# Patient Record
Sex: FEMALE | Race: WHITE | NOT HISPANIC OR LATINO | URBAN - METROPOLITAN AREA
[De-identification: names, ages, dates, MRNs, and addresses within clinical notes are randomized per-mention and may not be internally consistent; named-entity substitution may affect disease eponyms.]

---

## 2017-01-31 ENCOUNTER — OUTPATIENT (OUTPATIENT)
Dept: OUTPATIENT SERVICES | Facility: HOSPITAL | Age: 67
LOS: 1 days | Discharge: HOME | End: 2017-01-31

## 2017-01-31 DIAGNOSIS — D69.6 THROMBOCYTOPENIA, UNSPECIFIED: ICD-10-CM

## 2017-02-01 ENCOUNTER — RESULT REVIEW (OUTPATIENT)
Age: 67
End: 2017-02-01

## 2017-02-02 ENCOUNTER — APPOINTMENT (OUTPATIENT)
Dept: HEMATOLOGY ONCOLOGY | Facility: CLINIC | Age: 67
End: 2017-02-02

## 2017-02-02 VITALS
OXYGEN SATURATION: 100 % | BODY MASS INDEX: 40.15 KG/M2 | RESPIRATION RATE: 16 BRPM | HEART RATE: 74 BPM | DIASTOLIC BLOOD PRESSURE: 80 MMHG | SYSTOLIC BLOOD PRESSURE: 130 MMHG | WEIGHT: 246.92 LBS | TEMPERATURE: 98 F

## 2017-02-02 LAB
BASOPHILS # BLD AUTO: 0 K/UL — SIGNIFICANT CHANGE UP (ref 0–0.2)
BASOPHILS NFR BLD AUTO: 0.4 % — SIGNIFICANT CHANGE UP (ref 0–2)
EOSINOPHIL # BLD AUTO: 0.3 K/UL — SIGNIFICANT CHANGE UP (ref 0–0.5)
EOSINOPHIL NFR BLD AUTO: 4.1 % — SIGNIFICANT CHANGE UP (ref 0–6)
HCT VFR BLD CALC: 34.1 % — LOW (ref 34.5–45)
HGB BLD-MCNC: 10.9 G/DL — LOW (ref 11.5–15.5)
LYMPHOCYTES # BLD AUTO: 1.4 K/UL — SIGNIFICANT CHANGE UP (ref 1–3.3)
LYMPHOCYTES # BLD AUTO: 23 % — SIGNIFICANT CHANGE UP (ref 13–44)
MCHC RBC-ENTMCNC: 31.9 GM/DL — LOW (ref 32–36)
MCHC RBC-ENTMCNC: 33.1 PG — SIGNIFICANT CHANGE UP (ref 27–34)
MCV RBC AUTO: 104 FL — HIGH (ref 80–100)
MONOCYTES # BLD AUTO: 0.3 K/UL — SIGNIFICANT CHANGE UP (ref 0–0.9)
MONOCYTES NFR BLD AUTO: 5.5 % — SIGNIFICANT CHANGE UP (ref 2–14)
NEUTROPHILS # BLD AUTO: 4.2 K/UL — SIGNIFICANT CHANGE UP (ref 1.8–7.4)
NEUTROPHILS NFR BLD AUTO: 67 % — SIGNIFICANT CHANGE UP (ref 43–77)
PLATELET # BLD AUTO: 558 K/UL — HIGH (ref 150–400)
RBC # BLD: 3.29 M/UL — LOW (ref 3.8–5.2)
RBC # FLD: 19.2 % — HIGH (ref 10.3–14.5)
WBC # BLD: 6.3 K/UL — SIGNIFICANT CHANGE UP (ref 3.8–10.5)
WBC # FLD AUTO: 6.3 K/UL — SIGNIFICANT CHANGE UP (ref 3.8–10.5)

## 2017-02-28 ENCOUNTER — OUTPATIENT (OUTPATIENT)
Dept: OUTPATIENT SERVICES | Facility: HOSPITAL | Age: 67
LOS: 1 days | Discharge: HOME | End: 2017-02-28

## 2017-02-28 DIAGNOSIS — D69.6 THROMBOCYTOPENIA, UNSPECIFIED: ICD-10-CM

## 2017-03-01 ENCOUNTER — RESULT REVIEW (OUTPATIENT)
Age: 67
End: 2017-03-01

## 2017-03-02 ENCOUNTER — APPOINTMENT (OUTPATIENT)
Dept: HEMATOLOGY ONCOLOGY | Facility: CLINIC | Age: 67
End: 2017-03-02

## 2017-03-02 VITALS
WEIGHT: 242.06 LBS | DIASTOLIC BLOOD PRESSURE: 82 MMHG | BODY MASS INDEX: 39.36 KG/M2 | HEART RATE: 68 BPM | TEMPERATURE: 98.8 F | SYSTOLIC BLOOD PRESSURE: 131 MMHG | RESPIRATION RATE: 16 BRPM | OXYGEN SATURATION: 96 %

## 2017-03-02 LAB
BASOPHILS # BLD AUTO: 0 K/UL — SIGNIFICANT CHANGE UP (ref 0–0.2)
BASOPHILS NFR BLD AUTO: 0.2 % — SIGNIFICANT CHANGE UP (ref 0–2)
EOSINOPHIL # BLD AUTO: 0.2 K/UL — SIGNIFICANT CHANGE UP (ref 0–0.5)
EOSINOPHIL NFR BLD AUTO: 3.6 % — SIGNIFICANT CHANGE UP (ref 0–6)
HCT VFR BLD CALC: 30.9 % — LOW (ref 34.5–45)
HGB BLD-MCNC: 10.5 G/DL — LOW (ref 11.5–15.5)
LYMPHOCYTES # BLD AUTO: 1.4 K/UL — SIGNIFICANT CHANGE UP (ref 1–3.3)
LYMPHOCYTES # BLD AUTO: 28 % — SIGNIFICANT CHANGE UP (ref 13–44)
MCHC RBC-ENTMCNC: 33.9 G/DL — SIGNIFICANT CHANGE UP (ref 32–36)
MCHC RBC-ENTMCNC: 35 PG — HIGH (ref 27–34)
MCV RBC AUTO: 103 FL — HIGH (ref 80–100)
MONOCYTES # BLD AUTO: 0.3 K/UL — SIGNIFICANT CHANGE UP (ref 0–0.9)
MONOCYTES NFR BLD AUTO: 5.3 % — SIGNIFICANT CHANGE UP (ref 2–14)
NEUTROPHILS # BLD AUTO: 3.1 K/UL — SIGNIFICANT CHANGE UP (ref 1.8–7.4)
NEUTROPHILS NFR BLD AUTO: 62.9 % — SIGNIFICANT CHANGE UP (ref 43–77)
PLATELET # BLD AUTO: 413 K/UL — HIGH (ref 150–400)
RBC # BLD: 2.99 M/UL — LOW (ref 3.8–5.2)
RBC # FLD: 18.8 % — HIGH (ref 10.3–14.5)
WBC # BLD: 4.9 K/UL — SIGNIFICANT CHANGE UP (ref 3.8–10.5)
WBC # FLD AUTO: 4.9 K/UL — SIGNIFICANT CHANGE UP (ref 3.8–10.5)

## 2017-04-03 ENCOUNTER — FORM ENCOUNTER (OUTPATIENT)
Age: 67
End: 2017-04-03

## 2017-04-04 ENCOUNTER — OUTPATIENT (OUTPATIENT)
Dept: OUTPATIENT SERVICES | Facility: HOSPITAL | Age: 67
LOS: 1 days | End: 2017-04-04
Payer: COMMERCIAL

## 2017-04-04 ENCOUNTER — APPOINTMENT (OUTPATIENT)
Dept: ULTRASOUND IMAGING | Facility: IMAGING CENTER | Age: 67
End: 2017-04-04

## 2017-04-04 DIAGNOSIS — D75.81 MYELOFIBROSIS: ICD-10-CM

## 2017-04-04 PROCEDURE — 76700 US EXAM ABDOM COMPLETE: CPT

## 2017-04-07 ENCOUNTER — OUTPATIENT (OUTPATIENT)
Dept: OUTPATIENT SERVICES | Facility: HOSPITAL | Age: 67
LOS: 1 days | Discharge: HOME | End: 2017-04-07

## 2017-04-07 DIAGNOSIS — D69.6 THROMBOCYTOPENIA, UNSPECIFIED: ICD-10-CM

## 2017-04-10 ENCOUNTER — RESULT REVIEW (OUTPATIENT)
Age: 67
End: 2017-04-10

## 2017-04-11 ENCOUNTER — LABORATORY RESULT (OUTPATIENT)
Age: 67
End: 2017-04-11

## 2017-04-11 ENCOUNTER — APPOINTMENT (OUTPATIENT)
Dept: HEMATOLOGY ONCOLOGY | Facility: CLINIC | Age: 67
End: 2017-04-11

## 2017-04-11 VITALS
SYSTOLIC BLOOD PRESSURE: 127 MMHG | HEART RATE: 82 BPM | BODY MASS INDEX: 39.51 KG/M2 | TEMPERATURE: 98.2 F | WEIGHT: 242.95 LBS | OXYGEN SATURATION: 94 % | RESPIRATION RATE: 16 BRPM | DIASTOLIC BLOOD PRESSURE: 76 MMHG

## 2017-04-11 LAB
BASOPHILS # BLD AUTO: 0 K/UL — SIGNIFICANT CHANGE UP (ref 0–0.2)
BASOPHILS NFR BLD AUTO: 0.1 % — SIGNIFICANT CHANGE UP (ref 0–2)
EOSINOPHIL # BLD AUTO: 0.2 K/UL — SIGNIFICANT CHANGE UP (ref 0–0.5)
EOSINOPHIL NFR BLD AUTO: 2.6 % — SIGNIFICANT CHANGE UP (ref 0–6)
HCT VFR BLD CALC: 30.9 % — LOW (ref 34.5–45)
HGB BLD-MCNC: 10.6 G/DL — LOW (ref 11.5–15.5)
LYMPHOCYTES # BLD AUTO: 1.1 K/UL — SIGNIFICANT CHANGE UP (ref 1–3.3)
LYMPHOCYTES # BLD AUTO: 15 % — SIGNIFICANT CHANGE UP (ref 13–44)
MCHC RBC-ENTMCNC: 34.3 G/DL — SIGNIFICANT CHANGE UP (ref 32–36)
MCHC RBC-ENTMCNC: 37 PG — HIGH (ref 27–34)
MCV RBC AUTO: 108 FL — HIGH (ref 80–100)
MONOCYTES # BLD AUTO: 0.3 K/UL — SIGNIFICANT CHANGE UP (ref 0–0.9)
MONOCYTES NFR BLD AUTO: 3.6 % — SIGNIFICANT CHANGE UP (ref 2–14)
NEUTROPHILS # BLD AUTO: 5.6 K/UL — SIGNIFICANT CHANGE UP (ref 1.8–7.4)
NEUTROPHILS NFR BLD AUTO: 78.7 % — HIGH (ref 43–77)
PLATELET # BLD AUTO: 504 K/UL — HIGH (ref 150–400)
RBC # BLD: 2.87 M/UL — LOW (ref 3.8–5.2)
RBC # FLD: 17.8 % — HIGH (ref 10.3–14.5)
WBC # BLD: 7.2 K/UL — SIGNIFICANT CHANGE UP (ref 3.8–10.5)
WBC # FLD AUTO: 7.2 K/UL — SIGNIFICANT CHANGE UP (ref 3.8–10.5)

## 2017-05-24 ENCOUNTER — OUTPATIENT (OUTPATIENT)
Dept: OUTPATIENT SERVICES | Facility: HOSPITAL | Age: 67
LOS: 1 days | Discharge: HOME | End: 2017-05-24

## 2017-05-24 DIAGNOSIS — D69.6 THROMBOCYTOPENIA, UNSPECIFIED: ICD-10-CM

## 2017-05-25 ENCOUNTER — LABORATORY RESULT (OUTPATIENT)
Age: 67
End: 2017-05-25

## 2017-05-25 ENCOUNTER — APPOINTMENT (OUTPATIENT)
Dept: HEMATOLOGY ONCOLOGY | Facility: CLINIC | Age: 67
End: 2017-05-25

## 2017-05-25 ENCOUNTER — RESULT REVIEW (OUTPATIENT)
Age: 67
End: 2017-05-25

## 2017-05-25 VITALS
BODY MASS INDEX: 39.54 KG/M2 | HEART RATE: 76 BPM | SYSTOLIC BLOOD PRESSURE: 137 MMHG | RESPIRATION RATE: 16 BRPM | OXYGEN SATURATION: 99 % | DIASTOLIC BLOOD PRESSURE: 78 MMHG | TEMPERATURE: 99.1 F | WEIGHT: 243.17 LBS

## 2017-05-25 LAB
BASOPHILS # BLD AUTO: 0 K/UL — SIGNIFICANT CHANGE UP (ref 0–0.2)
BASOPHILS NFR BLD AUTO: 0.4 % — SIGNIFICANT CHANGE UP (ref 0–2)
EOSINOPHIL # BLD AUTO: 0.1 K/UL — SIGNIFICANT CHANGE UP (ref 0–0.5)
EOSINOPHIL NFR BLD AUTO: 2.6 % — SIGNIFICANT CHANGE UP (ref 0–6)
HCT VFR BLD CALC: 30.4 % — LOW (ref 34.5–45)
HGB BLD-MCNC: 10.5 G/DL — LOW (ref 11.5–15.5)
LYMPHOCYTES # BLD AUTO: 1.4 K/UL — SIGNIFICANT CHANGE UP (ref 1–3.3)
LYMPHOCYTES # BLD AUTO: 28 % — SIGNIFICANT CHANGE UP (ref 13–44)
MCHC RBC-ENTMCNC: 34.4 G/DL — SIGNIFICANT CHANGE UP (ref 32–36)
MCHC RBC-ENTMCNC: 38.2 PG — HIGH (ref 27–34)
MCV RBC AUTO: 111 FL — HIGH (ref 80–100)
MONOCYTES # BLD AUTO: 0.3 K/UL — SIGNIFICANT CHANGE UP (ref 0–0.9)
MONOCYTES NFR BLD AUTO: 5.4 % — SIGNIFICANT CHANGE UP (ref 2–14)
NEUTROPHILS # BLD AUTO: 3.2 K/UL — SIGNIFICANT CHANGE UP (ref 1.8–7.4)
NEUTROPHILS NFR BLD AUTO: 63.7 % — SIGNIFICANT CHANGE UP (ref 43–77)
PLATELET # BLD AUTO: 408 K/UL — HIGH (ref 150–400)
RBC # BLD: 2.74 M/UL — LOW (ref 3.8–5.2)
RBC # FLD: 15 % — HIGH (ref 10.3–14.5)
WBC # BLD: 5 K/UL — SIGNIFICANT CHANGE UP (ref 3.8–10.5)
WBC # FLD AUTO: 5 K/UL — SIGNIFICANT CHANGE UP (ref 3.8–10.5)

## 2017-05-26 LAB
ALBUMIN SERPL ELPH-MCNC: 3.6 G/DL
ALP BLD-CCNC: 74 U/L
ALT SERPL-CCNC: 12 U/L
ANION GAP SERPL CALC-SCNC: 15 MMOL/L
AST SERPL-CCNC: 12 U/L
BILIRUB SERPL-MCNC: 0.3 MG/DL
BUN SERPL-MCNC: 19 MG/DL
CALCIUM SERPL-MCNC: 9 MG/DL
CHLORIDE SERPL-SCNC: 103 MMOL/L
CO2 SERPL-SCNC: 24 MMOL/L
CREAT SERPL-MCNC: 0.85 MG/DL
FOLATE SERPL-MCNC: 17.8 NG/ML
GLUCOSE SERPL-MCNC: 98 MG/DL
LDH SERPL-CCNC: 232 U/L
POTASSIUM SERPL-SCNC: 4.4 MMOL/L
PROT SERPL-MCNC: 6.7 G/DL
SODIUM SERPL-SCNC: 142 MMOL/L
URATE SERPL-MCNC: 5.3 MG/DL
VIT B12 SERPL-MCNC: 821 PG/ML

## 2017-06-29 ENCOUNTER — OUTPATIENT (OUTPATIENT)
Dept: OUTPATIENT SERVICES | Facility: HOSPITAL | Age: 67
LOS: 1 days | Discharge: HOME | End: 2017-06-29

## 2017-06-29 DIAGNOSIS — D69.6 THROMBOCYTOPENIA, UNSPECIFIED: ICD-10-CM

## 2017-07-06 ENCOUNTER — RESULT REVIEW (OUTPATIENT)
Age: 67
End: 2017-07-06

## 2017-07-06 ENCOUNTER — APPOINTMENT (OUTPATIENT)
Dept: HEMATOLOGY ONCOLOGY | Facility: CLINIC | Age: 67
End: 2017-07-06

## 2017-07-06 VITALS
BODY MASS INDEX: 39.87 KG/M2 | DIASTOLIC BLOOD PRESSURE: 81 MMHG | HEART RATE: 75 BPM | WEIGHT: 245.15 LBS | TEMPERATURE: 98.6 F | RESPIRATION RATE: 16 BRPM | SYSTOLIC BLOOD PRESSURE: 152 MMHG | OXYGEN SATURATION: 96 %

## 2017-07-06 LAB
BASOPHILS # BLD AUTO: 0 K/UL — SIGNIFICANT CHANGE UP (ref 0–0.2)
BASOPHILS NFR BLD AUTO: 0.5 % — SIGNIFICANT CHANGE UP (ref 0–2)
EOSINOPHIL # BLD AUTO: 0.1 K/UL — SIGNIFICANT CHANGE UP (ref 0–0.5)
EOSINOPHIL NFR BLD AUTO: 2.4 % — SIGNIFICANT CHANGE UP (ref 0–6)
HCT VFR BLD CALC: 31.9 % — LOW (ref 34.5–45)
HGB BLD-MCNC: 10.8 G/DL — LOW (ref 11.5–15.5)
LYMPHOCYTES # BLD AUTO: 1.4 K/UL — SIGNIFICANT CHANGE UP (ref 1–3.3)
LYMPHOCYTES # BLD AUTO: 25.4 % — SIGNIFICANT CHANGE UP (ref 13–44)
MCHC RBC-ENTMCNC: 33.9 G/DL — SIGNIFICANT CHANGE UP (ref 32–36)
MCHC RBC-ENTMCNC: 37.7 PG — HIGH (ref 27–34)
MCV RBC AUTO: 111 FL — HIGH (ref 80–100)
MONOCYTES # BLD AUTO: 0.3 K/UL — SIGNIFICANT CHANGE UP (ref 0–0.9)
MONOCYTES NFR BLD AUTO: 5.4 % — SIGNIFICANT CHANGE UP (ref 2–14)
NEUTROPHILS # BLD AUTO: 3.7 K/UL — SIGNIFICANT CHANGE UP (ref 1.8–7.4)
NEUTROPHILS NFR BLD AUTO: 66.4 % — SIGNIFICANT CHANGE UP (ref 43–77)
PLATELET # BLD AUTO: 450 K/UL — HIGH (ref 150–400)
RBC # BLD: 2.87 M/UL — LOW (ref 3.8–5.2)
RBC # FLD: 14.6 % — HIGH (ref 10.3–14.5)
WBC # BLD: 5.6 K/UL — SIGNIFICANT CHANGE UP (ref 3.8–10.5)
WBC # FLD AUTO: 5.6 K/UL — SIGNIFICANT CHANGE UP (ref 3.8–10.5)

## 2017-08-06 LAB
ALBUMIN SERPL ELPH-MCNC: 3.9 G/DL
ALP BLD-CCNC: 77 U/L
ALT SERPL-CCNC: 17 U/L
ANION GAP SERPL CALC-SCNC: 14 MMOL/L
AST SERPL-CCNC: 17 U/L
BILIRUB SERPL-MCNC: 0.4 MG/DL
BUN SERPL-MCNC: 12 MG/DL
CALCIUM SERPL-MCNC: 9.4 MG/DL
CHLORIDE SERPL-SCNC: 104 MMOL/L
CO2 SERPL-SCNC: 25 MMOL/L
CREAT SERPL-MCNC: 0.8 MG/DL
GLUCOSE SERPL-MCNC: 90 MG/DL
LDH SERPL-CCNC: 247 U/L
POTASSIUM SERPL-SCNC: 4.1 MMOL/L
PROT SERPL-MCNC: 6.6 G/DL
SODIUM SERPL-SCNC: 143 MMOL/L

## 2017-08-14 ENCOUNTER — OUTPATIENT (OUTPATIENT)
Dept: OUTPATIENT SERVICES | Facility: HOSPITAL | Age: 67
LOS: 1 days | Discharge: HOME | End: 2017-08-14

## 2017-08-14 DIAGNOSIS — D69.6 THROMBOCYTOPENIA, UNSPECIFIED: ICD-10-CM

## 2017-09-12 ENCOUNTER — LABORATORY RESULT (OUTPATIENT)
Age: 67
End: 2017-09-12

## 2017-09-12 ENCOUNTER — RESULT REVIEW (OUTPATIENT)
Age: 67
End: 2017-09-12

## 2017-09-12 ENCOUNTER — APPOINTMENT (OUTPATIENT)
Dept: HEMATOLOGY ONCOLOGY | Facility: CLINIC | Age: 67
End: 2017-09-12
Payer: MEDICARE

## 2017-09-12 ENCOUNTER — OUTPATIENT (OUTPATIENT)
Dept: OUTPATIENT SERVICES | Facility: HOSPITAL | Age: 67
LOS: 1 days | Discharge: HOME | End: 2017-09-12

## 2017-09-12 VITALS
SYSTOLIC BLOOD PRESSURE: 130 MMHG | TEMPERATURE: 98.5 F | DIASTOLIC BLOOD PRESSURE: 78 MMHG | BODY MASS INDEX: 41.31 KG/M2 | OXYGEN SATURATION: 97 % | RESPIRATION RATE: 16 BRPM | WEIGHT: 244.93 LBS | HEART RATE: 75 BPM | HEIGHT: 64.65 IN

## 2017-09-12 DIAGNOSIS — D69.6 THROMBOCYTOPENIA, UNSPECIFIED: ICD-10-CM

## 2017-09-12 LAB
BASOPHILS # BLD AUTO: 0 K/UL — SIGNIFICANT CHANGE UP (ref 0–0.2)
BASOPHILS NFR BLD AUTO: 0.2 % — SIGNIFICANT CHANGE UP (ref 0–2)
EOSINOPHIL # BLD AUTO: 0.2 K/UL — SIGNIFICANT CHANGE UP (ref 0–0.5)
EOSINOPHIL NFR BLD AUTO: 2.8 % — SIGNIFICANT CHANGE UP (ref 0–6)
HCT VFR BLD CALC: 32.8 % — LOW (ref 34.5–45)
HGB BLD-MCNC: 10.6 G/DL — LOW (ref 11.5–15.5)
LYMPHOCYTES # BLD AUTO: 1.4 K/UL — SIGNIFICANT CHANGE UP (ref 1–3.3)
LYMPHOCYTES # BLD AUTO: 23.2 % — SIGNIFICANT CHANGE UP (ref 13–44)
MCHC RBC-ENTMCNC: 32.2 G/DL — SIGNIFICANT CHANGE UP (ref 32–36)
MCHC RBC-ENTMCNC: 35.1 PG — HIGH (ref 27–34)
MCV RBC AUTO: 109 FL — HIGH (ref 80–100)
MONOCYTES # BLD AUTO: 0.3 K/UL — SIGNIFICANT CHANGE UP (ref 0–0.9)
MONOCYTES NFR BLD AUTO: 5.3 % — SIGNIFICANT CHANGE UP (ref 2–14)
NEUTROPHILS # BLD AUTO: 4.2 K/UL — SIGNIFICANT CHANGE UP (ref 1.8–7.4)
NEUTROPHILS NFR BLD AUTO: 68.5 % — SIGNIFICANT CHANGE UP (ref 43–77)
PLATELET # BLD AUTO: 437 K/UL — HIGH (ref 150–400)
RBC # BLD: 3.02 M/UL — LOW (ref 3.8–5.2)
RBC # FLD: 17 % — HIGH (ref 10.3–14.5)
WBC # BLD: 6.2 K/UL — SIGNIFICANT CHANGE UP (ref 3.8–10.5)
WBC # FLD AUTO: 6.2 K/UL — SIGNIFICANT CHANGE UP (ref 3.8–10.5)

## 2017-09-12 PROCEDURE — 99214 OFFICE O/P EST MOD 30 MIN: CPT

## 2017-12-13 ENCOUNTER — OUTPATIENT (OUTPATIENT)
Dept: OUTPATIENT SERVICES | Facility: HOSPITAL | Age: 67
LOS: 1 days | Discharge: HOME | End: 2017-12-13

## 2017-12-13 DIAGNOSIS — D69.6 THROMBOCYTOPENIA, UNSPECIFIED: ICD-10-CM

## 2017-12-19 ENCOUNTER — APPOINTMENT (OUTPATIENT)
Dept: HEMATOLOGY ONCOLOGY | Facility: CLINIC | Age: 67
End: 2017-12-19
Payer: MEDICARE

## 2017-12-19 ENCOUNTER — RESULT REVIEW (OUTPATIENT)
Age: 67
End: 2017-12-19

## 2017-12-19 VITALS
BODY MASS INDEX: 41.5 KG/M2 | SYSTOLIC BLOOD PRESSURE: 147 MMHG | WEIGHT: 246.7 LBS | RESPIRATION RATE: 16 BRPM | HEART RATE: 76 BPM | TEMPERATURE: 98.2 F | OXYGEN SATURATION: 100 % | DIASTOLIC BLOOD PRESSURE: 74 MMHG

## 2017-12-19 LAB
BASOPHILS # BLD AUTO: 0.1 K/UL — SIGNIFICANT CHANGE UP (ref 0–0.2)
BASOPHILS NFR BLD AUTO: 0.9 % — SIGNIFICANT CHANGE UP (ref 0–2)
EOSINOPHIL # BLD AUTO: 0.2 K/UL — SIGNIFICANT CHANGE UP (ref 0–0.5)
EOSINOPHIL NFR BLD AUTO: 2.7 % — SIGNIFICANT CHANGE UP (ref 0–6)
HCT VFR BLD CALC: 32.3 % — LOW (ref 34.5–45)
HGB BLD-MCNC: 10.8 G/DL — LOW (ref 11.5–15.5)
LYMPHOCYTES # BLD AUTO: 1.3 K/UL — SIGNIFICANT CHANGE UP (ref 1–3.3)
LYMPHOCYTES # BLD AUTO: 20.5 % — SIGNIFICANT CHANGE UP (ref 13–44)
MCHC RBC-ENTMCNC: 33.3 G/DL — SIGNIFICANT CHANGE UP (ref 32–36)
MCHC RBC-ENTMCNC: 35.8 PG — HIGH (ref 27–34)
MCV RBC AUTO: 107 FL — HIGH (ref 80–100)
MONOCYTES # BLD AUTO: 0.3 K/UL — SIGNIFICANT CHANGE UP (ref 0–0.9)
MONOCYTES NFR BLD AUTO: 4 % — SIGNIFICANT CHANGE UP (ref 2–14)
NEUTROPHILS # BLD AUTO: 4.6 K/UL — SIGNIFICANT CHANGE UP (ref 1.8–7.4)
NEUTROPHILS NFR BLD AUTO: 71.9 % — SIGNIFICANT CHANGE UP (ref 43–77)
PLATELET # BLD AUTO: 455 K/UL — HIGH (ref 150–400)
RBC # BLD: 3.02 M/UL — LOW (ref 3.8–5.2)
RBC # FLD: 16.4 % — HIGH (ref 10.3–14.5)
WBC # BLD: 6.4 K/UL — SIGNIFICANT CHANGE UP (ref 3.8–10.5)
WBC # FLD AUTO: 6.4 K/UL — SIGNIFICANT CHANGE UP (ref 3.8–10.5)

## 2017-12-19 PROCEDURE — 99213 OFFICE O/P EST LOW 20 MIN: CPT

## 2018-04-03 ENCOUNTER — OUTPATIENT (OUTPATIENT)
Dept: OUTPATIENT SERVICES | Facility: HOSPITAL | Age: 68
LOS: 1 days | Discharge: HOME | End: 2018-04-03

## 2018-04-03 DIAGNOSIS — D69.6 THROMBOCYTOPENIA, UNSPECIFIED: ICD-10-CM

## 2018-04-05 ENCOUNTER — APPOINTMENT (OUTPATIENT)
Dept: HEMATOLOGY ONCOLOGY | Facility: CLINIC | Age: 68
End: 2018-04-05

## 2018-06-20 ENCOUNTER — OUTPATIENT (OUTPATIENT)
Dept: OUTPATIENT SERVICES | Facility: HOSPITAL | Age: 68
LOS: 1 days | Discharge: HOME | End: 2018-06-20

## 2018-06-20 DIAGNOSIS — D69.6 THROMBOCYTOPENIA, UNSPECIFIED: ICD-10-CM

## 2018-06-21 ENCOUNTER — RESULT REVIEW (OUTPATIENT)
Age: 68
End: 2018-06-21

## 2018-06-21 ENCOUNTER — APPOINTMENT (OUTPATIENT)
Dept: HEMATOLOGY ONCOLOGY | Facility: CLINIC | Age: 68
End: 2018-06-21
Payer: MEDICARE

## 2018-06-21 VITALS
SYSTOLIC BLOOD PRESSURE: 130 MMHG | RESPIRATION RATE: 16 BRPM | BODY MASS INDEX: 39.5 KG/M2 | TEMPERATURE: 98 F | HEART RATE: 76 BPM | WEIGHT: 234.79 LBS | DIASTOLIC BLOOD PRESSURE: 70 MMHG

## 2018-06-21 DIAGNOSIS — F32.9 MAJOR DEPRESSIVE DISORDER, SINGLE EPISODE, UNSPECIFIED: ICD-10-CM

## 2018-06-21 LAB
BASOPHILS # BLD AUTO: 0 K/UL — SIGNIFICANT CHANGE UP (ref 0–0.2)
BASOPHILS NFR BLD AUTO: 0.3 % — SIGNIFICANT CHANGE UP (ref 0–2)
EOSINOPHIL # BLD AUTO: 0.2 K/UL — SIGNIFICANT CHANGE UP (ref 0–0.5)
EOSINOPHIL NFR BLD AUTO: 2.5 % — SIGNIFICANT CHANGE UP (ref 0–6)
HCT VFR BLD CALC: 36.5 % — SIGNIFICANT CHANGE UP (ref 34.5–45)
HGB BLD-MCNC: 11.8 G/DL — SIGNIFICANT CHANGE UP (ref 11.5–15.5)
LYMPHOCYTES # BLD AUTO: 2 K/UL — SIGNIFICANT CHANGE UP (ref 1–3.3)
LYMPHOCYTES # BLD AUTO: 21.1 % — SIGNIFICANT CHANGE UP (ref 13–44)
MCHC RBC-ENTMCNC: 32.4 G/DL — SIGNIFICANT CHANGE UP (ref 32–36)
MCHC RBC-ENTMCNC: 34.8 PG — HIGH (ref 27–34)
MCV RBC AUTO: 107 FL — HIGH (ref 80–100)
MONOCYTES # BLD AUTO: 0.4 K/UL — SIGNIFICANT CHANGE UP (ref 0–0.9)
MONOCYTES NFR BLD AUTO: 4.3 % — SIGNIFICANT CHANGE UP (ref 2–14)
NEUTROPHILS # BLD AUTO: 6.7 K/UL — SIGNIFICANT CHANGE UP (ref 1.8–7.4)
NEUTROPHILS NFR BLD AUTO: 71.8 % — SIGNIFICANT CHANGE UP (ref 43–77)
PLATELET # BLD AUTO: 812 K/UL — HIGH (ref 150–400)
RBC # BLD: 3.4 M/UL — LOW (ref 3.8–5.2)
RBC # FLD: 18.1 % — HIGH (ref 10.3–14.5)
WBC # BLD: 9.3 K/UL — SIGNIFICANT CHANGE UP (ref 3.8–10.5)
WBC # FLD AUTO: 9.3 K/UL — SIGNIFICANT CHANGE UP (ref 3.8–10.5)

## 2018-06-21 PROCEDURE — 99213 OFFICE O/P EST LOW 20 MIN: CPT

## 2018-07-02 LAB
ALBUMIN SERPL ELPH-MCNC: 3.7 G/DL
ALP BLD-CCNC: 72 U/L
ALT SERPL-CCNC: 13 U/L
ANION GAP SERPL CALC-SCNC: 13 MMOL/L
AST SERPL-CCNC: 18 U/L
BILIRUB SERPL-MCNC: 0.4 MG/DL
BUN SERPL-MCNC: 13 MG/DL
CALCIUM SERPL-MCNC: 9.3 MG/DL
CHLORIDE SERPL-SCNC: 103 MMOL/L
CO2 SERPL-SCNC: 25 MMOL/L
CREAT SERPL-MCNC: 0.95 MG/DL
GLUCOSE SERPL-MCNC: 84 MG/DL
LDH SERPL-CCNC: 337 U/L
POTASSIUM SERPL-SCNC: 5.8 MMOL/L
PROT SERPL-MCNC: 7 G/DL
SODIUM SERPL-SCNC: 141 MMOL/L
URATE SERPL-MCNC: 4.8 MG/DL

## 2018-07-19 ENCOUNTER — OUTPATIENT (OUTPATIENT)
Dept: OUTPATIENT SERVICES | Facility: HOSPITAL | Age: 68
LOS: 1 days | Discharge: HOME | End: 2018-07-19

## 2018-07-19 DIAGNOSIS — D69.6 THROMBOCYTOPENIA, UNSPECIFIED: ICD-10-CM

## 2018-07-26 ENCOUNTER — RESULT REVIEW (OUTPATIENT)
Age: 68
End: 2018-07-26

## 2018-07-26 ENCOUNTER — LABORATORY RESULT (OUTPATIENT)
Age: 68
End: 2018-07-26

## 2018-07-26 ENCOUNTER — APPOINTMENT (OUTPATIENT)
Dept: HEMATOLOGY ONCOLOGY | Facility: CLINIC | Age: 68
End: 2018-07-26
Payer: MEDICARE

## 2018-07-26 VITALS
DIASTOLIC BLOOD PRESSURE: 70 MMHG | SYSTOLIC BLOOD PRESSURE: 130 MMHG | BODY MASS INDEX: 40.42 KG/M2 | OXYGEN SATURATION: 99 % | WEIGHT: 240.3 LBS | HEART RATE: 83 BPM | TEMPERATURE: 98.5 F | RESPIRATION RATE: 18 BRPM

## 2018-07-26 LAB
BASOPHILS # BLD AUTO: 0 K/UL — SIGNIFICANT CHANGE UP (ref 0–0.2)
BASOPHILS NFR BLD AUTO: 0.4 % — SIGNIFICANT CHANGE UP (ref 0–2)
EOSINOPHIL # BLD AUTO: 0.2 K/UL — SIGNIFICANT CHANGE UP (ref 0–0.5)
EOSINOPHIL NFR BLD AUTO: 3.4 % — SIGNIFICANT CHANGE UP (ref 0–6)
HCT VFR BLD CALC: 31.2 % — LOW (ref 34.5–45)
HGB BLD-MCNC: 10.2 G/DL — LOW (ref 11.5–15.5)
LYMPHOCYTES # BLD AUTO: 0.9 K/UL — LOW (ref 1–3.3)
LYMPHOCYTES # BLD AUTO: 16.3 % — SIGNIFICANT CHANGE UP (ref 13–44)
MCHC RBC-ENTMCNC: 32.6 G/DL — SIGNIFICANT CHANGE UP (ref 32–36)
MCHC RBC-ENTMCNC: 35.3 PG — HIGH (ref 27–34)
MCV RBC AUTO: 108 FL — HIGH (ref 80–100)
MONOCYTES # BLD AUTO: 0.2 K/UL — SIGNIFICANT CHANGE UP (ref 0–0.9)
MONOCYTES NFR BLD AUTO: 3.6 % — SIGNIFICANT CHANGE UP (ref 2–14)
NEUTROPHILS # BLD AUTO: 4.4 K/UL — SIGNIFICANT CHANGE UP (ref 1.8–7.4)
NEUTROPHILS NFR BLD AUTO: 76.3 % — SIGNIFICANT CHANGE UP (ref 43–77)
PLATELET # BLD AUTO: 512 K/UL — HIGH (ref 150–400)
RBC # BLD: 2.88 M/UL — LOW (ref 3.8–5.2)
RBC # FLD: 18.2 % — HIGH (ref 10.3–14.5)
WBC # BLD: 5.8 K/UL — SIGNIFICANT CHANGE UP (ref 3.8–10.5)
WBC # FLD AUTO: 5.8 K/UL — SIGNIFICANT CHANGE UP (ref 3.8–10.5)

## 2018-07-26 PROCEDURE — 99213 OFFICE O/P EST LOW 20 MIN: CPT

## 2018-08-15 ENCOUNTER — OUTPATIENT (OUTPATIENT)
Dept: OUTPATIENT SERVICES | Facility: HOSPITAL | Age: 68
LOS: 1 days | Discharge: HOME | End: 2018-08-15

## 2018-08-15 DIAGNOSIS — D69.6 THROMBOCYTOPENIA, UNSPECIFIED: ICD-10-CM

## 2018-08-22 ENCOUNTER — RESULT REVIEW (OUTPATIENT)
Age: 68
End: 2018-08-22

## 2018-08-22 ENCOUNTER — APPOINTMENT (OUTPATIENT)
Dept: HEMATOLOGY ONCOLOGY | Facility: CLINIC | Age: 68
End: 2018-08-22

## 2018-08-22 LAB
BASOPHILS # BLD AUTO: 0 K/UL — SIGNIFICANT CHANGE UP (ref 0–0.2)
BASOPHILS NFR BLD AUTO: 0.2 % — SIGNIFICANT CHANGE UP (ref 0–2)
EOSINOPHIL # BLD AUTO: 0.4 K/UL — SIGNIFICANT CHANGE UP (ref 0–0.5)
EOSINOPHIL NFR BLD AUTO: 5.3 % — SIGNIFICANT CHANGE UP (ref 0–6)
HCT VFR BLD CALC: 32.2 % — LOW (ref 34.5–45)
HGB BLD-MCNC: 10.7 G/DL — LOW (ref 11.5–15.5)
LYMPHOCYTES # BLD AUTO: 1.5 K/UL — SIGNIFICANT CHANGE UP (ref 1–3.3)
LYMPHOCYTES # BLD AUTO: 18.9 % — SIGNIFICANT CHANGE UP (ref 13–44)
MCHC RBC-ENTMCNC: 33.2 G/DL — SIGNIFICANT CHANGE UP (ref 32–36)
MCHC RBC-ENTMCNC: 34.9 PG — HIGH (ref 27–34)
MCV RBC AUTO: 105 FL — HIGH (ref 80–100)
MONOCYTES # BLD AUTO: 0.2 K/UL — SIGNIFICANT CHANGE UP (ref 0–0.9)
MONOCYTES NFR BLD AUTO: 2.5 % — SIGNIFICANT CHANGE UP (ref 2–14)
NEUTROPHILS # BLD AUTO: 5.8 K/UL — SIGNIFICANT CHANGE UP (ref 1.8–7.4)
NEUTROPHILS NFR BLD AUTO: 73 % — SIGNIFICANT CHANGE UP (ref 43–77)
PLATELET # BLD AUTO: 594 K/UL — HIGH (ref 150–400)
RBC # BLD: 3.06 M/UL — LOW (ref 3.8–5.2)
RBC # FLD: 17.8 % — HIGH (ref 10.3–14.5)
WBC # BLD: 8 K/UL — SIGNIFICANT CHANGE UP (ref 3.8–10.5)
WBC # FLD AUTO: 8 K/UL — SIGNIFICANT CHANGE UP (ref 3.8–10.5)

## 2018-09-11 LAB
ALBUMIN SERPL ELPH-MCNC: 3.9 G/DL
ALP BLD-CCNC: 76 U/L
ALT SERPL-CCNC: 10 U/L
ANION GAP SERPL CALC-SCNC: 15 MMOL/L
AST SERPL-CCNC: 16 U/L
BILIRUB SERPL-MCNC: 0.4 MG/DL
BUN SERPL-MCNC: 12 MG/DL
CALCIUM SERPL-MCNC: 8.7 MG/DL
CHLORIDE SERPL-SCNC: 101 MMOL/L
CO2 SERPL-SCNC: 26 MMOL/L
CREAT SERPL-MCNC: 0.85 MG/DL
GLUCOSE SERPL-MCNC: 109 MG/DL
LDH SERPL-CCNC: 305 U/L
POTASSIUM SERPL-SCNC: 4.2 MMOL/L
PROT SERPL-MCNC: 6.4 G/DL
SODIUM SERPL-SCNC: 142 MMOL/L
URATE SERPL-MCNC: 4.4 MG/DL

## 2018-09-26 ENCOUNTER — OUTPATIENT (OUTPATIENT)
Dept: OUTPATIENT SERVICES | Facility: HOSPITAL | Age: 68
LOS: 1 days | Discharge: HOME | End: 2018-09-26

## 2018-09-26 DIAGNOSIS — D69.6 THROMBOCYTOPENIA, UNSPECIFIED: ICD-10-CM

## 2018-10-04 ENCOUNTER — APPOINTMENT (OUTPATIENT)
Dept: HEMATOLOGY ONCOLOGY | Facility: CLINIC | Age: 68
End: 2018-10-04
Payer: MEDICARE

## 2018-10-04 ENCOUNTER — RESULT REVIEW (OUTPATIENT)
Age: 68
End: 2018-10-04

## 2018-10-04 VITALS
SYSTOLIC BLOOD PRESSURE: 139 MMHG | DIASTOLIC BLOOD PRESSURE: 86 MMHG | BODY MASS INDEX: 39.87 KG/M2 | HEART RATE: 94 BPM | WEIGHT: 237 LBS | OXYGEN SATURATION: 99 % | TEMPERATURE: 97.7 F | RESPIRATION RATE: 17 BRPM

## 2018-10-04 LAB
BASOPHILS # BLD AUTO: 0 K/UL — SIGNIFICANT CHANGE UP (ref 0–0.2)
BASOPHILS NFR BLD AUTO: 0.2 % — SIGNIFICANT CHANGE UP (ref 0–2)
EOSINOPHIL # BLD AUTO: 0.2 K/UL — SIGNIFICANT CHANGE UP (ref 0–0.5)
EOSINOPHIL NFR BLD AUTO: 2.3 % — SIGNIFICANT CHANGE UP (ref 0–6)
HCT VFR BLD CALC: 32.8 % — LOW (ref 34.5–45)
HGB BLD-MCNC: 10.8 G/DL — LOW (ref 11.5–15.5)
LYMPHOCYTES # BLD AUTO: 1.1 K/UL — SIGNIFICANT CHANGE UP (ref 1–3.3)
LYMPHOCYTES # BLD AUTO: 14.9 % — SIGNIFICANT CHANGE UP (ref 13–44)
MCHC RBC-ENTMCNC: 32.8 G/DL — SIGNIFICANT CHANGE UP (ref 32–36)
MCHC RBC-ENTMCNC: 34.4 PG — HIGH (ref 27–34)
MCV RBC AUTO: 105 FL — HIGH (ref 80–100)
MONOCYTES # BLD AUTO: 0.3 K/UL — SIGNIFICANT CHANGE UP (ref 0–0.9)
MONOCYTES NFR BLD AUTO: 4 % — SIGNIFICANT CHANGE UP (ref 2–14)
NEUTROPHILS # BLD AUTO: 5.7 K/UL — SIGNIFICANT CHANGE UP (ref 1.8–7.4)
NEUTROPHILS NFR BLD AUTO: 78.6 % — HIGH (ref 43–77)
PLATELET # BLD AUTO: 519 K/UL — HIGH (ref 150–400)
RBC # BLD: 3.13 M/UL — LOW (ref 3.8–5.2)
RBC # FLD: 18.7 % — HIGH (ref 10.3–14.5)
WBC # BLD: 7.3 K/UL — SIGNIFICANT CHANGE UP (ref 3.8–10.5)
WBC # FLD AUTO: 7.3 K/UL — SIGNIFICANT CHANGE UP (ref 3.8–10.5)

## 2018-10-04 PROCEDURE — 99213 OFFICE O/P EST LOW 20 MIN: CPT

## 2018-10-16 LAB
ALBUMIN SERPL ELPH-MCNC: 4.2 G/DL
ALP BLD-CCNC: 80 U/L
ALT SERPL-CCNC: 16 U/L
ANION GAP SERPL CALC-SCNC: 14 MMOL/L
AST SERPL-CCNC: 16 U/L
BILIRUB SERPL-MCNC: 0.5 MG/DL
BUN SERPL-MCNC: 12 MG/DL
CALCIUM SERPL-MCNC: 8.8 MG/DL
CHLORIDE SERPL-SCNC: 104 MMOL/L
CO2 SERPL-SCNC: 24 MMOL/L
CREAT SERPL-MCNC: 0.84 MG/DL
GLUCOSE SERPL-MCNC: 96 MG/DL
LDH SERPL-CCNC: 240 U/L
POTASSIUM SERPL-SCNC: 4.6 MMOL/L
PROT SERPL-MCNC: 6.9 G/DL
SODIUM SERPL-SCNC: 142 MMOL/L
URATE SERPL-MCNC: 4.7 MG/DL

## 2018-12-06 ENCOUNTER — NON-APPOINTMENT (OUTPATIENT)
Age: 68
End: 2018-12-06

## 2018-12-06 ENCOUNTER — RX RENEWAL (OUTPATIENT)
Age: 68
End: 2018-12-06

## 2018-12-06 ENCOUNTER — APPOINTMENT (OUTPATIENT)
Dept: CARDIOLOGY | Facility: CLINIC | Age: 68
End: 2018-12-06
Payer: MEDICARE

## 2018-12-06 VITALS
SYSTOLIC BLOOD PRESSURE: 135 MMHG | HEIGHT: 66 IN | WEIGHT: 242 LBS | BODY MASS INDEX: 38.89 KG/M2 | HEART RATE: 94 BPM | DIASTOLIC BLOOD PRESSURE: 87 MMHG | OXYGEN SATURATION: 96 %

## 2018-12-06 DIAGNOSIS — M79.89 OTHER SPECIFIED SOFT TISSUE DISORDERS: ICD-10-CM

## 2018-12-06 DIAGNOSIS — R06.09 OTHER FORMS OF DYSPNEA: ICD-10-CM

## 2018-12-06 PROCEDURE — 99215 OFFICE O/P EST HI 40 MIN: CPT

## 2018-12-06 PROCEDURE — 93000 ELECTROCARDIOGRAM COMPLETE: CPT

## 2018-12-06 RX ORDER — FUROSEMIDE 20 MG/1
20 TABLET ORAL
Qty: 30 | Refills: 1 | Status: DISCONTINUED | COMMUNITY
Start: 2017-07-06 | End: 2018-12-06

## 2018-12-07 NOTE — PHYSICAL EXAM
[General Appearance - Well Developed] : well developed [Normal Appearance] : normal appearance [Well Groomed] : well groomed [General Appearance - Well Nourished] : well nourished [No Deformities] : no deformities [General Appearance - In No Acute Distress] : no acute distress [Normal Conjunctiva] : the conjunctiva exhibited no abnormalities [Eyelids - No Xanthelasma] : the eyelids demonstrated no xanthelasmas [Normal Oral Mucosa] : normal oral mucosa [No Oral Pallor] : no oral pallor [No Oral Cyanosis] : no oral cyanosis [Normal Jugular Venous A Waves Present] : normal jugular venous A waves present [Normal Jugular Venous V Waves Present] : normal jugular venous V waves present [No Jugular Venous Montes A Waves] : no jugular venous montes A waves [Respiration, Rhythm And Depth] : normal respiratory rhythm and effort [Exaggerated Use Of Accessory Muscles For Inspiration] : no accessory muscle use [Auscultation Breath Sounds / Voice Sounds] : lungs were clear to auscultation bilaterally [Abdomen Soft] : soft [Abdomen Tenderness] : non-tender [Abdomen Mass (___ Cm)] : no abdominal mass palpated [Abnormal Walk] : normal gait [Gait - Sufficient For Exercise Testing] : the gait was sufficient for exercise testing [Nail Clubbing] : no clubbing of the fingernails [Cyanosis, Localized] : no localized cyanosis [Petechial Hemorrhages (___cm)] : no petechial hemorrhages [Skin Color & Pigmentation] : normal skin color and pigmentation [] : no rash [No Venous Stasis] : no venous stasis [Skin Lesions] : no skin lesions [No Skin Ulcers] : no skin ulcer [No Xanthoma] : no  xanthoma was observed [Oriented To Time, Place, And Person] : oriented to person, place, and time [Affect] : the affect was normal [Mood] : the mood was normal [No Anxiety] : not feeling anxious [Normal Rate] : normal [Rhythm Regular] : regular [Normal S1] : normal S1 [Normal S2] : normal S2 [No Gallop] : no gallop heard [2+] : left 2+ [___ +] : [unfilled]U+ pitting edema to the right ankle [FreeTextEntry1] : obese [Right Carotid Bruit] : no bruit heard over the right carotid [Left Carotid Bruit] : no bruit heard over the left carotid

## 2018-12-07 NOTE — HISTORY OF PRESENT ILLNESS
[FreeTextEntry1] : 68-year-old woman with a history of asthma, obesity, essential thrombocytosis who presents today for a followup visit cardiac evaluation. \par She was last seen in the office with 8/2016.\par \par In may of 2018 she was in [facility] for HMMV. Since then she has still remained significantly dyspnea. She states that she has not been able to walk more than 25 feet or climbing 3 steps without stopping. She has progressively feeing worse for the last seven month despite being on inhalers and different. She complains of increased PND and orthopnea. She has been sleeping in a recliner more. She has noted more lower extremity edema. She has been coughing more especially laying down. She has been complaining of more intermittent palpitations. \par  She has been complaining of a left sided sharp chest pain, intermittently, last for a minute and resolves, nonexertional. \par \par She   denies any   near syncope, syncope, strokelike symptoms. \par She does not maintain a low salt diet.

## 2018-12-07 NOTE — REVIEW OF SYSTEMS
[Shortness Of Breath] : shortness of breath [Dyspnea on exertion] : dyspnea during exertion [Lower Ext Edema] : lower extremity edema [see HPI] : see HPI [Joint Pain] : joint pain [Joint Stiffness] : joint stiffness [Negative] : Heme/Lymph [Chest  Pressure] : no chest pressure [Chest Pain] : no chest pain [Leg Claudication] : no intermittent leg claudication [Palpitations] : no palpitations

## 2018-12-07 NOTE — DISCUSSION/SUMMARY
[FreeTextEntry1] : 68-year-old woman with a history as listed presents for a followup visit.\par Mallory is significantly volume overloaded which is likely the cause of her symptoms. i will start her on Lasix 40mg Q12 PO. She will monitor her sodium intake. Check daily weight. \par She will get a 2d echo to assess for any  new structural heart disease, changes in valvular and ventricular function. Eventually when better optimized she will need an angiogram to rule out obstructive CAD. \par Her EKG appears to be in AF. She is currently rate controlled. I will reassess this post diuresis. She will likely require AC if it continues. Continue ASA for now. \par She will get her baseline labs. \par If her symptoms worsen she is instructed to go to the ED. \par She will followup with me in 1 week.

## 2018-12-11 ENCOUNTER — APPOINTMENT (OUTPATIENT)
Dept: CARDIOLOGY | Facility: CLINIC | Age: 68
End: 2018-12-11
Payer: MEDICARE

## 2018-12-11 VITALS — DIASTOLIC BLOOD PRESSURE: 78 MMHG | SYSTOLIC BLOOD PRESSURE: 120 MMHG | HEART RATE: 100 BPM | OXYGEN SATURATION: 97 %

## 2018-12-11 PROCEDURE — 99211 OFF/OP EST MAY X REQ PHY/QHP: CPT

## 2018-12-12 LAB
ALBUMIN SERPL ELPH-MCNC: 4.6 G/DL
ALP BLD-CCNC: 91 U/L
ALT SERPL-CCNC: 13 U/L
ANION GAP SERPL CALC-SCNC: 18 MMOL/L
ANION GAP SERPL CALC-SCNC: 19 MMOL/L
AST SERPL-CCNC: 15 U/L
BILIRUB SERPL-MCNC: 0.5 MG/DL
BUN SERPL-MCNC: 25 MG/DL
BUN SERPL-MCNC: 26 MG/DL
CALCIUM SERPL-MCNC: 9.3 MG/DL
CALCIUM SERPL-MCNC: 9.8 MG/DL
CHLORIDE SERPL-SCNC: 97 MMOL/L
CHLORIDE SERPL-SCNC: 97 MMOL/L
CO2 SERPL-SCNC: 24 MMOL/L
CO2 SERPL-SCNC: 25 MMOL/L
CREAT SERPL-MCNC: 1.3 MG/DL
CREAT SERPL-MCNC: 1.32 MG/DL
GLUCOSE SERPL-MCNC: 123 MG/DL
GLUCOSE SERPL-MCNC: 96 MG/DL
NT-PROBNP SERPL-MCNC: 620 PG/ML
POTASSIUM SERPL-SCNC: 5 MMOL/L
POTASSIUM SERPL-SCNC: 5.2 MMOL/L
PROT SERPL-MCNC: 8.3 G/DL
SODIUM SERPL-SCNC: 139 MMOL/L
SODIUM SERPL-SCNC: 141 MMOL/L

## 2018-12-13 ENCOUNTER — APPOINTMENT (OUTPATIENT)
Dept: CARDIOLOGY | Facility: CLINIC | Age: 68
End: 2018-12-13
Payer: MEDICARE

## 2018-12-13 ENCOUNTER — RX RENEWAL (OUTPATIENT)
Age: 68
End: 2018-12-13

## 2018-12-13 ENCOUNTER — NON-APPOINTMENT (OUTPATIENT)
Age: 68
End: 2018-12-13

## 2018-12-13 VITALS
WEIGHT: 230 LBS | SYSTOLIC BLOOD PRESSURE: 132 MMHG | DIASTOLIC BLOOD PRESSURE: 82 MMHG | HEIGHT: 66 IN | HEART RATE: 82 BPM | OXYGEN SATURATION: 97 % | BODY MASS INDEX: 36.96 KG/M2

## 2018-12-13 PROCEDURE — 99215 OFFICE O/P EST HI 40 MIN: CPT

## 2018-12-13 PROCEDURE — 93000 ELECTROCARDIOGRAM COMPLETE: CPT

## 2018-12-13 RX ORDER — APIXABAN 5 MG/1
5 TABLET, FILM COATED ORAL
Qty: 60 | Refills: 3 | Status: DISCONTINUED | COMMUNITY
Start: 2018-12-13 | End: 2018-12-13

## 2018-12-13 NOTE — DISCUSSION/SUMMARY
[FreeTextEntry1] : 68-year-old woman with a history as listed presents for a followup visit.\par Mallory has improved but she is still volume overloaded. She will return to lasix 40mg Qday. \par She will get a 2d echo to assess for any  new structural heart disease, changes in valvular and ventricular function.  I think she needs to go for a coronary angiogram to rule out CAD. \par She is still in  AF. She is currently rate controlled. I will start her on Eliquis 5mg q12. She will start on Toprol 50mg Qday. \par If her symptoms worsen she is instructed to go to the ED. \par She will followup with me in 2-3 week.

## 2018-12-13 NOTE — HISTORY OF PRESENT ILLNESS
[FreeTextEntry1] : 68-year-old woman with a history of asthma, obesity, essential thrombocytosis \par \par Since her last visit, she appeared to improve from the dyspnea standpoint but became very lightheaded and weak. It was thought to be from overdiuresis. Her lasix was held. \par She is now feeling better. Her orthopnea has improved but still present. She is sleeping on 2 pillows. She is no longer using the recliner. Her cough has become infrequent but still occurs with laying down. Her dyspnea has improved though she is still complaining of MATTHEW after 500 feet. She has been having intermittent left sided sharp chest pain that occurs at random and nonexertional. \par She   denies any  palpitations, lower extremity edema, near syncope, syncope, strokelike symptoms.

## 2018-12-17 ENCOUNTER — CHART COPY (OUTPATIENT)
Age: 68
End: 2018-12-17

## 2018-12-17 ENCOUNTER — APPOINTMENT (OUTPATIENT)
Dept: CARDIOLOGY | Facility: CLINIC | Age: 68
End: 2018-12-17
Payer: MEDICARE

## 2018-12-17 PROCEDURE — 93306 TTE W/DOPPLER COMPLETE: CPT

## 2018-12-20 ENCOUNTER — OUTPATIENT (OUTPATIENT)
Dept: OUTPATIENT SERVICES | Facility: HOSPITAL | Age: 68
LOS: 1 days | End: 2018-12-20
Payer: MEDICARE

## 2018-12-20 VITALS
RESPIRATION RATE: 16 BRPM | SYSTOLIC BLOOD PRESSURE: 136 MMHG | TEMPERATURE: 99 F | DIASTOLIC BLOOD PRESSURE: 81 MMHG | WEIGHT: 229.94 LBS | HEART RATE: 112 BPM | HEIGHT: 67 IN

## 2018-12-20 DIAGNOSIS — I48.91 UNSPECIFIED ATRIAL FIBRILLATION: ICD-10-CM

## 2018-12-20 DIAGNOSIS — S99.919A UNSPECIFIED INJURY OF UNSPECIFIED ANKLE, INITIAL ENCOUNTER: Chronic | ICD-10-CM

## 2018-12-20 DIAGNOSIS — K81.9 CHOLECYSTITIS, UNSPECIFIED: Chronic | ICD-10-CM

## 2018-12-20 LAB
ANION GAP SERPL CALC-SCNC: 11 MMOL/L — SIGNIFICANT CHANGE UP (ref 5–17)
BUN SERPL-MCNC: 19 MG/DL — SIGNIFICANT CHANGE UP (ref 7–23)
CALCIUM SERPL-MCNC: 9.3 MG/DL — SIGNIFICANT CHANGE UP (ref 8.4–10.5)
CHLORIDE SERPL-SCNC: 101 MMOL/L — SIGNIFICANT CHANGE UP (ref 96–108)
CO2 SERPL-SCNC: 26 MMOL/L — SIGNIFICANT CHANGE UP (ref 22–31)
CREAT SERPL-MCNC: 0.98 MG/DL — SIGNIFICANT CHANGE UP (ref 0.5–1.3)
GLUCOSE BLDC GLUCOMTR-MCNC: 117 MG/DL — HIGH (ref 70–99)
GLUCOSE SERPL-MCNC: 118 MG/DL — HIGH (ref 70–99)
HCT VFR BLD CALC: 36.1 % — SIGNIFICANT CHANGE UP (ref 34.5–45)
HGB BLD-MCNC: 12.2 G/DL — SIGNIFICANT CHANGE UP (ref 11.5–15.5)
MAGNESIUM SERPL-MCNC: 2 MG/DL — SIGNIFICANT CHANGE UP (ref 1.6–2.6)
MCHC RBC-ENTMCNC: 33.7 GM/DL — SIGNIFICANT CHANGE UP (ref 32–36)
MCHC RBC-ENTMCNC: 33.7 PG — SIGNIFICANT CHANGE UP (ref 27–34)
MCV RBC AUTO: 100 FL — SIGNIFICANT CHANGE UP (ref 80–100)
PLATELET # BLD AUTO: 365 K/UL — SIGNIFICANT CHANGE UP (ref 150–400)
POTASSIUM SERPL-MCNC: 4.5 MMOL/L — SIGNIFICANT CHANGE UP (ref 3.5–5.3)
POTASSIUM SERPL-SCNC: 4.5 MMOL/L — SIGNIFICANT CHANGE UP (ref 3.5–5.3)
RBC # BLD: 3.61 M/UL — LOW (ref 3.8–5.2)
RBC # FLD: 19.4 % — HIGH (ref 10.3–14.5)
SODIUM SERPL-SCNC: 138 MMOL/L — SIGNIFICANT CHANGE UP (ref 135–145)
WBC # BLD: 7.8 K/UL — SIGNIFICANT CHANGE UP (ref 3.8–10.5)
WBC # FLD AUTO: 7.8 K/UL — SIGNIFICANT CHANGE UP (ref 3.8–10.5)

## 2018-12-20 PROCEDURE — 85027 COMPLETE CBC AUTOMATED: CPT

## 2018-12-20 PROCEDURE — 99152 MOD SED SAME PHYS/QHP 5/>YRS: CPT | Mod: GC

## 2018-12-20 PROCEDURE — 93005 ELECTROCARDIOGRAM TRACING: CPT

## 2018-12-20 PROCEDURE — 83735 ASSAY OF MAGNESIUM: CPT

## 2018-12-20 PROCEDURE — 93010 ELECTROCARDIOGRAM REPORT: CPT

## 2018-12-20 PROCEDURE — 93458 L HRT ARTERY/VENTRICLE ANGIO: CPT

## 2018-12-20 PROCEDURE — 99152 MOD SED SAME PHYS/QHP 5/>YRS: CPT

## 2018-12-20 PROCEDURE — 93458 L HRT ARTERY/VENTRICLE ANGIO: CPT | Mod: 26,GC

## 2018-12-20 PROCEDURE — C1769: CPT

## 2018-12-20 PROCEDURE — C1887: CPT

## 2018-12-20 PROCEDURE — 80048 BASIC METABOLIC PNL TOTAL CA: CPT

## 2018-12-20 PROCEDURE — 82962 GLUCOSE BLOOD TEST: CPT

## 2018-12-20 PROCEDURE — C1894: CPT

## 2018-12-20 RX ORDER — RIVAROXABAN 15 MG-20MG
1 KIT ORAL
Qty: 0 | Refills: 0 | COMMUNITY

## 2018-12-20 RX ORDER — METFORMIN HYDROCHLORIDE 850 MG/1
1 TABLET ORAL
Qty: 0 | Refills: 0 | COMMUNITY

## 2018-12-20 NOTE — H&P CARDIOLOGY - FAMILY HISTORY
Father  Still living? Yes, Estimated age: Age Unknown  Family history of early CAD, Age at diagnosis: Age Unknown     Mother  Still living? Unknown  Family history of early CAD, Age at diagnosis: Age Unknown

## 2018-12-20 NOTE — H&P CARDIOLOGY - HISTORY OF PRESENT ILLNESS
68-year-old woman with a history of asthma, obesity, essential thrombocytosis (sees hematologist Dr Doshi, Carmen), recent hospital for upper respiratory infection in May, treated with IV abx,  who complains of several months of progressive dyspnea and fatigue accompanied with diaphoresis and midsternal chest discomfort, non radiating, orthopnea (sleeps in recliner) and underwent cardiac evaluation with Dr Bryson Fernandez and had TTE that showed boardeline pHTN, mild mitral regurgitation, mild tricuspid regurgitation, EF 65%.  Pt treated with Lasix for LE edema with improvement but caused acute kidney injury with overdiuresis and dose reduced recently. Pt  denies any palpitations, lower extremity edema, near syncope, syncope, stroke like symptoms but continues to complain of dyspnea and fatigue.  Pt referred for [facility] for further cardiac evaluation for which she presents today.  Pt denies any discomfort at this time.

## 2019-01-04 ENCOUNTER — APPOINTMENT (OUTPATIENT)
Dept: CARDIOLOGY | Facility: CLINIC | Age: 69
End: 2019-01-04
Payer: MEDICARE

## 2019-01-04 ENCOUNTER — RX RENEWAL (OUTPATIENT)
Age: 69
End: 2019-01-04

## 2019-01-04 ENCOUNTER — NON-APPOINTMENT (OUTPATIENT)
Age: 69
End: 2019-01-04

## 2019-01-04 VITALS
OXYGEN SATURATION: 97 % | BODY MASS INDEX: 37.93 KG/M2 | WEIGHT: 236 LBS | HEIGHT: 66 IN | SYSTOLIC BLOOD PRESSURE: 126 MMHG | HEART RATE: 102 BPM | DIASTOLIC BLOOD PRESSURE: 78 MMHG

## 2019-01-04 PROBLEM — E03.9 HYPOTHYROIDISM, UNSPECIFIED: Chronic | Status: ACTIVE | Noted: 2018-12-20

## 2019-01-04 PROBLEM — E78.5 HYPERLIPIDEMIA, UNSPECIFIED: Chronic | Status: ACTIVE | Noted: 2018-12-20

## 2019-01-04 PROBLEM — I48.1 PERSISTENT ATRIAL FIBRILLATION: Chronic | Status: ACTIVE | Noted: 2018-12-20

## 2019-01-04 PROBLEM — D47.3 ESSENTIAL (HEMORRHAGIC) THROMBOCYTHEMIA: Chronic | Status: ACTIVE | Noted: 2018-12-20

## 2019-01-04 PROCEDURE — 93000 ELECTROCARDIOGRAM COMPLETE: CPT

## 2019-01-04 PROCEDURE — 99215 OFFICE O/P EST HI 40 MIN: CPT

## 2019-01-04 NOTE — PHYSICAL EXAM
[General Appearance - Well Developed] : well developed [Normal Appearance] : normal appearance [Well Groomed] : well groomed [General Appearance - Well Nourished] : well nourished [No Deformities] : no deformities [General Appearance - In No Acute Distress] : no acute distress [Normal Conjunctiva] : the conjunctiva exhibited no abnormalities [Eyelids - No Xanthelasma] : the eyelids demonstrated no xanthelasmas [Normal Oral Mucosa] : normal oral mucosa [No Oral Pallor] : no oral pallor [No Oral Cyanosis] : no oral cyanosis [Normal Jugular Venous A Waves Present] : normal jugular venous A waves present [Normal Jugular Venous V Waves Present] : normal jugular venous V waves present [No Jugular Venous Montes A Waves] : no jugular venous montes A waves [Respiration, Rhythm And Depth] : normal respiratory rhythm and effort [Exaggerated Use Of Accessory Muscles For Inspiration] : no accessory muscle use [Auscultation Breath Sounds / Voice Sounds] : lungs were clear to auscultation bilaterally [Abdomen Soft] : soft [Abdomen Tenderness] : non-tender [Abdomen Mass (___ Cm)] : no abdominal mass palpated [Abnormal Walk] : normal gait [Gait - Sufficient For Exercise Testing] : the gait was sufficient for exercise testing [Nail Clubbing] : no clubbing of the fingernails [Cyanosis, Localized] : no localized cyanosis [Petechial Hemorrhages (___cm)] : no petechial hemorrhages [Skin Color & Pigmentation] : normal skin color and pigmentation [] : no rash [No Venous Stasis] : no venous stasis [Skin Lesions] : no skin lesions [No Skin Ulcers] : no skin ulcer [No Xanthoma] : no  xanthoma was observed [Oriented To Time, Place, And Person] : oriented to person, place, and time [Affect] : the affect was normal [Mood] : the mood was normal [No Anxiety] : not feeling anxious [Normal Rate] : normal [Irregularly Irregular] : irregularly irregular [Normal S1] : normal S1 [Normal S2] : normal S2 [No Gallop] : no gallop heard [No Murmur] : no murmurs heard [2+] : left 2+ [No Pitting Edema] : no pitting edema present [FreeTextEntry1] : obese [Right Carotid Bruit] : no bruit heard over the right carotid [Left Carotid Bruit] : no bruit heard over the left carotid [Bruit] : no bruit heard

## 2019-01-11 ENCOUNTER — APPOINTMENT (OUTPATIENT)
Dept: ELECTROPHYSIOLOGY | Facility: CLINIC | Age: 69
End: 2019-01-11
Payer: MEDICARE

## 2019-01-11 ENCOUNTER — NON-APPOINTMENT (OUTPATIENT)
Age: 69
End: 2019-01-11

## 2019-01-11 VITALS
OXYGEN SATURATION: 95 % | WEIGHT: 232 LBS | HEIGHT: 66 IN | DIASTOLIC BLOOD PRESSURE: 57 MMHG | BODY MASS INDEX: 37.28 KG/M2 | HEART RATE: 96 BPM | SYSTOLIC BLOOD PRESSURE: 125 MMHG

## 2019-01-11 PROCEDURE — 99214 OFFICE O/P EST MOD 30 MIN: CPT

## 2019-01-11 PROCEDURE — 93000 ELECTROCARDIOGRAM COMPLETE: CPT

## 2019-01-11 RX ORDER — AZITHROMYCIN 250 MG/1
250 TABLET, FILM COATED ORAL
Qty: 6 | Refills: 0 | Status: COMPLETED | COMMUNITY
Start: 2017-04-09 | End: 2019-01-11

## 2019-01-11 RX ORDER — ALBUTEROL SULFATE 90 UG/1
108 (90 BASE) AEROSOL, METERED RESPIRATORY (INHALATION)
Qty: 9 | Refills: 0 | Status: DISCONTINUED | COMMUNITY
Start: 2017-02-08 | End: 2019-01-11

## 2019-01-11 RX ORDER — FLUTICASONE PROPIONATE 50 UG/1
50 SPRAY, METERED NASAL
Qty: 16 | Refills: 0 | Status: DISCONTINUED | COMMUNITY
Start: 2017-03-21 | End: 2019-01-11

## 2019-01-11 RX ORDER — BENZONATATE 100 MG/1
100 CAPSULE ORAL
Qty: 90 | Refills: 0 | Status: DISCONTINUED | COMMUNITY
Start: 2017-03-21 | End: 2019-01-11

## 2019-01-11 RX ORDER — PROMETHAZINE HYDROCHLORIDE AND DEXTROMETHORPHAN HYDROBROMIDE ORAL SOLUTION 15; 6.25 MG/5ML; MG/5ML
6.25-15 SOLUTION ORAL
Qty: 140 | Refills: 0 | Status: DISCONTINUED | COMMUNITY
Start: 2017-04-07 | End: 2019-01-11

## 2019-01-11 RX ORDER — METHYLPREDNISOLONE 4 MG/1
4 TABLET ORAL
Qty: 21 | Refills: 0 | Status: DISCONTINUED | COMMUNITY
Start: 2017-04-09 | End: 2019-01-11

## 2019-01-14 ENCOUNTER — APPOINTMENT (OUTPATIENT)
Dept: CARDIOLOGY | Facility: CLINIC | Age: 69
End: 2019-01-14

## 2019-01-14 NOTE — DISCUSSION/SUMMARY
[FreeTextEntry1] : In summary, this is a 68 year old woman with symptomatic CHADSVASC 4 persistent atrial fibrillation. Options regarding management, including a rate control strategy with AV praveen blocking agents, or rhythm control strategies employing anti-arrhythmic drugs and/or catheter ablation were reviewed. She remains symptomatic despite a good dose of beta blockers. I thus recommended that she pursue a rhythm control strategy; she is ruluctant to begin additional medications and would like to pursue ablation. She will first return for ADE/DCCV with initiation of amiodarone to maintain sinus in the weeks prior to her procedure. This will be stopped at her one-month visit assuming she is in sinus.\par \par The rationale for the procedure as well as its risks--including but not limited to bleeding, vascular injury, pericardial effusion/tamponade, heart block requiring pacemaker, stroke, and death--were reviewed in detail. After consideration of this information, the decision was made to proceed with the procedure.\par \par Ms. Ornelas appeared to understand the whole discussion and verbalized that all of her questions were answered to her satisfaction.\par \par Thank you for allowing me to be involved in the care of this pleasant woman. Please feel free to contact me with any questions.\par \par

## 2019-01-14 NOTE — PHYSICAL EXAM
[General Appearance - Well Developed] : well developed [Normal Appearance] : normal appearance [Well Groomed] : well groomed [General Appearance - Well Nourished] : well nourished [No Deformities] : no deformities [General Appearance - In No Acute Distress] : no acute distress [Normal Jugular Venous A Waves Present] : normal jugular venous A waves present [Normal Jugular Venous V Waves Present] : normal jugular venous V waves present [No Jugular Venous Montes A Waves] : no jugular venous montes A waves [Heart Sounds] : normal S1 and S2 [Murmurs] : no murmurs present [Respiration, Rhythm And Depth] : normal respiratory rhythm and effort [Exaggerated Use Of Accessory Muscles For Inspiration] : no accessory muscle use [Auscultation Breath Sounds / Voice Sounds] : lungs were clear to auscultation bilaterally [Abdomen Soft] : soft [Abdomen Tenderness] : non-tender [Abdomen Mass (___ Cm)] : no abdominal mass palpated [Nail Clubbing] : no clubbing of the fingernails [Cyanosis, Localized] : no localized cyanosis [Petechial Hemorrhages (___cm)] : no petechial hemorrhages [] : no ischemic changes [FreeTextEntry1] : Irregular rhythm

## 2019-01-14 NOTE — HISTORY OF PRESENT ILLNESS
[FreeTextEntry1] : Referring Physician: Bryson Fernandez MD\par \par Dear Bryson:\par \par Ms. Mallory Ornelas was seen in the [facility] today. For our records, please allow me to summarize the history and my findings.\par \par This pleasant 68 year old woman has a cardiovascular history significant for diabetes, HTN, obesity, and CHADSVASC 4 persistent atrial fibrillation. She first noted onset of shortness of breath and fatigue in late September/early October of this year. She feels symptoms have been getting slowly worse, such that she can now only climb 3-5 stairs before having to stop for breathlessness. She endorses occasional occasional palpitations, but has had no dizziness, syncope, or chest pain.\par \par Ms. Ornelas denies any recent history of chest pain, shortness of breath, palpitations, dizziness, or syncope.\par \par DIAGNOSTIC TESTS\par

## 2019-01-22 ENCOUNTER — OUTPATIENT (OUTPATIENT)
Dept: OUTPATIENT SERVICES | Facility: HOSPITAL | Age: 69
LOS: 1 days | End: 2019-01-22
Payer: MEDICARE

## 2019-01-22 ENCOUNTER — APPOINTMENT (OUTPATIENT)
Dept: CV DIAGNOSITCS | Facility: HOSPITAL | Age: 69
End: 2019-01-22

## 2019-01-22 VITALS
WEIGHT: 231.93 LBS | SYSTOLIC BLOOD PRESSURE: 150 MMHG | TEMPERATURE: 98 F | DIASTOLIC BLOOD PRESSURE: 71 MMHG | HEART RATE: 87 BPM | OXYGEN SATURATION: 96 % | HEIGHT: 66 IN

## 2019-01-22 DIAGNOSIS — S99.919A UNSPECIFIED INJURY OF UNSPECIFIED ANKLE, INITIAL ENCOUNTER: Chronic | ICD-10-CM

## 2019-01-22 DIAGNOSIS — I48.91 UNSPECIFIED ATRIAL FIBRILLATION: ICD-10-CM

## 2019-01-22 DIAGNOSIS — K81.9 CHOLECYSTITIS, UNSPECIFIED: Chronic | ICD-10-CM

## 2019-01-22 LAB
ANION GAP SERPL CALC-SCNC: 13 MMOL/L — SIGNIFICANT CHANGE UP (ref 5–17)
APTT BLD: 32.7 SEC — SIGNIFICANT CHANGE UP (ref 27.5–36.3)
BUN SERPL-MCNC: 18 MG/DL — SIGNIFICANT CHANGE UP (ref 7–23)
CALCIUM SERPL-MCNC: 9.3 MG/DL — SIGNIFICANT CHANGE UP (ref 8.4–10.5)
CHLORIDE SERPL-SCNC: 100 MMOL/L — SIGNIFICANT CHANGE UP (ref 96–108)
CO2 SERPL-SCNC: 27 MMOL/L — SIGNIFICANT CHANGE UP (ref 22–31)
CREAT SERPL-MCNC: 1.09 MG/DL — SIGNIFICANT CHANGE UP (ref 0.5–1.3)
GLUCOSE SERPL-MCNC: 116 MG/DL — HIGH (ref 70–99)
INR BLD: 1.1 RATIO — SIGNIFICANT CHANGE UP (ref 0.88–1.16)
POTASSIUM SERPL-MCNC: 4.1 MMOL/L — SIGNIFICANT CHANGE UP (ref 3.5–5.3)
POTASSIUM SERPL-SCNC: 4.1 MMOL/L — SIGNIFICANT CHANGE UP (ref 3.5–5.3)
PROTHROM AB SERPL-ACNC: 12.7 SEC — SIGNIFICANT CHANGE UP (ref 10–12.9)
SODIUM SERPL-SCNC: 140 MMOL/L — SIGNIFICANT CHANGE UP (ref 135–145)

## 2019-01-22 PROCEDURE — 93312 ECHO TRANSESOPHAGEAL: CPT

## 2019-01-22 PROCEDURE — 92960 CARDIOVERSION ELECTRIC EXT: CPT

## 2019-01-22 PROCEDURE — 93010 ELECTROCARDIOGRAM REPORT: CPT

## 2019-01-22 PROCEDURE — 85610 PROTHROMBIN TIME: CPT

## 2019-01-22 PROCEDURE — 82962 GLUCOSE BLOOD TEST: CPT

## 2019-01-22 PROCEDURE — 85730 THROMBOPLASTIN TIME PARTIAL: CPT

## 2019-01-22 PROCEDURE — 93312 ECHO TRANSESOPHAGEAL: CPT | Mod: 26

## 2019-01-22 PROCEDURE — 85027 COMPLETE CBC AUTOMATED: CPT

## 2019-01-22 PROCEDURE — 93306 TTE W/DOPPLER COMPLETE: CPT | Mod: 26

## 2019-01-22 PROCEDURE — 93005 ELECTROCARDIOGRAM TRACING: CPT

## 2019-01-22 PROCEDURE — 93306 TTE W/DOPPLER COMPLETE: CPT

## 2019-01-22 PROCEDURE — 80048 BASIC METABOLIC PNL TOTAL CA: CPT

## 2019-01-22 RX ORDER — RIVAROXABAN 15 MG-20MG
1 KIT ORAL
Qty: 0 | Refills: 0 | COMMUNITY

## 2019-01-22 RX ORDER — FUROSEMIDE 40 MG
1 TABLET ORAL
Qty: 0 | Refills: 0 | COMMUNITY

## 2019-01-22 RX ORDER — METFORMIN HYDROCHLORIDE 850 MG/1
1 TABLET ORAL
Qty: 0 | Refills: 0 | COMMUNITY

## 2019-01-22 RX ORDER — HYDROXYUREA 500 MG/1
1500 CAPSULE ORAL
Qty: 0 | Refills: 0 | COMMUNITY

## 2019-01-22 RX ORDER — LEVOTHYROXINE SODIUM 125 MCG
1 TABLET ORAL
Qty: 0 | Refills: 0 | COMMUNITY

## 2019-01-22 RX ORDER — ASPIRIN/CALCIUM CARB/MAGNESIUM 324 MG
1 TABLET ORAL
Qty: 0 | Refills: 0 | COMMUNITY

## 2019-01-22 RX ORDER — HYDROXYUREA 500 MG/1
1 CAPSULE ORAL
Qty: 0 | Refills: 0 | COMMUNITY

## 2019-01-22 NOTE — H&P CARDIOLOGY - HISTORY OF PRESENT ILLNESS
68-year-old woman with a history of asthma, obesity, essential thrombocytosis (sees hematologist Carmen Gibson), recent hospital for upper respiratory infection 12/2018 chest pain and had cardiac cath which showed normal coronaries 12/21/18. Presents today for outpatient ADE/ Cardioversion with Dr. Arceo.   LM:   --  LM: Normal.  LAD:   --  LAD:Normal.  --  D1: Normal.  CX:   --  Circumflex: Normal.  --  OM1: Normal.  RCA:   --  RCA: Normal.  COMPLICATIONS: There were no complications.  DIAGNOSTIC IMPRESSIONS: Normal coronary arteries. Slightly elevated LVEDP  DIAGNOSTIC RECOMMENDATIONS: F/u with Bryson palacios. Consider ADE CV  Prepared and signed by  Joellen Joyner M.D.  Signed 12/21/2018 08:47:30  HEMODYNAMIC TABLES 68-year-old woman with a history of asthma, obesity, essential thrombocytosis (sees hematologist Carmen Gibson), recent hospital for upper respiratory infection 12/2018 chest pain and had cardiac cath which showed normal coronaries 12/21/18. Presents today for outpatient ADE/ Cardioversion with Dr. Arceo. AFib was diagnosed fall of 2018. Pt took Xarelto this morning and is NPO for procedure today.     Dr. Bryson Palacios - cardiologist [place]     LM:   --  LM: Normal.  LAD:   --  LAD:Normal.  --  D1: Normal.  CX:   --  Circumflex: Normal.  --  OM1: Normal.  RCA:   --  RCA: Normal.  COMPLICATIONS: There were no complications.  DIAGNOSTIC IMPRESSIONS: Normal coronary arteries. Slightly elevated LVEDP  DIAGNOSTIC RECOMMENDATIONS: F/u with Bryson palacios. Consider ADE CV  Prepared and signed by  Joellen Joyner M.D.  Signed 12/21/2018 08:47:30  HEMODYNAMIC TABLES

## 2019-01-22 NOTE — H&P CARDIOLOGY - PMH
HLD (hyperlipidemia)    Hypothyroidism    Obesity (BMI 35.0-39.9 without comorbidity)    Persistent atrial fibrillation    Thrombocytosis

## 2019-01-23 ENCOUNTER — RX RENEWAL (OUTPATIENT)
Age: 69
End: 2019-01-23

## 2019-01-25 PROBLEM — E66.9 OBESITY, UNSPECIFIED: Chronic | Status: ACTIVE | Noted: 2019-01-22

## 2019-02-07 ENCOUNTER — INPATIENT (INPATIENT)
Facility: HOSPITAL | Age: 69
LOS: 0 days | Discharge: HOME | DRG: 274 | End: 2019-02-08
Attending: INTERNAL MEDICINE | Admitting: INTERNAL MEDICINE
Payer: MEDICARE

## 2019-02-07 VITALS
SYSTOLIC BLOOD PRESSURE: 110 MMHG | HEART RATE: 85 BPM | OXYGEN SATURATION: 100 % | DIASTOLIC BLOOD PRESSURE: 78 MMHG | TEMPERATURE: 97 F | RESPIRATION RATE: 18 BRPM

## 2019-02-07 DIAGNOSIS — K81.9 CHOLECYSTITIS, UNSPECIFIED: Chronic | ICD-10-CM

## 2019-02-07 DIAGNOSIS — S99.919A UNSPECIFIED INJURY OF UNSPECIFIED ANKLE, INITIAL ENCOUNTER: Chronic | ICD-10-CM

## 2019-02-07 DIAGNOSIS — I48.91 UNSPECIFIED ATRIAL FIBRILLATION: ICD-10-CM

## 2019-02-07 LAB
ALBUMIN SERPL ELPH-MCNC: 3.2 G/DL — LOW (ref 3.3–5)
ALP SERPL-CCNC: 62 U/L — SIGNIFICANT CHANGE UP (ref 40–120)
ALT FLD-CCNC: 11 U/L — SIGNIFICANT CHANGE UP (ref 10–45)
ANION GAP SERPL CALC-SCNC: 13 MMOL/L — SIGNIFICANT CHANGE UP (ref 5–17)
ANISOCYTOSIS BLD QL: SLIGHT — SIGNIFICANT CHANGE UP
AST SERPL-CCNC: 24 U/L — SIGNIFICANT CHANGE UP (ref 10–40)
BASOPHILS # BLD AUTO: 0 K/UL — SIGNIFICANT CHANGE UP (ref 0–0.2)
BASOPHILS NFR BLD AUTO: 0.3 % — SIGNIFICANT CHANGE UP (ref 0–2)
BILIRUB SERPL-MCNC: 0.2 MG/DL — SIGNIFICANT CHANGE UP (ref 0.2–1.2)
BLD GP AB SCN SERPL QL: NEGATIVE — SIGNIFICANT CHANGE UP
BUN SERPL-MCNC: 16 MG/DL — SIGNIFICANT CHANGE UP (ref 7–23)
CALCIUM SERPL-MCNC: 8.1 MG/DL — LOW (ref 8.4–10.5)
CHLORIDE SERPL-SCNC: 109 MMOL/L — HIGH (ref 96–108)
CO2 SERPL-SCNC: 22 MMOL/L — SIGNIFICANT CHANGE UP (ref 22–31)
CREAT SERPL-MCNC: 1.13 MG/DL — SIGNIFICANT CHANGE UP (ref 0.5–1.3)
DACRYOCYTES BLD QL SMEAR: SLIGHT — SIGNIFICANT CHANGE UP
ELLIPTOCYTES BLD QL SMEAR: SLIGHT — SIGNIFICANT CHANGE UP
EOSINOPHIL # BLD AUTO: 0.2 K/UL — SIGNIFICANT CHANGE UP (ref 0–0.5)
EOSINOPHIL NFR BLD AUTO: 2 % — SIGNIFICANT CHANGE UP (ref 0–6)
GLUCOSE SERPL-MCNC: 114 MG/DL — HIGH (ref 70–99)
HCT VFR BLD CALC: 31.3 % — LOW (ref 34.5–45)
HGB BLD-MCNC: 10.4 G/DL — LOW (ref 11.5–15.5)
HYPOCHROMIA BLD QL: SLIGHT — SIGNIFICANT CHANGE UP
INR BLD: 1.27 RATIO — HIGH (ref 0.88–1.16)
LYMPHOCYTES # BLD AUTO: 1.6 K/UL — SIGNIFICANT CHANGE UP (ref 1–3.3)
LYMPHOCYTES # BLD AUTO: 15.5 % — SIGNIFICANT CHANGE UP (ref 13–44)
MACROCYTES BLD QL: SIGNIFICANT CHANGE UP
MAGNESIUM SERPL-MCNC: 1.7 MG/DL — SIGNIFICANT CHANGE UP (ref 1.6–2.6)
MCHC RBC-ENTMCNC: 33.2 GM/DL — SIGNIFICANT CHANGE UP (ref 32–36)
MCHC RBC-ENTMCNC: 35 PG — HIGH (ref 27–34)
MCV RBC AUTO: 105 FL — HIGH (ref 80–100)
MICROCYTES BLD QL: SLIGHT — SIGNIFICANT CHANGE UP
MONOCYTES # BLD AUTO: 0.3 K/UL — SIGNIFICANT CHANGE UP (ref 0–0.9)
MONOCYTES NFR BLD AUTO: 3.3 % — SIGNIFICANT CHANGE UP (ref 2–14)
NEUTROPHILS # BLD AUTO: 8.3 K/UL — HIGH (ref 1.8–7.4)
NEUTROPHILS NFR BLD AUTO: 78.9 % — HIGH (ref 43–77)
PHOSPHATE SERPL-MCNC: 6.1 MG/DL — HIGH (ref 2.5–4.5)
PLAT MORPH BLD: NORMAL — SIGNIFICANT CHANGE UP
PLATELET # BLD AUTO: 591 K/UL — HIGH (ref 150–400)
POIKILOCYTOSIS BLD QL AUTO: SLIGHT — SIGNIFICANT CHANGE UP
POLYCHROMASIA BLD QL SMEAR: SLIGHT — SIGNIFICANT CHANGE UP
POTASSIUM SERPL-MCNC: 3.5 MMOL/L — SIGNIFICANT CHANGE UP (ref 3.5–5.3)
POTASSIUM SERPL-SCNC: 3.5 MMOL/L — SIGNIFICANT CHANGE UP (ref 3.5–5.3)
PROT SERPL-MCNC: 6.1 G/DL — SIGNIFICANT CHANGE UP (ref 6–8.3)
PROTHROM AB SERPL-ACNC: 14.7 SEC — HIGH (ref 10–12.9)
RBC # BLD: 2.97 M/UL — LOW (ref 3.8–5.2)
RBC # FLD: 20.8 % — HIGH (ref 10.3–14.5)
RBC BLD AUTO: ABNORMAL
RH IG SCN BLD-IMP: NEGATIVE — SIGNIFICANT CHANGE UP
SODIUM SERPL-SCNC: 144 MMOL/L — SIGNIFICANT CHANGE UP (ref 135–145)
SPHEROCYTES BLD QL SMEAR: SLIGHT — SIGNIFICANT CHANGE UP
TARGETS BLD QL SMEAR: SLIGHT — SIGNIFICANT CHANGE UP
WBC # BLD: 10.5 K/UL — SIGNIFICANT CHANGE UP (ref 3.8–10.5)
WBC # FLD AUTO: 10.5 K/UL — SIGNIFICANT CHANGE UP (ref 3.8–10.5)

## 2019-02-07 PROCEDURE — 93010 ELECTROCARDIOGRAM REPORT: CPT

## 2019-02-07 PROCEDURE — 93657 TX L/R ATRIAL FIB ADDL: CPT

## 2019-02-07 PROCEDURE — 93656 COMPRE EP EVAL ABLTJ ATR FIB: CPT

## 2019-02-07 PROCEDURE — 93623 PRGRMD STIMJ&PACG IV RX NFS: CPT | Mod: 26

## 2019-02-07 PROCEDURE — 93662 INTRACARDIAC ECG (ICE): CPT | Mod: 26

## 2019-02-07 PROCEDURE — 99223 1ST HOSP IP/OBS HIGH 75: CPT | Mod: GC

## 2019-02-07 PROCEDURE — 93613 INTRACARDIAC EPHYS 3D MAPG: CPT

## 2019-02-07 RX ORDER — MONTELUKAST 4 MG/1
10 TABLET, CHEWABLE ORAL DAILY
Qty: 0 | Refills: 0 | Status: DISCONTINUED | OUTPATIENT
Start: 2019-02-07 | End: 2019-02-08

## 2019-02-07 RX ORDER — FUROSEMIDE 40 MG
40 TABLET ORAL DAILY
Qty: 0 | Refills: 0 | Status: DISCONTINUED | OUTPATIENT
Start: 2019-02-07 | End: 2019-02-08

## 2019-02-07 RX ORDER — RIVAROXABAN 15 MG-20MG
20 KIT ORAL EVERY 24 HOURS
Qty: 0 | Refills: 0 | Status: DISCONTINUED | OUTPATIENT
Start: 2019-02-07 | End: 2019-02-08

## 2019-02-07 RX ORDER — SERTRALINE 25 MG/1
100 TABLET, FILM COATED ORAL DAILY
Qty: 0 | Refills: 0 | Status: DISCONTINUED | OUTPATIENT
Start: 2019-02-07 | End: 2019-02-08

## 2019-02-07 RX ORDER — INSULIN LISPRO 100/ML
VIAL (ML) SUBCUTANEOUS AT BEDTIME
Qty: 0 | Refills: 0 | Status: DISCONTINUED | OUTPATIENT
Start: 2019-02-07 | End: 2019-02-08

## 2019-02-07 RX ORDER — ATORVASTATIN CALCIUM 80 MG/1
20 TABLET, FILM COATED ORAL AT BEDTIME
Qty: 0 | Refills: 0 | Status: DISCONTINUED | OUTPATIENT
Start: 2019-02-07 | End: 2019-02-08

## 2019-02-07 RX ORDER — TIOTROPIUM BROMIDE 18 UG/1
1 CAPSULE ORAL; RESPIRATORY (INHALATION) DAILY
Qty: 0 | Refills: 0 | Status: DISCONTINUED | OUTPATIENT
Start: 2019-02-07 | End: 2019-02-08

## 2019-02-07 RX ORDER — METOPROLOL TARTRATE 50 MG
50 TABLET ORAL DAILY
Qty: 0 | Refills: 0 | Status: DISCONTINUED | OUTPATIENT
Start: 2019-02-07 | End: 2019-02-08

## 2019-02-07 RX ORDER — SODIUM CHLORIDE 9 MG/ML
3 INJECTION INTRAMUSCULAR; INTRAVENOUS; SUBCUTANEOUS EVERY 8 HOURS
Qty: 0 | Refills: 0 | Status: DISCONTINUED | OUTPATIENT
Start: 2019-02-07 | End: 2019-02-08

## 2019-02-07 RX ORDER — ONDANSETRON 8 MG/1
4 TABLET, FILM COATED ORAL ONCE
Qty: 0 | Refills: 0 | Status: COMPLETED | OUTPATIENT
Start: 2019-02-07 | End: 2019-02-07

## 2019-02-07 RX ORDER — HYDROXYUREA 500 MG/1
500 CAPSULE ORAL
Qty: 0 | Refills: 0 | Status: DISCONTINUED | OUTPATIENT
Start: 2019-02-08 | End: 2019-02-08

## 2019-02-07 RX ORDER — INSULIN LISPRO 100/ML
VIAL (ML) SUBCUTANEOUS
Qty: 0 | Refills: 0 | Status: DISCONTINUED | OUTPATIENT
Start: 2019-02-07 | End: 2019-02-08

## 2019-02-07 RX ORDER — PANTOPRAZOLE SODIUM 20 MG/1
40 TABLET, DELAYED RELEASE ORAL
Qty: 0 | Refills: 0 | Status: DISCONTINUED | OUTPATIENT
Start: 2019-02-07 | End: 2019-02-08

## 2019-02-07 RX ORDER — LEVOTHYROXINE SODIUM 125 MCG
175 TABLET ORAL DAILY
Qty: 0 | Refills: 0 | Status: DISCONTINUED | OUTPATIENT
Start: 2019-02-07 | End: 2019-02-08

## 2019-02-07 RX ADMIN — TIOTROPIUM BROMIDE 1 CAPSULE(S): 18 CAPSULE ORAL; RESPIRATORY (INHALATION) at 18:25

## 2019-02-07 RX ADMIN — SODIUM CHLORIDE 3 MILLILITER(S): 9 INJECTION INTRAMUSCULAR; INTRAVENOUS; SUBCUTANEOUS at 21:17

## 2019-02-07 RX ADMIN — Medication 40 MILLIGRAM(S): at 17:16

## 2019-02-07 RX ADMIN — RIVAROXABAN 20 MILLIGRAM(S): KIT at 18:02

## 2019-02-07 RX ADMIN — ATORVASTATIN CALCIUM 20 MILLIGRAM(S): 80 TABLET, FILM COATED ORAL at 21:17

## 2019-02-07 RX ADMIN — SODIUM CHLORIDE 3 MILLILITER(S): 9 INJECTION INTRAMUSCULAR; INTRAVENOUS; SUBCUTANEOUS at 12:56

## 2019-02-07 RX ADMIN — ONDANSETRON 4 MILLIGRAM(S): 8 TABLET, FILM COATED ORAL at 13:18

## 2019-02-07 RX ADMIN — MONTELUKAST 10 MILLIGRAM(S): 4 TABLET, CHEWABLE ORAL at 17:16

## 2019-02-07 RX ADMIN — Medication 50 MILLIGRAM(S): at 17:16

## 2019-02-07 NOTE — CHART NOTE - NSCHARTNOTEFT_GEN_A_CORE
====================  CCU MIDNIGHT ROUNDS  ====================    HERMILA QUIÑONES  [id number]    ====================  SUMMARY: HPI: 68F PMH asthma, obesity, essential thrombocytosis (sees hematologist Carmen Gibson), admitted for Afib Ablation now s/p afib ablation.    ====================        ====================  NEW EVENTS:  ====================  No events, no complaints.      ====================  VITALS (Last 12 hrs):  ====================    T(C): 37.1 (02-07-19 @ 19:00), Max: 37.1 (02-07-19 @ 19:00)  HR: 94 (02-07-19 @ 22:00) (65 - 94)  BP: 117/100 (02-07-19 @ 22:00) (98/55 - 124/59)  BP(mean): 107 (02-07-19 @ 22:00) (67 - 107)  RR: 22 (02-07-19 @ 21:00) (16 - 23)  SpO2: 96% (02-07-19 @ 21:00) (95% - 100%)      TELEMETRY: BRANTD      I&O's Summary    07 Feb 2019 07:01  -  07 Feb 2019 23:31  --------------------------------------------------------  IN: 350 mL / OUT: 1200 mL / NET: -850 mL        ====================  PLAN:  ====================    Atrial Fibrillation s/p Afib Ablation  -Continue all home medications: Toprol XL 50mg daily,  Atorvastatin 20mg daily, Lasix 40mg daily, Synthroid 175mcg daily, Zoloft 100mg daily, Spiriva, Singulair 10mg  -Xarelto 20mg  -Protonix 40mg  -Soft Diet  -HISS    Larry Jameson, CCU PA-C  #[id number]

## 2019-02-07 NOTE — CHART NOTE - NSCHARTNOTEFT_GEN_A_CORE
68F PMH asthma, obesity, essential thrombocytosis (sees hematologist Carmen Gibson), admitted for Afib Ablation now s/p afib ablation.    Atrial Fibrillation s/p Afib Ablation  Continue all home medications: Toprol XL 50mg daily,  Atorvastatin 20mg daily, Lasix 40mg daily, Synthroid 175mcg daily, Zoloft 100mg daily, Spiriva, Singulair 10mg  Metformin on hold start HISS  Suture removal at 19:00  Xarelto 20mg at 18:00  Start Protonix 40mg  Soft Diet  Plan discussed with Dr. Adis Byrd, ANP    MEDICATIONS  (STANDING):  atorvastatin 20 milliGRAM(s) Oral at bedtime  furosemide    Tablet 40 milliGRAM(s) Oral daily  insulin lispro (HumaLOG) corrective regimen sliding scale   SubCutaneous three times a day before meals  insulin lispro (HumaLOG) corrective regimen sliding scale   SubCutaneous at bedtime  levothyroxine 175 MICROGram(s) Oral daily  metoprolol succinate ER 50 milliGRAM(s) Oral daily  montelukast 10 milliGRAM(s) Oral daily  pantoprazole    Tablet 40 milliGRAM(s) Oral before breakfast  rivaroxaban 20 milliGRAM(s) Oral every 24 hours  sertraline 100 milliGRAM(s) Oral daily  sodium chloride 0.9% lock flush 3 milliLiter(s) IV Push every 8 hours  tiotropium 18 MICROgram(s) Capsule 1 Capsule(s) Inhalation daily    MEDICATIONS  (PRN):      Objective:  Vital Signs Last 24 Hrs  T(C): 36.3 (07 Feb 2019 12:40), Max: 36.3 (07 Feb 2019 12:40)  T(F): 97.3 (07 Feb 2019 12:40), Max: 97.3 (07 Feb 2019 12:40)  HR: 76 (07 Feb 2019 15:00) (74 - 85)  BP: 108/57 (07 Feb 2019 15:00) (99/60 - 124/59)  BP(mean): 73 (07 Feb 2019 15:00) (72 - 89)  RR: 21 (07 Feb 2019 15:00) (17 - 22)  SpO2: 100% (07 Feb 2019 15:00) (95% - 100%)  ICU Vital Signs Last 24 Hrs  T(C): 36.3 (07 Feb 2019 12:40), Max: 36.3 (07 Feb 2019 12:40)  T(F): 97.3 (07 Feb 2019 12:40), Max: 97.3 (07 Feb 2019 12:40)  HR: 76 (07 Feb 2019 15:00) (74 - 85)  BP: 108/57 (07 Feb 2019 15:00) (99/60 - 124/59)  BP(mean): 73 (07 Feb 2019 15:00) (72 - 89)  ABP: --  ABP(mean): --  RR: 21 (07 Feb 2019 15:00) (17 - 22)  SpO2: 100% (07 Feb 2019 15:00) (95% - 100%)      Adult Advanced Hemodynamics Last 24 Hrs  CVP(mm Hg): --  CVP(cm H2O): --  CO: --  CI: --  PA: --  PA(mean): --  PCWP: --  SVR: --  SVRI: --  PVR: --  PVRI: --      Daily Height in cm: 167.64 (07 Feb 2019 12:45)    Daily                                10.4   10.5  )-----------( 591      ( 07 Feb 2019 13:55 )             31.3     02-07    144  |  109<H>  |  16  ----------------------------<  114<H>  3.5   |  22  |  1.13    Ca    8.1<L>      07 Feb 2019 13:55  Phos  6.1     02-07  Mg     1.7     02-07    TPro  6.1  /  Alb  3.2<L>  /  TBili  0.2  /  DBili  x   /  AST  24  /  ALT  11  /  AlkPhos  62  02-07    LIVER FUNCTIONS - ( 07 Feb 2019 13:55 )  Alb: 3.2 g/dL / Pro: 6.1 g/dL / ALK PHOS: 62 U/L / ALT: 11 U/L / AST: 24 U/L / GGT: x           PT/INR - ( 07 Feb 2019 06:53 )   PT: 14.7 sec;   INR: 1.27 ratio               *BLOOD GAS/ARTERIAL/MIXED/VENOUS  *LACTATE      HEALTH ISSUES - PROBLEM Dx:        HEALTH ISSUES - R/O PROBLEM Dx:      Juan Byrd CCU NP   #

## 2019-02-08 ENCOUNTER — TRANSCRIPTION ENCOUNTER (OUTPATIENT)
Age: 69
End: 2019-02-08

## 2019-02-08 VITALS
OXYGEN SATURATION: 96 % | HEART RATE: 80 BPM | DIASTOLIC BLOOD PRESSURE: 59 MMHG | RESPIRATION RATE: 18 BRPM | SYSTOLIC BLOOD PRESSURE: 107 MMHG

## 2019-02-08 DIAGNOSIS — I48.1 PERSISTENT ATRIAL FIBRILLATION: ICD-10-CM

## 2019-02-08 DIAGNOSIS — E78.5 HYPERLIPIDEMIA, UNSPECIFIED: ICD-10-CM

## 2019-02-08 DIAGNOSIS — D47.3 ESSENTIAL (HEMORRHAGIC) THROMBOCYTHEMIA: ICD-10-CM

## 2019-02-08 DIAGNOSIS — J45.909 UNSPECIFIED ASTHMA, UNCOMPLICATED: ICD-10-CM

## 2019-02-08 DIAGNOSIS — E03.9 HYPOTHYROIDISM, UNSPECIFIED: ICD-10-CM

## 2019-02-08 LAB
ALBUMIN SERPL ELPH-MCNC: 3.6 G/DL — SIGNIFICANT CHANGE UP (ref 3.3–5)
ALP SERPL-CCNC: 64 U/L — SIGNIFICANT CHANGE UP (ref 40–120)
ALT FLD-CCNC: 9 U/L — LOW (ref 10–45)
ANION GAP SERPL CALC-SCNC: 14 MMOL/L — SIGNIFICANT CHANGE UP (ref 5–17)
AST SERPL-CCNC: 22 U/L — SIGNIFICANT CHANGE UP (ref 10–40)
BILIRUB SERPL-MCNC: 0.5 MG/DL — SIGNIFICANT CHANGE UP (ref 0.2–1.2)
BUN SERPL-MCNC: 12 MG/DL — SIGNIFICANT CHANGE UP (ref 7–23)
CALCIUM SERPL-MCNC: 8 MG/DL — LOW (ref 8.4–10.5)
CHLORIDE SERPL-SCNC: 103 MMOL/L — SIGNIFICANT CHANGE UP (ref 96–108)
CO2 SERPL-SCNC: 24 MMOL/L — SIGNIFICANT CHANGE UP (ref 22–31)
CREAT SERPL-MCNC: 1.15 MG/DL — SIGNIFICANT CHANGE UP (ref 0.5–1.3)
GLUCOSE SERPL-MCNC: 132 MG/DL — HIGH (ref 70–99)
HCT VFR BLD CALC: 29.4 % — LOW (ref 34.5–45)
HGB BLD-MCNC: 10 G/DL — LOW (ref 11.5–15.5)
MAGNESIUM SERPL-MCNC: 1.6 MG/DL — SIGNIFICANT CHANGE UP (ref 1.6–2.6)
MCHC RBC-ENTMCNC: 34 GM/DL — SIGNIFICANT CHANGE UP (ref 32–36)
MCHC RBC-ENTMCNC: 35.2 PG — HIGH (ref 27–34)
MCV RBC AUTO: 104 FL — HIGH (ref 80–100)
PHOSPHATE SERPL-MCNC: 4.3 MG/DL — SIGNIFICANT CHANGE UP (ref 2.5–4.5)
PLATELET # BLD AUTO: 585 K/UL — HIGH (ref 150–400)
POTASSIUM SERPL-MCNC: 3.2 MMOL/L — LOW (ref 3.5–5.3)
POTASSIUM SERPL-SCNC: 3.2 MMOL/L — LOW (ref 3.5–5.3)
PROT SERPL-MCNC: 6.4 G/DL — SIGNIFICANT CHANGE UP (ref 6–8.3)
RBC # BLD: 2.84 M/UL — LOW (ref 3.8–5.2)
RBC # FLD: 20.5 % — HIGH (ref 10.3–14.5)
SODIUM SERPL-SCNC: 141 MMOL/L — SIGNIFICANT CHANGE UP (ref 135–145)
WBC # BLD: 9.8 K/UL — SIGNIFICANT CHANGE UP (ref 3.8–10.5)
WBC # FLD AUTO: 9.8 K/UL — SIGNIFICANT CHANGE UP (ref 3.8–10.5)

## 2019-02-08 PROCEDURE — 93623 PRGRMD STIMJ&PACG IV RX NFS: CPT

## 2019-02-08 PROCEDURE — 93613 INTRACARDIAC EPHYS 3D MAPG: CPT

## 2019-02-08 PROCEDURE — 93005 ELECTROCARDIOGRAM TRACING: CPT

## 2019-02-08 PROCEDURE — C1894: CPT

## 2019-02-08 PROCEDURE — 80053 COMPREHEN METABOLIC PANEL: CPT

## 2019-02-08 PROCEDURE — 93662 INTRACARDIAC ECG (ICE): CPT

## 2019-02-08 PROCEDURE — 85027 COMPLETE CBC AUTOMATED: CPT

## 2019-02-08 PROCEDURE — 84100 ASSAY OF PHOSPHORUS: CPT

## 2019-02-08 PROCEDURE — C1730: CPT

## 2019-02-08 PROCEDURE — 86900 BLOOD TYPING SEROLOGIC ABO: CPT

## 2019-02-08 PROCEDURE — 85610 PROTHROMBIN TIME: CPT

## 2019-02-08 PROCEDURE — 86850 RBC ANTIBODY SCREEN: CPT

## 2019-02-08 PROCEDURE — 99238 HOSP IP/OBS DSCHRG MGMT 30/<: CPT

## 2019-02-08 PROCEDURE — 82962 GLUCOSE BLOOD TEST: CPT

## 2019-02-08 PROCEDURE — 93656 COMPRE EP EVAL ABLTJ ATR FIB: CPT

## 2019-02-08 PROCEDURE — C1766: CPT

## 2019-02-08 PROCEDURE — 86901 BLOOD TYPING SEROLOGIC RH(D): CPT

## 2019-02-08 PROCEDURE — C1732: CPT

## 2019-02-08 PROCEDURE — 83735 ASSAY OF MAGNESIUM: CPT

## 2019-02-08 PROCEDURE — 93010 ELECTROCARDIOGRAM REPORT: CPT

## 2019-02-08 PROCEDURE — 93657 TX L/R ATRIAL FIB ADDL: CPT

## 2019-02-08 PROCEDURE — 94640 AIRWAY INHALATION TREATMENT: CPT

## 2019-02-08 RX ORDER — POTASSIUM CHLORIDE 20 MEQ
1 PACKET (EA) ORAL
Qty: 0 | Refills: 0 | COMMUNITY

## 2019-02-08 RX ORDER — MAGNESIUM SULFATE 500 MG/ML
2 VIAL (ML) INJECTION ONCE
Qty: 0 | Refills: 0 | Status: COMPLETED | OUTPATIENT
Start: 2019-02-08 | End: 2019-02-08

## 2019-02-08 RX ORDER — POTASSIUM CHLORIDE 20 MEQ
40 PACKET (EA) ORAL EVERY 4 HOURS
Qty: 0 | Refills: 0 | Status: COMPLETED | OUTPATIENT
Start: 2019-02-08 | End: 2019-02-08

## 2019-02-08 RX ORDER — ALBUTEROL 90 UG/1
2.5 AEROSOL, METERED ORAL ONCE
Qty: 0 | Refills: 0 | Status: COMPLETED | OUTPATIENT
Start: 2019-02-08 | End: 2019-02-08

## 2019-02-08 RX ADMIN — ALBUTEROL 2.5 MILLIGRAM(S): 90 AEROSOL, METERED ORAL at 04:07

## 2019-02-08 RX ADMIN — Medication 50 MILLIGRAM(S): at 06:36

## 2019-02-08 RX ADMIN — SODIUM CHLORIDE 3 MILLILITER(S): 9 INJECTION INTRAMUSCULAR; INTRAVENOUS; SUBCUTANEOUS at 04:53

## 2019-02-08 RX ADMIN — Medication 40 MILLIEQUIVALENT(S): at 09:04

## 2019-02-08 RX ADMIN — Medication 100 MILLIGRAM(S): at 06:42

## 2019-02-08 RX ADMIN — Medication 100 MILLIGRAM(S): at 01:08

## 2019-02-08 RX ADMIN — Medication 40 MILLIEQUIVALENT(S): at 06:41

## 2019-02-08 RX ADMIN — HYDROXYUREA 500 MILLIGRAM(S): 500 CAPSULE ORAL at 06:41

## 2019-02-08 RX ADMIN — Medication 50 GRAM(S): at 06:42

## 2019-02-08 RX ADMIN — Medication 175 MICROGRAM(S): at 06:36

## 2019-02-08 RX ADMIN — Medication 2: at 09:04

## 2019-02-08 RX ADMIN — PANTOPRAZOLE SODIUM 40 MILLIGRAM(S): 20 TABLET, DELAYED RELEASE ORAL at 06:36

## 2019-02-08 NOTE — PROGRESS NOTE ADULT - SUBJECTIVE AND OBJECTIVE BOX
Admission date: 2/7  CC: Andree     HPI:68F PMH asthma, obesity, essential thrombocytosis (sees hematologist Carmen Gibson), admitted for Afib Ablation now s/p afib ablation.    Overnight events:  Pt had asthma attack which was relieved with Albuterol.  Pt states this usually happens at least once a month and is relieved with Albuterol.  Pt had bleeding from R groin walking to bathroom overnight, groin soft, no signs of hematoma or further bleeding.     OBJECTIVE:  Review Of Systems: Pt denies HA, CP, SOB, wheezing, abd pain, N/V, weakness/dizziness.   Constitutional: [ ] Fever [ ] Chills [ ] Fatigue [ ] Weight change   HEENT: [ ] Blurred vision [ ] Eye Pain [ ] Headache [ ] Runny nose [ ] Sore Throat   Respiratory: [ ] Cough [ ] Wheezing [ ] Shortness of breath  Cardiovascular: [ ] Chest Pain [ ] Palpitations [ ] MATTHEW [ ] PND [ ] Orthopnea  Gastrointestinal: [ ] Abdominal Pain [ ] Diarrhea [ ] Constipation [ ] Hemorrhoids [ ] Nausea [ ] Vomiting  Genitourinary: [ ] Nocturia [ ] Dysuria [ ] Incontinence  Extremities: [ ] Swelling [ ] Joint Pain  Neurologic: [ ] Focal deficit [ ] Paresthesias [ ] Syncope  Lymphatic: [ ] Swelling [ ] Lymphadenopathy   Skin: [ ] Rash [ ] Ecchymoses [ ] Wounds [ ] Lesions  Psychiatry: [ ] Depression [ ] Suicidal/Homicidal Ideation [ ] Anxiety [ ] Sleep Disturbances  [ ] 10 point review of systems is otherwise negative except as mentioned above            [ ]Unable to obtain    Allergy:  No Known Allergies      Medications:  MEDICATIONS  (STANDING):  atorvastatin 20 milliGRAM(s) Oral at bedtime  benzonatate 100 milliGRAM(s) Oral three times a day  furosemide    Tablet 40 milliGRAM(s) Oral daily  hydroxyurea 500 milliGRAM(s) Oral two times a day  insulin lispro (HumaLOG) corrective regimen sliding scale   SubCutaneous three times a day before meals  insulin lispro (HumaLOG) corrective regimen sliding scale   SubCutaneous at bedtime  levothyroxine 175 MICROGram(s) Oral daily  metoprolol succinate ER 50 milliGRAM(s) Oral daily  montelukast 10 milliGRAM(s) Oral daily  pantoprazole    Tablet 40 milliGRAM(s) Oral before breakfast  potassium chloride    Tablet ER 40 milliEquivalent(s) Oral every 4 hours  rivaroxaban 20 milliGRAM(s) Oral every 24 hours  sertraline 100 milliGRAM(s) Oral daily  sodium chloride 0.9% lock flush 3 milliLiter(s) IV Push every 8 hours  tiotropium 18 MICROgram(s) Capsule 1 Capsule(s) Inhalation daily      PMH/PSH/FH/SH: [x ] Unchanged    Vitals:  T(C): 37.6 (02-08-19 @ 07:20), Max: 38.3 (02-08-19 @ 04:00)  HR: 73 (02-08-19 @ 08:00) (65 - 94)  BP: 100/71 (02-08-19 @ 08:00) (98/54 - 124/59)  BP(mean): 81 (02-08-19 @ 08:00) (66 - 107)  RR: 17 (02-08-19 @ 08:00) (16 - 23)  SpO2: 96% (02-08-19 @ 08:00) (90% - 100%)  Wt(kg): --  Daily Height in cm: 167.64 (07 Feb 2019 12:45)    Daily   I&O's Summary    07 Feb 2019 07:01  -  08 Feb 2019 07:00  --------------------------------------------------------  IN: 350 mL / OUT: 1200 mL / NET: -850 mL      Labs:                        10.0   9.8   )-----------( 585      ( 08 Feb 2019 04:12 )             29.4     02-08    141  |  103  |  12  ----------------------------<  132<H>  3.2<L>   |  24  |  1.15    Ca    8.0<L>      08 Feb 2019 04:12  Phos  4.3     02-08  Mg     1.6     02-08    TPro  6.4  /  Alb  3.6  /  TBili  0.5  /  DBili  x   /  AST  22  /  ALT  9<L>  /  AlkPhos  64  02-08    PT/INR - ( 07 Feb 2019 06:53 )   PT: 14.7 sec;   INR: 1.27 ratio        Magnesium, Serum: 1.6 mg/dL (02-08 @ 04:12)  Phosphorus Level, Serum: 4.3 mg/dL (02-08 @ 04:12)      ECG: NSR @ 75bpm. No acute ST-T wave changes. QTc 475.     Echo:  < from: Transesophageal Echocardiogram (01.22.19 @ 15:24) >  Conclusions:  1. Severely dilated left atrium.  LA volume index = 67  cc/m2.   No left atrial or left atrial appendage thrombus.   Normal left atrial appendage function (velocity=  0.45  m/s).  2. Normal left ventricular internal dimensions and wall  thicknesses.  3. Normal left ventricular systolic function. No segmental  wall motion abnormalities.  4. Normal right ventricular size and function.  5. Color Doppler demonstrates evidence of a small patent  foramen ovale.    < end of copied text >      Stress Testing:     Cath:    Imaging:    Interpretation of Telemetry: Sinus       Physical Exam:  Appearance: [x ] Normal  [ ] abnormal [ ] NAD   Eyes: [x ] PERRL [x ] EOMI  HENT: [ x] Normal [ ] Abnormal oral muscosa [ ]NC/AT  Cardiovascular: [x ] S1 [x ] S2 [x ] RRR [ ] m/r/g [ ]edema [ ] JVP  Procedural Access Site: R groin clean, dry, intact.  Soft, non-tender, no signs of hematoma.   Respiratory: [ x] Clear to auscultation bilaterally, no wheezes, rales, rhonchi.   Gastrointestinal: [ x] Soft [ ] tenderness[ ] distension [ ] BS  Musculoskeletal: [ ] clubbing [ ] joint deformity   Neurologic: [x ] Non-focal  Lymphatic: [ ] lymphadenopathy  Psychiatry: [ x] AAOx3  [ ] confused [ ] disoriented [x ] Mood & affect appropriate  Skin: [ ]  rashes [ ] ecchymoses [ ] cyanosis

## 2019-02-08 NOTE — DISCHARGE NOTE ADULT - CARE PLAN
Principal Discharge DX:	Persistent atrial fibrillation  Goal:	sinus rhythm  Assessment and plan of treatment:	S/p ablation.  Continue Metoprolol and Xarelto as prescribed.  Follow up with cardiologist in 1-2 weeks.

## 2019-02-08 NOTE — DISCHARGE NOTE ADULT - NS AS ACTIVITY OBS
Return to Work/School allowed/Bathing allowed/Showering allowed/Driving allowed/No Heavy lifting/straining

## 2019-02-08 NOTE — DISCHARGE NOTE ADULT - CARE PROVIDER_API CALL
Terence Arceo (MD)  Cardiac Electrophysiology; Cardiology; Internal Medicine  [address] [place] [address]  Phone: [phone]  Follow Up Time:

## 2019-02-08 NOTE — DISCHARGE NOTE ADULT - MEDICATION SUMMARY - MEDICATIONS TO TAKE
I will START or STAY ON the medications listed below when I get home from the hospital:    Xarelto 20 mg oral tablet  -- 1 tab(s) by mouth once a day   -- Indication: For Atrial fibrillation    Zoloft 100 mg oral tablet  -- 1 tab(s) by mouth once a day  -- Indication: For Depression     metFORMIN 500 mg oral tablet  -- 1 tab(s) by mouth 2 times a day  -- Indication: For Diabetes    Lipitor 20 mg oral tablet  -- 1 tab(s) by mouth once a day  -- Indication: For HLD (hyperlipidemia)    hydroxyurea  -- 500 milligram(s) by mouth 2 times a day  -- Indication: For Thrombocytosis    benzonatate 100 mg oral capsule  -- 1 cap(s) by mouth 3 times a day  -- Indication: For Asthma    metoprolol succinate 50 mg oral tablet, extended release  -- 1 tab(s) by mouth once a day  -- Indication: For Atrial fibrillation    Spiriva  -- 2 puff(s) inhaled 2 times a day  -- Indication: For Asthma    Dulera 200 mcg-5 mcg/inh inhalation aerosol  -- 2 puff(s) inhaled 2 times a day  -- Indication: For Asthma    furosemide 40 mg oral tablet  -- 1 tab(s) by mouth once a day  -- Indication: For Atrial fibrillation    Singulair 10 mg oral tablet  -- 1 tab(s) by mouth once a day  -- Indication: For Asthma    pantoprazole 40 mg oral delayed release tablet  -- 1 tab(s) by mouth once a day  -- Indication: For GERD    Synthroid 175 mcg (0.175 mg) oral tablet  -- 1 tab(s) by mouth once a day  -- Indication: For Hypothyroidism

## 2019-02-08 NOTE — DISCHARGE NOTE ADULT - PATIENT PORTAL LINK FT
You can access the [facility], offered by [facility], by registering with the following website: [email or web address]

## 2019-02-08 NOTE — PROGRESS NOTE ADULT - ASSESSMENT
68F PMH asthma, obesity, essential thrombocytosis (sees hematologist Dr Doshi, Carmen), admitted for Afib Ablation now s/p afib ablation. Hemodynamically stable in sinus rhythm. Plan to D/C home today.

## 2019-02-08 NOTE — DISCHARGE NOTE ADULT - HOSPITAL COURSE
67 y/o F with h/o asthma, obesity, essential thrombocytosis, HLD, A.fib now s/p ablation on 2/7.  Pt hemodynamically stable, in sinus rhythm.  Cleared for discharge home.

## 2019-02-08 NOTE — DISCHARGE NOTE ADULT - PLAN OF CARE
sinus rhythm S/p ablation.  Continue Metoprolol and Xarelto as prescribed.  Follow up with cardiologist in 1-2 weeks.

## 2019-02-08 NOTE — DISCHARGE NOTE ADULT - CARE PROVIDERS DIRECT ADDRESSES
[email or web address]

## 2019-02-22 ENCOUNTER — OUTPATIENT (OUTPATIENT)
Dept: OUTPATIENT SERVICES | Facility: HOSPITAL | Age: 69
LOS: 1 days | Discharge: HOME | End: 2019-02-22

## 2019-02-22 DIAGNOSIS — S99.919A UNSPECIFIED INJURY OF UNSPECIFIED ANKLE, INITIAL ENCOUNTER: Chronic | ICD-10-CM

## 2019-02-22 DIAGNOSIS — K81.9 CHOLECYSTITIS, UNSPECIFIED: Chronic | ICD-10-CM

## 2019-02-22 DIAGNOSIS — D69.6 THROMBOCYTOPENIA, UNSPECIFIED: ICD-10-CM

## 2019-03-05 ENCOUNTER — APPOINTMENT (OUTPATIENT)
Dept: HEMATOLOGY ONCOLOGY | Facility: CLINIC | Age: 69
End: 2019-03-05
Payer: MEDICARE

## 2019-03-13 ENCOUNTER — APPOINTMENT (OUTPATIENT)
Dept: CARDIOLOGY | Facility: CLINIC | Age: 69
End: 2019-03-13
Payer: MEDICARE

## 2019-03-13 ENCOUNTER — NON-APPOINTMENT (OUTPATIENT)
Age: 69
End: 2019-03-13

## 2019-03-13 VITALS
BODY MASS INDEX: 36.8 KG/M2 | HEART RATE: 60 BPM | DIASTOLIC BLOOD PRESSURE: 77 MMHG | HEIGHT: 66 IN | SYSTOLIC BLOOD PRESSURE: 126 MMHG | OXYGEN SATURATION: 98 % | WEIGHT: 229 LBS

## 2019-03-13 PROCEDURE — 93000 ELECTROCARDIOGRAM COMPLETE: CPT

## 2019-03-13 PROCEDURE — 99214 OFFICE O/P EST MOD 30 MIN: CPT

## 2019-03-13 RX ORDER — AMIODARONE HYDROCHLORIDE 200 MG/1
200 TABLET ORAL
Qty: 104 | Refills: 3 | Status: DISCONTINUED | COMMUNITY
Start: 2019-01-23 | End: 2019-03-13

## 2019-03-13 NOTE — DISCUSSION/SUMMARY
[FreeTextEntry1] : 68-year-old woman with a history as listed presents for a followup visit.\par \par Mallory is doing well s/p her AF ablation. \par She denies any anginal symptoms. Clinically she is euvolemic on exam. She is s/p cath on 12/2018 with normal cors and LVEDP. She was noted to have normal LV function. She had an echo in 12/2018 that showed normal systolic LV function without any significant other findings, including no significant valvular disease. \par  \par Her EKG did not reveal any significant ischemic changes. She si maintaining SR. She will continue Toprol 50mg Qday. She will continue Xarelto for CVA risk reduction. She will likely get a screening holter in 6 months. \par She will  continue lasix 40mg Qday  \par \par Exercise and diet counseling was performed in order to reduce her future cardiovascular risk. \par She will followup with me in 3 months

## 2019-03-13 NOTE — HISTORY OF PRESENT ILLNESS
[FreeTextEntry1] : 68-year-old woman with a history of asthma, obesity, essential thrombocytosis, AF s/p ablation, normal cors in 12/2018, PAF s/p ablation in 2/7/19.\par \par she is s/p cath on 12/2018 with normal cors and LVEDP. She was noted to have normal LV function. \par She then subsequently underwent an AF ablation on 2/7/19. She was taken off of Amio. \par \par Since her last visit, her fatigue has improved.  Her dyspnea on exertion has dramatically improved. \par She   denies any  palpitations, lower extremity edema, near syncope, syncope, strokelike symptoms. \par No reported melena, hematochezia or hematemesis. \par She is compliant with her medications.

## 2019-03-13 NOTE — REVIEW OF SYSTEMS
[Shortness Of Breath] : no shortness of breath [Dyspnea on exertion] : not dyspnea during exertion [Chest  Pressure] : no chest pressure [Chest Pain] : no chest pain [Lower Ext Edema] : no extremity edema [Leg Claudication] : no intermittent leg claudication [Palpitations] : no palpitations [see HPI] : see HPI [Joint Pain] : joint pain [Joint Stiffness] : joint stiffness [Negative] : Heme/Lymph

## 2019-03-13 NOTE — PHYSICAL EXAM
[General Appearance - Well Developed] : well developed [Normal Appearance] : normal appearance [Well Groomed] : well groomed [General Appearance - Well Nourished] : well nourished [No Deformities] : no deformities [General Appearance - In No Acute Distress] : no acute distress [Normal Conjunctiva] : the conjunctiva exhibited no abnormalities [Eyelids - No Xanthelasma] : the eyelids demonstrated no xanthelasmas [Normal Oral Mucosa] : normal oral mucosa [No Oral Pallor] : no oral pallor [No Oral Cyanosis] : no oral cyanosis [Normal Jugular Venous A Waves Present] : normal jugular venous A waves present [Normal Jugular Venous V Waves Present] : normal jugular venous V waves present [No Jugular Venous Montes A Waves] : no jugular venous montes A waves [Respiration, Rhythm And Depth] : normal respiratory rhythm and effort [Exaggerated Use Of Accessory Muscles For Inspiration] : no accessory muscle use [Auscultation Breath Sounds / Voice Sounds] : lungs were clear to auscultation bilaterally [Abdomen Soft] : soft [Abdomen Tenderness] : non-tender [Abdomen Mass (___ Cm)] : no abdominal mass palpated [FreeTextEntry1] : obese [Abnormal Walk] : normal gait [Gait - Sufficient For Exercise Testing] : the gait was sufficient for exercise testing [Nail Clubbing] : no clubbing of the fingernails [Cyanosis, Localized] : no localized cyanosis [Petechial Hemorrhages (___cm)] : no petechial hemorrhages [Skin Color & Pigmentation] : normal skin color and pigmentation [] : no rash [No Venous Stasis] : no venous stasis [Skin Lesions] : no skin lesions [No Skin Ulcers] : no skin ulcer [No Xanthoma] : no  xanthoma was observed [Oriented To Time, Place, And Person] : oriented to person, place, and time [Affect] : the affect was normal [Mood] : the mood was normal [No Anxiety] : not feeling anxious [Normal Rate] : normal [Rhythm Regular] : regular [Normal S1] : normal S1 [Normal S2] : normal S2 [No Gallop] : no gallop heard [No Murmur] : no murmurs heard [2+] : left 2+ [Right Carotid Bruit] : no bruit heard over the right carotid [Left Carotid Bruit] : no bruit heard over the left carotid [Bruit] : no bruit heard [No Pitting Edema] : no pitting edema present

## 2019-03-28 ENCOUNTER — OUTPATIENT (OUTPATIENT)
Dept: OUTPATIENT SERVICES | Facility: HOSPITAL | Age: 69
LOS: 1 days | Discharge: HOME | End: 2019-03-28

## 2019-03-28 DIAGNOSIS — S99.919A UNSPECIFIED INJURY OF UNSPECIFIED ANKLE, INITIAL ENCOUNTER: Chronic | ICD-10-CM

## 2019-03-28 DIAGNOSIS — K81.9 CHOLECYSTITIS, UNSPECIFIED: Chronic | ICD-10-CM

## 2019-03-28 DIAGNOSIS — D69.6 THROMBOCYTOPENIA, UNSPECIFIED: ICD-10-CM

## 2019-04-01 ENCOUNTER — NON-APPOINTMENT (OUTPATIENT)
Age: 69
End: 2019-04-01

## 2019-04-01 ENCOUNTER — APPOINTMENT (OUTPATIENT)
Dept: ELECTROPHYSIOLOGY | Facility: HOSPITAL | Age: 69
End: 2019-04-01
Payer: MEDICARE

## 2019-04-01 VITALS
SYSTOLIC BLOOD PRESSURE: 133 MMHG | BODY MASS INDEX: 36.32 KG/M2 | HEART RATE: 71 BPM | HEIGHT: 66 IN | WEIGHT: 226 LBS | DIASTOLIC BLOOD PRESSURE: 76 MMHG | OXYGEN SATURATION: 99 %

## 2019-04-01 PROCEDURE — 99214 OFFICE O/P EST MOD 30 MIN: CPT

## 2019-04-01 PROCEDURE — 93000 ELECTROCARDIOGRAM COMPLETE: CPT

## 2019-04-01 RX ORDER — MONTELUKAST 10 MG/1
10 TABLET, FILM COATED ORAL
Qty: 30 | Refills: 0 | Status: DISCONTINUED | COMMUNITY
Start: 2017-05-01 | End: 2019-04-01

## 2019-04-01 NOTE — HISTORY OF PRESENT ILLNESS
[FreeTextEntry1] : Referring Physician: Bryson Fernandez MD\par \par Dear Bryson:\par \par Ms. Mallory Ornelas was seen in the [facility] today. For our records, please allow me to summarize the history and my findings.\par \par This pleasant 68 year old woman has a cardiovascular history significant for diabetes, HTN, obesity, and CHADSVASC 4 persistent atrial fibrillation. Arrhythmia was characterized by severe fatigue and exertional intolerance. On 2/7/19 she underwent pulmonary vein isolation. She returns today for followup noting significant improvement in symptoms. She has resumed usual activities and is without complaint. \par \par Ms. Ornelas denies any recent history of chest pain, shortness of breath, palpitations, dizziness, or syncope.\par

## 2019-04-01 NOTE — DISCUSSION/SUMMARY
[FreeTextEntry1] : In summary, this is a 68 year old woman with symptomatic CHADSVASC 4 persistent atrial fibrillation s/p ablation. I am pleased to see her doing so well today and maintaining sinus rhythm. She will discontinue amiodarone in 2 weeks and will be sent an event monitor in one month. She will return for follow up in 3 months. We discussed risks/benefits of longterm anti-coagulation in this individual with persistent CHADSVASC 4 AF; at this time I recommended drug continuation unless a strong contraindication develops.\par \par Ms. Ornelas appeared to understand the whole discussion and verbalized that all of her questions were answered to her satisfaction.\par \par Thank you for allowing me to be involved in the care of this pleasant woman. Please feel free to contact me with any questions.

## 2019-04-01 NOTE — PHYSICAL EXAM
[General Appearance - Well Developed] : well developed [Normal Appearance] : normal appearance [Well Groomed] : well groomed [General Appearance - Well Nourished] : well nourished [No Deformities] : no deformities [General Appearance - In No Acute Distress] : no acute distress [Normal Jugular Venous A Waves Present] : normal jugular venous A waves present [Normal Jugular Venous V Waves Present] : normal jugular venous V waves present [No Jugular Venous Montes A Waves] : no jugular venous montes A waves [Respiration, Rhythm And Depth] : normal respiratory rhythm and effort [Exaggerated Use Of Accessory Muscles For Inspiration] : no accessory muscle use [Auscultation Breath Sounds / Voice Sounds] : lungs were clear to auscultation bilaterally [Heart Sounds] : normal S1 and S2 [Murmurs] : no murmurs present [Abdomen Soft] : soft [Abdomen Tenderness] : non-tender [Abdomen Mass (___ Cm)] : no abdominal mass palpated [Nail Clubbing] : no clubbing of the fingernails [Cyanosis, Localized] : no localized cyanosis [Petechial Hemorrhages (___cm)] : no petechial hemorrhages [Normal Conjunctiva] : the conjunctiva exhibited no abnormalities [Eyelids - No Xanthelasma] : the eyelids demonstrated no xanthelasmas [Normal Oral Mucosa] : normal oral mucosa [No Oral Pallor] : no oral pallor [No Oral Cyanosis] : no oral cyanosis [FreeTextEntry1] : Irregular rhythm [Skin Color & Pigmentation] : normal skin color and pigmentation [] : no rash [No Venous Stasis] : no venous stasis [Skin Lesions] : no skin lesions [No Skin Ulcers] : no skin ulcer [No Xanthoma] : no  xanthoma was observed [Oriented To Time, Place, And Person] : oriented to person, place, and time [Affect] : the affect was normal [Mood] : the mood was normal [No Anxiety] : not feeling anxious

## 2019-04-03 ENCOUNTER — RX RENEWAL (OUTPATIENT)
Age: 69
End: 2019-04-03

## 2019-04-09 ENCOUNTER — RESULT REVIEW (OUTPATIENT)
Age: 69
End: 2019-04-09

## 2019-04-09 ENCOUNTER — APPOINTMENT (OUTPATIENT)
Dept: HEMATOLOGY ONCOLOGY | Facility: CLINIC | Age: 69
End: 2019-04-09
Payer: MEDICARE

## 2019-04-09 VITALS
SYSTOLIC BLOOD PRESSURE: 109 MMHG | BODY MASS INDEX: 36.65 KG/M2 | WEIGHT: 227.05 LBS | DIASTOLIC BLOOD PRESSURE: 70 MMHG | RESPIRATION RATE: 16 BRPM | HEART RATE: 61 BPM | OXYGEN SATURATION: 98 % | TEMPERATURE: 98.5 F

## 2019-04-09 LAB
BASOPHILS # BLD AUTO: 0 K/UL — SIGNIFICANT CHANGE UP (ref 0–0.2)
BASOPHILS NFR BLD AUTO: 0.4 % — SIGNIFICANT CHANGE UP (ref 0–2)
EOSINOPHIL # BLD AUTO: 0.3 K/UL — SIGNIFICANT CHANGE UP (ref 0–0.5)
EOSINOPHIL NFR BLD AUTO: 4 % — SIGNIFICANT CHANGE UP (ref 0–6)
HCT VFR BLD CALC: 32.5 % — LOW (ref 34.5–45)
HGB BLD-MCNC: 10.9 G/DL — LOW (ref 11.5–15.5)
LYMPHOCYTES # BLD AUTO: 1.2 K/UL — SIGNIFICANT CHANGE UP (ref 1–3.3)
LYMPHOCYTES # BLD AUTO: 15 % — SIGNIFICANT CHANGE UP (ref 13–44)
MCHC RBC-ENTMCNC: 33.6 G/DL — SIGNIFICANT CHANGE UP (ref 32–36)
MCHC RBC-ENTMCNC: 35.2 PG — HIGH (ref 27–34)
MCV RBC AUTO: 105 FL — HIGH (ref 80–100)
MONOCYTES # BLD AUTO: 0.2 K/UL — SIGNIFICANT CHANGE UP (ref 0–0.9)
MONOCYTES NFR BLD AUTO: 2.7 % — SIGNIFICANT CHANGE UP (ref 2–14)
NEUTROPHILS # BLD AUTO: 6.2 K/UL — SIGNIFICANT CHANGE UP (ref 1.8–7.4)
NEUTROPHILS NFR BLD AUTO: 77.8 % — HIGH (ref 43–77)
PLATELET # BLD AUTO: 515 K/UL — HIGH (ref 150–400)
RBC # BLD: 3.1 M/UL — LOW (ref 3.8–5.2)
RBC # FLD: 18.2 % — HIGH (ref 10.3–14.5)
WBC # BLD: 7.9 K/UL — SIGNIFICANT CHANGE UP (ref 3.8–10.5)
WBC # FLD AUTO: 7.9 K/UL — SIGNIFICANT CHANGE UP (ref 3.8–10.5)

## 2019-04-09 PROCEDURE — 99213 OFFICE O/P EST LOW 20 MIN: CPT

## 2019-04-10 NOTE — HISTORY OF PRESENT ILLNESS
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 [de-identified] : -february had an ablation- has a fib\par -currently on xarelto\par -asthma exacerbation x 2\par -father passed away yesterday, was on hospice\par -her son's getting a divorce\par -reports she had a "nervous breakdown" started rexalti on top of zoloft\par

## 2019-04-10 NOTE — ASSESSMENT
[FreeTextEntry1] : 69 yo F with a history of PMF jak2+ here with worse counts, now improved somewhat back on HU,\par \par -continue hu for now, no history of clotting, \par -if platelets worsen, can consider change of medications, continues to be stable\par -hb stabilized but increasing hu will drop hb as well so will hold at this dose\par -continue folic acid and aspirin\par

## 2019-05-01 ENCOUNTER — RX RENEWAL (OUTPATIENT)
Age: 69
End: 2019-05-01

## 2019-05-01 LAB
ALBUMIN SERPL ELPH-MCNC: 3.7 G/DL
ALP BLD-CCNC: 78 U/L
ALT SERPL-CCNC: 10 U/L
ANION GAP SERPL CALC-SCNC: 15 MMOL/L
AST SERPL-CCNC: 14 U/L
BILIRUB SERPL-MCNC: 0.4 MG/DL
BUN SERPL-MCNC: 11 MG/DL
CALCIUM SERPL-MCNC: 8.6 MG/DL
CHLORIDE SERPL-SCNC: 101 MMOL/L
CO2 SERPL-SCNC: 28 MMOL/L
CREAT SERPL-MCNC: 1 MG/DL
GLUCOSE SERPL-MCNC: 110 MG/DL
LDH SERPL-CCNC: 266 U/L
POTASSIUM SERPL-SCNC: 3.4 MMOL/L
PROT SERPL-MCNC: 6.5 G/DL
SODIUM SERPL-SCNC: 144 MMOL/L
URATE SERPL-MCNC: 6.3 MG/DL

## 2019-06-11 ENCOUNTER — NON-APPOINTMENT (OUTPATIENT)
Age: 69
End: 2019-06-11

## 2019-06-11 ENCOUNTER — APPOINTMENT (OUTPATIENT)
Dept: CARDIOLOGY | Facility: CLINIC | Age: 69
End: 2019-06-11
Payer: MEDICARE

## 2019-06-11 VITALS
SYSTOLIC BLOOD PRESSURE: 121 MMHG | BODY MASS INDEX: 36.96 KG/M2 | DIASTOLIC BLOOD PRESSURE: 78 MMHG | OXYGEN SATURATION: 93 % | HEIGHT: 66 IN | HEART RATE: 65 BPM | WEIGHT: 230 LBS

## 2019-06-11 PROCEDURE — 93000 ELECTROCARDIOGRAM COMPLETE: CPT

## 2019-06-11 PROCEDURE — 99214 OFFICE O/P EST MOD 30 MIN: CPT

## 2019-06-11 NOTE — HISTORY OF PRESENT ILLNESS
[FreeTextEntry1] : 68-year-old woman with a history of asthma, obesity, essential thrombocytosis, AF s/p ablation, normal cors in 12/2018, PAF s/p ablation in 2/7/19.\par \par she is s/p cath on 12/2018 with normal cors and LVEDP. She was noted to have normal LV function. \par She then subsequently underwent an AF ablation on 2/7/19. She was taken off of Amio. \par \par Since her last visit, her father has recently passed away. \par Her allergies have been more active. \par She tried to wear a Ziopatch but it did not work. She never received a replacement from the company. \par  Her dyspnea on exertion has resolved. SHe has been swimming more. She has been able to do the treadmill for about 15-20 minutes. \par She   denies any  chest pain, palpitations, lower extremity edema, near syncope, syncope, strokelike symptoms. \par No reported melena, hematochezia or hematemesis. \par She is compliant with her medications.

## 2019-06-11 NOTE — DISCUSSION/SUMMARY
[FreeTextEntry1] : 68-year-old woman with a history as listed presents for a followup visit.\par \par Mallory is doing well overall. She denies any anginal symptoms. Clinically she is euvolemic on exam. She is s/p cath on 12/2018 with normal cors and LVEDP. She was noted to have normal LV function. She had an echo in 12/2018 that showed normal systolic LV function without any significant other findings, including no significant valvular disease. Her physical exam was essentially unrevealing for any significant cardiovascular findings. \par  \par Her EKG did not reveal any significant ischemic changes. She is maintaining SR. She will continue Toprol 50mg Qday. She will continue Xarelto for CVA risk reduction. She will likely get a screening holter in August/Sept. \par \par She will  continue lasix 40mg Qday for her history of lower extremity edema and because she maintains a  higher salt diet. \par \par Exercise and diet counseling was performed in order to reduce her future cardiovascular risk. \par She will followup with me in 3 months

## 2019-06-11 NOTE — REVIEW OF SYSTEMS
[see HPI] : see HPI [Joint Pain] : joint pain [Joint Stiffness] : joint stiffness [Negative] : Endocrine [Shortness Of Breath] : no shortness of breath [Dyspnea on exertion] : not dyspnea during exertion [Chest  Pressure] : no chest pressure [Chest Pain] : no chest pain [Lower Ext Edema] : no extremity edema [Leg Claudication] : no intermittent leg claudication [Palpitations] : no palpitations

## 2019-06-11 NOTE — PHYSICAL EXAM
[General Appearance - Well Developed] : well developed [Well Groomed] : well groomed [Normal Appearance] : normal appearance [General Appearance - In No Acute Distress] : no acute distress [No Deformities] : no deformities [General Appearance - Well Nourished] : well nourished [Normal Conjunctiva] : the conjunctiva exhibited no abnormalities [Normal Oral Mucosa] : normal oral mucosa [Eyelids - No Xanthelasma] : the eyelids demonstrated no xanthelasmas [No Oral Cyanosis] : no oral cyanosis [No Oral Pallor] : no oral pallor [Normal Jugular Venous A Waves Present] : normal jugular venous A waves present [Normal Jugular Venous V Waves Present] : normal jugular venous V waves present [No Jugular Venous Montes A Waves] : no jugular venous montes A waves [Auscultation Breath Sounds / Voice Sounds] : lungs were clear to auscultation bilaterally [Respiration, Rhythm And Depth] : normal respiratory rhythm and effort [Exaggerated Use Of Accessory Muscles For Inspiration] : no accessory muscle use [Abdomen Soft] : soft [Abdomen Tenderness] : non-tender [Abdomen Mass (___ Cm)] : no abdominal mass palpated [Abnormal Walk] : normal gait [Nail Clubbing] : no clubbing of the fingernails [Gait - Sufficient For Exercise Testing] : the gait was sufficient for exercise testing [Petechial Hemorrhages (___cm)] : no petechial hemorrhages [Cyanosis, Localized] : no localized cyanosis [Skin Color & Pigmentation] : normal skin color and pigmentation [Skin Lesions] : no skin lesions [No Venous Stasis] : no venous stasis [] : no rash [No Xanthoma] : no  xanthoma was observed [No Skin Ulcers] : no skin ulcer [Affect] : the affect was normal [Oriented To Time, Place, And Person] : oriented to person, place, and time [No Anxiety] : not feeling anxious [Normal Rate] : normal [Mood] : the mood was normal [Rhythm Regular] : regular [Normal S1] : normal S1 [Normal S2] : normal S2 [No Gallop] : no gallop heard [No Murmur] : no murmurs heard [2+] : left 2+ [No Pitting Edema] : no pitting edema present [Right Carotid Bruit] : no bruit heard over the right carotid [FreeTextEntry1] : obese [Left Carotid Bruit] : no bruit heard over the left carotid [Bruit] : no bruit heard

## 2019-08-16 ENCOUNTER — OUTPATIENT (OUTPATIENT)
Dept: OUTPATIENT SERVICES | Facility: HOSPITAL | Age: 69
LOS: 1 days | Discharge: HOME | End: 2019-08-16

## 2019-08-16 DIAGNOSIS — D69.6 THROMBOCYTOPENIA, UNSPECIFIED: ICD-10-CM

## 2019-08-16 DIAGNOSIS — S99.919A UNSPECIFIED INJURY OF UNSPECIFIED ANKLE, INITIAL ENCOUNTER: Chronic | ICD-10-CM

## 2019-08-16 DIAGNOSIS — K81.9 CHOLECYSTITIS, UNSPECIFIED: Chronic | ICD-10-CM

## 2019-08-20 ENCOUNTER — APPOINTMENT (OUTPATIENT)
Dept: HEMATOLOGY ONCOLOGY | Facility: CLINIC | Age: 69
End: 2019-08-20

## 2019-08-29 ENCOUNTER — APPOINTMENT (OUTPATIENT)
Dept: HEMATOLOGY ONCOLOGY | Facility: CLINIC | Age: 69
End: 2019-08-29

## 2019-10-15 ENCOUNTER — APPOINTMENT (OUTPATIENT)
Dept: CARDIOLOGY | Facility: CLINIC | Age: 69
End: 2019-10-15
Payer: MEDICARE

## 2019-10-15 ENCOUNTER — NON-APPOINTMENT (OUTPATIENT)
Age: 69
End: 2019-10-15

## 2019-10-15 VITALS
DIASTOLIC BLOOD PRESSURE: 81 MMHG | WEIGHT: 235 LBS | HEIGHT: 66 IN | OXYGEN SATURATION: 100 % | BODY MASS INDEX: 37.77 KG/M2 | SYSTOLIC BLOOD PRESSURE: 138 MMHG | HEART RATE: 70 BPM

## 2019-10-15 PROCEDURE — 99214 OFFICE O/P EST MOD 30 MIN: CPT

## 2019-10-15 PROCEDURE — 93000 ELECTROCARDIOGRAM COMPLETE: CPT

## 2019-10-15 RX ORDER — AMIODARONE HYDROCHLORIDE 200 MG/1
200 TABLET ORAL DAILY
Qty: 30 | Refills: 5 | Status: DISCONTINUED | COMMUNITY
Start: 2019-04-01 | End: 2019-10-15

## 2019-10-15 NOTE — REVIEW OF SYSTEMS
[Shortness Of Breath] : no shortness of breath [Dyspnea on exertion] : not dyspnea during exertion [Chest  Pressure] : no chest pressure [Lower Ext Edema] : no extremity edema [Chest Pain] : no chest pain [Leg Claudication] : no intermittent leg claudication [Palpitations] : no palpitations [see HPI] : see HPI [Joint Pain] : joint pain [Joint Stiffness] : joint stiffness [Negative] : Heme/Lymph

## 2019-10-15 NOTE — HISTORY OF PRESENT ILLNESS
[FreeTextEntry1] : 69-year-old woman with a history of asthma, obesity, essential thrombocytosis, AF s/p ablation, normal cors in 12/2018, PAF s/p ablation in 2/7/19.\par \par she is s/p cath on 12/2018 with normal cors and LVEDP. She was noted to have normal LV function. \par She then subsequently underwent an AF ablation on 2/7/19. She was taken off of Amio. \par \par Since her last visit, she has been feeling well. \par Her dyspnea on exertion especially when she climbs 3 flights of stairs.  She has became more sedentary over the summer. \par She   denies any  chest pain, palpitations, lower extremity edema, near syncope, syncope, strokelike symptoms. No reported melena, hematochezia or hematemesis. \par She is compliant with her medications.

## 2019-10-15 NOTE — PHYSICAL EXAM
[General Appearance - Well Developed] : well developed [Normal Appearance] : normal appearance [Well Groomed] : well groomed [General Appearance - Well Nourished] : well nourished [No Deformities] : no deformities [General Appearance - In No Acute Distress] : no acute distress [Normal Conjunctiva] : the conjunctiva exhibited no abnormalities [Eyelids - No Xanthelasma] : the eyelids demonstrated no xanthelasmas [No Oral Pallor] : no oral pallor [Normal Oral Mucosa] : normal oral mucosa [No Oral Cyanosis] : no oral cyanosis [Normal Jugular Venous A Waves Present] : normal jugular venous A waves present [Normal Jugular Venous V Waves Present] : normal jugular venous V waves present [No Jugular Venous Montes A Waves] : no jugular venous montes A waves [Respiration, Rhythm And Depth] : normal respiratory rhythm and effort [Exaggerated Use Of Accessory Muscles For Inspiration] : no accessory muscle use [Auscultation Breath Sounds / Voice Sounds] : lungs were clear to auscultation bilaterally [Abdomen Tenderness] : non-tender [Abdomen Soft] : soft [Abdomen Mass (___ Cm)] : no abdominal mass palpated [FreeTextEntry1] : obese [Abnormal Walk] : normal gait [Gait - Sufficient For Exercise Testing] : the gait was sufficient for exercise testing [Nail Clubbing] : no clubbing of the fingernails [Cyanosis, Localized] : no localized cyanosis [Petechial Hemorrhages (___cm)] : no petechial hemorrhages [Skin Color & Pigmentation] : normal skin color and pigmentation [] : no rash [No Venous Stasis] : no venous stasis [Skin Lesions] : no skin lesions [No Skin Ulcers] : no skin ulcer [No Xanthoma] : no  xanthoma was observed [Oriented To Time, Place, And Person] : oriented to person, place, and time [Affect] : the affect was normal [Mood] : the mood was normal [No Anxiety] : not feeling anxious [Normal Rate] : normal [Rhythm Regular] : regular [Normal S2] : normal S2 [Normal S1] : normal S1 [No Gallop] : no gallop heard [No Murmur] : no murmurs heard [2+] : left 2+ [Left Carotid Bruit] : no bruit heard over the left carotid [Right Carotid Bruit] : no bruit heard over the right carotid [No Pitting Edema] : no pitting edema present [Bruit] : no bruit heard

## 2019-10-15 NOTE — DISCUSSION/SUMMARY
[FreeTextEntry1] : 69-year-old woman with a history as listed presents for a followup visit.\par \par Mallory is doing well overall. She is having dyspnea on exertion which is likely related to deconditioning. \par She denies any anginal symptoms. Clinically she is euvolemic on exam. She is s/p cath on 12/2018 with normal cors and LVEDP. She was noted to have normal LV function. She had an echo in 12/2018 that showed normal systolic LV function without any significant other findings, including no significant valvular disease. Her physical exam was essentially unrevealing for any significant cardiovascular findings. \par  \par Her EKG did not reveal any significant ischemic changes. She is maintaining SR. She will continue Toprol 50mg Qday. \par She does not feel her AF. Therefore at this point she will continue Xarelto for CVA risk reduction. She will likely get a screening holter  prior to her next visit. \par \par She will  continue lasix 40mg Qday for her history of lower extremity edema and because she maintains a  higher salt diet. \par \par Exercise and diet counseling was performed in order to reduce her future cardiovascular risk. \par She will followup with me in 3 months

## 2019-10-17 ENCOUNTER — OUTPATIENT (OUTPATIENT)
Dept: OUTPATIENT SERVICES | Facility: HOSPITAL | Age: 69
LOS: 1 days | Discharge: HOME | End: 2019-10-17

## 2019-10-17 ENCOUNTER — OTHER (OUTPATIENT)
Age: 69
End: 2019-10-17

## 2019-10-17 DIAGNOSIS — D69.6 THROMBOCYTOPENIA, UNSPECIFIED: ICD-10-CM

## 2019-10-17 DIAGNOSIS — K81.9 CHOLECYSTITIS, UNSPECIFIED: Chronic | ICD-10-CM

## 2019-10-17 DIAGNOSIS — S99.919A UNSPECIFIED INJURY OF UNSPECIFIED ANKLE, INITIAL ENCOUNTER: Chronic | ICD-10-CM

## 2019-10-24 ENCOUNTER — RESULT REVIEW (OUTPATIENT)
Age: 69
End: 2019-10-24

## 2019-10-24 ENCOUNTER — APPOINTMENT (OUTPATIENT)
Dept: HEMATOLOGY ONCOLOGY | Facility: CLINIC | Age: 69
End: 2019-10-24
Payer: MEDICARE

## 2019-10-24 VITALS
OXYGEN SATURATION: 100 % | DIASTOLIC BLOOD PRESSURE: 64 MMHG | HEART RATE: 79 BPM | BODY MASS INDEX: 38.68 KG/M2 | TEMPERATURE: 98.7 F | RESPIRATION RATE: 18 BRPM | WEIGHT: 239.64 LBS | SYSTOLIC BLOOD PRESSURE: 95 MMHG

## 2019-10-24 LAB
BASOPHILS # BLD AUTO: 0 K/UL — SIGNIFICANT CHANGE UP (ref 0–0.2)
BASOPHILS NFR BLD AUTO: 0.2 % — SIGNIFICANT CHANGE UP (ref 0–2)
EOSINOPHIL # BLD AUTO: 0.5 K/UL — SIGNIFICANT CHANGE UP (ref 0–0.5)
EOSINOPHIL NFR BLD AUTO: 2.9 % — SIGNIFICANT CHANGE UP (ref 0–6)
HCT VFR BLD CALC: 33.4 % — LOW (ref 34.5–45)
HGB BLD-MCNC: 10.8 G/DL — LOW (ref 11.5–15.5)
LYMPHOCYTES # BLD AUTO: 1.5 K/UL — SIGNIFICANT CHANGE UP (ref 1–3.3)
LYMPHOCYTES # BLD AUTO: 8.8 % — LOW (ref 13–44)
MCHC RBC-ENTMCNC: 32.3 G/DL — SIGNIFICANT CHANGE UP (ref 32–36)
MCHC RBC-ENTMCNC: 32.9 PG — SIGNIFICANT CHANGE UP (ref 27–34)
MCV RBC AUTO: 102 FL — HIGH (ref 80–100)
MONOCYTES # BLD AUTO: 0.4 K/UL — SIGNIFICANT CHANGE UP (ref 0–0.9)
MONOCYTES NFR BLD AUTO: 2.2 % — SIGNIFICANT CHANGE UP (ref 2–14)
NEUTROPHILS # BLD AUTO: 14.8 K/UL — HIGH (ref 1.8–7.4)
NEUTROPHILS NFR BLD AUTO: 85.9 % — HIGH (ref 43–77)
PLATELET # BLD AUTO: 622 K/UL — HIGH (ref 150–400)
RBC # BLD: 3.28 M/UL — LOW (ref 3.8–5.2)
RBC # FLD: 22.8 % — HIGH (ref 10.3–14.5)
WBC # BLD: 17.2 K/UL — HIGH (ref 3.8–10.5)
WBC # FLD AUTO: 17.2 K/UL — HIGH (ref 3.8–10.5)

## 2019-10-24 PROCEDURE — 99213 OFFICE O/P EST LOW 20 MIN: CPT

## 2019-10-24 NOTE — HISTORY OF PRESENT ILLNESS
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 [de-identified] : -here feeling down last few days\par -thinking of not going on her trip in november- 3 week cruise with a bunch of friends\par -has not been feeling like she wants to go out, this has been x 1-2 months\par -otherwise she is doing okay, she thinks she will go on her trip\par -she decided to stay on her xarelto for now with her history of afib (she is s/p ablation),  to do a monitor when she comes back\par -no back pain, no itching, no dizziness, no blurry vision\par -no sx of uri, no urinary tract sx/ burning or dysuria, no cough, no diarrhea to explain her wbc\par

## 2019-10-24 NOTE — ASSESSMENT
[FreeTextEntry1] : 69 yo F with a history of PMF jak2+ here with persistent thrombocytosis despite HU TID/BID alternating,\par \par -continue hu for now, no history of clotting, hb hovering at 10, don’t want to increase dose to worsen anemia\par -if platelets worsen, can consider change of medications, approx stable\par -continue folic acid and xarelto\par -patient to repeat cbc next monday/tuesday given elevated wbc\par -will review pbs as well\par

## 2019-10-28 ENCOUNTER — APPOINTMENT (OUTPATIENT)
Dept: HEMATOLOGY ONCOLOGY | Facility: CLINIC | Age: 69
End: 2019-10-28

## 2019-10-28 ENCOUNTER — RESULT REVIEW (OUTPATIENT)
Age: 69
End: 2019-10-28

## 2019-10-28 LAB
BASOPHILS # BLD AUTO: 0 K/UL — SIGNIFICANT CHANGE UP (ref 0–0.2)
BASOPHILS NFR BLD AUTO: 0.2 % — SIGNIFICANT CHANGE UP (ref 0–2)
EOSINOPHIL # BLD AUTO: 0.8 K/UL — HIGH (ref 0–0.5)
EOSINOPHIL NFR BLD AUTO: 4.3 % — SIGNIFICANT CHANGE UP (ref 0–6)
HCT VFR BLD CALC: 32.5 % — LOW (ref 34.5–45)
HGB BLD-MCNC: 10.6 G/DL — LOW (ref 11.5–15.5)
LYMPHOCYTES # BLD AUTO: 1.5 K/UL — SIGNIFICANT CHANGE UP (ref 1–3.3)
LYMPHOCYTES # BLD AUTO: 8 % — LOW (ref 13–44)
MCHC RBC-ENTMCNC: 32.4 PG — SIGNIFICANT CHANGE UP (ref 27–34)
MCHC RBC-ENTMCNC: 32.6 G/DL — SIGNIFICANT CHANGE UP (ref 32–36)
MCV RBC AUTO: 99.5 FL — SIGNIFICANT CHANGE UP (ref 80–100)
MONOCYTES # BLD AUTO: 0.4 K/UL — SIGNIFICANT CHANGE UP (ref 0–0.9)
MONOCYTES NFR BLD AUTO: 2.3 % — SIGNIFICANT CHANGE UP (ref 2–14)
NEUTROPHILS # BLD AUTO: 16.4 K/UL — HIGH (ref 1.8–7.4)
NEUTROPHILS NFR BLD AUTO: 85.2 % — HIGH (ref 43–77)
PLATELET # BLD AUTO: 1020 K/UL — CRITICAL HIGH (ref 150–400)
RBC # BLD: 3.27 M/UL — LOW (ref 3.8–5.2)
RBC # FLD: 24.6 % — HIGH (ref 10.3–14.5)
WBC # BLD: 19.3 K/UL — HIGH (ref 3.8–10.5)
WBC # FLD AUTO: 19.3 K/UL — HIGH (ref 3.8–10.5)

## 2019-10-30 ENCOUNTER — APPOINTMENT (OUTPATIENT)
Dept: HEMATOLOGY ONCOLOGY | Facility: CLINIC | Age: 69
End: 2019-10-30

## 2019-10-30 ENCOUNTER — RESULT REVIEW (OUTPATIENT)
Age: 69
End: 2019-10-30

## 2019-10-30 LAB
BASOPHILS # BLD AUTO: 0.1 K/UL — SIGNIFICANT CHANGE UP (ref 0–0.2)
BASOPHILS NFR BLD AUTO: 0.4 % — SIGNIFICANT CHANGE UP (ref 0–2)
EOSINOPHIL # BLD AUTO: 0.6 K/UL — HIGH (ref 0–0.5)
EOSINOPHIL NFR BLD AUTO: 3.8 % — SIGNIFICANT CHANGE UP (ref 0–6)
HCT VFR BLD CALC: 32.9 % — LOW (ref 34.5–45)
HGB BLD-MCNC: 11 G/DL — LOW (ref 11.5–15.5)
LYMPHOCYTES # BLD AUTO: 1.6 K/UL — SIGNIFICANT CHANGE UP (ref 1–3.3)
LYMPHOCYTES # BLD AUTO: 10.7 % — LOW (ref 13–44)
MCHC RBC-ENTMCNC: 33.4 PG — SIGNIFICANT CHANGE UP (ref 27–34)
MCHC RBC-ENTMCNC: 33.5 G/DL — SIGNIFICANT CHANGE UP (ref 32–36)
MCV RBC AUTO: 99.7 FL — SIGNIFICANT CHANGE UP (ref 80–100)
MONOCYTES # BLD AUTO: 0.3 K/UL — SIGNIFICANT CHANGE UP (ref 0–0.9)
MONOCYTES NFR BLD AUTO: 2 % — SIGNIFICANT CHANGE UP (ref 2–14)
NEUTROPHILS # BLD AUTO: 12.5 K/UL — HIGH (ref 1.8–7.4)
NEUTROPHILS NFR BLD AUTO: 83.1 % — HIGH (ref 43–77)
PLATELET # BLD AUTO: 1024 K/UL — CRITICAL HIGH (ref 150–400)
RBC # BLD: 3.3 M/UL — LOW (ref 3.8–5.2)
RBC # FLD: 23.8 % — HIGH (ref 10.3–14.5)
WBC # BLD: 15.1 K/UL — HIGH (ref 3.8–10.5)
WBC # FLD AUTO: 15.1 K/UL — HIGH (ref 3.8–10.5)

## 2019-11-16 ENCOUNTER — OUTPATIENT (OUTPATIENT)
Dept: OUTPATIENT SERVICES | Facility: HOSPITAL | Age: 69
LOS: 1 days | Discharge: HOME | End: 2019-11-16

## 2019-11-16 DIAGNOSIS — S99.919A UNSPECIFIED INJURY OF UNSPECIFIED ANKLE, INITIAL ENCOUNTER: Chronic | ICD-10-CM

## 2019-11-16 DIAGNOSIS — D69.6 THROMBOCYTOPENIA, UNSPECIFIED: ICD-10-CM

## 2019-11-16 DIAGNOSIS — K81.9 CHOLECYSTITIS, UNSPECIFIED: Chronic | ICD-10-CM

## 2019-11-21 ENCOUNTER — RESULT REVIEW (OUTPATIENT)
Age: 69
End: 2019-11-21

## 2019-11-21 ENCOUNTER — APPOINTMENT (OUTPATIENT)
Dept: HEMATOLOGY ONCOLOGY | Facility: CLINIC | Age: 69
End: 2019-11-21

## 2019-11-21 LAB
BASOPHILS # BLD AUTO: 0 K/UL — SIGNIFICANT CHANGE UP (ref 0–0.2)
BASOPHILS NFR BLD AUTO: 0.2 % — SIGNIFICANT CHANGE UP (ref 0–2)
EOSINOPHIL # BLD AUTO: 0.5 K/UL — SIGNIFICANT CHANGE UP (ref 0–0.5)
EOSINOPHIL NFR BLD AUTO: 4.1 % — SIGNIFICANT CHANGE UP (ref 0–6)
HCT VFR BLD CALC: 33.3 % — LOW (ref 34.5–45)
HGB BLD-MCNC: 10.5 G/DL — LOW (ref 11.5–15.5)
LYMPHOCYTES # BLD AUTO: 1.2 K/UL — SIGNIFICANT CHANGE UP (ref 1–3.3)
LYMPHOCYTES # BLD AUTO: 9.6 % — LOW (ref 13–44)
MCHC RBC-ENTMCNC: 31.6 G/DL — LOW (ref 32–36)
MCHC RBC-ENTMCNC: 31.6 PG — SIGNIFICANT CHANGE UP (ref 27–34)
MCV RBC AUTO: 100 FL — SIGNIFICANT CHANGE UP (ref 80–100)
MONOCYTES # BLD AUTO: 0.2 K/UL — SIGNIFICANT CHANGE UP (ref 0–0.9)
MONOCYTES NFR BLD AUTO: 2 % — SIGNIFICANT CHANGE UP (ref 2–14)
NEUTROPHILS # BLD AUTO: 10.3 K/UL — HIGH (ref 1.8–7.4)
NEUTROPHILS NFR BLD AUTO: 84.1 % — HIGH (ref 43–77)
PLATELET # BLD AUTO: 509 K/UL — HIGH (ref 150–400)
RBC # BLD: 3.33 M/UL — LOW (ref 3.8–5.2)
RBC # FLD: 23.4 % — HIGH (ref 10.3–14.5)
WBC # BLD: 12.3 K/UL — HIGH (ref 3.8–10.5)
WBC # FLD AUTO: 12.3 K/UL — HIGH (ref 3.8–10.5)

## 2019-11-22 ENCOUNTER — APPOINTMENT (OUTPATIENT)
Dept: HEMATOLOGY ONCOLOGY | Facility: CLINIC | Age: 69
End: 2019-11-22

## 2019-11-29 ENCOUNTER — APPOINTMENT (OUTPATIENT)
Dept: HEMATOLOGY ONCOLOGY | Facility: CLINIC | Age: 69
End: 2019-11-29

## 2019-11-29 ENCOUNTER — RESULT REVIEW (OUTPATIENT)
Age: 69
End: 2019-11-29

## 2019-11-29 LAB
BASOPHILS # BLD AUTO: 0 K/UL — SIGNIFICANT CHANGE UP (ref 0–0.2)
BASOPHILS NFR BLD AUTO: 0.4 % — SIGNIFICANT CHANGE UP (ref 0–2)
EOSINOPHIL # BLD AUTO: 0.5 K/UL — SIGNIFICANT CHANGE UP (ref 0–0.5)
EOSINOPHIL NFR BLD AUTO: 4.8 % — SIGNIFICANT CHANGE UP (ref 0–6)
HCT VFR BLD CALC: 32.9 % — LOW (ref 34.5–45)
HGB BLD-MCNC: 10.7 G/DL — LOW (ref 11.5–15.5)
LYMPHOCYTES # BLD AUTO: 1.3 K/UL — SIGNIFICANT CHANGE UP (ref 1–3.3)
LYMPHOCYTES # BLD AUTO: 13 % — SIGNIFICANT CHANGE UP (ref 13–44)
MCHC RBC-ENTMCNC: 32.4 G/DL — SIGNIFICANT CHANGE UP (ref 32–36)
MCHC RBC-ENTMCNC: 32.8 PG — SIGNIFICANT CHANGE UP (ref 27–34)
MCV RBC AUTO: 101 FL — HIGH (ref 80–100)
MONOCYTES # BLD AUTO: 0.2 K/UL — SIGNIFICANT CHANGE UP (ref 0–0.9)
MONOCYTES NFR BLD AUTO: 2 % — SIGNIFICANT CHANGE UP (ref 2–14)
NEUTROPHILS # BLD AUTO: 7.8 K/UL — HIGH (ref 1.8–7.4)
NEUTROPHILS NFR BLD AUTO: 79.8 % — HIGH (ref 43–77)
PLATELET # BLD AUTO: 248 K/UL — SIGNIFICANT CHANGE UP (ref 150–400)
RBC # BLD: 3.25 M/UL — LOW (ref 3.8–5.2)
RBC # FLD: 20.3 % — HIGH (ref 10.3–14.5)
WBC # BLD: 9.8 K/UL — SIGNIFICANT CHANGE UP (ref 3.8–10.5)
WBC # FLD AUTO: 9.8 K/UL — SIGNIFICANT CHANGE UP (ref 3.8–10.5)

## 2019-12-03 ENCOUNTER — APPOINTMENT (OUTPATIENT)
Dept: HEMATOLOGY ONCOLOGY | Facility: CLINIC | Age: 69
End: 2019-12-03

## 2019-12-03 ENCOUNTER — RESULT REVIEW (OUTPATIENT)
Age: 69
End: 2019-12-03

## 2019-12-03 LAB
BASOPHILS # BLD AUTO: 0 K/UL — SIGNIFICANT CHANGE UP (ref 0–0.2)
BASOPHILS NFR BLD AUTO: 0.3 % — SIGNIFICANT CHANGE UP (ref 0–2)
EOSINOPHIL # BLD AUTO: 0.6 K/UL — HIGH (ref 0–0.5)
EOSINOPHIL NFR BLD AUTO: 4.6 % — SIGNIFICANT CHANGE UP (ref 0–6)
HCT VFR BLD CALC: 35.4 % — SIGNIFICANT CHANGE UP (ref 34.5–45)
HGB BLD-MCNC: 11.2 G/DL — LOW (ref 11.5–15.5)
LYMPHOCYTES # BLD AUTO: 1.4 K/UL — SIGNIFICANT CHANGE UP (ref 1–3.3)
LYMPHOCYTES # BLD AUTO: 10.9 % — LOW (ref 13–44)
MCHC RBC-ENTMCNC: 31.6 G/DL — LOW (ref 32–36)
MCHC RBC-ENTMCNC: 32.2 PG — SIGNIFICANT CHANGE UP (ref 27–34)
MCV RBC AUTO: 102 FL — HIGH (ref 80–100)
MONOCYTES # BLD AUTO: 0.3 K/UL — SIGNIFICANT CHANGE UP (ref 0–0.9)
MONOCYTES NFR BLD AUTO: 2 % — SIGNIFICANT CHANGE UP (ref 2–14)
NEUTROPHILS # BLD AUTO: 10.3 K/UL — HIGH (ref 1.8–7.4)
NEUTROPHILS NFR BLD AUTO: 82.3 % — HIGH (ref 43–77)
PLATELET # BLD AUTO: 370 K/UL — SIGNIFICANT CHANGE UP (ref 150–400)
RBC # BLD: 3.47 M/UL — LOW (ref 3.8–5.2)
RBC # FLD: 22.9 % — HIGH (ref 10.3–14.5)
WBC # BLD: 12.5 K/UL — HIGH (ref 3.8–10.5)
WBC # FLD AUTO: 12.5 K/UL — HIGH (ref 3.8–10.5)

## 2019-12-11 ENCOUNTER — OUTPATIENT (OUTPATIENT)
Dept: OUTPATIENT SERVICES | Facility: HOSPITAL | Age: 69
LOS: 1 days | Discharge: HOME | End: 2019-12-11

## 2019-12-11 DIAGNOSIS — K81.9 CHOLECYSTITIS, UNSPECIFIED: Chronic | ICD-10-CM

## 2019-12-11 DIAGNOSIS — S99.919A UNSPECIFIED INJURY OF UNSPECIFIED ANKLE, INITIAL ENCOUNTER: Chronic | ICD-10-CM

## 2019-12-11 DIAGNOSIS — D69.6 THROMBOCYTOPENIA, UNSPECIFIED: ICD-10-CM

## 2019-12-16 ENCOUNTER — APPOINTMENT (OUTPATIENT)
Dept: CARDIOLOGY | Facility: CLINIC | Age: 69
End: 2019-12-16
Payer: MEDICARE

## 2019-12-16 PROCEDURE — 93268 ECG RECORD/REVIEW: CPT

## 2019-12-18 ENCOUNTER — APPOINTMENT (OUTPATIENT)
Dept: HEMATOLOGY ONCOLOGY | Facility: CLINIC | Age: 69
End: 2019-12-18

## 2019-12-18 ENCOUNTER — RESULT REVIEW (OUTPATIENT)
Age: 69
End: 2019-12-18

## 2019-12-18 LAB
BASOPHILS # BLD AUTO: 0 K/UL — SIGNIFICANT CHANGE UP (ref 0–0.2)
BASOPHILS NFR BLD AUTO: 0.2 % — SIGNIFICANT CHANGE UP (ref 0–2)
EOSINOPHIL # BLD AUTO: 0.7 K/UL — HIGH (ref 0–0.5)
EOSINOPHIL NFR BLD AUTO: 5 % — SIGNIFICANT CHANGE UP (ref 0–6)
HCT VFR BLD CALC: 30.2 % — LOW (ref 34.5–45)
HGB BLD-MCNC: 9.8 G/DL — LOW (ref 11.5–15.5)
LYMPHOCYTES # BLD AUTO: 1 K/UL — SIGNIFICANT CHANGE UP (ref 1–3.3)
LYMPHOCYTES # BLD AUTO: 7.7 % — LOW (ref 13–44)
MCHC RBC-ENTMCNC: 32.4 G/DL — SIGNIFICANT CHANGE UP (ref 32–36)
MCHC RBC-ENTMCNC: 32.7 PG — SIGNIFICANT CHANGE UP (ref 27–34)
MCV RBC AUTO: 101 FL — HIGH (ref 80–100)
MONOCYTES # BLD AUTO: 0.2 K/UL — SIGNIFICANT CHANGE UP (ref 0–0.9)
MONOCYTES NFR BLD AUTO: 1.8 % — LOW (ref 2–14)
NEUTROPHILS # BLD AUTO: 11.3 K/UL — HIGH (ref 1.8–7.4)
NEUTROPHILS NFR BLD AUTO: 85.4 % — HIGH (ref 43–77)
PLATELET # BLD AUTO: 499 K/UL — HIGH (ref 150–400)
RBC # BLD: 2.99 M/UL — LOW (ref 3.8–5.2)
RBC # FLD: 23 % — HIGH (ref 10.3–14.5)
WBC # BLD: 13.2 K/UL — HIGH (ref 3.8–10.5)
WBC # FLD AUTO: 13.2 K/UL — HIGH (ref 3.8–10.5)

## 2019-12-26 ENCOUNTER — APPOINTMENT (OUTPATIENT)
Dept: HEMATOLOGY ONCOLOGY | Facility: CLINIC | Age: 69
End: 2019-12-26

## 2020-01-01 NOTE — DISCHARGE NOTE ADULT - FUNCTIONAL SCREEN CURRENT LEVEL: DRESSING, MLM
2 = assistive person 0 = independent You can access the FollowMyHealth Patient Portal offered by [facility] by registering at the following website: [email or web address]. By joining [facility]’s FollowMyHealth portal, you will also be able to view your health information using other applications (apps) compatible with our system.

## 2020-01-03 ENCOUNTER — RESULT REVIEW (OUTPATIENT)
Age: 70
End: 2020-01-03

## 2020-01-03 ENCOUNTER — APPOINTMENT (OUTPATIENT)
Dept: HEMATOLOGY ONCOLOGY | Facility: CLINIC | Age: 70
End: 2020-01-03

## 2020-01-03 LAB
BASOPHILS # BLD AUTO: 0 K/UL — SIGNIFICANT CHANGE UP (ref 0–0.2)
BASOPHILS NFR BLD AUTO: 0.3 % — SIGNIFICANT CHANGE UP (ref 0–2)
EOSINOPHIL # BLD AUTO: 0.5 K/UL — SIGNIFICANT CHANGE UP (ref 0–0.5)
EOSINOPHIL NFR BLD AUTO: 4 % — SIGNIFICANT CHANGE UP (ref 0–6)
HCT VFR BLD CALC: 33.6 % — LOW (ref 34.5–45)
HGB BLD-MCNC: 10.8 G/DL — LOW (ref 11.5–15.5)
LYMPHOCYTES # BLD AUTO: 1.3 K/UL — SIGNIFICANT CHANGE UP (ref 1–3.3)
LYMPHOCYTES # BLD AUTO: 11.2 % — LOW (ref 13–44)
MCHC RBC-ENTMCNC: 32 PG — SIGNIFICANT CHANGE UP (ref 27–34)
MCHC RBC-ENTMCNC: 32.1 G/DL — SIGNIFICANT CHANGE UP (ref 32–36)
MCV RBC AUTO: 99.5 FL — SIGNIFICANT CHANGE UP (ref 80–100)
MONOCYTES # BLD AUTO: 0.3 K/UL — SIGNIFICANT CHANGE UP (ref 0–0.9)
MONOCYTES NFR BLD AUTO: 2.8 % — SIGNIFICANT CHANGE UP (ref 2–14)
NEUTROPHILS # BLD AUTO: 9.6 K/UL — HIGH (ref 1.8–7.4)
NEUTROPHILS NFR BLD AUTO: 81.6 % — HIGH (ref 43–77)
PLATELET # BLD AUTO: 676 K/UL — HIGH (ref 150–400)
RBC # BLD: 3.38 M/UL — LOW (ref 3.8–5.2)
RBC # FLD: 22.7 % — HIGH (ref 10.3–14.5)
WBC # BLD: 11.7 K/UL — HIGH (ref 3.8–10.5)
WBC # FLD AUTO: 11.7 K/UL — HIGH (ref 3.8–10.5)

## 2020-01-09 ENCOUNTER — RESULT REVIEW (OUTPATIENT)
Age: 70
End: 2020-01-09

## 2020-01-09 ENCOUNTER — APPOINTMENT (OUTPATIENT)
Dept: HEMATOLOGY ONCOLOGY | Facility: CLINIC | Age: 70
End: 2020-01-09

## 2020-01-09 LAB
ALBUMIN SERPL ELPH-MCNC: 3.8 G/DL
ALP BLD-CCNC: 85 U/L
ALT SERPL-CCNC: 9 U/L
ANION GAP SERPL CALC-SCNC: 13 MMOL/L
AST SERPL-CCNC: 16 U/L
BASOPHILS # BLD AUTO: 0 K/UL — SIGNIFICANT CHANGE UP (ref 0–0.2)
BASOPHILS NFR BLD AUTO: 0.2 % — SIGNIFICANT CHANGE UP (ref 0–2)
BILIRUB SERPL-MCNC: 0.4 MG/DL
BUN SERPL-MCNC: 10 MG/DL
CALCIUM SERPL-MCNC: 8.8 MG/DL
CHLORIDE SERPL-SCNC: 101 MMOL/L
CO2 SERPL-SCNC: 27 MMOL/L
CREAT SERPL-MCNC: 0.9 MG/DL
EOSINOPHIL # BLD AUTO: 0.6 K/UL — HIGH (ref 0–0.5)
EOSINOPHIL NFR BLD AUTO: 4.4 % — SIGNIFICANT CHANGE UP (ref 0–6)
GLUCOSE SERPL-MCNC: 114 MG/DL
HCT VFR BLD CALC: 33.3 % — LOW (ref 34.5–45)
HGB BLD-MCNC: 10.4 G/DL — LOW (ref 11.5–15.5)
LDH SERPL-CCNC: 302 U/L
LYMPHOCYTES # BLD AUTO: 1.4 K/UL — SIGNIFICANT CHANGE UP (ref 1–3.3)
LYMPHOCYTES # BLD AUTO: 10.7 % — LOW (ref 13–44)
MCHC RBC-ENTMCNC: 31.2 G/DL — LOW (ref 32–36)
MCHC RBC-ENTMCNC: 31.2 PG — SIGNIFICANT CHANGE UP (ref 27–34)
MCV RBC AUTO: 100 FL — SIGNIFICANT CHANGE UP (ref 80–100)
MONOCYTES # BLD AUTO: 0.2 K/UL — SIGNIFICANT CHANGE UP (ref 0–0.9)
MONOCYTES NFR BLD AUTO: 1.8 % — LOW (ref 2–14)
NEUTROPHILS # BLD AUTO: 11 K/UL — HIGH (ref 1.8–7.4)
NEUTROPHILS NFR BLD AUTO: 82.9 % — HIGH (ref 43–77)
PLATELET # BLD AUTO: 428 K/UL — HIGH (ref 150–400)
POTASSIUM SERPL-SCNC: 3.7 MMOL/L
PROT SERPL-MCNC: 6.7 G/DL
RBC # BLD: 3.32 M/UL — LOW (ref 3.8–5.2)
RBC # FLD: 23 % — HIGH (ref 10.3–14.5)
SODIUM SERPL-SCNC: 141 MMOL/L
URATE SERPL-MCNC: 5 MG/DL
WBC # BLD: 13.3 K/UL — HIGH (ref 3.8–10.5)
WBC # FLD AUTO: 13.3 K/UL — HIGH (ref 3.8–10.5)

## 2020-01-17 ENCOUNTER — APPOINTMENT (OUTPATIENT)
Dept: CARDIOLOGY | Facility: CLINIC | Age: 70
End: 2020-01-17

## 2020-01-17 ENCOUNTER — OUTPATIENT (OUTPATIENT)
Dept: OUTPATIENT SERVICES | Facility: HOSPITAL | Age: 70
LOS: 1 days | Discharge: HOME | End: 2020-01-17

## 2020-01-17 DIAGNOSIS — S99.919A UNSPECIFIED INJURY OF UNSPECIFIED ANKLE, INITIAL ENCOUNTER: Chronic | ICD-10-CM

## 2020-01-17 DIAGNOSIS — K81.9 CHOLECYSTITIS, UNSPECIFIED: Chronic | ICD-10-CM

## 2020-01-17 DIAGNOSIS — D69.6 THROMBOCYTOPENIA, UNSPECIFIED: ICD-10-CM

## 2020-01-21 DIAGNOSIS — R79.89 OTHER SPECIFIED ABNORMAL FINDINGS OF BLOOD CHEMISTRY: ICD-10-CM

## 2020-01-23 ENCOUNTER — APPOINTMENT (OUTPATIENT)
Dept: HEMATOLOGY ONCOLOGY | Facility: CLINIC | Age: 70
End: 2020-01-23
Payer: MEDICARE

## 2020-01-23 ENCOUNTER — RESULT REVIEW (OUTPATIENT)
Age: 70
End: 2020-01-23

## 2020-01-23 VITALS
WEIGHT: 231.49 LBS | TEMPERATURE: 98.5 F | DIASTOLIC BLOOD PRESSURE: 82 MMHG | SYSTOLIC BLOOD PRESSURE: 141 MMHG | BODY MASS INDEX: 37.36 KG/M2 | RESPIRATION RATE: 16 BRPM | OXYGEN SATURATION: 95 % | HEART RATE: 75 BPM

## 2020-01-23 LAB
BASOPHILS # BLD AUTO: 0.1 K/UL — SIGNIFICANT CHANGE UP (ref 0–0.2)
BASOPHILS NFR BLD AUTO: 0.6 % — SIGNIFICANT CHANGE UP (ref 0–2)
EOSINOPHIL # BLD AUTO: 0.6 K/UL — HIGH (ref 0–0.5)
EOSINOPHIL NFR BLD AUTO: 5.5 % — SIGNIFICANT CHANGE UP (ref 0–6)
HCT VFR BLD CALC: 38.2 % — SIGNIFICANT CHANGE UP (ref 34.5–45)
HGB BLD-MCNC: 11.7 G/DL — SIGNIFICANT CHANGE UP (ref 11.5–15.5)
LYMPHOCYTES # BLD AUTO: 1.7 K/UL — SIGNIFICANT CHANGE UP (ref 1–3.3)
LYMPHOCYTES # BLD AUTO: 14.5 % — SIGNIFICANT CHANGE UP (ref 13–44)
MCHC RBC-ENTMCNC: 30.5 G/DL — LOW (ref 32–36)
MCHC RBC-ENTMCNC: 30.7 PG — SIGNIFICANT CHANGE UP (ref 27–34)
MCV RBC AUTO: 101 FL — HIGH (ref 80–100)
MONOCYTES # BLD AUTO: 0.3 K/UL — SIGNIFICANT CHANGE UP (ref 0–0.9)
MONOCYTES NFR BLD AUTO: 2.4 % — SIGNIFICANT CHANGE UP (ref 2–14)
NEUTROPHILS # BLD AUTO: 9 K/UL — HIGH (ref 1.8–7.4)
NEUTROPHILS NFR BLD AUTO: 77 % — SIGNIFICANT CHANGE UP (ref 43–77)
PLATELET # BLD AUTO: 827 K/UL — HIGH (ref 150–400)
RBC # BLD: 3.8 M/UL — SIGNIFICANT CHANGE UP (ref 3.8–5.2)
RBC # FLD: 23.3 % — HIGH (ref 10.3–14.5)
WBC # BLD: 11.8 K/UL — HIGH (ref 3.8–10.5)
WBC # FLD AUTO: 11.8 K/UL — HIGH (ref 3.8–10.5)

## 2020-01-23 PROCEDURE — 99213 OFFICE O/P EST LOW 20 MIN: CPT

## 2020-01-27 ENCOUNTER — RX RENEWAL (OUTPATIENT)
Age: 70
End: 2020-01-27

## 2020-02-06 ENCOUNTER — APPOINTMENT (OUTPATIENT)
Dept: HEMATOLOGY ONCOLOGY | Facility: CLINIC | Age: 70
End: 2020-02-06

## 2020-02-06 ENCOUNTER — RESULT REVIEW (OUTPATIENT)
Age: 70
End: 2020-02-06

## 2020-02-06 LAB
BASOPHILS # BLD AUTO: 0 K/UL — SIGNIFICANT CHANGE UP (ref 0–0.2)
BASOPHILS NFR BLD AUTO: 0.3 % — SIGNIFICANT CHANGE UP (ref 0–2)
EOSINOPHIL # BLD AUTO: 0.6 K/UL — HIGH (ref 0–0.5)
EOSINOPHIL NFR BLD AUTO: 4.8 % — SIGNIFICANT CHANGE UP (ref 0–6)
HCT VFR BLD CALC: 35 % — SIGNIFICANT CHANGE UP (ref 34.5–45)
HGB BLD-MCNC: 11.5 G/DL — SIGNIFICANT CHANGE UP (ref 11.5–15.5)
LYMPHOCYTES # BLD AUTO: 1.4 K/UL — SIGNIFICANT CHANGE UP (ref 1–3.3)
LYMPHOCYTES # BLD AUTO: 10.7 % — LOW (ref 13–44)
MCHC RBC-ENTMCNC: 32.8 G/DL — SIGNIFICANT CHANGE UP (ref 32–36)
MCHC RBC-ENTMCNC: 32.9 PG — SIGNIFICANT CHANGE UP (ref 27–34)
MCV RBC AUTO: 100 FL — SIGNIFICANT CHANGE UP (ref 80–100)
MONOCYTES # BLD AUTO: 0.2 K/UL — SIGNIFICANT CHANGE UP (ref 0–0.9)
MONOCYTES NFR BLD AUTO: 1.4 % — LOW (ref 2–14)
NEUTROPHILS # BLD AUTO: 11.2 K/UL — HIGH (ref 1.8–7.4)
NEUTROPHILS NFR BLD AUTO: 82.8 % — HIGH (ref 43–77)
PLATELET # BLD AUTO: 383 K/UL — SIGNIFICANT CHANGE UP (ref 150–400)
RBC # BLD: 3.48 M/UL — LOW (ref 3.8–5.2)
RBC # FLD: 22.3 % — HIGH (ref 10.3–14.5)
WBC # BLD: 13.6 K/UL — HIGH (ref 3.8–10.5)
WBC # FLD AUTO: 13.6 K/UL — HIGH (ref 3.8–10.5)

## 2020-02-19 ENCOUNTER — OUTPATIENT (OUTPATIENT)
Dept: OUTPATIENT SERVICES | Facility: HOSPITAL | Age: 70
LOS: 1 days | Discharge: HOME | End: 2020-02-19

## 2020-02-19 DIAGNOSIS — D69.6 THROMBOCYTOPENIA, UNSPECIFIED: ICD-10-CM

## 2020-02-19 DIAGNOSIS — K81.9 CHOLECYSTITIS, UNSPECIFIED: Chronic | ICD-10-CM

## 2020-02-19 DIAGNOSIS — S99.919A UNSPECIFIED INJURY OF UNSPECIFIED ANKLE, INITIAL ENCOUNTER: Chronic | ICD-10-CM

## 2020-02-20 ENCOUNTER — RESULT REVIEW (OUTPATIENT)
Age: 70
End: 2020-02-20

## 2020-02-20 ENCOUNTER — APPOINTMENT (OUTPATIENT)
Dept: HEMATOLOGY ONCOLOGY | Facility: CLINIC | Age: 70
End: 2020-02-20

## 2020-02-20 LAB
BASOPHILS # BLD AUTO: 0 K/UL — SIGNIFICANT CHANGE UP (ref 0–0.2)
EOSINOPHIL # BLD AUTO: 0.7 K/UL — HIGH (ref 0–0.5)
EOSINOPHIL NFR BLD AUTO: 2 % — SIGNIFICANT CHANGE UP (ref 0–6)
HCT VFR BLD CALC: 34.2 % — LOW (ref 34.5–45)
HGB BLD-MCNC: 11 G/DL — LOW (ref 11.5–15.5)
LYMPHOCYTES # BLD AUTO: 10 % — LOW (ref 13–44)
LYMPHOCYTES # BLD AUTO: 2.2 K/UL — SIGNIFICANT CHANGE UP (ref 1–3.3)
MCHC RBC-ENTMCNC: 32 PG — SIGNIFICANT CHANGE UP (ref 27–34)
MCHC RBC-ENTMCNC: 32.3 G/DL — SIGNIFICANT CHANGE UP (ref 32–36)
MCV RBC AUTO: 99.1 FL — SIGNIFICANT CHANGE UP (ref 80–100)
MONOCYTES # BLD AUTO: 0.4 K/UL — SIGNIFICANT CHANGE UP (ref 0–0.9)
MONOCYTES NFR BLD AUTO: 5 % — SIGNIFICANT CHANGE UP (ref 2–14)
NEUTROPHILS # BLD AUTO: 13.6 K/UL — HIGH (ref 1.8–7.4)
NEUTROPHILS NFR BLD AUTO: 82 % — HIGH (ref 43–77)
NEUTS BAND # BLD: 1 % — SIGNIFICANT CHANGE UP (ref 0–8)
PLAT MORPH BLD: NORMAL — SIGNIFICANT CHANGE UP
PLATELET # BLD AUTO: 917 K/UL — HIGH (ref 150–400)
RBC # BLD: 3.45 M/UL — LOW (ref 3.8–5.2)
RBC # FLD: 24.1 % — HIGH (ref 10.3–14.5)
RBC BLD AUTO: SIGNIFICANT CHANGE UP
WBC # BLD: 17 K/UL — HIGH (ref 3.8–10.5)
WBC # FLD AUTO: 17 K/UL — HIGH (ref 3.8–10.5)

## 2020-02-28 ENCOUNTER — APPOINTMENT (OUTPATIENT)
Dept: HEMATOLOGY ONCOLOGY | Facility: CLINIC | Age: 70
End: 2020-02-28

## 2020-02-28 ENCOUNTER — RESULT REVIEW (OUTPATIENT)
Age: 70
End: 2020-02-28

## 2020-02-28 LAB
BASOPHILS # BLD AUTO: 0.1 K/UL — SIGNIFICANT CHANGE UP (ref 0–0.2)
BASOPHILS NFR BLD AUTO: 0.9 % — SIGNIFICANT CHANGE UP (ref 0–2)
EOSINOPHIL # BLD AUTO: 0.5 K/UL — SIGNIFICANT CHANGE UP (ref 0–0.5)
EOSINOPHIL NFR BLD AUTO: 4.4 % — SIGNIFICANT CHANGE UP (ref 0–6)
HCT VFR BLD CALC: 33.7 % — LOW (ref 34.5–45)
HGB BLD-MCNC: 11 G/DL — LOW (ref 11.5–15.5)
LYMPHOCYTES # BLD AUTO: 1.2 K/UL — SIGNIFICANT CHANGE UP (ref 1–3.3)
LYMPHOCYTES # BLD AUTO: 10.6 % — LOW (ref 13–44)
MCHC RBC-ENTMCNC: 32.3 PG — SIGNIFICANT CHANGE UP (ref 27–34)
MCHC RBC-ENTMCNC: 32.5 G/DL — SIGNIFICANT CHANGE UP (ref 32–36)
MCV RBC AUTO: 99.3 FL — SIGNIFICANT CHANGE UP (ref 80–100)
MONOCYTES # BLD AUTO: 0.2 K/UL — SIGNIFICANT CHANGE UP (ref 0–0.9)
MONOCYTES NFR BLD AUTO: 1.8 % — LOW (ref 2–14)
NEUTROPHILS # BLD AUTO: 9.3 K/UL — HIGH (ref 1.8–7.4)
NEUTROPHILS NFR BLD AUTO: 82.4 % — HIGH (ref 43–77)
PLATELET # BLD AUTO: 995 K/UL — HIGH (ref 150–400)
RBC # BLD: 3.39 M/UL — LOW (ref 3.8–5.2)
RBC # FLD: 23.8 % — HIGH (ref 10.3–14.5)
WBC # BLD: 11.3 K/UL — HIGH (ref 3.8–10.5)
WBC # FLD AUTO: 11.3 K/UL — HIGH (ref 3.8–10.5)

## 2020-03-05 ENCOUNTER — RESULT REVIEW (OUTPATIENT)
Age: 70
End: 2020-03-05

## 2020-03-05 ENCOUNTER — APPOINTMENT (OUTPATIENT)
Dept: HEMATOLOGY ONCOLOGY | Facility: CLINIC | Age: 70
End: 2020-03-05
Payer: MEDICARE

## 2020-03-05 VITALS
DIASTOLIC BLOOD PRESSURE: 77 MMHG | SYSTOLIC BLOOD PRESSURE: 116 MMHG | WEIGHT: 236.09 LBS | TEMPERATURE: 98.3 F | HEART RATE: 64 BPM | RESPIRATION RATE: 16 BRPM | OXYGEN SATURATION: 95 % | BODY MASS INDEX: 38.11 KG/M2

## 2020-03-05 LAB
ANISOCYTOSIS BLD QL: SLIGHT — SIGNIFICANT CHANGE UP
BASOPHILS # BLD AUTO: 0 K/UL — SIGNIFICANT CHANGE UP (ref 0–0.2)
BASOPHILS NFR BLD AUTO: 0 % — SIGNIFICANT CHANGE UP (ref 0–2)
DACRYOCYTES BLD QL SMEAR: SLIGHT — SIGNIFICANT CHANGE UP
ELLIPTOCYTES BLD QL SMEAR: SLIGHT — SIGNIFICANT CHANGE UP
EOSINOPHIL # BLD AUTO: 0.43 K/UL — SIGNIFICANT CHANGE UP (ref 0–0.5)
EOSINOPHIL NFR BLD AUTO: 4 % — SIGNIFICANT CHANGE UP (ref 0–6)
HCT VFR BLD CALC: 34.7 % — SIGNIFICANT CHANGE UP (ref 34.5–45)
HGB BLD-MCNC: 10.4 G/DL — LOW (ref 11.5–15.5)
LYMPHOCYTES # BLD AUTO: 0.86 K/UL — LOW (ref 1–3.3)
LYMPHOCYTES # BLD AUTO: 8 % — LOW (ref 13–44)
MACROCYTES BLD QL: SLIGHT — SIGNIFICANT CHANGE UP
MCHC RBC-ENTMCNC: 30 GM/DL — LOW (ref 32–36)
MCHC RBC-ENTMCNC: 32.1 PG — SIGNIFICANT CHANGE UP (ref 27–34)
MCV RBC AUTO: 107.1 FL — HIGH (ref 80–100)
MONOCYTES # BLD AUTO: 0.53 K/UL — SIGNIFICANT CHANGE UP (ref 0–0.9)
MONOCYTES NFR BLD AUTO: 5 % — SIGNIFICANT CHANGE UP (ref 2–14)
NEUTROPHILS # BLD AUTO: 8.87 K/UL — HIGH (ref 1.8–7.4)
NEUTROPHILS NFR BLD AUTO: 83 % — HIGH (ref 43–77)
NRBC # BLD: 0 — SIGNIFICANT CHANGE UP
NRBC # BLD: SIGNIFICANT CHANGE UP /100 WBCS (ref 0–0)
PLAT MORPH BLD: NORMAL — SIGNIFICANT CHANGE UP
PLATELET # BLD AUTO: 477 K/UL — HIGH (ref 150–400)
POIKILOCYTOSIS BLD QL AUTO: SLIGHT — SIGNIFICANT CHANGE UP
RBC # BLD: 3.24 M/UL — LOW (ref 3.8–5.2)
RBC # FLD: 20.7 % — HIGH (ref 10.3–14.5)
RBC BLD AUTO: ABNORMAL
WBC # BLD: 10.69 K/UL — HIGH (ref 3.8–10.5)
WBC # FLD AUTO: 10.69 K/UL — HIGH (ref 3.8–10.5)

## 2020-03-05 PROCEDURE — 99214 OFFICE O/P EST MOD 30 MIN: CPT

## 2020-03-05 NOTE — HISTORY OF PRESENT ILLNESS
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

## 2020-03-05 NOTE — PHYSICAL EXAM
[Fully active, able to carry on all pre-disease performance without restriction] : Status 0 - Fully active, able to carry on all pre-disease performance without restriction [Normal] : clear to auscultation bilaterally, no dullness, no wheezing

## 2020-03-05 NOTE — ASSESSMENT
[FreeTextEntry1] : 89 yo F with a history of PMF jak2+ here with persistent thrombocytosis despite HU 2g and 1.5g alternating,\par \par -continue hu for now, hb improved, platelets seem to have better control when patient is taking 4/3 tablets alternating\par -discussed risks and benefits of anagrelide, if still can tolerate HU would prefer this\par -however possibly could use jakafi? - restage- send LDH today, reimage spleen (was 15cm in 2017) and also plan for repeat bone marrow biopsy, based on this will risk stratify and determine if HU versus Jakafi (not sure if this will control the platelet count?)\par -continue folic acid and xarelto\par -repeat cbc every week for now given significant drop in platelets from last week to see if patient can stabilize on current HU dose\par -patient expressed understanding, will stay on HU for now.\par

## 2020-03-08 ENCOUNTER — EMERGENCY (EMERGENCY)
Facility: HOSPITAL | Age: 70
LOS: 1 days | Discharge: HOME | End: 2020-03-08
Attending: STUDENT IN AN ORGANIZED HEALTH CARE EDUCATION/TRAINING PROGRAM | Admitting: STUDENT IN AN ORGANIZED HEALTH CARE EDUCATION/TRAINING PROGRAM
Payer: MEDICARE

## 2020-03-08 VITALS
OXYGEN SATURATION: 98 % | DIASTOLIC BLOOD PRESSURE: 61 MMHG | SYSTOLIC BLOOD PRESSURE: 141 MMHG | HEART RATE: 80 BPM | TEMPERATURE: 98 F | RESPIRATION RATE: 18 BRPM

## 2020-03-08 VITALS
TEMPERATURE: 99 F | SYSTOLIC BLOOD PRESSURE: 139 MMHG | RESPIRATION RATE: 16 BRPM | HEART RATE: 97 BPM | OXYGEN SATURATION: 96 % | DIASTOLIC BLOOD PRESSURE: 78 MMHG

## 2020-03-08 DIAGNOSIS — S99.919A UNSPECIFIED INJURY OF UNSPECIFIED ANKLE, INITIAL ENCOUNTER: Chronic | ICD-10-CM

## 2020-03-08 DIAGNOSIS — K81.9 CHOLECYSTITIS, UNSPECIFIED: Chronic | ICD-10-CM

## 2020-03-08 LAB
ALBUMIN SERPL ELPH-MCNC: 3.9 G/DL — SIGNIFICANT CHANGE UP (ref 3.3–5)
ALP SERPL-CCNC: 87 U/L — SIGNIFICANT CHANGE UP (ref 40–120)
ALT FLD-CCNC: 7 U/L — SIGNIFICANT CHANGE UP (ref 4–33)
ANION GAP SERPL CALC-SCNC: 14 MMO/L — SIGNIFICANT CHANGE UP (ref 7–14)
ANISOCYTOSIS BLD QL: SIGNIFICANT CHANGE UP
AST SERPL-CCNC: 12 U/L — SIGNIFICANT CHANGE UP (ref 4–32)
BASOPHILS # BLD AUTO: 0.07 K/UL — SIGNIFICANT CHANGE UP (ref 0–0.2)
BASOPHILS NFR BLD AUTO: 0.4 % — SIGNIFICANT CHANGE UP (ref 0–2)
BASOPHILS NFR SPEC: 0 % — SIGNIFICANT CHANGE UP (ref 0–2)
BILIRUB SERPL-MCNC: 0.6 MG/DL — SIGNIFICANT CHANGE UP (ref 0.2–1.2)
BLASTS # FLD: 0 % — SIGNIFICANT CHANGE UP (ref 0–0)
BUN SERPL-MCNC: 18 MG/DL — SIGNIFICANT CHANGE UP (ref 7–23)
CALCIUM SERPL-MCNC: 8.9 MG/DL — SIGNIFICANT CHANGE UP (ref 8.4–10.5)
CHLORIDE SERPL-SCNC: 98 MMOL/L — SIGNIFICANT CHANGE UP (ref 98–107)
CO2 SERPL-SCNC: 25 MMOL/L — SIGNIFICANT CHANGE UP (ref 22–31)
CREAT SERPL-MCNC: 0.96 MG/DL — SIGNIFICANT CHANGE UP (ref 0.5–1.3)
EOSINOPHIL # BLD AUTO: 0.51 K/UL — HIGH (ref 0–0.5)
EOSINOPHIL NFR BLD AUTO: 3.1 % — SIGNIFICANT CHANGE UP (ref 0–6)
EOSINOPHIL NFR FLD: 4.4 % — SIGNIFICANT CHANGE UP (ref 0–6)
GIANT PLATELETS BLD QL SMEAR: PRESENT — SIGNIFICANT CHANGE UP
GLUCOSE SERPL-MCNC: 123 MG/DL — HIGH (ref 70–99)
HCT VFR BLD CALC: 33.7 % — LOW (ref 34.5–45)
HGB BLD-MCNC: 10.2 G/DL — LOW (ref 11.5–15.5)
HYPOCHROMIA BLD QL: SIGNIFICANT CHANGE UP
IMM GRANULOCYTES NFR BLD AUTO: 1.9 % — HIGH (ref 0–1.5)
LYMPHOCYTES # BLD AUTO: 1.21 K/UL — SIGNIFICANT CHANGE UP (ref 1–3.3)
LYMPHOCYTES # BLD AUTO: 7.3 % — LOW (ref 13–44)
LYMPHOCYTES NFR SPEC AUTO: 4.3 % — LOW (ref 13–44)
MACROCYTES BLD QL: SIGNIFICANT CHANGE UP
MCHC RBC-ENTMCNC: 30.3 % — LOW (ref 32–36)
MCHC RBC-ENTMCNC: 32.2 PG — SIGNIFICANT CHANGE UP (ref 27–34)
MCV RBC AUTO: 106.3 FL — HIGH (ref 80–100)
METAMYELOCYTES # FLD: 0 % — SIGNIFICANT CHANGE UP (ref 0–1)
MONOCYTES # BLD AUTO: 0.5 K/UL — SIGNIFICANT CHANGE UP (ref 0–0.9)
MONOCYTES NFR BLD AUTO: 3 % — SIGNIFICANT CHANGE UP (ref 2–14)
MONOCYTES NFR BLD: 0.9 % — LOW (ref 2–9)
MYELOCYTES NFR BLD: 0 % — SIGNIFICANT CHANGE UP (ref 0–0)
NEUTROPHIL AB SER-ACNC: 87.8 % — HIGH (ref 43–77)
NEUTROPHILS # BLD AUTO: 13.87 K/UL — HIGH (ref 1.8–7.4)
NEUTROPHILS NFR BLD AUTO: 84.3 % — HIGH (ref 43–77)
NEUTS BAND # BLD: 0 % — SIGNIFICANT CHANGE UP (ref 0–6)
NRBC # FLD: 0 K/UL — SIGNIFICANT CHANGE UP (ref 0–0)
OTHER - HEMATOLOGY %: 0 — SIGNIFICANT CHANGE UP
OVALOCYTES BLD QL SMEAR: SLIGHT — SIGNIFICANT CHANGE UP
PLATELET # BLD AUTO: 431 K/UL — HIGH (ref 150–400)
PLATELET COUNT - ESTIMATE: NORMAL — SIGNIFICANT CHANGE UP
PMV BLD: 12.1 FL — SIGNIFICANT CHANGE UP (ref 7–13)
POIKILOCYTOSIS BLD QL AUTO: SIGNIFICANT CHANGE UP
POLYCHROMASIA BLD QL SMEAR: SIGNIFICANT CHANGE UP
POTASSIUM SERPL-MCNC: 4.3 MMOL/L — SIGNIFICANT CHANGE UP (ref 3.5–5.3)
POTASSIUM SERPL-SCNC: 4.3 MMOL/L — SIGNIFICANT CHANGE UP (ref 3.5–5.3)
PROMYELOCYTES # FLD: 0 % — SIGNIFICANT CHANGE UP (ref 0–0)
PROT SERPL-MCNC: 7.5 G/DL — SIGNIFICANT CHANGE UP (ref 6–8.3)
RBC # BLD: 3.17 M/UL — LOW (ref 3.8–5.2)
RBC # FLD: 21.2 % — HIGH (ref 10.3–14.5)
SODIUM SERPL-SCNC: 137 MMOL/L — SIGNIFICANT CHANGE UP (ref 135–145)
STOMATOCYTES BLD QL SMEAR: SIGNIFICANT CHANGE UP
VARIANT LYMPHS # BLD: 2.6 % — SIGNIFICANT CHANGE UP
WBC # BLD: 16.47 K/UL — HIGH (ref 3.8–10.5)
WBC # FLD AUTO: 16.47 K/UL — HIGH (ref 3.8–10.5)

## 2020-03-08 PROCEDURE — 93970 EXTREMITY STUDY: CPT | Mod: 26

## 2020-03-08 PROCEDURE — 99284 EMERGENCY DEPT VISIT MOD MDM: CPT | Mod: GC

## 2020-03-08 RX ORDER — CEPHALEXIN 500 MG
500 CAPSULE ORAL ONCE
Refills: 0 | Status: COMPLETED | OUTPATIENT
Start: 2020-03-08 | End: 2020-03-08

## 2020-03-08 RX ORDER — CEPHALEXIN 500 MG
1 CAPSULE ORAL
Qty: 28 | Refills: 0
Start: 2020-03-08 | End: 2020-03-14

## 2020-03-08 RX ORDER — ACETAMINOPHEN 500 MG
975 TABLET ORAL ONCE
Refills: 0 | Status: COMPLETED | OUTPATIENT
Start: 2020-03-08 | End: 2020-03-08

## 2020-03-08 RX ADMIN — Medication 975 MILLIGRAM(S): at 17:46

## 2020-03-08 RX ADMIN — Medication 500 MILLIGRAM(S): at 19:21

## 2020-03-08 NOTE — ED ADULT NURSE NOTE - NSIMPLEMENTINTERV_GEN_ALL_ED
Implemented All Universal Safety Interventions:  [place] to call system. Call bell, personal items and telephone within reach. Instruct patient to call for assistance. Room bathroom lighting operational. Non-slip footwear when patient is off stretcher. Physically safe environment: no spills, clutter or unnecessary equipment. Stretcher in lowest position, wheels locked, appropriate side rails in place.

## 2020-03-08 NOTE — ED PROVIDER NOTE - PHYSICAL EXAMINATION
Gen: Well appearing, NAD  Head: NCAT  HEENT: PERRL, MMM, normal conjunctiva, anicteric, neck supple  Lung: CTAB, no adventitious sounds  CV: RRR, no murmurs  Abd: soft, NTND, no rebound or guarding, no CVAT  MSK: 3+ edema to RLE to midcalf, 2+ edema to LLE to midcalf, no visible deformities  Neuro: Moving all extremity grossly  Skin: redness to anterior shin on L, redness surround RLE from ankle to midshin circumferential  Psych: normal mood and affect

## 2020-03-08 NOTE — ED PROVIDER NOTE - PROGRESS NOTE DETAILS
William Peter DO PGY2 - elevated wbc, US findings noted. Advised to return for worsening symtpoms. Will f/u with pmd for repeat US. Safe for dc

## 2020-03-08 NOTE — ED PROVIDER NOTE - NSFOLLOWUPINSTRUCTIONS_ED_ALL_ED_FT
- Please follow up with your doctor this week to consider repeat ultrasound in 5-7 days    - Return to the ED for new or worsening symptoms    - Continue all prescribed medications and please  your prescription for your antibiotics

## 2020-03-08 NOTE — ED PROVIDER NOTE - NS ED ROS FT
CONSTITUTIONAL: No fevers, no chills, no lightheadedness, no dizziness  EYES: no visual changes, no eye pain  EARS: no ear drainage, no ear pain, no change in hearing  NOSE: no nasal congestion  MOUTH/THROAT: no sore throat  CV: No chest pain, no palpitations  RESP: No SOB, no cough  GI: No n/v/d, no abd pain  : no dysuria, no hematuria, no flank pain  MSK: no back pain  SKIN: see hpi  NEURO: no headache, no focal weakness, no decreased sensation/parasthesias   PSYCHIATRIC: no known mental health issues.

## 2020-03-08 NOTE — ED PROVIDER NOTE - CLINICAL SUMMARY MEDICAL DECISION MAKING FREE TEXT BOX
LE swelling and redness hx thrombocytosis, afib on xarelto. Concern for dvt vs cellulitis. Check plt ct and dvt study and reassess Graham Vera DO: LE swelling and redness, minimal pain hx thrombocytosis, afib on xarelto.  neurovasc intact. Concern for dvt vs cellulitis. plan: labs, us, reassess

## 2020-03-08 NOTE — ED ADULT TRIAGE NOTE - CHIEF COMPLAINT QUOTE
Patient c/o right lower leg area swelling since Friday night, sent in by her pmd to r/o a dvt. Patient c/o right lower leg area swelling since Friday night, sent in by her pmd to r/o a dvt. Patient has h/o elevated platelet count,  Thromboesietoesis. and a fib , takes Xarelto.

## 2020-03-08 NOTE — ED ADULT NURSE NOTE - CHIEF COMPLAINT QUOTE
Patient c/o right lower leg area swelling since Friday night, sent in by her pmd to r/o a dvt. Patient has h/o elevated platelet count,  Thromboesietoesis. and a fib , takes Xarelto.

## 2020-03-08 NOTE — ED ADULT NURSE NOTE - OBJECTIVE STATEMENT
pt received spot 20. pt with hx of thrombocytosis and afib on xarelto, A+Ox3 c/o b/l lower extremity edema and erythema x 3 days. sent by PMD for r/o dvt. denies any recent travel. respirations even and unlabored. denies cp/sob. lower extremities feel warm to touch. IVSL in place. labs drawn and sent. pt at US dept at this time. will monitor.

## 2020-03-08 NOTE — ED PROVIDER NOTE - PATIENT PORTAL LINK FT
You can access the FollowMyHealth Patient Portal offered by [facility] by registering at the following website: [email or web address]. By joining [employer]’s FollowMyHealth portal, you will also be able to view your health information using other applications (apps) compatible with our system.

## 2020-03-08 NOTE — ED PROVIDER NOTE - OBJECTIVE STATEMENT
70yo F hx thrombocytosis, afib on xarelto, htn p/w b/l LE pain, redness and swelling R>L x 2 days. Was sent in by pmd for concern of cellulitis vs dvt. No hx of stroke or dvt in past. Last plt count in 400s on last labs.

## 2020-03-16 ENCOUNTER — RX RENEWAL (OUTPATIENT)
Age: 70
End: 2020-03-16

## 2020-03-18 ENCOUNTER — RESULT REVIEW (OUTPATIENT)
Age: 70
End: 2020-03-18

## 2020-03-18 ENCOUNTER — APPOINTMENT (OUTPATIENT)
Dept: ULTRASOUND IMAGING | Facility: IMAGING CENTER | Age: 70
End: 2020-03-18
Payer: MEDICARE

## 2020-03-18 ENCOUNTER — APPOINTMENT (OUTPATIENT)
Dept: HEMATOLOGY ONCOLOGY | Facility: CLINIC | Age: 70
End: 2020-03-18

## 2020-03-18 ENCOUNTER — OUTPATIENT (OUTPATIENT)
Dept: OUTPATIENT SERVICES | Facility: HOSPITAL | Age: 70
LOS: 1 days | End: 2020-03-18
Payer: MEDICARE

## 2020-03-18 DIAGNOSIS — S99.919A UNSPECIFIED INJURY OF UNSPECIFIED ANKLE, INITIAL ENCOUNTER: Chronic | ICD-10-CM

## 2020-03-18 DIAGNOSIS — K81.9 CHOLECYSTITIS, UNSPECIFIED: Chronic | ICD-10-CM

## 2020-03-18 DIAGNOSIS — D75.81 MYELOFIBROSIS: ICD-10-CM

## 2020-03-18 LAB
ANISOCYTOSIS BLD QL: SLIGHT — SIGNIFICANT CHANGE UP
BASOPHILS # BLD AUTO: 0 K/UL — SIGNIFICANT CHANGE UP (ref 0–0.2)
BASOPHILS NFR BLD AUTO: 0 % — SIGNIFICANT CHANGE UP (ref 0–2)
ELLIPTOCYTES BLD QL SMEAR: SLIGHT — SIGNIFICANT CHANGE UP
EOSINOPHIL # BLD AUTO: 0.68 K/UL — HIGH (ref 0–0.5)
EOSINOPHIL NFR BLD AUTO: 6 % — SIGNIFICANT CHANGE UP (ref 0–6)
HCT VFR BLD CALC: 35 % — SIGNIFICANT CHANGE UP (ref 34.5–45)
HGB BLD-MCNC: 10.6 G/DL — LOW (ref 11.5–15.5)
LYMPHOCYTES # BLD AUTO: 1.13 K/UL — SIGNIFICANT CHANGE UP (ref 1–3.3)
LYMPHOCYTES # BLD AUTO: 10 % — LOW (ref 13–44)
MCHC RBC-ENTMCNC: 30.3 GM/DL — LOW (ref 32–36)
MCHC RBC-ENTMCNC: 32.4 PG — SIGNIFICANT CHANGE UP (ref 27–34)
MCV RBC AUTO: 107 FL — HIGH (ref 80–100)
MONOCYTES # BLD AUTO: 0.68 K/UL — SIGNIFICANT CHANGE UP (ref 0–0.9)
MONOCYTES NFR BLD AUTO: 6 % — SIGNIFICANT CHANGE UP (ref 2–14)
NEUTROPHILS # BLD AUTO: 8.79 K/UL — HIGH (ref 1.8–7.4)
NEUTROPHILS NFR BLD AUTO: 78 % — HIGH (ref 43–77)
NRBC # BLD: 0 — SIGNIFICANT CHANGE UP
NRBC # BLD: SIGNIFICANT CHANGE UP /100 WBCS (ref 0–0)
PLAT MORPH BLD: NORMAL — SIGNIFICANT CHANGE UP
PLATELET # BLD AUTO: 1041 K/UL — CRITICAL HIGH (ref 150–400)
POIKILOCYTOSIS BLD QL AUTO: SLIGHT — SIGNIFICANT CHANGE UP
RBC # BLD: 3.27 M/UL — LOW (ref 3.8–5.2)
RBC # FLD: 21.1 % — HIGH (ref 10.3–14.5)
RBC BLD AUTO: ABNORMAL
WBC # BLD: 11.27 K/UL — HIGH (ref 3.8–10.5)
WBC # FLD AUTO: 11.27 K/UL — HIGH (ref 3.8–10.5)

## 2020-03-18 PROCEDURE — 76700 US EXAM ABDOM COMPLETE: CPT | Mod: 26

## 2020-03-18 PROCEDURE — 76700 US EXAM ABDOM COMPLETE: CPT

## 2020-03-26 ENCOUNTER — APPOINTMENT (OUTPATIENT)
Dept: HEMATOLOGY ONCOLOGY | Facility: CLINIC | Age: 70
End: 2020-03-26

## 2020-04-09 ENCOUNTER — OUTPATIENT (OUTPATIENT)
Dept: OUTPATIENT SERVICES | Facility: HOSPITAL | Age: 70
LOS: 1 days | Discharge: HOME | End: 2020-04-09

## 2020-04-09 DIAGNOSIS — S99.919A UNSPECIFIED INJURY OF UNSPECIFIED ANKLE, INITIAL ENCOUNTER: Chronic | ICD-10-CM

## 2020-04-09 DIAGNOSIS — D69.6 THROMBOCYTOPENIA, UNSPECIFIED: ICD-10-CM

## 2020-04-09 DIAGNOSIS — K81.9 CHOLECYSTITIS, UNSPECIFIED: Chronic | ICD-10-CM

## 2020-04-10 LAB
ALBUMIN SERPL ELPH-MCNC: 3.8 G/DL
ALBUMIN SERPL ELPH-MCNC: 4.2 G/DL
ALP BLD-CCNC: 85 U/L
ALP BLD-CCNC: 86 U/L
ALT SERPL-CCNC: 11 U/L
ALT SERPL-CCNC: 15 U/L
ANION GAP SERPL CALC-SCNC: 13 MMOL/L
ANION GAP SERPL CALC-SCNC: 15 MMOL/L
AST SERPL-CCNC: 13 U/L
AST SERPL-CCNC: 17 U/L
BILIRUB SERPL-MCNC: 0.3 MG/DL
BILIRUB SERPL-MCNC: 0.5 MG/DL
BUN SERPL-MCNC: 14 MG/DL
BUN SERPL-MCNC: 23 MG/DL
CALCIUM SERPL-MCNC: 8.8 MG/DL
CALCIUM SERPL-MCNC: 9.5 MG/DL
CHLORIDE SERPL-SCNC: 101 MMOL/L
CHLORIDE SERPL-SCNC: 98 MMOL/L
CO2 SERPL-SCNC: 26 MMOL/L
CO2 SERPL-SCNC: 26 MMOL/L
CREAT SERPL-MCNC: 0.91 MG/DL
CREAT SERPL-MCNC: 1.03 MG/DL
GLUCOSE SERPL-MCNC: 101 MG/DL
GLUCOSE SERPL-MCNC: 82 MG/DL
POTASSIUM SERPL-SCNC: 4.2 MMOL/L
POTASSIUM SERPL-SCNC: 5 MMOL/L
PROT SERPL-MCNC: 6.8 G/DL
PROT SERPL-MCNC: 7 G/DL
SODIUM SERPL-SCNC: 139 MMOL/L
SODIUM SERPL-SCNC: 140 MMOL/L
VWF AG PPP IA-ACNC: 169 %
VWF MULTIMERS PPP IA-ACNC: NORMAL

## 2020-04-16 ENCOUNTER — APPOINTMENT (OUTPATIENT)
Dept: HEMATOLOGY ONCOLOGY | Facility: CLINIC | Age: 70
End: 2020-04-16

## 2020-04-29 ENCOUNTER — RESULT REVIEW (OUTPATIENT)
Age: 70
End: 2020-04-29

## 2020-04-29 ENCOUNTER — APPOINTMENT (OUTPATIENT)
Dept: HEMATOLOGY ONCOLOGY | Facility: CLINIC | Age: 70
End: 2020-04-29

## 2020-04-29 LAB
ANISOCYTOSIS BLD QL: SLIGHT — SIGNIFICANT CHANGE UP
BASOPHILS # BLD AUTO: 0 K/UL — SIGNIFICANT CHANGE UP (ref 0–0.2)
BASOPHILS NFR BLD AUTO: 0 % — SIGNIFICANT CHANGE UP (ref 0–2)
BLASTS # FLD: 2 % — HIGH (ref 0–0)
EOSINOPHIL # BLD AUTO: 0.29 K/UL — SIGNIFICANT CHANGE UP (ref 0–0.5)
EOSINOPHIL NFR BLD AUTO: 2 % — SIGNIFICANT CHANGE UP (ref 0–6)
HCT VFR BLD CALC: 33.8 % — LOW (ref 34.5–45)
HGB BLD-MCNC: 10.2 G/DL — LOW (ref 11.5–15.5)
HYPOCHROMIA BLD QL: SLIGHT — SIGNIFICANT CHANGE UP
LYMPHOCYTES # BLD AUTO: 0.87 K/UL — LOW (ref 1–3.3)
LYMPHOCYTES # BLD AUTO: 6 % — LOW (ref 13–44)
MACROCYTES BLD QL: SLIGHT — SIGNIFICANT CHANGE UP
MANUAL DIF COMMENT BLD-IMP: SIGNIFICANT CHANGE UP
MCHC RBC-ENTMCNC: 30.2 GM/DL — LOW (ref 32–36)
MCHC RBC-ENTMCNC: 32.2 PG — SIGNIFICANT CHANGE UP (ref 27–34)
MCV RBC AUTO: 106.6 FL — HIGH (ref 80–100)
MICROCYTES BLD QL: SLIGHT — SIGNIFICANT CHANGE UP
MONOCYTES # BLD AUTO: 0.29 K/UL — SIGNIFICANT CHANGE UP (ref 0–0.9)
MONOCYTES NFR BLD AUTO: 2 % — SIGNIFICANT CHANGE UP (ref 2–14)
MYELOCYTES NFR BLD: 1 % — HIGH (ref 0–0)
NEUTROPHILS # BLD AUTO: 12.65 K/UL — HIGH (ref 1.8–7.4)
NEUTROPHILS NFR BLD AUTO: 87 % — HIGH (ref 43–77)
NRBC # BLD: 0 /100 — SIGNIFICANT CHANGE UP (ref 0–0)
NRBC # BLD: SIGNIFICANT CHANGE UP /100 WBCS (ref 0–0)
OVALOCYTES BLD QL SMEAR: SLIGHT — SIGNIFICANT CHANGE UP
PLAT MORPH BLD: NORMAL — SIGNIFICANT CHANGE UP
PLATELET # BLD AUTO: 1226 K/UL — CRITICAL HIGH (ref 150–400)
POIKILOCYTOSIS BLD QL AUTO: SLIGHT — SIGNIFICANT CHANGE UP
POLYCHROMASIA BLD QL SMEAR: SLIGHT — SIGNIFICANT CHANGE UP
RBC # BLD: 3.17 M/UL — LOW (ref 3.8–5.2)
RBC # FLD: 21 % — HIGH (ref 10.3–14.5)
RBC BLD AUTO: ABNORMAL
STOMATOCYTES BLD QL SMEAR: SLIGHT — SIGNIFICANT CHANGE UP
WBC # BLD: 14.54 K/UL — HIGH (ref 3.8–10.5)
WBC # FLD AUTO: 14.54 K/UL — HIGH (ref 3.8–10.5)

## 2020-04-30 LAB
GIANT PLATELETS BLD QL SMEAR: PRESENT — SIGNIFICANT CHANGE UP
LG PLATELETS BLD QL AUTO: SLIGHT — SIGNIFICANT CHANGE UP

## 2020-05-04 ENCOUNTER — OUTPATIENT (OUTPATIENT)
Dept: OUTPATIENT SERVICES | Facility: HOSPITAL | Age: 70
LOS: 1 days | End: 2020-05-04
Payer: MEDICARE

## 2020-05-04 DIAGNOSIS — K81.9 CHOLECYSTITIS, UNSPECIFIED: Chronic | ICD-10-CM

## 2020-05-04 DIAGNOSIS — S99.919A UNSPECIFIED INJURY OF UNSPECIFIED ANKLE, INITIAL ENCOUNTER: Chronic | ICD-10-CM

## 2020-05-04 DIAGNOSIS — D69.6 THROMBOCYTOPENIA, UNSPECIFIED: ICD-10-CM

## 2020-05-06 ENCOUNTER — RESULT REVIEW (OUTPATIENT)
Age: 70
End: 2020-05-06

## 2020-05-06 ENCOUNTER — APPOINTMENT (OUTPATIENT)
Dept: HEMATOLOGY ONCOLOGY | Facility: CLINIC | Age: 70
End: 2020-05-06
Payer: MEDICARE

## 2020-05-06 ENCOUNTER — LABORATORY RESULT (OUTPATIENT)
Age: 70
End: 2020-05-06

## 2020-05-06 VITALS
TEMPERATURE: 98.5 F | RESPIRATION RATE: 18 BRPM | BODY MASS INDEX: 38.54 KG/M2 | DIASTOLIC BLOOD PRESSURE: 79 MMHG | OXYGEN SATURATION: 95 % | SYSTOLIC BLOOD PRESSURE: 132 MMHG | HEART RATE: 90 BPM | WEIGHT: 238.76 LBS

## 2020-05-06 LAB
ANISOCYTOSIS BLD QL: SLIGHT — SIGNIFICANT CHANGE UP
BASOPHILS # BLD AUTO: 0.1 K/UL — SIGNIFICANT CHANGE UP (ref 0–0.2)
BASOPHILS NFR BLD AUTO: 1 % — SIGNIFICANT CHANGE UP (ref 0–2)
DACRYOCYTES BLD QL SMEAR: SLIGHT — SIGNIFICANT CHANGE UP
EOSINOPHIL # BLD AUTO: 0.41 K/UL — SIGNIFICANT CHANGE UP (ref 0–0.5)
EOSINOPHIL NFR BLD AUTO: 4 % — SIGNIFICANT CHANGE UP (ref 0–6)
HCT VFR BLD CALC: 32.9 % — LOW (ref 34.5–45)
HGB BLD-MCNC: 9.8 G/DL — LOW (ref 11.5–15.5)
HYPOCHROMIA BLD QL: SLIGHT — SIGNIFICANT CHANGE UP
LYMPHOCYTES # BLD AUTO: 0.93 K/UL — LOW (ref 1–3.3)
LYMPHOCYTES # BLD AUTO: 9 % — LOW (ref 13–44)
MACROCYTES BLD QL: SLIGHT — SIGNIFICANT CHANGE UP
MCHC RBC-ENTMCNC: 29.8 GM/DL — LOW (ref 32–36)
MCHC RBC-ENTMCNC: 31.3 PG — SIGNIFICANT CHANGE UP (ref 27–34)
MCV RBC AUTO: 105.1 FL — HIGH (ref 80–100)
MICROCYTES BLD QL: SLIGHT — SIGNIFICANT CHANGE UP
MONOCYTES # BLD AUTO: 0.1 K/UL — SIGNIFICANT CHANGE UP (ref 0–0.9)
MONOCYTES NFR BLD AUTO: 1 % — LOW (ref 2–14)
NEUTROPHILS # BLD AUTO: 8.76 K/UL — HIGH (ref 1.8–7.4)
NEUTROPHILS NFR BLD AUTO: 84 % — HIGH (ref 43–77)
NEUTS BAND # BLD: 1 % — SIGNIFICANT CHANGE UP (ref 0–8)
NRBC # BLD: 0 /100 — SIGNIFICANT CHANGE UP (ref 0–0)
NRBC # BLD: SIGNIFICANT CHANGE UP /100 WBCS (ref 0–0)
PLAT MORPH BLD: NORMAL — SIGNIFICANT CHANGE UP
PLATELET # BLD AUTO: 611 K/UL — HIGH (ref 150–400)
POIKILOCYTOSIS BLD QL AUTO: SLIGHT — SIGNIFICANT CHANGE UP
POLYCHROMASIA BLD QL SMEAR: SLIGHT — SIGNIFICANT CHANGE UP
RBC # BLD: 3.13 M/UL — LOW (ref 3.8–5.2)
RBC # FLD: 20.6 % — HIGH (ref 10.3–14.5)
RBC BLD AUTO: ABNORMAL
WBC # BLD: 10.3 K/UL — SIGNIFICANT CHANGE UP (ref 3.8–10.5)
WBC # FLD AUTO: 10.3 K/UL — SIGNIFICANT CHANGE UP (ref 3.8–10.5)

## 2020-05-06 PROCEDURE — 88341 IMHCHEM/IMCYTCHM EA ADD ANTB: CPT

## 2020-05-06 PROCEDURE — 88264 CHROMOSOME ANALYSIS 20-25: CPT

## 2020-05-06 PROCEDURE — 38222 DX BONE MARROW BX & ASPIR: CPT | Mod: RT

## 2020-05-06 PROCEDURE — 88313 SPECIAL STAINS GROUP 2: CPT

## 2020-05-06 PROCEDURE — 87205 SMEAR GRAM STAIN: CPT

## 2020-05-06 PROCEDURE — 88360 TUMOR IMMUNOHISTOCHEM/MANUAL: CPT | Mod: 26

## 2020-05-06 PROCEDURE — 88280 CHROMOSOME KARYOTYPE STUDY: CPT

## 2020-05-06 PROCEDURE — 88342 IMHCHEM/IMCYTCHM 1ST ANTB: CPT

## 2020-05-06 PROCEDURE — 88360 TUMOR IMMUNOHISTOCHEM/MANUAL: CPT

## 2020-05-06 PROCEDURE — 85097 BONE MARROW INTERPRETATION: CPT

## 2020-05-06 PROCEDURE — 88342 IMHCHEM/IMCYTCHM 1ST ANTB: CPT | Mod: 26,59

## 2020-05-06 PROCEDURE — 88185 FLOWCYTOMETRY/TC ADD-ON: CPT

## 2020-05-06 PROCEDURE — 88237 TISSUE CULTURE BONE MARROW: CPT

## 2020-05-06 PROCEDURE — 88341 IMHCHEM/IMCYTCHM EA ADD ANTB: CPT | Mod: 26,59

## 2020-05-06 PROCEDURE — 88184 FLOWCYTOMETRY/ TC 1 MARKER: CPT

## 2020-05-06 PROCEDURE — 88305 TISSUE EXAM BY PATHOLOGIST: CPT | Mod: 26

## 2020-05-06 PROCEDURE — 88313 SPECIAL STAINS GROUP 2: CPT | Mod: 26

## 2020-05-06 PROCEDURE — 88305 TISSUE EXAM BY PATHOLOGIST: CPT

## 2020-05-06 PROCEDURE — 88189 FLOWCYTOMETRY/READ 16 & >: CPT

## 2020-05-06 NOTE — PROCEDURE
[Bone Marrow Biopsy] : bone marrow biopsy [Bone Marrow Aspiration] : bone marrow aspiration  [Patient] : the patient [Patient identification verified] : patient identification verified [Procedure verified and consent obtained] : procedure verified and consent obtained [Correct positioning] : correct positioning [Prone] : prone [The right posterior iliac crest was prepped with betadine and draped, using sterile technique.] : The right posterior iliac crest was prepped with betadine and draped, using sterile technique. [Lidocaine was injected and into the periosteum overlying the site.] : Lidocaine was injected and into the periosteum overlying the site. [Aspirate] : aspirate [Cytogenetics] : cytogenetics [FISH] : FISH [Other ___] : [unfilled] [Biopsy] : biopsy [Flow Cytometry] : flow cytometry [] : The patient was instructed to remove the bandage the following AM. The patient may bathe. Acetaminophen may be taken for discomfort, as per package directions.If there are any other problems, the patient was instructed to call the office. The patient verbalized understanding, and is aware of the office contact numbers. [FreeTextEntry1] : 68 yo female w/ PMF Jak2+, persistent thrombocytosis despite HU 2g and 1.5g alternating. BMBX to r/o MPN/MDS/AML [FreeTextEntry2] : CBC prior to procedure\par WBC 10.3\par Hgb 9.8   Hct 32.9\par Plt 611\par BM Bx and aspiration was performed by PARUL Calixto. [employer] ordered, [employer] tracting # [id number]. Pt felt fatigue, Hgb trending down 9.8 today, will inform Dr. Doshi team to close monitor her CBC. Explained to pt that with Hgb 9.8, she doesn't need blood transfusion but if her anemia symptoms get worsen such as dizziness, fatigue or SOB, she needs call Dr. Doshi's team ASAP for urgent blood transfusion. Pt verbalized understanding and agreed the plan. Will f/u w Dr. Doshi 5/21.

## 2020-05-07 ENCOUNTER — RESULT REVIEW (OUTPATIENT)
Age: 70
End: 2020-05-07

## 2020-05-08 LAB — TM INTERPRETATION: SIGNIFICANT CHANGE UP

## 2020-05-11 ENCOUNTER — OUTPATIENT (OUTPATIENT)
Dept: OUTPATIENT SERVICES | Facility: HOSPITAL | Age: 70
LOS: 1 days | Discharge: HOME | End: 2020-05-11

## 2020-05-11 DIAGNOSIS — K81.9 CHOLECYSTITIS, UNSPECIFIED: Chronic | ICD-10-CM

## 2020-05-11 DIAGNOSIS — S99.919A UNSPECIFIED INJURY OF UNSPECIFIED ANKLE, INITIAL ENCOUNTER: Chronic | ICD-10-CM

## 2020-05-11 DIAGNOSIS — D69.6 THROMBOCYTOPENIA, UNSPECIFIED: ICD-10-CM

## 2020-05-13 ENCOUNTER — APPOINTMENT (OUTPATIENT)
Dept: HEMATOLOGY ONCOLOGY | Facility: CLINIC | Age: 70
End: 2020-05-13

## 2020-05-13 LAB — HEMATOPATHOLOGY REPORT: SIGNIFICANT CHANGE UP

## 2020-05-19 LAB — CHROM ANALY OVERALL INTERP SPEC-IMP: SIGNIFICANT CHANGE UP

## 2020-05-21 ENCOUNTER — APPOINTMENT (OUTPATIENT)
Dept: HEMATOLOGY ONCOLOGY | Facility: CLINIC | Age: 70
End: 2020-05-21

## 2020-05-27 ENCOUNTER — APPOINTMENT (OUTPATIENT)
Dept: HEMATOLOGY ONCOLOGY | Facility: CLINIC | Age: 70
End: 2020-05-27
Payer: MEDICARE

## 2020-05-27 PROCEDURE — 99442: CPT | Mod: 95

## 2020-06-03 ENCOUNTER — RESULT REVIEW (OUTPATIENT)
Age: 70
End: 2020-06-03

## 2020-06-03 ENCOUNTER — APPOINTMENT (OUTPATIENT)
Dept: HEMATOLOGY ONCOLOGY | Facility: CLINIC | Age: 70
End: 2020-06-03

## 2020-06-03 LAB
ANISOCYTOSIS BLD QL: SLIGHT — SIGNIFICANT CHANGE UP
BASOPHILS # BLD AUTO: 0 K/UL — SIGNIFICANT CHANGE UP (ref 0–0.2)
BASOPHILS NFR BLD AUTO: 0 % — SIGNIFICANT CHANGE UP (ref 0–2)
DACRYOCYTES BLD QL SMEAR: SLIGHT — SIGNIFICANT CHANGE UP
EOSINOPHIL # BLD AUTO: 0.99 K/UL — HIGH (ref 0–0.5)
EOSINOPHIL NFR BLD AUTO: 6 % — SIGNIFICANT CHANGE UP (ref 0–6)
HCT VFR BLD CALC: 33.6 % — LOW (ref 34.5–45)
HGB BLD-MCNC: 9.8 G/DL — LOW (ref 11.5–15.5)
HYPOCHROMIA BLD QL: SLIGHT — SIGNIFICANT CHANGE UP
LYMPHOCYTES # BLD AUTO: 0.99 K/UL — LOW (ref 1–3.3)
LYMPHOCYTES # BLD AUTO: 6 % — LOW (ref 13–44)
MACROCYTES BLD QL: SLIGHT — SIGNIFICANT CHANGE UP
MCHC RBC-ENTMCNC: 29.2 GM/DL — LOW (ref 32–36)
MCHC RBC-ENTMCNC: 30.7 PG — SIGNIFICANT CHANGE UP (ref 27–34)
MCV RBC AUTO: 105.3 FL — HIGH (ref 80–100)
MICROCYTES BLD QL: SLIGHT — SIGNIFICANT CHANGE UP
MONOCYTES # BLD AUTO: 0.66 K/UL — SIGNIFICANT CHANGE UP (ref 0–0.9)
MONOCYTES NFR BLD AUTO: 4 % — SIGNIFICANT CHANGE UP (ref 2–14)
NEUTROPHILS # BLD AUTO: 13.88 K/UL — HIGH (ref 1.8–7.4)
NEUTROPHILS NFR BLD AUTO: 84 % — HIGH (ref 43–77)
NRBC # BLD: 0 /100 — SIGNIFICANT CHANGE UP (ref 0–0)
NRBC # BLD: SIGNIFICANT CHANGE UP /100 WBCS (ref 0–0)
PLAT MORPH BLD: NORMAL — SIGNIFICANT CHANGE UP
PLATELET # BLD AUTO: 447 K/UL — HIGH (ref 150–400)
POIKILOCYTOSIS BLD QL AUTO: SLIGHT — SIGNIFICANT CHANGE UP
POLYCHROMASIA BLD QL SMEAR: SLIGHT — SIGNIFICANT CHANGE UP
RBC # BLD: 3.19 M/UL — LOW (ref 3.8–5.2)
RBC # FLD: 21.1 % — HIGH (ref 10.3–14.5)
RBC BLD AUTO: ABNORMAL
WBC # BLD: 16.52 K/UL — HIGH (ref 3.8–10.5)
WBC # FLD AUTO: 16.52 K/UL — HIGH (ref 3.8–10.5)

## 2020-06-04 LAB
ALBUMIN SERPL ELPH-MCNC: 3.8 G/DL
ALP BLD-CCNC: 84 U/L
ALT SERPL-CCNC: 8 U/L
ANION GAP SERPL CALC-SCNC: 13 MMOL/L
AST SERPL-CCNC: 13 U/L
BILIRUB SERPL-MCNC: 0.5 MG/DL
BUN SERPL-MCNC: 9 MG/DL
CALCIUM SERPL-MCNC: 9 MG/DL
CHLORIDE SERPL-SCNC: 100 MMOL/L
CO2 SERPL-SCNC: 26 MMOL/L
CREAT SERPL-MCNC: 0.83 MG/DL
GLUCOSE SERPL-MCNC: 107 MG/DL
LDH SERPL-CCNC: 296 U/L
POTASSIUM SERPL-SCNC: 4 MMOL/L
PROT SERPL-MCNC: 6.8 G/DL
SODIUM SERPL-SCNC: 139 MMOL/L
URATE SERPL-MCNC: 4.6 MG/DL

## 2020-06-11 ENCOUNTER — NON-APPOINTMENT (OUTPATIENT)
Age: 70
End: 2020-06-11

## 2020-06-11 ENCOUNTER — APPOINTMENT (OUTPATIENT)
Dept: CARDIOLOGY | Facility: CLINIC | Age: 70
End: 2020-06-11
Payer: MEDICARE

## 2020-06-11 VITALS
DIASTOLIC BLOOD PRESSURE: 83 MMHG | HEIGHT: 66 IN | SYSTOLIC BLOOD PRESSURE: 168 MMHG | HEART RATE: 83 BPM | BODY MASS INDEX: 38.25 KG/M2 | OXYGEN SATURATION: 91 % | WEIGHT: 238 LBS

## 2020-06-11 PROCEDURE — 93000 ELECTROCARDIOGRAM COMPLETE: CPT

## 2020-06-11 PROCEDURE — 99214 OFFICE O/P EST MOD 30 MIN: CPT

## 2020-06-11 NOTE — PHYSICAL EXAM
[General Appearance - Well Developed] : well developed [Normal Appearance] : normal appearance [Well Groomed] : well groomed [General Appearance - Well Nourished] : well nourished [No Deformities] : no deformities [General Appearance - In No Acute Distress] : no acute distress [Normal Conjunctiva] : the conjunctiva exhibited no abnormalities [Normal Oral Mucosa] : normal oral mucosa [Eyelids - No Xanthelasma] : the eyelids demonstrated no xanthelasmas [No Oral Pallor] : no oral pallor [No Oral Cyanosis] : no oral cyanosis [Normal Jugular Venous A Waves Present] : normal jugular venous A waves present [Normal Jugular Venous V Waves Present] : normal jugular venous V waves present [No Jugular Venous Montes A Waves] : no jugular venous montes A waves [Respiration, Rhythm And Depth] : normal respiratory rhythm and effort [Exaggerated Use Of Accessory Muscles For Inspiration] : no accessory muscle use [Auscultation Breath Sounds / Voice Sounds] : lungs were clear to auscultation bilaterally [Abdomen Soft] : soft [Abdomen Tenderness] : non-tender [Abnormal Walk] : normal gait [FreeTextEntry1] : obese [Abdomen Mass (___ Cm)] : no abdominal mass palpated [Gait - Sufficient For Exercise Testing] : the gait was sufficient for exercise testing [Nail Clubbing] : no clubbing of the fingernails [Petechial Hemorrhages (___cm)] : no petechial hemorrhages [Cyanosis, Localized] : no localized cyanosis [] : no rash [Skin Color & Pigmentation] : normal skin color and pigmentation [No Venous Stasis] : no venous stasis [Skin Lesions] : no skin lesions [No Skin Ulcers] : no skin ulcer [No Xanthoma] : no  xanthoma was observed [Oriented To Time, Place, And Person] : oriented to person, place, and time [Affect] : the affect was normal [Mood] : the mood was normal [No Anxiety] : not feeling anxious [Normal Rate] : normal [Normal S1] : normal S1 [Rhythm Regular] : regular [Normal S2] : normal S2 [No Murmur] : no murmurs heard [No Gallop] : no gallop heard [2+] : left 2+ [Right Carotid Bruit] : no bruit heard over the right carotid [Left Carotid Bruit] : no bruit heard over the left carotid [Bruit] : no bruit heard [No Pitting Edema] : no pitting edema present

## 2020-06-11 NOTE — REVIEW OF SYSTEMS
[Shortness Of Breath] : no shortness of breath [Chest  Pressure] : no chest pressure [Chest Pain] : no chest pain [Dyspnea on exertion] : dyspnea during exertion [Lower Ext Edema] : no extremity edema [Leg Claudication] : no intermittent leg claudication [Palpitations] : no palpitations [see HPI] : see HPI [Joint Pain] : joint pain [Joint Stiffness] : joint stiffness [Anxiety] : anxiety [Under Stress] : under stress [Negative] : Heme/Lymph

## 2020-06-11 NOTE — DISCUSSION/SUMMARY
[FreeTextEntry1] : 69-year-old woman with a history as listed presents for a followup visit.\par \par Mallory is doing well overall. She is having dyspnea on exertion which is likely related to deconditioning. \par She denies any anginal symptoms. Clinically she is euvolemic on exam. She is s/p cath on 12/2018 with normal cors and LVEDP. She was noted to have normal LV function. She had an echo in 12/2018 that showed normal systolic LV function without any significant other findings, including no significant valvular disease. Her physical exam was essentially unrevealing for any significant cardiovascular findings. \par  \par Her EKG did not reveal any significant ischemic changes. She is maintaining SR. She will continue Toprol 50mg Qday. \par \par She does not feel her AF. Therefore at this point she will continue Xarelto for CVA risk reduction. She will likely get a screening holter (Ziopatch) prior to her next visit. \par \par She will  resume lasix 40mg QOD for her history of lower extremity edema and because she maintains a  higher salt diet. \par \par Exercise and diet counseling was performed in order to reduce her future cardiovascular risk. \par She will followup with me in 3 months

## 2020-06-11 NOTE — REVIEW OF SYSTEMS
[Shortness Of Breath] : no shortness of breath [Dyspnea on exertion] : dyspnea during exertion [Chest  Pressure] : no chest pressure [Chest Pain] : no chest pain [Leg Claudication] : no intermittent leg claudication [Palpitations] : no palpitations [Lower Ext Edema] : no extremity edema [see HPI] : see HPI [Joint Pain] : joint pain [Joint Stiffness] : joint stiffness [Anxiety] : anxiety [Under Stress] : under stress [Negative] : Endocrine

## 2020-06-11 NOTE — HISTORY OF PRESENT ILLNESS
[FreeTextEntry1] : 69-year-old woman with a history of asthma, obesity, essential thrombocytosis, HFPEF, normal cors in 12/2018, PAF s/p ablation in 2/7/19.\par \par she is s/p cath on 12/2018 with normal cors and LVEDP. She was noted to have normal LV function. \par She then subsequently underwent an AF ablation on 2/7/19. She was taken off of Amio. \par \par Since her last visit, she has been feeling well. \par She has been more stressed recently now that her son is having another baby. She has been quarantined. She has been anxious because of the pandemic. Recently she needed to increase the Hydroxyurea for worsening thrombocytosis. \par \par Her dyspnea on exertion especially when she climbs 2 flights of stairs.  \par She   denies any  chest pain, palpitations, lower extremity edema, near syncope, syncope, strokelike symptoms. No reported melena, hematochezia or hematemesis. \par She is taking the lasix 40mg every few days because she is feeling more worn our and lightheaded with it. She is compliant with her medications.

## 2020-06-11 NOTE — PHYSICAL EXAM
[General Appearance - Well Developed] : well developed [Normal Appearance] : normal appearance [Well Groomed] : well groomed [General Appearance - Well Nourished] : well nourished [No Deformities] : no deformities [General Appearance - In No Acute Distress] : no acute distress [Normal Conjunctiva] : the conjunctiva exhibited no abnormalities [Normal Oral Mucosa] : normal oral mucosa [Eyelids - No Xanthelasma] : the eyelids demonstrated no xanthelasmas [No Oral Pallor] : no oral pallor [No Oral Cyanosis] : no oral cyanosis [Normal Jugular Venous V Waves Present] : normal jugular venous V waves present [Normal Jugular Venous A Waves Present] : normal jugular venous A waves present [No Jugular Venous Montes A Waves] : no jugular venous montes A waves [Respiration, Rhythm And Depth] : normal respiratory rhythm and effort [Exaggerated Use Of Accessory Muscles For Inspiration] : no accessory muscle use [Auscultation Breath Sounds / Voice Sounds] : lungs were clear to auscultation bilaterally [Abdomen Soft] : soft [Abdomen Tenderness] : non-tender [Abnormal Walk] : normal gait [Abdomen Mass (___ Cm)] : no abdominal mass palpated [FreeTextEntry1] : obese [Gait - Sufficient For Exercise Testing] : the gait was sufficient for exercise testing [Nail Clubbing] : no clubbing of the fingernails [Cyanosis, Localized] : no localized cyanosis [Petechial Hemorrhages (___cm)] : no petechial hemorrhages [] : no rash [Skin Color & Pigmentation] : normal skin color and pigmentation [Skin Lesions] : no skin lesions [No Venous Stasis] : no venous stasis [No Skin Ulcers] : no skin ulcer [Oriented To Time, Place, And Person] : oriented to person, place, and time [No Xanthoma] : no  xanthoma was observed [Affect] : the affect was normal [Mood] : the mood was normal [No Anxiety] : not feeling anxious [Normal Rate] : normal [Normal S1] : normal S1 [Rhythm Regular] : regular [No Gallop] : no gallop heard [No Murmur] : no murmurs heard [Normal S2] : normal S2 [2+] : left 2+ [Right Carotid Bruit] : no bruit heard over the right carotid [Left Carotid Bruit] : no bruit heard over the left carotid [Bruit] : no bruit heard [No Pitting Edema] : no pitting edema present

## 2020-06-22 ENCOUNTER — OUTPATIENT (OUTPATIENT)
Dept: OUTPATIENT SERVICES | Facility: HOSPITAL | Age: 70
LOS: 1 days | Discharge: HOME | End: 2020-06-22

## 2020-06-22 DIAGNOSIS — K81.9 CHOLECYSTITIS, UNSPECIFIED: Chronic | ICD-10-CM

## 2020-06-22 DIAGNOSIS — D69.6 THROMBOCYTOPENIA, UNSPECIFIED: ICD-10-CM

## 2020-06-22 DIAGNOSIS — S99.919A UNSPECIFIED INJURY OF UNSPECIFIED ANKLE, INITIAL ENCOUNTER: Chronic | ICD-10-CM

## 2020-06-22 NOTE — ASSESSMENT
[FreeTextEntry1] : 67 yo F with a history of PMF jak2+ here with persistent thrombocytosis despite HU TID/BID alternating,\par \par -continue hu for now, hb improved, will go to HU TID\par -if unable to control with HU could consider anagrelide, but would prefer to use HU\par -continue folic acid and xarelto\par -repeat cbc every 2 weeks\par -return visit 6 weeks\par

## 2020-06-22 NOTE — HISTORY OF PRESENT ILLNESS
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 [de-identified] : -feeling worried and anxious about her recent platelet fluctuations\par -she cancelled her trip to [place]\par -she had a cruise she took in november/december which turned out very nice\par -she was on hu 3/4 tabs alternating daily and had great control of her platelets at the time

## 2020-06-24 ENCOUNTER — RESULT REVIEW (OUTPATIENT)
Age: 70
End: 2020-06-24

## 2020-06-24 ENCOUNTER — APPOINTMENT (OUTPATIENT)
Dept: HEMATOLOGY ONCOLOGY | Facility: CLINIC | Age: 70
End: 2020-06-24

## 2020-06-24 LAB
ANISOCYTOSIS BLD QL: SLIGHT — SIGNIFICANT CHANGE UP
BASOPHILS # BLD AUTO: 0.14 K/UL — SIGNIFICANT CHANGE UP (ref 0–0.2)
BASOPHILS NFR BLD AUTO: 1 % — SIGNIFICANT CHANGE UP (ref 0–2)
DACRYOCYTES BLD QL SMEAR: SLIGHT — SIGNIFICANT CHANGE UP
ELLIPTOCYTES BLD QL SMEAR: SLIGHT — SIGNIFICANT CHANGE UP
EOSINOPHIL # BLD AUTO: 0.98 K/UL — HIGH (ref 0–0.5)
EOSINOPHIL NFR BLD AUTO: 7 % — HIGH (ref 0–6)
HCT VFR BLD CALC: 32.9 % — LOW (ref 34.5–45)
HGB BLD-MCNC: 9.8 G/DL — LOW (ref 11.5–15.5)
HYPOCHROMIA BLD QL: SLIGHT — SIGNIFICANT CHANGE UP
LYMPHOCYTES # BLD AUTO: 1.13 K/UL — SIGNIFICANT CHANGE UP (ref 1–3.3)
LYMPHOCYTES # BLD AUTO: 8 % — LOW (ref 13–44)
MACROCYTES BLD QL: SLIGHT — SIGNIFICANT CHANGE UP
MCHC RBC-ENTMCNC: 29.8 GM/DL — LOW (ref 32–36)
MCHC RBC-ENTMCNC: 30.7 PG — SIGNIFICANT CHANGE UP (ref 27–34)
MCV RBC AUTO: 103.1 FL — HIGH (ref 80–100)
MICROCYTES BLD QL: SLIGHT — SIGNIFICANT CHANGE UP
MONOCYTES # BLD AUTO: 0.28 K/UL — SIGNIFICANT CHANGE UP (ref 0–0.9)
MONOCYTES NFR BLD AUTO: 2 % — SIGNIFICANT CHANGE UP (ref 2–14)
NEUTROPHILS # BLD AUTO: 11.54 K/UL — HIGH (ref 1.8–7.4)
NEUTROPHILS NFR BLD AUTO: 81 % — HIGH (ref 43–77)
NEUTS BAND # BLD: 1 % — SIGNIFICANT CHANGE UP (ref 0–8)
NRBC # BLD: 0 /100 — SIGNIFICANT CHANGE UP (ref 0–0)
NRBC # BLD: SIGNIFICANT CHANGE UP /100 WBCS (ref 0–0)
PLAT MORPH BLD: NORMAL — SIGNIFICANT CHANGE UP
PLATELET # BLD AUTO: 268 K/UL — SIGNIFICANT CHANGE UP (ref 150–400)
POIKILOCYTOSIS BLD QL AUTO: SLIGHT — SIGNIFICANT CHANGE UP
POLYCHROMASIA BLD QL SMEAR: SLIGHT — SIGNIFICANT CHANGE UP
RBC # BLD: 3.19 M/UL — LOW (ref 3.8–5.2)
RBC # FLD: 22.3 % — HIGH (ref 10.3–14.5)
RBC BLD AUTO: ABNORMAL
WBC # BLD: 14.07 K/UL — HIGH (ref 3.8–10.5)
WBC # FLD AUTO: 14.07 K/UL — HIGH (ref 3.8–10.5)

## 2020-06-26 LAB
ALBUMIN SERPL ELPH-MCNC: 4.1 G/DL
ALP BLD-CCNC: 80 U/L
ALT SERPL-CCNC: 8 U/L
ANION GAP SERPL CALC-SCNC: 11 MMOL/L
AST SERPL-CCNC: 13 U/L
BILIRUB SERPL-MCNC: 0.4 MG/DL
BUN SERPL-MCNC: 13 MG/DL
CALCIUM SERPL-MCNC: 8.9 MG/DL
CHLORIDE SERPL-SCNC: 103 MMOL/L
CO2 SERPL-SCNC: 27 MMOL/L
CREAT SERPL-MCNC: 0.89 MG/DL
GLUCOSE SERPL-MCNC: 98 MG/DL
LDH SERPL-CCNC: 270 U/L
POTASSIUM SERPL-SCNC: 3.7 MMOL/L
PROT SERPL-MCNC: 6.8 G/DL
SODIUM SERPL-SCNC: 141 MMOL/L
URATE SERPL-MCNC: 5.6 MG/DL

## 2020-07-06 ENCOUNTER — APPOINTMENT (OUTPATIENT)
Dept: HEMATOLOGY ONCOLOGY | Facility: CLINIC | Age: 70
End: 2020-07-06

## 2020-07-06 ENCOUNTER — RESULT REVIEW (OUTPATIENT)
Age: 70
End: 2020-07-06

## 2020-07-06 LAB
BASOPHILS # BLD AUTO: 0.08 K/UL — SIGNIFICANT CHANGE UP (ref 0–0.2)
BASOPHILS NFR BLD AUTO: 0.5 % — SIGNIFICANT CHANGE UP (ref 0–2)
EOSINOPHIL # BLD AUTO: 0.44 K/UL — SIGNIFICANT CHANGE UP (ref 0–0.5)
EOSINOPHIL NFR BLD AUTO: 2.9 % — SIGNIFICANT CHANGE UP (ref 0–6)
HCT VFR BLD CALC: 30.9 % — LOW (ref 34.5–45)
HGB BLD-MCNC: 9.6 G/DL — LOW (ref 11.5–15.5)
IMM GRANULOCYTES NFR BLD AUTO: 2.2 % — HIGH (ref 0–1.5)
LYMPHOCYTES # BLD AUTO: 0.74 K/UL — LOW (ref 1–3.3)
LYMPHOCYTES # BLD AUTO: 4.8 % — LOW (ref 13–44)
MCHC RBC-ENTMCNC: 30.4 PG — SIGNIFICANT CHANGE UP (ref 27–34)
MCHC RBC-ENTMCNC: 31.1 GM/DL — LOW (ref 32–36)
MCV RBC AUTO: 97.8 FL — SIGNIFICANT CHANGE UP (ref 80–100)
MONOCYTES # BLD AUTO: 0.49 K/UL — SIGNIFICANT CHANGE UP (ref 0–0.9)
MONOCYTES NFR BLD AUTO: 3.2 % — SIGNIFICANT CHANGE UP (ref 2–14)
NEUTROPHILS # BLD AUTO: 13.26 K/UL — HIGH (ref 1.8–7.4)
NEUTROPHILS NFR BLD AUTO: 86.4 % — HIGH (ref 43–77)
NRBC # BLD: 0 /100 WBCS — SIGNIFICANT CHANGE UP (ref 0–0)
PLATELET # BLD AUTO: 854 K/UL — HIGH (ref 150–400)
RBC # BLD: 3.16 M/UL — LOW (ref 3.8–5.2)
RBC # FLD: 21.6 % — HIGH (ref 10.3–14.5)
WBC # BLD: 15.35 K/UL — HIGH (ref 3.8–10.5)
WBC # FLD AUTO: 15.35 K/UL — HIGH (ref 3.8–10.5)

## 2020-07-16 ENCOUNTER — APPOINTMENT (OUTPATIENT)
Dept: HEMATOLOGY ONCOLOGY | Facility: CLINIC | Age: 70
End: 2020-07-16

## 2020-07-16 ENCOUNTER — RESULT REVIEW (OUTPATIENT)
Age: 70
End: 2020-07-16

## 2020-07-16 LAB
BASOPHILS # BLD AUTO: 0 K/UL — SIGNIFICANT CHANGE UP (ref 0–0.2)
BASOPHILS NFR BLD AUTO: 0 % — SIGNIFICANT CHANGE UP (ref 0–2)
EOSINOPHIL # BLD AUTO: 0.87 K/UL — HIGH (ref 0–0.5)
EOSINOPHIL NFR BLD AUTO: 5 % — SIGNIFICANT CHANGE UP (ref 0–6)
HCT VFR BLD CALC: 34.3 % — LOW (ref 34.5–45)
HGB BLD-MCNC: 10.1 G/DL — LOW (ref 11.5–15.5)
LYMPHOCYTES # BLD AUTO: 1.21 K/UL — SIGNIFICANT CHANGE UP (ref 1–3.3)
LYMPHOCYTES # BLD AUTO: 7 % — LOW (ref 13–44)
MCHC RBC-ENTMCNC: 29.4 GM/DL — LOW (ref 32–36)
MCHC RBC-ENTMCNC: 29.5 PG — SIGNIFICANT CHANGE UP (ref 27–34)
MCV RBC AUTO: 100.3 FL — HIGH (ref 80–100)
MONOCYTES # BLD AUTO: 0.35 K/UL — SIGNIFICANT CHANGE UP (ref 0–0.9)
MONOCYTES NFR BLD AUTO: 2 % — SIGNIFICANT CHANGE UP (ref 2–14)
NEUTROPHILS # BLD AUTO: 14.9 K/UL — HIGH (ref 1.8–7.4)
NEUTROPHILS NFR BLD AUTO: 86 % — HIGH (ref 43–77)
NRBC # BLD: 0 /100 — SIGNIFICANT CHANGE UP (ref 0–0)
NRBC # BLD: SIGNIFICANT CHANGE UP /100 WBCS (ref 0–0)
PLAT MORPH BLD: NORMAL — SIGNIFICANT CHANGE UP
PLATELET # BLD AUTO: 1176 K/UL — CRITICAL HIGH (ref 150–400)
RBC # BLD: 3.42 M/UL — LOW (ref 3.8–5.2)
RBC # FLD: 21.2 % — HIGH (ref 10.3–14.5)
RBC BLD AUTO: SIGNIFICANT CHANGE UP
WBC # BLD: 17.33 K/UL — HIGH (ref 3.8–10.5)
WBC # FLD AUTO: 17.33 K/UL — HIGH (ref 3.8–10.5)

## 2020-07-20 ENCOUNTER — APPOINTMENT (OUTPATIENT)
Dept: HEMATOLOGY ONCOLOGY | Facility: CLINIC | Age: 70
End: 2020-07-20

## 2020-07-20 ENCOUNTER — RESULT REVIEW (OUTPATIENT)
Age: 70
End: 2020-07-20

## 2020-07-20 LAB
BASOPHILS # BLD AUTO: 0 K/UL — SIGNIFICANT CHANGE UP (ref 0–0.2)
BASOPHILS NFR BLD AUTO: 0 % — SIGNIFICANT CHANGE UP (ref 0–2)
EOSINOPHIL # BLD AUTO: 0.97 K/UL — HIGH (ref 0–0.5)
EOSINOPHIL NFR BLD AUTO: 7 % — HIGH (ref 0–6)
HCT VFR BLD CALC: 32.5 % — LOW (ref 34.5–45)
HGB BLD-MCNC: 9.5 G/DL — LOW (ref 11.5–15.5)
LYMPHOCYTES # BLD AUTO: 1.11 K/UL — SIGNIFICANT CHANGE UP (ref 1–3.3)
LYMPHOCYTES # BLD AUTO: 8 % — LOW (ref 13–44)
MCHC RBC-ENTMCNC: 29.2 GM/DL — LOW (ref 32–36)
MCHC RBC-ENTMCNC: 29.3 PG — SIGNIFICANT CHANGE UP (ref 27–34)
MCV RBC AUTO: 100.3 FL — HIGH (ref 80–100)
MONOCYTES # BLD AUTO: 0.28 K/UL — SIGNIFICANT CHANGE UP (ref 0–0.9)
MONOCYTES NFR BLD AUTO: 2 % — SIGNIFICANT CHANGE UP (ref 2–14)
NEUTROPHILS # BLD AUTO: 11.51 K/UL — HIGH (ref 1.8–7.4)
NEUTROPHILS NFR BLD AUTO: 83 % — HIGH (ref 43–77)
NRBC # BLD: 0 /100 — SIGNIFICANT CHANGE UP (ref 0–0)
NRBC # BLD: SIGNIFICANT CHANGE UP /100 WBCS (ref 0–0)
PLAT MORPH BLD: NORMAL — SIGNIFICANT CHANGE UP
PLATELET # BLD AUTO: 883 K/UL — HIGH (ref 150–400)
RBC # BLD: 3.24 M/UL — LOW (ref 3.8–5.2)
RBC # FLD: 21.2 % — HIGH (ref 10.3–14.5)
RBC BLD AUTO: SIGNIFICANT CHANGE UP
WBC # BLD: 13.87 K/UL — HIGH (ref 3.8–10.5)
WBC # FLD AUTO: 13.87 K/UL — HIGH (ref 3.8–10.5)

## 2020-07-25 ENCOUNTER — OUTPATIENT (OUTPATIENT)
Dept: OUTPATIENT SERVICES | Facility: HOSPITAL | Age: 70
LOS: 1 days | Discharge: HOME | End: 2020-07-25

## 2020-07-25 DIAGNOSIS — K81.9 CHOLECYSTITIS, UNSPECIFIED: Chronic | ICD-10-CM

## 2020-07-25 DIAGNOSIS — D69.6 THROMBOCYTOPENIA, UNSPECIFIED: ICD-10-CM

## 2020-07-25 DIAGNOSIS — S99.919A UNSPECIFIED INJURY OF UNSPECIFIED ANKLE, INITIAL ENCOUNTER: Chronic | ICD-10-CM

## 2020-07-28 ENCOUNTER — NON-APPOINTMENT (OUTPATIENT)
Age: 70
End: 2020-07-28

## 2020-07-28 ENCOUNTER — APPOINTMENT (OUTPATIENT)
Dept: CARDIOLOGY | Facility: CLINIC | Age: 70
End: 2020-07-28
Payer: MEDICARE

## 2020-07-28 VITALS
BODY MASS INDEX: 37.77 KG/M2 | SYSTOLIC BLOOD PRESSURE: 157 MMHG | WEIGHT: 235 LBS | HEIGHT: 66 IN | DIASTOLIC BLOOD PRESSURE: 74 MMHG | HEART RATE: 88 BPM | OXYGEN SATURATION: 92 %

## 2020-07-28 VITALS — SYSTOLIC BLOOD PRESSURE: 120 MMHG | DIASTOLIC BLOOD PRESSURE: 72 MMHG

## 2020-07-28 PROCEDURE — 93000 ELECTROCARDIOGRAM COMPLETE: CPT

## 2020-07-28 PROCEDURE — 99215 OFFICE O/P EST HI 40 MIN: CPT

## 2020-07-30 ENCOUNTER — RESULT REVIEW (OUTPATIENT)
Age: 70
End: 2020-07-30

## 2020-07-30 ENCOUNTER — APPOINTMENT (OUTPATIENT)
Dept: HEMATOLOGY ONCOLOGY | Facility: CLINIC | Age: 70
End: 2020-07-30
Payer: MEDICARE

## 2020-07-30 LAB
ANISOCYTOSIS BLD QL: SLIGHT — SIGNIFICANT CHANGE UP
BASOPHILS # BLD AUTO: 0 K/UL — SIGNIFICANT CHANGE UP (ref 0–0.2)
BASOPHILS NFR BLD AUTO: 0 % — SIGNIFICANT CHANGE UP (ref 0–2)
EOSINOPHIL # BLD AUTO: 1.18 K/UL — HIGH (ref 0–0.5)
EOSINOPHIL NFR BLD AUTO: 7 % — HIGH (ref 0–6)
HCT VFR BLD CALC: 32.7 % — LOW (ref 34.5–45)
HGB BLD-MCNC: 10 G/DL — LOW (ref 11.5–15.5)
LG PLATELETS BLD QL AUTO: SLIGHT — SIGNIFICANT CHANGE UP
LYMPHOCYTES # BLD AUTO: 1.35 K/UL — SIGNIFICANT CHANGE UP (ref 1–3.3)
LYMPHOCYTES # BLD AUTO: 8 % — LOW (ref 13–44)
MACROCYTES BLD QL: SLIGHT — SIGNIFICANT CHANGE UP
MCHC RBC-ENTMCNC: 30 PG — SIGNIFICANT CHANGE UP (ref 27–34)
MCHC RBC-ENTMCNC: 30.6 GM/DL — LOW (ref 32–36)
MCV RBC AUTO: 98.2 FL — SIGNIFICANT CHANGE UP (ref 80–100)
MICROCYTES BLD QL: SLIGHT — SIGNIFICANT CHANGE UP
MONOCYTES # BLD AUTO: 0.17 K/UL — SIGNIFICANT CHANGE UP (ref 0–0.9)
MONOCYTES NFR BLD AUTO: 1 % — LOW (ref 2–14)
NEUTROPHILS # BLD AUTO: 14.2 K/UL — HIGH (ref 1.8–7.4)
NEUTROPHILS NFR BLD AUTO: 84 % — HIGH (ref 43–77)
NRBC # BLD: 0 /100 — SIGNIFICANT CHANGE UP (ref 0–0)
NRBC # BLD: SIGNIFICANT CHANGE UP /100 WBCS (ref 0–0)
OVALOCYTES BLD QL SMEAR: SLIGHT — SIGNIFICANT CHANGE UP
PLAT MORPH BLD: ABNORMAL
PLATELET # BLD AUTO: 365 K/UL — SIGNIFICANT CHANGE UP (ref 150–400)
RBC # BLD: 3.33 M/UL — LOW (ref 3.8–5.2)
RBC # FLD: 23 % — HIGH (ref 10.3–14.5)
RBC BLD AUTO: ABNORMAL
SCHISTOCYTES BLD QL AUTO: SLIGHT — SIGNIFICANT CHANGE UP
WBC # BLD: 16.91 K/UL — HIGH (ref 3.8–10.5)
WBC # FLD AUTO: 16.91 K/UL — HIGH (ref 3.8–10.5)

## 2020-07-30 PROCEDURE — 99441: CPT | Mod: 95

## 2020-08-01 NOTE — REVIEW OF SYSTEMS
[Shortness Of Breath] : no shortness of breath [Chest  Pressure] : no chest pressure [Palpitations] : no palpitations [Lower Ext Edema] : no extremity edema [Chest Pain] : no chest pain [Leg Claudication] : no intermittent leg claudication

## 2020-08-01 NOTE — DISCUSSION/SUMMARY
[FreeTextEntry1] : 69-year-old woman with a history as listed presents for a followup visit.\par \par Mallory is now complaining of more dyspnea and orthopnea. She is likely volume overloaded. She will increase her lasix to 40mg q12 x 2 weeks. She will get a 2d echo to assess for any  new structural heart disease, changes in valvular and ventricular function. Her EKG did not reveal any significant ischemic changes. \par \par She denies any anginal symptoms.  She is s/p cath on 12/2018 with normal cors and LVEDP. She was noted to have normal LV function.   Her physical exam was essentially unrevealing for any significant cardiovascular findings. \par  \par Her EKG did not reveal any significant ischemic changes. She is maintaining SR. She will continue Toprol 50mg Qday. \par \par She does not feel her AF. Therefore at this point she will continue Xarelto for CVA risk reduction. She will need to get a screening holter (Ziopatch). \par \par Exercise and diet counseling was performed in order to reduce her future cardiovascular risk. \par She will followup with me in 3 months

## 2020-08-01 NOTE — HISTORY OF PRESENT ILLNESS
[FreeTextEntry1] : 69-year-old woman with a history of asthma, obesity, essential thrombocytosis, HFPEF, normal cors in 12/2018, PAF s/p ablation in 2/7/19.\par \par she is s/p cath on 12/2018 with normal cors and LVEDP. She was noted to have normal LV function. \par She then subsequently underwent an AF ablation on 2/7/19. She was taken off of Amio. \par \par Since her last visit, she has been feeling well. \par She now a granddaughter who had some medical issues at birth but is improving. \par She has been having more thrombocytosis. She has been increasing her hydroxyurea. \par \par She is still not feeling well. She is complaining of more dyspnea on exertion. She is complaining of more PND/orthopnea over the last 2-3 weeks. She has been maintaining a high salt diet. \par  She has been taking lasix 40mg Qday. \par She   denies any  chest pain, palpitations, lower extremity edema, near syncope, syncope, strokelike symptoms. No reported melena, hematochezia or hematemesis.  She is compliant with her medications.

## 2020-08-01 NOTE — PHYSICAL EXAM
[FreeTextEntry1] : obese [Right Carotid Bruit] : no bruit heard over the right carotid [Bruit] : no bruit heard [Left Carotid Bruit] : no bruit heard over the left carotid

## 2020-08-20 ENCOUNTER — RESULT REVIEW (OUTPATIENT)
Age: 70
End: 2020-08-20

## 2020-08-20 ENCOUNTER — APPOINTMENT (OUTPATIENT)
Dept: HEMATOLOGY ONCOLOGY | Facility: CLINIC | Age: 70
End: 2020-08-20

## 2020-08-20 ENCOUNTER — APPOINTMENT (OUTPATIENT)
Dept: CARDIOLOGY | Facility: CLINIC | Age: 70
End: 2020-08-20

## 2020-08-20 LAB
BASOPHILS # BLD AUTO: 0 K/UL — SIGNIFICANT CHANGE UP (ref 0–0.2)
BASOPHILS NFR BLD AUTO: 0 % — SIGNIFICANT CHANGE UP (ref 0–2)
EOSINOPHIL # BLD AUTO: 0 K/UL — SIGNIFICANT CHANGE UP (ref 0–0.5)
EOSINOPHIL NFR BLD AUTO: 0 % — SIGNIFICANT CHANGE UP (ref 0–6)
HCT VFR BLD CALC: 34 % — LOW (ref 34.5–45)
HGB BLD-MCNC: 10.1 G/DL — LOW (ref 11.5–15.5)
LG PLATELETS BLD QL AUTO: SLIGHT — SIGNIFICANT CHANGE UP
LYMPHOCYTES # BLD AUTO: 0.83 K/UL — LOW (ref 1–3.3)
LYMPHOCYTES # BLD AUTO: 6 % — LOW (ref 13–44)
MCHC RBC-ENTMCNC: 29.7 GM/DL — LOW (ref 32–36)
MCHC RBC-ENTMCNC: 31 PG — SIGNIFICANT CHANGE UP (ref 27–34)
MCV RBC AUTO: 104.3 FL — HIGH (ref 80–100)
MONOCYTES # BLD AUTO: 0.97 K/UL — HIGH (ref 0–0.9)
MONOCYTES NFR BLD AUTO: 7 % — SIGNIFICANT CHANGE UP (ref 2–14)
NEUTROPHILS # BLD AUTO: 12.03 K/UL — HIGH (ref 1.8–7.4)
NEUTROPHILS NFR BLD AUTO: 87 % — HIGH (ref 43–77)
NRBC # BLD: 0 /100 — SIGNIFICANT CHANGE UP (ref 0–0)
NRBC # BLD: SIGNIFICANT CHANGE UP /100 WBCS (ref 0–0)
PLAT MORPH BLD: NORMAL — SIGNIFICANT CHANGE UP
PLATELET # BLD AUTO: 637 K/UL — HIGH (ref 150–400)
RBC # BLD: 3.26 M/UL — LOW (ref 3.8–5.2)
RBC # FLD: 24.1 % — HIGH (ref 10.3–14.5)
RBC BLD AUTO: SIGNIFICANT CHANGE UP
WBC # BLD: 13.83 K/UL — HIGH (ref 3.8–10.5)
WBC # FLD AUTO: 13.83 K/UL — HIGH (ref 3.8–10.5)

## 2020-09-03 LAB
ALBUMIN SERPL ELPH-MCNC: 4.1 G/DL
ALP BLD-CCNC: 84 U/L
ALT SERPL-CCNC: 8 U/L
ANION GAP SERPL CALC-SCNC: 14 MMOL/L
AST SERPL-CCNC: 14 U/L
BILIRUB SERPL-MCNC: 0.3 MG/DL
BUN SERPL-MCNC: 17 MG/DL
CALCIUM SERPL-MCNC: 8.9 MG/DL
CHLORIDE SERPL-SCNC: 100 MMOL/L
CO2 SERPL-SCNC: 28 MMOL/L
CREAT SERPL-MCNC: 1.02 MG/DL
GLUCOSE SERPL-MCNC: 115 MG/DL
LDH SERPL-CCNC: 299 U/L
POTASSIUM SERPL-SCNC: 4.1 MMOL/L
PROT SERPL-MCNC: 6.7 G/DL
SODIUM SERPL-SCNC: 142 MMOL/L
URATE SERPL-MCNC: 7.8 MG/DL

## 2020-11-18 ENCOUNTER — OUTPATIENT (OUTPATIENT)
Dept: OUTPATIENT SERVICES | Facility: HOSPITAL | Age: 70
LOS: 1 days | Discharge: HOME | End: 2020-11-18

## 2020-11-18 DIAGNOSIS — S99.919A UNSPECIFIED INJURY OF UNSPECIFIED ANKLE, INITIAL ENCOUNTER: Chronic | ICD-10-CM

## 2020-11-18 DIAGNOSIS — D69.6 THROMBOCYTOPENIA, UNSPECIFIED: ICD-10-CM

## 2020-11-18 DIAGNOSIS — K81.9 CHOLECYSTITIS, UNSPECIFIED: Chronic | ICD-10-CM

## 2020-11-19 ENCOUNTER — RESULT REVIEW (OUTPATIENT)
Age: 70
End: 2020-11-19

## 2020-11-19 ENCOUNTER — APPOINTMENT (OUTPATIENT)
Dept: HEMATOLOGY ONCOLOGY | Facility: CLINIC | Age: 70
End: 2020-11-19
Payer: MEDICARE

## 2020-11-19 VITALS
SYSTOLIC BLOOD PRESSURE: 117 MMHG | BODY MASS INDEX: 36.79 KG/M2 | RESPIRATION RATE: 18 BRPM | OXYGEN SATURATION: 95 % | HEART RATE: 84 BPM | TEMPERATURE: 97 F | WEIGHT: 227.94 LBS | DIASTOLIC BLOOD PRESSURE: 78 MMHG

## 2020-11-19 LAB
ANISOCYTOSIS BLD QL: SLIGHT — SIGNIFICANT CHANGE UP
BASOPHILS # BLD AUTO: 0 K/UL — SIGNIFICANT CHANGE UP (ref 0–0.2)
BASOPHILS NFR BLD AUTO: 0 % — SIGNIFICANT CHANGE UP (ref 0–2)
DACRYOCYTES BLD QL SMEAR: SLIGHT — SIGNIFICANT CHANGE UP
EOSINOPHIL # BLD AUTO: 0.41 K/UL — SIGNIFICANT CHANGE UP (ref 0–0.5)
EOSINOPHIL NFR BLD AUTO: 3 % — SIGNIFICANT CHANGE UP (ref 0–6)
HCT VFR BLD CALC: 31.1 % — LOW (ref 34.5–45)
HGB BLD-MCNC: 9.3 G/DL — LOW (ref 11.5–15.5)
HYPOCHROMIA BLD QL: SLIGHT — SIGNIFICANT CHANGE UP
LG PLATELETS BLD QL AUTO: SLIGHT — SIGNIFICANT CHANGE UP
LYMPHOCYTES # BLD AUTO: 0.96 K/UL — LOW (ref 1–3.3)
LYMPHOCYTES # BLD AUTO: 7 % — LOW (ref 13–44)
MCHC RBC-ENTMCNC: 29.9 G/DL — LOW (ref 32–36)
MCHC RBC-ENTMCNC: 31.1 PG — SIGNIFICANT CHANGE UP (ref 27–34)
MCV RBC AUTO: 104 FL — HIGH (ref 80–100)
MONOCYTES # BLD AUTO: 0.14 K/UL — SIGNIFICANT CHANGE UP (ref 0–0.9)
MONOCYTES NFR BLD AUTO: 1 % — LOW (ref 2–14)
NEUTROPHILS # BLD AUTO: 12.26 K/UL — HIGH (ref 1.8–7.4)
NEUTROPHILS NFR BLD AUTO: 89 % — HIGH (ref 43–77)
NRBC # BLD: 0 /100 — SIGNIFICANT CHANGE UP (ref 0–0)
NRBC # BLD: SIGNIFICANT CHANGE UP /100 WBCS (ref 0–0)
OVALOCYTES BLD QL SMEAR: SLIGHT — SIGNIFICANT CHANGE UP
PLAT MORPH BLD: NORMAL — SIGNIFICANT CHANGE UP
PLATELET # BLD AUTO: 335 K/UL — SIGNIFICANT CHANGE UP (ref 150–400)
POIKILOCYTOSIS BLD QL AUTO: SLIGHT — SIGNIFICANT CHANGE UP
RBC # BLD: 2.99 M/UL — LOW (ref 3.8–5.2)
RBC # FLD: 21.2 % — HIGH (ref 10.3–14.5)
RBC BLD AUTO: ABNORMAL
STOMATOCYTES BLD QL SMEAR: SLIGHT — SIGNIFICANT CHANGE UP
WBC # BLD: 13.77 K/UL — HIGH (ref 3.8–10.5)
WBC # FLD AUTO: 13.77 K/UL — HIGH (ref 3.8–10.5)

## 2020-11-19 PROCEDURE — 99214 OFFICE O/P EST MOD 30 MIN: CPT

## 2020-11-19 RX ORDER — SERTRALINE HYDROCHLORIDE 100 MG/1
100 TABLET, FILM COATED ORAL DAILY
Refills: 0 | Status: COMPLETED | COMMUNITY
Start: 2018-06-21 | End: 2020-11-19

## 2020-11-19 NOTE — HISTORY OF PRESENT ILLNESS
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

## 2020-11-19 NOTE — PHYSICAL EXAM
[Fully active, able to carry on all pre-disease performance without restriction] : Status 0 - Fully active, able to carry on all pre-disease performance without restriction [Normal] : affect appropriate [de-identified] : + splenomegaly, 3cm below costal margin

## 2020-11-19 NOTE — ASSESSMENT
[FreeTextEntry1] : 71 yo F with a history of PMF jak2+ here with improved platelet counts currenty on 1500 mg hu daily\par \par \par -continue hu for now, hb dropping, platelets are stable,\par -patient does not want to change her dose now, will check CBC in 1 month to see if Hb stable\par -if hb is dropping further will decrease hydroxyurea, kati if pt still fatigued and platelets are stable\par -continue folic acid and xarelto\par -poor appetite likely related to anxiety and depresoin however she has lost weight, reassess in 3 months, if weight loss continues despite improved mood, would repeat US and consider initiation of jakafi

## 2020-12-14 NOTE — OBJECTIVE
Prilosec      Last Written Prescription Date:  01/07/20  Last Fill Quantity: 90,   # refills: 3  Last Office Visit: 10/09/20  Future Office visit:          [History reviewed] : History reviewed. [Medications and Allergies reviewed] : Medications and allergies reviewed.

## 2020-12-18 ENCOUNTER — APPOINTMENT (OUTPATIENT)
Dept: HEMATOLOGY ONCOLOGY | Facility: CLINIC | Age: 70
End: 2020-12-18

## 2021-01-08 ENCOUNTER — APPOINTMENT (OUTPATIENT)
Dept: CARDIOLOGY | Facility: CLINIC | Age: 71
End: 2021-01-08
Payer: MEDICARE

## 2021-01-08 ENCOUNTER — NON-APPOINTMENT (OUTPATIENT)
Age: 71
End: 2021-01-08

## 2021-01-08 VITALS
HEIGHT: 66 IN | OXYGEN SATURATION: 98 % | HEART RATE: 76 BPM | SYSTOLIC BLOOD PRESSURE: 137 MMHG | DIASTOLIC BLOOD PRESSURE: 73 MMHG | WEIGHT: 220 LBS | BODY MASS INDEX: 35.36 KG/M2

## 2021-01-08 PROCEDURE — 99214 OFFICE O/P EST MOD 30 MIN: CPT

## 2021-01-08 PROCEDURE — 93000 ELECTROCARDIOGRAM COMPLETE: CPT

## 2021-01-08 NOTE — DISCUSSION/SUMMARY
[FreeTextEntry1] : 70-year-old woman with a history as listed presents for a followup visit.\par \par Mallory is doing relatively well.  She denies any anginal symptoms.  She is s/p cath on 12/2018 with normal cors and LVEDP. She was noted to have normal LV function.   Her physical exam was essentially unrevealing for any significant cardiovascular findings.  She is euvolemic on exam. She will continue Lasix 40mg Qday. \par  \par Her EKG did not reveal any significant ischemic changes. She is maintaining SR. She will continue Toprol 50mg Qday. \par \par She does not feel her AF. Therefore at this point she will continue Xarelto for CVA risk reduction.  \par \par Exercise and diet counseling was performed in order to reduce her future cardiovascular risk. \par She will followup with me in 6 months

## 2021-01-08 NOTE — REVIEW OF SYSTEMS
[Dyspnea on exertion] : dyspnea during exertion [see HPI] : see HPI [Joint Pain] : joint pain [Joint Stiffness] : joint stiffness [Anxiety] : anxiety [Under Stress] : under stress [Negative] : Heme/Lymph [Shortness Of Breath] : no shortness of breath [Chest  Pressure] : no chest pressure [Chest Pain] : no chest pain [Lower Ext Edema] : no extremity edema [Leg Claudication] : no intermittent leg claudication [Palpitations] : no palpitations

## 2021-01-08 NOTE — PHYSICAL EXAM
[General Appearance - Well Developed] : well developed [Normal Appearance] : normal appearance [General Appearance - Well Nourished] : well nourished [No Deformities] : no deformities [Normal Conjunctiva] : the conjunctiva exhibited no abnormalities [Eyelids - No Xanthelasma] : the eyelids demonstrated no xanthelasmas [Normal Oral Mucosa] : normal oral mucosa [No Oral Pallor] : no oral pallor [No Oral Cyanosis] : no oral cyanosis [Normal Jugular Venous A Waves Present] : normal jugular venous A waves present [Normal Jugular Venous V Waves Present] : normal jugular venous V waves present [No Jugular Venous Montes A Waves] : no jugular venous montes A waves [Respiration, Rhythm And Depth] : normal respiratory rhythm and effort [Exaggerated Use Of Accessory Muscles For Inspiration] : no accessory muscle use [Auscultation Breath Sounds / Voice Sounds] : lungs were clear to auscultation bilaterally [Abdomen Soft] : soft [Abdomen Tenderness] : non-tender [Abdomen Mass (___ Cm)] : no abdominal mass palpated [Abnormal Walk] : normal gait [Gait - Sufficient For Exercise Testing] : the gait was sufficient for exercise testing [Nail Clubbing] : no clubbing of the fingernails [Cyanosis, Localized] : no localized cyanosis [Petechial Hemorrhages (___cm)] : no petechial hemorrhages [Skin Color & Pigmentation] : normal skin color and pigmentation [] : no rash [No Venous Stasis] : no venous stasis [Skin Lesions] : no skin lesions [No Skin Ulcers] : no skin ulcer [No Xanthoma] : no  xanthoma was observed [Oriented To Time, Place, And Person] : oriented to person, place, and time [Affect] : the affect was normal [Mood] : the mood was normal [No Anxiety] : not feeling anxious [Normal Rate] : normal [Rhythm Regular] : regular [Normal S1] : normal S1 [Normal S2] : normal S2 [No Gallop] : no gallop heard [No Murmur] : no murmurs heard [2+] : left 2+ [___ +] : bilateral [unfilled]U+ pretibial pitting edema [FreeTextEntry1] : obese [Right Carotid Bruit] : no bruit heard over the right carotid [Left Carotid Bruit] : no bruit heard over the left carotid [Bruit] : no bruit heard

## 2021-01-08 NOTE — HISTORY OF PRESENT ILLNESS
[FreeTextEntry1] : 69-year-old woman with a history of asthma, obesity, essential thrombocytosis, HFPEF, normal cors in 12/2018, PAF s/p ablation in 2/7/19.\par \par she is s/p cath on 12/2018 with normal cors and LVEDP. She was noted to have normal LV function. \par She then subsequently underwent an AF ablation on 2/7/19. She was taken off of Amio. \par \par Since her last visit, she has been feeling well. Her granddaughter in now doing well. \par SHe is relatively feeling well. She   denies any chest pain, PND, orthopnea, lower extremity edema, near syncope, syncope, strokelike symptoms. Her dyspnea on exertion has remained stable. \par She no longer has any palpitations. \par  She has been taking lasix 40mg Qday. \par  No reported melena, hematochezia or hematemesis.  She is compliant with her medications.

## 2021-01-13 ENCOUNTER — OUTPATIENT (OUTPATIENT)
Dept: OUTPATIENT SERVICES | Facility: HOSPITAL | Age: 71
LOS: 1 days | Discharge: HOME | End: 2021-01-13

## 2021-01-13 DIAGNOSIS — D69.6 THROMBOCYTOPENIA, UNSPECIFIED: ICD-10-CM

## 2021-01-13 DIAGNOSIS — S99.919A UNSPECIFIED INJURY OF UNSPECIFIED ANKLE, INITIAL ENCOUNTER: Chronic | ICD-10-CM

## 2021-01-13 DIAGNOSIS — K81.9 CHOLECYSTITIS, UNSPECIFIED: Chronic | ICD-10-CM

## 2021-01-19 ENCOUNTER — RESULT REVIEW (OUTPATIENT)
Age: 71
End: 2021-01-19

## 2021-01-19 ENCOUNTER — APPOINTMENT (OUTPATIENT)
Dept: HEMATOLOGY ONCOLOGY | Facility: CLINIC | Age: 71
End: 2021-01-19

## 2021-01-19 LAB
BASOPHILS # BLD AUTO: 0.08 K/UL — SIGNIFICANT CHANGE UP (ref 0–0.2)
BASOPHILS NFR BLD AUTO: 1 % — SIGNIFICANT CHANGE UP (ref 0–2)
EOSINOPHIL # BLD AUTO: 0.47 K/UL — SIGNIFICANT CHANGE UP (ref 0–0.5)
EOSINOPHIL NFR BLD AUTO: 6 % — SIGNIFICANT CHANGE UP (ref 0–6)
HCT VFR BLD CALC: 29.8 % — LOW (ref 34.5–45)
HGB BLD-MCNC: 8.8 G/DL — LOW (ref 11.5–15.5)
LYMPHOCYTES # BLD AUTO: 0.71 K/UL — LOW (ref 1–3.3)
LYMPHOCYTES # BLD AUTO: 9 % — LOW (ref 13–44)
MCHC RBC-ENTMCNC: 29.5 G/DL — LOW (ref 32–36)
MCHC RBC-ENTMCNC: 31.9 PG — SIGNIFICANT CHANGE UP (ref 27–34)
MCV RBC AUTO: 108 FL — HIGH (ref 80–100)
MONOCYTES # BLD AUTO: 0.16 K/UL — SIGNIFICANT CHANGE UP (ref 0–0.9)
MONOCYTES NFR BLD AUTO: 2 % — SIGNIFICANT CHANGE UP (ref 2–14)
NEUTROPHILS # BLD AUTO: 6.44 K/UL — SIGNIFICANT CHANGE UP (ref 1.8–7.4)
NEUTROPHILS NFR BLD AUTO: 82 % — HIGH (ref 43–77)
NRBC # BLD: 0 /100 — SIGNIFICANT CHANGE UP (ref 0–0)
NRBC # BLD: SIGNIFICANT CHANGE UP /100 WBCS (ref 0–0)
PLAT MORPH BLD: NORMAL — SIGNIFICANT CHANGE UP
PLATELET # BLD AUTO: 532 K/UL — HIGH (ref 150–400)
RBC # BLD: 2.76 M/UL — LOW (ref 3.8–5.2)
RBC # FLD: 23.2 % — HIGH (ref 10.3–14.5)
RBC BLD AUTO: SIGNIFICANT CHANGE UP
WBC # BLD: 7.85 K/UL — SIGNIFICANT CHANGE UP (ref 3.8–10.5)
WBC # FLD AUTO: 7.85 K/UL — SIGNIFICANT CHANGE UP (ref 3.8–10.5)

## 2021-01-27 NOTE — HISTORY OF PRESENT ILLNESS
[FreeTextEntry1] : 68-year-old woman with a history of asthma, obesity, essential thrombocytosis \par \par Since her last visit, she is s/p cath with normal cors and LVEDP. She is no long feeling lightheaded. Though she is still coughing. It appears that the cough has worsened especially for the last few days. She noted that she had been eating more salt and had to sleep in recliner last night.  \par   She is sleeping on 2 pillows. Her dyspnea on exertion appears to be stable. \par She   denies any  palpitations, lower extremity edema, near syncope, syncope, strokelike symptoms. \par No reported melena, hematochezia or hematemesis. \par She is compliant with her medications. 
N/A

## 2021-02-11 ENCOUNTER — RESULT REVIEW (OUTPATIENT)
Age: 71
End: 2021-02-11

## 2021-02-11 ENCOUNTER — APPOINTMENT (OUTPATIENT)
Dept: HEMATOLOGY ONCOLOGY | Facility: CLINIC | Age: 71
End: 2021-02-11

## 2021-02-11 LAB
BASOPHILS # BLD AUTO: 0 K/UL — SIGNIFICANT CHANGE UP (ref 0–0.2)
BASOPHILS NFR BLD AUTO: 0 % — SIGNIFICANT CHANGE UP (ref 0–2)
BLASTS # FLD: 1 % — HIGH (ref 0–0)
EOSINOPHIL # BLD AUTO: 0.26 K/UL — SIGNIFICANT CHANGE UP (ref 0–0.5)
EOSINOPHIL NFR BLD AUTO: 3 % — SIGNIFICANT CHANGE UP (ref 0–6)
HCT VFR BLD CALC: 32.8 % — LOW (ref 34.5–45)
HGB BLD-MCNC: 9.9 G/DL — LOW (ref 11.5–15.5)
LYMPHOCYTES # BLD AUTO: 0.94 K/UL — LOW (ref 1–3.3)
LYMPHOCYTES # BLD AUTO: 11 % — LOW (ref 13–44)
MCHC RBC-ENTMCNC: 30.2 G/DL — LOW (ref 32–36)
MCHC RBC-ENTMCNC: 32.7 PG — SIGNIFICANT CHANGE UP (ref 27–34)
MCV RBC AUTO: 108.3 FL — HIGH (ref 80–100)
MONOCYTES # BLD AUTO: 0.17 K/UL — SIGNIFICANT CHANGE UP (ref 0–0.9)
MONOCYTES NFR BLD AUTO: 2 % — SIGNIFICANT CHANGE UP (ref 2–14)
NEUTROPHILS # BLD AUTO: 7.1 K/UL — SIGNIFICANT CHANGE UP (ref 1.8–7.4)
NEUTROPHILS NFR BLD AUTO: 83 % — HIGH (ref 43–77)
NRBC # BLD: 0 /100 — SIGNIFICANT CHANGE UP (ref 0–0)
NRBC # BLD: SIGNIFICANT CHANGE UP /100 WBCS (ref 0–0)
PLAT MORPH BLD: NORMAL — SIGNIFICANT CHANGE UP
PLATELET # BLD AUTO: 582 K/UL — HIGH (ref 150–400)
RBC # BLD: 3.03 M/UL — LOW (ref 3.8–5.2)
RBC # FLD: 23.4 % — HIGH (ref 10.3–14.5)
RBC BLD AUTO: SIGNIFICANT CHANGE UP
WBC # BLD: 8.56 K/UL — SIGNIFICANT CHANGE UP (ref 3.8–10.5)
WBC # FLD AUTO: 8.56 K/UL — SIGNIFICANT CHANGE UP (ref 3.8–10.5)

## 2021-02-19 LAB — LDH SERPL-CCNC: 298 U/L

## 2021-02-28 ENCOUNTER — OUTPATIENT (OUTPATIENT)
Dept: OUTPATIENT SERVICES | Facility: HOSPITAL | Age: 71
LOS: 1 days | Discharge: HOME | End: 2021-02-28

## 2021-02-28 DIAGNOSIS — D69.6 THROMBOCYTOPENIA, UNSPECIFIED: ICD-10-CM

## 2021-02-28 DIAGNOSIS — K81.9 CHOLECYSTITIS, UNSPECIFIED: Chronic | ICD-10-CM

## 2021-02-28 DIAGNOSIS — S99.919A UNSPECIFIED INJURY OF UNSPECIFIED ANKLE, INITIAL ENCOUNTER: Chronic | ICD-10-CM

## 2021-03-04 ENCOUNTER — RESULT REVIEW (OUTPATIENT)
Age: 71
End: 2021-03-04

## 2021-03-04 ENCOUNTER — APPOINTMENT (OUTPATIENT)
Dept: HEMATOLOGY ONCOLOGY | Facility: CLINIC | Age: 71
End: 2021-03-04
Payer: MEDICARE

## 2021-03-04 VITALS
WEIGHT: 224.87 LBS | RESPIRATION RATE: 20 BRPM | BODY MASS INDEX: 36.14 KG/M2 | HEIGHT: 65.98 IN | OXYGEN SATURATION: 94 % | DIASTOLIC BLOOD PRESSURE: 71 MMHG | TEMPERATURE: 99 F | HEART RATE: 84 BPM | SYSTOLIC BLOOD PRESSURE: 120 MMHG

## 2021-03-04 LAB
BASOPHILS # BLD AUTO: 0 K/UL — SIGNIFICANT CHANGE UP (ref 0–0.2)
BASOPHILS NFR BLD AUTO: 0 % — SIGNIFICANT CHANGE UP (ref 0–2)
EOSINOPHIL # BLD AUTO: 0.16 K/UL — SIGNIFICANT CHANGE UP (ref 0–0.5)
EOSINOPHIL NFR BLD AUTO: 2 % — SIGNIFICANT CHANGE UP (ref 0–6)
HCT VFR BLD CALC: 26.4 % — LOW (ref 34.5–45)
HGB BLD-MCNC: 7.8 G/DL — LOW (ref 11.5–15.5)
LYMPHOCYTES # BLD AUTO: 0.8 K/UL — LOW (ref 1–3.3)
LYMPHOCYTES # BLD AUTO: 10 % — LOW (ref 13–44)
MCHC RBC-ENTMCNC: 29.5 G/DL — LOW (ref 32–36)
MCHC RBC-ENTMCNC: 32.9 PG — SIGNIFICANT CHANGE UP (ref 27–34)
MCV RBC AUTO: 111.4 FL — HIGH (ref 80–100)
MONOCYTES # BLD AUTO: 0.48 K/UL — SIGNIFICANT CHANGE UP (ref 0–0.9)
MONOCYTES NFR BLD AUTO: 6 % — SIGNIFICANT CHANGE UP (ref 2–14)
NEUTROPHILS # BLD AUTO: 6.55 K/UL — SIGNIFICANT CHANGE UP (ref 1.8–7.4)
NEUTROPHILS NFR BLD AUTO: 82 % — HIGH (ref 43–77)
NRBC # BLD: 0 /100 — SIGNIFICANT CHANGE UP (ref 0–0)
NRBC # BLD: SIGNIFICANT CHANGE UP /100 WBCS (ref 0–0)
PLAT MORPH BLD: NORMAL — SIGNIFICANT CHANGE UP
PLATELET # BLD AUTO: 1088 K/UL — CRITICAL HIGH (ref 150–400)
RBC # BLD: 2.37 M/UL — LOW (ref 3.8–5.2)
RBC # FLD: 22.4 % — HIGH (ref 10.3–14.5)
RBC BLD AUTO: SIGNIFICANT CHANGE UP
WBC # BLD: 7.99 K/UL — SIGNIFICANT CHANGE UP (ref 3.8–10.5)
WBC # FLD AUTO: 7.99 K/UL — SIGNIFICANT CHANGE UP (ref 3.8–10.5)

## 2021-03-04 PROCEDURE — 99214 OFFICE O/P EST MOD 30 MIN: CPT

## 2021-03-08 NOTE — ASSESSMENT
[FreeTextEntry1] : 71 yo F with a history of PMF jak2+ here with improved platelet counts currenty on 1500 mg hu daily\par \par -hb has dropped sharply, but counts are fluctuating significantly\par -platelets have often fluctuated, have increased today despite low hb\par -transfuse 2 units prbc, she cannot come in until next week, \par -check type na dcross when she comes in next week\par -decresae hydrea, take two and three tablets alternating\par -repeat counts next week\par -will plan to start anagrelide if counts are similar, start 1mg daily and then increase to 1mg bid slowly while stopping HU\par -continue folic acid and xarelto- watch for bleeding, increased risk with adding anagrelide\par - would repeat US spleen\par -given anemia would not start jakafi at this time\par

## 2021-03-08 NOTE — PHYSICAL EXAM
[Fully active, able to carry on all pre-disease performance without restriction] : Status 0 - Fully active, able to carry on all pre-disease performance without restriction [Normal] : RRR, normal S1S2, no murmurs, rubs, gallops [de-identified] : appears very fatigued

## 2021-03-08 NOTE — HISTORY OF PRESENT ILLNESS
[de-identified] : 70yoF with a h/o ET/PMF (likely PMF given SM, Jak2+) on HU  2000  and 1500 alternating ; recently with fluctuating platelet counts [de-identified] : -she has been feeling exhausted lately\par -she has had a chronic cough, was told to start steriods but has not wanted to\par -does not like the way she feels on the medications\par -her hb has also dropped significantly\par -she is feeling fatigued to even get up and walk across the room\par -taking 3 tablets of HU daily

## 2021-03-10 ENCOUNTER — APPOINTMENT (OUTPATIENT)
Dept: HEMATOLOGY ONCOLOGY | Facility: CLINIC | Age: 71
End: 2021-03-10

## 2021-03-10 ENCOUNTER — RESULT REVIEW (OUTPATIENT)
Age: 71
End: 2021-03-10

## 2021-03-10 ENCOUNTER — OUTPATIENT (OUTPATIENT)
Dept: OUTPATIENT SERVICES | Facility: HOSPITAL | Age: 71
LOS: 1 days | End: 2021-03-10

## 2021-03-10 DIAGNOSIS — S99.919A UNSPECIFIED INJURY OF UNSPECIFIED ANKLE, INITIAL ENCOUNTER: Chronic | ICD-10-CM

## 2021-03-10 DIAGNOSIS — D75.81 MYELOFIBROSIS: ICD-10-CM

## 2021-03-10 DIAGNOSIS — K81.9 CHOLECYSTITIS, UNSPECIFIED: Chronic | ICD-10-CM

## 2021-03-10 LAB
BASOPHILS # BLD AUTO: 0.12 K/UL — SIGNIFICANT CHANGE UP (ref 0–0.2)
BASOPHILS NFR BLD AUTO: 1 % — SIGNIFICANT CHANGE UP (ref 0–2)
EOSINOPHIL # BLD AUTO: 0.24 K/UL — SIGNIFICANT CHANGE UP (ref 0–0.5)
EOSINOPHIL NFR BLD AUTO: 2 % — SIGNIFICANT CHANGE UP (ref 0–6)
HCT VFR BLD CALC: 29.2 % — LOW (ref 34.5–45)
HGB BLD-MCNC: 8.7 G/DL — LOW (ref 11.5–15.5)
LYMPHOCYTES # BLD AUTO: 1.22 K/UL — SIGNIFICANT CHANGE UP (ref 1–3.3)
LYMPHOCYTES # BLD AUTO: 10 % — LOW (ref 13–44)
MCHC RBC-ENTMCNC: 29.8 G/DL — LOW (ref 32–36)
MCHC RBC-ENTMCNC: 32.7 PG — SIGNIFICANT CHANGE UP (ref 27–34)
MCV RBC AUTO: 109.8 FL — HIGH (ref 80–100)
MONOCYTES # BLD AUTO: 0.37 K/UL — SIGNIFICANT CHANGE UP (ref 0–0.9)
MONOCYTES NFR BLD AUTO: 3 % — SIGNIFICANT CHANGE UP (ref 2–14)
NEUTROPHILS # BLD AUTO: 10.27 K/UL — HIGH (ref 1.8–7.4)
NEUTROPHILS NFR BLD AUTO: 84 % — HIGH (ref 43–77)
NRBC # BLD: 0 /100 — SIGNIFICANT CHANGE UP (ref 0–0)
NRBC # BLD: SIGNIFICANT CHANGE UP /100 WBCS (ref 0–0)
PLAT MORPH BLD: NORMAL — SIGNIFICANT CHANGE UP
PLATELET # BLD AUTO: 898 K/UL — HIGH (ref 150–400)
RBC # BLD: 2.66 M/UL — LOW (ref 3.8–5.2)
RBC # FLD: 21.1 % — HIGH (ref 10.3–14.5)
RBC BLD AUTO: SIGNIFICANT CHANGE UP
WBC # BLD: 12.23 K/UL — HIGH (ref 3.8–10.5)
WBC # FLD AUTO: 12.23 K/UL — HIGH (ref 3.8–10.5)

## 2021-03-12 ENCOUNTER — RESULT REVIEW (OUTPATIENT)
Age: 71
End: 2021-03-12

## 2021-03-12 ENCOUNTER — APPOINTMENT (OUTPATIENT)
Dept: HEMATOLOGY ONCOLOGY | Facility: CLINIC | Age: 71
End: 2021-03-12

## 2021-03-12 ENCOUNTER — APPOINTMENT (OUTPATIENT)
Dept: INFUSION THERAPY | Facility: HOSPITAL | Age: 71
End: 2021-03-12

## 2021-03-12 LAB
BASOPHILS # BLD AUTO: 0.12 K/UL — SIGNIFICANT CHANGE UP (ref 0–0.2)
BASOPHILS NFR BLD AUTO: 1 % — SIGNIFICANT CHANGE UP (ref 0–2)
EOSINOPHIL # BLD AUTO: 0.35 K/UL — SIGNIFICANT CHANGE UP (ref 0–0.5)
EOSINOPHIL NFR BLD AUTO: 3 % — SIGNIFICANT CHANGE UP (ref 0–6)
HCT VFR BLD CALC: 27.5 % — LOW (ref 34.5–45)
HGB BLD-MCNC: 8.2 G/DL — LOW (ref 11.5–15.5)
LYMPHOCYTES # BLD AUTO: 0.92 K/UL — LOW (ref 1–3.3)
LYMPHOCYTES # BLD AUTO: 8 % — LOW (ref 13–44)
MCHC RBC-ENTMCNC: 29.8 G/DL — LOW (ref 32–36)
MCHC RBC-ENTMCNC: 32.2 PG — SIGNIFICANT CHANGE UP (ref 27–34)
MCV RBC AUTO: 107.8 FL — HIGH (ref 80–100)
MONOCYTES # BLD AUTO: 0.81 K/UL — SIGNIFICANT CHANGE UP (ref 0–0.9)
MONOCYTES NFR BLD AUTO: 7 % — SIGNIFICANT CHANGE UP (ref 2–14)
NEUTROPHILS # BLD AUTO: 9.36 K/UL — HIGH (ref 1.8–7.4)
NEUTROPHILS NFR BLD AUTO: 81 % — HIGH (ref 43–77)
NRBC # BLD: 0 /100 — SIGNIFICANT CHANGE UP (ref 0–0)
NRBC # BLD: SIGNIFICANT CHANGE UP /100 WBCS (ref 0–0)
PLAT MORPH BLD: NORMAL — SIGNIFICANT CHANGE UP
PLATELET # BLD AUTO: 790 K/UL — HIGH (ref 150–400)
RBC # BLD: 2.55 M/UL — LOW (ref 3.8–5.2)
RBC # FLD: 21.5 % — HIGH (ref 10.3–14.5)
RBC BLD AUTO: SIGNIFICANT CHANGE UP
WBC # BLD: 11.56 K/UL — HIGH (ref 3.8–10.5)
WBC # FLD AUTO: 11.56 K/UL — HIGH (ref 3.8–10.5)

## 2021-03-17 ENCOUNTER — RESULT REVIEW (OUTPATIENT)
Age: 71
End: 2021-03-17

## 2021-03-17 ENCOUNTER — APPOINTMENT (OUTPATIENT)
Dept: HEMATOLOGY ONCOLOGY | Facility: CLINIC | Age: 71
End: 2021-03-17
Payer: MEDICARE

## 2021-03-17 ENCOUNTER — LABORATORY RESULT (OUTPATIENT)
Age: 71
End: 2021-03-17

## 2021-03-17 VITALS
RESPIRATION RATE: 16 BRPM | SYSTOLIC BLOOD PRESSURE: 101 MMHG | OXYGEN SATURATION: 98 % | DIASTOLIC BLOOD PRESSURE: 67 MMHG | WEIGHT: 227.08 LBS | BODY MASS INDEX: 36.67 KG/M2 | TEMPERATURE: 98 F | HEART RATE: 71 BPM

## 2021-03-17 PROBLEM — J45.909 UNSPECIFIED ASTHMA, UNCOMPLICATED: Chronic | Status: ACTIVE | Noted: 2021-03-12

## 2021-03-17 PROBLEM — E11.9 TYPE 2 DIABETES MELLITUS WITHOUT COMPLICATIONS: Chronic | Status: ACTIVE | Noted: 2021-03-12

## 2021-03-17 LAB
BASOPHILS # BLD AUTO: 0 K/UL — SIGNIFICANT CHANGE UP (ref 0–0.2)
BASOPHILS NFR BLD AUTO: 0 % — SIGNIFICANT CHANGE UP (ref 0–2)
EOSINOPHIL # BLD AUTO: 0.7 K/UL — HIGH (ref 0–0.5)
EOSINOPHIL NFR BLD AUTO: 6 % — SIGNIFICANT CHANGE UP (ref 0–6)
HCT VFR BLD CALC: 35.6 % — SIGNIFICANT CHANGE UP (ref 34.5–45)
HGB BLD-MCNC: 10.5 G/DL — LOW (ref 11.5–15.5)
LYMPHOCYTES # BLD AUTO: 1.29 K/UL — SIGNIFICANT CHANGE UP (ref 1–3.3)
LYMPHOCYTES # BLD AUTO: 11 % — LOW (ref 13–44)
MCHC RBC-ENTMCNC: 29.5 G/DL — LOW (ref 32–36)
MCHC RBC-ENTMCNC: 31.3 PG — SIGNIFICANT CHANGE UP (ref 27–34)
MCV RBC AUTO: 106 FL — HIGH (ref 80–100)
MONOCYTES # BLD AUTO: 0.47 K/UL — SIGNIFICANT CHANGE UP (ref 0–0.9)
MONOCYTES NFR BLD AUTO: 4 % — SIGNIFICANT CHANGE UP (ref 2–14)
NEUTROPHILS # BLD AUTO: 9.25 K/UL — HIGH (ref 1.8–7.4)
NEUTROPHILS NFR BLD AUTO: 79 % — HIGH (ref 43–77)
NRBC # BLD: 0 /100 — SIGNIFICANT CHANGE UP (ref 0–0)
NRBC # BLD: SIGNIFICANT CHANGE UP /100 WBCS (ref 0–0)
PLAT MORPH BLD: NORMAL — SIGNIFICANT CHANGE UP
PLATELET # BLD AUTO: 1067 K/UL — CRITICAL HIGH (ref 150–400)
RBC # BLD: 3.36 M/UL — LOW (ref 3.8–5.2)
RBC # FLD: 22.2 % — HIGH (ref 10.3–14.5)
RBC BLD AUTO: SIGNIFICANT CHANGE UP
WBC # BLD: 11.71 K/UL — HIGH (ref 3.8–10.5)
WBC # FLD AUTO: 11.71 K/UL — HIGH (ref 3.8–10.5)

## 2021-03-17 PROCEDURE — 99214 OFFICE O/P EST MOD 30 MIN: CPT

## 2021-03-23 ENCOUNTER — NON-APPOINTMENT (OUTPATIENT)
Age: 71
End: 2021-03-23

## 2021-03-24 ENCOUNTER — RESULT REVIEW (OUTPATIENT)
Age: 71
End: 2021-03-24

## 2021-03-24 ENCOUNTER — APPOINTMENT (OUTPATIENT)
Dept: HEMATOLOGY ONCOLOGY | Facility: CLINIC | Age: 71
End: 2021-03-24

## 2021-03-24 LAB
BASOPHILS # BLD AUTO: 0 K/UL — SIGNIFICANT CHANGE UP (ref 0–0.2)
BASOPHILS NFR BLD AUTO: 0 % — SIGNIFICANT CHANGE UP (ref 0–2)
BUN SERPL-MCNC: 7 MG/DL — SIGNIFICANT CHANGE UP (ref 7–23)
CA-I BLDA-SCNC: 1.1 MMOL/L — LOW (ref 1.12–1.3)
CHLORIDE SERPL-SCNC: 101 MMOL/L — SIGNIFICANT CHANGE UP (ref 96–108)
CO2 SERPL-SCNC: 27 MMOL/L — SIGNIFICANT CHANGE UP (ref 22–31)
CREAT SERPL-MCNC: 1 MG/DL — SIGNIFICANT CHANGE UP (ref 0.5–1.3)
EOSINOPHIL # BLD AUTO: 0.44 K/UL — SIGNIFICANT CHANGE UP (ref 0–0.5)
EOSINOPHIL NFR BLD AUTO: 3 % — SIGNIFICANT CHANGE UP (ref 0–6)
GLUCOSE SERPL-MCNC: 108 MG/DL — HIGH (ref 70–99)
HCT VFR BLD CALC: 31.1 % — LOW (ref 34.5–45)
HGB BLD-MCNC: 9.4 G/DL — LOW (ref 11.5–15.5)
LYMPHOCYTES # BLD AUTO: 0.3 K/UL — LOW (ref 1–3.3)
LYMPHOCYTES # BLD AUTO: 2 % — LOW (ref 13–44)
MCHC RBC-ENTMCNC: 30.2 G/DL — LOW (ref 32–36)
MCHC RBC-ENTMCNC: 31.6 PG — SIGNIFICANT CHANGE UP (ref 27–34)
MCV RBC AUTO: 104.7 FL — HIGH (ref 80–100)
MONOCYTES # BLD AUTO: 0.15 K/UL — SIGNIFICANT CHANGE UP (ref 0–0.9)
MONOCYTES NFR BLD AUTO: 1 % — LOW (ref 2–14)
NEUTROPHILS # BLD AUTO: 13.87 K/UL — HIGH (ref 1.8–7.4)
NEUTROPHILS NFR BLD AUTO: 94 % — HIGH (ref 43–77)
NRBC # BLD: 0 /100 — SIGNIFICANT CHANGE UP (ref 0–0)
NRBC # BLD: SIGNIFICANT CHANGE UP /100 WBCS (ref 0–0)
PLAT MORPH BLD: NORMAL — SIGNIFICANT CHANGE UP
PLATELET # BLD AUTO: 800 K/UL — HIGH (ref 150–400)
POTASSIUM SERPL-MCNC: 3.6 MMOL/L — SIGNIFICANT CHANGE UP (ref 3.5–5.3)
POTASSIUM SERPL-SCNC: 3.6 MMOL/L — SIGNIFICANT CHANGE UP (ref 3.5–5.3)
RBC # BLD: 2.97 M/UL — LOW (ref 3.8–5.2)
RBC # FLD: 21.3 % — HIGH (ref 10.3–14.5)
RBC BLD AUTO: SIGNIFICANT CHANGE UP
SODIUM SERPL-SCNC: 138 MMOL/L — SIGNIFICANT CHANGE UP (ref 135–145)
WBC # BLD: 14.75 K/UL — HIGH (ref 3.8–10.5)
WBC # FLD AUTO: 14.75 K/UL — HIGH (ref 3.8–10.5)

## 2021-03-26 ENCOUNTER — OUTPATIENT (OUTPATIENT)
Dept: OUTPATIENT SERVICES | Facility: HOSPITAL | Age: 71
LOS: 1 days | End: 2021-03-26
Payer: MEDICARE

## 2021-03-26 ENCOUNTER — APPOINTMENT (OUTPATIENT)
Dept: ULTRASOUND IMAGING | Facility: IMAGING CENTER | Age: 71
End: 2021-03-26
Payer: MEDICARE

## 2021-03-26 ENCOUNTER — OUTPATIENT (OUTPATIENT)
Dept: OUTPATIENT SERVICES | Facility: HOSPITAL | Age: 71
LOS: 1 days | Discharge: HOME | End: 2021-03-26

## 2021-03-26 DIAGNOSIS — D69.9 HEMORRHAGIC CONDITION, UNSPECIFIED: ICD-10-CM

## 2021-03-26 DIAGNOSIS — D75.81 MYELOFIBROSIS: ICD-10-CM

## 2021-03-26 DIAGNOSIS — K81.9 CHOLECYSTITIS, UNSPECIFIED: Chronic | ICD-10-CM

## 2021-03-26 DIAGNOSIS — S99.919A UNSPECIFIED INJURY OF UNSPECIFIED ANKLE, INITIAL ENCOUNTER: Chronic | ICD-10-CM

## 2021-03-26 PROCEDURE — 76705 ECHO EXAM OF ABDOMEN: CPT

## 2021-03-26 PROCEDURE — 76705 ECHO EXAM OF ABDOMEN: CPT | Mod: 26

## 2021-03-31 LAB
ALBUMIN SERPL ELPH-MCNC: 3.3 G/DL
ALBUMIN SERPL ELPH-MCNC: 3.4 G/DL
ALBUMIN SERPL ELPH-MCNC: 3.6 G/DL
ALP BLD-CCNC: 78 U/L
ALP BLD-CCNC: 80 U/L
ALP BLD-CCNC: 82 U/L
ALT SERPL-CCNC: 8 U/L
ALT SERPL-CCNC: 8 U/L
ALT SERPL-CCNC: 9 U/L
ANION GAP SERPL CALC-SCNC: 10 MMOL/L
ANION GAP SERPL CALC-SCNC: 13 MMOL/L
ANION GAP SERPL CALC-SCNC: 13 MMOL/L
AST SERPL-CCNC: 12 U/L
AST SERPL-CCNC: 16 U/L
AST SERPL-CCNC: 19 U/L
BILIRUB SERPL-MCNC: 0.3 MG/DL
BILIRUB SERPL-MCNC: 0.4 MG/DL
BILIRUB SERPL-MCNC: 0.4 MG/DL
BUN SERPL-MCNC: 12 MG/DL
BUN SERPL-MCNC: 9 MG/DL
BUN SERPL-MCNC: 9 MG/DL
CALCIUM SERPL-MCNC: 8.2 MG/DL
CALCIUM SERPL-MCNC: 8.4 MG/DL
CALCIUM SERPL-MCNC: 8.7 MG/DL
CHLORIDE SERPL-SCNC: 100 MMOL/L
CHLORIDE SERPL-SCNC: 101 MMOL/L
CHLORIDE SERPL-SCNC: 102 MMOL/L
CO2 SERPL-SCNC: 26 MMOL/L
CO2 SERPL-SCNC: 26 MMOL/L
CO2 SERPL-SCNC: 27 MMOL/L
CREAT SERPL-MCNC: 0.93 MG/DL
CREAT SERPL-MCNC: 1.03 MG/DL
CREAT SERPL-MCNC: 1.03 MG/DL
FOLATE SERPL-MCNC: 2.2 NG/ML
FOLATE SERPL-MCNC: 2.2 NG/ML
GLUCOSE SERPL-MCNC: 101 MG/DL
GLUCOSE SERPL-MCNC: 105 MG/DL
GLUCOSE SERPL-MCNC: 168 MG/DL
METHYLMALONATE SERPL-SCNC: 146 NMOL/L
METHYLMALONATE SERPL-SCNC: 187 NMOL/L
POTASSIUM SERPL-SCNC: 3.5 MMOL/L
POTASSIUM SERPL-SCNC: 4.1 MMOL/L
POTASSIUM SERPL-SCNC: 4.5 MMOL/L
PROT SERPL-MCNC: 6.3 G/DL
PROT SERPL-MCNC: 6.4 G/DL
PROT SERPL-MCNC: 6.7 G/DL
SARS-COV-2 N GENE NPH QL NAA+PROBE: NOT DETECTED
SODIUM SERPL-SCNC: 136 MMOL/L
SODIUM SERPL-SCNC: 140 MMOL/L
SODIUM SERPL-SCNC: 141 MMOL/L
VIT B12 SERPL-MCNC: 674 PG/ML
VIT B12 SERPL-MCNC: 850 PG/ML

## 2021-04-01 ENCOUNTER — RESULT REVIEW (OUTPATIENT)
Age: 71
End: 2021-04-01

## 2021-04-01 ENCOUNTER — APPOINTMENT (OUTPATIENT)
Dept: HEMATOLOGY ONCOLOGY | Facility: CLINIC | Age: 71
End: 2021-04-01
Payer: MEDICARE

## 2021-04-01 VITALS
HEIGHT: 65.98 IN | HEART RATE: 92 BPM | RESPIRATION RATE: 17 BRPM | DIASTOLIC BLOOD PRESSURE: 76 MMHG | WEIGHT: 209.66 LBS | SYSTOLIC BLOOD PRESSURE: 117 MMHG | TEMPERATURE: 96.8 F | BODY MASS INDEX: 33.69 KG/M2 | OXYGEN SATURATION: 98 %

## 2021-04-01 LAB
ANISOCYTOSIS BLD QL: SLIGHT — SIGNIFICANT CHANGE UP
BASOPHILS # BLD AUTO: 0.18 K/UL — SIGNIFICANT CHANGE UP (ref 0–0.2)
BASOPHILS NFR BLD AUTO: 1 % — SIGNIFICANT CHANGE UP (ref 0–2)
BUN SERPL-MCNC: 6 MG/DL — LOW (ref 7–23)
CA-I BLDA-SCNC: 1.08 MMOL/L — LOW (ref 1.12–1.3)
CHLORIDE SERPL-SCNC: 98 MMOL/L — SIGNIFICANT CHANGE UP (ref 96–108)
CO2 SERPL-SCNC: 27 MMOL/L — SIGNIFICANT CHANGE UP (ref 22–31)
CREAT SERPL-MCNC: 1.8 MG/DL — HIGH (ref 0.5–1.3)
EOSINOPHIL # BLD AUTO: 0.54 K/UL — HIGH (ref 0–0.5)
EOSINOPHIL NFR BLD AUTO: 3 % — SIGNIFICANT CHANGE UP (ref 0–6)
GLUCOSE SERPL-MCNC: 121 MG/DL — HIGH (ref 70–99)
HCT VFR BLD CALC: 32 % — LOW (ref 34.5–45)
HGB BLD-MCNC: 9.4 G/DL — LOW (ref 11.5–15.5)
LYMPHOCYTES # BLD AUTO: 0.72 K/UL — LOW (ref 1–3.3)
LYMPHOCYTES # BLD AUTO: 4 % — LOW (ref 13–44)
MACROCYTES BLD QL: SLIGHT — SIGNIFICANT CHANGE UP
MCHC RBC-ENTMCNC: 29.4 G/DL — LOW (ref 32–36)
MCHC RBC-ENTMCNC: 30.9 PG — SIGNIFICANT CHANGE UP (ref 27–34)
MCV RBC AUTO: 105.3 FL — HIGH (ref 80–100)
MICROCYTES BLD QL: SLIGHT — SIGNIFICANT CHANGE UP
MONOCYTES # BLD AUTO: 0.36 K/UL — SIGNIFICANT CHANGE UP (ref 0–0.9)
MONOCYTES NFR BLD AUTO: 2 % — SIGNIFICANT CHANGE UP (ref 2–14)
NEUTROPHILS # BLD AUTO: 16.17 K/UL — HIGH (ref 1.8–7.4)
NEUTROPHILS NFR BLD AUTO: 90 % — HIGH (ref 43–77)
NRBC # BLD: 0 /100 — SIGNIFICANT CHANGE UP (ref 0–0)
NRBC # BLD: SIGNIFICANT CHANGE UP /100 WBCS (ref 0–0)
PLAT MORPH BLD: NORMAL — SIGNIFICANT CHANGE UP
PLATELET # BLD AUTO: 738 K/UL — HIGH (ref 150–400)
POTASSIUM SERPL-MCNC: 3.3 MMOL/L — LOW (ref 3.5–5.3)
POTASSIUM SERPL-SCNC: 3.3 MMOL/L — LOW (ref 3.5–5.3)
RBC # BLD: 3.04 M/UL — LOW (ref 3.8–5.2)
RBC # FLD: 21.2 % — HIGH (ref 10.3–14.5)
RBC BLD AUTO: ABNORMAL
SODIUM SERPL-SCNC: 137 MMOL/L — SIGNIFICANT CHANGE UP (ref 135–145)
WBC # BLD: 17.97 K/UL — HIGH (ref 3.8–10.5)
WBC # FLD AUTO: 17.97 K/UL — HIGH (ref 3.8–10.5)

## 2021-04-01 PROCEDURE — 99214 OFFICE O/P EST MOD 30 MIN: CPT

## 2021-04-01 RX ORDER — ONDANSETRON 4 MG/1
4 TABLET, ORALLY DISINTEGRATING ORAL EVERY 6 HOURS
Qty: 30 | Refills: 1 | Status: DISCONTINUED | COMMUNITY
Start: 2021-03-24 | End: 2021-04-01

## 2021-04-01 RX ORDER — RUXOLITINIB 10 MG/1
10 TABLET ORAL TWICE DAILY
Qty: 60 | Refills: 0 | Status: DISCONTINUED | COMMUNITY
Start: 2021-03-17 | End: 2021-04-01

## 2021-04-01 NOTE — PHYSICAL EXAM
[Fully active, able to carry on all pre-disease performance without restriction] : Status 0 - Fully active, able to carry on all pre-disease performance without restriction [Normal] : grossly intact [de-identified] : appears very fatigued [de-identified] : Benign exam

## 2021-04-01 NOTE — HISTORY OF PRESENT ILLNESS
[de-identified] : 70yoF with a h/o ET/PMF (likely PMF given SM, Jak2+) on HU  2000  and 1500 alternating ; recently with fluctuating platelet counts [de-identified] : Patient is seen for follow up today as she has not been able to tolerate Jakafi. On 3/20/21 was started on Jakafi 20mg twice a day and started to experience nausea, vomiting, and diarrhea; Jakafi reduced to 10mg BID on 3/23/21, along with Hydrea decreased to 500mg once a day. Today, she feels exhausted and continues to have diarrhea (yesterday had 4 episodes, loose, watery, non-bloody), and nausea, zofran is not helping. She is able to tolerate oral intake and has been drinking plenty of fluids, does not want IVF. Patient denies palpitations, any bleeding, easy bruising, melena, BRBPR, fever, headache, abdominal pain, vomiting, or chest pain. Poor appetite, weight loss (-18 Ibs)\par +chronic cough due to her asthma, was told to start steroids but has not wanted to\par

## 2021-04-01 NOTE — ASSESSMENT
[FreeTextEntry1] : 69 yo F with a history of PMF jak2+ here with improved platelet counts currently on 500 mg hydroxyurea daily and Jakafi 10mg BID.\par US spleen (3/26/21): splenomegaly 17.7 x 6.1 x 8.7 cm. Splenic volume of 493 cc.\par \par -Discuss with Dr. Doshi to start anagrelide if counts are similar, start 1mg daily and then increase to 1mg bid slowly while stopping HU\par -continue folic acid and xarelto- watch for bleeding, increased risk with adding anagrelide\par \par Her Cr went from 1.0 to 1.8 over the past week. Weight loss of 18 pounds over past 2 weeks since starting Jakafi.\par Cannot abruptly discontinue Jakafi, must be tapered off. Currently at 10mg BID, reduce dose to 5mg BID. Patient is aware of this.\par Increase Hydrea to 1000mg daily\par BRAT diet reviewed. Oral hydration encouraged\par Increase oral hydration, patient does not want IVF. Agrees to IVF next Wednesday\par OTC Imodium for diarrhea\par Repeat labs next Wednesday with IVF\par Replete potassium\par Reglan prescribed, Zofran discontinued as it was not helping\par \par Follow up as scheduled\par Case and management discussed with Dr. Lantigua\par

## 2021-04-07 ENCOUNTER — RESULT REVIEW (OUTPATIENT)
Age: 71
End: 2021-04-07

## 2021-04-07 ENCOUNTER — LABORATORY RESULT (OUTPATIENT)
Age: 71
End: 2021-04-07

## 2021-04-07 ENCOUNTER — APPOINTMENT (OUTPATIENT)
Dept: INFUSION THERAPY | Facility: HOSPITAL | Age: 71
End: 2021-04-07

## 2021-04-07 ENCOUNTER — NON-APPOINTMENT (OUTPATIENT)
Age: 71
End: 2021-04-07

## 2021-04-07 ENCOUNTER — APPOINTMENT (OUTPATIENT)
Dept: HEMATOLOGY ONCOLOGY | Facility: CLINIC | Age: 71
End: 2021-04-07

## 2021-04-07 LAB
ANISOCYTOSIS BLD QL: SLIGHT — SIGNIFICANT CHANGE UP
BASOPHILS # BLD AUTO: 0.22 K/UL — HIGH (ref 0–0.2)
BASOPHILS NFR BLD AUTO: 1 % — SIGNIFICANT CHANGE UP (ref 0–2)
BUN SERPL-MCNC: 9 MG/DL — SIGNIFICANT CHANGE UP (ref 7–23)
CA-I BLDA-SCNC: 1.15 MMOL/L — SIGNIFICANT CHANGE UP (ref 1.12–1.3)
CHLORIDE SERPL-SCNC: 100 MMOL/L — SIGNIFICANT CHANGE UP (ref 96–108)
CO2 SERPL-SCNC: 31 MMOL/L — SIGNIFICANT CHANGE UP (ref 22–31)
CREAT SERPL-MCNC: 1.9 MG/DL — HIGH (ref 0.5–1.3)
DACRYOCYTES BLD QL SMEAR: SLIGHT — SIGNIFICANT CHANGE UP
EOSINOPHIL # BLD AUTO: 0.89 K/UL — HIGH (ref 0–0.5)
EOSINOPHIL NFR BLD AUTO: 4 % — SIGNIFICANT CHANGE UP (ref 0–6)
GLUCOSE SERPL-MCNC: 113 MG/DL — HIGH (ref 70–99)
HCT VFR BLD CALC: 33 % — LOW (ref 34.5–45)
HGB BLD-MCNC: 9.6 G/DL — LOW (ref 11.5–15.5)
HYPOCHROMIA BLD QL: SLIGHT — SIGNIFICANT CHANGE UP
LG PLATELETS BLD QL AUTO: SLIGHT — SIGNIFICANT CHANGE UP
LYMPHOCYTES # BLD AUTO: 1.56 K/UL — SIGNIFICANT CHANGE UP (ref 1–3.3)
LYMPHOCYTES # BLD AUTO: 7 % — LOW (ref 13–44)
MACROCYTES BLD QL: SLIGHT — SIGNIFICANT CHANGE UP
MCHC RBC-ENTMCNC: 29.1 G/DL — LOW (ref 32–36)
MCHC RBC-ENTMCNC: 29.9 PG — SIGNIFICANT CHANGE UP (ref 27–34)
MCV RBC AUTO: 102.8 FL — HIGH (ref 80–100)
MICROCYTES BLD QL: SLIGHT — SIGNIFICANT CHANGE UP
MONOCYTES # BLD AUTO: 1.11 K/UL — HIGH (ref 0–0.9)
MONOCYTES NFR BLD AUTO: 5 % — SIGNIFICANT CHANGE UP (ref 2–14)
NEUTROPHILS # BLD AUTO: 18.48 K/UL — HIGH (ref 1.8–7.4)
NEUTROPHILS NFR BLD AUTO: 83 % — HIGH (ref 43–77)
NRBC # BLD: 0 /100 — SIGNIFICANT CHANGE UP (ref 0–0)
NRBC # BLD: SIGNIFICANT CHANGE UP /100 WBCS (ref 0–0)
PLAT MORPH BLD: ABNORMAL
PLATELET # BLD AUTO: 1226 K/UL — CRITICAL HIGH (ref 150–400)
POTASSIUM SERPL-MCNC: 3.6 MMOL/L — SIGNIFICANT CHANGE UP (ref 3.5–5.3)
POTASSIUM SERPL-SCNC: 3.6 MMOL/L — SIGNIFICANT CHANGE UP (ref 3.5–5.3)
RBC # BLD: 3.21 M/UL — LOW (ref 3.8–5.2)
RBC # FLD: 21.2 % — HIGH (ref 10.3–14.5)
RBC BLD AUTO: ABNORMAL
SODIUM SERPL-SCNC: 140 MMOL/L — SIGNIFICANT CHANGE UP (ref 135–145)
WBC # BLD: 22.27 K/UL — HIGH (ref 3.8–10.5)
WBC # FLD AUTO: 22.27 K/UL — HIGH (ref 3.8–10.5)

## 2021-04-08 DIAGNOSIS — E86.0 DEHYDRATION: ICD-10-CM

## 2021-04-09 ENCOUNTER — RESULT REVIEW (OUTPATIENT)
Age: 71
End: 2021-04-09

## 2021-04-09 ENCOUNTER — LABORATORY RESULT (OUTPATIENT)
Age: 71
End: 2021-04-09

## 2021-04-09 ENCOUNTER — APPOINTMENT (OUTPATIENT)
Dept: INFUSION THERAPY | Facility: HOSPITAL | Age: 71
End: 2021-04-09

## 2021-04-09 LAB
ANISOCYTOSIS BLD QL: SLIGHT — SIGNIFICANT CHANGE UP
BASOPHILS # BLD AUTO: 0 K/UL — SIGNIFICANT CHANGE UP (ref 0–0.2)
BASOPHILS NFR BLD AUTO: 0 % — SIGNIFICANT CHANGE UP (ref 0–2)
BLASTS # FLD: 3 % — HIGH (ref 0–0)
BUN SERPL-MCNC: 10 MG/DL — SIGNIFICANT CHANGE UP (ref 7–23)
CA-I BLDA-SCNC: 1.08 MMOL/L — LOW (ref 1.12–1.3)
CHLORIDE SERPL-SCNC: 102 MMOL/L — SIGNIFICANT CHANGE UP (ref 96–108)
CO2 SERPL-SCNC: 29 MMOL/L — SIGNIFICANT CHANGE UP (ref 22–31)
CREAT SERPL-MCNC: 1.8 MG/DL — HIGH (ref 0.5–1.3)
DACRYOCYTES BLD QL SMEAR: SLIGHT — SIGNIFICANT CHANGE UP
ELLIPTOCYTES BLD QL SMEAR: SLIGHT — SIGNIFICANT CHANGE UP
EOSINOPHIL # BLD AUTO: 0.52 K/UL — HIGH (ref 0–0.5)
EOSINOPHIL NFR BLD AUTO: 2 % — SIGNIFICANT CHANGE UP (ref 0–6)
GLUCOSE SERPL-MCNC: 124 MG/DL — HIGH (ref 70–99)
HCT VFR BLD CALC: 31.7 % — LOW (ref 34.5–45)
HGB BLD-MCNC: 9.4 G/DL — LOW (ref 11.5–15.5)
HYPOCHROMIA BLD QL: SLIGHT — SIGNIFICANT CHANGE UP
LG PLATELETS BLD QL AUTO: SLIGHT — SIGNIFICANT CHANGE UP
LYMPHOCYTES # BLD AUTO: 1.55 K/UL — SIGNIFICANT CHANGE UP (ref 1–3.3)
LYMPHOCYTES # BLD AUTO: 6 % — LOW (ref 13–44)
MACROCYTES BLD QL: SLIGHT — SIGNIFICANT CHANGE UP
MCHC RBC-ENTMCNC: 29.7 G/DL — LOW (ref 32–36)
MCHC RBC-ENTMCNC: 30.2 PG — SIGNIFICANT CHANGE UP (ref 27–34)
MCV RBC AUTO: 101.9 FL — HIGH (ref 80–100)
MICROCYTES BLD QL: SLIGHT — SIGNIFICANT CHANGE UP
MONOCYTES # BLD AUTO: 0.78 K/UL — SIGNIFICANT CHANGE UP (ref 0–0.9)
MONOCYTES NFR BLD AUTO: 3 % — SIGNIFICANT CHANGE UP (ref 2–14)
NEUTROPHILS # BLD AUTO: 22.25 K/UL — HIGH (ref 1.8–7.4)
NEUTROPHILS NFR BLD AUTO: 86 % — HIGH (ref 43–77)
NRBC # BLD: 0 /100 — SIGNIFICANT CHANGE UP (ref 0–0)
NRBC # BLD: SIGNIFICANT CHANGE UP /100 WBCS (ref 0–0)
OVALOCYTES BLD QL SMEAR: SLIGHT — SIGNIFICANT CHANGE UP
PLAT MORPH BLD: NORMAL — SIGNIFICANT CHANGE UP
PLATELET # BLD AUTO: 1128 K/UL — CRITICAL HIGH (ref 150–400)
POIKILOCYTOSIS BLD QL AUTO: SLIGHT — SIGNIFICANT CHANGE UP
POTASSIUM SERPL-MCNC: 3.7 MMOL/L — SIGNIFICANT CHANGE UP (ref 3.5–5.3)
POTASSIUM SERPL-SCNC: 3.7 MMOL/L — SIGNIFICANT CHANGE UP (ref 3.5–5.3)
RBC # BLD: 3.11 M/UL — LOW (ref 3.8–5.2)
RBC # FLD: 21.4 % — HIGH (ref 10.3–14.5)
RBC BLD AUTO: ABNORMAL
SODIUM SERPL-SCNC: 139 MMOL/L — SIGNIFICANT CHANGE UP (ref 135–145)
STOMATOCYTES BLD QL SMEAR: SLIGHT — SIGNIFICANT CHANGE UP
WBC # BLD: 25.87 K/UL — HIGH (ref 3.8–10.5)
WBC # FLD AUTO: 25.87 K/UL — HIGH (ref 3.8–10.5)

## 2021-04-12 ENCOUNTER — NON-APPOINTMENT (OUTPATIENT)
Age: 71
End: 2021-04-12

## 2021-04-14 ENCOUNTER — RESULT REVIEW (OUTPATIENT)
Age: 71
End: 2021-04-14

## 2021-04-14 ENCOUNTER — APPOINTMENT (OUTPATIENT)
Dept: HEMATOLOGY ONCOLOGY | Facility: CLINIC | Age: 71
End: 2021-04-14
Payer: MEDICARE

## 2021-04-14 VITALS
HEIGHT: 65.98 IN | TEMPERATURE: 98.2 F | RESPIRATION RATE: 20 BRPM | WEIGHT: 213.39 LBS | DIASTOLIC BLOOD PRESSURE: 81 MMHG | SYSTOLIC BLOOD PRESSURE: 142 MMHG | OXYGEN SATURATION: 91 % | HEART RATE: 98 BPM | BODY MASS INDEX: 34.29 KG/M2

## 2021-04-14 LAB
ANISOCYTOSIS BLD QL: SLIGHT — SIGNIFICANT CHANGE UP
BASOPHILS # BLD AUTO: 0.23 K/UL — HIGH (ref 0–0.2)
BASOPHILS NFR BLD AUTO: 1 % — SIGNIFICANT CHANGE UP (ref 0–2)
BUN SERPL-MCNC: 13 MG/DL — SIGNIFICANT CHANGE UP (ref 7–23)
CA-I BLDA-SCNC: 1.07 MMOL/L — LOW (ref 1.12–1.3)
CHLORIDE SERPL-SCNC: 101 MMOL/L — SIGNIFICANT CHANGE UP (ref 96–108)
CO2 SERPL-SCNC: 29 MMOL/L — SIGNIFICANT CHANGE UP (ref 22–31)
CREAT SERPL-MCNC: 1 MG/DL — SIGNIFICANT CHANGE UP (ref 0.5–1.3)
DACRYOCYTES BLD QL SMEAR: SLIGHT — SIGNIFICANT CHANGE UP
EOSINOPHIL # BLD AUTO: 0.23 K/UL — SIGNIFICANT CHANGE UP (ref 0–0.5)
EOSINOPHIL NFR BLD AUTO: 1 % — SIGNIFICANT CHANGE UP (ref 0–6)
GLUCOSE SERPL-MCNC: 110 MG/DL — HIGH (ref 70–99)
HCT VFR BLD CALC: 31.4 % — LOW (ref 34.5–45)
HGB BLD-MCNC: 9.2 G/DL — LOW (ref 11.5–15.5)
HYPOCHROMIA BLD QL: SLIGHT — SIGNIFICANT CHANGE UP
LG PLATELETS BLD QL AUTO: SLIGHT — SIGNIFICANT CHANGE UP
LYMPHOCYTES # BLD AUTO: 1.16 K/UL — SIGNIFICANT CHANGE UP (ref 1–3.3)
LYMPHOCYTES # BLD AUTO: 5 % — LOW (ref 13–44)
MACROCYTES BLD QL: SLIGHT — SIGNIFICANT CHANGE UP
MCHC RBC-ENTMCNC: 29.3 G/DL — LOW (ref 32–36)
MCHC RBC-ENTMCNC: 30.5 PG — SIGNIFICANT CHANGE UP (ref 27–34)
MCV RBC AUTO: 104 FL — HIGH (ref 80–100)
MICROCYTES BLD QL: SLIGHT — SIGNIFICANT CHANGE UP
MONOCYTES # BLD AUTO: 0.46 K/UL — SIGNIFICANT CHANGE UP (ref 0–0.9)
MONOCYTES NFR BLD AUTO: 2 % — SIGNIFICANT CHANGE UP (ref 2–14)
MYELOCYTES NFR BLD: 1 % — HIGH (ref 0–0)
NEUTROPHILS # BLD AUTO: 20.87 K/UL — HIGH (ref 1.8–7.4)
NEUTROPHILS NFR BLD AUTO: 90 % — HIGH (ref 43–77)
NRBC # BLD: 0 /100 — SIGNIFICANT CHANGE UP (ref 0–0)
NRBC # BLD: SIGNIFICANT CHANGE UP /100 WBCS (ref 0–0)
PLAT MORPH BLD: NORMAL — SIGNIFICANT CHANGE UP
PLATELET # BLD AUTO: 977 K/UL — HIGH (ref 150–400)
POIKILOCYTOSIS BLD QL AUTO: SLIGHT — SIGNIFICANT CHANGE UP
POLYCHROMASIA BLD QL SMEAR: SLIGHT — SIGNIFICANT CHANGE UP
POTASSIUM SERPL-MCNC: 3.2 MMOL/L — LOW (ref 3.5–5.3)
POTASSIUM SERPL-SCNC: 3.2 MMOL/L — LOW (ref 3.5–5.3)
RBC # BLD: 3.02 M/UL — LOW (ref 3.8–5.2)
RBC # FLD: 21.9 % — HIGH (ref 10.3–14.5)
RBC BLD AUTO: ABNORMAL
SODIUM SERPL-SCNC: 140 MMOL/L — SIGNIFICANT CHANGE UP (ref 135–145)
WBC # BLD: 23.19 K/UL — HIGH (ref 3.8–10.5)
WBC # FLD AUTO: 23.19 K/UL — HIGH (ref 3.8–10.5)

## 2021-04-14 PROCEDURE — 99214 OFFICE O/P EST MOD 30 MIN: CPT

## 2021-04-14 RX ORDER — RUXOLITINIB 5 MG/1
5 TABLET ORAL TWICE DAILY
Refills: 0 | Status: COMPLETED | COMMUNITY
Start: 2021-04-01 | End: 2021-04-14

## 2021-04-14 NOTE — HISTORY OF PRESENT ILLNESS
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

## 2021-04-14 NOTE — ASSESSMENT
[FreeTextEntry1] : 71 yo F with a history of PMF jak2+ here with improved platelet counts on anagrelide 1mg in am and pm\par -also on HU twice daily. Intolerant of jakafi\par \par -platelets are improving in anegrelide 1mg bid\par -check next week,\par -decrease HU to 500/1000mg alternating\par -if plt stabilizing next week will increase anegrelide\par \par \par hycodan given for cough\par protonix given for stomach discomfort\par slowing improving\par -return visit 1 month\par -to call for results next week\par \par

## 2021-04-21 ENCOUNTER — APPOINTMENT (OUTPATIENT)
Dept: HEMATOLOGY ONCOLOGY | Facility: CLINIC | Age: 71
End: 2021-04-21

## 2021-04-23 NOTE — ASSESSMENT
[FreeTextEntry1] : 71 yo F with a history of PMF jak2+, Cytogenetics show +1 and +9, + SM on high doses of HU causing worsening anemia with persistently high platelet counts.  \par -on HU currently\par -instead of anagrelide, will attempt to start jakafi given it may help SM, symptoms of significant fatigue, and may allow for stopping HU and allow for hb recovery\par -trial of jakafi 20mg BID\par -discussed risks and benefits of medication\par -patient will call to let us know when she obtains jakafi to start.\par

## 2021-04-23 NOTE — HISTORY OF PRESENT ILLNESS
[de-identified] : 70yoF with a h/o ET/PMF (likely PMF given SM, Jak2+) on HU  2000  and 1500 alternating ; recently with fluctuating platelet counts [de-identified] : -platelet count is still very high\par -unable to control both platelets while keeping Hb high\par -she reports she is feeling very tired, more than ever\par -she has a cough and asthma symptoms but she does not want to take steroids, has been refusing\par

## 2021-04-23 NOTE — PHYSICAL EXAM
[Fully active, able to carry on all pre-disease performance without restriction] : Status 0 - Fully active, able to carry on all pre-disease performance without restriction [Normal] : no peripheral adenopathy appreciated [de-identified] : fatigued

## 2021-04-26 ENCOUNTER — OUTPATIENT (OUTPATIENT)
Dept: OUTPATIENT SERVICES | Facility: HOSPITAL | Age: 71
LOS: 1 days | Discharge: HOME | End: 2021-04-26

## 2021-04-26 DIAGNOSIS — S99.919A UNSPECIFIED INJURY OF UNSPECIFIED ANKLE, INITIAL ENCOUNTER: Chronic | ICD-10-CM

## 2021-04-26 DIAGNOSIS — D69.6 THROMBOCYTOPENIA, UNSPECIFIED: ICD-10-CM

## 2021-04-26 DIAGNOSIS — K81.9 CHOLECYSTITIS, UNSPECIFIED: Chronic | ICD-10-CM

## 2021-04-28 ENCOUNTER — RESULT REVIEW (OUTPATIENT)
Age: 71
End: 2021-04-28

## 2021-04-28 ENCOUNTER — APPOINTMENT (OUTPATIENT)
Dept: HEMATOLOGY ONCOLOGY | Facility: CLINIC | Age: 71
End: 2021-04-28

## 2021-04-28 LAB
ANISOCYTOSIS BLD QL: SLIGHT — SIGNIFICANT CHANGE UP
BASOPHILS # BLD AUTO: 0.25 K/UL — HIGH (ref 0–0.2)
BASOPHILS NFR BLD AUTO: 1 % — SIGNIFICANT CHANGE UP (ref 0–2)
BLASTS # FLD: 2 % — HIGH (ref 0–0)
EOSINOPHIL # BLD AUTO: 0.25 K/UL — SIGNIFICANT CHANGE UP (ref 0–0.5)
EOSINOPHIL NFR BLD AUTO: 1 % — SIGNIFICANT CHANGE UP (ref 0–6)
HCT VFR BLD CALC: 30.8 % — LOW (ref 34.5–45)
HGB BLD-MCNC: 9.3 G/DL — LOW (ref 11.5–15.5)
LYMPHOCYTES # BLD AUTO: 1.24 K/UL — SIGNIFICANT CHANGE UP (ref 1–3.3)
LYMPHOCYTES # BLD AUTO: 5 % — LOW (ref 13–44)
MACROCYTES BLD QL: SLIGHT — SIGNIFICANT CHANGE UP
MCHC RBC-ENTMCNC: 30.2 G/DL — LOW (ref 32–36)
MCHC RBC-ENTMCNC: 30.6 PG — SIGNIFICANT CHANGE UP (ref 27–34)
MCV RBC AUTO: 101.3 FL — HIGH (ref 80–100)
MICROCYTES BLD QL: SLIGHT — SIGNIFICANT CHANGE UP
MONOCYTES # BLD AUTO: 0.25 K/UL — SIGNIFICANT CHANGE UP (ref 0–0.9)
MONOCYTES NFR BLD AUTO: 1 % — LOW (ref 2–14)
NEUTROPHILS # BLD AUTO: 22.37 K/UL — HIGH (ref 1.8–7.4)
NEUTROPHILS NFR BLD AUTO: 90 % — HIGH (ref 43–77)
NRBC # BLD: 0 /100 — SIGNIFICANT CHANGE UP (ref 0–0)
NRBC # BLD: SIGNIFICANT CHANGE UP /100 WBCS (ref 0–0)
OVALOCYTES BLD QL SMEAR: SLIGHT — SIGNIFICANT CHANGE UP
PLAT MORPH BLD: NORMAL — SIGNIFICANT CHANGE UP
PLATELET # BLD AUTO: 602 K/UL — HIGH (ref 150–400)
POIKILOCYTOSIS BLD QL AUTO: SLIGHT — SIGNIFICANT CHANGE UP
POLYCHROMASIA BLD QL SMEAR: SLIGHT — SIGNIFICANT CHANGE UP
RBC # BLD: 3.04 M/UL — LOW (ref 3.8–5.2)
RBC # FLD: 22.5 % — HIGH (ref 10.3–14.5)
RBC BLD AUTO: ABNORMAL
WBC # BLD: 24.86 K/UL — HIGH (ref 3.8–10.5)
WBC # FLD AUTO: 24.86 K/UL — HIGH (ref 3.8–10.5)

## 2021-05-13 ENCOUNTER — RESULT REVIEW (OUTPATIENT)
Age: 71
End: 2021-05-13

## 2021-05-13 ENCOUNTER — APPOINTMENT (OUTPATIENT)
Dept: HEMATOLOGY ONCOLOGY | Facility: CLINIC | Age: 71
End: 2021-05-13
Payer: MEDICARE

## 2021-05-13 VITALS
TEMPERATURE: 98 F | SYSTOLIC BLOOD PRESSURE: 138 MMHG | OXYGEN SATURATION: 96 % | WEIGHT: 211.64 LBS | BODY MASS INDEX: 34.18 KG/M2 | RESPIRATION RATE: 18 BRPM | DIASTOLIC BLOOD PRESSURE: 80 MMHG | HEART RATE: 92 BPM

## 2021-05-13 LAB
ANISOCYTOSIS BLD QL: SLIGHT — SIGNIFICANT CHANGE UP
BASOPHILS # BLD AUTO: 0 K/UL — SIGNIFICANT CHANGE UP (ref 0–0.2)
BASOPHILS NFR BLD AUTO: 0 % — SIGNIFICANT CHANGE UP (ref 0–2)
BLASTS # FLD: 1 % — HIGH (ref 0–0)
EOSINOPHIL # BLD AUTO: 0.79 K/UL — HIGH (ref 0–0.5)
EOSINOPHIL NFR BLD AUTO: 4 % — SIGNIFICANT CHANGE UP (ref 0–6)
HCT VFR BLD CALC: 29.7 % — LOW (ref 34.5–45)
HGB BLD-MCNC: 8.4 G/DL — LOW (ref 11.5–15.5)
LYMPHOCYTES # BLD AUTO: 0.79 K/UL — LOW (ref 1–3.3)
LYMPHOCYTES # BLD AUTO: 4 % — LOW (ref 13–44)
MACROCYTES BLD QL: SLIGHT — SIGNIFICANT CHANGE UP
MCHC RBC-ENTMCNC: 28.3 G/DL — LOW (ref 32–36)
MCHC RBC-ENTMCNC: 29.9 PG — SIGNIFICANT CHANGE UP (ref 27–34)
MCV RBC AUTO: 105.7 FL — HIGH (ref 80–100)
MICROCYTES BLD QL: SLIGHT — SIGNIFICANT CHANGE UP
MONOCYTES # BLD AUTO: 0.2 K/UL — SIGNIFICANT CHANGE UP (ref 0–0.9)
MONOCYTES NFR BLD AUTO: 1 % — LOW (ref 2–14)
NEUTROPHILS # BLD AUTO: 17.79 K/UL — HIGH (ref 1.8–7.4)
NEUTROPHILS NFR BLD AUTO: 90 % — HIGH (ref 43–77)
NRBC # BLD: 0 /100 — SIGNIFICANT CHANGE UP (ref 0–0)
NRBC # BLD: SIGNIFICANT CHANGE UP /100 WBCS (ref 0–0)
PLAT MORPH BLD: NORMAL — SIGNIFICANT CHANGE UP
PLATELET # BLD AUTO: 984 K/UL — HIGH (ref 150–400)
POIKILOCYTOSIS BLD QL AUTO: SLIGHT — SIGNIFICANT CHANGE UP
POLYCHROMASIA BLD QL SMEAR: SLIGHT — SIGNIFICANT CHANGE UP
RBC # BLD: 2.81 M/UL — LOW (ref 3.8–5.2)
RBC # FLD: 24.2 % — HIGH (ref 10.3–14.5)
RBC BLD AUTO: ABNORMAL
STOMATOCYTES BLD QL SMEAR: SIGNIFICANT CHANGE UP
WBC # BLD: 19.77 K/UL — HIGH (ref 3.8–10.5)
WBC # FLD AUTO: 19.77 K/UL — HIGH (ref 3.8–10.5)

## 2021-05-13 PROCEDURE — 99213 OFFICE O/P EST LOW 20 MIN: CPT

## 2021-05-19 LAB
ALBUMIN SERPL ELPH-MCNC: 3.3 G/DL
ALBUMIN SERPL ELPH-MCNC: 3.4 G/DL
ALP BLD-CCNC: 76 U/L
ALP BLD-CCNC: 90 U/L
ALT SERPL-CCNC: 13 U/L
ALT SERPL-CCNC: 7 U/L
ANION GAP SERPL CALC-SCNC: 12 MMOL/L
ANION GAP SERPL CALC-SCNC: 13 MMOL/L
AST SERPL-CCNC: 15 U/L
AST SERPL-CCNC: 20 U/L
BILIRUB SERPL-MCNC: 0.5 MG/DL
BILIRUB SERPL-MCNC: 0.6 MG/DL
BUN SERPL-MCNC: 14 MG/DL
BUN SERPL-MCNC: 9 MG/DL
CALCIUM SERPL-MCNC: 8.2 MG/DL
CALCIUM SERPL-MCNC: 8.5 MG/DL
CHLORIDE SERPL-SCNC: 100 MMOL/L
CHLORIDE SERPL-SCNC: 103 MMOL/L
CO2 SERPL-SCNC: 24 MMOL/L
CO2 SERPL-SCNC: 24 MMOL/L
CREAT SERPL-MCNC: 0.94 MG/DL
CREAT SERPL-MCNC: 0.96 MG/DL
GLUCOSE SERPL-MCNC: 112 MG/DL
GLUCOSE SERPL-MCNC: 96 MG/DL
POTASSIUM SERPL-SCNC: 3.8 MMOL/L
POTASSIUM SERPL-SCNC: 4 MMOL/L
PROT SERPL-MCNC: 6.4 G/DL
PROT SERPL-MCNC: 6.4 G/DL
SODIUM SERPL-SCNC: 136 MMOL/L
SODIUM SERPL-SCNC: 140 MMOL/L

## 2021-05-19 NOTE — ASSESSMENT
[FreeTextEntry1] : 69 yo F with a history of PMF jak2+, Cytogenetics show +1 and +9, + SM on high doses of HU causing worsening anemia with persistently high platelet counts.  \par -now on anagrelide with some improved platelet countrol\par -still with anemia\par -patient to have the pet scan results sent to me \par -continue current dose of anagrelide, will increase if next level is similar given patients plt counts are erratic\par \par

## 2021-05-19 NOTE — HISTORY OF PRESENT ILLNESS
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

## 2021-05-25 ENCOUNTER — NON-APPOINTMENT (OUTPATIENT)
Age: 71
End: 2021-05-25

## 2021-05-25 ENCOUNTER — APPOINTMENT (OUTPATIENT)
Dept: CARDIOLOGY | Facility: CLINIC | Age: 71
End: 2021-05-25
Payer: MEDICARE

## 2021-05-25 VITALS
HEIGHT: 65 IN | WEIGHT: 209 LBS | HEART RATE: 86 BPM | BODY MASS INDEX: 34.82 KG/M2 | DIASTOLIC BLOOD PRESSURE: 77 MMHG | SYSTOLIC BLOOD PRESSURE: 120 MMHG | OXYGEN SATURATION: 97 %

## 2021-05-25 PROCEDURE — 93000 ELECTROCARDIOGRAM COMPLETE: CPT

## 2021-05-25 PROCEDURE — 93306 TTE W/DOPPLER COMPLETE: CPT

## 2021-05-25 PROCEDURE — 99215 OFFICE O/P EST HI 40 MIN: CPT

## 2021-05-25 NOTE — DISCUSSION/SUMMARY
[FreeTextEntry1] : 70-year-old woman with a history as listed presents for a followup visit.\par \par Mallory has had a complicated course since her last visit. It appears that she will need a VATS. She has worsening MATTHEW which I believe is likely related to her worsening anemia. She will need hematological evaluation prior to her procedure. Possibly a PRBC transfusion. She will get a 2d echo to assess for any  new structural heart disease, changes in valvular and ventricular function.  She is s/p cath on 12/2018 with normal coronaries. I don’t this that a repeat ischemic evaluation in setting of worsening anemia is prudent. She will continue Lasix 40mg Qday. \par  \par Her EKG did not reveal any significant ischemic changes. though she is more tachycardic with worsening PACs. She will continue Toprol 50mg Qday. \par \par Historically she has not felt her Af so we continued her on the Xarelto. though with her worsening anemia this may not be prudent. Given that she has maintained SR since her ablation she can stop her Xarelto for now at least until her counts improve. \par \par Exercise and diet counseling was performed in order to reduce her future cardiovascular risk. \par She will followup with me in 1 months

## 2021-05-25 NOTE — CARDIOLOGY SUMMARY
[de-identified] : SR with PACs [de-identified] : PAF s/p ablation in 2/7/19. [de-identified] : 12/2018 with normal cors and LVEDP.

## 2021-05-25 NOTE — HISTORY OF PRESENT ILLNESS
[FreeTextEntry1] : 70-year-old woman with a history of asthma, obesity, essential thrombocytosis, HFPEF, normal cors in 12/2018, PAF s/p ablation in 2/7/19.\par \par Since her last visit, she was hospitalized at [facility] for PNA. Since then she has been coughing more. She is seeing Dr. Buckley who found her to have "spots"  on her lungd. She is now pending VATS on 6/4/21 with Dr. Farah at [place]. Also her anemia and thrombocytosis has been worsening and she has been on anagrelide therapy. \par \par She   denies any chest pain, PND, orthopnea, lower extremity edema, near syncope, syncope, strokelike symptoms. Her dyspnea on exertion has worsened recently. he no longer has any palpitations. \par  No reported melena, hematochezia or hematemesis.  She is compliant with her medications.

## 2021-05-26 NOTE — DISCHARGE NOTE ADULT - PHYSICIAN SECTION COMPLETE
Final Anesthesia Post-op Assessment    Patient: Vipin Sandra  Procedure(s) Performed: LEFT EYE - 25 GAUGE VITRECTOMY, REMOVAL OF BLOOD - LEFT  Anesthesia type: General    Vitals Value Taken Time   Temp 36.4 °C (97.5 °F) 05/26/21 1701   Pulse 60 05/26/21 1701   Resp 18 05/26/21 1701   SpO2 100 % 05/26/21 1701   BP 91/53 05/26/21 1701         Patient Location: PACU Phase 1  Post-op Vital Signs:stable  Level of Consciousness: awake and alert  Respiratory Status: spontaneous ventilation  Cardiovascular stable  Hydration: euvolemic  Pain Management: adequately controlled  Handoff: Handoff to receiving nurse was performed and questions were answered  Vomiting: none  Nausea: None  Airway Patency:patent  Post-op Assessment: no complications and patient tolerated procedure well with no complications      No complications documented.    Yes

## 2021-06-21 ENCOUNTER — APPOINTMENT (OUTPATIENT)
Dept: CARDIOLOGY | Facility: CLINIC | Age: 71
End: 2021-06-21

## 2021-06-23 ENCOUNTER — OUTPATIENT (OUTPATIENT)
Dept: OUTPATIENT SERVICES | Facility: HOSPITAL | Age: 71
LOS: 1 days | Discharge: HOME | End: 2021-06-23

## 2021-06-23 DIAGNOSIS — K81.9 CHOLECYSTITIS, UNSPECIFIED: Chronic | ICD-10-CM

## 2021-06-23 DIAGNOSIS — S99.919A UNSPECIFIED INJURY OF UNSPECIFIED ANKLE, INITIAL ENCOUNTER: Chronic | ICD-10-CM

## 2021-06-23 DIAGNOSIS — D69.6 THROMBOCYTOPENIA, UNSPECIFIED: ICD-10-CM

## 2021-06-24 ENCOUNTER — RESULT REVIEW (OUTPATIENT)
Age: 71
End: 2021-06-24

## 2021-06-24 ENCOUNTER — APPOINTMENT (OUTPATIENT)
Dept: HEMATOLOGY ONCOLOGY | Facility: CLINIC | Age: 71
End: 2021-06-24

## 2021-06-24 LAB
ANISOCYTOSIS BLD QL: SLIGHT — SIGNIFICANT CHANGE UP
BASOPHILS # BLD AUTO: 0.38 K/UL — HIGH (ref 0–0.2)
BASOPHILS NFR BLD AUTO: 2 % — SIGNIFICANT CHANGE UP (ref 0–2)
BLASTS # FLD: 3 % — HIGH (ref 0–0)
EOSINOPHIL # BLD AUTO: 0.19 K/UL — SIGNIFICANT CHANGE UP (ref 0–0.5)
EOSINOPHIL NFR BLD AUTO: 1 % — SIGNIFICANT CHANGE UP (ref 0–6)
HCT VFR BLD CALC: 34.9 % — SIGNIFICANT CHANGE UP (ref 34.5–45)
HGB BLD-MCNC: 10.1 G/DL — LOW (ref 11.5–15.5)
LYMPHOCYTES # BLD AUTO: 1.32 K/UL — SIGNIFICANT CHANGE UP (ref 1–3.3)
LYMPHOCYTES # BLD AUTO: 7 % — LOW (ref 13–44)
MACROCYTES BLD QL: SLIGHT — SIGNIFICANT CHANGE UP
MCHC RBC-ENTMCNC: 28.9 G/DL — LOW (ref 32–36)
MCHC RBC-ENTMCNC: 29.6 PG — SIGNIFICANT CHANGE UP (ref 27–34)
MCV RBC AUTO: 102.3 FL — HIGH (ref 80–100)
MICROCYTES BLD QL: SLIGHT — SIGNIFICANT CHANGE UP
MONOCYTES # BLD AUTO: 0.76 K/UL — SIGNIFICANT CHANGE UP (ref 0–0.9)
MONOCYTES NFR BLD AUTO: 4 % — SIGNIFICANT CHANGE UP (ref 2–14)
NEUTROPHILS # BLD AUTO: 15.68 K/UL — HIGH (ref 1.8–7.4)
NEUTROPHILS NFR BLD AUTO: 83 % — HIGH (ref 43–77)
NRBC # BLD: 0 /100 — SIGNIFICANT CHANGE UP (ref 0–0)
NRBC # BLD: SIGNIFICANT CHANGE UP /100 WBCS (ref 0–0)
PLAT MORPH BLD: NORMAL — SIGNIFICANT CHANGE UP
PLATELET # BLD AUTO: 566 K/UL — HIGH (ref 150–400)
POIKILOCYTOSIS BLD QL AUTO: SLIGHT — SIGNIFICANT CHANGE UP
POLYCHROMASIA BLD QL SMEAR: SLIGHT — SIGNIFICANT CHANGE UP
RBC # BLD: 3.41 M/UL — LOW (ref 3.8–5.2)
RBC # FLD: 22.3 % — HIGH (ref 10.3–14.5)
RBC BLD AUTO: ABNORMAL
STOMATOCYTES BLD QL SMEAR: SIGNIFICANT CHANGE UP
WBC # BLD: 18.89 K/UL — HIGH (ref 3.8–10.5)
WBC # FLD AUTO: 18.89 K/UL — HIGH (ref 3.8–10.5)

## 2021-06-28 LAB
ALBUMIN SERPL ELPH-MCNC: 3.3 G/DL
ALP BLD-CCNC: 83 U/L
ALT SERPL-CCNC: 34 U/L
ANION GAP SERPL CALC-SCNC: 13 MMOL/L
AST SERPL-CCNC: 24 U/L
BILIRUB SERPL-MCNC: 0.4 MG/DL
BUN SERPL-MCNC: 15 MG/DL
CALCIUM SERPL-MCNC: 8.7 MG/DL
CHLORIDE SERPL-SCNC: 103 MMOL/L
CO2 SERPL-SCNC: 23 MMOL/L
CREAT SERPL-MCNC: 1.02 MG/DL
GLUCOSE SERPL-MCNC: 149 MG/DL
POTASSIUM SERPL-SCNC: 4.2 MMOL/L
PROT SERPL-MCNC: 6.3 G/DL
SODIUM SERPL-SCNC: 139 MMOL/L

## 2021-07-01 ENCOUNTER — APPOINTMENT (OUTPATIENT)
Dept: HEMATOLOGY ONCOLOGY | Facility: CLINIC | Age: 71
End: 2021-07-01

## 2021-07-01 ENCOUNTER — RESULT REVIEW (OUTPATIENT)
Age: 71
End: 2021-07-01

## 2021-07-01 LAB
ANISOCYTOSIS BLD QL: SLIGHT — SIGNIFICANT CHANGE UP
BASOPHILS # BLD AUTO: 0 K/UL — SIGNIFICANT CHANGE UP (ref 0–0.2)
BASOPHILS NFR BLD AUTO: 0 % — SIGNIFICANT CHANGE UP (ref 0–2)
BLASTS # FLD: 1 % — HIGH (ref 0–0)
EOSINOPHIL # BLD AUTO: 0.63 K/UL — HIGH (ref 0–0.5)
EOSINOPHIL NFR BLD AUTO: 4 % — SIGNIFICANT CHANGE UP (ref 0–6)
HCT VFR BLD CALC: 28.2 % — LOW (ref 34.5–45)
HGB BLD-MCNC: 8.4 G/DL — LOW (ref 11.5–15.5)
LG PLATELETS BLD QL AUTO: SLIGHT — SIGNIFICANT CHANGE UP
LYMPHOCYTES # BLD AUTO: 1.42 K/UL — SIGNIFICANT CHANGE UP (ref 1–3.3)
LYMPHOCYTES # BLD AUTO: 9 % — LOW (ref 13–44)
MACROCYTES BLD QL: SLIGHT — SIGNIFICANT CHANGE UP
MCHC RBC-ENTMCNC: 29.8 G/DL — LOW (ref 32–36)
MCHC RBC-ENTMCNC: 29.9 PG — SIGNIFICANT CHANGE UP (ref 27–34)
MCV RBC AUTO: 100.4 FL — HIGH (ref 80–100)
MICROCYTES BLD QL: SLIGHT — SIGNIFICANT CHANGE UP
MONOCYTES # BLD AUTO: 0.47 K/UL — SIGNIFICANT CHANGE UP (ref 0–0.9)
MONOCYTES NFR BLD AUTO: 3 % — SIGNIFICANT CHANGE UP (ref 2–14)
NEUTROPHILS # BLD AUTO: 13.13 K/UL — HIGH (ref 1.8–7.4)
NEUTROPHILS NFR BLD AUTO: 83 % — HIGH (ref 43–77)
NRBC # BLD: 0 /100 — SIGNIFICANT CHANGE UP (ref 0–0)
NRBC # BLD: SIGNIFICANT CHANGE UP /100 WBCS (ref 0–0)
PLAT MORPH BLD: NORMAL — SIGNIFICANT CHANGE UP
PLATELET # BLD AUTO: 513 K/UL — HIGH (ref 150–400)
POIKILOCYTOSIS BLD QL AUTO: SLIGHT — SIGNIFICANT CHANGE UP
POLYCHROMASIA BLD QL SMEAR: SLIGHT — SIGNIFICANT CHANGE UP
RBC # BLD: 2.81 M/UL — LOW (ref 3.8–5.2)
RBC # FLD: 22.1 % — HIGH (ref 10.3–14.5)
RBC BLD AUTO: ABNORMAL
STOMATOCYTES BLD QL SMEAR: SIGNIFICANT CHANGE UP
WBC # BLD: 15.82 K/UL — HIGH (ref 3.8–10.5)
WBC # FLD AUTO: 15.82 K/UL — HIGH (ref 3.8–10.5)

## 2021-07-02 ENCOUNTER — RX RENEWAL (OUTPATIENT)
Age: 71
End: 2021-07-02

## 2021-07-08 ENCOUNTER — RESULT REVIEW (OUTPATIENT)
Age: 71
End: 2021-07-08

## 2021-07-08 ENCOUNTER — APPOINTMENT (OUTPATIENT)
Dept: HEMATOLOGY ONCOLOGY | Facility: CLINIC | Age: 71
End: 2021-07-08

## 2021-07-08 LAB
ANISOCYTOSIS BLD QL: SLIGHT — SIGNIFICANT CHANGE UP
BASOPHILS # BLD AUTO: 0.62 K/UL — HIGH (ref 0–0.2)
BASOPHILS NFR BLD AUTO: 2 % — SIGNIFICANT CHANGE UP (ref 0–2)
BLASTS # FLD: 3 % — HIGH (ref 0–0)
EOSINOPHIL # BLD AUTO: 0.62 K/UL — HIGH (ref 0–0.5)
EOSINOPHIL NFR BLD AUTO: 2 % — SIGNIFICANT CHANGE UP (ref 0–6)
HCT VFR BLD CALC: 31.1 % — LOW (ref 34.5–45)
HGB BLD-MCNC: 8.9 G/DL — LOW (ref 11.5–15.5)
LYMPHOCYTES # BLD AUTO: 2.47 K/UL — SIGNIFICANT CHANGE UP (ref 1–3.3)
LYMPHOCYTES # BLD AUTO: 8 % — LOW (ref 13–44)
MACROCYTES BLD QL: SLIGHT — SIGNIFICANT CHANGE UP
MCHC RBC-ENTMCNC: 28.6 G/DL — LOW (ref 32–36)
MCHC RBC-ENTMCNC: 28.6 PG — SIGNIFICANT CHANGE UP (ref 27–34)
MCV RBC AUTO: 100 FL — SIGNIFICANT CHANGE UP (ref 80–100)
MICROCYTES BLD QL: SLIGHT — SIGNIFICANT CHANGE UP
MONOCYTES # BLD AUTO: 0.93 K/UL — HIGH (ref 0–0.9)
MONOCYTES NFR BLD AUTO: 3 % — SIGNIFICANT CHANGE UP (ref 2–14)
MYELOCYTES NFR BLD: 1 % — HIGH (ref 0–0)
NEUTROPHILS # BLD AUTO: 25.04 K/UL — HIGH (ref 1.8–7.4)
NEUTROPHILS NFR BLD AUTO: 81 % — HIGH (ref 43–77)
NRBC # BLD: 0 /100 — SIGNIFICANT CHANGE UP (ref 0–0)
NRBC # BLD: SIGNIFICANT CHANGE UP /100 WBCS (ref 0–0)
PLAT MORPH BLD: NORMAL — SIGNIFICANT CHANGE UP
PLATELET # BLD AUTO: 870 K/UL — HIGH (ref 150–400)
POIKILOCYTOSIS BLD QL AUTO: SLIGHT — SIGNIFICANT CHANGE UP
POLYCHROMASIA BLD QL SMEAR: SLIGHT — SIGNIFICANT CHANGE UP
RBC # BLD: 3.11 M/UL — LOW (ref 3.8–5.2)
RBC # FLD: 21.7 % — HIGH (ref 10.3–14.5)
RBC BLD AUTO: ABNORMAL
SCHISTOCYTES BLD QL AUTO: SLIGHT — SIGNIFICANT CHANGE UP
WBC # BLD: 30.91 K/UL — HIGH (ref 3.8–10.5)
WBC # FLD AUTO: 30.91 K/UL — HIGH (ref 3.8–10.5)

## 2021-07-09 ENCOUNTER — NON-APPOINTMENT (OUTPATIENT)
Age: 71
End: 2021-07-09

## 2021-07-15 ENCOUNTER — APPOINTMENT (OUTPATIENT)
Dept: HEMATOLOGY ONCOLOGY | Facility: CLINIC | Age: 71
End: 2021-07-15

## 2021-08-03 ENCOUNTER — OUTPATIENT (OUTPATIENT)
Dept: OUTPATIENT SERVICES | Facility: HOSPITAL | Age: 71
LOS: 1 days | Discharge: HOME | End: 2021-08-03

## 2021-08-03 DIAGNOSIS — S99.919A UNSPECIFIED INJURY OF UNSPECIFIED ANKLE, INITIAL ENCOUNTER: Chronic | ICD-10-CM

## 2021-08-03 DIAGNOSIS — D69.6 THROMBOCYTOPENIA, UNSPECIFIED: ICD-10-CM

## 2021-08-03 DIAGNOSIS — K81.9 CHOLECYSTITIS, UNSPECIFIED: Chronic | ICD-10-CM

## 2021-08-04 ENCOUNTER — RESULT REVIEW (OUTPATIENT)
Age: 71
End: 2021-08-04

## 2021-08-04 ENCOUNTER — OUTPATIENT (OUTPATIENT)
Dept: OUTPATIENT SERVICES | Facility: HOSPITAL | Age: 71
LOS: 1 days | End: 2021-08-04
Payer: MEDICARE

## 2021-08-04 ENCOUNTER — APPOINTMENT (OUTPATIENT)
Dept: HEMATOLOGY ONCOLOGY | Facility: CLINIC | Age: 71
End: 2021-08-04
Payer: MEDICARE

## 2021-08-04 VITALS
HEART RATE: 72 BPM | DIASTOLIC BLOOD PRESSURE: 72 MMHG | TEMPERATURE: 97 F | BODY MASS INDEX: 31.99 KG/M2 | HEIGHT: 64 IN | WEIGHT: 187.39 LBS | RESPIRATION RATE: 17 BRPM | SYSTOLIC BLOOD PRESSURE: 124 MMHG

## 2021-08-04 DIAGNOSIS — S99.919A UNSPECIFIED INJURY OF UNSPECIFIED ANKLE, INITIAL ENCOUNTER: Chronic | ICD-10-CM

## 2021-08-04 DIAGNOSIS — K81.9 CHOLECYSTITIS, UNSPECIFIED: Chronic | ICD-10-CM

## 2021-08-04 DIAGNOSIS — D75.81 MYELOFIBROSIS: ICD-10-CM

## 2021-08-04 LAB
ANISOCYTOSIS BLD QL: SLIGHT — SIGNIFICANT CHANGE UP
BASOPHILS # BLD AUTO: 0.69 K/UL — HIGH (ref 0–0.2)
BASOPHILS NFR BLD AUTO: 3 % — HIGH (ref 0–2)
BLASTS # FLD: 1 % — HIGH (ref 0–0)
EOSINOPHIL # BLD AUTO: 0.69 K/UL — HIGH (ref 0–0.5)
EOSINOPHIL NFR BLD AUTO: 3 % — SIGNIFICANT CHANGE UP (ref 0–6)
HCT VFR BLD CALC: 30.7 % — LOW (ref 34.5–45)
HGB BLD-MCNC: 8.7 G/DL — LOW (ref 11.5–15.5)
LYMPHOCYTES # BLD AUTO: 1.14 K/UL — SIGNIFICANT CHANGE UP (ref 1–3.3)
LYMPHOCYTES # BLD AUTO: 5 % — LOW (ref 13–44)
MACROCYTES BLD QL: SLIGHT — SIGNIFICANT CHANGE UP
MCHC RBC-ENTMCNC: 27.6 PG — SIGNIFICANT CHANGE UP (ref 27–34)
MCHC RBC-ENTMCNC: 28.3 G/DL — LOW (ref 32–36)
MCV RBC AUTO: 97.5 FL — SIGNIFICANT CHANGE UP (ref 80–100)
MICROCYTES BLD QL: SLIGHT — SIGNIFICANT CHANGE UP
MONOCYTES # BLD AUTO: 0.69 K/UL — SIGNIFICANT CHANGE UP (ref 0–0.9)
MONOCYTES NFR BLD AUTO: 3 % — SIGNIFICANT CHANGE UP (ref 2–14)
NEUTROPHILS # BLD AUTO: 19.42 K/UL — HIGH (ref 1.8–7.4)
NEUTROPHILS NFR BLD AUTO: 85 % — HIGH (ref 43–77)
NRBC # BLD: 0 /100 — SIGNIFICANT CHANGE UP (ref 0–0)
NRBC # BLD: SIGNIFICANT CHANGE UP /100 WBCS (ref 0–0)
PLAT MORPH BLD: NORMAL — SIGNIFICANT CHANGE UP
PLATELET # BLD AUTO: 598 K/UL — HIGH (ref 150–400)
POIKILOCYTOSIS BLD QL AUTO: SLIGHT — SIGNIFICANT CHANGE UP
POLYCHROMASIA BLD QL SMEAR: SLIGHT — SIGNIFICANT CHANGE UP
RBC # BLD: 3.15 M/UL — LOW (ref 3.8–5.2)
RBC # FLD: 22.3 % — HIGH (ref 10.3–14.5)
RBC BLD AUTO: ABNORMAL
SCHISTOCYTES BLD QL AUTO: SLIGHT — SIGNIFICANT CHANGE UP
WBC # BLD: 22.85 K/UL — HIGH (ref 3.8–10.5)
WBC # FLD AUTO: 22.85 K/UL — HIGH (ref 3.8–10.5)

## 2021-08-04 PROCEDURE — 99214 OFFICE O/P EST MOD 30 MIN: CPT

## 2021-08-04 RX ORDER — POTASSIUM CHLORIDE 1500 MG/1
20 TABLET, FILM COATED, EXTENDED RELEASE ORAL
Qty: 180 | Refills: 3 | Status: DISCONTINUED | COMMUNITY
Start: 2018-12-06 | End: 2021-08-04

## 2021-08-04 RX ORDER — POTASSIUM CHLORIDE 1500 MG/1
20 TABLET, FILM COATED, EXTENDED RELEASE ORAL
Qty: 4 | Refills: 0 | Status: DISCONTINUED | COMMUNITY
Start: 2021-04-01 | End: 2021-08-04

## 2021-08-04 NOTE — PHYSICAL EXAM
[Fully active, able to carry on all pre-disease performance without restriction] : Status 0 - Fully active, able to carry on all pre-disease performance without restriction [Normal] : affect appropriate [de-identified] : supple [de-identified] : (+)S1S2 irregularly regular [de-identified] : (+) +1-2 B/L LE edema R>L [de-identified] : no tenderness [de-identified] : warm/dry

## 2021-08-04 NOTE — ASSESSMENT
[FreeTextEntry1] : 71 yo F with a history of PMF jak2+, Cytogenetics show +1 and +9, + SM on high doses of HU causing worsening anemia with persistently high platelet counts.  \par \par -now on anagrelide and hydrea\par -CBC with WBC 22.85 Hgb 8.7 PLT.  Will decrease HU to 2 tabs M-W-F and 1 tab daily T-Th-Sat-Sun to see if there is any improvements in Hgb. If she is still symptomatic will transfuse.  Anagrelide will be increased to 2mg BID\par -continue follow up with pulm and cards\par -spoke weight loss and eating habits, she will try to eat small frequent meals throughout the day\par \par -Labs in 2 weeks and follow up in 1 month\par

## 2021-08-04 NOTE — REVIEW OF SYSTEMS
[Fatigue] : fatigue [Cough] : cough [Negative] : Allergic/Immunologic [Recent Change In Weight] : ~T recent weight change [Lower Ext Edema] : lower extremity edema [SOB on Exertion] : shortness of breath during exertion [Anxiety] : anxiety [Fever] : no fever [Chills] : no chills [Night Sweats] : no night sweats [Vision Problems] : no vision problems [Dysphagia] : no dysphagia [Nosebleeds] : no nosebleeds [Odynophagia] : no odynophagia [Wheezing] : no wheezing [Dysuria] : no dysuria [Incontinence] : no incontinence [Hot Flashes] : no hot flashes [Muscle Weakness] : no muscle weakness [FreeTextEntry5] : chronic LE edema R>L 2/2 surgery

## 2021-08-04 NOTE — HISTORY OF PRESENT ILLNESS
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 uses inhalers often\par -has lost approx 20 lbs since May, has poor appetite\par -she continues to follow up with pulm (Jl Miller) sees him in 2 weeks and will be getting repeat CT Chest\par

## 2021-08-18 ENCOUNTER — RESULT REVIEW (OUTPATIENT)
Age: 71
End: 2021-08-18

## 2021-08-18 ENCOUNTER — APPOINTMENT (OUTPATIENT)
Dept: HEMATOLOGY ONCOLOGY | Facility: CLINIC | Age: 71
End: 2021-08-18

## 2021-08-18 LAB
ANISOCYTOSIS BLD QL: SLIGHT — SIGNIFICANT CHANGE UP
BASOPHILS # BLD AUTO: 0.22 K/UL — HIGH (ref 0–0.2)
BASOPHILS NFR BLD AUTO: 1 % — SIGNIFICANT CHANGE UP (ref 0–2)
BLASTS # FLD: 1 % — HIGH (ref 0–0)
ELLIPTOCYTES BLD QL SMEAR: SLIGHT — SIGNIFICANT CHANGE UP
EOSINOPHIL # BLD AUTO: 0.87 K/UL — HIGH (ref 0–0.5)
EOSINOPHIL NFR BLD AUTO: 4 % — SIGNIFICANT CHANGE UP (ref 0–6)
HCT VFR BLD CALC: 30.8 % — LOW (ref 34.5–45)
HGB BLD-MCNC: 8.8 G/DL — LOW (ref 11.5–15.5)
HYPOCHROMIA BLD QL: SLIGHT — SIGNIFICANT CHANGE UP
LG PLATELETS BLD QL AUTO: SLIGHT — SIGNIFICANT CHANGE UP
LYMPHOCYTES # BLD AUTO: 0.87 K/UL — LOW (ref 1–3.3)
LYMPHOCYTES # BLD AUTO: 4 % — LOW (ref 13–44)
MACROCYTES BLD QL: SLIGHT — SIGNIFICANT CHANGE UP
MCHC RBC-ENTMCNC: 28 PG — SIGNIFICANT CHANGE UP (ref 27–34)
MCHC RBC-ENTMCNC: 28.6 G/DL — LOW (ref 32–36)
MCV RBC AUTO: 98.1 FL — SIGNIFICANT CHANGE UP (ref 80–100)
MICROCYTES BLD QL: SLIGHT — SIGNIFICANT CHANGE UP
MONOCYTES # BLD AUTO: 0.43 K/UL — SIGNIFICANT CHANGE UP (ref 0–0.9)
MONOCYTES NFR BLD AUTO: 2 % — SIGNIFICANT CHANGE UP (ref 2–14)
NEUTROPHILS # BLD AUTO: 19.09 K/UL — HIGH (ref 1.8–7.4)
NEUTROPHILS NFR BLD AUTO: 88 % — HIGH (ref 43–77)
NRBC # BLD: 0 /100 — SIGNIFICANT CHANGE UP (ref 0–0)
NRBC # BLD: SIGNIFICANT CHANGE UP /100 WBCS (ref 0–0)
OVALOCYTES BLD QL SMEAR: SLIGHT — SIGNIFICANT CHANGE UP
PLAT MORPH BLD: NORMAL — SIGNIFICANT CHANGE UP
PLATELET # BLD AUTO: 657 K/UL — HIGH (ref 150–400)
POIKILOCYTOSIS BLD QL AUTO: SLIGHT — SIGNIFICANT CHANGE UP
POLYCHROMASIA BLD QL SMEAR: SLIGHT — SIGNIFICANT CHANGE UP
RBC # BLD: 3.14 M/UL — LOW (ref 3.8–5.2)
RBC # FLD: 22.8 % — HIGH (ref 10.3–14.5)
RBC BLD AUTO: ABNORMAL
SCHISTOCYTES BLD QL AUTO: SLIGHT — SIGNIFICANT CHANGE UP
SPHEROCYTES BLD QL SMEAR: SLIGHT — SIGNIFICANT CHANGE UP
WBC # BLD: 21.69 K/UL — HIGH (ref 3.8–10.5)
WBC # FLD AUTO: 21.69 K/UL — HIGH (ref 3.8–10.5)

## 2021-08-18 PROCEDURE — 86901 BLOOD TYPING SEROLOGIC RH(D): CPT

## 2021-08-18 PROCEDURE — 86850 RBC ANTIBODY SCREEN: CPT

## 2021-08-18 PROCEDURE — 86900 BLOOD TYPING SEROLOGIC ABO: CPT

## 2021-09-01 ENCOUNTER — RESULT REVIEW (OUTPATIENT)
Age: 71
End: 2021-09-01

## 2021-09-01 ENCOUNTER — APPOINTMENT (OUTPATIENT)
Dept: HEMATOLOGY ONCOLOGY | Facility: CLINIC | Age: 71
End: 2021-09-01
Payer: MEDICARE

## 2021-09-01 VITALS
HEART RATE: 84 BPM | SYSTOLIC BLOOD PRESSURE: 113 MMHG | WEIGHT: 186.73 LBS | TEMPERATURE: 97.2 F | OXYGEN SATURATION: 99 % | DIASTOLIC BLOOD PRESSURE: 56 MMHG | BODY MASS INDEX: 31.11 KG/M2 | RESPIRATION RATE: 16 BRPM | HEIGHT: 65 IN

## 2021-09-01 LAB
ANISOCYTOSIS BLD QL: SLIGHT — SIGNIFICANT CHANGE UP
BASOPHILS # BLD AUTO: 0.24 K/UL — HIGH (ref 0–0.2)
BASOPHILS NFR BLD AUTO: 1 % — SIGNIFICANT CHANGE UP (ref 0–2)
BLASTS # FLD: 1 % — HIGH (ref 0–0)
ELLIPTOCYTES BLD QL SMEAR: SLIGHT — SIGNIFICANT CHANGE UP
EOSINOPHIL # BLD AUTO: 0.95 K/UL — HIGH (ref 0–0.5)
EOSINOPHIL NFR BLD AUTO: 4 % — SIGNIFICANT CHANGE UP (ref 0–6)
HCT VFR BLD CALC: 29.8 % — LOW (ref 34.5–45)
HGB BLD-MCNC: 8.6 G/DL — LOW (ref 11.5–15.5)
HYPOCHROMIA BLD QL: SLIGHT — SIGNIFICANT CHANGE UP
LG PLATELETS BLD QL AUTO: SLIGHT — SIGNIFICANT CHANGE UP
LYMPHOCYTES # BLD AUTO: 1.65 K/UL — SIGNIFICANT CHANGE UP (ref 1–3.3)
LYMPHOCYTES # BLD AUTO: 7 % — LOW (ref 13–44)
MACROCYTES BLD QL: SLIGHT — SIGNIFICANT CHANGE UP
MCHC RBC-ENTMCNC: 27.2 PG — SIGNIFICANT CHANGE UP (ref 27–34)
MCHC RBC-ENTMCNC: 28.9 G/DL — LOW (ref 32–36)
MCV RBC AUTO: 94.3 FL — SIGNIFICANT CHANGE UP (ref 80–100)
METAMYELOCYTES # FLD: 2 % — HIGH (ref 0–0)
MICROCYTES BLD QL: SLIGHT — SIGNIFICANT CHANGE UP
MONOCYTES # BLD AUTO: 0.71 K/UL — SIGNIFICANT CHANGE UP (ref 0–0.9)
MONOCYTES NFR BLD AUTO: 3 % — SIGNIFICANT CHANGE UP (ref 2–14)
MYELOCYTES NFR BLD: 1 % — HIGH (ref 0–0)
NEUTROPHILS # BLD AUTO: 19.14 K/UL — HIGH (ref 1.8–7.4)
NEUTROPHILS NFR BLD AUTO: 81 % — HIGH (ref 43–77)
NRBC # BLD: 0 /100 — SIGNIFICANT CHANGE UP (ref 0–0)
NRBC # BLD: SIGNIFICANT CHANGE UP /100 WBCS (ref 0–0)
OVALOCYTES BLD QL SMEAR: SLIGHT — SIGNIFICANT CHANGE UP
PLAT MORPH BLD: ABNORMAL
PLATELET # BLD AUTO: 520 K/UL — HIGH (ref 150–400)
POIKILOCYTOSIS BLD QL AUTO: SLIGHT — SIGNIFICANT CHANGE UP
POLYCHROMASIA BLD QL SMEAR: SLIGHT — SIGNIFICANT CHANGE UP
RBC # BLD: 3.16 M/UL — LOW (ref 3.8–5.2)
RBC # FLD: 23.6 % — HIGH (ref 10.3–14.5)
RBC BLD AUTO: ABNORMAL
SPHEROCYTES BLD QL SMEAR: SLIGHT — SIGNIFICANT CHANGE UP
WBC # BLD: 23.63 K/UL — HIGH (ref 3.8–10.5)
WBC # FLD AUTO: 23.63 K/UL — HIGH (ref 3.8–10.5)

## 2021-09-01 PROCEDURE — 99214 OFFICE O/P EST MOD 30 MIN: CPT

## 2021-09-01 NOTE — HISTORY OF PRESENT ILLNESS
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

## 2021-09-29 ENCOUNTER — OUTPATIENT (OUTPATIENT)
Dept: OUTPATIENT SERVICES | Facility: HOSPITAL | Age: 71
LOS: 1 days | Discharge: HOME | End: 2021-09-29

## 2021-09-29 DIAGNOSIS — S99.919A UNSPECIFIED INJURY OF UNSPECIFIED ANKLE, INITIAL ENCOUNTER: Chronic | ICD-10-CM

## 2021-09-29 DIAGNOSIS — D69.6 THROMBOCYTOPENIA, UNSPECIFIED: ICD-10-CM

## 2021-09-29 DIAGNOSIS — K81.9 CHOLECYSTITIS, UNSPECIFIED: Chronic | ICD-10-CM

## 2021-10-07 ENCOUNTER — APPOINTMENT (OUTPATIENT)
Dept: HEMATOLOGY ONCOLOGY | Facility: CLINIC | Age: 71
End: 2021-10-07
Payer: MEDICARE

## 2021-10-07 ENCOUNTER — RESULT REVIEW (OUTPATIENT)
Age: 71
End: 2021-10-07

## 2021-10-07 VITALS
DIASTOLIC BLOOD PRESSURE: 85 MMHG | WEIGHT: 187.39 LBS | RESPIRATION RATE: 16 BRPM | HEART RATE: 84 BPM | OXYGEN SATURATION: 98 % | BODY MASS INDEX: 31.18 KG/M2 | TEMPERATURE: 98.1 F | SYSTOLIC BLOOD PRESSURE: 142 MMHG

## 2021-10-07 LAB
ANISOCYTOSIS BLD QL: SLIGHT — SIGNIFICANT CHANGE UP
BASOPHILS # BLD AUTO: 0.27 K/UL — HIGH (ref 0–0.2)
BASOPHILS NFR BLD AUTO: 1 % — SIGNIFICANT CHANGE UP (ref 0–2)
BLASTS # FLD: 1 % — HIGH (ref 0–0)
DACRYOCYTES BLD QL SMEAR: SLIGHT — SIGNIFICANT CHANGE UP
ELLIPTOCYTES BLD QL SMEAR: SLIGHT — SIGNIFICANT CHANGE UP
EOSINOPHIL # BLD AUTO: 0.54 K/UL — HIGH (ref 0–0.5)
EOSINOPHIL NFR BLD AUTO: 2 % — SIGNIFICANT CHANGE UP (ref 0–6)
HCT VFR BLD CALC: 28.5 % — LOW (ref 34.5–45)
HGB BLD-MCNC: 8.5 G/DL — LOW (ref 11.5–15.5)
HYPOCHROMIA BLD QL: SLIGHT — SIGNIFICANT CHANGE UP
LG PLATELETS BLD QL AUTO: SLIGHT — SIGNIFICANT CHANGE UP
LYMPHOCYTES # BLD AUTO: 0.54 K/UL — LOW (ref 1–3.3)
LYMPHOCYTES # BLD AUTO: 2 % — LOW (ref 13–44)
MCHC RBC-ENTMCNC: 27.8 PG — SIGNIFICANT CHANGE UP (ref 27–34)
MCHC RBC-ENTMCNC: 29.8 G/DL — LOW (ref 32–36)
MCV RBC AUTO: 93.1 FL — SIGNIFICANT CHANGE UP (ref 80–100)
METAMYELOCYTES # FLD: 1 % — HIGH (ref 0–0)
MONOCYTES # BLD AUTO: 0.27 K/UL — SIGNIFICANT CHANGE UP (ref 0–0.9)
MONOCYTES NFR BLD AUTO: 1 % — LOW (ref 2–14)
MYELOCYTES NFR BLD: 1 % — HIGH (ref 0–0)
NEUTROPHILS # BLD AUTO: 24.45 K/UL — HIGH (ref 1.8–7.4)
NEUTROPHILS NFR BLD AUTO: 91 % — HIGH (ref 43–77)
NRBC # BLD: 0 /100 — SIGNIFICANT CHANGE UP (ref 0–0)
NRBC # BLD: SIGNIFICANT CHANGE UP /100 WBCS (ref 0–0)
PLAT MORPH BLD: NORMAL — SIGNIFICANT CHANGE UP
PLATELET # BLD AUTO: 759 K/UL — HIGH (ref 150–400)
POIKILOCYTOSIS BLD QL AUTO: SLIGHT — SIGNIFICANT CHANGE UP
POLYCHROMASIA BLD QL SMEAR: SLIGHT — SIGNIFICANT CHANGE UP
RBC # BLD: 3.06 M/UL — LOW (ref 3.8–5.2)
RBC # FLD: 22.4 % — HIGH (ref 10.3–14.5)
RBC BLD AUTO: ABNORMAL
WBC # BLD: 26.87 K/UL — HIGH (ref 3.8–10.5)
WBC # FLD AUTO: 26.87 K/UL — HIGH (ref 3.8–10.5)

## 2021-10-07 PROCEDURE — 99214 OFFICE O/P EST MOD 30 MIN: CPT

## 2021-10-07 NOTE — PHYSICAL EXAM
[Fully active, able to carry on all pre-disease performance without restriction] : Status 0 - Fully active, able to carry on all pre-disease performance without restriction [Normal] : affect appropriate [de-identified] : +splenomegaly, 1 FB below costophrenic margin. Abdomen soft, non-tender

## 2021-10-07 NOTE — HISTORY OF PRESENT ILLNESS
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 Today she has chronic complaints of fatigue, and cough; slightly improved. 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 Seen by pulmonologist (Dr. Farah) who attributes cough to COPD and is managing it via combination of inhaler medications. Patient denies bone pain, fever, chills, night sweats, abdominal pain, or chest pain. Poor appetite, unintentional weight loss but stable as of recent, early satiety.\par

## 2021-10-07 NOTE — ASSESSMENT
[FreeTextEntry1] : 70 yo F with a history of PMF jak2+, Cytogenetics show +1 and +9, + SM on high doses of HU causing worsening anemia with persistently high platelet counts.  \par \par -Currently on Anagrelide 1.5mg BID, but platelet count 759 today. Increase Anagrelide to 2mg BID\par -Hydrea was titrated down to 500mg daily, due to patient reporting low appetite, losing weight, early satiety, palpable spleen. HU may have been helping control some symptoms and now that we titrated down perhaps sx from SM may be worsening, would not expect such marked worsening from hu titration however still with anemia, not much improved from decreasing Hydrea.\par -f/u CT chest (09/2021) done at [facility] which showed diffuse inflammation, no signs of cancer. Is on Abx per pulmonologist for the inflammation findings. She will bring in the report. \par \par Repeat CBC in 2 weeks\par Follow up in 1 month\par Case and management discussed with Dr. Doshi.

## 2021-10-19 LAB
ALBUMIN SERPL ELPH-MCNC: 3.2 G/DL
ALBUMIN SERPL ELPH-MCNC: 3.3 G/DL
ALP BLD-CCNC: 87 U/L
ALP BLD-CCNC: 92 U/L
ALT SERPL-CCNC: 6 U/L
ALT SERPL-CCNC: <5 U/L
ANION GAP SERPL CALC-SCNC: 14 MMOL/L
ANION GAP SERPL CALC-SCNC: 14 MMOL/L
AST SERPL-CCNC: 9 U/L
AST SERPL-CCNC: 9 U/L
BILIRUB SERPL-MCNC: 0.3 MG/DL
BILIRUB SERPL-MCNC: 0.6 MG/DL
BUN SERPL-MCNC: 9 MG/DL
BUN SERPL-MCNC: 9 MG/DL
CALCIUM SERPL-MCNC: 8.4 MG/DL
CALCIUM SERPL-MCNC: 8.4 MG/DL
CHLORIDE SERPL-SCNC: 101 MMOL/L
CHLORIDE SERPL-SCNC: 97 MMOL/L
CO2 SERPL-SCNC: 26 MMOL/L
CO2 SERPL-SCNC: 27 MMOL/L
CREAT SERPL-MCNC: 0.88 MG/DL
CREAT SERPL-MCNC: 1.01 MG/DL
GLUCOSE SERPL-MCNC: 126 MG/DL
GLUCOSE SERPL-MCNC: 81 MG/DL
POTASSIUM SERPL-SCNC: 3.8 MMOL/L
POTASSIUM SERPL-SCNC: 3.9 MMOL/L
PROT SERPL-MCNC: 6 G/DL
PROT SERPL-MCNC: 6.4 G/DL
SODIUM SERPL-SCNC: 138 MMOL/L
SODIUM SERPL-SCNC: 140 MMOL/L

## 2021-10-27 ENCOUNTER — RESULT REVIEW (OUTPATIENT)
Age: 71
End: 2021-10-27

## 2021-10-27 ENCOUNTER — NON-APPOINTMENT (OUTPATIENT)
Age: 71
End: 2021-10-27

## 2021-10-27 ENCOUNTER — APPOINTMENT (OUTPATIENT)
Dept: HEMATOLOGY ONCOLOGY | Facility: CLINIC | Age: 71
End: 2021-10-27

## 2021-10-27 LAB
ANISOCYTOSIS BLD QL: SLIGHT — SIGNIFICANT CHANGE UP
BASOPHILS # BLD AUTO: 0.29 K/UL — HIGH (ref 0–0.2)
BASOPHILS NFR BLD AUTO: 1 % — SIGNIFICANT CHANGE UP (ref 0–2)
BLASTS # FLD: 2 % — HIGH (ref 0–0)
DACRYOCYTES BLD QL SMEAR: SLIGHT — SIGNIFICANT CHANGE UP
ELLIPTOCYTES BLD QL SMEAR: SLIGHT — SIGNIFICANT CHANGE UP
EOSINOPHIL # BLD AUTO: 1.44 K/UL — HIGH (ref 0–0.5)
EOSINOPHIL NFR BLD AUTO: 5 % — SIGNIFICANT CHANGE UP (ref 0–6)
HCT VFR BLD CALC: 30.4 % — LOW (ref 34.5–45)
HGB BLD-MCNC: 9.1 G/DL — LOW (ref 11.5–15.5)
HYPOCHROMIA BLD QL: SLIGHT — SIGNIFICANT CHANGE UP
LYMPHOCYTES # BLD AUTO: 1.15 K/UL — SIGNIFICANT CHANGE UP (ref 1–3.3)
LYMPHOCYTES # BLD AUTO: 4 % — LOW (ref 13–44)
MCHC RBC-ENTMCNC: 26.8 PG — LOW (ref 27–34)
MCHC RBC-ENTMCNC: 29.9 G/DL — LOW (ref 32–36)
MCV RBC AUTO: 89.7 FL — SIGNIFICANT CHANGE UP (ref 80–100)
MICROCYTES BLD QL: SLIGHT — SIGNIFICANT CHANGE UP
MONOCYTES # BLD AUTO: 0.86 K/UL — SIGNIFICANT CHANGE UP (ref 0–0.9)
MONOCYTES NFR BLD AUTO: 3 % — SIGNIFICANT CHANGE UP (ref 2–14)
MYELOCYTES NFR BLD: 1 % — HIGH (ref 0–0)
NEUTROPHILS # BLD AUTO: 24.13 K/UL — HIGH (ref 1.8–7.4)
NEUTROPHILS NFR BLD AUTO: 84 % — HIGH (ref 43–77)
NRBC # BLD: 0 /100 — SIGNIFICANT CHANGE UP (ref 0–0)
NRBC # BLD: SIGNIFICANT CHANGE UP /100 WBCS (ref 0–0)
PLAT MORPH BLD: NORMAL — SIGNIFICANT CHANGE UP
PLATELET # BLD AUTO: 540 K/UL — HIGH (ref 150–400)
POIKILOCYTOSIS BLD QL AUTO: SLIGHT — SIGNIFICANT CHANGE UP
POLYCHROMASIA BLD QL SMEAR: SLIGHT — SIGNIFICANT CHANGE UP
RBC # BLD: 3.39 M/UL — LOW (ref 3.8–5.2)
RBC # FLD: 21.3 % — HIGH (ref 10.3–14.5)
RBC BLD AUTO: ABNORMAL
WBC # BLD: 28.73 K/UL — HIGH (ref 3.8–10.5)
WBC # FLD AUTO: 28.73 K/UL — HIGH (ref 3.8–10.5)

## 2021-11-01 ENCOUNTER — OUTPATIENT (OUTPATIENT)
Dept: OUTPATIENT SERVICES | Facility: HOSPITAL | Age: 71
LOS: 1 days | Discharge: HOME | End: 2021-11-01

## 2021-11-01 DIAGNOSIS — S99.919A UNSPECIFIED INJURY OF UNSPECIFIED ANKLE, INITIAL ENCOUNTER: Chronic | ICD-10-CM

## 2021-11-01 DIAGNOSIS — D69.6 THROMBOCYTOPENIA, UNSPECIFIED: ICD-10-CM

## 2021-11-01 DIAGNOSIS — K81.9 CHOLECYSTITIS, UNSPECIFIED: Chronic | ICD-10-CM

## 2021-11-03 ENCOUNTER — RESULT REVIEW (OUTPATIENT)
Age: 71
End: 2021-11-03

## 2021-11-03 ENCOUNTER — APPOINTMENT (OUTPATIENT)
Dept: HEMATOLOGY ONCOLOGY | Facility: CLINIC | Age: 71
End: 2021-11-03
Payer: MEDICARE

## 2021-11-03 VITALS
BODY MASS INDEX: 28.58 KG/M2 | RESPIRATION RATE: 16 BRPM | DIASTOLIC BLOOD PRESSURE: 77 MMHG | WEIGHT: 171.52 LBS | HEIGHT: 65.04 IN | SYSTOLIC BLOOD PRESSURE: 145 MMHG | TEMPERATURE: 97.5 F | HEART RATE: 82 BPM | OXYGEN SATURATION: 99 %

## 2021-11-03 DIAGNOSIS — R05.3 CHRONIC COUGH: ICD-10-CM

## 2021-11-03 LAB
ANISOCYTOSIS BLD QL: SLIGHT — SIGNIFICANT CHANGE UP
BASOPHILS # BLD AUTO: 0.22 K/UL — HIGH (ref 0–0.2)
BASOPHILS NFR BLD AUTO: 1 % — SIGNIFICANT CHANGE UP (ref 0–2)
DACRYOCYTES BLD QL SMEAR: SLIGHT — SIGNIFICANT CHANGE UP
ELLIPTOCYTES BLD QL SMEAR: SLIGHT — SIGNIFICANT CHANGE UP
EOSINOPHIL # BLD AUTO: 0.44 K/UL — SIGNIFICANT CHANGE UP (ref 0–0.5)
EOSINOPHIL NFR BLD AUTO: 2 % — SIGNIFICANT CHANGE UP (ref 0–6)
HCT VFR BLD CALC: 27.5 % — LOW (ref 34.5–45)
HGB BLD-MCNC: 7.8 G/DL — LOW (ref 11.5–15.5)
HYPOCHROMIA BLD QL: SLIGHT — SIGNIFICANT CHANGE UP
LG PLATELETS BLD QL AUTO: SLIGHT — SIGNIFICANT CHANGE UP
LYMPHOCYTES # BLD AUTO: 0.89 K/UL — LOW (ref 1–3.3)
LYMPHOCYTES # BLD AUTO: 4 % — LOW (ref 13–44)
MCHC RBC-ENTMCNC: 26.4 PG — LOW (ref 27–34)
MCHC RBC-ENTMCNC: 28.4 G/DL — LOW (ref 32–36)
MCV RBC AUTO: 93.2 FL — SIGNIFICANT CHANGE UP (ref 80–100)
METAMYELOCYTES # FLD: 1 % — HIGH (ref 0–0)
MICROCYTES BLD QL: SLIGHT — SIGNIFICANT CHANGE UP
MONOCYTES # BLD AUTO: 0.44 K/UL — SIGNIFICANT CHANGE UP (ref 0–0.9)
MONOCYTES NFR BLD AUTO: 2 % — SIGNIFICANT CHANGE UP (ref 2–14)
NEUTROPHILS # BLD AUTO: 19.92 K/UL — HIGH (ref 1.8–7.4)
NEUTROPHILS NFR BLD AUTO: 90 % — HIGH (ref 43–77)
NRBC # BLD: 0 /100 — SIGNIFICANT CHANGE UP (ref 0–0)
NRBC # BLD: SIGNIFICANT CHANGE UP /100 WBCS (ref 0–0)
PLAT MORPH BLD: ABNORMAL
PLATELET # BLD AUTO: 445 K/UL — HIGH (ref 150–400)
POIKILOCYTOSIS BLD QL AUTO: SLIGHT — SIGNIFICANT CHANGE UP
POLYCHROMASIA BLD QL SMEAR: SLIGHT — SIGNIFICANT CHANGE UP
RBC # BLD: 2.95 M/UL — LOW (ref 3.8–5.2)
RBC # FLD: 22.6 % — HIGH (ref 10.3–14.5)
RBC BLD AUTO: ABNORMAL
WBC # BLD: 22.13 K/UL — HIGH (ref 3.8–10.5)
WBC # FLD AUTO: 22.13 K/UL — HIGH (ref 3.8–10.5)

## 2021-11-03 PROCEDURE — 99214 OFFICE O/P EST MOD 30 MIN: CPT

## 2021-11-03 NOTE — HISTORY OF PRESENT ILLNESS
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

## 2021-11-03 NOTE — ASSESSMENT
[FreeTextEntry1] : 72 yo F with a history of PMF jak2+, Cytogenetics show +1 and +9, + SM on high doses of HU causing worsening anemia with persistently high platelet counts.  \par \par \par Anemia:\par -on previous bone marrow biopsy, she had low iron stores\par -check iron studies today, if low \par -plan for 2 doses of iv venofer 200mg \par -if this does not work could initiate epo versus danazol\par -would hold off oral given her poor po intake at this time\par \par Splenomegaly and thrombocytosis\par -anagrelide has improved thrombocytosis  but patietns appetite and SM seems to be worsened\par -incresed HU last week to 500/1000 alternating\par -still on anagrelide 1.5bID\par -would plan to increase HU especially if Hb improves with iron supplementation\par -had a very bad response to jakafi, consider trial of fedratinib, will add on epo level and thiamine level \par -if patient against initiation of fedratinib and no response to iron and HU titration, look for clinical trial (concern if she will be a candidate given carcinoid)\par \par Carcinoid:\par being followed by Dr Farah at [place]\par -cough persists\par -reached out to his office today to get a betters sense of her diseaes status, possibility that there may be progressive disease\par -after discussion see if she needs to be seen by oncology here\par \par Return visit 1 week\par follow closely\par \par \par

## 2021-11-03 NOTE — PHYSICAL EXAM
[Fully active, able to carry on all pre-disease performance without restriction] : Status 0 - Fully active, able to carry on all pre-disease performance without restriction [Thin] : thin [de-identified] : spleen + 5 finger breaths below costal margin

## 2021-11-04 LAB
FERRITIN SERPL-MCNC: 475 NG/ML
FOLATE SERPL-MCNC: 9.7 NG/ML
IRON SATN MFR SERPL: 24 %
IRON SERPL-MCNC: 35 UG/DL
TIBC SERPL-MCNC: 147 UG/DL
UIBC SERPL-MCNC: 112 UG/DL
VIT B12 SERPL-MCNC: 1369 PG/ML

## 2021-11-08 ENCOUNTER — APPOINTMENT (OUTPATIENT)
Dept: INFUSION THERAPY | Facility: HOSPITAL | Age: 71
End: 2021-11-08

## 2021-11-09 DIAGNOSIS — R11.2 NAUSEA WITH VOMITING, UNSPECIFIED: ICD-10-CM

## 2021-11-11 ENCOUNTER — RESULT REVIEW (OUTPATIENT)
Age: 71
End: 2021-11-11

## 2021-11-11 ENCOUNTER — LABORATORY RESULT (OUTPATIENT)
Age: 71
End: 2021-11-11

## 2021-11-11 ENCOUNTER — APPOINTMENT (OUTPATIENT)
Dept: HEMATOLOGY ONCOLOGY | Facility: CLINIC | Age: 71
End: 2021-11-11
Payer: MEDICARE

## 2021-11-11 VITALS
DIASTOLIC BLOOD PRESSURE: 80 MMHG | RESPIRATION RATE: 16 BRPM | SYSTOLIC BLOOD PRESSURE: 134 MMHG | TEMPERATURE: 98 F | OXYGEN SATURATION: 98 % | WEIGHT: 171.96 LBS | BODY MASS INDEX: 28.58 KG/M2 | HEART RATE: 102 BPM

## 2021-11-11 LAB
ANISOCYTOSIS BLD QL: SLIGHT — SIGNIFICANT CHANGE UP
BASOPHILS # BLD AUTO: 0.59 K/UL — HIGH (ref 0–0.2)
BASOPHILS NFR BLD AUTO: 2 % — SIGNIFICANT CHANGE UP (ref 0–2)
BLASTS # FLD: 1 % — HIGH (ref 0–0)
DACRYOCYTES BLD QL SMEAR: SLIGHT — SIGNIFICANT CHANGE UP
ELLIPTOCYTES BLD QL SMEAR: SLIGHT — SIGNIFICANT CHANGE UP
EOSINOPHIL # BLD AUTO: 0.89 K/UL — HIGH (ref 0–0.5)
EOSINOPHIL NFR BLD AUTO: 3 % — SIGNIFICANT CHANGE UP (ref 0–6)
HCT VFR BLD CALC: 27.8 % — LOW (ref 34.5–45)
HGB BLD-MCNC: 8.2 G/DL — LOW (ref 11.5–15.5)
HYPOCHROMIA BLD QL: SLIGHT — SIGNIFICANT CHANGE UP
LG PLATELETS BLD QL AUTO: SLIGHT — SIGNIFICANT CHANGE UP
LYMPHOCYTES # BLD AUTO: 1.19 K/UL — SIGNIFICANT CHANGE UP (ref 1–3.3)
LYMPHOCYTES # BLD AUTO: 4 % — LOW (ref 13–44)
MACROCYTES BLD QL: SLIGHT — SIGNIFICANT CHANGE UP
MCHC RBC-ENTMCNC: 26.7 PG — LOW (ref 27–34)
MCHC RBC-ENTMCNC: 29.5 G/DL — LOW (ref 32–36)
MCV RBC AUTO: 90.6 FL — SIGNIFICANT CHANGE UP (ref 80–100)
MICROCYTES BLD QL: SLIGHT — SIGNIFICANT CHANGE UP
MONOCYTES # BLD AUTO: 0.89 K/UL — SIGNIFICANT CHANGE UP (ref 0–0.9)
MONOCYTES NFR BLD AUTO: 3 % — SIGNIFICANT CHANGE UP (ref 2–14)
MYELOCYTES NFR BLD: 1 % — HIGH (ref 0–0)
NEUTROPHILS # BLD AUTO: 25.55 K/UL — HIGH (ref 1.8–7.4)
NEUTROPHILS NFR BLD AUTO: 86 % — HIGH (ref 43–77)
NRBC # BLD: 0 /100 — SIGNIFICANT CHANGE UP (ref 0–0)
NRBC # BLD: SIGNIFICANT CHANGE UP /100 WBCS (ref 0–0)
OVALOCYTES BLD QL SMEAR: SLIGHT — SIGNIFICANT CHANGE UP
PLAT MORPH BLD: NORMAL — SIGNIFICANT CHANGE UP
PLATELET # BLD AUTO: 693 K/UL — HIGH (ref 150–400)
POIKILOCYTOSIS BLD QL AUTO: SLIGHT — SIGNIFICANT CHANGE UP
POLYCHROMASIA BLD QL SMEAR: SLIGHT — SIGNIFICANT CHANGE UP
RBC # BLD: 3.07 M/UL — LOW (ref 3.8–5.2)
RBC # FLD: 22.5 % — HIGH (ref 10.3–14.5)
RBC BLD AUTO: ABNORMAL
STOMATOCYTES BLD QL SMEAR: SLIGHT — SIGNIFICANT CHANGE UP
WBC # BLD: 29.71 K/UL — HIGH (ref 3.8–10.5)
WBC # FLD AUTO: 29.71 K/UL — HIGH (ref 3.8–10.5)

## 2021-11-11 PROCEDURE — 99214 OFFICE O/P EST MOD 30 MIN: CPT

## 2021-11-11 NOTE — ASSESSMENT
[FreeTextEntry1] : 72 yo F with a history of PMF jak2+, Cytogenetics show +1 and +9, + SM on high doses of HU causing worsening anemia with persistently high platelet counts.  \par US (2017): Spleen 15.3cm\par US (3/2020): Spleen 15.9cm\par US (3/2021): Spleen 17.7cm\par \par Splenomegaly and thrombocytosis\par -Splenic US scheduled for 11/22/21\par -Anagrelide 1.5mg BID has improved thrombocytosis initially but plts 693 today. Patients appetite and SM seems to be worsened\par -Continue HU 500mg BID, would consider increasing HU if Hb improves\par -Had a very bad response to Jakafi. Fedratinib 100mg daily to be started, and titrate to goal of 400mg daily as tolerated, for now will aim to achieve 200mg daily. Check epo, thiamine, lipase, and amylase prior to initiation (done today). \par -So far no response to iron and HU titration, if Fedratinib fails look for clinical trial (concern if she will be a candidate given carcinoid)\par \par Anemia:\par -on previous bone marrow biopsy (5/6/2020), she had low iron stores\par -11/3/21 labs found to have ferritin of 475, iron 35, sat 24%. Recieved one dose of Venofer 200mg on 11/8/21 without clinical improvement\par -iron repletion did not work could initiate epo versus danazol\par \par Carcinoid:\par -Spoke with Dr. Farah at [place], who strongly believes there is no concern for persistent disease\par -Cough persists, she will follow up with her pulmonologist \par \par Return for count check 1 week after starting Fedratinib, ~11/22/21; side profile/risk/benefits reviewed with patient in office and provided information pamphlet. \par Follow up in 1 month\par Case and management discussed with Dr. Doshi

## 2021-11-11 NOTE — HISTORY OF PRESENT ILLNESS
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 Farah who strongly believes there is no concern for persistent disease\par -she is continuously coughing, she will follow up with her pulmonologist\par -her spleen is also palpable on exam \par -she is exhausted and did not feel any improvement after the Venofer infusion\par

## 2021-11-11 NOTE — PHYSICAL EXAM
[Fully active, able to carry on all pre-disease performance without restriction] : Status 0 - Fully active, able to carry on all pre-disease performance without restriction [Thin] : thin [Normal] : RRR, normal S1S2, no murmurs, rubs, gallops [de-identified] : spleen + 5 finger breaths below costal margin

## 2021-11-11 NOTE — REVIEW OF SYSTEMS
[Negative] : Allergic/Immunologic [Fatigue] : fatigue [Recent Change In Weight] : ~T recent weight change [Cough] : cough [Depression] : depression [Fever] : no fever [Chills] : no chills [Night Sweats] : no night sweats [Wheezing] : no wheezing [Abdominal Pain] : no abdominal pain [Vomiting] : no vomiting [Constipation] : no constipation [Diarrhea] : no diarrhea [Suicidal] : not suicidal [FreeTextEntry7] : +Early satiety

## 2021-11-16 ENCOUNTER — APPOINTMENT (OUTPATIENT)
Dept: CARDIOLOGY | Facility: CLINIC | Age: 71
End: 2021-11-16
Payer: MEDICARE

## 2021-11-16 ENCOUNTER — NON-APPOINTMENT (OUTPATIENT)
Age: 71
End: 2021-11-16

## 2021-11-16 VITALS
HEIGHT: 66 IN | SYSTOLIC BLOOD PRESSURE: 109 MMHG | BODY MASS INDEX: 26.68 KG/M2 | HEART RATE: 75 BPM | WEIGHT: 166 LBS | DIASTOLIC BLOOD PRESSURE: 68 MMHG

## 2021-11-16 VITALS — DIASTOLIC BLOOD PRESSURE: 58 MMHG | SYSTOLIC BLOOD PRESSURE: 98 MMHG

## 2021-11-16 PROCEDURE — 93000 ELECTROCARDIOGRAM COMPLETE: CPT

## 2021-11-16 PROCEDURE — 99214 OFFICE O/P EST MOD 30 MIN: CPT

## 2021-11-16 NOTE — REVIEW OF SYSTEMS
[Feeling Fatigued] : feeling fatigued [Weight Loss (___ Lbs)] : [unfilled] ~Ulb weight loss [Negative] : Heme/Lymph

## 2021-11-16 NOTE — HISTORY OF PRESENT ILLNESS
[FreeTextEntry1] : 70-year-old woman with a history of asthma, obesity, essential thrombocytosis, HFPEF, normal cors in 12/2018, PAF s/p ablation in 2/7/19.\par \par Since her last visit, she had VATS with removal of carcinoid in 5/2021. She has had about a 70lbs weight loss since. She reports a lack of appetite, increased fatigue. She is trying to stay hydrated. She denies any near syncope or syncope. \par \par She   denies any chest pain, PND, orthopnea, lower extremity edema,  strokelike symptoms. Her dyspnea on exertion has worsened recently. he no longer has any palpitations. \par  No reported melena, hematochezia or hematemesis.  She is compliant with her medications.

## 2021-11-16 NOTE — DISCUSSION/SUMMARY
[FreeTextEntry1] : 71-year-old woman with a history as listed presents for a followup visit.\par \par Mallory has had profound weight loss.  This may be related to her underlying carcinoid.  She will follow up with Dr. Gray at [place] for this.  She may need to see an oncologist in the near future.  For now she will follow-up with hematology in regards to her anemia.  She recently has been stable.\par \par Of note despite her carcinoid diagnosis mitral valve and tricuspid valve did not have any signs of distortion from carcinoid.\par \par She denies any anginal symptoms. She is s/p cath on 12/2018 with normal coronaries. I don’t this that a repeat ischemic evaluation in setting of worsening anemia is prudent. \par \par  She is euvolemic on exam.  Her blood pressure is running low.  I will stop her Lasix given her poor p.o. intake.  She will monitor for worsening volume.\par  \par She is status post an A. fib ablation.  At this time she has an irregular rhythm.  Her EKG today is more consistent with a wandering atrial pacemaker or sinus rhythm with frequent PACs.  She will continue Toprol 50mg Qday.  She has maintained her Xarelto therapy.  She has no signs of clinical bleeding.  However given her anemia is likely not prudent to continue Xarelto and aspirin therapy.  If okay with hematology she should stop her aspirin.\par \par Exercise and diet counseling was performed in order to reduce her future cardiovascular risk. \par She will followup with me in 2 months

## 2021-11-22 ENCOUNTER — APPOINTMENT (OUTPATIENT)
Dept: HEMATOLOGY ONCOLOGY | Facility: CLINIC | Age: 71
End: 2021-11-22

## 2021-11-22 ENCOUNTER — OUTPATIENT (OUTPATIENT)
Dept: OUTPATIENT SERVICES | Facility: HOSPITAL | Age: 71
LOS: 1 days | End: 2021-11-22
Payer: MEDICARE

## 2021-11-22 ENCOUNTER — APPOINTMENT (OUTPATIENT)
Dept: INFUSION THERAPY | Facility: HOSPITAL | Age: 71
End: 2021-11-22

## 2021-11-22 ENCOUNTER — APPOINTMENT (OUTPATIENT)
Dept: ULTRASOUND IMAGING | Facility: IMAGING CENTER | Age: 71
End: 2021-11-22
Payer: MEDICARE

## 2021-11-22 DIAGNOSIS — D75.81 MYELOFIBROSIS: ICD-10-CM

## 2021-11-22 DIAGNOSIS — K81.9 CHOLECYSTITIS, UNSPECIFIED: Chronic | ICD-10-CM

## 2021-11-22 DIAGNOSIS — S99.919A UNSPECIFIED INJURY OF UNSPECIFIED ANKLE, INITIAL ENCOUNTER: Chronic | ICD-10-CM

## 2021-11-22 PROCEDURE — 76705 ECHO EXAM OF ABDOMEN: CPT

## 2021-11-22 PROCEDURE — 76705 ECHO EXAM OF ABDOMEN: CPT | Mod: 26

## 2021-11-30 ENCOUNTER — RESULT REVIEW (OUTPATIENT)
Age: 71
End: 2021-11-30

## 2021-11-30 ENCOUNTER — APPOINTMENT (OUTPATIENT)
Dept: HEMATOLOGY ONCOLOGY | Facility: CLINIC | Age: 71
End: 2021-11-30
Payer: MEDICARE

## 2021-11-30 ENCOUNTER — OUTPATIENT (OUTPATIENT)
Dept: OUTPATIENT SERVICES | Facility: HOSPITAL | Age: 71
LOS: 1 days | End: 2021-11-30
Payer: MEDICARE

## 2021-11-30 VITALS
TEMPERATURE: 97.7 F | DIASTOLIC BLOOD PRESSURE: 70 MMHG | OXYGEN SATURATION: 95 % | BODY MASS INDEX: 27.28 KG/M2 | WEIGHT: 169.73 LBS | HEART RATE: 73 BPM | RESPIRATION RATE: 20 BRPM | HEIGHT: 65.98 IN | SYSTOLIC BLOOD PRESSURE: 130 MMHG

## 2021-11-30 DIAGNOSIS — D75.81 MYELOFIBROSIS: ICD-10-CM

## 2021-11-30 DIAGNOSIS — K81.9 CHOLECYSTITIS, UNSPECIFIED: Chronic | ICD-10-CM

## 2021-11-30 DIAGNOSIS — S99.919A UNSPECIFIED INJURY OF UNSPECIFIED ANKLE, INITIAL ENCOUNTER: Chronic | ICD-10-CM

## 2021-11-30 LAB
ALBUMIN SERPL ELPH-MCNC: 3.1 G/DL
ALP BLD-CCNC: 103 U/L
ALT SERPL-CCNC: 5 U/L
AMYLASE/CREAT SERPL: 26 U/L
ANION GAP SERPL CALC-SCNC: 10 MMOL/L
ANISOCYTOSIS BLD QL: SLIGHT — SIGNIFICANT CHANGE UP
AST SERPL-CCNC: 8 U/L
BASOPHILS # BLD AUTO: 0.24 K/UL — HIGH (ref 0–0.2)
BASOPHILS NFR BLD AUTO: 1 % — SIGNIFICANT CHANGE UP (ref 0–2)
BILIRUB SERPL-MCNC: 0.5 MG/DL
BLASTS # FLD: 1 % — HIGH (ref 0–0)
BUN SERPL-MCNC: 8 MG/DL
CALCIUM SERPL-MCNC: 8.2 MG/DL
CHLORIDE SERPL-SCNC: 99 MMOL/L
CO2 SERPL-SCNC: 27 MMOL/L
CREAT SERPL-MCNC: 0.88 MG/DL
DACRYOCYTES BLD QL SMEAR: SLIGHT — SIGNIFICANT CHANGE UP
ELLIPTOCYTES BLD QL SMEAR: SLIGHT — SIGNIFICANT CHANGE UP
EOSINOPHIL # BLD AUTO: 1.41 K/UL — HIGH (ref 0–0.5)
EOSINOPHIL NFR BLD AUTO: 6 % — SIGNIFICANT CHANGE UP (ref 0–6)
GLUCOSE SERPL-MCNC: 98 MG/DL
HCT VFR BLD CALC: 26 % — LOW (ref 34.5–45)
HGB BLD-MCNC: 7.7 G/DL — LOW (ref 11.5–15.5)
HYPOCHROMIA BLD QL: SLIGHT — SIGNIFICANT CHANGE UP
LPL SERPL-CCNC: 17 U/L
LYMPHOCYTES # BLD AUTO: 1.65 K/UL — SIGNIFICANT CHANGE UP (ref 1–3.3)
LYMPHOCYTES # BLD AUTO: 7 % — LOW (ref 13–44)
MCHC RBC-ENTMCNC: 27.1 PG — SIGNIFICANT CHANGE UP (ref 27–34)
MCHC RBC-ENTMCNC: 29.6 G/DL — LOW (ref 32–36)
MCV RBC AUTO: 91.5 FL — SIGNIFICANT CHANGE UP (ref 80–100)
MICROCYTES BLD QL: SLIGHT — SIGNIFICANT CHANGE UP
MONOCYTES # BLD AUTO: 1.18 K/UL — HIGH (ref 0–0.9)
MONOCYTES NFR BLD AUTO: 5 % — SIGNIFICANT CHANGE UP (ref 2–14)
MYELOCYTES NFR BLD: 1 % — HIGH (ref 0–0)
NEUTROPHILS # BLD AUTO: 18.57 K/UL — HIGH (ref 1.8–7.4)
NEUTROPHILS NFR BLD AUTO: 79 % — HIGH (ref 43–77)
NRBC # BLD: 0 /100 — SIGNIFICANT CHANGE UP (ref 0–0)
NRBC # BLD: SIGNIFICANT CHANGE UP /100 WBCS (ref 0–0)
PLAT MORPH BLD: NORMAL — SIGNIFICANT CHANGE UP
PLATELET # BLD AUTO: 512 K/UL — HIGH (ref 150–400)
POIKILOCYTOSIS BLD QL AUTO: SLIGHT — SIGNIFICANT CHANGE UP
POTASSIUM SERPL-SCNC: 4 MMOL/L
PROT SERPL-MCNC: 6.2 G/DL
RBC # BLD: 2.84 M/UL — LOW (ref 3.8–5.2)
RBC # FLD: 22.8 % — HIGH (ref 10.3–14.5)
RBC BLD AUTO: ABNORMAL
SODIUM SERPL-SCNC: 136 MMOL/L
STOMATOCYTES BLD QL SMEAR: SLIGHT — SIGNIFICANT CHANGE UP
VIT B1 SERPL-MCNC: 157.3 NMOL/L
WBC # BLD: 23.5 K/UL — HIGH (ref 3.8–10.5)
WBC # FLD AUTO: 23.5 K/UL — HIGH (ref 3.8–10.5)

## 2021-11-30 PROCEDURE — 99214 OFFICE O/P EST MOD 30 MIN: CPT

## 2021-11-30 NOTE — REVIEW OF SYSTEMS
[Fatigue] : fatigue [Recent Change In Weight] : ~T recent weight change [Cough] : cough [Depression] : depression [Negative] : Allergic/Immunologic [Fever] : no fever [Chills] : no chills [Night Sweats] : no night sweats [Wheezing] : no wheezing [Abdominal Pain] : no abdominal pain [Vomiting] : no vomiting [Constipation] : no constipation [Diarrhea] : no diarrhea [Suicidal] : not suicidal [FreeTextEntry7] : +Early satiety

## 2021-11-30 NOTE — PHYSICAL EXAM
[Fully active, able to carry on all pre-disease performance without restriction] : Status 0 - Fully active, able to carry on all pre-disease performance without restriction [Thin] : thin [Normal] : RRR, normal S1S2, no murmurs, rubs, gallops [de-identified] : spleen + 5 finger breaths below costal margin, slightyl decreased in size

## 2021-11-30 NOTE — ASSESSMENT
[FreeTextEntry1] : 72 yo F with a history of PMF jak2+, Cytogenetics show +1 and +9, + SM on high doses of HU causing worsening anemia with persistently high platelet counts.  \par US (2017): Spleen 15.3cm\par US (3/2020): Spleen 15.9cm\par US (3/2021): Spleen 17.7cm\par US (11/2021): Spleen 19.2cm\par Had a very bad response to Jakafi. No response to iron and HU titration.\par \par Splenomegaly and thrombocytosis\par -Anagrelide 1.5mg BID has improved thrombocytosis initially but plts 693 today. Patients appetite and SM seems to be worsened\par -Decrease HU 500mg daily, given decreasing Hgb. \par -Hgb 7.0 today, will arrange for 1u PRBC\par -CBC weekly\par -Increase Fedratinib to 200mg daily, and will titrate to goal of 400mg daily as tolerated.\par -If Fedratinib fails look for clinical trial (concern if she will be a candidate given carcinoid)\par \par Anemia:\par -on previous bone marrow biopsy (5/6/2020), she had low iron stores\par -11/3/21 labs found to have ferritin of 475, iron 35, sat 24%. Recieved one dose of Venofer 200mg on 11/8/21 without clinical improvement\par -iron repletion did not work could initiate epo versus danazol\par \par Carcinoid:\par -Spoke with Dr. Farah at [place], who strongly believes there is no concern for persistent disease\par -Cough persists, she will follow up with her pulmonologist \par \par Follow up in 2 weeks

## 2021-11-30 NOTE — HISTORY OF PRESENT ILLNESS
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 She has gained 3 pounds since last visit but does still note early satiety.\par -60lb weight loss since March 2021 but has now stabilized\par -had surgery for carcinoid at [facility] [place] in may 2021, spoke with Dr. Farah who strongly believes there is no concern for persistent disease\par -she is continuously coughing, she will follow up with her pulmonologist\par

## 2021-12-01 LAB
AMYLASE/CREAT SERPL: 29 U/L
LPL SERPL-CCNC: 32 U/L

## 2021-12-08 ENCOUNTER — OUTPATIENT (OUTPATIENT)
Dept: OUTPATIENT SERVICES | Facility: HOSPITAL | Age: 71
LOS: 1 days | Discharge: HOME | End: 2021-12-08

## 2021-12-08 DIAGNOSIS — D69.6 THROMBOCYTOPENIA, UNSPECIFIED: ICD-10-CM

## 2021-12-08 DIAGNOSIS — S99.919A UNSPECIFIED INJURY OF UNSPECIFIED ANKLE, INITIAL ENCOUNTER: Chronic | ICD-10-CM

## 2021-12-08 DIAGNOSIS — K81.9 CHOLECYSTITIS, UNSPECIFIED: Chronic | ICD-10-CM

## 2021-12-09 ENCOUNTER — RESULT REVIEW (OUTPATIENT)
Age: 71
End: 2021-12-09

## 2021-12-09 ENCOUNTER — APPOINTMENT (OUTPATIENT)
Dept: HEMATOLOGY ONCOLOGY | Facility: CLINIC | Age: 71
End: 2021-12-09
Payer: MEDICARE

## 2021-12-09 ENCOUNTER — APPOINTMENT (OUTPATIENT)
Dept: HEMATOLOGY ONCOLOGY | Facility: CLINIC | Age: 71
End: 2021-12-09

## 2021-12-09 VITALS
HEIGHT: 66 IN | OXYGEN SATURATION: 99 % | WEIGHT: 174.14 LBS | RESPIRATION RATE: 18 BRPM | HEART RATE: 77 BPM | SYSTOLIC BLOOD PRESSURE: 122 MMHG | TEMPERATURE: 97.4 F | BODY MASS INDEX: 27.99 KG/M2 | DIASTOLIC BLOOD PRESSURE: 73 MMHG

## 2021-12-09 LAB
ANISOCYTOSIS BLD QL: SLIGHT — SIGNIFICANT CHANGE UP
BASOPHILS # BLD AUTO: 0.38 K/UL — HIGH (ref 0–0.2)
BASOPHILS NFR BLD AUTO: 2 % — SIGNIFICANT CHANGE UP (ref 0–2)
BLASTS # FLD: 1 % — HIGH (ref 0–0)
DACRYOCYTES BLD QL SMEAR: SLIGHT — SIGNIFICANT CHANGE UP
ELLIPTOCYTES BLD QL SMEAR: SLIGHT — SIGNIFICANT CHANGE UP
EOSINOPHIL # BLD AUTO: 0.96 K/UL — HIGH (ref 0–0.5)
EOSINOPHIL NFR BLD AUTO: 5 % — SIGNIFICANT CHANGE UP (ref 0–6)
GIANT PLATELETS BLD QL SMEAR: PRESENT — SIGNIFICANT CHANGE UP
HCT VFR BLD CALC: 25.6 % — LOW (ref 34.5–45)
HGB BLD-MCNC: 7.3 G/DL — LOW (ref 11.5–15.5)
HYPOCHROMIA BLD QL: SLIGHT — SIGNIFICANT CHANGE UP
LYMPHOCYTES # BLD AUTO: 0.76 K/UL — LOW (ref 1–3.3)
LYMPHOCYTES # BLD AUTO: 4 % — LOW (ref 13–44)
MCHC RBC-ENTMCNC: 26.7 PG — LOW (ref 27–34)
MCHC RBC-ENTMCNC: 28.5 G/DL — LOW (ref 32–36)
MCV RBC AUTO: 93.8 FL — SIGNIFICANT CHANGE UP (ref 80–100)
MICROCYTES BLD QL: SLIGHT — SIGNIFICANT CHANGE UP
MONOCYTES # BLD AUTO: 0.38 K/UL — SIGNIFICANT CHANGE UP (ref 0–0.9)
MONOCYTES NFR BLD AUTO: 2 % — SIGNIFICANT CHANGE UP (ref 2–14)
NEUTROPHILS # BLD AUTO: 16.44 K/UL — HIGH (ref 1.8–7.4)
NEUTROPHILS NFR BLD AUTO: 86 % — HIGH (ref 43–77)
NRBC # BLD: 0 /100 — SIGNIFICANT CHANGE UP (ref 0–0)
NRBC # BLD: SIGNIFICANT CHANGE UP /100 WBCS (ref 0–0)
PLAT MORPH BLD: NORMAL — SIGNIFICANT CHANGE UP
PLATELET # BLD AUTO: 737 K/UL — HIGH (ref 150–400)
POIKILOCYTOSIS BLD QL AUTO: SLIGHT — SIGNIFICANT CHANGE UP
RBC # BLD: 2.73 M/UL — LOW (ref 3.8–5.2)
RBC # FLD: 22.8 % — HIGH (ref 10.3–14.5)
RBC BLD AUTO: ABNORMAL
STOMATOCYTES BLD QL SMEAR: SLIGHT — SIGNIFICANT CHANGE UP
WBC # BLD: 19.12 K/UL — HIGH (ref 3.8–10.5)
WBC # FLD AUTO: 19.12 K/UL — HIGH (ref 3.8–10.5)

## 2021-12-09 PROCEDURE — 86923 COMPATIBILITY TEST ELECTRIC: CPT

## 2021-12-09 PROCEDURE — 86901 BLOOD TYPING SEROLOGIC RH(D): CPT

## 2021-12-09 PROCEDURE — 86850 RBC ANTIBODY SCREEN: CPT

## 2021-12-09 PROCEDURE — 99214 OFFICE O/P EST MOD 30 MIN: CPT

## 2021-12-09 PROCEDURE — 86900 BLOOD TYPING SEROLOGIC ABO: CPT

## 2021-12-09 NOTE — ASSESSMENT
[FreeTextEntry1] : 72 yo F with a history of PMF jak2+, Cytogenetics show +1 and +9, + SM on high doses of HU causing worsening anemia with persistently high platelet counts.  \par US (2017): Spleen 15.3cm\par US (3/2020): Spleen 15.9cm\par US (3/2021): Spleen 17.7cm\par US (11/2021): Spleen 19.2cm\par Had a very bad response to Jakafi. No response to iron and HU titration.\par \par Splenomegaly and thrombocytosis\par -Anagrelide 1.5mg BID has improved thrombocytosis initially but remain elevated today. Patients appetite and SM seems to be improved.\par -Discontinue HU 500mg, given decreasing Hgb. \par -Hgb 7.3 today, has 2u PRBC set for tomorrow\par -CBC weekly\par -Continue Fedratinib to 200mg daily, and will titrate to goal of 400mg daily as tolerated She is going away next week, will contiue current dose for now. Next visit will attempt to titrate down the anagrelide and increase Fedratinib dose. \par -If Fedratinib fails look for clinical trial (concern if she will be a candidate given h/o carcinoid)\par \par Anemia:\par -on previous bone marrow biopsy (5/6/2020), she had low iron stores\par -11/3/21 labs found to have ferritin of 475, iron 35, sat 24%. Recieved one dose of Venofer 200mg on 11/8/21 without clinical improvement\par -iron repletion did not work could initiate epo versus danazol, but would hold off for now \par \par Carcinoid:\par -Spoke with Dr. Farah at [place], who strongly believes there is no concern for persistent disease\par -Cough persists, she will follow up with her pulmonologist \par \par Follow up in 1 month

## 2021-12-09 NOTE — HISTORY OF PRESENT ILLNESS
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 She is tolerating the higher dose well. Notes fatigue but her hgb is low today, 7.3. Overall she notes improvement of the fatigue, and appetite. She has gained another 4 pounds since last visit but does still note early satiety.\par -Currently on Hydrea 500mg every other day. \par -60lb weight loss since March 2021 to Nov 2021 but has now stabilized and gaining weight since starting Fedratinib.\par -she is continuously coughing, she will follow up with her pulmonologist, planned to go on oxygen therapy when she is walking \par -Plans to go to [place] soon to visit her son and see his new apartment\par

## 2021-12-09 NOTE — PHYSICAL EXAM
[Fully active, able to carry on all pre-disease performance without restriction] : Status 0 - Fully active, able to carry on all pre-disease performance without restriction [Thin] : thin [Normal] : RRR, normal S1S2, no murmurs, rubs, gallops [de-identified] : B/L LE edema 2+, pitting [de-identified] : spleen + 4 finger breaths below costal margin, slightyl decreased in size

## 2021-12-10 ENCOUNTER — APPOINTMENT (OUTPATIENT)
Dept: INFUSION THERAPY | Facility: HOSPITAL | Age: 71
End: 2021-12-10

## 2021-12-13 DIAGNOSIS — Z51.89 ENCOUNTER FOR OTHER SPECIFIED AFTERCARE: ICD-10-CM

## 2021-12-14 LAB
ALBUMIN SERPL ELPH-MCNC: 3 G/DL
ALP BLD-CCNC: 91 U/L
ALT SERPL-CCNC: 6 U/L
AMYLASE/CREAT SERPL: 23 U/L
ANION GAP SERPL CALC-SCNC: 8 MMOL/L
AST SERPL-CCNC: 6 U/L
BILIRUB SERPL-MCNC: 0.4 MG/DL
BUN SERPL-MCNC: 9 MG/DL
CALCIUM SERPL-MCNC: 7.7 MG/DL
CHLORIDE SERPL-SCNC: 100 MMOL/L
CO2 SERPL-SCNC: 29 MMOL/L
CREAT SERPL-MCNC: 0.98 MG/DL
GLUCOSE SERPL-MCNC: 94 MG/DL
LPL SERPL-CCNC: 23 U/L
POTASSIUM SERPL-SCNC: 3.5 MMOL/L
PROT SERPL-MCNC: 6.1 G/DL
SODIUM SERPL-SCNC: 137 MMOL/L

## 2022-01-01 ENCOUNTER — APPOINTMENT (OUTPATIENT)
Dept: HEMATOLOGY ONCOLOGY | Facility: CLINIC | Age: 72
End: 2022-01-01

## 2022-01-01 ENCOUNTER — RESULT REVIEW (OUTPATIENT)
Age: 72
End: 2022-01-01

## 2022-01-01 ENCOUNTER — OUTPATIENT (OUTPATIENT)
Dept: OUTPATIENT SERVICES | Facility: HOSPITAL | Age: 72
LOS: 1 days | Discharge: HOME | End: 2022-01-01

## 2022-01-01 ENCOUNTER — APPOINTMENT (OUTPATIENT)
Dept: CARDIOLOGY | Facility: CLINIC | Age: 72
End: 2022-01-01

## 2022-01-01 ENCOUNTER — NON-APPOINTMENT (OUTPATIENT)
Age: 72
End: 2022-01-01

## 2022-01-01 ENCOUNTER — OUTPATIENT (OUTPATIENT)
Dept: OUTPATIENT SERVICES | Facility: HOSPITAL | Age: 72
LOS: 1 days | End: 2022-01-01
Payer: MEDICARE

## 2022-01-01 ENCOUNTER — INPATIENT (INPATIENT)
Facility: HOSPITAL | Age: 72
LOS: 1 days | Discharge: HOME | DRG: 291 | End: 2022-08-17
Attending: HOSPITALIST | Admitting: STUDENT IN AN ORGANIZED HEALTH CARE EDUCATION/TRAINING PROGRAM
Payer: COMMERCIAL

## 2022-01-01 ENCOUNTER — APPOINTMENT (OUTPATIENT)
Dept: CARDIOLOGY | Facility: CLINIC | Age: 72
End: 2022-01-01
Payer: MEDICARE

## 2022-01-01 ENCOUNTER — TRANSCRIPTION ENCOUNTER (OUTPATIENT)
Age: 72
End: 2022-01-01

## 2022-01-01 VITALS
DIASTOLIC BLOOD PRESSURE: 64 MMHG | RESPIRATION RATE: 16 BRPM | WEIGHT: 168.65 LBS | TEMPERATURE: 96.8 F | OXYGEN SATURATION: 97 % | SYSTOLIC BLOOD PRESSURE: 131 MMHG | BODY MASS INDEX: 24.98 KG/M2 | HEIGHT: 69 IN | HEART RATE: 71 BPM

## 2022-01-01 VITALS
DIASTOLIC BLOOD PRESSURE: 79 MMHG | WEIGHT: 165 LBS | BODY MASS INDEX: 24.44 KG/M2 | SYSTOLIC BLOOD PRESSURE: 128 MMHG | OXYGEN SATURATION: 97 % | HEART RATE: 75 BPM | HEIGHT: 69 IN

## 2022-01-01 VITALS
DIASTOLIC BLOOD PRESSURE: 78 MMHG | WEIGHT: 173.5 LBS | BODY MASS INDEX: 25.62 KG/M2 | HEART RATE: 89 BPM | OXYGEN SATURATION: 92 % | TEMPERATURE: 97.3 F | RESPIRATION RATE: 18 BRPM | SYSTOLIC BLOOD PRESSURE: 124 MMHG

## 2022-01-01 VITALS
BODY MASS INDEX: 25.59 KG/M2 | OXYGEN SATURATION: 98 % | SYSTOLIC BLOOD PRESSURE: 125 MMHG | WEIGHT: 173.28 LBS | RESPIRATION RATE: 18 BRPM | DIASTOLIC BLOOD PRESSURE: 80 MMHG | TEMPERATURE: 96.8 F | HEART RATE: 81 BPM

## 2022-01-01 VITALS
WEIGHT: 68 LBS | DIASTOLIC BLOOD PRESSURE: 90 MMHG | HEART RATE: 96 BPM | SYSTOLIC BLOOD PRESSURE: 131 MMHG | HEIGHT: 69 IN | BODY MASS INDEX: 10.07 KG/M2

## 2022-01-01 VITALS
HEART RATE: 65 BPM | DIASTOLIC BLOOD PRESSURE: 65 MMHG | TEMPERATURE: 98 F | SYSTOLIC BLOOD PRESSURE: 120 MMHG | OXYGEN SATURATION: 97 % | RESPIRATION RATE: 17 BRPM

## 2022-01-01 VITALS
OXYGEN SATURATION: 90 % | RESPIRATION RATE: 18 BRPM | BODY MASS INDEX: 24.58 KG/M2 | WEIGHT: 166.45 LBS | DIASTOLIC BLOOD PRESSURE: 80 MMHG | SYSTOLIC BLOOD PRESSURE: 145 MMHG | HEART RATE: 82 BPM | TEMPERATURE: 97.3 F

## 2022-01-01 VITALS
SYSTOLIC BLOOD PRESSURE: 119 MMHG | DIASTOLIC BLOOD PRESSURE: 67 MMHG | WEIGHT: 166.01 LBS | RESPIRATION RATE: 23 BRPM | OXYGEN SATURATION: 98 % | TEMPERATURE: 98 F | HEART RATE: 77 BPM | HEIGHT: 66 IN

## 2022-01-01 VITALS
OXYGEN SATURATION: 90 % | WEIGHT: 182.98 LBS | DIASTOLIC BLOOD PRESSURE: 89 MMHG | HEIGHT: 69.02 IN | BODY MASS INDEX: 27.1 KG/M2 | TEMPERATURE: 97.3 F | HEART RATE: 96 BPM | SYSTOLIC BLOOD PRESSURE: 160 MMHG | RESPIRATION RATE: 16 BRPM

## 2022-01-01 VITALS — OXYGEN SATURATION: 93 % | RESPIRATION RATE: 18 BRPM

## 2022-01-01 DIAGNOSIS — J45.909 UNSPECIFIED ASTHMA, UNCOMPLICATED: ICD-10-CM

## 2022-01-01 DIAGNOSIS — E03.9 HYPOTHYROIDISM, UNSPECIFIED: ICD-10-CM

## 2022-01-01 DIAGNOSIS — F32.9 MAJOR DEPRESSIVE DISORDER, SINGLE EPISODE, UNSPECIFIED: ICD-10-CM

## 2022-01-01 DIAGNOSIS — I48.1 PERSISTENT ATRIAL FIBRILLATION: ICD-10-CM

## 2022-01-01 DIAGNOSIS — I10 ESSENTIAL (PRIMARY) HYPERTENSION: ICD-10-CM

## 2022-01-01 DIAGNOSIS — I50.9 HEART FAILURE, UNSPECIFIED: ICD-10-CM

## 2022-01-01 DIAGNOSIS — D69.6 THROMBOCYTOPENIA, UNSPECIFIED: ICD-10-CM

## 2022-01-01 DIAGNOSIS — S99.919A UNSPECIFIED INJURY OF UNSPECIFIED ANKLE, INITIAL ENCOUNTER: Chronic | ICD-10-CM

## 2022-01-01 DIAGNOSIS — K81.9 CHOLECYSTITIS, UNSPECIFIED: Chronic | ICD-10-CM

## 2022-01-01 DIAGNOSIS — D47.3 ESSENTIAL (HEMORRHAGIC) THROMBOCYTHEMIA: ICD-10-CM

## 2022-01-01 DIAGNOSIS — D64.9 ANEMIA, UNSPECIFIED: ICD-10-CM

## 2022-01-01 DIAGNOSIS — B02.9 ZOSTER WITHOUT COMPLICATIONS: ICD-10-CM

## 2022-01-01 DIAGNOSIS — N17.9 ACUTE KIDNEY FAILURE, UNSPECIFIED: ICD-10-CM

## 2022-01-01 DIAGNOSIS — R06.09 OTHER FORMS OF DYSPNEA: ICD-10-CM

## 2022-01-01 DIAGNOSIS — D75.81 MYELOFIBROSIS: ICD-10-CM

## 2022-01-01 DIAGNOSIS — I50.33 ACUTE ON CHRONIC DIASTOLIC (CONGESTIVE) HEART FAILURE: ICD-10-CM

## 2022-01-01 DIAGNOSIS — R74.8 ABNORMAL LEVELS OF OTHER SERUM ENZYMES: ICD-10-CM

## 2022-01-01 DIAGNOSIS — R53.0 NEOPLASTIC (MALIGNANT) RELATED FATIGUE: ICD-10-CM

## 2022-01-01 LAB
% ALBUMIN: 53.5 % — SIGNIFICANT CHANGE UP
% ALPHA 1: 6 % — SIGNIFICANT CHANGE UP
% ALPHA 2: 9.4 % — SIGNIFICANT CHANGE UP
% BETA: 14.1 % — SIGNIFICANT CHANGE UP
% GAMMA: 17 % — SIGNIFICANT CHANGE UP
% M SPIKE: SIGNIFICANT CHANGE UP
ABO RH CONFIRMATION: SIGNIFICANT CHANGE UP
ALBUMIN SERPL ELPH-MCNC: 3.1 G/DL — LOW (ref 3.3–5)
ALBUMIN SERPL ELPH-MCNC: 3.4 G/DL — LOW (ref 3.6–5.5)
ALBUMIN SERPL ELPH-MCNC: 3.5 G/DL
ALBUMIN SERPL ELPH-MCNC: 3.6 G/DL
ALBUMIN SERPL ELPH-MCNC: 3.7 G/DL
ALBUMIN SERPL ELPH-MCNC: 4 G/DL
ALBUMIN SERPL ELPH-MCNC: 4.1 G/DL
ALBUMIN/GLOB SERPL ELPH: 1.2 RATIO — SIGNIFICANT CHANGE UP
ALP BLD-CCNC: 75 U/L
ALP BLD-CCNC: 78 U/L
ALP BLD-CCNC: 81 U/L
ALP BLD-CCNC: 81 U/L
ALP BLD-CCNC: 86 U/L
ALP BLD-CCNC: 89 U/L
ALP BLD-CCNC: 95 U/L
ALP SERPL-CCNC: 83 U/L — SIGNIFICANT CHANGE UP (ref 40–120)
ALPHA1 GLOB SERPL ELPH-MCNC: 0.4 G/DL — SIGNIFICANT CHANGE UP (ref 0.1–0.4)
ALPHA2 GLOB SERPL ELPH-MCNC: 0.6 G/DL — SIGNIFICANT CHANGE UP (ref 0.5–1)
ALT FLD-CCNC: 13 U/L — SIGNIFICANT CHANGE UP (ref 12–78)
ALT SERPL-CCNC: 15 U/L
ALT SERPL-CCNC: 6 U/L
ALT SERPL-CCNC: 6 U/L
ALT SERPL-CCNC: 7 U/L
ALT SERPL-CCNC: 7 U/L
ALT SERPL-CCNC: 9 U/L
ALT SERPL-CCNC: <5 U/L
AMYLASE/CREAT SERPL: 35 U/L
AMYLASE/CREAT SERPL: 36 U/L
AMYLASE/CREAT SERPL: 40 U/L
AMYLASE/CREAT SERPL: 42 U/L
AMYLASE/CREAT SERPL: 43 U/L
AMYLASE/CREAT SERPL: 70 U/L
AMYLASE/CREAT SERPL: 75 U/L
ANION GAP SERPL CALC-SCNC: 10 MMOL/L
ANION GAP SERPL CALC-SCNC: 10 MMOL/L
ANION GAP SERPL CALC-SCNC: 11 MMOL/L
ANION GAP SERPL CALC-SCNC: 12 MMOL/L
ANION GAP SERPL CALC-SCNC: 13 MMOL/L
ANION GAP SERPL CALC-SCNC: 13 MMOL/L
ANION GAP SERPL CALC-SCNC: 15 MMOL/L
ANION GAP SERPL CALC-SCNC: 6 MMOL/L — SIGNIFICANT CHANGE UP (ref 5–17)
ANION GAP SERPL CALC-SCNC: 9 MMOL/L — SIGNIFICANT CHANGE UP (ref 5–17)
ANISOCYTOSIS BLD QL: SLIGHT — SIGNIFICANT CHANGE UP
AST SERPL-CCNC: 11 U/L
AST SERPL-CCNC: 12 U/L
AST SERPL-CCNC: 12 U/L
AST SERPL-CCNC: 15 U/L
AST SERPL-CCNC: 15 U/L — SIGNIFICANT CHANGE UP (ref 15–37)
AST SERPL-CCNC: 16 U/L
B-GLOBULIN SERPL ELPH-MCNC: 0.9 G/DL — SIGNIFICANT CHANGE UP (ref 0.5–1)
B2 MICROGLOB SERPL-MCNC: 5 MG/L
BASOPHILS # BLD AUTO: 0 K/UL — SIGNIFICANT CHANGE UP (ref 0–0.2)
BASOPHILS # BLD AUTO: 0.06 K/UL — SIGNIFICANT CHANGE UP (ref 0–0.2)
BASOPHILS # BLD AUTO: 0.09 K/UL — SIGNIFICANT CHANGE UP (ref 0–0.2)
BASOPHILS # BLD AUTO: 0.1 K/UL — SIGNIFICANT CHANGE UP (ref 0–0.2)
BASOPHILS # BLD AUTO: 0.18 K/UL — SIGNIFICANT CHANGE UP (ref 0–0.2)
BASOPHILS # BLD AUTO: 0.24 K/UL — HIGH (ref 0–0.2)
BASOPHILS NFR BLD AUTO: 0 % — SIGNIFICANT CHANGE UP (ref 0–2)
BASOPHILS NFR BLD AUTO: 0.5 % — SIGNIFICANT CHANGE UP (ref 0–2)
BASOPHILS NFR BLD AUTO: 0.7 % — SIGNIFICANT CHANGE UP (ref 0–2)
BASOPHILS NFR BLD AUTO: 1 % — SIGNIFICANT CHANGE UP (ref 0–2)
BASOPHILS NFR BLD AUTO: 1 % — SIGNIFICANT CHANGE UP (ref 0–2)
BASOPHILS NFR BLD AUTO: 2 % — SIGNIFICANT CHANGE UP (ref 0–2)
BILIRUB SERPL-MCNC: 0.2 MG/DL
BILIRUB SERPL-MCNC: 0.3 MG/DL
BILIRUB SERPL-MCNC: 0.3 MG/DL
BILIRUB SERPL-MCNC: 0.4 MG/DL
BILIRUB SERPL-MCNC: 0.4 MG/DL — SIGNIFICANT CHANGE UP (ref 0.2–1.2)
BILIRUB SERPL-MCNC: 0.5 MG/DL
BLASTS # FLD: 3 % — HIGH (ref 0–0)
BLD GP AB SCN SERPL QL: SIGNIFICANT CHANGE UP
BLD GP AB SCN SERPL QL: SIGNIFICANT CHANGE UP
BUN SERPL-MCNC: 12 MG/DL
BUN SERPL-MCNC: 14 MG/DL
BUN SERPL-MCNC: 18 MG/DL
BUN SERPL-MCNC: 18 MG/DL — SIGNIFICANT CHANGE UP (ref 7–23)
BUN SERPL-MCNC: 19 MG/DL
BUN SERPL-MCNC: 19 MG/DL
BUN SERPL-MCNC: 20 MG/DL
BUN SERPL-MCNC: 20 MG/DL
BUN SERPL-MCNC: 21 MG/DL — SIGNIFICANT CHANGE UP (ref 7–23)
CALCIUM SERPL-MCNC: 7.8 MG/DL
CALCIUM SERPL-MCNC: 8 MG/DL — LOW (ref 8.5–10.1)
CALCIUM SERPL-MCNC: 8 MG/DL — LOW (ref 8.5–10.1)
CALCIUM SERPL-MCNC: 8.3 MG/DL
CALCIUM SERPL-MCNC: 8.4 MG/DL
CALCIUM SERPL-MCNC: 8.4 MG/DL
CALCIUM SERPL-MCNC: 8.5 MG/DL
CALCIUM SERPL-MCNC: 8.6 MG/DL
CALCIUM SERPL-MCNC: 8.9 MG/DL
CHLORIDE SERPL-SCNC: 102 MMOL/L
CHLORIDE SERPL-SCNC: 103 MMOL/L
CHLORIDE SERPL-SCNC: 104 MMOL/L
CHLORIDE SERPL-SCNC: 104 MMOL/L
CHLORIDE SERPL-SCNC: 105 MMOL/L
CHLORIDE SERPL-SCNC: 105 MMOL/L
CHLORIDE SERPL-SCNC: 105 MMOL/L — SIGNIFICANT CHANGE UP (ref 96–108)
CHLORIDE SERPL-SCNC: 106 MMOL/L
CHLORIDE SERPL-SCNC: 106 MMOL/L — SIGNIFICANT CHANGE UP (ref 96–108)
CO2 SERPL-SCNC: 22 MMOL/L
CO2 SERPL-SCNC: 26 MMOL/L
CO2 SERPL-SCNC: 27 MMOL/L
CO2 SERPL-SCNC: 28 MMOL/L
CO2 SERPL-SCNC: 28 MMOL/L
CO2 SERPL-SCNC: 28 MMOL/L — SIGNIFICANT CHANGE UP (ref 22–31)
CO2 SERPL-SCNC: 29 MMOL/L — SIGNIFICANT CHANGE UP (ref 22–31)
CREAT SERPL-MCNC: 0.96 MG/DL
CREAT SERPL-MCNC: 0.96 MG/DL
CREAT SERPL-MCNC: 1.03 MG/DL
CREAT SERPL-MCNC: 1.13 MG/DL
CREAT SERPL-MCNC: 1.2 MG/DL
CREAT SERPL-MCNC: 1.25 MG/DL
CREAT SERPL-MCNC: 1.3 MG/DL — SIGNIFICANT CHANGE UP (ref 0.5–1.3)
CREAT SERPL-MCNC: 1.37 MG/DL
CREAT SERPL-MCNC: 1.4 MG/DL — HIGH (ref 0.5–1.3)
DACRYOCYTES BLD QL SMEAR: SLIGHT — SIGNIFICANT CHANGE UP
EGFR: 41 ML/MIN/1.73M2 — LOW
EGFR: 42 ML/MIN/1.73M2
EGFR: 45 ML/MIN/1.73M2 — LOW
EGFR: 46 ML/MIN/1.73M2
EGFR: 49 ML/MIN/1.73M2
EGFR: 52 ML/MIN/1.73M2
EGFR: 59 ML/MIN/1.73M2
EGFR: 64 ML/MIN/1.73M2
EGFR: 64 ML/MIN/1.73M2
ELLIPTOCYTES BLD QL SMEAR: SLIGHT — SIGNIFICANT CHANGE UP
EOSINOPHIL # BLD AUTO: 0.35 K/UL — SIGNIFICANT CHANGE UP (ref 0–0.5)
EOSINOPHIL # BLD AUTO: 0.36 K/UL — SIGNIFICANT CHANGE UP (ref 0–0.5)
EOSINOPHIL # BLD AUTO: 0.38 K/UL — SIGNIFICANT CHANGE UP (ref 0–0.5)
EOSINOPHIL # BLD AUTO: 0.4 K/UL — SIGNIFICANT CHANGE UP (ref 0–0.5)
EOSINOPHIL # BLD AUTO: 0.43 K/UL — SIGNIFICANT CHANGE UP (ref 0–0.5)
EOSINOPHIL # BLD AUTO: 0.53 K/UL — HIGH (ref 0–0.5)
EOSINOPHIL # BLD AUTO: 0.55 K/UL — HIGH (ref 0–0.5)
EOSINOPHIL # BLD AUTO: 0.87 K/UL — HIGH (ref 0–0.5)
EOSINOPHIL # BLD AUTO: 1.43 K/UL — HIGH (ref 0–0.5)
EOSINOPHIL NFR BLD AUTO: 12 % — HIGH (ref 0–6)
EOSINOPHIL NFR BLD AUTO: 2.4 % — SIGNIFICANT CHANGE UP (ref 0–6)
EOSINOPHIL NFR BLD AUTO: 2.9 % — SIGNIFICANT CHANGE UP (ref 0–6)
EOSINOPHIL NFR BLD AUTO: 3 % — SIGNIFICANT CHANGE UP (ref 0–6)
EOSINOPHIL NFR BLD AUTO: 5 % — SIGNIFICANT CHANGE UP (ref 0–6)
EOSINOPHIL NFR BLD AUTO: 6 % — SIGNIFICANT CHANGE UP (ref 0–6)
FERRITIN SERPL-MCNC: 319 NG/ML — HIGH (ref 15–150)
FOLATE SERPL-MCNC: >20 NG/ML — SIGNIFICANT CHANGE UP
GAMMA GLOBULIN: 1.1 G/DL — SIGNIFICANT CHANGE UP (ref 0.6–1.6)
GIANT PLATELETS BLD QL SMEAR: PRESENT — SIGNIFICANT CHANGE UP
GLUCOSE SERPL-MCNC: 101 MG/DL
GLUCOSE SERPL-MCNC: 101 MG/DL
GLUCOSE SERPL-MCNC: 101 MG/DL — HIGH (ref 70–99)
GLUCOSE SERPL-MCNC: 102 MG/DL — HIGH (ref 70–99)
GLUCOSE SERPL-MCNC: 130 MG/DL
GLUCOSE SERPL-MCNC: 86 MG/DL
GLUCOSE SERPL-MCNC: 88 MG/DL
GLUCOSE SERPL-MCNC: 91 MG/DL
GLUCOSE SERPL-MCNC: 95 MG/DL
HCT VFR BLD CALC: 23.4 % — LOW (ref 34.5–45)
HCT VFR BLD CALC: 26.4 % — LOW (ref 34.5–45)
HCT VFR BLD CALC: 26.5 % — LOW (ref 34.5–45)
HCT VFR BLD CALC: 27.9 % — LOW (ref 34.5–45)
HCT VFR BLD CALC: 28.3 % — LOW (ref 34.5–45)
HCT VFR BLD CALC: 29 % — LOW (ref 34.5–45)
HCT VFR BLD CALC: 29.6 % — LOW (ref 34.5–45)
HCT VFR BLD CALC: 29.9 % — LOW (ref 34.5–45)
HCT VFR BLD CALC: 30 % — LOW (ref 34.5–45)
HCT VFR BLD CALC: 30.8 % — LOW (ref 34.5–45)
HCT VFR BLD CALC: 33.4 % — LOW (ref 34.5–45)
HCV AB S/CO SERPL IA: 0.1 S/CO — SIGNIFICANT CHANGE UP (ref 0–0.99)
HCV AB SERPL-IMP: SIGNIFICANT CHANGE UP
HGB BLD-MCNC: 7.2 G/DL — LOW (ref 11.5–15.5)
HGB BLD-MCNC: 7.9 G/DL — LOW (ref 11.5–15.5)
HGB BLD-MCNC: 8.4 G/DL — LOW (ref 11.5–15.5)
HGB BLD-MCNC: 8.4 G/DL — LOW (ref 11.5–15.5)
HGB BLD-MCNC: 8.7 G/DL — LOW (ref 11.5–15.5)
HGB BLD-MCNC: 8.8 G/DL — LOW (ref 11.5–15.5)
HGB BLD-MCNC: 8.9 G/DL — LOW (ref 11.5–15.5)
HGB BLD-MCNC: 8.9 G/DL — LOW (ref 11.5–15.5)
HGB BLD-MCNC: 9 G/DL — LOW (ref 11.5–15.5)
HGB BLD-MCNC: 9.2 G/DL — LOW (ref 11.5–15.5)
HGB BLD-MCNC: 9.9 G/DL — LOW (ref 11.5–15.5)
HYPOCHROMIA BLD QL: SLIGHT — SIGNIFICANT CHANGE UP
IMM GRANULOCYTES NFR BLD AUTO: 1.3 % — HIGH (ref 0–0.9)
IMM GRANULOCYTES NFR BLD AUTO: 1.6 % — HIGH (ref 0–1.5)
IRON SATN MFR SERPL: 19 % — SIGNIFICANT CHANGE UP (ref 14–50)
IRON SATN MFR SERPL: 42 UG/DL — SIGNIFICANT CHANGE UP (ref 30–160)
LDH SERPL-CCNC: 344 U/L
LDH SERPL-CCNC: 393 U/L
LDH SERPL-CCNC: 400 U/L
LDH SERPL-CCNC: 400 U/L
LG PLATELETS BLD QL AUTO: SLIGHT — SIGNIFICANT CHANGE UP
LPL SERPL-CCNC: 124 U/L
LPL SERPL-CCNC: 179 U/L
LPL SERPL-CCNC: 49 U/L
LPL SERPL-CCNC: 55 U/L
LPL SERPL-CCNC: 55 U/L
LPL SERPL-CCNC: 57 U/L
LPL SERPL-CCNC: 64 U/L
LYMPHOCYTES # BLD AUTO: 0.37 K/UL — LOW (ref 1–3.3)
LYMPHOCYTES # BLD AUTO: 0.88 K/UL — LOW (ref 1–3.3)
LYMPHOCYTES # BLD AUTO: 1.05 K/UL — SIGNIFICANT CHANGE UP (ref 1–3.3)
LYMPHOCYTES # BLD AUTO: 1.06 K/UL — SIGNIFICANT CHANGE UP (ref 1–3.3)
LYMPHOCYTES # BLD AUTO: 1.17 K/UL — SIGNIFICANT CHANGE UP (ref 1–3.3)
LYMPHOCYTES # BLD AUTO: 1.38 K/UL — SIGNIFICANT CHANGE UP (ref 1–3.3)
LYMPHOCYTES # BLD AUTO: 1.39 K/UL — SIGNIFICANT CHANGE UP (ref 1–3.3)
LYMPHOCYTES # BLD AUTO: 1.58 K/UL — SIGNIFICANT CHANGE UP (ref 1–3.3)
LYMPHOCYTES # BLD AUTO: 10 % — LOW (ref 13–44)
LYMPHOCYTES # BLD AUTO: 10 % — LOW (ref 13–44)
LYMPHOCYTES # BLD AUTO: 11 % — LOW (ref 13–44)
LYMPHOCYTES # BLD AUTO: 18 % — SIGNIFICANT CHANGE UP (ref 13–44)
LYMPHOCYTES # BLD AUTO: 2 % — LOW (ref 13–44)
LYMPHOCYTES # BLD AUTO: 2.15 K/UL — SIGNIFICANT CHANGE UP (ref 1–3.3)
LYMPHOCYTES # BLD AUTO: 8 % — LOW (ref 13–44)
LYMPHOCYTES # BLD AUTO: 8 % — LOW (ref 13–44)
LYMPHOCYTES # BLD AUTO: 8.1 % — LOW (ref 13–44)
LYMPHOCYTES # BLD AUTO: 9.3 % — LOW (ref 13–44)
M-SPIKE: SIGNIFICANT CHANGE UP (ref 0–0)
MCHC RBC-ENTMCNC: 25.8 PG — LOW (ref 27–34)
MCHC RBC-ENTMCNC: 26.4 PG — LOW (ref 27–34)
MCHC RBC-ENTMCNC: 27.2 PG — SIGNIFICANT CHANGE UP (ref 27–34)
MCHC RBC-ENTMCNC: 27.5 PG — SIGNIFICANT CHANGE UP (ref 27–34)
MCHC RBC-ENTMCNC: 27.8 PG — SIGNIFICANT CHANGE UP (ref 27–34)
MCHC RBC-ENTMCNC: 27.9 PG — SIGNIFICANT CHANGE UP (ref 27–34)
MCHC RBC-ENTMCNC: 28.6 PG — SIGNIFICANT CHANGE UP (ref 27–34)
MCHC RBC-ENTMCNC: 28.6 PG — SIGNIFICANT CHANGE UP (ref 27–34)
MCHC RBC-ENTMCNC: 28.7 PG — SIGNIFICANT CHANGE UP (ref 27–34)
MCHC RBC-ENTMCNC: 29 G/DL — LOW (ref 32–36)
MCHC RBC-ENTMCNC: 29.1 PG — SIGNIFICANT CHANGE UP (ref 27–34)
MCHC RBC-ENTMCNC: 29.4 PG — SIGNIFICANT CHANGE UP (ref 27–34)
MCHC RBC-ENTMCNC: 29.6 G/DL — LOW (ref 32–36)
MCHC RBC-ENTMCNC: 29.7 G/DL — LOW (ref 32–36)
MCHC RBC-ENTMCNC: 29.8 G/DL — LOW (ref 32–36)
MCHC RBC-ENTMCNC: 29.9 G/DL — LOW (ref 32–36)
MCHC RBC-ENTMCNC: 29.9 G/DL — LOW (ref 32–36)
MCHC RBC-ENTMCNC: 30.1 G/DL — LOW (ref 32–36)
MCHC RBC-ENTMCNC: 30.3 G/DL — LOW (ref 32–36)
MCHC RBC-ENTMCNC: 30.8 GM/DL — LOW (ref 32–36)
MCHC RBC-ENTMCNC: 31.7 GM/DL — LOW (ref 32–36)
MCHC RBC-ENTMCNC: 32.3 G/DL — SIGNIFICANT CHANGE UP (ref 32–36)
MCV RBC AUTO: 87 FL — SIGNIFICANT CHANGE UP (ref 80–100)
MCV RBC AUTO: 88.5 FL — SIGNIFICANT CHANGE UP (ref 80–100)
MCV RBC AUTO: 91 FL — SIGNIFICANT CHANGE UP (ref 80–100)
MCV RBC AUTO: 91.2 FL — SIGNIFICANT CHANGE UP (ref 80–100)
MCV RBC AUTO: 91.7 FL — SIGNIFICANT CHANGE UP (ref 80–100)
MCV RBC AUTO: 92.5 FL — SIGNIFICANT CHANGE UP (ref 80–100)
MCV RBC AUTO: 93.2 FL — SIGNIFICANT CHANGE UP (ref 80–100)
MCV RBC AUTO: 94 FL — SIGNIFICANT CHANGE UP (ref 80–100)
MCV RBC AUTO: 94.2 FL — SIGNIFICANT CHANGE UP (ref 80–100)
MCV RBC AUTO: 94.9 FL — SIGNIFICANT CHANGE UP (ref 80–100)
MCV RBC AUTO: 96.1 FL — SIGNIFICANT CHANGE UP (ref 80–100)
METAMYELOCYTES # FLD: 1 % — HIGH (ref 0–0)
MICROCYTES BLD QL: SLIGHT — SIGNIFICANT CHANGE UP
MONOCYTES # BLD AUTO: 0.18 K/UL — SIGNIFICANT CHANGE UP (ref 0–0.9)
MONOCYTES # BLD AUTO: 0.24 K/UL — SIGNIFICANT CHANGE UP (ref 0–0.9)
MONOCYTES # BLD AUTO: 0.27 K/UL — SIGNIFICANT CHANGE UP (ref 0–0.9)
MONOCYTES # BLD AUTO: 0.29 K/UL — SIGNIFICANT CHANGE UP (ref 0–0.9)
MONOCYTES # BLD AUTO: 0.35 K/UL — SIGNIFICANT CHANGE UP (ref 0–0.9)
MONOCYTES # BLD AUTO: 0.57 K/UL — SIGNIFICANT CHANGE UP (ref 0–0.9)
MONOCYTES # BLD AUTO: 0.7 K/UL — SIGNIFICANT CHANGE UP (ref 0–0.9)
MONOCYTES # BLD AUTO: 0.7 K/UL — SIGNIFICANT CHANGE UP (ref 0–0.9)
MONOCYTES # BLD AUTO: 0.73 K/UL — SIGNIFICANT CHANGE UP (ref 0–0.9)
MONOCYTES NFR BLD AUTO: 2 % — SIGNIFICANT CHANGE UP (ref 2–14)
MONOCYTES NFR BLD AUTO: 3 % — SIGNIFICANT CHANGE UP (ref 2–14)
MONOCYTES NFR BLD AUTO: 4 % — SIGNIFICANT CHANGE UP (ref 2–14)
MONOCYTES NFR BLD AUTO: 4 % — SIGNIFICANT CHANGE UP (ref 2–14)
MONOCYTES NFR BLD AUTO: 4.4 % — SIGNIFICANT CHANGE UP (ref 2–14)
MONOCYTES NFR BLD AUTO: 4.7 % — SIGNIFICANT CHANGE UP (ref 2–14)
NEUTROPHILS # BLD AUTO: 10.67 K/UL — HIGH (ref 1.8–7.4)
NEUTROPHILS # BLD AUTO: 11.56 K/UL — HIGH (ref 1.8–7.4)
NEUTROPHILS # BLD AUTO: 12.04 K/UL — HIGH (ref 1.8–7.4)
NEUTROPHILS # BLD AUTO: 12.05 K/UL — HIGH (ref 1.8–7.4)
NEUTROPHILS # BLD AUTO: 14.43 K/UL — HIGH (ref 1.8–7.4)
NEUTROPHILS # BLD AUTO: 16.5 K/UL — HIGH (ref 1.8–7.4)
NEUTROPHILS # BLD AUTO: 6.75 K/UL — SIGNIFICANT CHANGE UP (ref 1.8–7.4)
NEUTROPHILS # BLD AUTO: 7.88 K/UL — HIGH (ref 1.8–7.4)
NEUTROPHILS # BLD AUTO: 9.81 K/UL — HIGH (ref 1.8–7.4)
NEUTROPHILS NFR BLD AUTO: 66 % — SIGNIFICANT CHANGE UP (ref 43–77)
NEUTROPHILS NFR BLD AUTO: 77 % — SIGNIFICANT CHANGE UP (ref 43–77)
NEUTROPHILS NFR BLD AUTO: 81.3 % — HIGH (ref 43–77)
NEUTROPHILS NFR BLD AUTO: 82.8 % — HIGH (ref 43–77)
NEUTROPHILS NFR BLD AUTO: 83 % — HIGH (ref 43–77)
NEUTROPHILS NFR BLD AUTO: 84 % — HIGH (ref 43–77)
NEUTROPHILS NFR BLD AUTO: 84 % — HIGH (ref 43–77)
NEUTROPHILS NFR BLD AUTO: 87 % — HIGH (ref 43–77)
NEUTROPHILS NFR BLD AUTO: 90 % — HIGH (ref 43–77)
NRBC # BLD: 0 /100 WBCS — SIGNIFICANT CHANGE UP (ref 0–0)
NRBC # BLD: 0 /100 — SIGNIFICANT CHANGE UP (ref 0–0)
NRBC # BLD: SIGNIFICANT CHANGE UP /100 WBCS (ref 0–0)
NT-PROBNP SERPL-SCNC: 3172 PG/ML — HIGH (ref 0–125)
OB PNL STL: NEGATIVE — SIGNIFICANT CHANGE UP
PLAT MORPH BLD: ABNORMAL
PLAT MORPH BLD: NORMAL — SIGNIFICANT CHANGE UP
PLATELET # BLD AUTO: 329 K/UL — SIGNIFICANT CHANGE UP (ref 150–400)
PLATELET # BLD AUTO: 337 K/UL — SIGNIFICANT CHANGE UP (ref 150–400)
PLATELET # BLD AUTO: 368 K/UL — SIGNIFICANT CHANGE UP (ref 150–400)
PLATELET # BLD AUTO: 403 K/UL — HIGH (ref 150–400)
PLATELET # BLD AUTO: 442 K/UL — HIGH (ref 150–400)
PLATELET # BLD AUTO: 457 K/UL — HIGH (ref 150–400)
PLATELET # BLD AUTO: 459 K/UL — HIGH (ref 150–400)
PLATELET # BLD AUTO: 469 K/UL — HIGH (ref 150–400)
PLATELET # BLD AUTO: 520 K/UL — HIGH (ref 150–400)
PLATELET # BLD AUTO: 573 K/UL — HIGH (ref 150–400)
PLATELET # BLD AUTO: 657 K/UL — HIGH (ref 150–400)
POIKILOCYTOSIS BLD QL AUTO: SLIGHT — SIGNIFICANT CHANGE UP
POLYCHROMASIA BLD QL SMEAR: SLIGHT — SIGNIFICANT CHANGE UP
POTASSIUM SERPL-MCNC: 3.4 MMOL/L — LOW (ref 3.5–5.3)
POTASSIUM SERPL-MCNC: 4.2 MMOL/L — SIGNIFICANT CHANGE UP (ref 3.5–5.3)
POTASSIUM SERPL-SCNC: 3.2 MMOL/L
POTASSIUM SERPL-SCNC: 3.4 MMOL/L — LOW (ref 3.5–5.3)
POTASSIUM SERPL-SCNC: 3.9 MMOL/L
POTASSIUM SERPL-SCNC: 4.2 MMOL/L
POTASSIUM SERPL-SCNC: 4.2 MMOL/L
POTASSIUM SERPL-SCNC: 4.2 MMOL/L — SIGNIFICANT CHANGE UP (ref 3.5–5.3)
POTASSIUM SERPL-SCNC: 4.3 MMOL/L
POTASSIUM SERPL-SCNC: 4.5 MMOL/L
POTASSIUM SERPL-SCNC: 4.8 MMOL/L
PROT PATTERN SERPL ELPH-IMP: SIGNIFICANT CHANGE UP
PROT SERPL-MCNC: 5.8 G/DL
PROT SERPL-MCNC: 6.2 G/DL
PROT SERPL-MCNC: 6.3 G/DL
PROT SERPL-MCNC: 6.3 G/DL — SIGNIFICANT CHANGE UP (ref 6–8.3)
PROT SERPL-MCNC: 6.3 G/DL — SIGNIFICANT CHANGE UP (ref 6–8.3)
PROT SERPL-MCNC: 6.4 G/DL
PROT SERPL-MCNC: 6.5 G/DL
PROT SERPL-MCNC: 6.6 G/DL
PROT SERPL-MCNC: 6.8 G/DL
PROT SERPL-MCNC: 6.9 G/DL — SIGNIFICANT CHANGE UP (ref 6–8.3)
RBC # BLD: 2.51 M/UL — LOW (ref 3.8–5.2)
RBC # BLD: 2.89 M/UL — LOW (ref 3.8–5.2)
RBC # BLD: 2.9 M/UL — LOW (ref 3.8–5.2)
RBC # BLD: 3.01 M/UL — LOW (ref 3.8–5.2)
RBC # BLD: 3.06 M/UL — LOW (ref 3.8–5.2)
RBC # BLD: 3.08 M/UL — LOW (ref 3.8–5.2)
RBC # BLD: 3.11 M/UL — LOW (ref 3.8–5.2)
RBC # BLD: 3.16 M/UL — LOW (ref 3.8–5.2)
RBC # BLD: 3.2 M/UL — LOW (ref 3.8–5.2)
RBC # BLD: 3.48 M/UL — LOW (ref 3.8–5.2)
RBC # BLD: 3.84 M/UL — SIGNIFICANT CHANGE UP (ref 3.8–5.2)
RBC # FLD: 20.3 % — HIGH (ref 10.3–14.5)
RBC # FLD: 20.3 % — HIGH (ref 10.3–14.5)
RBC # FLD: 20.7 % — HIGH (ref 10.3–14.5)
RBC # FLD: 21.1 % — HIGH (ref 10.3–14.5)
RBC # FLD: 21.2 % — HIGH (ref 10.3–14.5)
RBC # FLD: 21.2 % — HIGH (ref 10.3–14.5)
RBC # FLD: 21.3 % — HIGH (ref 10.3–14.5)
RBC # FLD: 21.3 % — HIGH (ref 10.3–14.5)
RBC # FLD: 22.2 % — HIGH (ref 10.3–14.5)
RBC # FLD: 22.4 % — HIGH (ref 10.3–14.5)
RBC # FLD: 22.6 % — HIGH (ref 10.3–14.5)
RBC BLD AUTO: ABNORMAL
RETICS #: 47.1 K/UL — SIGNIFICANT CHANGE UP (ref 25–125)
RETICS #: 49.4 K/UL — SIGNIFICANT CHANGE UP (ref 25–125)
RETICS #: 61.8 K/UL — SIGNIFICANT CHANGE UP (ref 25–125)
RETICS/RBC NFR: 1.6 % — SIGNIFICANT CHANGE UP (ref 0.5–2.5)
RETICS/RBC NFR: 1.6 % — SIGNIFICANT CHANGE UP (ref 0.5–2.5)
RETICS/RBC NFR: 2 % — SIGNIFICANT CHANGE UP (ref 0.5–2.5)
SARS-COV-2 RNA SPEC QL NAA+PROBE: SIGNIFICANT CHANGE UP
SCHISTOCYTES BLD QL AUTO: SLIGHT — SIGNIFICANT CHANGE UP
SODIUM SERPL-SCNC: 140 MMOL/L — SIGNIFICANT CHANGE UP (ref 135–145)
SODIUM SERPL-SCNC: 141 MMOL/L
SODIUM SERPL-SCNC: 141 MMOL/L
SODIUM SERPL-SCNC: 142 MMOL/L
SODIUM SERPL-SCNC: 143 MMOL/L
SODIUM SERPL-SCNC: 143 MMOL/L — SIGNIFICANT CHANGE UP (ref 135–145)
STOMATOCYTES BLD QL SMEAR: SLIGHT — SIGNIFICANT CHANGE UP
TIBC SERPL-MCNC: 221 UG/DL — SIGNIFICANT CHANGE UP (ref 220–430)
TRANSFERRIN SERPL-MCNC: 177 MG/DL — LOW (ref 200–360)
TROPONIN I, HIGH SENSITIVITY RESULT: 32.9 NG/L — SIGNIFICANT CHANGE UP
UIBC SERPL-MCNC: 179 UG/DL — SIGNIFICANT CHANGE UP (ref 110–370)
VIT B12 SERPL-MCNC: 972 PG/ML — SIGNIFICANT CHANGE UP (ref 232–1245)
WBC # BLD: 10.2 K/UL — SIGNIFICANT CHANGE UP (ref 3.8–10.5)
WBC # BLD: 11.13 K/UL — HIGH (ref 3.8–10.5)
WBC # BLD: 11.68 K/UL — HIGH (ref 3.8–10.5)
WBC # BLD: 11.94 K/UL — HIGH (ref 3.8–10.5)
WBC # BLD: 12.9 K/UL — HIGH (ref 3.8–10.5)
WBC # BLD: 13.29 K/UL — HIGH (ref 3.8–10.5)
WBC # BLD: 14.34 K/UL — HIGH (ref 3.8–10.5)
WBC # BLD: 14.81 K/UL — HIGH (ref 3.8–10.5)
WBC # BLD: 17.39 K/UL — HIGH (ref 3.8–10.5)
WBC # BLD: 18.33 K/UL — HIGH (ref 3.8–10.5)
WBC # BLD: 8.77 K/UL — SIGNIFICANT CHANGE UP (ref 3.8–10.5)
WBC # FLD AUTO: 10.2 K/UL — SIGNIFICANT CHANGE UP (ref 3.8–10.5)
WBC # FLD AUTO: 11.13 K/UL — HIGH (ref 3.8–10.5)
WBC # FLD AUTO: 11.68 K/UL — HIGH (ref 3.8–10.5)
WBC # FLD AUTO: 11.94 K/UL — HIGH (ref 3.8–10.5)
WBC # FLD AUTO: 12.9 K/UL — HIGH (ref 3.8–10.5)
WBC # FLD AUTO: 13.29 K/UL — HIGH (ref 3.8–10.5)
WBC # FLD AUTO: 14.34 K/UL — HIGH (ref 3.8–10.5)
WBC # FLD AUTO: 14.81 K/UL — HIGH (ref 3.8–10.5)
WBC # FLD AUTO: 17.39 K/UL — HIGH (ref 3.8–10.5)
WBC # FLD AUTO: 18.33 K/UL — HIGH (ref 3.8–10.5)
WBC # FLD AUTO: 8.77 K/UL — SIGNIFICANT CHANGE UP (ref 3.8–10.5)

## 2022-01-01 PROCEDURE — 99214 OFFICE O/P EST MOD 30 MIN: CPT

## 2022-01-01 PROCEDURE — 84484 ASSAY OF TROPONIN QUANT: CPT

## 2022-01-01 PROCEDURE — 71045 X-RAY EXAM CHEST 1 VIEW: CPT

## 2022-01-01 PROCEDURE — 84165 PROTEIN E-PHORESIS SERUM: CPT

## 2022-01-01 PROCEDURE — 71250 CT THORAX DX C-: CPT | Mod: 26

## 2022-01-01 PROCEDURE — 86850 RBC ANTIBODY SCREEN: CPT

## 2022-01-01 PROCEDURE — 93010 ELECTROCARDIOGRAM REPORT: CPT

## 2022-01-01 PROCEDURE — 86900 BLOOD TYPING SEROLOGIC ABO: CPT

## 2022-01-01 PROCEDURE — 82272 OCCULT BLD FECES 1-3 TESTS: CPT

## 2022-01-01 PROCEDURE — 84466 ASSAY OF TRANSFERRIN: CPT

## 2022-01-01 PROCEDURE — 82728 ASSAY OF FERRITIN: CPT

## 2022-01-01 PROCEDURE — 83880 ASSAY OF NATRIURETIC PEPTIDE: CPT

## 2022-01-01 PROCEDURE — 84155 ASSAY OF PROTEIN SERUM: CPT

## 2022-01-01 PROCEDURE — 71250 CT THORAX DX C-: CPT

## 2022-01-01 PROCEDURE — 86901 BLOOD TYPING SEROLOGIC RH(D): CPT

## 2022-01-01 PROCEDURE — 93306 TTE W/DOPPLER COMPLETE: CPT | Mod: 26

## 2022-01-01 PROCEDURE — 99223 1ST HOSP IP/OBS HIGH 75: CPT | Mod: GC

## 2022-01-01 PROCEDURE — 93000 ELECTROCARDIOGRAM COMPLETE: CPT

## 2022-01-01 PROCEDURE — 86923 COMPATIBILITY TEST ELECTRIC: CPT

## 2022-01-01 PROCEDURE — 93970 EXTREMITY STUDY: CPT | Mod: 26

## 2022-01-01 PROCEDURE — 82746 ASSAY OF FOLIC ACID SERUM: CPT

## 2022-01-01 PROCEDURE — 83550 IRON BINDING TEST: CPT

## 2022-01-01 PROCEDURE — 85027 COMPLETE CBC AUTOMATED: CPT

## 2022-01-01 PROCEDURE — 99215 OFFICE O/P EST HI 40 MIN: CPT

## 2022-01-01 PROCEDURE — 82607 VITAMIN B-12: CPT

## 2022-01-01 PROCEDURE — 93306 TTE W/DOPPLER COMPLETE: CPT

## 2022-01-01 PROCEDURE — 80048 BASIC METABOLIC PNL TOTAL CA: CPT

## 2022-01-01 PROCEDURE — 93970 EXTREMITY STUDY: CPT

## 2022-01-01 PROCEDURE — 86803 HEPATITIS C AB TEST: CPT

## 2022-01-01 PROCEDURE — 80053 COMPREHEN METABOLIC PANEL: CPT

## 2022-01-01 PROCEDURE — 36430 TRANSFUSION BLD/BLD COMPNT: CPT

## 2022-01-01 PROCEDURE — 93308 TTE F-UP OR LMTD: CPT

## 2022-01-01 PROCEDURE — 87635 SARS-COV-2 COVID-19 AMP PRB: CPT

## 2022-01-01 PROCEDURE — 94640 AIRWAY INHALATION TREATMENT: CPT

## 2022-01-01 PROCEDURE — 99285 EMERGENCY DEPT VISIT HI MDM: CPT | Mod: 25

## 2022-01-01 PROCEDURE — 86334 IMMUNOFIX E-PHORESIS SERUM: CPT

## 2022-01-01 PROCEDURE — 71045 X-RAY EXAM CHEST 1 VIEW: CPT | Mod: 26

## 2022-01-01 PROCEDURE — 83540 ASSAY OF IRON: CPT

## 2022-01-01 PROCEDURE — 99285 EMERGENCY DEPT VISIT HI MDM: CPT

## 2022-01-01 PROCEDURE — 36415 COLL VENOUS BLD VENIPUNCTURE: CPT

## 2022-01-01 PROCEDURE — 99232 SBSQ HOSP IP/OBS MODERATE 35: CPT

## 2022-01-01 PROCEDURE — P9016: CPT

## 2022-01-01 PROCEDURE — 99222 1ST HOSP IP/OBS MODERATE 55: CPT

## 2022-01-01 PROCEDURE — 99239 HOSP IP/OBS DSCHRG MGMT >30: CPT

## 2022-01-01 PROCEDURE — 93005 ELECTROCARDIOGRAM TRACING: CPT

## 2022-01-01 RX ORDER — LEVOTHYROXINE SODIUM 125 MCG
175 TABLET ORAL DAILY
Refills: 0 | Status: DISCONTINUED | OUTPATIENT
Start: 2022-01-01 | End: 2022-01-01

## 2022-01-01 RX ORDER — VALACYCLOVIR 500 MG/1
1000 TABLET, FILM COATED ORAL
Refills: 0 | Status: DISCONTINUED | OUTPATIENT
Start: 2022-01-01 | End: 2022-01-01

## 2022-01-01 RX ORDER — GABAPENTIN 400 MG/1
1 CAPSULE ORAL
Qty: 60 | Refills: 0
Start: 2022-01-01 | End: 2022-01-01

## 2022-01-01 RX ORDER — ESCITALOPRAM OXALATE 10 MG/1
10 TABLET ORAL
Qty: 90 | Refills: 0 | Status: DISCONTINUED | COMMUNITY
Start: 2020-11-19 | End: 2022-01-01

## 2022-01-01 RX ORDER — METOPROLOL TARTRATE 50 MG
50 TABLET ORAL DAILY
Refills: 0 | Status: DISCONTINUED | OUTPATIENT
Start: 2022-01-01 | End: 2022-01-01

## 2022-01-01 RX ORDER — RIVAROXABAN 15 MG-20MG
20 KIT ORAL DAILY
Refills: 0 | Status: DISCONTINUED | OUTPATIENT
Start: 2022-01-01 | End: 2022-01-01

## 2022-01-01 RX ORDER — GABAPENTIN 400 MG/1
100 CAPSULE ORAL ONCE
Refills: 0 | Status: COMPLETED | OUTPATIENT
Start: 2022-01-01 | End: 2022-01-01

## 2022-01-01 RX ORDER — MONTELUKAST 4 MG/1
10 TABLET, CHEWABLE ORAL DAILY
Refills: 0 | Status: DISCONTINUED | OUTPATIENT
Start: 2022-01-01 | End: 2022-01-01

## 2022-01-01 RX ORDER — VALACYCLOVIR 500 MG/1
1 TABLET, FILM COATED ORAL
Qty: 0 | Refills: 0 | DISCHARGE

## 2022-01-01 RX ORDER — ACETAMINOPHEN 500 MG
1000 TABLET ORAL ONCE
Refills: 0 | Status: COMPLETED | OUTPATIENT
Start: 2022-01-01 | End: 2022-01-01

## 2022-01-01 RX ORDER — LEVOTHYROXINE SODIUM 125 MCG
1 TABLET ORAL
Qty: 0 | Refills: 0 | DISCHARGE

## 2022-01-01 RX ORDER — RIVAROXABAN 15 MG-20MG
1 KIT ORAL
Qty: 0 | Refills: 0 | DISCHARGE

## 2022-01-01 RX ORDER — FUROSEMIDE 40 MG
40 TABLET ORAL DAILY
Refills: 0 | Status: DISCONTINUED | OUTPATIENT
Start: 2022-01-01 | End: 2022-01-01

## 2022-01-01 RX ORDER — METFORMIN HYDROCHLORIDE 850 MG/1
1 TABLET ORAL
Qty: 0 | Refills: 0 | DISCHARGE

## 2022-01-01 RX ORDER — RIVAROXABAN 15 MG-20MG
15 KIT ORAL
Refills: 0 | Status: DISCONTINUED | OUTPATIENT
Start: 2022-01-01 | End: 2022-01-01

## 2022-01-01 RX ORDER — LEVOCETIRIZINE DIHYDROCHLORIDE 5 MG/1
5 TABLET ORAL
Qty: 90 | Refills: 0 | Status: ACTIVE | COMMUNITY
Start: 2022-01-01

## 2022-01-01 RX ORDER — GABAPENTIN 400 MG/1
100 CAPSULE ORAL
Refills: 0 | Status: DISCONTINUED | OUTPATIENT
Start: 2022-01-01 | End: 2022-01-01

## 2022-01-01 RX ORDER — HYDROXYUREA 500 MG/1
500 CAPSULE ORAL
Qty: 0 | Refills: 0 | DISCHARGE

## 2022-01-01 RX ORDER — FLUTICASONE FUROATE, UMECLIDINIUM BROMIDE AND VILANTEROL TRIFENATATE 100; 62.5; 25 UG/1; UG/1; UG/1
100-62.5-25 POWDER RESPIRATORY (INHALATION)
Qty: 60 | Refills: 0 | Status: ACTIVE | COMMUNITY
Start: 2022-01-01

## 2022-01-01 RX ORDER — GABAPENTIN 100 MG
100 TABLET ORAL
Refills: 0 | Status: DISCONTINUED | COMMUNITY
End: 2022-01-01

## 2022-01-01 RX ORDER — ESCITALOPRAM OXALATE 20 MG/1
20 TABLET ORAL
Qty: 90 | Refills: 0 | Status: ACTIVE | COMMUNITY
Start: 2022-01-01

## 2022-01-01 RX ORDER — SERTRALINE 25 MG/1
1 TABLET, FILM COATED ORAL
Qty: 0 | Refills: 0 | DISCHARGE

## 2022-01-01 RX ORDER — IPRATROPIUM BROMIDE 42 UG/1
0.06 SPRAY NASAL
Qty: 15 | Refills: 0 | Status: DISCONTINUED | COMMUNITY
Start: 2017-04-07 | End: 2022-01-01

## 2022-01-01 RX ORDER — POTASSIUM CHLORIDE 20 MEQ
40 PACKET (EA) ORAL EVERY 4 HOURS
Refills: 0 | Status: COMPLETED | OUTPATIENT
Start: 2022-01-01 | End: 2022-01-01

## 2022-01-01 RX ORDER — ESCITALOPRAM OXALATE 10 MG/1
10 TABLET, FILM COATED ORAL DAILY
Refills: 0 | Status: DISCONTINUED | OUTPATIENT
Start: 2022-01-01 | End: 2022-01-01

## 2022-01-01 RX ORDER — ONDANSETRON 8 MG/1
4 TABLET, FILM COATED ORAL ONCE
Refills: 0 | Status: COMPLETED | OUTPATIENT
Start: 2022-01-01 | End: 2022-01-01

## 2022-01-01 RX ORDER — ANAGRELIDE HCL 0.5 MG
1.5 CAPSULE ORAL
Refills: 0 | Status: DISCONTINUED | OUTPATIENT
Start: 2022-01-01 | End: 2022-01-01

## 2022-01-01 RX ORDER — FAMOTIDINE 10 MG/ML
1 INJECTION INTRAVENOUS
Qty: 0 | Refills: 0 | DISCHARGE

## 2022-01-01 RX ORDER — TIOTROPIUM BROMIDE 18 UG/1
1 CAPSULE ORAL; RESPIRATORY (INHALATION) DAILY
Refills: 0 | Status: DISCONTINUED | OUTPATIENT
Start: 2022-01-01 | End: 2022-01-01

## 2022-01-01 RX ORDER — FEDRATINIB HYDROCHLORIDE 100 MG/1
100 CAPSULE ORAL
Qty: 30 | Refills: 0 | Status: DISCONTINUED | COMMUNITY
Start: 2021-11-11 | End: 2022-01-01

## 2022-01-01 RX ORDER — MOMETASONE FUROATE AND FORMOTEROL FUMARATE DIHYDRATE 200; 5 UG/1; UG/1
2 AEROSOL RESPIRATORY (INHALATION)
Qty: 0 | Refills: 0 | DISCHARGE

## 2022-01-01 RX ORDER — ATORVASTATIN CALCIUM 80 MG/1
1 TABLET, FILM COATED ORAL
Qty: 0 | Refills: 0 | DISCHARGE

## 2022-01-01 RX ORDER — PANTOPRAZOLE SODIUM 20 MG/1
40 TABLET, DELAYED RELEASE ORAL
Refills: 0 | Status: DISCONTINUED | OUTPATIENT
Start: 2022-01-01 | End: 2022-01-01

## 2022-01-01 RX ORDER — METOCLOPRAMIDE 10 MG/1
10 TABLET ORAL EVERY 6 HOURS
Qty: 28 | Refills: 0 | Status: DISCONTINUED | COMMUNITY
Start: 2021-04-01 | End: 2022-01-01

## 2022-01-01 RX ADMIN — ESCITALOPRAM OXALATE 10 MILLIGRAM(S): 10 TABLET, FILM COATED ORAL at 10:07

## 2022-01-01 RX ADMIN — MONTELUKAST 10 MILLIGRAM(S): 4 TABLET, CHEWABLE ORAL at 10:08

## 2022-01-01 RX ADMIN — Medication 175 MICROGRAM(S): at 06:26

## 2022-01-01 RX ADMIN — RIVAROXABAN 15 MILLIGRAM(S): KIT at 16:35

## 2022-01-01 RX ADMIN — ESCITALOPRAM OXALATE 10 MILLIGRAM(S): 10 TABLET, FILM COATED ORAL at 11:09

## 2022-01-01 RX ADMIN — GABAPENTIN 100 MILLIGRAM(S): 400 CAPSULE ORAL at 06:05

## 2022-01-01 RX ADMIN — Medication 400 MILLIGRAM(S): at 15:02

## 2022-01-01 RX ADMIN — MONTELUKAST 10 MILLIGRAM(S): 4 TABLET, CHEWABLE ORAL at 11:09

## 2022-01-01 RX ADMIN — GABAPENTIN 100 MILLIGRAM(S): 400 CAPSULE ORAL at 16:35

## 2022-01-01 RX ADMIN — Medication 175 MICROGRAM(S): at 06:06

## 2022-01-01 RX ADMIN — Medication 40 MILLIEQUIVALENT(S): at 13:08

## 2022-01-01 RX ADMIN — ONDANSETRON 4 MILLIGRAM(S): 8 TABLET, FILM COATED ORAL at 22:21

## 2022-01-01 RX ADMIN — Medication 1.5 MILLIGRAM(S): at 18:50

## 2022-01-01 RX ADMIN — GABAPENTIN 100 MILLIGRAM(S): 400 CAPSULE ORAL at 08:01

## 2022-01-01 RX ADMIN — PANTOPRAZOLE SODIUM 40 MILLIGRAM(S): 20 TABLET, DELAYED RELEASE ORAL at 06:26

## 2022-01-01 RX ADMIN — Medication 400 MILLIGRAM(S): at 22:21

## 2022-01-01 RX ADMIN — Medication 50 MILLIGRAM(S): at 06:05

## 2022-01-01 RX ADMIN — Medication 40 MILLIGRAM(S): at 06:05

## 2022-01-01 RX ADMIN — Medication 1000 MILLIGRAM(S): at 23:00

## 2022-01-01 RX ADMIN — PANTOPRAZOLE SODIUM 40 MILLIGRAM(S): 20 TABLET, DELAYED RELEASE ORAL at 06:05

## 2022-01-01 RX ADMIN — TIOTROPIUM BROMIDE 1 CAPSULE(S): 18 CAPSULE ORAL; RESPIRATORY (INHALATION) at 07:31

## 2022-01-01 RX ADMIN — Medication 40 MILLIEQUIVALENT(S): at 09:36

## 2022-01-01 RX ADMIN — Medication 1.5 MILLIGRAM(S): at 06:06

## 2022-01-01 RX ADMIN — Medication 1.5 MILLIGRAM(S): at 08:44

## 2022-01-01 RX ADMIN — VALACYCLOVIR 1000 MILLIGRAM(S): 500 TABLET, FILM COATED ORAL at 06:36

## 2022-01-01 RX ADMIN — Medication 40 MILLIGRAM(S): at 10:08

## 2022-01-03 ENCOUNTER — APPOINTMENT (OUTPATIENT)
Dept: HEMATOLOGY ONCOLOGY | Facility: CLINIC | Age: 72
End: 2022-01-03

## 2022-01-03 ENCOUNTER — NON-APPOINTMENT (OUTPATIENT)
Age: 72
End: 2022-01-03

## 2022-01-04 ENCOUNTER — APPOINTMENT (OUTPATIENT)
Dept: INFUSION THERAPY | Facility: HOSPITAL | Age: 72
End: 2022-01-04

## 2022-01-11 ENCOUNTER — OUTPATIENT (OUTPATIENT)
Dept: OUTPATIENT SERVICES | Facility: HOSPITAL | Age: 72
LOS: 1 days | Discharge: HOME | End: 2022-01-11

## 2022-01-11 DIAGNOSIS — D69.6 THROMBOCYTOPENIA, UNSPECIFIED: ICD-10-CM

## 2022-01-11 DIAGNOSIS — K81.9 CHOLECYSTITIS, UNSPECIFIED: Chronic | ICD-10-CM

## 2022-01-11 DIAGNOSIS — S99.919A UNSPECIFIED INJURY OF UNSPECIFIED ANKLE, INITIAL ENCOUNTER: Chronic | ICD-10-CM

## 2022-01-12 ENCOUNTER — RESULT REVIEW (OUTPATIENT)
Age: 72
End: 2022-01-12

## 2022-01-12 ENCOUNTER — APPOINTMENT (OUTPATIENT)
Dept: HEMATOLOGY ONCOLOGY | Facility: CLINIC | Age: 72
End: 2022-01-12
Payer: MEDICARE

## 2022-01-12 VITALS
SYSTOLIC BLOOD PRESSURE: 127 MMHG | TEMPERATURE: 97.3 F | WEIGHT: 155.42 LBS | DIASTOLIC BLOOD PRESSURE: 78 MMHG | BODY MASS INDEX: 24.98 KG/M2 | HEIGHT: 66 IN | HEART RATE: 61 BPM | RESPIRATION RATE: 16 BRPM | OXYGEN SATURATION: 99 %

## 2022-01-12 LAB
ANISOCYTOSIS BLD QL: SLIGHT — SIGNIFICANT CHANGE UP
BASOPHILS # BLD AUTO: 0.29 K/UL — HIGH (ref 0–0.2)
BASOPHILS NFR BLD AUTO: 2 % — SIGNIFICANT CHANGE UP (ref 0–2)
DACRYOCYTES BLD QL SMEAR: SLIGHT — SIGNIFICANT CHANGE UP
ELLIPTOCYTES BLD QL SMEAR: SLIGHT — SIGNIFICANT CHANGE UP
EOSINOPHIL # BLD AUTO: 0.72 K/UL — HIGH (ref 0–0.5)
EOSINOPHIL NFR BLD AUTO: 5 % — SIGNIFICANT CHANGE UP (ref 0–6)
HCT VFR BLD CALC: 28.2 % — LOW (ref 34.5–45)
HGB BLD-MCNC: 8.2 G/DL — LOW (ref 11.5–15.5)
HYPOCHROMIA BLD QL: SLIGHT — SIGNIFICANT CHANGE UP
LG PLATELETS BLD QL AUTO: SLIGHT — SIGNIFICANT CHANGE UP
LYMPHOCYTES # BLD AUTO: 0.29 K/UL — LOW (ref 1–3.3)
LYMPHOCYTES # BLD AUTO: 2 % — LOW (ref 13–44)
MCHC RBC-ENTMCNC: 27.1 PG — SIGNIFICANT CHANGE UP (ref 27–34)
MCHC RBC-ENTMCNC: 29.1 G/DL — LOW (ref 32–36)
MCV RBC AUTO: 93.1 FL — SIGNIFICANT CHANGE UP (ref 80–100)
MICROCYTES BLD QL: SLIGHT — SIGNIFICANT CHANGE UP
MONOCYTES # BLD AUTO: 0.14 K/UL — SIGNIFICANT CHANGE UP (ref 0–0.9)
MONOCYTES NFR BLD AUTO: 1 % — LOW (ref 2–14)
MYELOCYTES NFR BLD: 1 % — HIGH (ref 0–0)
NEUTROPHILS # BLD AUTO: 12.85 K/UL — HIGH (ref 1.8–7.4)
NEUTROPHILS NFR BLD AUTO: 89 % — HIGH (ref 43–77)
NRBC # BLD: 0 /100 — SIGNIFICANT CHANGE UP (ref 0–0)
NRBC # BLD: SIGNIFICANT CHANGE UP /100 WBCS (ref 0–0)
PLAT MORPH BLD: NORMAL — SIGNIFICANT CHANGE UP
PLATELET # BLD AUTO: 764 K/UL — HIGH (ref 150–400)
POIKILOCYTOSIS BLD QL AUTO: SLIGHT — SIGNIFICANT CHANGE UP
RBC # BLD: 3.03 M/UL — LOW (ref 3.8–5.2)
RBC # FLD: 20.6 % — HIGH (ref 10.3–14.5)
RBC BLD AUTO: ABNORMAL
STOMATOCYTES BLD QL SMEAR: SLIGHT — SIGNIFICANT CHANGE UP
WBC # BLD: 14.44 K/UL — HIGH (ref 3.8–10.5)
WBC # FLD AUTO: 14.44 K/UL — HIGH (ref 3.8–10.5)

## 2022-01-12 PROCEDURE — 99214 OFFICE O/P EST MOD 30 MIN: CPT

## 2022-01-12 NOTE — HISTORY OF PRESENT ILLNESS
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 She is tolerating the higher dose well, though her hgb has remained ~8.0. Notes satiety is improving as well as fatigue and this is the best she has felt in a while.\par -Currently on Hydrea 500mg every other day as she had trouble with obtaining anagrelide. Last took anagrelide 01/01/2022.\par -Continued 16lb weight loss since 12/2021.\par -she is continuously coughing, she will follow up with her pulmonologist, planned to go on oxygen therapy when she is walking \par

## 2022-01-12 NOTE — REVIEW OF SYSTEMS
[Recent Change In Weight] : ~T recent weight change [Cough] : cough [Depression] : depression [Negative] : Allergic/Immunologic [Fever] : no fever [Chills] : no chills [Night Sweats] : no night sweats [Fatigue] : no fatigue [Wheezing] : no wheezing [Abdominal Pain] : no abdominal pain [Vomiting] : no vomiting [Constipation] : no constipation [Diarrhea] : no diarrhea [Suicidal] : not suicidal [FreeTextEntry7] : +Early satiety (improving)

## 2022-01-12 NOTE — PHYSICAL EXAM
[Fully active, able to carry on all pre-disease performance without restriction] : Status 0 - Fully active, able to carry on all pre-disease performance without restriction [Thin] : thin [Normal] : RRR, normal S1S2, no murmurs, rubs, gallops [de-identified] : B/L LE edema 1+, pitting [de-identified] : spleen + 4 finger breaths below costal margin, slightly decreased in size

## 2022-01-12 NOTE — ASSESSMENT
[FreeTextEntry1] : 70 yo F with a history of PMF jak2+, Cytogenetics show +1 and +9, + SM on high doses of HU causing worsening anemia with persistently high platelet counts.  \par US (2017): Spleen 15.3cm\par US (3/2020): Spleen 15.9cm\par US (3/2021): Spleen 17.7cm\par US (11/2021): Spleen 19.2cm\par Had a very bad response to Jakafi. No response to iron and HU titration.\par \par Splenomegaly and thrombocytosis\par -Patients appetite and SM seems to be improved.\par -Anagrelide 1.5mg BID\par -Discontinue HU 500mg, given decreasing Hgb. \par -Hgb 8.2 today. Last transfused 2u PRBC on 12/10/2021\par -CBC weekly\par -Continue Fedratinib to 200mg daily, and will titrate to goal of 400mg daily as tolerated, but her hemoglobin has remained around 8.0. Next visit will attempt to titrate down the anagrelide and increase Fedratinib dose. \par -If Fedratinib fails look for clinical trial (concern if she will be a candidate given h/o carcinoid)\par \par Anemia:\par -on previous bone marrow biopsy (5/6/2020), she had low iron stores\par -11/3/21 labs found to have ferritin of 475, iron 35, sat 24%. Recieved one dose of Venofer 200mg on 11/8/21 without clinical improvement\par -iron repletion did not work could initiate epo versus danazol, but would hold off for now \par \par Carcinoid:\par -Spoke with Dr. Farah at [place], who strongly believes there is no concern for persistent disease\par -Cough persists, she will follow up with her pulmonologist \par \par Follow up in 1 month\par Case and management discussed with Dr. Dsohi

## 2022-01-15 NOTE — CARDIOLOGY SUMMARY
EMERGENCY DEPARTMENT ENCOUNTER      NAME: Imani Del Real  AGE: 10 year old female  YOB: 2011  MRN: [id number]  EVALUATION DATE & TIME: No admission date for patient encounter.    PCP: No primary care provider on file.    ED PROVIDER: Deepak Nath PA-C      Chief Complaint   Patient presents with     Shortness of Breath         FINAL IMPRESSION:  1. Viral URI with cough    2. Croup          MEDICAL DECISION MAKING:    Pertinent Labs & Imaging studies reviewed. (See chart for details)  10 year old female presents to the Emergency Department for evaluation of cough, shortness of breath, reports of stridor coming from urgent care.    After obtaining history present illness, reviewing vitals and examining the patient currently she is not in any acute respiratory distress, no active coughing or stridor.  Plan to monitor the patient for the next 60 to 90 minutes here as well as assess with soft tissue x-ray of the neck, chest x-ray, provide Motrin.    Patient was reassessed after the racemic epinephrine neb and I did have discussion of x-ray results with the patient's father.  At this point time she appears in no acute distress.  We will continue to observe her for the next 45 minutes for total of 2 hours after the epi neb was given.  If there is no change in her symptoms no indication for repeat need of racemic epinephrine we will plan to discharge to home.  I did recommend the use of NSAIDs to help with swelling as well as cold drinks and cold fluids for eating.    Final assessment of the child shows that she is active, no acute distress, nontoxic, respiratory breath sounds are normal with no wheezing, stridor or tachypnea.,  Or tachypnea, rales, or tachypnea.    ED COURSE    I met with the patient, obtained history, performed an initial exam, and discussed options and plan for diagnostics and treatment here in the ED.    At the conclusion of the encounter I discussed the results of all of the tests and  [de-identified] : WAP\par  [de-identified] : 5/2021 nroaml LV function. RV was normal in structure. TV appears normal. MV leaflets are calcified the disposition. The questions were answered. The patient or family acknowledged understanding and was agreeable with the care plan.     MEDICATIONS GIVEN IN THE EMERGENCY:  Medications   ibuprofen (ADVIL/MOTRIN) suspension 400 mg (400 mg Oral Given 1/15/22 1150)   racEPINEPHrine neb solution 0.5 mL (0.5 mLs Nebulization Given 1/15/22 1220)       NEW PRESCRIPTIONS STARTED AT TODAY'S ER VISIT  New Prescriptions    No medications on file            =================================================================    HPI    Patient information was obtained from: Patient and father  Imani Del Real is a 10 year old female with a pertinent history of intermittent asthma who presents to this ED for evaluation of 2 to 3-day history of upper respiratory type symptoms.  Current complaints are shortness of breath, cough, fever.  Patient is fully vaccinated and also has received COVID-19 vaccines.  No other ill contacts at home.  Father reports that her asthma is very sporadic seems to occur most often when she has a cold or during allergy seasons.  No daily use of inhalers for long-term management of asthma.  Currently the patient denies any pain, no reports of sore throat, chest pain, or abdominal pain.  She denies headache.  She does admit to some mild breathing difficulty and a cough.  No complaints of GI symptoms such as nausea, vomiting, diarrhea.    Patient was assessed at urgent care they had concerns about stridor on their examination therefore they gave her Decadron and Tylenol, swabbed for COVID-19 and sent her here for further assessment.      REVIEW OF SYSTEMS   Review of Systems   Constitutional: Positive for chills, fatigue and fever.   HENT: Negative for congestion and sore throat.    Eyes: Negative.    Respiratory: Positive for cough and shortness of breath. Negative for wheezing and stridor.    Cardiovascular: Negative for chest pain.   Gastrointestinal: Negative.    Genitourinary: Negative.     Musculoskeletal: Negative.    Neurological: Negative.           PAST MEDICAL HISTORY:  No past medical history on file.    PAST SURGICAL HISTORY:  No past surgical history on file.      CURRENT MEDICATIONS:    No current facility-administered medications for this encounter.  No current outpatient medications on file.      ALLERGIES:  No Known Allergies    FAMILY HISTORY:  Family History   Problem Relation Age of Onset     Hypertension Maternal Grandmother        SOCIAL HISTORY:   Social History     Socioeconomic History     Marital status: Single     Spouse name: Not on file     Number of children: 0     Years of education: Not on file     Highest education level: Not on file   Occupational History     Not on file   Tobacco Use     Smoking status: Never Smoker     Smokeless tobacco: Never Used   Substance and Sexual Activity     Alcohol use: No     Drug use: No     Sexual activity: Never   Other Topics Concern     Not on file   Social History Narrative     Not on file     Social Determinants of Health     Financial Resource Strain: Not on file   Food Insecurity: Not on file   Transportation Needs: Not on file   Physical Activity: Not on file   Housing Stability: Not on file       VITALS:  Patient Vitals for the past 24 hrs:   Temp Temp src Pulse Resp SpO2 Weight   01/15/22 1122 99.4  F (37.4  C) Oral (!) 136 24 98 % 38.6 kg (85 lb)       PHYSICAL EXAM    Physical Exam  Vitals and nursing note reviewed.   Constitutional:       Appearance: Normal appearance. She is normal weight.   HENT:      Head: Normocephalic.      Right Ear: External ear normal.      Left Ear: External ear normal.      Nose: Nose normal. No congestion.      Mouth/Throat:      Mouth: Mucous membranes are moist.      Pharynx: Oropharynx is clear. No oropharyngeal exudate or posterior oropharyngeal erythema.   Eyes:      Conjunctiva/sclera: Conjunctivae normal.   Cardiovascular:      Rate and Rhythm: Tachycardia present.   Pulmonary:      Effort:  [de-identified] : PAF s/p ablation in 2/7/19. Pulmonary effort is normal. No respiratory distress, nasal flaring or retractions.      Breath sounds: Normal breath sounds. No stridor. No wheezing or rhonchi.   Abdominal:      General: Abdomen is flat.      Tenderness: There is no abdominal tenderness.   Musculoskeletal:         General: No deformity or signs of injury. Normal range of motion.   Skin:     General: Skin is warm and dry.      Findings: No rash.   Neurological:      General: No focal deficit present.      Mental Status: She is alert.      Sensory: No sensory deficit.      Motor: No weakness.   Psychiatric:         Mood and Affect: Mood normal.          LAB:  All pertinent labs reviewed and interpreted.  Results for orders placed or performed during the hospital encounter of 01/15/22   XR Chest 1 View    Impression    IMPRESSION: Negative chest.   XR Neck Soft Tissue    Impression    IMPRESSION: No prevertebral edema. Very mild narrowing of the subglottic airway on the single lateral view. This may reflect mild mucosal edema/laryngitis. Otherwise normal appearance of the epiglottis and larynx. No airway compromise.. No radiopaque   foreign bodies.       RADIOLOGY:  Reviewed all pertinent imaging. Please see official radiology report.  XR Neck Soft Tissue   Final Result   IMPRESSION: No prevertebral edema. Very mild narrowing of the subglottic airway on the single lateral view. This may reflect mild mucosal edema/laryngitis. Otherwise normal appearance of the epiglottis and larynx. No airway compromise.. No radiopaque    foreign bodies.      XR Chest 1 View   Final Result   IMPRESSION: Negative chest.            Deepak Nath PA-C  Emergency Medicine  [facility]     Deepak Nath PA-C  01/15/22 5460     [de-identified] : 12/2018 with normal cors and LVEDP.

## 2022-01-26 ENCOUNTER — RESULT REVIEW (OUTPATIENT)
Age: 72
End: 2022-01-26

## 2022-01-26 ENCOUNTER — APPOINTMENT (OUTPATIENT)
Dept: CARDIOLOGY | Facility: CLINIC | Age: 72
End: 2022-01-26
Payer: MEDICARE

## 2022-01-26 ENCOUNTER — APPOINTMENT (OUTPATIENT)
Dept: HEMATOLOGY ONCOLOGY | Facility: CLINIC | Age: 72
End: 2022-01-26

## 2022-01-26 ENCOUNTER — NON-APPOINTMENT (OUTPATIENT)
Age: 72
End: 2022-01-26

## 2022-01-26 VITALS
HEIGHT: 66 IN | OXYGEN SATURATION: 97 % | WEIGHT: 163 LBS | SYSTOLIC BLOOD PRESSURE: 135 MMHG | HEART RATE: 86 BPM | DIASTOLIC BLOOD PRESSURE: 70 MMHG | BODY MASS INDEX: 26.2 KG/M2

## 2022-01-26 LAB
ALBUMIN SERPL ELPH-MCNC: 3.4 G/DL
ALP BLD-CCNC: 82 U/L
ALT SERPL-CCNC: 5 U/L
AMYLASE/CREAT SERPL: 34 U/L
ANION GAP SERPL CALC-SCNC: 14 MMOL/L
ANISOCYTOSIS BLD QL: SLIGHT — SIGNIFICANT CHANGE UP
AST SERPL-CCNC: 11 U/L
BASOPHILS # BLD AUTO: 0.09 K/UL — SIGNIFICANT CHANGE UP (ref 0–0.2)
BASOPHILS NFR BLD AUTO: 0.5 % — SIGNIFICANT CHANGE UP (ref 0–2)
BILIRUB SERPL-MCNC: 0.4 MG/DL
BUN SERPL-MCNC: 9 MG/DL
CALCIUM SERPL-MCNC: 8.3 MG/DL
CHLORIDE SERPL-SCNC: 103 MMOL/L
CO2 SERPL-SCNC: 25 MMOL/L
CREAT SERPL-MCNC: 0.92 MG/DL
DACRYOCYTES BLD QL SMEAR: SLIGHT — SIGNIFICANT CHANGE UP
ELLIPTOCYTES BLD QL SMEAR: SLIGHT — SIGNIFICANT CHANGE UP
EOSINOPHIL # BLD AUTO: 0.63 K/UL — HIGH (ref 0–0.5)
EOSINOPHIL NFR BLD AUTO: 3.8 % — SIGNIFICANT CHANGE UP (ref 0–6)
GLUCOSE SERPL-MCNC: 87 MG/DL
HCT VFR BLD CALC: 26.4 % — LOW (ref 34.5–45)
HGB BLD-MCNC: 7.8 G/DL — LOW (ref 11.5–15.5)
HYPOCHROMIA BLD QL: SLIGHT — SIGNIFICANT CHANGE UP
IMM GRANULOCYTES NFR BLD AUTO: 3 % — HIGH (ref 0–1.5)
LG PLATELETS BLD QL AUTO: SLIGHT — SIGNIFICANT CHANGE UP
LPL SERPL-CCNC: 28 U/L
LYMPHOCYTES # BLD AUTO: 1.25 K/UL — SIGNIFICANT CHANGE UP (ref 1–3.3)
LYMPHOCYTES # BLD AUTO: 7.5 % — LOW (ref 13–44)
MCHC RBC-ENTMCNC: 27.2 PG — SIGNIFICANT CHANGE UP (ref 27–34)
MCHC RBC-ENTMCNC: 29.5 G/DL — LOW (ref 32–36)
MCV RBC AUTO: 92 FL — SIGNIFICANT CHANGE UP (ref 80–100)
MICROCYTES BLD QL: SLIGHT — SIGNIFICANT CHANGE UP
MONOCYTES # BLD AUTO: 0.46 K/UL — SIGNIFICANT CHANGE UP (ref 0–0.9)
MONOCYTES NFR BLD AUTO: 2.8 % — SIGNIFICANT CHANGE UP (ref 2–14)
NEUTROPHILS # BLD AUTO: 13.7 K/UL — HIGH (ref 1.8–7.4)
NEUTROPHILS NFR BLD AUTO: 82.4 % — HIGH (ref 43–77)
NRBC # BLD: 0 /100 WBCS — SIGNIFICANT CHANGE UP (ref 0–0)
OVALOCYTES BLD QL SMEAR: SLIGHT — SIGNIFICANT CHANGE UP
PLAT MORPH BLD: NORMAL — SIGNIFICANT CHANGE UP
PLATELET # BLD AUTO: 685 K/UL — HIGH (ref 150–400)
POIKILOCYTOSIS BLD QL AUTO: SLIGHT — SIGNIFICANT CHANGE UP
POLYCHROMASIA BLD QL SMEAR: SLIGHT — SIGNIFICANT CHANGE UP
POTASSIUM SERPL-SCNC: 4.6 MMOL/L
PROT SERPL-MCNC: 6.4 G/DL
RBC # BLD: 2.87 M/UL — LOW (ref 3.8–5.2)
RBC # FLD: 21.8 % — HIGH (ref 10.3–14.5)
RBC BLD AUTO: ABNORMAL
SCHISTOCYTES BLD QL AUTO: SLIGHT — SIGNIFICANT CHANGE UP
SODIUM SERPL-SCNC: 142 MMOL/L
STOMATOCYTES BLD QL SMEAR: SLIGHT — SIGNIFICANT CHANGE UP
WBC # BLD: 16.63 K/UL — HIGH (ref 3.8–10.5)
WBC # FLD AUTO: 16.63 K/UL — HIGH (ref 3.8–10.5)

## 2022-01-26 PROCEDURE — 99214 OFFICE O/P EST MOD 30 MIN: CPT

## 2022-01-26 PROCEDURE — 93000 ELECTROCARDIOGRAM COMPLETE: CPT

## 2022-01-26 NOTE — HISTORY OF PRESENT ILLNESS
[FreeTextEntry1] : 70-year-old woman with a history of asthma, obesity, essential thrombocytosis, HFPEF, normal cors in 12/2018, PAF s/p ablation in 2/7/19, VATS with removal of carcinoid in 5/2021\par \par Since her last visit, she has been feeling well. She started Fedratinib and anagrelide for splenomegaly and thrombocytosis. Her appetite has improved. She has been gaining weight. \par Her MATTHEW has improved though he still feels it when climbing the stairs. he   denies any chest pain, PND, orthopnea, lower extremity edema,  strokelike symptoms.  he no longer has any palpitations.  No reported melena, hematochezia or hematemesis.  She is compliant with her medications.

## 2022-01-26 NOTE — CARDIOLOGY SUMMARY
[de-identified] : Sinus  Rhythm  - frequent PAC s  [de-identified] : 5/2021 nroaml LV function. RV was normal in structure. TV appears normal. MV leaflets are calcified [de-identified] : PAF s/p ablation in 2/7/19. [de-identified] : 12/2018 with normal cors and LVEDP.

## 2022-01-26 NOTE — DISCUSSION/SUMMARY
[FreeTextEntry1] : 71-year-old woman with a history as listed presents for a followup visit.\par \par Mallory has been doing well.  She has SR with frequent APCs. She denies any anginal symptoms. She is s/p cath on 12/2018 with normal coronaries. I don’t this that a repeat ischemic evaluation in setting of worsening anemia is prudent. \par She is compensated from a HF POV but has mildly increased lower extremity edema.  She will take the Lasix PRN for lower extremity edema.   She will monitor for worsening volume.\par \par Of note despite her carcinoid diagnosis, her mitral valve and tricuspid valve did not have any signs of distortion from carcinoid.\par  \par She is status post an A. fib ablation.  At this time she has an irregular rhythm.  Her EKG today is more consistent with a  sinus rhythm with frequent PACs.  She will continue Toprol 50mg Qday.  She has maintained her Xarelto therapy.  She has no signs of clinical bleeding.  She will stay on the ASA therapy \par \par She will followup with heme. \par Exercise and diet counseling was performed in order to reduce her future cardiovascular risk. \par She will followup with me in 2-3 months

## 2022-02-10 ENCOUNTER — OUTPATIENT (OUTPATIENT)
Dept: OUTPATIENT SERVICES | Facility: HOSPITAL | Age: 72
LOS: 1 days | Discharge: HOME | End: 2022-02-10

## 2022-02-10 ENCOUNTER — RESULT REVIEW (OUTPATIENT)
Age: 72
End: 2022-02-10

## 2022-02-10 ENCOUNTER — APPOINTMENT (OUTPATIENT)
Dept: HEMATOLOGY ONCOLOGY | Facility: CLINIC | Age: 72
End: 2022-02-10
Payer: MEDICARE

## 2022-02-10 ENCOUNTER — OUTPATIENT (OUTPATIENT)
Dept: OUTPATIENT SERVICES | Facility: HOSPITAL | Age: 72
LOS: 1 days | End: 2022-02-10
Payer: MEDICARE

## 2022-02-10 VITALS
WEIGHT: 154.32 LBS | TEMPERATURE: 97.7 F | RESPIRATION RATE: 16 BRPM | HEIGHT: 65.98 IN | DIASTOLIC BLOOD PRESSURE: 74 MMHG | SYSTOLIC BLOOD PRESSURE: 143 MMHG | HEART RATE: 76 BPM | BODY MASS INDEX: 24.8 KG/M2 | OXYGEN SATURATION: 96 %

## 2022-02-10 DIAGNOSIS — D69.6 THROMBOCYTOPENIA, UNSPECIFIED: ICD-10-CM

## 2022-02-10 DIAGNOSIS — S99.919A UNSPECIFIED INJURY OF UNSPECIFIED ANKLE, INITIAL ENCOUNTER: Chronic | ICD-10-CM

## 2022-02-10 DIAGNOSIS — D75.81 MYELOFIBROSIS: ICD-10-CM

## 2022-02-10 DIAGNOSIS — K81.9 CHOLECYSTITIS, UNSPECIFIED: Chronic | ICD-10-CM

## 2022-02-10 LAB
ANISOCYTOSIS BLD QL: SLIGHT — SIGNIFICANT CHANGE UP
BASOPHILS # BLD AUTO: 0.07 K/UL — SIGNIFICANT CHANGE UP (ref 0–0.2)
BASOPHILS NFR BLD AUTO: 0.5 % — SIGNIFICANT CHANGE UP (ref 0–2)
DACRYOCYTES BLD QL SMEAR: SLIGHT — SIGNIFICANT CHANGE UP
ELLIPTOCYTES BLD QL SMEAR: SLIGHT — SIGNIFICANT CHANGE UP
EOSINOPHIL # BLD AUTO: 0.43 K/UL — SIGNIFICANT CHANGE UP (ref 0–0.5)
EOSINOPHIL NFR BLD AUTO: 3 % — SIGNIFICANT CHANGE UP (ref 0–6)
HCT VFR BLD CALC: 28.6 % — LOW (ref 34.5–45)
HGB BLD-MCNC: 8.1 G/DL — LOW (ref 11.5–15.5)
HYPOCHROMIA BLD QL: SLIGHT — SIGNIFICANT CHANGE UP
IMM GRANULOCYTES NFR BLD AUTO: 2.6 % — HIGH (ref 0–1.5)
LYMPHOCYTES # BLD AUTO: 1.16 K/UL — SIGNIFICANT CHANGE UP (ref 1–3.3)
LYMPHOCYTES # BLD AUTO: 8 % — LOW (ref 13–44)
MCHC RBC-ENTMCNC: 26.3 PG — LOW (ref 27–34)
MCHC RBC-ENTMCNC: 28.3 G/DL — LOW (ref 32–36)
MCV RBC AUTO: 92.9 FL — SIGNIFICANT CHANGE UP (ref 80–100)
MICROCYTES BLD QL: SLIGHT — SIGNIFICANT CHANGE UP
MONOCYTES # BLD AUTO: 0.58 K/UL — SIGNIFICANT CHANGE UP (ref 0–0.9)
MONOCYTES NFR BLD AUTO: 4 % — SIGNIFICANT CHANGE UP (ref 2–14)
NEUTROPHILS # BLD AUTO: 11.84 K/UL — HIGH (ref 1.8–7.4)
NEUTROPHILS NFR BLD AUTO: 81.9 % — HIGH (ref 43–77)
NRBC # BLD: 0 /100 WBCS — SIGNIFICANT CHANGE UP (ref 0–0)
PLAT MORPH BLD: NORMAL — SIGNIFICANT CHANGE UP
PLATELET # BLD AUTO: 702 K/UL — HIGH (ref 150–400)
POIKILOCYTOSIS BLD QL AUTO: SLIGHT — SIGNIFICANT CHANGE UP
RBC # BLD: 3.08 M/UL — LOW (ref 3.8–5.2)
RBC # FLD: 22.2 % — HIGH (ref 10.3–14.5)
RBC BLD AUTO: ABNORMAL
STOMATOCYTES BLD QL SMEAR: SLIGHT — SIGNIFICANT CHANGE UP
WBC # BLD: 14.45 K/UL — HIGH (ref 3.8–10.5)
WBC # FLD AUTO: 14.45 K/UL — HIGH (ref 3.8–10.5)

## 2022-02-10 PROCEDURE — 86850 RBC ANTIBODY SCREEN: CPT

## 2022-02-10 PROCEDURE — 99214 OFFICE O/P EST MOD 30 MIN: CPT

## 2022-02-10 PROCEDURE — 86900 BLOOD TYPING SEROLOGIC ABO: CPT

## 2022-02-10 PROCEDURE — 86901 BLOOD TYPING SEROLOGIC RH(D): CPT

## 2022-02-10 PROCEDURE — 86923 COMPATIBILITY TEST ELECTRIC: CPT

## 2022-02-10 NOTE — PHYSICAL EXAM
[Fully active, able to carry on all pre-disease performance without restriction] : Status 0 - Fully active, able to carry on all pre-disease performance without restriction [Thin] : thin [Normal] : RRR, normal S1S2, no murmurs, rubs, gallops [de-identified] : B/L LE edema 1+, pitting [de-identified] : spleen + 3 finger breaths below costal margin, slightly decreased in size

## 2022-02-10 NOTE — REVIEW OF SYSTEMS
[Recent Change In Weight] : ~T recent weight change [Cough] : cough [Depression] : depression [Fever] : no fever [Chills] : no chills [Night Sweats] : no night sweats [Fatigue] : no fatigue [Wheezing] : no wheezing [Suicidal] : not suicidal [Negative] : Gastrointestinal

## 2022-02-10 NOTE — HISTORY OF PRESENT ILLNESS
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 Though her hgb has remained ~8.0. \par -Notes her abdomen has felt warm, especially around the umbilical area x 2 weeks. The feeling of warmth is not associated with eating, or accompanied by other GI symptoms (i.e. nausea, vomiting, diarrhea, constipation). She is able to tolerate PO intake without issues. Had a CT of the abdomen done with the surgery department at [facility] which did not reveal any abnormalities.  \par -Weight loss has stabilized, she has had a much better appetite, aim to gain weight.\par -Still is continuously coughing, she will follow up with her pulmonologist, planned to go on oxygen therapy when she is walking \par

## 2022-02-10 NOTE — ASSESSMENT
[FreeTextEntry1] : 70 yo F with a history of PMF jak2+, Cytogenetics show +1 and +9, + SM on high doses of HU causing worsening anemia with persistently high platelet counts.  \par US (2017): Spleen 15.3cm\par US (3/2020): Spleen 15.9cm\par US (3/2021): Spleen 17.7cm\par US (11/2021): Spleen 19.2cm\par Had a very bad response to Jakafi. No response to iron and HU titration.\par Fedratinib 100mg daily started on 11/17/21, titrated up to 200mg daily on 11/30/21, titrated up to 300mg daily on 2/10/22\par \par Splenomegaly and thrombocytosis\par -Patients appetite and SM seems to be improved.\par -Anagrelide 1.5mg BID\par -Off HU 500mg, given decreasing Hgb. \par -Hgb 8.2 today. Last transfused 2u PRBC on 12/10/2021. Will transfuse 2u PRBC tomorrow in anticipation of drop as we will be titrating up on Fedratinib.\par -Increase Fedratinib to 300mg daily, and will titrate to goal of 400mg daily as tolerated. Next visit will attempt to titrate down the anagrelide and increase Fedratinib dose. \par -Repeat CBC [id number] to assess counts\par -If Fedratinib fails look for clinical trial (concern if she will be a candidate given h/o carcinoid)\par \par Anemia:\par -on previous bone marrow biopsy (5/6/2020), she had low iron stores\par -11/3/21 labs found to have ferritin of 475, iron 35, sat 24%. Recieved one dose of Venofer 200mg on 11/8/21 without clinical improvement\par -iron repletion did not work could initiate epo versus danazol, but would hold off for now \par \par Carcinoid:\par -Dr. Starr Lara (CT surgery) at [place] strongly believes there is no concern for persistent disease\par -Cough persists, she will follow up with her pulmonologist \par \par Follow up in 1 month\par Patient seen with Dr. Doshi

## 2022-02-11 ENCOUNTER — APPOINTMENT (OUTPATIENT)
Dept: INFUSION THERAPY | Facility: HOSPITAL | Age: 72
End: 2022-02-11

## 2022-02-14 DIAGNOSIS — R11.2 NAUSEA WITH VOMITING, UNSPECIFIED: ICD-10-CM

## 2022-02-14 DIAGNOSIS — Z51.89 ENCOUNTER FOR OTHER SPECIFIED AFTERCARE: ICD-10-CM

## 2022-02-24 ENCOUNTER — APPOINTMENT (OUTPATIENT)
Dept: HEMATOLOGY ONCOLOGY | Facility: CLINIC | Age: 72
End: 2022-02-24

## 2022-03-10 ENCOUNTER — APPOINTMENT (OUTPATIENT)
Dept: HEMATOLOGY ONCOLOGY | Facility: CLINIC | Age: 72
End: 2022-03-10
Payer: MEDICARE

## 2022-03-10 ENCOUNTER — RESULT REVIEW (OUTPATIENT)
Age: 72
End: 2022-03-10

## 2022-03-10 VITALS
RESPIRATION RATE: 16 BRPM | HEIGHT: 65.98 IN | WEIGHT: 164 LBS | TEMPERATURE: 97 F | SYSTOLIC BLOOD PRESSURE: 126 MMHG | DIASTOLIC BLOOD PRESSURE: 70 MMHG | BODY MASS INDEX: 26.36 KG/M2 | HEART RATE: 70 BPM

## 2022-03-10 LAB
ANISOCYTOSIS BLD QL: SLIGHT — SIGNIFICANT CHANGE UP
BASOPHILS # BLD AUTO: 0.16 K/UL — SIGNIFICANT CHANGE UP (ref 0–0.2)
BASOPHILS NFR BLD AUTO: 1 % — SIGNIFICANT CHANGE UP (ref 0–2)
BLASTS # FLD: 2 % — HIGH (ref 0–0)
DACRYOCYTES BLD QL SMEAR: SLIGHT — SIGNIFICANT CHANGE UP
ELLIPTOCYTES BLD QL SMEAR: SLIGHT — SIGNIFICANT CHANGE UP
EOSINOPHIL # BLD AUTO: 0.48 K/UL — SIGNIFICANT CHANGE UP (ref 0–0.5)
EOSINOPHIL NFR BLD AUTO: 3 % — SIGNIFICANT CHANGE UP (ref 0–6)
HCT VFR BLD CALC: 27.8 % — LOW (ref 34.5–45)
HGB BLD-MCNC: 8.1 G/DL — LOW (ref 11.5–15.5)
HYPOCHROMIA BLD QL: SLIGHT — SIGNIFICANT CHANGE UP
LG PLATELETS BLD QL AUTO: SLIGHT — SIGNIFICANT CHANGE UP
LYMPHOCYTES # BLD AUTO: 0.95 K/UL — LOW (ref 1–3.3)
LYMPHOCYTES # BLD AUTO: 6 % — LOW (ref 13–44)
MCHC RBC-ENTMCNC: 27.2 PG — SIGNIFICANT CHANGE UP (ref 27–34)
MCHC RBC-ENTMCNC: 29.1 G/DL — LOW (ref 32–36)
MCV RBC AUTO: 93.3 FL — SIGNIFICANT CHANGE UP (ref 80–100)
MICROCYTES BLD QL: SLIGHT — SIGNIFICANT CHANGE UP
MONOCYTES # BLD AUTO: 0.32 K/UL — SIGNIFICANT CHANGE UP (ref 0–0.9)
MONOCYTES NFR BLD AUTO: 2 % — SIGNIFICANT CHANGE UP (ref 2–14)
NEUTROPHILS # BLD AUTO: 13.65 K/UL — HIGH (ref 1.8–7.4)
NEUTROPHILS NFR BLD AUTO: 86 % — HIGH (ref 43–77)
NRBC # BLD: 0 /100 — SIGNIFICANT CHANGE UP (ref 0–0)
NRBC # BLD: SIGNIFICANT CHANGE UP /100 WBCS (ref 0–0)
PLAT MORPH BLD: NORMAL — SIGNIFICANT CHANGE UP
PLATELET # BLD AUTO: 558 K/UL — HIGH (ref 150–400)
POIKILOCYTOSIS BLD QL AUTO: SLIGHT — SIGNIFICANT CHANGE UP
RBC # BLD: 2.98 M/UL — LOW (ref 3.8–5.2)
RBC # FLD: 21.2 % — HIGH (ref 10.3–14.5)
RBC BLD AUTO: ABNORMAL
SCHISTOCYTES BLD QL AUTO: SLIGHT — SIGNIFICANT CHANGE UP
STOMATOCYTES BLD QL SMEAR: SLIGHT — SIGNIFICANT CHANGE UP
WBC # BLD: 15.87 K/UL — HIGH (ref 3.8–10.5)
WBC # FLD AUTO: 15.87 K/UL — HIGH (ref 3.8–10.5)

## 2022-03-10 PROCEDURE — 99213 OFFICE O/P EST LOW 20 MIN: CPT

## 2022-03-10 NOTE — REVIEW OF SYSTEMS
[Recent Change In Weight] : ~T recent weight change [Cough] : cough [Depression] : depression [Negative] : Allergic/Immunologic [Fever] : no fever [Chills] : no chills [Night Sweats] : no night sweats [Fatigue] : no fatigue [Wheezing] : no wheezing [Suicidal] : not suicidal

## 2022-03-10 NOTE — HISTORY OF PRESENT ILLNESS
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 Though her hgb has remained ~8.0. \par -Weight loss has stabilized, she has had a much better appetite, aim to gain weight; +9 pounds since last visit.\par -Still is continuously coughing, she will follow up with her pulmonologist. Currently on abx therapy for a URI via PCP. \par

## 2022-03-10 NOTE — ASSESSMENT
[FreeTextEntry1] : 72 yo F with a history of PMF jak2+, Cytogenetics show +1 and +9, + SM on high doses of HU causing worsening anemia with persistently high platelet counts.  \par US (2017): Spleen 15.3cm\par US (3/2020): Spleen 15.9cm\par US (3/2021): Spleen 17.7cm\par US (11/2021): Spleen 19.2cm\par Had a very bad response to Jakafi. No response to iron and HU titration.\par Fedratinib 100mg daily started on 11/17/21, titrated up to 200mg daily on 11/30/21, titrated up to 300mg daily on 2/10/22\par \par Splenomegaly and thrombocytosis\par -Patients appetite and SM seems to be improved.\par -Anagrelide 1.5mg BID, if platelets stable can consider titrating down at next visit\par -Off HU 500mg, given decreasing Hgb. \par -Hgb 8.1 today. Last transfused 2u PRBC on 2/11/2022. \par -continue Fedratinib to 300mg daily for now and monitor Hb and need for further transfusion.  Would plan to  titrate to goal of 400mg daily as tolerated. Next visit will attempt to titrate down the anagrelide and increase Fedratinib dose. \par -Repeat CBC [id number]\par -If Fedratinib fails look for clinical trial (concern if she will be a candidate given h/o carcinoid)\par \par Anemia:\par -on previous bone marrow biopsy (5/6/2020), she had low iron stores\par -11/3/21 labs found to have ferritin of 475, iron 35, sat 24%. Recieved one dose of Venofer 200mg on 11/8/21 without clinical improvement\par -iron repletion did not work could initiate epo versus danazol, but would hold off for now \par \par Carcinoid:\par -Dr. Starr Lara (CT surgery) at [place] strongly believes there is no concern for persistent disease\par -Cough persists, she will follow up with her pulmonologist \par \par Follow up in 1 month\par Informed that I will be leaving and she will be [place] to one [place] [facility] [place]

## 2022-03-10 NOTE — PHYSICAL EXAM
[Fully active, able to carry on all pre-disease performance without restriction] : Status 0 - Fully active, able to carry on all pre-disease performance without restriction [Thin] : thin [Normal] : RRR, normal S1S2, no murmurs, rubs, gallops [de-identified] : +Rhonchi [de-identified] : (chronic since her surgery) LLE edema 1+, pitting [de-identified] : spleen + 3 finger breaths below costal margin, slightly decreased in size

## 2022-03-14 LAB
ALBUMIN SERPL ELPH-MCNC: 3.5 G/DL
ALBUMIN SERPL ELPH-MCNC: 3.8 G/DL
ALP BLD-CCNC: 80 U/L
ALP BLD-CCNC: 85 U/L
ALT SERPL-CCNC: 5 U/L
ALT SERPL-CCNC: 7 U/L
AMYLASE/CREAT SERPL: 37 U/L
AMYLASE/CREAT SERPL: 44 U/L
ANION GAP SERPL CALC-SCNC: 10 MMOL/L
ANION GAP SERPL CALC-SCNC: 14 MMOL/L
AST SERPL-CCNC: 7 U/L
AST SERPL-CCNC: 8 U/L
BILIRUB SERPL-MCNC: 0.3 MG/DL
BILIRUB SERPL-MCNC: 0.3 MG/DL
BUN SERPL-MCNC: 12 MG/DL
BUN SERPL-MCNC: 16 MG/DL
CALCIUM SERPL-MCNC: 8.4 MG/DL
CALCIUM SERPL-MCNC: 8.5 MG/DL
CHLORIDE SERPL-SCNC: 105 MMOL/L
CHLORIDE SERPL-SCNC: 106 MMOL/L
CO2 SERPL-SCNC: 23 MMOL/L
CO2 SERPL-SCNC: 26 MMOL/L
CREAT SERPL-MCNC: 0.95 MG/DL
CREAT SERPL-MCNC: 0.95 MG/DL
EGFR: 65 ML/MIN/1.73M2
GLUCOSE SERPL-MCNC: 90 MG/DL
GLUCOSE SERPL-MCNC: 91 MG/DL
LPL SERPL-CCNC: 49 U/L
LPL SERPL-CCNC: 52 U/L
POTASSIUM SERPL-SCNC: 4.1 MMOL/L
POTASSIUM SERPL-SCNC: 4.2 MMOL/L
PROT SERPL-MCNC: 6.4 G/DL
PROT SERPL-MCNC: 6.8 G/DL
SODIUM SERPL-SCNC: 142 MMOL/L
SODIUM SERPL-SCNC: 142 MMOL/L

## 2022-03-22 ENCOUNTER — OUTPATIENT (OUTPATIENT)
Dept: OUTPATIENT SERVICES | Facility: HOSPITAL | Age: 72
LOS: 1 days | Discharge: HOME | End: 2022-03-22

## 2022-03-22 DIAGNOSIS — K81.9 CHOLECYSTITIS, UNSPECIFIED: Chronic | ICD-10-CM

## 2022-03-22 DIAGNOSIS — D69.6 THROMBOCYTOPENIA, UNSPECIFIED: ICD-10-CM

## 2022-03-22 DIAGNOSIS — S99.919A UNSPECIFIED INJURY OF UNSPECIFIED ANKLE, INITIAL ENCOUNTER: Chronic | ICD-10-CM

## 2022-03-24 ENCOUNTER — RESULT REVIEW (OUTPATIENT)
Age: 72
End: 2022-03-24

## 2022-03-24 ENCOUNTER — APPOINTMENT (OUTPATIENT)
Dept: HEMATOLOGY ONCOLOGY | Facility: CLINIC | Age: 72
End: 2022-03-24

## 2022-03-24 ENCOUNTER — OUTPATIENT (OUTPATIENT)
Dept: OUTPATIENT SERVICES | Facility: HOSPITAL | Age: 72
LOS: 1 days | End: 2022-03-24
Payer: MEDICARE

## 2022-03-24 DIAGNOSIS — D75.81 MYELOFIBROSIS: ICD-10-CM

## 2022-03-24 DIAGNOSIS — S99.919A UNSPECIFIED INJURY OF UNSPECIFIED ANKLE, INITIAL ENCOUNTER: Chronic | ICD-10-CM

## 2022-03-24 DIAGNOSIS — K81.9 CHOLECYSTITIS, UNSPECIFIED: Chronic | ICD-10-CM

## 2022-03-24 LAB
ANISOCYTOSIS BLD QL: SLIGHT — SIGNIFICANT CHANGE UP
BASOPHILS # BLD AUTO: 0.34 K/UL — HIGH (ref 0–0.2)
BASOPHILS NFR BLD AUTO: 3 % — HIGH (ref 0–2)
BLASTS # FLD: 1 % — HIGH (ref 0–0)
DACRYOCYTES BLD QL SMEAR: SLIGHT — SIGNIFICANT CHANGE UP
EOSINOPHIL # BLD AUTO: 0.34 K/UL — SIGNIFICANT CHANGE UP (ref 0–0.5)
EOSINOPHIL NFR BLD AUTO: 3 % — SIGNIFICANT CHANGE UP (ref 0–6)
HCT VFR BLD CALC: 27.2 % — LOW (ref 34.5–45)
HGB BLD-MCNC: 8 G/DL — LOW (ref 11.5–15.5)
HYPOCHROMIA BLD QL: SLIGHT — SIGNIFICANT CHANGE UP
LYMPHOCYTES # BLD AUTO: 1.03 K/UL — SIGNIFICANT CHANGE UP (ref 1–3.3)
LYMPHOCYTES # BLD AUTO: 9 % — LOW (ref 13–44)
MCHC RBC-ENTMCNC: 27.4 PG — SIGNIFICANT CHANGE UP (ref 27–34)
MCHC RBC-ENTMCNC: 29.4 G/DL — LOW (ref 32–36)
MCV RBC AUTO: 93.2 FL — SIGNIFICANT CHANGE UP (ref 80–100)
METAMYELOCYTES # FLD: 2 % — HIGH (ref 0–0)
MONOCYTES # BLD AUTO: 0.57 K/UL — SIGNIFICANT CHANGE UP (ref 0–0.9)
MONOCYTES NFR BLD AUTO: 5 % — SIGNIFICANT CHANGE UP (ref 2–14)
MYELOCYTES NFR BLD: 1 % — HIGH (ref 0–0)
NEUTROPHILS # BLD AUTO: 8.71 K/UL — HIGH (ref 1.8–7.4)
NEUTROPHILS NFR BLD AUTO: 76 % — SIGNIFICANT CHANGE UP (ref 43–77)
NRBC # BLD: 0 /100 — SIGNIFICANT CHANGE UP (ref 0–0)
NRBC # BLD: SIGNIFICANT CHANGE UP /100 WBCS (ref 0–0)
PLAT MORPH BLD: NORMAL — SIGNIFICANT CHANGE UP
PLATELET # BLD AUTO: 555 K/UL — HIGH (ref 150–400)
POIKILOCYTOSIS BLD QL AUTO: SLIGHT — SIGNIFICANT CHANGE UP
POLYCHROMASIA BLD QL SMEAR: SLIGHT — SIGNIFICANT CHANGE UP
RBC # BLD: 2.92 M/UL — LOW (ref 3.8–5.2)
RBC # FLD: 22.4 % — HIGH (ref 10.3–14.5)
RBC BLD AUTO: ABNORMAL
SCHISTOCYTES BLD QL AUTO: SLIGHT — SIGNIFICANT CHANGE UP
WBC # BLD: 11.46 K/UL — HIGH (ref 3.8–10.5)
WBC # FLD AUTO: 11.46 K/UL — HIGH (ref 3.8–10.5)

## 2022-03-24 PROCEDURE — 86901 BLOOD TYPING SEROLOGIC RH(D): CPT

## 2022-03-24 PROCEDURE — 86900 BLOOD TYPING SEROLOGIC ABO: CPT

## 2022-03-24 PROCEDURE — 86850 RBC ANTIBODY SCREEN: CPT

## 2022-04-04 ENCOUNTER — RESULT REVIEW (OUTPATIENT)
Age: 72
End: 2022-04-04

## 2022-04-04 ENCOUNTER — APPOINTMENT (OUTPATIENT)
Dept: HEMATOLOGY ONCOLOGY | Facility: CLINIC | Age: 72
End: 2022-04-04
Payer: MEDICARE

## 2022-04-04 VITALS
BODY MASS INDEX: 27.09 KG/M2 | OXYGEN SATURATION: 92 % | WEIGHT: 167.75 LBS | HEART RATE: 70 BPM | RESPIRATION RATE: 16 BRPM | TEMPERATURE: 96.9 F | SYSTOLIC BLOOD PRESSURE: 142 MMHG | DIASTOLIC BLOOD PRESSURE: 83 MMHG

## 2022-04-04 LAB
ANISOCYTOSIS BLD QL: SLIGHT — SIGNIFICANT CHANGE UP
BASOPHILS # BLD AUTO: 0 K/UL — SIGNIFICANT CHANGE UP (ref 0–0.2)
BASOPHILS NFR BLD AUTO: 0 % — SIGNIFICANT CHANGE UP (ref 0–2)
BLASTS # FLD: 1 % — HIGH (ref 0–0)
DACRYOCYTES BLD QL SMEAR: SLIGHT — SIGNIFICANT CHANGE UP
ELLIPTOCYTES BLD QL SMEAR: SLIGHT — SIGNIFICANT CHANGE UP
EOSINOPHIL # BLD AUTO: 0.53 K/UL — HIGH (ref 0–0.5)
EOSINOPHIL NFR BLD AUTO: 4 % — SIGNIFICANT CHANGE UP (ref 0–6)
GIANT PLATELETS BLD QL SMEAR: PRESENT — SIGNIFICANT CHANGE UP
HCT VFR BLD CALC: 25.5 % — LOW (ref 34.5–45)
HGB BLD-MCNC: 7.8 G/DL — LOW (ref 11.5–15.5)
HYPOCHROMIA BLD QL: SLIGHT — SIGNIFICANT CHANGE UP
LG PLATELETS BLD QL AUTO: SLIGHT — SIGNIFICANT CHANGE UP
LYMPHOCYTES # BLD AUTO: 1.86 K/UL — SIGNIFICANT CHANGE UP (ref 1–3.3)
LYMPHOCYTES # BLD AUTO: 14 % — SIGNIFICANT CHANGE UP (ref 13–44)
MCHC RBC-ENTMCNC: 28.3 PG — SIGNIFICANT CHANGE UP (ref 27–34)
MCHC RBC-ENTMCNC: 30.6 G/DL — LOW (ref 32–36)
MCV RBC AUTO: 92.4 FL — SIGNIFICANT CHANGE UP (ref 80–100)
METAMYELOCYTES # FLD: 1 % — HIGH (ref 0–0)
MONOCYTES # BLD AUTO: 0.53 K/UL — SIGNIFICANT CHANGE UP (ref 0–0.9)
MONOCYTES NFR BLD AUTO: 4 % — SIGNIFICANT CHANGE UP (ref 2–14)
NEUTROPHILS # BLD AUTO: 10.11 K/UL — HIGH (ref 1.8–7.4)
NEUTROPHILS NFR BLD AUTO: 76 % — SIGNIFICANT CHANGE UP (ref 43–77)
NRBC # BLD: 0 /100 — SIGNIFICANT CHANGE UP (ref 0–0)
NRBC # BLD: SIGNIFICANT CHANGE UP /100 WBCS (ref 0–0)
PLAT MORPH BLD: NORMAL — SIGNIFICANT CHANGE UP
PLATELET # BLD AUTO: 715 K/UL — HIGH (ref 150–400)
POIKILOCYTOSIS BLD QL AUTO: SLIGHT — SIGNIFICANT CHANGE UP
POLYCHROMASIA BLD QL SMEAR: SLIGHT — SIGNIFICANT CHANGE UP
RBC # BLD: 2.76 M/UL — LOW (ref 3.8–5.2)
RBC # FLD: 22.2 % — HIGH (ref 10.3–14.5)
RBC BLD AUTO: ABNORMAL
SCHISTOCYTES BLD QL AUTO: SLIGHT — SIGNIFICANT CHANGE UP
TARGETS BLD QL SMEAR: SLIGHT — SIGNIFICANT CHANGE UP
WBC # BLD: 13.3 K/UL — HIGH (ref 3.8–10.5)
WBC # FLD AUTO: 13.3 K/UL — HIGH (ref 3.8–10.5)

## 2022-04-04 PROCEDURE — 99215 OFFICE O/P EST HI 40 MIN: CPT

## 2022-04-05 NOTE — HISTORY OF PRESENT ILLNESS
[Treatment Protocol] : Treatment Protocol [Disease:__________________________] : Disease: [unfilled] [de-identified] : 70 yo F with PMHx lung nodules (malignant, s/p left lower lung resection), splenomegaly, ET/PMF (Jak2+) here for further management.  Previously was on Hydroxyurea 2000 mg  and 1500 mg alternating, had increase in platelet counts, was started on ruxolitinib which significant side effects within 2 days including body aches and weakness. She has been on Fedratinib since Nov 2021 which has led to stabilization of Hb and platelets. She has also experiencing some improvement in her neutrophilia.\par \par Social Hx:\par Retired from [employer], worked in the office.  ( passed away from Leukemia ~2004), Lives alone, adult children live nearby\par Never smoker, significant second hand exposure\par No significant alcohol use\par No illicit drug use\par \par Family Hx:\par No relevant family history\par \par =============================================================\par Splenomegaly:\par US (2017): Spleen 15.3cm\par US (3/2020): Spleen 15.9cm\par US (3/2021): Spleen 17.7cm\par US (11/2021): Spleen 19.2cm\par \par Treatment Hx:\par - Hydrea since at least 2014 up to 2021\par - Trial of Ruxolitinib Oct 2021\par - Fedratinib Nov 2021 to present\par \par ============================================================= [FreeTextEntry1] : On Fedratinib (JAK2 inhibitor) since Nov 2021 [de-identified] : 4/4/22: Transfer of care from Dr Carmen Doshi. Follow-up. Ms Ornelas is feeling well overall. She was recently told that she has cirrhosis of the liver seen on recent outpatient imaging ([facility]). She has consistent coughing every night and uses Hydrocodone / homatropine to help with cough / allow her to sleep.  Appetite improving, now eating full meals and gaining weight back. No current abdominal pain. Since that time she has had improvement in her wbc. Her hb has stabilized around 8, PLT have also improved, now down to mid 500's. She denies fevers, chills, night sweats. \par \par ____\par A comprehensive review of systems was performed including constitutional, eyes, ENT, cardiovascular, respiratory, gastrointestinal, genitourinary, musculoskeletal, integumentary, neurological, psychiatric and hematologic / lymphatic. All pertinent positives are included in the H&P under interval history above and the remaining review of systems listed are negative. \par

## 2022-04-05 NOTE — ADDENDUM
[FreeTextEntry1] : All medical record entries made by the Scribe were at my, Dr. Rice, direction and personally dictated by me on 04/04/2022. I have reviewed the chart and agree that the record accurately reflects my personal performance of the history, physical exam, assessment and plan.  I have also personally directed, reviewed, and agreed with the chart.\par

## 2022-04-05 NOTE — ASSESSMENT
[FreeTextEntry1] : 72 yo F with a history of PMF jak2+, Cytogenetics show +1 and +9, + SM on high doses of HU causing worsening anemia with persistently high platelet counts.  \par \par She had a very bad response to Jakafi. No response to iron and HU titration. Fedratinib 100 mg daily started on 11/17/21, titrated up to 200 mg daily on 11/30/21, titrated up to 300 mg daily on 2/10/22 which is her current dosing.\par \par Regarding her splenomegaly, her spleen size remains unchanged from her prior visit, however initial improvement has allowed her to have improvement in her appetite. She remains on both Fedratinib and Anagrelide, tolerating well. Hydrea had been stopped previously due to low Hb (she has required a few units of pRBC support previously), was also treated with venofer for CLAY in late 2021..\par \par Plan:\par - Continue Anagrelide 1.5 mg BID, will attempt to down titrate\par - Continue Fedratinib 300 mg daily, will titrate to goal of 400 mg daily as tolerated next visit.\par - If Fedratinib fails look for clinical trial (concern if she will be a candidate given h/o carcinoid)\par - Anemia: Did not favorably respond to iron infusion, could initiate epo versus danazol, but would hold off for now \par - Carcinoid: Dr. Starr Lara (CT surgery) at [place] strongly believes there is no concern for persistent disease, Continues to have persistent cough persists, plan to recheck CT imaging this month with Dr Mclean\par - HCM: She has received the COVID19 vaccines and booster.\par - Repeat CBC 2-4 weeks to assess counts\par - Follow up in 2 months\par \par _____\par I personally have spent a total of 45 minutes of time on the date of this encounter reviewing test results, documenting findings, providing education, coordinating care and directly consulting with the patient and/or designated family member.

## 2022-04-05 NOTE — PHYSICAL EXAM
[Fully active, able to carry on all pre-disease performance without restriction] : Status 0 - Fully active, able to carry on all pre-disease performance without restriction [Normal] : affect appropriate [de-identified] : B/L LE edema 1+, pitting, Right leg > left leg (reports it is chronic) [de-identified] : spleen + 3 fingers (~4 cm) below costal margin [de-identified] : 1.5 cm lymph node in the left inferior/ lower inguinal region

## 2022-04-06 ENCOUNTER — APPOINTMENT (OUTPATIENT)
Dept: HEMATOLOGY ONCOLOGY | Facility: CLINIC | Age: 72
End: 2022-04-06

## 2022-04-15 RX ORDER — HYDROCODONE BITARTRATE AND HOMATROPINE METHYLBROMIDE 5; 1.5 MG/5ML; MG/5ML
5-1.5 SOLUTION ORAL
Qty: 1 | Refills: 0 | Status: COMPLETED | COMMUNITY
Start: 2021-04-14 | End: 2022-04-15

## 2022-04-25 ENCOUNTER — APPOINTMENT (OUTPATIENT)
Dept: CARDIOLOGY | Facility: CLINIC | Age: 72
End: 2022-04-25

## 2022-04-29 ENCOUNTER — OUTPATIENT (OUTPATIENT)
Dept: OUTPATIENT SERVICES | Facility: HOSPITAL | Age: 72
LOS: 1 days | Discharge: HOME | End: 2022-04-29

## 2022-04-29 DIAGNOSIS — D69.6 THROMBOCYTOPENIA, UNSPECIFIED: ICD-10-CM

## 2022-04-29 DIAGNOSIS — K81.9 CHOLECYSTITIS, UNSPECIFIED: Chronic | ICD-10-CM

## 2022-04-29 DIAGNOSIS — S99.919A UNSPECIFIED INJURY OF UNSPECIFIED ANKLE, INITIAL ENCOUNTER: Chronic | ICD-10-CM

## 2022-05-04 ENCOUNTER — RESULT REVIEW (OUTPATIENT)
Age: 72
End: 2022-05-04

## 2022-05-04 ENCOUNTER — APPOINTMENT (OUTPATIENT)
Dept: HEMATOLOGY ONCOLOGY | Facility: CLINIC | Age: 72
End: 2022-05-04

## 2022-05-04 LAB
ANISOCYTOSIS BLD QL: SLIGHT — SIGNIFICANT CHANGE UP
BASOPHILS # BLD AUTO: 0 K/UL — SIGNIFICANT CHANGE UP (ref 0–0.2)
BASOPHILS NFR BLD AUTO: 0 % — SIGNIFICANT CHANGE UP (ref 0–2)
DACRYOCYTES BLD QL SMEAR: SLIGHT — SIGNIFICANT CHANGE UP
ELLIPTOCYTES BLD QL SMEAR: SLIGHT — SIGNIFICANT CHANGE UP
EOSINOPHIL # BLD AUTO: 0.65 K/UL — HIGH (ref 0–0.5)
EOSINOPHIL NFR BLD AUTO: 4 % — SIGNIFICANT CHANGE UP (ref 0–6)
HCT VFR BLD CALC: 25.7 % — LOW (ref 34.5–45)
HGB BLD-MCNC: 7.5 G/DL — LOW (ref 11.5–15.5)
HYPOCHROMIA BLD QL: SLIGHT — SIGNIFICANT CHANGE UP
LG PLATELETS BLD QL AUTO: SLIGHT — SIGNIFICANT CHANGE UP
LYMPHOCYTES # BLD AUTO: 1.3 K/UL — SIGNIFICANT CHANGE UP (ref 1–3.3)
LYMPHOCYTES # BLD AUTO: 8 % — LOW (ref 13–44)
MCHC RBC-ENTMCNC: 27.3 PG — SIGNIFICANT CHANGE UP (ref 27–34)
MCHC RBC-ENTMCNC: 29.2 G/DL — LOW (ref 32–36)
MCV RBC AUTO: 93.5 FL — SIGNIFICANT CHANGE UP (ref 80–100)
METAMYELOCYTES # FLD: 1 % — HIGH (ref 0–0)
MICROCYTES BLD QL: SLIGHT — SIGNIFICANT CHANGE UP
MONOCYTES # BLD AUTO: 0.49 K/UL — SIGNIFICANT CHANGE UP (ref 0–0.9)
MONOCYTES NFR BLD AUTO: 3 % — SIGNIFICANT CHANGE UP (ref 2–14)
MYELOCYTES NFR BLD: 1 % — HIGH (ref 0–0)
NEUTROPHILS # BLD AUTO: 13.54 K/UL — HIGH (ref 1.8–7.4)
NEUTROPHILS NFR BLD AUTO: 83 % — HIGH (ref 43–77)
NRBC # BLD: 0 /100 — SIGNIFICANT CHANGE UP (ref 0–0)
NRBC # BLD: SIGNIFICANT CHANGE UP /100 WBCS (ref 0–0)
PLAT MORPH BLD: NORMAL — SIGNIFICANT CHANGE UP
PLATELET # BLD AUTO: 670 K/UL — HIGH (ref 150–400)
POIKILOCYTOSIS BLD QL AUTO: SLIGHT — SIGNIFICANT CHANGE UP
POLYCHROMASIA BLD QL SMEAR: SLIGHT — SIGNIFICANT CHANGE UP
RBC # BLD: 2.75 M/UL — LOW (ref 3.8–5.2)
RBC # FLD: 22.9 % — HIGH (ref 10.3–14.5)
RBC BLD AUTO: ABNORMAL
RETICS #: 99.5 K/UL — SIGNIFICANT CHANGE UP (ref 25–125)
RETICS/RBC NFR: 3.6 % — HIGH (ref 0.5–2.5)
TARGETS BLD QL SMEAR: SLIGHT — SIGNIFICANT CHANGE UP
WBC # BLD: 16.31 K/UL — HIGH (ref 3.8–10.5)
WBC # FLD AUTO: 16.31 K/UL — HIGH (ref 3.8–10.5)

## 2022-05-20 NOTE — HISTORY OF PRESENT ILLNESS
[FreeTextEntry1] : 70-year-old woman with a history of asthma, obesity, essential thrombocytosis, HFPEF, normal cors in 12/2018, PAF s/p ablation in 2/7/19, VATS with removal of carcinoid in 5/2021\par \par Since her last visit, On 4/21/22 had a severe posterior nose bleed that continued over 12 hours. Required rhino rockets and interventional embolization via groin catheterization at [facility]. She needed PRBC transfusion. She is pending a recurrent transfusion. \par She has mildly worsening dyspnea on exertion since she had the epistaxis. She has mildly worsening lower extremity edema right >left. \par he   denies any chest pain, PND, orthopnea, lower extremity edema,  strokelike symptoms.  he no longer has any palpitations.  No reported melena, hematochezia or hematemesis.  She is compliant with her medications.

## 2022-05-20 NOTE — DISCUSSION/SUMMARY
[FreeTextEntry1] : 71-year-old woman with a history as listed presents for a followup visit.\par \par Mallory recently had about of epistaxis. She has  mildly increased lower extremity edema and worsening dyspena likely form anemia.   Given her MATTHEW and edema I would have her take lasix 40mg Qday x 3 days and then after her transfusion. \par \par She has SR with frequent APCs. She denies any anginal symptoms. She is s/p cath on 12/2018 with normal coronaries. I don’t this that a repeat ischemic evaluation in setting of worsening anemia is prudent. \par \par Of note despite her carcinoid diagnosis, her mitral valve and tricuspid valve did not have any signs of distortion from carcinoid.\par  \par She is status post an A. fib ablation.  At this time she has an irregular rhythm.  Her EKG today is more consistent with a  sinus rhythm with frequent PACs.  She will continue Toprol 50mg Qday.  She has maintained her Xarelto therapy.  She has no signs of clinical bleeding.  She will stay off the ASA therapy \par \par She will followup with heme. \par Exercise and diet counseling was performed in order to reduce her future cardiovascular risk. \par She will followup with me in 2-3 months

## 2022-05-20 NOTE — CARDIOLOGY SUMMARY
[de-identified] : Sinus  Rhythm  - frequent PAC s \par nonspecific T waves  [de-identified] : 5/2021 normal LV function. RV was normal in structure. TV appears normal. MV leaflets are calcified [de-identified] : PAF s/p ablation in 2/7/19. [de-identified] : 12/2018 with normal cors and LVEDP.

## 2022-05-20 NOTE — PHYSICAL EXAM
[FreeTextEntry1] : obese [Right Carotid Bruit] : no bruit heard over the right carotid [Left Carotid Bruit] : no bruit heard over the left carotid [Bruit] : no bruit heard

## 2022-07-11 NOTE — HISTORY OF PRESENT ILLNESS
[Disease:__________________________] : Disease: [unfilled] [Treatment Protocol] : Treatment Protocol [de-identified] : 72 yo F with PMHx lung nodules (malignant, s/p left lower lung resection), splenomegaly, ET/PMF (Jak2+) here for further management.  Previously was on Hydroxyurea 2000 mg  and 1500 mg alternating, had increase in platelet counts, was started on ruxolitinib which significant side effects within 2 days including body aches and weakness. She has been on Fedratinib since Nov 2021 which has led to stabilization of Hb and platelets. She has also experiencing some improvement in her neutrophilia.\par \par Social Hx:\par Retired from [employer], worked in the office.  ( passed away from Leukemia ~2004), Lives alone, adult children live nearby\par Never smoker, significant second hand exposure\par No significant alcohol use\par No illicit drug use\par \par Family Hx:\par No relevant family history\par \par =============================================================\par Splenomegaly:\par US (2017): Spleen 15.3cm\par US (3/2020): Spleen 15.9cm\par US (3/2021): Spleen 17.7cm\par US (11/2021): Spleen 19.2cm\par \par Treatment Hx:\par - Hydrea since at least 2014 up to 2021\par - Trial of Ruxolitinib Oct 2021\par - Fedratinib Nov 2021 to present\par \par ============================================================= [FreeTextEntry1] : On Fedratinib (JAK2 inhibitor) since Nov 2021 [de-identified] : 4/4/22: Transfer of care from Dr Carmen Doshi. Follow-up. Ms Ornelas is feeling well overall. She was recently told that she has cirrhosis of the liver seen on recent outpatient imaging ([facility]). She has consistent coughing every night and uses Hydrocodone / homatropine to help with cough / allow her to sleep.  Appetite improving, now eating full meals and gaining weight back. No current abdominal pain. Since that time she has had improvement in her wbc. Her hb has stabilized around 8, PLT have also improved, now down to mid 500's. She denies fevers, chills, night sweats. \par \par 7/11/22: Followup. In April she had severe epistaxis, which has since fully resolved. She is back on aspirin, and rivaroxaban. She feels well today overall, only with the complaint of fatigue. She has not received the second booster. She has never had COVID-19. She denies any fevers, chills, night sweats. No new lumps or bumps. Her appetite is improved.\par \par \par ____\par A comprehensive review of systems was performed including constitutional, eyes, ENT, cardiovascular, respiratory, gastrointestinal, genitourinary, musculoskeletal, integumentary, neurological, psychiatric and hematologic / lymphatic. All pertinent positives are included in the H&P under interval history above and the remaining review of systems listed are negative. \par

## 2022-07-11 NOTE — PHYSICAL EXAM
[Fully active, able to carry on all pre-disease performance without restriction] : Status 0 - Fully active, able to carry on all pre-disease performance without restriction [Normal] : no peripheral adenopathy appreciated [de-identified] : mild crackles in bilateral lung bases [de-identified] : B/L LE edema 1+, pitting, Right leg > left leg (reports it is chronic) [de-identified] : spleen + 1-2 fingers (~2 cm) below costal margin (improving)

## 2022-07-11 NOTE — ASSESSMENT
[FreeTextEntry1] : 72 yo F with a history of PMF jak2+, Cytogenetics show +1 and +9, + SM on high doses of HU causing worsening anemia with persistently high platelet counts.  \par \par She had a poor response to Jakafi. No response to iron and HU titration. Fedratinib 100 mg daily started on 11/17/21, titrated up to 200 mg daily on 11/30/21, titrated up to 300 mg daily on 2/10/22 which is her current dosing.\par \par Regarding her splenomegaly, her spleen size is improving which has allowed her to have improvement in her appetite. She remains on both Fedratinib and Anagrelide, tolerating well. Hydrea had been stopped previously due to low Hb (she has required a few units of pRBC support previously), was also treated with venofer for CLAY in late 2021.\par \par CBC today: WBC 8.7, Hb 7.9, Plt 337\par \par Plan:\par - Continue Anagrelide 1.5 mg BID, will attempt to down titrate if plts remain stable in normal range\par - Continue Fedratinib 300 mg daily, can titrate to 400 mg daily if needed, however counts are improved, will hold for now\par - If Fedratinib fails look for clinical trial (concern if she will be a candidate given h/o carcinoid)\par - Anemia: Did not favorably respond to iron infusion, could initiate epo versus danazol, but would hold off for now \par - Carcinoid: Dr. Starr Lara (CT surgery) at [facility] strongly believes there is no concern for persistent disease, Continues to have persistent cough persists, plan to recheck CT imaging this month with Dr Mclean\par - HCM: She has received the COVID19 vaccines and booster x 1.\par - Repeat CBC 4 weeks to assess counts\par - Follow up in 2 months\par \par _____\par I personally have spent a total of 35 minutes of time on the date of this encounter reviewing test results, documenting findings, providing education, coordinating care and directly consulting with the patient and/or designated family member.

## 2022-08-15 NOTE — ED ADULT NURSE NOTE - NSICDXPASTMEDICALHX_GEN_ALL_CORE_FT
PAST MEDICAL HISTORY:  Asthma     Diabetes mellitus     HLD (hyperlipidemia)     Hypothyroidism     Obesity (BMI 35.0-39.9 without comorbidity)     Persistent atrial fibrillation     Thrombocytosis

## 2022-08-16 NOTE — CONSULT NOTE ADULT - ATTENDING COMMENTS
70 y/o female H/O  Asthma, COPD, CHF, s/p cardiac cath which showed normal coronaries 12/21/18, AFib s/p cardiac ablation, Hypothyroidism thrombocytosis, anemia, lung nodules (s/p left lower lung resection), depression, presents with dyspnea on exertion for 4days likely 2/2 CHF exacerbation and anemia.     - EKG: Sinus rhythm with PACs   - No clear evidence of acute ischemia, and trops negative   - Appears volume overloaded on exam, will receive 40mg IV Lasix today   - Continue with Lasix 40mg IV daily   - monitor renal function closely   - strict I&Os, daily weights  - BP's acceptable   - TTE in 2021 with EF of 60% though will obtain TTE on admission     AF:  - continue on Metoprolol Succinate ER 50mg PO daily   - Hold Xarelto for now given low hgb    - admit to tele    Thank you for this comsult!

## 2022-08-16 NOTE — H&P ADULT - ATTENDING COMMENTS
70 y/o female H/O Asthma, COPD, HFpEF, s/p cardiac cath which showed normal coronaries 12/21/18, AFib s/p cardiac ablation (on xarelto), Hypothyroidism thrombocytosis, anemia, lung nodules (malignant, s/p left lower lung resection), depression, admitted for dyspnea on exertion and with change of positioning for 4days likely 2/2 CHF exacerbation and anemia. Admit to telemetry. Continue IV diuresis. Ordered for 1U PRBC by the ER. Check TTE. Follow CT chest results. Cardiology/Heme consults. Trial gabapentin for post herpetic neuralgia. D/w patient at bedside.    Agree with H&P as outlined above, edited where appropriate.

## 2022-08-16 NOTE — H&P ADULT - ASSESSMENT
70 y/o female H/O  Asthma, COPD, CHF s/p cardiac cath which showed normal coronaries 12/21/18, AFib s/p cardiac ablation, Hypothyroidism thrombocytosis, anemia, lung nodules (malignant, s/p left lower lung resection), depression, admitted for dyspnea on exertion and with change of positioning for 4days and anemia.  70 y/o female H/O  Asthma, COPD, CHF, s/p cardiac cath which showed normal coronaries 12/21/18, AFib s/p cardiac ablation, Hypothyroidism thrombocytosis, anemia, lung nodules (malignant, s/p left lower lung resection), depression, admitted for dyspnea on exertion and with change of positioning for 4days likely 2/2 CHF exacerbation and anemia.  72 y/o female H/O  Asthma, COPD, CHF, s/p cardiac cath which showed normal coronaries 12/21/18, AFib s/p cardiac ablation, Hypothyroidism thrombocytosis, anemia, lung nodules (malignant, s/p left lower lung resection), depression, admitted for dyspnea on exertion and with change of positioning for 4days likely 2/2 CHF exacerbation and anemia.

## 2022-08-16 NOTE — ED PROVIDER NOTE - SIGNIFICANT NEGATIVE FINDINGS
no headache, no chest pain, no Syncope , no palpitations, no abdominal pain, no n/v/d, no urinary symptoms, no GI  bleeding. no neuro changes

## 2022-08-16 NOTE — ED PROVIDER NOTE - CLINICAL SUMMARY MEDICAL DECISION MAKING FREE TEXT BOX
CHF exacerbation, severe anemia , already took extra Lasix 80 dose and is diuresing in the ED , will transfuse PRBCs slowly, admit to medicine

## 2022-08-16 NOTE — CONSULT NOTE ADULT - ASSESSMENT
72 y/o female H/O  Asthma, COPD, CHF, s/p cardiac cath which showed normal coronaries 12/21/18, AFib s/p cardiac ablation, Hypothyroidism thrombocytosis, anemia, lung nodules (malignant, s/p left lower lung resection), depression, presents with dyspnea on exertion and with change of positioning for 4days likely 2/2 CHF exacerbation and anemia.     - EKG: Sinus rhythm with PACs   - No clear evidence of acute ischemia, and trops negative   - Appears volume overloaded on exam, will administer 40mg IV Lasix x 1 STAT   - Continue with Lasix 40mg IV daily though monitor kidney fx   - BP's acceptable   - Hx of A. Fib, continue on Metoprolol Succinate ER 50mg PO daily and Xarelto for AC   - Hx of Heart Failure with Preserved EF, past TTE in 2021 with EF of 60% though will obtain TTE on admission   - Monitor and replete lytes, keep K>4, Mg>2.  - CHARTING IN PROGRESS    70 y/o female H/O  Asthma, COPD, CHF, s/p cardiac cath which showed normal coronaries 12/21/18, AFib s/p cardiac ablation, Hypothyroidism thrombocytosis, anemia, lung nodules (malignant, s/p left lower lung resection), depression, presents with dyspnea on exertion and with change of positioning for 4days likely 2/2 CHF exacerbation and anemia.     - EKG: Sinus rhythm with PACs   - No clear evidence of acute ischemia, and trops negative   - Appears volume overloaded on exam, will administer 40mg IV Lasix x 1 STAT   - Continue with Lasix 40mg IV daily though monitor kidney fx   - BP's acceptable   - Hx of A. Fib, continue on Metoprolol Succinate ER 50mg PO daily and Hold Xarelto for now   - Hx of Heart Failure with Preserved EF, past TTE in 2021 with EF of 60% though will obtain TTE on admission   - Monitor and replete lytes, keep K>4, Mg>2.     70 y/o female H/O  Asthma, COPD, CHF, s/p cardiac cath which showed normal coronaries 12/21/18, AFib s/p cardiac ablation, Hypothyroidism thrombocytosis, anemia, lung nodules (malignant, s/p left lower lung resection), depression, presents with dyspnea on exertion and with change of positioning for 4days likely 2/2 CHF exacerbation and anemia.     - EKG: Sinus rhythm with PACs   - No clear evidence of acute ischemia, and trops negative   - Appears volume overloaded on exam, will administer 40mg IV Lasix x 1 STAT   - Continue with Lasix 40mg IV daily though monitor kidney fx   - BP's acceptable   - Hx of A. Fib, continue on Metoprolol Succinate ER 50mg PO daily and Hold Xarelto for now given low hgb    - Hx of Heart Failure with Preserved EF, past TTE in 2021 with EF of 60% though will obtain TTE on admission   - Monitor and replete lytes, keep K>4, Mg>2.     70 y/o female H/O  Asthma, COPD, CHF, s/p cardiac cath which showed normal coronaries 12/21/18, AFib s/p cardiac ablation, Hypothyroidism thrombocytosis, anemia, lung nodules (malignant, s/p left lower lung resection), depression, presents with dyspnea on exertion and with change of positioning for 4days likely 2/2 CHF exacerbation and anemia.     - EKG: Sinus rhythm with PACs   - No clear evidence of acute ischemia, and trops negative   - Appears volume overloaded on exam, will receive 40mg IV Lasix today   - Continue with Lasix 40mg IV daily though monitor kidney fx   - BP's acceptable   - Hx of A. Fib, continue on Metoprolol Succinate ER 50mg PO daily and Hold Xarelto for now given low hgb    - Hx of Heart Failure with Preserved EF, past TTE in 2021 with EF of 60% though will obtain TTE on admission   - Monitor and replete lytes, keep K>4, Mg>2.

## 2022-08-16 NOTE — CONSULT NOTE ADULT - SUBJECTIVE AND OBJECTIVE BOX
[facility]         Nils Johnson, Go, Adrián, Elsa, Jim, Phan        [phone] (office)    CHIEF COMPLAINT: Patient is a 71y old  Female who presents with a chief complaint of SOB, anemia (16 Aug 2022 04:14)      HPI:  72 y/o female H/O Asthma, COPD, HFpEF, s/p cardiac cath which showed normal coronaries 12/21/18, AFib s/p cardiac ablation, Hypothyroidism thrombocytosis, anemia, lung nodules (malignant, s/p left lower lung resection), depression, c/o of dyspnea on exertion and with change of positioning for 4days which pt is attributing to increased stress in her life. SOB is relieved with resting and decreased activity. Also reports that these symptoms are similar to how she felt when she was diagnosed with Afib. Pt recently diagnosed by urgent care with shingles for the first time at her L abdomen wrapping around to her back for which she is taking valacyclovir and topical Terrasil. Recent 2U blood transfusion s/p severe epistaxis in April 2022. Agrees to receiving a blood transfusion during this admission, if necessary. Pt denies CP, weakness, dizziness, syncope, palpitations, abdominal pain, vomiting. Pt fears going out due to her "low immune system" and "always getting sick" such that she gets all of her groceries delivered. This lack of social interaction causes pt to be depressed. Denies recent sick contacts. COVID-19 PCR neg today. She admits to orthopnea, dyspnea on exertion and lower extremity edema.    In the ED,  VS: T  98.5F HR 77  /67   RR  23   SpO2    98 % (RA)  Labs: WBC 11.13, Hgb 7.2 (pt's baseline appears to be at least 8.3 per chart review), platelets 403, creatinine 1.4, gluc 101, ca 8.0, albumin 3.1, eGFR 41, ProBNP 3172  Urine: none  CXR: on wet read: cardiomegaly, L pleural effusion  Imaging: US Duplex venous lower extremity: No evidence of DVT in either lower extremity.  Orders: s/p 1U pRBCs   (16 Aug 2022 04:14)    Interval History: CHARTING IN PROGRESS     PAST MEDICAL & SURGICAL HISTORY:  Hypothyroidism      HLD (hyperlipidemia)      Thrombocytosis      Persistent atrial fibrillation      Obesity (BMI 35.0-39.9 without comorbidity)      Asthma      Diabetes mellitus      Ankle injury  on 2004, 5 surgeries.      Cholecystitis          SOCIAL HISTORY: No active tobacco, alcohol or illicit drug use    FAMILY HISTORY:  Family history of early CAD (Father)    Family history of early CAD (Mother)     No pertinent family history of CAD    Outpatient medications:    MEDICATIONS  (STANDING):  anagrelide 1.5 milliGRAM(s) Oral <User Schedule>  escitalopram 10 milliGRAM(s) Oral daily  furosemide   Injectable 40 milliGRAM(s) IV Push daily  gabapentin 100 milliGRAM(s) Oral two times a day  gabapentin 100 milliGRAM(s) Oral once  levothyroxine 175 MICROGram(s) Oral daily  metoprolol succinate ER 50 milliGRAM(s) Oral daily  montelukast 10 milliGRAM(s) Oral daily  pantoprazole    Tablet 40 milliGRAM(s) Oral before breakfast  rivaroxaban 15 milliGRAM(s) Oral with dinner  tiotropium 18 MICROgram(s) Capsule 1 Capsule(s) Inhalation daily    MEDICATIONS  (PRN):      Allergies    No Known Allergies    Intolerances        REVIEW OF SYSTEMS: Is negative for eye, ENT, GI, , allergic, dermatologic, musculoskeletal and neurologic, except as described above.    VITAL SIGNS:   Vital Signs Last 24 Hrs  T(C): 36.4 (16 Aug 2022 07:41), Max: 36.9 (15 Aug 2022 23:33)  T(F): 97.6 (16 Aug 2022 07:41), Max: 98.5 (15 Aug 2022 23:33)  HR: 77 (16 Aug 2022 07:41) (77 - 80)  BP: 137/81 (16 Aug 2022 07:41) (119/67 - 137/81)  BP(mean): --  RR: 20 (16 Aug 2022 07:41) (18 - 23)  SpO2: 100% (16 Aug 2022 07:41) (97% - 100%)    Parameters below as of 16 Aug 2022 07:41  Patient On (Oxygen Delivery Method): room air        I&O's Summary      PHYSICAL EXAM:    Constitutional: NAD, awake and alert, well-developed  Eyes:  EOMI, no oral cyanosis, conjunctivae clear, anicteric.  Pulmonary: Non-labored, breath sounds are clear bilaterally, no wheezing, rales or rhonchi  Cardiovascular:  regular S1 and S2. No murmur.  No rubs, gallops or clicks  Gastrointestinal: Bowel Sounds present, soft, nontender.   Lymph: No peripheral edema.   Neurological: Alert, strength and sensitivity are grossly intact  Skin: No obvious lesions/rashes.   Psych:  Mood & affect appropriate .    LABS: All Labs Reviewed:                        7.2    11.13 )-----------( 403      ( 16 Aug 2022 00:40 )             23.4     16 Aug 2022 00:40    140    |  105    |  21     ----------------------------<  101    4.2     |  29     |  1.40     Ca    8.0        16 Aug 2022 00:40    TPro  6.9    /  Alb  3.1    /  TBili  0.4    /  DBili  x      /  AST  15     /  ALT  13     /  AlkPhos  83     16 Aug 2022 00:40          Blood Culture:   08-16 @ 00:40  Pro Bnp 3172        RADIOLOGY:    EKG:    Impression/Plan: [facility]         Nils Johnson, Go, Adrián, Elsa, Jim, Phan        [phone] (office)    CHIEF COMPLAINT: Patient is a 71y old  Female who presents with a chief complaint of SOB, anemia (16 Aug 2022 04:14)      HPI:  72 y/o female H/O Asthma, COPD, HFpEF, s/p cardiac cath which showed normal coronaries 12/21/18, AFib s/p cardiac ablation, Hypothyroidism thrombocytosis, anemia, lung nodules (malignant, s/p left lower lung resection), depression, c/o of dyspnea on exertion and with change of positioning for 4days which pt is attributing to increased stress in her life. SOB is relieved with resting and decreased activity. Also reports that these symptoms are similar to how she felt when she was diagnosed with Afib. Pt recently diagnosed by urgent care with shingles for the first time at her L abdomen wrapping around to her back for which she is taking valacyclovir and topical Terrasil. Recent 2U blood transfusion s/p severe epistaxis in April 2022. Agrees to receiving a blood transfusion during this admission, if necessary. Pt denies CP, weakness, dizziness, syncope, palpitations, abdominal pain, vomiting. Pt fears going out due to her "low immune system" and "always getting sick" such that she gets all of her groceries delivered. This lack of social interaction causes pt to be depressed. Denies recent sick contacts. COVID-19 PCR neg today. She admits to orthopnea, dyspnea on exertion and lower extremity edema.    In the ED,  VS: T  98.5F HR 77  /67   RR  23   SpO2    98 % (RA)  Labs: WBC 11.13, Hgb 7.2 (pt's baseline appears to be at least 8.3 per chart review), platelets 403, creatinine 1.4, gluc 101, ca 8.0, albumin 3.1, eGFR 41, ProBNP 3172  Urine: none  CXR: on wet read: cardiomegaly, L pleural effusion  Imaging: US Duplex venous lower extremity: No evidence of DVT in either lower extremity.  Orders: s/p 1U pRBCs   (16 Aug 2022 04:14)    Interval History: Patient seen and examined at bedside. Patient laying comfortable and in no NAD. She states that she feels slight better after taking 80mg PO of Lasix per Dr. Fernandez's recommendation though still remains SOB which is work with exertion. Patient with history of severe anemia with transfusions in the past. Currently receiving 1 unit of blood. She denies any palpitations or chest pain.     PAST MEDICAL & SURGICAL HISTORY:  Hypothyroidism      HLD (hyperlipidemia)      Thrombocytosis      Persistent atrial fibrillation      Obesity (BMI 35.0-39.9 without comorbidity)      Asthma      Diabetes mellitus      Ankle injury  on 2004, 5 surgeries.      Cholecystitis          SOCIAL HISTORY: No active tobacco, alcohol or illicit drug use    FAMILY HISTORY:  Family history of early CAD (Father)    Family history of early CAD (Mother)     No pertinent family history of CAD    Outpatient medications:    MEDICATIONS  (STANDING):  anagrelide 1.5 milliGRAM(s) Oral <User Schedule>  escitalopram 10 milliGRAM(s) Oral daily  furosemide   Injectable 40 milliGRAM(s) IV Push daily  gabapentin 100 milliGRAM(s) Oral two times a day  gabapentin 100 milliGRAM(s) Oral once  levothyroxine 175 MICROGram(s) Oral daily  metoprolol succinate ER 50 milliGRAM(s) Oral daily  montelukast 10 milliGRAM(s) Oral daily  pantoprazole    Tablet 40 milliGRAM(s) Oral before breakfast  rivaroxaban 15 milliGRAM(s) Oral with dinner  tiotropium 18 MICROgram(s) Capsule 1 Capsule(s) Inhalation daily    MEDICATIONS  (PRN):      Allergies    No Known Allergies    Intolerances        REVIEW OF SYSTEMS: Is negative for eye, ENT, GI, , allergic, dermatologic, musculoskeletal and neurologic, except as described above.    VITAL SIGNS:   Vital Signs Last 24 Hrs  T(C): 36.4 (16 Aug 2022 07:41), Max: 36.9 (15 Aug 2022 23:33)  T(F): 97.6 (16 Aug 2022 07:41), Max: 98.5 (15 Aug 2022 23:33)  HR: 77 (16 Aug 2022 07:41) (77 - 80)  BP: 137/81 (16 Aug 2022 07:41) (119/67 - 137/81)  BP(mean): --  RR: 20 (16 Aug 2022 07:41) (18 - 23)  SpO2: 100% (16 Aug 2022 07:41) (97% - 100%)    Parameters below as of 16 Aug 2022 07:41  Patient On (Oxygen Delivery Method): room air        I&O's Summary      PHYSICAL EXAM:    Constitutional: NAD, awake and alert, well-developed  Pulmonary: Non-labored, breath sounds are clear bilaterally though decreased B/L in lower lobes, no wheezing, rales or rhonchi  Cardiovascular:  regular S1 and S2. No murmur.  No rubs, gallops or clicks  Gastrointestinal: Bowel Sounds present, soft, nontender.   Neurological: Alert, strength and sensitivity are grossly intact  Skin: Trace edema b/l   Psych:  Mood & affect appropriate .    LABS: All Labs Reviewed:                        7.2    11.13 )-----------( 403      ( 16 Aug 2022 00:40 )             23.4     16 Aug 2022 00:40    140    |  105    |  21     ----------------------------<  101    4.2     |  29     |  1.40     Ca    8.0        16 Aug 2022 00:40    TPro  6.9    /  Alb  3.1    /  TBili  0.4    /  DBili  x      /  AST  15     /  ALT  13     /  AlkPhos  83     16 Aug 2022 00:40          Blood Culture:   08-16 @ 00:40  Pro Bnp 3172        RADIOLOGY: < from: CT Chest No Cont (08.16.22 @ 05:25) >    ******PRELIMINARY REPORT******      ******PRELIMINARY REPORT******       ACC: [id number] EXAM:  CT CHEST                          PROCEDURE DATE:  08/16/2022    ******PRELIMINARY REPORT******      ******PRELIMINARY REPORT******           INTERPRETATION:  No emergent finding. Mosaic attenuation of lungs and   bilateral pleural effusions, correlate for CHF. Bilateral pulm nodules.   Please f/u final report.        ******PRELIMINARY REPORT******      ******PRELIMINARY REPORT******         SHAWN KING MD; Attending Radiologist  This document is a PRELIMINARY interpretation and is pending final   attending approval. Aug 16 2022  7:51AM    < end of copied text >  < from: US Duplex Venous Lower Ext Complete, Bilateral (08.16.22 @ 02:40) >  ACC: [id number] EXAM:  US DPLX LWR EXT VEINS COMPL BI                          PROCEDURE DATE:  08/16/2022          INTERPRETATION:  BILATERAL LOWER EXTREMITY VENOUS ULTRASOUND    INDICATION:  Bilateral lower extremity swelling. Evaluate for deep venous   thrombosis (DVT).    TECHNIQUE: Grayscale, color and spectral Doppler evaluation of the   bilateral lower extremities with graded compression maneuvers was   performed.    COMPARISON: None.    FINDINGS:    Normal waveforms and compressibility are demonstrated in the common   femoral, superficial femoral, and popliteal veins of both lower   extremities.  No thrombus is demonstrated. Doppler examination shows   normal spontaneous and phasic flow.    Visualized calf veins are patent.    IMPRESSION:    No evidence of DVT in either lower extremity.    --- End of Report ---            BRITTANY POLLOCK MD; Attending Radiologist  This document has been electronically signed. Aug 16 2022  3:56AM    < end of copied text >      EKG: Sinus rhythm with PACs, prolonged QT

## 2022-08-16 NOTE — H&P ADULT - PROBLEM SELECTOR PLAN 2
- Continue home spiriva - Hgb 7.2 (pt's baseline appears to be at least 8.3 per chart review), transfuse 1U pRBCs. Monitor for transfusion rxns - most at risk for TACO, but shall minimize with IV lasix dosing  - No active signs of bleeding. monitor for s/s of anemia. Transfuse PRN hgb <7.   - F/U iron studies (iron w/TIBC, vitB12, ferritin, transferrin, folate, protein electrophoresis)   - F/U FOBT

## 2022-08-16 NOTE — H&P ADULT - PROBLEM SELECTOR PLAN 3
- Hgb 7.2 (pt's baseline appears to be at least 8.3 per chart review), transfuse 1U pRBCs. Monitor for transfusion rxns  - No active signs of bleeding. monitor for s/s of anemia. Transfuse PRN hgb <7.   - F/U iron studies (iron w/TIBC, vitB12, ferritin, transferrin, folate, protein electrophoresis)   - F/U FOBT - Continue home spiriva

## 2022-08-16 NOTE — H&P ADULT - NSHPREVIEWOFSYSTEMS_GEN_ALL_CORE
REVIEW OF SYSTEMS:  Constitutional: + chronic fatigue denies fever, chills, diaphoresis  HEENT: denies sore throat, runny nose, blurry vision, double vision    Respiratory: + MATTHEW, denies cough, wheezing, hemoptysis  Cardiovascular: denies CP, palpitations  Gastrointestinal:  denies nausea, vomiting, diarrhea, constipation, abdominal pain, melena, hematochezia   Genitourinary: denies dysuria, hematuria  Skin/Breast: denies rash, hives, itching   Musculoskeletal: denies myalgias, arthritis, joint swelling, muscle weakness  Neurologic: denies syncope, LOC, headache, weakness, dizziness Constitutional: + chronic fatigue denies fever, chills, diaphoresis  HEENT: denies sore throat, runny nose, blurry vision, double vision    Respiratory: + MATTHEW, denies cough, wheezing, hemoptysis  Cardiovascular: denies CP, palpitations  Gastrointestinal:  denies nausea, vomiting, diarrhea, constipation, abdominal pain, melena, hematochezia   Genitourinary: denies dysuria, hematuria  Skin/Breast: denies rash, hives, itching   Musculoskeletal: denies myalgias, arthritis, joint swelling, muscle weakness; admits shignles pain  Neurologic: denies syncope, LOC, headache, weakness, dizziness  Psych: admits depression

## 2022-08-16 NOTE — H&P ADULT - NSHPOUTPATIENTPROVIDERS_GEN_ALL_CORE
PCP: Kely  Cardio: Jim  Pulm: Jl Miller MD and Tiffanie Kelly Onc: Benji Rice PCP: Kely/ PARUL Alexander  Cardio: Jim  Pulm: Jl Miller MD and Tiffanie Kelly Onc: Benji Rice

## 2022-08-16 NOTE — H&P ADULT - NSHPSOCIALHISTORY_GEN_ALL_CORE
Lives: alone;  ( passed away from Leukemia ~2004); her son lives nearby  ADLs: independent; ambulations without assistance, can drive a vehicle  Vaccination: Pfizer X3 COVID  Occupation: Retired from [employer], worked in the office.   Alcohol Use: rare 1-2X/year  Tobacco Use: Never smoker, significant second hand exposure  Recreational Drug Use: denies

## 2022-08-16 NOTE — GOALS OF CARE CONVERSATION - ADVANCED CARE PLANNING - CONVERSATION DETAILS
STARS Writer met with pt at bedside. Reviewed patient's medical and social history as well as events leading to patient's hospitalization. Writer discussed patient's current diagnosis (SOB, asthma, COPD, HF, A/F, hypothyroid, lung nodules), medical condition and management, prognosis, and life expectancy. Inquired about patient's wishes regarding extent of medical care to be provided including escalation of medical care into the ICU and use of vasopressor support. In addition, the writer inquired about thoughts regarding cardiopulmonary resuscitation, artificial nutrition and hydration including use of feeding tubes and IVF, antibiotics, and further investigative studies such as blood draws and radiology. Pt. showed good insight into medical condition. All questions answered. Writer recommended she discuss in detail her wishes regarding resusciataion with her son William ARELLANO. Pt to decide what she wants and discuss with son. Psychosocial support provided.

## 2022-08-16 NOTE — H&P ADULT - PROBLEM SELECTOR PLAN 6
- BP in /67  - Continue home metoprolol succinate ER 50mg qd w/hold parameters  - DASH/TLC diet  - Monitor vs - Continue home anagrelide 0.5mg. Pt will have to bring in home inrebic 100mg given that we don't stock that here - Continue home anagrelide 0.5mg. Pt will have to bring in home inrebic 100mg given that we don't stock that here  - Heme/onc consulted  - recs appreciated

## 2022-08-16 NOTE — H&P ADULT - PROBLEM SELECTOR PLAN 7
- Continue home synthroid .175mg - BP in /67  - Continue home metoprolol succinate ER 50mg qd w/hold parameters  - DASH/TLC diet  - Monitor vs

## 2022-08-16 NOTE — H&P ADULT - PROBLEM SELECTOR PLAN 4
- Continue home xarelto 20mg - Continue home metoprolol succinate ER 50mg qd for rate control w/hold parameters  - Continue home xarelto 20mg - Cr on admission 1.4 (baseline ~0.9 based on chart review) likely to resolve as pt doesn't have any hx of kidney issues  - F/U CMP  - renal dose meds. avoid nephrotoxins - Cr on admission 1.4 (baseline ~0.9 based on chart review) likely to resolve as this is new onset and pt doesn't have any hx of kidney issues  - F/U CMP  - renal dose meds. avoid nephrotoxins

## 2022-08-16 NOTE — H&P ADULT - PROBLEM SELECTOR PLAN 5
- Continue home anagrelide 0.5mg. Pt will have to bring in home inrebic 100mg given that we don't stock that here - Continue home metoprolol succinate ER 50mg qd for rate control w/hold parameters  - Continue home xarelto 20mg - Continue home metoprolol succinate ER 50mg qd for rate control w/hold parameters  - Home xarelto dose is 20mg, however, renal dose to 15mg given new onset aman

## 2022-08-16 NOTE — H&P ADULT - PROBLEM SELECTOR PLAN 1
- s/p extra 80mg lasix this evening after speaking with Dr Fernandez  - CXR: L pleural effusion and cardiomegaly. ProBNP 3172. F/U CT Chest to r/o CHF - s/p extra 80mg lasix this evening after speaking with Dr Fernandez. Continue lasix 40mg   - CXR: on wet read, L pleural effusion and cardiomegaly. ProBNP 3172. F/U CT Chest to r/o CHF - s/p extra 80mg lasix this evening after speaking with Dr Fernandez.   - CXR: on wet read, L pleural effusion and cardiomegaly. ProBNP 3172.  - F/U official CT Chest to r/o CHF. On wet read, pt appears to have pulmonary edema. Continue lasix 40mg IV once daily for diuresis given LAYA (baseline ~0.9 based on chart review) - s/p extra 80mg lasix this evening after speaking with Dr Fernandez.   - CXR: on wet read, L pleural effusion and cardiomegaly. ProBNP 3172.  - F/U official CT Chest to r/o CHF. On wet read, pt appears to have pulmonary edema. Continue lasix 40mg IV once daily for diuresis given LAYA (baseline ~0.9 based on chart review)  - TTE to be performed. F/U official results.    - Cardio Dr. Fernandez consulted  - recs appreciated Admit to telemetry  Patient with dyspnea on exertion, LE edema and orthopnea  - s/p extra 80mg lasix this evening after speaking with Dr Fernandez.   - CXR: on wet read, L pleural effusion and cardiomegaly. ProBNP 3172.  - F/U official CT Chest to r/o CHF. On wet read, pt appears to have pulmonary edema. Continue lasix 40mg IV once daily for diuresis given LAYA (baseline ~0.9 based on chart review)  - TTE to be performed. F/U official results.    - Cardio Dr. Fernandez consulted  - recs appreciated

## 2022-08-16 NOTE — H&P ADULT - PROBLEM SELECTOR PLAN 9
- Continue home escitalopram 10mg - Continue home valacyclovir   - Pain control Shingles rash L abdomen to lumbar w/post herpetic neuralgia  - Trialed advil for pain w/o relief  - Start pain control w/ gabapentin 100mg BID Shingles rash L abdomen to lumbar w/ post herpetic neuralgia  - Trialed advil and OTC cream for pain w/o relief  - Start pain control w/ gabapentin 100mg BID

## 2022-08-16 NOTE — H&P ADULT - NSHPLABSRESULTS_GEN_ALL_CORE
.  CT chest: c/w fibrosis with superimposed pulm edema on review (pending rad read)    Labs, Imaging and EKG all personally reviewed by the attending physician.

## 2022-08-16 NOTE — H&P ADULT - HISTORY OF PRESENT ILLNESS
72 y/o female H/O CHF s/p cardiac cath which showed normal coronaries 12/21/18, Asthma, COPD Hypothyroidism thrombocytosis, anemia, lung nodules (malignant, s/p left lower lung resection), depression, c/o of dyspnea on exertion for 4days which pt is attributing to increased stress in her life. Also reports that these symptoms are similar to how she felt when she was diagnosed with Afib. Pt recently diagnosed by urgent care with shingles for the first time at her L abdomen wrapping around to her back for which she is taking valacyclovir and topical Terrasil. Recent 2U blood transfusion s/p severe epistaxis in APril 2022. Pt denies weakness, dizziness, syncope, palpitations. Pt        Takes  Lasix 40 mg in the morning and an  extra 80mg  this evening after speaking with eboni Ward CP      Of note, pt         Patient last admitted,      In the ED,  VS: T   , HR   , BP    , RR    , SpO2     % (RA/NC)  Labs:  Urine:  EKG:  CXR:  Imaging: Other  Orders:    72 y/o female H/O  Asthma, COPD, CHF s/p cardiac cath which showed normal coronaries 12/21/18, AFib s/p cardiac ablation, Hypothyroidism thrombocytosis, anemia, lung nodules (malignant, s/p left lower lung resection), depression, c/o of dyspnea on exertion and with change of positioning for 4days which pt is attributing to increased stress in her life. SOB is relieved with resting and decreased activity. Also reports that these symptoms are similar to how she felt when she was diagnosed with Afib. Pt recently diagnosed by urgent care with shingles for the first time at her L abdomen wrapping around to her back for which she is taking valacyclovir and topical Terrasil. Recent 2U blood transfusion s/p severe epistaxis in April 2022. Agrees to receiving a blood transfusion during this admission, if necessary. Pt denies CP, weakness, dizziness, syncope, palpitations, abdominal pain, vomiting. Pt fears going out due to her "low immune system" and "always getting sick" such that she gets all of her groceries delivered. Denies recent sick contacts. COVID-19 PCR neg today.       In the ED,  VS: T  98.5F HR 77  /67   RR  23   SpO2    98 % (RA)  Labs: WBC 11.13, Hgb 7.2 (pt's baseline appears to be at least 8.3 per chart review), platelets 403, creatinine 1.4, gluc 101, ca 8.0, albumin 3.1, eGFR 41, ProBNP 3172  Urine: none  CXR: on wet read: cardiomegaly, L pleural effusion  Imaging: US Duplex venous lower extremity: No evidence of DVT in either lower extremity.  Orders: s/p 1U pRBCs   70 y/o female H/O  Asthma, COPD, CHF s/p cardiac cath which showed normal coronaries 12/21/18, AFib s/p cardiac ablation, Hypothyroidism thrombocytosis, anemia, lung nodules (malignant, s/p left lower lung resection), depression, c/o of dyspnea on exertion and with change of positioning for 4days which pt is attributing to increased stress in her life. SOB is relieved with resting and decreased activity. Also reports that these symptoms are similar to how she felt when she was diagnosed with Afib. Pt recently diagnosed by urgent care with shingles for the first time at her L abdomen wrapping around to her back for which she is taking valacyclovir and topical Terrasil. Recent 2U blood transfusion s/p severe epistaxis in April 2022. Agrees to receiving a blood transfusion during this admission, if necessary. Pt denies CP, weakness, dizziness, syncope, palpitations, abdominal pain, vomiting. Pt fears going out due to her "low immune system" and "always getting sick" such that she gets all of her groceries delivered. This lack of social interaction causes pt to be depressed. Denies recent sick contacts. COVID-19 PCR neg today.       In the ED,  VS: T  98.5F HR 77  /67   RR  23   SpO2    98 % (RA)  Labs: WBC 11.13, Hgb 7.2 (pt's baseline appears to be at least 8.3 per chart review), platelets 403, creatinine 1.4, gluc 101, ca 8.0, albumin 3.1, eGFR 41, ProBNP 3172  Urine: none  CXR: on wet read: cardiomegaly, L pleural effusion  Imaging: US Duplex venous lower extremity: No evidence of DVT in either lower extremity.  Orders: s/p 1U pRBCs   72 y/o female H/O Asthma, COPD, HFpEF, s/p cardiac cath which showed normal coronaries 12/21/18, AFib s/p cardiac ablation, Hypothyroidism thrombocytosis, anemia, lung nodules (malignant, s/p left lower lung resection), depression, c/o of dyspnea on exertion and with change of positioning for 4days which pt is attributing to increased stress in her life. SOB is relieved with resting and decreased activity. Also reports that these symptoms are similar to how she felt when she was diagnosed with Afib. Pt recently diagnosed by urgent care with shingles for the first time at her L abdomen wrapping around to her back for which she is taking valacyclovir and topical Terrasil. Recent 2U blood transfusion s/p severe epistaxis in April 2022. Agrees to receiving a blood transfusion during this admission, if necessary. Pt denies CP, weakness, dizziness, syncope, palpitations, abdominal pain, vomiting. Pt fears going out due to her "low immune system" and "always getting sick" such that she gets all of her groceries delivered. This lack of social interaction causes pt to be depressed. Denies recent sick contacts. COVID-19 PCR neg today. She admits to orthopnea, dyspnea on exertion and lower extremity edema.    In the ED,  VS: T  98.5F HR 77  /67   RR  23   SpO2    98 % (RA)  Labs: WBC 11.13, Hgb 7.2 (pt's baseline appears to be at least 8.3 per chart review), platelets 403, creatinine 1.4, gluc 101, ca 8.0, albumin 3.1, eGFR 41, ProBNP 3172  Urine: none  CXR: on wet read: cardiomegaly, L pleural effusion  Imaging: US Duplex venous lower extremity: No evidence of DVT in either lower extremity.  Orders: s/p 1U pRBCs

## 2022-08-16 NOTE — H&P ADULT - NSHPPHYSICALEXAM_GEN_ALL_CORE
T(C): 36.9 (08-15-22 @ 23:33), Max: 36.9 (08-15-22 @ 23:33)  HR: 77 (08-15-22 @ 23:33) (77 - 77)  BP: 119/67 (08-15-22 @ 23:33) (119/67 - 119/67)  RR: 23 (08-15-22 @ 23:33) (23 - 23)  SpO2: 98% (08-15-22 @ 23:33) (98% - 98%)    Physical Exam:   GENERAL: well-groomed, well-developed, NAD, wearing glasses, has pink nail polish, appropriately interactive during exam  HEENT: head NC/AT; EOM intact, PERRLA, conjunctiva & sclera clear; hearing grossly intact, dry oral mucosa  NECK: supple, no JVD  RESPIRATORY: CTA B/L, no wheezing, rales, rhonchi or rubs  CARDIOVASCULAR: irregularly irregular, no murmurs or gallops  ABDOMEN: soft, non-tender, non-distended, no guarding, rebound or rigidity  MUSCULOSKELETAL:  no clubbing or cyanosis of all 4 extremities; R > L distal leg and ankle edema   LYMPH: no cervical lymphadenopathy  VASCULAR: Radial pulses 2+ bilaterally  SKIN: warm and dry, color normal  NEUROLOGIC: AA&O X3, following commands, no sensory loss, motor Strength 5/5 in UE & LE B/L, moving all 4 extremities spontaneously  Psych: Normal mood and affect, normal behavior T(C): 36.9 (08-15-22 @ 23:33), Max: 36.9 (08-15-22 @ 23:33)  HR: 77 (08-15-22 @ 23:33) (77 - 77)  BP: 119/67 (08-15-22 @ 23:33) (119/67 - 119/67)  RR: 23 (08-15-22 @ 23:33) (23 - 23)  SpO2: 98% (08-15-22 @ 23:33) (98% - 98%)    GENERAL: well-groomed, well-developed, NAD, wearing glasses, has pink nail polish, appropriately interactive during exam  HEENT: head NC/AT; EOM intact, PERRLA, conjunctiva & sclera clear; hearing grossly intact, dry oral mucosa  NECK: supple, no JVD  RESPIRATORY: crackles throughout  CARDIOVASCULAR: irregularly irregular, no murmurs or gallops  ABDOMEN: soft, non-tender, non-distended, no guarding, rebound or rigidity  MUSCULOSKELETAL:  no clubbing or cyanosis of all 4 extremities; R > L distal leg and ankle edema   LYMPH: no cervical lymphadenopathy  VASCULAR: Radial pulses 2+ bilaterally  SKIN: warm and dry, color normal  NEUROLOGIC: AA&O X3, following commands, no sensory loss, motor Strength 5/5 in UE & LE B/L, moving all 4 extremities spontaneously  Psych: Normal mood and affect, normal behavior

## 2022-08-16 NOTE — ED PROVIDER NOTE - OBJECTIVE STATEMENT
shortness of breath since yesterday. rash to buttock currently being treated for shingles  shortness of breath shortness of breath since yesterday. rash to buttock currently being treated for shingles  shortness of breath x 4 days edema af asthma copd  Lasix 40 mg extra 80 this evening  no CP 70 y/o female H/O  CHF Asthma COPD, Diabetes mellitus HLD Hypothyroidism thrombocytosis, anemia,  lung nodules  C/C shortness of breath since yesterday. rash to buttock currently being treated for shingles  increasing shortness of breath x 4 days, Takes  Lasix 40 mg in the morning and an  extra 80mg  this evening after speaking with eboni Ward CP 70 y/o female H/O  CHF Asthma COPD, Diabetes mellitus HLD Hypothyroidism thrombocytosis, anemia,  lung nodules  C/C increasing shortness of breath x 4 days, MATTHEW, orthopnea and peripheral edema  no headache, no chest pain, no Syncope , no palpitations, no abdominal pain, no n/v/d, no urinary symptoms, no GI  bleeding. no neuro changes. Takes  Lasix 40 mg in the morning and an  extra 80mg  this evening after speaking with Dr Fernandez,

## 2022-08-17 NOTE — DISCHARGE NOTE PROVIDER - CARE PROVIDER_API CALL
Holly Edge (DO)  Family Medicine  [address] [place] [id number]  Phone: [phone]  Fax: [phone]  Follow Up Time: 1 week    Bryson Fernandez)  Internal Medicine  [address] [place] [address]  Phone: [phone]  Fax: [phone]  Follow Up Time: 2 weeks

## 2022-08-17 NOTE — DISCHARGE NOTE PROVIDER - NSDCFUSCHEDAPPT_GEN_ALL_CORE_FT
Matt Physician [facility]  [facility]  Scheduled Appointment: 09/12/2022    Benji Rice Physician [facility]  [facility]  Scheduled Appointment: 09/12/2022

## 2022-08-17 NOTE — DISCHARGE NOTE PROVIDER - PROVIDER TOKENS
PROVIDER:[TOKEN:[[id number]],FOLLOWUP:[1 week]],PROVIDER:[TOKEN:[[id number]],FOLLOWUP:[2 weeks]]

## 2022-08-17 NOTE — PROGRESS NOTE ADULT - ASSESSMENT
70 y/o female H/O  Asthma, COPD, CHF, s/p cardiac cath which showed normal coronaries 12/21/18, AFib s/p cardiac ablation, Hypothyroidism thrombocytosis, anemia, lung nodules (malignant, s/p left lower lung resection), depression, presents with dyspnea on exertion and with change of positioning for 4days likely 2/2 CHF exacerbation and anemia.     Hx Afib s/p ablation  - EKG: Sinus rhythm with PACs.;  NSR on tele.  Can D/C   - No clear evidence of acute ischemia, and trops negative   - No anginal symptoms  - Continue Toprol XL    Pleural Effusions  - CT chest noted.  With increasing RML nodule and new LLL nodule as well as new mediastinal adenopathy  - Although, she is not requiring O2 supplement, she is still mildly overloaded  - Continue with Lasix 40mg IV daily.  Would give additional 40 mg this afternoon and reassess in am  - BP remains stable and controlled  - Previous TTE with normal EF.  Follow up repeat    - Monitor and replete lytes, keep K>4, Mg>2.  - Will continue to follow    Ivana Whitney DNP, NP-C  Cardiology   Spectra #[id number]

## 2022-08-17 NOTE — DISCHARGE NOTE NURSING/CASE MANAGEMENT/SOCIAL WORK - NSDCPEFALRISK_GEN_ALL_CORE
Results   Component Value Date    BUN 22 2017     Lab Results   Component Value Date    CREATININE 0.81 2017       Additional Lab/Culture results: none    PAST MEDICAL, FAMILY AND SOCIAL HISTORY UPDATE:  Past Medical History:   Diagnosis Date    Allergic rhinitis 2017    At high risk for falls 2017    Colon polyp     Had colonoscopy done 20 yrs ago    Diabetes mellitus [facility]     Endometrial cancer ([facility]     History of TIA (transient ischemic attack) '80's    on Baby ASA    Hyperglycemia 3/21/2017    Hyperlipidemia     Hypertension since     on Rx.     Hypothyroidism     sub total thyroidectomy goiter    Legally blind since born    both eyes, cord prolapse    Lower back pain     Mixed incontinence 2016    Morbid obesity with BMI of 40.0-44.9, adult ([facility] 2017    Obesity     Osteoarthritis (arthritis due to wear and tear of joints)     ronan knee    Osteoarthritis involving multiple joints on both sides of body 2012    Osteoporosis     Tennis elbow     left     Past Surgical History:   Procedure Laterality Date    CATARACT REMOVAL WITH IMPLANT Bilateral     COLONOSCOPY      had polyp, she doesn't know where and when, \"20 yrs ago\"    COLONOSCOPY  2017    DILATION AND CURETTAGE OF UTERUS N/A 2017    HYSTEROSCOPY  WITH Lydia Quiver performed by Acacia Mujica DO at [address] N/A 10/24/2017    TOTAL LAPAROSCOPIC HYSTERECTOMY, BSO, F.S.  STAGING, GYRUS G400 performed by Errol Buck MD at [address] FLX W/REMOVAL LESION BY  [address] N/A 2017    COLONOSCOPY POLYPECTOMY / HOT SNARE performed by Delila Apgar, DO at [address]  10/24/2017    with pelvic lymph node dissection    THYROID SURGERY  1980    subtotal 20 years ago    TONSILLECTOMY      VITRECTOMY      FLOATERS     Family History   Problem Relation Age of Onset    Coronary Art Dis Father     Hypertension Father week (Patient taking differently: Take 50,000 Units by mouth once a week ) 4 capsule 2    loratadine (CLARITIN) 10 MG tablet TAKE ONE TABLET BY MOUTH EVERY DAY (Patient taking differently: Take 10 mg by mouth daily as needed TAKE ONE TABLET BY MOUTH EVERY DAY) 90 tablet 3    simvastatin (ZOCOR) 40 MG tablet TAKE ONE TABLET BY MOUTH ONE TIME A DAY (Patient taking differently: Take 40 mg by mouth nightly TAKE ONE TABLET BY MOUTH ONE TIME A DAY) 90 tablet 3      (All medications reviewed and updated by provider since last office visit or hospitalization)   Sulfa antibiotics and Penicillins  History   Smoking Status    Never Smoker   Smokeless Tobacco    Never Used      (If patient a smoker, smoking cessation counseling offered)     History   Alcohol Use No       REVIEW OF SYSTEMS:  Review of Systems   Constitutional: Negative for activity change, chills and fever. Eyes: Negative for pain, redness and visual disturbance. Respiratory: Negative for cough, shortness of breath and wheezing. Cardiovascular: Positive for leg swelling. Negative for chest pain. Gastrointestinal: Negative for abdominal pain, nausea and vomiting. Genitourinary: Negative for difficulty urinating, dysuria, frequency, hematuria, pelvic pain, vaginal bleeding and vaginal discharge. Musculoskeletal: Positive for back pain (chronic). Negative for joint swelling and myalgias. Skin: Negative for color change and rash. Neurological: Negative for dizziness, tremors and numbness. Hematological: Negative for adenopathy. Does not bruise/bleed easily. Physical Exam:      Vitals:    12/04/17 1129   BP: (!) 103/58   Pulse: 57   Temp:      Body mass index is 43.29 kg/m². Patient is a 74 y.o. female in no acute distress and alert and oriented to person, place and time. Physical Exam  Constitutional: Patient in no acute distress. Neuro: Alert and oriented to person, place and time.   Psych: Mood normal, affect normal  Skin: No rash noted  HEENT: Head: Normocephalic and atraumatic  Conjunctivae and EOM are normal. Pupils are equal, round  Nose: Normal  Right External Ear: Normal; Left External Ear: Normal  Mouth: Mucosa Moist  Neck: Supple  Lungs: Respiratory effort is normal  Cardiovascular: Warm & Pink  Abdomen: Soft, non-tender, non-distended with no CVA,  No flank tenderness,  Or hepatosplenomegaly   Lymphatics: No palpable lymphadenopathy. Bladder non-tender and not distended. Musculoskeletal: Normal gait and station      Assessment and Plan      1. Mixed incontinence    2. Urgency of urination    3. Frequency of urination    4. Nocturia           Plan:   Cont myrbetriq  F/u 6 mo     Return in about 6 months (around 6/4/2018). Prescriptions Ordered:  No orders of the defined types were placed in this encounter. Orders Placed:  No orders of the defined types were placed in this encounter.            Jared Caicedo MD For information on Fall & Injury Prevention, visit: [email or web address] OR  [email or web address] OR  https://www.cdc.gov/steadi/patient.html

## 2022-08-17 NOTE — DISCHARGE NOTE NURSING/CASE MANAGEMENT/SOCIAL WORK - PATIENT PORTAL LINK FT
You can access the FollowMyHealth Patient Portal offered by [facility] by registering at the following website: [email or web address]. By joining [employer]’s FollowMyHealth portal, you will also be able to view your health information using other applications (apps) compatible with our system.

## 2022-08-17 NOTE — DISCHARGE NOTE PROVIDER - HOSPITAL COURSE
FROM ADMISSION H+P:   HPI:  70 y/o female H/O Asthma, COPD, HFpEF, s/p cardiac cath which showed normal coronaries 12/21/18, AFib s/p cardiac ablation, Hypothyroidism thrombocytosis, anemia, lung nodules (malignant, s/p left lower lung resection), depression, c/o of dyspnea on exertion and with change of positioning for 4days which pt is attributing to increased stress in her life. SOB is relieved with resting and decreased activity. Also reports that these symptoms are similar to how she felt when she was diagnosed with Afib. Pt recently diagnosed by urgent care with shingles for the first time at her L abdomen wrapping around to her back for which she is taking valacyclovir and topical Terrasil. Recent 2U blood transfusion s/p severe epistaxis in April 2022. Agrees to receiving a blood transfusion during this admission, if necessary. Pt denies CP, weakness, dizziness, syncope, palpitations, abdominal pain, vomiting. Pt fears going out due to her "low immune system" and "always getting sick" such that she gets all of her groceries delivered. This lack of social interaction causes pt to be depressed. Denies recent sick contacts. COVID-19 PCR neg today. She admits to orthopnea, dyspnea on exertion and lower extremity edema.    In the ED,  VS: T  98.5F HR 77  /67   RR  23   SpO2    98 % (RA)  Labs: WBC 11.13, Hgb 7.2 (pt's baseline appears to be at least 8.3 per chart review), platelets 403, creatinine 1.4, gluc 101, ca 8.0, albumin 3.1, eGFR 41, ProBNP 3172  Urine: none  CXR: on wet read: cardiomegaly, L pleural effusion  Imaging: US Duplex venous lower extremity: No evidence of DVT in either lower extremity.  Orders: s/p 1U pRBCs   (16 Aug 2022 04:14)      ---  HOSPITAL COURSE:   Patient was admitted for dyspnea on exertion and anemia. Patient received IV Lasix 80mg x1,Cardio was consulted and was started on IV Lasix 40mg daily. Toprol 50mg was continued inpatient. Patient was transfused 1 unit PRBC for hgb 7.2 and post transfusion hgb was 8.4. Patient was found to have shingles rash and was started on Gabapentin 100mg BID.   Patient was medically stable and clinically improved prior to day of discharge.     T(C): 36.7 (08-17-22 @ 04:15), Max: 37 (08-16-22 @ 21:34)  HR: 75 (08-17-22 @ 06:05) (64 - 84)  BP: 129/62 (08-17-22 @ 06:05) (120/70 - 144/79)  RR: 18 (08-17-22 @ 04:15) (17 - 18)  SpO2: 91% (08-17-22 @ 04:15) (91% - 97%)    Physical Exam:  Gen: well appearing, NAD  HEENT: NCAT, PEERLA b/l, EOMI b/l, no conjunctival erythema  Cardio: regular rate and rhythm, +s1s2, no murmurs, rubs, or gallops  Pulm: CTA b/l, no wheezes, rales or rhonchi  Abdomen: soft, nontender, nondistended, +BS x4 quadrants, no guarding  Extremities: no clubbing, cyanosis or edema, +2 pedal pulses  Neuro: AAOx3, CNII-XII intact grossly, 5/5 strength all extremities, sensation intact  Skin: warm and dry, erythematous rash on L abdomen/back      ---  CONSULTANTS:   Cardio: [facility]     ---  TIME SPENT:  I, the attending physician, was physically present for the key portions of the evaluation and management (E/M) service provided. The total amount of time spent reviewing the hospital notes, laboratory values, imaging findings, assessing/counseling the patient, discussing with consultant physicians, social work, nursing staff was -- minutes     FROM ADMISSION H+P:   HPI:  70 y/o female H/O Asthma, COPD, HFpEF, s/p cardiac cath which showed normal coronaries 12/21/18, AFib s/p cardiac ablation, Hypothyroidism thrombocytosis, anemia, lung nodules (malignant, s/p left lower lung resection), depression, c/o of dyspnea on exertion and with change of positioning for 4days which pt is attributing to increased stress in her life. SOB is relieved with resting and decreased activity. Also reports that these symptoms are similar to how she felt when she was diagnosed with Afib. Pt recently diagnosed by urgent care with shingles for the first time at her L abdomen wrapping around to her back for which she is taking valacyclovir and topical Terrasil. Recent 2U blood transfusion s/p severe epistaxis in April 2022. Agrees to receiving a blood transfusion during this admission, if necessary. Pt denies CP, weakness, dizziness, syncope, palpitations, abdominal pain, vomiting. Pt fears going out due to her "low immune system" and "always getting sick" such that she gets all of her groceries delivered. This lack of social interaction causes pt to be depressed. Denies recent sick contacts. COVID-19 PCR neg today. She admits to orthopnea, dyspnea on exertion and lower extremity edema.    In the ED,  VS: T  98.5F HR 77  /67   RR  23   SpO2    98 % (RA)  Labs: WBC 11.13, Hgb 7.2 (pt's baseline appears to be at least 8.3 per chart review), platelets 403, creatinine 1.4, gluc 101, ca 8.0, albumin 3.1, eGFR 41, ProBNP 3172  Urine: none  CXR: on wet read: cardiomegaly, L pleural effusion  Imaging: US Duplex venous lower extremity: No evidence of DVT in either lower extremity.  Orders: s/p 1U pRBCs   (16 Aug 2022 04:14)      ---  HOSPITAL COURSE:   Patient was admitted for dyspnea on exertion and anemia. Patient received IV Lasix 80mg x1,Cardio was consulted and was started on IV Lasix 40mg daily. Toprol 50mg was continued inpatient. Patient was transfused 1 unit PRBC for hgb 7.2 and post transfusion hgb was 8.4. Patient was found to have shingles rash and was started on Gabapentin 100mg BID. TTE performed, showed normal LV and RV function, LVEF 65%, moderate MR.  Patient was medically stable and clinically improved prior to day of discharge.     T(C): 36.7 (08-17-22 @ 04:15), Max: 37 (08-16-22 @ 21:34)  HR: 75 (08-17-22 @ 06:05) (64 - 84)  BP: 129/62 (08-17-22 @ 06:05) (120/70 - 144/79)  RR: 18 (08-17-22 @ 04:15) (17 - 18)  SpO2: 91% (08-17-22 @ 04:15) (91% - 97%)    Physical Exam:  Gen: well appearing, NAD  HEENT: NCAT, PEERLA b/l, EOMI b/l, no conjunctival erythema  Cardio: regular rate and rhythm, +s1s2, no murmurs, rubs, or gallops  Pulm: CTA b/l, no wheezes, rales or rhonchi  Abdomen: soft, nontender, nondistended, +BS x4 quadrants, no guarding  Extremities: no clubbing, cyanosis or edema, +2 pedal pulses  Neuro: AAOx3, CNII-XII intact grossly, 5/5 strength all extremities, sensation intact  Skin: warm and dry, erythematous rash on L abdomen/back      ---  CONSULTANTS:   Cardio: [facility]     ---  TIME SPENT:  I, the attending physician, was physically present for the key portions of the evaluation and management (E/M) service provided. The total amount of time spent reviewing the hospital notes, laboratory values, imaging findings, assessing/counseling the patient, discussing with consultant physicians, social work, nursing staff was -- minutes     FROM ADMISSION H+P:   HPI:  70 y/o female H/O Asthma, COPD, HFpEF, s/p cardiac cath which showed normal coronaries 12/21/18, AFib s/p cardiac ablation, Hypothyroidism thrombocytosis, anemia, lung nodules (malignant, s/p left lower lung resection), depression, c/o of dyspnea on exertion and with change of positioning for 4days which pt is attributing to increased stress in her life. SOB is relieved with resting and decreased activity. Also reports that these symptoms are similar to how she felt when she was diagnosed with Afib. Pt recently diagnosed by urgent care with shingles for the first time at her L abdomen wrapping around to her back for which she is taking valacyclovir and topical Terrasil. Recent 2U blood transfusion s/p severe epistaxis in April 2022. Agrees to receiving a blood transfusion during this admission, if necessary. Pt denies CP, weakness, dizziness, syncope, palpitations, abdominal pain, vomiting. Pt fears going out due to her "low immune system" and "always getting sick" such that she gets all of her groceries delivered. This lack of social interaction causes pt to be depressed. Denies recent sick contacts. COVID-19 PCR neg today. She admits to orthopnea, dyspnea on exertion and lower extremity edema.    In the ED,  VS: T  98.5F HR 77  /67   RR  23   SpO2    98 % (RA)  Labs: WBC 11.13, Hgb 7.2 (pt's baseline appears to be at least 8.3 per chart review), platelets 403, creatinine 1.4, gluc 101, ca 8.0, albumin 3.1, eGFR 41, ProBNP 3172  Urine: none  CXR: on wet read: cardiomegaly, L pleural effusion  Imaging: US Duplex venous lower extremity: No evidence of DVT in either lower extremity.  Orders: s/p 1U pRBCs   (16 Aug 2022 04:14)      ---  HOSPITAL COURSE:   Patient was admitted for dyspnea on exertion and anemia. Patient received IV Lasix 80mg x1,Cardio was consulted and was started on IV Lasix 40mg daily. Toprol 50mg was continued inpatient. Patient was transfused 1 unit PRBC for hgb 7.2 and post transfusion hgb was 8.4. Patient was found to have shingles rash and was started on Gabapentin 100mg BID. TTE performed, showed normal LV and RV function, LVEF 65%, moderate MR.  Patient was medically stable and clinically improved prior to day of discharge.     NOTE: CT CHEST OBTAINED: Nonspecific mosaic parenchymal attenuation pattern. Correlate clinically.  Bilateral pleural effusions.  Increase in size of a 1.1 cm right middle lobe nodule and a new 7 mm left lower lobe nodule. Neoplasm not excluded. Consider PET/CT.  New mediastinal adenopathy as described  Additional findings as discussed      T(C): 36.7 (08-17-22 @ 04:15), Max: 37 (08-16-22 @ 21:34)  HR: 75 (08-17-22 @ 06:05) (64 - 84)  BP: 129/62 (08-17-22 @ 06:05) (120/70 - 144/79)  RR: 18 (08-17-22 @ 04:15) (17 - 18)  SpO2: 91% (08-17-22 @ 04:15) (91% - 97%)    Physical Exam:  Gen: well appearing, NAD  HEENT: NCAT, PEERLA b/l, EOMI b/l, no conjunctival erythema  Cardio: regular rate and rhythm, +s1s2, no murmurs, rubs, or gallops  Pulm: CTA b/l, no wheezes, rales or rhonchi  Abdomen: soft, nontender, nondistended, +BS x4 quadrants, no guarding  Extremities: no clubbing, cyanosis or edema, +2 pedal pulses  Neuro: AAOx3, CNII-XII intact grossly, 5/5 strength all extremities, sensation intact  Skin: warm and dry, erythematous rash on L abdomen/back      ---  CONSULTANTS:   Cardio: [facility]     D/W PATIENT CT FINDINGS. PATIENT WILL FOLLOWUP OUTPATIENT FOR CONTINUED WORKUP/PET SCAN with her PCP   FROM ADMISSION H+P:   HPI:  70 y/o female H/O Asthma, COPD, HFpEF, s/p cardiac cath which showed normal coronaries 12/21/18, AFib s/p cardiac ablation, Hypothyroidism thrombocytosis, anemia, lung nodules (malignant, s/p left lower lung resection), depression, c/o of dyspnea on exertion and with change of positioning for 4days which pt is attributing to increased stress in her life. SOB is relieved with resting and decreased activity. Also reports that these symptoms are similar to how she felt when she was diagnosed with Afib. Pt recently diagnosed by urgent care with shingles for the first time at her L abdomen wrapping around to her back for which she is taking valacyclovir and topical Terrasil. Recent 2U blood transfusion s/p severe epistaxis in April 2022. Agrees to receiving a blood transfusion during this admission, if necessary. Pt denies CP, weakness, dizziness, syncope, palpitations, abdominal pain, vomiting. Pt fears going out due to her "low immune system" and "always getting sick" such that she gets all of her groceries delivered. This lack of social interaction causes pt to be depressed. Denies recent sick contacts. COVID-19 PCR neg today. She admits to orthopnea, dyspnea on exertion and lower extremity edema.    In the ED,  VS: T  98.5F HR 77  /67   RR  23   SpO2    98 % (RA)  Labs: WBC 11.13, Hgb 7.2 (pt's baseline appears to be at least 8.3 per chart review), platelets 403, creatinine 1.4, gluc 101, ca 8.0, albumin 3.1, eGFR 41, ProBNP 3172  Urine: none  CXR: on wet read: cardiomegaly, L pleural effusion  Imaging: US Duplex venous lower extremity: No evidence of DVT in either lower extremity.  Orders: s/p 1U pRBCs   (16 Aug 2022 04:14)      ---  HOSPITAL COURSE:   Patient was admitted for dyspnea on exertion and anemia. Patient received IV Lasix 80mg x1,Cardio was consulted and was started on IV Lasix 40mg daily. Toprol 50mg was continued inpatient. Patient was transfused 1 unit PRBC for hgb 7.2 and post transfusion hgb was 8.4. Patient was found to have shingles rash and was started on Gabapentin 100mg BID. TTE performed, showed normal LV and RV function, LVEF 65%, moderate MR.  Patient was medically stable and clinically improved prior to day of discharge.     NOTE: CT CHEST OBTAINED: Nonspecific mosaic parenchymal attenuation pattern. Correlate clinically.  Bilateral pleural effusions.  Increase in size of a 1.1 cm right middle lobe nodule and a new 7 mm left lower lobe nodule. Neoplasm not excluded. Consider PET/CT.  New mediastinal adenopathy as described  Additional findings as discussed      T(C): 36.7 (08-17-22 @ 04:15), Max: 37 (08-16-22 @ 21:34)  HR: 75 (08-17-22 @ 06:05) (64 - 84)  BP: 129/62 (08-17-22 @ 06:05) (120/70 - 144/79)  RR: 18 (08-17-22 @ 04:15) (17 - 18)  SpO2: 91% (08-17-22 @ 04:15) (91% - 97%)    Physical Exam:  Gen: well appearing, NAD  HEENT: NCAT, PEERLA b/l, EOMI b/l, no conjunctival erythema  Cardio: regular rate and rhythm, +s1s2, no murmurs, rubs, or gallops  Pulm: CTA b/l, no wheezes, rales or rhonchi  Abdomen: soft, nontender, nondistended, +BS x4 quadrants, no guarding  Extremities: no clubbing, cyanosis or edema, +2 pedal pulses  Neuro: AAOx3, CNII-XII intact grossly, 5/5 strength all extremities, sensation intact  Skin: warm and dry, erythematous rash on L abdomen/back      ---  CONSULTANTS:   Cardio: [facility]     D/W PATIENT CT FINDINGS. PATIENT WILL FOLLOWUP OUTPATIENT FOR CONTINUED WORKUP/PET SCAN with her PCP. Patient reports lung nodules were cancerous s/p lung resection.. requested team to followup with Dr. Miller. Called Office; left message for Paul to call writer  back

## 2022-08-17 NOTE — DISCHARGE NOTE PROVIDER - ATTENDING DISCHARGE PHYSICAL EXAMINATION:
Objective:    Vitals:  T(C): 37.1 (08-17-22 @ 11:42), Max: 37.1 (08-17-22 @ 11:42)  HR: 75 (08-17-22 @ 11:42) (64 - 84)  BP: 134/95 (08-17-22 @ 11:42) (120/70 - 144/79)  RR: 18 (08-17-22 @ 11:42) (17 - 18)  SpO2: 98% (08-17-22 @ 11:42) (91% - 98%)    Physical Exam:  General: comfortable, no acute distress, well nourished  HEENT: Atraumatic, no LAD, trachea midline, PERRLA  Cardiovascular: normal s1s2, no murmurs, gallops or fricition rubs  Pulmonary: clear to ausculation Bilaterally, no wheezing , rhonchi  Gastrointestinal: soft non tender non distended, no masses felt, no organomegally  Muscloskeletal: no lower extremity edema, intact bilateral lower extremity pulses  Neurological: CN II-12 intact. No focal weakness  Psychiatrical: normal mood, cooperative  SKIN: no rash, lesions or ulcers

## 2022-08-17 NOTE — PROGRESS NOTE ADULT - SUBJECTIVE AND OBJECTIVE BOX
[facility] -- Elsa Sampson Patel, Savella, Goodger  Office # [phone]    Follow Up:  Volume overload    Subjective/Observations: Seen on side-lying position, non-orthopneic on RA.  States, she if feeling better with no O2 requirement.  Denies chest pain or palpitations.  No tele events    REVIEW OF SYSTEMS: All other review of systems is negative unless indicated above  PAST MEDICAL & SURGICAL HISTORY:  Hypothyroidism  HLD (hyperlipidemia)  Thrombocytosis  Persistent atrial fibrillation  Obesity (BMI 35.0-39.9 without comorbidity)  Asthma  Diabetes mellitus  Ankle injury  on 2004, 5 surgeries.  Cholecystitis    MEDICATIONS  (STANDING):  anagrelide 1.5 milliGRAM(s) Oral <User Schedule>  escitalopram 10 milliGRAM(s) Oral daily  furosemide   Injectable 40 milliGRAM(s) IV Push daily  gabapentin 100 milliGRAM(s) Oral two times a day  levothyroxine 175 MICROGram(s) Oral daily  metoprolol succinate ER 50 milliGRAM(s) Oral daily  montelukast 10 milliGRAM(s) Oral daily  pantoprazole    Tablet 40 milliGRAM(s) Oral before breakfast  potassium chloride   Powder 40 milliEquivalent(s) Oral every 4 hours  rivaroxaban 15 milliGRAM(s) Oral with dinner  tiotropium 18 MICROgram(s) Capsule 1 Capsule(s) Inhalation daily    MEDICATIONS  (PRN):    Allergies    No Known Allergies    Intolerances    Vital Signs Last 24 Hrs  T(C): 36.7 (17 Aug 2022 04:15), Max: 37 (16 Aug 2022 21:34)  T(F): 98 (17 Aug 2022 04:15), Max: 98.6 (16 Aug 2022 21:34)  HR: 75 (17 Aug 2022 06:05) (64 - 84)  BP: 129/62 (17 Aug 2022 06:05) (120/70 - 144/79)  BP(mean): 92 (16 Aug 2022 10:10) (92 - 92)  RR: 18 (17 Aug 2022 04:15) (17 - 18)  SpO2: 91% (17 Aug 2022 04:15) (91% - 97%)    Parameters below as of 17 Aug 2022 04:15  Patient On (Oxygen Delivery Method): room air    I&O's Summary    16 Aug 2022 07:01  -  17 Aug 2022 07:00  --------------------------------------------------------  IN: 360 mL / OUT: 300 mL / NET: 60 mL      PHYSICAL EXAM:  TELE: NSR  Constitutional: NAD, awake and alert, well-developed  HEENT: Moist Mucous Membranes, Anicteric  Pulmonary: Non-labored, breath sounds are diminished at bases bilaterally, No wheezing, +fine rales left base no rhonchi  Cardiovascular: Regular, S1 and S2, +murmurs, no , gallops or clicks  Gastrointestinal: Bowel Sounds present, soft, nontender.   Lymph: No peripheral edema. No lymphadenopathy.  Skin: + rashes left flank, no ulcers.  Psych:  Mood & affect appropriate  LABS: All Labs Reviewed:                        8.4    10.20 )-----------( 368      ( 17 Aug 2022 07:25 )             26.5                         7.2    11.13 )-----------( 403      ( 16 Aug 2022 00:40 )             23.4     17 Aug 2022 07:25    143    |  106    |  18     ----------------------------<  102    3.4     |  28     |  1.30   16 Aug 2022 00:40    140    |  105    |  21     ----------------------------<  101    4.2     |  29     |  1.40     Ca    8.0        17 Aug 2022 07:25  Ca    8.0        16 Aug 2022 00:40    TPro  6.3    /  Alb  x      /  TBili  x      /  DBili  x      /  AST  x      /  ALT  x      /  AlkPhos  x      16 Aug 2022 04:55  TPro  6.9    /  Alb  3.1    /  TBili  0.4    /  DBili  x      /  AST  15     /  ALT  13     /  AlkPhos  83     16 Aug 2022 00:40    TTE pending    ACC: [id number] EXAM:  CT CHEST                          PROCEDURE DATE:  08/16/2022      INTERPRETATION:  CLINICAL INFORMATION: Dyspnea    COMPARISON: None.    CONTRAST/COMPLICATIONS:  IV Contrast: NONE  Oral Contrast: NONE  Complications: None reported at time of study completion    PROCEDURE:  CT of the Chest was performed.  Sagittal and coronal reformats were performed.    FINDINGS:    LUNGS AND AIRWAYS: Patent central airways.  Generalized mosaic   parenchymal attenuation pattern is nonspecific and may be a manifestation   of small airways disease can also be seen in the setting of parenchymal   disease processes. Multiple bilateral scattered tiny parenchymal nodules   similar to prior likely granulomas.. Increase in size of a right middle   lobe nodule (2, 79) which measures 1.1 cm previously measured 5 mm. Also,   there is a new 7 mm left lower lobe parenchymal nodule (2, 81) neoplasm   not excluded. Consider PET/CT. No focal consolidation.  PLEURA: Small bilateral pleural effusions.  MEDIASTINUM AND KELSY: New pretracheal and AP window lymphadenopathy the   largest nodes in the pretracheal station measuring up to 1.7 cm. Hilar   evaluation limited without IV contrast. Small hiatal hernia..  VESSELS: Within normal limits.  HEART: Heart size is normal. No pericardial effusion.  CHEST WALL AND LOWER NECK: Within normal limits.  VISUALIZED UPPER ABDOMEN: Incompletely imaged hepatosplenomegaly..  BONES: No acute or aggressive pathology.    IMPRESSION:  Nonspecific mosaic parenchymal attenuation pattern. Correlate clinically.  Bilateral pleural effusions.  Increase in size of a 1.1 cm right middle lobe nodule and a new 7 mm left   lower lobe nodule. Neoplasm not excluded. Consider PET/CT.  New mediastinal adenopathy as described  Additional findings as discussed    --- End of Report ---    ANN MAY MD; Attending Radiologist  This document has been electronically signed. Aug 16 2022 11:33AM    Ventricular Rate 77 BPM    Atrial Rate 77 BPM    P-R Interval 184 ms    QRS Duration 84 ms    Q-T Interval 436 ms    QTC Calculation(Bazett) 493 ms    P Axis 97 degrees    R Axis 62 degrees    T Axis 16 degrees    Diagnosis Line Sinus rhythm with premature atrial complexes  Prolonged QT  Abnormal ECG  No previous ECGs available  Confirmed by Diandra Pool ([id number]) on 8/16/2022 11:30:47 AM

## 2022-08-17 NOTE — DISCHARGE NOTE PROVIDER - NSDCCPCAREPLAN_GEN_ALL_CORE_FT
PRINCIPAL DISCHARGE DIAGNOSIS  Diagnosis: CHF exacerbation  Assessment and Plan of Treatment: You were admitted for shortness of breath and were given IV diuretics. Your shortness of breath improved.   You were seen by cardiology and it was recommended for you to start taking a diuretic daily.  Please continue to take ______ after discharge.  Please follow up with your PCP and cardiologist after discharge.      SECONDARY DISCHARGE DIAGNOSES  Diagnosis: Anemia  Assessment and Plan of Treatment: You were found to have a hemoglobin of 7.2 on admission. You were given 1 unit of blood and your hemoglobin improved.   Please follow with your PCP within 1 week of discharge.   Please return back to the ER if you experience and dizziness, blurry vision, lightheadedness.     PRINCIPAL DISCHARGE DIAGNOSIS  Diagnosis: CHF exacerbation  Assessment and Plan of Treatment: You were admitted for shortness of breath and were given IV diuretics. Your shortness of breath improved.   You were seen by cardiology and it was recommended for you to start taking a diuretic daily.  Please continue to take Lasix 40mg daily after discharge.  Please follow up with your PCP and cardiologist after discharge.      SECONDARY DISCHARGE DIAGNOSES  Diagnosis: Anemia  Assessment and Plan of Treatment: You were found to have a hemoglobin of 7.2 on admission. You were given 1 unit of blood and your hemoglobin improved.   Please follow with your PCP within 1 week of discharge.   Please return back to the ER if you experience and dizziness, blurry vision, lightheadedness.

## 2022-08-17 NOTE — DISCHARGE NOTE PROVIDER - NSDCMRMEDTOKEN_GEN_ALL_CORE_FT
anagrelide 0.5 mg oral capsule: 3 cap(s) orally 2 times a day  escitalopram 10 mg oral tablet: 1 tab(s) orally once a day  furosemide 40 mg oral tablet: 1 tab(s) orally once a day  Inrebic 100 mg oral capsule: 1 cap(s) orally 3 times a day  metoprolol succinate 50 mg oral tablet, extended release: 1 tab(s) orally once a day  pantoprazole 40 mg oral delayed release tablet: 1 tab(s) orally once a day  Singulair 10 mg oral tablet: 1 tab(s) orally once a day  Spiriva: 2 puff(s) inhaled 2 times a day  Synthroid 175 mcg (0.175 mg) oral tablet: 1 tab(s) orally once a day  Xarelto 20 mg oral tablet: 1 tab(s) orally once a day (in the evening)   anagrelide 0.5 mg oral capsule: 3 cap(s) orally 2 times a day  escitalopram 10 mg oral tablet: 1 tab(s) orally once a day  furosemide 40 mg oral tablet: 1 tab(s) orally once a day  gabapentin 100 mg oral capsule: 1 cap(s) orally 2 times a day  Inrebic 100 mg oral capsule: 1 cap(s) orally 3 times a day  metoprolol succinate 50 mg oral tablet, extended release: 1 tab(s) orally once a day  pantoprazole 40 mg oral delayed release tablet: 1 tab(s) orally once a day  Singulair 10 mg oral tablet: 1 tab(s) orally once a day  Spiriva: 2 puff(s) inhaled 2 times a day  Synthroid 175 mcg (0.175 mg) oral tablet: 1 tab(s) orally once a day  Xarelto 20 mg oral tablet: 1 tab(s) orally once a day (in the evening)

## 2022-08-17 NOTE — PROGRESS NOTE ADULT - NS ATTEND AMEND GEN_ALL_CORE FT
mild adhf with radu effusions on ct  cont iv lasix today with plan to change to po in am  repeat echocardiogram with pending read

## 2022-08-25 NOTE — HISTORY OF PRESENT ILLNESS
[Disease:__________________________] : Disease: [unfilled] [Treatment Protocol] : Treatment Protocol [de-identified] : 70 yo F with PMHx lung nodules (malignant, s/p left lower lung resection), splenomegaly, ET/PMF (Jak2+) here for further management.  Previously was on Hydroxyurea 2000 mg  and 1500 mg alternating, had increase in platelet counts, was started on ruxolitinib which significant side effects within 2 days including body aches and weakness. She has been on Fedratinib since Nov 2021 which has led to stabilization of Hb and platelets. She has also experiencing some improvement in her neutrophilia.\par \par Social Hx:\par Retired from [employer], worked in the office.  ( passed away from Leukemia ~2004), Lives alone, adult children live nearby\par Never smoker, significant second hand exposure\par No significant alcohol use\par No illicit drug use\par \par Family Hx:\par No relevant family history\par \par =============================================================\par Splenomegaly:\par US (2017): Spleen 15.3cm\par US (3/2020): Spleen 15.9cm\par US (3/2021): Spleen 17.7cm\par US (11/2021): Spleen 19.2cm\par \par Treatment Hx:\par - Hydrea since at least 2014 up to 2021\par - Trial of Ruxolitinib Oct 2021\par - Fedratinib Nov 2021 to present (held from 8/26/22 to ____ due to lipase elevation)\par \par =============================================================\par Care providers:\par Pulmonologist- Dr. Jl Milelr [phone] [FreeTextEntry1] : On Fedratinib (JAK2 inhibitor) since Nov 2021 [de-identified] : 4/4/22: Transfer of care from Dr Carmen Doshi. Follow-up. Ms Ornelas is feeling well overall. She was recently told that she has cirrhosis of the liver seen on recent outpatient imaging ([facility]). She has consistent coughing every night and uses Hydrocodone / homatropine to help with cough / allow her to sleep.  Appetite improving, now eating full meals and gaining weight back. No current abdominal pain. Since that time she has had improvement in her wbc. Her hb has stabilized around 8, PLT have also improved, now down to mid 500's. She denies fevers, chills, night sweats. \par \par 7/11/22: Followup. In April she had severe epistaxis, which has since fully resolved. She is back on aspirin, and rivaroxaban. She feels well today overall, only with the complaint of fatigue. She has not received the second booster. She has never had COVID-19. She denies any fevers, chills, night sweats. No new lumps or bumps. Her appetite is improved.\par \par 8/24/22: Follow-up. She recently was admitted to [facility] [place] on 8/16/22 for presumed heart failure exacerbation due to shortness of breath and anemia. She initially went to hospital due to right leg swelling and shortness of breath. She was previously on Fedratinib, however its been held due to concerns of affecting her volume status. An echocardiogram on 8/16/22 showed normal systolic lvf of 65% without any noted diastolic function, however she did have moderate mitral valve regurgitation. GP told her yesterday she had two "spots" on her lungs. She is taking Lasix everyday. Her breathing has improved but still has shortness of breath. She had left hip shingles outbreak while at the hospital. She was on gabapentin for the shingles. She is still taking it but has not seen a significant improvement in her pain, lesions are healing. She is seeing the pulmonologist next week.\par \par \par ____\par A comprehensive review of systems was performed including constitutional, eyes, ENT, cardiovascular, respiratory, gastrointestinal, genitourinary, musculoskeletal, integumentary, neurological, psychiatric and hematologic / lymphatic. All pertinent positives are included in the H&P under interval history above and the remaining review of systems listed are negative. \par

## 2022-08-25 NOTE — PHYSICAL EXAM
[Fully active, able to carry on all pre-disease performance without restriction] : Status 0 - Fully active, able to carry on all pre-disease performance without restriction [Normal] : affect appropriate [de-identified] : course breath sounds throughout [de-identified] : irregular rhythm, regular rate [de-identified] : Trace edema of the right leg.  [de-identified] : spleen +2-3 fingers (~2-3 cm) below costal margin

## 2022-08-25 NOTE — ASSESSMENT
[FreeTextEntry1] : 70 yo F with a history of PMF jak2+, Cytogenetics show +1 and +9, + SM on high doses of HU causing worsening anemia with persistently high platelet counts.  \par \par She had a poor response to Jakafi. No response to iron and HU titration. Fedratinib 100 mg daily started on 11/17/21, titrated up to 200 mg daily on 11/30/21, titrated up to 300 mg daily on 2/10/22 which is her current dosing.\par \par Regarding her splenomegaly, her spleen size is improving which has allowed her to have improvement in her appetite. She remains on both Fedratinib and Anagrelide, tolerating well. Hydrea had been stopped previously due to low Hb (she has required a few units of pRBC support previously), was also treated with venofer for CLAY in late 2021.\par \par She was recently admitted to [facility] for dyspnea, leg edema and suspected heart failure exacerbation. I do not suspect that this is related to Fedratinib at this time. She is being managed with diuretics. Her most recent echo was unremarkable with good systolic function (LVEF 65%) with moderate MR.\par \par CBC today: WBC 11.68 , Hb 8.9 , Plt 459k \par \par Plan:\par - Continue Anagrelide 1.5 mg BID, will attempt to down titrate if plts remain stable in normal range\par - Grade 3 lipase elevation (~126), will hold Fedratinib for now until normalized to < 60. When restarting, will reduce dose to 200 mg daily. \par - If Fedratinib fails in the future will look for clinical trial (concern if she will be a candidate given h/o carcinoid)\par - Anemia: Did not favorably respond to iron infusion, could initiate epo versus danazol, but would hold off for now \par - Carcinoid: Since her last visit with Dr. Starr Lara (CT surgery) at [place] she had recent CT imaging of C/A/P which showed increased pulmonary nodule to 1.1 cm and another new subcentimeter nodule. Follow-up this week with Dr. Jl Miller (pulm)\par - HCM: She has received the COVID19 vaccines. Continue with cardiology followup.\par - Repeat CBC and lipase/ amylase on 9/1/22 to guide restarting fedratinib\par - Follow up in 1 month\par \par _____\par I personally have spent a total of 40 minutes of time on the date of this encounter reviewing test results, documenting findings, providing education, coordinating care and directly consulting with the patient and/or designated family member.

## 2022-08-30 NOTE — REVIEW OF SYSTEMS
[Feeling Fatigued] : feeling fatigued [Negative] : Heme/Lymph [Dyspnea on exertion] : dyspnea during exertion [Lower Ext Edema] : lower extremity edema

## 2022-08-30 NOTE — CARDIOLOGY SUMMARY
[de-identified] : Sinus  Rhythm  - frequent PAC s \par nonspecific T waves  [de-identified] : 5/2021 normal LV function. RV was normal in structure. TV appears normal. MV leaflets are calcified\par  8/16/22 showed normal systolic with EF 65% without any noted diastolic function, however she did have moderate mitral valve regurgitation. [de-identified] : PAF s/p ablation in 2/7/19. [de-identified] : 12/2018 with normal cors and LVEDP.

## 2022-08-30 NOTE — DISCUSSION/SUMMARY
[FreeTextEntry1] : 71-year-old woman with a history as listed presents for a followup visit.\par \par Mallory recently was admitted for worsening volume overload. She is still orthopneic on exam. She will take Lasix 40mg Q12 x 3 days and then take 40mg Qday alternating with 40mg q12. I think her volume overload was a from her high output HF from anemia and possibly as a side effect of her chemo agents. She has moderate MR on her last echo which is likely just reflection of her volume status. Will repeat once euvolemic. \par \par She has SR with frequent APCs. She denies any anginal symptoms. She is s/p cath on 12/2018 with normal coronaries. I don’t this that a repeat ischemic evaluation in setting of worsening anemia is prudent. \par  \par She is status post an A. fib ablation.  At this time she has an irregular rhythm.  Her EKG today is more consistent with a  sinus rhythm with frequent PACs.  She will continue Toprol 50mg Qday.  She has maintained her Xarelto therapy.  She will stay off the ASA therapy \par \par She will followup with heme. She will need try to maintain her Hb >8. \par Exercise and diet counseling was performed in order to reduce her future cardiovascular risk. \par She will followup with me in 2  months  [EKG obtained to assist in diagnosis and management of assessed problem(s)] : EKG obtained to assist in diagnosis and management of assessed problem(s)

## 2022-08-30 NOTE — HISTORY OF PRESENT ILLNESS
[FreeTextEntry1] : 70-year-old woman with a history of asthma, obesity, essential thrombocytosis, HFPEF, normal cors in 12/2018, PAF s/p ablation in 2/7/19, VATS with removal of carcinoid in 5/2021\par \par Since her last visit, she  was admitted to [facility] on 8/16/22 for presumed heart failure exacerbation due to shortness of breath and anemia. She initially went to hospital due to right leg swelling and shortness of breath. CT chest noted. With increasing RML nodule and new LLL nodule as well as new\par mediastinal adenopathy She was previously on Fedratinib, however its been held due to concerns of affecting her volume status. She was discharged on lasix. \par She is still complaining of MATTHEW when walking making her bed. Her lower extrmeity edema has returned to its baseline (right leg >left leg).  \par he   denies any chest pain, PND, orthopnea, lower extremity edema,  strokelike symptoms.     No reported melena, hematochezia or hematemesis.  She is compliant with her medications.

## 2022-09-22 PROBLEM — R74.8 ELEVATED LIPASE: Status: ACTIVE | Noted: 2022-01-01

## 2022-09-22 NOTE — HISTORY OF PRESENT ILLNESS
[Disease:__________________________] : Disease: [unfilled] [Treatment Protocol] : Treatment Protocol [de-identified] : 70 yo F with PMHx lung nodules (malignant, s/p left lower lung resection), splenomegaly, ET/PMF (Jak2+) here for further management.  Previously was on Hydroxyurea 2000 mg  and 1500 mg alternating, had increase in platelet counts, was started on ruxolitinib which significant side effects within 2 days including body aches and weakness. She has been on Fedratinib since Nov 2021 which has led to stabilization of Hb and platelets. She has also experiencing some improvement in her neutrophilia.\par \par Social Hx:\par Retired from [employer], worked in the office.  ( passed away from Leukemia ~2004), Lives alone, adult children live nearby\par Never smoker, significant second hand exposure\par No significant alcohol use\par No illicit drug use\par \par Family Hx:\par No relevant family history\par \par =============================================================\par Splenomegaly:\par US (2017): Spleen 15.3cm\par US (3/2020): Spleen 15.9cm\par US (3/2021): Spleen 17.7cm\par US (11/2021): Spleen 19.2cm\par \par Treatment Hx:\par - Hydrea since at least 2014 up to 2021\par - Trial of Ruxolitinib Oct 2021\par - Fedratinib Nov 2021 to present (held from 8/26/22 to ____ due to lipase elevation)\par \par =============================================================\par Care providers:\par Pulmonologist- Dr. Jl Miller [phone] [de-identified] : 4/4/22: Transfer of care from Dr Carmen Doshi. Follow-up. Ms Ornelas is feeling well overall. She was recently told that she has cirrhosis of the liver seen on recent outpatient imaging ([facility]). She has consistent coughing every night and uses Hydrocodone / homatropine to help with cough / allow her to sleep.  Appetite improving, now eating full meals and gaining weight back. No current abdominal pain. Since that time she has had improvement in her wbc. Her hb has stabilized around 8, PLT have also improved, now down to mid 500's. She denies fevers, chills, night sweats. \par \par 7/11/22: Followup. In April she had severe epistaxis, which has since fully resolved. She is back on aspirin, and rivaroxaban. She feels well today overall, only with the complaint of fatigue. She has not received the second booster. She has never had COVID-19. She denies any fevers, chills, night sweats. No new lumps or bumps. Her appetite is improved.\par \par 8/24/22: Follow-up. She recently was admitted to [facility] on 8/16/22 for presumed heart failure exacerbation due to shortness of breath and anemia. She initially went to hospital due to right leg swelling and shortness of breath. She was previously on Fedratinib, however its been held due to concerns of affecting her volume status. An echocardiogram on 8/16/22 showed normal systolic lvf of 65% without any noted diastolic function, however she did have moderate mitral valve regurgitation. GP told her yesterday she had two "spots" on her lungs. She is taking Lasix everyday. Her breathing has improved but still has shortness of breath. She had left hip shingles outbreak while at the hospital. She was on gabapentin for the shingles. She is still taking it but has not seen a significant improvement in her pain, lesions are healing. She is seeing the pulmonologist next week.\par \par 9/12/22: Follow-up. She has been feeling extremely fatigued but denies any other complaints. No fevers, chills, night sweats, unexpected weight loss. She had her chest CT reviewed by her pulmonologist, which showed stable nodules, no immediate action needed. She has been off Fedratinib for the past 2 weeks, we are awaiting repeat lipase.\par \par ____\par A comprehensive review of systems was performed including constitutional, eyes, ENT, cardiovascular, respiratory, gastrointestinal, genitourinary, musculoskeletal, integumentary, neurological, psychiatric and hematologic / lymphatic. All pertinent positives are included in the H&P under interval history above and the remaining review of systems listed are negative. \par  [FreeTextEntry1] : On Fedratinib (JAK2 inhibitor) since Nov 2021, Held for August and first 2 weeks of Sept 2022 due to elevated lipase (asymptomatic), Now on Fedratinib 100 mg daily

## 2022-09-22 NOTE — ASSESSMENT
[FreeTextEntry1] : 72 yo F with a history of PMF jak2+, Cytogenetics show +1 and +9, + SM on high doses of HU causing worsening anemia with persistently high platelet counts.  \par \par She had a poor response to Jakafi. No response to iron and HU titration. Fedratinib 100 mg daily started on 11/17/21, titrated up to 200 mg daily on 11/30/21, titrated up to 300 mg daily on 2/10/22 which is her current dosing.\par \par Regarding her splenomegaly, her spleen size is improving which has allowed her to have improvement in her appetite. She remains on anagrelide, tolerating well. She has been off Fedratinib since the end of Aug 2022 (2 weeks now) due to lipase elevations. Will reassess today and restart if normalizing. Hydrea had been stopped previously due to low Hb (she has required a few units of pRBC support previously), was also treated with venofer for CLAY in late 2021.\par \par She was recently admitted to [facility] [place] for dyspnea, leg edema and suspected heart failure exacerbation. I do not suspect that this is related to Fedratinib at this time. She is being managed with diuretics. Her most recent echo was unremarkable with good systolic function (LVEF 65%) with moderate MR. Since holding her Fedratinib, she has experienced increasing fatigue.\par \par CBC today:WBC 18.33, Hb 8.8, Plt 573\par \par Plan:\par - Continue Anagrelide 1.5 mg BID, will attempt to down titrate if plts remain stable in normal range\par - Grade 3 lipase elevation (~126), HELD Fedratinib for the last 2 weeks, if lipase ok today < 70, will restart Fedratinib at 100 mg daily on 9/13/22\par - If Fedratinib fails in the future will look for clinical trial (concern if she will be a candidate given h/o carcinoid)\par - Anemia: Did not favorably respond to iron infusion, could initiate epo versus danazol, but would hold off for now \par - Carcinoid: Since her last visit with Dr. Starr Lara (CT surgery) at [place] she had recent CT imaging of C/A/P which showed increased pulmonary nodule to 1.1 cm and another new subcentimeter nodule. Follow-up this week with Dr. Jl Miller (pulm)\par - HCM: She has received the COVID19 vaccines. Continue with cardiology followup.\par - Repeat CBC and lipase/ amylase on 9/1/22 to guide restarting fedratinib\par - Follow up in 1 month\par \par _____\par I personally have spent a total of 30 minutes of time on the date of this encounter reviewing test results, documenting findings, providing education, coordinating care and directly consulting with the patient and/or designated family member.

## 2022-09-22 NOTE — PHYSICAL EXAM
[Fully active, able to carry on all pre-disease performance without restriction] : Status 0 - Fully active, able to carry on all pre-disease performance without restriction [Normal] : affect appropriate [de-identified] : course breath sounds throughout [de-identified] : irregular rhythm, regular rate [de-identified] : Trace edema of the right leg.  [de-identified] : spleen +2-3 fingers (~2-3 cm) below costal margin

## 2022-09-23 PROBLEM — R53.0 NEOPLASTIC MALIGNANT RELATED FATIGUE: Status: ACTIVE | Noted: 2022-01-01

## 2022-10-06 NOTE — HISTORY OF PRESENT ILLNESS
[Disease:__________________________] : Disease: [unfilled] [Treatment Protocol] : Treatment Protocol [de-identified] : 70 yo F with PMHx lung nodules (malignant, s/p left lower lung resection), splenomegaly, ET/PMF (Jak2+) here for further management.  Previously was on Hydroxyurea 2000 mg  and 1500 mg alternating, had increase in platelet counts, was started on ruxolitinib which significant side effects within 2 days including body aches and weakness. She has been on Fedratinib since Nov 2021 which has led to stabilization of Hb and platelets. She has also experiencing some improvement in her neutrophilia.\par \par Social Hx:\par Retired from [employer], worked in the office.  ( passed away from Leukemia ~2004), Lives alone, adult children live nearby\par Never smoker, significant second hand exposure\par No significant alcohol use\par No illicit drug use\par \par Family Hx:\par No relevant family history\par \par =============================================================\par Splenomegaly:\par US (2017): Spleen 15.3cm\par US (3/2020): Spleen 15.9cm\par US (3/2021): Spleen 17.7cm\par US (11/2021): Spleen 19.2cm\par \par Treatment Hx:\par - Hydrea since at least 2014 up to 2021\par - Trial of Ruxolitinib Oct 2021\par - Fedratinib Nov 2021 to present (held from 8/26/22 to ____ due to lipase elevation)\par \par =============================================================\par Care providers:\par Pulmonologist- Dr. Jl Miller [phone] [de-identified] : 4/4/22: Transfer of care from Dr Carmen Doshi. Follow-up. Ms Ornelas is feeling well overall. She was recently told that she has cirrhosis of the liver seen on recent outpatient imaging ([facility]). She has consistent coughing every night and uses Hydrocodone / homatropine to help with cough / allow her to sleep.  Appetite improving, now eating full meals and gaining weight back. No current abdominal pain. Since that time she has had improvement in her wbc. Her hb has stabilized around 8, PLT have also improved, now down to mid 500's. She denies fevers, chills, night sweats. \par \par 7/11/22: Followup. In April she had severe epistaxis, which has since fully resolved. She is back on aspirin, and rivaroxaban. She feels well today overall, only with the complaint of fatigue. She has not received the second booster. She has never had COVID-19. She denies any fevers, chills, night sweats. No new lumps or bumps. Her appetite is improved.\par \par 8/24/22: Follow-up. She recently was admitted to [facility] on 8/16/22 for presumed heart failure exacerbation due to shortness of breath and anemia. She initially went to hospital due to right leg swelling and shortness of breath. She was previously on Fedratinib, however its been held due to concerns of affecting her volume status. An echocardiogram on 8/16/22 showed normal systolic lvf of 65% without any noted diastolic function, however she did have moderate mitral valve regurgitation. GP told her yesterday she had two "spots" on her lungs. She is taking Lasix everyday. Her breathing has improved but still has shortness of breath. She had left hip shingles outbreak while at the hospital. She was on gabapentin for the shingles. She is still taking it but has not seen a significant improvement in her pain, lesions are healing. She is seeing the pulmonologist next week.\par \par 9/12/22: Follow-up. She has been feeling extremely fatigued but denies any other complaints. No fevers, chills, night sweats, unexpected weight loss. She had her chest CT reviewed by her pulmonologist, which showed stable nodules, no immediate action needed. She has been off Fedratinib for the past 2 weeks, we are awaiting repeat lipase.\par \par 10/5/22: Follow-up. In the past few days she has been having issues with her appetite. She feels that she is always very hungry, however when she makes her food she feels she can only eat a few bites then she gets full and starts to feel nauseated. No vomiting. Food does not get stuck. Was constipated last week, feeling better this week with senna. No abdominal pain. She feels very fatigued still, which started immediately after holding Fedratinib for 3 weeks. This has not improved.  She has seen her pulmonologist and thoracic surgeon who are not concerned with lung nodules due to stability. She continues to be able to tolerate the Fedratinib and anagrelide. No fevers, chills, drenching night sweats, weight is stable.\par \par \par ____\par A comprehensive review of systems was performed including constitutional, eyes, ENT, cardiovascular, respiratory, gastrointestinal, genitourinary, musculoskeletal, integumentary, neurological, psychiatric and hematologic / lymphatic. All pertinent positives are included in the H&P under interval history above and the remaining review of systems listed are negative. \par  [FreeTextEntry1] : On Fedratinib (JAK2 inhibitor) since Nov 2021, Held for August and first 2 weeks of Sept 2022 due to elevated lipase (asymptomatic), Now on Fedratinib 100 mg daily

## 2022-10-06 NOTE — ASSESSMENT
[FreeTextEntry1] : 71 yo F with a history of PMF jak2+, Cytogenetics show +1 and +9, + SM on high doses of HU causing worsening anemia with persistently high platelet counts.  \par \par She had a poor response to Jakafi. No response to iron and HU titration. Fedratinib 100 mg daily started on 11/17/21, titrated up to 200 mg daily on 11/30/21, titrated up to 300 mg daily on 2/10/22. Since holding her Fedratinib on 8/25/22 due to lipase elevations, she has experienced increasing fatigue. fadratinib was restarted on 9/22/22 with no improvement in her fatigue since restarting at a lower dose (100 mg daily). Fedratinib held again since 10/6/2022 due to lipase now at 179.\par \par Regarding her splenomegaly, her spleen size is stable with -4 cm below the costal margin. She remains on anagrelide, tolerating well. She had been off Fedratinib since the end of Aug 2022 (2 weeks now) due to lipase elevations. Will reassess today and restart if normalizing. Hydrea had been stopped previously due to low Hb (she has required a few units of pRBC support previously), was also treated with venofer for CLAY in late 2021.\par \par \par CBC today: WBC 12.9, Hb 8.9, Plt 469\par \par Plan:\par - Continue Anagrelide 1.5 mg BID, will attempt to down titrate if plts remain stable in normal range\par - Grade 3 lipase elevation again after normalization and restarting at lowest dose of 100 mg daily, now HOLDING Fedratinib due to recurrent grade 3 lipase elevation (without pancreatitis symptoms). Advised increased oral hydration. Next lipase check 10/19/22.\par - If Fedratinib fails in the future will look for clinical trial (concern if she will be a candidate given h/o carcinoid)\par - Anemia: Did not favorably respond to iron infusion, could initiate epo versus danazol, but would hold off for now \par - Carcinoid: Since her last visit with Dr. Starr Lara (CT surgery) at [facility] she had recent CT imaging of C/A/P which showed increased pulmonary nodule to 1.1 cm and another new subcentimeter nodule. Reviewed with pulm and thoracic surgery, suspect stability, no need for further management. \par - HCM: She has received the COVID19 vaccines. Continue with cardiology followup Aug 2022 TTE with good systolic function (LVEF 65%) with moderate MR.\par - Follow up in 1 month\par \par _____\par I personally have spent a total of 40 minutes of time on the date of this encounter reviewing test results, documenting findings, providing education, coordinating care and directly consulting with the patient and/or designated family member.

## 2022-10-06 NOTE — PHYSICAL EXAM
[Fully active, able to carry on all pre-disease performance without restriction] : Status 0 - Fully active, able to carry on all pre-disease performance without restriction [Normal] : affect appropriate [de-identified] : course breath sounds throughout [de-identified] : irregular rhythm, regular rate [de-identified] : Trace edema of the right leg.  [de-identified] : spleen +2-3 fingers (~2-3 cm) below costal margin

## 2022-11-10 NOTE — DISCUSSION/SUMMARY
Patient has appointment for 11/15 and wants to do labs prior.  Please include all labs and urine orders.  Call him when orders placed so he can make appointment .      [FreeTextEntry1] : 68-year-old woman with a history as listed presents for a followup visit.\par \par Mallory is still complaining of dyspnea and cough. I think that recently she has salt loaded and her cough has returned. Overall has improved but she is still volume overloaded. She has gained about 6 pounds from dietary indiscretion.  She will  continue lasix 40mg Qday but take an extra dose for 3 days.  She will monitor her daily weights and reduce her salt intake. \par Some of her cough may be GERD related. I will start on a PPI. \par She had an echo in 12/2018 that showed normal systolic LV function without any significant other findings, including no significant valvular disease. Her coronary angiogram showed normal coronary. \par \par She is still in  AF. She is currently rate controlled. She will continue Xarelto 20mg qday. She will start on Toprol 50mg Qday.  I think some of her symptoms may be related to her AF. I will send her for a ADE/DCCV and discussion regarding ablation with EP. \par \par If her symptoms worsen she is instructed to go to the ED. \par She will followup with me in 4 week.

## 2022-11-16 NOTE — PHYSICAL EXAM
[Fully active, able to carry on all pre-disease performance without restriction] : Status 0 - Fully active, able to carry on all pre-disease performance without restriction [Normal] : affect appropriate [de-identified] : course breath sounds throughout [de-identified] : irregular rhythm, regular rate [de-identified] : Trace edema of the right leg.  [de-identified] : spleen +2-3 fingers (~2-3 cm) below costal margin

## 2022-11-16 NOTE — HISTORY OF PRESENT ILLNESS
[Disease:__________________________] : Disease: [unfilled] [Treatment Protocol] : Treatment Protocol [de-identified] : 70 yo F with PMHx lung nodules (malignant, s/p left lower lung resection), splenomegaly, ET/PMF (Jak2+) here for further management.  Previously was on Hydroxyurea 2000 mg  and 1500 mg alternating, had increase in platelet counts, was started on ruxolitinib which significant side effects within 2 days including body aches and weakness. She has been on Fedratinib since Nov 2021 which has led to stabilization of Hb and platelets. She has also experiencing some improvement in her neutrophilia.\par \par Social Hx:\par Retired from [employer], worked in the office.  ( passed away from Leukemia ~2004), Lives alone, adult children live nearby\par Never smoker, significant second hand exposure\par No significant alcohol use\par No illicit drug use\par \par Family Hx:\par No relevant family history\par \par =============================================================\par Splenomegaly:\par US (2017): Spleen 15.3cm\par US (3/2020): Spleen 15.9cm\par US (3/2021): Spleen 17.7cm\par US (11/2021): Spleen 19.2cm\par \par Treatment Hx:\par - Hydrea since at least 2014 up to 2021\par - Trial of Ruxolitinib Oct 2021\par - Fedratinib Nov 2021 to present (held from 8/26/22 to ____ due to lipase elevation)\par \par =============================================================\par Care providers:\par Pulmonologist- Dr. Jl Miller [phone] [FreeTextEntry1] : On Fedratinib (JAK2 inhibitor) since Nov 2021, Held for August and first 2 weeks of Sept 2022 due to elevated lipase (asymptomatic), Now on Fedratinib 100 mg daily [de-identified] : 4/4/22: Transfer of care from Dr Carmen Doshi. Follow-up. Ms Ornelas is feeling well overall. She was recently told that she has cirrhosis of the liver seen on recent outpatient imaging ([facility]). She has consistent coughing every night and uses Hydrocodone / homatropine to help with cough / allow her to sleep.  Appetite improving, now eating full meals and gaining weight back. No current abdominal pain. Since that time she has had improvement in her wbc. Her hb has stabilized around 8, PLT have also improved, now down to mid 500's. She denies fevers, chills, night sweats. \par \par 7/11/22: Followup. In April she had severe epistaxis, which has since fully resolved. She is back on aspirin, and rivaroxaban. She feels well today overall, only with the complaint of fatigue. She has not received the second booster. She has never had COVID-19. She denies any fevers, chills, night sweats. No new lumps or bumps. Her appetite is improved.\par \par 8/24/22: Follow-up. She recently was admitted to [facility] on 8/16/22 for presumed heart failure exacerbation due to shortness of breath and anemia. She initially went to hospital due to right leg swelling and shortness of breath. She was previously on Fedratinib, however its been held due to concerns of affecting her volume status. An echocardiogram on 8/16/22 showed normal systolic lvf of 65% without any noted diastolic function, however she did have moderate mitral valve regurgitation. GP told her yesterday she had two "spots" on her lungs. She is taking Lasix everyday. Her breathing has improved but still has shortness of breath. She had left hip shingles outbreak while at the hospital. She was on gabapentin for the shingles. She is still taking it but has not seen a significant improvement in her pain, lesions are healing. She is seeing the pulmonologist next week.\par \par 9/12/22: Follow-up. She has been feeling extremely fatigued but denies any other complaints. No fevers, chills, night sweats, unexpected weight loss. She had her chest CT reviewed by her pulmonologist, which showed stable nodules, no immediate action needed. She has been off Fedratinib for the past 2 weeks, we are awaiting repeat lipase.\par \par 10/5/22: Follow-up. In the past few days she has been having issues with her appetite. She feels that she is always very hungry, however when she makes her food she feels she can only eat a few bites then she gets full and starts to feel nauseated. No vomiting. Food does not get stuck. Was constipated last week, feeling better this week with senna. No abdominal pain. She feels very fatigued still, which started immediately after holding Fedratinib for 3 weeks. This has not improved.  She has seen her pulmonologist and thoracic surgeon who are not concerned with lung nodules due to stability. She continues to be able to tolerate the Fedratinib and anagrelide. No fevers, chills, drenching night sweats, weight is stable.\par \par 11/16/22: Follow up. Patient reports diarrhea with rectal pressure and intermittent abd pain for 4 weeks. States she has been eating junk food for the past few months. Followed up with GI  MD Mckenna ([facility]) who conducted a rectal digital exam and CT Abd. Patient states she was advised to follow up with a rectal surgeon MD Cheema on 11/18/22. Patient states diarrhea has improved, last episode yesterday. Denies any abd pain, n/v, bleeding/hematochezia, headache, dizziness, visual disturbances, chest pain. \par \par \par ____\par A comprehensive review of systems was performed including constitutional, eyes, ENT, cardiovascular, respiratory, gastrointestinal, genitourinary, musculoskeletal, integumentary, neurological, psychiatric and hematologic / lymphatic. All pertinent positives are included in the H&P under interval history above and the remaining review of systems listed are negative. \par

## 2022-11-16 NOTE — ASSESSMENT
[FreeTextEntry1] : 73 yo F with a history of PMF jak2+, Cytogenetics show +1 and +9, + SM on high doses of HU causing worsening anemia with persistently high platelet counts.  \par \par She had a poor response to Jakafi. No response to iron and HU titration. Fedratinib 100 mg daily started on 11/17/21, titrated up to 200 mg daily on 11/30/21, titrated up to 300 mg daily on 2/10/22. Since holding her Fedratinib on 8/25/22 due to lipase elevations, she has experienced increasing fatigue. fadratinib was restarted on 9/22/22 with no improvement in her fatigue since restarting at a lower dose (100 mg daily). Fedratinib held again since 10/6/2022 due to lipase now at 179.\par \par Regarding her splenomegaly, her spleen size is stable with -4 cm below the costal margin. She remains on anagrelide, tolerating well. She had been off Fedratinib since the end of Aug 2022 (2 weeks now) due to lipase elevations. Will reassess today and restart if normalizing. Hydrea had been stopped previously due to low Hb (she has required a few units of pRBC support previously), was also treated with venofer for CLAY in late 2021.\par \par CBC today: WBC 14.34 K/uL,  HGB 9.2 g/dL,  HCT 30.8 %,  PLTS 457 K/uL. Results reviewed with patient\par \par Plan:\par - CMP, Lipase, Amylase repeated today\par -Advised patient to fax us the recent CT ABD results from [facility]. We believe patient has a component of pancreatic insufficiency at this time. Will wait for colorectal surgeon's recommendations. \par - Continue Anagrelide 1.5 mg BID, will attempt to down titrate if plts remain stable in normal range\par - Fedratinib held due to recurrent grade 3 lipase elevation (without pancreatitis symptoms)\par - If Fedratinib fails in the future will look for clinical trial (concern if she will be a candidate given h/o carcinoid)\par - Anemia: Did not favorably respond to iron infusion, could initiate epo versus danazol, but would hold off for now \par - Carcinoid: Since her last visit with Dr. Starr Lara (CT surgery) at [place] she had recent CT imaging of C/A/P which showed increased pulmonary nodule to 1.1 cm and another new subcentimeter nodule. Reviewed with pulm and thoracic surgery, suspect stability, no need for further management. \par - HCM: She has received the COVID19 vaccines. Continue with cardiology follow up Aug 2022 TTE with good systolic function (LVEF 65%) with moderate MR.\par - Follow up in 1 month\par \par Case and management discussed with MD Rice \par _____\par I personally have spent a total of 30 minutes of time on the date of this encounter reviewing test results, documenting findings, providing education, coordinating care and directly consulting with the patient and/or designated family member.

## 2022-11-16 NOTE — REVIEW OF SYSTEMS
[Shortness Of Breath] : shortness of breath [Cough] : cough [Vomiting] : vomiting [Diarrhea] : diarrhea [Negative] : Heme/Lymph [Abdominal Pain] : no abdominal pain [Constipation] : no constipation [FreeTextEntry6] : hx of asthma, uses supplemental 02 2L NC intermittently, chronic cough for several years  [FreeTextEntry7] : intermittent diarrhea for past 4 weeks, last episode yesterday. denies any bleeding

## 2023-01-01 ENCOUNTER — RESULT REVIEW (OUTPATIENT)
Age: 73
End: 2023-01-01

## 2023-01-01 ENCOUNTER — APPOINTMENT (OUTPATIENT)
Dept: CARDIOLOGY | Facility: CLINIC | Age: 73
End: 2023-01-01

## 2023-01-01 ENCOUNTER — INPATIENT (INPATIENT)
Facility: HOSPITAL | Age: 73
LOS: 6 days | Discharge: HOME | DRG: 177 | End: 2023-01-19
Attending: STUDENT IN AN ORGANIZED HEALTH CARE EDUCATION/TRAINING PROGRAM | Admitting: INTERNAL MEDICINE
Payer: MEDICARE

## 2023-01-01 ENCOUNTER — APPOINTMENT (OUTPATIENT)
Dept: HEMATOLOGY ONCOLOGY | Facility: CLINIC | Age: 73
End: 2023-01-01

## 2023-01-01 ENCOUNTER — APPOINTMENT (OUTPATIENT)
Dept: GASTROENTEROLOGY | Facility: CLINIC | Age: 73
End: 2023-01-01

## 2023-01-01 ENCOUNTER — TRANSCRIPTION ENCOUNTER (OUTPATIENT)
Age: 73
End: 2023-01-01

## 2023-01-01 ENCOUNTER — OUTPATIENT (OUTPATIENT)
Dept: OUTPATIENT SERVICES | Facility: HOSPITAL | Age: 73
LOS: 1 days | Discharge: HOME | End: 2023-01-01

## 2023-01-01 ENCOUNTER — INPATIENT (INPATIENT)
Facility: HOSPITAL | Age: 73
LOS: 4 days | Discharge: HOME | DRG: 371 | End: 2023-01-31
Attending: FAMILY MEDICINE | Admitting: INTERNAL MEDICINE
Payer: MEDICARE

## 2023-01-01 ENCOUNTER — NON-APPOINTMENT (OUTPATIENT)
Age: 73
End: 2023-01-01

## 2023-01-01 ENCOUNTER — APPOINTMENT (OUTPATIENT)
Dept: NUCLEAR MEDICINE | Facility: IMAGING CENTER | Age: 73
End: 2023-01-01
Payer: MEDICARE

## 2023-01-01 ENCOUNTER — APPOINTMENT (OUTPATIENT)
Dept: CARDIOLOGY | Facility: CLINIC | Age: 73
End: 2023-01-01
Payer: MEDICARE

## 2023-01-01 ENCOUNTER — APPOINTMENT (OUTPATIENT)
Dept: HEMATOLOGY ONCOLOGY | Facility: CLINIC | Age: 73
End: 2023-01-01
Payer: MEDICARE

## 2023-01-01 ENCOUNTER — OUTPATIENT (OUTPATIENT)
Dept: OUTPATIENT SERVICES | Facility: HOSPITAL | Age: 73
LOS: 1 days | End: 2023-01-01
Payer: MEDICARE

## 2023-01-01 ENCOUNTER — INPATIENT (INPATIENT)
Facility: HOSPITAL | Age: 73
LOS: 21 days | DRG: 853 | End: 2023-05-05
Attending: FAMILY MEDICINE | Admitting: INTERNAL MEDICINE
Payer: MEDICARE

## 2023-01-01 VITALS
OXYGEN SATURATION: 94 % | RESPIRATION RATE: 18 BRPM | SYSTOLIC BLOOD PRESSURE: 123 MMHG | HEART RATE: 86 BPM | TEMPERATURE: 98 F | DIASTOLIC BLOOD PRESSURE: 76 MMHG

## 2023-01-01 VITALS
RESPIRATION RATE: 20 BRPM | HEART RATE: 127 BPM | WEIGHT: 167.99 LBS | SYSTOLIC BLOOD PRESSURE: 93 MMHG | TEMPERATURE: 98 F | HEIGHT: 66 IN | DIASTOLIC BLOOD PRESSURE: 57 MMHG | OXYGEN SATURATION: 94 %

## 2023-01-01 VITALS
HEART RATE: 69 BPM | OXYGEN SATURATION: 94 % | WEIGHT: 177 LBS | HEIGHT: 69 IN | BODY MASS INDEX: 26.22 KG/M2 | SYSTOLIC BLOOD PRESSURE: 132 MMHG | DIASTOLIC BLOOD PRESSURE: 70 MMHG

## 2023-01-01 VITALS
WEIGHT: 146.59 LBS | TEMPERATURE: 98.6 F | DIASTOLIC BLOOD PRESSURE: 71 MMHG | HEART RATE: 79 BPM | SYSTOLIC BLOOD PRESSURE: 121 MMHG | BODY MASS INDEX: 21.65 KG/M2 | RESPIRATION RATE: 18 BRPM | OXYGEN SATURATION: 99 %

## 2023-01-01 VITALS
SYSTOLIC BLOOD PRESSURE: 92 MMHG | WEIGHT: 149.91 LBS | TEMPERATURE: 96 F | RESPIRATION RATE: 18 BRPM | DIASTOLIC BLOOD PRESSURE: 61 MMHG | HEIGHT: 66 IN | HEART RATE: 108 BPM | OXYGEN SATURATION: 98 %

## 2023-01-01 VITALS
HEIGHT: 68 IN | BODY MASS INDEX: 37.59 KG/M2 | WEIGHT: 248 LBS | OXYGEN SATURATION: 92 % | DIASTOLIC BLOOD PRESSURE: 55 MMHG | HEART RATE: 86 BPM | SYSTOLIC BLOOD PRESSURE: 114 MMHG

## 2023-01-01 VITALS
HEART RATE: 71 BPM | OXYGEN SATURATION: 93 % | TEMPERATURE: 98 F | SYSTOLIC BLOOD PRESSURE: 112 MMHG | DIASTOLIC BLOOD PRESSURE: 68 MMHG | RESPIRATION RATE: 18 BRPM

## 2023-01-01 VITALS
HEIGHT: 66 IN | TEMPERATURE: 98 F | OXYGEN SATURATION: 97 % | WEIGHT: 171.96 LBS | DIASTOLIC BLOOD PRESSURE: 83 MMHG | RESPIRATION RATE: 18 BRPM | SYSTOLIC BLOOD PRESSURE: 133 MMHG | HEART RATE: 63 BPM

## 2023-01-01 VITALS — RESPIRATION RATE: 35 BRPM

## 2023-01-01 DIAGNOSIS — K81.9 CHOLECYSTITIS, UNSPECIFIED: Chronic | ICD-10-CM

## 2023-01-01 DIAGNOSIS — E03.9 HYPOTHYROIDISM, UNSPECIFIED: ICD-10-CM

## 2023-01-01 DIAGNOSIS — I48.91 UNSPECIFIED ATRIAL FIBRILLATION: ICD-10-CM

## 2023-01-01 DIAGNOSIS — I50.9 HEART FAILURE, UNSPECIFIED: ICD-10-CM

## 2023-01-01 DIAGNOSIS — J45.909 UNSPECIFIED ASTHMA, UNCOMPLICATED: ICD-10-CM

## 2023-01-01 DIAGNOSIS — E87.6 HYPOKALEMIA: ICD-10-CM

## 2023-01-01 DIAGNOSIS — D75.81 MYELOFIBROSIS: ICD-10-CM

## 2023-01-01 DIAGNOSIS — I10 ESSENTIAL (PRIMARY) HYPERTENSION: ICD-10-CM

## 2023-01-01 DIAGNOSIS — U07.1 COVID-19: ICD-10-CM

## 2023-01-01 DIAGNOSIS — D3A.090 BENIGN CARCINOID TUMOR OF THE BRONCHUS AND LUNG: ICD-10-CM

## 2023-01-01 DIAGNOSIS — I48.20 CHRONIC ATRIAL FIBRILLATION, UNSPECIFIED: ICD-10-CM

## 2023-01-01 DIAGNOSIS — S99.919A UNSPECIFIED INJURY OF UNSPECIFIED ANKLE, INITIAL ENCOUNTER: Chronic | ICD-10-CM

## 2023-01-01 DIAGNOSIS — E11.9 TYPE 2 DIABETES MELLITUS W/OUT COMPLICATIONS: ICD-10-CM

## 2023-01-01 DIAGNOSIS — Z29.9 ENCOUNTER FOR PROPHYLACTIC MEASURES, UNSPECIFIED: ICD-10-CM

## 2023-01-01 DIAGNOSIS — N17.9 ACUTE KIDNEY FAILURE, UNSPECIFIED: ICD-10-CM

## 2023-01-01 DIAGNOSIS — E78.5 HYPERLIPIDEMIA, UNSPECIFIED: ICD-10-CM

## 2023-01-01 DIAGNOSIS — N17.0 ACUTE KIDNEY FAILURE WITH TUBULAR NECROSIS: ICD-10-CM

## 2023-01-01 DIAGNOSIS — D64.9 ANEMIA, UNSPECIFIED: ICD-10-CM

## 2023-01-01 DIAGNOSIS — E11.9 TYPE 2 DIABETES MELLITUS WITHOUT COMPLICATIONS: ICD-10-CM

## 2023-01-01 DIAGNOSIS — R19.7 DIARRHEA, UNSPECIFIED: ICD-10-CM

## 2023-01-01 DIAGNOSIS — D47.1 CHRONIC MYELOPROLIFERATIVE DISEASE: ICD-10-CM

## 2023-01-01 DIAGNOSIS — A04.72 ENTEROCOLITIS DUE TO CLOSTRIDIUM DIFFICILE, NOT SPECIFIED AS RECURRENT: ICD-10-CM

## 2023-01-01 DIAGNOSIS — K63.1 PERFORATION OF INTESTINE (NONTRAUMATIC): ICD-10-CM

## 2023-01-01 DIAGNOSIS — A41.9 SEPSIS, UNSPECIFIED ORGANISM: ICD-10-CM

## 2023-01-01 DIAGNOSIS — Z86.2 PERSONAL HISTORY OF DISEASES OF THE BLOOD AND BLOOD-FORMING ORGANS AND CERTAIN DISORDERS INVOLVING THE IMMUNE MECHANISM: ICD-10-CM

## 2023-01-01 DIAGNOSIS — J96.01 ACUTE RESPIRATORY FAILURE WITH HYPOXIA: ICD-10-CM

## 2023-01-01 DIAGNOSIS — D69.6 THROMBOCYTOPENIA, UNSPECIFIED: ICD-10-CM

## 2023-01-01 DIAGNOSIS — I50.33 ACUTE ON CHRONIC DIASTOLIC (CONGESTIVE) HEART FAILURE: ICD-10-CM

## 2023-01-01 DIAGNOSIS — I50.23 ACUTE ON CHRONIC SYSTOLIC (CONGESTIVE) HEART FAILURE: ICD-10-CM

## 2023-01-01 LAB
ALBUMIN SERPL ELPH-MCNC: 1.6 G/DL — LOW (ref 3.3–5)
ALBUMIN SERPL ELPH-MCNC: 1.6 G/DL — LOW (ref 3.3–5)
ALBUMIN SERPL ELPH-MCNC: 1.7 G/DL — LOW (ref 3.3–5)
ALBUMIN SERPL ELPH-MCNC: 1.8 G/DL — LOW (ref 3.3–5)
ALBUMIN SERPL ELPH-MCNC: 1.9 G/DL — LOW (ref 3.3–5)
ALBUMIN SERPL ELPH-MCNC: 1.9 G/DL — LOW (ref 3.3–5)
ALBUMIN SERPL ELPH-MCNC: 2 G/DL — LOW (ref 3.3–5)
ALBUMIN SERPL ELPH-MCNC: 2.1 G/DL — LOW (ref 3.3–5)
ALBUMIN SERPL ELPH-MCNC: 2.2 G/DL — LOW (ref 3.3–5)
ALBUMIN SERPL ELPH-MCNC: 2.4 G/DL — LOW (ref 3.3–5)
ALBUMIN SERPL ELPH-MCNC: 2.5 G/DL — LOW (ref 3.3–5)
ALBUMIN SERPL ELPH-MCNC: 2.6 G/DL — LOW (ref 3.3–5)
ALBUMIN SERPL ELPH-MCNC: 2.7 G/DL — LOW (ref 3.3–5)
ALBUMIN SERPL ELPH-MCNC: 2.8 G/DL — LOW (ref 3.3–5)
ALBUMIN SERPL ELPH-MCNC: 2.8 G/DL — LOW (ref 3.3–5)
ALP SERPL-CCNC: 105 U/L — SIGNIFICANT CHANGE UP (ref 40–120)
ALP SERPL-CCNC: 112 U/L — SIGNIFICANT CHANGE UP (ref 40–120)
ALP SERPL-CCNC: 129 U/L — HIGH (ref 40–120)
ALP SERPL-CCNC: 152 U/L — HIGH (ref 40–120)
ALP SERPL-CCNC: 164 U/L — HIGH (ref 40–120)
ALP SERPL-CCNC: 51 U/L — SIGNIFICANT CHANGE UP (ref 40–120)
ALP SERPL-CCNC: 54 U/L — SIGNIFICANT CHANGE UP (ref 40–120)
ALP SERPL-CCNC: 54 U/L — SIGNIFICANT CHANGE UP (ref 40–120)
ALP SERPL-CCNC: 56 U/L — SIGNIFICANT CHANGE UP (ref 40–120)
ALP SERPL-CCNC: 57 U/L — SIGNIFICANT CHANGE UP (ref 40–120)
ALP SERPL-CCNC: 59 U/L — SIGNIFICANT CHANGE UP (ref 40–120)
ALP SERPL-CCNC: 61 U/L — SIGNIFICANT CHANGE UP (ref 40–120)
ALP SERPL-CCNC: 65 U/L — SIGNIFICANT CHANGE UP (ref 40–120)
ALP SERPL-CCNC: 66 U/L — SIGNIFICANT CHANGE UP (ref 40–120)
ALP SERPL-CCNC: 67 U/L — SIGNIFICANT CHANGE UP (ref 40–120)
ALP SERPL-CCNC: 69 U/L — SIGNIFICANT CHANGE UP (ref 40–120)
ALP SERPL-CCNC: 70 U/L — SIGNIFICANT CHANGE UP (ref 40–120)
ALP SERPL-CCNC: 71 U/L — SIGNIFICANT CHANGE UP (ref 40–120)
ALP SERPL-CCNC: 72 U/L — SIGNIFICANT CHANGE UP (ref 40–120)
ALP SERPL-CCNC: 74 U/L — SIGNIFICANT CHANGE UP (ref 40–120)
ALP SERPL-CCNC: 76 U/L — SIGNIFICANT CHANGE UP (ref 40–120)
ALP SERPL-CCNC: 77 U/L — SIGNIFICANT CHANGE UP (ref 40–120)
ALP SERPL-CCNC: 79 U/L — SIGNIFICANT CHANGE UP (ref 40–120)
ALP SERPL-CCNC: 79 U/L — SIGNIFICANT CHANGE UP (ref 40–120)
ALP SERPL-CCNC: 81 U/L — SIGNIFICANT CHANGE UP (ref 40–120)
ALP SERPL-CCNC: 82 U/L — SIGNIFICANT CHANGE UP (ref 40–120)
ALP SERPL-CCNC: 84 U/L — SIGNIFICANT CHANGE UP (ref 40–120)
ALP SERPL-CCNC: 85 U/L — SIGNIFICANT CHANGE UP (ref 40–120)
ALP SERPL-CCNC: 92 U/L — SIGNIFICANT CHANGE UP (ref 40–120)
ALP SERPL-CCNC: 94 U/L — SIGNIFICANT CHANGE UP (ref 40–120)
ALT FLD-CCNC: 10 U/L — LOW (ref 12–78)
ALT FLD-CCNC: 10 U/L — LOW (ref 12–78)
ALT FLD-CCNC: 11 U/L — LOW (ref 12–78)
ALT FLD-CCNC: 11 U/L — LOW (ref 12–78)
ALT FLD-CCNC: 12 U/L — SIGNIFICANT CHANGE UP (ref 12–78)
ALT FLD-CCNC: 140 U/L — HIGH (ref 12–78)
ALT FLD-CCNC: 15 U/L — SIGNIFICANT CHANGE UP (ref 12–78)
ALT FLD-CCNC: 17 U/L — SIGNIFICANT CHANGE UP (ref 12–78)
ALT FLD-CCNC: 195 U/L — HIGH (ref 12–78)
ALT FLD-CCNC: 218 U/L — HIGH (ref 12–78)
ALT FLD-CCNC: 24 U/L — SIGNIFICANT CHANGE UP (ref 12–78)
ALT FLD-CCNC: 268 U/L — HIGH (ref 12–78)
ALT FLD-CCNC: 277 U/L — HIGH (ref 12–78)
ALT FLD-CCNC: 29 U/L — SIGNIFICANT CHANGE UP (ref 12–78)
ALT FLD-CCNC: 40 U/L — SIGNIFICANT CHANGE UP (ref 12–78)
ALT FLD-CCNC: 6 U/L — LOW (ref 12–78)
ALT FLD-CCNC: 63 U/L — SIGNIFICANT CHANGE UP (ref 12–78)
ALT FLD-CCNC: 7 U/L — LOW (ref 12–78)
ALT FLD-CCNC: 8 U/L — LOW (ref 12–78)
ALT FLD-CCNC: 9 U/L — LOW (ref 12–78)
ALT FLD-CCNC: 97 U/L — HIGH (ref 12–78)
ALT FLD-CCNC: <6 U/L — LOW (ref 12–78)
ANION GAP SERPL CALC-SCNC: 10 MMOL/L — SIGNIFICANT CHANGE UP (ref 5–17)
ANION GAP SERPL CALC-SCNC: 11 MMOL/L — SIGNIFICANT CHANGE UP (ref 5–17)
ANION GAP SERPL CALC-SCNC: 12 MMOL/L — SIGNIFICANT CHANGE UP (ref 5–17)
ANION GAP SERPL CALC-SCNC: 13 MMOL/L — SIGNIFICANT CHANGE UP (ref 5–17)
ANION GAP SERPL CALC-SCNC: 13 MMOL/L — SIGNIFICANT CHANGE UP (ref 5–17)
ANION GAP SERPL CALC-SCNC: 15 MMOL/L — SIGNIFICANT CHANGE UP (ref 5–17)
ANION GAP SERPL CALC-SCNC: 16 MMOL/L — SIGNIFICANT CHANGE UP (ref 5–17)
ANION GAP SERPL CALC-SCNC: 3 MMOL/L — LOW (ref 5–17)
ANION GAP SERPL CALC-SCNC: 3 MMOL/L — LOW (ref 5–17)
ANION GAP SERPL CALC-SCNC: 4 MMOL/L — LOW (ref 5–17)
ANION GAP SERPL CALC-SCNC: 5 MMOL/L — SIGNIFICANT CHANGE UP (ref 5–17)
ANION GAP SERPL CALC-SCNC: 6 MMOL/L — SIGNIFICANT CHANGE UP (ref 5–17)
ANION GAP SERPL CALC-SCNC: 7 MMOL/L — SIGNIFICANT CHANGE UP (ref 5–17)
ANION GAP SERPL CALC-SCNC: 8 MMOL/L — SIGNIFICANT CHANGE UP (ref 5–17)
ANION GAP SERPL CALC-SCNC: 8 MMOL/L — SIGNIFICANT CHANGE UP (ref 5–17)
ANION GAP SERPL CALC-SCNC: 9 MMOL/L — SIGNIFICANT CHANGE UP (ref 5–17)
ANION GAP SERPL CALC-SCNC: 9 MMOL/L — SIGNIFICANT CHANGE UP (ref 5–17)
ANISOCYTOSIS BLD QL: SLIGHT — SIGNIFICANT CHANGE UP
ANISOCYTOSIS BLD QL: SLIGHT — SIGNIFICANT CHANGE UP
APPEARANCE UR: ABNORMAL
APPEARANCE UR: CLEAR — SIGNIFICANT CHANGE UP
APTT BLD: 33.2 SEC — SIGNIFICANT CHANGE UP (ref 27.5–35.5)
APTT BLD: 36.2 SEC — HIGH (ref 27.5–35.5)
APTT BLD: 36.9 SEC — HIGH (ref 27.5–35.5)
APTT BLD: 40.4 SEC — HIGH (ref 27.5–35.5)
APTT BLD: 42.7 SEC — HIGH (ref 27.5–35.5)
APTT BLD: 43.4 SEC — HIGH (ref 27.5–35.5)
APTT BLD: 46 SEC — HIGH (ref 27.5–35.5)
APTT BLD: 53.9 SEC — HIGH (ref 27.5–35.5)
AST SERPL-CCNC: 10 U/L — LOW (ref 15–37)
AST SERPL-CCNC: 100 U/L — HIGH (ref 15–37)
AST SERPL-CCNC: 1043 U/L — HIGH (ref 15–37)
AST SERPL-CCNC: 11 U/L — LOW (ref 15–37)
AST SERPL-CCNC: 11 U/L — LOW (ref 15–37)
AST SERPL-CCNC: 12 U/L — LOW (ref 15–37)
AST SERPL-CCNC: 12 U/L — LOW (ref 15–37)
AST SERPL-CCNC: 13 U/L — LOW (ref 15–37)
AST SERPL-CCNC: 14 U/L — LOW (ref 15–37)
AST SERPL-CCNC: 15 U/L — SIGNIFICANT CHANGE UP (ref 15–37)
AST SERPL-CCNC: 15 U/L — SIGNIFICANT CHANGE UP (ref 15–37)
AST SERPL-CCNC: 16 U/L — SIGNIFICANT CHANGE UP (ref 15–37)
AST SERPL-CCNC: 178 U/L — HIGH (ref 15–37)
AST SERPL-CCNC: 19 U/L — SIGNIFICANT CHANGE UP (ref 15–37)
AST SERPL-CCNC: 20 U/L — SIGNIFICANT CHANGE UP (ref 15–37)
AST SERPL-CCNC: 22 U/L — SIGNIFICANT CHANGE UP (ref 15–37)
AST SERPL-CCNC: 380 U/L — HIGH (ref 15–37)
AST SERPL-CCNC: 45 U/L — HIGH (ref 15–37)
AST SERPL-CCNC: 521 U/L — HIGH (ref 15–37)
AST SERPL-CCNC: 8 U/L — LOW (ref 15–37)
AST SERPL-CCNC: 85 U/L — HIGH (ref 15–37)
AST SERPL-CCNC: 866 U/L — HIGH (ref 15–37)
AST SERPL-CCNC: 9 U/L — LOW (ref 15–37)
AST SERPL-CCNC: 9 U/L — LOW (ref 15–37)
BASE EXCESS BLDA CALC-SCNC: -1.1 MMOL/L — SIGNIFICANT CHANGE UP (ref -2–3)
BASE EXCESS BLDA CALC-SCNC: -11.1 MMOL/L — LOW (ref -2–3)
BASE EXCESS BLDA CALC-SCNC: -12.3 MMOL/L — LOW (ref -2–3)
BASE EXCESS BLDA CALC-SCNC: -12.7 MMOL/L — LOW (ref -2–3)
BASE EXCESS BLDA CALC-SCNC: -16.7 MMOL/L — LOW (ref -2–3)
BASE EXCESS BLDA CALC-SCNC: -17.8 MMOL/L — LOW (ref -2–3)
BASE EXCESS BLDA CALC-SCNC: -2.7 MMOL/L — LOW (ref -2–3)
BASE EXCESS BLDA CALC-SCNC: -3.7 MMOL/L — LOW (ref -2–3)
BASE EXCESS BLDA CALC-SCNC: -5.2 MMOL/L — LOW (ref -2–3)
BASE EXCESS BLDA CALC-SCNC: -7 MMOL/L — LOW (ref -2–3)
BASE EXCESS BLDA CALC-SCNC: -9.5 MMOL/L — LOW (ref -2–3)
BASE EXCESS BLDA CALC-SCNC: 2.2 MMOL/L — SIGNIFICANT CHANGE UP (ref -2–3)
BASE EXCESS BLDA CALC-SCNC: 2.8 MMOL/L — SIGNIFICANT CHANGE UP (ref -2–3)
BASOPHILS # BLD AUTO: 0 K/UL — SIGNIFICANT CHANGE UP (ref 0–0.2)
BASOPHILS # BLD AUTO: 0.02 K/UL — SIGNIFICANT CHANGE UP (ref 0–0.2)
BASOPHILS # BLD AUTO: 0.03 K/UL — SIGNIFICANT CHANGE UP (ref 0–0.2)
BASOPHILS # BLD AUTO: 0.05 K/UL — SIGNIFICANT CHANGE UP (ref 0–0.2)
BASOPHILS # BLD AUTO: 0.06 K/UL — SIGNIFICANT CHANGE UP (ref 0–0.2)
BASOPHILS # BLD AUTO: 0.06 K/UL — SIGNIFICANT CHANGE UP (ref 0–0.2)
BASOPHILS # BLD AUTO: 0.07 K/UL — SIGNIFICANT CHANGE UP (ref 0–0.2)
BASOPHILS # BLD AUTO: 0.09 K/UL — SIGNIFICANT CHANGE UP (ref 0–0.2)
BASOPHILS # BLD AUTO: 0.09 K/UL — SIGNIFICANT CHANGE UP (ref 0–0.2)
BASOPHILS # BLD AUTO: 0.13 K/UL — SIGNIFICANT CHANGE UP (ref 0–0.2)
BASOPHILS # BLD AUTO: 0.16 K/UL — SIGNIFICANT CHANGE UP (ref 0–0.2)
BASOPHILS # BLD AUTO: 0.17 K/UL — SIGNIFICANT CHANGE UP (ref 0–0.2)
BASOPHILS # BLD AUTO: 0.18 K/UL — SIGNIFICANT CHANGE UP (ref 0–0.2)
BASOPHILS # BLD AUTO: 0.21 K/UL — HIGH (ref 0–0.2)
BASOPHILS # BLD AUTO: 0.23 K/UL — HIGH (ref 0–0.2)
BASOPHILS # BLD AUTO: 0.23 K/UL — HIGH (ref 0–0.2)
BASOPHILS # BLD AUTO: 0.25 K/UL — HIGH (ref 0–0.2)
BASOPHILS # BLD AUTO: 0.26 K/UL — HIGH (ref 0–0.2)
BASOPHILS # BLD AUTO: 0.34 K/UL — HIGH (ref 0–0.2)
BASOPHILS # BLD AUTO: 0.39 K/UL — HIGH (ref 0–0.2)
BASOPHILS # BLD AUTO: 0.42 K/UL — HIGH (ref 0–0.2)
BASOPHILS NFR BLD AUTO: 0 % — SIGNIFICANT CHANGE UP (ref 0–2)
BASOPHILS NFR BLD AUTO: 0.2 % — SIGNIFICANT CHANGE UP (ref 0–2)
BASOPHILS NFR BLD AUTO: 0.3 % — SIGNIFICANT CHANGE UP (ref 0–2)
BASOPHILS NFR BLD AUTO: 0.3 % — SIGNIFICANT CHANGE UP (ref 0–2)
BASOPHILS NFR BLD AUTO: 0.4 % — SIGNIFICANT CHANGE UP (ref 0–2)
BASOPHILS NFR BLD AUTO: 0.5 % — SIGNIFICANT CHANGE UP (ref 0–2)
BASOPHILS NFR BLD AUTO: 0.6 % — SIGNIFICANT CHANGE UP (ref 0–2)
BASOPHILS NFR BLD AUTO: 0.7 % — SIGNIFICANT CHANGE UP (ref 0–2)
BASOPHILS NFR BLD AUTO: 0.7 % — SIGNIFICANT CHANGE UP (ref 0–2)
BASOPHILS NFR BLD AUTO: 0.8 % — SIGNIFICANT CHANGE UP (ref 0–2)
BASOPHILS NFR BLD AUTO: 1 % — SIGNIFICANT CHANGE UP (ref 0–2)
BILIRUB DIRECT SERPL-MCNC: 0.1 MG/DL — SIGNIFICANT CHANGE UP (ref 0–0.3)
BILIRUB DIRECT SERPL-MCNC: 0.2 MG/DL — SIGNIFICANT CHANGE UP (ref 0–0.3)
BILIRUB INDIRECT FLD-MCNC: 0.2 MG/DL — SIGNIFICANT CHANGE UP (ref 0.2–1)
BILIRUB INDIRECT FLD-MCNC: 0.3 MG/DL — SIGNIFICANT CHANGE UP (ref 0.2–1)
BILIRUB INDIRECT FLD-MCNC: 0.4 MG/DL — SIGNIFICANT CHANGE UP (ref 0.2–1)
BILIRUB INDIRECT FLD-MCNC: 0.4 MG/DL — SIGNIFICANT CHANGE UP (ref 0.2–1)
BILIRUB INDIRECT FLD-MCNC: 0.6 MG/DL — SIGNIFICANT CHANGE UP (ref 0.2–1)
BILIRUB SERPL-MCNC: 0.3 MG/DL — SIGNIFICANT CHANGE UP (ref 0.2–1.2)
BILIRUB SERPL-MCNC: 0.4 MG/DL — SIGNIFICANT CHANGE UP (ref 0.2–1.2)
BILIRUB SERPL-MCNC: 0.5 MG/DL — SIGNIFICANT CHANGE UP (ref 0.2–1.2)
BILIRUB SERPL-MCNC: 0.6 MG/DL — SIGNIFICANT CHANGE UP (ref 0.2–1.2)
BILIRUB SERPL-MCNC: 0.7 MG/DL — SIGNIFICANT CHANGE UP (ref 0.2–1.2)
BILIRUB SERPL-MCNC: 0.8 MG/DL — SIGNIFICANT CHANGE UP (ref 0.2–1.2)
BILIRUB SERPL-MCNC: 0.9 MG/DL — SIGNIFICANT CHANGE UP (ref 0.2–1.2)
BILIRUB SERPL-MCNC: 0.9 MG/DL — SIGNIFICANT CHANGE UP (ref 0.2–1.2)
BILIRUB SERPL-MCNC: 1 MG/DL — SIGNIFICANT CHANGE UP (ref 0.2–1.2)
BILIRUB UR-MCNC: NEGATIVE — SIGNIFICANT CHANGE UP
BILIRUB UR-MCNC: NEGATIVE — SIGNIFICANT CHANGE UP
BLASTS # FLD: 1 % — HIGH (ref 0–0)
BLD GP AB SCN SERPL QL: SIGNIFICANT CHANGE UP
BLOOD GAS COMMENTS ARTERIAL: SIGNIFICANT CHANGE UP
BUN SERPL-MCNC: 17 MG/DL — SIGNIFICANT CHANGE UP (ref 7–23)
BUN SERPL-MCNC: 17 MG/DL — SIGNIFICANT CHANGE UP (ref 7–23)
BUN SERPL-MCNC: 18 MG/DL — SIGNIFICANT CHANGE UP (ref 7–23)
BUN SERPL-MCNC: 18 MG/DL — SIGNIFICANT CHANGE UP (ref 7–23)
BUN SERPL-MCNC: 19 MG/DL — SIGNIFICANT CHANGE UP (ref 7–23)
BUN SERPL-MCNC: 20 MG/DL — SIGNIFICANT CHANGE UP (ref 7–23)
BUN SERPL-MCNC: 22 MG/DL — SIGNIFICANT CHANGE UP (ref 7–23)
BUN SERPL-MCNC: 23 MG/DL — SIGNIFICANT CHANGE UP (ref 7–23)
BUN SERPL-MCNC: 23 MG/DL — SIGNIFICANT CHANGE UP (ref 7–23)
BUN SERPL-MCNC: 25 MG/DL — HIGH (ref 7–23)
BUN SERPL-MCNC: 26 MG/DL — HIGH (ref 7–23)
BUN SERPL-MCNC: 27 MG/DL — HIGH (ref 7–23)
BUN SERPL-MCNC: 28 MG/DL — HIGH (ref 7–23)
BUN SERPL-MCNC: 31 MG/DL — HIGH (ref 7–23)
BUN SERPL-MCNC: 33 MG/DL — HIGH (ref 7–23)
BUN SERPL-MCNC: 50 MG/DL — HIGH (ref 7–23)
BUN SERPL-MCNC: 53 MG/DL — HIGH (ref 7–23)
BUN SERPL-MCNC: 54 MG/DL — HIGH (ref 7–23)
BUN SERPL-MCNC: 61 MG/DL — HIGH (ref 7–23)
BUN SERPL-MCNC: 69 MG/DL — HIGH (ref 7–23)
BUN SERPL-MCNC: 71 MG/DL — HIGH (ref 7–23)
BUN SERPL-MCNC: 71 MG/DL — HIGH (ref 7–23)
BUN SERPL-MCNC: 73 MG/DL — HIGH (ref 7–23)
BUN SERPL-MCNC: 74 MG/DL — HIGH (ref 7–23)
BUN SERPL-MCNC: 74 MG/DL — HIGH (ref 7–23)
BUN SERPL-MCNC: 75 MG/DL — HIGH (ref 7–23)
BUN SERPL-MCNC: 77 MG/DL — HIGH (ref 7–23)
BUN SERPL-MCNC: 77 MG/DL — HIGH (ref 7–23)
BUN SERPL-MCNC: 79 MG/DL — HIGH (ref 7–23)
BUN SERPL-MCNC: 79 MG/DL — HIGH (ref 7–23)
BUN SERPL-MCNC: 80 MG/DL — HIGH (ref 7–23)
BUN SERPL-MCNC: 82 MG/DL — HIGH (ref 7–23)
BUN SERPL-MCNC: 82 MG/DL — HIGH (ref 7–23)
BUN SERPL-MCNC: 86 MG/DL — HIGH (ref 7–23)
BUN SERPL-MCNC: 86 MG/DL — HIGH (ref 7–23)
BUN SERPL-MCNC: 87 MG/DL — HIGH (ref 7–23)
BUN SERPL-MCNC: 88 MG/DL — HIGH (ref 7–23)
BUN SERPL-MCNC: 88 MG/DL — HIGH (ref 7–23)
BUN SERPL-MCNC: 91 MG/DL — HIGH (ref 7–23)
C DIFF BY PCR RESULT: DETECTED
C DIFF BY PCR RESULT: SIGNIFICANT CHANGE UP
CALCIUM SERPL-MCNC: 6.3 MG/DL — CRITICAL LOW (ref 8.5–10.1)
CALCIUM SERPL-MCNC: 6.5 MG/DL — CRITICAL LOW (ref 8.5–10.1)
CALCIUM SERPL-MCNC: 6.7 MG/DL — LOW (ref 8.5–10.1)
CALCIUM SERPL-MCNC: 6.8 MG/DL — LOW (ref 8.5–10.1)
CALCIUM SERPL-MCNC: 6.8 MG/DL — LOW (ref 8.5–10.1)
CALCIUM SERPL-MCNC: 6.9 MG/DL — LOW (ref 8.5–10.1)
CALCIUM SERPL-MCNC: 7.3 MG/DL — LOW (ref 8.5–10.1)
CALCIUM SERPL-MCNC: 7.4 MG/DL — LOW (ref 8.5–10.1)
CALCIUM SERPL-MCNC: 7.5 MG/DL — LOW (ref 8.5–10.1)
CALCIUM SERPL-MCNC: 7.6 MG/DL — LOW (ref 8.5–10.1)
CALCIUM SERPL-MCNC: 7.7 MG/DL — LOW (ref 8.5–10.1)
CALCIUM SERPL-MCNC: 7.8 MG/DL — LOW (ref 8.5–10.1)
CALCIUM SERPL-MCNC: 7.9 MG/DL — LOW (ref 8.5–10.1)
CALCIUM SERPL-MCNC: 8 MG/DL — LOW (ref 8.5–10.1)
CALCIUM SERPL-MCNC: 8.1 MG/DL — LOW (ref 8.5–10.1)
CALCIUM SERPL-MCNC: 8.2 MG/DL — LOW (ref 8.5–10.1)
CALCIUM SERPL-MCNC: 8.3 MG/DL — LOW (ref 8.5–10.1)
CALCIUM SERPL-MCNC: 8.3 MG/DL — LOW (ref 8.5–10.1)
CALCIUM SERPL-MCNC: 8.4 MG/DL — LOW (ref 8.5–10.1)
CALPROTECTIN STL-MCNT: 183 UG/G — HIGH (ref 0–120)
CGA SERPL-MCNC: 555.3 NG/ML
CHLORIDE SERPL-SCNC: 101 MMOL/L — SIGNIFICANT CHANGE UP (ref 96–108)
CHLORIDE SERPL-SCNC: 101 MMOL/L — SIGNIFICANT CHANGE UP (ref 96–108)
CHLORIDE SERPL-SCNC: 102 MMOL/L — SIGNIFICANT CHANGE UP (ref 96–108)
CHLORIDE SERPL-SCNC: 102 MMOL/L — SIGNIFICANT CHANGE UP (ref 96–108)
CHLORIDE SERPL-SCNC: 103 MMOL/L — SIGNIFICANT CHANGE UP (ref 96–108)
CHLORIDE SERPL-SCNC: 103 MMOL/L — SIGNIFICANT CHANGE UP (ref 96–108)
CHLORIDE SERPL-SCNC: 104 MMOL/L — SIGNIFICANT CHANGE UP (ref 96–108)
CHLORIDE SERPL-SCNC: 105 MMOL/L — SIGNIFICANT CHANGE UP (ref 96–108)
CHLORIDE SERPL-SCNC: 106 MMOL/L — SIGNIFICANT CHANGE UP (ref 96–108)
CHLORIDE SERPL-SCNC: 107 MMOL/L — SIGNIFICANT CHANGE UP (ref 96–108)
CHLORIDE SERPL-SCNC: 108 MMOL/L — SIGNIFICANT CHANGE UP (ref 96–108)
CHLORIDE SERPL-SCNC: 108 MMOL/L — SIGNIFICANT CHANGE UP (ref 96–108)
CHLORIDE SERPL-SCNC: 110 MMOL/L — HIGH (ref 96–108)
CHLORIDE SERPL-SCNC: 111 MMOL/L — HIGH (ref 96–108)
CHLORIDE SERPL-SCNC: 112 MMOL/L — HIGH (ref 96–108)
CHLORIDE SERPL-SCNC: 112 MMOL/L — HIGH (ref 96–108)
CHLORIDE SERPL-SCNC: 113 MMOL/L — HIGH (ref 96–108)
CHLORIDE SERPL-SCNC: 114 MMOL/L — HIGH (ref 96–108)
CHLORIDE SERPL-SCNC: 115 MMOL/L — HIGH (ref 96–108)
CHLORIDE SERPL-SCNC: 116 MMOL/L — HIGH (ref 96–108)
CK MB BLD-MCNC: 3.9 % — HIGH (ref 0–3.5)
CK MB BLD-MCNC: <3.2 % — SIGNIFICANT CHANGE UP (ref 0–3.5)
CK MB BLD-MCNC: <3.3 % — SIGNIFICANT CHANGE UP (ref 0–3.5)
CK MB CFR SERPL CALC: 2.4 NG/ML — SIGNIFICANT CHANGE UP (ref 0–3.6)
CK MB CFR SERPL CALC: <1 NG/ML — SIGNIFICANT CHANGE UP (ref 0–3.6)
CK MB CFR SERPL CALC: <1 NG/ML — SIGNIFICANT CHANGE UP (ref 0–3.6)
CK SERPL-CCNC: 30 U/L — SIGNIFICANT CHANGE UP (ref 26–192)
CK SERPL-CCNC: 31 U/L — SIGNIFICANT CHANGE UP (ref 26–192)
CK SERPL-CCNC: 54 U/L — SIGNIFICANT CHANGE UP (ref 26–192)
CK SERPL-CCNC: 62 U/L — SIGNIFICANT CHANGE UP (ref 26–192)
CO2 SERPL-SCNC: 15 MMOL/L — LOW (ref 22–31)
CO2 SERPL-SCNC: 17 MMOL/L — LOW (ref 22–31)
CO2 SERPL-SCNC: 19 MMOL/L — LOW (ref 22–31)
CO2 SERPL-SCNC: 22 MMOL/L — SIGNIFICANT CHANGE UP (ref 22–31)
CO2 SERPL-SCNC: 24 MMOL/L — SIGNIFICANT CHANGE UP (ref 22–31)
CO2 SERPL-SCNC: 25 MMOL/L — SIGNIFICANT CHANGE UP (ref 22–31)
CO2 SERPL-SCNC: 26 MMOL/L — SIGNIFICANT CHANGE UP (ref 22–31)
CO2 SERPL-SCNC: 27 MMOL/L — SIGNIFICANT CHANGE UP (ref 22–31)
CO2 SERPL-SCNC: 28 MMOL/L — SIGNIFICANT CHANGE UP (ref 22–31)
CO2 SERPL-SCNC: 29 MMOL/L — SIGNIFICANT CHANGE UP (ref 22–31)
CO2 SERPL-SCNC: 29 MMOL/L — SIGNIFICANT CHANGE UP (ref 22–31)
CO2 SERPL-SCNC: 30 MMOL/L — SIGNIFICANT CHANGE UP (ref 22–31)
CO2 SERPL-SCNC: 30 MMOL/L — SIGNIFICANT CHANGE UP (ref 22–31)
CO2 SERPL-SCNC: 31 MMOL/L — SIGNIFICANT CHANGE UP (ref 22–31)
CO2 SERPL-SCNC: 32 MMOL/L — HIGH (ref 22–31)
CO2 SERPL-SCNC: 32 MMOL/L — HIGH (ref 22–31)
CO2 SERPL-SCNC: 33 MMOL/L — HIGH (ref 22–31)
CO2 SERPL-SCNC: 34 MMOL/L — HIGH (ref 22–31)
CO2 SERPL-SCNC: 35 MMOL/L — HIGH (ref 22–31)
CO2 SERPL-SCNC: 35 MMOL/L — HIGH (ref 22–31)
CO2 SERPL-SCNC: 36 MMOL/L — HIGH (ref 22–31)
COLOR SPEC: ABNORMAL
COLOR SPEC: SIGNIFICANT CHANGE UP
CREAT ?TM UR-MCNC: 143 MG/DL — SIGNIFICANT CHANGE UP
CREAT ?TM UR-MCNC: 22 MG/DL — SIGNIFICANT CHANGE UP
CREAT SERPL-MCNC: 1.3 MG/DL — SIGNIFICANT CHANGE UP (ref 0.5–1.3)
CREAT SERPL-MCNC: 1.3 MG/DL — SIGNIFICANT CHANGE UP (ref 0.5–1.3)
CREAT SERPL-MCNC: 1.4 MG/DL — HIGH (ref 0.5–1.3)
CREAT SERPL-MCNC: 1.5 MG/DL — HIGH (ref 0.5–1.3)
CREAT SERPL-MCNC: 1.5 MG/DL — HIGH (ref 0.5–1.3)
CREAT SERPL-MCNC: 1.6 MG/DL — HIGH (ref 0.5–1.3)
CREAT SERPL-MCNC: 1.6 MG/DL — HIGH (ref 0.5–1.3)
CREAT SERPL-MCNC: 1.7 MG/DL — HIGH (ref 0.5–1.3)
CREAT SERPL-MCNC: 1.8 MG/DL — HIGH (ref 0.5–1.3)
CREAT SERPL-MCNC: 1.9 MG/DL — HIGH (ref 0.5–1.3)
CREAT SERPL-MCNC: 1.9 MG/DL — HIGH (ref 0.5–1.3)
CREAT SERPL-MCNC: 2 MG/DL — HIGH (ref 0.5–1.3)
CREAT SERPL-MCNC: 2 MG/DL — HIGH (ref 0.5–1.3)
CREAT SERPL-MCNC: 2.2 MG/DL — HIGH (ref 0.5–1.3)
CREAT SERPL-MCNC: 2.3 MG/DL — HIGH (ref 0.5–1.3)
CREAT SERPL-MCNC: 2.5 MG/DL — HIGH (ref 0.5–1.3)
CREAT SERPL-MCNC: 2.7 MG/DL — HIGH (ref 0.5–1.3)
CREAT SERPL-MCNC: 2.8 MG/DL — HIGH (ref 0.5–1.3)
CREAT SERPL-MCNC: 3.1 MG/DL — HIGH (ref 0.5–1.3)
CREAT SERPL-MCNC: 3.5 MG/DL — HIGH (ref 0.5–1.3)
CREAT SERPL-MCNC: 3.9 MG/DL — HIGH (ref 0.5–1.3)
CREAT SERPL-MCNC: 4.2 MG/DL — HIGH (ref 0.5–1.3)
CREAT SERPL-MCNC: 4.3 MG/DL — HIGH (ref 0.5–1.3)
CREAT SERPL-MCNC: 4.4 MG/DL — HIGH (ref 0.5–1.3)
CREAT SERPL-MCNC: 4.6 MG/DL — HIGH (ref 0.5–1.3)
CREAT SERPL-MCNC: 4.7 MG/DL — HIGH (ref 0.5–1.3)
CREAT SERPL-MCNC: 4.7 MG/DL — HIGH (ref 0.5–1.3)
CREAT SERPL-MCNC: 4.8 MG/DL — HIGH (ref 0.5–1.3)
CREAT SERPL-MCNC: 4.9 MG/DL — HIGH (ref 0.5–1.3)
CREAT SERPL-MCNC: 5 MG/DL — HIGH (ref 0.5–1.3)
CREAT SERPL-MCNC: 5.2 MG/DL — HIGH (ref 0.5–1.3)
CREAT SERPL-MCNC: 5.2 MG/DL — HIGH (ref 0.5–1.3)
CREAT SERPL-MCNC: 5.3 MG/DL — HIGH (ref 0.5–1.3)
CRP SERPL-MCNC: 23 MG/L — HIGH
CULTURE RESULTS: SIGNIFICANT CHANGE UP
DACRYOCYTES BLD QL SMEAR: SLIGHT — SIGNIFICANT CHANGE UP
DACRYOCYTES BLD QL SMEAR: SLIGHT — SIGNIFICANT CHANGE UP
DIFF PNL FLD: ABNORMAL
DIFF PNL FLD: NEGATIVE — SIGNIFICANT CHANGE UP
EGFR: 10 ML/MIN/1.73M2 — LOW
EGFR: 11 ML/MIN/1.73M2 — LOW
EGFR: 12 ML/MIN/1.73M2 — LOW
EGFR: 13 ML/MIN/1.73M2 — LOW
EGFR: 16 ML/MIN/1.73M2 — LOW
EGFR: 18 ML/MIN/1.73M2 — LOW
EGFR: 18 ML/MIN/1.73M2 — LOW
EGFR: 20 ML/MIN/1.73M2 — LOW
EGFR: 22 ML/MIN/1.73M2 — LOW
EGFR: 24 ML/MIN/1.73M2 — LOW
EGFR: 26 ML/MIN/1.73M2 — LOW
EGFR: 26 ML/MIN/1.73M2 — LOW
EGFR: 28 ML/MIN/1.73M2 — LOW
EGFR: 28 ML/MIN/1.73M2 — LOW
EGFR: 30 ML/MIN/1.73M2 — LOW
EGFR: 32 ML/MIN/1.73M2 — LOW
EGFR: 34 ML/MIN/1.73M2 — LOW
EGFR: 34 ML/MIN/1.73M2 — LOW
EGFR: 37 ML/MIN/1.73M2 — LOW
EGFR: 37 ML/MIN/1.73M2 — LOW
EGFR: 40 ML/MIN/1.73M2 — LOW
EGFR: 44 ML/MIN/1.73M2 — LOW
EGFR: 44 ML/MIN/1.73M2 — LOW
EGFR: 8 ML/MIN/1.73M2 — LOW
EGFR: 9 ML/MIN/1.73M2 — LOW
ELLIPTOCYTES BLD QL SMEAR: SLIGHT — SIGNIFICANT CHANGE UP
ELLIPTOCYTES BLD QL SMEAR: SLIGHT — SIGNIFICANT CHANGE UP
EOSINOPHIL # BLD AUTO: 0 K/UL — SIGNIFICANT CHANGE UP (ref 0–0.5)
EOSINOPHIL # BLD AUTO: 0 K/UL — SIGNIFICANT CHANGE UP (ref 0–0.5)
EOSINOPHIL # BLD AUTO: 0.01 K/UL — SIGNIFICANT CHANGE UP (ref 0–0.5)
EOSINOPHIL # BLD AUTO: 0.08 K/UL — SIGNIFICANT CHANGE UP (ref 0–0.5)
EOSINOPHIL # BLD AUTO: 0.15 K/UL — SIGNIFICANT CHANGE UP (ref 0–0.5)
EOSINOPHIL # BLD AUTO: 0.18 K/UL — SIGNIFICANT CHANGE UP (ref 0–0.5)
EOSINOPHIL # BLD AUTO: 0.2 K/UL — SIGNIFICANT CHANGE UP (ref 0–0.5)
EOSINOPHIL # BLD AUTO: 0.23 K/UL — SIGNIFICANT CHANGE UP (ref 0–0.5)
EOSINOPHIL # BLD AUTO: 0.24 K/UL — SIGNIFICANT CHANGE UP (ref 0–0.5)
EOSINOPHIL # BLD AUTO: 0.24 K/UL — SIGNIFICANT CHANGE UP (ref 0–0.5)
EOSINOPHIL # BLD AUTO: 0.25 K/UL — SIGNIFICANT CHANGE UP (ref 0–0.5)
EOSINOPHIL # BLD AUTO: 0.28 K/UL — SIGNIFICANT CHANGE UP (ref 0–0.5)
EOSINOPHIL # BLD AUTO: 0.28 K/UL — SIGNIFICANT CHANGE UP (ref 0–0.5)
EOSINOPHIL # BLD AUTO: 0.3 K/UL — SIGNIFICANT CHANGE UP (ref 0–0.5)
EOSINOPHIL # BLD AUTO: 0.31 K/UL — SIGNIFICANT CHANGE UP (ref 0–0.5)
EOSINOPHIL # BLD AUTO: 0.32 K/UL — SIGNIFICANT CHANGE UP (ref 0–0.5)
EOSINOPHIL # BLD AUTO: 0.33 K/UL — SIGNIFICANT CHANGE UP (ref 0–0.5)
EOSINOPHIL # BLD AUTO: 0.34 K/UL — SIGNIFICANT CHANGE UP (ref 0–0.5)
EOSINOPHIL # BLD AUTO: 0.37 K/UL — SIGNIFICANT CHANGE UP (ref 0–0.5)
EOSINOPHIL # BLD AUTO: 0.39 K/UL — SIGNIFICANT CHANGE UP (ref 0–0.5)
EOSINOPHIL # BLD AUTO: 0.41 K/UL — SIGNIFICANT CHANGE UP (ref 0–0.5)
EOSINOPHIL # BLD AUTO: 0.42 K/UL — SIGNIFICANT CHANGE UP (ref 0–0.5)
EOSINOPHIL # BLD AUTO: 0.42 K/UL — SIGNIFICANT CHANGE UP (ref 0–0.5)
EOSINOPHIL # BLD AUTO: 0.46 K/UL — SIGNIFICANT CHANGE UP (ref 0–0.5)
EOSINOPHIL # BLD AUTO: 0.47 K/UL — SIGNIFICANT CHANGE UP (ref 0–0.5)
EOSINOPHIL # BLD AUTO: 0.47 K/UL — SIGNIFICANT CHANGE UP (ref 0–0.5)
EOSINOPHIL # BLD AUTO: 0.48 K/UL — SIGNIFICANT CHANGE UP (ref 0–0.5)
EOSINOPHIL # BLD AUTO: 0.55 K/UL — HIGH (ref 0–0.5)
EOSINOPHIL # BLD AUTO: 0.56 K/UL — HIGH (ref 0–0.5)
EOSINOPHIL # BLD AUTO: 0.59 K/UL — HIGH (ref 0–0.5)
EOSINOPHIL # BLD AUTO: 0.6 K/UL — HIGH (ref 0–0.5)
EOSINOPHIL # BLD AUTO: 0.79 K/UL — HIGH (ref 0–0.5)
EOSINOPHIL NFR BLD AUTO: 0 % — SIGNIFICANT CHANGE UP (ref 0–6)
EOSINOPHIL NFR BLD AUTO: 0.6 % — SIGNIFICANT CHANGE UP (ref 0–6)
EOSINOPHIL NFR BLD AUTO: 0.8 % — SIGNIFICANT CHANGE UP (ref 0–6)
EOSINOPHIL NFR BLD AUTO: 1 % — SIGNIFICANT CHANGE UP (ref 0–6)
EOSINOPHIL NFR BLD AUTO: 1.1 % — SIGNIFICANT CHANGE UP (ref 0–6)
EOSINOPHIL NFR BLD AUTO: 1.2 % — SIGNIFICANT CHANGE UP (ref 0–6)
EOSINOPHIL NFR BLD AUTO: 1.3 % — SIGNIFICANT CHANGE UP (ref 0–6)
EOSINOPHIL NFR BLD AUTO: 1.6 % — SIGNIFICANT CHANGE UP (ref 0–6)
EOSINOPHIL NFR BLD AUTO: 1.8 % — SIGNIFICANT CHANGE UP (ref 0–6)
EOSINOPHIL NFR BLD AUTO: 1.8 % — SIGNIFICANT CHANGE UP (ref 0–6)
EOSINOPHIL NFR BLD AUTO: 1.9 % — SIGNIFICANT CHANGE UP (ref 0–6)
EOSINOPHIL NFR BLD AUTO: 2 % — SIGNIFICANT CHANGE UP (ref 0–6)
EOSINOPHIL NFR BLD AUTO: 2.1 % — SIGNIFICANT CHANGE UP (ref 0–6)
EOSINOPHIL NFR BLD AUTO: 2.2 % — SIGNIFICANT CHANGE UP (ref 0–6)
EOSINOPHIL NFR BLD AUTO: 2.4 % — SIGNIFICANT CHANGE UP (ref 0–6)
EOSINOPHIL NFR BLD AUTO: 2.7 % — SIGNIFICANT CHANGE UP (ref 0–6)
EOSINOPHIL NFR BLD AUTO: 2.8 % — SIGNIFICANT CHANGE UP (ref 0–6)
EOSINOPHIL NFR BLD AUTO: 3 % — SIGNIFICANT CHANGE UP (ref 0–6)
EOSINOPHIL NFR BLD AUTO: 3 % — SIGNIFICANT CHANGE UP (ref 0–6)
FERRITIN SERPL-MCNC: 293 NG/ML — HIGH (ref 15–150)
FLUAV AG NPH QL: SIGNIFICANT CHANGE UP
FLUAV AG NPH QL: SIGNIFICANT CHANGE UP
FLUBV AG NPH QL: SIGNIFICANT CHANGE UP
FLUBV AG NPH QL: SIGNIFICANT CHANGE UP
GAS PNL BLDA: SIGNIFICANT CHANGE UP
GI PCR PANEL: SIGNIFICANT CHANGE UP
GLUCOSE SERPL-MCNC: 104 MG/DL — HIGH (ref 70–99)
GLUCOSE SERPL-MCNC: 106 MG/DL — HIGH (ref 70–99)
GLUCOSE SERPL-MCNC: 108 MG/DL — HIGH (ref 70–99)
GLUCOSE SERPL-MCNC: 115 MG/DL — HIGH (ref 70–99)
GLUCOSE SERPL-MCNC: 115 MG/DL — HIGH (ref 70–99)
GLUCOSE SERPL-MCNC: 116 MG/DL — HIGH (ref 70–99)
GLUCOSE SERPL-MCNC: 118 MG/DL — HIGH (ref 70–99)
GLUCOSE SERPL-MCNC: 121 MG/DL — HIGH (ref 70–99)
GLUCOSE SERPL-MCNC: 124 MG/DL — HIGH (ref 70–99)
GLUCOSE SERPL-MCNC: 125 MG/DL — HIGH (ref 70–99)
GLUCOSE SERPL-MCNC: 126 MG/DL — HIGH (ref 70–99)
GLUCOSE SERPL-MCNC: 128 MG/DL — HIGH (ref 70–99)
GLUCOSE SERPL-MCNC: 129 MG/DL — HIGH (ref 70–99)
GLUCOSE SERPL-MCNC: 129 MG/DL — HIGH (ref 70–99)
GLUCOSE SERPL-MCNC: 131 MG/DL — HIGH (ref 70–99)
GLUCOSE SERPL-MCNC: 132 MG/DL — HIGH (ref 70–99)
GLUCOSE SERPL-MCNC: 134 MG/DL — HIGH (ref 70–99)
GLUCOSE SERPL-MCNC: 136 MG/DL — HIGH (ref 70–99)
GLUCOSE SERPL-MCNC: 137 MG/DL — HIGH (ref 70–99)
GLUCOSE SERPL-MCNC: 159 MG/DL — HIGH (ref 70–99)
GLUCOSE SERPL-MCNC: 162 MG/DL — HIGH (ref 70–99)
GLUCOSE SERPL-MCNC: 163 MG/DL — HIGH (ref 70–99)
GLUCOSE SERPL-MCNC: 63 MG/DL — LOW (ref 70–99)
GLUCOSE SERPL-MCNC: 66 MG/DL — LOW (ref 70–99)
GLUCOSE SERPL-MCNC: 67 MG/DL — LOW (ref 70–99)
GLUCOSE SERPL-MCNC: 67 MG/DL — LOW (ref 70–99)
GLUCOSE SERPL-MCNC: 74 MG/DL — SIGNIFICANT CHANGE UP (ref 70–99)
GLUCOSE SERPL-MCNC: 78 MG/DL — SIGNIFICANT CHANGE UP (ref 70–99)
GLUCOSE SERPL-MCNC: 80 MG/DL — SIGNIFICANT CHANGE UP (ref 70–99)
GLUCOSE SERPL-MCNC: 81 MG/DL — SIGNIFICANT CHANGE UP (ref 70–99)
GLUCOSE SERPL-MCNC: 81 MG/DL — SIGNIFICANT CHANGE UP (ref 70–99)
GLUCOSE SERPL-MCNC: 83 MG/DL — SIGNIFICANT CHANGE UP (ref 70–99)
GLUCOSE SERPL-MCNC: 84 MG/DL — SIGNIFICANT CHANGE UP (ref 70–99)
GLUCOSE SERPL-MCNC: 85 MG/DL — SIGNIFICANT CHANGE UP (ref 70–99)
GLUCOSE SERPL-MCNC: 88 MG/DL — SIGNIFICANT CHANGE UP (ref 70–99)
GLUCOSE SERPL-MCNC: 88 MG/DL — SIGNIFICANT CHANGE UP (ref 70–99)
GLUCOSE SERPL-MCNC: 92 MG/DL — SIGNIFICANT CHANGE UP (ref 70–99)
GLUCOSE SERPL-MCNC: 92 MG/DL — SIGNIFICANT CHANGE UP (ref 70–99)
GLUCOSE SERPL-MCNC: 96 MG/DL — SIGNIFICANT CHANGE UP (ref 70–99)
GLUCOSE SERPL-MCNC: 96 MG/DL — SIGNIFICANT CHANGE UP (ref 70–99)
GLUCOSE SERPL-MCNC: 97 MG/DL — SIGNIFICANT CHANGE UP (ref 70–99)
GLUCOSE SERPL-MCNC: 98 MG/DL — SIGNIFICANT CHANGE UP (ref 70–99)
GLUCOSE SERPL-MCNC: 99 MG/DL — SIGNIFICANT CHANGE UP (ref 70–99)
GLUCOSE UR QL: NEGATIVE — SIGNIFICANT CHANGE UP
GLUCOSE UR QL: NEGATIVE — SIGNIFICANT CHANGE UP
HCO3 BLDA-SCNC: 11 MMOL/L — LOW (ref 21–28)
HCO3 BLDA-SCNC: 12 MMOL/L — LOW (ref 21–28)
HCO3 BLDA-SCNC: 15 MMOL/L — LOW (ref 21–28)
HCO3 BLDA-SCNC: 16 MMOL/L — LOW (ref 21–28)
HCO3 BLDA-SCNC: 17 MMOL/L — LOW (ref 21–28)
HCO3 BLDA-SCNC: 20 MMOL/L — LOW (ref 21–28)
HCO3 BLDA-SCNC: 22 MMOL/L — SIGNIFICANT CHANGE UP (ref 21–28)
HCO3 BLDA-SCNC: 23 MMOL/L — SIGNIFICANT CHANGE UP (ref 21–28)
HCO3 BLDA-SCNC: 23 MMOL/L — SIGNIFICANT CHANGE UP (ref 21–28)
HCO3 BLDA-SCNC: 24 MMOL/L — SIGNIFICANT CHANGE UP (ref 21–28)
HCO3 BLDA-SCNC: 25 MMOL/L — SIGNIFICANT CHANGE UP (ref 21–28)
HCO3 BLDA-SCNC: 26 MMOL/L — SIGNIFICANT CHANGE UP (ref 21–28)
HCO3 BLDA-SCNC: 28 MMOL/L — SIGNIFICANT CHANGE UP (ref 21–28)
HCT VFR BLD CALC: 22.5 % — LOW (ref 34.5–45)
HCT VFR BLD CALC: 23.1 % — LOW (ref 34.5–45)
HCT VFR BLD CALC: 24 % — LOW (ref 34.5–45)
HCT VFR BLD CALC: 24.5 % — LOW (ref 34.5–45)
HCT VFR BLD CALC: 24.7 % — LOW (ref 34.5–45)
HCT VFR BLD CALC: 24.8 % — LOW (ref 34.5–45)
HCT VFR BLD CALC: 25.1 % — LOW (ref 34.5–45)
HCT VFR BLD CALC: 25.4 % — LOW (ref 34.5–45)
HCT VFR BLD CALC: 25.5 % — LOW (ref 34.5–45)
HCT VFR BLD CALC: 25.5 % — LOW (ref 34.5–45)
HCT VFR BLD CALC: 25.8 % — LOW (ref 34.5–45)
HCT VFR BLD CALC: 25.9 % — LOW (ref 34.5–45)
HCT VFR BLD CALC: 26 % — LOW (ref 34.5–45)
HCT VFR BLD CALC: 26.1 % — LOW (ref 34.5–45)
HCT VFR BLD CALC: 26.5 % — LOW (ref 34.5–45)
HCT VFR BLD CALC: 26.5 % — LOW (ref 34.5–45)
HCT VFR BLD CALC: 26.6 % — LOW (ref 34.5–45)
HCT VFR BLD CALC: 26.8 % — LOW (ref 34.5–45)
HCT VFR BLD CALC: 26.8 % — LOW (ref 34.5–45)
HCT VFR BLD CALC: 27.2 % — LOW (ref 34.5–45)
HCT VFR BLD CALC: 27.5 % — LOW (ref 34.5–45)
HCT VFR BLD CALC: 27.6 % — LOW (ref 34.5–45)
HCT VFR BLD CALC: 27.6 % — LOW (ref 34.5–45)
HCT VFR BLD CALC: 27.7 % — LOW (ref 34.5–45)
HCT VFR BLD CALC: 27.9 % — LOW (ref 34.5–45)
HCT VFR BLD CALC: 28 % — LOW (ref 34.5–45)
HCT VFR BLD CALC: 28.1 % — LOW (ref 34.5–45)
HCT VFR BLD CALC: 28.1 % — LOW (ref 34.5–45)
HCT VFR BLD CALC: 28.2 % — LOW (ref 34.5–45)
HCT VFR BLD CALC: 28.2 % — LOW (ref 34.5–45)
HCT VFR BLD CALC: 28.5 % — LOW (ref 34.5–45)
HCT VFR BLD CALC: 29.1 % — LOW (ref 34.5–45)
HCT VFR BLD CALC: 29.3 % — LOW (ref 34.5–45)
HCT VFR BLD CALC: 29.8 % — LOW (ref 34.5–45)
HCT VFR BLD CALC: 29.9 % — LOW (ref 34.5–45)
HCT VFR BLD CALC: 29.9 % — LOW (ref 34.5–45)
HCT VFR BLD CALC: 30.3 % — LOW (ref 34.5–45)
HCT VFR BLD CALC: 31.1 % — LOW (ref 34.5–45)
HCT VFR BLD CALC: 31.2 % — LOW (ref 34.5–45)
HCT VFR BLD CALC: 31.6 % — LOW (ref 34.5–45)
HCT VFR BLD CALC: 31.7 % — LOW (ref 34.5–45)
HCT VFR BLD CALC: 32.2 % — LOW (ref 34.5–45)
HCT VFR BLD CALC: 33.2 % — LOW (ref 34.5–45)
HCT VFR BLD CALC: 35.1 % — SIGNIFICANT CHANGE UP (ref 34.5–45)
HGB BLD-MCNC: 10.5 G/DL — LOW (ref 11.5–15.5)
HGB BLD-MCNC: 6.5 G/DL — CRITICAL LOW (ref 11.5–15.5)
HGB BLD-MCNC: 6.9 G/DL — CRITICAL LOW (ref 11.5–15.5)
HGB BLD-MCNC: 7.1 G/DL — LOW (ref 11.5–15.5)
HGB BLD-MCNC: 7.2 G/DL — LOW (ref 11.5–15.5)
HGB BLD-MCNC: 7.3 G/DL — LOW (ref 11.5–15.5)
HGB BLD-MCNC: 7.4 G/DL — LOW (ref 11.5–15.5)
HGB BLD-MCNC: 7.6 G/DL — LOW (ref 11.5–15.5)
HGB BLD-MCNC: 7.7 G/DL — LOW (ref 11.5–15.5)
HGB BLD-MCNC: 7.8 G/DL — LOW (ref 11.5–15.5)
HGB BLD-MCNC: 7.9 G/DL — LOW (ref 11.5–15.5)
HGB BLD-MCNC: 7.9 G/DL — LOW (ref 11.5–15.5)
HGB BLD-MCNC: 8 G/DL — LOW (ref 11.5–15.5)
HGB BLD-MCNC: 8.1 G/DL — LOW (ref 11.5–15.5)
HGB BLD-MCNC: 8.2 G/DL — LOW (ref 11.5–15.5)
HGB BLD-MCNC: 8.3 G/DL — LOW (ref 11.5–15.5)
HGB BLD-MCNC: 8.4 G/DL — LOW (ref 11.5–15.5)
HGB BLD-MCNC: 8.4 G/DL — LOW (ref 11.5–15.5)
HGB BLD-MCNC: 8.6 G/DL — LOW (ref 11.5–15.5)
HGB BLD-MCNC: 8.6 G/DL — LOW (ref 11.5–15.5)
HGB BLD-MCNC: 8.7 G/DL — LOW (ref 11.5–15.5)
HGB BLD-MCNC: 8.8 G/DL — LOW (ref 11.5–15.5)
HGB BLD-MCNC: 8.8 G/DL — LOW (ref 11.5–15.5)
HGB BLD-MCNC: 8.9 G/DL — LOW (ref 11.5–15.5)
HGB BLD-MCNC: 9.1 G/DL — LOW (ref 11.5–15.5)
HGB BLD-MCNC: 9.2 G/DL — LOW (ref 11.5–15.5)
HGB BLD-MCNC: 9.4 G/DL — LOW (ref 11.5–15.5)
HGB BLD-MCNC: 9.5 G/DL — LOW (ref 11.5–15.5)
HGB BLD-MCNC: 9.8 G/DL — LOW (ref 11.5–15.5)
HOROWITZ INDEX BLDA+IHG-RTO: 100 — SIGNIFICANT CHANGE UP
HOROWITZ INDEX BLDA+IHG-RTO: 30 — SIGNIFICANT CHANGE UP
HOROWITZ INDEX BLDA+IHG-RTO: 50 — SIGNIFICANT CHANGE UP
HOROWITZ INDEX BLDA+IHG-RTO: 50 — SIGNIFICANT CHANGE UP
HOROWITZ INDEX BLDA+IHG-RTO: 60 — SIGNIFICANT CHANGE UP
HOROWITZ INDEX BLDA+IHG-RTO: 60 — SIGNIFICANT CHANGE UP
HOROWITZ INDEX BLDA+IHG-RTO: 70 — SIGNIFICANT CHANGE UP
IMM GRANULOCYTES NFR BLD AUTO: 1.2 % — HIGH (ref 0–0.9)
IMM GRANULOCYTES NFR BLD AUTO: 1.3 % — HIGH (ref 0–0.9)
IMM GRANULOCYTES NFR BLD AUTO: 1.5 % — HIGH (ref 0–0.9)
IMM GRANULOCYTES NFR BLD AUTO: 1.8 % — HIGH (ref 0–0.9)
IMM GRANULOCYTES NFR BLD AUTO: 1.9 % — HIGH (ref 0–0.9)
IMM GRANULOCYTES NFR BLD AUTO: 1.9 % — HIGH (ref 0–0.9)
IMM GRANULOCYTES NFR BLD AUTO: 2.5 % — HIGH (ref 0–0.9)
IMM GRANULOCYTES NFR BLD AUTO: 2.5 % — HIGH (ref 0–0.9)
IMM GRANULOCYTES NFR BLD AUTO: 2.8 % — HIGH (ref 0–0.9)
IMM GRANULOCYTES NFR BLD AUTO: 3.3 % — HIGH (ref 0–0.9)
IMM GRANULOCYTES NFR BLD AUTO: 3.5 % — HIGH (ref 0–0.9)
IMM GRANULOCYTES NFR BLD AUTO: 3.5 % — HIGH (ref 0–0.9)
IMM GRANULOCYTES NFR BLD AUTO: 4.3 % — HIGH (ref 0–0.9)
IMM GRANULOCYTES NFR BLD AUTO: 4.8 % — HIGH (ref 0–0.9)
IMM GRANULOCYTES NFR BLD AUTO: 5.2 % — HIGH (ref 0–0.9)
IMM GRANULOCYTES NFR BLD AUTO: 6 % — HIGH (ref 0–0.9)
INR BLD: 1.17 RATIO — HIGH (ref 0.88–1.16)
INR BLD: 1.57 RATIO — HIGH (ref 0.88–1.16)
INR BLD: 1.57 RATIO — HIGH (ref 0.88–1.16)
INR BLD: 1.87 RATIO — HIGH (ref 0.88–1.16)
INR BLD: 1.93 RATIO — HIGH (ref 0.88–1.16)
INR BLD: 2.1 RATIO — HIGH (ref 0.88–1.16)
INR BLD: 2.12 RATIO — HIGH (ref 0.88–1.16)
INR BLD: 2.24 RATIO — HIGH (ref 0.88–1.16)
INR BLD: 3.12 RATIO — HIGH (ref 0.88–1.16)
IRON SATN MFR SERPL: 22 % — SIGNIFICANT CHANGE UP (ref 14–50)
IRON SATN MFR SERPL: 43 UG/DL — SIGNIFICANT CHANGE UP (ref 30–160)
KETONES UR-MCNC: ABNORMAL
KETONES UR-MCNC: NEGATIVE — SIGNIFICANT CHANGE UP
LACTATE SERPL-SCNC: 0.8 MMOL/L — SIGNIFICANT CHANGE UP (ref 0.7–2)
LACTATE SERPL-SCNC: 1.6 MMOL/L — SIGNIFICANT CHANGE UP (ref 0.7–2)
LACTATE SERPL-SCNC: 3.2 MMOL/L — HIGH (ref 0.7–2)
LACTATE SERPL-SCNC: 3.5 MMOL/L — HIGH (ref 0.7–2)
LACTATE SERPL-SCNC: 3.5 MMOL/L — HIGH (ref 0.7–2)
LACTATE SERPL-SCNC: 4.5 MMOL/L — CRITICAL HIGH (ref 0.7–2)
LACTATE SERPL-SCNC: 4.9 MMOL/L — CRITICAL HIGH (ref 0.7–2)
LACTATE SERPL-SCNC: 5 MMOL/L — CRITICAL HIGH (ref 0.7–2)
LACTATE SERPL-SCNC: 5.2 MMOL/L — CRITICAL HIGH (ref 0.7–2)
LACTATE SERPL-SCNC: 5.5 MMOL/L — CRITICAL HIGH (ref 0.7–2)
LACTATE SERPL-SCNC: 5.9 MMOL/L — CRITICAL HIGH (ref 0.7–2)
LACTATE SERPL-SCNC: 7.8 MMOL/L — CRITICAL HIGH (ref 0.7–2)
LACTATE SERPL-SCNC: 9.9 MMOL/L — CRITICAL HIGH (ref 0.7–2)
LEUKOCYTE ESTERASE UR-ACNC: ABNORMAL
LEUKOCYTE ESTERASE UR-ACNC: NEGATIVE — SIGNIFICANT CHANGE UP
LG PLATELETS BLD QL AUTO: SLIGHT — SIGNIFICANT CHANGE UP
LG PLATELETS BLD QL AUTO: SLIGHT — SIGNIFICANT CHANGE UP
LIDOCAIN IGE QN: 26 U/L — LOW (ref 73–393)
LIDOCAIN IGE QN: 863 U/L — HIGH (ref 73–393)
LYMPHOCYTES # BLD AUTO: 0 % — LOW (ref 13–44)
LYMPHOCYTES # BLD AUTO: 0 K/UL — LOW (ref 1–3.3)
LYMPHOCYTES # BLD AUTO: 0.2 K/UL — LOW (ref 1–3.3)
LYMPHOCYTES # BLD AUTO: 0.34 K/UL — LOW (ref 1–3.3)
LYMPHOCYTES # BLD AUTO: 0.35 K/UL — LOW (ref 1–3.3)
LYMPHOCYTES # BLD AUTO: 0.49 K/UL — LOW (ref 1–3.3)
LYMPHOCYTES # BLD AUTO: 0.61 K/UL — LOW (ref 1–3.3)
LYMPHOCYTES # BLD AUTO: 0.65 K/UL — LOW (ref 1–3.3)
LYMPHOCYTES # BLD AUTO: 0.66 K/UL — LOW (ref 1–3.3)
LYMPHOCYTES # BLD AUTO: 0.67 K/UL — LOW (ref 1–3.3)
LYMPHOCYTES # BLD AUTO: 0.82 K/UL — LOW (ref 1–3.3)
LYMPHOCYTES # BLD AUTO: 0.84 K/UL — LOW (ref 1–3.3)
LYMPHOCYTES # BLD AUTO: 0.89 K/UL — LOW (ref 1–3.3)
LYMPHOCYTES # BLD AUTO: 0.95 K/UL — LOW (ref 1–3.3)
LYMPHOCYTES # BLD AUTO: 0.98 K/UL — LOW (ref 1–3.3)
LYMPHOCYTES # BLD AUTO: 1 % — LOW (ref 13–44)
LYMPHOCYTES # BLD AUTO: 1.02 K/UL — SIGNIFICANT CHANGE UP (ref 1–3.3)
LYMPHOCYTES # BLD AUTO: 1.03 K/UL — SIGNIFICANT CHANGE UP (ref 1–3.3)
LYMPHOCYTES # BLD AUTO: 1.08 K/UL — SIGNIFICANT CHANGE UP (ref 1–3.3)
LYMPHOCYTES # BLD AUTO: 1.1 K/UL — SIGNIFICANT CHANGE UP (ref 1–3.3)
LYMPHOCYTES # BLD AUTO: 1.11 K/UL — SIGNIFICANT CHANGE UP (ref 1–3.3)
LYMPHOCYTES # BLD AUTO: 1.12 K/UL — SIGNIFICANT CHANGE UP (ref 1–3.3)
LYMPHOCYTES # BLD AUTO: 1.12 K/UL — SIGNIFICANT CHANGE UP (ref 1–3.3)
LYMPHOCYTES # BLD AUTO: 1.15 K/UL — SIGNIFICANT CHANGE UP (ref 1–3.3)
LYMPHOCYTES # BLD AUTO: 1.17 K/UL — SIGNIFICANT CHANGE UP (ref 1–3.3)
LYMPHOCYTES # BLD AUTO: 1.19 K/UL — SIGNIFICANT CHANGE UP (ref 1–3.3)
LYMPHOCYTES # BLD AUTO: 1.19 K/UL — SIGNIFICANT CHANGE UP (ref 1–3.3)
LYMPHOCYTES # BLD AUTO: 1.21 K/UL — SIGNIFICANT CHANGE UP (ref 1–3.3)
LYMPHOCYTES # BLD AUTO: 1.21 K/UL — SIGNIFICANT CHANGE UP (ref 1–3.3)
LYMPHOCYTES # BLD AUTO: 1.25 K/UL — SIGNIFICANT CHANGE UP (ref 1–3.3)
LYMPHOCYTES # BLD AUTO: 1.32 K/UL — SIGNIFICANT CHANGE UP (ref 1–3.3)
LYMPHOCYTES # BLD AUTO: 1.34 K/UL — SIGNIFICANT CHANGE UP (ref 1–3.3)
LYMPHOCYTES # BLD AUTO: 1.42 K/UL — SIGNIFICANT CHANGE UP (ref 1–3.3)
LYMPHOCYTES # BLD AUTO: 1.5 % — LOW (ref 13–44)
LYMPHOCYTES # BLD AUTO: 1.58 K/UL — SIGNIFICANT CHANGE UP (ref 1–3.3)
LYMPHOCYTES # BLD AUTO: 1.7 % — LOW (ref 13–44)
LYMPHOCYTES # BLD AUTO: 1.9 % — LOW (ref 13–44)
LYMPHOCYTES # BLD AUTO: 2 % — LOW (ref 13–44)
LYMPHOCYTES # BLD AUTO: 2.2 % — LOW (ref 13–44)
LYMPHOCYTES # BLD AUTO: 2.2 % — LOW (ref 13–44)
LYMPHOCYTES # BLD AUTO: 2.6 % — LOW (ref 13–44)
LYMPHOCYTES # BLD AUTO: 3 % — LOW (ref 13–44)
LYMPHOCYTES # BLD AUTO: 3.1 % — LOW (ref 13–44)
LYMPHOCYTES # BLD AUTO: 3.1 % — LOW (ref 13–44)
LYMPHOCYTES # BLD AUTO: 3.3 % — LOW (ref 13–44)
LYMPHOCYTES # BLD AUTO: 4 % — LOW (ref 13–44)
LYMPHOCYTES # BLD AUTO: 4.2 % — LOW (ref 13–44)
LYMPHOCYTES # BLD AUTO: 5 % — LOW (ref 13–44)
LYMPHOCYTES # BLD AUTO: 6 % — LOW (ref 13–44)
LYMPHOCYTES # BLD AUTO: 7.1 % — LOW (ref 13–44)
LYMPHOCYTES # BLD AUTO: 7.1 % — LOW (ref 13–44)
LYMPHOCYTES # BLD AUTO: 7.2 % — LOW (ref 13–44)
LYMPHOCYTES # BLD AUTO: 7.5 % — LOW (ref 13–44)
LYMPHOCYTES # BLD AUTO: 7.5 % — LOW (ref 13–44)
LYMPHOCYTES # BLD AUTO: 7.6 % — LOW (ref 13–44)
LYMPHOCYTES # BLD AUTO: 7.8 % — LOW (ref 13–44)
LYMPHOCYTES # BLD AUTO: 7.9 % — LOW (ref 13–44)
LYMPHOCYTES # BLD AUTO: 8.3 % — LOW (ref 13–44)
MAGNESIUM SERPL-MCNC: 1.7 MG/DL — SIGNIFICANT CHANGE UP (ref 1.6–2.6)
MAGNESIUM SERPL-MCNC: 1.8 MG/DL — SIGNIFICANT CHANGE UP (ref 1.6–2.6)
MAGNESIUM SERPL-MCNC: 1.9 MG/DL — SIGNIFICANT CHANGE UP (ref 1.6–2.6)
MAGNESIUM SERPL-MCNC: 2 MG/DL — SIGNIFICANT CHANGE UP (ref 1.6–2.6)
MAGNESIUM SERPL-MCNC: 2.1 MG/DL — SIGNIFICANT CHANGE UP (ref 1.6–2.6)
MAGNESIUM SERPL-MCNC: 2.2 MG/DL — SIGNIFICANT CHANGE UP (ref 1.6–2.6)
MAGNESIUM SERPL-MCNC: 2.3 MG/DL — SIGNIFICANT CHANGE UP (ref 1.6–2.6)
MAGNESIUM SERPL-MCNC: 2.8 MG/DL — HIGH (ref 1.6–2.6)
MCHC RBC-ENTMCNC: 24.7 PG — LOW (ref 27–34)
MCHC RBC-ENTMCNC: 24.8 PG — LOW (ref 27–34)
MCHC RBC-ENTMCNC: 24.9 PG — LOW (ref 27–34)
MCHC RBC-ENTMCNC: 25 PG — LOW (ref 27–34)
MCHC RBC-ENTMCNC: 25 PG — LOW (ref 27–34)
MCHC RBC-ENTMCNC: 25.1 PG — LOW (ref 27–34)
MCHC RBC-ENTMCNC: 25.2 PG — LOW (ref 27–34)
MCHC RBC-ENTMCNC: 25.3 PG — LOW (ref 27–34)
MCHC RBC-ENTMCNC: 25.3 PG — LOW (ref 27–34)
MCHC RBC-ENTMCNC: 25.4 PG — LOW (ref 27–34)
MCHC RBC-ENTMCNC: 25.5 PG — LOW (ref 27–34)
MCHC RBC-ENTMCNC: 25.6 PG — LOW (ref 27–34)
MCHC RBC-ENTMCNC: 25.9 PG — LOW (ref 27–34)
MCHC RBC-ENTMCNC: 26.1 PG — LOW (ref 27–34)
MCHC RBC-ENTMCNC: 26.1 PG — LOW (ref 27–34)
MCHC RBC-ENTMCNC: 26.2 PG — LOW (ref 27–34)
MCHC RBC-ENTMCNC: 26.2 PG — LOW (ref 27–34)
MCHC RBC-ENTMCNC: 26.3 PG — LOW (ref 27–34)
MCHC RBC-ENTMCNC: 26.3 PG — LOW (ref 27–34)
MCHC RBC-ENTMCNC: 26.6 PG — LOW (ref 27–34)
MCHC RBC-ENTMCNC: 26.8 PG — LOW (ref 27–34)
MCHC RBC-ENTMCNC: 26.9 PG — LOW (ref 27–34)
MCHC RBC-ENTMCNC: 26.9 PG — LOW (ref 27–34)
MCHC RBC-ENTMCNC: 27 PG — SIGNIFICANT CHANGE UP (ref 27–34)
MCHC RBC-ENTMCNC: 27 PG — SIGNIFICANT CHANGE UP (ref 27–34)
MCHC RBC-ENTMCNC: 27.2 PG — SIGNIFICANT CHANGE UP (ref 27–34)
MCHC RBC-ENTMCNC: 27.3 PG — SIGNIFICANT CHANGE UP (ref 27–34)
MCHC RBC-ENTMCNC: 27.4 PG — SIGNIFICANT CHANGE UP (ref 27–34)
MCHC RBC-ENTMCNC: 27.8 GM/DL — LOW (ref 32–36)
MCHC RBC-ENTMCNC: 27.8 PG — SIGNIFICANT CHANGE UP (ref 27–34)
MCHC RBC-ENTMCNC: 27.8 PG — SIGNIFICANT CHANGE UP (ref 27–34)
MCHC RBC-ENTMCNC: 27.9 PG — SIGNIFICANT CHANGE UP (ref 27–34)
MCHC RBC-ENTMCNC: 28 GM/DL — LOW (ref 32–36)
MCHC RBC-ENTMCNC: 28 GM/DL — LOW (ref 32–36)
MCHC RBC-ENTMCNC: 28 PG — SIGNIFICANT CHANGE UP (ref 27–34)
MCHC RBC-ENTMCNC: 28.1 PG — SIGNIFICANT CHANGE UP (ref 27–34)
MCHC RBC-ENTMCNC: 28.4 GM/DL — LOW (ref 32–36)
MCHC RBC-ENTMCNC: 28.5 PG — SIGNIFICANT CHANGE UP (ref 27–34)
MCHC RBC-ENTMCNC: 28.6 GM/DL — LOW (ref 32–36)
MCHC RBC-ENTMCNC: 28.7 GM/DL — LOW (ref 32–36)
MCHC RBC-ENTMCNC: 28.7 PG — SIGNIFICANT CHANGE UP (ref 27–34)
MCHC RBC-ENTMCNC: 28.8 GM/DL — LOW (ref 32–36)
MCHC RBC-ENTMCNC: 29 GM/DL — LOW (ref 32–36)
MCHC RBC-ENTMCNC: 29.1 GM/DL — LOW (ref 32–36)
MCHC RBC-ENTMCNC: 29.2 GM/DL — LOW (ref 32–36)
MCHC RBC-ENTMCNC: 29.2 PG — SIGNIFICANT CHANGE UP (ref 27–34)
MCHC RBC-ENTMCNC: 29.3 GM/DL — LOW (ref 32–36)
MCHC RBC-ENTMCNC: 29.3 GM/DL — LOW (ref 32–36)
MCHC RBC-ENTMCNC: 29.3 PG — SIGNIFICANT CHANGE UP (ref 27–34)
MCHC RBC-ENTMCNC: 29.4 GM/DL — LOW (ref 32–36)
MCHC RBC-ENTMCNC: 29.4 GM/DL — LOW (ref 32–36)
MCHC RBC-ENTMCNC: 29.4 PG — SIGNIFICANT CHANGE UP (ref 27–34)
MCHC RBC-ENTMCNC: 29.5 GM/DL — LOW (ref 32–36)
MCHC RBC-ENTMCNC: 29.6 GM/DL — LOW (ref 32–36)
MCHC RBC-ENTMCNC: 29.6 PG — SIGNIFICANT CHANGE UP (ref 27–34)
MCHC RBC-ENTMCNC: 29.7 GM/DL — LOW (ref 32–36)
MCHC RBC-ENTMCNC: 29.9 GM/DL — LOW (ref 32–36)
MCHC RBC-ENTMCNC: 29.9 PG — SIGNIFICANT CHANGE UP (ref 27–34)
MCHC RBC-ENTMCNC: 30 GM/DL — LOW (ref 32–36)
MCHC RBC-ENTMCNC: 30.1 PG — SIGNIFICANT CHANGE UP (ref 27–34)
MCHC RBC-ENTMCNC: 30.1 PG — SIGNIFICANT CHANGE UP (ref 27–34)
MCHC RBC-ENTMCNC: 30.2 GM/DL — LOW (ref 32–36)
MCHC RBC-ENTMCNC: 30.2 GM/DL — LOW (ref 32–36)
MCHC RBC-ENTMCNC: 30.4 GM/DL — LOW (ref 32–36)
MCHC RBC-ENTMCNC: 30.5 GM/DL — LOW (ref 32–36)
MCHC RBC-ENTMCNC: 30.6 G/DL — LOW (ref 32–36)
MCHC RBC-ENTMCNC: 30.7 G/DL — LOW (ref 32–36)
MCHC RBC-ENTMCNC: 30.7 GM/DL — LOW (ref 32–36)
MCHC RBC-ENTMCNC: 30.8 PG — SIGNIFICANT CHANGE UP (ref 27–34)
MCHC RBC-ENTMCNC: 30.9 GM/DL — LOW (ref 32–36)
MCHC RBC-ENTMCNC: 31 GM/DL — LOW (ref 32–36)
MCHC RBC-ENTMCNC: 31.1 GM/DL — LOW (ref 32–36)
MCHC RBC-ENTMCNC: 31.2 GM/DL — LOW (ref 32–36)
MCHC RBC-ENTMCNC: 31.5 GM/DL — LOW (ref 32–36)
MCHC RBC-ENTMCNC: 31.9 GM/DL — LOW (ref 32–36)
MCV RBC AUTO: 101.4 FL — HIGH (ref 80–100)
MCV RBC AUTO: 102.9 FL — HIGH (ref 80–100)
MCV RBC AUTO: 103.1 FL — HIGH (ref 80–100)
MCV RBC AUTO: 83.1 FL — SIGNIFICANT CHANGE UP (ref 80–100)
MCV RBC AUTO: 83.3 FL — SIGNIFICANT CHANGE UP (ref 80–100)
MCV RBC AUTO: 84.1 FL — SIGNIFICANT CHANGE UP (ref 80–100)
MCV RBC AUTO: 84.1 FL — SIGNIFICANT CHANGE UP (ref 80–100)
MCV RBC AUTO: 84.5 FL — SIGNIFICANT CHANGE UP (ref 80–100)
MCV RBC AUTO: 84.5 FL — SIGNIFICANT CHANGE UP (ref 80–100)
MCV RBC AUTO: 84.6 FL — SIGNIFICANT CHANGE UP (ref 80–100)
MCV RBC AUTO: 84.7 FL — SIGNIFICANT CHANGE UP (ref 80–100)
MCV RBC AUTO: 85 FL — SIGNIFICANT CHANGE UP (ref 80–100)
MCV RBC AUTO: 85.1 FL — SIGNIFICANT CHANGE UP (ref 80–100)
MCV RBC AUTO: 85.2 FL — SIGNIFICANT CHANGE UP (ref 80–100)
MCV RBC AUTO: 85.4 FL — SIGNIFICANT CHANGE UP (ref 80–100)
MCV RBC AUTO: 85.4 FL — SIGNIFICANT CHANGE UP (ref 80–100)
MCV RBC AUTO: 85.6 FL — SIGNIFICANT CHANGE UP (ref 80–100)
MCV RBC AUTO: 85.7 FL — SIGNIFICANT CHANGE UP (ref 80–100)
MCV RBC AUTO: 85.7 FL — SIGNIFICANT CHANGE UP (ref 80–100)
MCV RBC AUTO: 85.8 FL — SIGNIFICANT CHANGE UP (ref 80–100)
MCV RBC AUTO: 86.1 FL — SIGNIFICANT CHANGE UP (ref 80–100)
MCV RBC AUTO: 86.3 FL — SIGNIFICANT CHANGE UP (ref 80–100)
MCV RBC AUTO: 86.5 FL — SIGNIFICANT CHANGE UP (ref 80–100)
MCV RBC AUTO: 86.9 FL — SIGNIFICANT CHANGE UP (ref 80–100)
MCV RBC AUTO: 86.9 FL — SIGNIFICANT CHANGE UP (ref 80–100)
MCV RBC AUTO: 88.4 FL — SIGNIFICANT CHANGE UP (ref 80–100)
MCV RBC AUTO: 88.8 FL — SIGNIFICANT CHANGE UP (ref 80–100)
MCV RBC AUTO: 89.3 FL — SIGNIFICANT CHANGE UP (ref 80–100)
MCV RBC AUTO: 89.5 FL — SIGNIFICANT CHANGE UP (ref 80–100)
MCV RBC AUTO: 89.6 FL — SIGNIFICANT CHANGE UP (ref 80–100)
MCV RBC AUTO: 91.2 FL — SIGNIFICANT CHANGE UP (ref 80–100)
MCV RBC AUTO: 91.6 FL — SIGNIFICANT CHANGE UP (ref 80–100)
MCV RBC AUTO: 92.7 FL — SIGNIFICANT CHANGE UP (ref 80–100)
MCV RBC AUTO: 93.5 FL — SIGNIFICANT CHANGE UP (ref 80–100)
MCV RBC AUTO: 94.4 FL — SIGNIFICANT CHANGE UP (ref 80–100)
MCV RBC AUTO: 94.6 FL — SIGNIFICANT CHANGE UP (ref 80–100)
MCV RBC AUTO: 94.9 FL — SIGNIFICANT CHANGE UP (ref 80–100)
MCV RBC AUTO: 95 FL — SIGNIFICANT CHANGE UP (ref 80–100)
MCV RBC AUTO: 95.3 FL — SIGNIFICANT CHANGE UP (ref 80–100)
MCV RBC AUTO: 96.2 FL — SIGNIFICANT CHANGE UP (ref 80–100)
MCV RBC AUTO: 96.5 FL — SIGNIFICANT CHANGE UP (ref 80–100)
MCV RBC AUTO: 96.8 FL — SIGNIFICANT CHANGE UP (ref 80–100)
MCV RBC AUTO: 96.8 FL — SIGNIFICANT CHANGE UP (ref 80–100)
MCV RBC AUTO: 97 FL — SIGNIFICANT CHANGE UP (ref 80–100)
MCV RBC AUTO: 97.1 FL — SIGNIFICANT CHANGE UP (ref 80–100)
MCV RBC AUTO: 97.4 FL — SIGNIFICANT CHANGE UP (ref 80–100)
MCV RBC AUTO: 99 FL — SIGNIFICANT CHANGE UP (ref 80–100)
MCV RBC AUTO: 99.7 FL — SIGNIFICANT CHANGE UP (ref 80–100)
MONOCYTES # BLD AUTO: 0.18 K/UL — SIGNIFICANT CHANGE UP (ref 0–0.9)
MONOCYTES # BLD AUTO: 0.25 K/UL — SIGNIFICANT CHANGE UP (ref 0–0.9)
MONOCYTES # BLD AUTO: 0.26 K/UL — SIGNIFICANT CHANGE UP (ref 0–0.9)
MONOCYTES # BLD AUTO: 0.34 K/UL — SIGNIFICANT CHANGE UP (ref 0–0.9)
MONOCYTES # BLD AUTO: 0.34 K/UL — SIGNIFICANT CHANGE UP (ref 0–0.9)
MONOCYTES # BLD AUTO: 0.39 K/UL — SIGNIFICANT CHANGE UP (ref 0–0.9)
MONOCYTES # BLD AUTO: 0.39 K/UL — SIGNIFICANT CHANGE UP (ref 0–0.9)
MONOCYTES # BLD AUTO: 0.48 K/UL — SIGNIFICANT CHANGE UP (ref 0–0.9)
MONOCYTES # BLD AUTO: 0.59 K/UL — SIGNIFICANT CHANGE UP (ref 0–0.9)
MONOCYTES # BLD AUTO: 0.6 K/UL — SIGNIFICANT CHANGE UP (ref 0–0.9)
MONOCYTES # BLD AUTO: 0.6 K/UL — SIGNIFICANT CHANGE UP (ref 0–0.9)
MONOCYTES # BLD AUTO: 0.67 K/UL — SIGNIFICANT CHANGE UP (ref 0–0.9)
MONOCYTES # BLD AUTO: 0.69 K/UL — SIGNIFICANT CHANGE UP (ref 0–0.9)
MONOCYTES # BLD AUTO: 0.74 K/UL — SIGNIFICANT CHANGE UP (ref 0–0.9)
MONOCYTES # BLD AUTO: 0.75 K/UL — SIGNIFICANT CHANGE UP (ref 0–0.9)
MONOCYTES # BLD AUTO: 0.76 K/UL — SIGNIFICANT CHANGE UP (ref 0–0.9)
MONOCYTES # BLD AUTO: 0.77 K/UL — SIGNIFICANT CHANGE UP (ref 0–0.9)
MONOCYTES # BLD AUTO: 0.77 K/UL — SIGNIFICANT CHANGE UP (ref 0–0.9)
MONOCYTES # BLD AUTO: 0.79 K/UL — SIGNIFICANT CHANGE UP (ref 0–0.9)
MONOCYTES # BLD AUTO: 0.85 K/UL — SIGNIFICANT CHANGE UP (ref 0–0.9)
MONOCYTES # BLD AUTO: 0.92 K/UL — HIGH (ref 0–0.9)
MONOCYTES # BLD AUTO: 0.95 K/UL — HIGH (ref 0–0.9)
MONOCYTES # BLD AUTO: 0.97 K/UL — HIGH (ref 0–0.9)
MONOCYTES # BLD AUTO: 1.03 K/UL — HIGH (ref 0–0.9)
MONOCYTES # BLD AUTO: 1.09 K/UL — HIGH (ref 0–0.9)
MONOCYTES # BLD AUTO: 1.21 K/UL — HIGH (ref 0–0.9)
MONOCYTES # BLD AUTO: 1.25 K/UL — HIGH (ref 0–0.9)
MONOCYTES # BLD AUTO: 1.31 K/UL — HIGH (ref 0–0.9)
MONOCYTES # BLD AUTO: 1.35 K/UL — HIGH (ref 0–0.9)
MONOCYTES # BLD AUTO: 1.35 K/UL — HIGH (ref 0–0.9)
MONOCYTES # BLD AUTO: 1.42 K/UL — HIGH (ref 0–0.9)
MONOCYTES # BLD AUTO: 3.15 K/UL — HIGH (ref 0–0.9)
MONOCYTES NFR BLD AUTO: 1 % — LOW (ref 2–14)
MONOCYTES NFR BLD AUTO: 1.9 % — LOW (ref 2–14)
MONOCYTES NFR BLD AUTO: 2 % — SIGNIFICANT CHANGE UP (ref 2–14)
MONOCYTES NFR BLD AUTO: 2 % — SIGNIFICANT CHANGE UP (ref 2–14)
MONOCYTES NFR BLD AUTO: 2.2 % — SIGNIFICANT CHANGE UP (ref 2–14)
MONOCYTES NFR BLD AUTO: 2.2 % — SIGNIFICANT CHANGE UP (ref 2–14)
MONOCYTES NFR BLD AUTO: 2.6 % — SIGNIFICANT CHANGE UP (ref 2–14)
MONOCYTES NFR BLD AUTO: 2.9 % — SIGNIFICANT CHANGE UP (ref 2–14)
MONOCYTES NFR BLD AUTO: 3 % — SIGNIFICANT CHANGE UP (ref 2–14)
MONOCYTES NFR BLD AUTO: 3 % — SIGNIFICANT CHANGE UP (ref 2–14)
MONOCYTES NFR BLD AUTO: 3.3 % — SIGNIFICANT CHANGE UP (ref 2–14)
MONOCYTES NFR BLD AUTO: 3.6 % — SIGNIFICANT CHANGE UP (ref 2–14)
MONOCYTES NFR BLD AUTO: 3.6 % — SIGNIFICANT CHANGE UP (ref 2–14)
MONOCYTES NFR BLD AUTO: 3.7 % — SIGNIFICANT CHANGE UP (ref 2–14)
MONOCYTES NFR BLD AUTO: 3.7 % — SIGNIFICANT CHANGE UP (ref 2–14)
MONOCYTES NFR BLD AUTO: 3.8 % — SIGNIFICANT CHANGE UP (ref 2–14)
MONOCYTES NFR BLD AUTO: 3.9 % — SIGNIFICANT CHANGE UP (ref 2–14)
MONOCYTES NFR BLD AUTO: 4 % — SIGNIFICANT CHANGE UP (ref 2–14)
MONOCYTES NFR BLD AUTO: 4.6 % — SIGNIFICANT CHANGE UP (ref 2–14)
MONOCYTES NFR BLD AUTO: 4.9 % — SIGNIFICANT CHANGE UP (ref 2–14)
MONOCYTES NFR BLD AUTO: 4.9 % — SIGNIFICANT CHANGE UP (ref 2–14)
MONOCYTES NFR BLD AUTO: 5 % — SIGNIFICANT CHANGE UP (ref 2–14)
MONOCYTES NFR BLD AUTO: 5.2 % — SIGNIFICANT CHANGE UP (ref 2–14)
MONOCYTES NFR BLD AUTO: 5.2 % — SIGNIFICANT CHANGE UP (ref 2–14)
MRSA PCR RESULT.: SIGNIFICANT CHANGE UP
NEUTROPHILS # BLD AUTO: 10.07 K/UL — HIGH (ref 1.8–7.4)
NEUTROPHILS # BLD AUTO: 10.27 K/UL — HIGH (ref 1.8–7.4)
NEUTROPHILS # BLD AUTO: 10.95 K/UL — HIGH (ref 1.8–7.4)
NEUTROPHILS # BLD AUTO: 11.49 K/UL — HIGH (ref 1.8–7.4)
NEUTROPHILS # BLD AUTO: 12.38 K/UL — HIGH (ref 1.8–7.4)
NEUTROPHILS # BLD AUTO: 12.41 K/UL — HIGH (ref 1.8–7.4)
NEUTROPHILS # BLD AUTO: 12.48 K/UL — HIGH (ref 1.8–7.4)
NEUTROPHILS # BLD AUTO: 12.89 K/UL — HIGH (ref 1.8–7.4)
NEUTROPHILS # BLD AUTO: 13.14 K/UL — HIGH (ref 1.8–7.4)
NEUTROPHILS # BLD AUTO: 13.38 K/UL — HIGH (ref 1.8–7.4)
NEUTROPHILS # BLD AUTO: 13.7 K/UL — HIGH (ref 1.8–7.4)
NEUTROPHILS # BLD AUTO: 14.15 K/UL — HIGH (ref 1.8–7.4)
NEUTROPHILS # BLD AUTO: 15.88 K/UL — HIGH (ref 1.8–7.4)
NEUTROPHILS # BLD AUTO: 16.42 K/UL — HIGH (ref 1.8–7.4)
NEUTROPHILS # BLD AUTO: 17.45 K/UL — HIGH (ref 1.8–7.4)
NEUTROPHILS # BLD AUTO: 20.8 K/UL — HIGH (ref 1.8–7.4)
NEUTROPHILS # BLD AUTO: 21.16 K/UL — HIGH (ref 1.8–7.4)
NEUTROPHILS # BLD AUTO: 22.55 K/UL — HIGH (ref 1.8–7.4)
NEUTROPHILS # BLD AUTO: 25.61 K/UL — HIGH (ref 1.8–7.4)
NEUTROPHILS # BLD AUTO: 26 K/UL — HIGH (ref 1.8–7.4)
NEUTROPHILS # BLD AUTO: 26.59 K/UL — HIGH (ref 1.8–7.4)
NEUTROPHILS # BLD AUTO: 27.74 K/UL — HIGH (ref 1.8–7.4)
NEUTROPHILS # BLD AUTO: 30.53 K/UL — HIGH (ref 1.8–7.4)
NEUTROPHILS # BLD AUTO: 30.6 K/UL — HIGH (ref 1.8–7.4)
NEUTROPHILS # BLD AUTO: 31.85 K/UL — HIGH (ref 1.8–7.4)
NEUTROPHILS # BLD AUTO: 32.34 K/UL — HIGH (ref 1.8–7.4)
NEUTROPHILS # BLD AUTO: 32.63 K/UL — HIGH (ref 1.8–7.4)
NEUTROPHILS # BLD AUTO: 33.06 K/UL — HIGH (ref 1.8–7.4)
NEUTROPHILS # BLD AUTO: 34.65 K/UL — HIGH (ref 1.8–7.4)
NEUTROPHILS # BLD AUTO: 36 K/UL — HIGH (ref 1.8–7.4)
NEUTROPHILS # BLD AUTO: 36.64 K/UL — HIGH (ref 1.8–7.4)
NEUTROPHILS # BLD AUTO: 74.79 K/UL — HIGH (ref 1.8–7.4)
NEUTROPHILS NFR BLD AUTO: 44 % — SIGNIFICANT CHANGE UP (ref 43–77)
NEUTROPHILS NFR BLD AUTO: 82.4 % — HIGH (ref 43–77)
NEUTROPHILS NFR BLD AUTO: 82.9 % — HIGH (ref 43–77)
NEUTROPHILS NFR BLD AUTO: 83.8 % — HIGH (ref 43–77)
NEUTROPHILS NFR BLD AUTO: 83.9 % — HIGH (ref 43–77)
NEUTROPHILS NFR BLD AUTO: 84 % — HIGH (ref 43–77)
NEUTROPHILS NFR BLD AUTO: 84.1 % — HIGH (ref 43–77)
NEUTROPHILS NFR BLD AUTO: 84.1 % — HIGH (ref 43–77)
NEUTROPHILS NFR BLD AUTO: 84.6 % — HIGH (ref 43–77)
NEUTROPHILS NFR BLD AUTO: 84.7 % — HIGH (ref 43–77)
NEUTROPHILS NFR BLD AUTO: 85.3 % — HIGH (ref 43–77)
NEUTROPHILS NFR BLD AUTO: 86 % — HIGH (ref 43–77)
NEUTROPHILS NFR BLD AUTO: 86.5 % — HIGH (ref 43–77)
NEUTROPHILS NFR BLD AUTO: 86.6 % — HIGH (ref 43–77)
NEUTROPHILS NFR BLD AUTO: 87 % — HIGH (ref 43–77)
NEUTROPHILS NFR BLD AUTO: 87.3 % — HIGH (ref 43–77)
NEUTROPHILS NFR BLD AUTO: 88 % — HIGH (ref 43–77)
NEUTROPHILS NFR BLD AUTO: 88 % — HIGH (ref 43–77)
NEUTROPHILS NFR BLD AUTO: 88.2 % — HIGH (ref 43–77)
NEUTROPHILS NFR BLD AUTO: 88.4 % — HIGH (ref 43–77)
NEUTROPHILS NFR BLD AUTO: 88.8 % — HIGH (ref 43–77)
NEUTROPHILS NFR BLD AUTO: 89 % — HIGH (ref 43–77)
NEUTROPHILS NFR BLD AUTO: 89.5 % — HIGH (ref 43–77)
NEUTROPHILS NFR BLD AUTO: 90.1 % — HIGH (ref 43–77)
NEUTROPHILS NFR BLD AUTO: 90.7 % — HIGH (ref 43–77)
NEUTROPHILS NFR BLD AUTO: 91 % — HIGH (ref 43–77)
NEUTROPHILS NFR BLD AUTO: 91 % — HIGH (ref 43–77)
NEUTROPHILS NFR BLD AUTO: 92 % — HIGH (ref 43–77)
NEUTROPHILS NFR BLD AUTO: 93.1 % — HIGH (ref 43–77)
NITRITE UR-MCNC: NEGATIVE — SIGNIFICANT CHANGE UP
NITRITE UR-MCNC: NEGATIVE — SIGNIFICANT CHANGE UP
NRBC # BLD: 0 /100 WBCS — SIGNIFICANT CHANGE UP (ref 0–0)
NRBC # BLD: 0 /100 — SIGNIFICANT CHANGE UP (ref 0–0)
NRBC # BLD: 0 /100 — SIGNIFICANT CHANGE UP (ref 0–0)
NRBC # BLD: SIGNIFICANT CHANGE UP /100 WBCS (ref 0–0)
NT-PROBNP SERPL-SCNC: 9620 PG/ML — HIGH (ref 0–125)
NT-PROBNP SERPL-SCNC: HIGH PG/ML (ref 0–125)
NT-PROBNP SERPL-SCNC: HIGH PG/ML (ref 0–125)
OB PNL STL: NEGATIVE — SIGNIFICANT CHANGE UP
OB PNL STL: POSITIVE
OSMOLALITY UR: 327 MOSM/KG — SIGNIFICANT CHANGE UP (ref 50–1200)
OSMOLALITY UR: 502 MOSM/KG — SIGNIFICANT CHANGE UP (ref 50–1200)
PCO2 BLDA: 32 MMHG — SIGNIFICANT CHANGE UP (ref 32–35)
PCO2 BLDA: 34 MMHG — SIGNIFICANT CHANGE UP (ref 32–35)
PCO2 BLDA: 35 MMHG — SIGNIFICANT CHANGE UP (ref 32–35)
PCO2 BLDA: 38 MMHG — HIGH (ref 32–35)
PCO2 BLDA: 40 MMHG — HIGH (ref 32–35)
PCO2 BLDA: 41 MMHG — HIGH (ref 32–35)
PCO2 BLDA: 46 MMHG — HIGH (ref 32–35)
PCO2 BLDA: 50 MMHG — HIGH (ref 32–35)
PCO2 BLDA: 51 MMHG — HIGH (ref 32–35)
PCO2 BLDA: 53 MMHG — HIGH (ref 32–35)
PCO2 BLDA: 61 MMHG — HIGH (ref 32–35)
PCO2 BLDA: 64 MMHG — HIGH (ref 32–35)
PCO2 BLDA: 73 MMHG — CRITICAL HIGH (ref 32–35)
PH BLDA: 7.1 — CRITICAL LOW (ref 7.35–7.45)
PH BLDA: 7.11 — CRITICAL LOW (ref 7.35–7.45)
PH BLDA: 7.12 — CRITICAL LOW (ref 7.35–7.45)
PH BLDA: 7.15 — CRITICAL LOW (ref 7.35–7.45)
PH BLDA: 7.15 — CRITICAL LOW (ref 7.35–7.45)
PH BLDA: 7.17 — CRITICAL LOW (ref 7.35–7.45)
PH BLDA: 7.24 — LOW (ref 7.35–7.45)
PH BLDA: 7.25 — LOW (ref 7.35–7.45)
PH BLDA: 7.28 — LOW (ref 7.35–7.45)
PH BLDA: 7.31 — LOW (ref 7.35–7.45)
PH BLDA: 7.34 — LOW (ref 7.35–7.45)
PH BLDA: 7.39 — SIGNIFICANT CHANGE UP (ref 7.35–7.45)
PH BLDA: 7.43 — SIGNIFICANT CHANGE UP (ref 7.35–7.45)
PH UR: 5 — SIGNIFICANT CHANGE UP (ref 5–8)
PH UR: 5 — SIGNIFICANT CHANGE UP (ref 5–8)
PHOSPHATE SERPL-MCNC: 2.5 MG/DL — SIGNIFICANT CHANGE UP (ref 2.5–4.5)
PHOSPHATE SERPL-MCNC: 2.7 MG/DL — SIGNIFICANT CHANGE UP (ref 2.5–4.5)
PHOSPHATE SERPL-MCNC: 2.7 MG/DL — SIGNIFICANT CHANGE UP (ref 2.5–4.5)
PHOSPHATE SERPL-MCNC: 2.8 MG/DL — SIGNIFICANT CHANGE UP (ref 2.5–4.5)
PHOSPHATE SERPL-MCNC: 2.8 MG/DL — SIGNIFICANT CHANGE UP (ref 2.5–4.5)
PHOSPHATE SERPL-MCNC: 3.3 MG/DL — SIGNIFICANT CHANGE UP (ref 2.5–4.5)
PHOSPHATE SERPL-MCNC: 3.6 MG/DL — SIGNIFICANT CHANGE UP (ref 2.5–4.5)
PHOSPHATE SERPL-MCNC: 3.7 MG/DL — SIGNIFICANT CHANGE UP (ref 2.5–4.5)
PHOSPHATE SERPL-MCNC: 3.8 MG/DL — SIGNIFICANT CHANGE UP (ref 2.5–4.5)
PHOSPHATE SERPL-MCNC: 3.8 MG/DL — SIGNIFICANT CHANGE UP (ref 2.5–4.5)
PHOSPHATE SERPL-MCNC: 3.9 MG/DL — SIGNIFICANT CHANGE UP (ref 2.5–4.5)
PHOSPHATE SERPL-MCNC: 4 MG/DL — SIGNIFICANT CHANGE UP (ref 2.5–4.5)
PHOSPHATE SERPL-MCNC: 4 MG/DL — SIGNIFICANT CHANGE UP (ref 2.5–4.5)
PHOSPHATE SERPL-MCNC: 4.2 MG/DL — SIGNIFICANT CHANGE UP (ref 2.5–4.5)
PHOSPHATE SERPL-MCNC: 4.2 MG/DL — SIGNIFICANT CHANGE UP (ref 2.5–4.5)
PHOSPHATE SERPL-MCNC: 4.5 MG/DL — SIGNIFICANT CHANGE UP (ref 2.5–4.5)
PHOSPHATE SERPL-MCNC: 4.6 MG/DL — HIGH (ref 2.5–4.5)
PHOSPHATE SERPL-MCNC: 4.9 MG/DL — HIGH (ref 2.5–4.5)
PHOSPHATE SERPL-MCNC: 5.1 MG/DL — HIGH (ref 2.5–4.5)
PHOSPHATE SERPL-MCNC: 5.3 MG/DL — HIGH (ref 2.5–4.5)
PHOSPHATE SERPL-MCNC: 5.5 MG/DL — HIGH (ref 2.5–4.5)
PHOSPHATE SERPL-MCNC: 5.6 MG/DL — HIGH (ref 2.5–4.5)
PHOSPHATE SERPL-MCNC: 5.6 MG/DL — HIGH (ref 2.5–4.5)
PHOSPHATE SERPL-MCNC: 5.8 MG/DL — HIGH (ref 2.5–4.5)
PHOSPHATE SERPL-MCNC: 6 MG/DL — HIGH (ref 2.5–4.5)
PHOSPHATE SERPL-MCNC: 8.6 MG/DL — HIGH (ref 2.5–4.5)
PLAT MORPH BLD: ABNORMAL
PLAT MORPH BLD: ABNORMAL
PLATELET # BLD AUTO: 109 K/UL — LOW (ref 150–400)
PLATELET # BLD AUTO: 110 K/UL — LOW (ref 150–400)
PLATELET # BLD AUTO: 114 K/UL — LOW (ref 150–400)
PLATELET # BLD AUTO: 134 K/UL — LOW (ref 150–400)
PLATELET # BLD AUTO: 159 K/UL — SIGNIFICANT CHANGE UP (ref 150–400)
PLATELET # BLD AUTO: 178 K/UL — SIGNIFICANT CHANGE UP (ref 150–400)
PLATELET # BLD AUTO: 192 K/UL — SIGNIFICANT CHANGE UP (ref 150–400)
PLATELET # BLD AUTO: 201 K/UL — SIGNIFICANT CHANGE UP (ref 150–400)
PLATELET # BLD AUTO: 213 K/UL — SIGNIFICANT CHANGE UP (ref 150–400)
PLATELET # BLD AUTO: 221 K/UL — SIGNIFICANT CHANGE UP (ref 150–400)
PLATELET # BLD AUTO: 224 K/UL — SIGNIFICANT CHANGE UP (ref 150–400)
PLATELET # BLD AUTO: 230 K/UL — SIGNIFICANT CHANGE UP (ref 150–400)
PLATELET # BLD AUTO: 238 K/UL — SIGNIFICANT CHANGE UP (ref 150–400)
PLATELET # BLD AUTO: 244 K/UL — SIGNIFICANT CHANGE UP (ref 150–400)
PLATELET # BLD AUTO: 244 K/UL — SIGNIFICANT CHANGE UP (ref 150–400)
PLATELET # BLD AUTO: 259 K/UL — SIGNIFICANT CHANGE UP (ref 150–400)
PLATELET # BLD AUTO: 279 K/UL — SIGNIFICANT CHANGE UP (ref 150–400)
PLATELET # BLD AUTO: 292 K/UL — SIGNIFICANT CHANGE UP (ref 150–400)
PLATELET # BLD AUTO: 315 K/UL — SIGNIFICANT CHANGE UP (ref 150–400)
PLATELET # BLD AUTO: 321 K/UL — SIGNIFICANT CHANGE UP (ref 150–400)
PLATELET # BLD AUTO: 327 K/UL — SIGNIFICANT CHANGE UP (ref 150–400)
PLATELET # BLD AUTO: 333 K/UL — SIGNIFICANT CHANGE UP (ref 150–400)
PLATELET # BLD AUTO: 343 K/UL — SIGNIFICANT CHANGE UP (ref 150–400)
PLATELET # BLD AUTO: 345 K/UL — SIGNIFICANT CHANGE UP (ref 150–400)
PLATELET # BLD AUTO: 364 K/UL — SIGNIFICANT CHANGE UP (ref 150–400)
PLATELET # BLD AUTO: 365 K/UL — SIGNIFICANT CHANGE UP (ref 150–400)
PLATELET # BLD AUTO: 369 K/UL — SIGNIFICANT CHANGE UP (ref 150–400)
PLATELET # BLD AUTO: 389 K/UL — SIGNIFICANT CHANGE UP (ref 150–400)
PLATELET # BLD AUTO: 403 K/UL — HIGH (ref 150–400)
PLATELET # BLD AUTO: 416 K/UL — HIGH (ref 150–400)
PLATELET # BLD AUTO: 416 K/UL — HIGH (ref 150–400)
PLATELET # BLD AUTO: 417 K/UL — HIGH (ref 150–400)
PLATELET # BLD AUTO: 426 K/UL — HIGH (ref 150–400)
PLATELET # BLD AUTO: 430 K/UL — HIGH (ref 150–400)
PLATELET # BLD AUTO: 446 K/UL — HIGH (ref 150–400)
PLATELET # BLD AUTO: 459 K/UL — HIGH (ref 150–400)
PLATELET # BLD AUTO: 476 K/UL — HIGH (ref 150–400)
PLATELET # BLD AUTO: 481 K/UL — HIGH (ref 150–400)
PLATELET # BLD AUTO: 481 K/UL — HIGH (ref 150–400)
PLATELET # BLD AUTO: 494 K/UL — HIGH (ref 150–400)
PLATELET # BLD AUTO: 498 K/UL — HIGH (ref 150–400)
PLATELET # BLD AUTO: 514 K/UL — HIGH (ref 150–400)
PLATELET # BLD AUTO: 538 K/UL — HIGH (ref 150–400)
PLATELET # BLD AUTO: 566 K/UL — HIGH (ref 150–400)
PLATELET # BLD AUTO: 569 K/UL — HIGH (ref 150–400)
PLATELET # BLD AUTO: 691 K/UL — HIGH (ref 150–400)
PLATELET # BLD AUTO: 721 K/UL — HIGH (ref 150–400)
PLATELET # BLD AUTO: 96 K/UL — LOW (ref 150–400)
PO2 BLDA: 105 MMHG — SIGNIFICANT CHANGE UP (ref 83–108)
PO2 BLDA: 140 MMHG — HIGH (ref 83–108)
PO2 BLDA: 145 MMHG — HIGH (ref 83–108)
PO2 BLDA: 160 MMHG — HIGH (ref 83–108)
PO2 BLDA: 194 MMHG — HIGH (ref 83–108)
PO2 BLDA: 207 MMHG — HIGH (ref 83–108)
PO2 BLDA: 267 MMHG — HIGH (ref 83–108)
PO2 BLDA: 282 MMHG — HIGH (ref 83–108)
PO2 BLDA: 362 MMHG — HIGH (ref 83–108)
PO2 BLDA: 65 MMHG — LOW (ref 83–108)
PO2 BLDA: 73 MMHG — LOW (ref 83–108)
PO2 BLDA: 73 MMHG — LOW (ref 83–108)
PO2 BLDA: 74 MMHG — LOW (ref 83–108)
POIKILOCYTOSIS BLD QL AUTO: SLIGHT — SIGNIFICANT CHANGE UP
POIKILOCYTOSIS BLD QL AUTO: SLIGHT — SIGNIFICANT CHANGE UP
POTASSIUM SERPL-MCNC: 2.3 MMOL/L — CRITICAL LOW (ref 3.5–5.3)
POTASSIUM SERPL-MCNC: 2.3 MMOL/L — CRITICAL LOW (ref 3.5–5.3)
POTASSIUM SERPL-MCNC: 2.5 MMOL/L — CRITICAL LOW (ref 3.5–5.3)
POTASSIUM SERPL-MCNC: 2.6 MMOL/L — CRITICAL LOW (ref 3.5–5.3)
POTASSIUM SERPL-MCNC: 2.8 MMOL/L — CRITICAL LOW (ref 3.5–5.3)
POTASSIUM SERPL-MCNC: 2.9 MMOL/L — CRITICAL LOW (ref 3.5–5.3)
POTASSIUM SERPL-MCNC: 3 MMOL/L — LOW (ref 3.5–5.3)
POTASSIUM SERPL-MCNC: 3.1 MMOL/L — LOW (ref 3.5–5.3)
POTASSIUM SERPL-MCNC: 3.2 MMOL/L — LOW (ref 3.5–5.3)
POTASSIUM SERPL-MCNC: 3.4 MMOL/L — LOW (ref 3.5–5.3)
POTASSIUM SERPL-MCNC: 3.5 MMOL/L — SIGNIFICANT CHANGE UP (ref 3.5–5.3)
POTASSIUM SERPL-MCNC: 3.6 MMOL/L — SIGNIFICANT CHANGE UP (ref 3.5–5.3)
POTASSIUM SERPL-MCNC: 3.7 MMOL/L — SIGNIFICANT CHANGE UP (ref 3.5–5.3)
POTASSIUM SERPL-MCNC: 3.8 MMOL/L — SIGNIFICANT CHANGE UP (ref 3.5–5.3)
POTASSIUM SERPL-MCNC: 3.9 MMOL/L — SIGNIFICANT CHANGE UP (ref 3.5–5.3)
POTASSIUM SERPL-MCNC: 4 MMOL/L — SIGNIFICANT CHANGE UP (ref 3.5–5.3)
POTASSIUM SERPL-MCNC: 4 MMOL/L — SIGNIFICANT CHANGE UP (ref 3.5–5.3)
POTASSIUM SERPL-MCNC: 4.1 MMOL/L — SIGNIFICANT CHANGE UP (ref 3.5–5.3)
POTASSIUM SERPL-MCNC: 4.2 MMOL/L — SIGNIFICANT CHANGE UP (ref 3.5–5.3)
POTASSIUM SERPL-MCNC: 4.3 MMOL/L — SIGNIFICANT CHANGE UP (ref 3.5–5.3)
POTASSIUM SERPL-MCNC: 4.7 MMOL/L — SIGNIFICANT CHANGE UP (ref 3.5–5.3)
POTASSIUM SERPL-SCNC: 2.3 MMOL/L — CRITICAL LOW (ref 3.5–5.3)
POTASSIUM SERPL-SCNC: 2.3 MMOL/L — CRITICAL LOW (ref 3.5–5.3)
POTASSIUM SERPL-SCNC: 2.5 MMOL/L — CRITICAL LOW (ref 3.5–5.3)
POTASSIUM SERPL-SCNC: 2.6 MMOL/L — CRITICAL LOW (ref 3.5–5.3)
POTASSIUM SERPL-SCNC: 2.8 MMOL/L — CRITICAL LOW (ref 3.5–5.3)
POTASSIUM SERPL-SCNC: 2.9 MMOL/L — CRITICAL LOW (ref 3.5–5.3)
POTASSIUM SERPL-SCNC: 3 MMOL/L — LOW (ref 3.5–5.3)
POTASSIUM SERPL-SCNC: 3.1 MMOL/L — LOW (ref 3.5–5.3)
POTASSIUM SERPL-SCNC: 3.2 MMOL/L — LOW (ref 3.5–5.3)
POTASSIUM SERPL-SCNC: 3.4 MMOL/L — LOW (ref 3.5–5.3)
POTASSIUM SERPL-SCNC: 3.5 MMOL/L — SIGNIFICANT CHANGE UP (ref 3.5–5.3)
POTASSIUM SERPL-SCNC: 3.6 MMOL/L — SIGNIFICANT CHANGE UP (ref 3.5–5.3)
POTASSIUM SERPL-SCNC: 3.7 MMOL/L — SIGNIFICANT CHANGE UP (ref 3.5–5.3)
POTASSIUM SERPL-SCNC: 3.8 MMOL/L — SIGNIFICANT CHANGE UP (ref 3.5–5.3)
POTASSIUM SERPL-SCNC: 3.9 MMOL/L — SIGNIFICANT CHANGE UP (ref 3.5–5.3)
POTASSIUM SERPL-SCNC: 4 MMOL/L — SIGNIFICANT CHANGE UP (ref 3.5–5.3)
POTASSIUM SERPL-SCNC: 4 MMOL/L — SIGNIFICANT CHANGE UP (ref 3.5–5.3)
POTASSIUM SERPL-SCNC: 4.1 MMOL/L — SIGNIFICANT CHANGE UP (ref 3.5–5.3)
POTASSIUM SERPL-SCNC: 4.2 MMOL/L — SIGNIFICANT CHANGE UP (ref 3.5–5.3)
POTASSIUM SERPL-SCNC: 4.3 MMOL/L — SIGNIFICANT CHANGE UP (ref 3.5–5.3)
POTASSIUM SERPL-SCNC: 4.7 MMOL/L — SIGNIFICANT CHANGE UP (ref 3.5–5.3)
POTASSIUM UR-SCNC: 12.2 MMOL/L — SIGNIFICANT CHANGE UP
POTASSIUM UR-SCNC: 36.7 MMOL/L — SIGNIFICANT CHANGE UP
PROCALCITONIN SERPL-MCNC: 14.21 NG/ML — HIGH
PROT ?TM UR-MCNC: 50 MG/DL — HIGH (ref 0–12)
PROT ?TM UR-MCNC: <4 MG/DL — SIGNIFICANT CHANGE UP (ref 0–12)
PROT SERPL-MCNC: 4.4 G/DL — LOW (ref 6–8.3)
PROT SERPL-MCNC: 4.5 G/DL — LOW (ref 6–8.3)
PROT SERPL-MCNC: 4.6 G/DL — LOW (ref 6–8.3)
PROT SERPL-MCNC: 4.7 G/DL — LOW (ref 6–8.3)
PROT SERPL-MCNC: 4.8 G/DL — LOW (ref 6–8.3)
PROT SERPL-MCNC: 4.9 G/DL — LOW (ref 6–8.3)
PROT SERPL-MCNC: 4.9 G/DL — LOW (ref 6–8.3)
PROT SERPL-MCNC: 5.1 G/DL — LOW (ref 6–8.3)
PROT SERPL-MCNC: 5.1 G/DL — LOW (ref 6–8.3)
PROT SERPL-MCNC: 5.2 G/DL — LOW (ref 6–8.3)
PROT SERPL-MCNC: 5.3 G/DL — LOW (ref 6–8.3)
PROT SERPL-MCNC: 5.3 G/DL — LOW (ref 6–8.3)
PROT SERPL-MCNC: 5.4 G/DL — LOW (ref 6–8.3)
PROT SERPL-MCNC: 5.4 G/DL — LOW (ref 6–8.3)
PROT SERPL-MCNC: 5.7 G/DL — LOW (ref 6–8.3)
PROT SERPL-MCNC: 5.8 G/DL — LOW (ref 6–8.3)
PROT SERPL-MCNC: 5.8 G/DL — LOW (ref 6–8.3)
PROT SERPL-MCNC: 5.9 G/DL — LOW (ref 6–8.3)
PROT SERPL-MCNC: 6 G/DL — SIGNIFICANT CHANGE UP (ref 6–8.3)
PROT SERPL-MCNC: 6 G/DL — SIGNIFICANT CHANGE UP (ref 6–8.3)
PROT SERPL-MCNC: 6.1 G/DL — SIGNIFICANT CHANGE UP (ref 6–8.3)
PROT SERPL-MCNC: 6.2 G/DL — SIGNIFICANT CHANGE UP (ref 6–8.3)
PROT SERPL-MCNC: 6.3 G/DL — SIGNIFICANT CHANGE UP (ref 6–8.3)
PROT SERPL-MCNC: 6.3 G/DL — SIGNIFICANT CHANGE UP (ref 6–8.3)
PROT SERPL-MCNC: 6.4 G/DL — SIGNIFICANT CHANGE UP (ref 6–8.3)
PROT SERPL-MCNC: 6.5 G/DL — SIGNIFICANT CHANGE UP (ref 6–8.3)
PROT SERPL-MCNC: 6.8 G/DL — SIGNIFICANT CHANGE UP (ref 6–8.3)
PROT UR-MCNC: 100
PROT UR-MCNC: NEGATIVE — SIGNIFICANT CHANGE UP
PROT/CREAT UR-RTO: 0.4 RATIO — HIGH (ref 0–0.2)
PROT/CREAT UR-RTO: SIGNIFICANT CHANGE UP RATIO (ref 0–0.2)
PROTHROM AB SERPL-ACNC: 13.7 SEC — HIGH (ref 10.5–13.4)
PROTHROM AB SERPL-ACNC: 18.4 SEC — HIGH (ref 10.5–13.4)
PROTHROM AB SERPL-ACNC: 18.4 SEC — HIGH (ref 10.5–13.4)
PROTHROM AB SERPL-ACNC: 22 SEC — HIGH (ref 10.5–13.4)
PROTHROM AB SERPL-ACNC: 22.7 SEC — HIGH (ref 10.5–13.4)
PROTHROM AB SERPL-ACNC: 24.8 SEC — HIGH (ref 10.5–13.4)
PROTHROM AB SERPL-ACNC: 25 SEC — HIGH (ref 10.5–13.4)
PROTHROM AB SERPL-ACNC: 26.4 SEC — HIGH (ref 10.5–13.4)
PROTHROM AB SERPL-ACNC: 36.9 SEC — HIGH (ref 10.5–13.4)
RAPID RVP RESULT: SIGNIFICANT CHANGE UP
RBC # BLD: 2.48 M/UL — LOW (ref 3.8–5.2)
RBC # BLD: 2.48 M/UL — LOW (ref 3.8–5.2)
RBC # BLD: 2.57 M/UL — LOW (ref 3.8–5.2)
RBC # BLD: 2.6 M/UL — LOW (ref 3.8–5.2)
RBC # BLD: 2.63 M/UL — LOW (ref 3.8–5.2)
RBC # BLD: 2.66 M/UL — LOW (ref 3.8–5.2)
RBC # BLD: 2.67 M/UL — LOW (ref 3.8–5.2)
RBC # BLD: 2.72 M/UL — LOW (ref 3.8–5.2)
RBC # BLD: 2.77 M/UL — LOW (ref 3.8–5.2)
RBC # BLD: 2.83 M/UL — LOW (ref 3.8–5.2)
RBC # BLD: 2.85 M/UL — LOW (ref 3.8–5.2)
RBC # BLD: 2.85 M/UL — LOW (ref 3.8–5.2)
RBC # BLD: 2.86 M/UL — LOW (ref 3.8–5.2)
RBC # BLD: 2.86 M/UL — LOW (ref 3.8–5.2)
RBC # BLD: 2.89 M/UL — LOW (ref 3.8–5.2)
RBC # BLD: 2.89 M/UL — LOW (ref 3.8–5.2)
RBC # BLD: 2.91 M/UL — LOW (ref 3.8–5.2)
RBC # BLD: 2.91 M/UL — LOW (ref 3.8–5.2)
RBC # BLD: 2.93 M/UL — LOW (ref 3.8–5.2)
RBC # BLD: 2.93 M/UL — LOW (ref 3.8–5.2)
RBC # BLD: 2.94 M/UL — LOW (ref 3.8–5.2)
RBC # BLD: 2.95 M/UL — LOW (ref 3.8–5.2)
RBC # BLD: 2.95 M/UL — LOW (ref 3.8–5.2)
RBC # BLD: 2.98 M/UL — LOW (ref 3.8–5.2)
RBC # BLD: 2.98 M/UL — LOW (ref 3.8–5.2)
RBC # BLD: 3.01 M/UL — LOW (ref 3.8–5.2)
RBC # BLD: 3.05 M/UL — LOW (ref 3.8–5.2)
RBC # BLD: 3.06 M/UL — LOW (ref 3.8–5.2)
RBC # BLD: 3.07 M/UL — LOW (ref 3.8–5.2)
RBC # BLD: 3.08 M/UL — LOW (ref 3.8–5.2)
RBC # BLD: 3.09 M/UL — LOW (ref 3.8–5.2)
RBC # BLD: 3.09 M/UL — LOW (ref 3.8–5.2)
RBC # BLD: 3.12 M/UL — LOW (ref 3.8–5.2)
RBC # BLD: 3.14 M/UL — LOW (ref 3.8–5.2)
RBC # BLD: 3.17 M/UL — LOW (ref 3.8–5.2)
RBC # BLD: 3.2 M/UL — LOW (ref 3.8–5.2)
RBC # BLD: 3.28 M/UL — LOW (ref 3.8–5.2)
RBC # BLD: 3.29 M/UL — LOW (ref 3.8–5.2)
RBC # BLD: 3.34 M/UL — LOW (ref 3.8–5.2)
RBC # BLD: 3.34 M/UL — LOW (ref 3.8–5.2)
RBC # BLD: 3.38 M/UL — LOW (ref 3.8–5.2)
RBC # BLD: 3.4 M/UL — LOW (ref 3.8–5.2)
RBC # BLD: 3.41 M/UL — LOW (ref 3.8–5.2)
RBC # BLD: 3.44 M/UL — LOW (ref 3.8–5.2)
RBC # BLD: 3.64 M/UL — LOW (ref 3.8–5.2)
RBC # BLD: 3.67 M/UL — LOW (ref 3.8–5.2)
RBC # BLD: 3.71 M/UL — LOW (ref 3.8–5.2)
RBC # BLD: 3.8 M/UL — SIGNIFICANT CHANGE UP (ref 3.8–5.2)
RBC # BLD: 3.95 M/UL — SIGNIFICANT CHANGE UP (ref 3.8–5.2)
RBC # FLD: 18.3 % — HIGH (ref 10.3–14.5)
RBC # FLD: 18.7 % — HIGH (ref 10.3–14.5)
RBC # FLD: 18.9 % — HIGH (ref 10.3–14.5)
RBC # FLD: 19 % — HIGH (ref 10.3–14.5)
RBC # FLD: 19 % — HIGH (ref 10.3–14.5)
RBC # FLD: 19.1 % — HIGH (ref 10.3–14.5)
RBC # FLD: 19.2 % — HIGH (ref 10.3–14.5)
RBC # FLD: 19.2 % — HIGH (ref 10.3–14.5)
RBC # FLD: 19.3 % — HIGH (ref 10.3–14.5)
RBC # FLD: 19.3 % — HIGH (ref 10.3–14.5)
RBC # FLD: 19.4 % — HIGH (ref 10.3–14.5)
RBC # FLD: 19.4 % — HIGH (ref 10.3–14.5)
RBC # FLD: 19.5 % — HIGH (ref 10.3–14.5)
RBC # FLD: 19.5 % — HIGH (ref 10.3–14.5)
RBC # FLD: 19.6 % — HIGH (ref 10.3–14.5)
RBC # FLD: 19.7 % — HIGH (ref 10.3–14.5)
RBC # FLD: 19.8 % — HIGH (ref 10.3–14.5)
RBC # FLD: 20.1 % — HIGH (ref 10.3–14.5)
RBC # FLD: 20.2 % — HIGH (ref 10.3–14.5)
RBC # FLD: 20.2 % — HIGH (ref 10.3–14.5)
RBC # FLD: 20.3 % — HIGH (ref 10.3–14.5)
RBC # FLD: 20.4 % — HIGH (ref 10.3–14.5)
RBC # FLD: 20.5 % — HIGH (ref 10.3–14.5)
RBC # FLD: 20.5 % — HIGH (ref 10.3–14.5)
RBC # FLD: 20.6 % — HIGH (ref 10.3–14.5)
RBC # FLD: 20.8 % — HIGH (ref 10.3–14.5)
RBC # FLD: 20.9 % — HIGH (ref 10.3–14.5)
RBC # FLD: 21 % — HIGH (ref 10.3–14.5)
RBC # FLD: 21.1 % — HIGH (ref 10.3–14.5)
RBC # FLD: 21.2 % — HIGH (ref 10.3–14.5)
RBC # FLD: 21.4 % — HIGH (ref 10.3–14.5)
RBC # FLD: 21.5 % — HIGH (ref 10.3–14.5)
RBC # FLD: 21.7 % — HIGH (ref 10.3–14.5)
RBC # FLD: 21.8 % — HIGH (ref 10.3–14.5)
RBC BLD AUTO: ABNORMAL
RBC BLD AUTO: ABNORMAL
RETICS #: 43.3 K/UL — SIGNIFICANT CHANGE UP (ref 25–125)
RETICS/RBC NFR: 1.4 % — SIGNIFICANT CHANGE UP (ref 0.5–2.5)
RSV RNA NPH QL NAA+NON-PROBE: SIGNIFICANT CHANGE UP
RSV RNA NPH QL NAA+NON-PROBE: SIGNIFICANT CHANGE UP
S AUREUS DNA NOSE QL NAA+PROBE: SIGNIFICANT CHANGE UP
SAO2 % BLDA: 100 % — HIGH (ref 94–98)
SAO2 % BLDA: 93.8 % — LOW (ref 94–98)
SAO2 % BLDA: 95.9 % — SIGNIFICANT CHANGE UP (ref 94–98)
SAO2 % BLDA: 96.1 % — SIGNIFICANT CHANGE UP (ref 94–98)
SAO2 % BLDA: 96.5 % — SIGNIFICANT CHANGE UP (ref 94–98)
SAO2 % BLDA: 99.6 % — HIGH (ref 94–98)
SARS-COV-2 RNA SPEC QL NAA+PROBE: DETECTED
SARS-COV-2 RNA SPEC QL NAA+PROBE: DETECTED
SARS-COV-2 RNA SPEC QL NAA+PROBE: SIGNIFICANT CHANGE UP
SEROTONIN SERUM: 9 NG/ML
SODIUM SERPL-SCNC: 138 MMOL/L — SIGNIFICANT CHANGE UP (ref 135–145)
SODIUM SERPL-SCNC: 139 MMOL/L — SIGNIFICANT CHANGE UP (ref 135–145)
SODIUM SERPL-SCNC: 139 MMOL/L — SIGNIFICANT CHANGE UP (ref 135–145)
SODIUM SERPL-SCNC: 140 MMOL/L — SIGNIFICANT CHANGE UP (ref 135–145)
SODIUM SERPL-SCNC: 141 MMOL/L — SIGNIFICANT CHANGE UP (ref 135–145)
SODIUM SERPL-SCNC: 142 MMOL/L — SIGNIFICANT CHANGE UP (ref 135–145)
SODIUM SERPL-SCNC: 143 MMOL/L — SIGNIFICANT CHANGE UP (ref 135–145)
SODIUM SERPL-SCNC: 144 MMOL/L — SIGNIFICANT CHANGE UP (ref 135–145)
SODIUM SERPL-SCNC: 145 MMOL/L — SIGNIFICANT CHANGE UP (ref 135–145)
SODIUM SERPL-SCNC: 145 MMOL/L — SIGNIFICANT CHANGE UP (ref 135–145)
SODIUM SERPL-SCNC: 146 MMOL/L — HIGH (ref 135–145)
SODIUM SERPL-SCNC: 147 MMOL/L — HIGH (ref 135–145)
SODIUM UR-SCNC: 132 MMOL/L — SIGNIFICANT CHANGE UP
SODIUM UR-SCNC: 42 MMOL/L — SIGNIFICANT CHANGE UP
SP GR SPEC: 1.01 — SIGNIFICANT CHANGE UP (ref 1.01–1.02)
SP GR SPEC: 1.01 — SIGNIFICANT CHANGE UP (ref 1.01–1.02)
SPECIMEN SOURCE: SIGNIFICANT CHANGE UP
SURGICAL PATHOLOGY STUDY: SIGNIFICANT CHANGE UP
TIBC SERPL-MCNC: 190 UG/DL — LOW (ref 220–430)
TRANSFERRIN SERPL-MCNC: 132 MG/DL — LOW (ref 200–360)
TROPONIN I, HIGH SENSITIVITY RESULT: 100.9 NG/L — HIGH
TROPONIN I, HIGH SENSITIVITY RESULT: 58.2 NG/L — HIGH
TROPONIN I, HIGH SENSITIVITY RESULT: 60.9 NG/L — HIGH
TROPONIN I, HIGH SENSITIVITY RESULT: 69.2 NG/L — HIGH
TROPONIN I, HIGH SENSITIVITY RESULT: 90.4 NG/L — HIGH
UIBC SERPL-MCNC: 147 UG/DL — SIGNIFICANT CHANGE UP (ref 110–370)
UROBILINOGEN FLD QL: NEGATIVE — SIGNIFICANT CHANGE UP
UROBILINOGEN FLD QL: NEGATIVE — SIGNIFICANT CHANGE UP
UUN UR-MCNC: 109 MG/DL — SIGNIFICANT CHANGE UP
UUN UR-MCNC: 906 MG/DL — SIGNIFICANT CHANGE UP
WBC # BLD: 10.59 K/UL — HIGH (ref 3.8–10.5)
WBC # BLD: 11.32 K/UL — HIGH (ref 3.8–10.5)
WBC # BLD: 11.8 K/UL — HIGH (ref 3.8–10.5)
WBC # BLD: 12.95 K/UL — HIGH (ref 3.8–10.5)
WBC # BLD: 13.02 K/UL — HIGH (ref 3.8–10.5)
WBC # BLD: 13.35 K/UL — HIGH (ref 3.8–10.5)
WBC # BLD: 13.71 K/UL — HIGH (ref 3.8–10.5)
WBC # BLD: 14.13 K/UL — HIGH (ref 3.8–10.5)
WBC # BLD: 14.74 K/UL — HIGH (ref 3.8–10.5)
WBC # BLD: 14.76 K/UL — HIGH (ref 3.8–10.5)
WBC # BLD: 15.13 K/UL — HIGH (ref 3.8–10.5)
WBC # BLD: 15.26 K/UL — HIGH (ref 3.8–10.5)
WBC # BLD: 15.8 K/UL — HIGH (ref 3.8–10.5)
WBC # BLD: 16.02 K/UL — HIGH (ref 3.8–10.5)
WBC # BLD: 16.3 K/UL — HIGH (ref 3.8–10.5)
WBC # BLD: 17.08 K/UL — HIGH (ref 3.8–10.5)
WBC # BLD: 17.19 K/UL — HIGH (ref 3.8–10.5)
WBC # BLD: 17.32 K/UL — HIGH (ref 3.8–10.5)
WBC # BLD: 18.04 K/UL — HIGH (ref 3.8–10.5)
WBC # BLD: 18.41 K/UL — HIGH (ref 3.8–10.5)
WBC # BLD: 18.47 K/UL — HIGH (ref 3.8–10.5)
WBC # BLD: 18.6 K/UL — HIGH (ref 3.8–10.5)
WBC # BLD: 19.13 K/UL — HIGH (ref 3.8–10.5)
WBC # BLD: 19.61 K/UL — HIGH (ref 3.8–10.5)
WBC # BLD: 20.09 K/UL — HIGH (ref 3.8–10.5)
WBC # BLD: 23.36 K/UL — HIGH (ref 3.8–10.5)
WBC # BLD: 23.77 K/UL — HIGH (ref 3.8–10.5)
WBC # BLD: 24.25 K/UL — HIGH (ref 3.8–10.5)
WBC # BLD: 29.42 K/UL — HIGH (ref 3.8–10.5)
WBC # BLD: 29.57 K/UL — HIGH (ref 3.8–10.5)
WBC # BLD: 30.22 K/UL — HIGH (ref 3.8–10.5)
WBC # BLD: 31.74 K/UL — HIGH (ref 3.8–10.5)
WBC # BLD: 32.29 K/UL — HIGH (ref 3.8–10.5)
WBC # BLD: 33.53 K/UL — HIGH (ref 3.8–10.5)
WBC # BLD: 33.79 K/UL — HIGH (ref 3.8–10.5)
WBC # BLD: 34.21 K/UL — HIGH (ref 3.8–10.5)
WBC # BLD: 34.35 K/UL — HIGH (ref 3.8–10.5)
WBC # BLD: 35.31 K/UL — HIGH (ref 3.8–10.5)
WBC # BLD: 36.72 K/UL — HIGH (ref 3.8–10.5)
WBC # BLD: 37.21 K/UL — HIGH (ref 3.8–10.5)
WBC # BLD: 37.23 K/UL — HIGH (ref 3.8–10.5)
WBC # BLD: 38.2 K/UL — HIGH (ref 3.8–10.5)
WBC # BLD: 38.85 K/UL — HIGH (ref 3.8–10.5)
WBC # BLD: 39.38 K/UL — HIGH (ref 3.8–10.5)
WBC # BLD: 39.94 K/UL — HIGH (ref 3.8–10.5)
WBC # BLD: 41.64 K/UL — CRITICAL HIGH (ref 3.8–10.5)
WBC # BLD: 42.12 K/UL — CRITICAL HIGH (ref 3.8–10.5)
WBC # BLD: 78.73 K/UL — CRITICAL HIGH (ref 3.8–10.5)
WBC # FLD AUTO: 10.59 K/UL — HIGH (ref 3.8–10.5)
WBC # FLD AUTO: 11.32 K/UL — HIGH (ref 3.8–10.5)
WBC # FLD AUTO: 11.8 K/UL — HIGH (ref 3.8–10.5)
WBC # FLD AUTO: 12.95 K/UL — HIGH (ref 3.8–10.5)
WBC # FLD AUTO: 13.02 K/UL — HIGH (ref 3.8–10.5)
WBC # FLD AUTO: 13.35 K/UL — HIGH (ref 3.8–10.5)
WBC # FLD AUTO: 13.71 K/UL — HIGH (ref 3.8–10.5)
WBC # FLD AUTO: 14.13 K/UL — HIGH (ref 3.8–10.5)
WBC # FLD AUTO: 14.74 K/UL — HIGH (ref 3.8–10.5)
WBC # FLD AUTO: 14.76 K/UL — HIGH (ref 3.8–10.5)
WBC # FLD AUTO: 15.13 K/UL — HIGH (ref 3.8–10.5)
WBC # FLD AUTO: 15.26 K/UL — HIGH (ref 3.8–10.5)
WBC # FLD AUTO: 15.8 K/UL — HIGH (ref 3.8–10.5)
WBC # FLD AUTO: 16.02 K/UL — HIGH (ref 3.8–10.5)
WBC # FLD AUTO: 16.3 K/UL — HIGH (ref 3.8–10.5)
WBC # FLD AUTO: 17.08 K/UL — HIGH (ref 3.8–10.5)
WBC # FLD AUTO: 17.19 K/UL — HIGH (ref 3.8–10.5)
WBC # FLD AUTO: 17.32 K/UL — HIGH (ref 3.8–10.5)
WBC # FLD AUTO: 18.04 K/UL — HIGH (ref 3.8–10.5)
WBC # FLD AUTO: 18.41 K/UL — HIGH (ref 3.8–10.5)
WBC # FLD AUTO: 18.47 K/UL — HIGH (ref 3.8–10.5)
WBC # FLD AUTO: 18.6 K/UL — HIGH (ref 3.8–10.5)
WBC # FLD AUTO: 19.13 K/UL — HIGH (ref 3.8–10.5)
WBC # FLD AUTO: 19.61 K/UL — HIGH (ref 3.8–10.5)
WBC # FLD AUTO: 20.09 K/UL — HIGH (ref 3.8–10.5)
WBC # FLD AUTO: 23.36 K/UL — HIGH (ref 3.8–10.5)
WBC # FLD AUTO: 23.77 K/UL — HIGH (ref 3.8–10.5)
WBC # FLD AUTO: 24.25 K/UL — HIGH (ref 3.8–10.5)
WBC # FLD AUTO: 29.42 K/UL — HIGH (ref 3.8–10.5)
WBC # FLD AUTO: 29.57 K/UL — HIGH (ref 3.8–10.5)
WBC # FLD AUTO: 30.22 K/UL — HIGH (ref 3.8–10.5)
WBC # FLD AUTO: 31.74 K/UL — HIGH (ref 3.8–10.5)
WBC # FLD AUTO: 32.29 K/UL — HIGH (ref 3.8–10.5)
WBC # FLD AUTO: 33.53 K/UL — HIGH (ref 3.8–10.5)
WBC # FLD AUTO: 33.79 K/UL — HIGH (ref 3.8–10.5)
WBC # FLD AUTO: 34.21 K/UL — HIGH (ref 3.8–10.5)
WBC # FLD AUTO: 34.35 K/UL — HIGH (ref 3.8–10.5)
WBC # FLD AUTO: 35.31 K/UL — HIGH (ref 3.8–10.5)
WBC # FLD AUTO: 36.72 K/UL — HIGH (ref 3.8–10.5)
WBC # FLD AUTO: 37.21 K/UL — HIGH (ref 3.8–10.5)
WBC # FLD AUTO: 37.23 K/UL — HIGH (ref 3.8–10.5)
WBC # FLD AUTO: 38.2 K/UL — HIGH (ref 3.8–10.5)
WBC # FLD AUTO: 38.85 K/UL — HIGH (ref 3.8–10.5)
WBC # FLD AUTO: 39.38 K/UL — HIGH (ref 3.8–10.5)
WBC # FLD AUTO: 39.94 K/UL — HIGH (ref 3.8–10.5)
WBC # FLD AUTO: 41.64 K/UL — CRITICAL HIGH (ref 3.8–10.5)
WBC # FLD AUTO: 42.12 K/UL — CRITICAL HIGH (ref 3.8–10.5)
WBC # FLD AUTO: 78.73 K/UL — CRITICAL HIGH (ref 3.8–10.5)

## 2023-01-01 PROCEDURE — 82728 ASSAY OF FERRITIN: CPT

## 2023-01-01 PROCEDURE — 83605 ASSAY OF LACTIC ACID: CPT

## 2023-01-01 PROCEDURE — 99223 1ST HOSP IP/OBS HIGH 75: CPT | Mod: GC

## 2023-01-01 PROCEDURE — 84300 ASSAY OF URINE SODIUM: CPT

## 2023-01-01 PROCEDURE — 93306 TTE W/DOPPLER COMPLETE: CPT

## 2023-01-01 PROCEDURE — 99232 SBSQ HOSP IP/OBS MODERATE 35: CPT

## 2023-01-01 PROCEDURE — 99223 1ST HOSP IP/OBS HIGH 75: CPT

## 2023-01-01 PROCEDURE — 71045 X-RAY EXAM CHEST 1 VIEW: CPT | Mod: 26

## 2023-01-01 PROCEDURE — 99233 SBSQ HOSP IP/OBS HIGH 50: CPT

## 2023-01-01 PROCEDURE — G1004: CPT

## 2023-01-01 PROCEDURE — 94003 VENT MGMT INPAT SUBQ DAY: CPT

## 2023-01-01 PROCEDURE — 96374 THER/PROPH/DIAG INJ IV PUSH: CPT

## 2023-01-01 PROCEDURE — 97161 PT EVAL LOW COMPLEX 20 MIN: CPT

## 2023-01-01 PROCEDURE — A9592: CPT

## 2023-01-01 PROCEDURE — 97162 PT EVAL MOD COMPLEX 30 MIN: CPT

## 2023-01-01 PROCEDURE — 93010 ELECTROCARDIOGRAM REPORT: CPT

## 2023-01-01 PROCEDURE — P9059: CPT

## 2023-01-01 PROCEDURE — 99285 EMERGENCY DEPT VISIT HI MDM: CPT

## 2023-01-01 PROCEDURE — 82570 ASSAY OF URINE CREATININE: CPT

## 2023-01-01 PROCEDURE — 80076 HEPATIC FUNCTION PANEL: CPT

## 2023-01-01 PROCEDURE — 99291 CRITICAL CARE FIRST HOUR: CPT

## 2023-01-01 PROCEDURE — 93005 ELECTROCARDIOGRAM TRACING: CPT

## 2023-01-01 PROCEDURE — 85025 COMPLETE CBC W/AUTO DIFF WBC: CPT

## 2023-01-01 PROCEDURE — 82962 GLUCOSE BLOOD TEST: CPT

## 2023-01-01 PROCEDURE — 78815 PET IMAGE W/CT SKULL-THIGH: CPT | Mod: 26,PI,MH

## 2023-01-01 PROCEDURE — 86900 BLOOD TYPING SEROLOGIC ABO: CPT

## 2023-01-01 PROCEDURE — 99024 POSTOP FOLLOW-UP VISIT: CPT

## 2023-01-01 PROCEDURE — 99233 SBSQ HOSP IP/OBS HIGH 50: CPT | Mod: GC

## 2023-01-01 PROCEDURE — 71045 X-RAY EXAM CHEST 1 VIEW: CPT

## 2023-01-01 PROCEDURE — 71045 X-RAY EXAM CHEST 1 VIEW: CPT | Mod: 26,76

## 2023-01-01 PROCEDURE — 80053 COMPREHEN METABOLIC PANEL: CPT

## 2023-01-01 PROCEDURE — 70450 CT HEAD/BRAIN W/O DYE: CPT | Mod: 26

## 2023-01-01 PROCEDURE — 84100 ASSAY OF PHOSPHORUS: CPT

## 2023-01-01 PROCEDURE — 85018 HEMOGLOBIN: CPT

## 2023-01-01 PROCEDURE — 82550 ASSAY OF CK (CPK): CPT

## 2023-01-01 PROCEDURE — 71250 CT THORAX DX C-: CPT

## 2023-01-01 PROCEDURE — P9045: CPT

## 2023-01-01 PROCEDURE — 87640 STAPH A DNA AMP PROBE: CPT

## 2023-01-01 PROCEDURE — 83880 ASSAY OF NATRIURETIC PEPTIDE: CPT

## 2023-01-01 PROCEDURE — 87040 BLOOD CULTURE FOR BACTERIA: CPT

## 2023-01-01 PROCEDURE — P9047: CPT

## 2023-01-01 PROCEDURE — 87077 CULTURE AEROBIC IDENTIFY: CPT

## 2023-01-01 PROCEDURE — 87075 CULTR BACTERIA EXCEPT BLOOD: CPT

## 2023-01-01 PROCEDURE — 83550 IRON BINDING TEST: CPT

## 2023-01-01 PROCEDURE — 99214 OFFICE O/P EST MOD 30 MIN: CPT

## 2023-01-01 PROCEDURE — 83540 ASSAY OF IRON: CPT

## 2023-01-01 PROCEDURE — 87641 MR-STAPH DNA AMP PROBE: CPT

## 2023-01-01 PROCEDURE — 99291 CRITICAL CARE FIRST HOUR: CPT | Mod: 25

## 2023-01-01 PROCEDURE — 92610 EVALUATE SWALLOWING FUNCTION: CPT

## 2023-01-01 PROCEDURE — 99215 OFFICE O/P EST HI 40 MIN: CPT

## 2023-01-01 PROCEDURE — 87493 C DIFF AMPLIFIED PROBE: CPT

## 2023-01-01 PROCEDURE — 96375 TX/PRO/DX INJ NEW DRUG ADDON: CPT

## 2023-01-01 PROCEDURE — 87637 SARSCOV2&INF A&B&RSV AMP PRB: CPT

## 2023-01-01 PROCEDURE — 85730 THROMBOPLASTIN TIME PARTIAL: CPT

## 2023-01-01 PROCEDURE — 36415 COLL VENOUS BLD VENIPUNCTURE: CPT

## 2023-01-01 PROCEDURE — 83735 ASSAY OF MAGNESIUM: CPT

## 2023-01-01 PROCEDURE — 84466 ASSAY OF TRANSFERRIN: CPT

## 2023-01-01 PROCEDURE — 70450 CT HEAD/BRAIN W/O DYE: CPT | Mod: MA

## 2023-01-01 PROCEDURE — 99222 1ST HOSP IP/OBS MODERATE 55: CPT

## 2023-01-01 PROCEDURE — 85610 PROTHROMBIN TIME: CPT

## 2023-01-01 PROCEDURE — 86901 BLOOD TYPING SEROLOGIC RH(D): CPT

## 2023-01-01 PROCEDURE — 84156 ASSAY OF PROTEIN URINE: CPT

## 2023-01-01 PROCEDURE — 74176 CT ABD & PELVIS W/O CONTRAST: CPT | Mod: 26

## 2023-01-01 PROCEDURE — 83935 ASSAY OF URINE OSMOLALITY: CPT

## 2023-01-01 PROCEDURE — 84540 ASSAY OF URINE/UREA-N: CPT

## 2023-01-01 PROCEDURE — 93308 TTE F-UP OR LMTD: CPT | Mod: 26

## 2023-01-01 PROCEDURE — 82803 BLOOD GASES ANY COMBINATION: CPT

## 2023-01-01 PROCEDURE — 84133 ASSAY OF URINE POTASSIUM: CPT

## 2023-01-01 PROCEDURE — 87070 CULTURE OTHR SPECIMN AEROBIC: CPT

## 2023-01-01 PROCEDURE — 82553 CREATINE MB FRACTION: CPT

## 2023-01-01 PROCEDURE — 99239 HOSP IP/OBS DSCHRG MGMT >30: CPT

## 2023-01-01 PROCEDURE — P9016: CPT

## 2023-01-01 PROCEDURE — 74176 CT ABD & PELVIS W/O CONTRAST: CPT

## 2023-01-01 PROCEDURE — 99285 EMERGENCY DEPT VISIT HI MDM: CPT | Mod: FS,CS

## 2023-01-01 PROCEDURE — 71250 CT THORAX DX C-: CPT | Mod: 26

## 2023-01-01 PROCEDURE — 0225U NFCT DS DNA&RNA 21 SARSCOV2: CPT

## 2023-01-01 PROCEDURE — 93000 ELECTROCARDIOGRAM COMPLETE: CPT

## 2023-01-01 PROCEDURE — 86850 RBC ANTIBODY SCREEN: CPT

## 2023-01-01 PROCEDURE — 74176 CT ABD & PELVIS W/O CONTRAST: CPT | Mod: 26,ME

## 2023-01-01 PROCEDURE — 84145 PROCALCITONIN (PCT): CPT

## 2023-01-01 PROCEDURE — 76604 US EXAM CHEST: CPT | Mod: 26

## 2023-01-01 PROCEDURE — 87507 IADNA-DNA/RNA PROBE TQ 12-25: CPT

## 2023-01-01 PROCEDURE — 80048 BASIC METABOLIC PNL TOTAL CA: CPT

## 2023-01-01 PROCEDURE — 74176 CT ABD & PELVIS W/O CONTRAST: CPT | Mod: ME

## 2023-01-01 PROCEDURE — 82248 BILIRUBIN DIRECT: CPT

## 2023-01-01 PROCEDURE — 93306 TTE W/DOPPLER COMPLETE: CPT | Mod: 26

## 2023-01-01 PROCEDURE — 87186 SC STD MICRODIL/AGAR DIL: CPT

## 2023-01-01 PROCEDURE — 31720 CLEARANCE OF AIRWAYS: CPT

## 2023-01-01 PROCEDURE — 88307 TISSUE EXAM BY PATHOLOGIST: CPT

## 2023-01-01 PROCEDURE — 81001 URINALYSIS AUTO W/SCOPE: CPT

## 2023-01-01 PROCEDURE — 83690 ASSAY OF LIPASE: CPT

## 2023-01-01 PROCEDURE — C1889: CPT

## 2023-01-01 PROCEDURE — 94760 N-INVAS EAR/PLS OXIMETRY 1: CPT

## 2023-01-01 PROCEDURE — 85027 COMPLETE CBC AUTOMATED: CPT

## 2023-01-01 PROCEDURE — 36430 TRANSFUSION BLD/BLD COMPNT: CPT

## 2023-01-01 PROCEDURE — 84484 ASSAY OF TROPONIN QUANT: CPT

## 2023-01-01 PROCEDURE — 87205 SMEAR GRAM STAIN: CPT

## 2023-01-01 PROCEDURE — 70551 MRI BRAIN STEM W/O DYE: CPT | Mod: 26

## 2023-01-01 PROCEDURE — 83993 ASSAY FOR CALPROTECTIN FECAL: CPT

## 2023-01-01 PROCEDURE — 36600 WITHDRAWAL OF ARTERIAL BLOOD: CPT

## 2023-01-01 PROCEDURE — 84132 ASSAY OF SERUM POTASSIUM: CPT

## 2023-01-01 PROCEDURE — 99291 CRITICAL CARE FIRST HOUR: CPT | Mod: FS,CS

## 2023-01-01 PROCEDURE — 82272 OCCULT BLD FECES 1-3 TESTS: CPT

## 2023-01-01 PROCEDURE — 81003 URINALYSIS AUTO W/O SCOPE: CPT

## 2023-01-01 PROCEDURE — 97110 THERAPEUTIC EXERCISES: CPT

## 2023-01-01 PROCEDURE — 71045 X-RAY EXAM CHEST 1 VIEW: CPT | Mod: 26,77

## 2023-01-01 PROCEDURE — 94799 UNLISTED PULMONARY SVC/PX: CPT

## 2023-01-01 PROCEDURE — 78815 PET IMAGE W/CT SKULL-THIGH: CPT

## 2023-01-01 PROCEDURE — 70551 MRI BRAIN STEM W/O DYE: CPT

## 2023-01-01 PROCEDURE — 94640 AIRWAY INHALATION TREATMENT: CPT

## 2023-01-01 PROCEDURE — 97530 THERAPEUTIC ACTIVITIES: CPT

## 2023-01-01 PROCEDURE — 86140 C-REACTIVE PROTEIN: CPT

## 2023-01-01 PROCEDURE — 85014 HEMATOCRIT: CPT

## 2023-01-01 PROCEDURE — 86923 COMPATIBILITY TEST ELECTRIC: CPT

## 2023-01-01 PROCEDURE — 99221 1ST HOSP IP/OBS SF/LOW 40: CPT

## 2023-01-01 PROCEDURE — 94002 VENT MGMT INPAT INIT DAY: CPT

## 2023-01-01 PROCEDURE — 88307 TISSUE EXAM BY PATHOLOGIST: CPT | Mod: 26

## 2023-01-01 PROCEDURE — 85384 FIBRINOGEN ACTIVITY: CPT

## 2023-01-01 PROCEDURE — 85045 AUTOMATED RETICULOCYTE COUNT: CPT

## 2023-01-01 DEVICE — STAPLER COVIDIEN TRI-STAPLE 60MM BLACK RELOAD: Type: IMPLANTABLE DEVICE | Status: FUNCTIONAL

## 2023-01-01 DEVICE — STAPLER COVIDIEN ENDO GIA 80-3.8MM BLUE RELOAD: Type: IMPLANTABLE DEVICE | Status: FUNCTIONAL

## 2023-01-01 DEVICE — STAPLER ECHELON CONTOUR RELOAD GST 6/BX BLU: Type: IMPLANTABLE DEVICE | Status: FUNCTIONAL

## 2023-01-01 DEVICE — STAPLER COVIDIEN ENDO GIA 80-3.8MM BLUE: Type: IMPLANTABLE DEVICE | Status: FUNCTIONAL

## 2023-01-01 DEVICE — VISTASEAL FIBRIN HUMAN 10ML: Type: IMPLANTABLE DEVICE | Status: FUNCTIONAL

## 2023-01-01 DEVICE — STAPLER CONTOUR CURVED WITH BLUE CART: Type: IMPLANTABLE DEVICE | Status: FUNCTIONAL

## 2023-01-01 RX ORDER — SODIUM CHLORIDE 9 MG/ML
1000 INJECTION INTRAMUSCULAR; INTRAVENOUS; SUBCUTANEOUS
Refills: 0 | Status: DISCONTINUED | OUTPATIENT
Start: 2023-01-01 | End: 2023-01-01

## 2023-01-01 RX ORDER — POTASSIUM CHLORIDE 20 MEQ
40 PACKET (EA) ORAL EVERY 4 HOURS
Refills: 0 | Status: COMPLETED | OUTPATIENT
Start: 2023-01-01 | End: 2023-01-01

## 2023-01-01 RX ORDER — SODIUM CHLORIDE 9 MG/ML
10 INJECTION INTRAMUSCULAR; INTRAVENOUS; SUBCUTANEOUS
Refills: 0 | Status: DISCONTINUED | OUTPATIENT
Start: 2023-01-01 | End: 2023-01-01

## 2023-01-01 RX ORDER — REMDESIVIR 5 MG/ML
INJECTION INTRAVENOUS
Refills: 0 | Status: COMPLETED | OUTPATIENT
Start: 2023-01-01 | End: 2023-01-01

## 2023-01-01 RX ORDER — METRONIDAZOLE 500 MG
500 TABLET ORAL ONCE
Refills: 0 | Status: COMPLETED | OUTPATIENT
Start: 2023-01-01 | End: 2023-01-01

## 2023-01-01 RX ORDER — LEVOTHYROXINE SODIUM 125 MCG
125 TABLET ORAL DAILY
Refills: 0 | Status: DISCONTINUED | OUTPATIENT
Start: 2023-01-01 | End: 2023-01-01

## 2023-01-01 RX ORDER — PHENYLEPHRINE HYDROCHLORIDE 10 MG/ML
0.1 INJECTION INTRAVENOUS
Qty: 40 | Refills: 0 | Status: DISCONTINUED | OUTPATIENT
Start: 2023-01-01 | End: 2023-01-01

## 2023-01-01 RX ORDER — POTASSIUM CHLORIDE 20 MEQ
10 PACKET (EA) ORAL
Refills: 0 | Status: COMPLETED | OUTPATIENT
Start: 2023-01-01 | End: 2023-01-01

## 2023-01-01 RX ORDER — FUROSEMIDE 40 MG
1 TABLET ORAL
Qty: 0 | Refills: 0 | DISCHARGE

## 2023-01-01 RX ORDER — LEVOTHYROXINE SODIUM 125 MCG
1 TABLET ORAL
Qty: 0 | Refills: 0 | DISCHARGE

## 2023-01-01 RX ORDER — ESCITALOPRAM OXALATE 10 MG/1
1 TABLET, FILM COATED ORAL
Qty: 0 | Refills: 0 | DISCHARGE

## 2023-01-01 RX ORDER — METOCLOPRAMIDE HCL 10 MG
10 TABLET ORAL ONCE
Refills: 0 | Status: COMPLETED | OUTPATIENT
Start: 2023-01-01 | End: 2023-01-01

## 2023-01-01 RX ORDER — FUROSEMIDE 40 MG
1 TABLET ORAL
Refills: 0 | DISCHARGE

## 2023-01-01 RX ORDER — ANAGRELIDE HCL 0.5 MG
3 CAPSULE ORAL
Qty: 0 | Refills: 0 | DISCHARGE
Start: 2023-01-01

## 2023-01-01 RX ORDER — MIDODRINE HYDROCHLORIDE 2.5 MG/1
10 TABLET ORAL EVERY 8 HOURS
Refills: 0 | Status: DISCONTINUED | OUTPATIENT
Start: 2023-01-01 | End: 2023-01-01

## 2023-01-01 RX ORDER — SODIUM CHLORIDE 9 MG/ML
1000 INJECTION INTRAMUSCULAR; INTRAVENOUS; SUBCUTANEOUS ONCE
Refills: 0 | Status: COMPLETED | OUTPATIENT
Start: 2023-01-01 | End: 2023-01-01

## 2023-01-01 RX ORDER — POTASSIUM CHLORIDE 20 MEQ
40 PACKET (EA) ORAL ONCE
Refills: 0 | Status: COMPLETED | OUTPATIENT
Start: 2023-01-01 | End: 2023-01-01

## 2023-01-01 RX ORDER — MIDAZOLAM HYDROCHLORIDE 1 MG/ML
2 INJECTION, SOLUTION INTRAMUSCULAR; INTRAVENOUS ONCE
Refills: 0 | Status: DISCONTINUED | OUTPATIENT
Start: 2023-01-01 | End: 2023-01-01

## 2023-01-01 RX ORDER — SODIUM CHLORIDE 9 MG/ML
1000 INJECTION, SOLUTION INTRAVENOUS
Refills: 0 | Status: DISCONTINUED | OUTPATIENT
Start: 2023-01-01 | End: 2023-01-01

## 2023-01-01 RX ORDER — FAMOTIDINE 10 MG/ML
20 INJECTION INTRAVENOUS ONCE
Refills: 0 | Status: COMPLETED | OUTPATIENT
Start: 2023-01-01 | End: 2023-01-01

## 2023-01-01 RX ORDER — ALBUMIN HUMAN 25 %
50 VIAL (ML) INTRAVENOUS EVERY 8 HOURS
Refills: 0 | Status: COMPLETED | OUTPATIENT
Start: 2023-01-01 | End: 2023-01-01

## 2023-01-01 RX ORDER — SODIUM CHLORIDE 9 MG/ML
1000 INJECTION, SOLUTION INTRAVENOUS ONCE
Refills: 0 | Status: COMPLETED | OUTPATIENT
Start: 2023-01-01 | End: 2023-01-01

## 2023-01-01 RX ORDER — FUROSEMIDE 40 MG
40 TABLET ORAL
Refills: 0 | Status: DISCONTINUED | OUTPATIENT
Start: 2023-01-01 | End: 2023-01-01

## 2023-01-01 RX ORDER — DESMOPRESSIN ACETATE 0.1 MG/1
20 TABLET ORAL ONCE
Refills: 0 | Status: DISCONTINUED | OUTPATIENT
Start: 2023-01-01 | End: 2023-01-01

## 2023-01-01 RX ORDER — MIDODRINE HYDROCHLORIDE 2.5 MG/1
5 TABLET ORAL THREE TIMES A DAY
Refills: 0 | Status: DISCONTINUED | OUTPATIENT
Start: 2023-01-01 | End: 2023-01-01

## 2023-01-01 RX ORDER — LORATADINE 10 MG/1
10 TABLET ORAL DAILY
Refills: 0 | Status: DISCONTINUED | OUTPATIENT
Start: 2023-01-01 | End: 2023-01-01

## 2023-01-01 RX ORDER — IOHEXOL 300 MG/ML
30 INJECTION, SOLUTION INTRAVENOUS ONCE
Refills: 0 | Status: COMPLETED | OUTPATIENT
Start: 2023-01-01 | End: 2023-01-01

## 2023-01-01 RX ORDER — HYDROMORPHONE HYDROCHLORIDE 2 MG/ML
0.25 INJECTION INTRAMUSCULAR; INTRAVENOUS; SUBCUTANEOUS ONCE
Refills: 0 | Status: DISCONTINUED | OUTPATIENT
Start: 2023-01-01 | End: 2023-01-01

## 2023-01-01 RX ORDER — ESCITALOPRAM OXALATE 10 MG/1
20 TABLET, FILM COATED ORAL DAILY
Refills: 0 | Status: DISCONTINUED | OUTPATIENT
Start: 2023-01-01 | End: 2023-01-01

## 2023-01-01 RX ORDER — OXYCODONE AND ACETAMINOPHEN 5; 325 MG/1; MG/1
1 TABLET ORAL
Qty: 10 | Refills: 0
Start: 2023-01-01 | End: 2023-01-01

## 2023-01-01 RX ORDER — ONDANSETRON 8 MG/1
4 TABLET, FILM COATED ORAL ONCE
Refills: 0 | Status: DISCONTINUED | OUTPATIENT
Start: 2023-01-01 | End: 2023-01-01

## 2023-01-01 RX ORDER — ANAGRELIDE HCL 0.5 MG
0 CAPSULE ORAL
Refills: 0 | DISCHARGE

## 2023-01-01 RX ORDER — VANCOMYCIN HCL 1 G
125 VIAL (EA) INTRAVENOUS EVERY 6 HOURS
Refills: 0 | Status: DISCONTINUED | OUTPATIENT
Start: 2023-01-01 | End: 2023-01-01

## 2023-01-01 RX ORDER — DEXTROSE 50 % IN WATER 50 %
25 SYRINGE (ML) INTRAVENOUS ONCE
Refills: 0 | Status: COMPLETED | OUTPATIENT
Start: 2023-01-01 | End: 2023-01-01

## 2023-01-01 RX ORDER — IPRATROPIUM/ALBUTEROL SULFATE 18-103MCG
3 AEROSOL WITH ADAPTER (GRAM) INHALATION EVERY 6 HOURS
Refills: 0 | Status: DISCONTINUED | OUTPATIENT
Start: 2023-01-01 | End: 2023-01-01

## 2023-01-01 RX ORDER — FUROSEMIDE 40 MG
2 TABLET ORAL
Qty: 0 | Refills: 0 | DISCHARGE

## 2023-01-01 RX ORDER — LEVOCETIRIZINE DIHYDROCHLORIDE 0.5 MG/ML
1 SOLUTION ORAL
Qty: 0 | Refills: 0 | DISCHARGE

## 2023-01-01 RX ORDER — HYDROCODONE BITARTRATE AND HOMATROPINE METHYLBROMIDE 5; 1.5 MG/5ML; MG/5ML
5-1.5 SOLUTION ORAL EVERY 6 HOURS
Qty: 1 | Refills: 0 | Status: ACTIVE | COMMUNITY
Start: 2022-04-15 | End: 1900-01-01

## 2023-01-01 RX ORDER — VASOPRESSIN 20 [USP'U]/ML
0.04 INJECTION INTRAVENOUS
Qty: 40 | Refills: 0 | Status: DISCONTINUED | OUTPATIENT
Start: 2023-01-01 | End: 2023-01-01

## 2023-01-01 RX ORDER — LEVALBUTEROL 1.25 MG/.5ML
1.25 SOLUTION, CONCENTRATE RESPIRATORY (INHALATION)
Refills: 0 | Status: DISCONTINUED | OUTPATIENT
Start: 2023-01-01 | End: 2023-01-01

## 2023-01-01 RX ORDER — MEROPENEM 1 G/30ML
500 INJECTION INTRAVENOUS EVERY 24 HOURS
Refills: 0 | Status: DISCONTINUED | OUTPATIENT
Start: 2023-01-01 | End: 2023-01-01

## 2023-01-01 RX ORDER — ROCURONIUM BROMIDE 10 MG/ML
50 VIAL (ML) INTRAVENOUS ONCE
Refills: 0 | Status: COMPLETED | OUTPATIENT
Start: 2023-01-01 | End: 2023-01-01

## 2023-01-01 RX ORDER — MAGNESIUM SULFATE 500 MG/ML
2 VIAL (ML) INJECTION ONCE
Refills: 0 | Status: COMPLETED | OUTPATIENT
Start: 2023-01-01 | End: 2023-01-01

## 2023-01-01 RX ORDER — MAGNESIUM SULFATE 500 MG/ML
1 VIAL (ML) INJECTION ONCE
Refills: 0 | Status: COMPLETED | OUTPATIENT
Start: 2023-01-01 | End: 2023-01-01

## 2023-01-01 RX ORDER — METRONIDAZOLE 500 MG
500 TABLET ORAL EVERY 8 HOURS
Refills: 0 | Status: DISCONTINUED | OUTPATIENT
Start: 2023-01-01 | End: 2023-01-01

## 2023-01-01 RX ORDER — SODIUM BICARBONATE 1 MEQ/ML
50 SYRINGE (ML) INTRAVENOUS ONCE
Refills: 0 | Status: COMPLETED | OUTPATIENT
Start: 2023-01-01 | End: 2023-01-01

## 2023-01-01 RX ORDER — ALBUMIN HUMAN 25 %
50 VIAL (ML) INTRAVENOUS ONCE
Refills: 0 | Status: COMPLETED | OUTPATIENT
Start: 2023-01-01 | End: 2023-01-01

## 2023-01-01 RX ORDER — ALBUTEROL 90 UG/1
4 AEROSOL, METERED ORAL EVERY 6 HOURS
Refills: 0 | Status: DISCONTINUED | OUTPATIENT
Start: 2023-01-01 | End: 2023-01-01

## 2023-01-01 RX ORDER — BUMETANIDE 0.25 MG/ML
2 INJECTION INTRAMUSCULAR; INTRAVENOUS ONCE
Refills: 0 | Status: COMPLETED | OUTPATIENT
Start: 2023-01-01 | End: 2023-01-01

## 2023-01-01 RX ORDER — POTASSIUM CHLORIDE 20 MEQ
20 PACKET (EA) ORAL
Refills: 0 | Status: COMPLETED | OUTPATIENT
Start: 2023-01-01 | End: 2023-01-01

## 2023-01-01 RX ORDER — ACETAMINOPHEN 500 MG
650 TABLET ORAL EVERY 6 HOURS
Refills: 0 | Status: DISCONTINUED | OUTPATIENT
Start: 2023-01-01 | End: 2023-01-01

## 2023-01-01 RX ORDER — ALBUMIN HUMAN 25 %
250 VIAL (ML) INTRAVENOUS ONCE
Refills: 0 | Status: COMPLETED | OUTPATIENT
Start: 2023-01-01 | End: 2023-01-01

## 2023-01-01 RX ORDER — METOLAZONE 5 MG/1
1 TABLET ORAL
Qty: 0 | Refills: 0 | DISCHARGE

## 2023-01-01 RX ORDER — FLUCONAZOLE 150 MG/1
100 TABLET ORAL ONCE
Refills: 0 | Status: COMPLETED | OUTPATIENT
Start: 2023-01-01 | End: 2023-01-01

## 2023-01-01 RX ORDER — FUROSEMIDE 40 MG
80 TABLET ORAL DAILY
Refills: 0 | Status: DISCONTINUED | OUTPATIENT
Start: 2023-01-01 | End: 2023-01-01

## 2023-01-01 RX ORDER — FAMOTIDINE 10 MG/ML
1 INJECTION INTRAVENOUS
Qty: 0 | Refills: 0 | DISCHARGE

## 2023-01-01 RX ORDER — PIPERACILLIN AND TAZOBACTAM 4; .5 G/20ML; G/20ML
3.38 INJECTION, POWDER, LYOPHILIZED, FOR SOLUTION INTRAVENOUS ONCE
Refills: 0 | Status: COMPLETED | OUTPATIENT
Start: 2023-01-01 | End: 2023-01-01

## 2023-01-01 RX ORDER — CHLORHEXIDINE GLUCONATE 213 G/1000ML
1 SOLUTION TOPICAL
Refills: 0 | Status: DISCONTINUED | OUTPATIENT
Start: 2023-01-01 | End: 2023-01-01

## 2023-01-01 RX ORDER — PANTOPRAZOLE SODIUM 20 MG/1
1 TABLET, DELAYED RELEASE ORAL
Qty: 0 | Refills: 0 | DISCHARGE

## 2023-01-01 RX ORDER — VANCOMYCIN HYDROCHLORIDE 125 MG/1
125 CAPSULE ORAL 4 TIMES DAILY
Refills: 0 | Status: ACTIVE | COMMUNITY

## 2023-01-01 RX ORDER — DIPHENHYDRAMINE HYDROCHLORIDE AND LIDOCAINE HYDROCHLORIDE AND ALUMINUM HYDROXIDE AND MAGNESIUM HYDRO
10 KIT
Refills: 0 | Status: DISCONTINUED | OUTPATIENT
Start: 2023-01-01 | End: 2023-01-01

## 2023-01-01 RX ORDER — NOREPINEPHRINE BITARTRATE/D5W 8 MG/250ML
0.05 PLASTIC BAG, INJECTION (ML) INTRAVENOUS
Qty: 16 | Refills: 0 | Status: DISCONTINUED | OUTPATIENT
Start: 2023-01-01 | End: 2023-01-01

## 2023-01-01 RX ORDER — SODIUM CHLORIDE 9 MG/ML
1000 INJECTION, SOLUTION INTRAVENOUS
Refills: 0 | Status: ACTIVE | OUTPATIENT
Start: 2023-01-01 | End: 2024-03-12

## 2023-01-01 RX ORDER — RIVAROXABAN 15 MG-20MG
1 KIT ORAL
Qty: 0 | Refills: 0 | DISCHARGE

## 2023-01-01 RX ORDER — SODIUM CHLORIDE 9 MG/ML
500 INJECTION INTRAMUSCULAR; INTRAVENOUS; SUBCUTANEOUS ONCE
Refills: 0 | Status: COMPLETED | OUTPATIENT
Start: 2023-01-01 | End: 2023-01-01

## 2023-01-01 RX ORDER — METOPROLOL TARTRATE 50 MG
50 TABLET ORAL DAILY
Refills: 0 | Status: DISCONTINUED | OUTPATIENT
Start: 2023-01-01 | End: 2023-01-01

## 2023-01-01 RX ORDER — ACETAMINOPHEN 500 MG
650 TABLET ORAL ONCE
Refills: 0 | Status: COMPLETED | OUTPATIENT
Start: 2023-01-01 | End: 2023-01-01

## 2023-01-01 RX ORDER — METOCLOPRAMIDE HCL 10 MG
10 TABLET ORAL EVERY 8 HOURS
Refills: 0 | Status: COMPLETED | OUTPATIENT
Start: 2023-01-01 | End: 2023-01-01

## 2023-01-01 RX ORDER — POTASSIUM CHLORIDE 20 MEQ
10 PACKET (EA) ORAL
Refills: 0 | Status: DISCONTINUED | OUTPATIENT
Start: 2023-01-01 | End: 2023-01-01

## 2023-01-01 RX ORDER — FUROSEMIDE 40 MG
40 TABLET ORAL ONCE
Refills: 0 | Status: COMPLETED | OUTPATIENT
Start: 2023-01-01 | End: 2023-01-01

## 2023-01-01 RX ORDER — OXYCODONE AND ACETAMINOPHEN 5; 325 MG/1; MG/1
1 TABLET ORAL
Qty: 8 | Refills: 0
Start: 2023-01-01 | End: 2023-01-01

## 2023-01-01 RX ORDER — VANCOMYCIN HCL 1 G
1 VIAL (EA) INTRAVENOUS
Qty: 16 | Refills: 0
Start: 2023-01-01 | End: 2023-01-01

## 2023-01-01 RX ORDER — PIPERACILLIN AND TAZOBACTAM 4; .5 G/20ML; G/20ML
3.38 INJECTION, POWDER, LYOPHILIZED, FOR SOLUTION INTRAVENOUS ONCE
Refills: 0 | Status: DISCONTINUED | OUTPATIENT
Start: 2023-01-01 | End: 2023-01-01

## 2023-01-01 RX ORDER — ONDANSETRON 8 MG/1
4 TABLET, FILM COATED ORAL ONCE
Refills: 0 | Status: COMPLETED | OUTPATIENT
Start: 2023-01-01 | End: 2023-01-01

## 2023-01-01 RX ORDER — KETAMINE HYDROCHLORIDE 100 MG/ML
0.25 INJECTION INTRAMUSCULAR; INTRAVENOUS
Qty: 200 | Refills: 0 | Status: DISCONTINUED | OUTPATIENT
Start: 2023-01-01 | End: 2023-01-01

## 2023-01-01 RX ORDER — DEXMEDETOMIDINE HYDROCHLORIDE IN 0.9% SODIUM CHLORIDE 4 UG/ML
0.5 INJECTION INTRAVENOUS
Qty: 200 | Refills: 0 | Status: DISCONTINUED | OUTPATIENT
Start: 2023-01-01 | End: 2023-01-01

## 2023-01-01 RX ORDER — FIDAXOMICIN 200 MG/5ML
200 GRANULE, FOR SUSPENSION ORAL EVERY 12 HOURS
Refills: 0 | Status: DISCONTINUED | OUTPATIENT
Start: 2023-01-01 | End: 2023-01-01

## 2023-01-01 RX ORDER — ALBUTEROL SULFATE 1.25 MG/3ML
1.25 SOLUTION RESPIRATORY (INHALATION)
Qty: 300 | Refills: 0 | Status: DISCONTINUED | COMMUNITY
Start: 2017-05-01 | End: 2023-01-01

## 2023-01-01 RX ORDER — OCTREOTIDE ACETATE 200 UG/ML
100 INJECTION, SOLUTION INTRAVENOUS; SUBCUTANEOUS EVERY 8 HOURS
Refills: 0 | Status: DISCONTINUED | OUTPATIENT
Start: 2023-01-01 | End: 2023-01-01

## 2023-01-01 RX ORDER — PHYTONADIONE (VIT K1) 5 MG
10 TABLET ORAL ONCE
Refills: 0 | Status: COMPLETED | OUTPATIENT
Start: 2023-01-01 | End: 2023-01-01

## 2023-01-01 RX ORDER — BUDESONIDE, MICRONIZED 100 %
0.5 POWDER (GRAM) MISCELLANEOUS EVERY 12 HOURS
Refills: 0 | Status: DISCONTINUED | OUTPATIENT
Start: 2023-01-01 | End: 2023-01-01

## 2023-01-01 RX ORDER — MORPHINE SULFATE 50 MG/1
1 CAPSULE, EXTENDED RELEASE ORAL EVERY 8 HOURS
Refills: 0 | Status: DISCONTINUED | OUTPATIENT
Start: 2023-01-01 | End: 2023-01-01

## 2023-01-01 RX ORDER — REMDESIVIR 5 MG/ML
100 INJECTION INTRAVENOUS EVERY 24 HOURS
Refills: 0 | Status: COMPLETED | OUTPATIENT
Start: 2023-01-01 | End: 2023-01-01

## 2023-01-01 RX ORDER — LANOLIN ALCOHOL/MO/W.PET/CERES
5 CREAM (GRAM) TOPICAL ONCE
Refills: 0 | Status: COMPLETED | OUTPATIENT
Start: 2023-01-01 | End: 2023-01-01

## 2023-01-01 RX ORDER — ACETAMINOPHEN 500 MG
1000 TABLET ORAL ONCE
Refills: 0 | Status: COMPLETED | OUTPATIENT
Start: 2023-01-01 | End: 2023-01-01

## 2023-01-01 RX ORDER — CALCIUM GLUCONATE 100 MG/ML
2 VIAL (ML) INTRAVENOUS ONCE
Refills: 0 | Status: COMPLETED | OUTPATIENT
Start: 2023-01-01 | End: 2023-01-01

## 2023-01-01 RX ORDER — CHLORHEXIDINE GLUCONATE 213 G/1000ML
15 SOLUTION TOPICAL EVERY 12 HOURS
Refills: 0 | Status: DISCONTINUED | OUTPATIENT
Start: 2023-01-01 | End: 2023-01-01

## 2023-01-01 RX ORDER — FUROSEMIDE 40 MG
3 TABLET ORAL
Qty: 0 | Refills: 0 | DISCHARGE

## 2023-01-01 RX ORDER — VANCOMYCIN HCL 1 G
1000 VIAL (EA) INTRAVENOUS ONCE
Refills: 0 | Status: COMPLETED | OUTPATIENT
Start: 2023-01-01 | End: 2023-01-01

## 2023-01-01 RX ORDER — POTASSIUM CHLORIDE 20 MEQ
20 PACKET (EA) ORAL ONCE
Refills: 0 | Status: COMPLETED | OUTPATIENT
Start: 2023-01-01 | End: 2023-01-01

## 2023-01-01 RX ORDER — METOCLOPRAMIDE HCL 10 MG
5 TABLET ORAL EVERY 6 HOURS
Refills: 0 | Status: DISCONTINUED | OUTPATIENT
Start: 2023-01-01 | End: 2023-01-01

## 2023-01-01 RX ORDER — MEROPENEM 1 G/30ML
500 INJECTION INTRAVENOUS ONCE
Refills: 0 | Status: COMPLETED | OUTPATIENT
Start: 2023-01-01 | End: 2023-01-01

## 2023-01-01 RX ORDER — RIVAROXABAN 15 MG-20MG
1 KIT ORAL
Qty: 0 | Refills: 0 | DISCHARGE
Start: 2023-01-01

## 2023-01-01 RX ORDER — TIOTROPIUM BROMIDE 18 UG/1
2 CAPSULE ORAL; RESPIRATORY (INHALATION)
Qty: 0 | Refills: 0 | DISCHARGE

## 2023-01-01 RX ORDER — MEROPENEM 1 G/30ML
500 INJECTION INTRAVENOUS EVERY 12 HOURS
Refills: 0 | Status: DISCONTINUED | OUTPATIENT
Start: 2023-01-01 | End: 2023-01-01

## 2023-01-01 RX ORDER — HYDROCORTISONE 20 MG
100 TABLET ORAL EVERY 8 HOURS
Refills: 0 | Status: DISCONTINUED | OUTPATIENT
Start: 2023-01-01 | End: 2023-01-01

## 2023-01-01 RX ORDER — POTASSIUM CHLORIDE 20 MEQ
40 PACKET (EA) ORAL EVERY 4 HOURS
Refills: 0 | Status: DISCONTINUED | OUTPATIENT
Start: 2023-01-01 | End: 2023-01-01

## 2023-01-01 RX ORDER — ZOLPIDEM TARTRATE 10 MG/1
1 TABLET ORAL
Refills: 0 | DISCHARGE

## 2023-01-01 RX ORDER — HYDROMORPHONE HYDROCHLORIDE 2 MG/ML
1 INJECTION INTRAMUSCULAR; INTRAVENOUS; SUBCUTANEOUS EVERY 6 HOURS
Refills: 0 | Status: DISCONTINUED | OUTPATIENT
Start: 2023-01-01 | End: 2023-01-01

## 2023-01-01 RX ORDER — PANTOPRAZOLE SODIUM 20 MG/1
40 TABLET, DELAYED RELEASE ORAL DAILY
Refills: 0 | Status: DISCONTINUED | OUTPATIENT
Start: 2023-01-01 | End: 2023-01-01

## 2023-01-01 RX ORDER — RIVAROXABAN 15 MG/1
15 TABLET, FILM COATED ORAL
Qty: 90 | Refills: 0 | Status: ACTIVE | COMMUNITY
Start: 2018-12-13

## 2023-01-01 RX ORDER — HEPARIN SODIUM 5000 [USP'U]/ML
5000 INJECTION INTRAVENOUS; SUBCUTANEOUS EVERY 8 HOURS
Refills: 0 | Status: DISCONTINUED | OUTPATIENT
Start: 2023-01-01 | End: 2023-01-01

## 2023-01-01 RX ORDER — SODIUM CHLORIDE 9 MG/ML
1500 INJECTION, SOLUTION INTRAVENOUS ONCE
Refills: 0 | Status: COMPLETED | OUTPATIENT
Start: 2023-01-01 | End: 2023-01-01

## 2023-01-01 RX ORDER — HYDROMORPHONE HYDROCHLORIDE 2 MG/ML
0.5 INJECTION INTRAMUSCULAR; INTRAVENOUS; SUBCUTANEOUS
Refills: 0 | Status: DISCONTINUED | OUTPATIENT
Start: 2023-01-01 | End: 2023-01-01

## 2023-01-01 RX ORDER — FENTANYL CITRATE 50 UG/ML
0.5 INJECTION INTRAVENOUS
Qty: 5000 | Refills: 0 | Status: DISCONTINUED | OUTPATIENT
Start: 2023-01-01 | End: 2023-01-01

## 2023-01-01 RX ORDER — LEVOTHYROXINE SODIUM 125 MCG
93.75 TABLET ORAL
Refills: 0 | Status: DISCONTINUED | OUTPATIENT
Start: 2023-01-01 | End: 2023-01-01

## 2023-01-01 RX ORDER — MORPHINE SULFATE 50 MG/1
2 CAPSULE, EXTENDED RELEASE ORAL EVERY 6 HOURS
Refills: 0 | Status: DISCONTINUED | OUTPATIENT
Start: 2023-01-01 | End: 2023-01-01

## 2023-01-01 RX ORDER — LOPERAMIDE HCL 2 MG
2 TABLET ORAL DAILY
Refills: 0 | Status: DISCONTINUED | OUTPATIENT
Start: 2023-01-01 | End: 2023-01-01

## 2023-01-01 RX ORDER — METOPROLOL TARTRATE 50 MG
1 TABLET ORAL
Qty: 0 | Refills: 0 | DISCHARGE

## 2023-01-01 RX ORDER — HYDROMORPHONE HYDROCHLORIDE 2 MG/ML
0.5 INJECTION INTRAMUSCULAR; INTRAVENOUS; SUBCUTANEOUS ONCE
Refills: 0 | Status: DISCONTINUED | OUTPATIENT
Start: 2023-01-01 | End: 2023-01-01

## 2023-01-01 RX ORDER — PANTOPRAZOLE SODIUM 20 MG/1
40 TABLET, DELAYED RELEASE ORAL
Refills: 0 | Status: DISCONTINUED | OUTPATIENT
Start: 2023-01-01 | End: 2023-01-01

## 2023-01-01 RX ORDER — POTASSIUM CHLORIDE 20 MEQ
10 PACKET (EA) ORAL ONCE
Refills: 0 | Status: COMPLETED | OUTPATIENT
Start: 2023-01-01 | End: 2023-01-01

## 2023-01-01 RX ORDER — MEROPENEM 1 G/30ML
INJECTION INTRAVENOUS
Refills: 0 | Status: DISCONTINUED | OUTPATIENT
Start: 2023-01-01 | End: 2023-01-01

## 2023-01-01 RX ORDER — RIVAROXABAN 15 MG-20MG
15 KIT ORAL
Refills: 0 | Status: DISCONTINUED | OUTPATIENT
Start: 2023-01-01 | End: 2023-01-01

## 2023-01-01 RX ORDER — FEDRATINIB HYDROCHLORIDE 100 MG/1
1 CAPSULE ORAL
Qty: 0 | Refills: 0 | DISCHARGE

## 2023-01-01 RX ORDER — FENTANYL CITRATE 50 UG/ML
0.5 INJECTION INTRAVENOUS
Qty: 2500 | Refills: 0 | Status: DISCONTINUED | OUTPATIENT
Start: 2023-01-01 | End: 2023-01-01

## 2023-01-01 RX ORDER — MONTELUKAST 4 MG/1
1 TABLET, CHEWABLE ORAL
Qty: 0 | Refills: 0 | DISCHARGE

## 2023-01-01 RX ORDER — FUROSEMIDE 40 MG/1
40 TABLET ORAL DAILY
Refills: 0 | Status: ACTIVE | COMMUNITY
Start: 2018-12-06

## 2023-01-01 RX ORDER — ERYTHROPOIETIN 10000 [IU]/ML
4000 INJECTION, SOLUTION INTRAVENOUS; SUBCUTANEOUS
Refills: 0 | Status: DISCONTINUED | OUTPATIENT
Start: 2023-01-01 | End: 2023-01-01

## 2023-01-01 RX ORDER — NOREPINEPHRINE BITARTRATE/D5W 8 MG/250ML
0.05 PLASTIC BAG, INJECTION (ML) INTRAVENOUS
Qty: 8 | Refills: 0 | Status: DISCONTINUED | OUTPATIENT
Start: 2023-01-01 | End: 2023-01-01

## 2023-01-01 RX ORDER — PROPOFOL 10 MG/ML
20 INJECTION, EMULSION INTRAVENOUS
Qty: 1000 | Refills: 0 | Status: DISCONTINUED | OUTPATIENT
Start: 2023-01-01 | End: 2023-01-01

## 2023-01-01 RX ORDER — ALBUMIN HUMAN 25 %
250 VIAL (ML) INTRAVENOUS
Refills: 0 | Status: COMPLETED | OUTPATIENT
Start: 2023-01-01 | End: 2023-01-01

## 2023-01-01 RX ORDER — CHLORHEXIDINE GLUCONATE 213 G/1000ML
1 SOLUTION TOPICAL DAILY
Refills: 0 | Status: DISCONTINUED | OUTPATIENT
Start: 2023-01-01 | End: 2023-01-01

## 2023-01-01 RX ORDER — FLUTICASONE FUROATE, UMECLIDINIUM BROMIDE AND VILANTEROL TRIFENATATE 200; 62.5; 25 UG/1; UG/1; UG/1
1 POWDER RESPIRATORY (INHALATION)
Qty: 0 | Refills: 0 | DISCHARGE

## 2023-01-01 RX ORDER — FUROSEMIDE 40 MG
40 TABLET ORAL AT BEDTIME
Refills: 0 | Status: DISCONTINUED | OUTPATIENT
Start: 2023-01-01 | End: 2023-01-01

## 2023-01-01 RX ORDER — ONDANSETRON 8 MG/1
4 TABLET, FILM COATED ORAL EVERY 8 HOURS
Refills: 0 | Status: DISCONTINUED | OUTPATIENT
Start: 2023-01-01 | End: 2023-01-01

## 2023-01-01 RX ORDER — CISATRACURIUM BESYLATE 2 MG/ML
3 INJECTION INTRAVENOUS
Qty: 200 | Refills: 0 | Status: DISCONTINUED | OUTPATIENT
Start: 2023-01-01 | End: 2023-01-01

## 2023-01-01 RX ORDER — PSYLLIUM SEED (WITH DEXTROSE)
1 POWDER (GRAM) ORAL DAILY
Refills: 0 | Status: DISCONTINUED | OUTPATIENT
Start: 2023-01-01 | End: 2023-01-01

## 2023-01-01 RX ORDER — ROBINUL 0.2 MG/ML
0.2 INJECTION INTRAMUSCULAR; INTRAVENOUS EVERY 6 HOURS
Refills: 0 | Status: DISCONTINUED | OUTPATIENT
Start: 2023-01-01 | End: 2023-01-01

## 2023-01-01 RX ORDER — METOPROLOL SUCCINATE 50 MG/1
50 TABLET, EXTENDED RELEASE ORAL DAILY
Qty: 90 | Refills: 3 | Status: DISCONTINUED | COMMUNITY
Start: 2018-12-13 | End: 2023-01-01

## 2023-01-01 RX ORDER — METOLAZONE 2.5 MG/1
2.5 TABLET ORAL DAILY
Qty: 90 | Refills: 0 | Status: ACTIVE | COMMUNITY
Start: 2023-01-01

## 2023-01-01 RX ORDER — NOREPINEPHRINE BITARTRATE/D5W 8 MG/250ML
0.04 PLASTIC BAG, INJECTION (ML) INTRAVENOUS
Qty: 8 | Refills: 0 | Status: DISCONTINUED | OUTPATIENT
Start: 2023-01-01 | End: 2023-01-01

## 2023-01-01 RX ORDER — CEFOTETAN DISODIUM 1 G
2 VIAL (EA) INJECTION ONCE
Refills: 0 | Status: COMPLETED | OUTPATIENT
Start: 2023-01-01 | End: 2023-01-01

## 2023-01-01 RX ORDER — SODIUM BICARBONATE 1 MEQ/ML
150 SYRINGE (ML) INTRAVENOUS ONCE
Refills: 0 | Status: COMPLETED | OUTPATIENT
Start: 2023-01-01 | End: 2023-01-01

## 2023-01-01 RX ORDER — PIPERACILLIN AND TAZOBACTAM 4; .5 G/20ML; G/20ML
3.38 INJECTION, POWDER, LYOPHILIZED, FOR SOLUTION INTRAVENOUS EVERY 12 HOURS
Refills: 0 | Status: CANCELLED | OUTPATIENT
Start: 2023-01-01 | End: 2023-01-01

## 2023-01-01 RX ORDER — FUROSEMIDE 40 MG
1 TABLET ORAL
Qty: 0 | Refills: 0 | DISCHARGE
Start: 2023-01-01

## 2023-01-01 RX ORDER — VANCOMYCIN HCL 1 G
125 VIAL (EA) INTRAVENOUS DAILY
Refills: 0 | Status: DISCONTINUED | OUTPATIENT
Start: 2023-01-01 | End: 2023-01-01

## 2023-01-01 RX ORDER — TAMSULOSIN HYDROCHLORIDE 0.4 MG/1
0.4 CAPSULE ORAL AT BEDTIME
Refills: 0 | Status: DISCONTINUED | OUTPATIENT
Start: 2023-01-01 | End: 2023-01-01

## 2023-01-01 RX ORDER — FENTANYL CITRATE 50 UG/ML
25 INJECTION INTRAVENOUS EVERY 4 HOURS
Refills: 0 | Status: DISCONTINUED | OUTPATIENT
Start: 2023-01-01 | End: 2023-01-01

## 2023-01-01 RX ORDER — METOCLOPRAMIDE HCL 10 MG
5 TABLET ORAL ONCE
Refills: 0 | Status: COMPLETED | OUTPATIENT
Start: 2023-01-01 | End: 2023-01-01

## 2023-01-01 RX ORDER — FENTANYL CITRATE 50 UG/ML
50 INJECTION INTRAVENOUS EVERY 4 HOURS
Refills: 0 | Status: DISCONTINUED | OUTPATIENT
Start: 2023-01-01 | End: 2023-01-01

## 2023-01-01 RX ORDER — ALBUMIN HUMAN 25 %
50 VIAL (ML) INTRAVENOUS EVERY 8 HOURS
Refills: 0 | Status: DISCONTINUED | OUTPATIENT
Start: 2023-01-01 | End: 2023-01-01

## 2023-01-01 RX ORDER — ONDANSETRON 8 MG/1
4 TABLET, FILM COATED ORAL EVERY 6 HOURS
Refills: 0 | Status: DISCONTINUED | OUTPATIENT
Start: 2023-01-01 | End: 2023-01-01

## 2023-01-01 RX ORDER — PIPERACILLIN AND TAZOBACTAM 4; .5 G/20ML; G/20ML
3.38 INJECTION, POWDER, LYOPHILIZED, FOR SOLUTION INTRAVENOUS EVERY 12 HOURS
Refills: 0 | Status: DISCONTINUED | OUTPATIENT
Start: 2023-01-01 | End: 2023-01-01

## 2023-01-01 RX ORDER — HYDROCODONE BITARTRATE AND HOMATROPINE METHYLBROMIDE 5; 1.5 MG/5ML; MG/5ML
5 SOLUTION ORAL
Qty: 0 | Refills: 0 | DISCHARGE

## 2023-01-01 RX ORDER — ANAGRELIDE 0.5 MG/1
0.5 CAPSULE ORAL
Qty: 180 | Refills: 1 | Status: ACTIVE | COMMUNITY
Start: 2021-03-04 | End: 1900-01-01

## 2023-01-01 RX ORDER — CEFEPIME 1 G/1
1000 INJECTION, POWDER, FOR SOLUTION INTRAMUSCULAR; INTRAVENOUS ONCE
Refills: 0 | Status: COMPLETED | OUTPATIENT
Start: 2023-01-01 | End: 2023-01-01

## 2023-01-01 RX ORDER — POTASSIUM CHLORIDE 20 MEQ
10 PACKET (EA) ORAL ONCE
Refills: 0 | Status: DISCONTINUED | OUTPATIENT
Start: 2023-01-01 | End: 2023-01-01

## 2023-01-01 RX ORDER — ANAGRELIDE HCL 0.5 MG
3 CAPSULE ORAL
Qty: 0 | Refills: 0 | DISCHARGE

## 2023-01-01 RX ORDER — DESMOPRESSIN ACETATE 0.1 MG/1
20 TABLET ORAL ONCE
Refills: 0 | Status: COMPLETED | OUTPATIENT
Start: 2023-01-01 | End: 2023-01-01

## 2023-01-01 RX ORDER — ANAGRELIDE HCL 0.5 MG
1.5 CAPSULE ORAL
Refills: 0 | Status: DISCONTINUED | OUTPATIENT
Start: 2023-01-01 | End: 2023-01-01

## 2023-01-01 RX ORDER — LEVOTHYROXINE SODIUM 0.12 MG/1
125 TABLET ORAL
Refills: 0 | Status: ACTIVE | COMMUNITY

## 2023-01-01 RX ORDER — GABAPENTIN 400 MG/1
1 CAPSULE ORAL
Refills: 0 | DISCHARGE

## 2023-01-01 RX ORDER — REMDESIVIR 5 MG/ML
200 INJECTION INTRAVENOUS EVERY 24 HOURS
Refills: 0 | Status: COMPLETED | OUTPATIENT
Start: 2023-01-01 | End: 2023-01-01

## 2023-01-01 RX ORDER — FAMOTIDINE 40 MG/1
40 TABLET, FILM COATED ORAL
Refills: 0 | Status: ACTIVE | COMMUNITY

## 2023-01-01 RX ORDER — LEVOTHYROXINE SODIUM 125 MCG
93.75 TABLET ORAL AT BEDTIME
Refills: 0 | Status: DISCONTINUED | OUTPATIENT
Start: 2023-01-01 | End: 2023-01-01

## 2023-01-01 RX ORDER — BUMETANIDE 0.25 MG/ML
2 INJECTION INTRAMUSCULAR; INTRAVENOUS ONCE
Refills: 0 | Status: DISCONTINUED | OUTPATIENT
Start: 2023-01-01 | End: 2023-01-01

## 2023-01-01 RX ORDER — PANTOPRAZOLE 40 MG/1
40 TABLET, DELAYED RELEASE ORAL
Qty: 90 | Refills: 0 | Status: DISCONTINUED | COMMUNITY
Start: 2019-01-04 | End: 2023-01-01

## 2023-01-01 RX ORDER — DEXMEDETOMIDINE HYDROCHLORIDE IN 0.9% SODIUM CHLORIDE 4 UG/ML
0.5 INJECTION INTRAVENOUS
Qty: 400 | Refills: 0 | Status: DISCONTINUED | OUTPATIENT
Start: 2023-01-01 | End: 2023-01-01

## 2023-01-01 RX ORDER — ANAGRELIDE HCL 0.5 MG
1.5 CAPSULE ORAL DAILY
Refills: 0 | Status: DISCONTINUED | OUTPATIENT
Start: 2023-01-01 | End: 2023-01-01

## 2023-01-01 RX ORDER — RIVAROXABAN 15 MG-20MG
1 KIT ORAL
Refills: 0 | DISCHARGE

## 2023-01-01 RX ADMIN — Medication 1000 MILLIGRAM(S): at 15:26

## 2023-01-01 RX ADMIN — Medication 0.5 MILLIGRAM(S): at 20:19

## 2023-01-01 RX ADMIN — PANTOPRAZOLE SODIUM 40 MILLIGRAM(S): 20 TABLET, DELAYED RELEASE ORAL at 05:07

## 2023-01-01 RX ADMIN — Medication 3 MILLILITER(S): at 07:40

## 2023-01-01 RX ADMIN — SODIUM CHLORIDE 1000 MILLILITER(S): 9 INJECTION INTRAMUSCULAR; INTRAVENOUS; SUBCUTANEOUS at 08:58

## 2023-01-01 RX ADMIN — FIDAXOMICIN 200 MILLIGRAM(S): 200 GRANULE, FOR SUSPENSION ORAL at 18:04

## 2023-01-01 RX ADMIN — Medication 93.75 MICROGRAM(S): at 09:37

## 2023-01-01 RX ADMIN — Medication 20 MILLIEQUIVALENT(S): at 11:48

## 2023-01-01 RX ADMIN — Medication 3 MILLILITER(S): at 08:10

## 2023-01-01 RX ADMIN — Medication 125 MILLILITER(S): at 01:17

## 2023-01-01 RX ADMIN — Medication 100 MILLIEQUIVALENT(S): at 01:40

## 2023-01-01 RX ADMIN — Medication 0.5 MILLIGRAM(S): at 19:02

## 2023-01-01 RX ADMIN — Medication 25 GRAM(S): at 16:34

## 2023-01-01 RX ADMIN — SODIUM CHLORIDE 100 MILLILITER(S): 9 INJECTION, SOLUTION INTRAVENOUS at 22:11

## 2023-01-01 RX ADMIN — Medication 3 MILLILITER(S): at 07:48

## 2023-01-01 RX ADMIN — SODIUM CHLORIDE 75 MILLILITER(S): 9 INJECTION, SOLUTION INTRAVENOUS at 05:59

## 2023-01-01 RX ADMIN — Medication 650 MILLIGRAM(S): at 17:32

## 2023-01-01 RX ADMIN — Medication 125 MILLIGRAM(S): at 23:05

## 2023-01-01 RX ADMIN — Medication 125 MILLIGRAM(S): at 18:39

## 2023-01-01 RX ADMIN — FENTANYL CITRATE 50 MICROGRAM(S): 50 INJECTION INTRAVENOUS at 16:15

## 2023-01-01 RX ADMIN — Medication 80 MILLIGRAM(S): at 05:31

## 2023-01-01 RX ADMIN — FENTANYL CITRATE 3.64 MICROGRAM(S)/KG/HR: 50 INJECTION INTRAVENOUS at 21:25

## 2023-01-01 RX ADMIN — Medication 100 MILLIEQUIVALENT(S): at 08:14

## 2023-01-01 RX ADMIN — Medication 650 MILLIGRAM(S): at 21:36

## 2023-01-01 RX ADMIN — SODIUM CHLORIDE 150 MILLILITER(S): 9 INJECTION, SOLUTION INTRAVENOUS at 02:24

## 2023-01-01 RX ADMIN — HEPARIN SODIUM 5000 UNIT(S): 5000 INJECTION INTRAVENOUS; SUBCUTANEOUS at 14:29

## 2023-01-01 RX ADMIN — FIDAXOMICIN 200 MILLIGRAM(S): 200 GRANULE, FOR SUSPENSION ORAL at 17:55

## 2023-01-01 RX ADMIN — SODIUM CHLORIDE 100 MILLILITER(S): 9 INJECTION, SOLUTION INTRAVENOUS at 06:52

## 2023-01-01 RX ADMIN — DEXMEDETOMIDINE HYDROCHLORIDE IN 0.9% SODIUM CHLORIDE 9.1 MICROGRAM(S)/KG/HR: 4 INJECTION INTRAVENOUS at 18:18

## 2023-01-01 RX ADMIN — MIDODRINE HYDROCHLORIDE 10 MILLIGRAM(S): 2.5 TABLET ORAL at 15:33

## 2023-01-01 RX ADMIN — Medication 125 MICROGRAM(S): at 06:08

## 2023-01-01 RX ADMIN — Medication 1.5 MILLIGRAM(S): at 05:53

## 2023-01-01 RX ADMIN — Medication 125 MICROGRAM(S): at 05:30

## 2023-01-01 RX ADMIN — MIDODRINE HYDROCHLORIDE 5 MILLIGRAM(S): 2.5 TABLET ORAL at 05:03

## 2023-01-01 RX ADMIN — LEVALBUTEROL 1.25 MILLIGRAM(S): 1.25 SOLUTION, CONCENTRATE RESPIRATORY (INHALATION) at 00:58

## 2023-01-01 RX ADMIN — Medication 1000 MILLIGRAM(S): at 14:00

## 2023-01-01 RX ADMIN — FIDAXOMICIN 200 MILLIGRAM(S): 200 GRANULE, FOR SUSPENSION ORAL at 05:09

## 2023-01-01 RX ADMIN — Medication 3 MILLILITER(S): at 00:36

## 2023-01-01 RX ADMIN — Medication 1 PACKET(S): at 12:25

## 2023-01-01 RX ADMIN — Medication 100 MILLIGRAM(S): at 15:58

## 2023-01-01 RX ADMIN — SODIUM CHLORIDE 100 MILLILITER(S): 9 INJECTION, SOLUTION INTRAVENOUS at 22:52

## 2023-01-01 RX ADMIN — RIVAROXABAN 15 MILLIGRAM(S): KIT at 18:04

## 2023-01-01 RX ADMIN — HEPARIN SODIUM 5000 UNIT(S): 5000 INJECTION INTRAVENOUS; SUBCUTANEOUS at 05:37

## 2023-01-01 RX ADMIN — HEPARIN SODIUM 5000 UNIT(S): 5000 INJECTION INTRAVENOUS; SUBCUTANEOUS at 13:23

## 2023-01-01 RX ADMIN — Medication 93.75 MICROGRAM(S): at 21:12

## 2023-01-01 RX ADMIN — Medication 3 MILLILITER(S): at 00:34

## 2023-01-01 RX ADMIN — Medication 100 MILLIEQUIVALENT(S): at 09:23

## 2023-01-01 RX ADMIN — MORPHINE SULFATE 2 MILLIGRAM(S): 50 CAPSULE, EXTENDED RELEASE ORAL at 03:37

## 2023-01-01 RX ADMIN — Medication 93.75 MICROGRAM(S): at 09:02

## 2023-01-01 RX ADMIN — HEPARIN SODIUM 5000 UNIT(S): 5000 INJECTION INTRAVENOUS; SUBCUTANEOUS at 05:02

## 2023-01-01 RX ADMIN — Medication 30 MILLILITER(S): at 14:15

## 2023-01-01 RX ADMIN — CHLORHEXIDINE GLUCONATE 15 MILLILITER(S): 213 SOLUTION TOPICAL at 18:13

## 2023-01-01 RX ADMIN — Medication 50 MILLIGRAM(S): at 05:52

## 2023-01-01 RX ADMIN — MORPHINE SULFATE 1 MILLIGRAM(S): 50 CAPSULE, EXTENDED RELEASE ORAL at 00:01

## 2023-01-01 RX ADMIN — DEXMEDETOMIDINE HYDROCHLORIDE IN 0.9% SODIUM CHLORIDE 9.1 MICROGRAM(S)/KG/HR: 4 INJECTION INTRAVENOUS at 20:32

## 2023-01-01 RX ADMIN — Medication 3 MILLILITER(S): at 01:07

## 2023-01-01 RX ADMIN — FENTANYL CITRATE 50 MICROGRAM(S): 50 INJECTION INTRAVENOUS at 11:45

## 2023-01-01 RX ADMIN — Medication 3 MILLILITER(S): at 01:21

## 2023-01-01 RX ADMIN — Medication 40 MILLIEQUIVALENT(S): at 11:23

## 2023-01-01 RX ADMIN — BUMETANIDE 2 MILLIGRAM(S): 0.25 INJECTION INTRAMUSCULAR; INTRAVENOUS at 14:30

## 2023-01-01 RX ADMIN — Medication 0.5 MILLIGRAM(S): at 20:21

## 2023-01-01 RX ADMIN — Medication 93.75 MICROGRAM(S): at 21:23

## 2023-01-01 RX ADMIN — Medication 125 MILLIGRAM(S): at 18:19

## 2023-01-01 RX ADMIN — Medication 50 MILLILITER(S): at 12:01

## 2023-01-01 RX ADMIN — Medication 200 GRAM(S): at 15:58

## 2023-01-01 RX ADMIN — OCTREOTIDE ACETATE 100 MICROGRAM(S): 200 INJECTION, SOLUTION INTRAVENOUS; SUBCUTANEOUS at 05:27

## 2023-01-01 RX ADMIN — Medication 6.83 MICROGRAM(S)/KG/MIN: at 20:42

## 2023-01-01 RX ADMIN — MORPHINE SULFATE 1 MILLIGRAM(S): 50 CAPSULE, EXTENDED RELEASE ORAL at 16:01

## 2023-01-01 RX ADMIN — Medication 10 MILLIGRAM(S): at 13:54

## 2023-01-01 RX ADMIN — SODIUM CHLORIDE 65 MILLILITER(S): 9 INJECTION, SOLUTION INTRAVENOUS at 18:20

## 2023-01-01 RX ADMIN — BUMETANIDE 2 MILLIGRAM(S): 0.25 INJECTION INTRAMUSCULAR; INTRAVENOUS at 07:31

## 2023-01-01 RX ADMIN — ESCITALOPRAM OXALATE 20 MILLIGRAM(S): 10 TABLET, FILM COATED ORAL at 13:14

## 2023-01-01 RX ADMIN — MIDODRINE HYDROCHLORIDE 5 MILLIGRAM(S): 2.5 TABLET ORAL at 06:18

## 2023-01-01 RX ADMIN — ESCITALOPRAM OXALATE 20 MILLIGRAM(S): 10 TABLET, FILM COATED ORAL at 12:15

## 2023-01-01 RX ADMIN — Medication 150 GRAM(S): at 21:34

## 2023-01-01 RX ADMIN — Medication 93.75 MICROGRAM(S): at 10:59

## 2023-01-01 RX ADMIN — Medication 3 MILLILITER(S): at 19:31

## 2023-01-01 RX ADMIN — Medication 3 MILLILITER(S): at 13:33

## 2023-01-01 RX ADMIN — Medication 400 MILLIGRAM(S): at 13:50

## 2023-01-01 RX ADMIN — HEPARIN SODIUM 5000 UNIT(S): 5000 INJECTION INTRAVENOUS; SUBCUTANEOUS at 13:51

## 2023-01-01 RX ADMIN — PANTOPRAZOLE SODIUM 40 MILLIGRAM(S): 20 TABLET, DELAYED RELEASE ORAL at 18:32

## 2023-01-01 RX ADMIN — MIDODRINE HYDROCHLORIDE 10 MILLIGRAM(S): 2.5 TABLET ORAL at 05:22

## 2023-01-01 RX ADMIN — Medication 1.5 MILLIGRAM(S): at 08:54

## 2023-01-01 RX ADMIN — HEPARIN SODIUM 5000 UNIT(S): 5000 INJECTION INTRAVENOUS; SUBCUTANEOUS at 21:02

## 2023-01-01 RX ADMIN — Medication 3 MILLILITER(S): at 20:11

## 2023-01-01 RX ADMIN — Medication 10 MILLIGRAM(S): at 19:39

## 2023-01-01 RX ADMIN — Medication 100 MILLIEQUIVALENT(S): at 23:45

## 2023-01-01 RX ADMIN — CEFEPIME 100 MILLIGRAM(S): 1 INJECTION, POWDER, FOR SOLUTION INTRAMUSCULAR; INTRAVENOUS at 17:08

## 2023-01-01 RX ADMIN — CHLORHEXIDINE GLUCONATE 15 MILLILITER(S): 213 SOLUTION TOPICAL at 05:04

## 2023-01-01 RX ADMIN — OCTREOTIDE ACETATE 100 MICROGRAM(S): 200 INJECTION, SOLUTION INTRAVENOUS; SUBCUTANEOUS at 06:18

## 2023-01-01 RX ADMIN — Medication 125 MILLIGRAM(S): at 00:20

## 2023-01-01 RX ADMIN — SODIUM CHLORIDE 1500 MILLILITER(S): 9 INJECTION, SOLUTION INTRAVENOUS at 01:10

## 2023-01-01 RX ADMIN — CHLORHEXIDINE GLUCONATE 15 MILLILITER(S): 213 SOLUTION TOPICAL at 17:54

## 2023-01-01 RX ADMIN — MORPHINE SULFATE 2 MILLIGRAM(S): 50 CAPSULE, EXTENDED RELEASE ORAL at 21:44

## 2023-01-01 RX ADMIN — Medication 6.83 MICROGRAM(S)/KG/MIN: at 16:06

## 2023-01-01 RX ADMIN — Medication 125 MILLIGRAM(S): at 23:16

## 2023-01-01 RX ADMIN — MEROPENEM 100 MILLIGRAM(S): 1 INJECTION INTRAVENOUS at 05:37

## 2023-01-01 RX ADMIN — PROPOFOL 8.74 MICROGRAM(S)/KG/MIN: 10 INJECTION, EMULSION INTRAVENOUS at 17:55

## 2023-01-01 RX ADMIN — PANTOPRAZOLE SODIUM 40 MILLIGRAM(S): 20 TABLET, DELAYED RELEASE ORAL at 13:41

## 2023-01-01 RX ADMIN — FENTANYL CITRATE 50 MICROGRAM(S): 50 INJECTION INTRAVENOUS at 16:40

## 2023-01-01 RX ADMIN — OCTREOTIDE ACETATE 100 MICROGRAM(S): 200 INJECTION, SOLUTION INTRAVENOUS; SUBCUTANEOUS at 13:11

## 2023-01-01 RX ADMIN — Medication 125 MILLIGRAM(S): at 12:43

## 2023-01-01 RX ADMIN — HEPARIN SODIUM 5000 UNIT(S): 5000 INJECTION INTRAVENOUS; SUBCUTANEOUS at 05:20

## 2023-01-01 RX ADMIN — Medication 100 MILLIEQUIVALENT(S): at 00:39

## 2023-01-01 RX ADMIN — Medication 50 MILLIEQUIVALENT(S): at 00:53

## 2023-01-01 RX ADMIN — RIVAROXABAN 15 MILLIGRAM(S): KIT at 18:20

## 2023-01-01 RX ADMIN — SODIUM CHLORIDE 75 MILLILITER(S): 9 INJECTION, SOLUTION INTRAVENOUS at 15:19

## 2023-01-01 RX ADMIN — OCTREOTIDE ACETATE 100 MICROGRAM(S): 200 INJECTION, SOLUTION INTRAVENOUS; SUBCUTANEOUS at 21:12

## 2023-01-01 RX ADMIN — MORPHINE SULFATE 1 MILLIGRAM(S): 50 CAPSULE, EXTENDED RELEASE ORAL at 01:01

## 2023-01-01 RX ADMIN — Medication 125 MICROGRAM(S): at 05:27

## 2023-01-01 RX ADMIN — Medication 3 MILLILITER(S): at 13:21

## 2023-01-01 RX ADMIN — RIVAROXABAN 15 MILLIGRAM(S): KIT at 17:45

## 2023-01-01 RX ADMIN — PANTOPRAZOLE SODIUM 40 MILLIGRAM(S): 20 TABLET, DELAYED RELEASE ORAL at 13:10

## 2023-01-01 RX ADMIN — Medication 650 MILLIGRAM(S): at 09:45

## 2023-01-01 RX ADMIN — Medication 5.46 MICROGRAM(S)/KG/MIN: at 17:58

## 2023-01-01 RX ADMIN — FENTANYL CITRATE 50 MICROGRAM(S): 50 INJECTION INTRAVENOUS at 16:25

## 2023-01-01 RX ADMIN — Medication 0.5 MILLIGRAM(S): at 20:55

## 2023-01-01 RX ADMIN — Medication 125 MICROGRAM(S): at 05:41

## 2023-01-01 RX ADMIN — Medication 30 MILLILITER(S): at 02:07

## 2023-01-01 RX ADMIN — Medication 125 MILLIGRAM(S): at 13:23

## 2023-01-01 RX ADMIN — BUMETANIDE 2 MILLIGRAM(S): 0.25 INJECTION INTRAMUSCULAR; INTRAVENOUS at 17:00

## 2023-01-01 RX ADMIN — Medication 93.75 MICROGRAM(S): at 09:58

## 2023-01-01 RX ADMIN — FENTANYL CITRATE 50 MICROGRAM(S): 50 INJECTION INTRAVENOUS at 20:32

## 2023-01-01 RX ADMIN — Medication 6.83 MICROGRAM(S)/KG/MIN: at 21:32

## 2023-01-01 RX ADMIN — CHLORHEXIDINE GLUCONATE 15 MILLILITER(S): 213 SOLUTION TOPICAL at 17:16

## 2023-01-01 RX ADMIN — MORPHINE SULFATE 2 MILLIGRAM(S): 50 CAPSULE, EXTENDED RELEASE ORAL at 21:11

## 2023-01-01 RX ADMIN — ONDANSETRON 4 MILLIGRAM(S): 8 TABLET, FILM COATED ORAL at 05:41

## 2023-01-01 RX ADMIN — MEROPENEM 100 MILLIGRAM(S): 1 INJECTION INTRAVENOUS at 18:25

## 2023-01-01 RX ADMIN — OCTREOTIDE ACETATE 100 MICROGRAM(S): 200 INJECTION, SOLUTION INTRAVENOUS; SUBCUTANEOUS at 05:20

## 2023-01-01 RX ADMIN — Medication 3 MILLILITER(S): at 07:38

## 2023-01-01 RX ADMIN — MORPHINE SULFATE 2 MILLIGRAM(S): 50 CAPSULE, EXTENDED RELEASE ORAL at 13:20

## 2023-01-01 RX ADMIN — MORPHINE SULFATE 2 MILLIGRAM(S): 50 CAPSULE, EXTENDED RELEASE ORAL at 21:59

## 2023-01-01 RX ADMIN — HYDROMORPHONE HYDROCHLORIDE 1 MILLIGRAM(S): 2 INJECTION INTRAMUSCULAR; INTRAVENOUS; SUBCUTANEOUS at 09:20

## 2023-01-01 RX ADMIN — Medication 100 MILLIEQUIVALENT(S): at 14:20

## 2023-01-01 RX ADMIN — Medication 125 MICROGRAM(S): at 05:45

## 2023-01-01 RX ADMIN — Medication 30 MILLILITER(S): at 13:32

## 2023-01-01 RX ADMIN — ESCITALOPRAM OXALATE 20 MILLIGRAM(S): 10 TABLET, FILM COATED ORAL at 11:53

## 2023-01-01 RX ADMIN — MIDODRINE HYDROCHLORIDE 10 MILLIGRAM(S): 2.5 TABLET ORAL at 13:31

## 2023-01-01 RX ADMIN — CHLORHEXIDINE GLUCONATE 15 MILLILITER(S): 213 SOLUTION TOPICAL at 17:03

## 2023-01-01 RX ADMIN — Medication 100 MILLIEQUIVALENT(S): at 00:33

## 2023-01-01 RX ADMIN — FENTANYL CITRATE 3.64 MICROGRAM(S)/KG/HR: 50 INJECTION INTRAVENOUS at 22:54

## 2023-01-01 RX ADMIN — Medication 0.5 MILLIGRAM(S): at 08:10

## 2023-01-01 RX ADMIN — Medication 3 MILLILITER(S): at 20:30

## 2023-01-01 RX ADMIN — CHLORHEXIDINE GLUCONATE 15 MILLILITER(S): 213 SOLUTION TOPICAL at 05:07

## 2023-01-01 RX ADMIN — Medication 125 MILLIGRAM(S): at 05:52

## 2023-01-01 RX ADMIN — HYDROMORPHONE HYDROCHLORIDE 0.5 MILLIGRAM(S): 2 INJECTION INTRAMUSCULAR; INTRAVENOUS; SUBCUTANEOUS at 10:23

## 2023-01-01 RX ADMIN — Medication 125 MILLIGRAM(S): at 17:51

## 2023-01-01 RX ADMIN — Medication 3 MILLILITER(S): at 01:51

## 2023-01-01 RX ADMIN — BUMETANIDE 2 MILLIGRAM(S): 0.25 INJECTION INTRAMUSCULAR; INTRAVENOUS at 14:15

## 2023-01-01 RX ADMIN — Medication 100 MILLIGRAM(S): at 21:06

## 2023-01-01 RX ADMIN — DEXMEDETOMIDINE HYDROCHLORIDE IN 0.9% SODIUM CHLORIDE 9.1 MICROGRAM(S)/KG/HR: 4 INJECTION INTRAVENOUS at 13:37

## 2023-01-01 RX ADMIN — Medication 40 MILLIEQUIVALENT(S): at 21:29

## 2023-01-01 RX ADMIN — SODIUM CHLORIDE 100 MILLILITER(S): 9 INJECTION, SOLUTION INTRAVENOUS at 01:15

## 2023-01-01 RX ADMIN — MIDODRINE HYDROCHLORIDE 5 MILLIGRAM(S): 2.5 TABLET ORAL at 18:32

## 2023-01-01 RX ADMIN — Medication 650 MILLIGRAM(S): at 03:09

## 2023-01-01 RX ADMIN — Medication 3.19 MICROGRAM(S)/KG/MIN: at 12:36

## 2023-01-01 RX ADMIN — HEPARIN SODIUM 5000 UNIT(S): 5000 INJECTION INTRAVENOUS; SUBCUTANEOUS at 21:58

## 2023-01-01 RX ADMIN — Medication 100 MILLIEQUIVALENT(S): at 09:40

## 2023-01-01 RX ADMIN — Medication 125 MILLIGRAM(S): at 13:38

## 2023-01-01 RX ADMIN — Medication 650 MILLIGRAM(S): at 19:47

## 2023-01-01 RX ADMIN — Medication 3 MILLILITER(S): at 08:13

## 2023-01-01 RX ADMIN — REMDESIVIR 200 MILLIGRAM(S): 5 INJECTION INTRAVENOUS at 20:52

## 2023-01-01 RX ADMIN — Medication 50 MILLIGRAM(S): at 06:08

## 2023-01-01 RX ADMIN — RIVAROXABAN 15 MILLIGRAM(S): KIT at 18:02

## 2023-01-01 RX ADMIN — CHLORHEXIDINE GLUCONATE 15 MILLILITER(S): 213 SOLUTION TOPICAL at 16:25

## 2023-01-01 RX ADMIN — Medication 100 MILLIEQUIVALENT(S): at 12:16

## 2023-01-01 RX ADMIN — Medication 1.5 MILLIGRAM(S): at 06:08

## 2023-01-01 RX ADMIN — FENTANYL CITRATE 50 MICROGRAM(S): 50 INJECTION INTRAVENOUS at 21:00

## 2023-01-01 RX ADMIN — Medication 3.23 MICROGRAM(S)/KG/MIN: at 23:59

## 2023-01-01 RX ADMIN — Medication 93.75 MICROGRAM(S): at 10:01

## 2023-01-01 RX ADMIN — CHLORHEXIDINE GLUCONATE 1 APPLICATION(S): 213 SOLUTION TOPICAL at 15:02

## 2023-01-01 RX ADMIN — MIDODRINE HYDROCHLORIDE 10 MILLIGRAM(S): 2.5 TABLET ORAL at 21:12

## 2023-01-01 RX ADMIN — Medication 1.5 MILLIGRAM(S): at 06:00

## 2023-01-01 RX ADMIN — Medication 93.75 MICROGRAM(S): at 08:18

## 2023-01-01 RX ADMIN — Medication 93.75 MICROGRAM(S): at 21:02

## 2023-01-01 RX ADMIN — MORPHINE SULFATE 2 MILLIGRAM(S): 50 CAPSULE, EXTENDED RELEASE ORAL at 10:50

## 2023-01-01 RX ADMIN — OCTREOTIDE ACETATE 100 MICROGRAM(S): 200 INJECTION, SOLUTION INTRAVENOUS; SUBCUTANEOUS at 21:00

## 2023-01-01 RX ADMIN — Medication 3 MILLILITER(S): at 14:24

## 2023-01-01 RX ADMIN — Medication 1.5 MILLIGRAM(S): at 18:21

## 2023-01-01 RX ADMIN — ESCITALOPRAM OXALATE 20 MILLIGRAM(S): 10 TABLET, FILM COATED ORAL at 12:41

## 2023-01-01 RX ADMIN — HYDROMORPHONE HYDROCHLORIDE 0.25 MILLIGRAM(S): 2 INJECTION INTRAMUSCULAR; INTRAVENOUS; SUBCUTANEOUS at 23:07

## 2023-01-01 RX ADMIN — PANTOPRAZOLE SODIUM 40 MILLIGRAM(S): 20 TABLET, DELAYED RELEASE ORAL at 11:37

## 2023-01-01 RX ADMIN — ESCITALOPRAM OXALATE 20 MILLIGRAM(S): 10 TABLET, FILM COATED ORAL at 11:24

## 2023-01-01 RX ADMIN — SODIUM CHLORIDE 150 MILLILITER(S): 9 INJECTION, SOLUTION INTRAVENOUS at 20:46

## 2023-01-01 RX ADMIN — Medication 40 MILLIEQUIVALENT(S): at 18:04

## 2023-01-01 RX ADMIN — ESCITALOPRAM OXALATE 20 MILLIGRAM(S): 10 TABLET, FILM COATED ORAL at 12:12

## 2023-01-01 RX ADMIN — Medication 3 MILLILITER(S): at 20:57

## 2023-01-01 RX ADMIN — FENTANYL CITRATE 3.64 MICROGRAM(S)/KG/HR: 50 INJECTION INTRAVENOUS at 04:55

## 2023-01-01 RX ADMIN — MIDODRINE HYDROCHLORIDE 10 MILLIGRAM(S): 2.5 TABLET ORAL at 05:02

## 2023-01-01 RX ADMIN — ESCITALOPRAM OXALATE 20 MILLIGRAM(S): 10 TABLET, FILM COATED ORAL at 11:33

## 2023-01-01 RX ADMIN — CHLORHEXIDINE GLUCONATE 1 APPLICATION(S): 213 SOLUTION TOPICAL at 05:24

## 2023-01-01 RX ADMIN — ESCITALOPRAM OXALATE 20 MILLIGRAM(S): 10 TABLET, FILM COATED ORAL at 12:03

## 2023-01-01 RX ADMIN — Medication 100 GRAM(S): at 10:46

## 2023-01-01 RX ADMIN — MIDODRINE HYDROCHLORIDE 10 MILLIGRAM(S): 2.5 TABLET ORAL at 13:22

## 2023-01-01 RX ADMIN — PANTOPRAZOLE SODIUM 40 MILLIGRAM(S): 20 TABLET, DELAYED RELEASE ORAL at 13:02

## 2023-01-01 RX ADMIN — Medication 10 MILLIGRAM(S): at 12:19

## 2023-01-01 RX ADMIN — MIDODRINE HYDROCHLORIDE 5 MILLIGRAM(S): 2.5 TABLET ORAL at 13:14

## 2023-01-01 RX ADMIN — HEPARIN SODIUM 5000 UNIT(S): 5000 INJECTION INTRAVENOUS; SUBCUTANEOUS at 13:17

## 2023-01-01 RX ADMIN — MEROPENEM 100 MILLIGRAM(S): 1 INJECTION INTRAVENOUS at 06:10

## 2023-01-01 RX ADMIN — MIDODRINE HYDROCHLORIDE 5 MILLIGRAM(S): 2.5 TABLET ORAL at 13:19

## 2023-01-01 RX ADMIN — Medication 80 MILLIGRAM(S): at 05:23

## 2023-01-01 RX ADMIN — BUMETANIDE 2 MILLIGRAM(S): 0.25 INJECTION INTRAMUSCULAR; INTRAVENOUS at 00:18

## 2023-01-01 RX ADMIN — HEPARIN SODIUM 5000 UNIT(S): 5000 INJECTION INTRAVENOUS; SUBCUTANEOUS at 05:05

## 2023-01-01 RX ADMIN — PANTOPRAZOLE SODIUM 40 MILLIGRAM(S): 20 TABLET, DELAYED RELEASE ORAL at 12:19

## 2023-01-01 RX ADMIN — MIDODRINE HYDROCHLORIDE 5 MILLIGRAM(S): 2.5 TABLET ORAL at 06:01

## 2023-01-01 RX ADMIN — Medication 400 MILLIGRAM(S): at 23:00

## 2023-01-01 RX ADMIN — Medication 1.5 MILLIGRAM(S): at 17:51

## 2023-01-01 RX ADMIN — OCTREOTIDE ACETATE 100 MICROGRAM(S): 200 INJECTION, SOLUTION INTRAVENOUS; SUBCUTANEOUS at 21:27

## 2023-01-01 RX ADMIN — DEXMEDETOMIDINE HYDROCHLORIDE IN 0.9% SODIUM CHLORIDE 9.1 MICROGRAM(S)/KG/HR: 4 INJECTION INTRAVENOUS at 22:22

## 2023-01-01 RX ADMIN — Medication 0.5 MILLIGRAM(S): at 07:46

## 2023-01-01 RX ADMIN — SODIUM CHLORIDE 1000 MILLILITER(S): 9 INJECTION, SOLUTION INTRAVENOUS at 18:36

## 2023-01-01 RX ADMIN — FENTANYL CITRATE 50 MICROGRAM(S): 50 INJECTION INTRAVENOUS at 02:50

## 2023-01-01 RX ADMIN — Medication 100 MILLIEQUIVALENT(S): at 17:30

## 2023-01-01 RX ADMIN — MIDODRINE HYDROCHLORIDE 10 MILLIGRAM(S): 2.5 TABLET ORAL at 06:18

## 2023-01-01 RX ADMIN — Medication 93.75 MICROGRAM(S): at 09:20

## 2023-01-01 RX ADMIN — Medication 3 MILLILITER(S): at 14:01

## 2023-01-01 RX ADMIN — PANTOPRAZOLE SODIUM 40 MILLIGRAM(S): 20 TABLET, DELAYED RELEASE ORAL at 17:45

## 2023-01-01 RX ADMIN — ESCITALOPRAM OXALATE 20 MILLIGRAM(S): 10 TABLET, FILM COATED ORAL at 11:31

## 2023-01-01 RX ADMIN — Medication 3 MILLILITER(S): at 07:13

## 2023-01-01 RX ADMIN — Medication 40 MILLIEQUIVALENT(S): at 14:49

## 2023-01-01 RX ADMIN — SODIUM CHLORIDE 65 MILLILITER(S): 9 INJECTION, SOLUTION INTRAVENOUS at 11:29

## 2023-01-01 RX ADMIN — HEPARIN SODIUM 5000 UNIT(S): 5000 INJECTION INTRAVENOUS; SUBCUTANEOUS at 21:01

## 2023-01-01 RX ADMIN — HYDROMORPHONE HYDROCHLORIDE 0.5 MILLIGRAM(S): 2 INJECTION INTRAMUSCULAR; INTRAVENOUS; SUBCUTANEOUS at 12:08

## 2023-01-01 RX ADMIN — PANTOPRAZOLE SODIUM 40 MILLIGRAM(S): 20 TABLET, DELAYED RELEASE ORAL at 18:20

## 2023-01-01 RX ADMIN — Medication 40 MILLIEQUIVALENT(S): at 13:03

## 2023-01-01 RX ADMIN — SODIUM CHLORIDE 500 MILLILITER(S): 9 INJECTION INTRAMUSCULAR; INTRAVENOUS; SUBCUTANEOUS at 14:42

## 2023-01-01 RX ADMIN — Medication 125 MILLILITER(S): at 13:26

## 2023-01-01 RX ADMIN — VASOPRESSIN 6 UNIT(S)/MIN: 20 INJECTION INTRAVENOUS at 21:34

## 2023-01-01 RX ADMIN — MIDODRINE HYDROCHLORIDE 5 MILLIGRAM(S): 2.5 TABLET ORAL at 18:15

## 2023-01-01 RX ADMIN — Medication 3 MILLILITER(S): at 19:02

## 2023-01-01 RX ADMIN — Medication 3 MILLILITER(S): at 13:20

## 2023-01-01 RX ADMIN — Medication 40 MILLIEQUIVALENT(S): at 17:37

## 2023-01-01 RX ADMIN — Medication 25 GRAM(S): at 10:57

## 2023-01-01 RX ADMIN — PIPERACILLIN AND TAZOBACTAM 200 GRAM(S): 4; .5 INJECTION, POWDER, LYOPHILIZED, FOR SOLUTION INTRAVENOUS at 12:26

## 2023-01-01 RX ADMIN — Medication 3 MILLILITER(S): at 07:35

## 2023-01-01 RX ADMIN — SODIUM CHLORIDE 75 MILLILITER(S): 9 INJECTION, SOLUTION INTRAVENOUS at 18:39

## 2023-01-01 RX ADMIN — PANTOPRAZOLE SODIUM 40 MILLIGRAM(S): 20 TABLET, DELAYED RELEASE ORAL at 11:20

## 2023-01-01 RX ADMIN — HEPARIN SODIUM 5000 UNIT(S): 5000 INJECTION INTRAVENOUS; SUBCUTANEOUS at 05:21

## 2023-01-01 RX ADMIN — Medication 3 MILLILITER(S): at 07:34

## 2023-01-01 RX ADMIN — CHLORHEXIDINE GLUCONATE 1 APPLICATION(S): 213 SOLUTION TOPICAL at 05:08

## 2023-01-01 RX ADMIN — Medication 1000 MILLIGRAM(S): at 14:50

## 2023-01-01 RX ADMIN — Medication 0.5 MILLIGRAM(S): at 07:34

## 2023-01-01 RX ADMIN — Medication 50 MILLILITER(S): at 21:13

## 2023-01-01 RX ADMIN — Medication 125 MILLIGRAM(S): at 12:20

## 2023-01-01 RX ADMIN — MIDODRINE HYDROCHLORIDE 5 MILLIGRAM(S): 2.5 TABLET ORAL at 17:45

## 2023-01-01 RX ADMIN — MORPHINE SULFATE 2 MILLIGRAM(S): 50 CAPSULE, EXTENDED RELEASE ORAL at 10:39

## 2023-01-01 RX ADMIN — Medication 100 MILLIGRAM(S): at 18:35

## 2023-01-01 RX ADMIN — MIDODRINE HYDROCHLORIDE 5 MILLIGRAM(S): 2.5 TABLET ORAL at 17:20

## 2023-01-01 RX ADMIN — IOHEXOL 30 MILLILITER(S): 300 INJECTION, SOLUTION INTRAVENOUS at 07:06

## 2023-01-01 RX ADMIN — MIDODRINE HYDROCHLORIDE 5 MILLIGRAM(S): 2.5 TABLET ORAL at 05:14

## 2023-01-01 RX ADMIN — Medication 125 MILLIGRAM(S): at 12:16

## 2023-01-01 RX ADMIN — MIDODRINE HYDROCHLORIDE 5 MILLIGRAM(S): 2.5 TABLET ORAL at 19:45

## 2023-01-01 RX ADMIN — Medication 125 MICROGRAM(S): at 05:11

## 2023-01-01 RX ADMIN — MIDODRINE HYDROCHLORIDE 5 MILLIGRAM(S): 2.5 TABLET ORAL at 18:38

## 2023-01-01 RX ADMIN — ESCITALOPRAM OXALATE 20 MILLIGRAM(S): 10 TABLET, FILM COATED ORAL at 11:47

## 2023-01-01 RX ADMIN — MEROPENEM 100 MILLIGRAM(S): 1 INJECTION INTRAVENOUS at 18:16

## 2023-01-01 RX ADMIN — FAMOTIDINE 20 MILLIGRAM(S): 10 INJECTION INTRAVENOUS at 07:07

## 2023-01-01 RX ADMIN — MIDODRINE HYDROCHLORIDE 10 MILLIGRAM(S): 2.5 TABLET ORAL at 15:22

## 2023-01-01 RX ADMIN — Medication 3 MILLILITER(S): at 13:39

## 2023-01-01 RX ADMIN — Medication 1000 MILLIGRAM(S): at 00:00

## 2023-01-01 RX ADMIN — Medication 1 PACKET(S): at 12:15

## 2023-01-01 RX ADMIN — Medication 5 MILLIGRAM(S): at 22:12

## 2023-01-01 RX ADMIN — Medication 0.5 MILLIGRAM(S): at 07:13

## 2023-01-01 RX ADMIN — ONDANSETRON 4 MILLIGRAM(S): 8 TABLET, FILM COATED ORAL at 10:50

## 2023-01-01 RX ADMIN — PIPERACILLIN AND TAZOBACTAM 25 GRAM(S): 4; .5 INJECTION, POWDER, LYOPHILIZED, FOR SOLUTION INTRAVENOUS at 11:46

## 2023-01-01 RX ADMIN — Medication 0.5 MILLIGRAM(S): at 07:39

## 2023-01-01 RX ADMIN — PIPERACILLIN AND TAZOBACTAM 25 GRAM(S): 4; .5 INJECTION, POWDER, LYOPHILIZED, FOR SOLUTION INTRAVENOUS at 11:55

## 2023-01-01 RX ADMIN — SODIUM CHLORIDE 75 MILLILITER(S): 9 INJECTION, SOLUTION INTRAVENOUS at 23:52

## 2023-01-01 RX ADMIN — RIVAROXABAN 15 MILLIGRAM(S): KIT at 18:19

## 2023-01-01 RX ADMIN — Medication 50 MILLILITER(S): at 12:18

## 2023-01-01 RX ADMIN — Medication 100 MILLIEQUIVALENT(S): at 10:38

## 2023-01-01 RX ADMIN — Medication 102 MILLIGRAM(S): at 11:24

## 2023-01-01 RX ADMIN — PANTOPRAZOLE SODIUM 40 MILLIGRAM(S): 20 TABLET, DELAYED RELEASE ORAL at 11:15

## 2023-01-01 RX ADMIN — Medication 1.5 MILLIGRAM(S): at 08:42

## 2023-01-01 RX ADMIN — Medication 125 MILLIGRAM(S): at 13:14

## 2023-01-01 RX ADMIN — MEROPENEM 100 MILLIGRAM(S): 1 INJECTION INTRAVENOUS at 05:20

## 2023-01-01 RX ADMIN — Medication 40 MILLIEQUIVALENT(S): at 09:46

## 2023-01-01 RX ADMIN — Medication 125 MILLILITER(S): at 23:54

## 2023-01-01 RX ADMIN — OCTREOTIDE ACETATE 100 MICROGRAM(S): 200 INJECTION, SOLUTION INTRAVENOUS; SUBCUTANEOUS at 21:45

## 2023-01-01 RX ADMIN — HEPARIN SODIUM 5000 UNIT(S): 5000 INJECTION INTRAVENOUS; SUBCUTANEOUS at 13:11

## 2023-01-01 RX ADMIN — FENTANYL CITRATE 50 MICROGRAM(S): 50 INJECTION INTRAVENOUS at 15:57

## 2023-01-01 RX ADMIN — Medication 3 MILLILITER(S): at 00:04

## 2023-01-01 RX ADMIN — RIVAROXABAN 15 MILLIGRAM(S): KIT at 17:51

## 2023-01-01 RX ADMIN — PIPERACILLIN AND TAZOBACTAM 25 GRAM(S): 4; .5 INJECTION, POWDER, LYOPHILIZED, FOR SOLUTION INTRAVENOUS at 00:41

## 2023-01-01 RX ADMIN — MEROPENEM 100 MILLIGRAM(S): 1 INJECTION INTRAVENOUS at 05:09

## 2023-01-01 RX ADMIN — LORATADINE 10 MILLIGRAM(S): 10 TABLET ORAL at 12:42

## 2023-01-01 RX ADMIN — Medication 93.75 MICROGRAM(S): at 21:50

## 2023-01-01 RX ADMIN — Medication 50 MILLIGRAM(S): at 05:27

## 2023-01-01 RX ADMIN — Medication 100 MILLIEQUIVALENT(S): at 23:31

## 2023-01-01 RX ADMIN — Medication 125 MILLIGRAM(S): at 18:20

## 2023-01-01 RX ADMIN — CHLORHEXIDINE GLUCONATE 15 MILLILITER(S): 213 SOLUTION TOPICAL at 05:20

## 2023-01-01 RX ADMIN — HYDROMORPHONE HYDROCHLORIDE 1 MILLIGRAM(S): 2 INJECTION INTRAMUSCULAR; INTRAVENOUS; SUBCUTANEOUS at 11:06

## 2023-01-01 RX ADMIN — Medication 125 MILLIGRAM(S): at 18:11

## 2023-01-01 RX ADMIN — Medication 125 MICROGRAM(S): at 05:52

## 2023-01-01 RX ADMIN — OCTREOTIDE ACETATE 100 MICROGRAM(S): 200 INJECTION, SOLUTION INTRAVENOUS; SUBCUTANEOUS at 21:23

## 2023-01-01 RX ADMIN — Medication 125 MICROGRAM(S): at 08:15

## 2023-01-01 RX ADMIN — MEROPENEM 100 MILLIGRAM(S): 1 INJECTION INTRAVENOUS at 05:04

## 2023-01-01 RX ADMIN — PANTOPRAZOLE SODIUM 40 MILLIGRAM(S): 20 TABLET, DELAYED RELEASE ORAL at 11:12

## 2023-01-01 RX ADMIN — Medication 1.5 MILLIGRAM(S): at 11:23

## 2023-01-01 RX ADMIN — Medication 5 MILLIGRAM(S): at 17:42

## 2023-01-01 RX ADMIN — PANTOPRAZOLE SODIUM 40 MILLIGRAM(S): 20 TABLET, DELAYED RELEASE ORAL at 23:52

## 2023-01-01 RX ADMIN — Medication 3 MILLILITER(S): at 20:12

## 2023-01-01 RX ADMIN — Medication 40 MILLIGRAM(S): at 15:40

## 2023-01-01 RX ADMIN — FENTANYL CITRATE 50 MICROGRAM(S): 50 INJECTION INTRAVENOUS at 21:05

## 2023-01-01 RX ADMIN — HEPARIN SODIUM 5000 UNIT(S): 5000 INJECTION INTRAVENOUS; SUBCUTANEOUS at 05:03

## 2023-01-01 RX ADMIN — OCTREOTIDE ACETATE 100 MICROGRAM(S): 200 INJECTION, SOLUTION INTRAVENOUS; SUBCUTANEOUS at 22:02

## 2023-01-01 RX ADMIN — Medication 400 MILLIGRAM(S): at 13:10

## 2023-01-01 RX ADMIN — Medication 125 MILLIGRAM(S): at 11:33

## 2023-01-01 RX ADMIN — Medication 1.5 MILLIGRAM(S): at 17:33

## 2023-01-01 RX ADMIN — Medication 125 MICROGRAM(S): at 05:22

## 2023-01-01 RX ADMIN — Medication 3 MILLILITER(S): at 07:18

## 2023-01-01 RX ADMIN — Medication 80 MILLIGRAM(S): at 15:18

## 2023-01-01 RX ADMIN — Medication 40 MILLIEQUIVALENT(S): at 10:04

## 2023-01-01 RX ADMIN — Medication 650 MILLIGRAM(S): at 21:07

## 2023-01-01 RX ADMIN — FIDAXOMICIN 200 MILLIGRAM(S): 200 GRANULE, FOR SUSPENSION ORAL at 05:30

## 2023-01-01 RX ADMIN — Medication 125 MILLIGRAM(S): at 22:12

## 2023-01-01 RX ADMIN — MIDODRINE HYDROCHLORIDE 5 MILLIGRAM(S): 2.5 TABLET ORAL at 11:48

## 2023-01-01 RX ADMIN — Medication 125 MILLIGRAM(S): at 17:21

## 2023-01-01 RX ADMIN — Medication 1.5 MILLIGRAM(S): at 07:26

## 2023-01-01 RX ADMIN — ESCITALOPRAM OXALATE 20 MILLIGRAM(S): 10 TABLET, FILM COATED ORAL at 13:07

## 2023-01-01 RX ADMIN — Medication 100 MILLIEQUIVALENT(S): at 10:23

## 2023-01-01 RX ADMIN — Medication 125 MILLIGRAM(S): at 13:41

## 2023-01-01 RX ADMIN — Medication 0.5 MILLIGRAM(S): at 20:20

## 2023-01-01 RX ADMIN — Medication 5 MILLIGRAM(S): at 22:45

## 2023-01-01 RX ADMIN — Medication 40 MILLIGRAM(S): at 13:03

## 2023-01-01 RX ADMIN — Medication 50 MILLILITER(S): at 05:06

## 2023-01-01 RX ADMIN — Medication 1.5 MILLIGRAM(S): at 12:06

## 2023-01-01 RX ADMIN — Medication 5 MILLIGRAM(S): at 05:02

## 2023-01-01 RX ADMIN — HEPARIN SODIUM 5000 UNIT(S): 5000 INJECTION INTRAVENOUS; SUBCUTANEOUS at 13:22

## 2023-01-01 RX ADMIN — HYDROMORPHONE HYDROCHLORIDE 0.5 MILLIGRAM(S): 2 INJECTION INTRAMUSCULAR; INTRAVENOUS; SUBCUTANEOUS at 17:35

## 2023-01-01 RX ADMIN — Medication 125 MICROGRAM(S): at 06:18

## 2023-01-01 RX ADMIN — Medication 3.23 MICROGRAM(S)/KG/MIN: at 21:33

## 2023-01-01 RX ADMIN — MIDODRINE HYDROCHLORIDE 10 MILLIGRAM(S): 2.5 TABLET ORAL at 21:01

## 2023-01-01 RX ADMIN — OCTREOTIDE ACETATE 100 MICROGRAM(S): 200 INJECTION, SOLUTION INTRAVENOUS; SUBCUTANEOUS at 05:23

## 2023-01-01 RX ADMIN — MEROPENEM 100 MILLIGRAM(S): 1 INJECTION INTRAVENOUS at 17:03

## 2023-01-01 RX ADMIN — ONDANSETRON 4 MILLIGRAM(S): 8 TABLET, FILM COATED ORAL at 11:36

## 2023-01-01 RX ADMIN — Medication 100 MILLIEQUIVALENT(S): at 11:46

## 2023-01-01 RX ADMIN — Medication 25 GRAM(S): at 05:39

## 2023-01-01 RX ADMIN — Medication 20 MILLIEQUIVALENT(S): at 18:32

## 2023-01-01 RX ADMIN — Medication 400 MILLIGRAM(S): at 14:36

## 2023-01-01 RX ADMIN — FIDAXOMICIN 200 MILLIGRAM(S): 200 GRANULE, FOR SUSPENSION ORAL at 05:22

## 2023-01-01 RX ADMIN — Medication 3 MILLILITER(S): at 20:22

## 2023-01-01 RX ADMIN — HEPARIN SODIUM 5000 UNIT(S): 5000 INJECTION INTRAVENOUS; SUBCUTANEOUS at 21:45

## 2023-01-01 RX ADMIN — LORATADINE 10 MILLIGRAM(S): 10 TABLET ORAL at 13:14

## 2023-01-01 RX ADMIN — Medication 0.5 MILLIGRAM(S): at 07:48

## 2023-01-01 RX ADMIN — MEROPENEM 100 MILLIGRAM(S): 1 INJECTION INTRAVENOUS at 06:18

## 2023-01-01 RX ADMIN — Medication 3 MILLILITER(S): at 07:56

## 2023-01-01 RX ADMIN — Medication 40 MILLIEQUIVALENT(S): at 14:30

## 2023-01-01 RX ADMIN — MIDODRINE HYDROCHLORIDE 5 MILLIGRAM(S): 2.5 TABLET ORAL at 13:51

## 2023-01-01 RX ADMIN — HEPARIN SODIUM 5000 UNIT(S): 5000 INJECTION INTRAVENOUS; SUBCUTANEOUS at 21:06

## 2023-01-01 RX ADMIN — FENTANYL CITRATE 25 MICROGRAM(S): 50 INJECTION INTRAVENOUS at 18:48

## 2023-01-01 RX ADMIN — RIVAROXABAN 15 MILLIGRAM(S): KIT at 17:55

## 2023-01-01 RX ADMIN — RIVAROXABAN 15 MILLIGRAM(S): KIT at 17:22

## 2023-01-01 RX ADMIN — CISATRACURIUM BESYLATE 12.4 MICROGRAM(S)/KG/MIN: 2 INJECTION INTRAVENOUS at 22:17

## 2023-01-01 RX ADMIN — Medication 0.5 MILLIGRAM(S): at 20:15

## 2023-01-01 RX ADMIN — Medication 125 MILLIGRAM(S): at 13:18

## 2023-01-01 RX ADMIN — Medication 50 MILLILITER(S): at 12:49

## 2023-01-01 RX ADMIN — Medication 1.5 MILLIGRAM(S): at 11:32

## 2023-01-01 RX ADMIN — DEXMEDETOMIDINE HYDROCHLORIDE IN 0.9% SODIUM CHLORIDE 9.1 MICROGRAM(S)/KG/HR: 4 INJECTION INTRAVENOUS at 19:18

## 2023-01-01 RX ADMIN — OCTREOTIDE ACETATE 100 MICROGRAM(S): 200 INJECTION, SOLUTION INTRAVENOUS; SUBCUTANEOUS at 15:09

## 2023-01-01 RX ADMIN — Medication 1.5 MILLIGRAM(S): at 17:45

## 2023-01-01 RX ADMIN — Medication 100 MILLIGRAM(S): at 14:19

## 2023-01-01 RX ADMIN — Medication 40 MILLIGRAM(S): at 05:27

## 2023-01-01 RX ADMIN — MORPHINE SULFATE 1 MILLIGRAM(S): 50 CAPSULE, EXTENDED RELEASE ORAL at 01:32

## 2023-01-01 RX ADMIN — Medication 100 MILLIEQUIVALENT(S): at 20:51

## 2023-01-01 RX ADMIN — Medication 100 MILLIGRAM(S): at 05:23

## 2023-01-01 RX ADMIN — Medication 125 MILLIGRAM(S): at 13:51

## 2023-01-01 RX ADMIN — PANTOPRAZOLE SODIUM 40 MILLIGRAM(S): 20 TABLET, DELAYED RELEASE ORAL at 06:18

## 2023-01-01 RX ADMIN — Medication 0.5 MILLIGRAM(S): at 07:35

## 2023-01-01 RX ADMIN — Medication 3.23 MICROGRAM(S)/KG/MIN: at 02:12

## 2023-01-01 RX ADMIN — Medication 125 MILLILITER(S): at 22:53

## 2023-01-01 RX ADMIN — Medication 100 MILLIEQUIVALENT(S): at 12:26

## 2023-01-01 RX ADMIN — PANTOPRAZOLE SODIUM 40 MILLIGRAM(S): 20 TABLET, DELAYED RELEASE ORAL at 13:14

## 2023-01-01 RX ADMIN — Medication 3 MILLILITER(S): at 20:37

## 2023-01-01 RX ADMIN — HEPARIN SODIUM 5000 UNIT(S): 5000 INJECTION INTRAVENOUS; SUBCUTANEOUS at 15:06

## 2023-01-01 RX ADMIN — PROPOFOL 8.74 MICROGRAM(S)/KG/MIN: 10 INJECTION, EMULSION INTRAVENOUS at 21:28

## 2023-01-01 RX ADMIN — Medication 1.5 MILLIGRAM(S): at 11:47

## 2023-01-01 RX ADMIN — Medication 100 MILLIEQUIVALENT(S): at 08:58

## 2023-01-01 RX ADMIN — Medication 10 MILLIGRAM(S): at 03:35

## 2023-01-01 RX ADMIN — Medication 2 MILLIGRAM(S): at 11:31

## 2023-01-01 RX ADMIN — Medication 125 MILLIGRAM(S): at 05:44

## 2023-01-01 RX ADMIN — Medication 125 MICROGRAM(S): at 05:59

## 2023-01-01 RX ADMIN — SODIUM CHLORIDE 100 MILLILITER(S): 9 INJECTION, SOLUTION INTRAVENOUS at 17:45

## 2023-01-01 RX ADMIN — PIPERACILLIN AND TAZOBACTAM 25 GRAM(S): 4; .5 INJECTION, POWDER, LYOPHILIZED, FOR SOLUTION INTRAVENOUS at 23:32

## 2023-01-01 RX ADMIN — Medication 100 GRAM(S): at 18:00

## 2023-01-01 RX ADMIN — Medication 93.75 MICROGRAM(S): at 21:04

## 2023-01-01 RX ADMIN — ROBINUL 0.2 MILLIGRAM(S): 0.2 INJECTION INTRAMUSCULAR; INTRAVENOUS at 23:54

## 2023-01-01 RX ADMIN — Medication 3 MILLILITER(S): at 13:47

## 2023-01-01 RX ADMIN — FENTANYL CITRATE 50 MICROGRAM(S): 50 INJECTION INTRAVENOUS at 11:30

## 2023-01-01 RX ADMIN — ESCITALOPRAM OXALATE 20 MILLIGRAM(S): 10 TABLET, FILM COATED ORAL at 13:22

## 2023-01-01 RX ADMIN — Medication 40 MILLIGRAM(S): at 21:27

## 2023-01-01 RX ADMIN — LORATADINE 10 MILLIGRAM(S): 10 TABLET ORAL at 11:24

## 2023-01-01 RX ADMIN — PANTOPRAZOLE SODIUM 40 MILLIGRAM(S): 20 TABLET, DELAYED RELEASE ORAL at 11:47

## 2023-01-01 RX ADMIN — HEPARIN SODIUM 5000 UNIT(S): 5000 INJECTION INTRAVENOUS; SUBCUTANEOUS at 06:18

## 2023-01-01 RX ADMIN — Medication 100 MILLIGRAM(S): at 08:58

## 2023-01-01 RX ADMIN — MIDODRINE HYDROCHLORIDE 5 MILLIGRAM(S): 2.5 TABLET ORAL at 05:17

## 2023-01-01 RX ADMIN — FENTANYL CITRATE 50 MICROGRAM(S): 50 INJECTION INTRAVENOUS at 02:29

## 2023-01-01 RX ADMIN — Medication 3 MILLILITER(S): at 01:31

## 2023-01-01 RX ADMIN — MIDODRINE HYDROCHLORIDE 10 MILLIGRAM(S): 2.5 TABLET ORAL at 21:02

## 2023-01-01 RX ADMIN — Medication 3 MILLILITER(S): at 20:20

## 2023-01-01 RX ADMIN — ONDANSETRON 4 MILLIGRAM(S): 8 TABLET, FILM COATED ORAL at 05:50

## 2023-01-01 RX ADMIN — Medication 50 MILLIGRAM(S): at 18:35

## 2023-01-01 RX ADMIN — Medication 50 MILLIEQUIVALENT(S): at 20:25

## 2023-01-01 RX ADMIN — Medication 40 MILLIEQUIVALENT(S): at 12:06

## 2023-01-01 RX ADMIN — SODIUM CHLORIDE 75 MILLILITER(S): 9 INJECTION INTRAMUSCULAR; INTRAVENOUS; SUBCUTANEOUS at 21:25

## 2023-01-01 RX ADMIN — HEPARIN SODIUM 5000 UNIT(S): 5000 INJECTION INTRAVENOUS; SUBCUTANEOUS at 21:25

## 2023-01-01 RX ADMIN — SODIUM CHLORIDE 100 MILLILITER(S): 9 INJECTION, SOLUTION INTRAVENOUS at 20:32

## 2023-01-01 RX ADMIN — Medication 125 MILLIGRAM(S): at 11:12

## 2023-01-01 RX ADMIN — Medication 100 MILLIGRAM(S): at 21:30

## 2023-01-01 RX ADMIN — Medication 125 MILLIGRAM(S): at 11:16

## 2023-01-01 RX ADMIN — Medication 0.5 MILLIGRAM(S): at 08:13

## 2023-01-01 RX ADMIN — Medication 1.5 MILLIGRAM(S): at 18:20

## 2023-01-01 RX ADMIN — Medication 30 MILLILITER(S): at 20:30

## 2023-01-01 RX ADMIN — Medication 5 MILLIGRAM(S): at 18:02

## 2023-01-01 RX ADMIN — ESCITALOPRAM OXALATE 20 MILLIGRAM(S): 10 TABLET, FILM COATED ORAL at 18:17

## 2023-01-01 RX ADMIN — Medication 125 MICROGRAM(S): at 14:15

## 2023-01-01 RX ADMIN — FENTANYL CITRATE 50 MICROGRAM(S): 50 INJECTION INTRAVENOUS at 21:30

## 2023-01-01 RX ADMIN — SODIUM CHLORIDE 2000 MILLILITER(S): 9 INJECTION INTRAMUSCULAR; INTRAVENOUS; SUBCUTANEOUS at 20:57

## 2023-01-01 RX ADMIN — Medication 50 MILLILITER(S): at 22:11

## 2023-01-01 RX ADMIN — Medication 100 MILLIGRAM(S): at 21:35

## 2023-01-01 RX ADMIN — Medication 80 MILLIGRAM(S): at 05:12

## 2023-01-01 RX ADMIN — ESCITALOPRAM OXALATE 20 MILLIGRAM(S): 10 TABLET, FILM COATED ORAL at 12:06

## 2023-01-01 RX ADMIN — ESCITALOPRAM OXALATE 20 MILLIGRAM(S): 10 TABLET, FILM COATED ORAL at 12:42

## 2023-01-01 RX ADMIN — MIDAZOLAM HYDROCHLORIDE 2 MILLIGRAM(S): 1 INJECTION, SOLUTION INTRAMUSCULAR; INTRAVENOUS at 20:30

## 2023-01-01 RX ADMIN — PANTOPRAZOLE SODIUM 40 MILLIGRAM(S): 20 TABLET, DELAYED RELEASE ORAL at 05:17

## 2023-01-01 RX ADMIN — Medication 93.75 MICROGRAM(S): at 08:29

## 2023-01-01 RX ADMIN — Medication 30 MILLILITER(S): at 08:25

## 2023-01-01 RX ADMIN — Medication 0.5 MILLIGRAM(S): at 07:19

## 2023-01-01 RX ADMIN — Medication 0.5 MILLIGRAM(S): at 20:31

## 2023-01-01 RX ADMIN — Medication 1.5 MILLIGRAM(S): at 17:21

## 2023-01-01 RX ADMIN — Medication 40 MILLIEQUIVALENT(S): at 18:08

## 2023-01-01 RX ADMIN — MIDODRINE HYDROCHLORIDE 10 MILLIGRAM(S): 2.5 TABLET ORAL at 21:11

## 2023-01-01 RX ADMIN — Medication 125 MILLIGRAM(S): at 23:25

## 2023-01-01 RX ADMIN — Medication 50 MILLIEQUIVALENT(S): at 20:05

## 2023-01-01 RX ADMIN — Medication 0.5 MILLIGRAM(S): at 19:31

## 2023-01-01 RX ADMIN — HEPARIN SODIUM 5000 UNIT(S): 5000 INJECTION INTRAVENOUS; SUBCUTANEOUS at 21:26

## 2023-01-01 RX ADMIN — Medication 93.75 MICROGRAM(S): at 21:01

## 2023-01-01 RX ADMIN — MEROPENEM 100 MILLIGRAM(S): 1 INJECTION INTRAVENOUS at 05:03

## 2023-01-01 RX ADMIN — HYDROMORPHONE HYDROCHLORIDE 1 MILLIGRAM(S): 2 INJECTION INTRAMUSCULAR; INTRAVENOUS; SUBCUTANEOUS at 12:06

## 2023-01-01 RX ADMIN — SODIUM CHLORIDE 100 MILLILITER(S): 9 INJECTION, SOLUTION INTRAVENOUS at 05:09

## 2023-01-01 RX ADMIN — Medication 125 MILLIGRAM(S): at 06:08

## 2023-01-01 RX ADMIN — Medication 50 MILLILITER(S): at 13:17

## 2023-01-01 RX ADMIN — OCTREOTIDE ACETATE 100 MICROGRAM(S): 200 INJECTION, SOLUTION INTRAVENOUS; SUBCUTANEOUS at 05:02

## 2023-01-01 RX ADMIN — PHENYLEPHRINE HYDROCHLORIDE 2.73 MICROGRAM(S)/KG/MIN: 10 INJECTION INTRAVENOUS at 20:45

## 2023-01-01 RX ADMIN — Medication 80 MILLIGRAM(S): at 05:15

## 2023-01-01 RX ADMIN — Medication 125 MILLIGRAM(S): at 13:10

## 2023-01-01 RX ADMIN — LORATADINE 10 MILLIGRAM(S): 10 TABLET ORAL at 12:03

## 2023-01-01 RX ADMIN — PANTOPRAZOLE SODIUM 40 MILLIGRAM(S): 20 TABLET, DELAYED RELEASE ORAL at 12:13

## 2023-01-01 RX ADMIN — HEPARIN SODIUM 5000 UNIT(S): 5000 INJECTION INTRAVENOUS; SUBCUTANEOUS at 21:49

## 2023-01-01 RX ADMIN — Medication 125 MILLIGRAM(S): at 11:36

## 2023-01-01 RX ADMIN — PANTOPRAZOLE SODIUM 40 MILLIGRAM(S): 20 TABLET, DELAYED RELEASE ORAL at 18:15

## 2023-01-01 RX ADMIN — SODIUM CHLORIDE 75 MILLILITER(S): 9 INJECTION, SOLUTION INTRAVENOUS at 11:45

## 2023-01-01 RX ADMIN — PANTOPRAZOLE SODIUM 40 MILLIGRAM(S): 20 TABLET, DELAYED RELEASE ORAL at 11:55

## 2023-01-01 RX ADMIN — MIDODRINE HYDROCHLORIDE 10 MILLIGRAM(S): 2.5 TABLET ORAL at 13:16

## 2023-01-01 RX ADMIN — Medication 3 MILLILITER(S): at 19:35

## 2023-01-01 RX ADMIN — CHLORHEXIDINE GLUCONATE 15 MILLILITER(S): 213 SOLUTION TOPICAL at 05:14

## 2023-01-01 RX ADMIN — Medication 2 MILLIGRAM(S): at 12:07

## 2023-01-01 RX ADMIN — LORATADINE 10 MILLIGRAM(S): 10 TABLET ORAL at 12:41

## 2023-01-01 RX ADMIN — Medication 125 MILLIGRAM(S): at 12:41

## 2023-01-01 RX ADMIN — RIVAROXABAN 15 MILLIGRAM(S): KIT at 17:37

## 2023-01-01 RX ADMIN — Medication 650 MILLIGRAM(S): at 18:38

## 2023-01-01 RX ADMIN — ESCITALOPRAM OXALATE 20 MILLIGRAM(S): 10 TABLET, FILM COATED ORAL at 11:36

## 2023-01-01 RX ADMIN — Medication 40 MILLIEQUIVALENT(S): at 21:27

## 2023-01-01 RX ADMIN — KETAMINE HYDROCHLORIDE 8.63 MG/KG/HR: 100 INJECTION INTRAMUSCULAR; INTRAVENOUS at 01:55

## 2023-01-01 RX ADMIN — Medication 3 MILLILITER(S): at 14:20

## 2023-01-01 RX ADMIN — Medication 93.75 MICROGRAM(S): at 08:32

## 2023-01-01 RX ADMIN — MIDODRINE HYDROCHLORIDE 10 MILLIGRAM(S): 2.5 TABLET ORAL at 21:23

## 2023-01-01 RX ADMIN — MIDODRINE HYDROCHLORIDE 5 MILLIGRAM(S): 2.5 TABLET ORAL at 05:07

## 2023-01-01 RX ADMIN — Medication 0.5 MILLIGRAM(S): at 07:40

## 2023-01-01 RX ADMIN — FENTANYL CITRATE 25 MICROGRAM(S): 50 INJECTION INTRAVENOUS at 18:18

## 2023-01-01 RX ADMIN — Medication 0.5 MILLIGRAM(S): at 19:35

## 2023-01-01 RX ADMIN — OCTREOTIDE ACETATE 100 MICROGRAM(S): 200 INJECTION, SOLUTION INTRAVENOUS; SUBCUTANEOUS at 13:31

## 2023-01-01 RX ADMIN — ESCITALOPRAM OXALATE 20 MILLIGRAM(S): 10 TABLET, FILM COATED ORAL at 11:48

## 2023-01-01 RX ADMIN — MIDODRINE HYDROCHLORIDE 10 MILLIGRAM(S): 2.5 TABLET ORAL at 05:10

## 2023-01-01 RX ADMIN — Medication 3 MILLILITER(S): at 13:57

## 2023-01-01 RX ADMIN — Medication 40 MILLIEQUIVALENT(S): at 19:38

## 2023-01-01 RX ADMIN — Medication 125 MILLIGRAM(S): at 13:16

## 2023-01-01 RX ADMIN — MIDODRINE HYDROCHLORIDE 5 MILLIGRAM(S): 2.5 TABLET ORAL at 05:01

## 2023-01-01 RX ADMIN — Medication 1 PACKET(S): at 13:32

## 2023-01-01 RX ADMIN — HEPARIN SODIUM 5000 UNIT(S): 5000 INJECTION INTRAVENOUS; SUBCUTANEOUS at 05:07

## 2023-01-01 RX ADMIN — PANTOPRAZOLE SODIUM 40 MILLIGRAM(S): 20 TABLET, DELAYED RELEASE ORAL at 12:15

## 2023-01-01 RX ADMIN — Medication 1.5 MILLIGRAM(S): at 20:52

## 2023-01-01 RX ADMIN — Medication 100 MILLIGRAM(S): at 05:14

## 2023-01-01 RX ADMIN — MIDAZOLAM HYDROCHLORIDE 2 MILLIGRAM(S): 1 INJECTION, SOLUTION INTRAMUSCULAR; INTRAVENOUS at 23:37

## 2023-01-01 RX ADMIN — Medication 125 MILLIGRAM(S): at 05:04

## 2023-01-01 RX ADMIN — FENTANYL CITRATE 25 MICROGRAM(S): 50 INJECTION INTRAVENOUS at 14:17

## 2023-01-01 RX ADMIN — OCTREOTIDE ACETATE 100 MICROGRAM(S): 200 INJECTION, SOLUTION INTRAVENOUS; SUBCUTANEOUS at 13:23

## 2023-01-01 RX ADMIN — ESCITALOPRAM OXALATE 20 MILLIGRAM(S): 10 TABLET, FILM COATED ORAL at 12:25

## 2023-01-01 RX ADMIN — HEPARIN SODIUM 5000 UNIT(S): 5000 INJECTION INTRAVENOUS; SUBCUTANEOUS at 21:12

## 2023-01-01 RX ADMIN — HEPARIN SODIUM 5000 UNIT(S): 5000 INJECTION INTRAVENOUS; SUBCUTANEOUS at 05:09

## 2023-01-01 RX ADMIN — PHENYLEPHRINE HYDROCHLORIDE 2.59 MICROGRAM(S)/KG/MIN: 10 INJECTION INTRAVENOUS at 22:55

## 2023-01-01 RX ADMIN — DEXMEDETOMIDINE HYDROCHLORIDE IN 0.9% SODIUM CHLORIDE 9.1 MICROGRAM(S)/KG/HR: 4 INJECTION INTRAVENOUS at 05:10

## 2023-01-01 RX ADMIN — PANTOPRAZOLE SODIUM 40 MILLIGRAM(S): 20 TABLET, DELAYED RELEASE ORAL at 05:01

## 2023-01-01 RX ADMIN — PANTOPRAZOLE SODIUM 40 MILLIGRAM(S): 20 TABLET, DELAYED RELEASE ORAL at 06:01

## 2023-01-01 RX ADMIN — Medication 125 MICROGRAM(S): at 05:15

## 2023-01-01 RX ADMIN — Medication 3 MILLILITER(S): at 07:46

## 2023-01-01 RX ADMIN — MIDODRINE HYDROCHLORIDE 10 MILLIGRAM(S): 2.5 TABLET ORAL at 05:28

## 2023-01-01 RX ADMIN — ERYTHROPOIETIN 4000 UNIT(S): 10000 INJECTION, SOLUTION INTRAVENOUS; SUBCUTANEOUS at 17:37

## 2023-01-01 RX ADMIN — Medication 650 MILLIGRAM(S): at 22:06

## 2023-01-01 RX ADMIN — SODIUM CHLORIDE 1000 MILLILITER(S): 9 INJECTION INTRAMUSCULAR; INTRAVENOUS; SUBCUTANEOUS at 10:42

## 2023-01-01 RX ADMIN — Medication 3 MILLILITER(S): at 14:04

## 2023-01-01 RX ADMIN — Medication 40 MILLIEQUIVALENT(S): at 23:14

## 2023-01-01 RX ADMIN — MIDODRINE HYDROCHLORIDE 10 MILLIGRAM(S): 2.5 TABLET ORAL at 05:20

## 2023-01-01 RX ADMIN — CHLORHEXIDINE GLUCONATE 1 APPLICATION(S): 213 SOLUTION TOPICAL at 05:14

## 2023-01-01 RX ADMIN — MORPHINE SULFATE 1 MILLIGRAM(S): 50 CAPSULE, EXTENDED RELEASE ORAL at 15:45

## 2023-01-01 RX ADMIN — LORATADINE 10 MILLIGRAM(S): 10 TABLET ORAL at 11:33

## 2023-01-01 RX ADMIN — MIDODRINE HYDROCHLORIDE 10 MILLIGRAM(S): 2.5 TABLET ORAL at 05:05

## 2023-01-01 RX ADMIN — Medication 3 MILLILITER(S): at 20:13

## 2023-01-01 RX ADMIN — MIDODRINE HYDROCHLORIDE 10 MILLIGRAM(S): 2.5 TABLET ORAL at 21:26

## 2023-01-01 RX ADMIN — MORPHINE SULFATE 2 MILLIGRAM(S): 50 CAPSULE, EXTENDED RELEASE ORAL at 13:35

## 2023-01-01 RX ADMIN — MIDODRINE HYDROCHLORIDE 5 MILLIGRAM(S): 2.5 TABLET ORAL at 13:06

## 2023-01-01 RX ADMIN — ESCITALOPRAM OXALATE 20 MILLIGRAM(S): 10 TABLET, FILM COATED ORAL at 13:13

## 2023-01-01 RX ADMIN — HEPARIN SODIUM 5000 UNIT(S): 5000 INJECTION INTRAVENOUS; SUBCUTANEOUS at 15:22

## 2023-01-01 RX ADMIN — Medication 400 MILLIGRAM(S): at 07:07

## 2023-01-01 RX ADMIN — Medication 5 MILLIGRAM(S): at 23:39

## 2023-01-01 RX ADMIN — SODIUM CHLORIDE 1000 MILLILITER(S): 9 INJECTION, SOLUTION INTRAVENOUS at 21:28

## 2023-01-01 RX ADMIN — Medication 150 MILLIEQUIVALENT(S): at 23:37

## 2023-01-01 RX ADMIN — Medication 20 MILLIEQUIVALENT(S): at 09:57

## 2023-01-01 RX ADMIN — HYDROMORPHONE HYDROCHLORIDE 0.5 MILLIGRAM(S): 2 INJECTION INTRAMUSCULAR; INTRAVENOUS; SUBCUTANEOUS at 09:53

## 2023-01-01 RX ADMIN — DESMOPRESSIN ACETATE 220 MICROGRAM(S): 0.1 TABLET ORAL at 11:24

## 2023-01-01 RX ADMIN — Medication 125 MILLIGRAM(S): at 12:25

## 2023-01-01 RX ADMIN — MIDODRINE HYDROCHLORIDE 10 MILLIGRAM(S): 2.5 TABLET ORAL at 13:17

## 2023-01-01 RX ADMIN — MIDODRINE HYDROCHLORIDE 5 MILLIGRAM(S): 2.5 TABLET ORAL at 11:32

## 2023-01-01 RX ADMIN — ESCITALOPRAM OXALATE 20 MILLIGRAM(S): 10 TABLET, FILM COATED ORAL at 13:51

## 2023-01-01 RX ADMIN — Medication 250 MILLIGRAM(S): at 21:26

## 2023-01-01 RX ADMIN — BUMETANIDE 2 MILLIGRAM(S): 0.25 INJECTION INTRAMUSCULAR; INTRAVENOUS at 13:25

## 2023-01-01 RX ADMIN — Medication 40 MILLIGRAM(S): at 12:35

## 2023-01-01 RX ADMIN — OCTREOTIDE ACETATE 100 MICROGRAM(S): 200 INJECTION, SOLUTION INTRAVENOUS; SUBCUTANEOUS at 13:59

## 2023-01-01 RX ADMIN — Medication 125 MILLIGRAM(S): at 05:58

## 2023-01-01 RX ADMIN — ESCITALOPRAM OXALATE 20 MILLIGRAM(S): 10 TABLET, FILM COATED ORAL at 13:19

## 2023-01-01 RX ADMIN — HYDROMORPHONE HYDROCHLORIDE 1 MILLIGRAM(S): 2 INJECTION INTRAMUSCULAR; INTRAVENOUS; SUBCUTANEOUS at 10:20

## 2023-01-01 RX ADMIN — MEROPENEM 100 MILLIGRAM(S): 1 INJECTION INTRAVENOUS at 21:27

## 2023-01-01 RX ADMIN — Medication 93.75 MICROGRAM(S): at 13:07

## 2023-01-01 RX ADMIN — SODIUM CHLORIDE 100 MILLILITER(S): 9 INJECTION, SOLUTION INTRAVENOUS at 23:14

## 2023-01-01 RX ADMIN — FIDAXOMICIN 200 MILLIGRAM(S): 200 GRANULE, FOR SUSPENSION ORAL at 18:09

## 2023-01-01 RX ADMIN — Medication 3 MILLILITER(S): at 20:19

## 2023-01-01 RX ADMIN — HEPARIN SODIUM 5000 UNIT(S): 5000 INJECTION INTRAVENOUS; SUBCUTANEOUS at 05:28

## 2023-01-01 RX ADMIN — LORATADINE 10 MILLIGRAM(S): 10 TABLET ORAL at 11:36

## 2023-01-01 RX ADMIN — MIDODRINE HYDROCHLORIDE 10 MILLIGRAM(S): 2.5 TABLET ORAL at 21:58

## 2023-01-01 RX ADMIN — HEPARIN SODIUM 5000 UNIT(S): 5000 INJECTION INTRAVENOUS; SUBCUTANEOUS at 13:14

## 2023-01-01 RX ADMIN — REMDESIVIR 200 MILLIGRAM(S): 5 INJECTION INTRAVENOUS at 20:04

## 2023-01-01 RX ADMIN — Medication 93.75 MICROGRAM(S): at 10:06

## 2023-01-01 RX ADMIN — Medication 6.83 MICROGRAM(S)/KG/MIN: at 21:10

## 2023-01-01 RX ADMIN — PANTOPRAZOLE SODIUM 40 MILLIGRAM(S): 20 TABLET, DELAYED RELEASE ORAL at 14:40

## 2023-01-01 RX ADMIN — ESCITALOPRAM OXALATE 20 MILLIGRAM(S): 10 TABLET, FILM COATED ORAL at 13:31

## 2023-01-01 RX ADMIN — ESCITALOPRAM OXALATE 20 MILLIGRAM(S): 10 TABLET, FILM COATED ORAL at 13:11

## 2023-01-01 RX ADMIN — IOHEXOL 30 MILLILITER(S): 300 INJECTION, SOLUTION INTRAVENOUS at 10:37

## 2023-01-01 RX ADMIN — Medication 0.5 MILLIGRAM(S): at 07:56

## 2023-01-01 RX ADMIN — SODIUM CHLORIDE 100 MILLILITER(S): 9 INJECTION, SOLUTION INTRAVENOUS at 11:37

## 2023-01-01 RX ADMIN — Medication 125 MILLIGRAM(S): at 18:32

## 2023-01-01 RX ADMIN — FENTANYL CITRATE 25 MICROGRAM(S): 50 INJECTION INTRAVENOUS at 15:38

## 2023-01-01 RX ADMIN — Medication 1.5 MILLIGRAM(S): at 11:53

## 2023-01-01 RX ADMIN — Medication 125 MILLIGRAM(S): at 17:49

## 2023-01-01 RX ADMIN — Medication 40 MILLIGRAM(S): at 22:22

## 2023-01-01 RX ADMIN — Medication 93.75 MICROGRAM(S): at 21:05

## 2023-01-01 RX ADMIN — HEPARIN SODIUM 5000 UNIT(S): 5000 INJECTION INTRAVENOUS; SUBCUTANEOUS at 13:32

## 2023-01-01 RX ADMIN — Medication 40 MILLIEQUIVALENT(S): at 05:27

## 2023-01-01 RX ADMIN — HEPARIN SODIUM 5000 UNIT(S): 5000 INJECTION INTRAVENOUS; SUBCUTANEOUS at 13:41

## 2023-01-01 RX ADMIN — Medication 40 MILLIGRAM(S): at 05:40

## 2023-01-01 RX ADMIN — SODIUM CHLORIDE 1000 MILLILITER(S): 9 INJECTION INTRAMUSCULAR; INTRAVENOUS; SUBCUTANEOUS at 10:23

## 2023-01-01 RX ADMIN — OCTREOTIDE ACETATE 100 MICROGRAM(S): 200 INJECTION, SOLUTION INTRAVENOUS; SUBCUTANEOUS at 05:38

## 2023-01-01 RX ADMIN — MEROPENEM 100 MILLIGRAM(S): 1 INJECTION INTRAVENOUS at 05:01

## 2023-01-01 RX ADMIN — Medication 6.83 MICROGRAM(S)/KG/MIN: at 20:02

## 2023-01-01 RX ADMIN — Medication 0.5 MILLIGRAM(S): at 20:11

## 2023-01-01 RX ADMIN — PANTOPRAZOLE SODIUM 40 MILLIGRAM(S): 20 TABLET, DELAYED RELEASE ORAL at 13:22

## 2023-01-01 RX ADMIN — SODIUM CHLORIDE 1000 MILLILITER(S): 9 INJECTION, SOLUTION INTRAVENOUS at 12:56

## 2023-01-01 RX ADMIN — SODIUM CHLORIDE 125 MILLILITER(S): 9 INJECTION, SOLUTION INTRAVENOUS at 12:27

## 2023-01-01 RX ADMIN — ESCITALOPRAM OXALATE 20 MILLIGRAM(S): 10 TABLET, FILM COATED ORAL at 11:32

## 2023-01-01 RX ADMIN — Medication 0.5 MILLIGRAM(S): at 20:37

## 2023-01-01 RX ADMIN — CHLORHEXIDINE GLUCONATE 15 MILLILITER(S): 213 SOLUTION TOPICAL at 05:19

## 2023-01-01 RX ADMIN — Medication 100 MILLIEQUIVALENT(S): at 22:27

## 2023-01-01 RX ADMIN — DEXMEDETOMIDINE HYDROCHLORIDE IN 0.9% SODIUM CHLORIDE 9.1 MICROGRAM(S)/KG/HR: 4 INJECTION INTRAVENOUS at 10:18

## 2023-01-01 RX ADMIN — Medication 100 MILLIEQUIVALENT(S): at 06:53

## 2023-01-01 RX ADMIN — Medication 125 MILLIGRAM(S): at 17:55

## 2023-01-01 RX ADMIN — Medication 3 MILLILITER(S): at 00:42

## 2023-01-01 RX ADMIN — Medication 200 GRAM(S): at 20:08

## 2023-01-01 RX ADMIN — MIDODRINE HYDROCHLORIDE 5 MILLIGRAM(S): 2.5 TABLET ORAL at 18:39

## 2023-01-01 RX ADMIN — ESCITALOPRAM OXALATE 20 MILLIGRAM(S): 10 TABLET, FILM COATED ORAL at 11:23

## 2023-01-01 RX ADMIN — RIVAROXABAN 15 MILLIGRAM(S): KIT at 17:33

## 2023-01-01 RX ADMIN — FLUCONAZOLE 50 MILLIGRAM(S): 150 TABLET ORAL at 17:34

## 2023-01-01 RX ADMIN — Medication 50 MILLILITER(S): at 19:15

## 2023-01-10 NOTE — ED ADULT TRIAGE NOTE - WILL THE PATIENT ACCEPT THE PFIZER COVID-19 VACCINE IF ELIGIBLE AND IT IS AVAILABLE?
Patient evaluated by provider prior to start of oral food challenge.  RN administered baked egg muffin per physician directed guidelines.  Patient was monitored for 15 minutes after each administered dose.  After 1/8 muffin dose hives was noted and food challenge was stopped immediately.  Provider was consulted and patient was further evaluated.  Per providers verbal order Cetirizine was administered.  Patient remained in clinic for 2 hours for further monitoring. Vitals were obtained and recorded.      Juanita Collins RN    
No

## 2023-01-12 NOTE — ED ADULT NURSE NOTE - SKIN CAPILLARY REFILL
2 seconds or less Cyclosporine Counseling:  I discussed with the patient the risks of cyclosporine including but not limited to hypertension, gingival hyperplasia,myelosuppression, immunosuppression, liver damage, kidney damage, neurotoxicity, lymphoma, and serious infections. The patient understands that monitoring is required including baseline blood pressure, CBC, CMP, lipid panel and uric acid, and then 1-2 times monthly CMP and blood pressure.

## 2023-01-12 NOTE — DISCUSSION/SUMMARY
[FreeTextEntry1] : 71-year-old woman with a history as listed presents for a followup visit.\par \par Mallory recently was admitted for worsening volume overload. She is still orthopneic on exam. She will take Lasix 40mg Q12 x 3 days and then take 40mg Qday alternating with 40mg q12. I think her volume overload was from her high output HF from anemia and possibly as a side effect of her chemo agents. She has moderate MR on her last echo which is likely just reflection of her volume status.\par \par She is status post an A. fib ablation.  At this time she has an irregular rhythm.  Her EKG today is more consistent with a  sinus rhythm with frequent PACs.  She will continue Toprol 50mg Qday.  She has maintained her Xarelto therapy.  She will stay off the ASA therapy \par \par Today she is severely fluid overloaded. On PE she had +3 Lower extremity edema, Palor, Bilateral Crackles, +JVD, SOB when laying flat. Advised due to her Acute HF ongoing Anemia she go to [facility] for urgent BW, IV diuresing. \par \par Patient and son agreed to overall plan. They will follow up with me s/p discharge from hospital.  [EKG obtained to assist in diagnosis and management of assessed problem(s)] : EKG obtained to assist in diagnosis and management of assessed problem(s)

## 2023-01-12 NOTE — REVIEW OF SYSTEMS
[Feeling Fatigued] : feeling fatigued [Dyspnea on exertion] : dyspnea during exertion [Lower Ext Edema] : lower extremity edema [Negative] : Heme/Lymph [FreeTextEntry5] : see HPI

## 2023-01-12 NOTE — PATIENT PROFILE ADULT - FALL HARM RISK - HARM RISK INTERVENTIONS

## 2023-01-12 NOTE — HISTORY OF PRESENT ILLNESS
[FreeTextEntry1] : 72-year-old woman with a history of asthma, obesity, essential thrombocytosis, HFPEF, normal cors in 12/2018, PAF s/p ablation in 2/7/19, VATS with removal of carcinoid in 5/2021\par \par Since her last visit, she  was admitted to [facility] on 8/16/22 for presumed heart failure exacerbation due to shortness of breath and anemia. She initially went to hospital due to right leg swelling and shortness of breath. CT chest noted. With increasing RML nodule and new LLL nodule as well as new\par mediastinal adenopathy She was previously on Fedratinib, however its been held due to concerns of affecting her volume status. She was discharged on lasix. \par \par She is here today for follow up and complains of worsening MATTHEW/SOB when walking or sitting. Her lower extremity edema has worsened she states. \par She reports being out and after sitting she was not able to get up due to excessive swelling of Lower Legs. She called our office and we increased her Lasix to 40 mg TID and Metolazone. She reports her home weight in 200s and has dropped 30lbs of water weight\par She also reports haves diarrhea over the past week. Saw GI doctor who advised her to obtain Cardiac clearance prior to Colonoscopy procedure to r/o any colon concerns. She denies any melena, BRB in stool. \par \par She denies any chest pain, PND,  strokelike symptoms. No reported melena, hematochezia or hematemesis.  She is compliant with her medications.

## 2023-01-12 NOTE — ED ADULT NURSE NOTE - OBJECTIVE STATEMENT
Pt presents to the ED s/p SOB, while lying flat and with exertion. EKG done , pt placed on cardiac monitor, continuous pulse ox. Pt has bilat lower extremities swelling.

## 2023-01-12 NOTE — PHYSICAL EXAM
[General Appearance - Well Developed] : well developed [Normal Appearance] : normal appearance [General Appearance - Well Nourished] : well nourished [No Deformities] : no deformities [Normal Conjunctiva] : the conjunctiva exhibited no abnormalities [Eyelids - No Xanthelasma] : the eyelids demonstrated no xanthelasmas [Normal Oral Mucosa] : normal oral mucosa [No Oral Pallor] : no oral pallor [No Oral Cyanosis] : no oral cyanosis [Normal Jugular Venous A Waves Present] : normal jugular venous A waves present [Normal Jugular Venous V Waves Present] : normal jugular venous V waves present [No Jugular Venous Montes A Waves] : no jugular venous montes A waves [Respiration, Rhythm And Depth] : normal respiratory rhythm and effort [Abdomen Soft] : soft [Abdomen Tenderness] : non-tender [Abdomen Mass (___ Cm)] : no abdominal mass palpated [Abnormal Walk] : normal gait [Gait - Sufficient For Exercise Testing] : the gait was sufficient for exercise testing [Cyanosis, Localized] : no localized cyanosis [Petechial Hemorrhages (___cm)] : no petechial hemorrhages [] : no ischemic changes [Oriented To Time, Place, And Person] : oriented to person, place, and time [Affect] : the affect was normal [Mood] : the mood was normal [No Anxiety] : not feeling anxious [Normal Rate] : normal [Rhythm Regular] : regular [Normal S1] : normal S1 [Normal S2] : normal S2 [No Gallop] : no gallop heard [No Murmur] : no murmurs heard [2+] : left 2+ [___ +] : bilateral [unfilled]U+ pretibial pitting edema [FreeTextEntry1] : Palor noted [Right Carotid Bruit] : no bruit heard over the right carotid [Left Carotid Bruit] : no bruit heard over the left carotid [Bruit] : no bruit heard

## 2023-01-12 NOTE — ED ADULT TRIAGE NOTE - SOURCE OF INFORMATION
Impression: Combined forms of age-related cataract, bilateral: H25.813. Plan: No treatment currently recommended due to level of vision. Patient will monitor vision changes and contact us with any decrease in vision, will re-evaluate cataract on return visit.
Impression: Other specified postprocedural states: Z98.890. Plan: Lasik flap, centered and clear OU.
Impression: Puckering of macula, right eye: H35.371. Plan: c/o distortion OD. visually significant. will have pt see retina for consult.
Impression: Tear film insufficiency of bilateral lacrimal glands: H04.123. Plan: Recommend artificial tears at least 4 times a day and gel drop or tear ointment at bedtime, Omega 3 fatty acids (2-3,000 mg) daily. Drink plenty water. Avoid overhead fans at bedtime.
Impression: Vitreous degeneration, bilateral: H43.813. Plan: No retinal pathology. No family history, prior peripheral retinal disease, or other risk factors evident. Warning signs of retinal tear and detachment discussed with patient. To return to clinic STAT if any change in symptoms consistent with RT or RD.
Patient

## 2023-01-12 NOTE — ED PROVIDER NOTE - DIFFERENTIAL DIAGNOSIS
sob: chf vs anemia vs viral etiology vs pneumonia  diarrhea: bacterial vs viral etiology Differential Diagnosis

## 2023-01-12 NOTE — ED ADULT NURSE REASSESSMENT NOTE - NS ED NURSE REASSESS COMMENT FT1
1200: Unable to obtain stool specimen, pt urinates and has a BM at the same time (2 unsuccessful attempts)
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
## 2023-01-12 NOTE — H&P ADULT - ASSESSMENT
Ms Ceci is a 71 yo F sent to the ED by her cardiologist with acute CHF exacerbation. PMH HFpEF, Hypothyroidism, Thrombocytosis, HLD, Asthma,  A fib on Xarelto.     HFpEF: acute on chronic CHF  -continue with IV lasix 40mg BID and adjust dose accordingly pending clinical status.   -daily weights, I/O's  -check TTE  -trend cardiac enzymes    Hypokalemia: likely due to increased lasix dosing. Monitor Cr in setting of lasix use, Cr 1.40 today and mildly elevated  -recheck electrolytes  -replete as appropriate    COVID-19 infection: monitor on pulse ox  -diarrhea likely due to COVID 19 infection  -infectious disease consulted regarding possible need for Remdesivir  -obtain CT chest    Hypothyroidism: continue levothyroxine    Thrombocytosis: continue agrylin  -managed by hematology Dr Rice, patient last seen in Nov 2022.   -recommended to f/u with Dr Rice for her anemia and thrombocytosis as outpatient    A fib on Xarelto: continue Xarelto.   -not on any rate control agents    Anemia: no signs or symptoms of active bleeding. Continue to monitor hgb.     DVT ppx: on Xarelto Ms Ceci is a 73 yo F sent to the ED by her cardiologist with acute CHF exacerbation. PMH HFpEF, Hypothyroidism, Thrombocytosis, HLD, Asthma,  A fib on Xarelto, hx of Lung Nodules followed by heme/onc and thoracic surgery    HFpEF: acute on chronic CHF  -continue with IV lasix 40mg BID and adjust dose accordingly pending clinical status.   -daily weights, I/O's  -check TTE  -trend cardiac enzymes    Hypokalemia: likely due to increased lasix dosing. Monitor Cr in setting of lasix use, Cr 1.40 today and mildly elevated  -recheck electrolytes  -replete as appropriate    COVID-19 infection: monitor on pulse ox  -diarrhea likely due to COVID 19 infection  -infectious disease consulted regarding possible need for Remdesivir  -obtain CT chest    Hypothyroidism: continue levothyroxine    Thrombocytosis: continue agrylin  -managed by hematology Dr Rice, patient last seen in Nov 2022.   -recommended to f/u with Dr Rice for her anemia and thrombocytosis as outpatient    A fib on Xarelto: continue Xarelto.   -Continue Toprol    Anemia: no signs or symptoms of active bleeding. Continue to monitor hgb.     DVT ppx: on Xarelto

## 2023-01-12 NOTE — ED PROVIDER NOTE - CARE PLAN
Principal Discharge DX:	CHF exacerbation  Secondary Diagnosis:	Hypokalemia  Secondary Diagnosis:	2019 novel coronavirus disease (COVID-19)  Secondary Diagnosis:	Diarrhea   1

## 2023-01-12 NOTE — H&P ADULT - NSHPPHYSICALEXAM_GEN_ALL_CORE
T(C): 36.6 (01-12-23 @ 10:59), Max: 36.6 (01-12-23 @ 10:59)  HR: 61 (01-12-23 @ 15:20) (61 - 70)  BP: 118/69 (01-12-23 @ 15:20) (118/69 - 142/80)  RR: 19 (01-12-23 @ 15:20) (18 - 19)  SpO2: 94% (01-12-23 @ 15:20) (94% - 97%)  Wt(kg): --    Physical Exam:   GENERAL: well-groomed, well-developed, NAD  HEENT: head NC/AT; conjunctiva & sclera clear; hearing grossly intact, moist mucous membranes  NECK: supple, no JVD  RESPIRATORY: rales b/l lung bases, no wheezing  CARDIOVASCULAR: S1&S2, irreg irreg  ABDOMEN: soft, non-tender, non-distended, + Bowel sounds x4 quadrants, no guarding, rebound or rigidity  MUSCULOSKELETAL: b/l LE pitting edema 3+, no cyanosis noted  LYMPH: no cervical lymphadenopathy  VASCULAR: Radial pulses 2+ bilaterally, no varicose veins   SKIN: warm and dry, color normal  NEUROLOGIC: AA&O X3, speech fluent, moving all extremities  Psych: Normal mood and affect, normal behavior

## 2023-01-12 NOTE — ED PROVIDER NOTE - CLINICAL SUMMARY MEDICAL DECISION MAKING FREE TEXT BOX
Wright: 72-year-old female with history of hypothyroidism, hyperlipidemia, A. fib on Xarelto, thrombocytosis sent by her cardiologist, Dr. Fernandez for worsening shortness of breath and CHF.  Patient explains that she has been having worsening shortness of breath with exertion and with laying flat for the past 1 to 2 weeks.  Associated with peripheral edema.  Patient was fine Lasix 40 mg however increased to Lasix 80 mg in the morning and 40 mg at night with some improvement.  Patient also complains of diarrhea for the 3 weeks.  Patient having 6-7 episodes of watery diarrhea daily.  Denies recent travel or recent antibiotics.  Denies fever, chills, chest pain, cough, abdominal pain, nausea, vomiting, syncope, upper or lower extremity weakness or paresthesias. Will need admission for CHF exacerbation not improving with outpatient oral lasix.

## 2023-01-12 NOTE — ED PROVIDER NOTE - OBJECTIVE STATEMENT
72-year-old female with history of hypothyroidism, hyperlipidemia, A. fib on Xarelto, thrombocytosis sent by her cardiologist, Dr. Fernandez for worsening shortness of breath and CHF.  Patient explains that she has been having worsening shortness of breath with exertion and with laying flat for the past 1 to 2 weeks.  Associated with peripheral edema.  Patient was fine Lasix 40 mg however increased to Lasix 80 mg in the morning and 40 mg at night with some improvement.  Patient also complains of diarrhea for the 3 weeks.  Patient having 6-7 episodes of watery diarrhea daily.  Denies recent travel or recent antibiotics.  Denies fever, chills, chest pain, cough, abdominal pain, nausea, vomiting, syncope, upper or lower extremity weakness or paresthesias.    pmd: Dr. Edge, cards: Dr. Fernandez

## 2023-01-12 NOTE — H&P ADULT - HISTORY OF PRESENT ILLNESS
Ms Ornelas is a 73 yo F sent to the ED by her cardiologist with acute CHF exacerbation. PMH HFpEF, Hypothyroidism, Thrombocytosis, HLD, Asthma,  A fib on Xarelto.   Patient seen and examined at bedside. She reports worsening SOB over the past 7-10 days as well as worsening leg edema. She has been in contact with her cardiologist who increased her lasix from 40mg daily to 80mg in AM and 40mg in PM [total 120mg daily] for a duration of 4 days. She went to see her cardiologist today who recommended patient come to the ER.   She reports worsening orthopnea for about 1 week as well as worsening SOB. She denies any associated chest pain/pressure, palpitations.   She was noted to be covid positive in the ER today. Denies any known sick contacts. She Received a total of 3 doses of pfizer vaccine. She admits to approx 5-7 days of loose water stools without any melena/hematochezia.   Denies headaches, nausea, vomiting, chest pain,  palpitations, abdominal pain, constipation, dysuria.

## 2023-01-12 NOTE — CARDIOLOGY SUMMARY
[de-identified] : Sinus  Rhythm  - frequent PAC s \par nonspecific T waves  [de-identified] : 5/2021 normal LV function. RV was normal in structure. TV appears normal. MV leaflets are calcified\par  8/16/22 showed normal systolic with EF 65% without any noted diastolic function, however she did have moderate mitral valve regurgitation. [de-identified] : PAF s/p ablation in 2/7/19. [de-identified] : 12/2018 with normal cors and LVEDP.

## 2023-01-13 NOTE — PROGRESS NOTE ADULT - ASSESSMENT
72-year-old female with history of hypothyroidism, hyperlipidemia, AF on Xarelto, thrombocytosis sent in for worsening shortness of breath and CHF.   Reports a 40 lb weight gain, though has lost almost 25 lbs since increase in her home diuretic regimen.    - was sent from Dr. Fernandez's office w/ c/o SOB, orthopnea and edema, likely multifactorial d/t CHF exac, +covid   - remains volume overloaded on exam  - continue Lasix 40 iv BID, continue to trend renal indices  - CXR with opacities more prominent in R side   - CT chest noted, had mosaic pattern and nodules in the past   - monitor daily weights and i/o's  - Echo (2022)  normal LV function and moderate MR  - f/u TTE     - mild troponin leak, now downtrending , no suggestion of acs, no anginal complaints   - cath without cad in 2018    - history of pAfib , SR overnight  - cont BB, AC  - continue to monitor routine hemodynamics    - +Covid, management and supportive care per primary     - Monitor and replete Lytes. Keep K > 4 and Mg > 2  - Will continue to follow.    Sharri Steward, [facility]  Nurse Practitioner - Cardiology   Spectra #[id number]/ [phone]

## 2023-01-13 NOTE — DISCHARGE NOTE PROVIDER - CARE PROVIDER_API CALL
Holly Edge (DO)  Family Medicine  [address] [place] [address]  Phone: [phone]  Fax: [phone]  Follow Up Time: 1 week

## 2023-01-13 NOTE — CONSULT NOTE ADULT - ASSESSMENT
Mallory is a 72-year-old female with history of hypothyroidism, hyperlipidemia, AF on Xarelto, thrombocytosis sent in for worsening shortness of breath and CHF.   Reports a 40 lb weight gain, though has lost almost 25 lbs since increase in her home diuretic regimen.    - short of breath, and is reporting orthopnea and edema  - appears volume overloaded on exam, and would give her another Lasix 40 iv this afternoon  - also covid positive, which may be contributing to her symptoms  - check CT non contrast to evaluate for PNA  - sig hypokalemia noted and would replete aggressively  - trend renal function and electrolytes  - daily weights and i/o's  - check echocardiogram. Echo in 2022 with normal LV function and moderate MR    - mild troponin leak, though no suggestion of acs  - cont to trend  - cath without cad in 2018    - history of paf, though in sr  - cont with bb and ac    - rx for covid  - will follow with you
CKD 3b  Persistent Hypokalemia  Hypernatremia  COVID 19+    -Baseline creatinine 1.3  -Check Urine lytes  -Check for abnormal renal potassium losses  -Patient also with diarrhea which can cause loss of potassium  -Aggressive repletion  -Encourage PO intake for elevated creatinine and hypernatremia, likely significant losses from diarrhea  -COVID per primary

## 2023-01-13 NOTE — GOALS OF CARE CONVERSATION - ADVANCED CARE PLANNING - CONVERSATION DETAILS
STARS Writer met with pt at bedside. Reviewed patient's medical and social history as well as events leading to patient's hospitalization. Writer discussed patient's current diagnosis (CHF, covid, hypothyroid, thrombocytopenia, HLD, asthma, A/F, DM.), medical condition and management, prognosis, and life expectancy. Inquired about patient's wishes regarding extent of medical care to be provided including escalation of medical care into the ICU and use of vasopressor support. In addition, the writer inquired about thoughts regarding cardiopulmonary resuscitation, artificial nutrition and hydration including use of feeding tubes and IVF, antibiotics, and further investigative studies such as blood draws and radiology. Mallory showed good  insight into medical condition. All questions answered. Writer recommended pt discuss her wishes with her son/HCP. Pt is full code at this time.  Psychosocial support provided.

## 2023-01-13 NOTE — CONSULT NOTE ADULT - SUBJECTIVE AND OBJECTIVE BOX
[facility] - Adrián Wray Pannella, Phan Fernandez  Office Number: [phone]    Initial Consult Note    CHIEF COMPLAINT: Patient is a 72y old  Female who presents with a chief complaint of shortness of breath    HPI:  72-year-old female with history of hypothyroidism, hyperlipidemia, A. fib on Xarelto, thrombocytosis sent by her cardiologist, Dr. Fernandez for worsening shortness of breath and CHF.  Patient explains that she has been having worsening shortness of breath with exertion and with laying flat for the past 1 to 2 weeks.  Associated with peripheral edema.  Patient was fine Lasix 40 mg however increased to Lasix 80 mg in the morning and 40 mg at night with some improvement.  Patient also complains of diarrhea for the 3 weeks.  Patient having 6-7 episodes of watery diarrhea daily.  Denies recent travel or recent antibiotics.  Denies fever, chills, chest pain, cough, abdominal pain, nausea, vomiting, syncope, upper or lower extremity weakness or paresthesias.    PAST MEDICAL & SURGICAL HISTORY:  Hypothyroidism      HLD (hyperlipidemia)      Thrombocytosis      Persistent atrial fibrillation      Obesity (BMI 35.0-39.9 without comorbidity)      Asthma      Diabetes mellitus      Ankle injury  on 2004, 5 surgeries.      Cholecystitis          SOCIAL HISTORY:  No tobacco, ethanol, or drug abuse.    FAMILY HISTORY:  Family history of early CAD (Father)    Family history of early CAD (Mother)      No family history of acute MI or sudden cardiac death.    MEDICATIONS  (STANDING):  potassium chloride    Tablet ER 40 milliEquivalent(s) Oral once  potassium chloride  10 mEq/100 mL IVPB 10 milliEquivalent(s) IV Intermittent every 1 hour    MEDICATIONS  (PRN):      Allergies    No Known Allergies    Intolerances        REVIEW OF SYSTEMS:    CONSTITUTIONAL: reports fatigue  EYES/ENT: No visual changes;  No vertigo or throat pain   NECK: No pain or stiffness  RESPIRATORY: No cough, wheezing, hemoptysis; reports shortness of breath and edema  CARDIOVASCULAR: No chest pain or palpitations  GASTROINTESTINAL: No abdominal pain. No nausea, vomiting, or hematemesis; No diarrhea or constipation. No melena or hematochezia.  GENITOURINARY: No dysuria, frequency or hematuria  NEUROLOGICAL: No numbness or weakness  SKIN: No itching or rash  All other review of systems is negative unless indicated above    VITAL SIGNS:   Vital Signs Last 24 Hrs  T(C): 36.6 (12 Jan 2023 10:59), Max: 36.6 (12 Jan 2023 10:59)  T(F): 97.8 (12 Jan 2023 10:59), Max: 97.8 (12 Jan 2023 10:59)  HR: 70 (12 Jan 2023 12:00) (63 - 70)  BP: 142/80 (12 Jan 2023 12:00) (133/83 - 142/80)  BP(mean): --  RR: 19 (12 Jan 2023 12:00) (18 - 19)  SpO2: 96% (12 Jan 2023 12:00) (96% - 97%)    Parameters below as of 12 Jan 2023 12:00  Patient On (Oxygen Delivery Method): room air        I&O's Summary      On Exam:    Constitutional: NAD, alert and oriented x 3  Lungs:  Non-labored, breath sounds are decreased bilaterally, No wheezing, rales or rhonchi  Cardiovascular: RRR.  S1 and S2 positive.  No murmurs, rubs, gallops or clicks  Gastrointestinal: Bowel Sounds present, soft, nontender.   Lymph: 1-2+ peripheral edema. No cervical lymphadenopathy.  Neurological: Alert, no focal deficits  Skin: No rashes or ulcers   Psych:  Mood & affect appropriate.    LABS: All Labs Reviewed:                        9.4    11.80 )-----------( 279      ( 12 Jan 2023 12:08 )             32.2     12 Jan 2023 12:08    145    |  104    |  18     ----------------------------<  78     2.3     |  35     |  1.40     Ca    7.9        12 Jan 2023 12:08  Mg     1.8       12 Jan 2023 12:08    TPro  6.5    /  Alb  2.8    /  TBili  0.5    /  DBili  x      /  AST  14     /  ALT  9      /  AlkPhos  76     12 Jan 2023 12:08    PT/INR - ( 12 Jan 2023 12:08 )   PT: 18.4 sec;   INR: 1.57 ratio         PTT - ( 12 Jan 2023 12:08 )  PTT:36.2 sec      Blood Culture:   01-12 @ 12:08  Pro Bnp [id number]        RADIOLOGY:    EKG: sb, apcs    
Patient is a 72y old  Female who presents with a chief complaint of Acute on Chronic CHF, COVID (2023 13:22)       HPI:  Ms Ornelas is a 73 yo F sent to the ED by her cardiologist with acute CHF exacerbation. PMH HFpEF, Hypothyroidism, Thrombocytosis, HLD, Asthma,  A fib on Xarelto.   Patient seen and examined at bedside. She reports worsening SOB over the past 7-10 days as well as worsening leg edema. She has been in contact with her cardiologist who increased her lasix from 40mg daily to 80mg in AM and 40mg in PM [total 120mg daily] for a duration of 4 days. She went to see her cardiologist today who recommended patient come to the ER.   She reports worsening orthopnea for about 1 week as well as worsening SOB. She denies any associated chest pain/pressure, palpitations.   She was noted to be covid positive in the ER today. Denies any known sick contacts. She Received a total of 3 doses of pfizer vaccine. She admits to approx 5-7 days of loose water stools without any melena/hematochezia.   Denies headaches, nausea, vomiting, chest pain,  palpitations, abdominal pain, constipation, dysuria.  (2023 15:28)   Has not seen nephrologist in the past.  Roman any N/V.  Has fatigue and significant diarrhea.  Urinating without issue.      PAST MEDICAL & SURGICAL HISTORY:  Hypothyroidism      HLD (hyperlipidemia)      Thrombocytosis      Persistent atrial fibrillation      Obesity (BMI 35.0-39.9 without comorbidity)      Asthma      Diabetes mellitus      Ankle injury  on , 5 surgeries.      Cholecystitis           FAMILY HISTORY:  Family history of early CAD (Father)    Family history of early CAD (Mother)    NC    Social History:Non smoker    MEDICATIONS  (STANDING):  anagrelide 1.5 milliGRAM(s) Oral <User Schedule>  escitalopram 20 milliGRAM(s) Oral daily  furosemide   Injectable 40 milliGRAM(s) IV Push two times a day  levothyroxine 125 MICROGram(s) Oral daily  loratadine 10 milliGRAM(s) Oral daily  metoprolol succinate ER 50 milliGRAM(s) Oral daily  potassium chloride    Tablet ER 40 milliEquivalent(s) Oral every 4 hours  remdesivir  IVPB 100 milliGRAM(s) IV Intermittent every 24 hours  remdesivir  IVPB   IV Intermittent   rivaroxaban 15 milliGRAM(s) Oral with dinner    MEDICATIONS  (PRN):  acetaminophen     Tablet .. 650 milliGRAM(s) Oral every 6 hours PRN Temp greater or equal to 38C (100.4F), Mild Pain (1 - 3)  ondansetron    Tablet 4 milliGRAM(s) Oral every 6 hours PRN Nausea and/or Vomiting   Meds reviewed    Allergies    No Known Allergies    Intolerances         REVIEW OF SYSTEMS:    Review of Systems:   Constitutional: Denies fatigue  HEENT: Denies headaches and dizziness  Respiratory: denies SOB, cough, or wheezing  Cardiovascular: denies CP, palpitations  Gastrointestinal: Denies nausea, denies vomiting, diarrhea, constipation, abdominal pain, or bloody stools  Genitourinary: denies painful urination, increased frequency, urgency, or bloody urine  Skin: denies rashes or itching  Musculoskeletal: denies muscle aches, joint swelling  Neurologic: Denies generalized weakness, denies loss of sensation, numbness, or tingling      Vital Signs Last 24 Hrs  T(C): 36.7 (2023 12:02), Max: 36.9 (2023 18:00)  T(F): 98.1 (2023 12:02), Max: 98.5 (2023 18:00)  HR: 71 (2023 12:02) (61 - 82)  BP: 128/65 (2023 13:05) (118/69 - 160/72)  BP(mean): --  RR: 18 (2023 12:02) (18 - 19)  SpO2: 95% (2023 12:02) (92% - 98%)    Parameters below as of 2023 12:02  Patient On (Oxygen Delivery Method): room air      Daily Height in cm: 167.64 (2023 18:53)    Daily Weight in k.2 (2023 04:28)    PHYSICAL EXAM:    GENERAL: NAD  HEAD:  Atraumatic, Normocephalic  EYES: EOMI, conjunctiva and sclera clear  ENMT: No Drainage from nares, No drainage from ears  NECK: Supple, neck  veins full  NERVOUS SYSTEM:  Awake and Alert  CHEST/LUNG: Clear to percussion bilaterally; No rales, rhonchi, wheezing, or rubs  HEART: Regular rate and rhythm; No murmurs, rubs, or gallops  ABDOMEN: Soft, Nontender, Nondistended; Bowel sounds present  EXTREMITIES:  No Edema  SKIN: No rashes No obvious ecchymosis      LABS:                        9.5    17.19 )-----------( 327      ( 2023 10:25 )             31.7         143  |  106  |  19  ----------------------------<  125<H>  3.5   |  32<H>  |  1.50<H>    Ca    8.0<L>      2023 10:25  Phos  2.8       Mg     2.0         TPro  6.2  /  Alb  2.7<L>  /  TBili  0.5  /  DBili  0.1  /  AST  14<L>  /  ALT  10<L>  /  AlkPhos  71      PT/INR - ( 2023 12:08 )   PT: 18.4 sec;   INR: 1.57 ratio         PTT - ( 2023 12:08 )  PTT:36.2 sec    Magnesium, Serum: 2.0 mg/dL ( @ 10:25)  Phosphorus Level, Serum: 2.8 mg/dL ( @ 10:25)  Phosphorus Level, Serum: 2.5 mg/dL ( @ 02:52)  Magnesium, Serum: 1.9 mg/dL ( @ 02:52)  Magnesium, Serum: 1.9 mg/dL ( @ 00:01)  Phosphorus Level, Serum: 2.7 mg/dL ( @ 00:01)  Magnesium, Serum: 1.7 mg/dL ( @ 16:00)  Phosphorus Level, Serum: 2.7 mg/dL ( @ 16:00)          RADIOLOGY & ADDITIONAL TESTS:  
[facility]  INFECTIOUS DISEASES   [address] [place] [address]  Tel: [phone]     Fax: [phone]  ========================================================  MD Adriana Bird Kaushal, MD Cho, Michelle, MD Sunjit, Jaspal, MD  ========================================================    [id number]  HERMILA QUIÑONES     CC: Patient is a 72y old  Female who presents with a chief complaint of Acute on Chronic CHF, COVID     HPI:  71 yo woman with PMH of DM2, CHF, afib, asthma, and Hypothyroidism, was referred from cardiologist office for CHF exacerbation and was found with a positive COVID.   She has worsening SOB over the past 7-10 days as well as worsening leg edema and also complaining of diarrhea for 3 weeks.   She lives alone and as far as she knows didn't have contact with a sick person. Home COVID was negative 2 days ago.   Had COVID vaccines x 2 and one booster.   Denies headaches, nausea, vomiting, chest pain,  palpitations, abdominal pain, constipation, dysuria.     PAST MEDICAL & SURGICAL HISTORY:  Hypothyroidism  HLD (hyperlipidemia)  Thrombocytosis  Persistent atrial fibrillation  Obesity (BMI 35.0-39.9 without comorbidity)  Asthma  Diabetes mellitus  Ankle injury  on 2004, 5 surgeries.  Cholecystitis    Social Hx: no smoking, ETOH or drugs     FAMILY HISTORY:  Family history of early CAD (Father)    Family history of early CAD (Mother)    Allergies  No Known Allergies    Antibiotics:  MEDICATIONS  (STANDING):  anagrelide 1.5 milliGRAM(s) Oral <User Schedule>  escitalopram 20 milliGRAM(s) Oral daily  furosemide   Injectable 40 milliGRAM(s) IV Push two times a day  levothyroxine 125 MICROGram(s) Oral daily  loratadine 10 milliGRAM(s) Oral daily  magnesium sulfate  IVPB 1 Gram(s) IV Intermittent once  metoprolol succinate ER 50 milliGRAM(s) Oral daily  potassium chloride    Tablet ER 40 milliEquivalent(s) Oral every 4 hours  potassium chloride  10 mEq/100 mL IVPB 10 milliEquivalent(s) IV Intermittent every 1 hour  rivaroxaban 15 milliGRAM(s) Oral with dinner     REVIEW OF SYSTEMS:  CONSTITUTIONAL:  No Fever or chills  HEENT:  No diplopia or blurred vision.  No sore throat or runny nose.  CARDIOVASCULAR:  No chest pain or SOB.  RESPIRATORY:  +cough, +shortness of breath  GASTROINTESTINAL:  No nausea, vomiting, +diarrhea.  GENITOURINARY:  No dysuria, frequency or urgency. No Blood in urine  MUSCULOSKELETAL:  no joint aches, no muscle pain, + leg edema   SKIN:  No change in skin, hair or nails.  NEUROLOGIC:  No paresthesias or weakness.  PSYCHIATRIC:  No disorder of thought or mood.  ENDOCRINE:  No heat or cold intolerance, polyuria or polydipsia.  HEMATOLOGICAL:  No easy bruising or bleeding.     Physical Exam:  Vital Signs Last 24 Hrs  T(C): 36.6 (12 Jan 2023 10:59), Max: 36.6 (12 Jan 2023 10:59)  T(F): 97.8 (12 Jan 2023 10:59), Max: 97.8 (12 Jan 2023 10:59)  HR: 61 (12 Jan 2023 15:20) (61 - 70)  BP: 118/69 (12 Jan 2023 15:20) (118/69 - 142/80)  BP(mean): --  RR: 19 (12 Jan 2023 15:20) (18 - 19)  SpO2: 94% (12 Jan 2023 15:20) (94% - 97%)  Parameters below as of 12 Jan 2023 15:20  Patient On (Oxygen Delivery Method): room air  Height (cm): 167.6 (01-12 @ 10:59)  Weight (kg): 78 (01-12 @ 10:59)  BMI (kg/m2): 27.8 (01-12 @ 10:59)  BSA (m2): 1.88 (01-12 @ 10:59)  GEN: NAD  HEENT: normocephalic and atraumatic. EOMI. PERRL.    NECK: Supple.  No lymphadenopathy   LUNGS: Coarse breath sounds bilaterally   HEART: Irregular rate and rhythm , + sys murmur+   ABDOMEN: Soft, nontender, and nondistended.  Positive bowel sounds.    EXTREMITIES: 1+ edema.  NEUROLOGIC: grossly intact.  PSYCHIATRIC: Appropriate affect .  SKIN: No rash    Labs:  01-12    145  |  104  |  18  ----------------------------<  78  2.3<LL>   |  35<H>  |  1.40<H>    Ca    7.9<L>      12 Jan 2023 12:08  Mg     1.8     01-12    TPro  6.5  /  Alb  2.8<L>  /  TBili  0.5  /  DBili  x   /  AST  14<L>  /  ALT  9<L>  /  AlkPhos  76  01-12                        9.4    11.80 )-----------( 279      ( 12 Jan 2023 12:08 )             32.2     PT/INR - ( 12 Jan 2023 12:08 )   PT: 18.4 sec;   INR: 1.57 ratio    PTT - ( 12 Jan 2023 12:08 )  PTT:36.2 sec    LIVER FUNCTIONS - ( 12 Jan 2023 12:08 )  Alb: 2.8 g/dL / Pro: 6.5 g/dL / ALK PHOS: 76 U/L / ALT: 9 U/L / AST: 14 U/L / GGT: x           All imaging and other data have been reviewed.  < from: Xray Chest 1 View- PORTABLE-Urgent (01.12.23 @ 11:35) >  ACC: [id number] EXAM: XR CHEST PORTABLE URGENT 1V  PROCEDURE DATE: 01/12/2023  INTERPRETATION: AP chest on January 12, 2023 at 11:29 AM. Patient is short of breath.  Heart is quite enlarged.  On August 16, 2022 there was some atelectatic changes in the left mid lower lung field that are somewhat improved on present study.  The present study shows new scattered atelectatic changes at right base likely with pleural component.  IMPRESSION: Improved left lung findings but worsening right lung   findings. Heart enlargement again noted.    Assessment and Plan:   71 yo woman with PMH of DM2, CHF, afib, asthma, and Hypothyroidism, was referred from cardiologist office for CHF exacerbation and was found with a positive COVID.   She has worsening SOB over the past 7-10 days as well as worsening leg edema and also complaining of diarrhea for 3 weeks.   She lives alone and as far as she knows didn't have contact with a sick person.   Had COVID vaccines x 2 and one booster.     COVID 19   - BMP, CBC w diff, NLR. Procalcitonin, Ferritin, CRP, LDH and D dimer for the start and periodically can be repeated.   - CXR with opacities more prominent in R side but CT result pending, had mosaic pattern and nodules in the past   - Start Remdesivir 200mg stat and 100mg daily for 2more days (total 3days) if remains stable, otherwise will extend to 5days  - Spo2 normal at this time so can hold Dexamethasone   - Follow Creat and LFTs daily while on Remdesivir.    - Watch O2 sat closely   - No antibiotics at this time.  - Anticoagulation as per protocol  - GI PCR     Thank you for courtesy of this consult.     Will follow.   Discussed with primary team.    Murtaza Juarez MD  Division of Infectious Diseases   Please call ID service at [phone] with any question.      55 minutes spent on total encounter assessing patient, examination, chart review, counseling and coordinating care by the attending physician/nurse/care manager.

## 2023-01-13 NOTE — PROGRESS NOTE ADULT - ASSESSMENT
Ms Ceci is a 73 yo F sent to the ED by her cardiologist with acute CHF exacerbation. PMH HFpEF, Hypothyroidism, Thrombocytosis, HLD, Asthma,  A fib on Xarelto, hx of Lung Nodules followed by heme/onc and thoracic surgery    HFpEF: acute on chronic CHF  -continue with IV lasix 40mg BID and adjust dose accordingly pending clinical status.   -daily weights, I/O's  -f/u TTE  -CK normal, troponins elevated but peaked and downtrending. Mild troponin leak, though no suggestion of ACS  -BNP 9620  -Cardio Dr. Chisholm following    Hypokalemia: likely due to increased lasix dosing as well as diarrhea. -Monitor Cr in setting of lasix use, Cr 1.50 (1/13)  -K 3.5 (1/13)  -recheck electrolytes  -replete as appropriate    COVID-19 infection: Patient COVID+ (1/12)  -diarrhea likely due to COVID 19 infection  -CT chest (1/12): Small mildly loculated R pleural eff and mild R basilar atelectasis. Stable mosaic attenuation of lungs and numerous B/L lung nodules. Stable L pleural thickening  -f/u CRP, LDH, d-dimer  -Continue Remdesivir  -Can hold Decadron at this time  -monitor on pulse ox  -ID Dr. Juarez following    Hypothyroidism:   -continue levothyroxine    Thrombocytosis: continue agrylin  -managed by hematology Dr Rice, patient last seen in Nov 2022.   -recommended to f/u with Dr Rice for her anemia and thrombocytosis as outpatient    A fib on Xarelto  -Continue Xarelto  -Continue Toprol    Anemia: no signs or symptoms of active bleeding. Continue to monitor hgb.     DVT ppx: on Xarelto

## 2023-01-13 NOTE — DISCHARGE NOTE PROVIDER - NSDCMRMEDTOKEN_GEN_ALL_CORE_FT
anagrelide 0.5 mg oral capsule: 3 cap(s) orally 2 times a day  escitalopram 20 mg oral tablet: 1 tab(s) orally once a day  famotidine 40 mg oral tablet: 1 tab(s) orally once a day (at bedtime), As Needed  furosemide 40 mg oral tablet: 2 tab(s) orally in the morning and 1 tab in the evening   Hydromet 5 mg-1.5 mg/5 mL oral syrup: 5 milliliter(s) orally every 6 hours, As Needed for cough   levocetirizine 5 mg oral tablet: 1 tab(s) orally once a day (in the evening)  levothyroxine 125 mcg (0.125 mg) oral tablet: 1 tab(s) orally once a day  metOLazone 2.5 mg oral tablet: 1 tab(s) orally once a day  Trelegy Ellipta 100 mcg-62.5 mcg-25 mcg/inh inhalation powder: 1 puff(s) inhaled once a day  Xarelto 20 mg oral tablet: 1 tab(s) orally once a day (in the evening)   anagrelide 0.5 mg oral capsule: 3 cap(s) orally once a day  escitalopram 20 mg oral tablet: 1 tab(s) orally once a day  famotidine 40 mg oral tablet: 1 tab(s) orally once a day (at bedtime), As Needed  furosemide 40 mg oral tablet: 2 tab(s) orally in the morning and 1 tab in the evening   Hydromet 5 mg-1.5 mg/5 mL oral syrup: 5 milliliter(s) orally every 6 hours, As Needed for cough   levocetirizine 5 mg oral tablet: 1 tab(s) orally once a day (in the evening)  levothyroxine 125 mcg (0.125 mg) oral tablet: 1 tab(s) orally once a day  metOLazone 2.5 mg oral tablet: 1 tab(s) orally once a day  Trelegy Ellipta 100 mcg-62.5 mcg-25 mcg/inh inhalation powder: 1 puff(s) inhaled once a day  Xarelto 20 mg oral tablet: 1 tab(s) orally once a day (in the evening)   anagrelide 0.5 mg oral capsule: 3 cap(s) orally once a day  escitalopram 20 mg oral tablet: 1 tab(s) orally once a day  famotidine 40 mg oral tablet: 1 tab(s) orally once a day (at bedtime), As Needed  furosemide 40 mg oral tablet: 2 tab(s) orally in the morning and 1 tab in the evening   Hydromet 5 mg-1.5 mg/5 mL oral syrup: 5 milliliter(s) orally every 6 hours, As Needed for cough   levocetirizine 5 mg oral tablet: 1 tab(s) orally once a day (in the evening)  levothyroxine 125 mcg (0.125 mg) oral tablet: 1 tab(s) orally once a day  metOLazone 2.5 mg oral tablet: 1 tab(s) orally once a day  Trelegy Ellipta 100 mcg-62.5 mcg-25 mcg/inh inhalation powder: 1 puff(s) inhaled once a day  vancomycin 125 mg oral capsule: 1 cap(s) orally every 6 hours  Xarelto 20 mg oral tablet: 1 tab(s) orally once a day (in the evening)

## 2023-01-13 NOTE — DISCHARGE NOTE PROVIDER - HOSPITAL COURSE
FROM ADMISSION H+P:   HPI:  Ms Ornelas is a 73 yo F sent to the ED by her cardiologist with acute CHF exacerbation. PMH HFpEF, Hypothyroidism, Thrombocytosis, HLD, Asthma,  A fib on Xarelto.   Patient seen and examined at bedside. She reports worsening SOB over the past 7-10 days as well as worsening leg edema. She has been in contact with her cardiologist who increased her lasix from 40mg daily to 80mg in AM and 40mg in PM [total 120mg daily] for a duration of 4 days. She went to see her cardiologist today who recommended patient come to the ER.   She reports worsening orthopnea for about 1 week as well as worsening SOB. She denies any associated chest pain/pressure, palpitations.   She was noted to be covid positive in the ER today. Denies any known sick contacts. She Received a total of 3 doses of pfizer vaccine. She admits to approx 5-7 days of loose water stools without any melena/hematochezia.   Denies headaches, nausea, vomiting, chest pain,  palpitations, abdominal pain, constipation, dysuria.  (12 Jan 2023 15:28)      ---  HOSPITAL COURSE/PERTINENT LABS/PROCEDURES PERFORMED/PENDING TESTS:  Patient admitted for acute CHF exacerbation and COVID infection. Patient noted to be hyperkalemic with K 2.3 and was aggressively repleted. Cardio (Phan) consulted and recommended IV Lasix, echo, continuing home BB and AC, and trending trops which peaked and subsequently downtrended. CT chest was also recommended, which showed small mild loculated R pleural effusion, mild R basilar atelectasis, stable mosaic attenuation of lungs with numerous nodules, as well as stable L pleural thickening. ID (Larry) and recommended Remdesivir; Decadron was held as patient was not hypoxic on admission. Nephro (Tiff) consulted for acute on chronic CHF, recommended ****    Patient seen and examined on the day of discharge. Patient is clinically stable and medically optimized for discharge to ____________ with close outpatient follow-up.     ---  PATIENT CONDITION:  - stable    ---  PHYSICAL EXAM ON DAY OF DISCHARGE:    ---  CONSULTANTS:   Cardio: Dr. John Chisholm  ID: Dr. Murtaza Juarez  Nephro: Dr. Charlie Matthew    ---  ADVANCED CARE PLANNING:  - Code status:      - MOLST completed:      [  ] NO     [  ] YES    ---  TIME SPENT:  I, the attending physician, was physically present for the key portions of the evaluation and management (E/M) service provided. The total amount of time spent reviewing the hospital notes, laboratory values, imaging findings, assessing/counseling the patient, discussing with consultant physicians, social work, nursing staff was -- minutes FROM ADMISSION H+P:   HPI:  Ms Ornelas is a 71 yo F sent to the ED by her cardiologist with acute CHF exacerbation. PMH HFpEF, Hypothyroidism, Thrombocytosis, HLD, Asthma,  A fib on Xarelto.   Patient seen and examined at bedside. She reports worsening SOB over the past 7-10 days as well as worsening leg edema. She has been in contact with her cardiologist who increased her lasix from 40mg daily to 80mg in AM and 40mg in PM [total 120mg daily] for a duration of 4 days. She went to see her cardiologist today who recommended patient come to the ER.   She reports worsening orthopnea for about 1 week as well as worsening SOB. She denies any associated chest pain/pressure, palpitations.   She was noted to be covid positive in the ER today. Denies any known sick contacts. She Received a total of 3 doses of pfizer vaccine. She admits to approx 5-7 days of loose water stools without any melena/hematochezia.   Denies headaches, nausea, vomiting, chest pain,  palpitations, abdominal pain, constipation, dysuria.  (12 Jan 2023 15:28)      ---  HOSPITAL COURSE/PERTINENT LABS/PROCEDURES PERFORMED/PENDING TESTS:  Patient admitted for acute CHF exacerbation and COVID infection. Patient noted to be hyperkalemic with K 2.3 and was aggressively repleted. Cardio (Phan) consulted and recommended IV Lasix, echo, continuing home BB and AC, and trending trops which peaked and subsequently downtrended. CT chest was also recommended, which showed small mild loculated R pleural effusion, mild R basilar atelectasis, stable mosaic attenuation of lungs with numerous nodules, as well as stable L pleural thickening. ID (Larry) and recommended Remdesivir; Decadron was held as patient was not hypoxic on admission. Nephro (Tiff) consulted for acute on chronic CHF, recommended ****. Patient tested positive for C.diff, started on PO Vancomycin. Diarrhea subsequently improved. Patient also started on Percocet, and patient reported that pain was adequately controlled.    Patient seen and examined on the day of discharge. Patient is clinically stable and medically optimized for discharge to ____________ with close outpatient follow-up.     **Updated 1/15    ---  PATIENT CONDITION:  - stable    ---  PHYSICAL EXAM ON DAY OF DISCHARGE:    ---  CONSULTANTS:   Cardio: Dr. John Chisholm  ID: Dr. Murtaza Juarez  Nephro: Dr. Charlie Matthew    ---  ADVANCED CARE PLANNING:  - Code status:      - MOLST completed:      [  ] NO     [  ] YES    ---  TIME SPENT:  I, the attending physician, was physically present for the key portions of the evaluation and management (E/M) service provided. The total amount of time spent reviewing the hospital notes, laboratory values, imaging findings, assessing/counseling the patient, discussing with consultant physicians, social work, nursing staff was -- minutes FROM ADMISSION H+P:   HPI:  Ms Ornelas is a 71 yo F sent to the ED by her cardiologist with acute CHF exacerbation. PMH HFpEF, Hypothyroidism, Thrombocytosis, HLD, Asthma,  A fib on Xarelto.   Patient seen and examined at bedside. She reports worsening SOB over the past 7-10 days as well as worsening leg edema. She has been in contact with her cardiologist who increased her lasix from 40mg daily to 80mg in AM and 40mg in PM [total 120mg daily] for a duration of 4 days. She went to see her cardiologist today who recommended patient come to the ER.   She reports worsening orthopnea for about 1 week as well as worsening SOB. She denies any associated chest pain/pressure, palpitations.   She was noted to be covid positive in the ER today. Denies any known sick contacts. She Received a total of 3 doses of pfizer vaccine. She admits to approx 5-7 days of loose water stools without any melena/hematochezia.   Denies headaches, nausea, vomiting, chest pain,  palpitations, abdominal pain, constipation, dysuria.  (12 Jan 2023 15:28)      ---  HOSPITAL COURSE/PERTINENT LABS/PROCEDURES PERFORMED/PENDING TESTS:  Patient admitted for acute CHF exacerbation and COVID infection. Patient noted to be hypokalemic with K 2.3 and was aggressively repleted. Cardio (Phan) consulted and recommended IV Lasix, echo, continuing home BB and AC, and trending trops which peaked and subsequently downtrended. CT chest was also recommended, which showed small mild loculated R pleural effusion, mild R basilar atelectasis, stable mosaic attenuation of lungs with numerous nodules, as well as stable L pleural thickening. ID (Larry) and recommended Remdesivir; Decadron was held as patient was not hypoxic on admission. Nephro (Tiff) consulted for acute on chronic CHF, recommended aggressive repletion of potassium and encouraging PO intake in the setting of elevated creatinine and diarrhea. Patient tested positive for C.diff, started on PO Vancomycin. Diarrhea subsequently improved. Patient also started on Percocet, and patient reported that pain was adequately controlled.    Patient seen and examined on the day of discharge. Patient is clinically stable and medically optimized for discharge to ____________ with close outpatient follow-up.     **Updated 1/15    ---  PATIENT CONDITION:  - stable    ---  PHYSICAL EXAM ON DAY OF DISCHARGE:    ---  CONSULTANTS:   Cardio: Dr. John Chisholm  ID: Dr. Murtaza Juarez  Nephro: Dr. Charlie Matthew    ---  ADVANCED CARE PLANNING:  - Code status:      - MOLST completed:      [  ] NO     [  ] YES    ---  TIME SPENT:  I, the attending physician, was physically present for the key portions of the evaluation and management (E/M) service provided. The total amount of time spent reviewing the hospital notes, laboratory values, imaging findings, assessing/counseling the patient, discussing with consultant physicians, social work, nursing staff was -- minutes FROM ADMISSION H+P:   HPI:  Ms Ornelas is a 73 yo F sent to the ED by her cardiologist with acute CHF exacerbation. PMH HFpEF, Hypothyroidism, Thrombocytosis, HLD, Asthma,  A fib on Xarelto.   Patient seen and examined at bedside. She reports worsening SOB over the past 7-10 days as well as worsening leg edema. She has been in contact with her cardiologist who increased her lasix from 40mg daily to 80mg in AM and 40mg in PM [total 120mg daily] for a duration of 4 days. She went to see her cardiologist today who recommended patient come to the ER.   She reports worsening orthopnea for about 1 week as well as worsening SOB. She denies any associated chest pain/pressure, palpitations.   She was noted to be covid positive in the ER today. Denies any known sick contacts. She Received a total of 3 doses of pfizer vaccine. She admits to approx 5-7 days of loose water stools without any melena/hematochezia.   Denies headaches, nausea, vomiting, chest pain,  palpitations, abdominal pain, constipation, dysuria.  (12 Jan 2023 15:28)      ---  HOSPITAL COURSE/PERTINENT LABS/PROCEDURES PERFORMED/PENDING TESTS:  Patient admitted for acute CHF exacerbation and COVID infection. Patient noted to be hypokalemic with K 2.3 and was aggressively repleted. Cardio (Phan) consulted and recommended IV Lasix, echo, continuing home BB and AC, and trending trops which peaked and subsequently downtrended. CT chest was also recommended, which showed small mild loculated R pleural effusion, mild R basilar atelectasis, stable mosaic attenuation of lungs with numerous nodules, as well as stable L pleural thickening. ID (Larry) and recommended Remdesivir; Decadron was held as patient was not hypoxic on admission. Nephro (Tiff) consulted for acute on chronic CHF, recommended aggressive repletion of potassium and encouraging PO intake in the setting of elevated creatinine and diarrhea. Patient tested positive for C.diff, started on PO Vancomycin. Diarrhea subsequently improved. Patient also started on Percocet, and patient reported that pain was adequately controlled. Patient seen and examined on the day of discharge. Patient is clinically stable and medically optimized for discharge home with close outpatient follow-up and completion of Vancomycin regimen.     ---  PATIENT CONDITION:  - stable    ---  PHYSICAL EXAM ON DAY OF DISCHARGE:  T(C): 36.8 (01-19-23 @ 04:14), Max: 37 (01-18-23 @ 20:16)  HR: 84 (01-19-23 @ 04:14) (84 - 86)  BP: 108/71 (01-19-23 @ 04:14) (108/56 - 120/60)  RR: 18 (01-19-23 @ 04:14) (18 - 18)  SpO2: 94% (01-19-23 @ 04:14) (93% - 96%)    CONSTITUTIONAL: Well groomed, no apparent distress  EYES: PERRLA and symmetric, EOMI  ENMT: Oral mucosa with moist membranes. Normal dentition;  RESP: No respiratory distress, no use of accessory muscles; CTA b/l, no WRR  CV: RRR, +S1S2, no MRG; no JVD; no peripheral edema  GI: Soft, NT, ND, no rebound, no guarding  MSK: Normal gait  SKIN: No rashes or ulcers noted  NEURO: CN II-XII intact  PSYCH: Appropriate insight/judgment; A+O x 3, mood and affect appropriate, recent/remote memory intact  ---  CONSULTANTS:   Cardio: Dr. John Chisholm  ID: Dr. Murtaza Juarez  Nephro: Dr. Charlie Matthew    ---  ADVANCED CARE PLANNING:  - Code status:      - MOLST completed:      [  ] NO     [  ] YES    ---  TIME SPENT:  I, the attending physician, was physically present for the key portions of the evaluation and management (E/M) service provided. The total amount of time spent reviewing the hospital notes, laboratory values, imaging findings, assessing/counseling the patient, discussing with consultant physicians, social work, nursing staff was -- minutes FROM ADMISSION H+P:   HPI:  Ms Ornelas is a 71 yo F sent to the ED by her cardiologist with acute CHF exacerbation. PMH HFpEF, Hypothyroidism, Thrombocytosis, HLD, Asthma,  A fib on Xarelto.   Patient seen and examined at bedside. She reports worsening SOB over the past 7-10 days as well as worsening leg edema. She has been in contact with her cardiologist who increased her lasix from 40mg daily to 80mg in AM and 40mg in PM [total 120mg daily] for a duration of 4 days. She went to see her cardiologist today who recommended patient come to the ER.      ---  HOSPITAL COURSE/PERTINENT LABS/PROCEDURES PERFORMED/PENDING TESTS:  Patient admitted for acute CHF exacerbation and COVID infection. Patient noted to be hypokalemic with K 2.3 and was aggressively repleted. Cardio (Phan) consulted and recommended IV Lasix, echo, continuing home BB and AC, and trending trops which peaked and subsequently downtrended. CT chest was also recommended, which showed small mild loculated R pleural effusion, mild R basilar atelectasis, stable mosaic attenuation of lungs with numerous nodules, as well as stable L pleural thickening. ID (Larry) and recommended Remdesivir; Decadron was held as patient was not hypoxic on admission. Nephro (Tiff) consulted for acute on chronic CHF, recommended aggressive repletion of potassium and encouraging PO intake in the setting of elevated creatinine and diarrhea. Patient tested positive for C.diff, started on PO Vancomycin. Diarrhea subsequently improved. Patient also started on Percocet, and patient reported that pain was adequately controlled. Patient seen and examined on the day of discharge. Patient is clinically stable and medically optimized for discharge home with close outpatient follow-up and completion of Vancomycin regimen.     ---  PATIENT CONDITION:  - stable    ---  PHYSICAL EXAM ON DAY OF DISCHARGE:  T(C): 36.8 (01-19-23 @ 04:14), Max: 37 (01-18-23 @ 20:16)  HR: 84 (01-19-23 @ 04:14) (84 - 86)  BP: 108/71 (01-19-23 @ 04:14) (108/56 - 120/60)  RR: 18 (01-19-23 @ 04:14) (18 - 18)  SpO2: 94% (01-19-23 @ 04:14) (93% - 96%)    CONSTITUTIONAL: Well groomed, no apparent distress  EYES: PERRLA and symmetric, EOMI  ENMT: Oral mucosa with moist membranes. Normal dentition;  RESP: No respiratory distress, no use of accessory muscles; CTA b/l, no WRR  CV: RRR, +S1S2, no MRG; no JVD; no peripheral edema  GI: Soft, NT, ND, no rebound, no guarding  MSK: Normal gait  SKIN: No rashes or ulcers noted  NEURO: CN II-XII intact  PSYCH: Appropriate insight/judgment; A+O x 3, mood and affect appropriate, recent/remote memory intact    ---  CONSULTANTS:   Cardio: Dr. John Chisholm  ID: Dr. Murtaza Juarez  Nephro: Dr. Charlie Matthew

## 2023-01-13 NOTE — DISCHARGE NOTE PROVIDER - NSDCCPCAREPLAN_GEN_ALL_CORE_FT
PRINCIPAL DISCHARGE DIAGNOSIS  Diagnosis: Diarrhea  Assessment and Plan of Treatment: You presented with abdominal pain and diarrhea. A stool sample was collected with was positive for C.diff, and you were started on Vancomycin. Your diarrhea was noted to have improved after a course of antibiotics.      SECONDARY DISCHARGE DIAGNOSES  Diagnosis: Hypokalemia  Assessment and Plan of Treatment: Your potassium levels were noted to be low on admission. You were given supplements which repleted your potassium and it is now within the normal range.    Diagnosis: 2019 novel coronavirus disease (COVID-19)  Assessment and Plan of Treatment: You tested positive for COVID on admission, which may have contributed to your abdominal pain and diarrhea. You were given a course of antivirals which you completed, and your shortness of breath has improved.  You can take Tylenol 650 mg every 6 hours as needed for fever or pain. You can take over the counter Robitussin or Mucinex as needed for cough. Return to the emergency department if your symptoms worsen or if you notice your oxygen levels are below 90% on a pulse oximeter. Try to rest and keep hydrated. Please call your primary care doctor within 3-4 days after discharge.    Continue to practice social distancing. Wear a mask and wash your hands. Get the COVID vaccine and booster when appropriate.    Diagnosis: Chronic heart failure with preserved ejection fraction (HFpEF)  Assessment and Plan of Treatment: You have a known history of heart failure. On admission, you were noted to be short of breath and volume overloaded given the swelling in your legs. You were given Lasix (water pill) through an IV which has helped pull the fluid out of your body. Your volume status has now normalized.  Please follow up with a cardiologist after discharge to monitor and manage your condition.     PRINCIPAL DISCHARGE DIAGNOSIS  Diagnosis: Diarrhea  Assessment and Plan of Treatment: You presented with abdominal pain and diarrhea. A stool sample was collected with was positive for the bacteria known as C.diff, and you were started on Vancomycin. Your diarrhea was noted to have improved after a course of antibiotics.  PLEASE FINISH YOUR COURSE OF ANTIBIOTIC (VANCOMYCIN). PLEASE TAKE IT FOUR TIMES PER DAY FOR THE NEXY 4 DAYS AS DIRECTED ON THE PRESCRIPTION.      SECONDARY DISCHARGE DIAGNOSES  Diagnosis: Hypokalemia  Assessment and Plan of Treatment: Your potassium levels were noted to be low on admission. You were given supplements which repleted your potassium and it is now within the normal range.    Diagnosis: 2019 novel coronavirus disease (COVID-19)  Assessment and Plan of Treatment: You tested positive for COVID on admission, which may have contributed to your abdominal pain and diarrhea. You were given a course of antivirals which you completed, and your shortness of breath has improved.  You can take Tylenol 650 mg every 6 hours as needed for fever or pain. You can take over the counter Robitussin or Mucinex as needed for cough. Return to the emergency department if your symptoms worsen or if you notice your oxygen levels are below 90% on a pulse oximeter. Try to rest and keep hydrated. Please call your primary care doctor within 3-4 days after discharge.    Continue to practice social distancing. Wear a mask and wash your hands. Get the COVID vaccine and booster when appropriate.    Diagnosis: Chronic heart failure with preserved ejection fraction (HFpEF)  Assessment and Plan of Treatment: You have a known history of heart failure. On admission, you were noted to be short of breath and volume overloaded given the swelling in your legs. You were given Lasix (water pill) through an IV which has helped pull the fluid out of your body. Your volume status has now normalized.  Please follow up with a cardiologist after discharge to monitor and manage your condition.    Diagnosis: Hypothyroidism  Assessment and Plan of Treatment: Please continue taking your levothyroxine as prescribed.    Diagnosis: Chronic atrial fibrillation  Assessment and Plan of Treatment: You were previously diagnosed with Afib. Please continue to take your Xarelto as prescribed and follow up with your cardiologist for further management.

## 2023-01-14 NOTE — DIETITIAN INITIAL EVALUATION ADULT - OTHER INFO
71 yo woman with PMH of DM2, CHF, afib, asthma, and Hypothyroidism, was referred from cardiologist office for CHF exacerbation and was found with a positive COVID.   She has worsening SOB over the past 7-10 days as well as worsening leg edema and also complaining of diarrhea for 3 weeks.   She lives alone and as far as she knows didn't have contact with a sick person.   Had COVID vaccines x 2 and one booster.     COVID 19   - BMP, CBC w diff, NLR. Procalcitonin, Ferritin, CRP, LDH and D dimer for the start and periodically can be repeated.   - CXR with opacities more prominent in R side but CT result pending, had mosaic pattern and nodules in the past   - On Remdesivir 200mg for 3 days for now   - Spo2 normal at this time so can hold Dexamethasone   - Follow Creat and LFTs daily while on Remdesivir.    - Watch O2 sat closely   - No antibiotics at this time.  - Anticoagulation as per protocol  - GI PCR and C diff PCR pending      73 yo woman with PMH of DM2, CHF, afib, asthma, and Hypothyroidism, was referred from cardiologist office for CHF exacerbation and was found with a positive COVID. She has worsening SOB over the past 7-10 days as well as worsening leg edema and also complaining of diarrhea for 3 weeks.   Pt tells undersigned RD that she is 5'6" UBW ~ 170# diarrhea ( "orange" watery with chunks of solid" )  started ~ 3 weeks ago, was having nausea with dry heaves, for last couple of days has had altered taste. She reports her appetite is poor. r/t abd pain  Food preferences obtained for lunch. Pt being sent menu at lunch bc she disgarded menu at breakfast and would like to see daily options. Explained to pt the process of getting alternate items at meals; pt appreciative        COVID 19   - BMP, CBC w diff, NLR. Procalcitonin, Ferritin, CRP, LDH and D dimer for the start and periodically can be repeated.   - CXR with opacities more prominent in R side but CT result pending, had mosaic pattern and nodules in the past   - On Remdesivir 200mg for 3 days for now   - Spo2 normal at this time so can hold Dexamethasone   - Follow Creat and LFTs daily while on Remdesivir.    - Watch O2 sat closely   - No antibiotics at this time.  - Anticoagulation as per protocol  - GI PCR and C diff PCR pending      71 yo woman with PMH of DM2, CHF, afib, asthma, and Hypothyroidism, was referred from cardiologist office for CHF exacerbation and was found with a positive COVID. She has worsening SOB over the past 7-10 days as well as worsening leg edema and also complaining of diarrhea for 3 weeks.   Pt tells undersigned RD that she is 5'6" UBW ~ 170# diarrhea ( "orange" watery with chunks of solid" )  started ~ 3 weeks ago, was having nausea with dry heaves, for last couple of days has had altered taste. She reports her appetite is poor. r/t abd pain  Food preferences obtained for lunch. Pt being sent menu at lunch bc she discarded menu at breakfast and would like to see daily options. Explained to pt the process of getting alternate items at meals; pt appreciative

## 2023-01-14 NOTE — PROGRESS NOTE ADULT - ASSESSMENT
72-year-old female with history of hypothyroidism, hyperlipidemia, AF on Xarelto, thrombocytosis sent in for worsening shortness of breath and CHF.   Reports a 40 lb weight gain, though has lost almost 25 lbs since increase in her home diuretic regimen.    - was sent from Dr. Fernandez's office w/ c/o SOB, orthopnea and edema, likely multifactorial d/t CHF exac, +covid   - remains volume overloaded on exam  - continue Lasix 40 iv BID, continue to trend renal indices  - CXR with opacities more prominent in R side   - CT chest noted, had mosaic pattern and nodules in the past   - monitor daily weights and i/o's  - Echo (2022)  normal LV function and moderate MR  - f/u TTE     - mild troponin leak, now downtrending , no suggestion of acs, no anginal complaints   - cath without cad in 2018    - known hx of pAfib , has been SR no events noted, refusing tele, would discontinue   - cont BB, AC  - Monitor and replete Lytes. Keep K > 4 and Mg > 2  - continue to monitor routine hemodynamics    - +Covid/ CDIFF/ HZ, isolation, management and supportive care per primary   - Will continue to follow.    Sharri Steward, [facility]  Nurse Practitioner - Cardiology   Spectra #[phone]/ [phone]

## 2023-01-14 NOTE — DIETITIAN INITIAL EVALUATION ADULT - DIET TYPE
DASH/TLC (sodium and cholesterol restricted diet)/1500ml/consistent carbohydrate (evening snack) DASH/TLC (sodium and cholesterol restricted diet)/1500ml

## 2023-01-14 NOTE — PROGRESS NOTE ADULT - ASSESSMENT
LAYA on CKD 3b  Persistent Hypokalemia  Hypernatremia  COVID 19+    -Baseline creatinine 1.3  -Check Urine lytes  -Check for abnormal renal potassium losses  -Patient also with diarrhea which can cause loss of potassium  -S/P Aggressive KCL  repletion  -Encourage PO intake for elevated creatinine and hypernatremia, likely significant losses from diarrhea  -COVID per primary  -Creatinine worsening, DC lasix  -Give dose of albumin  -Encourage PO intake

## 2023-01-14 NOTE — DIETITIAN INITIAL EVALUATION ADULT - ORAL INTAKE PTA/DIET HISTORY
pt reports she hasn't been eating well for 3 weeks, usually follows a reduced na and sugar diet pt reports she hasn't been eating well for 3 weeks, usually follows a reduced na diet

## 2023-01-14 NOTE — DIETITIAN INITIAL EVALUATION ADULT - NUTRITIONGOAL OUTCOME1
maintain adeq hydration ( although pt with HF, 1 liter fr seems too restrictive for person with diarrhea x3 weeks)   po intake >/=75% served

## 2023-01-14 NOTE — DIETITIAN INITIAL EVALUATION ADULT - PERTINENT LABORATORY DATA
01-13    143  |  106  |  19  ----------------------------<  125<H>  3.5   |  32<H>  |  1.50<H>    Ca    8.0<L>      13 Jan 2023 10:25  Phos  2.8     01-13  Mg     2.0     01-13    TPro  6.2  /  Alb  2.7<L>  /  TBili  0.5  /  DBili  0.1  /  AST  14<L>  /  ALT  10<L>  /  AlkPhos  71  01-13  POCT Blood Glucose.: 123 mg/dL (01-13-23 @ 11:48)

## 2023-01-14 NOTE — DIETITIAN INITIAL EVALUATION ADULT - PERTINENT MEDS FT
MEDICATIONS  (STANDING):  anagrelide 1.5 milliGRAM(s) Oral <User Schedule>  escitalopram 20 milliGRAM(s) Oral daily  furosemide   Injectable 40 milliGRAM(s) IV Push two times a day  levothyroxine 125 MICROGram(s) Oral daily  loratadine 10 milliGRAM(s) Oral daily  metoprolol succinate ER 50 milliGRAM(s) Oral daily  remdesivir  IVPB 100 milliGRAM(s) IV Intermittent every 24 hours  remdesivir  IVPB   IV Intermittent   rivaroxaban 15 milliGRAM(s) Oral with dinner  vancomycin    Solution 125 milliGRAM(s) Oral every 6 hours    MEDICATIONS  (PRN):  acetaminophen     Tablet .. 650 milliGRAM(s) Oral every 6 hours PRN Temp greater or equal to 38C (100.4F), Mild Pain (1 - 3)  ondansetron    Tablet 4 milliGRAM(s) Oral every 6 hours PRN Nausea and/or Vomiting

## 2023-01-14 NOTE — PROGRESS NOTE ADULT - ASSESSMENT
Ms Ceci is a 71 yo F sent to the ED by her cardiologist with acute CHF exacerbation. PMH HFpEF, Hypothyroidism, Thrombocytosis, HLD, Asthma,  A fib on Xarelto, hx of Lung Nodules followed by heme/onc and thoracic surgery    HFpEF: acute on chronic CHF  -continue with IV lasix 40mg BID and adjust dose accordingly pending clinical status.   -daily weights, I/O's  -f/u TTE  -CK normal, troponins elevated but peaked and downtrending. Mild troponin leak, though no suggestion of ACS  -BNP 9620  -Cardio Dr. Chisholm following    C Diff  - GI PCR positive for C diff  - Percocet 5mg PRN q6 ordered for severe abdominal pian   - Started PO Vancomycin  - ID following     Hypokalemia: likely due to increased lasix dosing as well as diarrhea. -Monitor Cr in setting of lasix use, Cr 1.50 (1/13)  -K 4.3(1/14)  -recheck electrolytes  -replete as appropriate    COVID-19 infection: Patient COVID+ (1/12)  -diarrhea likely due to COVID 19 infection  -CT chest (1/12): Small mildly loculated R pleural eff and mild R basilar atelectasis. Stable mosaic attenuation of lungs and numerous B/L lung nodules. Stable L pleural thickening  -f/u CRP, LDH, d-dimer  -Continue Remdesivir  -Can hold Decadron at this time  -monitor on pulse ox  -ID Dr. Juarez following    Hypothyroidism:   -continue levothyroxine    Thrombocytosis: continue agrylin  -managed by hematology Dr Rice, patient last seen in Nov 2022.   -recommended to f/u with Dr Rice for her anemia and thrombocytosis as outpatient    A fib on Xarelto  -Continue Xarelto  -Continue Toprol    Anemia: no signs or symptoms of active bleeding. Continue to monitor hgb.     DVT ppx: on Xarelto

## 2023-01-14 NOTE — DIETITIAN INITIAL EVALUATION ADULT - LITERATURE/VIDEOS GIVEN
DASH/TLC consistent CHO and HF  written ed provided . pt is not up to  verbal education at this time DASH/TLC and HF  written ed provided . pt is not up to  verbal education at this time

## 2023-01-15 NOTE — PROGRESS NOTE ADULT - ASSESSMENT
LAYA on CKD 3b  Persistent Hypokalemia  Hypernatremia  COVID 19+    -Baseline creatinine 1.3  -Check Urine lytes  -Check for abnormal renal potassium losses  -Patient also with diarrhea which can cause loss of potassium  -S/P Aggressive KCL  repletion  -Encourage PO intake for elevated creatinine and hypernatremia, likely significant losses from diarrhea  -COVID per primary  -Creatinine stablizing  -Give IVF x 24 hours  -S/P dose of albumin  -Encourage PO intake

## 2023-01-15 NOTE — PROGRESS NOTE ADULT - ASSESSMENT
Ms eCci is a 73 yo F sent to the ED by her cardiologist with acute CHF exacerbation. PMH HFpEF, Hypothyroidism, Thrombocytosis, HLD, Asthma,  A fib on Xarelto, hx of Lung Nodules followed by heme/onc and thoracic surgery    HFpEF: acute on chronic CHF  -Lasix d/mik   continue with IV lasix 40mg BID and adjust dose accordingly pending clinical status.   -daily weights, I/O's  -f/u TTE  -CK normal, troponins elevated but peaked and downtrending. Mild troponin leak, though no suggestion of ACS  -BNP 9620  -Cardio Dr. Chisholm following    C Diff  - GI PCR positive for C diff  - Percocet 5mg PRN q6 ordered for severe abdominal pian   - Started PO Vancomycin  - ID following     Hypokalemia: likely due to increased lasix dosing as well as diarrhea. -Monitor Cr in setting of lasix use, Cr 1.50 (1/13)  -K 4.3(1/14)  -recheck electrolytes  -replete as appropriate    COVID-19 infection: Patient COVID+ (1/12)  -diarrhea likely due to COVID 19 infection  -CT chest (1/12): Small mildly loculated R pleural eff and mild R basilar atelectasis. Stable mosaic attenuation of lungs and numerous B/L lung nodules. Stable L pleural thickening  -f/u CRP, LDH, d-dimer  -Continue Remdesivir  -Can hold Decadron at this time  -monitor on pulse ox  -ID Dr. Juarez following    Hypothyroidism:   -continue levothyroxine    Thrombocytosis: continue agrylin  -managed by hematology Dr Rice, patient last seen in Nov 2022.   -recommended to f/u with Dr Rice for her anemia and thrombocytosis as outpatient    A fib on Xarelto  -Continue Xarelto  -Continue Toprol    Anemia: no signs or symptoms of active bleeding. Continue to monitor hgb.     DVT ppx: on Xarelto Ms Ornelas is a 73 yo F sent to the ED by her cardiologist with acute CHF exacerbation. PMH HFpEF, Hypothyroidism, Thrombocytosis, HLD, Asthma,  A fib on Xarelto, hx of Lung Nodules followed by heme/onc and thoracic surgery    HFpEF: acute on chronic CHF  -Lasix d/mik due to rising Cr and improvement in volume status  -daily weights, I/O's  -f/u TTE  -CK normal, troponins elevated but peaked and downtrending. Mild troponin leak, though no suggestion of ACS  -BNP 9620  -Cardio Dr. Chisholm following    C Diff  - GI PCR positive for C diff  - Percocet 5mg PRN q6 ordered for severe abdominal pian   - Continue PO Vancomycin  - ID following     Hypokalemia: likely due to increased lasix dosing as well as diarrhea.   -K 4.3(1/14)  -recheck electrolytes  -replete as appropriate    COVID-19 infection: Patient COVID+ (1/12)  -diarrhea likely due to COVID 19 infection  -CT chest (1/12): Small mildly loculated R pleural eff and mild R basilar atelectasis. Stable mosaic attenuation of lungs and numerous B/L lung nodules. Stable L pleural thickening  -f/u CRP, LDH, d-dimer  -s/p Remdesivir  -Can hold Decadron at this time  -monitor on pulse ox  -ID Dr. Juarez following    Hypothyroidism:   -continue levothyroxine    Thrombocytosis: continue agrylin  -managed by hematology Dr Rice, patient last seen in Nov 2022.   -recommended to f/u with Dr Rice for her anemia and thrombocytosis as outpatient    A fib on Xarelto  -Continue Xarelto  -Continue Toprol    Anemia: no signs or symptoms of active bleeding. Continue to monitor hgb.     DVT ppx: on Xarelto

## 2023-01-16 NOTE — PROGRESS NOTE ADULT - ASSESSMENT
A/P  COVID19   Cdiff +    - completed remdesivir x 3 days    - ID following    - po vanco started.     - on RA    - pain medications added as needed.     acute on chronic HFpEF exacerbation   afib   - lasix held for now given worsening Cr.    - cardio following   - ECHO with normal LVEF    LAYA    - nephro following     - cont IVF     - lasix held     hypothyroidism    - cont home meds     cont xarelto .

## 2023-01-16 NOTE — PROGRESS NOTE ADULT - ASSESSMENT
LAYA on CKD 3b  Persistent Hypokalemia  Hypernatremia  COVID 19+    -Baseline creatinine 1.3  -Patient also with diarrhea which can cause loss of potassium  -S/P Aggressive KCL  repletion  -Encourage PO intake for elevated creatinine and hypernatremia, likely significant losses from diarrhea  -COVID per primary  -Creatinine worsening  -Give IVF x additional 24 hours and increase rate  -S/P dose of albumin  -Check repeat Urine lytes  -States she is urinating without issue

## 2023-01-16 NOTE — PROGRESS NOTE ADULT - ASSESSMENT
72-year-old female with history of hypothyroidism, hyperlipidemia, AF on Xarelto, thrombocytosis sent in for worsening shortness of breath and CHF.  Reports a 40 lb weight gain, though has lost almost 25 lbs since increase in her home diuretic regimen.    - Pt was sent from Dr. Fernandez's office w/ c/o SOB, orthopnea and edema, likely multifactorial d/t CHF exac, +covid   - s/p IV Lasix with improvement in volume status   - ECHO showed LVH normal LV & RV size and function EF 65%, mod MR, trace TR  - Creatinine 2.0, continue to trend renal indices  - Would hold Lasix another day. Reassess daily   - CXR with opacities more prominent in R side   - CT chest noted, had mosaic pattern and nodules in the past     - mild troponin leak, now downtrending , no suggestion of acs, no anginal complaints   - cath without cad in 2018    - known hx of PAFIB  - BP, HR stable and controlled per flow sheet   - Continue BB, Xarelto    - +Covid/ CDIFF isolation, management and supportive care per primary   - Monitor and replete lytes, keep K>4, Mg>2.  - Will continue to follow.    Randall Wang, MS FNP, [facility]  Nurse Practitioner- Cardiology   Spectra #[phone] /[phone]

## 2023-01-17 NOTE — PROGRESS NOTE ADULT - ASSESSMENT
72-year-old female with history of hypothyroidism, hyperlipidemia, AF on Xarelto, thrombocytosis sent in for worsening shortness of breath and CHF.  Reports a 40 lb weight gain, though has lost almost 25 lbs since increase in her home diuretic regimen.    - presented from Dr. Fernandez's office w/ c/o SOB, orthopnea and edema, likely multifactorial d/t CHF exac, +COVID  - s/p IV Lasix with improvement in volume status   - ECHO showed LVH normal LV & RV size and function EF 65%, mod MR, trace TR  - Creatinine with slight improvement from yesterday 1.8, continue to trend renal indices, renal following   - Would hold Lasix again today. Reassess daily.   - CXR with opacities more prominent in R side   - CT chest noted, had mosaic pattern and nodules in the past     - mild troponin leak, now downtrending , no suggestion of acs, no anginal complaints   - cath without cad in 2018    - known hx of PAFIB  - BP, HR stable and controlled per flow sheet   - Continue BB, Xarelto    - +Covid/ CDIFF isolation, management and supportive care per primary   - Monitor and replete lytes, keep K>4, Mg>2.  - Will continue to follow.

## 2023-01-17 NOTE — PROGRESS NOTE ADULT - ASSESSMENT
LAYA on CKD 3b  Persistent Hypokalemia  Hypernatremia  COVID 19+    -Baseline creatinine 1.3  -Patient also with diarrhea which can cause loss of potassium  -S/P Aggressive KCL  repletion  -Encourage PO intake for elevated creatinine and hypernatremia, likely significant losses from diarrhea  -COVID per primary  -S/P dose of albumin  -Check repeat Urine lytes - pending  -Renal indices are improving well  -IVF to completion  -Lasix on Hold; does not appear grossly overloaded

## 2023-01-17 NOTE — PROGRESS NOTE ADULT - ASSESSMENT
71 y/o female with PMH of HCHF, hypothyroidism, HLD, Afibb, Asthma presents w/ cc of SOB, diarrhea, and LE edema; admitted for resp failure and COVID, Also + for C-diff. Currently doing well after completing course of Remdesivir, still on PO vancomycin.

## 2023-01-17 NOTE — PROGRESS NOTE ADULT - TIME BILLING
coordination of care with medical consultants, medical residents, [facility]

## 2023-01-18 NOTE — PROGRESS NOTE ADULT - PROBLEM SELECTOR PLAN 1
Patient presented with multiday hx of diarrhea to ED, found to be Cdiff + during hospitilization. Patient reports significant decrease in diarrhea volume at this time with symptomatic improvement.   - WBC downtrending to 12k today from 20 yesterday  - Continue PO Vancomycin 10 day course  - ID following  - D/c planning for tomorrow back to [facility]
s/p IV Lasix with improvement in volume status   - ECHO showed LVH normal LV & RV size and function EF 65%, mod MR, trace TR  - Currently saturating well on RA  - Cardiology following

## 2023-01-18 NOTE — PROGRESS NOTE ADULT - ASSESSMENT
72-year-old female with history of hypothyroidism, hyperlipidemia, AF on Xarelto, thrombocytosis sent in for worsening shortness of breath and CHF.  Reports a 40 lb weight gain, though has lost almost 25 lbs since increase in her home diuretic regimen.    - presented from Dr. Fernandez's office w/ c/o SOB, orthopnea and edema, likely multifactorial d/t CHF exac, +COVID  - s/p IV Lasix with improvement in volume status   - ECHO showed LVH normal LV & RV size and function EF 65%, mod MR, trace TR  - Continue to trend renal indices, renal following   - Would hold Lasix. Reassess daily.   - No evidence of any meaningful volume overload   - CXR with opacities more prominent in R side   - CT chest noted, had mosaic pattern and nodules in the past     - mild troponin leak, now downtrending , no suggestion of acs, no anginal complaints   - cath without cad in 2018    - known hx of PAFIB  - BP, HR stable and controlled per flow sheet   - Continue BB, Xarelto    - +Covid/ CDIFF isolation, management and supportive care per primary   - Monitor and replete lytes, keep K>4, Mg>2.  - Will continue to follow.    Randall Wang, MS FNP, [facility]  Nurse Practitioner- Cardiology   Spectra #[phone] /[phone]

## 2023-01-18 NOTE — PROGRESS NOTE ADULT - ATTENDING COMMENTS
patient seen at bedside.   reports feeling well. still complaining of diarrhea   denies any chest pain or dyspnea   ambulating to the bathroom.   leg swelling     A/P  COVID19   Cdiff +    - cont remdesivir x 3 days    - ID following    - po vanco started.     - on RA    - pain medications added as needed.     acute on chronic HFpEF exacerbation   afib   - lasix held for now given worsening Cr.    - cardio following   - ECHO with normal LVEF    LAYA    - nephro following      hypokalemia    - likely secondary to diuretics and diarrhea.     - replete     - nephro following     hypothyroidism    - cont home meds     cont xarelto .
patient seen at bedside.   reports feeling well. still complaining of diarrhea   denies any chest pain or dyspnea   ambulating to the bathroom.   leg swelling     A/P  COVID19   Diarrhea    - cont remdesivir x 3 days    - ID following    - monitor off abx    - GI/C diff PCR pending. no imodium until cdiff results.     - on RA    acute on chronic HFpEF exacerbation   afib   - cont lasix 40mg IV BID   - cardio following   - ECHO with normal LVEF    hypokalemia    - likely secondary to diuretics and diarrhea.     - replete     - nephro following     hypothyroidism    - cont home meds     cont xarelto
patient seen at bedside.   reports feeling ok. diarrhea improving but still present   denies any chest pain. dyspnea improving   ambulating to the bathroom.   leg swelling improving + 1    A/P  COVID19   Cdiff +    - completed remdesivir x 3 days    - ID following    - po vanco started.     - on RA    - pain medications added as needed.     acute on chronic HFpEF exacerbation   afib   - lasix held for now given worsening Cr.    - cardio following   - ECHO with normal LVEF    LAYA    - nephro following      hypokalemia    - likely secondary to diuretics and diarrhea.     - replete     - nephro following     hypothyroidism    - cont home meds     cont xarelto .
Patient seen at bedside   reports feeling improved  diarrhea improving, now mucoid like   abdominal pain improving     Lungs CTAB  abd soft NT/ND  +1 pedal edema     A/P  COVID19   Cdiff +    - completed remdesivir x 3 days    - ID following    - po vanco started.     - on RA    - pain medications added as needed.     acute on chronic HFpEF exacerbation   afib   - lasix held for now given worsening Cr.    - cardio following   - ECHO with normal LVEF    LAYA    - nephro following     - cont IVF     - lasix held     hypothyroidism    - cont home meds     cont xarelto .
The patient was seen and evaluated independently by the attending physician.  - I have personally reviewed the pt's labs, imaging, micro data and consultant recommendations.    73 y/o female with PMH of HCHF, hypothyroidism, HLD, Afibb, Asthma presents w/ cc of SOB, diarrhea, and LE edema; admitted for resp failure and COVID, Also + for C-diff. Currently doing well after completing course of Remdesivir, still on PO vancomycin.  Pt passing jelly textured stools frequently. She reports suppressed appetite. She feels a little weak and is not yet back to her baseline. She declines IVF but she agrees to attempt to increase her po hydration    #covid  #c.diff  #acute chf hfpef  #LAYA  #AF - AC w/ xarelto  -leukocytosis peaked and downtrending  -lytes better  -clinically dry on exam and pt reporting reduced gait stability  -renal indices only marginally improved but appear to have plateaued  -c/w po vanco  -supportive care dc planning - anticipate 24hrs

## 2023-01-18 NOTE — PROGRESS NOTE ADULT - PROBLEM SELECTOR PLAN 2
s/p IV Lasix with improvement in volume status   - ECHO showed LVH normal LV & RV size and function EF 65%, mod MR, trace TR  - Currently saturating well on RA, no symptoms at this time   - Cardiology following
Patient presented with multiday hx of diarrhea to ED, found to be Cdiff + during hospitilization. Patient reports significant decrease in diarrhea volume at this time with symptomatic improvement.     - WBC 20k, monitor   - Continue PO Vancomycin  - ID following

## 2023-01-18 NOTE — PROGRESS NOTE ADULT - ASSESSMENT
LAYA on CKD 3b  Persistent Hypokalemia  Hypernatremia  COVID 19+    -Baseline creatinine 1.3  -Patient also with diarrhea which can cause loss of potassium  -S/P Aggressive KCL repletion  -Encourage PO intake for elevated creatinine and hypernatremia, likely significant losses from diarrhea  -COVID per primary  -S/P dose of albumin  -Urine lytes reviewed  -Renal indices continue to slowly improve  -s/p IVF  -Lasix on Hold; does not appear grossly overloaded

## 2023-01-18 NOTE — PROGRESS NOTE ADULT - PROBLEM SELECTOR PLAN 3
Cr this AM 1.8, down from 2 yesterday     - nephro following     - cont IVF     - lasix held at this time  - Will monitor and resume lasix when Cr returns to baseline
Cr this AM 1.7, down from 1.8 yesterday     - nephro following     - cont IVF     - lasix held at this time

## 2023-01-18 NOTE — PROGRESS NOTE ADULT - PROBLEM SELECTOR PLAN 4
chronic, on rivaroxaban and BB   - ECHO with normal LVEF  - vitals wnl, patient asymptomatic   - Cardiology following
chronic, on rivaroxaban and BB   - ECHO with normal LVEF  - vitals wnl, patient asymptomatic   - Cardiology following

## 2023-01-18 NOTE — PROGRESS NOTE ADULT - PROBLEM SELECTOR PLAN 5
RVP and COVID + on admission  Completed course of Remdesivir   Currently saturating well on RA and has no symptoms  Mild crackles at lung bases b/l, holding lasix 2/2 rising Cr   Continue to monitor  ID following
RVP and COVID + on admission  Completed course of Remdesivir   Currently saturating well on RA and has no symptoms  Mild crackles at lung bases b/l, holding lasix 2/2 rising Cr   Continue to monitor  ID following

## 2023-01-18 NOTE — CASE MANAGEMENT PROGRESS NOTE - NSCMPROGRESSNOTE_GEN_ALL_CORE
CM met with patient at bedside to discuss transition to home 1/19/23. IMM reviewed, pt is in agreement with transition to home tomorrow. CM offered Home care visiting nurse services to assist in managing her medical conditions, CHF at home, pt again declined stating that she does not need a visiting nurse. EMIL notified MD.

## 2023-01-19 NOTE — PROGRESS NOTE ADULT - ASSESSMENT
72-year-old female with history of hypothyroidism, hyperlipidemia, AF on Xarelto, thrombocytosis sent in for worsening shortness of breath and CHF.  Reports a 40 lb weight gain, though has lost almost 25 lbs since increase in her home diuretic regimen.    - Presented from Dr. Fernandez's office w/ c/o SOB, orthopnea and edema, likely multifactorial d/t CHF exac, +COVID  - s/p IV Lasix with improvement in volume status   - ECHO showed LVH normal LV & RV size and function EF 65%, mod MR, trace TR  - Continue to trend renal indices, renal following   - Would hold Lasix. Reassess daily.   - No evidence of any meaningful volume overload   - CXR with opacities more prominent in R side   - CT chest noted, had mosaic pattern and nodules in the past     - mild troponin leak, now downtrending , no suggestion of acs, no anginal complaints   - cath without cad in 2018    - known hx of PAFIB  - BP, HR stable and controlled per flow sheet   - Continue BB, Xarelto    - +Covid/ CDIFF isolation, management and supportive care per primary   - D/c planning per primary team.   - Monitor and replete lytes, keep K>4, Mg>2.  - Will continue to follow.    Randall Wang, MS FNP, AGAP  Nurse Practitioner- Cardiology   Spectra #[phone] /[phone]

## 2023-01-19 NOTE — CHART NOTE - NSCHARTNOTEFT_GEN_A_CORE
Search Terms: Mallory Ornelas, 1950Search Date: 01/19/2023 20:24:17 PM  The Drug Utilization Report below displays all of the controlled substance prescriptions, if any, that your patient has filled in the last twelve months. The information displayed on this report is compiled from pharmacy submissions to the Department, and accurately reflects the information as submitted by the pharmacies.    This report was requested by: Shaw Cnode | Reference #: [id number]    Others' Prescriptions  Patient Name: Mallory OrnelasBirth Date: 1950  Address: [address] [place], [place] [address]Qgj: Female  Rx Written	Rx Dispensed	Drug	Quantity	Days Supply	Prescriber Name	Prescriber Heather #	Payment Method	Dispenser  12/16/2022	12/16/2022	hydromet 5 mg-1.5 mg/5 ml soln	473ml	24	Stephen Dalton R, PA-C	[id number]	Insurance	[id number]  11/16/2022	11/21/2022	hydromet 5 mg-1.5 mg/5 ml soln	473ml	23	Mallory Mcbride	[id number]	Insurance	[id number]  11/11/2022	11/11/2022	hydromet 5 mg-1.5 mg/5 ml soln	200ml	10	Liliya Thomas	[id number]	Insurance	[id number]  10/06/2022	10/14/2022	hydromet 5 mg-1.5 mg/5 ml soln	473ml	23	Benji Rice (DO)	[id number]	Insurance	[id number]  10/05/2022	10/05/2022	hydromet 5 mg-1.5 mg/5 ml soln	180ml	9	Benji Rice (DO)	[id number]	Insurance	[id number]  09/08/2022	09/09/2022	hydromet 5 mg-1.5 mg/5 ml soln	473ml	23	Benji Rice (DO)	[id number]	Insurance	[id number]  07/14/2022	07/15/2022	hydromet 5 mg-1.5 mg/5 ml soln	473ml	23	Stephen Dalton R, PA-C	[id number]	Insurance	[id number]  06/15/2022	06/17/2022	hycodan 5 mg-1.5 mg/5 ml soln	473ml	24	Stephen Dalton R, CIERRA	[id number]	Cash	[facility]  05/23/2022	05/24/2022	hydromet 5 mg-1.5 mg/5 ml soln	460ml	23	Benji Rice (DO)	[id number]	Insurance	[id number]  04/24/2022	04/25/2022	oxycodone hcl (ir) 5 mg tablet	30	5	Damon Lauren Veliz	[id number]	Medicare	[id number]  04/15/2022	04/19/2022	hycodan 5 mg-1.5 mg/5 ml soln	473ml	23	Stephen Dalton R, CIERRA	[id number]	Cash	[facility]  03/14/2022	03/15/2022	hycodan 5 mg-1.5 mg/5 ml soln	473ml	23	Lowell Priest	[id number]	Cash	[facility]  02/07/2022	02/10/2022	hycodan 5 mg-1.5 mg/5 ml soln	473ml	24	Ashlyn Christianson A (NP)	[id number]	Cash	[facility] Pt called saying that meds weren't sent to pharmacy. I checked i-stop - she requests percocet to be sent and she states that has had this prescribed by her pain management doc. I-stop reviewed. I will send 5d worth of percocet bid and she can f/u with pain management    Search Terms: Mallory Ornelas, 1950Search Date: 01/19/2023 20:24:17 PM  The Drug Utilization Report below displays all of the controlled substance prescriptions, if any, that your patient has filled in the last twelve months. The information displayed on this report is compiled from pharmacy submissions to the Department, and accurately reflects the information as submitted by the pharmacies.    This report was requested by: Shaw Conde | Reference #: [id number]    Others' Prescriptions  Patient Name: Mallory OrnelasBirth Date: 1950  Address: [address] [place], [place] [address]Zfv: Female  Rx Written	Rx Dispensed	Drug	Quantity	Days Supply	Prescriber Name	Prescriber Heather #	Payment Method	Dispenser  12/16/2022	12/16/2022	hydromet 5 mg-1.5 mg/5 ml soln	473ml	24	Stephen Dalton R, PANydiaC	[id number]	Insurance	[id number]  11/16/2022	11/21/2022	hydromet 5 mg-1.5 mg/5 ml soln	473ml	23	Mallory Mcbride	[id number]	Insurance	[id number]  11/11/2022	11/11/2022	hydromet 5 mg-1.5 mg/5 ml soln	200ml	10	Liliya Thomas	[id number]	Insurance	[id number]  10/06/2022	10/14/2022	hydromet 5 mg-1.5 mg/5 ml soln	473ml	23	Benji Rice (DO)	[id number]	Insurance	[id number]  10/05/2022	10/05/2022	hydromet 5 mg-1.5 mg/5 ml soln	180ml	9	Benji Rice (DO)	[id number]	Insurance	[id number]  09/08/2022	09/09/2022	hydromet 5 mg-1.5 mg/5 ml soln	473ml	23	Benji Rice (DO)	[id number]	Insurance	[id number]  07/14/2022	07/15/2022	hydromet 5 mg-1.5 mg/5 ml soln	473ml	23	Stephen Dalton R, CIERRA	[id number]	Insurance	[id number]  06/15/2022	06/17/2022	hycodan 5 mg-1.5 mg/5 ml soln	473ml	24	Stephen Dalton R, PAWYATT	[id number]	Cash	[facility]  05/23/2022	05/24/2022	hydromet 5 mg-1.5 mg/5 ml soln	460ml	23	Benji Rice (DO)	[id number]	Insurance	[id number]  04/24/2022	04/25/2022	oxycodone hcl (ir) 5 mg tablet	30	5	Damon Lauren Veliz	[id number]	Medicare	[id number]  04/15/2022	04/19/2022	hycodan 5 mg-1.5 mg/5 ml soln	473ml	23	Stephen Dalton R, PAWYATT	[id number]	Cash	[facility]  03/14/2022	03/15/2022	hycodan 5 mg-1.5 mg/5 ml soln	473ml	23	Lowell Priest	[id number]	Cash	[facility]  02/07/2022	02/10/2022	hycodan 5 mg-1.5 mg/5 ml soln	473ml	24	Ashlyn Christianson A (NP)	[id number]	Cash	[facility]

## 2023-01-19 NOTE — PROGRESS NOTE ADULT - PROVIDER SPECIALTY LIST ADULT
Cardiology
Infectious Disease
Infectious Disease
Cardiology
Cardiology
Hospitalist
Hospitalist
Nephrology
Nephrology
Cardiology
Hospitalist
Hospitalist
Infectious Disease
Nephrology
Hospitalist
Hospitalist

## 2023-01-19 NOTE — PROGRESS NOTE ADULT - NS ATTEND AMEND GEN_ALL_CORE FT
Vol status improved.  No evidence of ischemia.  Continue supportive care for COVID.  To follow while admitted.
sob, edema multifactorial  heart failure/vol ol as well as covid/underlying lung dz  cont iv lasix and trend renal fxn  mild demand ischemia in the setting of above
I have personally seen and examined the patient in detail.  I have spoken to the provider regarding the assessment and plan of care.  I have made changes to the note accordingly.
Vol status improved.  No evidence of ischemia.  Restart po lasix in next 2 days or so. Continue supportive care for COVID.  dc planning per primary team.
Appears compensated from HF POV. still with lower ext edema. would hold lasix today. Please continue to maintain strict I/Os, monitor daily weights, Cr, and K. monitor for worsening hf. cont with covid and cdiff rx. Further cardiac workup will depend on clinical course.

## 2023-01-19 NOTE — CASE MANAGEMENT PROGRESS NOTE - NSCMPROGRESSNOTE_GEN_ALL_CORE
Per MD pt stable for transition to home today. CM met with patient at bedside, again discussed the benefit of receiving home care visiting nurse services,  encouraged patient to accept, pt continues to decline. CM offered to assist in scheduling follow up appointment, unit secretary attempted to schedule appointment, cardiologist office stated that they will contact patient and notify her of followup appointment date. Pt stated that she will call the office when she gets home to follow up for appointment date. Pt will be transported home by her son. Pt is in agreement with transitioning to home today.

## 2023-01-19 NOTE — DISCHARGE NOTE NURSING/CASE MANAGEMENT/SOCIAL WORK - PATIENT PORTAL LINK FT
You can access the FollowMyHealth Patient Portal offered by [facility] by registering at the following website: [email or web address]. By joining [employer]’s FollowMyHealth portal, you will also be able to view your health information using other applications (apps) compatible with our system.

## 2023-01-19 NOTE — PROGRESS NOTE ADULT - REASON FOR ADMISSION
Acute on Chronic CHF, COVID

## 2023-01-19 NOTE — PROGRESS NOTE ADULT - SUBJECTIVE AND OBJECTIVE BOX
Consulted for hypokalemia    Chart reviewed    2/2 to diuresis  KCl already ordered; agree with total dosing  Repeat BMP to trend potassium  mag normal    Full consult note to follow, thank you
[facility] -- Nils Johnson,  Adrián, Jim Hunter [place], [place]  Office # [phone]    Follow Up:  CHF    Subjective/Observations: Patient seen and examined, awake, alert, ambulating in the room, denies chest pain, dyspnea, palpitations or dizziness, orthopnea and PND. Tolerating room air. c/o mild vaginal discharge     REVIEW OF SYSTEMS: All other review of systems is negative unless indicated above  PAST MEDICAL & SURGICAL HISTORY:  Hypothyroidism      HLD (hyperlipidemia)      Thrombocytosis      Persistent atrial fibrillation      Obesity (BMI 35.0-39.9 without comorbidity)      Asthma      Diabetes mellitus      Ankle injury  on 2004, 5 surgeries.      Cholecystitis        MEDICATIONS  (STANDING):  anagrelide 1.5 milliGRAM(s) Oral <User Schedule>  escitalopram 20 milliGRAM(s) Oral daily  levothyroxine 125 MICROGram(s) Oral daily  loratadine 10 milliGRAM(s) Oral daily  metoprolol succinate ER 50 milliGRAM(s) Oral daily  rivaroxaban 15 milliGRAM(s) Oral with dinner  sodium chloride 0.45%. 1000 milliLiter(s) (75 mL/Hr) IV Continuous <Continuous>  vancomycin    Solution 125 milliGRAM(s) Oral every 6 hours    MEDICATIONS  (PRN):  acetaminophen     Tablet .. 650 milliGRAM(s) Oral every 6 hours PRN Temp greater or equal to 38C (100.4F), Mild Pain (1 - 3)  ondansetron    Tablet 4 milliGRAM(s) Oral every 6 hours PRN Nausea and/or Vomiting  oxycodone    5 mG/acetaminophen 325 mG 1 Tablet(s) Oral every 6 hours PRN Severe Pain (7 - 10)    Allergies    No Known Allergies    Intolerances      Vital Signs Last 24 Hrs  T(C): 36.8 (15 Mark 2023 04:25), Max: 37 (14 Jan 2023 20:02)  T(F): 98.3 (15 Mark 2023 04:25), Max: 98.6 (14 Jan 2023 20:02)  HR: 82 (15 Mark 2023 04:25) (67 - 82)  BP: 105/52 (15 Mark 2023 04:25) (105/52 - 143/81)  BP(mean): --  RR: 18 (15 Mark 2023 04:25) (17 - 18)  SpO2: 94% (15 Mark 2023 04:25) (94% - 96%)    Parameters below as of 15 Mark 2023 04:25  Patient On (Oxygen Delivery Method): room air      I&O's Summary    14 Jan 2023 07:01  -  15 Mark 2023 07:00  --------------------------------------------------------  IN: 100 mL / OUT: 0 mL / NET: 100 mL    TELE: Not on telemetry   PHYSICAL EXAM:  Constitutional: NAD, awake and alert, well-developed  HEENT: Moist Mucous Membranes, Anicteric  Pulmonary: Non-labored, breath sounds are clear bilaterally, No wheezing, rales or rhonchi  Cardiovascular: Regular, S1 and S2, No murmurs, rubs, gallops or clicks  Gastrointestinal: Bowel Sounds present, soft, nontender.   Lymph: 1+ peripheral edema. No lymphadenopathy.  Skin: No visible rashes or ulcers.  Psych:  Mood & affect appropriate      LABS: All Labs Reviewed:                        9.2    16.30 )-----------( 416      ( 15 Mark 2023 07:35 )             31.6                         8.9    17.32 )-----------( 403      ( 14 Jan 2023 09:38 )             29.3                         9.5    17.19 )-----------( 327      ( 13 Jan 2023 10:25 )             31.7     15 Mark 2023 07:35    142    |  104    |  25     ----------------------------<  63     4.1     |  33     |  1.90   14 Jan 2023 09:38    141    |  105    |  22     ----------------------------<  137    4.3     |  32     |  1.90   13 Jan 2023 10:25    143    |  106    |  19     ----------------------------<  125    3.5     |  32     |  1.50     Ca    8.1        15 Mark 2023 07:35  Ca    8.2        14 Jan 2023 09:38  Ca    8.0        13 Jan 2023 10:25  Phos  3.8       15 Jan 2023 07:35  Phos  2.8       13 Jan 2023 10:25  Phos  2.5       13 Jan 2023 02:52  Mg     1.9       15 Mark 2023 07:35  Mg     2.0       13 Jan 2023 10:25  Mg     1.9       13 Jan 2023 02:52    TPro  6.1    /  Alb  2.6    /  TBili  0.4    /  DBili  0.2    /  AST  10     /  ALT  <6     /  AlkPhos  79     15 Jan 2023 07:35  TPro  6.2    /  Alb  2.8    /  TBili  0.4    /  DBili  0.2    /  AST  14     /  ALT  8      /  AlkPhos  74     14 Jan 2023 09:38  TPro  6.2    /  Alb  2.7    /  TBili  0.5    /  DBili  0.1    /  AST  14     /  ALT  10     /  AlkPhos  71     13 Jan 2023 10:25          12 Lead ECG:   Ventricular Rate 57 BPM    QRS Duration 84 ms    Q-T Interval 496 ms    QTC Calculation(Bazett) 482 ms    R Axis 18 degrees    T Axis -20 degrees    Diagnosis Line Atrial fibrillation with slow ventricular response with premature ventricular or aberrantly conducted complexes  ST & T wave abnormality, consider inferior ischemia  ST & T wave abnormality, consider anterior ischemia  Prolonged QT  Abnormal ECG    Confirmed by katelyn Chisholm ([id number]) on 1/12/2023 3:35:31 PM (01-12-23 @ 11:39)      ACC: [id number] EXAM:  ECHO TTE WO CON COMP W DOPP                          PROCEDURE DATE:  08/16/2022          INTERPRETATION:  INDICATION: Dyspnea  Sonographer KL    Blood Pressure 129/65    Height 167.6 cm     Weight 75.3 kg       BSA   1.85 sq m    Dimensions:  LA 4.6       Normal Values: 2.0 - 4.0 cm  Ao 3.2        Normal Values: 2.0 - 3.8 cm  SEPTUM 1.5       Normal Values: 0.6 - 1.2 cm  PWT 1.0       Normal Values: 0.6 - 1.1 cm  LVIDd 4.6         Normal Values: 3.0 - 5.6 cm  LVIDs 2.8    Normal Values: 1.8 - 4.0 cm      OBSERVATIONS:  Mitral Valve: Moderate MR.  Aortic Valve/Aorta: Sclerotic trileaflet aortic valve with grossly normal   opening. Trace AI  Tricuspid Valve: normal with trace TR.  Pulmonic Valve: Trace PI  Left Atrium: normal  Right Atrium: Not well-visualized  Left Ventricle: Left ventricular hypertrophy with normal systolic   function, estimated LVEF of 65%. Basal septal hypertrophy is noted  Right Ventricle: Grossly normal size and systolic function.  Pericardium: no significant pericardial effusion.  Pulmonary/RV Pressure: estimated PA systolic pressure of 50 mmHg assuming   an RA pressure of 8 mmHg.  IVC is dilated at 2.6 cm        IMPRESSION:  Left ventricular hypertrophy with normal systolic function,estimated   LVEF of 65%.  Grossly normal RV size and systolic function.  Sclerotic trileaflet aortic valve, trace AI.  Moderate MR  Trace TR.  No significant pericardial effusion.    --- End of Report ---            NEYMAR ROWLAND MD; Attending Cardiologist  This document has been electronically signed. Aug 17 2022  2:19PM     
Patient is a 72y old  Female who presents with a chief complaint of Acute on Chronic CHF, COVID (13 Jan 2023 13:22)       HPI:  Ms Ornelas is a 71 yo F sent to the ED by her cardiologist with acute CHF exacerbation. PMH HFpEF, Hypothyroidism, Thrombocytosis, HLD, Asthma,  A fib on Xarelto.   Patient seen and examined at bedside. She reports worsening SOB over the past 7-10 days as well as worsening leg edema. She has been in contact with her cardiologist who increased her lasix from 40mg daily to 80mg in AM and 40mg in PM [total 120mg daily] for a duration of 4 days. She went to see her cardiologist today who recommended patient come to the ER.   She reports worsening orthopnea for about 1 week as well as worsening SOB. She denies any associated chest pain/pressure, palpitations.   She was noted to be covid positive in the ER today. Denies any known sick contacts. She Received a total of 3 doses of pfizer vaccine. She admits to approx 5-7 days of loose water stools without any melena/hematochezia.   Denies headaches, nausea, vomiting, chest pain,  palpitations, abdominal pain, constipation, dysuria.  (12 Jan 2023 15:28)   Has not seen nephrologist in the past.  Roman any N/V.  Has fatigue and significant diarrhea.  Urinating without issue.      No new complaints; urinating well per patient    PAST MEDICAL & SURGICAL HISTORY:  Hypothyroidism      HLD (hyperlipidemia)      Thrombocytosis      Persistent atrial fibrillation      Obesity (BMI 35.0-39.9 without comorbidity)      Asthma      Diabetes mellitus      Ankle injury  on 2004, 5 surgeries.      Cholecystitis           FAMILY HISTORY:  Family history of early CAD (Father)    Family history of early CAD (Mother)    NC    Social History:Non smoker    MEDICATIONS  (STANDING):  anagrelide 1.5 milliGRAM(s) Oral <User Schedule>  escitalopram 20 milliGRAM(s) Oral daily  levothyroxine 125 MICROGram(s) Oral daily  loratadine 10 milliGRAM(s) Oral daily  metoprolol succinate ER 50 milliGRAM(s) Oral daily  rivaroxaban 15 milliGRAM(s) Oral with dinner  sodium chloride 0.45%. 1000 milliLiter(s) (100 mL/Hr) IV Continuous <Continuous>  vancomycin    Solution 125 milliGRAM(s) Oral every 6 hours    MEDICATIONS  (PRN):  acetaminophen     Tablet .. 650 milliGRAM(s) Oral every 6 hours PRN Temp greater or equal to 38C (100.4F), Mild Pain (1 - 3)  ondansetron    Tablet 4 milliGRAM(s) Oral every 6 hours PRN Nausea and/or Vomiting  oxycodone    5 mG/acetaminophen 325 mG 1 Tablet(s) Oral every 6 hours PRN Severe Pain (7 - 10)      Allergies    No Known Allergies    Intolerances         REVIEW OF SYSTEMS:    Review of Systems:   Constitutional: Denies fatigue  HEENT: Denies headaches and dizziness  Respiratory: denies SOB, cough, or wheezing  Cardiovascular: denies CP, palpitations  Gastrointestinal: Denies nausea, denies vomiting, diarrhea, constipation, abdominal pain, or bloody stools  Genitourinary: denies painful urination, increased frequency, urgency, or bloody urine  Skin: denies rashes or itching  Musculoskeletal: denies muscle aches, joint swelling  Neurologic: Denies generalized weakness, denies loss of sensation, numbness, or tingling      Vital Signs Last 24 Hrs  T(C): 37 (17 Jan 2023 04:30), Max: 37.4 (16 Jan 2023 13:00)  T(F): 98.6 (17 Jan 2023 04:30), Max: 99.4 (16 Jan 2023 13:00)  HR: 85 (17 Jan 2023 04:30) (80 - 98)  BP: 124/67 (17 Jan 2023 04:30) (111/58 - 124/67)  BP(mean): --  RR: 18 (17 Jan 2023 04:30) (18 - 18)  SpO2: 98% (17 Jan 2023 04:30) (94% - 98%)    Parameters below as of 17 Jan 2023 04:30  Patient On (Oxygen Delivery Method): nasal cannula  O2 Flow (L/min): 2      PHYSICAL EXAM:    GENERAL: NAD  HEAD:  Atraumatic, Normocephalic  EYES: EOMI, conjunctiva and sclera clear  ENMT: No Drainage from nares, No drainage from ears  NECK: Supple, neck  veins full  NERVOUS SYSTEM:  Awake and Alert  CHEST/LUNG: Clear to percussion bilaterally; No rales, rhonchi, wheezing, or rubs  HEART: Regular rate and rhythm; No murmurs, rubs, or gallops  ABDOMEN: Soft, Nontender, Nondistended; Bowel sounds present  EXTREMITIES:  trace Edema  SKIN: No rashes No obvious ecchymosis      LABS:                                                       9.1    19.13 )-----------( 315      ( 17 Jan 2023 08:32 )             31.2     01-17    138  |  103  |  31<H>  ----------------------------<  88  3.8   |  31  |  1.80<H>    Ca    7.8<L>      17 Jan 2023 08:32    TPro  x   /  Alb  2.6<L>  /  TBili  x   /  DBili  0.2  /  AST  13<L>  /  ALT  6<L>  /  AlkPhos  x   01-17              
Patient is a 72y old  Female who presents with a chief complaint of Acute on Chronic CHF, COVID (14 Jan 2023 10:08)      INTERVAL HPI/OVERNIGHT EVENTS: Patient seen and examined at bedside. No overnight events occurred. Patient states she is experiencing severe abdominal pain. Denies fevers, chills, headache, lightheadedness, chest pain, dyspnea, n/v/d/c.    MEDICATIONS  (STANDING):  anagrelide 1.5 milliGRAM(s) Oral <User Schedule>  escitalopram 20 milliGRAM(s) Oral daily  furosemide   Injectable 40 milliGRAM(s) IV Push two times a day  levothyroxine 125 MICROGram(s) Oral daily  loratadine 10 milliGRAM(s) Oral daily  metoprolol succinate ER 50 milliGRAM(s) Oral daily  remdesivir  IVPB 100 milliGRAM(s) IV Intermittent every 24 hours  remdesivir  IVPB   IV Intermittent   rivaroxaban 15 milliGRAM(s) Oral with dinner  vancomycin    Solution 125 milliGRAM(s) Oral every 6 hours    MEDICATIONS  (PRN):  acetaminophen     Tablet .. 650 milliGRAM(s) Oral every 6 hours PRN Temp greater or equal to 38C (100.4F), Mild Pain (1 - 3)  ondansetron    Tablet 4 milliGRAM(s) Oral every 6 hours PRN Nausea and/or Vomiting  oxycodone    5 mG/acetaminophen 325 mG 1 Tablet(s) Oral every 6 hours PRN Severe Pain (7 - 10)      Allergies    No Known Allergies    Intolerances        REVIEW OF SYSTEMS:  CONSTITUTIONAL: No fever or chills  HEENT:  No headache, no sore throat  RESPIRATORY: No cough, wheezing, or shortness of breath  CARDIOVASCULAR: No chest pain, palpitations  GASTROINTESTINAL:+ abd pain. no nausea, vomiting, or diarrhea  GENITOURINARY: No dysuria, frequency, or hematuria  NEUROLOGICAL: no focal weakness or dizziness  MUSCULOSKELETAL: no myalgias     Vital Signs Last 24 Hrs  T(C): 36.8 (14 Jan 2023 04:35), Max: 36.8 (14 Jan 2023 04:35)  T(F): 98.3 (14 Jan 2023 04:35), Max: 98.3 (14 Jan 2023 04:35)  HR: 66 (14 Jan 2023 04:35) (66 - 91)  BP: 116/74 (14 Jan 2023 04:35) (116/74 - 135/70)  BP(mean): --  RR: 18 (14 Jan 2023 04:35) (18 - 18)  SpO2: 93% (14 Jan 2023 04:35) (90% - 95%)    Parameters below as of 14 Jan 2023 04:35  Patient On (Oxygen Delivery Method): room air        PHYSICAL EXAM:  GENERAL: NAD  HEENT:  anicteric, moist mucous membranes  CHEST/LUNG:  CTA b/l, no rales, wheezes, or rhonchi  HEART:  RRR, S1, S2  ABDOMEN:  BS+, soft, nontender, nondistended  EXTREMITIES: no edema, cyanosis, or calf tenderness  NERVOUS SYSTEM: answers questions and follows commands appropriately    LABS:                        8.9    17.32 )-----------( 403      ( 14 Jan 2023 09:38 )             29.3     CBC Full  -  ( 14 Jan 2023 09:38 )  WBC Count : 17.32 K/uL  Hemoglobin : 8.9 g/dL  Hematocrit : 29.3 %  Platelet Count - Automated : 403 K/uL  Mean Cell Volume : 85.2 fl  Mean Cell Hemoglobin : 25.9 pg  Mean Cell Hemoglobin Concentration : 30.4 gm/dL  Auto Neutrophil # : x  Auto Lymphocyte # : x  Auto Monocyte # : x  Auto Eosinophil # : x  Auto Basophil # : x  Auto Neutrophil % : x  Auto Lymphocyte % : x  Auto Monocyte % : x  Auto Eosinophil % : x  Auto Basophil % : x    14 Jan 2023 09:38    141    |  105    |  22     ----------------------------<  137    4.3     |  32     |  1.90     Ca    8.2        14 Jan 2023 09:38      PT/INR - ( 12 Jan 2023 12:08 )   PT: 18.4 sec;   INR: 1.57 ratio         PTT - ( 12 Jan 2023 12:08 )  PTT:36.2 sec    CAPILLARY BLOOD GLUCOSE      POCT Blood Glucose.: 123 mg/dL (13 Jan 2023 11:48)          RADIOLOGY & ADDITIONAL TESTS:    Personally reviewed.     Consultant(s) Notes Reviewed:  [x] YES  [ ] NO    
Patient is a 72y old  Female who presents with a chief complaint of Acute on Chronic CHF, COVID (15 Mark 2023 11:59)    INTERVAL HPI/OVERNIGHT EVENTS: Patient was seen and examined. Reports feeling better. ambulating in the room. Still with diarrhea but improved. leg swelling is better. denies any chest pain.     I&O's Summary    15 Mark 2023 07:01  -  16 Jan 2023 07:00  --------------------------------------------------------  IN: 900 mL / OUT: 0 mL / NET: 900 mL        LABS:                        9.2    16.30 )-----------( 416      ( 15 Mark 2023 07:35 )             31.6     01-16    140  |  105  |  31<H>  ----------------------------<  67<L>  4.2   |  30  |  2.00<H>    Ca    8.0<L>      16 Jan 2023 08:00  Phos  3.8     01-15  Mg     1.9     01-15    TPro  6.4  /  Alb  2.6<L>  /  TBili  0.6  /  DBili  0.2  /  AST  13<L>  /  ALT  7<L>  /  AlkPhos  85  01-16        CAPILLARY BLOOD GLUCOSE    MEDICATIONS  (STANDING):  anagrelide 1.5 milliGRAM(s) Oral <User Schedule>  escitalopram 20 milliGRAM(s) Oral daily  levothyroxine 125 MICROGram(s) Oral daily  loratadine 10 milliGRAM(s) Oral daily  metoprolol succinate ER 50 milliGRAM(s) Oral daily  rivaroxaban 15 milliGRAM(s) Oral with dinner  sodium chloride 0.45%. 1000 milliLiter(s) (75 mL/Hr) IV Continuous <Continuous>  vancomycin    Solution 125 milliGRAM(s) Oral every 6 hours    MEDICATIONS  (PRN):  acetaminophen     Tablet .. 650 milliGRAM(s) Oral every 6 hours PRN Temp greater or equal to 38C (100.4F), Mild Pain (1 - 3)  ondansetron    Tablet 4 milliGRAM(s) Oral every 6 hours PRN Nausea and/or Vomiting  oxycodone    5 mG/acetaminophen 325 mG 1 Tablet(s) Oral every 6 hours PRN Severe Pain (7 - 10)      REVIEW OF SYSTEMS:  CONSTITUTIONAL: No fever or chills  HEENT:  No headache, no sore throat  RESPIRATORY: No cough, wheezing, or shortness of breath  CARDIOVASCULAR: No chest pain, palpitations  GASTROINTESTINAL: No abdominal pain, nausea, vomiting, or diarrhea  GENITOURINARY: No dysuria, frequency, or hematuria  NEUROLOGICAL: no focal weakness or dizziness  MUSCULOSKELETAL: no myalgias  PSYCH: no recent changes in mood    RADIOLOGY & ADDITIONAL TESTS:    Imaging Personally Reviewed:  [x] YES  [ ] NO    Consultant(s) Notes Reviewed:  [x] YES  [ ] NO    PHYSICAL EXAM:  T(C): 36.9 (01-16-23 @ 04:27), Max: 36.9 (01-16-23 @ 04:27)  HR: 76 (01-16-23 @ 04:27) (61 - 76)  BP: 118/74 (01-16-23 @ 04:27) (118/74 - 144/81)  RR: 18 (01-16-23 @ 04:27) (18 - 18)  SpO2: 92% (01-16-23 @ 04:27) (92% - 95%)    GENERAL: NAD, well-developed, well-groomed  HEENT:  anicteric, moist mucous membranes  CHEST/LUNG:  CTA b/l, no rales, wheezes, or rhonchi  HEART:  RRR, S1, S2  ABDOMEN:  BS+, soft, nontender, nondistended  EXTREMITIES: +1/2 edema, cyanosis, or calf tenderness  NERVOUS SYSTEM: answers questions and follows commands appropriately  PSYCH: normal affect    Care Discussed with Consultants/Other Providers [x] YES  [ ] NO
Patient is a 72y old  Female who presents with a chief complaint of Acute on Chronic CHF, COVID (2023 13:22)       HPI:  Ms Ornelas is a 71 yo F sent to the ED by her cardiologist with acute CHF exacerbation. PMH HFpEF, Hypothyroidism, Thrombocytosis, HLD, Asthma,  A fib on Xarelto.   Patient seen and examined at bedside. She reports worsening SOB over the past 7-10 days as well as worsening leg edema. She has been in contact with her cardiologist who increased her lasix from 40mg daily to 80mg in AM and 40mg in PM [total 120mg daily] for a duration of 4 days. She went to see her cardiologist today who recommended patient come to the ER.   She reports worsening orthopnea for about 1 week as well as worsening SOB. She denies any associated chest pain/pressure, palpitations.   She was noted to be covid positive in the ER today. Denies any known sick contacts. She Received a total of 3 doses of pfizer vaccine. She admits to approx 5-7 days of loose water stools without any melena/hematochezia.   Denies headaches, nausea, vomiting, chest pain,  palpitations, abdominal pain, constipation, dysuria.  (2023 15:28)   Has not seen nephrologist in the past.  Roman any N/V.  Has fatigue and significant diarrhea.  Urinating without issue.      No new complaints; urinating well per patient    PAST MEDICAL & SURGICAL HISTORY:  Hypothyroidism      HLD (hyperlipidemia)      Thrombocytosis      Persistent atrial fibrillation      Obesity (BMI 35.0-39.9 without comorbidity)      Asthma      Diabetes mellitus      Ankle injury  on , 5 surgeries.      Cholecystitis           FAMILY HISTORY:  Family history of early CAD (Father)    Family history of early CAD (Mother)    NC    Social History:Non smoker    MEDICATIONS  (STANDING):  anagrelide 1.5 milliGRAM(s) Oral <User Schedule>  escitalopram 20 milliGRAM(s) Oral daily  levothyroxine 125 MICROGram(s) Oral daily  loratadine 10 milliGRAM(s) Oral daily  metoprolol succinate ER 50 milliGRAM(s) Oral daily  rivaroxaban 15 milliGRAM(s) Oral with dinner  sodium chloride 0.45%. 1000 milliLiter(s) (100 mL/Hr) IV Continuous <Continuous>  vancomycin    Solution 125 milliGRAM(s) Oral every 6 hours    MEDICATIONS  (PRN):  acetaminophen     Tablet .. 650 milliGRAM(s) Oral every 6 hours PRN Temp greater or equal to 38C (100.4F), Mild Pain (1 - 3)  ondansetron    Tablet 4 milliGRAM(s) Oral every 6 hours PRN Nausea and/or Vomiting  oxycodone    5 mG/acetaminophen 325 mG 1 Tablet(s) Oral every 6 hours PRN Severe Pain (7 - 10)      Allergies    No Known Allergies    Intolerances         REVIEW OF SYSTEMS:    Review of Systems:   Constitutional: Denies fatigue  HEENT: Denies headaches and dizziness  Respiratory: denies SOB, cough, or wheezing  Cardiovascular: denies CP, palpitations  Gastrointestinal: Denies nausea, denies vomiting, diarrhea, constipation, abdominal pain, or bloody stools  Genitourinary: denies painful urination, increased frequency, urgency, or bloody urine  Skin: denies rashes or itching  Musculoskeletal: denies muscle aches, joint swelling  Neurologic: Denies generalized weakness, denies loss of sensation, numbness, or tingling    Vital Signs Last 24 Hrs  T(C): 36.9 (2023 04:00), Max: 36.9 (2023 19:29)  T(F): 98.4 (2023 04:00), Max: 98.4 (2023 19:29)  HR: 87 (2023 04:00) (63 - 96)  BP: 116/76 (2023 04:00) (116/76 - 122/73)  BP(mean): --  RR: 18 (2023 04:00) (18 - 18)  SpO2: 98% (2023 04:00) (92% - 98%)    Parameters below as of 2023 04:00  Patient On (Oxygen Delivery Method): room air          PHYSICAL EXAM:    GENERAL: NAD  HEAD:  Atraumatic, Normocephalic  EYES: EOMI, conjunctiva and sclera clear  ENMT: No Drainage from nares, No drainage from ears  NECK: Supple, neck  veins full  NERVOUS SYSTEM:  Awake and Alert  CHEST/LUNG: Clear to percussion bilaterally; No rales, rhonchi, wheezing, or rubs  HEART: Regular rate and rhythm; No murmurs, rubs, or gallops  ABDOMEN: Soft, Nontender, Nondistended; Bowel sounds present  EXTREMITIES:  trace Edema  SKIN: No rashes No obvious ecchymosis      LABS:                                                       8.3    14.74 )-----------( 259      ( 2023 07:38 )             28.1         139  |  104  |  33<H>  ----------------------------<  96  3.5   |  30  |  1.70<H>    Ca    7.6<L>      2023 07:38    TPro  6.0  /  Alb  2.4<L>  /  TBili  0.5  /  DBili  0.2  /  AST  10<L>  /  ALT  <6<L>  /  AlkPhos  67        Urinalysis Basic - ( 2023 06:21 )    Color: Red / Appearance: Turbid / S.015 / pH: x  Gluc: x / Ketone: Trace  / Bili: Negative / Urobili: Negative   Blood: x / Protein: 100 / Nitrite: Negative   Leuk Esterase: Small / RBC: >50 /HPF / WBC >50   Sq Epi: x / Non Sq Epi: Occasional / Bacteria: Many        
Patient is a 72y old  Female who presents with a chief complaint of Acute on Chronic CHF, COVID (2023 13:22)       HPI:  Ms Ornelas is a 73 yo F sent to the ED by her cardiologist with acute CHF exacerbation. PMH HFpEF, Hypothyroidism, Thrombocytosis, HLD, Asthma,  A fib on Xarelto.   Patient seen and examined at bedside. She reports worsening SOB over the past 7-10 days as well as worsening leg edema. She has been in contact with her cardiologist who increased her lasix from 40mg daily to 80mg in AM and 40mg in PM [total 120mg daily] for a duration of 4 days. She went to see her cardiologist today who recommended patient come to the ER.   She reports worsening orthopnea for about 1 week as well as worsening SOB. She denies any associated chest pain/pressure, palpitations.   She was noted to be covid positive in the ER today. Denies any known sick contacts. She Received a total of 3 doses of pfizer vaccine. She admits to approx 5-7 days of loose water stools without any melena/hematochezia.   Denies headaches, nausea, vomiting, chest pain,  palpitations, abdominal pain, constipation, dysuria.  (2023 15:28)   Has not seen nephrologist in the past.  Roman any N/V.  Has fatigue and significant diarrhea.  Urinating without issue.      diarrhea improving    PAST MEDICAL & SURGICAL HISTORY:  Hypothyroidism      HLD (hyperlipidemia)      Thrombocytosis      Persistent atrial fibrillation      Obesity (BMI 35.0-39.9 without comorbidity)      Asthma      Diabetes mellitus      Ankle injury  on , 5 surgeries.      Cholecystitis           FAMILY HISTORY:  Family history of early CAD (Father)    Family history of early CAD (Mother)    NC    Social History:Non smoker    MEDICATIONS  (STANDING):  anagrelide 1.5 milliGRAM(s) Oral <User Schedule>  escitalopram 20 milliGRAM(s) Oral daily  furosemide   Injectable 40 milliGRAM(s) IV Push two times a day  levothyroxine 125 MICROGram(s) Oral daily  loratadine 10 milliGRAM(s) Oral daily  metoprolol succinate ER 50 milliGRAM(s) Oral daily  potassium chloride    Tablet ER 40 milliEquivalent(s) Oral every 4 hours  remdesivir  IVPB 100 milliGRAM(s) IV Intermittent every 24 hours  remdesivir  IVPB   IV Intermittent   rivaroxaban 15 milliGRAM(s) Oral with dinner    MEDICATIONS  (PRN):  acetaminophen     Tablet .. 650 milliGRAM(s) Oral every 6 hours PRN Temp greater or equal to 38C (100.4F), Mild Pain (1 - 3)  ondansetron    Tablet 4 milliGRAM(s) Oral every 6 hours PRN Nausea and/or Vomiting   Meds reviewed    Allergies    No Known Allergies    Intolerances         REVIEW OF SYSTEMS:    Review of Systems:   Constitutional: Denies fatigue  HEENT: Denies headaches and dizziness  Respiratory: denies SOB, cough, or wheezing  Cardiovascular: denies CP, palpitations  Gastrointestinal: Denies nausea, denies vomiting, diarrhea, constipation, abdominal pain, or bloody stools  Genitourinary: denies painful urination, increased frequency, urgency, or bloody urine  Skin: denies rashes or itching  Musculoskeletal: denies muscle aches, joint swelling  Neurologic: Denies generalized weakness, denies loss of sensation, numbness, or tingling      Vital Signs Last 24 Hrs  T(C): 36.7 (2023 12:02), Max: 36.9 (2023 18:00)  T(F): 98.1 (2023 12:02), Max: 98.5 (2023 18:00)  HR: 71 (2023 12:02) (61 - 82)  BP: 128/65 (2023 13:05) (118/69 - 160/72)  BP(mean): --  RR: 18 (2023 12:02) (18 - 19)  SpO2: 95% (2023 12:02) (92% - 98%)    Parameters below as of 2023 12:02  Patient On (Oxygen Delivery Method): room air      Daily Height in cm: 167.64 (2023 18:53)    Daily Weight in k.2 (2023 04:28)    PHYSICAL EXAM:    GENERAL: NAD  HEAD:  Atraumatic, Normocephalic  EYES: EOMI, conjunctiva and sclera clear  ENMT: No Drainage from nares, No drainage from ears  NECK: Supple, neck  veins full  NERVOUS SYSTEM:  Awake and Alert  CHEST/LUNG: Clear to percussion bilaterally; No rales, rhonchi, wheezing, or rubs  HEART: Regular rate and rhythm; No murmurs, rubs, or gallops  ABDOMEN: Soft, Nontender, Nondistended; Bowel sounds present  EXTREMITIES:  No Edema  SKIN: No rashes No obvious ecchymosis      LABS:                        9.5    17.19 )-----------( 327      ( 2023 10:25 )             31.7         143  |  106  |  19  ----------------------------<  125<H>  3.5   |  32<H>  |  1.50<H>    Ca    8.0<L>      2023 10:25  Phos  2.8       Mg     2.0         TPro  6.2  /  Alb  2.7<L>  /  TBili  0.5  /  DBili  0.1  /  AST  14<L>  /  ALT  10<L>  /  AlkPhos  71      PT/INR - ( 2023 12:08 )   PT: 18.4 sec;   INR: 1.57 ratio         PTT - ( 2023 12:08 )  PTT:36.2 sec    Magnesium, Serum: 2.0 mg/dL ( @ 10:25)  Phosphorus Level, Serum: 2.8 mg/dL ( @ 10:25)  Phosphorus Level, Serum: 2.5 mg/dL ( @ 02:52)  Magnesium, Serum: 1.9 mg/dL ( @ 02:52)  Magnesium, Serum: 1.9 mg/dL ( @ 00:01)  Phosphorus Level, Serum: 2.7 mg/dL ( @ 00:01)  Magnesium, Serum: 1.7 mg/dL ( @ 16:00)  Phosphorus Level, Serum: 2.7 mg/dL ( @ 16:00)          RADIOLOGY & ADDITIONAL TESTS:  
[facility]  INFECTIOUS DISEASES   [address] [place] [address]  Tel: [phone]     Fax: [phone]  ========================================================  MD Adriana Bird Kaushal, MD Cho, Michelle, MD Sunjit, Jaspal, MD  ========================================================    [id number]  HERMILA QUIÑONES     Follow up: COVID and C diff     Doing better, off o2 comfortable.   Diarrhea has stopped as per her. No fever.     PAST MEDICAL & SURGICAL HISTORY:  Hypothyroidism  HLD (hyperlipidemia)  Thrombocytosis  Persistent atrial fibrillation  Obesity (BMI 35.0-39.9 without comorbidity)  Asthma  Diabetes mellitus  Ankle injury  on 2004, 5 surgeries.  Cholecystitis    Social Hx: no smoking, ETOH or drugs     FAMILY HISTORY:  Family history of early CAD (Father)    Family history of early CAD (Mother)    Allergies  No Known Allergies    Antibiotics:  MEDICATIONS  (STANDING):  anagrelide 1.5 milliGRAM(s) Oral <User Schedule>  escitalopram 20 milliGRAM(s) Oral daily  furosemide   Injectable 40 milliGRAM(s) IV Push two times a day  levothyroxine 125 MICROGram(s) Oral daily  loratadine 10 milliGRAM(s) Oral daily  magnesium sulfate  IVPB 1 Gram(s) IV Intermittent once  metoprolol succinate ER 50 milliGRAM(s) Oral daily  potassium chloride    Tablet ER 40 milliEquivalent(s) Oral every 4 hours  potassium chloride  10 mEq/100 mL IVPB 10 milliEquivalent(s) IV Intermittent every 1 hour  rivaroxaban 15 milliGRAM(s) Oral with dinner     REVIEW OF SYSTEMS:  CONSTITUTIONAL:  No Fever or chills  HEENT:  No diplopia or blurred vision.  No sore throat or runny nose.  CARDIOVASCULAR:  No chest pain or SOB.  RESPIRATORY:  +cough, +shortness of breath  GASTROINTESTINAL:  No nausea, vomiting, +diarrhea.  GENITOURINARY:  No dysuria, frequency or urgency. No Blood in urine  MUSCULOSKELETAL:  no joint aches, no muscle pain, + leg edema   SKIN:  No change in skin, hair or nails.  NEUROLOGIC:  No paresthesias or weakness.  PSYCHIATRIC:  No disorder of thought or mood.  ENDOCRINE:  No heat or cold intolerance, polyuria or polydipsia.  HEMATOLOGICAL:  No easy bruising or bleeding.     Physical Exam:  Vital Signs Last 24 Hrs  T(C): 36.6 (17 Jan 2023 12:40), Max: 37 (17 Jan 2023 04:30)  T(F): 97.9 (17 Jan 2023 12:40), Max: 98.6 (17 Jan 2023 04:30)  HR: 63 (17 Jan 2023 12:40) (63 - 98)  BP: 122/73 (17 Jan 2023 12:40) (111/58 - 124/67)  BP(mean): --  RR: 18 (17 Jan 2023 12:40) (18 - 18)  SpO2: 96% (17 Jan 2023 12:40) (96% - 98%)  Parameters below as of 17 Jan 2023 12:40  Patient On (Oxygen Delivery Method): room air  GEN: NAD  HEENT: normocephalic and atraumatic. EOMI. PERRL.    NECK: Supple.  No lymphadenopathy   LUNGS: Coarse breath sounds bilaterally   HEART: Irregular rate and rhythm , + sys murmur+   ABDOMEN: Soft, nontender, and nondistended.  Positive bowel sounds.    EXTREMITIES: 1+ edema.  NEUROLOGIC: grossly intact.  PSYCHIATRIC: Appropriate affect .  SKIN: No rash    Labs:                        9.1    19.13 )-----------( 315      ( 17 Jan 2023 08:32 )             31.2     01-17    138  |  103  |  31<H>  ----------------------------<  88  3.8   |  31  |  1.80<H>    Ca    7.8<L>      17 Jan 2023 08:32    TPro  6.3  /  Alb  2.6<L>  /  TBili  0.8  /  DBili  0.2  /  AST  13<L>  /  ALT  6<L>  /  AlkPhos  76  01-17    WBC Count: 19.13 K/uL (01-17-23 @ 08:32)  WBC Count: 20.09 K/uL (01-16-23 @ 08:00)  WBC Count: 16.30 K/uL (01-15-23 @ 07:35)  WBC Count: 17.32 K/uL (01-14-23 @ 09:38)  WBC Count: 17.19 K/uL (01-13-23 @ 10:25)  WBC Count: 13.02 K/uL (01-13-23 @ 02:52)    Creatinine, Serum: 1.80 mg/dL (01-17-23 @ 08:32)  Creatinine, Serum: 2.00 mg/dL (01-16-23 @ 08:00)  Creatinine, Serum: 1.90 mg/dL (01-15-23 @ 07:35)  Creatinine, Serum: 1.90 mg/dL (01-14-23 @ 09:38)  Creatinine, Serum: 1.50 mg/dL (01-13-23 @ 10:25)  Creatinine, Serum: 1.40 mg/dL (01-13-23 @ 02:52)  Creatinine, Serum: 1.40 mg/dL (01-13-23 @ 02:52)  Creatinine, Serum: 1.30 mg/dL (01-12-23 @ 21:05)  Creatinine, Serum: 1.30 mg/dL (01-12-23 @ 16:00)     SARS-CoV-2 Result: Detected (01-12-23 @ 12:08)    All imaging and other data have been reviewed.  < from: Xray Chest 1 View- PORTABLE-Urgent (01.12.23 @ 11:35) >  ACC: [id number] EXAM: XR CHEST PORTABLE URGENT 1V  PROCEDURE DATE: 01/12/2023  INTERPRETATION: AP chest on January 12, 2023 at 11:29 AM. Patient is short of breath.  Heart is quite enlarged.  On August 16, 2022 there was some atelectatic changes in the left mid lower lung field that are somewhat improved on present study.  The present study shows new scattered atelectatic changes at right base likely with pleural component.  IMPRESSION: Improved left lung findings but worsening right lung   findings. Heart enlargement again noted.    < from: CT Chest No Cont (01.12.23 @ 16:00) >  IMPRESSION:  Small mildly loculated right pleural effusion and mild right basilar   atelectasis.  Stable Mosaic attenuation of the lungs and numerous bilateral lung   nodules. Stable left pleural thickening.    Assessment and Plan:   73 yo woman with PMH of DM2, CHF, afib, asthma, and Hypothyroidism, was referred from cardiologist office for CHF exacerbation and was found with a positive COVID.   She has worsening SOB over the past 7-10 days as well as worsening leg edema and also complaining of diarrhea for 3 weeks.   She lives alone and as far as she knows didn't have contact with a sick person.   Had COVID vaccines x 2 and one booster.     Nontoxic, possibly leukocytosis due to C diff, started going down, will watch, no fever.     1- COVID 19   - BMP, CBC w diff, NLR. Procalcitonin, Ferritin, CRP, LDH and D dimer for the start and periodically can be repeated.   - Chest CT with a stable mosaic pattern and nodules  - Completed Remdesivir for 3 days total   - Spo2 normal, no need for Dexamethasone, Watch O2 sat closely   - No antibiotics at this time.  - Anticoagulation as per protocol    2- C diff:  - Continue vancomycin to complete 10days total   - Will watch Leukocytosis today 19k stable   - Will watch renal function, creat slightly better 1.8    Will follow.     Murtaza Juarez MD  Division of Infectious Diseases   Please call ID service at [phone] with any question.      35minutes spent on total encounter assessing patient, examination, chart review, counseling and coordinating care by the attending physician/nurse/care manager.        
[facility]  INFECTIOUS DISEASES   [address] [place] [address]  Tel: [phone]     Fax: [phone]  ========================================================  MD Adriana Bird Kaushal, MD Cho, Michelle, MD Sunjit, Jaspal, MD  ========================================================    [id number]  HERMILA QUIÑONES     Follow up: COVID and C diff     Doing better, off o2 comfortable.   Diarrhea has stopped as per her sometimes has mucus. No fever.   Appetite is good, no nausea or vomiting.     PAST MEDICAL & SURGICAL HISTORY:  Hypothyroidism  HLD (hyperlipidemia)  Thrombocytosis  Persistent atrial fibrillation  Obesity (BMI 35.0-39.9 without comorbidity)  Asthma  Diabetes mellitus  Ankle injury  on 2004, 5 surgeries.  Cholecystitis    Social Hx: no smoking, ETOH or drugs     FAMILY HISTORY:  Family history of early CAD (Father)    Family history of early CAD (Mother)    Allergies  No Known Allergies    Antibiotics:  MEDICATIONS  (STANDING):  anagrelide 1.5 milliGRAM(s) Oral <User Schedule>  escitalopram 20 milliGRAM(s) Oral daily  furosemide   Injectable 40 milliGRAM(s) IV Push two times a day  levothyroxine 125 MICROGram(s) Oral daily  loratadine 10 milliGRAM(s) Oral daily  magnesium sulfate  IVPB 1 Gram(s) IV Intermittent once  metoprolol succinate ER 50 milliGRAM(s) Oral daily  potassium chloride    Tablet ER 40 milliEquivalent(s) Oral every 4 hours  potassium chloride  10 mEq/100 mL IVPB 10 milliEquivalent(s) IV Intermittent every 1 hour  rivaroxaban 15 milliGRAM(s) Oral with dinner     REVIEW OF SYSTEMS:  CONSTITUTIONAL:  No Fever or chills  HEENT:  No diplopia or blurred vision.  No sore throat or runny nose.  CARDIOVASCULAR:  No chest pain or SOB.  RESPIRATORY:  +cough, +shortness of breath  GASTROINTESTINAL:  No nausea, vomiting, +diarrhea.  GENITOURINARY:  No dysuria, frequency or urgency. No Blood in urine  MUSCULOSKELETAL:  no joint aches, no muscle pain, + leg edema   SKIN:  No change in skin, hair or nails.  NEUROLOGIC:  No paresthesias or weakness.  PSYCHIATRIC:  No disorder of thought or mood.  ENDOCRINE:  No heat or cold intolerance, polyuria or polydipsia.  HEMATOLOGICAL:  No easy bruising or bleeding.     Physical Exam:  Vital Signs Last 24 Hrs  T(C): 36.7 (19 Jan 2023 11:30), Max: 37 (18 Jan 2023 20:16)  T(F): 98 (19 Jan 2023 11:30), Max: 98.6 (18 Jan 2023 20:16)  HR: 86 (19 Jan 2023 11:30) (84 - 86)  BP: 123/76 (19 Jan 2023 11:30) (108/56 - 123/76)  BP(mean): --  RR: 18 (19 Jan 2023 11:30) (18 - 18)  SpO2: 94% (19 Jan 2023 11:30) (93% - 94%)  Parameters below as of 19 Jan 2023 11:30  Patient On (Oxygen Delivery Method): room air  GEN: NAD  HEENT: normocephalic and atraumatic. EOMI. PERRL.    NECK: Supple.  No lymphadenopathy   LUNGS: Coarse breath sounds bilaterally   HEART: Irregular rate and rhythm , + sys murmur+   ABDOMEN: Soft, nontender, and nondistended.  Positive bowel sounds.    EXTREMITIES: 1+ edema.  NEUROLOGIC: grossly intact.  PSYCHIATRIC: Appropriate affect .  SKIN: No rash    Labs:                        8.2    13.71 )-----------( 221      ( 19 Jan 2023 07:35 )             27.7     01-19    140  |  103  |  31<H>  ----------------------------<  74  3.5   |  29  |  1.60<H>    Ca    7.8<L>      19 Jan 2023 07:35    TPro  6.0  /  Alb  2.4<L>  /  TBili  0.6  /  DBili  0.2  /  AST  10<L>  /  ALT  <6<L>  /  AlkPhos  69  01-19    WBC Count: 13.71 K/uL (01-19-23 @ 07:35)  WBC Count: 14.74 K/uL (01-18-23 @ 07:38)  WBC Count: 19.13 K/uL (01-17-23 @ 08:32)  WBC Count: 20.09 K/uL (01-16-23 @ 08:00)  WBC Count: 16.30 K/uL (01-15-23 @ 07:35)    Creatinine, Serum: 1.60 mg/dL (01-19-23 @ 07:35)  Creatinine, Serum: 1.70 mg/dL (01-18-23 @ 07:38)  Creatinine, Serum: 1.80 mg/dL (01-17-23 @ 08:32)  Creatinine, Serum: 2.00 mg/dL (01-16-23 @ 08:00)  Creatinine, Serum: 1.90 mg/dL (01-15-23 @ 07:35)     SARS-CoV-2 Result: Detected (01-12-23 @ 12:08)    All imaging and other data have been reviewed.  < from: Xray Chest 1 View- PORTABLE-Urgent (01.12.23 @ 11:35) >  ACC: [id number] EXAM: XR CHEST PORTABLE URGENT 1V  PROCEDURE DATE: 01/12/2023  INTERPRETATION: AP chest on January 12, 2023 at 11:29 AM. Patient is short of breath.  Heart is quite enlarged.  On August 16, 2022 there was some atelectatic changes in the left mid lower lung field that are somewhat improved on present study.  The present study shows new scattered atelectatic changes at right base likely with pleural component.  IMPRESSION: Improved left lung findings but worsening right lung   findings. Heart enlargement again noted.    < from: CT Chest No Cont (01.12.23 @ 16:00) >  IMPRESSION:  Small mildly loculated right pleural effusion and mild right basilar   atelectasis.  Stable Mosaic attenuation of the lungs and numerous bilateral lung   nodules. Stable left pleural thickening.    Assessment and Plan:   73 yo woman with PMH of DM2, CHF, afib, asthma, and Hypothyroidism, was referred from cardiologist office for CHF exacerbation and was found with a positive COVID.   She has worsening SOB over the past 7-10 days as well as worsening leg edema and also complaining of diarrhea for 3 weeks.   She lives alone and as far as she knows didn't have contact with a sick person.   Had COVID vaccines x 2 and one booster.     Nontoxic, leukocytosis due to C diff, started going down, no fever.     1- COVID 19   - BMP, CBC w diff, NLR. Procalcitonin, Ferritin, CRP, LDH and D dimer for the start and periodically can be repeated.   - Chest CT with a stable mosaic pattern and nodules  - Completed Remdesivir for 3 days total   - Spo2 normal, no need for Dexamethasone, Watch O2 sat closely   - No antibiotics at this time.  - Anticoagulation as per protocol    2- C diff:  - Continue vancomycin to complete 10days total   - Leukocytosis improving 19-->13  - Ceat improving too 1.6 today.     Will sign off please call with any question.     Murtaza Juarez MD  Division of Infectious Diseases   Please call ID service at [phone] with any question.      35minutes spent on total encounter assessing patient, examination, chart review, counseling and coordinating care by the attending physician/nurse/care manager.      
[facility]  INFECTIOUS DISEASES   [address] [place] [address]  Tel: [phone]     Fax: [phone]  ========================================================  MD Adriana Bird Kaushal, MD Cho, Michelle, MD Sunjit, Jaspal, MD  ========================================================    [id number]  HERMILA QUIÑONES     Follow up: COVID     Doing better, off o2 but uses PRN.   Still has severe diarrhea. No fever. Mild abdominal discomfort.     PAST MEDICAL & SURGICAL HISTORY:  Hypothyroidism  HLD (hyperlipidemia)  Thrombocytosis  Persistent atrial fibrillation  Obesity (BMI 35.0-39.9 without comorbidity)  Asthma  Diabetes mellitus  Ankle injury  on 2004, 5 surgeries.  Cholecystitis    Social Hx: no smoking, ETOH or drugs     FAMILY HISTORY:  Family history of early CAD (Father)    Family history of early CAD (Mother)    Allergies  No Known Allergies    Antibiotics:  MEDICATIONS  (STANDING):  anagrelide 1.5 milliGRAM(s) Oral <User Schedule>  escitalopram 20 milliGRAM(s) Oral daily  furosemide   Injectable 40 milliGRAM(s) IV Push two times a day  levothyroxine 125 MICROGram(s) Oral daily  loratadine 10 milliGRAM(s) Oral daily  magnesium sulfate  IVPB 1 Gram(s) IV Intermittent once  metoprolol succinate ER 50 milliGRAM(s) Oral daily  potassium chloride    Tablet ER 40 milliEquivalent(s) Oral every 4 hours  potassium chloride  10 mEq/100 mL IVPB 10 milliEquivalent(s) IV Intermittent every 1 hour  rivaroxaban 15 milliGRAM(s) Oral with dinner     REVIEW OF SYSTEMS:  CONSTITUTIONAL:  No Fever or chills  HEENT:  No diplopia or blurred vision.  No sore throat or runny nose.  CARDIOVASCULAR:  No chest pain or SOB.  RESPIRATORY:  +cough, +shortness of breath  GASTROINTESTINAL:  No nausea, vomiting, +diarrhea.  GENITOURINARY:  No dysuria, frequency or urgency. No Blood in urine  MUSCULOSKELETAL:  no joint aches, no muscle pain, + leg edema   SKIN:  No change in skin, hair or nails.  NEUROLOGIC:  No paresthesias or weakness.  PSYCHIATRIC:  No disorder of thought or mood.  ENDOCRINE:  No heat or cold intolerance, polyuria or polydipsia.  HEMATOLOGICAL:  No easy bruising or bleeding.     Physical Exam:  Vital Signs Last 24 Hrs  T(C): 36.7 (13 Jan 2023 12:02), Max: 36.9 (12 Jan 2023 18:00)  T(F): 98.1 (13 Jan 2023 12:02), Max: 98.5 (12 Jan 2023 18:00)  HR: 71 (13 Jan 2023 12:02) (61 - 82)  BP: 128/65 (13 Jan 2023 13:05) (118/69 - 160/72)  BP(mean): --  RR: 18 (13 Jan 2023 12:02) (18 - 19)  SpO2: 95% (13 Jan 2023 12:02) (92% - 98%)  Parameters below as of 13 Jan 2023 12:02  Patient On (Oxygen Delivery Method): room air  GEN: NAD  HEENT: normocephalic and atraumatic. EOMI. PERRL.    NECK: Supple.  No lymphadenopathy   LUNGS: Coarse breath sounds bilaterally   HEART: Irregular rate and rhythm , + sys murmur+   ABDOMEN: Soft, nontender, and nondistended.  Positive bowel sounds.    EXTREMITIES: 1+ edema.  NEUROLOGIC: grossly intact.  PSYCHIATRIC: Appropriate affect .  SKIN: No rash    Labs:                        9.5    17.19 )-----------( 327      ( 13 Jan 2023 10:25 )             31.7     01-13    143  |  106  |  19  ----------------------------<  125<H>  3.5   |  32<H>  |  1.50<H>    Ca    8.0<L>      13 Jan 2023 10:25  Phos  2.8     01-13  Mg     2.0     01-13    TPro  6.2  /  Alb  2.7<L>  /  TBili  0.5  /  DBili  0.1  /  AST  14<L>  /  ALT  10<L>  /  AlkPhos  71  01-13    WBC Count: 17.19 K/uL (01-13-23 @ 10:25)  WBC Count: 13.02 K/uL (01-13-23 @ 02:52)  WBC Count: 11.80 K/uL (01-12-23 @ 12:08)    Creatinine, Serum: 1.50 mg/dL (01-13-23 @ 10:25)  Creatinine, Serum: 1.40 mg/dL (01-13-23 @ 02:52)  Creatinine, Serum: 1.40 mg/dL (01-13-23 @ 02:52)  Creatinine, Serum: 1.30 mg/dL (01-12-23 @ 21:05)  Creatinine, Serum: 1.30 mg/dL (01-12-23 @ 16:00)  Creatinine, Serum: 1.40 mg/dL (01-12-23 @ 12:08)     SARS-CoV-2 Result: Detected (01-12-23 @ 12:08)       All imaging and other data have been reviewed.  < from: Xray Chest 1 View- PORTABLE-Urgent (01.12.23 @ 11:35) >  ACC: [id number] EXAM: XR CHEST PORTABLE URGENT 1V  PROCEDURE DATE: 01/12/2023  INTERPRETATION: AP chest on January 12, 2023 at 11:29 AM. Patient is short of breath.  Heart is quite enlarged.  On August 16, 2022 there was some atelectatic changes in the left mid lower lung field that are somewhat improved on present study.  The present study shows new scattered atelectatic changes at right base likely with pleural component.  IMPRESSION: Improved left lung findings but worsening right lung   findings. Heart enlargement again noted.    Assessment and Plan:   71 yo woman with PMH of DM2, CHF, afib, asthma, and Hypothyroidism, was referred from cardiologist office for CHF exacerbation and was found with a positive COVID.   She has worsening SOB over the past 7-10 days as well as worsening leg edema and also complaining of diarrhea for 3 weeks.   She lives alone and as far as she knows didn't have contact with a sick person.   Had COVID vaccines x 2 and one booster.     COVID 19   - BMP, CBC w diff, NLR. Procalcitonin, Ferritin, CRP, LDH and D dimer for the start and periodically can be repeated.   - CXR with opacities more prominent in R side but CT result pending, had mosaic pattern and nodules in the past   - On Remdesivir 200mg for 3 days for now   - Spo2 normal at this time so can hold Dexamethasone   - Follow Creat and LFTs daily while on Remdesivir.    - Watch O2 sat closely   - No antibiotics at this time.  - Anticoagulation as per protocol  - GI PCR and C diff PCR pending     Will follow.     Murtaza Juarez MD  Division of Infectious Diseases   Please call ID service at [phone] with any question.      35minutes spent on total encounter assessing patient, examination, chart review, counseling and coordinating care by the attending physician/nurse/care manager.        
[facility]  INFECTIOUS DISEASES   [address] [place] [address]  Tel: [phone]     Fax: [phone]  ========================================================  MD Adriana Bird Kaushal, MD Cho, Michelle, MD Sunjit, Jaspal, MD  ========================================================    [id number]  HERMILA QUIÑONES     Follow up: COVID and C diff     Doing better, off o2 comfortable.   Diarrhea is improving. No fever. Feels tired.     PAST MEDICAL & SURGICAL HISTORY:  Hypothyroidism  HLD (hyperlipidemia)  Thrombocytosis  Persistent atrial fibrillation  Obesity (BMI 35.0-39.9 without comorbidity)  Asthma  Diabetes mellitus  Ankle injury  on 2004, 5 surgeries.  Cholecystitis    Social Hx: no smoking, ETOH or drugs     FAMILY HISTORY:  Family history of early CAD (Father)    Family history of early CAD (Mother)    Allergies  No Known Allergies    Antibiotics:  MEDICATIONS  (STANDING):  anagrelide 1.5 milliGRAM(s) Oral <User Schedule>  escitalopram 20 milliGRAM(s) Oral daily  furosemide   Injectable 40 milliGRAM(s) IV Push two times a day  levothyroxine 125 MICROGram(s) Oral daily  loratadine 10 milliGRAM(s) Oral daily  magnesium sulfate  IVPB 1 Gram(s) IV Intermittent once  metoprolol succinate ER 50 milliGRAM(s) Oral daily  potassium chloride    Tablet ER 40 milliEquivalent(s) Oral every 4 hours  potassium chloride  10 mEq/100 mL IVPB 10 milliEquivalent(s) IV Intermittent every 1 hour  rivaroxaban 15 milliGRAM(s) Oral with dinner     REVIEW OF SYSTEMS:  CONSTITUTIONAL:  No Fever or chills  HEENT:  No diplopia or blurred vision.  No sore throat or runny nose.  CARDIOVASCULAR:  No chest pain or SOB.  RESPIRATORY:  +cough, +shortness of breath  GASTROINTESTINAL:  No nausea, vomiting, +diarrhea.  GENITOURINARY:  No dysuria, frequency or urgency. No Blood in urine  MUSCULOSKELETAL:  no joint aches, no muscle pain, + leg edema   SKIN:  No change in skin, hair or nails.  NEUROLOGIC:  No paresthesias or weakness.  PSYCHIATRIC:  No disorder of thought or mood.  ENDOCRINE:  No heat or cold intolerance, polyuria or polydipsia.  HEMATOLOGICAL:  No easy bruising or bleeding.     Physical Exam:  Vital Signs Last 24 Hrs  T(C): 36.9 (16 Jan 2023 04:27), Max: 36.9 (16 Jan 2023 04:27)  T(F): 98.5 (16 Jan 2023 04:27), Max: 98.5 (16 Jan 2023 04:27)  HR: 76 (16 Jan 2023 04:27) (61 - 76)  BP: 118/74 (16 Jan 2023 04:27) (118/74 - 144/81)  BP(mean): --  RR: 18 (16 Jan 2023 04:27) (18 - 18)  SpO2: 92% (16 Jan 2023 04:27) (92% - 95%)  Parameters below as of 16 Jan 2023 04:27  Patient On (Oxygen Delivery Method): room air  GEN: NAD  HEENT: normocephalic and atraumatic. EOMI. PERRL.    NECK: Supple.  No lymphadenopathy   LUNGS: Coarse breath sounds bilaterally   HEART: Irregular rate and rhythm , + sys murmur+   ABDOMEN: Soft, nontender, and nondistended.  Positive bowel sounds.    EXTREMITIES: 1+ edema.  NEUROLOGIC: grossly intact.  PSYCHIATRIC: Appropriate affect .  SKIN: No rash    Labs:                        9.8    20.09 )-----------( 389      ( 16 Jan 2023 08:00 )             33.2     01-16    140  |  105  |  31<H>  ----------------------------<  67<L>  4.2   |  30  |  2.00<H>    Ca    8.0<L>      16 Jan 2023 08:00  Phos  3.8     01-15  Mg     1.9     01-15    TPro  6.4  /  Alb  2.6<L>  /  TBili  0.6  /  DBili  0.2  /  AST  13<L>  /  ALT  7<L>  /  AlkPhos  85  01-16    WBC Count: 20.09 K/uL (01-16-23 @ 08:00)  WBC Count: 16.30 K/uL (01-15-23 @ 07:35)  WBC Count: 17.32 K/uL (01-14-23 @ 09:38)  WBC Count: 17.19 K/uL (01-13-23 @ 10:25)  WBC Count: 13.02 K/uL (01-13-23 @ 02:52)  WBC Count: 11.80 K/uL (01-12-23 @ 12:08)    Creatinine, Serum: 2.00 mg/dL (01-16-23 @ 08:00)  Creatinine, Serum: 1.90 mg/dL (01-15-23 @ 07:35)  Creatinine, Serum: 1.90 mg/dL (01-14-23 @ 09:38)  Creatinine, Serum: 1.50 mg/dL (01-13-23 @ 10:25)  Creatinine, Serum: 1.40 mg/dL (01-13-23 @ 02:52)  Creatinine, Serum: 1.40 mg/dL (01-13-23 @ 02:52)  Creatinine, Serum: 1.30 mg/dL (01-12-23 @ 21:05)  Creatinine, Serum: 1.30 mg/dL (01-12-23 @ 16:00)  Creatinine, Serum: 1.40 mg/dL (01-12-23 @ 12:08)     SARS-CoV-2 Result: Detected (01-12-23 @ 12:08)    All imaging and other data have been reviewed.  < from: Xray Chest 1 View- PORTABLE-Urgent (01.12.23 @ 11:35) >  ACC: [id number] EXAM: XR CHEST PORTABLE URGENT 1V  PROCEDURE DATE: 01/12/2023  INTERPRETATION: AP chest on January 12, 2023 at 11:29 AM. Patient is short of breath.  Heart is quite enlarged.  On August 16, 2022 there was some atelectatic changes in the left mid lower lung field that are somewhat improved on present study.  The present study shows new scattered atelectatic changes at right base likely with pleural component.  IMPRESSION: Improved left lung findings but worsening right lung   findings. Heart enlargement again noted.    < from: CT Chest No Cont (01.12.23 @ 16:00) >  IMPRESSION:  Small mildly loculated right pleural effusion and mild right basilar   atelectasis.  Stable Mosaic attenuation of the lungs and numerous bilateral lung   nodules. Stable left pleural thickening.    Assessment and Plan:   71 yo woman with PMH of DM2, CHF, afib, asthma, and Hypothyroidism, was referred from cardiologist office for CHF exacerbation and was found with a positive COVID.   She has worsening SOB over the past 7-10 days as well as worsening leg edema and also complaining of diarrhea for 3 weeks.   She lives alone and as far as she knows didn't have contact with a sick person.   Had COVID vaccines x 2 and one booster.     1- COVID 19   - BMP, CBC w diff, NLR. Procalcitonin, Ferritin, CRP, LDH and D dimer for the start and periodically can be repeated.   - Chest CT with a stable mosaic pattern and nodules  - Compelted Remdesivir for 3 days total   - Spo2 normal, no need for Dexamethasone, Watch O2 sat closely   - No antibiotics at this time.  - Anticoagulation as per protocol    2- C diff:  - Continue vancomycin to complete 10days total   - Will watch Leukocytosis today 20k  - Will watch renal function, creat is going up today 2    Will follow.     Murtaza Juarez MD  Division of Infectious Diseases   Please call ID service at [phone] with any question.      35minutes spent on total encounter assessing patient, examination, chart review, counseling and coordinating care by the attending physician/nurse/care manager.      
[facility]  INFECTIOUS DISEASES   [address] [place] [address]  Tel: [phone]     Fax: [phone]  ========================================================  MD Adriana Bird Kaushal, MD Cho, Michelle, MD Sunjit, Jaspal, MD  ========================================================    [id number]  HERMILA QUIÑONES     Follow up: COVID and C diff     Doing better, off o2 comfortable.   Diarrhea has stopped as per her sometimes has mucus. No fever.   Appetite is good, no nausea or vomiting.     PAST MEDICAL & SURGICAL HISTORY:  Hypothyroidism  HLD (hyperlipidemia)  Thrombocytosis  Persistent atrial fibrillation  Obesity (BMI 35.0-39.9 without comorbidity)  Asthma  Diabetes mellitus  Ankle injury  on 2004, 5 surgeries.  Cholecystitis    Social Hx: no smoking, ETOH or drugs     FAMILY HISTORY:  Family history of early CAD (Father)    Family history of early CAD (Mother)    Allergies  No Known Allergies    Antibiotics:  MEDICATIONS  (STANDING):  anagrelide 1.5 milliGRAM(s) Oral <User Schedule>  escitalopram 20 milliGRAM(s) Oral daily  furosemide   Injectable 40 milliGRAM(s) IV Push two times a day  levothyroxine 125 MICROGram(s) Oral daily  loratadine 10 milliGRAM(s) Oral daily  magnesium sulfate  IVPB 1 Gram(s) IV Intermittent once  metoprolol succinate ER 50 milliGRAM(s) Oral daily  potassium chloride    Tablet ER 40 milliEquivalent(s) Oral every 4 hours  potassium chloride  10 mEq/100 mL IVPB 10 milliEquivalent(s) IV Intermittent every 1 hour  rivaroxaban 15 milliGRAM(s) Oral with dinner     REVIEW OF SYSTEMS:  CONSTITUTIONAL:  No Fever or chills  HEENT:  No diplopia or blurred vision.  No sore throat or runny nose.  CARDIOVASCULAR:  No chest pain or SOB.  RESPIRATORY:  +cough, +shortness of breath  GASTROINTESTINAL:  No nausea, vomiting, +diarrhea.  GENITOURINARY:  No dysuria, frequency or urgency. No Blood in urine  MUSCULOSKELETAL:  no joint aches, no muscle pain, + leg edema   SKIN:  No change in skin, hair or nails.  NEUROLOGIC:  No paresthesias or weakness.  PSYCHIATRIC:  No disorder of thought or mood.  ENDOCRINE:  No heat or cold intolerance, polyuria or polydipsia.  HEMATOLOGICAL:  No easy bruising or bleeding.     Physical Exam:  Vital Signs Last 24 Hrs  T(C): 36.9 (18 Jan 2023 04:00), Max: 36.9 (17 Jan 2023 19:29)  T(F): 98.4 (18 Jan 2023 04:00), Max: 98.4 (17 Jan 2023 19:29)  HR: 87 (18 Jan 2023 04:00) (63 - 96)  BP: 116/76 (18 Jan 2023 04:00) (116/76 - 122/73)  BP(mean): --  RR: 18 (18 Jan 2023 04:00) (18 - 18)  SpO2: 98% (18 Jan 2023 04:00) (92% - 98%)  Parameters below as of 18 Jan 2023 04:00  Patient On (Oxygen Delivery Method): room air  GEN: NAD  HEENT: normocephalic and atraumatic. EOMI. PERRL.    NECK: Supple.  No lymphadenopathy   LUNGS: Coarse breath sounds bilaterally   HEART: Irregular rate and rhythm , + sys murmur+   ABDOMEN: Soft, nontender, and nondistended.  Positive bowel sounds.    EXTREMITIES: 1+ edema.  NEUROLOGIC: grossly intact.  PSYCHIATRIC: Appropriate affect .  SKIN: No rash    Labs:                        8.3    14.74 )-----------( 259      ( 18 Jan 2023 07:38 )             28.1     01-18    139  |  104  |  33<H>  ----------------------------<  96  3.5   |  30  |  1.70<H>    Ca    7.6<L>      18 Jan 2023 07:38    TPro  6.0  /  Alb  2.4<L>  /  TBili  0.5  /  DBili  0.2  /  AST  10<L>  /  ALT  <6<L>  /  AlkPhos  67  01-18    WBC Count: 14.74 K/uL (01-18-23 @ 07:38)  WBC Count: 19.13 K/uL (01-17-23 @ 08:32)  WBC Count: 20.09 K/uL (01-16-23 @ 08:00)  WBC Count: 16.30 K/uL (01-15-23 @ 07:35)  WBC Count: 17.32 K/uL (01-14-23 @ 09:38)    Creatinine, Serum: 1.70 mg/dL (01-18-23 @ 07:38)  Creatinine, Serum: 1.80 mg/dL (01-17-23 @ 08:32)  Creatinine, Serum: 2.00 mg/dL (01-16-23 @ 08:00)  Creatinine, Serum: 1.90 mg/dL (01-15-23 @ 07:35)  Creatinine, Serum: 1.90 mg/dL (01-14-23 @ 09:38)    SARS-CoV-2 Result: Detected (01-12-23 @ 12:08)    All imaging and other data have been reviewed.  < from: Xray Chest 1 View- PORTABLE-Urgent (01.12.23 @ 11:35) >  ACC: [id number] EXAM: XR CHEST PORTABLE URGENT 1V  PROCEDURE DATE: 01/12/2023  INTERPRETATION: AP chest on January 12, 2023 at 11:29 AM. Patient is short of breath.  Heart is quite enlarged.  On August 16, 2022 there was some atelectatic changes in the left mid lower lung field that are somewhat improved on present study.  The present study shows new scattered atelectatic changes at right base likely with pleural component.  IMPRESSION: Improved left lung findings but worsening right lung   findings. Heart enlargement again noted.    < from: CT Chest No Cont (01.12.23 @ 16:00) >  IMPRESSION:  Small mildly loculated right pleural effusion and mild right basilar   atelectasis.  Stable Mosaic attenuation of the lungs and numerous bilateral lung   nodules. Stable left pleural thickening.    Assessment and Plan:   73 yo woman with PMH of DM2, CHF, afib, asthma, and Hypothyroidism, was referred from cardiologist office for CHF exacerbation and was found with a positive COVID.   She has worsening SOB over the past 7-10 days as well as worsening leg edema and also complaining of diarrhea for 3 weeks.   She lives alone and as far as she knows didn't have contact with a sick person.   Had COVID vaccines x 2 and one booster.     Nontoxic, leukocytosis due to C diff, started going down, no fever.     1- COVID 19   - BMP, CBC w diff, NLR. Procalcitonin, Ferritin, CRP, LDH and D dimer for the start and periodically can be repeated.   - Chest CT with a stable mosaic pattern and nodules  - Completed Remdesivir for 3 days total   - Spo2 normal, no need for Dexamethasone, Watch O2 sat closely   - No antibiotics at this time.  - Anticoagulation as per protocol    2- C diff:  - Continue vancomycin to complete 10days total   - Leukocytosis improving 19-->14  - Ceat improving too 1.7 today.     From ID stand point can be discharged.   Will follow PRN.     Murtaza Juarez MD  Division of Infectious Diseases   Please call ID service at [phone] with any question.      35minutes spent on total encounter assessing patient, examination, chart review, counseling and coordinating care by the attending physician/nurse/care manager.      
[facility] -- Nils Johnson,  Adrián, Jim Hunter Savella, Goodger  Office # [phone]    Follow Up:  CHF    Subjective/Observations: seen and examined, awake, alert, sitting up in the chair, c/o having diarrhea overnight, denies chest pain, dyspnea, palpitations or dizziness, orthopnea and PND. Tolerating room air.    REVIEW OF SYSTEMS: All other review of systems is negative unless indicated above  PAST MEDICAL & SURGICAL HISTORY:  Hypothyroidism      HLD (hyperlipidemia)      Thrombocytosis      Persistent atrial fibrillation      Obesity (BMI 35.0-39.9 without comorbidity)      Asthma      Diabetes mellitus      Ankle injury  on 2004, 5 surgeries.      Cholecystitis        MEDICATIONS  (STANDING):  anagrelide 1.5 milliGRAM(s) Oral <User Schedule>  escitalopram 20 milliGRAM(s) Oral daily  furosemide   Injectable 40 milliGRAM(s) IV Push two times a day  levothyroxine 125 MICROGram(s) Oral daily  loratadine 10 milliGRAM(s) Oral daily  metoprolol succinate ER 50 milliGRAM(s) Oral daily  potassium chloride    Tablet ER 40 milliEquivalent(s) Oral every 4 hours  remdesivir  IVPB 100 milliGRAM(s) IV Intermittent every 24 hours  remdesivir  IVPB   IV Intermittent   rivaroxaban 15 milliGRAM(s) Oral with dinner    MEDICATIONS  (PRN):  acetaminophen     Tablet .. 650 milliGRAM(s) Oral every 6 hours PRN Temp greater or equal to 38C (100.4F), Mild Pain (1 - 3)  ondansetron    Tablet 4 milliGRAM(s) Oral every 6 hours PRN Nausea and/or Vomiting    Allergies    No Known Allergies    Intolerances      Vital Signs Last 24 Hrs  T(C): 36.7 (13 Jan 2023 12:02), Max: 36.9 (12 Jan 2023 18:00)  T(F): 98.1 (13 Jan 2023 12:02), Max: 98.5 (12 Jan 2023 18:00)  HR: 71 (13 Jan 2023 12:02) (61 - 82)  BP: 128/65 (13 Jan 2023 13:05) (118/69 - 160/72)  BP(mean): --  RR: 18 (13 Jan 2023 12:02) (18 - 19)  SpO2: 95% (13 Jan 2023 12:02) (92% - 98%)    Parameters below as of 13 Jan 2023 12:02  Patient On (Oxygen Delivery Method): room air      I&O's Summary    12 Jan 2023 07:01  -  13 Jan 2023 07:00  --------------------------------------------------------  IN: 300 mL / OUT: 500 mL / NET: -200 mL        TELE: SR 70's   PHYSICAL EXAM:  Constitutional: NAD, awake and alert, well-developed  HEENT: Moist Mucous Membranes, Anicteric  Pulmonary: Non-labored, breath sounds are clear bilaterally, No wheezing, rales or rhonchi  Cardiovascular: Regular, S1 and S2, No murmurs, rubs, gallops or clicks  Gastrointestinal: Bowel Sounds present, soft, nontender.   Lymph: 1+ peripheral edema. No lymphadenopathy.  Skin: No visible rashes or ulcers.  Psych:  Mood & affect appropriate  LABS: All Labs Reviewed:                        9.5    17.19 )-----------( 327      ( 13 Jan 2023 10:25 )             31.7                         8.6    13.02 )-----------( 321      ( 13 Jan 2023 02:52 )             29.1                         9.4    11.80 )-----------( 279      ( 12 Jan 2023 12:08 )             32.2     13 Jan 2023 10:25    143    |  106    |  19     ----------------------------<  125    3.5     |  32     |  1.50   13 Jan 2023 02:52    146    |  105    |  17     ----------------------------<  84     3.0     |  34     |  1.40   12 Jan 2023 21:05    144    |  104    |  18     ----------------------------<  88     2.5     |  33     |  1.30     Ca    8.0        13 Jan 2023 10:25  Ca    7.5        13 Jan 2023 02:52  Ca    7.7        12 Jan 2023 21:05  Phos  2.8       13 Jan 2023 10:25  Phos  2.5       13 Jan 2023 02:52  Phos  2.7       13 Jan 2023 00:01  Mg     2.0       13 Jan 2023 10:25  Mg     1.9       13 Jan 2023 02:52  Mg     1.9       13 Jan 2023 00:01    TPro  6.2    /  Alb  2.7    /  TBili  0.5    /  DBili  0.1    /  AST  14     /  ALT  10     /  AlkPhos  71     13 Jan 2023 10:25  TPro  5.9    /  Alb  2.6    /  TBili  0.5    /  DBili  0.2    /  AST  12     /  ALT  6      /  AlkPhos  69     13 Jan 2023 02:52  TPro  6.3    /  Alb  2.9    /  TBili  0.5    /  DBili  0.1    /  AST  10     /  ALT  9      /  AlkPhos  69     12 Jan 2023 16:00    PT/INR - ( 12 Jan 2023 12:08 )   PT: 18.4 sec;   INR: 1.57 ratio         PTT - ( 12 Jan 2023 12:08 )  PTT:36.2 sec  CARDIAC MARKERS ( 12 Jan 2023 21:05 )  x     / x     / 30 U/L / x     / <1.0 ng/mL  CARDIAC MARKERS ( 12 Jan 2023 16:00 )  x     / x     / 31 U/L / x     / <1.0 ng/mL      12 Lead ECG:   Ventricular Rate 57 BPM    QRS Duration 84 ms    Q-T Interval 496 ms    QTC Calculation(Bazett) 482 ms    R Axis 18 degrees    T Axis -20 degrees    Diagnosis Line Atrial fibrillation with slow ventricular response with premature ventricular or aberrantly conducted complexes  ST & T wave abnormality, consider inferior ischemia  ST & T wave abnormality, consider anterior ischemia  Prolonged QT  Abnormal ECG    Confirmed by katelyn Chisholm ([id number]) on 1/12/2023 3:35:31 PM (01-12-23 @ 11:39)      ACC: [id number] EXAM:  ECHO TTE WO CON COMP W DOPP                          PROCEDURE DATE:  08/16/2022          INTERPRETATION:  INDICATION: Dyspnea  Sonographer KL    Blood Pressure 129/65    Height 167.6 cm     Weight 75.3 kg       BSA   1.85 sq m    Dimensions:  LA 4.6       Normal Values: 2.0 - 4.0 cm  Ao 3.2        Normal Values: 2.0 - 3.8 cm  SEPTUM 1.5       Normal Values: 0.6 - 1.2 cm  PWT 1.0       Normal Values: 0.6 - 1.1 cm  LVIDd 4.6         Normal Values: 3.0 - 5.6 cm  LVIDs 2.8    Normal Values: 1.8 - 4.0 cm      OBSERVATIONS:  Mitral Valve: Moderate MR.  Aortic Valve/Aorta: Sclerotic trileaflet aortic valve with grossly normal   opening. Trace AI  Tricuspid Valve: normal with trace TR.  Pulmonic Valve: Trace PI  Left Atrium: normal  Right Atrium: Not well-visualized  Left Ventricle: Left ventricular hypertrophy with normal systolic   function, estimated LVEF of 65%. Basal septal hypertrophy is noted  Right Ventricle: Grossly normal size and systolic function.  Pericardium: no significant pericardial effusion.  Pulmonary/RV Pressure: estimated PA systolic pressure of 50 mmHg assuming   an RA pressure of 8 mmHg.  IVC is dilated at 2.6 cm        IMPRESSION:  Left ventricular hypertrophy with normal systolic function,estimated   LVEF of 65%.  Grossly normal RV size and systolic function.  Sclerotic trileaflet aortic valve, trace AI.  Moderate MR  Trace TR.  No significant pericardial effusion.    --- End of Report ---            NEYMAR ROWLAND MD; Attending Cardiologist  This document has been electronically signed. Aug 17 2022  2:19PM     
[facility] -- Adrián Wray, Jim Hunter Savella, Goodger: Office # [phone]    Follow Up: CHF     Subjective/Observations: Patient seen and examined. Patient awake, alert, resting in bed. No complaints of chest pain, dyspnea, palpitations or dizziness. No signs of orthopnea or PND. Tolerating room air.     REVIEW OF SYSTEMS: All other review of systems are negative unless indicated above    PAST MEDICAL & SURGICAL HISTORY:  Hypothyroidism      Hypothyroidism      HLD (hyperlipidemia)      Thrombocytosis      Persistent atrial fibrillation      Obesity (BMI 35.0-39.9 without comorbidity)      Asthma      Diabetes mellitus      Ankle injury  on 2004, 5 surgeries.      Cholecystitis    MEDICATIONS  (STANDING):  anagrelide 1.5 milliGRAM(s) Oral <User Schedule>  escitalopram 20 milliGRAM(s) Oral daily  levothyroxine 125 MICROGram(s) Oral daily  loratadine 10 milliGRAM(s) Oral daily  metoprolol succinate ER 50 milliGRAM(s) Oral daily  rivaroxaban 15 milliGRAM(s) Oral with dinner  sodium chloride 0.45%. 1000 milliLiter(s) (75 mL/Hr) IV Continuous <Continuous>  vancomycin    Solution 125 milliGRAM(s) Oral every 6 hours    MEDICATIONS  (PRN):  acetaminophen     Tablet .. 650 milliGRAM(s) Oral every 6 hours PRN Temp greater or equal to 38C (100.4F), Mild Pain (1 - 3)  ondansetron    Tablet 4 milliGRAM(s) Oral every 6 hours PRN Nausea and/or Vomiting  oxycodone    5 mG/acetaminophen 325 mG 1 Tablet(s) Oral every 6 hours PRN Severe Pain (7 - 10)    Allergies  No Known Allergies    Vital Signs Last 24 Hrs  T(C): 36.9 (16 Jan 2023 04:27), Max: 36.9 (16 Jan 2023 04:27)  T(F): 98.5 (16 Jan 2023 04:27), Max: 98.5 (16 Jan 2023 04:27)  HR: 76 (16 Jan 2023 04:27) (61 - 76)  BP: 118/74 (16 Jan 2023 04:27) (118/74 - 144/81)  BP(mean): --  RR: 18 (16 Jan 2023 04:27) (18 - 18)  SpO2: 92% (16 Jan 2023 04:27) (92% - 95%)    Parameters below as of 16 Jan 2023 04:27  Patient On (Oxygen Delivery Method): room air      I&O's Summary    15 Mark 2023 07:01  -  16 Jan 2023 07:00  --------------------------------------------------------  IN: 900 mL / OUT: 0 mL / NET: 900 mL    TELE: Not on telemetry   PHYSICAL EXAM:  Constitutional: NAD, awake and alert, Well developed   HEENT: Moist Mucous Membranes, Anicteric  Pulmonary: Non-labored, breath sounds are clear bilaterally, No wheezing, rales or rhonchi  Cardiovascular: Regular, S1 and S2, + murmurs, No rubs, gallops or clicks  Gastrointestinal:  soft, nontender, nondistended   Lymph: +1 peripheral edema. No lymphadenopathy.   Skin: No visible rashes or ulcers.  Psych:  Mood & affect appropriate      LABS: All Labs Reviewed:                        9.2    16.30 )-----------( 416      ( 15 Mark 2023 07:35 )             31.6                         8.9    17.32 )-----------( 403      ( 14 Jan 2023 09:38 )             29.3     16 Jan 2023 08:00    140    |  105    |  31     ----------------------------<  67     4.2     |  30     |  2.00   15 Mark 2023 07:35    142    |  104    |  25     ----------------------------<  63     4.1     |  33     |  1.90   14 Jan 2023 09:38    141    |  105    |  22     ----------------------------<  137    4.3     |  32     |  1.90     Ca    8.0        16 Jan 2023 08:00  Ca    8.1        15 Mark 2023 07:35  Ca    8.2        14 Jan 2023 09:38  Phos  3.8       15 Mark 2023 07:35  Mg     1.9       15 Mark 2023 07:35    TPro  6.4    /  Alb  2.6    /  TBili  0.6    /  DBili  0.2    /  AST  13     /  ALT  7      /  AlkPhos  85     16 Jan 2023 08:00  TPro  6.1    /  Alb  2.6    /  TBili  0.4    /  DBili  0.2    /  AST  10     /  ALT  <6     /  AlkPhos  79     15 Jan 2023 07:35  TPro  6.2    /  Alb  2.8    /  TBili  0.4    /  DBili  0.2    /  AST  14     /  ALT  8      /  AlkPhos  74     14 Jan 2023 09:38   LIVER FUNCTIONS - ( 16 Jan 2023 08:00 )  Alb: 2.6 g/dL / Pro: 6.4 g/dL / ALK PHOS: 85 U/L / ALT: 7 U/L / AST: 13 U/L / GGT: x           Creatine Kinase, Serum: 30 U/L (01-12-23 @ 21:05)  Troponin I, High Sensitivity Result: 58.2 ng/L (01-12-23 @ 21:05)  Creatine Kinase, Serum: 31 U/L (01-12-23 @ 16:00)  Troponin I, High Sensitivity Result: 60.9 ng/L (01-12-23 @ 16:00)  Troponin I, High Sensitivity Result: 69.2 ng/L (01-12-23 @ 12:08)    12 Lead ECG:   Ventricular Rate 57 BPM    QRS Duration 84 ms    Q-T Interval 496 ms    QTC Calculation(Bazett) 482 ms    R Axis 18 degrees    T Axis -20 degrees    Diagnosis Line Atrial fibrillation with slow ventricular response with premature ventricular or aberrantly conducted complexes  ST & T wave abnormality, consider inferior ischemia  ST & T wave abnormality, consider anterior ischemia  Prolonged QT  Abnormal ECG    Confirmed by katelyn Chisholm ([id number]) on 1/12/2023 3:35:31 PM (01-12-23 @ 11:39)      ACC: [id number] EXAM:  ECHO TTE WO CON COMP W DOPP                          PROCEDURE DATE:  08/16/2022          INTERPRETATION:  INDICATION: Dyspnea  Sonographer KL    Blood Pressure 129/65    Height 167.6 cm     Weight 75.3 kg       BSA   1.85 sq m    Dimensions:  LA 4.6       Normal Values: 2.0 - 4.0 cm  Ao 3.2        Normal Values: 2.0 - 3.8 cm  SEPTUM 1.5       Normal Values: 0.6 - 1.2 cm  PWT 1.0       Normal Values: 0.6 - 1.1 cm  LVIDd 4.6         Normal Values: 3.0 - 5.6 cm  LVIDs 2.8    Normal Values: 1.8 - 4.0 cm      OBSERVATIONS:  Mitral Valve: Moderate MR.  Aortic Valve/Aorta: Sclerotic trileaflet aortic valve with grossly normal   opening. Trace AI  Tricuspid Valve: normal with trace TR.  Pulmonic Valve: Trace PI  Left Atrium: normal  Right Atrium: Not well-visualized  Left Ventricle: Left ventricular hypertrophy with normal systolic   function, estimated LVEF of 65%. Basal septal hypertrophy is noted  Right Ventricle: Grossly normal size and systolic function.  Pericardium: no significant pericardial effusion.  Pulmonary/RV Pressure: estimated PA systolic pressure of 50 mmHg assuming   an RA pressure of 8 mmHg.  IVC is dilated at 2.6 cm        IMPRESSION:  Left ventricular hypertrophy with normal systolic function,estimated   LVEF of 65%.  Grossly normal RV size and systolic function.  Sclerotic trileaflet aortic valve, trace AI.  Moderate MR  Trace TR.  No significant pericardial effusion.    --- End of Report ---  NEYMAR ROWLAND MD; Attending Cardiologist  This document has been electronically signed. Aug 17 2022  2:19PM  
[facility] -- Adrián Wray, Jim Hunter Savella, Goodger: Office # [phone]    Follow Up: Acute on Chronic CHF, COVID     Subjective/Observations: Patient awake, alert, resting in bed. Denies chest pain, palpitations and dizziness. Denies any difficulty breathing. No orthopnea and PND. Tolerating room air.     REVIEW OF SYSTEMS: All other review of systems are negative unless indicated above    PAST MEDICAL & SURGICAL HISTORY:  Hypothyroidism      Hypothyroidism      HLD (hyperlipidemia)      Thrombocytosis      Persistent atrial fibrillation      Obesity (BMI 35.0-39.9 without comorbidity)      Asthma      Diabetes mellitus      Ankle injury  on 2004, 5 surgeries.      Cholecystitis    MEDICATIONS  (STANDING):  anagrelide 1.5 milliGRAM(s) Oral <User Schedule>  escitalopram 20 milliGRAM(s) Oral daily  levothyroxine 125 MICROGram(s) Oral daily  loratadine 10 milliGRAM(s) Oral daily  metoprolol succinate ER 50 milliGRAM(s) Oral daily  rivaroxaban 15 milliGRAM(s) Oral with dinner  sodium chloride 0.45%. 1000 milliLiter(s) (100 mL/Hr) IV Continuous <Continuous>  vancomycin    Solution 125 milliGRAM(s) Oral every 6 hours    MEDICATIONS  (PRN):  acetaminophen     Tablet .. 650 milliGRAM(s) Oral every 6 hours PRN Temp greater or equal to 38C (100.4F), Mild Pain (1 - 3)  ondansetron    Tablet 4 milliGRAM(s) Oral every 6 hours PRN Nausea and/or Vomiting  oxycodone    5 mG/acetaminophen 325 mG 1 Tablet(s) Oral every 6 hours PRN Severe Pain (7 - 10)    Allergies  No Known Allergies    Vital Signs Last 24 Hrs  T(C): 36.9 (18 Jan 2023 04:00), Max: 36.9 (17 Jan 2023 19:29)  T(F): 98.4 (18 Jan 2023 04:00), Max: 98.4 (17 Jan 2023 19:29)  HR: 87 (18 Jan 2023 04:00) (63 - 96)  BP: 116/76 (18 Jan 2023 04:00) (116/76 - 122/73)  BP(mean): --  RR: 18 (18 Jan 2023 04:00) (18 - 18)  SpO2: 98% (18 Jan 2023 04:00) (92% - 98%)    Parameters below as of 18 Jan 2023 04:00  Patient On (Oxygen Delivery Method): room air      I&O's Summary    17 Jan 2023 07:01  -  18 Jan 2023 07:00  --------------------------------------------------------  IN: 0 mL / OUT: 200 mL / NET: -200 mL          TELE: Not on telemetry   PHYSICAL EXAM:  Constitutional: NAD, awake and alert, Well developed   HEENT: Moist Mucous Membranes, Anicteric  Pulmonary: Non-labored, breath sounds are clear bilaterally, No wheezing, rales or rhonchi  Cardiovascular: Regular, S1 and S2, + murmurs, No rubs, gallops or clicks  Gastrointestinal:  soft, nontender, nondistended   Lymph: trace peripheral edema. No lymphadenopathy.   Skin: No visible rashes or ulcers.  Psych:  Mood & affect appropriate      LABS: All Labs Reviewed:                        8.3    14.74 )-----------( 259      ( 18 Jan 2023 07:38 )             28.1                         9.1    19.13 )-----------( 315      ( 17 Jan 2023 08:32 )             31.2                         9.8    20.09 )-----------( 389      ( 16 Jan 2023 08:00 )             33.2     18 Jan 2023 07:38    139    |  104    |  33     ----------------------------<  96     3.5     |  30     |  1.70   17 Jan 2023 08:32    138    |  103    |  31     ----------------------------<  88     3.8     |  31     |  1.80   16 Jan 2023 08:00    140    |  105    |  31     ----------------------------<  67     4.2     |  30     |  2.00     Ca    7.6        18 Jan 2023 07:38  Ca    7.8        17 Jan 2023 08:32  Ca    8.0        16 Jan 2023 08:00    TPro  6.0    /  Alb  2.4    /  TBili  0.5    /  DBili  0.2    /  AST  10     /  ALT  <6     /  AlkPhos  67     18 Jan 2023 07:38  TPro  6.3    /  Alb  2.6    /  TBili  0.8    /  DBili  0.2    /  AST  13     /  ALT  6      /  AlkPhos  76     17 Jan 2023 08:32  TPro  6.4    /  Alb  2.6    /  TBili  0.6    /  DBili  0.2    /  AST  13     /  ALT  7      /  AlkPhos  85     16 Jan 2023 08:00   LIVER FUNCTIONS - ( 18 Jan 2023 07:38 )  Alb: 2.4 g/dL / Pro: 6.0 g/dL / ALK PHOS: 67 U/L / ALT: <6 U/L / AST: 10 U/L / GGT: x           Creatine Kinase, Serum: 30 U/L (01-12-23 @ 21:05)  Troponin I, High Sensitivity Result: 58.2 ng/L (01-12-23 @ 21:05)  Creatine Kinase, Serum: 31 U/L (01-12-23 @ 16:00)  Troponin I, High Sensitivity Result: 60.9 ng/L (01-12-23 @ 16:00)  Troponin I, High Sensitivity Result: 69.2 ng/L (01-12-23 @ 12:08)    12 Lead ECG:   Ventricular Rate 57 BPM    QRS Duration 84 ms    Q-T Interval 496 ms    QTC Calculation(Bazett) 482 ms    R Axis 18 degrees    T Axis -20 degrees    Diagnosis Line Atrial fibrillation with slow ventricular response with premature ventricular or aberrantly conducted complexes  ST & T wave abnormality, consider inferior ischemia  ST & T wave abnormality, consider anterior ischemia  Prolonged QT  Abnormal ECG    Confirmed by katelyn Chisholm ([id number]) on 1/12/2023 3:35:31 PM (01-12-23 @ 11:39)      ACC: [id number] EXAM:  ECHO TTE WO CON COMP W DOPP                          PROCEDURE DATE:  08/16/2022          INTERPRETATION:  INDICATION: Dyspnea  Sonographer KL    Blood Pressure 129/65    Height 167.6 cm     Weight 75.3 kg       BSA   1.85 sq m    Dimensions:  LA 4.6       Normal Values: 2.0 - 4.0 cm  Ao 3.2        Normal Values: 2.0 - 3.8 cm  SEPTUM 1.5       Normal Values: 0.6 - 1.2 cm  PWT 1.0       Normal Values: 0.6 - 1.1 cm  LVIDd 4.6         Normal Values: 3.0 - 5.6 cm  LVIDs 2.8    Normal Values: 1.8 - 4.0 cm      OBSERVATIONS:  Mitral Valve: Moderate MR.  Aortic Valve/Aorta: Sclerotic trileaflet aortic valve with grossly normal   opening. Trace AI  Tricuspid Valve: normal with trace TR.  Pulmonic Valve: Trace PI  Left Atrium: normal  Right Atrium: Not well-visualized  Left Ventricle: Left ventricular hypertrophy with normal systolic   function, estimated LVEF of 65%. Basal septal hypertrophy is noted  Right Ventricle: Grossly normal size and systolic function.  Pericardium: no significant pericardial effusion.  Pulmonary/RV Pressure: estimated PA systolic pressure of 50 mmHg assuming   an RA pressure of 8 mmHg.  IVC is dilated at 2.6 cm    IMPRESSION:  Left ventricular hypertrophy with normal systolic function,estimated   LVEF of 65%.  Grossly normal RV size and systolic function.  Sclerotic trileaflet aortic valve, trace AI.  Moderate MR  Trace TR.  No significant pericardial effusion.  --- End of Report ---  NEYMAR ROWLAND MD; Attending Cardiologist  This document has been electronically signed. Aug 17 2022  2:19PM  
[facility] -- Adrián Wray, Jim Hunter [place], [place]: Office # [phone]      Follow Up: Acute on Chronic CHF, COVID     Subjective/Observations: Patient awake, alert, resting in bed. Denies chest pain, palpitations and dizziness. Denies any difficulty breathing. No orthopnea and PND. Tolerating room air.     REVIEW OF SYSTEMS: All other review of systems are negative unless indicated above    PAST MEDICAL & SURGICAL HISTORY:  Hypothyroidism      Hypothyroidism      HLD (hyperlipidemia)      Thrombocytosis      Persistent atrial fibrillation      Obesity (BMI 35.0-39.9 without comorbidity)      Asthma      Diabetes mellitus      Ankle injury  on 2004, 5 surgeries.      Cholecystitis    MEDICATIONS  (STANDING):  anagrelide 1.5 milliGRAM(s) Oral <User Schedule>  escitalopram 20 milliGRAM(s) Oral daily  levothyroxine 125 MICROGram(s) Oral daily  loratadine 10 milliGRAM(s) Oral daily  metoprolol succinate ER 50 milliGRAM(s) Oral daily  rivaroxaban 15 milliGRAM(s) Oral with dinner  sodium chloride 0.45%. 1000 milliLiter(s) (100 mL/Hr) IV Continuous <Continuous>  vancomycin    Solution 125 milliGRAM(s) Oral every 6 hours    MEDICATIONS  (PRN):  acetaminophen     Tablet .. 650 milliGRAM(s) Oral every 6 hours PRN Temp greater or equal to 38C (100.4F), Mild Pain (1 - 3)  ondansetron    Tablet 4 milliGRAM(s) Oral every 6 hours PRN Nausea and/or Vomiting  oxycodone    5 mG/acetaminophen 325 mG 1 Tablet(s) Oral every 6 hours PRN Severe Pain (7 - 10)    Allergies  No Known Allergies    Vital Signs Last 24 Hrs  T(C): 36.8 (19 Jan 2023 04:14), Max: 37 (18 Jan 2023 20:16)  T(F): 98.3 (19 Jan 2023 04:14), Max: 98.6 (18 Jan 2023 20:16)  HR: 84 (19 Jan 2023 04:14) (84 - 86)  BP: 108/71 (19 Jan 2023 04:14) (108/56 - 120/60)  BP(mean): --  RR: 18 (19 Jan 2023 04:14) (18 - 18)  SpO2: 94% (19 Jan 2023 04:14) (93% - 96%)    Parameters below as of 19 Jan 2023 04:14  Patient On (Oxygen Delivery Method): room air      I&O's Summary        TELE: Not on telemetry   PHYSICAL EXAM:  Constitutional: NAD, awake and alert, Well developed   HEENT: Moist Mucous Membranes, Anicteric  Pulmonary: Non-labored, breath sounds are clear bilaterally, No wheezing, rales or rhonchi  Cardiovascular: Regular, S1 and S2, + murmurs, No rubs, gallops or clicks  Gastrointestinal:  soft, nontender, nondistended   Lymph: trace peripheral edema. No lymphadenopathy.   Skin: No visible rashes or ulcers.  Psych:  Mood & affect appropriate      LABS: All Labs Reviewed:                        8.2    13.71 )-----------( 221      ( 19 Jan 2023 07:35 )             27.7                         8.3    14.74 )-----------( 259      ( 18 Jan 2023 07:38 )             28.1                         9.1    19.13 )-----------( 315      ( 17 Jan 2023 08:32 )             31.2     19 Jan 2023 07:35    140    |  103    |  31     ----------------------------<  74     3.5     |  29     |  1.60   18 Jan 2023 07:38    139    |  104    |  33     ----------------------------<  96     3.5     |  30     |  1.70   17 Jan 2023 08:32    138    |  103    |  31     ----------------------------<  88     3.8     |  31     |  1.80     Ca    7.8        19 Jan 2023 07:35  Ca    7.6        18 Jan 2023 07:38  Ca    7.8        17 Jan 2023 08:32    TPro  6.0    /  Alb  2.4    /  TBili  0.6    /  DBili  0.2    /  AST  10     /  ALT  <6     /  AlkPhos  69     19 Jan 2023 07:35  TPro  6.0    /  Alb  2.4    /  TBili  0.5    /  DBili  0.2    /  AST  10     /  ALT  <6     /  AlkPhos  67     18 Jan 2023 07:38  TPro  6.3    /  Alb  2.6    /  TBili  0.8    /  DBili  0.2    /  AST  13     /  ALT  6      /  AlkPhos  76     17 Jan 2023 08:32   LIVER FUNCTIONS - ( 19 Jan 2023 07:35 )  Alb: 2.4 g/dL / Pro: 6.0 g/dL / ALK PHOS: 69 U/L / ALT: <6 U/L / AST: 10 U/L / GGT: x           Creatine Kinase, Serum: 30 U/L (01-12-23 @ 21:05)  Troponin I, High Sensitivity Result: 58.2 ng/L (01-12-23 @ 21:05)  Creatine Kinase, Serum: 31 U/L (01-12-23 @ 16:00)  Troponin I, High Sensitivity Result: 60.9 ng/L (01-12-23 @ 16:00)  Troponin I, High Sensitivity Result: 69.2 ng/L (01-12-23 @ 12:08)    12 Lead ECG:   Ventricular Rate 57 BPM    QRS Duration 84 ms    Q-T Interval 496 ms    QTC Calculation(Bazett) 482 ms    R Axis 18 degrees    T Axis -20 degrees    Diagnosis Line Atrial fibrillation with slow ventricular response with premature ventricular or aberrantly conducted complexes  ST & T wave abnormality, consider inferior ischemia  ST & T wave abnormality, consider anterior ischemia  Prolonged QT  Abnormal ECG    Confirmed by katelyn Chisholm ([id number]) on 1/12/2023 3:35:31 PM (01-12-23 @ 11:39)      ACC: [id number] EXAM:  ECHO TTE WO CON COMP W DOPP                          PROCEDURE DATE:  01/17/2023          INTERPRETATION:  INDICATION: Abnormal EKG  Sonographer KL    Blood Pressure 124/67    Height 168 cm     Weight 78 kg       BSA 1.88 sq   m    Dimensions:  LA 4.1       Normal Values: 2.0 - 4.0 cm  Ao 3.1        Normal Values: 2.0 - 3.8 cm  SEPTUM 1.6       Normal Values: 0.6 - 1.2 cm  PWT 1.2       Normal Values: 0.6 - 1.1 cm  LVIDd 3.8         Normal Values: 3.0 - 5.6 cm  LVIDs 2.8     Normal Values: 1.8 - 4.0 cm      OBSERVATIONS:  Mitral Valve: Moderate MR.  Aortic Valve/Aorta: Sclerotic trileaflet aortic valve with grossly normal   opening. Mild AI  Tricuspid Valve: Moderate TR.  Pulmonic Valve: Trace PI  Left Atrium: Enlarged  Right Atrium: Enlarged  Left Ventricle: Left ventricular hypertrophy with normal systolic   function, estimated LVEF of 65%. Basal septal hypertrophy is noted  Right Ventricle: Grossly normal size and systolic function.  Pericardium: no significant pericardial effusion.  Pulmonary/RV Pressure: estimated PA systolic pressure of 70mmHg assuming   an RA pressure of 3mmHg.  IVC measures 1.4 cm      IMPRESSION:  Left ventricular hypertrophy with normal systolic function, estimated   LVEF of 65%.  Grossly normal RV size and systolic function.  Biatrial enlargement  Sclerotic trileaflet aortic valve, mild AI.  Moderate MR and TR.  No significant pericardial effusion.    --- End of Report ---  NEYMAR ROWLAND MD; Attending Cardiologist  This document has been electronically signed. Jan 18 2023 12:56PM  
Patient is a 72y old  Female who presents with a chief complaint of Acute on Chronic CHF, COVID (12 Jan 2023 20:25)      INTERVAL HPI/OVERNIGHT EVENTS: Patient seen and examined at bedside, sitting in chair attempting to eat breakfast. Patient is complaining of crampy abdominal pain rated 10/10, and states that she didn't sleep well despite receiving Melatonin. As per RN, patient continues to have diarrhea; however there is some soft consistency and not entirely watery. Endorses weakness, fatigue, dry cough, SOB, abd pain, N/V (dry heaving), and chronic paresthesias of the toes. Denies fevers, headache, sore throat, chest pain, palpitations.    MEDICATIONS  (STANDING):  anagrelide 1.5 milliGRAM(s) Oral <User Schedule>  escitalopram 20 milliGRAM(s) Oral daily  furosemide   Injectable 40 milliGRAM(s) IV Push two times a day  levothyroxine 125 MICROGram(s) Oral daily  loratadine 10 milliGRAM(s) Oral daily  metoprolol succinate ER 50 milliGRAM(s) Oral daily  potassium chloride    Tablet ER 40 milliEquivalent(s) Oral every 4 hours  remdesivir  IVPB 100 milliGRAM(s) IV Intermittent every 24 hours  remdesivir  IVPB   IV Intermittent   rivaroxaban 15 milliGRAM(s) Oral with dinner    MEDICATIONS  (PRN):  acetaminophen     Tablet .. 650 milliGRAM(s) Oral every 6 hours PRN Temp greater or equal to 38C (100.4F), Mild Pain (1 - 3)  ondansetron    Tablet 4 milliGRAM(s) Oral every 6 hours PRN Nausea and/or Vomiting      Allergies    No Known Allergies    Intolerances        REVIEW OF SYSTEMS:  CONSTITUTIONAL: + weakness, fatigue. No fever or chills  HEENT:  No headache, no sore throat  RESPIRATORY: + dry cough, shortness of breath, orthopnea. No wheezing  CARDIOVASCULAR: No chest pain, palpitations  GASTROINTESTINAL: + abd pain, nausea, dry heaving, diarrhea  GENITOURINARY: No dysuria, frequency, or hematuria  NEUROLOGICAL: + chronic paresthesias of B/L toes. no focal weakness or dizziness  MUSCULOSKELETAL: no myalgias     Vital Signs Last 24 Hrs  T(C): 36.7 (13 Jan 2023 12:02), Max: 36.9 (12 Jan 2023 18:00)  T(F): 98.1 (13 Jan 2023 12:02), Max: 98.5 (12 Jan 2023 18:00)  HR: 71 (13 Jan 2023 12:02) (61 - 82)  BP: 135/70 (13 Jan 2023 12:02) (118/69 - 160/72)  BP(mean): --  RR: 18 (13 Jan 2023 12:02) (18 - 19)  SpO2: 95% (13 Jan 2023 12:02) (92% - 98%)    Parameters below as of 13 Jan 2023 12:02  Patient On (Oxygen Delivery Method): room air        PHYSICAL EXAM:  GENERAL: NAD  HEENT:  anicteric, moist mucous membranes  CHEST/LUNG:  decreased breath sounds at bases B/L. No wheezes, or rhonchi  HEART:  irregularly irregular, S1, S2  ABDOMEN:  BS+, soft, lower abdominal region mild TTP nondistended  EXTREMITIES: 3+ pitting edema, cyanosis, or calf tenderness  NERVOUS SYSTEM: answers questions and follows commands appropriately    LABS:                        9.5    17.19 )-----------( 327      ( 13 Jan 2023 10:25 )             31.7     CBC Full  -  ( 13 Jan 2023 10:25 )  WBC Count : 17.19 K/uL  Hemoglobin : 9.5 g/dL  Hematocrit : 31.7 %  Platelet Count - Automated : 327 K/uL  Mean Cell Volume : 85.4 fl  Mean Cell Hemoglobin : 25.6 pg  Mean Cell Hemoglobin Concentration : 30.0 gm/dL  Auto Neutrophil # : x  Auto Lymphocyte # : x  Auto Monocyte # : x  Auto Eosinophil # : x  Auto Basophil # : x  Auto Neutrophil % : x  Auto Lymphocyte % : x  Auto Monocyte % : x  Auto Eosinophil % : x  Auto Basophil % : x    13 Jan 2023 10:25    143    |  106    |  19     ----------------------------<  125    3.5     |  32     |  1.50     Ca    8.0        13 Jan 2023 10:25  Phos  2.8       13 Jan 2023 10:25  Mg     2.0       13 Jan 2023 10:25    TPro  6.2    /  Alb  2.7    /  TBili  0.5    /  DBili  0.1    /  AST  14     /  ALT  10     /  AlkPhos  71     13 Jan 2023 10:25    PT/INR - ( 12 Jan 2023 12:08 )   PT: 18.4 sec;   INR: 1.57 ratio         PTT - ( 12 Jan 2023 12:08 )  PTT:36.2 sec    CAPILLARY BLOOD GLUCOSE      POCT Blood Glucose.: 123 mg/dL (13 Jan 2023 11:48)  POCT Blood Glucose.: 86 mg/dL (13 Jan 2023 08:32)          RADIOLOGY & ADDITIONAL TESTS:    Personally reviewed.     Consultant(s) Notes Reviewed:  [x] YES  [ ] NO    
Patient is a 72y old  Female who presents with a chief complaint of Acute on Chronic CHF, COVID (13 Jan 2023 13:22)       HPI:  Ms Ornelas is a 71 yo F sent to the ED by her cardiologist with acute CHF exacerbation. PMH HFpEF, Hypothyroidism, Thrombocytosis, HLD, Asthma,  A fib on Xarelto.   Patient seen and examined at bedside. She reports worsening SOB over the past 7-10 days as well as worsening leg edema. She has been in contact with her cardiologist who increased her lasix from 40mg daily to 80mg in AM and 40mg in PM [total 120mg daily] for a duration of 4 days. She went to see her cardiologist today who recommended patient come to the ER.   She reports worsening orthopnea for about 1 week as well as worsening SOB. She denies any associated chest pain/pressure, palpitations.   She was noted to be covid positive in the ER today. Denies any known sick contacts. She Received a total of 3 doses of pfizer vaccine. She admits to approx 5-7 days of loose water stools without any melena/hematochezia.   Denies headaches, nausea, vomiting, chest pain,  palpitations, abdominal pain, constipation, dysuria.  (12 Jan 2023 15:28)   Has not seen nephrologist in the past.  Roman any N/V.  Has fatigue and significant diarrhea.  Urinating without issue.      No new complaints; urinating well per patient    PAST MEDICAL & SURGICAL HISTORY:  Hypothyroidism      HLD (hyperlipidemia)      Thrombocytosis      Persistent atrial fibrillation      Obesity (BMI 35.0-39.9 without comorbidity)      Asthma      Diabetes mellitus      Ankle injury  on 2004, 5 surgeries.      Cholecystitis           FAMILY HISTORY:  Family history of early CAD (Father)    Family history of early CAD (Mother)    [place]    Social History:Non smoker    MEDICATIONS  (STANDING):  anagrelide 1.5 milliGRAM(s) Oral <User Schedule>  escitalopram 20 milliGRAM(s) Oral daily  levothyroxine 125 MICROGram(s) Oral daily  loratadine 10 milliGRAM(s) Oral daily  metoprolol succinate ER 50 milliGRAM(s) Oral daily  rivaroxaban 15 milliGRAM(s) Oral with dinner  sodium chloride 0.45%. 1000 milliLiter(s) (100 mL/Hr) IV Continuous <Continuous>  vancomycin    Solution 125 milliGRAM(s) Oral every 6 hours    MEDICATIONS  (PRN):  acetaminophen     Tablet .. 650 milliGRAM(s) Oral every 6 hours PRN Temp greater or equal to 38C (100.4F), Mild Pain (1 - 3)  ondansetron    Tablet 4 milliGRAM(s) Oral every 6 hours PRN Nausea and/or Vomiting  oxycodone    5 mG/acetaminophen 325 mG 1 Tablet(s) Oral every 6 hours PRN Severe Pain (7 - 10)      Allergies    No Known Allergies    Intolerances         REVIEW OF SYSTEMS:    Review of Systems:   Constitutional: Denies fatigue  HEENT: Denies headaches and dizziness  Respiratory: denies SOB, cough, or wheezing  Cardiovascular: denies CP, palpitations  Gastrointestinal: Denies nausea, denies vomiting, diarrhea, constipation, abdominal pain, or bloody stools  Genitourinary: denies painful urination, increased frequency, urgency, or bloody urine  Skin: denies rashes or itching  Musculoskeletal: denies muscle aches, joint swelling  Neurologic: Denies generalized weakness, denies loss of sensation, numbness, or tingling    ICU Vital Signs Last 24 Hrs  T(C): 36.7 (19 Jan 2023 11:30), Max: 37 (18 Jan 2023 20:16)  T(F): 98 (19 Jan 2023 11:30), Max: 98.6 (18 Jan 2023 20:16)  HR: 86 (19 Jan 2023 11:30) (84 - 86)  BP: 123/76 (19 Jan 2023 11:30) (108/56 - 123/76)  BP(mean): --  ABP: --  ABP(mean): --  RR: 18 (19 Jan 2023 11:30) (18 - 18)  SpO2: 94% (19 Jan 2023 11:30) (93% - 94%)    O2 Parameters below as of 19 Jan 2023 11:30  Patient On (Oxygen Delivery Method): room air          PHYSICAL EXAM:    GENERAL: NAD  HEAD:  Atraumatic, Normocephalic  EYES: EOMI, conjunctiva and sclera clear  ENMT: No Drainage from nares, No drainage from ears  NECK: Supple, neck  veins full  NERVOUS SYSTEM:  Awake and Alert  CHEST/LUNG: Clear to percussion bilaterally; No rales, rhonchi, wheezing, or rubs  HEART: Regular rate and rhythm; No murmurs, rubs, or gallops  ABDOMEN: Soft, Nontender, Nondistended; Bowel sounds present  EXTREMITIES:  trace Edema  SKIN: No rashes No obvious ecchymosis      LABS:                                              8.2    13.71 )-----------( 221      ( 19 Jan 2023 07:35 )             27.7     01-19    140  |  103  |  31<H>  ----------------------------<  74  3.5   |  29  |  1.60<H>    Ca    7.8<L>      19 Jan 2023 07:35    TPro  6.0  /  Alb  2.4<L>  /  TBili  0.6  /  DBili  0.2  /  AST  10<L>  /  ALT  <6<L>  /  AlkPhos  69  01-19            
Patient is a 72y old  Female who presents with a chief complaint of Acute on Chronic CHF, COVID (13 Jan 2023 13:22)       HPI:  Ms Ornelas is a 71 yo F sent to the ED by her cardiologist with acute CHF exacerbation. PMH HFpEF, Hypothyroidism, Thrombocytosis, HLD, Asthma,  A fib on Xarelto.   Patient seen and examined at bedside. She reports worsening SOB over the past 7-10 days as well as worsening leg edema. She has been in contact with her cardiologist who increased her lasix from 40mg daily to 80mg in AM and 40mg in PM [total 120mg daily] for a duration of 4 days. She went to see her cardiologist today who recommended patient come to the ER.   She reports worsening orthopnea for about 1 week as well as worsening SOB. She denies any associated chest pain/pressure, palpitations.   She was noted to be covid positive in the ER today. Denies any known sick contacts. She Received a total of 3 doses of pfizer vaccine. She admits to approx 5-7 days of loose water stools without any melena/hematochezia.   Denies headaches, nausea, vomiting, chest pain,  palpitations, abdominal pain, constipation, dysuria.  (12 Jan 2023 15:28)   Has not seen nephrologist in the past.  Roman any N/V.  Has fatigue and significant diarrhea.  Urinating without issue.      abd pain, diarrhea still present. No N.  Urinating without issue    PAST MEDICAL & SURGICAL HISTORY:  Hypothyroidism      HLD (hyperlipidemia)      Thrombocytosis      Persistent atrial fibrillation      Obesity (BMI 35.0-39.9 without comorbidity)      Asthma      Diabetes mellitus      Ankle injury  on 2004, 5 surgeries.      Cholecystitis           FAMILY HISTORY:  Family history of early CAD (Father)    Family history of early CAD (Mother)    [place]    Social History:Non smoker    MEDICATIONS  (STANDING):  anagrelide 1.5 milliGRAM(s) Oral <User Schedule>  escitalopram 20 milliGRAM(s) Oral daily  furosemide   Injectable 40 milliGRAM(s) IV Push two times a day  levothyroxine 125 MICROGram(s) Oral daily  loratadine 10 milliGRAM(s) Oral daily  metoprolol succinate ER 50 milliGRAM(s) Oral daily  potassium chloride    Tablet ER 40 milliEquivalent(s) Oral every 4 hours  remdesivir  IVPB 100 milliGRAM(s) IV Intermittent every 24 hours  remdesivir  IVPB   IV Intermittent   rivaroxaban 15 milliGRAM(s) Oral with dinner    MEDICATIONS  (PRN):  acetaminophen     Tablet .. 650 milliGRAM(s) Oral every 6 hours PRN Temp greater or equal to 38C (100.4F), Mild Pain (1 - 3)  ondansetron    Tablet 4 milliGRAM(s) Oral every 6 hours PRN Nausea and/or Vomiting   Meds reviewed    Allergies    No Known Allergies    Intolerances         REVIEW OF SYSTEMS:    Review of Systems:   Constitutional: Denies fatigue  HEENT: Denies headaches and dizziness  Respiratory: denies SOB, cough, or wheezing  Cardiovascular: denies CP, palpitations  Gastrointestinal: Denies nausea, denies vomiting, diarrhea, constipation, abdominal pain, or bloody stools  Genitourinary: denies painful urination, increased frequency, urgency, or bloody urine  Skin: denies rashes or itching  Musculoskeletal: denies muscle aches, joint swelling  Neurologic: Denies generalized weakness, denies loss of sensation, numbness, or tingling      ICU Vital Signs Last 24 Hrs  T(C): 36.8 (15 Mark 2023 04:25), Max: 37 (14 Jan 2023 20:02)  T(F): 98.3 (15 Mark 2023 04:25), Max: 98.6 (14 Jan 2023 20:02)  HR: 82 (15 Mark 2023 04:25) (67 - 82)  BP: 105/52 (15 Mark 2023 04:25) (105/52 - 143/81)  BP(mean): --  ABP: --  ABP(mean): --  RR: 18 (15 Mark 2023 04:25) (17 - 18)  SpO2: 94% (15 Mark 2023 04:25) (93% - 96%)    O2 Parameters below as of 15 Mark 2023 04:25  Patient On (Oxygen Delivery Method): room air          PHYSICAL EXAM:    GENERAL: NAD  HEAD:  Atraumatic, Normocephalic  EYES: EOMI, conjunctiva and sclera clear  ENMT: No Drainage from nares, No drainage from ears  NECK: Supple, neck  veins full  NERVOUS SYSTEM:  Awake and Alert  CHEST/LUNG: Clear to percussion bilaterally; No rales, rhonchi, wheezing, or rubs  HEART: Regular rate and rhythm; No murmurs, rubs, or gallops  ABDOMEN: Soft, Nontender, Nondistended; Bowel sounds present  EXTREMITIES:  trace Edema  SKIN: No rashes No obvious ecchymosis      LABS:                                   9.2    16.30 )-----------( 416      ( 15 Mark 2023 07:35 )             31.6     01-15    142  |  104  |  25<H>  ----------------------------<  63<L>  4.1   |  33<H>  |  1.90<H>    Ca    8.1<L>      15 Mark 2023 07:35  Phos  3.8     01-15  Mg     1.9     01-15    TPro  6.1  /  Alb  2.6<L>  /  TBili  0.4  /  DBili  0.2  /  AST  10<L>  /  ALT  <6<L>  /  AlkPhos  79  01-15          
Patient is a 72y old  Female who presents with a chief complaint of Acute on Chronic CHF, COVID (13 Jan 2023 13:22)       HPI:  Ms Ornelas is a 73 yo F sent to the ED by her cardiologist with acute CHF exacerbation. PMH HFpEF, Hypothyroidism, Thrombocytosis, HLD, Asthma,  A fib on Xarelto.   Patient seen and examined at bedside. She reports worsening SOB over the past 7-10 days as well as worsening leg edema. She has been in contact with her cardiologist who increased her lasix from 40mg daily to 80mg in AM and 40mg in PM [total 120mg daily] for a duration of 4 days. She went to see her cardiologist today who recommended patient come to the ER.   She reports worsening orthopnea for about 1 week as well as worsening SOB. She denies any associated chest pain/pressure, palpitations.   She was noted to be covid positive in the ER today. Denies any known sick contacts. She Received a total of 3 doses of pfizer vaccine. She admits to approx 5-7 days of loose water stools without any melena/hematochezia.   Denies headaches, nausea, vomiting, chest pain,  palpitations, abdominal pain, constipation, dysuria.  (12 Jan 2023 15:28)   Has not seen nephrologist in the past.  Roman any N/V.  Has fatigue and significant diarrhea.  Urinating without issue.      abd pain, diarrhea still present. No N.  Urinating without issue    PAST MEDICAL & SURGICAL HISTORY:  Hypothyroidism      HLD (hyperlipidemia)      Thrombocytosis      Persistent atrial fibrillation      Obesity (BMI 35.0-39.9 without comorbidity)      Asthma      Diabetes mellitus      Ankle injury  on 2004, 5 surgeries.      Cholecystitis           FAMILY HISTORY:  Family history of early CAD (Father)    Family history of early CAD (Mother)    NC    Social History:Non smoker    MEDICATIONS  (STANDING):  anagrelide 1.5 milliGRAM(s) Oral <User Schedule>  escitalopram 20 milliGRAM(s) Oral daily  furosemide   Injectable 40 milliGRAM(s) IV Push two times a day  levothyroxine 125 MICROGram(s) Oral daily  loratadine 10 milliGRAM(s) Oral daily  metoprolol succinate ER 50 milliGRAM(s) Oral daily  potassium chloride    Tablet ER 40 milliEquivalent(s) Oral every 4 hours  remdesivir  IVPB 100 milliGRAM(s) IV Intermittent every 24 hours  remdesivir  IVPB   IV Intermittent   rivaroxaban 15 milliGRAM(s) Oral with dinner    MEDICATIONS  (PRN):  acetaminophen     Tablet .. 650 milliGRAM(s) Oral every 6 hours PRN Temp greater or equal to 38C (100.4F), Mild Pain (1 - 3)  ondansetron    Tablet 4 milliGRAM(s) Oral every 6 hours PRN Nausea and/or Vomiting   Meds reviewed    Allergies    No Known Allergies    Intolerances         REVIEW OF SYSTEMS:    Review of Systems:   Constitutional: Denies fatigue  HEENT: Denies headaches and dizziness  Respiratory: denies SOB, cough, or wheezing  Cardiovascular: denies CP, palpitations  Gastrointestinal: Denies nausea, denies vomiting, diarrhea, constipation, abdominal pain, or bloody stools  Genitourinary: denies painful urination, increased frequency, urgency, or bloody urine  Skin: denies rashes or itching  Musculoskeletal: denies muscle aches, joint swelling  Neurologic: Denies generalized weakness, denies loss of sensation, numbness, or tingling      ICU Vital Signs Last 24 Hrs  T(C): 37.4 (16 Jan 2023 13:00), Max: 37.4 (16 Jan 2023 13:00)  T(F): 99.4 (16 Jan 2023 13:00), Max: 99.4 (16 Jan 2023 13:00)  HR: 80 (16 Jan 2023 13:00) (61 - 80)  BP: 116/73 (16 Jan 2023 13:00) (116/73 - 144/81)  BP(mean): --  ABP: --  ABP(mean): --  RR: 18 (16 Jan 2023 13:00) (18 - 18)  SpO2: 94% (16 Jan 2023 13:00) (92% - 95%)    O2 Parameters below as of 16 Jan 2023 13:00  Patient On (Oxygen Delivery Method): room air            PHYSICAL EXAM:    GENERAL: NAD  HEAD:  Atraumatic, Normocephalic  EYES: EOMI, conjunctiva and sclera clear  ENMT: No Drainage from nares, No drainage from ears  NECK: Supple, neck  veins full  NERVOUS SYSTEM:  Awake and Alert  CHEST/LUNG: Clear to percussion bilaterally; No rales, rhonchi, wheezing, or rubs  HEART: Regular rate and rhythm; No murmurs, rubs, or gallops  ABDOMEN: Soft, Nontender, Nondistended; Bowel sounds present  EXTREMITIES:  trace Edema  SKIN: No rashes No obvious ecchymosis      LABS:                                              9.8    20.09 )-----------( 389      ( 16 Jan 2023 08:00 )             33.2     01-16    140  |  105  |  31<H>  ----------------------------<  67<L>  4.2   |  30  |  2.00<H>    Ca    8.0<L>      16 Jan 2023 08:00  Phos  3.8     01-15  Mg     1.9     01-15    TPro  6.4  /  Alb  2.6<L>  /  TBili  0.6  /  DBili  0.2  /  AST  13<L>  /  ALT  7<L>  /  AlkPhos  85  01-16                    
Patient is a 72y old  Female who presents with a chief complaint of Acute on Chronic CHF, COVID (14 Jan 2023 12:22)      INTERVAL HPI/OVERNIGHT EVENTS: Patient seen and examined at bedside, resting in bed. States that abdominal pain persists, but Percocet is adequately controlling pain and diarrhea has improved. Patient also reports that BM looked orange yesterday but is now clear today with a "jelly-like consistency." +C.diff, on PO Vancomycin. Denies fevers, chills, headache, lightheadedness, chest pain, dyspnea, n/v/d/c.    MEDICATIONS  (STANDING):  anagrelide 1.5 milliGRAM(s) Oral <User Schedule>  escitalopram 20 milliGRAM(s) Oral daily  levothyroxine 125 MICROGram(s) Oral daily  loratadine 10 milliGRAM(s) Oral daily  metoprolol succinate ER 50 milliGRAM(s) Oral daily  rivaroxaban 15 milliGRAM(s) Oral with dinner  vancomycin    Solution 125 milliGRAM(s) Oral every 6 hours    MEDICATIONS  (PRN):  acetaminophen     Tablet .. 650 milliGRAM(s) Oral every 6 hours PRN Temp greater or equal to 38C (100.4F), Mild Pain (1 - 3)  ondansetron    Tablet 4 milliGRAM(s) Oral every 6 hours PRN Nausea and/or Vomiting  oxycodone    5 mG/acetaminophen 325 mG 1 Tablet(s) Oral every 6 hours PRN Severe Pain (7 - 10)      Allergies    No Known Allergies    Intolerances        REVIEW OF SYSTEMS:  CONSTITUTIONAL: No fever or chills  HEENT:  No headache, no sore throat  RESPIRATORY: No cough, wheezing, or shortness of breath  CARDIOVASCULAR: No chest pain, palpitations  GASTROINTESTINAL:+ abd pain. no nausea, vomiting, or diarrhea  GENITOURINARY: No dysuria, frequency, or hematuria  NEUROLOGICAL: no focal weakness or dizziness  MUSCULOSKELETAL: no myalgias     Vital Signs Last 24 Hrs  T(C): 36.8 (15 Mark 2023 04:25), Max: 37 (14 Jan 2023 20:02)  T(F): 98.3 (15 Mark 2023 04:25), Max: 98.6 (14 Jan 2023 20:02)  HR: 82 (15 Mark 2023 04:25) (67 - 82)  BP: 105/52 (15 Mark 2023 04:25) (105/52 - 143/81)  BP(mean): --  RR: 18 (15 Mark 2023 04:25) (17 - 18)  SpO2: 94% (15 Mark 2023 04:25) (93% - 96%)    Parameters below as of 15 Mark 2023 04:25  Patient On (Oxygen Delivery Method): room air        PHYSICAL EXAM:  GENERAL: NAD  HEENT:  anicteric, moist mucous membranes  CHEST/LUNG:  CTA b/l, no rales, wheezes, or rhonchi  HEART:  RRR, S1, S2  ABDOMEN:  BS+, soft, lower abdominal region mild TTP nondistended  EXTREMITIES: no edema, cyanosis, or calf tenderness  NERVOUS SYSTEM: answers questions and follows commands appropriately    LABS:                        8.9    17.32 )-----------( 403      ( 14 Jan 2023 09:38 )             29.3     CBC Full  -  ( 14 Jan 2023 09:38 )  WBC Count : 17.32 K/uL  Hemoglobin : 8.9 g/dL  Hematocrit : 29.3 %  Platelet Count - Automated : 403 K/uL  Mean Cell Volume : 85.2 fl  Mean Cell Hemoglobin : 25.9 pg  Mean Cell Hemoglobin Concentration : 30.4 gm/dL  Auto Neutrophil # : x  Auto Lymphocyte # : x  Auto Monocyte # : x  Auto Eosinophil # : x  Auto Basophil # : x  Auto Neutrophil % : x  Auto Lymphocyte % : x  Auto Monocyte % : x  Auto Eosinophil % : x  Auto Basophil % : x    14 Jan 2023 09:38    141    |  105    |  22     ----------------------------<  137    4.3     |  32     |  1.90     Ca    8.2        14 Jan 2023 09:38    TPro  6.2    /  Alb  2.8    /  TBili  0.4    /  DBili  0.2    /  AST  14     /  ALT  8      /  AlkPhos  74     14 Jan 2023 09:38        CAPILLARY BLOOD GLUCOSE              RADIOLOGY & ADDITIONAL TESTS:    Personally reviewed.     Consultant(s) Notes Reviewed:  [x] YES  [ ] NO    
[facility] -- Nils Johnson,  Adrián, Jim Hunter Savella, Goodger  Office # [phone]    Follow Up:  CHF    Subjective/Observations: seen and examined, awake, alert, denies chest pain, dyspnea, palpitations or dizziness, orthopnea and PND. Tolerating room air.      REVIEW OF SYSTEMS: All other review of systems is negative unless indicated above  PAST MEDICAL & SURGICAL HISTORY:  Hypothyroidism      HLD (hyperlipidemia)      Thrombocytosis      Persistent atrial fibrillation      Obesity (BMI 35.0-39.9 without comorbidity)      Asthma      Diabetes mellitus      Ankle injury  on 2004, 5 surgeries.      Cholecystitis        MEDICATIONS  (STANDING):  anagrelide 1.5 milliGRAM(s) Oral <User Schedule>  escitalopram 20 milliGRAM(s) Oral daily  furosemide   Injectable 40 milliGRAM(s) IV Push two times a day  levothyroxine 125 MICROGram(s) Oral daily  loratadine 10 milliGRAM(s) Oral daily  metoprolol succinate ER 50 milliGRAM(s) Oral daily  remdesivir  IVPB 100 milliGRAM(s) IV Intermittent every 24 hours  remdesivir  IVPB   IV Intermittent   rivaroxaban 15 milliGRAM(s) Oral with dinner  vancomycin    Solution 125 milliGRAM(s) Oral every 6 hours    MEDICATIONS  (PRN):  acetaminophen     Tablet .. 650 milliGRAM(s) Oral every 6 hours PRN Temp greater or equal to 38C (100.4F), Mild Pain (1 - 3)  ondansetron    Tablet 4 milliGRAM(s) Oral every 6 hours PRN Nausea and/or Vomiting  oxycodone    5 mG/acetaminophen 325 mG 1 Tablet(s) Oral every 6 hours PRN Severe Pain (7 - 10)    Allergies    No Known Allergies    Intolerances      Vital Signs Last 24 Hrs  T(C): 36.8 (14 Jan 2023 04:35), Max: 36.8 (14 Jan 2023 04:35)  T(F): 98.3 (14 Jan 2023 04:35), Max: 98.3 (14 Jan 2023 04:35)  HR: 66 (14 Jan 2023 04:35) (66 - 91)  BP: 116/74 (14 Jan 2023 04:35) (116/74 - 135/70)  BP(mean): --  RR: 18 (14 Jan 2023 04:35) (18 - 18)  SpO2: 93% (14 Jan 2023 04:35) (90% - 95%)    Parameters below as of 14 Jan 2023 04:35  Patient On (Oxygen Delivery Method): room air      I&O's Summary      TELE: SR 60-80's with PACs up until 2300 yesterday, refused tele overnight   PHYSICAL EXAM:  Constitutional: NAD, awake and alert, well-developed  HEENT: Moist Mucous Membranes, Anicteric  Pulmonary: Non-labored, breath sounds are clear bilaterally, No wheezing, rales or rhonchi  Cardiovascular: Regular, S1 and S2, No murmurs, rubs, gallops or clicks  Gastrointestinal: Bowel Sounds present, soft, nontender.   Lymph: 1+ peripheral edema. No lymphadenopathy.  Skin: No visible rashes or ulcers.  Psych:  Mood & affect appropriate  LABS: All Labs Reviewed:                        9.5    17.19 )-----------( 327      ( 13 Jan 2023 10:25 )             31.7                         8.6    13.02 )-----------( 321      ( 13 Jan 2023 02:52 )             29.1                         9.4    11.80 )-----------( 279      ( 12 Jan 2023 12:08 )             32.2     13 Jan 2023 10:25    143    |  106    |  19     ----------------------------<  125    3.5     |  32     |  1.50   13 Jan 2023 02:52    146    |  105    |  17     ----------------------------<  84     3.0     |  34     |  1.40   12 Jan 2023 21:05    144    |  104    |  18     ----------------------------<  88     2.5     |  33     |  1.30     Ca    8.0        13 Jan 2023 10:25  Ca    7.5        13 Jan 2023 02:52  Ca    7.7        12 Jan 2023 21:05  Phos  2.8       13 Jan 2023 10:25  Phos  2.5       13 Jan 2023 02:52  Phos  2.7       13 Jan 2023 00:01  Mg     2.0       13 Jan 2023 10:25  Mg     1.9       13 Jan 2023 02:52  Mg     1.9       13 Jan 2023 00:01    TPro  6.2    /  Alb  2.7    /  TBili  0.5    /  DBili  0.1    /  AST  14     /  ALT  10     /  AlkPhos  71     13 Jan 2023 10:25  TPro  5.9    /  Alb  2.6    /  TBili  0.5    /  DBili  0.2    /  AST  12     /  ALT  6      /  AlkPhos  69     13 Jan 2023 02:52  TPro  6.3    /  Alb  2.9    /  TBili  0.5    /  DBili  0.1    /  AST  10     /  ALT  9      /  AlkPhos  69     12 Jan 2023 16:00    PT/INR - ( 12 Jan 2023 12:08 )   PT: 18.4 sec;   INR: 1.57 ratio         PTT - ( 12 Jan 2023 12:08 )  PTT:36.2 sec  CARDIAC MARKERS ( 12 Jan 2023 21:05 )  x     / x     / 30 U/L / x     / <1.0 ng/mL  CARDIAC MARKERS ( 12 Jan 2023 16:00 )  x     / x     / 31 U/L / x     / <1.0 ng/mL      12 Lead ECG:   Ventricular Rate 57 BPM    QRS Duration 84 ms    Q-T Interval 496 ms    QTC Calculation(Bazett) 482 ms    R Axis 18 degrees    T Axis -20 degrees    Diagnosis Line Atrial fibrillation with slow ventricular response with premature ventricular or aberrantly conducted complexes  ST & T wave abnormality, consider inferior ischemia  ST & T wave abnormality, consider anterior ischemia  Prolonged QT  Abnormal ECG    Confirmed by katelyn Chisholm ([id number]) on 1/12/2023 3:35:31 PM (01-12-23 @ 11:39)      ACC: [id number] EXAM:  ECHO TTE WO CON COMP W DOPP                          PROCEDURE DATE:  08/16/2022          INTERPRETATION:  INDICATION: Dyspnea  Sonographer KL    Blood Pressure 129/65    Height 167.6 cm     Weight 75.3 kg       BSA   1.85 sq m    Dimensions:  LA 4.6       Normal Values: 2.0 - 4.0 cm  Ao 3.2        Normal Values: 2.0 - 3.8 cm  SEPTUM 1.5       Normal Values: 0.6 - 1.2 cm  PWT 1.0       Normal Values: 0.6 - 1.1 cm  LVIDd 4.6         Normal Values: 3.0 - 5.6 cm  LVIDs 2.8    Normal Values: 1.8 - 4.0 cm      OBSERVATIONS:  Mitral Valve: Moderate MR.  Aortic Valve/Aorta: Sclerotic trileaflet aortic valve with grossly normal   opening. Trace AI  Tricuspid Valve: normal with trace TR.  Pulmonic Valve: Trace PI  Left Atrium: normal  Right Atrium: Not well-visualized  Left Ventricle: Left ventricular hypertrophy with normal systolic   function, estimated LVEF of 65%. Basal septal hypertrophy is noted  Right Ventricle: Grossly normal size and systolic function.  Pericardium: no significant pericardial effusion.  Pulmonary/RV Pressure: estimated PA systolic pressure of 50 mmHg assuming   an RA pressure of 8 mmHg.  IVC is dilated at 2.6 cm        IMPRESSION:  Left ventricular hypertrophy with normal systolic function,estimated   LVEF of 65%.  Grossly normal RV size and systolic function.  Sclerotic trileaflet aortic valve, trace AI.  Moderate MR  Trace TR.  No significant pericardial effusion.    --- End of Report ---            NEYMAR ROWLAND MD; Attending Cardiologist  This document has been electronically signed. Aug 17 2022  2:19PM     
[facility] -- Nils Johnson,  Adrián, Jim Hunter Savella, Goodger  Office # [phone]    Follow Up:  CHD     Subjective/Observations: seen and examined, awake, alert, sitting up in bed eating breakfast. denies chest pain, dyspnea, palpitations or dizziness. No signs of orthopnea or PND. Tolerating room air.     REVIEW OF SYSTEMS: All other review of systems is negative unless indicated above  PAST MEDICAL & SURGICAL HISTORY:  Hypothyroidism      HLD (hyperlipidemia)      Thrombocytosis      Persistent atrial fibrillation      Obesity (BMI 35.0-39.9 without comorbidity)      Asthma      Diabetes mellitus      Ankle injury  on 2004, 5 surgeries.      Cholecystitis        MEDICATIONS  (STANDING):  anagrelide 1.5 milliGRAM(s) Oral <User Schedule>  escitalopram 20 milliGRAM(s) Oral daily  levothyroxine 125 MICROGram(s) Oral daily  loratadine 10 milliGRAM(s) Oral daily  metoprolol succinate ER 50 milliGRAM(s) Oral daily  rivaroxaban 15 milliGRAM(s) Oral with dinner  sodium chloride 0.45%. 1000 milliLiter(s) (100 mL/Hr) IV Continuous <Continuous>  vancomycin    Solution 125 milliGRAM(s) Oral every 6 hours    MEDICATIONS  (PRN):  acetaminophen     Tablet .. 650 milliGRAM(s) Oral every 6 hours PRN Temp greater or equal to 38C (100.4F), Mild Pain (1 - 3)  ondansetron    Tablet 4 milliGRAM(s) Oral every 6 hours PRN Nausea and/or Vomiting  oxycodone    5 mG/acetaminophen 325 mG 1 Tablet(s) Oral every 6 hours PRN Severe Pain (7 - 10)    Allergies    No Known Allergies    Intolerances      Vital Signs Last 24 Hrs  T(C): 37 (17 Jan 2023 04:30), Max: 37.4 (16 Jan 2023 13:00)  T(F): 98.6 (17 Jan 2023 04:30), Max: 99.4 (16 Jan 2023 13:00)  HR: 85 (17 Jan 2023 04:30) (80 - 98)  BP: 124/67 (17 Jan 2023 04:30) (111/58 - 124/67)  BP(mean): --  RR: 18 (17 Jan 2023 04:30) (18 - 18)  SpO2: 98% (17 Jan 2023 04:30) (94% - 98%)    Parameters below as of 17 Jan 2023 04:30  Patient On (Oxygen Delivery Method): nasal cannula  O2 Flow (L/min): 2    I&O's Summary    16 Jan 2023 07:01  -  17 Jan 2023 07:00  --------------------------------------------------------  IN: 500 mL / OUT: 0 mL / NET: 500 mL        TELE: Not on telemetry   PHYSICAL EXAM:  Constitutional: NAD, awake and alert, well-developed  HEENT: Moist Mucous Membranes, Anicteric  Pulmonary: Non-labored, breath sounds are clear bilaterally, No wheezing, rales or rhonchi  Cardiovascular: Regular, S1 and S2, + murmurs, rubs, gallops or clicks  Gastrointestinal: Bowel Sounds present, soft, nontender.   Lymph: +1 b/l LE edema. No lymphadenopathy.  Skin: No visible rashes or ulcers.  Psych:  Mood & affect appropriate  LABS: All Labs Reviewed:                        9.1    19.13 )-----------( 315      ( 17 Jan 2023 08:32 )             31.2                         9.8    20.09 )-----------( 389      ( 16 Jan 2023 08:00 )             33.2                         9.2    16.30 )-----------( 416      ( 15 Mark 2023 07:35 )             31.6     17 Jan 2023 08:32    138    |  103    |  31     ----------------------------<  88     3.8     |  31     |  1.80   16 Jan 2023 08:00    140    |  105    |  31     ----------------------------<  67     4.2     |  30     |  2.00   15 Mark 2023 07:35    142    |  104    |  25     ----------------------------<  63     4.1     |  33     |  1.90     Ca    7.8        17 Jan 2023 08:32  Ca    8.0        16 Jan 2023 08:00  Ca    8.1        15 Mark 2023 07:35  Phos  3.8       15 Mark 2023 07:35  Mg     1.9       15 Mark 2023 07:35    TPro  6.3    /  Alb  2.6    /  TBili  0.8    /  DBili  0.2    /  AST  13     /  ALT  6      /  AlkPhos  76     17 Jan 2023 08:32  TPro  6.4    /  Alb  2.6    /  TBili  0.6    /  DBili  0.2    /  AST  13     /  ALT  7      /  AlkPhos  85     16 Jan 2023 08:00  TPro  6.1    /  Alb  2.6    /  TBili  0.4    /  DBili  0.2    /  AST  10     /  ALT  <6     /  AlkPhos  79     15 Mark 2023 07:35          12 Lead ECG:   Ventricular Rate 57 BPM    QRS Duration 84 ms    Q-T Interval 496 ms    QTC Calculation(Bazett) 482 ms    R Axis 18 degrees    T Axis -20 degrees    Diagnosis Line Atrial fibrillation with slow ventricular response with premature ventricular or aberrantly conducted complexes  ST & T wave abnormality, consider inferior ischemia  ST & T wave abnormality, consider anterior ischemia  Prolonged QT  Abnormal ECG    Confirmed by katelyn Chisholm ([id number]) on 1/12/2023 3:35:31 PM (01-12-23 @ 11:39)      ACC: [id number] EXAM:  ECHO TTE WO CON COMP W DOPP                          PROCEDURE DATE:  08/16/2022          INTERPRETATION:  INDICATION: Dyspnea  Sonographer KL    Blood Pressure 129/65    Height 167.6 cm     Weight 75.3 kg       BSA   1.85 sq m    Dimensions:  LA 4.6       Normal Values: 2.0 - 4.0 cm  Ao 3.2        Normal Values: 2.0 - 3.8 cm  SEPTUM 1.5       Normal Values: 0.6 - 1.2 cm  PWT 1.0       Normal Values: 0.6 - 1.1 cm  LVIDd 4.6         Normal Values: 3.0 - 5.6 cm  LVIDs 2.8    Normal Values: 1.8 - 4.0 cm      OBSERVATIONS:  Mitral Valve: Moderate MR.  Aortic Valve/Aorta: Sclerotic trileaflet aortic valve with grossly normal   opening. Trace AI  Tricuspid Valve: normal with trace TR.  Pulmonic Valve: Trace PI  Left Atrium: normal  Right Atrium: Not well-visualized  Left Ventricle: Left ventricular hypertrophy with normal systolic   function, estimated LVEF of 65%. Basal septal hypertrophy is noted  Right Ventricle: Grossly normal size and systolic function.  Pericardium: no significant pericardial effusion.  Pulmonary/RV Pressure: estimated PA systolic pressure of 50 mmHg assuming   an RA pressure of 8 mmHg.  IVC is dilated at 2.6 cm        IMPRESSION:  Left ventricular hypertrophy with normal systolic function,estimated   LVEF of 65%.  Grossly normal RV size and systolic function.  Sclerotic trileaflet aortic valve, trace AI.  Moderate MR  Trace TR.  No significant pericardial effusion.    --- End of Report ---            ENYMAR ROWLAND MD; Attending Cardiologist  This document has been electronically signed. Aug 17 2022  2:19PM     
Patient is a 72y old  Female who presents with a chief complaint of Acute on Chronic CHF, COVID (2023 09:39)      Subjective:  INTERVAL HPI/OVERNIGHT EVENTS: Patient seen and examined at bedside. Patient had episode of hematuria this AM, unknown etiology. Patient also reports significant improvement in diarrhea from days before. Patient has no complaints at this time. Denies fevers, chills, headache, lightheadedness, chest pain, dyspnea, abdominal pain.    MEDICATIONS  (STANDING):  anagrelide 1.5 milliGRAM(s) Oral <User Schedule>  escitalopram 20 milliGRAM(s) Oral daily  levothyroxine 125 MICROGram(s) Oral daily  loratadine 10 milliGRAM(s) Oral daily  metoprolol succinate ER 50 milliGRAM(s) Oral daily  rivaroxaban 15 milliGRAM(s) Oral with dinner  sodium chloride 0.45%. 1000 milliLiter(s) (100 mL/Hr) IV Continuous <Continuous>  vancomycin    Solution 125 milliGRAM(s) Oral every 6 hours    MEDICATIONS  (PRN):  acetaminophen     Tablet .. 650 milliGRAM(s) Oral every 6 hours PRN Temp greater or equal to 38C (100.4F), Mild Pain (1 - 3)  ondansetron    Tablet 4 milliGRAM(s) Oral every 6 hours PRN Nausea and/or Vomiting  oxycodone    5 mG/acetaminophen 325 mG 1 Tablet(s) Oral every 6 hours PRN Severe Pain (7 - 10)      Allergies    No Known Allergies    Intolerances        REVIEW OF SYSTEMS:  CONSTITUTIONAL: No fever or chills  HEENT:  No headache, no sore throat  RESPIRATORY: No cough, wheezing, or shortness of breath  CARDIOVASCULAR: No chest pain, palpitations  GASTROINTESTINAL: + diarrhea; No abd pain, nausea, vomiting  GENITOURINARY: No dysuria, frequency, or hematuria  NEUROLOGICAL: no focal weakness or dizziness  MUSCULOSKELETAL: no myalgias     Objective:  Vital Signs Last 24 Hrs  T(C): 37 (2023 04:30), Max: 37.4 (2023 13:00)  T(F): 98.6 (2023 04:30), Max: 99.4 (2023 13:00)  HR: 85 (2023 04:30) (80 - 98)  BP: 124/67 (2023 04:30) (111/58 - 124/67)  BP(mean): --  RR: 18 (2023 04:30) (18 - 18)  SpO2: 98% (2023 04:30) (94% - 98%)    Parameters below as of 2023 04:30  Patient On (Oxygen Delivery Method): nasal cannula  O2 Flow (L/min): 2      GENERAL: NAD, lying in bed comfortably  HEAD:  Atraumatic, Normocephalic  EYES: EOMI, PERRLA, conjunctiva and sclera clear  ENT: Moist mucous membranes  CHEST/LUNG: crackles at lung bases b/l;  No rales, rhonchi, wheezing, or rubs. Unlabored respirations. on RA   HEART: Regular rate and rhythm; No murmurs, rubs, or gallops  ABDOMEN: Bowel sounds present; Soft, Nontender, Nondistended  NERVOUS SYSTEM:  Alert & Oriented X3, speech clear. No deficits   MSK: FROM all 4 extremities, full and equal strength  SKIN: No rashes or lesions    LABS:                        9.1    19.13 )-----------( 315      ( 2023 08:32 )             31.2     CBC Full  -  ( 2023 08:32 )  WBC Count : 19.13 K/uL  Hemoglobin : 9.1 g/dL  Hematocrit : 31.2 %  Platelet Count - Automated : 315 K/uL  Mean Cell Volume : 85.7 fl  Mean Cell Hemoglobin : 25.0 pg  Mean Cell Hemoglobin Concentration : 29.2 gm/dL  Auto Neutrophil # : x  Auto Lymphocyte # : x  Auto Monocyte # : x  Auto Eosinophil # : x  Auto Basophil # : x  Auto Neutrophil % : x  Auto Lymphocyte % : x  Auto Monocyte % : x  Auto Eosinophil % : x  Auto Basophil % : x    2023 08:32    138    |  103    |  31     ----------------------------<  88     3.8     |  31     |  1.80     Ca    7.8        2023 08:32    TPro  6.3    /  Alb  2.6    /  TBili  0.8    /  DBili  0.2    /  AST  13     /  ALT  6      /  AlkPhos  76     2023 08:32      Urinalysis Basic - ( 2023 06:21 )    Color: Red / Appearance: Turbid / S.015 / pH: x  Gluc: x / Ketone: Trace  / Bili: Negative / Urobili: Negative   Blood: x / Protein: 100 / Nitrite: Negative   Leuk Esterase: Small / RBC: >50 /HPF / WBC >50   Sq Epi: x / Non Sq Epi: Occasional / Bacteria: Many      CAPILLARY BLOOD GLUCOSE              RADIOLOGY & ADDITIONAL TESTS:    Personally reviewed.     Consultant(s) Notes Reviewed:  [x] YES  [ ] NO    
Patient is a 72y old  Female who presents with a chief complaint of Acute on Chronic CHF, COVID (2023 11:48)      Subjective:  INTERVAL HPI/OVERNIGHT EVENTS: Patient seen and examined at bedside. No overnight events occurred. Patient has no complaints at this time, reports that symptoms are improved signficantly and she will be ready to go home tomorrow. Denies fevers, chills, headache, lightheadedness, chest pain, dyspnea, abdominal pain.    MEDICATIONS  (STANDING):  anagrelide 1.5 milliGRAM(s) Oral <User Schedule>  escitalopram 20 milliGRAM(s) Oral daily  levothyroxine 125 MICROGram(s) Oral daily  loratadine 10 milliGRAM(s) Oral daily  metoprolol succinate ER 50 milliGRAM(s) Oral daily  rivaroxaban 15 milliGRAM(s) Oral with dinner  sodium chloride 0.45%. 1000 milliLiter(s) (100 mL/Hr) IV Continuous <Continuous>  vancomycin    Solution 125 milliGRAM(s) Oral every 6 hours    MEDICATIONS  (PRN):  acetaminophen     Tablet .. 650 milliGRAM(s) Oral every 6 hours PRN Temp greater or equal to 38C (100.4F), Mild Pain (1 - 3)  ondansetron    Tablet 4 milliGRAM(s) Oral every 6 hours PRN Nausea and/or Vomiting  oxycodone    5 mG/acetaminophen 325 mG 1 Tablet(s) Oral every 6 hours PRN Severe Pain (7 - 10)      Allergies    No Known Allergies    Intolerances        REVIEW OF SYSTEMS:  CONSTITUTIONAL: No fever or chills  HEENT:  No headache, no sore throat  RESPIRATORY: No cough, wheezing, or shortness of breath  CARDIOVASCULAR: No chest pain, palpitations  GASTROINTESTINAL: No abd pain, nausea, vomiting, or diarrhea  GENITOURINARY: No dysuria, frequency, or hematuria  NEUROLOGICAL: no focal weakness or dizziness  MUSCULOSKELETAL: no myalgias     Objective:  Vital Signs Last 24 Hrs  T(C): 36.8 (2023 11:30), Max: 36.9 (2023 19:29)  T(F): 98.2 (2023 11:30), Max: 98.4 (2023 19:29)  HR: 86 (2023 11:30) (63 - 96)  BP: 120/60 (2023 11:30) (116/76 - 122/73)  BP(mean): --  RR: 18 (2023 11:30) (18 - 18)  SpO2: 96% (2023 11:30) (92% - 98%)    Parameters below as of 2023 11:30  Patient On (Oxygen Delivery Method): room air        GENERAL: NAD, lying in bed comfortably  HEAD:  Atraumatic, Normocephalic  ENT: Moist mucous membranes  NECK: Supple, No JVD  CHEST/LUNG: Clear to auscultation bilaterally; No rales, rhonchi, wheezing, or rubs. Unlabored respirations  HEART: Regular rate and rhythm; No murmurs, rubs, or gallops  ABDOMEN: Bowel sounds present; Soft, Nontender, Nondistended.  NERVOUS SYSTEM:  Alert & Oriented X3, speech clear. No deficits   MSK: FROM all 4 extremities, full and equal strength      LABS:                        8.3    14.74 )-----------( 259      ( 2023 07:38 )             28.1     CBC Full  -  ( 2023 07:38 )  WBC Count : 14.74 K/uL  Hemoglobin : 8.3 g/dL  Hematocrit : 28.1 %  Platelet Count - Automated : 259 K/uL  Mean Cell Volume : 84.1 fl  Mean Cell Hemoglobin : 24.9 pg  Mean Cell Hemoglobin Concentration : 29.5 gm/dL  Auto Neutrophil # : 12.38 K/uL  Auto Lymphocyte # : 1.12 K/uL  Auto Monocyte # : 0.76 K/uL  Auto Eosinophil # : 0.23 K/uL  Auto Basophil # : 0.07 K/uL  Auto Neutrophil % : 83.9 %  Auto Lymphocyte % : 7.6 %  Auto Monocyte % : 5.2 %  Auto Eosinophil % : 1.6 %  Auto Basophil % : 0.5 %    2023 07:38    139    |  104    |  33     ----------------------------<  96     3.5     |  30     |  1.70     Ca    7.6        2023 07:38    TPro  6.0    /  Alb  2.4    /  TBili  0.5    /  DBili  0.2    /  AST  10     /  ALT  <6     /  AlkPhos  67     2023 07:38      Urinalysis Basic - ( 2023 06:21 )    Color: Red / Appearance: Turbid / S.015 / pH: x  Gluc: x / Ketone: Trace  / Bili: Negative / Urobili: Negative   Blood: x / Protein: 100 / Nitrite: Negative   Leuk Esterase: Small / RBC: >50 /HPF / WBC >50   Sq Epi: x / Non Sq Epi: Occasional / Bacteria: Many      CAPILLARY BLOOD GLUCOSE              RADIOLOGY & ADDITIONAL TESTS:    Personally reviewed.     Consultant(s) Notes Reviewed:  [x] YES  [ ] NO

## 2023-01-20 NOTE — DISCHARGE NOTE NURSING/CASE MANAGEMENT/SOCIAL WORK - DATE OF LAST VACCINATION
3 11 22 MILD SRF @ 12 WKS - [facility] THEN [facility] UP [facility] 8 WKS AS STABLE AND PT DOEAS NOT WANTS TO GO MORE FREQUENTLY. 16-Mar-2022

## 2023-01-24 PROBLEM — E87.6 HYPOKALEMIA: Status: ACTIVE | Noted: 2023-01-01

## 2023-01-24 PROBLEM — U07.1 COVID-19: Status: ACTIVE | Noted: 2023-01-01

## 2023-01-26 NOTE — CONSULT NOTE ADULT - SUBJECTIVE AND OBJECTIVE BOX
Patient is known to me from recent admission with COVID and C diff colitis.   After completion of vancomycin she is coming with worsening of diarrhea and   leukocytosis.   Send c diff at this point is not helpful most likely PCR would be positive.   Since recently had vancomycin I recommend starting Dificid for C diff treatment.  Based on clinical response will decide about the course of antibiotic.   Full consult will follow  Patient is known to me from recent admission with COVID and C diff colitis.   After completion of vancomycin she is coming with worsening of diarrhea and   leukocytosis.   Sending c diff at this point is not helpful most likely PCR would be positive. Will send GI PCR  to rule out other GI infection. No need to start any antibiotics unless becomes septic.    Since recently had vancomycin I recommend starting Dificid for C diff treatment.  Will keep her in contact isolation until diarrhea stops. No need for airborne isolation.  COVID is positive for more than 10days.    Based on clinical response will decide about the course of antibiotic.   Full consult will follow  Patient is known to me from recent admission with COVID and C diff colitis.   After completion of vancomycin she is coming with worsening of diarrhea and   leukocytosis.   Sending c diff at this point is not helpful most likely PCR would be positive. Will send GI PCR  to rule out other GI infection. No need to start any antibiotics unless becomes septic.    Since recently had vancomycin I recommend starting Dificid for C diff treatment.  Will keep her in contact isolation until diarrhea stops. No need for airborne isolation.  COVID is positive for more than 10days.    Based on clinical response will decide about the course of antibiotic.   Full consult will follow     [facility]  INFECTIOUS DISEASES   [address] [place] [address]  Tel: [phone]     Fax: [phone]  ========================================================  MD Adriana Bird Kaushal, MD Cho, Michelle, MD Sunjit, Jaspal, MD  ========================================================    MRN-[id number]  HERMILA ORNELAS     CC: Patient is a 72y old  Female who presents with a chief complaint of C Diff Infection (26 Jan 2023 19:24)    HPI:  Ms Ornelas is a 73 yo F presenting to ED with diarrhea. PMH HFpEF, Hypothyroidism, Thrombocytosis, HLD, Asthma,  A fib on Xarelto, recent C Diff infection.   Patient seen and examined at bedside. She reports multiple episodes of dark tarry stools up to 20 occurrences daily.  She reports completing her course of PO Vancomycin. She had generalized abdominal pain in all 4 quadrants earlier which she now states is resolved.   She was recently hospitalized for CHF exacerbation earlier this month.   Denies headaches, nausea, vomiting, chest pain, SOB, palpitations, constipation,  hematochezia, dysuria.   (26 Jan 2023 17:14)      PAST MEDICAL & SURGICAL HISTORY:  Hypothyroidism  HLD (hyperlipidemia)  Thrombocytosis  Persistent atrial fibrillation  Obesity (BMI 35.0-39.9 without comorbidity)  Asthma  Diabetes mellitus  Ankle injury  on 2004, 5 surgeries.  Cholecystitis    Social Hx: No smoking, ETOH or drugs     FAMILY HISTORY:  Family history of early CAD (Father)    Family history of early CAD (Mother)    Allergies  No Known Allergies    Antibiotics:  MEDICATIONS  (STANDING):  anagrelide 1.5 milliGRAM(s) Oral daily  escitalopram 20 milliGRAM(s) Oral daily  fidaxomicin 200 milliGRAM(s) Oral every 12 hours  levothyroxine 125 MICROGram(s) Oral daily  potassium chloride    Tablet ER 40 milliEquivalent(s) Oral every 4 hours  rivaroxaban 15 milliGRAM(s) Oral with dinner  sodium chloride 0.9%. 1000 milliLiter(s) (75 mL/Hr) IV Continuous <Continuous>    MEDICATIONS  (PRN):  acetaminophen     Tablet .. 650 milliGRAM(s) Oral every 6 hours PRN Temp greater or equal to 38C (100.4F), Mild Pain (1 - 3)  ondansetron Injectable 4 milliGRAM(s) IV Push every 8 hours PRN Nausea and/or Vomiting     REVIEW OF SYSTEMS:  CONSTITUTIONAL:  No Fever or chills  HEENT:  No diplopia or blurred vision.  No sore throat or runny nose.  CARDIOVASCULAR:  No chest pain or SOB.  RESPIRATORY:  No cough, shortness of breath, PND or orthopnea.  GASTROINTESTINAL:  No nausea, vomiting, +diarrhea.  GENITOURINARY:  No dysuria, frequency or urgency. No Blood in urine  MUSCULOSKELETAL:  no joint aches, no muscle pain  SKIN:  No change in skin, hair or nails.  NEUROLOGIC:  No paresthesias or weakness.  PSYCHIATRIC:  No disorder of thought or mood.  ENDOCRINE:  No heat or cold intolerance, polyuria or polydipsia.  HEMATOLOGICAL:  No easy bruising or bleeding.     Physical Exam:  Vital Signs Last 24 Hrs  T(C): 36.4 (26 Jan 2023 19:01), Max: 36.7 (26 Jan 2023 15:44)  T(F): 97.5 (26 Jan 2023 19:01), Max: 98 (26 Jan 2023 15:44)  HR: 102 (26 Jan 2023 19:01) (101 - 127)  BP: 135/72 (26 Jan 2023 19:01) (93/57 - 135/72)  BP(mean): --  RR: 18 (26 Jan 2023 19:01) (18 - 20)  SpO2: 94% (26 Jan 2023 19:01) (93% - 94%)  Parameters below as of 26 Jan 2023 19:01  Patient On (Oxygen Delivery Method): room air  Height (cm): 167.6 (01-26 @ 12:21)  Weight (kg): 76.2 (01-26 @ 12:21)  BMI (kg/m2): 27.1 (01-26 @ 12:21)  BSA (m2): 1.86 (01-26 @ 12:21)  GEN: NAD  HEENT: normocephalic and atraumatic. EOMI. PERRL.    NECK: Supple.  No lymphadenopathy   LUNGS: Clear to auscultation.  HEART: Irregular rate and rhythm  ABDOMEN: Soft, nontender, and nondistended.  Positive bowel sounds.    : No CVA tenderness  EXTREMITIES: Without edema.  NEUROLOGIC: grossly intact.  PSYCHIATRIC: Appropriate affect .  SKIN: No rash     Labs:  01-26    139  |  104  |  28<H>  ----------------------------<  104<H>  3.2<L>   |  31  |  1.80<H>    Ca    7.9<L>      26 Jan 2023 13:26    TPro  6.8  /  Alb  2.5<L>  /  TBili  0.4  /  DBili  x   /  AST  12<L>  /  ALT  <6<L>  /  AlkPhos  79  01-26                        8.4    18.47 )-----------( 244      ( 26 Jan 2023 13:26 )             28.1     PT/INR - ( 26 Jan 2023 13:26 )   PT: 36.9 sec;   INR: 3.12 ratio    PTT - ( 26 Jan 2023 13:26 )  PTT:43.4 sec    LIVER FUNCTIONS - ( 26 Jan 2023 13:26 )  Alb: 2.5 g/dL / Pro: 6.8 g/dL / ALK PHOS: 79 U/L / ALT: <6 U/L / AST: 12 U/L / GGT: x           SARS-CoV-2 Result: Detected (01-26-23 @ 15:45)  SARS-CoV-2 Result: Detected (01-12-23 @ 12:08)    All imaging and other data have been reviewed.  < from: CT Abdomen and Pelvis No Cont (01.26.23 @ 13:45) >  IMPRESSION:  Inflammatory changes involving the sigmoid colon and rectum, which could   be related to infectious or inflammatory proctocolitis versus sigmoid   diverticulitis. No abscess or free air to suggest perforation.  Hepatosplenomegaly.    < end of copied text >      Assessment and Plan:   Ms Ornelas is a 73 yo F presenting to ED with diarrhea. PMH HFpEF, Hypothyroidism, Thrombocytosis, HLD, Asthma,  A fib on Xarelto, recent C Diff infection.    She reports multiple episodes of dark tarry stools up to 20 occurrences daily.  Most likely Recurrent Cdiff due to failing treatment? Less likely diverticulitis and complications since CT in not concerning.  Translocation of bacteria can happen with colitis though.     Recommendations:  - Blood cultures   - GI PCR  - Start Dificid 200mg q12 for 10days  - Will hold on antibiotics for now  - Can restart ABx if positive culture or hemodynamically unstable  - Will monitor WBC today 18k, went up   - For COVID will watch, since she has treatment in last admission and it is more than 10days from first positive    Thank you for courtesy of this consult.     Will follow.  Discussed with the primary team.     Murtaza Juarez MD  Division of Infectious Diseases   Please call ID service at [phone] with any question.      55 minutes spent on total encounter assessing patient, examination, chart review, counseling and coordinating care by the attending physician/nurse/care manager.   Patient is known to me from recent admission with COVID and C diff colitis.   After completion of vancomycin she is coming with worsening of diarrhea and   leukocytosis.   Sending c diff at this point is not helpful most likely PCR would be positive. Will send GI PCR  to rule out other GI infection. No need to start any antibiotics unless becomes septic.    Since recently had vancomycin I recommend starting Dificid for C diff treatment.  Will keep her in contact isolation until diarrhea stops. No need for airborne isolation.  COVID is positive for more than 10days.    Based on clinical response will decide about the course of antibiotic.   Full consult will follow     [facility]  INFECTIOUS DISEASES   [address] [place] [address]  Tel: [phone]     Fax: [phone]  ========================================================  MD Adriana Bird Kaushal, MD Cho, Michelle, MD Sunjit, Jaspal, MD  ========================================================    MRN-[id number]  HERMILA ORNELAS     CC: Patient is a 72y old  Female who presents with a chief complaint of C Diff Infection (26 Jan 2023 19:24)    HPI:  Ms Ornelas is a 71 yo F presenting to ED with diarrhea. PMH HFpEF, Hypothyroidism, Thrombocytosis, HLD, Asthma,  A fib on Xarelto, recent C Diff infection.   Patient seen and examined at bedside. She reports multiple episodes of dark tarry stools up to 20 occurrences daily.  She reports completing her course of PO Vancomycin. She had generalized abdominal pain in all 4 quadrants earlier which she now states is resolved.   She was recently hospitalized for CHF exacerbation earlier this month.   Denies headaches, nausea, vomiting, chest pain, SOB, palpitations, constipation,  hematochezia, dysuria.   (26 Jan 2023 17:14)      PAST MEDICAL & SURGICAL HISTORY:  Hypothyroidism  HLD (hyperlipidemia)  Thrombocytosis  Persistent atrial fibrillation  Obesity (BMI 35.0-39.9 without comorbidity)  Asthma  Diabetes mellitus  Ankle injury  on 2004, 5 surgeries.  Cholecystitis    Social Hx: No smoking, ETOH or drugs     FAMILY HISTORY:  Family history of early CAD (Father)    Family history of early CAD (Mother)    Allergies  No Known Allergies    Antibiotics:  MEDICATIONS  (STANDING):  anagrelide 1.5 milliGRAM(s) Oral daily  escitalopram 20 milliGRAM(s) Oral daily  fidaxomicin 200 milliGRAM(s) Oral every 12 hours  levothyroxine 125 MICROGram(s) Oral daily  potassium chloride    Tablet ER 40 milliEquivalent(s) Oral every 4 hours  rivaroxaban 15 milliGRAM(s) Oral with dinner  sodium chloride 0.9%. 1000 milliLiter(s) (75 mL/Hr) IV Continuous <Continuous>    MEDICATIONS  (PRN):  acetaminophen     Tablet .. 650 milliGRAM(s) Oral every 6 hours PRN Temp greater or equal to 38C (100.4F), Mild Pain (1 - 3)  ondansetron Injectable 4 milliGRAM(s) IV Push every 8 hours PRN Nausea and/or Vomiting     REVIEW OF SYSTEMS:  CONSTITUTIONAL:  No Fever or chills  HEENT:  No diplopia or blurred vision.  No sore throat or runny nose.  CARDIOVASCULAR:  No chest pain or SOB.  RESPIRATORY:  No cough, shortness of breath, PND or orthopnea.  GASTROINTESTINAL:  No nausea, vomiting, +diarrhea.  GENITOURINARY:  No dysuria, frequency or urgency. No Blood in urine  MUSCULOSKELETAL:  no joint aches, no muscle pain, leg swelling  SKIN:  No change in skin, hair or nails.  NEUROLOGIC:  No paresthesias or weakness.  PSYCHIATRIC:  No disorder of thought or mood.  ENDOCRINE:  No heat or cold intolerance, polyuria or polydipsia.  HEMATOLOGICAL:  No easy bruising or bleeding.     Physical Exam:  Vital Signs Last 24 Hrs  T(C): 36.4 (26 Jan 2023 19:01), Max: 36.7 (26 Jan 2023 15:44)  T(F): 97.5 (26 Jan 2023 19:01), Max: 98 (26 Jan 2023 15:44)  HR: 102 (26 Jan 2023 19:01) (101 - 127)  BP: 135/72 (26 Jan 2023 19:01) (93/57 - 135/72)  BP(mean): --  RR: 18 (26 Jan 2023 19:01) (18 - 20)  SpO2: 94% (26 Jan 2023 19:01) (93% - 94%)  Parameters below as of 26 Jan 2023 19:01  Patient On (Oxygen Delivery Method): room air  Height (cm): 167.6 (01-26 @ 12:21)  Weight (kg): 76.2 (01-26 @ 12:21)  BMI (kg/m2): 27.1 (01-26 @ 12:21)  BSA (m2): 1.86 (01-26 @ 12:21)  GEN: NAD  HEENT: normocephalic and atraumatic. EOMI. PERRL.    NECK: Supple.  No lymphadenopathy   LUNGS: Clear to auscultation.  HEART: Irregular rate and rhythm  ABDOMEN: Soft, nontender, and nondistended.  Positive bowel sounds.    : No CVA tenderness  EXTREMITIES: + edema.  NEUROLOGIC: grossly intact.  PSYCHIATRIC: Appropriate affect .  SKIN: No rash     Labs:  01-26    139  |  104  |  28<H>  ----------------------------<  104<H>  3.2<L>   |  31  |  1.80<H>    Ca    7.9<L>      26 Jan 2023 13:26    TPro  6.8  /  Alb  2.5<L>  /  TBili  0.4  /  DBili  x   /  AST  12<L>  /  ALT  <6<L>  /  AlkPhos  79  01-26                        8.4    18.47 )-----------( 244      ( 26 Jan 2023 13:26 )             28.1     PT/INR - ( 26 Jan 2023 13:26 )   PT: 36.9 sec;   INR: 3.12 ratio    PTT - ( 26 Jan 2023 13:26 )  PTT:43.4 sec    LIVER FUNCTIONS - ( 26 Jan 2023 13:26 )  Alb: 2.5 g/dL / Pro: 6.8 g/dL / ALK PHOS: 79 U/L / ALT: <6 U/L / AST: 12 U/L / GGT: x           SARS-CoV-2 Result: Detected (01-26-23 @ 15:45)  SARS-CoV-2 Result: Detected (01-12-23 @ 12:08)    All imaging and other data have been reviewed.  < from: CT Abdomen and Pelvis No Cont (01.26.23 @ 13:45) >  IMPRESSION:  Inflammatory changes involving the sigmoid colon and rectum, which could   be related to infectious or inflammatory proctocolitis versus sigmoid   diverticulitis. No abscess or free air to suggest perforation.  Hepatosplenomegaly.    < end of copied text >      Assessment and Plan:   Ms Ornelas is a 71 yo F presenting to ED with diarrhea. PMH HFpEF, Hypothyroidism, Thrombocytosis, HLD, Asthma,  A fib on Xarelto, recent C Diff infection.    She reports multiple episodes of dark tarry stools up to 20 occurrences daily.  Most likely Recurrent Cdiff due to failing treatment? Less likely diverticulitis and complications since CT in not concerning.  Translocation of bacteria can happen with colitis though.     Recommendations:  - Blood cultures   - GI PCR  - Start Dificid 200mg q12 for 10days  - Will hold on antibiotics for now  - Can restart ABx if positive culture or hemodynamically unstable  - Will monitor WBC today 18k, went up   - For COVID will watch, since she has treatment in last admission and it is more than 10days from first positive    Thank you for courtesy of this consult.     Will follow.  Discussed with the primary team.     Murtaza Juarez MD  Division of Infectious Diseases   Please call ID service at [phone] with any question.      55 minutes spent on total encounter assessing patient, examination, chart review, counseling and coordinating care by the attending physician/nurse/care manager.

## 2023-01-26 NOTE — CONSULT NOTE ADULT - SUBJECTIVE AND OBJECTIVE BOX
DOCUMENTATION IN PROGRESS        CHIEF COMPLAINT: Patient is a 72y old  Female who presents with a chief complaint of     HPI: This is a 72 year-old female with recent admission for COVID,  CHF exacerbation, C. difficile infection complaining of abdominal pain, dark stools, shortness of breath.  Takes Xarelto for A. fib.  Has been taking her Lasix.  States symptoms were improving and she was discharged (1/19/2023), overall feeling well except for abdominal pain.  Then Sunday night started with dark tarry stool and very frequent.  Denies fever, chest pain, vomiting    EKG:    REVIEW OF SYSTEMS:   All other review of systems are negative unless indicated above    PAST MEDICAL & SURGICAL HISTORY:  Hypothyroidism      HLD (hyperlipidemia)      Thrombocytosis      Persistent atrial fibrillation      Obesity (BMI 35.0-39.9 without comorbidity)      Asthma      Diabetes mellitus      Ankle injury  on 2004, 5 surgeries.      Cholecystitis          SOCIAL HISTORY:  No tobacco, ethanol, or drug abuse.    FAMILY HISTORY:  Family history of early CAD (Father)    Family history of early CAD (Mother)      No family history of acute MI or sudden cardiac death.    MEDICATIONS  (STANDING):  cefepime   IVPB 1000 milliGRAM(s) IV Intermittent once    MEDICATIONS  (PRN):      Allergies    No Known Allergies    Intolerances        Home meds:  Home Medications:  anagrelide 0.5 mg oral capsule: 3 cap(s) orally once a day (19 Jan 2023 08:42)  escitalopram 20 mg oral tablet: 1 tab(s) orally once a day (12 Jan 2023 15:03)  famotidine 40 mg oral tablet: 1 tab(s) orally once a day (at bedtime), As Needed (12 Jan 2023 15:03)  furosemide 40 mg oral tablet: 2 tab(s) orally in the morning and 1 tab in the evening  (12 Jan 2023 15:03)  Hydromet 5 mg-1.5 mg/5 mL oral syrup: 5 milliliter(s) orally every 6 hours, As Needed for cough  (12 Jan 2023 15:03)  levocetirizine 5 mg oral tablet: 1 tab(s) orally once a day (in the evening) (12 Jan 2023 15:03)  levothyroxine 125 mcg (0.125 mg) oral tablet: 1 tab(s) orally once a day (12 Jan 2023 15:03)  metOLazone 2.5 mg oral tablet: 1 tab(s) orally once a day (12 Jan 2023 15:03)  Trelegy Ellipta 100 mcg-62.5 mcg-25 mcg/inh inhalation powder: 1 puff(s) inhaled once a day (12 Jan 2023 15:03)  Xarelto 20 mg oral tablet: 1 tab(s) orally once a day (in the evening) (12 Jan 2023 13:06)        VITAL SIGNS:   Vital Signs Last 24 Hrs  T(C): 36.7 (26 Jan 2023 15:44), Max: 36.7 (26 Jan 2023 15:44)  T(F): 98 (26 Jan 2023 15:44), Max: 98 (26 Jan 2023 15:44)  HR: 101 (26 Jan 2023 15:44) (101 - 127)  BP: 129/69 (26 Jan 2023 15:44) (93/57 - 129/69)  BP(mean): --  RR: 20 (26 Jan 2023 15:44) (20 - 20)  SpO2: 93% (26 Jan 2023 15:44) (93% - 94%)    Parameters below as of 26 Jan 2023 15:44  Patient On (Oxygen Delivery Method): room air        I&O's Summary      On Exam:  TELE:   Constitutional: NAD, awake and alert, well-developed  HEENT: Moist Mucous Membranes, Anicteric  Pulmonary: Non-labored, breath sounds are clear bilaterally, No wheezing, rales or rhonchi  Cardiovascular: Regular, S1 and S2, + murmurs, rubs, gallops or clicks  Gastrointestinal: Bowel Sounds present, soft, nontender.   Lymph: No peripheral edema. No lymphadenopathy.  Skin: No visible rashes or ulcers.  Psych:  Mood & affect appropriate for situation    LABS: All Labs Reviewed:                        8.4    18.47 )-----------( 244      ( 26 Jan 2023 13:26 )             28.1     26 Jan 2023 13:26    139    |  104    |  28     ----------------------------<  104    3.2     |  31     |  1.80     Ca    7.9        26 Jan 2023 13:26    TPro  6.8    /  Alb  2.5    /  TBili  0.4    /  DBili  x      /  AST  12     /  ALT  <6     /  AlkPhos  79     26 Jan 2023 13:26    PT/INR - ( 26 Jan 2023 13:26 )   PT: 36.9 sec;   INR: 3.12 ratio         PTT - ( 26 Jan 2023 13:26 )  PTT:43.4 sec      Blood Culture:   01-26 @ 13:26  Pro Bnp [id number]        RADIOLOGY:              CHIEF COMPLAINT: Patient is a 72y old  Female who presents with a chief complaint of     HPI: This is a 72 year-old female with PMHX, hypothyroidism, hyperlipidemia, A. fib on Xarelto, thrombocytosis with recent admission for COVID, CHF exacerbation, C. difficile infection complaining of abdominal pain, dark stools, shortness of breath, states that her symptoms were improving and she was discharged (1/19/2023), overall feeling well except for abdominal pain that started on sunday with frequent dark tarry stool, also with MATTHEW.  Denies fever, chest pain, vomiting. In ED patient seen and examined with Dr. Chisholm, denies chest pain, palpitations, c/o sharp abd pain that shoots across lower abdominal area.  noted with b/l LE edema, on room air.     EKG: 's     REVIEW OF SYSTEMS:   All other review of systems are negative unless indicated above    PAST MEDICAL & SURGICAL HISTORY:  Hypothyroidism      HLD (hyperlipidemia)      Thrombocytosis      Persistent atrial fibrillation      Obesity (BMI 35.0-39.9 without comorbidity)      Asthma      Diabetes mellitus      Ankle injury  on 2004, 5 surgeries.      Cholecystitis          SOCIAL HISTORY:  No tobacco, ethanol, or drug abuse.    FAMILY HISTORY:  Family history of early CAD (Father)    Family history of early CAD (Mother)      No family history of acute MI or sudden cardiac death.    MEDICATIONS  (STANDING):  cefepime   IVPB 1000 milliGRAM(s) IV Intermittent once    MEDICATIONS  (PRN):      Allergies    No Known Allergies    Intolerances        Home meds:  Home Medications:  anagrelide 0.5 mg oral capsule: 3 cap(s) orally once a day (19 Jan 2023 08:42)  escitalopram 20 mg oral tablet: 1 tab(s) orally once a day (12 Jan 2023 15:03)  famotidine 40 mg oral tablet: 1 tab(s) orally once a day (at bedtime), As Needed (12 Jan 2023 15:03)  furosemide 40 mg oral tablet: 2 tab(s) orally in the morning and 1 tab in the evening  (12 Jan 2023 15:03)  Hydromet 5 mg-1.5 mg/5 mL oral syrup: 5 milliliter(s) orally every 6 hours, As Needed for cough  (12 Jan 2023 15:03)  levocetirizine 5 mg oral tablet: 1 tab(s) orally once a day (in the evening) (12 Jan 2023 15:03)  levothyroxine 125 mcg (0.125 mg) oral tablet: 1 tab(s) orally once a day (12 Jan 2023 15:03)  metOLazone 2.5 mg oral tablet: 1 tab(s) orally once a day (12 Jan 2023 15:03)  Trelegy Ellipta 100 mcg-62.5 mcg-25 mcg/inh inhalation powder: 1 puff(s) inhaled once a day (12 Jan 2023 15:03)  Xarelto 20 mg oral tablet: 1 tab(s) orally once a day (in the evening) (12 Jan 2023 13:06)        VITAL SIGNS:   Vital Signs Last 24 Hrs  T(C): 36.7 (26 Jan 2023 15:44), Max: 36.7 (26 Jan 2023 15:44)  T(F): 98 (26 Jan 2023 15:44), Max: 98 (26 Jan 2023 15:44)  HR: 101 (26 Jan 2023 15:44) (101 - 127)  BP: 129/69 (26 Jan 2023 15:44) (93/57 - 129/69)  BP(mean): --  RR: 20 (26 Jan 2023 15:44) (20 - 20)  SpO2: 93% (26 Jan 2023 15:44) (93% - 94%)    Parameters below as of 26 Jan 2023 15:44  Patient On (Oxygen Delivery Method): room air        I&O's Summary      On Exam:  TELE: ST   Constitutional: NAD, awake and alert, well-developed  HEENT: Moist Mucous Membranes, Anicteric  Pulmonary: Non-labored, bibasilar rales  Cardiovascular: Regular, S1 and S2, + murmurs, rubs, gallops or clicks  Gastrointestinal: Bowel Sounds present, soft, nontender.   Lymph: +2 b/l  peripheral edema. No lymphadenopathy.  Skin: No visible rashes or ulcers.  Psych:  Mood & affect appropriate for situation    LABS: All Labs Reviewed:                        8.4    18.47 )-----------( 244      ( 26 Jan 2023 13:26 )             28.1     26 Jan 2023 13:26    139    |  104    |  28     ----------------------------<  104    3.2     |  31     |  1.80     Ca    7.9        26 Jan 2023 13:26    TPro  6.8    /  Alb  2.5    /  TBili  0.4    /  DBili  x      /  AST  12     /  ALT  <6     /  AlkPhos  79     26 Jan 2023 13:26    PT/INR - ( 26 Jan 2023 13:26 )   PT: 36.9 sec;   INR: 3.12 ratio         PTT - ( 26 Jan 2023 13:26 )  PTT:43.4 sec      Blood Culture:   01-26 @ 13:26  Pro Bnp [id number]        RADIOLOGY:  < from: 12 Lead ECG (01.26.23 @ 13:21) >    Ventricular Rate 104 BPM    QRS Duration 80 ms    Q-T Interval 362 ms    QTC Calculation(Bazett) 476 ms    R Axis 35 degrees    T Axis -3 degrees    Diagnosis Line artifact limits interpretation  though likely st with frequent atrial ectopy  Confirmed by katelyn Chisholm ([id number]) on 1/26/2023 1:54:35 PM    < end of copied text >

## 2023-01-26 NOTE — ED ADULT NURSE NOTE - CAS TRG GEN SKIN COLOR
Silver Nitrate Text: The wound bed was treated with silver nitrate after the biopsy was performed. Detail Level: Detailed Biopsy Method: curette Was A Bandage Applied: Yes Dressing: bandage Anticipated Plan (Based On Presumed Biopsy Results): Mohs Post-Care Instructions: I reviewed with the patient in detail post-care instructions. Patient is to keep the biopsy site dry overnight, and then apply bacitracin twice daily until healed. Patient may apply hydrogen peroxide soaks to remove any crusting. Anesthesia Volume In Cc: 0.5 Electrodesiccation And Curettage Text: The wound bed was treated with electrodesiccation and curettage after the biopsy was performed. Body Location Override (Optional - Billing Will Still Be Based On Selected Body Map Location If Applicable): right infraauricular Type Of Destruction Used: Curettage Billing Type: Third-Party Bill Bill [id number] For Specimen Handling/Conveyance To Laboratory?: no X Size Of Lesion In Cm: 0 Biopsy Type: H and E Electrodesiccation Text: The wound bed was treated with electrodesiccation after the biopsy was performed. Wound Care: Polysporin ointment Depth Of Biopsy: dermis Lab Facility: [id number] Anesthesia Type: 1% lidocaine without epinephrine Cryotherapy Text: The wound bed was treated with cryotherapy after the biopsy was performed. Size Of Lesion In Cm: 0.6 Consent: Written consent was obtained and risks were reviewed including but not limited to scarring, infection, bleeding, scabbing, incomplete removal, nerve damage and allergy to anesthesia. Curettage Text: The wound bed was treated with curettage after the biopsy was performed. Lab: [id number] Hemostasis: Drysol Notification Instructions: Patient will be notified of biopsy results. However, patient instructed to call the office if not contacted within 2 weeks. Normal for race

## 2023-01-26 NOTE — ED PROVIDER NOTE - NS ED ATTENDING STATEMENT MOD
This was a shared visit with the ELYSIA. I reviewed and verified the documentation and independently performed the documented:

## 2023-01-26 NOTE — ED ADULT NURSE NOTE - NSIMPLEMENTINTERV_GEN_ALL_ED
Implemented All Fall Risk Interventions:  [place] to call system. Call bell, personal items and telephone within reach. Instruct patient to call for assistance. Room bathroom lighting operational. Non-slip footwear when patient is off stretcher. Physically safe environment: no spills, clutter or unnecessary equipment. Stretcher in lowest position, wheels locked, appropriate side rails in place. Provide visual cue, wrist band, yellow gown, etc. Monitor gait and stability. Monitor for mental status changes and reorient to person, place, and time. Review medications for side effects contributing to fall risk. Reinforce activity limits and safety measures with patient and family.

## 2023-01-26 NOTE — PROGRESS NOTE ADULT - SUBJECTIVE AND OBJECTIVE BOX
Consulted for acute on CKD    Chart reviewed    Check urine indices  IVF  KCL ordered by primary team  Repeat labs in AM    Full consult note to follow, thank you

## 2023-01-26 NOTE — ED ADULT TRIAGE NOTE - CHIEF COMPLAINT QUOTE
patient was here on 1/12 with congestive heart failure and covid positive. patient now having SOB with episodes of tarry diarrhea.

## 2023-01-26 NOTE — ED PROVIDER NOTE - CLINICAL SUMMARY MEDICAL DECISION MAKING FREE TEXT BOX
Adriana: 72-year-old female with recent admission for COVID, CHF exacerbation, C. difficile infection complaining of abdominal pain, dark stools, shortness of breath.  Takes Xarelto for A. fib.  Has been taking her Lasix.  States symptoms were improving and she was discharged, overall feeling well except for abdominal pain and continued diarrhea.  Then Sunday night started with dark tarry stool and very frequent.  Denies fever, chest pain, vomiting

## 2023-01-26 NOTE — ED ADULT NURSE NOTE - OBJECTIVE STATEMENT
patient from triage a&ox4 with complaints of black tarry stool - c/o lower abdominal pain since sunday night. recently admitted in hospital for covid and cdiff and reports her stool changed from brown to black. pt takes xarelto for afib. denies dizziness/lightheadedness. abdomen tender to palpation. placed on isolation for +cdif from jan 13

## 2023-01-26 NOTE — CONSULT NOTE ADULT - ASSESSMENT
72-year-old female with history of hypothyroidism, hyperlipidemia, AF on Xarelto, thrombocytosis sent in for worsening shortness of breath and CHF.        - Presented from Dr. Fernandez's office w/ c/o SOB, orthopnea and edema, likely multifactorial d/t CHF exac, +COVID  - s/p IV Lasix with improvement in volume status   - ECHO showed LVH normal LV & RV size and function EF 65%, mod MR, trace TR  - Continue to trend renal indices, renal following   - Would hold Lasix. Reassess daily.   - No evidence of any meaningful volume overload   - CXR with opacities more prominent in R side   - CT chest noted, had mosaic pattern and nodules in the past     - mild troponin leak, now downtrending , no suggestion of acs, no anginal complaints   - cath without cad in 2018    - known hx of PAFIB  - BP, HR stable and controlled per flow sheet   - Continue BB, Xarelto     72-year-old female with history of hypothyroidism, hyperlipidemia, AF on Xarelto, thrombocytosis presented with abd pain , diarrhea, worsening MATTHEW, b/l LE edema     CHF exac, Afib   - p/w c/o abd pain, diarrhea, worsening MATTHEW and b/l LE  - was discharged from recent admission for CHF exac, covid, now presented again with volume overload  - would diurese with Lasix 80 IVP, cautiously, given that she is having diarrhea, please give first dose now, will reassess volume status closely  - recent ECHO (1/17/2023) showed LVH normal LV & RV size and function EF 65%, mod MR, trace TR, no need for repeat   - CXR with small pleural effusion with Right atelectasis   - Continue to trend renal indices, rec renal consult  - monitor strict I/O's     - CT abd noted  - rec GI consult      - EKG: ST.  No anginal complaints   - cath without cad in 2018    - known hx of PAFIB   - Continue BB, Xarelto  - continue to monitor routine hemodynamics    - Monitor and replete Lytes. Keep K > 4 and Mg > 2  - Will continue to follow.    Sharri Steward [facility]  Nurse Practitioner - Cardiology   Spectra #[id number]/ [phone]

## 2023-01-26 NOTE — H&P ADULT - HISTORY OF PRESENT ILLNESS
Ms Ornelas is a 71 yo F presenting to ED with diarrhea. PMH HFpEF, Hypothyroidism, Thrombocytosis, HLD, Asthma,  A fib on Xarelto, recent C Diff infection.   Patient seen and examined at bedside. She reports multiple episodes of dark tarry stools up to 20 occurrences daily.  She reports completing her course of PO Vancomycin. She had generalized abdominal pain in all 4 quadrants earlier which she now states is resolved.   She was recently hospitalized for CHF exacerbation earlier this month.   Denies headaches, nausea, vomiting, chest pain, SOB, palpitations, constipation,  hematochezia, dysuria.

## 2023-01-26 NOTE — H&P ADULT - ASSESSMENT
Ms Ornelas is a 73 yo F presenting to ED with diarrhea. PMH HFpEF, Hypothyroidism, Thrombocytosis, HLD, Asthma,  A fib on Xarelto, recent C Diff infection.     C Diff infection:   -suspected recurrent C Diff infection  -monitor WBC count  -CT scan reviewed  -start Dificid  -ID consult    HFpEF: s/p IV lasix  continue to monitor intake and output  -cardio consulted  -monitor electrolytes    Hypokalemia: likely due to lasix use  -replete with Oral Potassium  -check Mg level    COVID-19 infection: no longer infectious    Hypothyroidism: continue levothyroxine    Thrombocytosis: continue agrylin  -managed by hematology Dr Rice  -outpatient f/u    A fib on Xarelto: continue Xarelto.   -Continue Toprol    Anemia: no signs or symptoms of active bleeding. Continue to monitor hgb.   -instructed patient to f/u with her primary hematologist within 4 weeks of discharge    LAYA: monitor renal fxn  -will avoid IVF given her hx of HFpEF    DVT ppx: on Xarelto

## 2023-01-26 NOTE — ED ADULT NURSE NOTE - TEMPLATE
Detail Level: Simple Additional Notes: Patient consent was obtained to proceed with the visit and recommended plan of care after discussion of all risks and benefits, including the risks of COVID-19 exposure. Abdominal Pain, N/V/D

## 2023-01-26 NOTE — ED PROVIDER NOTE - WR ORDER STATUS 1
Patient Discharge Instructions    Bekah Roblero / [id number] : 1985    Admitted 2017 Discharged: 2017       · It is important that you take the medication exactly as they are prescribed. · Keep your medication in the bottles provided by the pharmacist and keep a list of the medication names, dosages, and times to be taken in your wallet. · Do not take other medications without consulting your doctor. What to do at Home    Recommended diet: Regular. Recommended activity: No Restrictions. No Driving While Taking [address]. May Take Shower or Skifto. If you experience any of the following symptoms Fevers, Chills, Nausea, Vomitting, Redness or Drainage at Surgical Site(s) or Any Other Questions or Concerns Please Call -  [phone]. Follow-up with Dr. Lucas Amezquita in 10-14 days. Information obtained by :  I understand that if any problems occur once I am at home I am to contact my physician. I understand and acknowledge receipt of the instructions indicated above. Physician's or R.N.'s Signature                                                                  Date/Time                                                                                                                                              Patient or Representative Signature                                                          Date/Time        DISCHARGE SUMMARY from Nurse    1) Toradol was given to you at 2:45 pm. This medication is an anti-inflammatory and is in the same category as Advil/Ibuprofen/Motrin. Do not take any of these medications until after 10:45 pm if needed. 2) Ancef which is an antibiotic was also given to you. Do not take any additional antibiotics if ordered until tomorrow.   2) Do not take any Tylenol while taking your pain pills as each pill contains 325 mg of Tylenol. Maximum amount of Tylenol for an adult should not exceed 4000 mg. Too much Tylenol can damage your liver. The following personal items are in your possession at time of discharge:    Dental Appliances: None  Visual Aid: None        Jewelry: Body Piercing (lt ear-taped)  Clothing:  (belongings bag)                PATIENT INSTRUCTIONS:    After general anesthesia or intravenous sedation, for 24 hours or while taking prescription Narcotics:  · Limit your activities  · Do not drive and operate hazardous machinery  · Do not make important personal or business decisions  · Do  not drink alcoholic beverages  · If you have not urinated within 8 hours after discharge, please contact your surgeon on call. Report the following to your surgeon:  · Excessive pain, swelling, redness or odor of or around the surgical area  · Temperature over 100.5  · Nausea and vomiting lasting longer than 4 hours or if unable to take medications  · Any signs of decreased circulation or nerve impairment to extremity: change in color, persistent  numbness, tingling, coldness or increase pain  · Any questions        What to do at Home:  Recommended activity: Activity as tolerated and no driving for today and No driving while on analgesics,     If you experience any of the following symptoms ; as noted above, please follow up with Ashley Toure. *  Please give a list of your current medications to your Primary Care Provider. *  Please update this list whenever your medications are discontinued, doses are      changed, or new medications (including over-the-counter products) are added. *  Please carry medication information at all times in case of emergency situations.           These are general instructions for a healthy lifestyle:    No smoking/ No tobacco products/ Avoid exposure to second hand smoke    Surgeon General's Warning:  Quitting smoking now greatly reduces serious risk to your health. Obesity, smoking, and sedentary lifestyle greatly increases your risk for illness    A healthy diet, regular physical exercise & weight monitoring are important for maintaining a healthy lifestyle    You may be retaining fluid if you have a history of heart failure or if you experience any of the following symptoms:  Weight gain of 3 pounds or more overnight or 5 pounds in a week, increased swelling in our hands or feet or shortness of breath while lying flat in bed. Please call your doctor as soon as you notice any of these symptoms; do not wait until your next office visit. Recognize signs and symptoms of STROKE:    F-face looks uneven    A-arms unable to move or move unevenly    S-speech slurred or non-existent    T-time-call 911 as soon as signs and symptoms begin-DO NOT go       Back to bed or wait to see if you get better-TIME IS BRAIN. Warning Signs of HEART ATTACK     Call 911 if you have these symptoms:   Chest discomfort. Most heart attacks involve discomfort in the center of the chest that lasts more than a few minutes, or that goes away and comes back. It can feel like uncomfortable pressure, squeezing, fullness, or pain.  Discomfort in other areas of the upper body. Symptoms can include pain or discomfort in one or both arms, the back, neck, jaw, or stomach.  Shortness of breath with or without chest discomfort.  Other signs may include breaking out in a cold sweat, nausea, or lightheadedness. Don't wait more than five minutes to call 911 - MINUTES MATTER! Fast action can save your life. Calling 911 is almost always the fastest way to get lifesaving treatment. Emergency Medical Services staff can begin treatment when they arrive -- up to an hour sooner than if someone gets to the hospital by car. The discharge information has been reviewed with the patient and spouse. The patient and spouse verbalized understanding.     Discharge medications reviewed with the patient and spouse and appropriate educational materials and side effects teaching were provided. Resulted

## 2023-01-26 NOTE — H&P ADULT - NSHPPHYSICALEXAM_GEN_ALL_CORE
T(C): 36.7 (01-26-23 @ 15:44), Max: 36.7 (01-26-23 @ 15:44)  HR: 101 (01-26-23 @ 15:44) (101 - 127)  BP: 129/69 (01-26-23 @ 15:44) (93/57 - 129/69)  RR: 20 (01-26-23 @ 15:44) (20 - 20)  SpO2: 93% (01-26-23 @ 15:44) (93% - 94%)  Wt(kg): --    Physical Exam:   GENERAL: well-groomed, well-developed, NAD  HEENT: head NC/AT;  conjunctiva & sclera clear; hearing grossly intact, moist mucous membranes  NECK: supple, no JVD  RESPIRATORY: decreased breath sounds b/l lung bases, no wheezing  CARDIOVASCULAR: S1&S2, irreg irreg  ABDOMEN: soft, non-tender, non-distended, + Bowel sounds x4 quadrants, no guarding, rebound or rigidity  MUSCULOSKELETAL:  b/l LE pitting edema, no cyanosis noted  LYMPH: no cervical lymphadenopathy  VASCULAR: Radial pulses 2+ bilaterally, no varicose veins   SKIN: warm and dry, color normal  NEUROLOGIC: AA&O X3, speech fluent no sensory loss, motor Strength 5/5 in UE & LE B/L  Psych: Normal mood and affect, normal behavior

## 2023-01-26 NOTE — CONSULT NOTE ADULT - NS ATTEND AMEND GEN_ALL_CORE FT
short of breath, and appears volume overloaded on exam  having diarrhea, and it is possible that all of the symptoms are infectious  takes 120 mg Lasix at home, and will put on 80 iv daily here  need to watch creatinine and electrolytes closely.  repeat echocardiogram  rx for colitis  ac/bb for pat short of breath, and appears volume overloaded on exam  having diarrhea, and it is possible that all of the symptoms are infectious  takes 120 mg Lasix at home, and will put on 80 iv daily here  need to watch creatinine and electrolytes closely.  repeat echocardiogram  rx for colitis  ac/bb for paf

## 2023-01-26 NOTE — ED PROVIDER NOTE - OBJECTIVE STATEMENT
-year-old female with recent admission for COVID, CHF exacerbation, C. difficile infection complaining of abdominal pain, dark stools, shortness of breath.  Takes Xarelto for A. fib.  Has been taking her Lasix.  States symptoms were improving and she was discharged, overall feeling well except for abdominal pain.  Then Sunday night started with dark tarry stool and very frequent.  Denies fever, chest pain, vomiting

## 2023-01-27 NOTE — PROGRESS NOTE ADULT - PROBLEM SELECTOR PLAN 2
Patient with volume overload on exam with pitting edema and crackles  s/p 40mg IV Lasix x1   Will hold lasix and IVF at this time   continue to monitor intake and output  cardio following   monitor and replete electrolytes PRN Patient with volume overload on exam with pitting edema and crackles  s/p 40mg IV Lasix x1   Will hold lasix and IVF at this time   continue to monitor intake and output Patient with volume overload on exam with pitting edema and crackles  s/p 40mg IV Lasix x1   Start IV Lasix 80mg qd   Will hold IVF at this time   continue to monitor intake and output

## 2023-01-27 NOTE — PROGRESS NOTE ADULT - ASSESSMENT
Ms Ornelas is a 71 yo F presenting to ED with diarrhea. PMH HFpEF, Hypothyroidism, Thrombocytosis, HLD, Asthma,  A fib on Xarelto, recent C Diff infection.               Hypothyroidism: continue levothyroxine    LAYA: monitor renal fxn  -will avoid IVF given her hx of HFpEF     73 yo F presenting to ED with diarrhea. PMH HFpEF, Hypothyroidism, Thrombocytosis, HLD, Asthma,  A fib on Xarelto, recent C Diff infection.

## 2023-01-27 NOTE — CONSULT NOTE ADULT - ASSESSMENT
LAYA on CKD 3b  Hypokalemia  Cdiff  HTN  CHF     -Baseline Cr 1.3  -LAYA likely  LAYA on CKD 3b  Hypokalemia  Cdiff  HTN  CHF     -Baseline Cr 1.3  -LAYA likely cardiorenal   -Check Urine lytes  -Check UA  -Will have to cont lasix at this time and accept higher creatinine due to volume overload state  -ABX per ID  -Cardiology note reviewed  -BP will improve with diuresis  -May need albumin

## 2023-01-27 NOTE — CARE COORDINATION ASSESSMENT. - NSCAREPROVIDERS_GEN_ALL_CORE_FT
CARE PROVIDERS:  Accepting Physician: Marshall Wright  Admitting: Marshall Wright  Attending: Marshall Wright  Consultant: Gucci Wallace  Consultant: Mustapha Sampson  Consultant: Ivana Whitney  Consultant: Sharri Steward  Consultant: John Chisholm  Consultant: Charlie Matthew  Consultant: Murtaza Juarez  Covering Team: Darion Hull  Covering Team: Kian Arauz  Covering Team: Tammie Hahn  Covering Team: Lyndsay Gill  ED ACP: Emani Oneil  ED Attending: Kellen Wright ED Nurse: Lyly Fletcher  Emergency Medicine: Emani Oneil  Nurse: Shaw Madrid  Nurse: Jason Beckford  Nurse: Mackenzie Kat  Nurse: Lyly Fletcher  Nurse: Sonia Rosado  Nurse: Zink, Corinne  Nurse: Felipe Garcia  Nurse: Leilani Horton  Nurse: Katy Castorena  Oncology: Shira Stephenson  Ordered: ADM, User  Outpatient Provider: Donovan Campos  Override: Mackenzie Kat  Override: Lyly Fletcher  Override: Orjiako, Victory  Override: Apple Martinez  PCA/Nursing Assistant: Dulce Maria Card  Primary Team: Mustapha Walter  Primary Team: Brandy Hoyos  Primary Team: Marshall Wright  Primary Team: Rupa Hauser  Registered Dietitian: Lesli Granados  Respiratory Therapy: Robyn Metz   CARE PROVIDERS:  Accepting Physician: Marshall Wright  Admitting: Marshall Wright  Attending: Marshall Wright  Consultant: Gucci Wallace  Consultant: Charlie Matthew  Consultant: Murtaza Juarez  Consultant: Mustapha Sampson  Consultant: Ivana Whitney  Consultant: Sharri Steward  Consultant: John Chisholm  Covering Team: Darion Hull  Covering Team: Kian Arauz  Covering Team: Tammie Hahn  Covering Team: Lyndsay Gill  ED ACP: Emani Oneil  ED Attending: Kellen Wright ED Nurse: Lyly Fletcher  Emergency Medicine: Emani Oneil  Nurse: Jason Beckford  Nurse: Mackenzie Kat  Nurse: Sonia Rosado  Nurse: Zink, Corinne  Oncology: Shira Stephenson  Ordered: ADM, User  Outpatient Provider: Donovan Campos  Override: Orjiako, Victory  Override: Lyly Fletcher  Override: Mackenzie Kat  Override: Apple Martinez  PCA/Nursing Assistant: Dulce Maria Card  Primary Team: Mustapha Walter  Primary Team: Brandy Hoyos  Primary Team: Marshall Wright  Primary Team: Rupa Hauser  Registered Dietitian: Lesli Granados  Respiratory Therapy: Robyn Metz

## 2023-01-27 NOTE — PROGRESS NOTE ADULT - PROBLEM SELECTOR PLAN 4
Potassium of 3.0 this AM   Given 10meq K rider x3 + PO 40mg K x1   F/u AM BMP  Nephrology following Potassium of 3.0 this AM   Given 10meq K rider x3 + PO 40mg K x1   F/u AM BMP  Nephrology following dr wolf

## 2023-01-27 NOTE — CONSULT NOTE ADULT - SUBJECTIVE AND OBJECTIVE BOX
Patient is a 72y old  Female who presents with a chief complaint of C Diff Infection (2023 19:29)       HPI:  Ms Ornelas is a 73 yo F presenting to ED with diarrhea. PMH HFpEF, Hypothyroidism, Thrombocytosis, HLD, Asthma,  A fib on Xarelto, recent C Diff infection.   Patient seen and examined at bedside. She reports multiple episodes of dark tarry stools up to 20 occurrences daily.  She reports completing her course of PO Vancomycin. She had generalized abdominal pain in all 4 quadrants earlier which she now states is resolved.   She was recently hospitalized for CHF exacerbation earlier this month.   Denies headaches, nausea, vomiting, chest pain, SOB, palpitations, constipation,  hematochezia, dysuria.   (2023 17:14)  Known to us from prior admission, which she had LAYA from volume depletion which was improving.   Still feels SOB and has diarrhea     PAST MEDICAL & SURGICAL HISTORY:  Hypothyroidism      HLD (hyperlipidemia)      Thrombocytosis      Persistent atrial fibrillation      Obesity (BMI 35.0-39.9 without comorbidity)      Asthma      Diabetes mellitus      Ankle injury  on , 5 surgeries.      Cholecystitis           FAMILY HISTORY:  Family history of early CAD (Father)    Family history of early CAD (Mother)    NC    Social History:Non smoker    MEDICATIONS  (STANDING):  anagrelide 1.5 milliGRAM(s) Oral daily  escitalopram 20 milliGRAM(s) Oral daily  fidaxomicin 200 milliGRAM(s) Oral every 12 hours  furosemide   Injectable 80 milliGRAM(s) IV Push daily  levothyroxine 125 MICROGram(s) Oral daily  rivaroxaban 15 milliGRAM(s) Oral with dinner    MEDICATIONS  (PRN):  acetaminophen     Tablet .. 650 milliGRAM(s) Oral every 6 hours PRN Temp greater or equal to 38C (100.4F), Mild Pain (1 - 3)  morphine  - Injectable 2 milliGRAM(s) IV Push every 6 hours PRN Severe Pain (7 - 10)  ondansetron Injectable 4 milliGRAM(s) IV Push every 8 hours PRN Nausea and/or Vomiting   Meds reviewed    Allergies    No Known Allergies    Intolerances         REVIEW OF SYSTEMS:    Review of Systems:   Constitutional: Denies fatigue  HEENT: Denies headaches and dizziness  Respiratory: +SOB, denies cough, or wheezing  Cardiovascular: denies CP, palpitations  Gastrointestinal: + diarrhea  Genitourinary: denies painful urination, increased frequency, urgency, or bloody urine  Skin: denies rashes or itching  Musculoskeletal: denies muscle aches, joint swelling  Neurologic: Denies generalized weakness, denies loss of sensation, numbness, or tingling      Vital Signs Last 24 Hrs  T(C): 36.6 (2023 19:15), Max: 36.6 (2023 05:08)  T(F): 97.9 (2023 19:15), Max: 97.9 (2023 19:15)  HR: 70 (2023 19:15) (70 - 112)  BP: 121/73 (2023 19:15) (121/73 - 132/75)  BP(mean): --  RR: 18 (2023 19:15) (18 - 18)  SpO2: 94% (2023 19:15) (92% - 94%)    Parameters below as of 2023 19:15  Patient On (Oxygen Delivery Method): room air      Daily     Daily Weight in k.4 (2023 05:08)    PHYSICAL EXAM:    GENERAL: NAD  HEAD:  Atraumatic, Normocephalic  EYES: EOMI, conjunctiva and sclera clear  ENMT: No Drainage from nares, No drainage from ears  NECK: Supple, neck  veins full  NERVOUS SYSTEM:  Awake and Alert  CHEST/LUNG: Clear to percussion bilaterally; No rales, rhonchi, wheezing, or rubs  HEART: Regular rate and rhythm; No murmurs, rubs, or gallops  ABDOMEN: Soft, Nontender, Nondistended; Bowel sounds present  EXTREMITIES:++ Edema  SKIN: No rashes No obvious ecchymosis      LABS:                        8.3    x     )-----------( x        ( 2023 11:30 )             28.2         142  |  105  |  27<H>  ----------------------------<  81  3.0<L>   |  31  |  1.80<H>    Ca    7.7<L>      2023 06:45  Mg     2.0         TPro  6.8  /  Alb  2.5<L>  /  TBili  0.4  /  DBili  x   /  AST  12<L>  /  ALT  <6<L>  /  AlkPhos  79      PT/INR - ( 2023 13:26 )   PT: 36.9 sec;   INR: 3.12 ratio         PTT - ( 2023 13:26 )  PTT:43.4 sec    Magnesium, Serum: 2.0 mg/dL ( @ 06:45)          RADIOLOGY & ADDITIONAL TESTS:

## 2023-01-27 NOTE — CARE COORDINATION ASSESSMENT. - NSPASTMEDSURGHISTORY_GEN_ALL_CORE_FT
PAST MEDICAL & SURGICAL HISTORY:  Persistent atrial fibrillation      Thrombocytosis      HLD (hyperlipidemia)      Hypothyroidism      Cholecystitis      Ankle injury  on 2004, 5 surgeries.      Obesity (BMI 35.0-39.9 without comorbidity)      Diabetes mellitus      Asthma

## 2023-01-27 NOTE — PROGRESS NOTE ADULT - SUBJECTIVE AND OBJECTIVE BOX
[facility] -- Nils Johnson,  Adrián, Jim Hunter Savella, Goodger  Office # [phone]    Follow Up:  HF, Afib     Subjective/Observations: Pt seen and examined, resting in bed. pt is tolerating RA Pt reports MATTHEW and orthopnea. Denied cp and palpitations.    REVIEW OF SYSTEMS: All other review of systems is negative unless indicated above  PAST MEDICAL & SURGICAL HISTORY:  Hypothyroidism      HLD (hyperlipidemia)      Thrombocytosis      Persistent atrial fibrillation      Obesity (BMI 35.0-39.9 without comorbidity)      Asthma      Diabetes mellitus      Ankle injury  on 2004, 5 surgeries.      Cholecystitis        MEDICATIONS  (STANDING):  anagrelide 1.5 milliGRAM(s) Oral daily  escitalopram 20 milliGRAM(s) Oral daily  fidaxomicin 200 milliGRAM(s) Oral every 12 hours  levothyroxine 125 MICROGram(s) Oral daily  rivaroxaban 15 milliGRAM(s) Oral with dinner    MEDICATIONS  (PRN):  acetaminophen     Tablet .. 650 milliGRAM(s) Oral every 6 hours PRN Temp greater or equal to 38C (100.4F), Mild Pain (1 - 3)  morphine  - Injectable 2 milliGRAM(s) IV Push every 6 hours PRN Severe Pain (7 - 10)  ondansetron Injectable 4 milliGRAM(s) IV Push every 8 hours PRN Nausea and/or Vomiting    Allergies    No Known Allergies    Intolerances      Vital Signs Last 24 Hrs  T(C): 36.5 (27 Jan 2023 11:22), Max: 36.7 (26 Jan 2023 15:44)  T(F): 97.7 (27 Jan 2023 11:22), Max: 98 (26 Jan 2023 15:44)  HR: 91 (27 Jan 2023 11:22) (81 - 127)  BP: 132/75 (27 Jan 2023 11:22) (93/57 - 149/77)  BP(mean): --  RR: 18 (27 Jan 2023 11:22) (17 - 20)  SpO2: 92% (27 Jan 2023 11:22) (92% - 95%)    Parameters below as of 27 Jan 2023 11:22  Patient On (Oxygen Delivery Method): room air      I&O's Summary    Weight (kg): 76.2 (01-26 @ 12:21)    TELE: SR  PHYSICAL EXAM:  Constitutional: NAD, awake and alert, well-developed  HEENT: Moist Mucous Membranes, Anicteric  Pulmonary: Non-labored, bibasilar rales  Cardiovascular: Regular, S1 and S2, + murmurs, rubs, gallops or clicks  Gastrointestinal: Bowel Sounds present, soft, nontender.   Lymph: +2 b/l  peripheral edema. No lymphadenopathy.  Skin: No visible rashes or ulcers.  Psych:  Mood & affect appropriate for situation  LABS: All Labs Reviewed:                        7.8    15.80 )-----------( 224      ( 27 Jan 2023 06:45 )             26.1                         8.4    18.47 )-----------( 244      ( 26 Jan 2023 13:26 )             28.1     27 Jan 2023 06:45    142    |  105    |  27     ----------------------------<  81     3.0     |  31     |  1.80   26 Jan 2023 13:26    139    |  104    |  28     ----------------------------<  104    3.2     |  31     |  1.80     Ca    7.7        27 Jan 2023 06:45  Ca    7.9        26 Jan 2023 13:26  Mg     2.0       27 Jan 2023 06:45    TPro  6.8    /  Alb  2.5    /  TBili  0.4    /  DBili  x      /  AST  12     /  ALT  <6     /  AlkPhos  79     26 Jan 2023 13:26    PT/INR - ( 26 Jan 2023 13:26 )   PT: 36.9 sec;   INR: 3.12 ratio         PTT - ( 26 Jan 2023 13:26 )  PTT:43.4 sec      12 Lead ECG:   Ventricular Rate 104 BPM    QRS Duration 80 ms    Q-T Interval 362 ms    QTC Calculation(Bazett) 476 ms    R Axis 35 degrees    T Axis -3 degrees    Diagnosis Line artifact limits interpretation  though likely st with frequent atrial ectopy  Confirmed by katelyn Chisholm ([id number]) on 1/26/2023 1:54:35 PM (01-26-23 @ 13:21)      ACC: [id number] EXAM:  ECHO TTE WO CON COMP W DOPP                          PROCEDURE DATE:  01/17/2023          INTERPRETATION:  INDICATION: Abnormal EKG  Sonographer KL    Blood Pressure 124/67    Height 168 cm     Weight 78 kg       BSA 1.88 sq   m    Dimensions:  LA 4.1       Normal Values: 2.0 - 4.0 cm  Ao 3.1        Normal Values: 2.0 - 3.8 cm  SEPTUM 1.6       Normal Values: 0.6 - 1.2 cm  PWT 1.2       Normal Values: 0.6 - 1.1 cm  LVIDd 3.8         Normal Values: 3.0 - 5.6 cm  LVIDs 2.8     Normal Values: 1.8 - 4.0 cm      OBSERVATIONS:  Mitral Valve: Moderate MR.  Aortic Valve/Aorta: Sclerotic trileaflet aortic valve with grossly normal   opening. Mild AI  Tricuspid Valve: Moderate TR.  Pulmonic Valve: Trace PI  Left Atrium: Enlarged  Right Atrium: Enlarged  Left Ventricle: Left ventricular hypertrophy with normal systolic   function, estimated LVEF of 65%. Basal septal hypertrophy is noted  Right Ventricle: Grossly normal size and systolic function.  Pericardium: no significant pericardial effusion.  Pulmonary/RV Pressure: estimated PA systolic pressure of 70mmHg assuming   an RA pressure of 3mmHg.  IVC measures 1.4 cm      IMPRESSION:  Left ventricular hypertrophy with normal systolic function, estimated   LVEF of 65%.  Grossly normal RV size and systolic function.  Biatrial enlargement  Sclerotic trileaflet aortic valve, mild AI.  Moderate MR and TR.  No significant pericardial effusion.    --- End of Report ---            NEYMAR ROWLAND MD; Attending Cardiologist  This document has been electronically signed. Jan 18 2023 12:56PM

## 2023-01-27 NOTE — GOALS OF CARE CONVERSATION - ADVANCED CARE PLANNING - CONVERSATION DETAILS
STARS Writer met with pt at bedside. Reviewed patient's medical and social history as well as events leading to patient's hospitalization. Writer discussed patient's current diagnosis (c-diff, CHF, Covid, hypothyroid, A/F, ), medical condition and management, prognosis, and life expectancy. Inquired about patient's wishes regarding extent of medical care to be provided including escalation of medical care into the ICU and use of vasopressor support. In addition, the writer inquired about thoughts regarding cardiopulmonary resuscitation, artificial nutrition and hydration including use of feeding tubes and IVF, antibiotics, and further investigative studies such as blood draws and radiology. Pt. showed  insight into medical condition. All questions answered. Pt wants resuscitation and has instructed son how to proceed. Psychosocial support provided.

## 2023-01-27 NOTE — CARE COORDINATION ASSESSMENT. - NSDCPLANSERVICES_GEN_ALL_CORE
Transition to home resource folder with CM contact information reviewed with patient and left at bedside/Same as Prior Admission

## 2023-01-27 NOTE — PATIENT PROFILE ADULT - FALL HARM RISK - HARM RISK INTERVENTIONS

## 2023-01-27 NOTE — PROGRESS NOTE ADULT - SUBJECTIVE AND OBJECTIVE BOX
Patient is a 72y old  Female who presents with a chief complaint of C Diff Infection (27 Jan 2023 11:41)      Subjective:  INTERVAL HPI/OVERNIGHT EVENTS: Patient seen and examined at bedside. No overnight events occurred. Patient reports she is frustrated with her diarrhea and feet swelling and is generally feeling unwell but has no other specific complaints at this time. Denies fevers, chills, headache, lightheadedness, chest pain, dyspnea.    MEDICATIONS  (STANDING):  anagrelide 1.5 milliGRAM(s) Oral daily  escitalopram 20 milliGRAM(s) Oral daily  fidaxomicin 200 milliGRAM(s) Oral every 12 hours  levothyroxine 125 MICROGram(s) Oral daily  rivaroxaban 15 milliGRAM(s) Oral with dinner    MEDICATIONS  (PRN):  acetaminophen     Tablet .. 650 milliGRAM(s) Oral every 6 hours PRN Temp greater or equal to 38C (100.4F), Mild Pain (1 - 3)  morphine  - Injectable 2 milliGRAM(s) IV Push every 6 hours PRN Severe Pain (7 - 10)  ondansetron Injectable 4 milliGRAM(s) IV Push every 8 hours PRN Nausea and/or Vomiting      Allergies    No Known Allergies    Intolerances        REVIEW OF SYSTEMS:  CONSTITUTIONAL: No fever or chills  HEENT:  No headache, no sore throat  RESPIRATORY: No cough, wheezing, or shortness of breath  CARDIOVASCULAR: No chest pain, palpitations. + leg swelling   GASTROINTESTINAL: No abd pain, nausea, vomiting, or diarrhea  GENITOURINARY: No dysuria, frequency, or hematuria  NEUROLOGICAL: no focal weakness or dizziness  MUSCULOSKELETAL: no myalgias     Objective:  Vital Signs Last 24 Hrs  T(C): 36.5 (27 Jan 2023 11:22), Max: 36.7 (26 Jan 2023 15:44)  T(F): 97.7 (27 Jan 2023 11:22), Max: 98 (26 Jan 2023 15:44)  HR: 91 (27 Jan 2023 11:22) (81 - 112)  BP: 132/75 (27 Jan 2023 11:22) (129/69 - 149/77)  BP(mean): --  RR: 18 (27 Jan 2023 11:22) (17 - 20)  SpO2: 92% (27 Jan 2023 11:22) (92% - 95%)    Parameters below as of 27 Jan 2023 11:22  Patient On (Oxygen Delivery Method): room air        GENERAL: NAD, lying in bed comfortably  HEAD:  Atraumatic, Normocephalic  EYES: EOMI, PERRLA  CHEST/LUNG: mild crackles at the bases b/l. saturating well on RA. unlabored respirations.   HEART: Regular rate and rhythm; No murmurs, rubs, or gallops  ABDOMEN: mild TTP  EXTREMITIES:  1+ pitting edema b/l   NERVOUS SYSTEM:  Alert & Oriented X3, speech clear. No deficits       LABS:                        8.3    x     )-----------( x        ( 27 Jan 2023 11:30 )             28.2     CBC Full  -  ( 27 Jan 2023 11:30 )  WBC Count : x  Hemoglobin : 8.3 g/dL  Hematocrit : 28.2 %  Platelet Count - Automated : x  Mean Cell Volume : x  Mean Cell Hemoglobin : x  Mean Cell Hemoglobin Concentration : x  Auto Neutrophil # : x  Auto Lymphocyte # : x  Auto Monocyte # : x  Auto Eosinophil # : x  Auto Basophil # : x  Auto Neutrophil % : x  Auto Lymphocyte % : x  Auto Monocyte % : x  Auto Eosinophil % : x  Auto Basophil % : x    27 Jan 2023 06:45    142    |  105    |  27     ----------------------------<  81     3.0     |  31     |  1.80     Ca    7.7        27 Jan 2023 06:45  Mg     2.0       27 Jan 2023 06:45    TPro  6.8    /  Alb  2.5    /  TBili  0.4    /  DBili  x      /  AST  12     /  ALT  <6     /  AlkPhos  79     26 Jan 2023 13:26    PT/INR - ( 26 Jan 2023 13:26 )   PT: 36.9 sec;   INR: 3.12 ratio         PTT - ( 26 Jan 2023 13:26 )  PTT:43.4 sec    CAPILLARY BLOOD GLUCOSE              RADIOLOGY & ADDITIONAL TESTS:    Personally reviewed.     Consultant(s) Notes Reviewed:  [x] YES  [ ] NO     Patient is a 72y old  Female who presents with a chief complaint of C Diff Infection (27 Jan 2023 11:41)      Subjective:  INTERVAL HPI/OVERNIGHT EVENTS: Patient seen and examined at bedside. No overnight events occurred. Patient reports she is frustrated with her diarrhea and feet swelling and is generally feeling unwell but has no other specific complaints at this time. Denies fevers, chills, headache, lightheadedness, chest pain, dyspnea.            REVIEW OF SYSTEMS:  CONSTITUTIONAL: No fever or chills  HEENT:  No headache, no sore throat  RESPIRATORY: No cough, wheezing, or shortness of breath  CARDIOVASCULAR: No chest pain, palpitations. + leg swelling   GASTROINTESTINAL: No abd pain, nausea, vomiting, or diarrhea  GENITOURINARY: No dysuria, frequency, or hematuria  NEUROLOGICAL: no focal weakness or dizziness  MUSCULOSKELETAL: no myalgias     Objective:  Vital Signs Last 24 Hrs  T(C): 36.5 (27 Jan 2023 11:22), Max: 36.7 (26 Jan 2023 15:44)  T(F): 97.7 (27 Jan 2023 11:22), Max: 98 (26 Jan 2023 15:44)  HR: 91 (27 Jan 2023 11:22) (81 - 112)  BP: 132/75 (27 Jan 2023 11:22) (129/69 - 149/77)  BP(mean): --  RR: 18 (27 Jan 2023 11:22) (17 - 20)  SpO2: 92% (27 Jan 2023 11:22) (92% - 95%)    Parameters below as of 27 Jan 2023 11:22  Patient On (Oxygen Delivery Method): room air        GENERAL: NAD, lying in bed comfortably  HEAD:  Atraumatic, Normocephalic  EYES: EOMI, PERRLA  CHEST/LUNG: mild crackles at the bases  HEART: Regular rate and rhythm; No murmurs, no tachy   ABDOMEN:  abd soft , mild tenderness , soft  EXTREMITIES:  3+ pitting edema b/l   NERVOUS SYSTEM:  Alert & Oriented X3, speech clear. No deficits    gu intact     LABS:                        8.3    x     )-----------( x        ( 27 Jan 2023 11:30 )             28.2     CBC Full  -  ( 27 Jan 2023 11:30 )  WBC Count : x  Hemoglobin : 8.3 g/dL  Hematocrit : 28.2 %  Platelet Count - Automated : x  Mean Cell Volume : x  Mean Cell Hemoglobin : x  Mean Cell Hemoglobin Concentration : x  Auto Neutrophil # : x  Auto Lymphocyte # : x  Auto Monocyte # : x  Auto Eosinophil # : x  Auto Basophil # : x  Auto Neutrophil % : x  Auto Lymphocyte % : x  Auto Monocyte % : x  Auto Eosinophil % : x  Auto Basophil % : x    27 Jan 2023 06:45    142    |  105    |  27     ----------------------------<  81     3.0     |  31     |  1.80     Ca    7.7        27 Jan 2023 06:45  Mg     2.0       27 Jan 2023 06:45    TPro  6.8    /  Alb  2.5    /  TBili  0.4    /  DBili  x      /  AST  12     /  ALT  <6     /  AlkPhos  79     26 Jan 2023 13:26    PT/INR - ( 26 Jan 2023 13:26 )   PT: 36.9 sec;   INR: 3.12 ratio         PTT - ( 26 Jan 2023 13:26 )  PTT:43.4 sec    CAPILLARY BLOOD GLUCOSE              RADIOLOGY & ADDITIONAL TESTS:    Personally reviewed.     Consultant(s) Notes Reviewed:  [x] YES  [ ] NO

## 2023-01-27 NOTE — PROGRESS NOTE ADULT - PROBLEM SELECTOR PLAN 1
- Patient with recent d/c from [facility] for Cdiff, now presenting again with recurrent diarrhea after completing course of PO vancomycin. 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 diff PCR   - Continue Dificid 200mg BID   - Will d/c fluids at this time  - monitor WBC count  - CT scan reviewed  - PT consulted, f.u recs for d/c planning   - ID following - Patient with recent d/c from [facility] for Cdiff, now presenting again with recurrent diarrhea after completing course of PO vancomycin. 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 diff PCR   - Continue Dificid 200mg BID   - Will d/c fluids at this time  - leucocytosis trending down   - CT scan reviewed  - PT consulted,   - ID following dr casas

## 2023-01-27 NOTE — CARE COORDINATION ASSESSMENT. - NSTRANSPORTNEEDS_GEN_ALL_CORE
Pt stated that her son will transport her home when she is stable for transition to home./Private Transportation

## 2023-01-27 NOTE — PROGRESS NOTE ADULT - ASSESSMENT
72-year-old female with history of hypothyroidism, hyperlipidemia, AF on Xarelto, thrombocytosis presented with abd pain , diarrhea, worsening MATTHEW, b/l LE edema     CHF exac, Afib   - p/w c/o abd pain, diarrhea, worsening MATTHEW and b/l LE  - was discharged from recent admission for CHF exac, covid, now presented again with volume overload  - Pt takes Lasix 120 mg po at home  - Start  Lasix 80 IVP daily here  - Strict i/o, given that she is having diarrhea, will reassess volume status closely  - recent ECHO (1/17/2023) showed LVH normal LV & RV size and function EF 65%, mod MR, trace TR, no need for repeat   - CXR with small pleural effusion with Right atelectasis   - Continue to trend renal indices, rec renal consult prn    - EKG: ST.  No anginal complaints   - cath without cad in 2018    - known hx of PAFIB   - Continue BB, Xarelto    - CT abd noted, Cdiff +  - rec GI consult      - Monitor and replete lytes, keep K>4, Mg>2.  - Will continue to follow.    Hannah Coley NP  Nurse Practitioner- Cardiology   Spectra #[phone] 72-year-old female with history of hypothyroidism, hyperlipidemia, AF on Xarelto, thrombocytosis presented with abd pain , diarrhea, worsening MATTHEW, b/l LE edema     CHF exac, Afib   - p/w c/o abd pain, diarrhea, worsening MATTHEW and b/l LE  - was discharged from recent admission for CHF exac, covid, now presented again with volume overload  - CXR with small pleural effusion with Right atelectasis   - elevared proBNP [id number]  - Pt takes Lasix 120 mg po at home  - Start  Lasix 80 IVP daily here  - Strict i/o, given that she is having diarrhea, will reassess volume status closely  - recent ECHO (1/17/2023) showed LVH normal LV & RV size and function EF 65%, mod MR, trace TR, no need for repeat     - Continue to trend renal indices, rec renal consult prn    - EKG: ST.  No anginal complaints   - cath without cad in 2018    - known hx of PAFIB   - Continue BB, Xarelto    - CT abd noted, Cdiff +  - rec GI consult      - Monitor and replete lytes, keep K>4, Mg>2.  - Will continue to follow.    Hannah Coley NP  Nurse Practitioner- Cardiology   Spectra #[phone]

## 2023-01-27 NOTE — PATIENT CHOICE NOTE. - NSPTCHOICESTATE_GEN_ALL_CORE

## 2023-01-27 NOTE — PROGRESS NOTE ADULT - SUBJECTIVE AND OBJECTIVE BOX
[facility]  INFECTIOUS DISEASES   [address] [place] [address]  Tel: [phone]     Fax: [phone]  ========================================================  MD Adriana Bird Kaushal, MD Cho, Michelle, MD Sunjit, Jaspal, MD  ========================================================    [id number]  HERMILA QUIÑONES     Follow up: C diff colitis    Still frequent diarrhea but each time very small amount. Able to tolerate po food. On clear liquid.   No abdominal pain.    PAST MEDICAL & SURGICAL HISTORY:  Hypothyroidism  HLD (hyperlipidemia)  Thrombocytosis  Persistent atrial fibrillation  Obesity (BMI 35.0-39.9 without comorbidity)  Asthma  Diabetes mellitus  Ankle injury  on 2004, 5 surgeries.  Cholecystitis    Social Hx: No smoking, ETOH or drugs     FAMILY HISTORY:  Family history of early CAD (Father)    Family history of early CAD (Mother)    Allergies  No Known Allergies    Antibiotics:  MEDICATIONS  (STANDING):  anagrelide 1.5 milliGRAM(s) Oral daily  escitalopram 20 milliGRAM(s) Oral daily  fidaxomicin 200 milliGRAM(s) Oral every 12 hours  levothyroxine 125 MICROGram(s) Oral daily  potassium chloride    Tablet ER 40 milliEquivalent(s) Oral every 4 hours  rivaroxaban 15 milliGRAM(s) Oral with dinner  sodium chloride 0.9%. 1000 milliLiter(s) (75 mL/Hr) IV Continuous <Continuous>    MEDICATIONS  (PRN):  acetaminophen     Tablet .. 650 milliGRAM(s) Oral every 6 hours PRN Temp greater or equal to 38C (100.4F), Mild Pain (1 - 3)  ondansetron Injectable 4 milliGRAM(s) IV Push every 8 hours PRN Nausea and/or Vomiting     REVIEW OF SYSTEMS:  CONSTITUTIONAL:  No Fever or chills  HEENT:  No diplopia or blurred vision.  No sore throat or runny nose.  CARDIOVASCULAR:  No chest pain or SOB.  RESPIRATORY:  No cough, shortness of breath, PND or orthopnea.  GASTROINTESTINAL:  No nausea, vomiting, +diarrhea.  GENITOURINARY:  No dysuria, frequency or urgency. No Blood in urine  MUSCULOSKELETAL:  no joint aches, no muscle pain  SKIN:  No change in skin, hair or nails.  NEUROLOGIC:  No paresthesias or weakness.  PSYCHIATRIC:  No disorder of thought or mood.  ENDOCRINE:  No heat or cold intolerance, polyuria or polydipsia.  HEMATOLOGICAL:  No easy bruising or bleeding.     Physical Exam:  Vital Signs Last 24 Hrs  T(C): 36.5 (27 Jan 2023 11:22), Max: 36.7 (26 Jan 2023 15:44)  T(F): 97.7 (27 Jan 2023 11:22), Max: 98 (26 Jan 2023 15:44)  HR: 91 (27 Jan 2023 11:22) (81 - 112)  BP: 132/75 (27 Jan 2023 11:22) (129/69 - 149/77)  RR: 18 (27 Jan 2023 11:22) (17 - 20)  SpO2: 92% (27 Jan 2023 11:22) (92% - 95%)  Parameters below as of 27 Jan 2023 11:22  Patient On (Oxygen Delivery Method): room air  GEN: NAD  HEENT: normocephalic and atraumatic. EOMI. PERRL.    NECK: Supple.  No lymphadenopathy   LUNGS: Clear to auscultation.  HEART: Irregular rate and rhythm  ABDOMEN: Soft, nontender, and nondistended.  Positive bowel sounds.    : No CVA tenderness  EXTREMITIES: Without edema.  NEUROLOGIC: grossly intact.  PSYCHIATRIC: Appropriate affect .  SKIN: No rash     Labs:                        8.3    x     )-----------( x        ( 27 Jan 2023 11:30 )             28.2     01-27    142  |  105  |  27<H>  ----------------------------<  81  3.0<L>   |  31  |  1.80<H>    Ca    7.7<L>      27 Jan 2023 06:45  Mg     2.0     01-27    TPro  6.8  /  Alb  2.5<L>  /  TBili  0.4  /  DBili  x   /  AST  12<L>  /  ALT  <6<L>  /  AlkPhos  79  01-26    WBC Count: 15.80 K/uL (01-27-23 @ 06:45)  WBC Count: 18.47 K/uL (01-26-23 @ 13:26)    Creatinine, Serum: 1.80 mg/dL (01-27-23 @ 06:45)  Creatinine, Serum: 1.80 mg/dL (01-26-23 @ 13:26)     SARS-CoV-2 Result: Detected (01-26-23 @ 15:45)  SARS-CoV-2 Result: Detected (01-12-23 @ 12:08)    All imaging and other data have been reviewed.  < from: CT Abdomen and Pelvis No Cont (01.26.23 @ 13:45) >  IMPRESSION:  Inflammatory changes involving the sigmoid colon and rectum, which could   be related to infectious or inflammatory proctocolitis versus sigmoid   diverticulitis. No abscess or free air to suggest perforation.  Hepatosplenomegaly.    Assessment and Plan:   Ms Quiñones is a 71 yo F presenting to ED with diarrhea. PMH HFpEF, Hypothyroidism, Thrombocytosis, HLD, Asthma,  A fib on Xarelto, recent C Diff infection.    She reports multiple episodes of dark tarry stools up to 20 occurrences daily.  Most likely Recurrent Cdiff due to failing treatment? Less likely diverticulitis and complications since CT in not concerning.  Translocation of bacteria can happen with colitis though.     Recommendations:  - Will follow cultures and GI PCR  - Continue Dificid 200mg q12 for 10days  - Will hold on antibiotics for now  - Will monitor WBC today 18k-->15k  - For COVID will watch, since she had treatment in last admission and it is more than 10days from first positive test, no need for isolation.     Will follow.  Dr. Nguyen is covering me over the weekend, he can be reached through teams and .     Murtaza Juarez MD  Division of Infectious Diseases   Please call ID service at [phone] with any question.      35 minutes spent on total encounter assessing patient, examination, chart review, counseling and coordinating care by the attending physician/nurse/care manager.   [facility]  INFECTIOUS DISEASES   [address] [place] [address]  Tel: [phone]     Fax: [phone]  ========================================================  MD Adriana Bird Kaushal, MD Cho, Michelle, MD Sunjit, Jaspal, MD  ========================================================    [id number]  HERMILA QUIÑONES     Follow up: C diff colitis    Still frequent diarrhea but each time very small amount. Able to tolerate po food. On clear liquid.   No abdominal pain.    PAST MEDICAL & SURGICAL HISTORY:  Hypothyroidism  HLD (hyperlipidemia)  Thrombocytosis  Persistent atrial fibrillation  Obesity (BMI 35.0-39.9 without comorbidity)  Asthma  Diabetes mellitus  Ankle injury  on 2004, 5 surgeries.  Cholecystitis    Social Hx: No smoking, ETOH or drugs     FAMILY HISTORY:  Family history of early CAD (Father)    Family history of early CAD (Mother)    Allergies  No Known Allergies    Antibiotics:  MEDICATIONS  (STANDING):  anagrelide 1.5 milliGRAM(s) Oral daily  escitalopram 20 milliGRAM(s) Oral daily  fidaxomicin 200 milliGRAM(s) Oral every 12 hours  levothyroxine 125 MICROGram(s) Oral daily  potassium chloride    Tablet ER 40 milliEquivalent(s) Oral every 4 hours  rivaroxaban 15 milliGRAM(s) Oral with dinner  sodium chloride 0.9%. 1000 milliLiter(s) (75 mL/Hr) IV Continuous <Continuous>    MEDICATIONS  (PRN):  acetaminophen     Tablet .. 650 milliGRAM(s) Oral every 6 hours PRN Temp greater or equal to 38C (100.4F), Mild Pain (1 - 3)  ondansetron Injectable 4 milliGRAM(s) IV Push every 8 hours PRN Nausea and/or Vomiting     REVIEW OF SYSTEMS:  CONSTITUTIONAL:  No Fever or chills  HEENT:  No diplopia or blurred vision.  No sore throat or runny nose.  CARDIOVASCULAR:  No chest pain or SOB.  RESPIRATORY:  No cough, shortness of breath, PND or orthopnea.  GASTROINTESTINAL:  No nausea, vomiting, +diarrhea.  GENITOURINARY:  No dysuria, frequency or urgency. No Blood in urine  MUSCULOSKELETAL:  no joint aches, no muscle pain, edema+  SKIN:  No change in skin, hair or nails.  NEUROLOGIC:  No paresthesias or weakness.  PSYCHIATRIC:  No disorder of thought or mood.  ENDOCRINE:  No heat or cold intolerance, polyuria or polydipsia.  HEMATOLOGICAL:  No easy bruising or bleeding.     Physical Exam:  Vital Signs Last 24 Hrs  T(C): 36.5 (27 Jan 2023 11:22), Max: 36.7 (26 Jan 2023 15:44)  T(F): 97.7 (27 Jan 2023 11:22), Max: 98 (26 Jan 2023 15:44)  HR: 91 (27 Jan 2023 11:22) (81 - 112)  BP: 132/75 (27 Jan 2023 11:22) (129/69 - 149/77)  RR: 18 (27 Jan 2023 11:22) (17 - 20)  SpO2: 92% (27 Jan 2023 11:22) (92% - 95%)  Parameters below as of 27 Jan 2023 11:22  Patient On (Oxygen Delivery Method): room air  GEN: NAD  HEENT: normocephalic and atraumatic. EOMI. PERRL.    NECK: Supple.  No lymphadenopathy   LUNGS: Clear to auscultation.  HEART: Irregular rate and rhythm  ABDOMEN: Soft, nontender, and nondistended.  Positive bowel sounds.    : No CVA tenderness  EXTREMITIES: Without edema.  NEUROLOGIC: grossly intact.  PSYCHIATRIC: Appropriate affect .  SKIN: No rash     Labs:                        8.3    x     )-----------( x        ( 27 Jan 2023 11:30 )             28.2     01-27    142  |  105  |  27<H>  ----------------------------<  81  3.0<L>   |  31  |  1.80<H>    Ca    7.7<L>      27 Jan 2023 06:45  Mg     2.0     01-27    TPro  6.8  /  Alb  2.5<L>  /  TBili  0.4  /  DBili  x   /  AST  12<L>  /  ALT  <6<L>  /  AlkPhos  79  01-26    WBC Count: 15.80 K/uL (01-27-23 @ 06:45)  WBC Count: 18.47 K/uL (01-26-23 @ 13:26)    Creatinine, Serum: 1.80 mg/dL (01-27-23 @ 06:45)  Creatinine, Serum: 1.80 mg/dL (01-26-23 @ 13:26)     SARS-CoV-2 Result: Detected (01-26-23 @ 15:45)  SARS-CoV-2 Result: Detected (01-12-23 @ 12:08)    All imaging and other data have been reviewed.  < from: CT Abdomen and Pelvis No Cont (01.26.23 @ 13:45) >  IMPRESSION:  Inflammatory changes involving the sigmoid colon and rectum, which could   be related to infectious or inflammatory proctocolitis versus sigmoid   diverticulitis. No abscess or free air to suggest perforation.  Hepatosplenomegaly.    Assessment and Plan:   Ms Quiñones is a 73 yo F presenting to ED with diarrhea. PMH HFpEF, Hypothyroidism, Thrombocytosis, HLD, Asthma,  A fib on Xarelto, recent C Diff infection.    She reports multiple episodes of dark tarry stools up to 20 occurrences daily.  Most likely Recurrent Cdiff due to failing treatment? Less likely diverticulitis and complications since CT in not concerning.  Translocation of bacteria can happen with colitis though.     Recommendations:  - Will follow cultures and GI PCR  - Continue Dificid 200mg q12 for 10days  - Will hold on antibiotics for now  - Will monitor WBC today 18k-->15k  - For COVID will watch, since she had treatment in last admission and it is more than 10days from first positive test, no need for isolation.     Will follow.  Dr. Nguyen is covering me over the weekend, he can be reached through teams and .     Murtaza Juarez MD  Division of Infectious Diseases   Please call ID service at [phone] with any question.      35 minutes spent on total encounter assessing patient, examination, chart review, counseling and coordinating care by the attending physician/nurse/care manager.   [facility]  INFECTIOUS DISEASES   [address] [place] [address]  Tel: [phone]     Fax: [phone]  ========================================================  MD Adriana Bird Kaushal, MD Cho, Michelle, MD Sunjit, Jaspal, MD  ========================================================    [id number]  HERMILA QUIÑONES     Follow up: C diff colitis    Still frequent diarrhea but each time very small amount. Able to tolerate po food. On clear liquid.   No abdominal pain.    PAST MEDICAL & SURGICAL HISTORY:  Hypothyroidism  HLD (hyperlipidemia)  Thrombocytosis  Persistent atrial fibrillation  Obesity (BMI 35.0-39.9 without comorbidity)  Asthma  Diabetes mellitus  Ankle injury  on 2004, 5 surgeries.  Cholecystitis    Social Hx: No smoking, ETOH or drugs     FAMILY HISTORY:  Family history of early CAD (Father)    Family history of early CAD (Mother)    Allergies  No Known Allergies    Antibiotics:  MEDICATIONS  (STANDING):  anagrelide 1.5 milliGRAM(s) Oral daily  escitalopram 20 milliGRAM(s) Oral daily  fidaxomicin 200 milliGRAM(s) Oral every 12 hours  levothyroxine 125 MICROGram(s) Oral daily  potassium chloride    Tablet ER 40 milliEquivalent(s) Oral every 4 hours  rivaroxaban 15 milliGRAM(s) Oral with dinner  sodium chloride 0.9%. 1000 milliLiter(s) (75 mL/Hr) IV Continuous <Continuous>    MEDICATIONS  (PRN):  acetaminophen     Tablet .. 650 milliGRAM(s) Oral every 6 hours PRN Temp greater or equal to 38C (100.4F), Mild Pain (1 - 3)  ondansetron Injectable 4 milliGRAM(s) IV Push every 8 hours PRN Nausea and/or Vomiting     REVIEW OF SYSTEMS:  CONSTITUTIONAL:  No Fever or chills  HEENT:  No diplopia or blurred vision.  No sore throat or runny nose.  CARDIOVASCULAR:  No chest pain or SOB.  RESPIRATORY:  No cough, shortness of breath, PND or orthopnea.  GASTROINTESTINAL:  No nausea, vomiting, +diarrhea.  GENITOURINARY:  No dysuria, frequency or urgency. No Blood in urine  MUSCULOSKELETAL:  no joint aches, no muscle pain, edema+  SKIN:  No change in skin, hair or nails.  NEUROLOGIC:  No paresthesias or weakness.  PSYCHIATRIC:  No disorder of thought or mood.  ENDOCRINE:  No heat or cold intolerance, polyuria or polydipsia.  HEMATOLOGICAL:  No easy bruising or bleeding.     Physical Exam:  Vital Signs Last 24 Hrs  T(C): 36.5 (27 Jan 2023 11:22), Max: 36.7 (26 Jan 2023 15:44)  T(F): 97.7 (27 Jan 2023 11:22), Max: 98 (26 Jan 2023 15:44)  HR: 91 (27 Jan 2023 11:22) (81 - 112)  BP: 132/75 (27 Jan 2023 11:22) (129/69 - 149/77)  RR: 18 (27 Jan 2023 11:22) (17 - 20)  SpO2: 92% (27 Jan 2023 11:22) (92% - 95%)  Parameters below as of 27 Jan 2023 11:22  Patient On (Oxygen Delivery Method): room air  GEN: NAD  HEENT: normocephalic and atraumatic. EOMI. PERRL.    NECK: Supple.  No lymphadenopathy   LUNGS: Clear to auscultation.  HEART: Irregular rate and rhythm  ABDOMEN: Soft, nontender, and nondistended.  Positive bowel sounds.    : No CVA tenderness  EXTREMITIES: + edema.  NEUROLOGIC: grossly intact.  PSYCHIATRIC: Appropriate affect .  SKIN: No rash     Labs:                        8.3    x     )-----------( x        ( 27 Jan 2023 11:30 )             28.2     01-27    142  |  105  |  27<H>  ----------------------------<  81  3.0<L>   |  31  |  1.80<H>    Ca    7.7<L>      27 Jan 2023 06:45  Mg     2.0     01-27    TPro  6.8  /  Alb  2.5<L>  /  TBili  0.4  /  DBili  x   /  AST  12<L>  /  ALT  <6<L>  /  AlkPhos  79  01-26    WBC Count: 15.80 K/uL (01-27-23 @ 06:45)  WBC Count: 18.47 K/uL (01-26-23 @ 13:26)    Creatinine, Serum: 1.80 mg/dL (01-27-23 @ 06:45)  Creatinine, Serum: 1.80 mg/dL (01-26-23 @ 13:26)     SARS-CoV-2 Result: Detected (01-26-23 @ 15:45)  SARS-CoV-2 Result: Detected (01-12-23 @ 12:08)    All imaging and other data have been reviewed.  < from: CT Abdomen and Pelvis No Cont (01.26.23 @ 13:45) >  IMPRESSION:  Inflammatory changes involving the sigmoid colon and rectum, which could   be related to infectious or inflammatory proctocolitis versus sigmoid   diverticulitis. No abscess or free air to suggest perforation.  Hepatosplenomegaly.    Assessment and Plan:   Ms Quiñones is a 73 yo F presenting to ED with diarrhea. PMH HFpEF, Hypothyroidism, Thrombocytosis, HLD, Asthma,  A fib on Xarelto, recent C Diff infection.    She reports multiple episodes of dark tarry stools up to 20 occurrences daily.  Most likely Recurrent Cdiff due to failing treatment? Less likely diverticulitis and complications since CT in not concerning.  Translocation of bacteria can happen with colitis though.     Recommendations:  - Will follow cultures and GI PCR  - Continue Dificid 200mg q12 for 10days  - Will hold on antibiotics for now  - Will monitor WBC today 18k-->15k  - For COVID will watch, since she had treatment in last admission and it is more than 10days from first positive test, no need for isolation.     Will follow.  Dr. Nguyen is covering me over the weekend, he can be reached through teams and .     Murtaza Juarez MD  Division of Infectious Diseases   Please call ID service at [phone] with any question.      35 minutes spent on total encounter assessing patient, examination, chart review, counseling and coordinating care by the attending physician/nurse/care manager.

## 2023-01-27 NOTE — PROGRESS NOTE ADULT - PROBLEM SELECTOR PLAN 3
Patient with anemia w/ Hb 7.8 this AM; repeat was 8.3  No signs or symptoms of active bleeding; No need for transfusion at this time  Continue to monitor Hg daily CBC   F/u AM iron studies, FOBT   Instructed patient to f/u with her primary hematologist within 4 weeks of discharge

## 2023-01-28 NOTE — PROGRESS NOTE ADULT - SUBJECTIVE AND OBJECTIVE BOX
[facility]  INFECTIOUS DISEASES   [address] [place] [address]  Tel: [phone]     Fax: [phone]  =======================================================      HERMILA QUIÑONES [id number]    Follow up: Diarrhea    recently treated for C diff.   Now C diff is negative.   Continues to have diarrhea.   Denies abdominal pain       Allergies:  No Known Allergies      REVIEW OF SYSTEMS:  CONSTITUTIONAL:  No Fever or chills  HEENT:   No diplopia or blurred vision.  No earache, sore throat or runny nose.  CARDIOVASCULAR:  No Chest Pain  RESPIRATORY:  No cough, shortness of breath  GASTROINTESTINAL:  No nausea, vomiting. + diarrhea.  GENITOURINARY:  No dysuria, frequency or urgency. No Blood in urine  MUSCULOSKELETAL:  no joint aches, no muscle pain  SKIN:  No change in skin, hair or nails.  NEUROLOGIC:  No Headaches, seizures   PSYCHIATRIC:  No disorder of thought or mood.  ENDOCRINE:  No heat or cold intolerance  HEMATOLOGICAL:  No easy bruising or bleeding.       Physical Exam:  GEN: NAD  HEENT: normocephalic and atraumatic. EOMI. PERRL.    NECK: Supple.   LUNGS: CTA B/L.  HEART: RRR  ABDOMEN: Soft, NT, ND.  +BS.    : No CVA tenderness  EXTREMITIES: + edema.  MSK: No joint swelling  NEUROLOGIC: No Focal Deficits   PSYCHIATRIC: Appropriate affect .  SKIN: No rash      Vitals:  T(F): 98 (28 Jan 2023 11:21), Max: 98 (28 Jan 2023 11:21)  HR: 79 (28 Jan 2023 11:21)  BP: 128/70 (28 Jan 2023 11:21)  RR: 18 (28 Jan 2023 15:55)  SpO2: 94% (28 Jan 2023 11:21) (94% - 94%)  temp max in last 48H T(F): , Max: 98 (01-28-23 @ 11:21)      Current Antibiotics:  fidaxomicin 200 milliGRAM(s) Oral every 12 hours    Other medications:  anagrelide 1.5 milliGRAM(s) Oral daily  escitalopram 20 milliGRAM(s) Oral daily  furosemide   Injectable 80 milliGRAM(s) IV Push daily  levothyroxine 125 MICROGram(s) Oral daily  rivaroxaban 15 milliGRAM(s) Oral with dinner                 7.9    14.76 )-----------( 230      ( 28 Jan 2023 07:38 )             27.2     01-28    140  |  104  |  26<H>  ----------------------------<  85  3.8   |  31  |  2.00<H>    Ca    8.1<L>      28 Jan 2023 07:38  Mg     2.0     01-27      RECENT CULTURES:  01-26 @ 16:10 .Blood Blood-Peripheral     No growth to date.    01-26 @ 16:00 .Blood Blood-Peripheral     No growth to date.      WBC Count: 14.76 K/uL (01-28-23 @ 07:38)  WBC Count: 15.80 K/uL (01-27-23 @ 06:45)  WBC Count: 18.47 K/uL (01-26-23 @ 13:26)    Creatinine, Serum: 2.00 mg/dL (01-28-23 @ 07:38)  Creatinine, Serum: 1.80 mg/dL (01-27-23 @ 06:45)  Creatinine, Serum: 1.80 mg/dL (01-26-23 @ 13:26)    SARS-CoV-2 Result: Detected (01-26-23 @ 15:45)  SARS-CoV-2 Result: Detected (01-12-23 @ 12:08)      Clostridium difficile Toxin by PCR (01.27.23 @ 11:50)    C Diff by PCR Result: NotDetec: The results of this test should be interpreted with consideration of  clinical context.  Not Detected result indicates the absence of C. difficile or that the  number of organisms is below the assay limit of detection.  Detected result indicates the presence of C. difficile nucleic acid.  Indeterminate result may indicate the presence of amplification  inhibitors in specimen; presence or absence of organisms cannot be  determined. Consider collecting new specimen if further testing is still  needed.  This qualitative PCR assay detects the toxin B gene; it is FDA-approved,  and its performance was established by [employer],  [place], [place].        < from: CT Abdomen and Pelvis No Cont (01.26.23 @ 13:45) >  ACC: [id number] EXAM:  CT ABDOMEN AND PELVIS   ORDERED BY: NELLY BRIDGESBERG     PROCEDURE DATE:  01/26/2023      INTERPRETATION:  CLINICAL INFORMATION: Acute abdominal pain    COMPARISON: CT chest 1/12/2023.    CONTRAST/COMPLICATIONS:  IV Contrast: NONE  Oral Contrast: NONE  Complications: None reported at time of study completion    PROCEDURE:  CT of the Abdomen and Pelvis was performed.  Sagittal and coronal reformats were performed.    FINDINGS:  LOWER CHEST: Mosaic attenuation of the lungs. Unchanged small loculated   right pleural effusion with right basilar atelectasis/consolidation.   Postsurgical changes left lower lobe and left pleural thickening.    LIVER: Liver is enlarged measuring 20 cm in length.  BILE DUCTS: Normal caliber.  GALLBLADDER: Cholecystectomy.  SPLEEN: Splenomegaly measuring 16.4 cm in length.  PANCREAS: Within normal limits.  ADRENALS: Within normal limits.  KIDNEYS/URETERS: Within normal limits.    BLADDER: Within normal limits.  REPRODUCTIVE ORGANS: Uterusand adnexa within normal limits.    BOWEL: Small hiatal hernia. No bowel obstruction. Moderate colonic stool   burden. Sigmoid diverticulosis. Colonic wall thickening and pericolonic   stranding involving the sigmoid colon and rectum.  PERITONEUM: Trace pelvic free fluid  VESSELS: Atherosclerotic changes.  RETROPERITONEUM/LYMPH NODES: No lymphadenopathy.  ABDOMINAL WALL: Diffuse body wall edema/anasarca.  BONES: Degenerative changes.    IMPRESSION:  Inflammatory changes involving the sigmoid colon and rectum, which could   be related to infectious or inflammatory proctocolitis versus sigmoid   diverticulitis. No abscess or free air to suggest perforation.    Hepatosplenomegaly.    --- End of Report ---  < end of copied text >

## 2023-01-28 NOTE — PROGRESS NOTE ADULT - ASSESSMENT
73 yo F presenting to ED with diarrhea. PMH HFpEF, Hypothyroidism, Thrombocytosis, HLD, Asthma,  A fib on Xarelto, recent C Diff infection.

## 2023-01-28 NOTE — PHARMACOTHERAPY INTERVENTION NOTE - COMMENTS
Pt was started on fidaxomicin on 1/26 due to suspicion of recurrent c diff.  C diff resulted as negative today. Discussed w/ ID Dr. Nguyen and recommended discontinuing therapy. MD accepted and order was d/c.

## 2023-01-28 NOTE — PROGRESS NOTE ADULT - SUBJECTIVE AND OBJECTIVE BOX
Patient is a 72y old  Female who presents with a chief complaint of C Diff Infection (2023 19:29)      OVERNIGHT EVENTS/SUBJECTIVE: No acute overnight events. Complains of some SOB w/ dry cough. Continues to have loose stools, improving. No complaints otherwise; pt denies fever/chills, lightheadedness/dizziness, cp/palp, abd pain, n/v.       MEDICATIONS  (STANDING):  anagrelide 1.5 milliGRAM(s) Oral daily  escitalopram 20 milliGRAM(s) Oral daily  fidaxomicin 200 milliGRAM(s) Oral every 12 hours  furosemide   Injectable 80 milliGRAM(s) IV Push daily  levothyroxine 125 MICROGram(s) Oral daily  rivaroxaban 15 milliGRAM(s) Oral with dinner    MEDICATIONS  (PRN):  acetaminophen     Tablet .. 650 milliGRAM(s) Oral every 6 hours PRN Temp greater or equal to 38C (100.4F), Mild Pain (1 - 3)  morphine  - Injectable 2 milliGRAM(s) IV Push every 6 hours PRN Severe Pain (7 - 10)  ondansetron Injectable 4 milliGRAM(s) IV Push every 8 hours PRN Nausea and/or Vomiting      Allergies    No Known Allergies    Intolerances      REVIEW OF SYSTEMS:  CONSTITUTIONAL: No fever/chills.  HENMT: No HA, lightheadedness/dizziness  RESPIRATORY: +SOB and dry cough.  CARDIOVASCULAR: No chest pain, palpitations.  GASTROINTESTINAL: +Loose stools. No abdominal or epigastric pain. No nausea or vomiting  GENITOURINARY: No dysuria, changes in frequency, hematuria, or incontinence  NEUROLOGICAL: Baseline strength. Sensation intact bilaterally.  SKIN: +LE swelling. No itching, rashes  MUSCULOSKELETAL: No joint pain or swelling; No muscle, back, or extremity pain     PHYSICAL EXAM:   Vital Signs:  Vital Signs Last 24 Hrs  T(C): 36.6 (2023 05:20), Max: 36.6 (2023 19:15)  T(F): 97.8 (2023 05:20), Max: 97.9 (2023 19:15)  HR: 85 (2023 05:20) (70 - 91)  BP: 132/81 (2023 05:20) (121/73 - 132/81)  BP(mean): --  RR: 18 (2023 05:20) (18 - 18)  SpO2: 94% (2023 05:20) (92% - 94%)    Parameters below as of 2023 05:20  Patient On (Oxygen Delivery Method): room air      Daily     Daily Weight in k.1 (2023 06:34)      GENERAL:  Not in acute distress, lying in bed comfortably  HEENT:  NC/AT  CHEST:  Mild b/l crackles @ bases.  HEART:  Regular rate and rhythm, no murmurs, rubs, gallops  ABDOMEN:  Soft, non-tender, non-distended,  EXTREMITIES: +LE edema b/l. no cyanosis  SKIN:  Warm, well perfused  NEURO:  Alert, Conversing appropriately  PSYCH: Not emotionally labile, appropriate affect       LABS:                        7.9    14.76 )-----------( 230      ( 2023 07:38 )             27.2     01-28    140  |  104  |  26<H>  ----------------------------<  85  3.8   |  31  |  2.00<H>    Ca    8.1<L>      2023 07:38  Mg     2.0     -    TPro  6.8  /  Alb  2.5<L>  /  TBili  0.4  /  DBili  x   /  AST  12<L>  /  ALT  <6<L>  /  AlkPhos  79  01-26    PT/INR - ( 2023 13:26 )   PT: 36.9 sec;   INR: 3.12 ratio         PTT - ( 2023 13:26 )  PTT:43.4 sec      RADIOLOGY & ADDITIONAL TESTS:   Patient is a 72y old  Female who presents with a chief complaint of C Diff Infection (2023 19:29)      OVERNIGHT EVENTS/SUBJECTIVE: No acute overnight events. Complains of some SOB w/ dry cough.   Continues to have loose stools, improving. No complaints otherwise; pt denies fever/chills, lightheadedness/dizziness, cp/palp, abd pain .           REVIEW OF SYSTEMS:  CONSTITUTIONAL: No fever/chills.  HENMT: No HA, lightheadedness/dizziness  RESPIRATORY: +SOB and dry cough.  CARDIOVASCULAR: No chest pain, palpitations.  GASTROINTESTINAL: +Loose stools. No abdominal or epigastric pain. No nausea or vomiting  GENITOURINARY: No dysuria, changes in frequency, hematuria, or incontinence  NEUROLOGICAL: Baseline strength. Sensation intact bilaterally.  SKIN: +LE swelling. No itching, rashes  MUSCULOSKELETAL: No joint pain or swelling; No muscle, back, or extremity pain     PHYSICAL EXAM:   Vital Signs:  Vital Signs Last 24 Hrs  T(C): 36.6 (2023 05:20), Max: 36.6 (2023 19:15)  T(F): 97.8 (2023 05:20), Max: 97.9 (2023 19:15)  HR: 85 (2023 05:20) (70 - 91)  BP: 132/81 (2023 05:20) (121/73 - 132/81)  BP(mean): --  RR: 18 (2023 05:20) (18 - 18)  SpO2: 94% (2023 05:20) (92% - 94%)    Parameters below as of 2023 05:20  Patient On (Oxygen Delivery Method): room air      Daily     Daily Weight in k.1 (2023 06:34)      GENERAL:  Not in acute distress, lying in bed comfortably  HEENT:  NC/AT  CHEST:  Mild b/l crackles @ bases.  HEART:  Regular rate and rhythm, no murmurs,  no tachy   ABDOMEN:  Soft, non-tender, non-distended,  EXTREMITIES: +LE edema b/l. no cyanosis , no edema   SKIN:  Warm, well perfused  NEURO:  Alert, Conversing appropriately  PSYCH: Not emotionally labile, appropriate affect   gu intact    MEDICATIONS  (STANDING):  anagrelide 1.5 milliGRAM(s) Oral daily  escitalopram 20 milliGRAM(s) Oral daily  fidaxomicin 200 milliGRAM(s) Oral every 12 hours  furosemide   Injectable 80 milliGRAM(s) IV Push daily  levothyroxine 125 MICROGram(s) Oral daily  rivaroxaban 15 milliGRAM(s) Oral with dinner    MEDICATIONS  (PRN):  acetaminophen     Tablet .. 650 milliGRAM(s) Oral every 6 hours PRN Temp greater or equal to 38C (100.4F), Mild Pain (1 - 3)  morphine  - Injectable 2 milliGRAM(s) IV Push every 6 hours PRN Severe Pain (7 - 10)  ondansetron Injectable 4 milliGRAM(s) IV Push every 8 hours PRN Nausea and/or Vomiting      Allergies    No Known Allergies    Intolerances        LABS:                        7.9    14.76 )-----------( 230      ( 2023 07:38 )             27.2     -    140  |  104  |  26<H>  ----------------------------<  85  3.8   |  31  |  2.00<H>    Ca    8.1<L>      2023 07:38  Mg     2.0         TPro  6.8  /  Alb  2.5<L>  /  TBili  0.4  /  DBili  x   /  AST  12<L>  /  ALT  <6<L>  /  AlkPhos  79      PT/INR - ( 2023 13:26 )   PT: 36.9 sec;   INR: 3.12 ratio         PTT - ( 2023 13:26 )  PTT:43.4 sec      RADIOLOGY & ADDITIONAL TESTS:

## 2023-01-28 NOTE — PROGRESS NOTE ADULT - PROBLEM SELECTOR PLAN 2
Patient with volume overload on exam with pitting edema and crackles  -Continue IV Lasix 80mg qd as per nephrology  -Fluid restriction 1.5L  -Strict I&O Patient with volume overload on exam with pitting edema and crackles  -Continue IV Lasix 80mg qd as per nephrology ok   -Fluid restriction 1.5L  -Strict I&O

## 2023-01-28 NOTE — PHYSICAL THERAPY INITIAL EVALUATION ADULT - ADDITIONAL COMMENTS
Pt lives alone in an apt, no steps. Pt ambulates independently without device and was independent with ADLs. Pt stating she has RW and SC to use as needed. Pt uses home O2 PRN

## 2023-01-28 NOTE — PROGRESS NOTE ADULT - PROBLEM SELECTOR PLAN 1
- Patient with recent d/c from plv for Cdiff, now presenting again with recurrent diarrhea after completing course of PO vancomycin. suspected recurrent C Diff infection  - 1/27/23 C. Diff PCR negative  - Continue Dificid 200mg BID   - leucocytosis trending down   - CT scan reviewed  - PT consulted,   - ID following Dr casas - Patient with recent d/c from plv for Cdiff, now presenting again with recurrent diarrhea after completing course of PO vancomycin. suspected recurrent C Diff infection  - 1/27/23 C. Diff PCR negative  - Continue Dificid 200mg BID until stools are formed  - leucocytosis trending down   - CT scan reviewed  - PT consulted,   - ID following Dr casas - Patient with recent d/c from plv for Cdiff, now presenting again with recurrent diarrhea after completing course of PO vancomycin. suspected recurrent C Diff infection  - 1/27/23 C. Diff PCR negative  - Continue Dificid 200mg BID until stools are formed  - leucocytosis trending down   - CT scan reviewed  - PT consulted,   - ID following Dr casas- Continue Dificid 200mg q12 for 10days

## 2023-01-28 NOTE — PROGRESS NOTE ADULT - SUBJECTIVE AND OBJECTIVE BOX
Patient is a 72y old  Female who presents with a chief complaint of C Diff Infection (2023 19:29)       HPI:  Ms Ornelas is a 73 yo F presenting to ED with diarrhea. PMH HFpEF, Hypothyroidism, Thrombocytosis, HLD, Asthma,  A fib on Xarelto, recent C Diff infection.   Patient seen and examined at bedside. She reports multiple episodes of dark tarry stools up to 20 occurrences daily.  She reports completing her course of PO Vancomycin. She had generalized abdominal pain in all 4 quadrants earlier which she now states is resolved.   She was recently hospitalized for CHF exacerbation earlier this month.   Denies headaches, nausea, vomiting, chest pain, SOB, palpitations, constipation,  hematochezia, dysuria.   (2023 17:14)  Known to us from prior admission, which she had LAYA from volume depletion which was improving.   Still feels SOB and has diarrhea     PAST MEDICAL & SURGICAL HISTORY:  Hypothyroidism      HLD (hyperlipidemia)      Thrombocytosis      Persistent atrial fibrillation      Obesity (BMI 35.0-39.9 without comorbidity)      Asthma      Diabetes mellitus      Ankle injury  on , 5 surgeries.      Cholecystitis           FAMILY HISTORY:  Family history of early CAD (Father)    Family history of early CAD (Mother)    NC    Social History:Non smoker    MEDICATIONS  (STANDING):  anagrelide 1.5 milliGRAM(s) Oral daily  escitalopram 20 milliGRAM(s) Oral daily  fidaxomicin 200 milliGRAM(s) Oral every 12 hours  furosemide   Injectable 80 milliGRAM(s) IV Push daily  levothyroxine 125 MICROGram(s) Oral daily  rivaroxaban 15 milliGRAM(s) Oral with dinner    MEDICATIONS  (PRN):  acetaminophen     Tablet .. 650 milliGRAM(s) Oral every 6 hours PRN Temp greater or equal to 38C (100.4F), Mild Pain (1 - 3)  morphine  - Injectable 2 milliGRAM(s) IV Push every 6 hours PRN Severe Pain (7 - 10)  ondansetron Injectable 4 milliGRAM(s) IV Push every 8 hours PRN Nausea and/or Vomiting   Meds reviewed    Allergies    No Known Allergies    Intolerances         REVIEW OF SYSTEMS:    Review of Systems:   Constitutional: Denies fatigue  HEENT: Denies headaches and dizziness  Respiratory: +SOB, denies cough, or wheezing  Cardiovascular: denies CP, palpitations  Gastrointestinal: + diarrhea  Genitourinary: denies painful urination, increased frequency, urgency, or bloody urine  Skin: denies rashes or itching  Musculoskeletal: denies muscle aches, joint swelling  Neurologic: Denies generalized weakness, denies loss of sensation, numbness, or tingling      Vital Signs Last 24 Hrs  T(C): 36.6 (2023 19:15), Max: 36.6 (2023 05:08)  T(F): 97.9 (2023 19:15), Max: 97.9 (2023 19:15)  HR: 70 (2023 19:15) (70 - 112)  BP: 121/73 (2023 19:15) (121/73 - 132/75)  BP(mean): --  RR: 18 (2023 19:15) (18 - 18)  SpO2: 94% (2023 19:15) (92% - 94%)    Parameters below as of 2023 19:15  Patient On (Oxygen Delivery Method): room air      Daily     Daily Weight in k.4 (2023 05:08)    PHYSICAL EXAM:    GENERAL: NAD  HEAD:  Atraumatic, Normocephalic  EYES: EOMI, conjunctiva and sclera clear  ENMT: No Drainage from nares, No drainage from ears  NECK: Supple, neck  veins full  NERVOUS SYSTEM:  Awake and Alert  CHEST/LUNG: Clear to percussion bilaterally; No rales, rhonchi, wheezing, or rubs  HEART: Regular rate and rhythm; No murmurs, rubs, or gallops  ABDOMEN: Soft, Nontender, Nondistended; Bowel sounds present  EXTREMITIES:++ Edema  SKIN: No rashes No obvious ecchymosis      LABS:                             7.9    14.76 )-----------( 230      ( 2023 07:38 )             27.2     01-    140  |  104  |  26<H>  ----------------------------<  85  3.8   |  31  |  2.00<H>    Ca    8.1<L>      2023 07:38  Mg     2.0     -    TPro  6.8  /  Alb  2.5<L>  /  TBili  0.4  /  DBili  x   /  AST  12<L>  /  ALT  <6<L>  /  AlkPhos  79      PT/INR - ( 2023 13:26 )   PT: 36.9 sec;   INR: 3.12 ratio         PTT - ( 2023 13:26 )  PTT:43.4 sec

## 2023-01-28 NOTE — PROGRESS NOTE ADULT - ASSESSMENT
LAYA on CKD 3b  Hypokalemia  Cdiff  HTN  CHF     -Baseline Cr 1.3  -LAYA likely cardiorenal   -Check Urine lytes  -Check UA  -Will have to cont lasix at this time and accept higher creatinine due to volume overload state  -ABX per ID  -Cardiology note reviewed  -BP will improve with diuresis  -May need albumin  -Creatinine slightly worsening with diuresis but will need to continue at this time and monitor renal tolerance

## 2023-01-28 NOTE — PROGRESS NOTE ADULT - ASSESSMENT
Ms Ornelas is a 71 yo F presenting to ED with diarrhea. PMH HFpEF, Hypothyroidism, Thrombocytosis, HLD, Asthma,  A fib on Xarelto, recent C Diff infection.       Concern for Recurrent Cdiff  ? Proctocolitis on CT   Leukocytosis   Diarrhea   LAYA  COVID since last admission       Recommendations:  - Blood cultures 1/26 negative   - Check stool culture  - Check Stool Calprotectin   - Check CRP   - GI PCR negative  - C Diff toxin PCR is negative   - D/C Dificid   - Will hold on antibiotics for now  - Will monitor WBC, she will likely need a hematology work up given she has had persistent leukocytosis on this record dating back to 2021  - For COVID will watch, since she had treatment with Remdesivir on the last admission     Will follow    d/w Pharmacy

## 2023-01-28 NOTE — PROGRESS NOTE ADULT - SUBJECTIVE AND OBJECTIVE BOX
[facility] -- Nils Johnson,  Adrián, Jim Hunter Savella, Goodger  Office # [phone]    Follow Up:  HF, Afib     Subjective/Observations: Pt seen and examined, resting in chair. pt is tolerating RA. Denied cp and palpitations. Reports curry when ambulating to restroom. Diarrhea improving.       REVIEW OF SYSTEMS: All other review of systems is negative unless indicated above  PAST MEDICAL & SURGICAL HISTORY:  Hypothyroidism      HLD (hyperlipidemia)      Thrombocytosis      Persistent atrial fibrillation      Obesity (BMI 35.0-39.9 without comorbidity)      Asthma      Diabetes mellitus      Ankle injury  on 2004, 5 surgeries.      Cholecystitis        MEDICATIONS  (STANDING):  anagrelide 1.5 milliGRAM(s) Oral daily  escitalopram 20 milliGRAM(s) Oral daily  fidaxomicin 200 milliGRAM(s) Oral every 12 hours  furosemide   Injectable 80 milliGRAM(s) IV Push daily  levothyroxine 125 MICROGram(s) Oral daily  rivaroxaban 15 milliGRAM(s) Oral with dinner    MEDICATIONS  (PRN):  acetaminophen     Tablet .. 650 milliGRAM(s) Oral every 6 hours PRN Temp greater or equal to 38C (100.4F), Mild Pain (1 - 3)  morphine  - Injectable 2 milliGRAM(s) IV Push every 6 hours PRN Severe Pain (7 - 10)  ondansetron Injectable 4 milliGRAM(s) IV Push every 8 hours PRN Nausea and/or Vomiting    Allergies    No Known Allergies    Intolerances      Vital Signs Last 24 Hrs  T(C): 36.6 (28 Jan 2023 05:20), Max: 36.6 (27 Jan 2023 19:15)  T(F): 97.8 (28 Jan 2023 05:20), Max: 97.9 (27 Jan 2023 19:15)  HR: 85 (28 Jan 2023 05:20) (70 - 91)  BP: 132/81 (28 Jan 2023 05:20) (121/73 - 132/81)  BP(mean): --  RR: 18 (28 Jan 2023 05:20) (18 - 18)  SpO2: 94% (28 Jan 2023 05:20) (92% - 94%)    Parameters below as of 28 Jan 2023 05:20  Patient On (Oxygen Delivery Method): room air      I&O's Summary      TELE: SR  PHYSICAL EXAM:  Constitutional: NAD, awake and alert, well-developed  HEENT: Moist Mucous Membranes, Anicteric  Pulmonary: Non-labored, bibasilar rales  Cardiovascular: Regular, S1 and S2, + murmurs, rubs, gallops or clicks  Gastrointestinal: Bowel Sounds present, soft, nontender.   Lymph: +2 b/l  peripheral edema. No lymphadenopathy.  Skin: No visible rashes or ulcers.  Psych:  Mood & affect appropriate for situation    LABS: All Labs Reviewed:                        7.9    14.76 )-----------( 230      ( 28 Jan 2023 07:38 )             27.2                         8.3    x     )-----------( x        ( 27 Jan 2023 11:30 )             28.2                         7.8    15.80 )-----------( 224      ( 27 Jan 2023 06:45 )             26.1     28 Jan 2023 07:38    140    |  104    |  26     ----------------------------<  85     3.8     |  31     |  2.00   27 Jan 2023 06:45    142    |  105    |  27     ----------------------------<  81     3.0     |  31     |  1.80   26 Jan 2023 13:26    139    |  104    |  28     ----------------------------<  104    3.2     |  31     |  1.80     Ca    8.1        28 Jan 2023 07:38  Ca    7.7        27 Jan 2023 06:45  Ca    7.9        26 Jan 2023 13:26  Mg     2.0       27 Jan 2023 06:45    TPro  6.8    /  Alb  2.5    /  TBili  0.4    /  DBili  x      /  AST  12     /  ALT  <6     /  AlkPhos  79     26 Jan 2023 13:26    PT/INR - ( 26 Jan 2023 13:26 )   PT: 36.9 sec;   INR: 3.12 ratio         PTT - ( 26 Jan 2023 13:26 )  PTT:43.4 sec      12 Lead ECG:   Ventricular Rate 104 BPM    QRS Duration 80 ms    Q-T Interval 362 ms    QTC Calculation(Bazett) 476 ms    R Axis 35 degrees    T Axis -3 degrees    Diagnosis Line artifact limits interpretation  though likely st with frequent atrial ectopy  Confirmed by katelyn Chisholm ([id number]) on 1/26/2023 1:54:35 PM (01-26-23 @ 13:21)      ACC: [id number] EXAM:  ECHO TTE WO CON COMP W DOPP                          PROCEDURE DATE:  01/17/2023          INTERPRETATION:  INDICATION: Abnormal EKG  Sonographer KL    Blood Pressure 124/67    Height 168 cm     Weight 78 kg       BSA 1.88 sq   m    Dimensions:  LA 4.1       Normal Values: 2.0 - 4.0 cm  Ao 3.1        Normal Values: 2.0 - 3.8 cm  SEPTUM 1.6       Normal Values: 0.6 - 1.2 cm  PWT 1.2       Normal Values: 0.6 - 1.1 cm  LVIDd 3.8         Normal Values: 3.0 - 5.6 cm  LVIDs 2.8     Normal Values: 1.8 - 4.0 cm      OBSERVATIONS:  Mitral Valve: Moderate MR.  Aortic Valve/Aorta: Sclerotic trileaflet aortic valve with grossly normal   opening. Mild AI  Tricuspid Valve: Moderate TR.  Pulmonic Valve: Trace PI  Left Atrium: Enlarged  Right Atrium: Enlarged  Left Ventricle: Left ventricular hypertrophy with normal systolic   function, estimated LVEF of 65%. Basal septal hypertrophy is noted  Right Ventricle: Grossly normal size and systolic function.  Pericardium: no significant pericardial effusion.  Pulmonary/RV Pressure: estimated PA systolic pressure of 70mmHg assuming   an RA pressure of 3mmHg.  IVC measures 1.4 cm      IMPRESSION:  Left ventricular hypertrophy with normal systolic function, estimated   LVEF of 65%.  Grossly normal RV size and systolic function.  Biatrial enlargement  Sclerotic trileaflet aortic valve, mild AI.  Moderate MR and TR.  No significant pericardial effusion.    --- End of Report ---            NEYMAR ROWLAND MD; Attending Cardiologist  This document has been electronically signed. Jan 18 2023 12:56PM

## 2023-01-28 NOTE — PROGRESS NOTE ADULT - PROBLEM SELECTOR PLAN 3
Hb 7.9 today; No signs or symptoms of active bleeding; No need for transfusion at this time  -F/u AM iron studies  -FOBT negative  -Continue to monitor Hg daily CBC   Instructed patient to f/u with her primary hematologist within 4 weeks of discharge Hb 7.9 today   possible  chronic ; No signs or symptoms of active bleeding; No need for transfusion at this time  -F/u AM iron studies  -FOBT negative/ no gi bleed   -Continue to monitor Hg daily CBC   Instructed patient to f/u with her primary hematologist within 4 weeks of discharge

## 2023-01-29 NOTE — PROGRESS NOTE ADULT - ASSESSMENT
Ms Ornelas is a 71 yo F presenting to ED with diarrhea. PMH HFpEF, Hypothyroidism, Thrombocytosis, HLD, Asthma,  A fib on Xarelto, recent C Diff infection.       Concern for Recurrent Cdiff  ? Proctocolitis on CT   Leukocytosis   Diarrhea   LAYA  COVID since last admission       Recommendations:  - Blood cultures 1/26 negative   - stool culture pending  - Stool Calprotectin  pending  - CRP pending  - GI PCR negative  - C Diff toxin PCR is negative   - D/C Dificid   - Will hold on antibiotics for now  - Will monitor WBC, she will likely need a hematology work up given she has had persistent leukocytosis on this record dating back to 2021  - For COVID will watch, since she had treatment with Remdesivir on the last admission     Will follow    d/w Pharmacy

## 2023-01-29 NOTE — DIETITIAN NUTRITION RISK NOTIFICATION - TREATMENT: THE FOLLOWING DIET HAS BEEN RECOMMENDED
Diet, DASH/TLC:   Sodium & Cholesterol Restricted  1500mL Fluid Restriction ([id number]) (01-29-23 @ 14:11) [Pending Verification By Attending]  Diet, DASH/TLC:   Sodium & Cholesterol Restricted  Consistent Carbohydrate {Evening Snack}  1500mL Fluid Restriction ([id number]) (01-29-23 @ 13:26) [Pending Verification By Attending]  Diet, Regular:   Consistent Carbohydrate {Evening Snack}  1200mL Fluid Restriction ([id number]) (01-29-23 @ 10:46) [Active]

## 2023-01-29 NOTE — PROGRESS NOTE ADULT - SUBJECTIVE AND OBJECTIVE BOX
Patient is a 72y old  Female who presents with a chief complaint of C Diff Infection (2023 09:24)      INTERVAL HPI/OVERNIGHT EVENTS: Patient seen and examined at bedside. No overnight events occurred. Patient has no complaints at this time. Informs that she has 2 BM this AM and both were pasty like. Denies fevers, chills, headache, lightheadedness, chest pain, dyspnea, abdominal pain, n/v/d/c.    MEDICATIONS  (STANDING):  anagrelide 1.5 milliGRAM(s) Oral daily  escitalopram 20 milliGRAM(s) Oral daily  levothyroxine 125 MICROGram(s) Oral daily  rivaroxaban 15 milliGRAM(s) Oral with dinner    MEDICATIONS  (PRN):  acetaminophen     Tablet .. 650 milliGRAM(s) Oral every 6 hours PRN Temp greater or equal to 38C (100.4F), Mild Pain (1 - 3)  morphine  - Injectable 2 milliGRAM(s) IV Push every 6 hours PRN Severe Pain (7 - 10)  ondansetron Injectable 4 milliGRAM(s) IV Push every 8 hours PRN Nausea and/or Vomiting      Allergies    No Known Allergies    Intolerances        REVIEW OF SYSTEMS:  CONSTITUTIONAL: No fever or chills  HEENT:  No headache, no sore throat  RESPIRATORY: No cough, wheezing, or shortness of breath  CARDIOVASCULAR: No chest pain, palpitations  GASTROINTESTINAL: No abd pain, nausea, vomiting, or diarrhea  GENITOURINARY: No dysuria, frequency, or hematuria  NEUROLOGICAL: no focal weakness or dizziness  MUSCULOSKELETAL: no myalgias     Vital Signs Last 24 Hrs  T(C): 36.7 (2023 13:36), Max: 36.9 (2023 20:03)  T(F): 98.1 (2023 13:36), Max: 98.4 (2023 20:03)  HR: 88 (2023 13:36) (71 - 99)  BP: 102/62 (2023 13:36) (102/62 - 136/81)  BP(mean): --  RR: 21 (2023 13:36) (18 - 21)  SpO2: 92% (2023 13:36) (92% - 95%)    Parameters below as of 2023 13:36  Patient On (Oxygen Delivery Method): room air        PHYSICAL EXAM:  GENERAL: well developed females who is sitting out of bed to chair and is in NAD  HEENT:  anicteric, moist mucous membranes  CHEST/LUNG:  CTA b/l, no rales, wheezes, or rhonchi  HEART:  RRR, S1, S2  ABDOMEN:  BS+, soft, nontender, nondistended  EXTREMITIES:  2 + BL LE edema, No cyanosis, or calf tenderness  NERVOUS SYSTEM: answers questions and follows commands appropriately    LABS:                        7.8    15.13 )-----------( 201      ( 2023 06:39 )             26.1     CBC Full  -  ( 2023 06:39 )  WBC Count : 15.13 K/uL  Hemoglobin : 7.8 g/dL  Hematocrit : 26.1 %  Platelet Count - Automated : 201 K/uL  Mean Cell Volume : 84.5 fl  Mean Cell Hemoglobin : 25.2 pg  Mean Cell Hemoglobin Concentration : 29.9 gm/dL  Auto Neutrophil # : 12.48 K/uL  Auto Lymphocyte # : 1.19 K/uL  Auto Monocyte # : 0.74 K/uL  Auto Eosinophil # : 0.42 K/uL  Auto Basophil # : 0.07 K/uL  Auto Neutrophil % : 82.4 %  Auto Lymphocyte % : 7.9 %  Auto Monocyte % : 4.9 %  Auto Eosinophil % : 2.8 %  Auto Basophil % : 0.5 %    2023 06:39    140    |  102    |  23     ----------------------------<  80     3.2     |  31     |  1.60     Ca    7.9        2023 06:39  Phos  3.6       2023 06:39  Mg     1.9       2023 06:39        Urinalysis Basic - ( 2023 12:00 )    Color: Pale Yellow / Appearance: Clear / S.010 / pH: x  Gluc: x / Ketone: Negative  / Bili: Negative / Urobili: Negative   Blood: x / Protein: Negative / Nitrite: Negative   Leuk Esterase: Negative / RBC: x / WBC x   Sq Epi: x / Non Sq Epi: x / Bacteria: x      CAPILLARY BLOOD GLUCOSE            Culture - Blood (collected 23 @ 16:10)  Source: .Blood Blood-Peripheral  Preliminary Report (23 @ 02:02):    No growth to date.    Culture - Blood (collected 23 @ 16:00)  Source: .Blood Blood-Peripheral  Preliminary Report (23 @ 04:01):    No growth to date.        RADIOLOGY & ADDITIONAL TESTS:    Personally reviewed.     Consultant(s) Notes Reviewed:  [x] YES  [ ] NO     Patient is a 72y old  Female who presents with a chief complaint of C Diff Infection (2023 09:24)      INTERVAL HPI/OVERNIGHT EVENTS: Patient seen and examined at bedside. No overnight events occurred. Patient has no complaints at this time.   Informs that she has 2 BM this AM and both were pasty like no blood . Denies fevers, chills, headache, lightheadedness, chest pain, dyspnea, abdominal pain.        REVIEW OF SYSTEMS:  CONSTITUTIONAL: No fever or chills  HEENT:  No headache, no sore throat  RESPIRATORY: No cough, wheezing, or shortness of breath  CARDIOVASCULAR: No chest pain, palpitations  GASTROINTESTINAL: No abd pain, nausea, vomiting, or diarrhea  GENITOURINARY: No dysuria, frequency, or hematuria  NEUROLOGICAL: no focal weakness or dizziness  MUSCULOSKELETAL: no myalgias     Vital Signs Last 24 Hrs  T(C): 36.7 (2023 13:36), Max: 36.9 (2023 20:03)  T(F): 98.1 (2023 13:36), Max: 98.4 (2023 20:03)  HR: 88 (2023 13:36) (71 - 99)  BP: 102/62 (2023 13:36) (102/62 - 136/81)  BP(mean): --  RR: 21 (2023 13:36) (18 - 21)  SpO2: 92% (2023 13:36) (92% - 95%)    Parameters below as of 2023 13:36  Patient On (Oxygen Delivery Method): room air        PHYSICAL EXAM:  GENERAL: well developed females who is sitting out of bed to chair and is in NAD  HEENT:  anicteric, moist mucous membranes  CHEST/LUNG:  CTA b/l, no rales, wheezes, or rhonchi  HEART:  RRR, S1, S2 , no tachy   ABDOMEN:  BS+, soft, nontender, nondistended  EXTREMITIES:  2 + BL LE edema, No cyanosis, or calf tenderness  NERVOUS SYSTEM: answers questions and follows commands appropriately  gu intact       MEDICATIONS  (STANDING):  anagrelide 1.5 milliGRAM(s) Oral daily  escitalopram 20 milliGRAM(s) Oral daily  levothyroxine 125 MICROGram(s) Oral daily  rivaroxaban 15 milliGRAM(s) Oral with dinner    MEDICATIONS  (PRN):  acetaminophen     Tablet .. 650 milliGRAM(s) Oral every 6 hours PRN Temp greater or equal to 38C (100.4F), Mild Pain (1 - 3)  morphine  - Injectable 2 milliGRAM(s) IV Push every 6 hours PRN Severe Pain (7 - 10)  ondansetron Injectable 4 milliGRAM(s) IV Push every 8 hours PRN Nausea and/or Vomiting      Allergies    No Known Allergies    Intolerances          LABS:                        7.8    15.13 )-----------( 201      ( 2023 06:39 )             26.1     CBC Full  -  ( 2023 06:39 )  WBC Count : 15.13 K/uL  Hemoglobin : 7.8 g/dL  Hematocrit : 26.1 %  Platelet Count - Automated : 201 K/uL  Mean Cell Volume : 84.5 fl  Mean Cell Hemoglobin : 25.2 pg  Mean Cell Hemoglobin Concentration : 29.9 gm/dL  Auto Neutrophil # : 12.48 K/uL  Auto Lymphocyte # : 1.19 K/uL  Auto Monocyte # : 0.74 K/uL  Auto Eosinophil # : 0.42 K/uL  Auto Basophil # : 0.07 K/uL  Auto Neutrophil % : 82.4 %  Auto Lymphocyte % : 7.9 %  Auto Monocyte % : 4.9 %  Auto Eosinophil % : 2.8 %  Auto Basophil % : 0.5 %    2023 06:39    140    |  102    |  23     ----------------------------<  80     3.2     |  31     |  1.60     Ca    7.9        2023 06:39  Phos  3.6       2023 06:39  Mg     1.9       2023 06:39        Urinalysis Basic - ( 2023 12:00 )    Color: Pale Yellow / Appearance: Clear / S.010 / pH: x  Gluc: x / Ketone: Negative  / Bili: Negative / Urobili: Negative   Blood: x / Protein: Negative / Nitrite: Negative   Leuk Esterase: Negative / RBC: x / WBC x   Sq Epi: x / Non Sq Epi: x / Bacteria: x                  Culture - Blood (collected 23 @ 16:10)  Source: .Blood Blood-Peripheral  Preliminary Report (23 @ 02:02):    No growth to date.    Culture - Blood (collected 23 @ 16:00)  Source: .Blood Blood-Peripheral  Preliminary Report (23 @ 04:01):    No growth to date.        RADIOLOGY & ADDITIONAL TESTS:    Personally reviewed.     Consultant(s) Notes Reviewed:  [x] YES  [ ] NO

## 2023-01-29 NOTE — DISCHARGE NOTE PROVIDER - NSDCFUSCHEDAPPT_GEN_ALL_CORE_FT
Matt Physician [facility]  [facility]  Scheduled Appointment: 02/01/2023    Benji Rice Physician [facility]  [facility]  Scheduled Appointment: 02/01/2023     [facility]  [facility]  Scheduled Appointment: 02/01/2023

## 2023-01-29 NOTE — DIETITIAN INITIAL EVALUATION ADULT - WEIGHT (KG)
Pt's parent given verbal and written discharge instructions, pt's parent verbalized understanding, no further questions at this time. Pt's parent aware to follow up with PCP, otherwise return to the ED with worsening symptoms. Pt ambulatory out of ED in stable condition.      58.9

## 2023-01-29 NOTE — DIETITIAN INITIAL EVALUATION ADULT - PERTINENT MEDS FT
MEDICATIONS  (STANDING):  anagrelide 1.5 milliGRAM(s) Oral daily  escitalopram 20 milliGRAM(s) Oral daily  levothyroxine 125 MICROGram(s) Oral daily  rivaroxaban 15 milliGRAM(s) Oral with dinner    MEDICATIONS  (PRN):  acetaminophen     Tablet .. 650 milliGRAM(s) Oral every 6 hours PRN Temp greater or equal to 38C (100.4F), Mild Pain (1 - 3)  morphine  - Injectable 2 milliGRAM(s) IV Push every 6 hours PRN Severe Pain (7 - 10)  ondansetron Injectable 4 milliGRAM(s) IV Push every 8 hours PRN Nausea and/or Vomiting

## 2023-01-29 NOTE — DIETITIAN INITIAL EVALUATION ADULT - OTHER INFO
71 y/o female adm with cdiff/diarrhea/HF. PMH HF, hypothyroidism, HLD, asthma, afib. Pt visited at bedside this am. Pt reports that her appetite is poor. No food preferences at this time/refusing po supplements. Pt states that she doesn't use salt. Wt has been decreasing for 1 yr since she has "gotten sick". Pt weighed 246#, now 168#. B/L extremities still with some edema. Verbally reviewed low sodium/fluid restriction with pt. Pt states that she is not a diabetic. Blood glucose levels noted to be normal (rx d/cing consistent carb restriction to help improve po intake). Pt with moderate depletion noted to temporal and clavicle regions with mild edema to lower extremities. Pt still with diarrhea/has not resolved yet.

## 2023-01-29 NOTE — PROGRESS NOTE ADULT - PROBLEM SELECTOR PLAN 3
- Patient presented with elevated creatinine of 2 Baseline around 1 per chart review  - Likely pre renal due to dehydration in the setting of c. diff   - HOLD diuretics and ACE/ARB's  - Avoid nephrotoxic meds  - Now improving, AM labs show creatinine of 1.6  - Diet advanced to regular with fluid restrictions due to hx of CHF and BL LE edema  - repeat BMP in AM  - Nephrology DR. REINA following, appreciate recs

## 2023-01-29 NOTE — PROGRESS NOTE ADULT - SUBJECTIVE AND OBJECTIVE BOX
[facility] -- Nils Johnson,  Adrián, Jim Hunter Savella, [place]  Office # [phone]    Follow Up:  HF, Afib     Subjective/Observations: Pt seen and examined, resting in bed. Pt is tolerating RA. Denied cp and palpitations. Reports curry when ambulating to restroom though improving. Reports b/l LE edema, though improving.  Diarrhea improving.     REVIEW OF SYSTEMS: All other review of systems is negative unless indicated above  PAST MEDICAL & SURGICAL HISTORY:  Hypothyroidism      HLD (hyperlipidemia)      Thrombocytosis      Persistent atrial fibrillation      Obesity (BMI 35.0-39.9 without comorbidity)      Asthma      Diabetes mellitus      Ankle injury  on 2004, 5 surgeries.      Cholecystitis        MEDICATIONS  (STANDING):  anagrelide 1.5 milliGRAM(s) Oral daily  escitalopram 20 milliGRAM(s) Oral daily  furosemide   Injectable 80 milliGRAM(s) IV Push daily  levothyroxine 125 MICROGram(s) Oral daily  potassium chloride    Tablet ER 40 milliEquivalent(s) Oral once  rivaroxaban 15 milliGRAM(s) Oral with dinner    MEDICATIONS  (PRN):  acetaminophen     Tablet .. 650 milliGRAM(s) Oral every 6 hours PRN Temp greater or equal to 38C (100.4F), Mild Pain (1 - 3)  morphine  - Injectable 2 milliGRAM(s) IV Push every 6 hours PRN Severe Pain (7 - 10)  ondansetron Injectable 4 milliGRAM(s) IV Push every 8 hours PRN Nausea and/or Vomiting    Allergies    No Known Allergies    Intolerances      Vital Signs Last 24 Hrs  T(C): 36.4 (29 Jan 2023 04:25), Max: 36.9 (28 Jan 2023 20:03)  T(F): 97.6 (29 Jan 2023 04:25), Max: 98.4 (28 Jan 2023 20:03)  HR: 80 (29 Jan 2023 04:25) (79 - 99)  BP: 136/81 (29 Jan 2023 04:25) (128/70 - 136/81)  BP(mean): --  RR: 19 (29 Jan 2023 04:25) (18 - 23)  SpO2: 93% (29 Jan 2023 04:25) (93% - 94%)    Parameters below as of 29 Jan 2023 04:25  Patient On (Oxygen Delivery Method): room air      I&O's Summary    28 Jan 2023 07:01  -  29 Jan 2023 07:00  --------------------------------------------------------  IN: 360 mL / OUT: 0 mL / NET: 360 mL    TELE: SR  PHYSICAL EXAM:  Constitutional: NAD, awake and alert, well-developed  HEENT: Moist Mucous Membranes, Anicteric  Pulmonary: Non-labored, bibasilar rales  Cardiovascular: Regular, S1 and S2, + murmurs, rubs, gallops or clicks  Gastrointestinal: Bowel Sounds present, soft, nontender.   Lymph: +2 b/l  peripheral edema. No lymphadenopathy.  Skin: No visible rashes or ulcers.  Psych:  Mood & affect appropriate for situation    LABS: All Labs Reviewed:                        7.8    15.13 )-----------( 201      ( 29 Jan 2023 06:39 )             26.1                         7.9    14.76 )-----------( 230      ( 28 Jan 2023 07:38 )             27.2                         8.3    x     )-----------( x        ( 27 Jan 2023 11:30 )             28.2     29 Jan 2023 06:39    140    |  102    |  23     ----------------------------<  80     3.2     |  31     |  1.60   28 Jan 2023 07:38    140    |  104    |  26     ----------------------------<  85     3.8     |  31     |  2.00   27 Jan 2023 06:45    142    |  105    |  27     ----------------------------<  81     3.0     |  31     |  1.80     Ca    7.9        29 Jan 2023 06:39  Ca    8.1        28 Jan 2023 07:38  Ca    7.7        27 Jan 2023 06:45  Phos  3.6       29 Jan 2023 06:39  Mg     1.9       29 Jan 2023 06:39  Mg     2.0       27 Jan 2023 06:45    TPro  6.8    /  Alb  2.5    /  TBili  0.4    /  DBili  x      /  AST  12     /  ALT  <6     /  AlkPhos  79     26 Jan 2023 13:26          12 Lead ECG:   Ventricular Rate 104 BPM    QRS Duration 80 ms    Q-T Interval 362 ms    QTC Calculation(Bazett) 476 ms    R Axis 35 degrees    T Axis -3 degrees    Diagnosis Line artifact limits interpretation  though likely st with frequent atrial ectopy  Confirmed by katelyn Chisholm ([id number]) on 1/26/2023 1:54:35 PM (01-26-23 @ 13:21)      ACC: [id number] EXAM:  ECHO TTE WO CON COMP W DOPP                          PROCEDURE DATE:  01/17/2023          INTERPRETATION:  INDICATION: Abnormal EKG  Sonographer KL    Blood Pressure 124/67    Height 168 cm     Weight 78 kg       BSA 1.88 sq   m    Dimensions:  LA 4.1       Normal Values: 2.0 - 4.0 cm  Ao 3.1        Normal Values: 2.0 - 3.8 cm  SEPTUM 1.6       Normal Values: 0.6 - 1.2 cm  PWT 1.2       Normal Values: 0.6 - 1.1 cm  LVIDd 3.8         Normal Values: 3.0 - 5.6 cm  LVIDs 2.8     Normal Values: 1.8 - 4.0 cm      OBSERVATIONS:  Mitral Valve: Moderate MR.  Aortic Valve/Aorta: Sclerotic trileaflet aortic valve with grossly normal   opening. Mild AI  Tricuspid Valve: Moderate TR.  Pulmonic Valve: Trace PI  Left Atrium: Enlarged  Right Atrium: Enlarged  Left Ventricle: Left ventricular hypertrophy with normal systolic   function, estimated LVEF of 65%. Basal septal hypertrophy is noted  Right Ventricle: Grossly normal size and systolic function.  Pericardium: no significant pericardial effusion.  Pulmonary/RV Pressure: estimated PA systolic pressure of 70mmHg assuming   an RA pressure of 3mmHg.  IVC measures 1.4 cm      IMPRESSION:  Left ventricular hypertrophy with normal systolic function, estimated   LVEF of 65%.  Grossly normal RV size and systolic function.  Biatrial enlargement  Sclerotic trileaflet aortic valve, mild AI.  Moderate MR and TR.  No significant pericardial effusion.    --- End of Report ---            NEYMAR ROWLAND MD; Attending Cardiologist  This document has been electronically signed. Jan 18 2023 12:56PM

## 2023-01-29 NOTE — PROGRESS NOTE ADULT - ASSESSMENT
72-year-old female with history of hypothyroidism, hyperlipidemia, AF on Xarelto, thrombocytosis presented with abd pain , diarrhea, worsening MATTHEW, b/l LE edema     CHF exac, Afib   - p/w c/o abd pain, diarrhea, worsening MATTHEW and b/l LE  - was discharged from recent admission for CHF exac, covid, now presented again with volume overload  - CXR with small pleural effusion with Right atelectasis   - elevated proBNP 90053, remains vol OL on exam, still with MATTHEW and b/l LE edema  - Pt takes Lasix 120 mg po at home  - Creat improving, continue Lasix 80 IVP,  renal is agreeable  - Strict i/o, given that she is having diarrhea, will reassess volume status closely  - recent ECHO (1/17/2023) showed LVH normal LV & RV size and function EF 65%, mod MR, trace TR, no need for repeat     - EKG: ST.  No anginal complaints   - cath without cad in 2018    - known hx of PAFIB   - Continue BB, Xarelto    - CT abd noted, Cdiff +  - rec GI consult      - Monitor and replete lytes, keep K>4, Mg>2.  - Will continue to follow.    Hannah Coley NP  Nurse Practitioner- Cardiology   Spectra #[phone]   72-year-old female with history of hypothyroidism, hyperlipidemia, AF on Xarelto, thrombocytosis presented with abd pain , diarrhea, worsening MATTHEW, b/l LE edema     CHF exac, Afib   - p/w c/o abd pain, diarrhea, worsening MATTHEW and b/l LE  - was discharged from recent admission for CHF exac, covid, now presented again with volume overload  - CXR with small pleural effusion with Right atelectasis   - elevated proBNP 13273, remains vol OL on exam, still with MATTHEW and b/l LE edema  - Pt takes Lasix 120 mg po at home  - Creat improving, continue Lasix 80 IVP,  renal is agreeable  - Strict i/o, given that she is having diarrhea, will reassess volume status closely  - recent ECHO (1/17/2023) showed LVH normal LV & RV size and function EF 65%, mod MR, trace TR, no need for repeat     - EKG: ST.  No anginal complaints   - cath without cad in 2018    - known hx of PAFIB  - NSR on tele, no events. DC tele  - Continue BB, Xarelto    - CT abd noted, Cdiff +  - rec GI consult      - Monitor and replete lytes, keep K>4, Mg>2.  - Will continue to follow.    Hannah Coley NP  Nurse Practitioner- Cardiology   Spectra #[phone]

## 2023-01-29 NOTE — DIETITIAN INITIAL EVALUATION ADULT - NS FNS DIET ORDER
Diet, Regular:   Consistent Carbohydrate {Evening Snack}  1200mL Fluid Restriction ([id number]) (01-29-23 @ 10:46)

## 2023-01-29 NOTE — DISCHARGE NOTE PROVIDER - PROVIDER TOKENS
PROVIDER:[TOKEN:[[id number]],FOLLOWUP:[1 week]],PROVIDER:[TOKEN:[[id number]],FOLLOWUP:[2 weeks]],PROVIDER:[TOKEN:[[id number]],FOLLOWUP:[2 weeks]],PROVIDER:[TOKEN:[[id number]],FOLLOWUP:[2 weeks]] PROVIDER:[TOKEN:[[id number]],FOLLOWUP:[1 week]],PROVIDER:[TOKEN:[[id number]],FOLLOWUP:[2 weeks]],PROVIDER:[TOKEN:[[id number]],FOLLOWUP:[1 week]],PROVIDER:[TOKEN:[[id number]],FOLLOWUP:[2 weeks]]

## 2023-01-29 NOTE — PROGRESS NOTE ADULT - PROBLEM SELECTOR PLAN 4
Hb 7.9 today   possible  chronic ; No signs or symptoms of active bleeding; No need for transfusion at this time  -F/u AM iron studies  -FOBT negative/ no gi bleed   - Iron studies show normal iron with decreased % saturation and transferrin with elevated ferritin - likely Anemia of chronic disease  - Discussed with Nephro  who will start patient on Epogen    -Continue to monitor Hg daily CBC   -Instructed patient to f/u with her primary hematologist within 4 weeks of discharge Hb 7.9 today   possible  chronic ; No signs or symptoms of active bleeding; No need for transfusion at this time  -F/u AM iron studies  -FOBT negative than positive  / no gi bleed   - Iron studies show normal iron with decreased % saturation and transferrin with elevated ferritin - likely Anemia of chronic disease  - Discussed with Nephro  who will start patient on Epogen    -Continue to monitor Hg daily CBC   -Instructed patient to f/u with her primary hematologist within 4 weeks of discharge

## 2023-01-29 NOTE — DISCHARGE NOTE PROVIDER - NSDCMRMEDTOKEN_GEN_ALL_CORE_FT
anagrelide 0.5 mg oral capsule: 3 cap(s) orally once a day  escitalopram 20 mg oral tablet: 1 tab(s) orally once a day  famotidine 40 mg oral tablet: 1 tab(s) orally once a day (at bedtime), As Needed  furosemide 40 mg oral tablet: 2 tab(s) orally in the morning and 1 tab in the evening   Hydromet 5 mg-1.5 mg/5 mL oral syrup: 5 milliliter(s) orally every 6 hours, As Needed for cough   levocetirizine 5 mg oral tablet: 1 tab(s) orally once a day (in the evening)  levothyroxine 125 mcg (0.125 mg) oral tablet: 1 tab(s) orally once a day  metOLazone 2.5 mg oral tablet: 1 tab(s) orally once a day  oxycodone-acetaminophen 5 mg-325 mg oral tablet: 1 tab(s) orally 2 times a day MDD:2 tabs  Trelegy Ellipta 100 mcg-62.5 mcg-25 mcg/inh inhalation powder: 1 puff(s) inhaled once a day  vancomycin 125 mg oral capsule: 1 cap(s) orally every 6 hours  Xarelto 20 mg oral tablet: 1 tab(s) orally once a day (in the evening)   anagrelide 0.5 mg oral capsule: 3 cap(s) orally once a day  escitalopram 20 mg oral tablet: 1 tab(s) orally once a day  famotidine 40 mg oral tablet: 1 tab(s) orally once a day (at bedtime), As Needed  furosemide 40 mg oral tablet: 2 tab(s) orally in the morning and 1 tab in the evening   furosemide 40 mg oral tablet: 1 tab(s) orally once a day (at bedtime)  furosemide 80 mg oral tablet: 1 tab(s) orally once a day  Hydromet 5 mg-1.5 mg/5 mL oral syrup: 5 milliliter(s) orally every 6 hours, As Needed for cough   levocetirizine 5 mg oral tablet: 1 tab(s) orally once a day (in the evening)  levothyroxine 125 mcg (0.125 mg) oral tablet: 1 tab(s) orally once a day  metOLazone 2.5 mg oral tablet: 1 tab(s) orally once a day  oxycodone-acetaminophen 5 mg-325 mg oral tablet: 1 tab(s) orally 2 times a day MDD:2 tabs  rivaroxaban 15 mg oral tablet: 1 tab(s) orally once a day (before a meal)  Trelegy Ellipta 100 mcg-62.5 mcg-25 mcg/inh inhalation powder: 1 puff(s) inhaled once a day   anagrelide 0.5 mg oral capsule: 3 cap(s) orally once a day  escitalopram 20 mg oral tablet: 1 tab(s) orally once a day  famotidine 40 mg oral tablet: 1 tab(s) orally once a day (at bedtime), As Needed  furosemide 40 mg oral tablet: 1 tab(s) orally once a day (at bedtime)  furosemide 80 mg oral tablet: 1 tab(s) orally once a day  Hydromet 5 mg-1.5 mg/5 mL oral syrup: 5 milliliter(s) orally every 6 hours, As Needed for cough   levocetirizine 5 mg oral tablet: 1 tab(s) orally once a day (in the evening)  levothyroxine 125 mcg (0.125 mg) oral tablet: 1 tab(s) orally once a day  metOLazone 2.5 mg oral tablet: 1 tab(s) orally once a day  oxycodone-acetaminophen 5 mg-325 mg oral tablet: 1 tab(s) orally 2 times a day MDD:2 tabs  rivaroxaban 15 mg oral tablet: 1 tab(s) orally once a day (before a meal)  Trelegy Ellipta 100 mcg-62.5 mcg-25 mcg/inh inhalation powder: 1 puff(s) inhaled once a day   anagrelide 0.5 mg oral capsule: 3 cap(s) orally once a day  escitalopram 20 mg oral tablet: 1 tab(s) orally once a day  famotidine 40 mg oral tablet: 1 tab(s) orally once a day (at bedtime), As Needed  furosemide 40 mg oral tablet: 1 tab(s) orally once a day (at bedtime)  furosemide 80 mg oral tablet: 1 tab(s) orally once a day  Hydromet 5 mg-1.5 mg/5 mL oral syrup: 5 milliliter(s) orally every 6 hours, As Needed for cough   levocetirizine 5 mg oral tablet: 1 tab(s) orally once a day (in the evening)  levothyroxine 125 mcg (0.125 mg) oral tablet: 1 tab(s) orally once a day  metOLazone 2.5 mg oral tablet: 1 tab(s) orally once a day  oxycodone-acetaminophen 5 mg-325 mg oral tablet: 1 tab(s) orally 2 times a day, As Needed MDD:2 tabs   rivaroxaban 15 mg oral tablet: 1 tab(s) orally once a day (before a meal)  Trelegy Ellipta 100 mcg-62.5 mcg-25 mcg/inh inhalation powder: 1 puff(s) inhaled once a day

## 2023-01-29 NOTE — PROGRESS NOTE ADULT - ASSESSMENT
LAYA on CKD 3b  Hypokalemia  Cdiff  HTN  CHF     -Baseline Cr 1.3  -LAYA likely cardiorenal   -Check Urine lytes  -Check UA  -ABX per ID  -Cardiology note reviewed  -BP improved  -Creatinine improved, cont with diuresis and monitor renal tolerance

## 2023-01-29 NOTE — PROGRESS NOTE ADULT - SUBJECTIVE AND OBJECTIVE BOX
[facility]  INFECTIOUS DISEASES   [address] [place] [place] [address]  Tel: [phone]     Fax: [phone]  =======================================================      HERMILA QUIÑONES [id number]    Follow up: Diarrhea    recently treated for C diff.   Now C diff is negative.   Continues to have diarrhea.   Denies abdominal pain       Allergies:  No Known Allergies      REVIEW OF SYSTEMS:  CONSTITUTIONAL:  No Fever or chills  HEENT:   No diplopia or blurred vision.  No earache, sore throat or runny nose.  CARDIOVASCULAR:  No Chest Pain  RESPIRATORY:  No cough, shortness of breath  GASTROINTESTINAL:  No nausea, vomiting. + diarrhea.  GENITOURINARY:  No dysuria, frequency or urgency. No Blood in urine  MUSCULOSKELETAL:  no joint aches, no muscle pain  SKIN:  No change in skin, hair or nails.  NEUROLOGIC:  No Headaches, seizures   PSYCHIATRIC:  No disorder of thought or mood.  ENDOCRINE:  No heat or cold intolerance  HEMATOLOGICAL:  No easy bruising or bleeding.       Physical Exam:  GEN: NAD  HEENT: normocephalic and atraumatic. EOMI. PERRL.    NECK: Supple.   LUNGS: CTA B/L.  HEART: RRR  ABDOMEN: Soft, NT, ND.  +BS.    : No CVA tenderness  EXTREMITIES: + edema.  MSK: No joint swelling  NEUROLOGIC: No Focal Deficits   PSYCHIATRIC: Appropriate affect .  SKIN: No rash      Vitals:  T(F): 97.6 (29 Jan 2023 04:25), Max: 98.4 (28 Jan 2023 20:03)  HR: 80 (29 Jan 2023 04:25)  BP: 136/81 (29 Jan 2023 04:25)  RR: 19 (29 Jan 2023 04:25)  SpO2: 93% (29 Jan 2023 04:25) (93% - 94%)  temp max in last 48H T(F): , Max: 98.4 (01-28-23 @ 20:03)    Current Antibiotics:    Other medications:  anagrelide 1.5 milliGRAM(s) Oral daily  escitalopram 20 milliGRAM(s) Oral daily  furosemide   Injectable 80 milliGRAM(s) IV Push daily  levothyroxine 125 MICROGram(s) Oral daily  potassium chloride    Tablet ER 40 milliEquivalent(s) Oral once  rivaroxaban 15 milliGRAM(s) Oral with dinner                            7.8    15.13 )-----------( 201      ( 29 Jan 2023 06:39 )             26.1     01-29    140  |  102  |  23  ----------------------------<  80  3.2<L>   |  31  |  1.60<H>    Ca    7.9<L>      29 Jan 2023 06:39  Phos  3.6     01-29  Mg     1.9     01-29      RECENT CULTURES:  01-26 @ 16:10 .Blood Blood-Peripheral     No growth to date.    01-26 @ 16:00 .Blood Blood-Peripheral     No growth to date.      WBC Count: 15.13 K/uL (01-29-23 @ 06:39)  WBC Count: 14.76 K/uL (01-28-23 @ 07:38)  WBC Count: 15.80 K/uL (01-27-23 @ 06:45)  WBC Count: 18.47 K/uL (01-26-23 @ 13:26)    Creatinine, Serum: 1.60 mg/dL (01-29-23 @ 06:39)  Creatinine, Serum: 2.00 mg/dL (01-28-23 @ 07:38)  Creatinine, Serum: 1.80 mg/dL (01-27-23 @ 06:45)  Creatinine, Serum: 1.80 mg/dL (01-26-23 @ 13:26)     SARS-CoV-2 Result: Detected (01-26-23 @ 15:45)  SARS-CoV-2 Result: Detected (01-12-23 @ 12:08)    Clostridium difficile Toxin by PCR (01.27.23 @ 11:50)    C Diff by PCR Result: NotDetec: The results of this test should be interpreted with consideration of  clinical context.  Not Detected result indicates the absence of C. difficile or that the  number of organisms is below the assay limit of detection.  Detected result indicates the presence of C. difficile nucleic acid.  Indeterminate result may indicate the presence of amplification  inhibitors in specimen; presence or absence of organisms cannot be  determined. Consider collecting new specimen if further testing is still  needed.  This qualitative PCR assay detects the toxin B gene; it is FDA-approved,  and its performance was established by [facility],  [place], [place].          < from: CT Abdomen and Pelvis No Cont (01.26.23 @ 13:45) >  ACC: [id number] EXAM:  CT ABDOMEN AND PELVIS   ORDERED BY: NELLY MONGE     PROCEDURE DATE:  01/26/2023      INTERPRETATION:  CLINICAL INFORMATION: Acute abdominal pain    COMPARISON: CT chest 1/12/2023.    CONTRAST/COMPLICATIONS:  IV Contrast: NONE  Oral Contrast: NONE  Complications: None reported at time of study completion    PROCEDURE:  CT of the Abdomen and Pelvis was performed.  Sagittal and coronal reformats were performed.    FINDINGS:  LOWER CHEST: Mosaic attenuation of the lungs. Unchanged small loculated   right pleural effusion with right basilar atelectasis/consolidation.   Postsurgical changes left lower lobe and left pleural thickening.    LIVER: Liver is enlarged measuring 20 cm in length.  BILE DUCTS: Normal caliber.  GALLBLADDER: Cholecystectomy.  SPLEEN: Splenomegaly measuring 16.4 cm in length.  PANCREAS: Within normal limits.  ADRENALS: Within normal limits.  KIDNEYS/URETERS: Within normal limits.    BLADDER: Within normal limits.  REPRODUCTIVE ORGANS: Uterusand adnexa within normal limits.    BOWEL: Small hiatal hernia. No bowel obstruction. Moderate colonic stool   burden. Sigmoid diverticulosis. Colonic wall thickening and pericolonic   stranding involving the sigmoid colon and rectum.  PERITONEUM: Trace pelvic free fluid  VESSELS: Atherosclerotic changes.  RETROPERITONEUM/LYMPH NODES: No lymphadenopathy.  ABDOMINAL WALL: Diffuse body wall edema/anasarca.  BONES: Degenerative changes.    IMPRESSION:  Inflammatory changes involving the sigmoid colon and rectum, which could   be related to infectious or inflammatory proctocolitis versus sigmoid   diverticulitis. No abscess or free air to suggest perforation.    Hepatosplenomegaly.    --- End of Report ---  < end of copied text >

## 2023-01-29 NOTE — DISCHARGE NOTE PROVIDER - HOSPITAL COURSE
HPI:  Ms Ornelas is a 71 yo F presenting to ED with diarrhea. PMH HFpEF, Hypothyroidism, Thrombocytosis, HLD, Asthma,  A fib on Xarelto, recent C Diff infection.   Patient seen and examined at bedside. She reports multiple episodes of dark tarry stools up to 20 occurrences daily.  She reports completing her course of PO Vancomycin. She had generalized abdominal pain in all 4 quadrants earlier which she now states is resolved.   She was recently hospitalized for CHF exacerbation earlier this month.   Denies headaches, nausea, vomiting, chest pain, SOB, palpitations, constipation,  hematochezia, dysuria.   (26 Jan 2023 17:14)      ---  HOSPITAL COURSE: Patient presented to the ED with c/o recurrent diarrhea and abdominal pain. Patient was recently treated for C. Diff by oral vanco for 10 days. Last c.dif  was positive on 01/13/23. Infectious disease was consulted and patient was seen by Dr. Juarez and recommended Dificid 200mg q12 x 10 days. Patient remained admitted and completed the course on 01/29/23. Hospital course was complicated by BL LE edema and wheezing and patient was found to be in CHF exacerbation. Cardiology was consulted and patient was seen by Dr. Sampson and recommended IV Lasix 80 mg qd. Patient was clinically monitored through out her stay in the hospital and was transitioned to her home dose of oral Lasix 80mg qam and 40mg qhs before discharge. At the time of admission ED labs showed LAYA with creatinine of 2 likely pre renal due to dehydration in the setting of C. Diff diarrhea. Nephrology was consulted and patient was seen by Dr. REINA. Patients renal function was closely monitored with daily CMP. Patient renal function showed improvement with improved creatinine which was ----- at the time of discharge.   Pt seen and examined on the day of discharge. Patient is clinically stable and medically optimized for discharge home with close outpatient follow up.      ---  CONSULTANTS:   Cardiology: Dr. Sampson  Nephrology: Gucci Bergeron (DO)  Infectious Disease: Dr. Juarez   ---  TIME SPENT:  I, the attending physician, was physically present for the key portions of the evaluation and management (E/M) service provided. The total amount of time spent reviewing the hospital notes, laboratory values, imaging findings, assessing/counseling the patient, discussing with consultant physicians, social work, nursing staff was -- minutes    ---  Primary care provider was made aware of plan for discharge:      [  ] NO     [  ] YES   HPI:  Ms Ornelas is a 73 yo F presenting to ED with diarrhea. PMH HFpEF, Hypothyroidism, Thrombocytosis, HLD, Asthma,  A fib on Xarelto, recent C Diff infection.   Patient seen and examined at bedside. She reports multiple episodes of dark tarry stools up to 20 occurrences daily.  She reports completing her course of PO Vancomycin. She had generalized abdominal pain in all 4 quadrants earlier which she now states is resolved.   She was recently hospitalized for CHF exacerbation earlier this month.   Denies headaches, nausea, vomiting, chest pain, SOB, palpitations, constipation,  hematochezia, dysuria.   (26 Jan 2023 17:14)      ---  HOSPITAL COURSE: Patient presented to the ED with c/o recurrent diarrhea and abdominal pain. Patient was recently treated for C. Diff by oral vanco for 10 days. Last c.dif  was positive on 01/13/23. Infectious disease was consulted and patient was seen by Dr. Juarez and recommended Dificid 200mg q12 x 10 days. Patient remained admitted and completed the course on 01/29/23. Hospital course was complicated by BL LE edema and wheezing and patient was found to be in CHF exacerbation. Cardiology was consulted and patient was seen by Dr. Sampson and recommended IV Lasix 80 mg qd. Patient was clinically monitored through out her stay in the hospital and was transitioned to her home dose of oral Lasix 80mg qam and 40mg qhs before discharge. At the time of admission ED labs showed LAYA with creatinine of 2 likely pre renal due to dehydration in the setting of C. Diff diarrhea. Nephrology was consulted and patient was seen by Dr. REINA. Patients renal function was closely monitored with daily CMP. Patient renal function showed improvement with improved creatinine which was ----- at the time of discharge.   Pt seen and examined on the day of discharge. Patient is clinically stable and medically optimized for discharge home with close outpatient follow up.    Vital Signs Last 24 Hrs  T(C): 36.4 (30 Jan 2023 04:24), Max: 36.7 (29 Jan 2023 10:51)  T(F): 97.5 (30 Jan 2023 04:24), Max: 98.1 (29 Jan 2023 10:51)  HR: 76 (30 Jan 2023 04:24) (71 - 88)  BP: 111/66 (30 Jan 2023 04:24) (102/62 - 131/69)  BP(mean): --  RR: 19 (30 Jan 2023 04:24) (18 - 21)  SpO2: 92% (30 Jan 2023 04:24) (91% - 95%)    Parameters below as of 30 Jan 2023 04:24  Patient On (Oxygen Delivery Method): room air    PHYSICAL EXAM:  GENERAL: well developed females who is sitting out of bed to chair and is in NAD  HEENT:  anicteric, moist mucous membranes  CHEST/LUNG:  CTA b/l, no rales, wheezes, or rhonchi  HEART:  RRR, S1, S2 , no tachy   ABDOMEN:  BS+, soft, nontender, nondistended  EXTREMITIES:  2 + BL LE edema, No cyanosis, or calf tenderness  NERVOUS SYSTEM: answers questions and follows commands appropriately  gu intact       ---  CONSULTANTS:   Cardiology: Dr. Sampson  Nephrology: Gucci Bergeron (DO)  Infectious Disease: Dr. Juarez   ---  TIME SPENT:  I, the attending physician, was physically present for the key portions of the evaluation and management (E/M) service provided. The total amount of time spent reviewing the hospital notes, laboratory values, imaging findings, assessing/counseling the patient, discussing with consultant physicians, social work, nursing staff was -- minutes    ---  Primary care provider was made aware of plan for discharge:      [  ] NO     [  ] YES   HPI:  Ms Ornelas is a 73 yo F presenting to ED with diarrhea. PMH HFpEF, Hypothyroidism, Thrombocytosis, HLD, Asthma,  A fib on Xarelto, recent C Diff infection.   Patient seen and examined at bedside. She reports multiple episodes of dark tarry stools up to 20 occurrences daily.  She reports completing her course of PO Vancomycin. She had generalized abdominal pain in all 4 quadrants earlier which she now states is resolved.   She was recently hospitalized for CHF exacerbation earlier this month.   Denies headaches, nausea, vomiting, chest pain, SOB, palpitations, constipation,  hematochezia, dysuria.   (26 Jan 2023 17:14)      ---  HOSPITAL COURSE: Patient presented to the ED with c/o recurrent diarrhea and abdominal pain. Patient was recently treated for C. Diff by oral vanco for 10 days. Last c.dif  was positive on 01/13/23. Infectious disease was consulted and patient was seen by Dr. Juarez and recommended Dificid 200mg q12 x 10 days. Patient remained admitted and completed the course on 01/29/23. Hospital course was complicated by BL LE edema and wheezing and patient was found to be in CHF exacerbation. Cardiology was consulted and patient was seen by Dr. Sampson and recommended IV Lasix 80 mg qd. Patient was clinically monitored through out her stay in the hospital and was transitioned to her home dose of oral Lasix 80mg qam and 40mg qhs before discharge. At the time of admission ED labs showed LAYA with creatinine of 2 likely pre renal due to dehydration in the setting of C. Diff diarrhea. Nephrology was consulted and patient was seen by Dr. REINA. Patients renal function was closely monitored with daily CMP. Patient renal function showed improvement with improved creatinine which was 1.50 at the time of discharge. Patient given imodium to assist with diarrhea, as GI PCR was negative.  Pt seen and examined on the day of discharge. Patient is clinically stable and medically optimized for discharge home with close outpatient follow up.    Vital Signs Last 24 Hrs  T(C): 36.4 (30 Jan 2023 04:24), Max: 36.7 (29 Jan 2023 10:51)  T(F): 97.5 (30 Jan 2023 04:24), Max: 98.1 (29 Jan 2023 10:51)  HR: 76 (30 Jan 2023 04:24) (71 - 88)  BP: 111/66 (30 Jan 2023 04:24) (102/62 - 131/69)  BP(mean): --  RR: 19 (30 Jan 2023 04:24) (18 - 21)  SpO2: 92% (30 Jan 2023 04:24) (91% - 95%)    Parameters below as of 30 Jan 2023 04:24  Patient On (Oxygen Delivery Method): room air    PHYSICAL EXAM:  GENERAL: well developed females who is sitting out of bed to chair and is in NAD  HEENT:  anicteric, moist mucous membranes  CHEST/LUNG:  CTA b/l, no rales, wheezes, or rhonchi  HEART:  RRR, S1, S2 , no tachy   ABDOMEN:  BS+, soft, nontender, nondistended  EXTREMITIES:  2 + BL LE edema, No cyanosis, or calf tenderness  NERVOUS SYSTEM: answers questions and follows commands appropriately  gu intact       ---  CONSULTANTS:   Cardiology: Dr. Sampson  Nephrology: Gucci Bergeron (DO)  Infectious Disease: Dr. Juarez   ---  TIME SPENT:  I, the attending physician, was physically present for the key portions of the evaluation and management (E/M) service provided. The total amount of time spent reviewing the hospital notes, laboratory values, imaging findings, assessing/counseling the patient, discussing with consultant physicians, social work, nursing staff was -- minutes    ---  Primary care provider was made aware of plan for discharge:      [  ] NO     [ X ] YES   HPI:  Ms Ornelas is a 73 yo F presenting to ED with diarrhea. PMH HFpEF, Hypothyroidism, Thrombocytosis, HLD, Asthma,  A fib on Xarelto, recent C Diff infection.   Patient seen and examined at bedside. She reports multiple episodes of dark tarry stools up to 20 occurrences daily.  She reports completing her course of PO Vancomycin. She had generalized abdominal pain in all 4 quadrants earlier which she now states is resolved.   She was recently hospitalized for CHF exacerbation earlier this month.   Denies headaches, nausea, vomiting, chest pain, SOB, palpitations, constipation,  hematochezia, dysuria.   (26 Jan 2023 17:14)      ---  HOSPITAL COURSE: Patient presented to the ED with c/o recurrent diarrhea and abdominal pain. Patient was recently treated for C. Diff by oral vanco for 10 days. Last c.dif  was positive on 01/13/23. Infectious disease was consulted and patient was seen by Dr. Juarez and recommended Dificid 200mg q12 x 10 days. Patient remained admitted and completed the course on 01/29/23. Hospital course was complicated by BL LE edema and wheezing and patient was found to be in CHF exacerbation. Cardiology was consulted and patient was seen by Dr. Sampson and recommended IV Lasix 80 mg qd. Patient was clinically monitored through out her stay in the hospital and was transitioned to her home dose of oral Lasix 80mg qam and 40mg qhs before discharge. At the time of admission ED labs showed LAYA with creatinine of 2 likely pre renal due to dehydration in the setting of C. Diff diarrhea. Nephrology was consulted and patient was seen by Dr. REINA. Patients renal function was closely monitored with daily CMP. Patient renal function showed improvement with improved creatinine which was 1.50 at the time of discharge. Patient given imodium to assist with diarrhea, as GI PCR was negative.  GI consulted for positive FOBT with stable h/h while patient on xarelto.  Pt seen and examined on the day of discharge. Patient is clinically stable and medically optimized for discharge home with close outpatient follow up.    Vital Signs Last 24 Hrs  T(C): 36.4 (30 Jan 2023 04:24), Max: 36.7 (29 Jan 2023 10:51)  T(F): 97.5 (30 Jan 2023 04:24), Max: 98.1 (29 Jan 2023 10:51)  HR: 76 (30 Jan 2023 04:24) (71 - 88)  BP: 111/66 (30 Jan 2023 04:24) (102/62 - 131/69)  BP(mean): --  RR: 19 (30 Jan 2023 04:24) (18 - 21)  SpO2: 92% (30 Jan 2023 04:24) (91% - 95%)    Parameters below as of 30 Jan 2023 04:24  Patient On (Oxygen Delivery Method): room air    PHYSICAL EXAM:  GENERAL: well developed females who is sitting out of bed to chair and is in NAD  HEENT:  anicteric, moist mucous membranes  CHEST/LUNG:  CTA b/l, no rales, wheezes, or rhonchi  HEART:  RRR, S1, S2 , no tachy   ABDOMEN:  BS+, soft, nontender, nondistended  EXTREMITIES:  2 + BL LE edema, No cyanosis, or calf tenderness  NERVOUS SYSTEM: answers questions and follows commands appropriately  gu intact       ---  CONSULTANTS:   Cardiology: Dr. Sampson  Nephrology: Gucci Bergeron (DO)  Infectious Disease: Dr. Larry MEDINA - Dr. Funez   ---  TIME SPENT:  I, the attending physician, was physically present for the key portions of the evaluation and management (E/M) service provided. The total amount of time spent reviewing the hospital notes, laboratory values, imaging findings, assessing/counseling the patient, discussing with consultant physicians, social work, nursing staff was -- minutes    ---  Primary care provider was made aware of plan for discharge:      [  ] NO     [ X ] YES   HPI:  Ms Ornelas is a 71 yo F presenting to ED with diarrhea. PMH HFpEF, Hypothyroidism, Thrombocytosis, HLD, Asthma,  A fib on Xarelto, recent C Diff infection.   Patient seen and examined at bedside. She reports multiple episodes of dark tarry stools up to 20 occurrences daily.  She reports completing her course of PO Vancomycin. She had generalized abdominal pain in all 4 quadrants earlier which she now states is resolved.   She was recently hospitalized for CHF exacerbation earlier this month.   Denies headaches, nausea, vomiting, chest pain, SOB, palpitations, constipation,  hematochezia, dysuria.   (26 Jan 2023 17:14)      ---  HOSPITAL COURSE: Patient presented to the ED with c/o recurrent diarrhea and abdominal pain. Patient was recently treated for C. Diff by oral vanco for 10 days. Last c.diff was positive on 01/13/23. Infectious disease was consulted and patient was seen by Dr. Juarez and recommended Dificid 200mg q12 x 10 days. Patient remained admitted and completed the course on 01/29/23. Hospital course was complicated by BL LE edema and wheezing and patient was found to be in CHF exacerbation. Cardiology was consulted and patient was seen by Dr. Sampson and recommended IV Lasix 80 mg qd. Patient was clinically monitored through out her stay in the hospital and was transitioned to her home dose of oral Lasix 80mg qam and 40mg qhs before discharge. At the time of admission ED labs showed LAYA with creatinine of 2 likely pre renal due to dehydration in the setting of C. Diff diarrhea. Nephrology was consulted and patient was seen by Dr. REINA. Patients renal function was closely monitored with daily CMP. Patient renal function showed improvement with improved creatinine which was 1.50 at the time of discharge. Patient given imodium to assist with diarrhea, as GI PCR was negative.  GI consulted for positive FOBT with stable h/h while patient on xarelto, and recommended outpatient colonoscopy after discharge.  Pt seen and examined on the day of discharge. Patient is clinically stable and medically optimized for discharge home with close outpatient follow up.    T(C): 36.5 (01-31-23 @ 04:37), Max: 37.1 (01-30-23 @ 20:39)  HR: 76 (01-31-23 @ 04:37) (68 - 80)  BP: 121/77 (01-31-23 @ 04:37) (110/63 - 121/77)  RR: 19 (01-31-23 @ 04:37) (19 - 20)  SpO2: 92% (01-31-23 @ 04:37) (92% - 96%)    PHYSICAL EXAM:  GENERAL: well developed females who is sitting out of bed to chair and is in NAD  HEENT:  anicteric, moist mucous membranes  CHEST/LUNG:  CTA b/l, no rales, wheezes, or rhonchi  HEART:  RRR, S1, S2 , no tachy   ABDOMEN:  BS+, soft, nontender, nondistended  EXTREMITIES:  2 + BL LE edema (R>L), No cyanosis, or calf tenderness  NERVOUS SYSTEM: answers questions and follows commands appropriately  gu intact       ---  CONSULTANTS:   Cardiology: Dr. Sampson  Nephrology: Gucci Bergeron  Infectious Disease: Dr. Larry MEDINA - Dr. Funez     ---  TIME SPENT:  I, the attending physician, was physically present for the key portions of the evaluation and management (E/M) service provided. The total amount of time spent reviewing the hospital notes, laboratory values, imaging findings, assessing/counseling the patient, discussing with consultant physicians, social work, nursing staff was -- minutes    ---  Primary care provider was made aware of plan for discharge:      [  ] NO     [ X ] YES   HPI:  Ms Ornelas is a 71 yo F presenting to ED with diarrhea. PMH HFpEF, Hypothyroidism, Thrombocytosis, HLD, Asthma,  A fib on Xarelto, recent C Diff infection.   Patient seen and examined at bedside. She reports multiple episodes of dark tarry stools up to 20 occurrences daily.  She reports completing her course of PO Vancomycin. She had generalized abdominal pain in all 4 quadrants earlier which she now states is resolved.   She was recently hospitalized for CHF exacerbation earlier this month.   Denies headaches, nausea, vomiting, chest pain, SOB, palpitations, constipation,  hematochezia, dysuria.   (26 Jan 2023 17:14)      ---  HOSPITAL COURSE: Patient presented to the ED with c/o recurrent diarrhea  with hx cdiff and abdominal pain. Patient was recently treated for C. Diff by oral vanco for 10 days. Last c.diff was positive on 01/13/23. Infectious disease was consulted and patient was seen by Dr. Juarez and recommended Dificid 200mg q12 x 10 days. Patient remained admitted and completed the course on 01/29/23    repeat c diff and stool pcr neg to date    persistent  leucocytosis possible  reactive  no fever , blood cult neg todate    . Hospital course was complicated by BL LE edema and wheezing and patient was found to be in    chr CHF . Cardiology was consulted and patient was seen by Dr. Sampson and recommended IV Lasix 80 mg qd  for leg edema   hx  chronic  diastolic  chf   - Strict i/o, given that she is having diarrhea, will reassess volume status closely ,  recent ECHO (1/17/2023) showed LVH normal LV & RV size and function EF 65%, mod MR, trace TR- Lasix 80 mg po daily   am  and 40 mg po in pm    Patient was clinically monitored through out her stay in the hospital  At the time of admission ED labs showed LAYA with creatinine of 2 likely pre renal due to dehydration in the setting of C. Diff diarrhea. Nephrology was consulted and patient was seen by Dr. REINA. Patients renal function was closely monitored with daily CMP. Patient renal function showed improvement with improved creatinine which was 1.50 at the time of discharge. Patient given imodium to assist with diarrhea, as GI PCR was negative.LAYA on CKD 3b - cr stable . fobt + no active gi   notice  bleed  iron study   showed  + anemia of chr dis -   s/p  epogen , no iron-deficiency anemia.  GI dr ortiz  consulted for positive FOBT    but no active gi bleed noticed  with  remain  stable h/h while patient on xarelto, and recommended outpatient colonoscopy after discharge.  Pt seen and examined on the day of discharge. Patient is clinically stable and medically optimized for discharge home with close outpatient follow up.  medically stable day of dc 1/31/23  T(C): 36.5 (01-31-23 @ 04:37), Max: 37.1 (01-30-23 @ 20:39)  HR: 76 (01-31-23 @ 04:37) (68 - 80)  BP: 121/77 (01-31-23 @ 04:37) (110/63 - 121/77)  RR: 19 (01-31-23 @ 04:37) (19 - 20)  SpO2: 92% (01-31-23 @ 04:37) (92% - 96%)    PHYSICAL EXAM:  GENERAL: well developed females who is sitting out of bed to chair and is in NAD  HEENT:  anicteric, moist mucous membranes  CHEST/LUNG:  CTA b/l, no rales, wheezes, or rhonchi  HEART:  RRR, S1, S2 , no tachy   ABDOMEN:  BS+, soft, nontender, nondistended  EXTREMITIES: 1+ BL LE edema (R>L), No cyanosis, or calf tenderness  NERVOUS SYSTEM: answers questions and follows commands appropriately  gu intact       ---  CONSULTANTS:   Cardiology: Dr. Sampson  Nephrology: Gucci Bergeron  Infectious Disease: Dr. Juarez   GI - Dr. Ortiz     ---  TIME SPENT:  I, the attending physician, was physically present for the key portions of the evaluation and management (E/M) service provided. The total amount of time spent reviewing the hospital notes, laboratory values, imaging findings, assessing/counseling the patient, discussing with consultant physicians, social work, nursing staff was -40- minutes   HPI:  Ms Ornelas is a 71 yo F presenting to ED with diarrhea. PMH HFpEF, Hypothyroidism, Thrombocytosis, HLD, Asthma,  A fib on Xarelto, recent C Diff infection.   Patient seen and examined at bedside. She reports multiple episodes of dark tarry stools up to 20 occurrences daily.  She reports completing her course of PO Vancomycin. She had generalized abdominal pain in all 4 quadrants earlier which she now states is resolved.   She was recently hospitalized for CHF exacerbation earlier this month.   Denies headaches, nausea, vomiting, chest pain, SOB, palpitations, constipation,  hematochezia, dysuria.   (26 Jan 2023 17:14)      ---  HOSPITAL COURSE: Patient presented to the ED with c/o recurrent diarrhea  with hx cdiff and abdominal pain. Patient was recently treated for C. Diff by oral vanco for 10 days. Last c.diff was positive on 01/13/23. Infectious disease was consulted and patient was seen by Dr. Juarez and recommended Dificid 200mg q12 x 10 days. Patient remained admitted and completed the course on 01/29/23 and her repeat c diff and stool pcr neg to date. Course complicated by persistent leucocytosis possible reactive since patient had no fevers and blood cult neg to date. Hospital course was complicated by BL LE edema and wheezing and patient found to be in acute exacerbation of heart failure. Cardiology was consulted and patient was seen by Cardiologist, Dr. Sampson and patient was given IV lasix and then transitioned to oral.    Patient was clinically monitored through out her stay in the hospital. At the time of admission ED labs showed LAYA with creatinine of 2 likely pre renal due to dehydration in the setting of C. Diff diarrhea. Nephrology was consulted and patient was seen by Dr. REINA. Patients renal function was closely monitored with daily CMP. Patient renal function showed improvement with improved creatinine which was 1.50 at the time of discharge. Patient given imodium to assist with diarrhea, as GI PCR was negative. LAYA on CKD 3b - cr stable . fobt + no active gi   notice  bleed  iron study   showed  + anemia of chr dis -   s/p  epogen , no iron-deficiency anemia.  GI dr ortiz  consulted for positive FOBT  but no active gi bleed noticed  with  remain  stable h/h while patient on xarelto, and recommended outpatient colonoscopy after discharge.  Pt seen and examined on the day of discharge. Patient is clinically stable and medically optimized for discharge home with close outpatient follow up.  medically stable day of dc 1/31/23  T(C): 36.5 (01-31-23 @ 04:37), Max: 37.1 (01-30-23 @ 20:39)  HR: 76 (01-31-23 @ 04:37) (68 - 80)  BP: 121/77 (01-31-23 @ 04:37) (110/63 - 121/77)  RR: 19 (01-31-23 @ 04:37) (19 - 20)  SpO2: 92% (01-31-23 @ 04:37) (92% - 96%)    PHYSICAL EXAM:  GENERAL: well developed females who is sitting out of bed to chair and is in NAD  HEENT:  anicteric, moist mucous membranes  CHEST/LUNG:  CTA b/l, no rales, wheezes, or rhonchi  HEART:  RRR, S1, S2 , no tachy   ABDOMEN:  BS+, soft, nontender, nondistended  EXTREMITIES: 1+ BL LE edema (R>L), No cyanosis, or calf tenderness  NERVOUS SYSTEM: answers questions and follows commands appropriately  gu intact       ---  CONSULTANTS:   Cardiology: Dr. Sampson  Nephrology: Gucci Bergeron  Infectious Disease: Dr. Larry MEDINA - Dr. Ortiz     ---  TIME SPENT:  I, the attending physician, was physically present for the key portions of the evaluation and management (E/M) service provided. The total amount of time spent reviewing the hospital notes, laboratory values, imaging findings, assessing/counseling the patient, discussing with consultant physicians, social work, nursing staff was -40- minutes

## 2023-01-29 NOTE — DISCHARGE NOTE PROVIDER - NSDCCPCAREPLAN_GEN_ALL_CORE_FT
PRINCIPAL DISCHARGE DIAGNOSIS  Diagnosis: C. difficile diarrhea  Assessment and Plan of Treatment: You presented to the ED with Recurrent diarrhea. You were treated for C. diff diarrhea with oral Vancomyacin. You were seen by infectious disease doctor and suggested to start you on Difcil antibiotic 200mg twice a day for 2 days.   You completed your antibiotic course on 01/29/23 while in the hospital.   We recommened that you make a followup appointment with your Primary care doctor with in a week after your discharge from the hospital.  We also recommend to follow up with infectious disease doctor in 1-2 weeks after the discharge from the hospital.      SECONDARY DISCHARGE DIAGNOSES  Diagnosis: Colitis  Assessment and Plan of Treatment:      PRINCIPAL DISCHARGE DIAGNOSIS  Diagnosis: C. difficile diarrhea  Assessment and Plan of Treatment: You presented to the ED with Recurrent diarrhea. You were treated for C. diff diarrhea with oral Vancomyacin. You were seen by infectious disease doctor and suggested to start you on Difcil antibiotic 200mg twice a day for 2 days.   You completed your antibiotic course on 01/29/23 while in the hospital.   We recommened that you make a followup appointment with your Primary care doctor with in a week after your discharge from the hospital.  We also recommend to follow up with infectious disease doctor in 1-2 weeks after the discharge from the hospital.      SECONDARY DISCHARGE DIAGNOSES  Diagnosis: Acute on chronic diastolic congestive heart failure  Assessment and Plan of Treatment: Congestive heart failure is a chronic condition in which the heart has trouble pumping blood. In some cases of heart failure, fluid may back up into your lungs or you may have swelling (edema) in your lower legs. There are many causes of heart failure including high blood pressure, coronary artery disease, abnormal heart valves, heart muscle disease, lung disease, diabetes, etc. Symptoms include shortness of breath with activity or when lying flat, cough, swelling of the legs, fatigue, or increased urination during the night.   You had an ECHO done here which is an ultrasound of your heart and you had a normal ejection fratcion which was 65%   You were treated with IV diuretics and transitioned back to your oral medications.   Continue taking lasix 80mg daily in the morning and 40mg lasix in the afternoon.        PRINCIPAL DISCHARGE DIAGNOSIS  Diagnosis: C. difficile diarrhea  Assessment and Plan of Treatment: You presented to the ED with Recurrent diarrhea. You were treated for C. diff diarrhea with oral Vancomycin. You were seen by infectious disease doctor and suggested to start you on Dificid antibiotic 200mg twice a day for 2 days.   You completed your antibiotic course of Dificid while in the hospital.   We recommend that you make a followup appointment with your Primary care doctor with in a week after your discharge from the hospital.  We also recommend to follow up with infectious disease doctor in 1-2 weeks after the discharge from the hospital.      SECONDARY DISCHARGE DIAGNOSES  Diagnosis: Acute on chronic diastolic congestive heart failure  Assessment and Plan of Treatment: Congestive heart failure is a chronic condition in which the heart has trouble pumping blood. In some cases of heart failure, fluid may back up into your lungs or you may have swelling (edema) in your lower legs. There are many causes of heart failure including high blood pressure, coronary artery disease, abnormal heart valves, heart muscle disease, lung disease, diabetes, etc. Symptoms include shortness of breath with activity or when lying flat, cough, swelling of the legs, fatigue, or increased urination during the night.   You had an ECHO done here which is an ultrasound of your heart and you had a normal ejection fraction which was 65% .  You were treated with IV diuretics and transitioned back to your oral medications.   Continue taking lasix 80mg daily as well as an additional 40mg at bedtime.       PRINCIPAL DISCHARGE DIAGNOSIS  Diagnosis: C. difficile diarrhea  Assessment and Plan of Treatment: You presented to the ED with Recurrent diarrhea. You were treated for C. diff diarrhea with oral Vancomycin. You were seen by infectious disease doctor and suggested to start you on Dificid antibiotic 200mg twice a day for 2 days.   You completed your antibiotic course of Dificid while in the hospital and a GI PCR was negative for further C. diff infection. You can buy Imodium over the counter if you are still having diarrhea after discharge, which you can take for an additional 3-5 days. If your diarrhea persists, please follow up with your primary care doctor for further management.  We recommend that you make a followup appointment with your Primary care doctor with in a week after your discharge from the hospital.  We also recommend to follow up with infectious disease doctor in 1-2 weeks after the discharge from the hospital.      SECONDARY DISCHARGE DIAGNOSES  Diagnosis: Acute on chronic diastolic congestive heart failure  Assessment and Plan of Treatment: Congestive heart failure is a chronic condition in which the heart has trouble pumping blood. In some cases of heart failure, fluid may back up into your lungs or you may have swelling (edema) in your lower legs. There are many causes of heart failure including high blood pressure, coronary artery disease, abnormal heart valves, heart muscle disease, lung disease, diabetes, etc. Symptoms include shortness of breath with activity or when lying flat, cough, swelling of the legs, fatigue, or increased urination during the night.   You had an ECHO done here which is an ultrasound of your heart and you had a normal ejection fraction which was 65% .  You were treated with IV diuretics and transitioned back to your oral medications.   Continue taking lasix 80mg daily as well as an additional 40mg at bedtime.      Diagnosis: Anemia  Assessment and Plan of Treatment: You presented with low blood levels, which was around 8.2-8.3. Iron studies were done which seemed to demonstrate a chronic anemia. You were given an injection to promote production of red blood cells in your body and improve your blood levels.  Your stool was analyzed and was positive for blood products; however, your labwork showed that your Hemoglobin was stable.  Please follow up with your hematologist (Dr. Rice) within 1-2 weeks of discharge.     PRINCIPAL DISCHARGE DIAGNOSIS  Diagnosis: C. difficile diarrhea  Assessment and Plan of Treatment: You presented to the ED with Recurrent diarrhea. You were treated for C. diff diarrhea with oral Vancomycin. You were seen by infectious disease doctor and suggested to start you on Dificid antibiotic 200mg twice a day for 2 days.   You completed your antibiotic course of Dificid while in the hospital and a GI PCR was negative for further C. diff infection. You can buy Imodium over the counter if you are still having diarrhea after discharge, which you can take for an additional 3-5 days. If your diarrhea persists, please follow up with your primary care doctor for further management.  We recommend that you make a followup appointment with your Primary care doctor with in a week after your discharge from the hospital.  We also recommend to follow up with infectious disease doctor in 1-2 weeks after the discharge from the hospital.      SECONDARY DISCHARGE DIAGNOSES  Diagnosis: Acute on chronic diastolic congestive heart failure  Assessment and Plan of Treatment: Congestive heart failure is a chronic condition in which the heart has trouble pumping blood. In some cases of heart failure, fluid may back up into your lungs or you may have swelling (edema) in your lower legs. There are many causes of heart failure including high blood pressure, coronary artery disease, abnormal heart valves, heart muscle disease, lung disease, diabetes, etc. Symptoms include shortness of breath with activity or when lying flat, cough, swelling of the legs, fatigue, or increased urination during the night.   You had an ECHO done here which is an ultrasound of your heart and you had a normal ejection fraction which was 65% .  You were treated with IV diuretics and transitioned back to your oral medications.   Continue taking lasix 80mg daily as well as an additional 40mg at bedtime.      Diagnosis: Anemia  Assessment and Plan of Treatment: You presented with low blood levels, which was around 8.2-8.3. Iron studies were done which seemed to demonstrate a chronic anemia. You were given an injection to promote production of red blood cells in your body and improve your blood levels.  Your stool was analyzed and was positive for blood products; however, your labwork showed that your Hemoglobin was stable. A gastroenterologist saw you at the hospital and recommended that you get an outpatient colonoscopy after discharge.  Also, follow up with your hematologist (Dr. Rice) within 1-2 weeks of discharge.     PRINCIPAL DISCHARGE DIAGNOSIS  Diagnosis: C. difficile diarrhea  Assessment and Plan of Treatment: You presented to the ED with Recurrent diarrhea. You were treated for C. diff diarrhea with oral Vancomycin. You were seen by infectious disease doctor and suggested to start you on Dificid antibiotic 200mg twice a day for 2 days.   You completed your antibiotic course of Dificid while in the hospital and a GI PCR was negative for further C. diff infection. You can buy Imodium over the counter if you are still having diarrhea after discharge, which you can take for an additional 3-5 days. If your diarrhea persists, please follow up with your primary care doctor for further management.  We recommend that you make a followup appointment with your Primary care doctor with in a week after your discharge from the hospital.  We also recommend to follow up with infectious disease doctor in 1-2 weeks after the discharge from the hospital.      SECONDARY DISCHARGE DIAGNOSES  Diagnosis: Acute on chronic diastolic congestive heart failure  Assessment and Plan of Treatment: Congestive heart failure is a chronic condition in which the heart has trouble pumping blood. In some cases of heart failure, fluid may back up into your lungs or you may have swelling (edema) in your lower legs. There are many causes of heart failure including high blood pressure, coronary artery disease, abnormal heart valves, heart muscle disease, lung disease, diabetes, etc. Symptoms include shortness of breath with activity or when lying flat, cough, swelling of the legs, fatigue, or increased urination during the night.   You had an ECHO done here which is an ultrasound of your heart and you had a normal ejection fraction which was 65% .  You were treated with IV diuretics and transitioned back to your oral medications.   Continue taking lasix 80mg daily as well as an additional 40mg at bedtime. Also, please follow up with your cardiologist in 1 week.      Diagnosis: Anemia  Assessment and Plan of Treatment: You presented with low blood levels, which was around 8.2-8.3. Iron studies were done which seemed to demonstrate a chronic anemia. You were given an injection to promote production of red blood cells in your body and improve your blood levels.  Your stool was analyzed and was positive for blood products; however, your labwork showed that your Hemoglobin was stable. A gastroenterologist saw you at the hospital and recommended that you get an outpatient colonoscopy after discharge.  Also, follow up with your hematologist (Dr. Rice) within 1-2 weeks of discharge.     PRINCIPAL DISCHARGE DIAGNOSIS  Diagnosis: C. difficile diarrhea  Assessment and Plan of Treatment: You presented to the ED with Recurrent diarrhea. You were treated for C. diff diarrhea with oral Vancomycin. You were seen by infectious disease doctor and suggested to start you on Dificid antibiotic 200mg twice a day  later diarrhea resolved , repeat cd iff pcr neg   and stool pcr neg/ dose of immodium given diarrhea stopped.   We recommend that you make a followup appointment with your Primary care doctor with in a week after your discharge from the hospital.  We also recommend to follow up with infectious disease doctor in 1-2 weeks after the discharge from the hospital.      SECONDARY DISCHARGE DIAGNOSES  Diagnosis: Anemia  Assessment and Plan of Treatment: You presented with low blood levels, which was around 8.2-8.3. Iron studies were done which seemed to demonstrate a chronic anemia. You were given an injection to promote production of red blood cells in your body and improve your blood levels.  Your stool was analyzed and was positive for blood products; however, your labwork showed that your Hemoglobin was stable. A gastroenterologist saw you at the hospital and recommended that you get an outpatient colonoscopy after discharge.  Also, follow up with your hematologist (Dr. Rice) within 1-2 weeks of discharge.    Diagnosis: Chronic diastolic congestive heart failure  Assessment and Plan of Treatment: continue home dose lasix .

## 2023-01-29 NOTE — PROGRESS NOTE ADULT - PROBLEM SELECTOR PLAN 1
- Patient with recent d/c from plv for Cdiff, now presenting again with recurrent diarrhea after completing course of PO vancomycin. suspected recurrent C Diff infection  - 1/27/23 C. Diff PCR negative  - Continue Dificid 200mg BID until stools are formed  - leucocytosis trending down   - CT scan reviewed  - PT consulted,   - 2 BM this Am pasty like   - Contact precautions removed  - ID following Dr casas- Continue Dificid 200mg q12 for 10days - LAST dose was today 01/29/23

## 2023-01-29 NOTE — PROGRESS NOTE ADULT - PROBLEM SELECTOR PLAN 2
Patient with volume overload on exam with pitting edema and crackles  - On IV Lasix 80mg qd, will transition to oral from tomorrow to home dose 80 mg qam and 40mg qhs  - Diet advanced to regular with 1200 ML fluid restriction   - Strict I&O  - Daily weight  - If patient tolerates regular diet will prepare to DC tomorrow 01/30/23

## 2023-01-29 NOTE — DISCHARGE NOTE PROVIDER - CARE PROVIDERS DIRECT ADDRESSES
[email or web address]

## 2023-01-29 NOTE — DISCHARGE NOTE PROVIDER - CARE PROVIDER_API CALL
Holly Edge (DO)  Family Medicine  [address] [place] [address]  Phone: [phone]  Fax: [phone]  Follow Up Time: 1 week    Gucci Wallace ()  Internal Medicine  [address] [place] [address] [address]  [place] [address]  Phone: [phone]  Fax: [phone]  Follow Up Time: 2 weeks    Mustapha Sampson)  Cardiovascular Disease  [address] [place] [address]  Phone: [phone]  Fax: [phone]  Follow Up Time: 2 weeks    Murtaza Juarez)  Infectious Disease; Internal Medicine  [address] [place] [address]  Phone: [phone]  Fax: [phone]  Follow Up Time: 2 weeks   Holly Edge (DO)  Family Medicine  [address] [place] [address]  Phone: [phone]  Fax: [phone]  Follow Up Time: 1 week    Gucci Wallace ()  Internal Medicine  [address] [place] [address] [address]  [place] [address]  Phone: [phone]  Fax: [phone]  Follow Up Time: 2 weeks    Mustapha Sampson)  Cardiovascular Disease  [address] [place] [address]  Phone: [phone]  Fax: [phone]  Follow Up Time: 1 week    Murtaza Juarez)  Infectious Disease; Internal Medicine  [address] [place] [address]  Phone: [phone]  Fax: [phone]  Follow Up Time: 2 weeks

## 2023-01-29 NOTE — PROGRESS NOTE ADULT - SUBJECTIVE AND OBJECTIVE BOX
Patient is a 72y old  Female who presents with a chief complaint of C Diff Infection (27 Jan 2023 19:29)       HPI:  Ms Ornelas is a 73 yo F presenting to ED with diarrhea. PMH HFpEF, Hypothyroidism, Thrombocytosis, HLD, Asthma,  A fib on Xarelto, recent C Diff infection.   Patient seen and examined at bedside. She reports multiple episodes of dark tarry stools up to 20 occurrences daily.  She reports completing her course of PO Vancomycin. She had generalized abdominal pain in all 4 quadrants earlier which she now states is resolved.   She was recently hospitalized for CHF exacerbation earlier this month.   Denies headaches, nausea, vomiting, chest pain, SOB, palpitations, constipation,  hematochezia, dysuria.   (26 Jan 2023 17:14)  Known to us from prior admission, which she had LAYA from volume depletion which was improving.   Still feels SOB and has diarrhea     PAST MEDICAL & SURGICAL HISTORY:  Hypothyroidism      HLD (hyperlipidemia)      Thrombocytosis      Persistent atrial fibrillation      Obesity (BMI 35.0-39.9 without comorbidity)      Asthma      Diabetes mellitus      Ankle injury  on 2004, 5 surgeries.      Cholecystitis           FAMILY HISTORY:  Family history of early CAD (Father)    Family history of early CAD (Mother)    NC    Social History:Non smoker    MEDICATIONS  (STANDING):  anagrelide 1.5 milliGRAM(s) Oral daily  escitalopram 20 milliGRAM(s) Oral daily  fidaxomicin 200 milliGRAM(s) Oral every 12 hours  furosemide   Injectable 80 milliGRAM(s) IV Push daily  levothyroxine 125 MICROGram(s) Oral daily  rivaroxaban 15 milliGRAM(s) Oral with dinner    MEDICATIONS  (PRN):  acetaminophen     Tablet .. 650 milliGRAM(s) Oral every 6 hours PRN Temp greater or equal to 38C (100.4F), Mild Pain (1 - 3)  morphine  - Injectable 2 milliGRAM(s) IV Push every 6 hours PRN Severe Pain (7 - 10)  ondansetron Injectable 4 milliGRAM(s) IV Push every 8 hours PRN Nausea and/or Vomiting   Meds reviewed    Allergies    No Known Allergies    Intolerances         REVIEW OF SYSTEMS:    Review of Systems:   Constitutional: Denies fatigue  HEENT: Denies headaches and dizziness  Respiratory: +SOB, denies cough, or wheezing  Cardiovascular: denies CP, palpitations  Gastrointestinal: + diarrhea  Genitourinary: denies painful urination, increased frequency, urgency, or bloody urine  Skin: denies rashes or itching  Musculoskeletal: denies muscle aches, joint swelling  Neurologic: Denies generalized weakness, denies loss of sensation, numbness, or tingling      ICU Vital Signs Last 24 Hrs  T(C): 36.4 (29 Jan 2023 04:25), Max: 36.9 (28 Jan 2023 20:03)  T(F): 97.6 (29 Jan 2023 04:25), Max: 98.4 (28 Jan 2023 20:03)  HR: 80 (29 Jan 2023 04:25) (79 - 99)  BP: 136/81 (29 Jan 2023 04:25) (128/70 - 136/81)  BP(mean): --  ABP: --  ABP(mean): --  RR: 19 (29 Jan 2023 04:25) (18 - 23)  SpO2: 93% (29 Jan 2023 04:25) (93% - 94%)    O2 Parameters below as of 29 Jan 2023 04:25  Patient On (Oxygen Delivery Method): room air      PHYSICAL EXAM:    GENERAL: NAD  HEAD:  Atraumatic, Normocephalic  EYES: EOMI, conjunctiva and sclera clear  ENMT: No Drainage from nares, No drainage from ears  NECK: Supple, neck  veins full  NERVOUS SYSTEM:  Awake and Alert  CHEST/LUNG: Clear to percussion bilaterally; No rales, rhonchi, wheezing, or rubs  HEART: Regular rate and rhythm; No murmurs, rubs, or gallops  ABDOMEN: Soft, Nontender, Nondistended; Bowel sounds present  EXTREMITIES:++ Edema  SKIN: No rashes No obvious ecchymosis      LABS:                                        7.8    15.13 )-----------( 201      ( 29 Jan 2023 06:39 )             26.1     01-29    140  |  102  |  23  ----------------------------<  80  3.2<L>   |  31  |  1.60<H>    Ca    7.9<L>      29 Jan 2023 06:39  Phos  3.6     01-29  Mg     1.9     01-29

## 2023-01-29 NOTE — DIETITIAN INITIAL EVALUATION ADULT - ORAL INTAKE PTA/DIET HISTORY
low sodium diet  poor po intake  has only eaten a 1/2 english muffin with peanut butter and jelly for 5 days pta

## 2023-01-30 NOTE — PROGRESS NOTE ADULT - PROBLEM SELECTOR PLAN 5
NSR; EKG w/n/l   - Continue Xarelto  - Continue Toprol
continue agrylin  -managed by hematology Dr Rice  -outpatient f/u
NSR; EKG w/n/l   - continue Xarelto  -Continue Toprol
NSR; EKG w/n/l   - Continue Xarelto  - Continue Toprol

## 2023-01-30 NOTE — PROGRESS NOTE ADULT - NUTRITIONAL ASSESSMENT
This patient has been assessed with a concern for Malnutrition and has been determined to have a diagnosis/diagnoses of Severe protein-calorie malnutrition.    This patient is being managed with:   Diet Regular-  Consistent Carbohydrate {Evening Snack}  1200mL Fluid Restriction ([id number])  Entered: Jan 29 2023 10:46AM    The following pending diet order is being considered for treatment of Severe protein-calorie malnutrition:  Diet DASH/TLC-  Sodium & Cholesterol Restricted  1500mL Fluid Restriction ([id number])  Entered: Jan 29 2023  2:11PM    Diet DASH/TLC-  Sodium & Cholesterol Restricted  Consistent Carbohydrate {Evening Snack}  1500mL Fluid Restriction ([id number])  Entered: Jan 29 2023  1:26PM

## 2023-01-30 NOTE — PROGRESS NOTE ADULT - PROBLEM SELECTOR PLAN 7
Continue xarelto for dvt ppx
Continue xarelto for dvt ppx
Continue Xarelto for dvt ppx    DISPO: No PT needs, discharge planning for 1/31

## 2023-01-30 NOTE — CONSULT NOTE ADULT - ASSESSMENT
recent c diff  diarrhea  anemia    cont reg diet  now c diff pcr negative  immodium prn  ct shows colitis  will need outpatient colonoscopy after discharge  d/w patient

## 2023-01-30 NOTE — PROGRESS NOTE ADULT - SUBJECTIVE AND OBJECTIVE BOX
Patient is a 72y old  Female who presents with a chief complaint of C Diff Infection (2023 19:29)       HPI:  Ms Ornelas is a 73 yo F presenting to ED with diarrhea. PMH HFpEF, Hypothyroidism, Thrombocytosis, HLD, Asthma,  A fib on Xarelto, recent C Diff infection.   Patient seen and examined at bedside. She reports multiple episodes of dark tarry stools up to 20 occurrences daily.  She reports completing her course of PO Vancomycin. She had generalized abdominal pain in all 4 quadrants earlier which she now states is resolved.   She was recently hospitalized for CHF exacerbation earlier this month.   Denies headaches, nausea, vomiting, chest pain, SOB, palpitations, constipation,  hematochezia, dysuria.   (2023 17:14)  Known to us from prior admission, which she had LAYA from volume depletion which was improving.   Still feels SOB and has diarrhea     PAST MEDICAL & SURGICAL HISTORY:  Hypothyroidism      HLD (hyperlipidemia)      Thrombocytosis      Persistent atrial fibrillation      Obesity (BMI 35.0-39.9 without comorbidity)      Asthma      Diabetes mellitus      Ankle injury  on , 5 surgeries.      Cholecystitis           FAMILY HISTORY:  Family history of early CAD (Father)    Family history of early CAD (Mother)    NC    Social History:Non smoker    MEDICATIONS  (STANDING):  anagrelide 1.5 milliGRAM(s) Oral daily  escitalopram 20 milliGRAM(s) Oral daily  fidaxomicin 200 milliGRAM(s) Oral every 12 hours  furosemide   Injectable 80 milliGRAM(s) IV Push daily  levothyroxine 125 MICROGram(s) Oral daily  rivaroxaban 15 milliGRAM(s) Oral with dinner    MEDICATIONS  (PRN):  acetaminophen     Tablet .. 650 milliGRAM(s) Oral every 6 hours PRN Temp greater or equal to 38C (100.4F), Mild Pain (1 - 3)  morphine  - Injectable 2 milliGRAM(s) IV Push every 6 hours PRN Severe Pain (7 - 10)  ondansetron Injectable 4 milliGRAM(s) IV Push every 8 hours PRN Nausea and/or Vomiting   Meds reviewed    Allergies    No Known Allergies    Intolerances         REVIEW OF SYSTEMS:    Review of Systems:   Constitutional: Denies fatigue  HEENT: Denies headaches and dizziness  Respiratory: +SOB, denies cough, or wheezing  Cardiovascular: denies CP, palpitations  Gastrointestinal: + diarrhea  Genitourinary: denies painful urination, increased frequency, urgency, or bloody urine  Skin: denies rashes or itching  Musculoskeletal: denies muscle aches, joint swelling  Neurologic: Denies generalized weakness, denies loss of sensation, numbness, or tingling    ICU Vital Signs Last 24 Hrs  T(C): 36.7 (2023 11:42), Max: 36.7 (2023 11:42)  T(F): 98 (2023 11:42), Max: 98 (2023 11:42)  HR: 68 (2023 11:42) (68 - 76)  BP: 113/68 (2023 11:42) (111/66 - 129/70)  BP(mean): --  ABP: --  ABP(mean): --  RR: 20 (2023 11:42) (18 - 20)  SpO2: 96% (2023 11:42) (91% - 96%)    O2 Parameters below as of 2023 11:42  Patient On (Oxygen Delivery Method): room air            PHYSICAL EXAM:    GENERAL: NAD  HEAD:  Atraumatic, Normocephalic  EYES: EOMI, conjunctiva and sclera clear  ENMT: No Drainage from nares, No drainage from ears  NECK: Supple, neck  veins full  NERVOUS SYSTEM:  Awake and Alert  CHEST/LUNG: Clear to percussion bilaterally; No rales, rhonchi, wheezing, or rubs  HEART: Regular rate and rhythm; No murmurs, rubs, or gallops  ABDOMEN: Soft, Nontender, Nondistended; Bowel sounds present  EXTREMITIES:++ Edema  SKIN: No rashes No obvious ecchymosis      LABS:                                                   8.2    15.26 )-----------( 192      ( 2023 07:10 )             28.0     01-30    140  |  101  |  23  ----------------------------<  66<L>  3.1<L>   |  33<H>  |  1.50<H>    Ca    8.1<L>      2023 07:10  Phos  3.6     01-29  Mg     1.9     -29    TPro  6.2  /  Alb  2.4<L>  /  TBili  0.5  /  DBili  x   /  AST  11<L>  /  ALT  <6<L>  /  AlkPhos  77        Urinalysis Basic - ( 2023 12:00 )    Color: Pale Yellow / Appearance: Clear / S.010 / pH: x  Gluc: x / Ketone: Negative  / Bili: Negative / Urobili: Negative   Blood: x / Protein: Negative / Nitrite: Negative   Leuk Esterase: Negative / RBC: x / WBC x   Sq Epi: x / Non Sq Epi: x / Bacteria: x

## 2023-01-30 NOTE — PROGRESS NOTE ADULT - SUBJECTIVE AND OBJECTIVE BOX
[facility]   INFECTIOUS DISEASES   [address] [place] [address]  Tel: [phone]     Fax: [phone]  =========================================================  MD Adriana Bird Kaushal, MD Cho, Michelle, MD Sunjit, Jaspal, MD  =========================================================    HERMILA QUIÑONES [id number]    Follow up: Diarrhea    Recently treated for C diff.   Now C diff is negative.   Continues to have diarrhea.   Denies abdominal pain     Allergies:  No Known Allergies    REVIEW OF SYSTEMS:  CONSTITUTIONAL:  No Fever or chills  HEENT:   No diplopia or blurred vision.  No earache, sore throat or runny nose.  CARDIOVASCULAR:  No Chest Pain  RESPIRATORY:  No cough, shortness of breath  GASTROINTESTINAL:  No nausea, vomiting. + diarrhea.  GENITOURINARY:  No dysuria, frequency or urgency. No Blood in urine  MUSCULOSKELETAL:  no joint aches, no muscle pain  SKIN:  No change in skin, hair or nails.  NEUROLOGIC:  No Headaches, seizures   PSYCHIATRIC:  No disorder of thought or mood.  ENDOCRINE:  No heat or cold intolerance  HEMATOLOGICAL:  No easy bruising or bleeding.       Physical Exam:  GEN: NAD  HEENT: normocephalic and atraumatic. EOMI. PERRL.    NECK: Supple.   LUNGS: CTA B/L.  HEART: RRR  ABDOMEN: Soft, NT, ND.  +BS.    : No CVA tenderness  EXTREMITIES: + edema.  MSK: No joint swelling  NEUROLOGIC: No Focal Deficits   PSYCHIATRIC: Appropriate affect .  SKIN: No rash      Vitals:  T(F): 97.6 (29 Jan 2023 04:25), Max: 98.4 (28 Jan 2023 20:03)  HR: 80 (29 Jan 2023 04:25)  BP: 136/81 (29 Jan 2023 04:25)  RR: 19 (29 Jan 2023 04:25)  SpO2: 93% (29 Jan 2023 04:25) (93% - 94%)  temp max in last 48H T(F): , Max: 98.4 (01-28-23 @ 20:03)    Current Antibiotics:    Other medications:  anagrelide 1.5 milliGRAM(s) Oral daily  escitalopram 20 milliGRAM(s) Oral daily  furosemide   Injectable 80 milliGRAM(s) IV Push daily  levothyroxine 125 MICROGram(s) Oral daily  potassium chloride    Tablet ER 40 milliEquivalent(s) Oral once  rivaroxaban 15 milliGRAM(s) Oral with dinner                            7.8    15.13 )-----------( 201      ( 29 Jan 2023 06:39 )             26.1     01-29    140  |  102  |  23  ----------------------------<  80  3.2<L>   |  31  |  1.60<H>    Ca    7.9<L>      29 Jan 2023 06:39  Phos  3.6     01-29  Mg     1.9     01-29      RECENT CULTURES:  01-26 @ 16:10 .Blood Blood-Peripheral     No growth to date.    01-26 @ 16:00 .Blood Blood-Peripheral     No growth to date.      WBC Count: 15.13 K/uL (01-29-23 @ 06:39)  WBC Count: 14.76 K/uL (01-28-23 @ 07:38)  WBC Count: 15.80 K/uL (01-27-23 @ 06:45)  WBC Count: 18.47 K/uL (01-26-23 @ 13:26)    Creatinine, Serum: 1.60 mg/dL (01-29-23 @ 06:39)  Creatinine, Serum: 2.00 mg/dL (01-28-23 @ 07:38)  Creatinine, Serum: 1.80 mg/dL (01-27-23 @ 06:45)  Creatinine, Serum: 1.80 mg/dL (01-26-23 @ 13:26)     SARS-CoV-2 Result: Detected (01-26-23 @ 15:45)  SARS-CoV-2 Result: Detected (01-12-23 @ 12:08)    Clostridium difficile Toxin by PCR (01.27.23 @ 11:50)    C Diff by PCR Result: NotDetec: The results of this test should be interpreted with consideration of  clinical context.  Not Detected result indicates the absence of C. difficile or that the  number of organisms is below the assay limit of detection.  Detected result indicates the presence of C. difficile nucleic acid.  Indeterminate result may indicate the presence of amplification  inhibitors in specimen; presence or absence of organisms cannot be  determined. Consider collecting new specimen if further testing is still  needed.  This qualitative PCR assay detects the toxin B gene; it is FDA-approved,  and its performance was established by [employer],  [place], [place].    < from: CT Abdomen and Pelvis No Cont (01.26.23 @ 13:45) >  ACC: [id number] EXAM:  CT ABDOMEN AND PELVIS   ORDERED BY: NELLY MONGE   PROCEDURE DATE:  01/26/2023    INTERPRETATION:  CLINICAL INFORMATION: Acute abdominal pain  COMPARISON: CT chest 1/12/2023.  CONTRAST/COMPLICATIONS:  IV Contrast: NONE  Oral Contrast: NONE  Complications: None reported at time of study completion  PROCEDURE:  CT of the Abdomen and Pelvis was performed.  Sagittal and coronal reformats were performed.  FINDINGS:  LOWER CHEST: Mosaic attenuation of the lungs. Unchanged small loculated   right pleural effusion with right basilar atelectasis/consolidation.   Postsurgical changes left lower lobe and left pleural thickening.  LIVER: Liver is enlarged measuring 20 cm in length.  BILE DUCTS: Normal caliber.  GALLBLADDER: Cholecystectomy.  SPLEEN: Splenomegaly measuring 16.4 cm in length.  PANCREAS: Within normal limits.  ADRENALS: Within normal limits.  KIDNEYS/URETERS: Within normal limits.  BLADDER: Within normal limits.  REPRODUCTIVE ORGANS: Uterusand adnexa within normal limits.  BOWEL: Small hiatal hernia. No bowel obstruction. Moderate colonic stool   burden. Sigmoid diverticulosis. Colonic wall thickening and pericolonic   stranding involving the sigmoid colon and rectum.  PERITONEUM: Trace pelvic free fluid  VESSELS: Atherosclerotic changes.  RETROPERITONEUM/LYMPH NODES: No lymphadenopathy.  ABDOMINAL WALL: Diffuse body wall edema/anasarca.  BONES: Degenerative changes.  IMPRESSION:  Inflammatory changes involving the sigmoid colon and rectum, which could   be related to infectious or inflammatory proctocolitis versus sigmoid   diverticulitis. No abscess or free air to suggest perforation.  Hepatosplenomegaly.    Assessment/Plan:  Ms Quiñones is a 71 yo F presenting to ED with diarrhea. PMH HFpEF, Hypothyroidism, Thrombocytosis, HLD, Asthma, A fib on Xarelto, recent C Diff infection.     #Concern for Recurrent Cdiff  ? Proctocolitis on CT   Leukocytosis   Diarrhea   LAYA  COVID since last admission     Recommendations:  - Blood cultures 1/26 negative   - stool culture pending  - Stool Calprotectin  pending  - CRP pending  - GI PCR and C diff negative  - Stopped Dificid   - Will hold on antibiotics for now  - Will monitor WBC, she will likely need a hematology work up given she has had persistent leukocytosis on this record dating back to 2021  - For COVID will watch, since she had treatment with Remdesivir on the last admission     Will follow.    Murtaza Juarez MD  Division of Infectious Diseases   Please call ID service at [phone] with any question.      35 minutes spent on total encounter assessing patient, examination, chart review, counseling and coordinating care by the attending physician/nurse/care manager.     [facility]   INFECTIOUS DISEASES   [address] [place] [address]  Tel: [phone]     Fax: [phone]  =========================================================  MD Adriana Bird Kaushal, MD Cho, Michelle, MD Sunjit, Jaspal, MD  =========================================================    HERMILA QUIÑONES [id number]    Follow up: Diarrhea    Recently treated for C diff.   Now C diff is negative.   Continues to have diarrhea.   Denies abdominal pain     Allergies:  No Known Allergies    MEDICATIONS  (STANDING):  anagrelide 1.5 milliGRAM(s) Oral daily  epoetin lex-epbx (RETACRIT) Injectable 4000 Unit(s) IV Push every 7 days  escitalopram 20 milliGRAM(s) Oral daily  furosemide    Tablet 80 milliGRAM(s) Oral daily  furosemide    Tablet 40 milliGRAM(s) Oral at bedtime  levothyroxine 125 MICROGram(s) Oral daily  loperamide 2 milliGRAM(s) Oral daily  potassium chloride    Tablet ER 40 milliEquivalent(s) Oral every 4 hours  rivaroxaban 15 milliGRAM(s) Oral with dinner    REVIEW OF SYSTEMS:  CONSTITUTIONAL:  No Fever or chills  HEENT:   No diplopia or blurred vision.  No earache, sore throat or runny nose.  CARDIOVASCULAR:  No Chest Pain  RESPIRATORY:  No cough, shortness of breath  GASTROINTESTINAL:  No nausea, vomiting. + diarrhea.  GENITOURINARY:  No dysuria, frequency or urgency. No Blood in urine  MUSCULOSKELETAL:  no joint aches, no muscle pain  SKIN:  No change in skin, hair or nails.  NEUROLOGIC:  No Headaches, seizures   PSYCHIATRIC:  No disorder of thought or mood.  ENDOCRINE:  No heat or cold intolerance  HEMATOLOGICAL:  No easy bruising or bleeding.     Physical Exam:  Vital Signs Last 24 Hrs  T(C): 36.7 (30 Jan 2023 11:42), Max: 36.7 (29 Jan 2023 13:36)  T(F): 98 (30 Jan 2023 11:42), Max: 98.1 (29 Jan 2023 13:36)  HR: 68 (30 Jan 2023 11:42) (68 - 88)  BP: 113/68 (30 Jan 2023 11:42) (102/62 - 129/70)  RR: 20 (30 Jan 2023 11:42) (18 - 21)  SpO2: 96% (30 Jan 2023 11:42) (91% - 96%)  Parameters below as of 30 Jan 2023 11:42  Patient On (Oxygen Delivery Method): room air  GEN: NAD  HEENT: normocephalic and atraumatic. EOMI. PERRL.    NECK: Supple.   LUNGS: CTA B/L.  HEART: RRR  ABDOMEN: Soft, NT, ND.  +BS.    : No CVA tenderness  EXTREMITIES: + edema.  MSK: No joint swelling  NEUROLOGIC: No Focal Deficits   PSYCHIATRIC: Appropriate affect .  SKIN: No rash    Labs:                        8.2    15.26 )-----------( 192      ( 30 Jan 2023 07:10 )             28.0     01-30    140  |  101  |  23  ----------------------------<  66<L>  3.1<L>   |  33<H>  |  1.50<H>    Ca    8.1<L>      30 Jan 2023 07:10  Phos  3.6     01-29  Mg     1.9     01-29    TPro  6.2  /  Alb  2.4<L>  /  TBili  0.5  /  DBili  x   /  AST  11<L>  /  ALT  <6<L>  /  AlkPhos  77  01-30    Culture - Blood (collected 01-26-23 @ 16:10)  Source: .Blood Blood-Peripheral    Culture - Blood (collected 01-26-23 @ 16:00)  Source: .Blood Blood-Peripheral    WBC Count: 15.26 K/uL (01-30-23 @ 07:10)  WBC Count: 15.13 K/uL (01-29-23 @ 06:39)  WBC Count: 14.76 K/uL (01-28-23 @ 07:38)  WBC Count: 15.80 K/uL (01-27-23 @ 06:45)  WBC Count: 18.47 K/uL (01-26-23 @ 13:26)    Creatinine, Serum: 1.50 mg/dL (01-30-23 @ 07:10)  Creatinine, Serum: 1.60 mg/dL (01-29-23 @ 06:39)  Creatinine, Serum: 2.00 mg/dL (01-28-23 @ 07:38)  Creatinine, Serum: 1.80 mg/dL (01-27-23 @ 06:45)  Creatinine, Serum: 1.80 mg/dL (01-26-23 @ 13:26)    C-Reactive Protein, Serum: 23 mg/L (01-29-23 @ 06:39)    Ferritin, Serum: 293 ng/mL (01-28-23 @ 07:38)     SARS-CoV-2 Result: Detected (01-26-23 @ 15:45)  SARS-CoV-2 Result: Detected (01-12-23 @ 12:08)    Clostridium difficile Toxin by PCR (01.27.23 @ 11:50)    C Diff by PCR Result: NotDetec: The results of this test should be interpreted with consideration of  clinical context.  Not Detected result indicates the absence of C. difficile or that the  number of organisms is below the assay limit of detection.  Detected result indicates the presence of C. difficile nucleic acid.  Indeterminate result may indicate the presence of amplification  inhibitors in specimen; presence or absence of organisms cannot be  determined. Consider collecting new specimen if further testing is still  needed.  This qualitative PCR assay detects the toxin B gene; it is FDA-approved,  and its performance was established by [employer] [employer],  [place], [place].    < from: CT Abdomen and Pelvis No Cont (01.26.23 @ 13:45) >  ACC: [id number] EXAM:  CT ABDOMEN AND PELVIS   ORDERED BY: NELLY MONGE   PROCEDURE DATE:  01/26/2023    INTERPRETATION:  CLINICAL INFORMATION: Acute abdominal pain  COMPARISON: CT chest 1/12/2023.  CONTRAST/COMPLICATIONS:  IV Contrast: NONE  Oral Contrast: NONE  Complications: None reported at time of study completion  PROCEDURE:  CT of the Abdomen and Pelvis was performed.  Sagittal and coronal reformats were performed.  FINDINGS:  LOWER CHEST: Mosaic attenuation of the lungs. Unchanged small loculated   right pleural effusion with right basilar atelectasis/consolidation.   Postsurgical changes left lower lobe and left pleural thickening.  LIVER: Liver is enlarged measuring 20 cm in length.  BILE DUCTS: Normal caliber.  GALLBLADDER: Cholecystectomy.  SPLEEN: Splenomegaly measuring 16.4 cm in length.  PANCREAS: Within normal limits.  ADRENALS: Within normal limits.  KIDNEYS/URETERS: Within normal limits.  BLADDER: Within normal limits.  REPRODUCTIVE ORGANS: Uterusand adnexa within normal limits.  BOWEL: Small hiatal hernia. No bowel obstruction. Moderate colonic stool   burden. Sigmoid diverticulosis. Colonic wall thickening and pericolonic   stranding involving the sigmoid colon and rectum.  PERITONEUM: Trace pelvic free fluid  VESSELS: Atherosclerotic changes.  RETROPERITONEUM/LYMPH NODES: No lymphadenopathy.  ABDOMINAL WALL: Diffuse body wall edema/anasarca.  BONES: Degenerative changes.  IMPRESSION:  Inflammatory changes involving the sigmoid colon and rectum, which could   be related to infectious or inflammatory proctocolitis versus sigmoid   diverticulitis. No abscess or free air to suggest perforation.  Hepatosplenomegaly.    Assessment/Plan:  Ms Quiñones is a 71 yo F presenting to ED with diarrhea. PMH HFpEF, Hypothyroidism, Thrombocytosis, HLD, Asthma, A fib on Xarelto, recent C Diff infection.   CRP 23    #Concern for Recurrent Cdiff  ? Proctocolitis on CT   Leukocytosis   Diarrhea   LAYA  COVID since last admission     Recommendations:  - Blood cultures 1/26 negative   - stool culture and calprotectin  pending, GI follow up  - GI PCR and C diff negative  - Stopped Dificid   - Will hold on antibiotics for now  - Will monitor WBC, she will likely need a hematology work up given she has had persistent leukocytosis on this record dating back to 2021  - For COVID will watch, since she had treatment with Remdesivir on the last admission     Will follow.    Murtaza Juarez MD  Division of Infectious Diseases   Please call ID service at [phone] with any question.      35 minutes spent on total encounter assessing patient, examination, chart review, counseling and coordinating care by the attending physician/nurse/care manager.

## 2023-01-30 NOTE — PROGRESS NOTE ADULT - PROBLEM SELECTOR PLAN 3
Patient presented with elevated creatinine of 2, Baseline around 1 per chart review  - Likely pre renal due to dehydration in the setting of c. diff   - HOLD diuretics and ACE/ARB's  - Avoid nephrotoxic meds  - Now improving, AM labs show creatinine of 1.5  - Diet advanced to regular with fluid restrictions due to hx of CHF and BL LE edema  - repeat BMP in AM  - Nephrology DR. REINA following, appreciate recs Patient presented with elevated creatinine of 2, Baseline around 1 per chart review  - Likely pre renal due to dehydration in the setting of c. diff   - HOLD diuretics and ACE/ARB's  - Avoid nephrotoxic meds  - Now improving, AM labs show creatinine of 1.5  - Diet advanced to regular with fluid restrictions due to hx of CHF and BL LE edema  - repeat BMP in AM  - Nephrology DR. REINA following,

## 2023-01-30 NOTE — PROGRESS NOTE ADULT - PROBLEM SELECTOR PLAN 4
Possible  chronic ; No signs or symptoms of active bleeding; No need for transfusion at this time  - FOBT negative than positive  / no gross gi bleed reported   - GI (Dr. Funez) consulted for FOBT+ in setting of Xarelto use  - Iron studies show normal iron with decreased % saturation and transferrin with elevated ferritin - likely Anemia of chronic disease  - Discussed with Nephro Dr. REINA who started patient on Epogen    - Continue to monitor Hg daily CBC   - Instructed patient to f/u with her primary hematologist within 4 weeks of discharge Possible  chronic ; No signs or symptoms of active bleeding; No need for transfusion at this time  - FOBT negative than positive  / no gross gi bleed reported   - GI (Dr. Funez) consulted for FOBT+ in setting of Xarelto use - will need outpatient colonoscopy   - Iron studies show normal iron with decreased % saturation and transferrin with elevated ferritin - likely Anemia of chronic disease  - Discussed with Nephro Dr. REINA who started patient on Epogen    - Continue to monitor Hg daily CBC   - Instructed patient to f/u with her primary hematologist within 4 weeks of discharge

## 2023-01-30 NOTE — PROGRESS NOTE ADULT - SUBJECTIVE AND OBJECTIVE BOX
[facility] -- Adrián Wray Pannella, Patel, Savella [facility]  Office # [phone]      Follow Up:    af  Subjective/Observations:   No events overnight resting comfortably in bed.  No complaints of chest pain, dyspnea, or palpitations reported. No signs of orthopnea or PND.      REVIEW OF SYSTEMS: All other review of systems is negative unless indicated above    PAST MEDICAL & SURGICAL HISTORY:  Hypothyroidism      HLD (hyperlipidemia)      Thrombocytosis      Persistent atrial fibrillation      Obesity (BMI 35.0-39.9 without comorbidity)      Asthma      Diabetes mellitus      Ankle injury  on , 5 surgeries.      Cholecystitis          MEDICATIONS  (STANDING):  anagrelide 1.5 milliGRAM(s) Oral daily  epoetin lex-epbx (RETACRIT) Injectable 4000 Unit(s) IV Push every 7 days  escitalopram 20 milliGRAM(s) Oral daily  furosemide    Tablet 80 milliGRAM(s) Oral daily  furosemide    Tablet 40 milliGRAM(s) Oral at bedtime  levothyroxine 125 MICROGram(s) Oral daily  rivaroxaban 15 milliGRAM(s) Oral with dinner    MEDICATIONS  (PRN):  acetaminophen     Tablet .. 650 milliGRAM(s) Oral every 6 hours PRN Temp greater or equal to 38C (100.4F), Mild Pain (1 - 3)  morphine  - Injectable 1 milliGRAM(s) IV Push every 8 hours PRN Severe Pain (7 - 10)  ondansetron Injectable 4 milliGRAM(s) IV Push every 8 hours PRN Nausea and/or Vomiting      Allergies    No Known Allergies    Intolerances        Vital Signs Last 24 Hrs  T(C): 36.4 (2023 04:24), Max: 36.7 (2023 10:51)  T(F): 97.5 (2023 04:24), Max: 98.1 (2023 10:51)  HR: 76 (2023 04:24) (71 - 88)  BP: 111/66 (2023 04:24) (102/62 - 131/69)  BP(mean): --  RR: 19 (2023 04:24) (18 - 21)  SpO2: 92% (2023 04:24) (91% - 95%)    Parameters below as of 2023 04:24  Patient On (Oxygen Delivery Method): room air        I&O's Summary    2023 07:01  -  2023 07:00  --------------------------------------------------------  IN: 0 mL / OUT: 2 mL / NET: -2 mL          PHYSICAL EXAM:  TELE:   Constitutional: NAD, awake and alert, well-developed  HEENT: Moist Mucous Membranes, Anicteric  Pulmonary: Non-labored, breath sounds are clear bilaterally, No wheezing, crackles or rhonchi  Cardiovascular: Regular, S1 and S2 nl, murmur   Gastrointestinal: Bowel Sounds present, soft, nontender.   Lymph+ peripheral edema.  Skin: No visible rashes or ulcers.  Psych:  Mood & affect appropriate    LABS: All Labs Reviewed:                        7.8    15.13 )-----------( 201      ( 2023 06:39 )             26.1                         7.9    14.76 )-----------( 230      ( 2023 07:38 )             27.2                         8.3    x     )-----------( x        ( 2023 11:30 )             28.2     2023 06:39    140    |  102    |  23     ----------------------------<  80     3.2     |  31     |  1.60   2023 07:38    140    |  104    |  26     ----------------------------<  85     3.8     |  31     |  2.00     Ca    7.9        2023 06:39  Ca    8.1        2023 07:38  Phos  3.6       2023 06:39  Mg     1.9       2023 06:39               EC Lead ECG:   Ventricular Rate 104 BPM    QRS Duration 80 ms    Q-T Interval 362 ms    QTC Calculation(Bazett) 476 ms    R Axis 35 degrees    T Axis -3 degrees    Diagnosis Line artifact limits interpretation  though likely st with frequent atrial ectopy  Confirmed by katelyn Chisholm ([id number]) on 2023 1:54:35 PM (23 @ 13:21)      ACC: [id number] EXAM:  ECHO TTE WO CON COMP W DOPP                          PROCEDURE DATE:  2023          INTERPRETATION:  INDICATION: Abnormal EKG  Sonographer KL    Blood Pressure 124/67    Height 168 cm     Weight 78 kg       BSA 1.88 sq   m    Dimensions:  LA 4.1       Normal Values: 2.0 - 4.0 cm  Ao 3.1        Normal Values: 2.0 - 3.8 cm  SEPTUM 1.6       Normal Values: 0.6 - 1.2 cm  PWT 1.2       Normal Values: 0.6 - 1.1 cm  LVIDd 3.8         Normal Values: 3.0 - 5.6 cm  LVIDs 2.8     Normal Values: 1.8 - 4.0 cm      OBSERVATIONS:  Mitral Valve: Moderate MR.  Aortic Valve/Aorta: Sclerotic trileaflet aortic valve with grossly normal   opening. Mild AI  Tricuspid Valve: Moderate TR.  Pulmonic Valve: Trace PI  Left Atrium: Enlarged  Right Atrium: Enlarged  Left Ventricle: Left ventricular hypertrophy with normal systolic   function, estimated LVEF of 65%. Basal septal hypertrophy is noted  Right Ventricle: Grossly normal size and systolic function.  Pericardium: no significant pericardial effusion.  Pulmonary/RV Pressure: estimated PA systolic pressure of 70mmHg assuming   an RA pressure of 3mmHg.  IVC measures 1.4 cm      IMPRESSION:  Left ventricular hypertrophy with normal systolic function, estimated   LVEF of 65%.  Grossly normal RV size and systolic function.  Biatrial enlargement  Sclerotic trileaflet aortic valve, mild AI.  Moderate MR and TR.  No significant pericardial effusion.           [facility] -- Adrián Wray Pannella, Patel, Savella [facility]  Office # [phone]      Follow Up:    af  Subjective/Observations:   .  No complaints of chest pain, dyspnea, or palpitations reported. No signs of orthopnea or PND.  reports 12 episodes of diarrhea overnight   remains on room air     REVIEW OF SYSTEMS: All other review of systems is negative unless indicated above    PAST MEDICAL & SURGICAL HISTORY:  Hypothyroidism      HLD (hyperlipidemia)      Thrombocytosis      Persistent atrial fibrillation      Obesity (BMI 35.0-39.9 without comorbidity)      Asthma      Diabetes mellitus      Ankle injury  on , 5 surgeries.      Cholecystitis          MEDICATIONS  (STANDING):  anagrelide 1.5 milliGRAM(s) Oral daily  epoetin lex-epbx (RETACRIT) Injectable 4000 Unit(s) IV Push every 7 days  escitalopram 20 milliGRAM(s) Oral daily  furosemide    Tablet 80 milliGRAM(s) Oral daily  furosemide    Tablet 40 milliGRAM(s) Oral at bedtime  levothyroxine 125 MICROGram(s) Oral daily  rivaroxaban 15 milliGRAM(s) Oral with dinner    MEDICATIONS  (PRN):  acetaminophen     Tablet .. 650 milliGRAM(s) Oral every 6 hours PRN Temp greater or equal to 38C (100.4F), Mild Pain (1 - 3)  morphine  - Injectable 1 milliGRAM(s) IV Push every 8 hours PRN Severe Pain (7 - 10)  ondansetron Injectable 4 milliGRAM(s) IV Push every 8 hours PRN Nausea and/or Vomiting      Allergies    No Known Allergies    Intolerances        Vital Signs Last 24 Hrs  T(C): 36.4 (2023 04:24), Max: 36.7 (2023 10:51)  T(F): 97.5 (2023 04:24), Max: 98.1 (2023 10:51)  HR: 76 (2023 04:24) (71 - 88)  BP: 111/66 (2023 04:24) (102/62 - 131/69)  BP(mean): --  RR: 19 (2023 04:24) (18 - 21)  SpO2: 92% (2023 04:24) (91% - 95%)    Parameters below as of 2023 04:24  Patient On (Oxygen Delivery Method): room air        I&O's Summary    2023 07:01  -  2023 07:00  --------------------------------------------------------  IN: 0 mL / OUT: 2 mL / NET: -2 mL          PHYSICAL EXAM:  TELE: Sr freq pvc   Constitutional: NAD, awake and alert, well-developed  HEENT: Moist Mucous Membranes, Anicteric  Pulmonary: Non-labored, breath sounds are clear bilaterally, No wheezing, crackles or rhonchi  Cardiovascular: Regular, S1 and S2 nl, murmur   Gastrointestinal: Bowel Sounds present, soft, nontender.   Lymph+ peripheral edema.  Skin: No visible rashes or ulcers.  Psych:  Mood & affect appropriate    LABS: All Labs Reviewed:                        7.8    15.13 )-----------( 201      ( 2023 06:39 )             26.1                         7.9    14.76 )-----------( 230      ( 2023 07:38 )             27.2                         8.3    x     )-----------( x        ( 2023 11:30 )             28.2     2023 06:39    140    |  102    |  23     ----------------------------<  80     3.2     |  31     |  1.60   2023 07:38    140    |  104    |  26     ----------------------------<  85     3.8     |  31     |  2.00     Ca    7.9        2023 06:39  Ca    8.1        2023 07:38  Phos  3.6       2023 06:39  Mg     1.9       2023 06:39               EC Lead ECG:   Ventricular Rate 104 BPM    QRS Duration 80 ms    Q-T Interval 362 ms    QTC Calculation(Bazett) 476 ms    R Axis 35 degrees    T Axis -3 degrees    Diagnosis Line artifact limits interpretation  though likely st with frequent atrial ectopy  Confirmed by katelyn Chisholm ([id number]) on 2023 1:54:35 PM (23 @ 13:21)      ACC: [id number] EXAM:  ECHO TTE WO CON COMP W DOPP                          PROCEDURE DATE:  2023          INTERPRETATION:  INDICATION: Abnormal EKG  Sonographer KL    Blood Pressure 124/67    Height 168 cm     Weight 78 kg       BSA 1.88 sq   m    Dimensions:  LA 4.1       Normal Values: 2.0 - 4.0 cm  Ao 3.1        Normal Values: 2.0 - 3.8 cm  SEPTUM 1.6       Normal Values: 0.6 - 1.2 cm  PWT 1.2       Normal Values: 0.6 - 1.1 cm  LVIDd 3.8         Normal Values: 3.0 - 5.6 cm  LVIDs 2.8     Normal Values: 1.8 - 4.0 cm      OBSERVATIONS:  Mitral Valve: Moderate MR.  Aortic Valve/Aorta: Sclerotic trileaflet aortic valve with grossly normal   opening. Mild AI  Tricuspid Valve: Moderate TR.  Pulmonic Valve: Trace PI  Left Atrium: Enlarged  Right Atrium: Enlarged  Left Ventricle: Left ventricular hypertrophy with normal systolic   function, estimated LVEF of 65%. Basal septal hypertrophy is noted  Right Ventricle: Grossly normal size and systolic function.  Pericardium: no significant pericardial effusion.  Pulmonary/RV Pressure: estimated PA systolic pressure of 70mmHg assuming   an RA pressure of 3mmHg.  IVC measures 1.4 cm      IMPRESSION:  Left ventricular hypertrophy with normal systolic function, estimated   LVEF of 65%.  Grossly normal RV size and systolic function.  Biatrial enlargement  Sclerotic trileaflet aortic valve, mild AI.  Moderate MR and TR.  No significant pericardial effusion.

## 2023-01-30 NOTE — PROGRESS NOTE ADULT - PROBLEM SELECTOR PLAN 6
continue agrylin  -managed by hematology Dr Rice  -outpatient f/u
Continue xarelto for dvt ppx

## 2023-01-30 NOTE — CONSULT NOTE ADULT - SUBJECTIVE AND OBJECTIVE BOX
[facility]  Yaw Tolbert PA-C  [address] [place] [id number]  [phone]      Chief Complaint:  Patient is a 72y old  Female who presents with a chief complaint of C Diff Infection (2023 13:54)      HPI:Ms Ornelas is a 71 yo F presenting to ED with diarrhea. PMH HFpEF, Hypothyroidism, Thrombocytosis, HLD, Asthma,  A fib on Xarelto, recent C Diff infection.     Allergies:  No Known Allergies      Medications:  acetaminophen     Tablet .. 650 milliGRAM(s) Oral every 6 hours PRN  anagrelide 1.5 milliGRAM(s) Oral daily  epoetin lex-epbx (RETACRIT) Injectable 4000 Unit(s) IV Push every 7 days  escitalopram 20 milliGRAM(s) Oral daily  furosemide    Tablet 80 milliGRAM(s) Oral daily  furosemide    Tablet 40 milliGRAM(s) Oral at bedtime  levothyroxine 125 MICROGram(s) Oral daily  loperamide 2 milliGRAM(s) Oral daily  morphine  - Injectable 1 milliGRAM(s) IV Push every 8 hours PRN  ondansetron Injectable 4 milliGRAM(s) IV Push every 8 hours PRN  potassium chloride    Tablet ER 40 milliEquivalent(s) Oral every 4 hours  rivaroxaban 15 milliGRAM(s) Oral with dinner      PMHX/PSHX:  Hypothyroidism    HLD (hyperlipidemia)    Thrombocytosis    Persistent atrial fibrillation    Obesity (BMI 35.0-39.9 without comorbidity)    Asthma    Diabetes mellitus    Ankle injury    Cholecystitis        Family history:  Family history of early CAD (Father)    Family history of early CAD (Mother)        Social History:     ROS:     General:  no fevers, chills, night sweats, fatigue,   Eyes:  Good vision, no reported pain  ENT:  No sore throat, pain, runny nose, dysphagia  CV:  No pain, palpitations, hypo/hypertension  Resp:  No dyspnea, cough, tachypnea, wheezing  GI:  No pain, No nausea, No vomiting, No diarrhea, No constipation, No weight loss, No fever, No pruritis, No rectal bleeding, No tarry stools, No dysphagia,  :  No pain, bleeding, incontinence, nocturia  Muscle:  No pain, weakness  Neuro:  No weakness, tingling, memory problems  Psych:  No fatigue, insomnia, mood problems, depression  Endocrine:  No polyuria, polydipsia, cold/heat intolerance  Heme:  No petechiae, ecchymosis, easy bruisability  Skin:  No rash, tattoos, scars, edema      PHYSICAL EXAM:   Vital Signs:  Vital Signs Last 24 Hrs  T(C): 36.7 (2023 11:42), Max: 36.7 (2023 11:42)  T(F): 98 (2023 11:42), Max: 98 (2023 11:42)  HR: 68 (2023 11:42) (68 - 76)  BP: 113/68 (2023 11:42) (111/66 - 129/70)  BP(mean): --  RR: 20 (2023 11:42) (18 - 20)  SpO2: 96% (2023 11:42) (91% - 96%)    Parameters below as of 2023 11:42  Patient On (Oxygen Delivery Method): room air      Daily     Daily     GENERAL:  Appears stated age,   HEENT:  NC/AT,    CHEST:  Full & symmetric excursion,   HEART:  Regular rhythm  ABDOMEN:  Soft, non-tender, non-distended,   EXTEREMITIES:  no cyanosis,clubbing or edema  SKIN:  No rash  NEURO:  Alert,    LABS:                        8.2    15.26 )-----------( 192      ( 2023 07:10 )             28.0         140  |  101  |  23  ----------------------------<  66<L>  3.1<L>   |  33<H>  |  1.50<H>    Ca    8.1<L>      2023 07:10  Phos  3.6     -  Mg     1.9         TPro  6.2  /  Alb  2.4<L>  /  TBili  0.5  /  DBili  x   /  AST  11<L>  /  ALT  <6<L>  /  AlkPhos  77  30    LIVER FUNCTIONS - ( 2023 07:10 )  Alb: 2.4 g/dL / Pro: 6.2 g/dL / ALK PHOS: 77 U/L / ALT: <6 U/L / AST: 11 U/L / GGT: x             Urinalysis Basic - ( 2023 12:00 )    Color: Pale Yellow / Appearance: Clear / S.010 / pH: x  Gluc: x / Ketone: Negative  / Bili: Negative / Urobili: Negative   Blood: x / Protein: Negative / Nitrite: Negative   Leuk Esterase: Negative / RBC: x / WBC x   Sq Epi: x / Non Sq Epi: x / Bacteria: x          Imaging:

## 2023-01-30 NOTE — PROGRESS NOTE ADULT - ASSESSMENT
72-year-old female with history of hypothyroidism, hyperlipidemia, AF on Xarelto, thrombocytosis presented with abd pain , diarrhea, worsening MATTHEW, b/l LE edema     IN Progress    CHF exac, Afib   - p/w c/o abd pain, diarrhea, worsening MATTHEW and b/l LE  - was discharged from recent admission for CHF exac, covid, now presented again with volume overload  - CXR with small pleural effusion with Right atelectasis   - elevated proBNP 54611, remains vol OL on exam, still with MATTHEW and b/l LE edema  - Pt takes Lasix 120 mg po at home  - Strict i/o, given that she is having diarrhea, will reassess volume status closely  - recent ECHO (1/17/2023) showed LVH normal LV & RV size and function EF 65%, mod MR, trace TR, no need for repeat   - EKG: ST.  No anginal complaints   - cath without cad in 2018  - known hx of PAFIB, occult + holding xarelto, on home bb on hold, bp 111.66   -fu GI recs      - Monitor and replete lytes, keep K>4, Mg>2.  Leilani Owens FNP-C  Cardiology NP  [id number] [phone]     72-year-old female with history of hypothyroidism, hyperlipidemia, AF on Xarelto, thrombocytosis presented with abd pain , diarrhea, worsening MATTHEW, b/l LE edema       CHF exac, Afib   - CXR with small pleural effusion with Right atelectasis   - elevated proBNP 76436,  - Pt takes Lasix 120 mg po at home  - overnight reports 12 episodes of diarrhea , would hold PM diuretic as reassess daily, does not appear volume overloaded on exam today   - recent ECHO (1/17/2023) showed LVH normal LV & RV size and function EF 65%, mod MR, trace TR, no need for repeat   - EKG: ST.  No anginal complaints   - cath without cad in 2018  - known hx of PAFIB, occult + holding xarelto, on home bb on hold, bp 111/66 with multiple episodes of diarrhea   -fu GI recs      - Monitor and replete lytes, keep K>4, Mg>2.  Leilani Owens FNP-C  Cardiology NP  [facility] [phone]

## 2023-01-30 NOTE — PROGRESS NOTE ADULT - TIME BILLING
pt seen and examine today see above plan --  leg edema  / hx  chronic chf   - Strict i/o, given that she is having diarrhea, will reassess volume status closely ,  recent ECHO (1/17/2023) showed LVH normal LV & RV size and function EF 65%, mod MR, trace TR- Lasix 80 mg iv daily   as per cardio . cdiff /diarrhea -  pcr was + Start Dificid 200mg q12 for 10days , hypokalemia  replaced - bmp in am .
pt seen and examine today see above plan --  leg edema  / hx  chronic chf   - Strict i/o, given that she is having diarrhea, will reassess volume status closely ,  recent ECHO (1/17/2023) showed LVH normal LV & RV size and function EF 65%, mod MR, trace TR- Lasix 80 mg po daily    as per cardio  dr spivey  . cdiff /diarrhea  resolved -  pcr was +  now neg  -  Start Dificid 200mg q12  last dose today , hypokalemia  replaced - repeat k  wnl. LAYA on CKD 3b - cr stable fu bmp in am.
pt seen and examine today see above plan --  leg edema  / hx  chronic chf   - Strict i/o, given that she is having diarrhea, will reassess volume status closely ,  recent ECHO (1/17/2023) showed LVH normal LV & RV size and function EF 65%, mod MR, trace TR- Lasix 80 mg iv daily   as per cardio  . cdiff /diarrhea -  pcr was +  now neg  -  Start Dificid 200mg q12 for 10days , hypokalemia  replaced - repeat k  wnl. LAYA on CKD 3b - watch cr with lasix  fu  electrolyte closely .
pt seen and examine today see above plan --  leg edema  / hx  chronic chf   - Strict i/o, given that she is having diarrhea, will reassess volume status closely ,  recent ECHO (1/17/2023) showed LVH normal LV & RV size and function EF 65%, mod MR, trace TR- Lasix 80 mg po daily   am  and 40 mg po in pm     . cdiff /diarrhea  resolved -  pcr was +  now neg  -  s/p Dificid 200mg q12   finished  tt  , hypokalemia  replaced - repeat k  wnl. LAYA on CKD 3b - cr stable . fobt + no active gi   notice  bleed iron study  + anemia of chr dis -   s/p  epogen  on Xarelto / gi dr ortiz to see pt for further evaluation . dc planning in am .

## 2023-01-30 NOTE — PROGRESS NOTE ADULT - PROBLEM SELECTOR PLAN 1
- Patient with recent d/c from [facility] for Cdiff, now presenting again with recurrent diarrhea after completing course of PO vancomycin. suspected recurrent C Diff infection  - 1/27/23 C. Diff PCR negative  - Started on Imodium for diarrhea  - s/p Dificid 200mg BID 10 day course  - leukocytosis elevated but stable  - PT consulted  - Stool culture, calprotectin pending  - ID following, Dr Juarez

## 2023-01-30 NOTE — CHART NOTE - NSCHARTNOTEFT_GEN_A_CORE
RN called as pt complaining of abdominal pain.   Pt examined at bedside. She states this is the same abd pain she has been having during the hospital stay. She states she continues to have watery diarrhea. She denies bloody stool.    CONSTITUTIONAL: awake, alert, no acute distress  RESP: No respiratory distress; CTA b/l, no WRR  CV: RRR, +S1S2, no MRG  GI: Bowel sounds present. Soft, diffusely tender, nondistended    Plan:  71 yo F presenting to ED with diarrhea. PMH HFpEF, Hypothyroidism, Thrombocytosis, HLD, Asthma,  A fib on Xarelto, recent C Diff infection. Called for abd pain.  # Abd pain:  - pt states this is the same pain she has been having  - PE has diffuse tenderness to palpation  - will give Ofirmev now for pain  - RN to call with any changes

## 2023-01-30 NOTE — PROGRESS NOTE ADULT - ASSESSMENT
LAYA on CKD 3b  Hypokalemia  Cdiff  HTN  CHF     -Baseline Cr 1.3  -LAYA likely cardiorenal   -ABX per ID  -Cardiology note reviewed  -BP improved  -Will start epogen  -Creatinine improving, cont with diuresis and monitor renal tolerance  -KCL repletion     d/w Primary

## 2023-01-30 NOTE — PROGRESS NOTE ADULT - PROBLEM SELECTOR PLAN 2
Patient with volume overload on exam with pitting edema and crackles  - Now on home dose 80mg QAM and 40mg QHS  - Diet advanced to regular with 1200 ML fluid restriction   - Strict I&O  - Daily weight

## 2023-01-31 NOTE — PROGRESS NOTE ADULT - SUBJECTIVE AND OBJECTIVE BOX
[facility]  Yaw Tolbert PA-C  [address] [place] [id number] [phone]      INTERVAL HPI/OVERNIGHT EVENTS:  Pt s/e  Reports no new GI complaints; abdominal pain improved today  Hgb stable    MEDICATIONS  (STANDING):  anagrelide 1.5 milliGRAM(s) Oral daily  epoetin lex-epbx (RETACRIT) Injectable 4000 Unit(s) IV Push every 7 days  escitalopram 20 milliGRAM(s) Oral daily  furosemide    Tablet 80 milliGRAM(s) Oral daily  furosemide    Tablet 40 milliGRAM(s) Oral at bedtime  levothyroxine 125 MICROGram(s) Oral daily  loperamide 2 milliGRAM(s) Oral daily  rivaroxaban 15 milliGRAM(s) Oral with dinner    MEDICATIONS  (PRN):  acetaminophen     Tablet .. 650 milliGRAM(s) Oral every 6 hours PRN Temp greater or equal to 38C (100.4F), Mild Pain (1 - 3)  morphine  - Injectable 1 milliGRAM(s) IV Push every 8 hours PRN Severe Pain (7 - 10)  ondansetron Injectable 4 milliGRAM(s) IV Push every 8 hours PRN Nausea and/or Vomiting      Allergies    No Known Allergies      PHYSICAL EXAM:   Vital Signs:  Vital Signs Last 24 Hrs  T(C): 36.7 (31 Jan 2023 11:47), Max: 37.1 (30 Jan 2023 20:39)  T(F): 98 (31 Jan 2023 11:47), Max: 98.7 (30 Jan 2023 20:39)  HR: 71 (31 Jan 2023 11:47) (71 - 80)  BP: 112/68 (31 Jan 2023 11:47) (110/63 - 121/77)  BP(mean): --  RR: 18 (31 Jan 2023 11:47) (18 - 19)  SpO2: 93% (31 Jan 2023 11:47) (92% - 94%)    Parameters below as of 31 Jan 2023 11:47  Patient On (Oxygen Delivery Method): room air    GENERAL:  Appears stated age  HEENT:  NC/AT  CHEST:  Full & symmetric excursion  HEART:  Regular rhythm  ABDOMEN:  Soft, non-tender, non-distended  EXTEREMITIES:  no cyanosis  SKIN:  No rash  NEURO:  Alert      LABS:                        8.1    17.08 )-----------( 244      ( 31 Jan 2023 07:10 )             27.6     01-31    140  |  101  |  20  ----------------------------<  67<L>  4.0   |  36<H>  |  1.40<H>    Ca    8.3<L>      31 Jan 2023 07:10    TPro  6.2  /  Alb  2.4<L>  /  TBili  0.5  /  DBili  x   /  AST  11<L>  /  ALT  <6<L>  /  AlkPhos  77  01-30      I spent 120 minutes face to face with the patient. Time was spent in discussion with patient. Discussed imaging results, lab results, answered all pt questions. Visit start time: 8:00. Visit end time: 10:00.

## 2023-01-31 NOTE — PROGRESS NOTE ADULT - SUBJECTIVE AND OBJECTIVE BOX
Patient is a 72y old  Female who presents with a chief complaint of C Diff Infection (2023 19:29)       HPI:  Ms Ornelas is a 73 yo F presenting to ED with diarrhea. PMH HFpEF, Hypothyroidism, Thrombocytosis, HLD, Asthma,  A fib on Xarelto, recent C Diff infection.   Patient seen and examined at bedside. She reports multiple episodes of dark tarry stools up to 20 occurrences daily.  She reports completing her course of PO Vancomycin. She had generalized abdominal pain in all 4 quadrants earlier which she now states is resolved.   She was recently hospitalized for CHF exacerbation earlier this month.   Denies headaches, nausea, vomiting, chest pain, SOB, palpitations, constipation,  hematochezia, dysuria.   (2023 17:14)  Known to us from prior admission, which she had LAYA from volume depletion which was improving.   Still feels SOB and has diarrhea     PAST MEDICAL & SURGICAL HISTORY:  Hypothyroidism      HLD (hyperlipidemia)      Thrombocytosis      Persistent atrial fibrillation      Obesity (BMI 35.0-39.9 without comorbidity)      Asthma      Diabetes mellitus      Ankle injury  on , 5 surgeries.      Cholecystitis           FAMILY HISTORY:  Family history of early CAD (Father)    Family history of early CAD (Mother)    NC    Social History:Non smoker    MEDICATIONS  (STANDING):  anagrelide 1.5 milliGRAM(s) Oral daily  epoetin lex-epbx (RETACRIT) Injectable 4000 Unit(s) IV Push every 7 days  escitalopram 20 milliGRAM(s) Oral daily  furosemide    Tablet 80 milliGRAM(s) Oral daily  furosemide    Tablet 40 milliGRAM(s) Oral at bedtime  levothyroxine 125 MICROGram(s) Oral daily  loperamide 2 milliGRAM(s) Oral daily  rivaroxaban 15 milliGRAM(s) Oral with dinner    MEDICATIONS  (PRN):  acetaminophen     Tablet .. 650 milliGRAM(s) Oral every 6 hours PRN Temp greater or equal to 38C (100.4F), Mild Pain (1 - 3)  morphine  - Injectable 1 milliGRAM(s) IV Push every 8 hours PRN Severe Pain (7 - 10)  ondansetron Injectable 4 milliGRAM(s) IV Push every 8 hours PRN Nausea and/or Vomiting      Allergies    No Known Allergies    Intolerances         REVIEW OF SYSTEMS:    Review of Systems:   Constitutional: Denies fatigue  HEENT: Denies headaches and dizziness  Respiratory: neg SOB, denies cough, or wheezing  Cardiovascular: denies CP, palpitations  Gastrointestinal: neg diarrhea  Genitourinary: denies painful urination, increased frequency, urgency, or bloody urine  Skin: denies rashes or itching  Musculoskeletal: denies muscle aches, joint swelling  Neurologic: Denies generalized weakness, denies loss of sensation, numbness, or tingling    Vital Signs Last 24 Hrs  T(C): 36.5 (2023 04:37), Max: 37.1 (2023 20:39)  T(F): 97.7 (2023 04:37), Max: 98.7 (2023 20:39)  HR: 76 (2023 04:37) (68 - 80)  BP: 121/77 (2023 04:37) (110/63 - 121/77)  BP(mean): --  RR: 19 (2023 04:37) (19 - 20)  SpO2: 92% (2023 04:37) (92% - 96%)    Parameters below as of 2023 04:37  Patient On (Oxygen Delivery Method): room air          PHYSICAL EXAM:    GENERAL: NAD  HEAD:  Atraumatic, Normocephalic  EYES: EOMI, conjunctiva and sclera clear  ENMT: No Drainage from nares, No drainage from ears  NECK: Supple, neck  veins full  NERVOUS SYSTEM:  Awake and Alert  CHEST/LUNG: Clear to percussion bilaterally; No rales, rhonchi, wheezing, or rubs  HEART: Regular rate and rhythm; No murmurs, rubs, or gallops  ABDOMEN: Soft, Nontender, Nondistended; Bowel sounds present  EXTREMITIES:++ Edema  SKIN: No rashes No obvious ecchymosis      LABS:                                                          8.1    17.08 )-----------( 244      ( 2023 07:10 )             27.6     01-31    140  |  101  |  20  ----------------------------<  67<L>  4.0   |  36<H>  |  1.40<H>    Ca    8.3<L>      2023 07:10    TPro  6.2  /  Alb  2.4<L>  /  TBili  0.5  /  DBili  x   /  AST  11<L>  /  ALT  <6<L>  /  AlkPhos  77        Urinalysis Basic - ( 2023 12:00 )    Color: Pale Yellow / Appearance: Clear / S.010 / pH: x  Gluc: x / Ketone: Negative  / Bili: Negative / Urobili: Negative   Blood: x / Protein: Negative / Nitrite: Negative   Leuk Esterase: Negative / RBC: x / WBC x   Sq Epi: x / Non Sq Epi: x / Bacteria: x

## 2023-01-31 NOTE — PROGRESS NOTE ADULT - ASSESSMENT
LAYA on CKD 3b  Hypokalemia  Cdiff  HTN  CHF   Anemia    -Baseline Cr 1.3  -LAYA likely cardiorenal   -ABX per ID  -Cardiology note reviewed  -BP improved  -Epogen; iron panel reviewed; no need for IV iron at present  -Creatinine improving back to baseline  -Continue with PO lasix as ordered  -Potassium is stable    Thank you

## 2023-01-31 NOTE — CASE MANAGEMENT PROGRESS NOTE - NSCMPROGRESSNOTE_GEN_ALL_CORE
CM met with patient at bedside to discuss transition to home today. CM offered visiting nurse [facility] services, pt declined, Encouraged patient to accept, pt continues to decline. Pt declined for CM to assist in scheduling a follow up appointment, pt declined stated that she will make the appointment when she gets home. Pt is in agreement with transition to home today and son will transport her home.

## 2023-01-31 NOTE — DISCHARGE NOTE NURSING/CASE MANAGEMENT/SOCIAL WORK - PATIENT PORTAL LINK FT
You can access the FollowMyHealth Patient Portal offered by [facility] by registering at the following website: [email or web address]. By joining [employer]’s FollowMyHealth portal, you will also be able to view your health information using other applications (apps) compatible with our system.

## 2023-01-31 NOTE — PROGRESS NOTE ADULT - REASON FOR ADMISSION
C Diff Infection

## 2023-01-31 NOTE — PROGRESS NOTE ADULT - ASSESSMENT
recent c diff  diarrhea  anemia    cont reg diet  now c diff pcr negative  imodium prn  ct shows colitis  will need outpatient colonoscopy after discharge  no objection to d/c plan  d/w patient    I reviewed the overnight course of events on the unit, re-confirming the patient history. I discussed the care with the patient and their family  Differential diagnosis and plan of care discussed with patient after the evaluation  35 minutes spent on total encounter of which more than fifty percent of the encounter was spent counseling and/or coordinating care by the attending physician.  Advanced care planning was discussed with patient and family.  Advanced care planning forms were reviewed and discussed.  Risks, benefits and alternatives of gastroenterologic procedures were discussed in detail and all questions were answered.

## 2023-01-31 NOTE — PROGRESS NOTE ADULT - SUBJECTIVE AND OBJECTIVE BOX
[facility]   INFECTIOUS DISEASES   [address] [place] [address]  Tel: [phone]     Fax: [phone]  =========================================================  MD Adriana Bird Kaushal, MD Cho, Michelle, MD Sunjit, Jaspal, MD  =========================================================    HERMILA QUIÑONES [id number]    Follow up: Diarrhea    Recently treated for C diff.   Now C diff is negative.   Continues to have diarrhea but better.   Has been started on imodium by GI.    Denies abdominal pain     Allergies:  No Known Allergies    MEDICATIONS  (STANDING):  anagrelide 1.5 milliGRAM(s) Oral daily  epoetin lex-epbx (RETACRIT) Injectable 4000 Unit(s) IV Push every 7 days  escitalopram 20 milliGRAM(s) Oral daily  furosemide    Tablet 80 milliGRAM(s) Oral daily  furosemide    Tablet 40 milliGRAM(s) Oral at bedtime  levothyroxine 125 MICROGram(s) Oral daily  loperamide 2 milliGRAM(s) Oral daily  potassium chloride    Tablet ER 40 milliEquivalent(s) Oral every 4 hours  rivaroxaban 15 milliGRAM(s) Oral with dinner    REVIEW OF SYSTEMS:  CONSTITUTIONAL:  No Fever or chills  HEENT:   No diplopia or blurred vision.  No earache, sore throat or runny nose.  CARDIOVASCULAR:  No Chest Pain  RESPIRATORY:  No cough, shortness of breath  GASTROINTESTINAL:  No nausea, vomiting. + diarrhea.  GENITOURINARY:  No dysuria, frequency or urgency. No Blood in urine  MUSCULOSKELETAL:  no joint aches, no muscle pain  SKIN:  No change in skin, hair or nails.  NEUROLOGIC:  No Headaches, seizures   PSYCHIATRIC:  No disorder of thought or mood.  ENDOCRINE:  No heat or cold intolerance  HEMATOLOGICAL:  No easy bruising or bleeding.     Physical Exam:  Vital Signs Last 24 Hrs  T(C): 36.5 (31 Jan 2023 04:37), Max: 37.1 (30 Jan 2023 20:39)  T(F): 97.7 (31 Jan 2023 04:37), Max: 98.7 (30 Jan 2023 20:39)  HR: 76 (31 Jan 2023 04:37) (68 - 80)  BP: 121/77 (31 Jan 2023 04:37) (110/63 - 121/77)  BP(mean): --  RR: 19 (31 Jan 2023 04:37) (19 - 20)  SpO2: 92% (31 Jan 2023 04:37) (92% - 96%)  Parameters below as of 31 Jan 2023 04:37  Patient On (Oxygen Delivery Method): room air  GEN: NAD  HEENT: normocephalic and atraumatic. EOMI. PERRL.    NECK: Supple.   LUNGS: CTA B/L.  HEART: RRR  ABDOMEN: Soft, NT, ND.  +BS.    : No CVA tenderness  EXTREMITIES: + edema.  MSK: No joint swelling  NEUROLOGIC: No Focal Deficits   PSYCHIATRIC: Appropriate affect .  SKIN: No rash    Labs:                        8.1    17.08 )-----------( 244      ( 31 Jan 2023 07:10 )             27.6     01-31    140  |  101  |  20  ----------------------------<  67<L>  4.0   |  36<H>  |  1.40<H>    Ca    8.3<L>      31 Jan 2023 07:10    TPro  6.2  /  Alb  2.4<L>  /  TBili  0.5  /  DBili  x   /  AST  11<L>  /  ALT  <6<L>  /  AlkPhos  77  01-30    Culture - Blood (collected 01-26-23 @ 16:10)  Source: .Blood Blood-Peripheral    Culture - Blood (collected 01-26-23 @ 16:00)  Source: .Blood Blood-Peripheral    WBC Count: 17.08 K/uL (01-31-23 @ 07:10)  WBC Count: 15.26 K/uL (01-30-23 @ 07:10)  WBC Count: 15.13 K/uL (01-29-23 @ 06:39)  WBC Count: 14.76 K/uL (01-28-23 @ 07:38)  WBC Count: 15.80 K/uL (01-27-23 @ 06:45)  WBC Count: 18.47 K/uL (01-26-23 @ 13:26)    Creatinine, Serum: 1.40 mg/dL (01-31-23 @ 07:10)  Creatinine, Serum: 1.50 mg/dL (01-30-23 @ 07:10)  Creatinine, Serum: 1.60 mg/dL (01-29-23 @ 06:39)  Creatinine, Serum: 2.00 mg/dL (01-28-23 @ 07:38)  Creatinine, Serum: 1.80 mg/dL (01-27-23 @ 06:45)  Creatinine, Serum: 1.80 mg/dL (01-26-23 @ 13:26)    C-Reactive Protein, Serum: 23 mg/L (01-29-23 @ 06:39)    Ferritin, Serum: 293 ng/mL (01-28-23 @ 07:38)     SARS-CoV-2 Result: Detected (01-26-23 @ 15:45)  SARS-CoV-2 Result: Detected (01-12-23 @ 12:08)    The results of this test should be interpreted with consideration of  clinical context.  Not Detected result indicates the absence of C. difficile or that the  number of organisms is below the assay limit of detection.  Detected result indicates the presence of C. difficile nucleic acid.  Indeterminate result may indicate the presence of amplification  inhibitors in specimen; presence or absence of organisms cannot be  determined. Consider collecting new specimen if further testing is still  needed.  This qualitative PCR assay detects the toxin B gene; it is FDA-approved,  and its performance was established by [facility] [employer],  [place], [place].    < from: CT Abdomen and Pelvis No Cont (01.26.23 @ 13:45) >  ACC: [id number] EXAM:  CT ABDOMEN AND PELVIS   ORDERED BY: NELLY MONGE   PROCEDURE DATE:  01/26/2023    INTERPRETATION:  CLINICAL INFORMATION: Acute abdominal pain  COMPARISON: CT chest 1/12/2023.  CONTRAST/COMPLICATIONS:  IV Contrast: NONE  Oral Contrast: NONE  Complications: None reported at time of study completion  PROCEDURE:  CT of the Abdomen and Pelvis was performed.  Sagittal and coronal reformats were performed.  FINDINGS:  LOWER CHEST: Mosaic attenuation of the lungs. Unchanged small loculated   right pleural effusion with right basilar atelectasis/consolidation.   Postsurgical changes left lower lobe and left pleural thickening.  LIVER: Liver is enlarged measuring 20 cm in length.  BILE DUCTS: Normal caliber.  GALLBLADDER: Cholecystectomy.  SPLEEN: Splenomegaly measuring 16.4 cm in length.  PANCREAS: Within normal limits.  ADRENALS: Within normal limits.  KIDNEYS/URETERS: Within normal limits.  BLADDER: Within normal limits.  REPRODUCTIVE ORGANS: Uterusand adnexa within normal limits.  BOWEL: Small hiatal hernia. No bowel obstruction. Moderate colonic stool   burden. Sigmoid diverticulosis. Colonic wall thickening and pericolonic   stranding involving the sigmoid colon and rectum.  PERITONEUM: Trace pelvic free fluid  VESSELS: Atherosclerotic changes.  RETROPERITONEUM/LYMPH NODES: No lymphadenopathy.  ABDOMINAL WALL: Diffuse body wall edema/anasarca.  BONES: Degenerative changes.  IMPRESSION:  Inflammatory changes involving the sigmoid colon and rectum, which could   be related to infectious or inflammatory proctocolitis versus sigmoid   diverticulitis. No abscess or free air to suggest perforation.  Hepatosplenomegaly.    Assessment/Plan:  Ms Quiñones is a 73 yo F presenting to ED with diarrhea. PMH HFpEF, Hypothyroidism, Thrombocytosis, HLD, Asthma, A fib on Xarelto, recent C Diff infection.   CRP 23    #Concern for Recurrent Cdiff  ? Proctocolitis on CT   Leukocytosis   Diarrhea   LAYA  COVID since last admission     Recommendations:  - Blood cultures 1/26 negative   - stool culture and calprotectin  pending, GI follow up  - GI PCR and C diff negative  - Stopped Dificid   - Will hold on antibiotics for now  - Will monitor WBC, she will likely need a nonurgent hematology work up given she has had persistent leukocytosis on this record dating back to 2021  - For COVID will watch, since she had treatment with Remdesivir on the last admission   - Colonoscopy as outpt, getting imodium as per GI    Will follow PRN.    Murtaza Juarez MD  Division of Infectious Diseases   Please call ID service at [phone] with any question.      35 minutes spent on total encounter assessing patient, examination, chart review, counseling and coordinating care by the attending physician/nurse/care manager.

## 2023-01-31 NOTE — PROGRESS NOTE ADULT - PROVIDER SPECIALTY LIST ADULT
Cardiology
Infectious Disease
Cardiology
Gastroenterology
Hospitalist
Infectious Disease
Infectious Disease
Nephrology
Cardiology
Infectious Disease
Nephrology
Nephrology
Infectious Disease
Nephrology
Nephrology
Hospitalist

## 2023-01-31 NOTE — PROGRESS NOTE ADULT - NS ATTEND AMEND GEN_ALL_CORE FT
improving cdiff  improving lower extremity edema  cont iv lasix, hopefully change to po next 24-48h
Appears compensated from HF POV. given extensive diarrhea would hold off on further diuresis at this time. Further cardiac workup will depend on clinical course.
having frequent bms but small volume  remains volume overloaded and needs lasix  resume lasix iv, and follow lytes, bun and creat
improving vol ol  cont iv lasix today and consider change to po tomorrow  cr rising slightly  bm frequency decreased in volume and freq, le edema improved
diarrhea improved  edema improved  back on po lasix  resumed ac  dc planning

## 2023-01-31 NOTE — PROGRESS NOTE ADULT - SUBJECTIVE AND OBJECTIVE BOX
[facility] -- Adrián Wray Pannella, Patel, Savella [facility]  Office # [phone]      Follow Up:    af  Subjective/Observations:       REVIEW OF SYSTEMS: All other review of systems is negative unless indicated above    PAST MEDICAL & SURGICAL HISTORY:  Hypothyroidism      HLD (hyperlipidemia)      Thrombocytosis      Persistent atrial fibrillation      Obesity (BMI 35.0-39.9 without comorbidity)      Asthma      Diabetes mellitus      Ankle injury  on , 5 surgeries.      Cholecystitis          MEDICATIONS  (STANDING):  anagrelide 1.5 milliGRAM(s) Oral daily  epoetin lex-epbx (RETACRIT) Injectable 4000 Unit(s) IV Push every 7 days  escitalopram 20 milliGRAM(s) Oral daily  furosemide    Tablet 80 milliGRAM(s) Oral daily  furosemide    Tablet 40 milliGRAM(s) Oral at bedtime  levothyroxine 125 MICROGram(s) Oral daily  loperamide 2 milliGRAM(s) Oral daily  rivaroxaban 15 milliGRAM(s) Oral with dinner    MEDICATIONS  (PRN):  acetaminophen     Tablet .. 650 milliGRAM(s) Oral every 6 hours PRN Temp greater or equal to 38C (100.4F), Mild Pain (1 - 3)  morphine  - Injectable 1 milliGRAM(s) IV Push every 8 hours PRN Severe Pain (7 - 10)  ondansetron Injectable 4 milliGRAM(s) IV Push every 8 hours PRN Nausea and/or Vomiting      Allergies    No Known Allergies    Intolerances        Vital Signs Last 24 Hrs  T(C): 36.5 (2023 04:37), Max: 37.1 (2023 20:39)  T(F): 97.7 (2023 04:37), Max: 98.7 (2023 20:39)  HR: 76 (2023 04:37) (68 - 80)  BP: 121/77 (2023 04:37) (110/63 - 121/77)  BP(mean): --  RR: 19 (2023 04:37) (19 - 20)  SpO2: 92% (2023 04:37) (92% - 96%)    Parameters below as of 2023 04:37  Patient On (Oxygen Delivery Method): room air        I&O's Summary    2023 07: 07:00  --------------------------------------------------------  IN: 180 mL / OUT: 0 mL / NET: 180 mL          PHYSICAL EXAM:  TELE:   Constitutional: NAD, awake and alert, well-developed  HEENT: Moist Mucous Membranes, Anicteric  Pulmonary: Non-labored, breath sounds are clear bilaterally, No wheezing, crackles or rhonchi  Cardiovascular: Regular, S1 and S2 nl, murmur   Gastrointestinal: Bowel Sounds present, soft, nontender.   Lymph: + peripheral edema.  Skin: No visible rashes or ulcers.  Psych:  Mood & affect appropriate    LABS: All Labs Reviewed:                        8.2    15.26 )-----------( 192      ( 2023 07:10 )             28.0                         7.8    15.13 )-----------( 201      ( 2023 06:39 )             26.1     2023 07:10    140    |  101    |  23     ----------------------------<  66     3.1     |  33     |  1.50   2023 06:39    140    |  102    |  23     ----------------------------<  80     3.2     |  31     |  1.60     Ca    8.1        2023 07:10  Ca    7.9        2023 06:39  Phos  3.6       2023 06:39  Mg     1.9       2023 06:39    TPro  6.2    /  Alb  2.4    /  TBili  0.5    /  DBili  x      /  AST  11     /  ALT  <6     /  AlkPhos  77     2023 07:10             EC Lead ECG:   Ventricular Rate 104 BPM    QRS Duration 80 ms    Q-T Interval 362 ms    QTC Calculation(Bazett) 476 ms    R Axis 35 degrees    T Axis -3 degrees    Diagnosis Line artifact limits interpretation  though likely st with frequent atrial ectopy  Confirmed by katelyn Chisholm ([id number]) on 2023 1:54:35 PM (23 @ 13:21)      ACC: [id number] EXAM:  ECHO TTE WO CON COMP W DOPP                          PROCEDURE DATE:  2023          INTERPRETATION:  INDICATION: Abnormal EKG  Sonographer KL    Blood Pressure 124/67    Height 168 cm     Weight 78 kg       BSA 1.88 sq   m    Dimensions:  LA 4.1       Normal Values: 2.0 - 4.0 cm  Ao 3.1        Normal Values: 2.0 - 3.8 cm  SEPTUM 1.6       Normal Values: 0.6 - 1.2 cm  PWT 1.2       Normal Values: 0.6 - 1.1 cm  LVIDd 3.8         Normal Values: 3.0 - 5.6 cm  LVIDs 2.8     Normal Values: 1.8 - 4.0 cm      OBSERVATIONS:  Mitral Valve: Moderate MR.  Aortic Valve/Aorta: Sclerotic trileaflet aortic valve with grossly normal   opening. Mild AI  Tricuspid Valve: Moderate TR.  Pulmonic Valve: Trace PI  Left Atrium: Enlarged  Right Atrium: Enlarged  Left Ventricle: Left ventricular hypertrophy with normal systolic   function, estimated LVEF of 65%. Basal septal hypertrophy is noted  Right Ventricle: Grossly normal size and systolic function.  Pericardium: no significant pericardial effusion.  Pulmonary/RV Pressure: estimated PA systolic pressure of 70mmHg assuming   an RA pressure of 3mmHg.  IVC measures 1.4 cm      IMPRESSION:  Left ventricular hypertrophy with normal systolic function, estimated   LVEF of 65%.  Grossly normal RV size and systolic function.  Biatrial enlargement  Sclerotic trileaflet aortic valve, mild AI.  Moderate MR and TR.  No significant pericardial effusion.    --- End of Report ---            NEYMAR ROWLAND MD; Attending Cardiologist  This document has been electronically signed. 2023 12:56PM      Radiology:         [facility] -- Adrián Wray Pannella, Patel, Savella [facility]  Office # [phone]      Follow Up:    af  Subjective/Observations:       No complaints of chest pain, dyspnea, or palpitations reported. No signs of orthopnea or PND.  sitting in chair   still with multiple episodes of diarrhea overnight       REVIEW OF SYSTEMS: All other review of systems is negative unless indicated above    PAST MEDICAL & SURGICAL HISTORY:  Hypothyroidism      HLD (hyperlipidemia)      Thrombocytosis      Persistent atrial fibrillation      Obesity (BMI 35.0-39.9 without comorbidity)      Asthma      Diabetes mellitus      Ankle injury  on , 5 surgeries.      Cholecystitis          MEDICATIONS  (STANDING):  anagrelide 1.5 milliGRAM(s) Oral daily  epoetin lex-epbx (RETACRIT) Injectable 4000 Unit(s) IV Push every 7 days  escitalopram 20 milliGRAM(s) Oral daily  furosemide    Tablet 80 milliGRAM(s) Oral daily  furosemide    Tablet 40 milliGRAM(s) Oral at bedtime  levothyroxine 125 MICROGram(s) Oral daily  loperamide 2 milliGRAM(s) Oral daily  rivaroxaban 15 milliGRAM(s) Oral with dinner    MEDICATIONS  (PRN):  acetaminophen     Tablet .. 650 milliGRAM(s) Oral every 6 hours PRN Temp greater or equal to 38C (100.4F), Mild Pain (1 - 3)  morphine  - Injectable 1 milliGRAM(s) IV Push every 8 hours PRN Severe Pain (7 - 10)  ondansetron Injectable 4 milliGRAM(s) IV Push every 8 hours PRN Nausea and/or Vomiting      Allergies    No Known Allergies    Intolerances        Vital Signs Last 24 Hrs  T(C): 36.5 (2023 04:37), Max: 37.1 (2023 20:39)  T(F): 97.7 (2023 04:37), Max: 98.7 (2023 20:39)  HR: 76 (2023 04:37) (68 - 80)  BP: 121/77 (2023 04:37) (110/63 - 121/77)  BP(mean): --  RR: 19 (2023 04:37) (19 - 20)  SpO2: 92% (2023 04:37) (92% - 96%)    Parameters below as of 2023 04:37  Patient On (Oxygen Delivery Method): room air        I&O's Summary    2023 07:01  -  2023 07:00  --------------------------------------------------------  IN: 180 mL / OUT: 0 mL / NET: 180 mL          PHYSICAL EXAM:  TELE: Sr 71  Constitutional: NAD, awake and alert, well-developed  HEENT: Moist Mucous Membranes, Anicteric  Pulmonary: Non-labored, breath sounds are clear bilaterally, No wheezing, crackles or rhonchi  Cardiovascular: Regular, S1 and S2 nl, murmur   Gastrointestinal: Bowel Sounds present, soft, nontender.   Lymph: + peripheral edema.  Skin: No visible rashes or ulcers.  Psych:  Mood & affect appropriate    LABS: All Labs Reviewed:                        8.2    15. )-----------( 192      ( 2023 07:10 )             28.0                         7.8    15.13 )-----------( 201      ( 2023 06:39 )             26.1     2023 07:10    140    |  101    |  23     ----------------------------<  66     3.1     |  33     |  1.50   2023 06:39    140    |  102    |  23     ----------------------------<  80     3.2     |  31     |  1.60     Ca    8.1        2023 07:10  Ca    7.9        2023 06:39  Phos  3.6       2023 06:39  Mg     1.9       2023 06:39    TPro  6.2    /  Alb  2.4    /  TBili  0.5    /  DBili  x      /  AST  11     /  ALT  <6     /  AlkPhos  77     2023 07:10             EC Lead ECG:   Ventricular Rate 104 BPM    QRS Duration 80 ms    Q-T Interval 362 ms    QTC Calculation(Bazett) 476 ms    R Axis 35 degrees    T Axis -3 degrees    Diagnosis Line artifact limits interpretation  though likely st with frequent atrial ectopy  Confirmed by katelyn Chisholm ([id number]) on 2023 1:54:35 PM (23 @ 13:21)      ACC: [id number] EXAM:  ECHO TTE WO CON COMP W DOPP                          PROCEDURE DATE:  2023          INTERPRETATION:  INDICATION: Abnormal EKG  Sonographer KL    Blood Pressure 124/67    Height 168 cm     Weight 78 kg       BSA 1.88 sq   m    Dimensions:  LA 4.1       Normal Values: 2.0 - 4.0 cm  Ao 3.1        Normal Values: 2.0 - 3.8 cm  SEPTUM 1.6       Normal Values: 0.6 - 1.2 cm  PWT 1.2       Normal Values: 0.6 - 1.1 cm  LVIDd 3.8         Normal Values: 3.0 - 5.6 cm  LVIDs 2.8     Normal Values: 1.8 - 4.0 cm      OBSERVATIONS:  Mitral Valve: Moderate MR.  Aortic Valve/Aorta: Sclerotic trileaflet aortic valve with grossly normal   opening. Mild AI  Tricuspid Valve: Moderate TR.  Pulmonic Valve: Trace PI  Left Atrium: Enlarged  Right Atrium: Enlarged  Left Ventricle: Left ventricular hypertrophy with normal systolic   function, estimated LVEF of 65%. Basal septal hypertrophy is noted  Right Ventricle: Grossly normal size and systolic function.  Pericardium: no significant pericardial effusion.  Pulmonary/RV Pressure: estimated PA systolic pressure of 70mmHg assuming   an RA pressure of 3mmHg.  IVC measures 1.4 cm      IMPRESSION:  Left ventricular hypertrophy with normal systolic function, estimated   LVEF of 65%.  Grossly normal RV size and systolic function.  Biatrial enlargement  Sclerotic trileaflet aortic valve, mild AI.  Moderate MR and TR.  No significant pericardial effusion.    --- End of Report ---            NEYMAR ROWLAND MD; Attending Cardiologist  This document has been electronically signed. 2023 12:56PM      Radiology:

## 2023-02-15 PROBLEM — A04.72 CLOSTRIDIUM DIFFICILE DIARRHEA: Status: ACTIVE | Noted: 2023-01-01

## 2023-02-20 NOTE — REVIEW OF SYSTEMS
[Shortness Of Breath] : shortness of breath [Cough] : cough [Vomiting] : vomiting [Diarrhea] : diarrhea [Negative] : Heme/Lymph [Abdominal Pain] : no abdominal pain [Constipation] : no constipation [FreeTextEntry6] : hx of asthma, uses supplemental O2 2L NC intermittently, chronic cough for several years  [FreeTextEntry7] : intermittent diarrhea for past 4 weeks, last episode yesterday. denies any bleeding

## 2023-02-20 NOTE — DISCUSSION/SUMMARY
[FreeTextEntry1] : 71-year-old woman with a history as listed presents for a followup visit.\par \par Mallory recently was admitted for worsening volume overload. She is euvolemic on exam today. She will continue Lasix 40mg Q12. She will hold her Metolazone given her ongoing diarrhea to avoid fluid imbalance. She will resume this medication when her diarrhea subsides. \par She has moderate MR on her last echo 1/2023 which is likely just reflection of her volume status.\par \par She is status post an A.fib ablation.  At this time she has an irregular rhythm.  Her EKG today is more consistent with a  sinus rhythm with frequent PACs.  She will continue Toprol 50mg Qday.  \par Given her ongoing low hemoglobin level 7.8 and a positive Guaiac in her recent admission. We discussed her holding Xarelto until GI work up is completed. She will see Dr Thacker for this. \par \par She will follow up with me in 3 months. \par  [EKG obtained to assist in diagnosis and management of assessed problem(s)] : EKG obtained to assist in diagnosis and management of assessed problem(s)

## 2023-02-20 NOTE — REVIEW OF SYSTEMS
[Feeling Fatigued] : feeling fatigued [Dyspnea on exertion] : dyspnea during exertion [Negative] : Heme/Lymph [Lower Ext Edema] : no extremity edema [FreeTextEntry5] : see HPI

## 2023-02-20 NOTE — HISTORY OF PRESENT ILLNESS
[Disease:__________________________] : Disease: [unfilled] [Treatment Protocol] : Treatment Protocol [de-identified] : 72 yo F with PMHx lung nodules (malignant, s/p left lower lung resection), splenomegaly, ET/PMF (Jak2+) here for further management.  Previously was on Hydroxyurea 2000 mg and 1500 mg alternating, had increase in platelet counts, was started on ruxolitinib which significant side effects within 2 days including body aches and weakness. She has been on Fedratinib since Nov 2021 which has led to stabilization of Hb and platelets. She has also experiencing some improvement in her neutrophilia.\par \par Social Hx:\par Retired from [employer], worked in the office.  ( passed away from Leukemia ~2004), Lives alone, adult children live nearby\par Never smoker, significant second hand exposure\par No significant alcohol use\par No illicit drug use\par \par Family Hx:\par No relevant family history\par \par =============================================================\par Splenomegaly:\par US (2017): Spleen 15.3cm\par US (3/2020): Spleen 15.9cm\par US (3/2021): Spleen 17.7cm\par US (11/2021): Spleen 19.2cm\par \par Treatment Hx:\par - Hydrea since at least 2014 up to 2021\par - Trial of Ruxolitinib Oct 2021\par - Fedratinib Nov 2021 to present (held from 8/26/22 to ____ due to lipase elevation)\par \par =============================================================\par Care providers:\par Pulmonologist- Dr. Jl Miller [phone] [de-identified] : 4/4/22: Transfer of care from Dr Carmen Doshi. Follow-up. Ms Ornelas is feeling well overall. She was recently told that she has cirrhosis of the liver seen on recent outpatient imaging ([facility]). She has consistent coughing every night and uses Hydrocodone / homatropine to help with cough / allow her to sleep.  Appetite improving, now eating full meals and gaining weight back. No current abdominal pain. Since that time she has had improvement in her wbc. Her hb has stabilized around 8, PLT have also improved, now down to mid 500's. She denies fevers, chills, night sweats. \par \par 7/11/22: Followup. In April she had severe epistaxis, which has since fully resolved. She is back on aspirin, and rivaroxaban. She feels well today overall, only with the complaint of fatigue. She has not received the second booster. She has never had COVID-19. She denies any fevers, chills, night sweats. No new lumps or bumps. Her appetite is improved.\par \par 8/24/22: Follow-up. She recently was admitted to [facility] on 8/16/22 for presumed heart failure exacerbation due to shortness of breath and anemia. She initially went to hospital due to right leg swelling and shortness of breath. She was previously on Fedratinib, however its been held due to concerns of affecting her volume status. An echocardiogram on 8/16/22 showed normal systolic lvf of 65% without any noted diastolic function, however she did have moderate mitral valve regurgitation. GP told her yesterday she had two "spots" on her lungs. She is taking Lasix everyday. Her breathing has improved but still has shortness of breath. She had left hip shingles outbreak while at the hospital. She was on gabapentin for the shingles. She is still taking it but has not seen a significant improvement in her pain, lesions are healing. She is seeing the pulmonologist next week.\par \par 9/12/22: Follow-up. She has been feeling extremely fatigued but denies any other complaints. No fevers, chills, night sweats, unexpected weight loss. She had her chest CT reviewed by her pulmonologist, which showed stable nodules, no immediate action needed. She has been off Fedratinib for the past 2 weeks, we are awaiting repeat lipase.\par \par 10/5/22: Follow-up. In the past few days she has been having issues with her appetite. She feels that she is always very hungry, however when she makes her food she feels she can only eat a few bites then she gets full and starts to feel nauseated. No vomiting. Food does not get stuck. Was constipated last week, feeling better this week with senna. No abdominal pain. She feels very fatigued still, which started immediately after holding Fedratinib for 3 weeks. This has not improved.  She has seen her pulmonologist and thoracic surgeon who are not concerned with lung nodules due to stability. She continues to be able to tolerate the Fedratinib and anagrelide. No fevers, chills, drenching night sweats, weight is stable.\par \par 11/16/22: Follow up. Patient reports diarrhea with rectal pressure and intermittent abd pain for 4 weeks. States she has been eating junk food for the past few months. Followed up with GI  MD Mckenna ([facility]) who conducted a rectal digital exam and CT Abd. Patient states she was advised to follow up with a rectal surgeon MD Cheema on 11/18/22. Patient states diarrhea has improved, last episode yesterday. Denies any abd pain, n/v, bleeding/hematochezia, headache, dizziness, visual disturbances, chest pain. \par \par 2/15/23: Follow-up. She was recently hospitalized for CHF exacerbation. She was sent to [facility] 1/12/23 after seeing Dr Fernandez (Cardiology). She was seen by GI while hospitalized and started on antibiotics, without any additional need of medications for diarrhea. She did take oxycodone on a few occasions due to abdominal pain. Her last visit was in 11/2022. She continues to have diarrhea every day (6x a day, previously was 10-20), her stool has been more formed, she takes Imodium only once a day. Today she feels ok, but had diarrhea already 2 x. She denies fevers, chills, night sweats. She has a poor appetite, but has been eating half a meal most meals. She has been off Fedratinib for many months now, but continues to take anagrelide.\par \par ____\par A comprehensive review of systems was performed including constitutional, eyes, ENT, cardiovascular, respiratory, gastrointestinal, genitourinary, musculoskeletal, integumentary, neurological, psychiatric and hematologic / lymphatic. All pertinent positives are included in the H&P under interval history above and the remaining review of systems listed are negative. \par  [FreeTextEntry1] : On Fedratinib (JAK2 inhibitor) since Nov 2021, Held for August and first 2 weeks of Sept 2022 due to elevated lipase (asymptomatic), Now on Fedratinib 100 mg daily

## 2023-02-20 NOTE — PHYSICAL EXAM
[Fully active, able to carry on all pre-disease performance without restriction] : Status 0 - Fully active, able to carry on all pre-disease performance without restriction [Normal] : affect appropriate [de-identified] : course breath sounds throughout [de-identified] : irregular rhythm, regular rate [de-identified] : Trace edema of the right leg.  [de-identified] : spleen +2-3 fingers (~2-3 cm) below costal margin

## 2023-02-20 NOTE — ASSESSMENT
[FreeTextEntry1] : 71 yo F with a history of PMF jak2+, Cytogenetics show +1 and +9, + SM on high doses of HU causing worsening anemia with persistently high platelet counts.  \par \par She had a poor response to Jakafi. No response to iron and HU titration. Fedratinib 100 mg daily started on 11/17/21, titrated up to 200 mg daily on 11/30/21, titrated up to 300 mg daily on 2/10/22. Since holding her Fedratinib on 8/25/22 due to lipase elevations, she has experienced increasing fatigue. Fadratinib was restarted on 9/22/22 with no improvement in her fatigue since restarting at a lower dose (100 mg daily). Fedratinib held again since 10/6/2022 due to lipase elevations, still holding. Will repeat lipase testing today.\par \par Regarding her splenomegaly, her spleen size remains stable on exam with -2-3 cm below the costal margin. She remains on anagrelide, tolerating well. Hydrea had been stopped previously due to low Hb (she has required a few units of pRBC support previously), was also treated with venofer for CLAY in late 2021.\par \par CBC reviewed. WBC 19, Plts in normal range.\par \par Plan:\par - CMP, Lipase, Amylase repeated today\par - Continue Anagrelide 1.5 mg BID, will attempt to down titrate if plts remain stable in normal range\par - Fedratinib held due to recurrent grade 3 lipase elevation (without pancreatitis symptoms), constant diarrhea and recent hospitalizations. She has been off for >3 months at this point\par - Carcinoid: Since her last visit with Dr. Starr Lara (CT surgery) at [place] she had recent CT imaging of C/A/P which showed increased pulmonary nodule to 1.1 cm and another new subcentimeter nodule. May benefit from somatostatin therapy given very frequent diarrhea. Will have her reach out to the team monitoring her carcinoid.\par - HCM: COVID19 vaccinated. Continue with cardiology follow up Aug 2022 TTE with good systolic function (LVEF 65%) with moderate MR.\par - Follow up in 1 month\par \par _____\par I personally have spent a total of 30 minutes of time on the date of this encounter reviewing test results, documenting findings, providing education, coordinating care and directly consulting with the patient and/or designated family member.

## 2023-02-20 NOTE — CARDIOLOGY SUMMARY
[de-identified] : Sinus  Rhythm  - frequent PAC s \par nonspecific T waves  [de-identified] : 5/2021 normal LV function. RV was normal in structure. TV appears normal. MV leaflets are calcified\par  8/16/22 showed normal systolic with EF 65% without any noted diastolic function, however she did have moderate mitral valve regurgitation. [de-identified] : PAF s/p ablation in 2/7/19. [de-identified] : 12/2018 with normal cors and LVEDP.

## 2023-02-20 NOTE — HISTORY OF PRESENT ILLNESS
[FreeTextEntry1] : 72-year-old woman with a history of asthma, obesity, essential thrombocytosis, HFPEF, normal cors in 12/2018, PAF s/p ablation in 2/7/19, VATS with removal of carcinoid in 5/2021\par \par Since her last visit, she  was admitted to [facility] on 8/16/22 for presumed heart failure exacerbation due to shortness of breath and anemia. She initially went to hospital due to right leg swelling and shortness of breath. CT chest noted. With increasing RML nodule and new LLL nodule as well as new\par mediastinal adenopathy She was previously on Fedratinib, however its been held due to concerns of affecting her volume status. She was discharged on lasix. \par \par She is here today for hospital follow up. She was last seen 1/2023 in office. She was sent to [facility] for fluid overload. While being admitted they IV diuresed her. She also states having ongoing diarrhea from her recent admission. She was tested for C.diff which was negative. She was also Guaiac positive. She reports today feeling really well but having some MATTHEW when walking. I reviewed all hospital records with her. She is taking all medications as prescribed. \par \par She denies any chest pain, PND,  strokelike symptoms. No reported melena, hematochezia or hematemesis.  She is compliant with her medications.

## 2023-03-25 PROBLEM — D3A.090 CARCINOID TUMOR OF LUNG, UNSPECIFIED WHETHER MALIGNANT: Status: ACTIVE | Noted: 2023-01-01

## 2023-03-25 PROBLEM — I48.91 ATRIAL FIBRILLATION: Status: ACTIVE | Noted: 2018-12-13

## 2023-03-25 PROBLEM — I50.9 CHF (CONGESTIVE HEART FAILURE): Status: ACTIVE | Noted: 2018-12-06

## 2023-04-13 NOTE — CONSULT NOTE ADULT - SUBJECTIVE AND OBJECTIVE BOX
HPI:  72-year-old female with history of hypertension, CHF, afib s/p AF ablation (19) currently not on xarelto, asthma, hypothyroidism, HLD history of C. difficile 2023 who is presenting for complaints of generalized abdominal pain ongoing diarrhea since  multiple episodes every day.  Denies fever.  Tonight had 3-4 episodes of nausea and vomiting, nonbloody x 3 days.  Chest pain that just started now also there is epigastric pain as well.  Patient reports she has been admitted 3 times for this diarrhea.    Son reports patient is just progressively weak and more lethargic having  oral intake.              PAST MEDICAL & SURGICAL HISTORY:  Hypothyroidism      HLD (hyperlipidemia)      Thrombocytosis      Persistent atrial fibrillation      Obesity (BMI 35.0-39.9 without comorbidity)      Asthma      Diabetes mellitus      Ankle injury  on , 5 surgeries.      Cholecystitis          MEDICATIONS  (STANDING):  cefoTEtan  IVPB 2 Gram(s) IV Intermittent once  ondansetron Injectable 4 milliGRAM(s) IV Push once  potassium chloride  10 mEq/100 mL IVPB 10 milliEquivalent(s) IV Intermittent every 1 hour  sodium chloride 0.9% Bolus 1000 milliLiter(s) IV Bolus once    MEDICATIONS  (PRN):      Allergies    No Known Allergies    Intolerances        SOCIAL HISTORY:    FAMILY HISTORY:  Family history of early CAD (Father)    Family history of early CAD (Mother)            Physical Exam:  Gen: appears uncomfortable  Pulm: mildly tachypneic  CV: +s1/s2  GI: abd diffusely tender, peritonitis    Vital Signs Last 24 Hrs  T(C): 35.8 (2023 06:13), Max: 35.8 (2023 06:13)  T(F): 96.5 (2023 06:13), Max: 96.5 (2023 06:13)  HR: 93 (2023 08:15) (93 - 108)  BP: 92/43 (2023 09:00) (82/42 - 92/61)  BP(mean): --  RR: 16 (2023 08:15) (16 - 18)  SpO2: 98% (2023 08:15) (98% - 98%)    Parameters below as of 2023 08:15  Patient On (Oxygen Delivery Method): room air        I&O's Summary          LABS:                        8.8    33.53 )-----------( 345      ( 2023 06:40 )             29.9         141  |  108  |  82<H>  ----------------------------<  121<H>  2.8<LL>   |  17<L>  |  4.70<H>    Ca    7.9<L>      2023 06:40  Mg     2.3         TPro  6.3  /  Alb  2.2<L>  /  TBili  0.9  /  DBili  x   /  AST  16  /  ALT  10<L>  /  AlkPhos  164<H>      PT/INR - ( 2023 06:40 )   PT: 22.7 sec;   INR: 1.93 ratio             CAPILLARY BLOOD GLUCOSE        LIVER FUNCTIONS - ( 2023 06:40 )  Alb: 2.2 g/dL / Pro: 6.3 g/dL / ALK PHOS: 164 U/L / ALT: 10 U/L / AST: 16 U/L / GGT: x           Imaging:     CT : IMPRESSION:  Pneumoperitoneum and no ascites. Suspect perforated sigmoid colon as above.    Stable splenomegaly.    Stable right lung nodule, groundglass infiltrates and loculated right pleural effusion.

## 2023-04-13 NOTE — ED PROVIDER NOTE - OBJECTIVE STATEMENT
72-year-old female with history of hypertension, congestive heart failure, asthma, hypothyroidism, prior notes state A-fib on Xarelto however patient states she is not taking any anticoagulants, history of C. difficile January 2023 who is presenting for complaints of generalized abdominal pain ongoing diarrhea since January 19 multiple episodes every day.  Denies fever.  Tonight had 3-4 episodes of nausea and vomiting, nonbloody.  Chest pain that just started now also there is epigastric pain as well.  Patient reports she has been admitted 3 times for this diarrhea.  Son reports patient is just progressively weak and more lethargic.  Patient is also having increased lower extremity edema.  Son reports patient is not taking her Lasix.  Denies blood in the diarrhea.  Patient's cardiologist is Dr. Fernandez.

## 2023-04-13 NOTE — ED ADULT NURSE NOTE - SUICIDE SCREENING QUESTION 3
Spoke with MD.   Full workup has been completed for this pt. No evidence for coronary disease or cardiac etiology. MD states that pt can schedule follow up next month if she prefers, but there is little for him to offer at this time based on her test results.     Notified pt of MD message above. Pt verbalized understanding and will follow up with Pulmonary team.    No

## 2023-04-13 NOTE — CHART NOTE - NSCHARTNOTEFT_GEN_A_CORE
: Mary      INDICATION: shock      PROCEDURE:    [x ] LIMITED ECHO        FINDINGS:  normal LV function  no pericardial effusion  IVC relative large without respiratory variation, varies from 2.3cm to 1.6cm    INTERPRETATION:  shock may be volume responsive    Images stored on [facility]

## 2023-04-13 NOTE — ED PROVIDER NOTE - CLINICAL SUMMARY MEDICAL DECISION MAKING FREE TEXT BOX
72-year-old female with history as mentioned above presenting for chronic diarrhea and abdominal pain and 3-4 episodes of nausea and vomiting last night feeling progressively more more weak and lethargic with increased lower extremity swelling and also having chest pain today.  Patient with history of positive C. difficile on January 13, 2023.  Patient was admitted to the hospital for diarrhea in January.  On exam patient does appear somnolent and has to be repeatedly called by her name for her to answer questions.  Vital signs noted.  No acute distress.  Lungs with diminished breath sounds on the right lower segment.  Abdomen with generalized tenderness throughout.  Lower extremities.  3+ pitting edema.  No erythema or signs of cellulitis.  Differential diagnosis includes CHF exacerbation, LAYA secondary to diarrhea, recurrent C. difficile as a cause of her diarrhea, colitis, anemia, ACS.  We will get labs, EKG, chest x-ray.  Will check CT abdomen and pelvis.  We will also check a CT head due to AMS.  A.m. cardiology consult placed for Dr. Fernandez.  Will hold IV fluids for now due to borderline BP patient clinically does not appear dehydrated.  Will give Zofran and Pepcid.  Patient will likely need admission.

## 2023-04-13 NOTE — BRIEF OPERATIVE NOTE - NSICDXBRIEFPROCEDURE_GEN_ALL_CORE_FT
PROCEDURES:  Tia procedure 13-Apr-2023 16:15:56  Cole Goldberg  Abdominal abscess drainage 13-Apr-2023 16:16:16  Cole Goldberg  Mobilization, splenic flexure 13-Apr-2023 16:16:30  Cole Goldberg

## 2023-04-13 NOTE — H&P ADULT - NSHPLABSRESULTS_GEN_ALL_CORE
8.8    33.53 )-----------( 345      ( 13 Apr 2023 06:40 )             29.9     04-13    141  |  108  |  82<H>  ----------------------------<  121<H>  2.8<LL>   |  17<L>  |  4.70<H>    Ca    7.9<L>      13 Apr 2023 06:40  Mg     2.3     04-13    TPro  6.3  /  Alb  2.2<L>  /  TBili  0.9  /  DBili  x   /  AST  16  /  ALT  10<L>  /  AlkPhos  164<H>  04-13 8.8    33.53 )-----------( 345      ( 13 Apr 2023 06:40 )             29.9     04-13    141  |  108  |  82<H>  ----------------------------<  121<H>  2.8<LL>   |  17<L>  |  4.70<H>    Ca    7.9<L>      13 Apr 2023 06:40  Mg     2.3     04-13    TPro  6.3  /  Alb  2.2<L>  /  TBili  0.9  /  DBili  x   /  AST  16  /  ALT  10<L>  /  AlkPhos  164<H>  04-13      ACC: [id number] EXAM:  CT ABDOMEN AND PELVIS OC   ORDERED BY:  MELISSA MAYFIELD     PROCEDURE DATE:  04/13/2023          INTERPRETATION:  CLINICAL INFORMATION: 72 years  Female with abd   pain/diarrhea. History of carcinoid tumor of the lung    COMPARISON: CT abdomen and pelvis 1/26/2023 and PET CT 3/17/2023    CONTRAST/COMPLICATIONS:  IV Contrast: NONE  0 cc administered   0 cc discarded  Oral Contrast: Omnipaque 300  Complications: None reported at time of study completion    PROCEDURE:  CT of the Abdomen and Pelvis was performed.  Sagittal and coronal reformats were performed.    LIMITATION: Evaluation of the solid organs is limited by lack of IV   contrast.    FINDINGS:  LOWER CHEST: Persistent bilateral groundglass opacities. Redemonstrated 9   mm right middle lobe nodule and loculated right pleural effusion.    LIVER: Within normal limits.  BILE DUCTS: Normal caliber.  GALLBLADDER: Cholecystectomy.  SPLEEN: Stable splenomegaly.  PANCREAS: Within normal limits.  ADRENALS: Within normal limits.  KIDNEYS/URETERS: Within normal limits.    BLADDER: Within normal limits.  REPRODUCTIVE ORGANS: Uterus not well visualized.    BOWEL: No bowel obstruction. Appendix is not visualized and cannot be   assessed.. Featureless colon suggestive of colitis. Sigmoid   diverticulosis. Limited evaluation for wall thickening without enteric   contrast. Droplets of extraluminal air adjacent to the sigmoid colon.   Moderate hiatus hernia.  PERITONEUM: Pneumoperitoneum most pronounced in the anterior upper   abdomen. Droplets of free air throughout the remainder of the abdomen and   adjacent to the sigmoid colon. Moderate ascites, new compared with prior.  VESSELS: Atherosclerotic changes.  RETROPERITONEUM/LYMPH NODES: No lymphadenopathy.  ABDOMINAL WALL: Within normal limits.  BONES: Degenerative changes.    IMPRESSION:  Pneumoperitoneum and no ascites. Suspect perforated sigmoid colon as   above.    Stable splenomegaly.    Stable right lung nodule, groundglass infiltrates and loculated right   pleural effusion.

## 2023-04-13 NOTE — CONSULT NOTE ADULT - NS PANP COMMENT GEN_ALL_CORE FT
73 yo woman with Hx afib (off xarelto at least 1wk), HFpEF, asthma, recent Cdiff in Jan presenting with n/v/d and found to have perforated sigmoid colon resulting in peritonitis, septic shock, lactic acidosis, LAYA.  She is now postop washout, drainage of 1.2L pus, transverse and sigmoid resection and Ro's procedure.  She remains in shock, in postop respiratory failure, LAYA, with acute blood loss anemia.    fentanyl gtt for pain and sedation  requiring low dose phenylephrine  transfuse 2 PRBCs, possible additional blood products for coagulopathy  acute hypoxemic respiratory failure, wean FiO2  LAYA, volume resuscitate as above  NPO, NGT  zosyn for peritonitis and abd abscess and flagyl empirically for Cdiff  plan discussed with surgery and with sons

## 2023-04-13 NOTE — CONSULT NOTE ADULT - ASSESSMENT
Prior TTE (1/18/23): normal LVEF 65%, noraml RV size and function, biatrial enlargement, sclerotic aortic valve, mild AI, Moderate MR and TR  - f/u TTE    - No clear evidence of acute ischemia, trops negative x 2. Will follow up third set.  - His CKs are flat, suggesting against acute atherosclerotic plaque rupture.  - Biomarker trend is not consistent with plaque rupture but rather demand ischemia. Monitor closely for the development of anginal symptoms or clinical signs of ischemia.   - No acute changes on EKG compared to previous.  - No meaningful evidence of volume overload.  - Previous TTE shows ___.  - BP well controlled, monitor routine hemodynamics.  - Continue ___.  - Monitor and replete lytes, keep K>4, Mg>2.  - Strict I/Os, daily weights.  - Pt has no active ischemia, decompensated heart failure, unstable arrythmia, or severe stenotic valvular disease, and has __ cardiac risk factors. In the setting of low risk ___, he/she is optimized from cardiovascular standpoint to proceed with planned procedure with routine hemodynamic monitoring.   - Pt has no modifiable active cardiac risk factors and in the setting of low risk _____, patient is optimized as best as possible from cardiovascular standpoint to proceed with planned procedure with routine hemodynamic monitoring.  - Other cardiovascular workup will depend on clinical course.  - All other workup per primary team.  - Will continue to follow.              72-year-old female with history of hypertension, CHF, afib s/p AF ablation (2/7/19) currently not on xarelto, PCI 2018hypothyroidism, HLD history of C. difficile January 2023  Prior TTE (1/18/23): normal LVEF 65%, noraml RV size and function, biatrial enlargement, sclerotic aortic valve, mild AI, Moderate MR and TR  - f/u TTE  - Elevated troponin 100.9, ProBNP 30880  - Trend trops, f/u CK  - No acute changes on EKG compared to previous. Sinus rhythm with PAC's  - Pt is volume overloaded w/ b/l LE edema, not taking lasix 2-3 days.  - BP hypotensive, unsure if taking Toprol  - Dr. Fernandez outpt cardiologist; last visit 02/2023. Continue lasix 40 2x in AM, 1x in PM, Continue Toprol 50; patient was told to hold xarelto and metolazone for GI colonoscopy plan due to previous C.Diff hospitalization  - Monitor and replete lytes, keep K>4, Mg>2.  - Other cardiovascular workup will depend on clinical course.  - All other workup per primary team.  - Will continue to follow.              72-year-old female with history of hypertension, CHF, afib s/p AF ablation (2/7/19) currently not on xarelto, CATH 2018, hypothyroidism, HLD history of C. difficile January 2023  - CT ABD/Pelvis: Pneumoperitoneum and no ascites. Suspect perforated sigmoid colon. Stable splenomegaly. Stable right lung nodule, groundglass infiltrates and loculated right pleural effusion.  - Per surgery, pt will have Ex-lap, Tia's for suspected perforation, to be evaluated and monitored in ICU post-op  - Hold AC, lasix, will monitor and f/u in ICU,   Prior TTE (1/18/23): normal LVEF 65%, normal RV size and function, biatrial enlargement, sclerotic aortic valve, mild AI, Moderate MR and TR  - f/u TTE when stable  - Elevated troponin 100.9, ProBNP 82078  - Trend trops, f/u CK  - No acute changes on EKG compared to previous. Sinus rhythm with PAC's  - Pt is volume overloaded w/ b/l LE edema, not taking lasix 2-3 days.  - Hypotensive today, unsure if taking Toprol, will f/u in ICU post-op  - Dr. Fernandez outpt cardiologist; last visit 02/2023. Was told to continue lasix 40 2x in AM, 1x in PM and continue Toprol 50, hold xarelto and metolazone for GI colonoscopy plan due to previous C.Diff hospitalization   - Monitor and replete lytes, keep K>4, Mg>2.  - Other cardiovascular workup will depend on clinical course.  - All other workup per primary team.  - Will continue to follow.              72-year-old female with history of hypertension, CHF, afib s/p AF ablation (2/7/19) currently not on xarelto, CATH 2018, hypothyroidism, HLD history of C. difficile January 2023    - CT ABD/Pelvis: Pneumoperitoneum and no ascites. Suspect perforated sigmoid colon. Stable splenomegaly. Stable right lung nodule, groundglass infiltrates and loculated right pleural effusion.  - Per surgery, pt will have Ex-lap, Tia's for suspected perforation, to be evaluated and monitored in ICU post-op  - Hold all AC due to planned procedure  - ProBNP 05373  - transfer to ICU s/p procedure, for vasopressor support for hypotension  - Pt states she has not taken lasix 2-3 days. no acute volume overload noted on CXR  - hold lasix for now due to hypotension  - hold home metoprolol 50mg qd due to hypotension, can consider restarting if BP stabilizes  - Elevated troponin 100.9, otherwise cardiac enzymes unremarkable,   - EKG: Sinus tachycardia with PAC's  - No acute changes on EKG compared to previous.   - Prior TTE (1/18/23): normal LVEF 65%, normal RV size and function, biatrial enlargement, sclerotic aortic valve, mild AI, Moderate MR and TR  - f/u TTE   - Monitor and replete lytes, keep K>4, Mg>2.  - Other cardiovascular workup will depend on clinical course.  - Will continue to follow.              72-year-old female with history of hypertension, CHF, afib s/p AF ablation (2/7/19) currently not on xarelto, CATH 2018, hypothyroidism, HLD history of C. difficile January 2023 presents for vomitting and abdominal pain. Consulted for CHF.    - CT ABD/Pelvis: Pneumoperitoneum and no ascites. Suspect perforated sigmoid colon. Stable splenomegaly. Stable right lung nodule, groundglass infiltrates and loculated right pleural effusion.  - Per surgery, pt will have Ex-lap, Tia's for suspected perforation, to be evaluated and monitored in ICU post-op  - Hold all AC due to planned procedure  - ProBNP 62960  - transfer to ICU s/p procedure, for vasopressor support for hypotension  - Pt states she has not taken lasix 2-3 days. no acute volume overload noted on CXR  - hold lasix for now due to hypotension  - hold home metoprolol 50mg qd due to hypotension, can consider restarting if BP stabilizes  - Elevated troponin 100.9, otherwise cardiac enzymes unremarkable, continue to trend  - EKG: Sinus tachycardia with PAC's  - No acute changes on EKG compared to previous.   - Atypical chest pain likely not cardiac of origin  - Prior TTE (1/18/23): normal LVEF 65%, normal RV size and function, biatrial enlargement, sclerotic aortic valve, mild AI, Moderate MR and TR  - f/u TTE   - Monitor and replete lytes, keep K>4, Mg>2.  - Other cardiovascular workup will depend on clinical course.  - Will continue to follow.

## 2023-04-13 NOTE — H&P ADULT - PROBLEM SELECTOR PLAN 4
Cards consulted, f/u recs  - pt has been off AC Cards consulted, f/u recs  - pt has been off AC, supposedly in prep for possible colonoscopy  - consider holding metoprolol in setting of hypotension

## 2023-04-13 NOTE — PROGRESS NOTE ADULT - SUBJECTIVE AND OBJECTIVE BOX
HERMILA QUIÑONES  MRN-[id number]  Patient is a 72y old  Female who presents with a chief complaint of intra-abdominal perforation (13 Apr 2023 23:15)    HPI:  71 y/o f w/ PMH of HTN, CHF, asthma, hypothyroidism, prior notes state A-fib on Xarelto however patient states she is not taking any anticoagulants, history of C. difficile January 2023 p/w generalized abdominal pain, and persistent diarrhea for last several months with several episodes every day, worsening in last few weeks.  Pt's abdominal pain acutely worsened in last day, with achy, persistent pain, diffusely in abdomen, nonraidtaing, not responding to oral pain meds, associated with nausea and vomiting, prompting pt to come to ED.  Pt did not report any fevers.  Pt has also had increased lower extremity swelling, but per son, has been noncompliant with lasix recently.   (13 Apr 2023 10:37)      Today:      S/p Tia's with abdominal abscess drainage.   OR finding with >1.2 liters of pus from abd cavity.     pt on Full vent support. s/p PRBC, IV hydration for severe vasodilation shock , on multiple pressor .   post op pt opens her eyes to command and moves all ext to commands,  ABD tamera with serious fluid.        ============================I/O===========================   I&O's Detail    13 Apr 2023 07:01  -  14 Apr 2023 00:06  --------------------------------------------------------  IN:    dextrose 5% w/ Additives: 300 mL    IV PiggyBack: 100 mL    IV PiggyBack: 100 mL    Lactated Ringers: 100 mL    Lactated Ringers Bolus: 1000 mL    Norepinephrine: 8 mL    Norepinephrine: 11 mL    Phenylephrine: 92.8 mL    Plasma: 656 mL    PRBCs (Packed Red Blood Cells): 627 mL    Sodium Chloride 0.9% Bolus: 1000 mL  Total IN: 3994.8 mL    OUT:    Bulb (mL): 410 mL    FentaNYL: 0 mL    Indwelling Catheter - Urethral (mL): 15 mL  Total OUT: 425 mL    Total NET: 3569.8 mL        ============================ LABS =========================                        8.2    12.95 )-----------( 213      ( 13 Apr 2023 22:19 )             27.6     04-13    141  |  111<H>  |  74<H>  ----------------------------<  129<H>  4.2   |  15<L>  |  4.30<H>    Ca    6.9<L>      13 Apr 2023 17:45  Phos  8.6     04-13  Mg     2.2     04-13    TPro  4.6<L>  /  Alb  1.6<L>  /  TBili  0.9  /  DBili  x   /  AST  100<H>  /  ALT  29  /  AlkPhos  152<H>  04-13    LIVER FUNCTIONS - ( 13 Apr 2023 17:45 )  Alb: 1.6 g/dL / Pro: 4.6 g/dL / ALK PHOS: 152 U/L / ALT: 29 U/L / AST: 100 U/L / GGT: x           PT/INR - ( 13 Apr 2023 22:19 )   PT: 22.0 sec;   INR: 1.87 ratio         PTT - ( 13 Apr 2023 22:19 )  PTT:36.9 sec  ABG - ( 13 Apr 2023 20:45 )  pH, Arterial: 7.25  pH, Blood: x     /  pCO2: 34    /  pO2: 267   / HCO3: 15    / Base Excess: -12.3 /  SaO2: 100.0         ======================Micro/Rad/Cardio=================  Culture: Reviewed   CXR: Reviewed  Echo:Reviewed  ======================================================  PAST MEDICAL & SURGICAL HISTORY:  Hypothyroidism      HLD (hyperlipidemia)      Thrombocytosis      Persistent atrial fibrillation      Obesity (BMI 35.0-39.9 without comorbidity)      Asthma      Diabetes mellitus      Ankle injury  on 2004, 5 surgeries.      Cholecystitis    GENERAL: pale, cool, lethargic    HEENT:  NC/AT. NGT in place with 200cc bilious fluid in cannister  CARDIO:  Regular rate and rhythm.  RESPIRATORY:  Intubated on mechanical ventilation  ABDOMEN:  Soft, nondistended, no facial grimacing to suggest tenderness. Stoma viable with empty colostomy bag. Right-sided TAMERA drain with approximately 15cc murky fluid. Dressing over midline incision clean/dry. No rigidity.  : Higgins in place  SKIN:  No jaundice, pallor, or cyanosis      ====================ASSESMENT ==============     72 year old female s/p Tia's and abdominal abscess drainage (1.2L) for perforated sigmoid colon.  pOD1    *sepsis   * vasodilatory shock.     ====================== NEUROLOGY=====================  fentaNYL   Infusion. 0.5 MICROgram(s)/kG/Hr (3.64 mL/Hr) IV Continuous <Continuous>    cont pain management.   post op - pt opened her eyes and follow command.  cont fent gtt for comfort at present.      ==================== RESPIRATORY======================  Mechanical Ventilation:  Mode: AC/ CMV (Assist Control/ Continuous Mandatory Ventilation)  RR (machine): 24  TV (machine): 400  FiO2: 50  PEEP: 5  ITime: 1  MAP: 18  PIP: 40    Vent support .   aggressive chest pt,   chest xray received.         ====================CARDIOVASCULAR==================  norepinephrine Infusion 0.05 MICROgram(s)/kG/Min (6.83 mL/Hr) IV Continuous <Continuous>  phenylephrine    Infusion 0.1 MICROgram(s)/kG/Min (2.73 mL/Hr) IV Continuous <Continuous>    Tele with NSR  PAC, will replace lytes to keep in NSR       ===================HEMATOLOGIC/ONC ===================    PAS stocking   hold all A/C for presents   keep h/H >8 .     ===================== RENAL =========================  Higgins for monitoring urine output    u/o is low post op ,  IV hydration , close eye on Lytes  trend creatine     ==================== GASTROINTESTINAL===================  dextrose 5% 1000 milliLiter(s) (150 mL/Hr) IV Continuous <Continuous>  pantoprazole  Injectable 40 milliGRAM(s) IV Push daily  sodium chloride 0.9% lock flush 10 milliLiter(s) IV Push every 1 hour PRN Pre/post blood products, medications, blood draw, and to maintain line patency    TAMERA to drainage   NGtube to suction - >350 sofar of bilious drainage.   PPI   NPO     =======================    ENDOCRINE  =====================    check BS q 2 ,   post op hypoglycemic   started on D5 with Bicarb for severe metabolic acidosis .     ========================INFECTIOUS DISEASE================  metroNIDAZOLE  IVPB 500 milliGRAM(s) IV Intermittent every 8 hours  piperacillin/tazobactam IVPB.. 3.375 Gram(s) IV Intermittent every 12 hours    cont ABX, ID follow up   Prelim body fluid GNR - gram variable rods      Patient requires continuous monitoring with bedside rhythm monitoring, pulse oximetry monitoring; and intermittent blood gas analysis.  Care plan discussed with ICU care team.  Patient remained critical and required more than usual post op care.   I have spent 35 minutes providing non-routine post op care with multiple re-evalutions throughout the evening.

## 2023-04-13 NOTE — PROGRESS NOTE ADULT - SUBJECTIVE AND OBJECTIVE BOX
Post Operative Note  Patient: HERMILA QUIÑONES 72y (1950) Female   MRN: [id number]  Location: \[facility]\"" ICU1 13A  Visit: 04-13-23 Inpatient  Date: 04-13-23 @ 23:15    PROCEDURE: S/p Tia's with abdominal abscess drainage    SUBJECTIVE   Patient seen and examined at bedside, unable to obtain meaningful history from patient as she is intubated and sedated. Continues on levophed and phenylephrine. Postop anemia treated with 2u FFP and 2u PRBC with post-transfusion H/H of 8.2/27.6 (from 6.5/22.5).     OBJECTIVE    VITALS  Vitals: T(F): 98.6 (04-13-23 @ 20:52), Max: 98.8 (04-13-23 @ 16:35)  HR: 98 (04-13-23 @ 22:30)  BP: 98/47 (04-13-23 @ 22:30) (82/42 - 177/80)  RR: 32 (04-13-23 @ 22:30)  SpO2: 100% (04-13-23 @ 22:30)  Vent Settings: Mode: AC/ CMV (Assist Control/ Continuous Mandatory Ventilation), RR (machine): 32, TV (machine): 400, FiO2: 70, PEEP: 7, MAP: 19, PIP: 43    INTAKE & OUTPUT  IN:   04-13-23 @ 07:01  -  04-13-23 @ 23:15  --------------------------------------------------------  IN: 3994.8 mL    IV Fluids: albumin human  5% IVPB 250 milliLiter(s) IV Intermittent every 1 hour  dextrose 5% 1000 milliLiter(s) (150 mL/Hr) IV Continuous <Continuous>    OUT:   04-13-23 @ 07:01  -  04-13-23 @ 23:15  --------------------------------------------------------  OUT: 425 mL    EBL: 50cc    04-13-23 @ 07:01  -  04-13-23 @ 23:15  --------------------------------------------------------  OUT: 0 mL    Voided Urine:   04-13-23 @ 07:01  -  04-13-23 @ 23:15  --------------------------------------------------------  OUT: 425 mL    Higgins Catheter: yes, 15cc/7hr  Drains:   TAMERA:   04-13-23 @ 07:01  -  04-13-23 @ 23:15  --------------------------------------------------------  OUT: 410 mL    NG Tube: 200cc/7hr    PHYSICAL EXAM  GENERAL:  Well-nourished, well-developed female lying in bed on a vent  HEENT:  NC/AT. NGT in place with 200cc bilious fluid in cannister  CARDIO:  Regular rate and rhythm.  RESPIRATORY:  Intubated on mechanical ventilation  ABDOMEN:  Soft, nondistended, no facial grimacing to suggest tenderness. Stoma viable with empty colostomy bag. Right-sided TAMERA drain with approximately 15cc murky fluid. Dressing over midline incision clean/dry. No rigidity.  : Higgins in place  SKIN:  No jaundice, pallor, or cyanosis  NEURO:  Sedated    MEDICATIONS  [Standing]  albumin human  5% IVPB 250 milliLiter(s) IV Intermittent every 1 hour  chlorhexidine 0.12% Liquid 15 milliLiter(s) Oral Mucosa every 12 hours  chlorhexidine 4% Liquid 1 Application(s) Topical <User Schedule>  dextrose 5% 1000 milliLiter(s) IV Continuous <Continuous>  fentaNYL   Infusion. 0.5 MICROgram(s)/kG/Hr IV Continuous <Continuous>  metroNIDAZOLE  IVPB 500 milliGRAM(s) IV Intermittent every 8 hours  norepinephrine Infusion 0.05 MICROgram(s)/kG/Min IV Continuous <Continuous>  pantoprazole  Injectable 40 milliGRAM(s) IV Push daily  phenylephrine    Infusion 0.1 MICROgram(s)/kG/Min IV Continuous <Continuous>  sodium chloride 0.9% lock flush 10 milliLiter(s) IV Push every 1 hour PRN    [PRN]  albumin human  5% IVPB 250 milliLiter(s) IV Intermittent every 1 hour  chlorhexidine 0.12% Liquid 15 milliLiter(s) Oral Mucosa every 12 hours  chlorhexidine 4% Liquid 1 Application(s) Topical <User Schedule>  dextrose 5% 1000 milliLiter(s) IV Continuous <Continuous>  fentaNYL   Infusion. 0.5 MICROgram(s)/kG/Hr IV Continuous <Continuous>  metroNIDAZOLE  IVPB 500 milliGRAM(s) IV Intermittent every 8 hours  norepinephrine Infusion 0.05 MICROgram(s)/kG/Min IV Continuous <Continuous>  pantoprazole  Injectable 40 milliGRAM(s) IV Push daily  phenylephrine    Infusion 0.1 MICROgram(s)/kG/Min IV Continuous <Continuous>  sodium chloride 0.9% lock flush 10 milliLiter(s) IV Push every 1 hour PRN    LABS:                        8.2    12.95 )-----------( 213      ( 13 Apr 2023 22:19 )             27.6     04-13    141  |  111<H>  |  74<H>  ----------------------------<  129<H>  4.2   |  15<L>  |  4.30<H>    Ca    6.9<L>      13 Apr 2023 17:45  Phos  8.6     04-13  Mg     2.2     04-13    TPro  4.6<L>  /  Alb  1.6<L>  /  TBili  0.9  /  DBili  x   /  AST  100<H>  /  ALT  29  /  AlkPhos  152<H>  04-13    PT/INR - ( 13 Apr 2023 22:19 )   PT: 22.0 sec;   INR: 1.87 ratio      PTT - ( 13 Apr 2023 22:19 )  PTT:36.9 sec  CARDIAC MARKERS ( 13 Apr 2023 09:10 )  x     / x     / 62 U/L / x     / 2.4 ng/mL    IMAGING:  No post-op imaging studies    ASSESSMENT:  72 year old female s/p Tia's and abdominal abscess drainage (1.2L) for perforated sigmoid colon.    PLAN:  - Continue antibiotics  - Vent/pressor support per ICU team  - Serial CBCs, transfuse PRN  - Monitor for return of GI function  - Pain control PRN  - DVT prophylaxis with SCDs  - Will continue to monitor Post Operative Note  Patient: HERMILA QUIÑONES 72y (1950) Female   MRN: [id number]  Location: [facility] ICU1 13A  Visit: 04-13-23 Inpatient  Date: 04-13-23 @ 23:15    PROCEDURE: S/p Tia's with abdominal abscess drainage    SUBJECTIVE   Patient seen and examined at bedside, unable to obtain meaningful history from patient as she is intubated and sedated. Continues on levophed and phenylephrine. Postop anemia treated with 2u FFP and 2u PRBC with post-transfusion H/H of 8.2/27.6 (from 6.5/22.5).     OBJECTIVE    VITALS  Vitals: T(F): 98.6 (04-13-23 @ 20:52), Max: 98.8 (04-13-23 @ 16:35)  HR: 98 (04-13-23 @ 22:30)  BP: 98/47 (04-13-23 @ 22:30) (82/42 - 177/80)  RR: 32 (04-13-23 @ 22:30)  SpO2: 100% (04-13-23 @ 22:30)  Vent Settings: Mode: AC/ CMV (Assist Control/ Continuous Mandatory Ventilation), RR (machine): 32, TV (machine): 400, FiO2: 70, PEEP: 7, MAP: 19, PIP: 43    INTAKE & OUTPUT  IN:   04-13-23 @ 07:01  -  04-13-23 @ 23:15  --------------------------------------------------------  IN: 3994.8 mL    IV Fluids: albumin human  5% IVPB 250 milliLiter(s) IV Intermittent every 1 hour  dextrose 5% 1000 milliLiter(s) (150 mL/Hr) IV Continuous <Continuous>    OUT:   04-13-23 @ 07:01  -  04-13-23 @ 23:15  --------------------------------------------------------  OUT: 425 mL    EBL: 50cc    04-13-23 @ 07:01  -  04-13-23 @ 23:15  --------------------------------------------------------  OUT: 0 mL    Voided Urine:   04-13-23 @ 07:01  -  04-13-23 @ 23:15  --------------------------------------------------------  OUT: 425 mL    Higgins Catheter: yes, 15cc/7hr  Drains:   TAMERA:   04-13-23 @ 07:01  -  04-13-23 @ 23:15  --------------------------------------------------------  OUT: 410 mL    NG Tube: 200cc/7hr    PHYSICAL EXAM  GENERAL:  Well-nourished, well-developed female lying in bed on a vent  HEENT:  NC/AT. NGT in place with 200cc bilious fluid in cannister  CARDIO:  Regular rate and rhythm.  RESPIRATORY:  Intubated on mechanical ventilation  ABDOMEN:  Soft, nondistended, no facial grimacing to suggest tenderness. Stoma viable with empty colostomy bag. Right-sided mariela drain with approximately 15cc murky fluid. Dressing over midline incision clean/dry. No rigidity.  : Higgins in place  SKIN:  No jaundice, pallor, or cyanosis  NEURO:  Sedated    MEDICATIONS  [Standing]  albumin human  5% IVPB 250 milliLiter(s) IV Intermittent every 1 hour  chlorhexidine 0.12% Liquid 15 milliLiter(s) Oral Mucosa every 12 hours  chlorhexidine 4% Liquid 1 Application(s) Topical <User Schedule>  dextrose 5% 1000 milliLiter(s) IV Continuous <Continuous>  fentaNYL   Infusion. 0.5 MICROgram(s)/kG/Hr IV Continuous <Continuous>  metroNIDAZOLE  IVPB 500 milliGRAM(s) IV Intermittent every 8 hours  norepinephrine Infusion 0.05 MICROgram(s)/kG/Min IV Continuous <Continuous>  pantoprazole  Injectable 40 milliGRAM(s) IV Push daily  phenylephrine    Infusion 0.1 MICROgram(s)/kG/Min IV Continuous <Continuous>  sodium chloride 0.9% lock flush 10 milliLiter(s) IV Push every 1 hour PRN    [PRN]  albumin human  5% IVPB 250 milliLiter(s) IV Intermittent every 1 hour  chlorhexidine 0.12% Liquid 15 milliLiter(s) Oral Mucosa every 12 hours  chlorhexidine 4% Liquid 1 Application(s) Topical <User Schedule>  dextrose 5% 1000 milliLiter(s) IV Continuous <Continuous>  fentaNYL   Infusion. 0.5 MICROgram(s)/kG/Hr IV Continuous <Continuous>  metroNIDAZOLE  IVPB 500 milliGRAM(s) IV Intermittent every 8 hours  norepinephrine Infusion 0.05 MICROgram(s)/kG/Min IV Continuous <Continuous>  pantoprazole  Injectable 40 milliGRAM(s) IV Push daily  phenylephrine    Infusion 0.1 MICROgram(s)/kG/Min IV Continuous <Continuous>  sodium chloride 0.9% lock flush 10 milliLiter(s) IV Push every 1 hour PRN    LABS:                        8.2    12.95 )-----------( 213      ( 13 Apr 2023 22:19 )             27.6     04-13    141  |  111<H>  |  74<H>  ----------------------------<  129<H>  4.2   |  15<L>  |  4.30<H>    Ca    6.9<L>      13 Apr 2023 17:45  Phos  8.6     04-13  Mg     2.2     04-13    TPro  4.6<L>  /  Alb  1.6<L>  /  TBili  0.9  /  DBili  x   /  AST  100<H>  /  ALT  29  /  AlkPhos  152<H>  04-13    PT/INR - ( 13 Apr 2023 22:19 )   PT: 22.0 sec;   INR: 1.87 ratio      PTT - ( 13 Apr 2023 22:19 )  PTT:36.9 sec  CARDIAC MARKERS ( 13 Apr 2023 09:10 )  x     / x     / 62 U/L / x     / 2.4 ng/mL    IMAGING:  No post-op imaging studies    ASSESSMENT:  72 year old female s/p Tia's and abdominal abscess drainage (1.2L) for perforated sigmoid colon.    PLAN:  - Continue antibiotics  - Vent/pressor support per ICU team  - Serial CBCs, transfuse PRN  - Monitor for return of GI function  - Pain control PRN  - DVT prophylaxis with SCDs  - Will continue to monitor

## 2023-04-13 NOTE — H&P ADULT - PROBLEM SELECTOR PLAN 1
Pt has been evaluated by surgery and is being admitted by ICU, further management and care to be determined by ICU  - CT abd/pelvis suggests perforated sigmoid colon  - Pt has been given IV flagyl and IV cefotetan by ED  - ID consult with Dr. Ricardo called, f/u recs, may consider zosyn, flagyl, but would f/u what ID recommends  - IVF, NPO  - IV pepcid, IV protonix  - Pain control as needed Pt has been evaluated by surgery and is being admitted by ICU, further management and care to be determined by ICU  - CT abd/pelvis suggests perforated sigmoid colon, leukocytosis to 33, pt also hypotensive and hypothermic, spesis present on admission  - Pt has been given IV flagyl and IV cefotetan by ED  - ID consult with Dr. Ricardo called, f/u recs, may consider zosyn, flagyl, but would f/u what ID recommends  - IVF, NPO  - IV pepcid, IV protonix  - Pain control as needed

## 2023-04-13 NOTE — CONSULT NOTE ADULT - ASSESSMENT
72-year-old female with history of hypertension, CHF, afib s/p AF ablation (2/7/19) currently not on xarelto, PCI 2018 hypothyroidism, HLD history of C. difficile January 2023 presents with perforated viscus, likely colon perf    -OR for ex-lap, yessica's today  -INR elevated, recommend stat vit K, and if time allows FFP  -aggressive resuscitation  -ICU evaluation

## 2023-04-13 NOTE — GOALS OF CARE CONVERSATION - ADVANCED CARE PLANNING - CONVERSATION DETAILS
Inquired about CPR/intubation, pt FULL CODE     Family contact info     Edgard Ornelas:  [phone]  William Ornelas: [phone]

## 2023-04-13 NOTE — CONSULT NOTE ADULT - SUBJECTIVE AND OBJECTIVE BOX
[facility]  INFECTIOUS DISEASES - Adriana Juarez, [address], [place] [address]  Tel: [phone]     Fax: [phone]  =======================================================    MRN-[id number]  HERMILA QUIÑONES     CC: Patient is a 72y old  Female who presents with a chief complaint of intra-abdominal perforation (13 Apr 2023 10:49)    HPI:  73 y/o f w/ PMH of HTN, CHF, asthma, hypothyroidism, prior notes state A-fib on Xarelto, history of C. difficile 1/13/23, who p/w generalized abdominal pain, and persistent diarrhea. Patient limited historian at he time of my exam. Son at bedside says patient has not been doing well and not taking PO meds the past 3-4 weeks. Patient's abdominal pain acutely worsened in last day, associated with nausea and vomiting.  Pt did not report any fevers.        PAST MEDICAL & SURGICAL HISTORY:  Hypothyroidism      HLD (hyperlipidemia)      Thrombocytosis      Persistent atrial fibrillation      Obesity (BMI 35.0-39.9 without comorbidity)      Asthma      Diabetes mellitus      Ankle injury  on 2004, 5 surgeries.      Cholecystitis          Social Hx:     FAMILY HISTORY:  Family history of early CAD (Father)    Family history of early CAD (Mother)        Allergies    No Known Allergies    Intolerances        Antibiotics:  MEDICATIONS  (STANDING):    MEDICATIONS  (PRN):       REVIEW OF SYSTEMS: *limited 2/2 clinical condition*  CONSTITUTIONAL:  No Fever or chills  CARDIOVASCULAR:  No chest pain  RESPIRATORY:  No shortness of breath  GASTROINTESTINAL:  see history  GENITOURINARY:  No dysuria      Physical Exam:  Vital Signs Last 24 Hrs  T(C): 35.4 (13 Apr 2023 12:29), Max: 35.8 (13 Apr 2023 06:13)  T(F): 95.7 (13 Apr 2023 12:29), Max: 96.5 (13 Apr 2023 06:13)  HR: 114 (13 Apr 2023 13:00) (87 - 120)  BP: 109/59 (13 Apr 2023 13:00) (82/42 - 122/53)  BP(mean): 65 (13 Apr 2023 13:00) (58 - 72)  RR: 20 (13 Apr 2023 13:00) (16 - 22)  SpO2: 100% (13 Apr 2023 13:00) (96% - 100%)    Parameters below as of 13 Apr 2023 12:50  Patient On (Oxygen Delivery Method): room air      Height (cm): 167.6 (04-13 @ 12:29)  Weight (kg): 68 (04-13 @ 12:29)  BMI (kg/m2): 24.2 (04-13 @ 12:29)  BSA (m2): 1.77 (04-13 @ 12:29)  GEN: appears weak and ill  HEENT: normocephalic and atraumatic.   NECK: Supple.   LUNGS: Tachycardic  HEART: Regular rate and rhythm   ABDOMEN: diffuse tenderness  EXTREMITIES: bipedal  NEUROLOGIC: responding to some questions with yes/no    Labs:  04-13    141  |  108  |  82<H>  ----------------------------<  121<H>  2.8<LL>   |  17<L>  |  4.70<H>    Ca    7.9<L>      13 Apr 2023 06:40  Mg     2.3     04-13    TPro  6.3  /  Alb  2.2<L>  /  TBili  0.9  /  DBili  x   /  AST  16  /  ALT  10<L>  /  AlkPhos  164<H>  04-13                          8.8    33.53 )-----------( 345      ( 13 Apr 2023 06:40 )             29.9     PT/INR - ( 13 Apr 2023 06:40 )   PT: 22.7 sec;   INR: 1.93 ratio             LIVER FUNCTIONS - ( 13 Apr 2023 06:40 )  Alb: 2.2 g/dL / Pro: 6.3 g/dL / ALK PHOS: 164 U/L / ALT: 10 U/L / AST: 16 U/L / GGT: x           CARDIAC MARKERS ( 13 Apr 2023 09:10 )  x     / x     / 62 U/L / x     / 2.4 ng/mL              SARS-CoV-2: NotDetec (04-13-23 @ 06:40)      RECENT CULTURES:  04-13 @ 06:40          NotDetec        All imaging and other data have been reviewed.    CT A/P:  FINDINGS:  LOWER CHEST: Persistent bilateral groundglass opacities. Redemonstrated 9   mm right middle lobe nodule and loculated right pleural effusion.    LIVER: Within normal limits.  BILE DUCTS: Normal caliber.  GALLBLADDER: Cholecystectomy.  SPLEEN: Stable splenomegaly.  PANCREAS: Within normal limits.  ADRENALS: Within normal limits.  KIDNEYS/URETERS: Within normal limits.    BLADDER: Within normal limits.  REPRODUCTIVE ORGANS: Uterus not well visualized.    BOWEL: No bowel obstruction. Appendix is not visualized and cannot be   assessed.. Featureless colon suggestive of colitis. Sigmoid   diverticulosis. Limited evaluation for wall thickening without enteric   contrast. Droplets of extraluminal air adjacent to the sigmoid colon.   Moderate hiatus hernia.  PERITONEUM: Pneumoperitoneum most pronounced in the anterior upper   abdomen. Droplets of free air throughout the remainder of the abdomen and   adjacent to the sigmoid colon. Moderate ascites, new compared with prior.  VESSELS: Atherosclerotic changes.  RETROPERITONEUM/LYMPH NODES: No lymphadenopathy.  ABDOMINAL WALL: Within normal limits.  BONES: Degenerative changes.    IMPRESSION:  Pneumoperitoneum and no ascites. Suspect perforated sigmoid colon as   above.    Stable splenomegaly.    Stable right lung nodule, groundglass infiltrates and loculated right   pleural effusion.

## 2023-04-13 NOTE — H&P ADULT - HISTORY OF PRESENT ILLNESS
72-year-old female with history of hypertension, congestive heart failure, asthma, hypothyroidism, prior notes state A-fib on Xarelto however patient states she is not taking any anticoagulants, history of C. difficile January 2023 who is presenting for complaints of generalized abdominal pain ongoing diarrhea since January 19 multiple episodes every day.  Denies fever.  Tonight had 3-4 episodes of nausea and vomiting, nonbloody.  Chest pain that just started now also there is epigastric pain as well.  Patient reports she has been admitted 3 times for this diarrhea.  Son reports patient is just progressively weak and more lethargic.  Patient is also having increased lower extremity edema.  Son reports patient is not taking her Lasix.  Denies blood in the diarrhea.  Patient's cardiologist is Dr. Fernandez. 71 y/o f w/ PMH of HTN, CHF, asthma, hypothyroidism, prior notes state A-fib on Xarelto however patient states she is not taking any anticoagulants, history of C. difficile January 2023 p/w generalized abdominal pain, and persistent diarrhea for last several months with several episodes every day, worsening in last few weeks.  Pt's abdominal pain acutely worsened in last day, with achy, persistent pain, diffusely in abdomen, nonraidtaing, not responding to oral pain meds, associated with nausea and vomiting, prompting pt to come to ED.  Pt did not report any fevers.  Pt has also had increased lower extremity swelling, but per son, has been noncompliant with lasix recently.

## 2023-04-13 NOTE — CONSULT NOTE ADULT - SUBJECTIVE AND OBJECTIVE BOX
CC: abd pain    HPI: 72F with pmhx afib s/p ablation, HFpEF, thrombocytosis, asthma recent C diff infection was brought to the ED today for recurrent falls and persistent nausea/vomiting over the past few weeks. On arrival to the ED, wbc 33, lactate 5, bicarb 17, scr/bun 82/4.70, bnp >59k. CTAP with PO contrast with pneumoperitoneum c/f sig colon perf. CTH negative for acute changes. Surgery consulted with plans for OR and ICU consulted for admission.     In the ED she was given cefotedan 2gx1, flagyl 500mg iv x1 and then 2L NS.     Patient seen and examined at bedside. Lethargic, AOx2. Only reporting abdominal pain. When asked if anything else bothers her, she continues to state "my belly".     ROS:   Due to altered mental status/intubation, subjective information was not able to be obtained from the patient. History was obtained, to the extent possible, from review of the chart and collateral sources of information.      PAST MEDICAL & SURGICAL HISTORY:  Hypothyroidism  HLD (hyperlipidemia)  Thrombocytosis  Persistent atrial fibrillation  Obesity (BMI 35.0-39.9 without comorbidity)  Asthma  Diabetes mellitus  Ankle injury  on 2004, 5 surgeries.  Cholecystitis    Medications:  piperacillin/tazobactam IVPB. 3.375 Gram(s) IV Intermittent once  piperacillin/tazobactam IVPB.- 3.375 Gram(s) IV Intermittent once  ondansetron Injectable 4 milliGRAM(s) IV Push once  desmopressin IVPB 20 MICROGram(s) IV Intermittent once  lactated ringers. 1000 milliLiter(s) IV Continuous <Continuous>  phytonadione  IVPB 10 milliGRAM(s) IV Intermittent once  potassium chloride  10 mEq/100 mL IVPB 10 milliEquivalent(s) IV Intermittent every 1 hour    ICU Vital Signs Last 24 Hrs  T(C): 35.8 (13 Apr 2023 06:13), Max: 35.8 (13 Apr 2023 06:13)  T(F): 96.5 (13 Apr 2023 06:13), Max: 96.5 (13 Apr 2023 06:13)  HR: 93 (13 Apr 2023 08:15) (93 - 108)  BP: 92/43 (13 Apr 2023 09:00) (82/42 - 92/61)  BP(mean): --  ABP: --  ABP(mean): --  RR: 16 (13 Apr 2023 08:15) (16 - 18)  SpO2: 98% (13 Apr 2023 08:15) (98% - 98%)    O2 Parameters below as of 13 Apr 2023 08:15  Patient On (Oxygen Delivery Method): room air            Physical Examination:    General: Laying in bed comfortably in no acute distress  HEENT: Pupils equal, reactive to light.  Symmetric.  PULM: Clear to auscultation bilaterally, unlabored respirations on room air   CVS: Regular rate and rhythm, no murmurs, rubs, or gallops  ABD: Soft, nondistended, nontender, normoactive bowel sounds, no masses  EXT: No edema, nontender  SKIN: Warm and well perfused  NEURO: interactive, nonfocal         I&O's Detail      LABS:                        8.8    33.53 )-----------( 345      ( 13 Apr 2023 06:40 )             29.9     04-13    141  |  108  |  82<H>  ----------------------------<  121<H>  2.8<LL>   |  17<L>  |  4.70<H>    Ca    7.9<L>      13 Apr 2023 06:40  Mg     2.3     04-13    TPro  6.3  /  Alb  2.2<L>  /  TBili  0.9  /  DBili  x   /  AST  16  /  ALT  10<L>  /  AlkPhos  164<H>  04-13      CARDIAC MARKERS ( 13 Apr 2023 09:10 )  x     / x     / 62 U/L / x     / 2.4 ng/mL      CAPILLARY BLOOD GLUCOSE        PT/INR - ( 13 Apr 2023 06:40 )   PT: 22.7 sec;   INR: 1.93 ratio             CULTURES:  Rapid RVP Result: NotDetec (04-13 @ 06:40)        RADIOLOGY: < from: CT Abdomen and Pelvis w/ Oral Cont (04.13.23 @ 09:26) >  IMPRESSION:  Pneumoperitoneum and no ascites. Suspect perforated sigmoid colon as   above.    Stable splenomegaly.    Stable right lung nodule, groundglass infiltrates and loculated right   pleural effusion.    CRITICAL VALUE: I discussed the major findings in this report with Dr. Mark Hobbs at 4/13/2023 9:54 AM with readback. Critical value policy of   the hospital was followed. Read back and confirmation of receipt of this   communication was  performed. This verbal communication supplements the text report of this   document.    --- End of Report ---      < end of copied text >  < from: CT Head No Cont (04.13.23 @ 09:26) >    IMPRESSION:  Mild chronic microvascular changes without evidence of an   acute transcortical infarction or hemorrhage.    < end of copied text >    INVASIVE LINES: N   INDWELLING BISHOP: N   VTE PROPHYLAXIS:  CODE STATUS: full     CRITICAL CARE TIME SPENT: *** minutes spent performing frequent bedside reassessments and augmenting plan of care to address problems of acute critical illness that pose high probability of life threatening deterioration and/or end organ damage/dysfunction and discussing goals of care, non-inclusive of time spent on procedures performed. CC: abd pain    HPI: 72F with pmhx afib s/p ablation, HFpEF, thrombocytosis, asthma recent C diff infection was brought to the ED today for recurrent falls and persistent nausea/vomiting over the past few weeks. On arrival to the ED, wbc 33, lactate 5, bicarb 17, scr/bun 82/4.70, bnp >59k. CTAP with PO contrast with pneumoperitoneum c/f sig colon perf. CTH negative for acute changes. Surgery consulted with plans for OR and ICU consulted for admission.     In the ED she was given cefotedan 2gx1, flagyl 500mg iv x1 and then 2L NS. Awaiting DDAVP, FFP and vitamin K.     Patient seen and examined at bedside. Lethargic, AOx2. Only reporting abdominal pain. When asked if anything else bothers her, she continues to state "my belly".     ROS:   Due to altered mental status/intubation, subjective information was not able to be obtained from the patient. History was obtained, to the extent possible, from review of the chart and collateral sources of information.      PAST MEDICAL & SURGICAL HISTORY:  Hypothyroidism  HLD (hyperlipidemia)  Thrombocytosis  Persistent atrial fibrillation  Obesity (BMI 35.0-39.9 without comorbidity)  Asthma  Diabetes mellitus  Ankle injury  on 2004, 5 surgeries.  Cholecystitis    Medications:  piperacillin/tazobactam IVPB. 3.375 Gram(s) IV Intermittent once  piperacillin/tazobactam IVPB.- 3.375 Gram(s) IV Intermittent once  ondansetron Injectable 4 milliGRAM(s) IV Push once  desmopressin IVPB 20 MICROGram(s) IV Intermittent once  lactated ringers. 1000 milliLiter(s) IV Continuous <Continuous>  phytonadione  IVPB 10 milliGRAM(s) IV Intermittent once  potassium chloride  10 mEq/100 mL IVPB 10 milliEquivalent(s) IV Intermittent every 1 hour    ICU Vital Signs Last 24 Hrs  T(C): 35.8 (13 Apr 2023 06:13), Max: 35.8 (13 Apr 2023 06:13)  T(F): 96.5 (13 Apr 2023 06:13), Max: 96.5 (13 Apr 2023 06:13)  HR: 93 (13 Apr 2023 08:15) (93 - 108)  BP: 92/43 (13 Apr 2023 09:00) (82/42 - 92/61)  BP(mean): --  ABP: --  ABP(mean): --  RR: 16 (13 Apr 2023 08:15) (16 - 18)  SpO2: 98% (13 Apr 2023 08:15) (98% - 98%)    O2 Parameters below as of 13 Apr 2023 08:15  Patient On (Oxygen Delivery Method): room air            Physical Examination:    General: Laying in bed comfortably in no acute distress  HEENT: Pupils equal, reactive to light.  Symmetric.  PULM: Clear to auscultation bilaterally, unlabored respirations on room air   CVS: Regular rate and rhythm, no murmurs, rubs, or gallops  ABD: Soft, nondistended, nontender, normoactive bowel sounds, no masses  EXT: No edema, nontender  SKIN: Warm and well perfused  NEURO: interactive, nonfocal         I&O's Detail      LABS:                        8.8    33.53 )-----------( 345      ( 13 Apr 2023 06:40 )             29.9     04-13    141  |  108  |  82<H>  ----------------------------<  121<H>  2.8<LL>   |  17<L>  |  4.70<H>    Ca    7.9<L>      13 Apr 2023 06:40  Mg     2.3     04-13    TPro  6.3  /  Alb  2.2<L>  /  TBili  0.9  /  DBili  x   /  AST  16  /  ALT  10<L>  /  AlkPhos  164<H>  04-13      CARDIAC MARKERS ( 13 Apr 2023 09:10 )  x     / x     / 62 U/L / x     / 2.4 ng/mL      CAPILLARY BLOOD GLUCOSE        PT/INR - ( 13 Apr 2023 06:40 )   PT: 22.7 sec;   INR: 1.93 ratio             CULTURES:  Rapid RVP Result: NotDetec (04-13 @ 06:40)        RADIOLOGY: < from: CT Abdomen and Pelvis w/ Oral Cont (04.13.23 @ 09:26) >  IMPRESSION:  Pneumoperitoneum and no ascites. Suspect perforated sigmoid colon as   above.    Stable splenomegaly.    Stable right lung nodule, groundglass infiltrates and loculated right   pleural effusion.    CRITICAL VALUE: I discussed the major findings in this report with Dr. Mark Hobbs at 4/13/2023 9:54 AM with readback. Critical value policy of   the hospital was followed. Read back and confirmation of receipt of this   communication was  performed. This verbal communication supplements the text report of this   document.    --- End of Report ---      < end of copied text >  < from: CT Head No Cont (04.13.23 @ 09:26) >    IMPRESSION:  Mild chronic microvascular changes without evidence of an   acute transcortical infarction or hemorrhage.    < end of copied text >    INVASIVE LINES: N   INDWELLING BISHOP: N   VTE PROPHYLAXIS:  CODE STATUS: full     CRITICAL CARE TIME SPENT: *** minutes spent performing frequent bedside reassessments and augmenting plan of care to address problems of acute critical illness that pose high probability of life threatening deterioration and/or end organ damage/dysfunction and discussing goals of care, non-inclusive of time spent on procedures performed. CC: abd pain    HPI: 72F with pmhx afib s/p ablation, HFpEF, thrombocytosis, asthma recent C diff infection was brought to the ED today for recurrent falls and persistent nausea/vomiting over the past few weeks. On arrival to the ED, wbc 33, lactate 5, bicarb 17, scr/bun 82/4.70, bnp >59k. CTAP with PO contrast with pneumoperitoneum c/f sig colon perf. CTH negative for acute changes. In the ED she was given cefotedan 2gx1, flagyl 500mg iv x1 and then 2L NS. Awaiting DDAVP, FFP and vitamin K. Surgery consulted with plans for OR and ICU consulted for admission.      Patient seen and examined at bedside. Lethargic, AOx2. Only reporting abdominal pain. When asked if anything else bothers her, she continues to state "my belly".     ROS:   Due to altered mental status/intubation, subjective information was not able to be obtained from the patient. History was obtained, to the extent possible, from review of the chart and collateral sources of information.    PAST MEDICAL & SURGICAL HISTORY:  Hypothyroidism  HLD (hyperlipidemia)  Thrombocytosis  Persistent atrial fibrillation  Obesity (BMI 35.0-39.9 without comorbidity)  Asthma  Diabetes mellitus  Ankle injury  on 2004, 5 surgeries.  Cholecystitis    Medications:  piperacillin/tazobactam IVPB. 3.375 Gram(s) IV Intermittent once  piperacillin/tazobactam IVPB.- 3.375 Gram(s) IV Intermittent once  ondansetron Injectable 4 milliGRAM(s) IV Push once  desmopressin IVPB 20 MICROGram(s) IV Intermittent once  lactated ringers. 1000 milliLiter(s) IV Continuous <Continuous>  phytonadione  IVPB 10 milliGRAM(s) IV Intermittent once  potassium chloride  10 mEq/100 mL IVPB 10 milliEquivalent(s) IV Intermittent every 1 hour    ICU Vital Signs Last 24 Hrs  T(C): 35.8 (13 Apr 2023 06:13), Max: 35.8 (13 Apr 2023 06:13)  T(F): 96.5 (13 Apr 2023 06:13), Max: 96.5 (13 Apr 2023 06:13)  HR: 93 (13 Apr 2023 08:15) (93 - 108)  BP: 92/43 (13 Apr 2023 09:00) (82/42 - 92/61)  BP(mean): --  ABP: --  ABP(mean): --  RR: 16 (13 Apr 2023 08:15) (16 - 18)  SpO2: 98% (13 Apr 2023 08:15) (98% - 98%)    O2 Parameters below as of 13 Apr 2023 08:15  Patient On (Oxygen Delivery Method): room air    Physical Examination:    General: ill appearing  HEENT: Pupils equal, reactive to light.  Symmetric.  PULM: Clear to auscultation bilaterally, unlabored respirations on room air, tachypneic  CVS: irreg irreg, fast   ABD: mildly firm, distended, extremely tender to palpation   EXT: 2+ pitting edema b/l  SKIN: warm, cool distal extremities of hands and feel   NEURO: lethargic, oriented only to self and place (AOx2)      I&O's Detail      LABS:                        8.8    33.53 )-----------( 345      ( 13 Apr 2023 06:40 )             29.9     04-13    141  |  108  |  82<H>  ----------------------------<  121<H>  2.8<LL>   |  17<L>  |  4.70<H>    Ca    7.9<L>      13 Apr 2023 06:40  Mg     2.3     04-13    TPro  6.3  /  Alb  2.2<L>  /  TBili  0.9  /  DBili  x   /  AST  16  /  ALT  10<L>  /  AlkPhos  164<H>  04-13      CARDIAC MARKERS ( 13 Apr 2023 09:10 )  x     / x     / 62 U/L / x     / 2.4 ng/mL      CAPILLARY BLOOD GLUCOSE        PT/INR - ( 13 Apr 2023 06:40 )   PT: 22.7 sec;   INR: 1.93 ratio             CULTURES:  Rapid RVP Result: NotDetec (04-13 @ 06:40)        RADIOLOGY: < from: CT Abdomen and Pelvis w/ Oral Cont (04.13.23 @ 09:26) >  IMPRESSION:  Pneumoperitoneum and no ascites. Suspect perforated sigmoid colon as   above.    Stable splenomegaly.    Stable right lung nodule, groundglass infiltrates and loculated right   pleural effusion.    CRITICAL VALUE: I discussed the major findings in this report with Dr. Mark Hobbs at 4/13/2023 9:54 AM with readback. Critical value policy of   the hospital was followed. Read back and confirmation of receipt of this   communication was  performed. This verbal communication supplements the text report of this   document.    --- End of Report ---      < end of copied text >  < from: CT Head No Cont (04.13.23 @ 09:26) >    IMPRESSION:  Mild chronic microvascular changes without evidence of an   acute transcortical infarction or hemorrhage.    < end of copied text >    INVASIVE LINES: RIJ  INDWELLING BISHOP: N   VTE PROPHYLAXIS: scds  CODE STATUS: full     CRITICAL CARE TIME SPENT: 70 minutes spent performing frequent bedside reassessments and augmenting plan of care to address problems of acute critical illness that pose high probability of life threatening deterioration and/or end organ damage/dysfunction and discussing goals of care, non-inclusive of time spent on procedures performed.

## 2023-04-13 NOTE — H&P ADULT - NSHPREVIEWOFSYSTEMS_GEN_ALL_CORE
CONSTITUTIONAL: No weakness, fevers or chills  EYES/ENT: No visual changes, no throat pain   RESPIRATORY: No cough, wheezing, hemoptysis; No shortness of breath  CARDIOVASCULAR: No chest pain or palpitations  GASTROINTESTINAL: No abdominal, nausea, vomiting, or hematemesis; No diarrhea or constipation. No melena or hematochezia.  GENITOURINARY: No dysuria, frequency or hematuria  NEUROLOGICAL: No dizziness, numbness, or weakness  SKIN: No itching, burning, rashes, or lesions   All other review of systems is negative unless indicated above. CONSTITUTIONAL: No weakness, fevers or chills  EYES/ENT: No visual changes, no throat pain   RESPIRATORY: No cough, wheezing, hemoptysis; No shortness of breath  CARDIOVASCULAR: No chest pain or palpitations  GASTROINTESTINAL: + Abdominal Pain, + Nausea, + Diarrhea. No melena or hematochezia.  GENITOURINARY: No dysuria, frequency or hematuria  NEUROLOGICAL: No dizziness, numbness, or weakness  SKIN: No itching, burning, rashes, or lesions   All other review of systems is negative unless indicated above.

## 2023-04-13 NOTE — CONSULT NOTE ADULT - ASSESSMENT
72F with pmhx afib s/p ablation, HFpEF, thrombocytosis, asthma recent C diff infection was brought to the ED today for recurrent falls and persistent nausea/vomiting, now with pneumoperitenum in setting of recent c diff infection. She also remains hypotensive with elevated lactate, suspect shock, septic and hypovolemic. Need to r/o active C diff infection.     Neuro: suspect encephalopathy in setting of shock state   CV:  Pulm:  GI:   Renal:  ID:   Endo: goal bg 110-180  Heme: DVT ppx with     Dispo:  72F with pmhx afib s/p ablation, HFpEF, thrombocytosis, asthma recent C diff infection was brought to the ED today for recurrent falls and persistent nausea/vomiting, now with pneumoperitenum in setting of recent c diff infection. She also remains hypotensive with elevated lactate, suspect shock, septic and hypovolemic. Need to r/o active C diff infection.     Colonic perforation   r/o C diff  Shock, with septic and hypovolemic components  acute on chronic renal failure   metabolic acidosis, lactic acidemia   Metabolic encephalopathy   Elevated INR   Hypokalemia     Neuro: suspect encephalopathy in setting of shock state  CV:   Pulm:  GI:   Renal:  ID:   Endo: goal bg 110-180  Heme: DVT ppx with     Dispo:  72F with pmhx afib s/p ablation, HFpEF, thrombocytosis, asthma recent C diff infection was brought to the ED today for recurrent falls and persistent nausea/vomiting, now with pneumoperitenum in setting of recent c diff infection. She also remains hypotensive with elevated lactate, suspect shock, septic and hypovolemic. Need to r/o active C diff infection.     Colonic perforation   Shock, with septic and hypovolemic components  r/o C diff  acute on chronic renal failure   metabolic acidosis, lactic acidemia   Metabolic encephalopathy   Elevated INR   Hypokalemia     Neuro: suspect encephalopathy in setting of shock state  CV: s/p 3L total of crystalloid in the ED without significant improvement in BP. Will start levophed to maintain MAP >65   Pulm: mildly tachypneic suspect in setting of metabolic acidemia. satting well on room air. would add  GI:   Renal:  ID:   Endo: goal bg 110-180  Heme: DVT ppx with     Dispo:  72F with pmhx afib s/p ablation, HFpEF, mod MR, thrombocytosis, asthma recent C diff infection was brought to the ED today for recurrent falls and persistent nausea/vomiting, now with pneumoperitenum in setting of recent c diff infection in Jan 2023. She remains hypotensive with elevated lactate representing shock state with septic and hypovolemic components with subsequent end organ damage as evidenced by AMS, LAYA and coagulopathy. Need to r/o active C diff infection.     Colonic perforation   Shock, septic and hypovolemic   r/o C diff  acute on chronic renal failure   metabolic acidosis  lactic acidemia   Metabolic encephalopathy   Elevated INR   Hypokalemia     Neuro: suspect encephalopathy in setting of shock state  CV: s/p 3L total of crystalloid in the ED without significant improvement in BP. Will start levophed to maintain MAP >65. BNP markedly elevated   Pulm: ?GGO at lung bases noted on ct scan but she is currently satting well on room air. place supp O2 to maintain sat >92. mildly tachypneia suspect in setting of metabolic acidemia  GI:   Renal:  ID:   Endo: goal bg 110-180  Heme: DVT ppx with     Dispo:  72F with pmhx afib s/p ablation, HFpEF, mod MR, thrombocytosis, asthma recent C diff infection was brought to the ED today for recurrent falls and persistent nausea/vomiting, now with pneumoperitenum in setting of recent c diff infection in Jan 2023. She remains hypotensive with elevated lactate representing shock state with septic and hypovolemic components with subsequent end organ damage as evidenced by AMS, LAYA and coagulopathy. Need to r/o active C diff infection.     Colonic perforation   Shock, septic and hypovolemic   r/o C diff  acute on chronic renal failure   metabolic acidosis  lactic acidemia   Metabolic encephalopathy   Elevated INR   Hypokalemia   Elevated troponin    Neuro: suspect encephalopathy in setting of shock state  CV: s/p 3L total of crystalloid in the ED without significant improvement in BP. Started levophed to maintain MAP >65. BNP markedly elevated, TTE pending, cardio following. trend trops. anticipate that she may require diuresis eventually; but will hold home diuretics for now  Pulm: mild tachypnea suspect in setting of metabolic acidemia. ?GGO at lung bases noted on ct scan but she is currently satting well on room air. would place supp O2 to maintain sat >92.   GI: CTAP with findings as above - discussed with surgical team, plan for emergent OR for ex-lap. r/o active c diff infection  Renal: supplementing electrolytes to maintain K >4 Phos >3 Mg >2. will recheck BMP post-procedurally   ID: spoke with ID, will broaden abx to zosyn and flagyl. stat bcx ordered.   Endo: goal bg 110-180, ISS   Heme: received DDAVP, FFP and vitamin K to reverse coagulopathy. continue to hold xarelto. ?eventual heparin gtt for full AC with afib. DVT ppx with scds for now, transition to chem ppx once celared by surgery     Dispo: admit to ICU    case d/w Dr. Hernandez  72F with pmhx afib s/p ablation, HFpEF, mod MR, thrombocytosis, asthma recent C diff infection was brought to the ED today for recurrent falls and persistent nausea/vomiting, now with pneumoperitenum in setting of recent c diff infection in Jan 2023. She remains hypotensive with elevated lactate representing shock state with septic and hypovolemic components with subsequent end organ damage as evidenced by AMS, LAYA and coagulopathy. Need to r/o active C diff infection.     Colonic perforation   Shock, septic and hypovolemic   r/o C diff  acute on chronic renal failure   metabolic acidosis  lactic acidemia   Metabolic encephalopathy   Elevated INR   Hypokalemia   Elevated troponin    Neuro: suspect encephalopathy in setting of shock state  CV: s/p 3L total of crystalloid in the ED without significant improvement in BP. Started levophed to maintain MAP >65. BNP markedly elevated, TTE pending, cardio following. trend trops. anticipate that she may require diuresis eventually; but will hold home diuretics for now  Pulm: mild tachypnea suspect in setting of metabolic acidemia. ?GGO at lung bases noted on ct scan but she is currently satting well on room air. would place supp O2 to maintain sat >92.   GI: CTAP with findings as above - discussed with surgical team, plan for emergent OR for ex-lap. r/o active c diff infection  Renal: supplementing electrolytes to maintain K >4 Phos >3 Mg >2. will recheck BMP post-procedurally   ID: spoke with ID, will broaden abx to zosyn and flagyl. stat bcx ordered.   Endo: goal bg 110-180, ISS if needed, fs q6h while npo  Heme: received DDAVP, FFP and vitamin K to reverse coagulopathy. continue to hold xarelto. ?eventual heparin gtt for full AC with afib. DVT ppx with scds for now, transition to chem ppx once celared by surgery     Dispo: admit to ICU    case d/w Dr. Hernandez

## 2023-04-13 NOTE — H&P ADULT - NSHPPHYSICALEXAM_GEN_ALL_CORE
VITAL SIGNS:    Vital Signs Last 24 Hrs  T(C): 35.8 (13 Apr 2023 06:13), Max: 35.8 (13 Apr 2023 06:13)  T(F): 96.5 (13 Apr 2023 06:13), Max: 96.5 (13 Apr 2023 06:13)  HR: 93 (13 Apr 2023 08:15) (93 - 108)  BP: 92/43 (13 Apr 2023 09:00) (82/42 - 92/61)  BP(mean): --  RR: 16 (13 Apr 2023 08:15) (16 - 18)  SpO2: 98% (13 Apr 2023 08:15) (98% - 98%)    Parameters below as of 13 Apr 2023 08:15  Patient On (Oxygen Delivery Method): room air        PHYSICAL EXAM:     GENERAL: no acute distress  HEENT: NC/AT, EOMI, neck supple, MMM  RESPIRATORY: LCTAB/L, no rhonchi, rales, or wheezing  CARDIOVASCULAR: RRR, no murmurs, gallops, rubs  ABDOMINAL: soft, non-tender, non-distended, positive bowel sounds   EXTREMITIES: no clubbing, cyanosis, or edema  NEUROLOGICAL: alert and oriented x 3, non-focal  SKIN: no rashes or lesions   MUSCULOSKELETAL: no gross joint deformity VITAL SIGNS:    Vital Signs Last 24 Hrs  T(C): 35.8 (13 Apr 2023 06:13), Max: 35.8 (13 Apr 2023 06:13)  T(F): 96.5 (13 Apr 2023 06:13), Max: 96.5 (13 Apr 2023 06:13)  HR: 93 (13 Apr 2023 08:15) (93 - 108)  BP: 92/43 (13 Apr 2023 09:00) (82/42 - 92/61)  BP(mean): --  RR: 16 (13 Apr 2023 08:15) (16 - 18)  SpO2: 98% (13 Apr 2023 08:15) (98% - 98%)    Parameters below as of 13 Apr 2023 08:15  Patient On (Oxygen Delivery Method): room air        PHYSICAL EXAM:     GENERAL: no acute distress  HEENT: NC/AT, EOMI, neck supple, MMM  RESPIRATORY: LCTAB/L, no rhonchi, rales, or wheezing  CARDIOVASCULAR: RRR, no murmurs, gallops, rubs  ABDOMINAL: soft, + TTP diffusely, distended, positive bowel sounds   EXTREMITIES: no clubbing, cyanosis, or edema  NEUROLOGICAL: alert and oriented x 3, non-focal  SKIN: no rashes or lesions   MUSCULOSKELETAL: no gross joint deformity

## 2023-04-13 NOTE — H&P ADULT - PROBLEM SELECTOR PLAN 3
Cards consulted, f/u recs Cards consulted, f/u recs  - BNP elevated to about 60,000, and trops elevated; as per cards trend enzymes  - Cards recommending cont home lasix po 80 in am, and 40 in pm, pt has been noncompliant with lasix recently, may change to lasix 40 IV in am, and 20 IV in pm if pt unable to tolerate PO  - 2d echo  - monitored on tele in ICU  - strict Is and Os, daily wts Cards consulted, f/u recs  - BNP elevated to about 60,000, and trops elevated; as per cards trend enzymes  - Cards recommending cont home lasix po 80 in am, and 40 in pm, pt has been noncompliant with lasix recently, may change to lasix 40 IV in am, and 20 IV in pm if pt unable to tolerate PO; pt has been off metolazone at home, supposedly in prep for a posisble colonoscopy  - 2d echo  - monitored on tele in ICU  - strict Is and Os, daily wts

## 2023-04-13 NOTE — ED ADULT TRIAGE NOTE - CHIEF COMPLAINT QUOTE
Patient brought in by family as reported having nausea, vomiting, diarrhea for the past 2 days with abdominal pain; weak, not eating , leg swelling with history of CHF

## 2023-04-13 NOTE — INPATIENT CERTIFICATION FOR MEDICARE PATIENTS - IN ORDER TO MEET MEDICARE REQUIREMENTS.
In order to meet Medicare requirements, the clinical documentation must support the information cited in the admission order.  Please be sure to provide detailed and clear documentation about the following in the admitting note/history and physical: [Fever] : no fever [Chills] : no chills [Feeling Poorly] : feeling poorly [Feeling Tired] : feeling tired [Eyesight Problems] : no eyesight problems [Loss Of Hearing] : no hearing loss [Chest Pain] : no chest pain [Palpitations] : no palpitations [Cough] : no cough [Arthralgias] : arthralgias [Neck Pain] : no neck pain [Skin Lesions] : no skin lesions [Skin Wound] : no skin wound [Itching] : no itching [Sleep Disturbances] : sleep disturbances [Muscle Weakness] : no muscle weakness [Swollen Glands] : no swollen glands [Swollen Glands In The Neck] : no swollen glands in the neck

## 2023-04-13 NOTE — PRE-OP CHECKLIST - PATIENT'S PERSONAL PROPERTY GIVEN TO
clothes, earrings given to Edgard (son)/friend/significant other clothes, earrings given to Edgard (son)/family member/friend/significant other

## 2023-04-13 NOTE — H&P ADULT - PROBLEM SELECTOR PLAN 5
- pt on telegy ellipta at home, would bring form home if pt can; if not may use symbicort  - albuterol inhaler  - consider duonebs if sob worse

## 2023-04-13 NOTE — ED ADULT NURSE NOTE - OBJECTIVE STATEMENT
Patient poor historian, as per patient son she has been experiencing diarrhea, n/v since january and getting worst X 2 days. Labs drawn and sent and medication given as ordered. PMHX CHF

## 2023-04-13 NOTE — ED PROVIDER NOTE - CARE PLAN
Principal Discharge DX:	Chronic diarrhea   1 Principal Discharge DX:	Clostridium difficile colitis  Secondary Diagnosis:	LAYA (acute kidney injury)   Principal Discharge DX:	Perforated abdominal viscus  Secondary Diagnosis:	LAYA (acute kidney injury)

## 2023-04-13 NOTE — CONSULT NOTE ADULT - ATTENDING COMMENTS
HOlding all cardiac medications for hypotension.  For emergent OR for perforated colon.  Given emergency nature of surgery, patient is at high risk for perioperative cardiac complications.  At present, no evidence of aCS, vol OL, uncontrolled arrhythmia, or severe obstructive valvular disease.  To follow post operatively in ICU.  Critically ill with high risk of decompensation.

## 2023-04-13 NOTE — ED ADULT NURSE REASSESSMENT NOTE - NS ED NURSE REASSESS COMMENT FT1
Patient received from night RN in guarded condition. Patient alert when called, not responsive to painful stimuli aside from palpation on abdomen. Hypotensive @82/42 at this time. MD Hobbs made aware. Critical labs received and md hobbs notified. Pending further labs and radiology reports. Will cont to monitor.
Central line placed to right EJ by ICU PA for hypotension and need for further access. Patient confused at times, finger and toes cyanotic - ICU made aware. Meds given per MD orders. Transporting patient to OR now for emergent OR than ICU.

## 2023-04-13 NOTE — H&P ADULT - ASSESSMENT
71 y/o f w/ PMH of HTN, CHF, asthma, hypothyroidism, prior notes states A-fib on Xarelto, but pt has not been taking AC p/w abd pn, nausea, diarrhea, found to have intraabdominal perforation, r/o c. diff

## 2023-04-13 NOTE — CONSULT NOTE ADULT - SUBJECTIVE AND OBJECTIVE BOX
Patient is a 72y old  Female who presents with a chief complaint of abdominal pain and diarrhea.    HPI: 72-year-old female with history of hypertension, CHF, asthma, hypothyroidism, prior notes state A-fib on Xarelto however patient states she is not taking any anticoagulants, history of C. difficile January 2023 who is presenting for complaints of generalized abdominal pain ongoing diarrhea since January 19 multiple episodes every day.  Denies fever.  Tonight had 3-4 episodes of nausea and vomiting, nonbloody.  Chest pain that just started now also there is epigastric pain as well.  Patient reports she has been admitted 3 times for this diarrhea.  Son reports patient is just progressively weak and more lethargic.  Patient is also having increased lower extremity edema.  Son reports patient is not taking her Lasix.  Denies blood in the diarrhea.     Cardiac Hx:  Cardiologist: Dr. Fernandez        PAST MEDICAL & SURGICAL HISTORY:  Hypothyroidism      HLD (hyperlipidemia)      Thrombocytosis      Persistent atrial fibrillation      Obesity (BMI 35.0-39.9 without comorbidity)      Asthma      Diabetes mellitus      Ankle injury  on 2004, 5 surgeries.      Cholecystitis                ECHO  FINDINGS:      MEDICATIONS  (STANDING):  metroNIDAZOLE  IVPB 500 milliGRAM(s) IV Intermittent once  ondansetron Injectable 4 milliGRAM(s) IV Push once  potassium chloride    Tablet ER 40 milliEquivalent(s) Oral once  potassium chloride  10 mEq/100 mL IVPB 10 milliEquivalent(s) IV Intermittent every 1 hour  sodium chloride 0.9% Bolus 1000 milliLiter(s) IV Bolus once    MEDICATIONS  (PRN):      FAMILY HISTORY:  Family history of early CAD (Father)    Family history of early CAD (Mother)      Denies Family history of CAD or early MI      Constitutional: denies fever, chills  HEENT: denies blurry vision, difficulty hearing  Respiratory: denies SOB, MATTHEW, cough  Cardiovascular: +LE edema. Denies CP, palpitations, orthopnea, PND  Gastrointestinal: +nausea, +abd pain, +non-bloddy diarrhea. Denies vomiting  Genitourinary: denies urinary changes  Skin: Denies rashes, itching  Neurologic: denies headache, weakness, dizziness  Hematology/Oncology: denies bleeding, easy bruising  ROS negative except as noted above      SOCIAL HISTORY:    No tobacco, Alcohol or Ddrug use    Vital Signs Last 24 Hrs  T(C): 35.8 (13 Apr 2023 06:13), Max: 35.8 (13 Apr 2023 06:13)  T(F): 96.5 (13 Apr 2023 06:13), Max: 96.5 (13 Apr 2023 06:13)  HR: 108 (13 Apr 2023 06:13) (108 - 108)  BP: 92/61 (13 Apr 2023 06:13) (92/61 - 92/61)  BP(mean): --  RR: 18 (13 Apr 2023 06:13) (18 - 18)  SpO2: 98% (13 Apr 2023 06:13) (98% - 98%)    Parameters below as of 13 Apr 2023 06:13  Patient On (Oxygen Delivery Method): room air        Physical Exam:  General: Well developed, well nourished, NAD  HEENT: NCAT, PERRLA, EOMI bl, moist mucous membranes   Neck: Supple, nontender, no mass  Neurology: A&Ox3, nonfocal, sensation intact   Respiratory: CTA B/L, No W/R/R  CV: RRR, +S1/S2, no murmurs, rubs or gallops  Abdominal: Soft, NT, ND +BSx4, no palpable masses  Extremities: No C/C/E, + peripheral pulses  MSK: Normal ROM, no joint erythema or warmth, no joint swelling   Heme: No obvious ecchymosis or petechiae   Skin: warm, dry, normal color      ECG:    I&O's Detail      LABS:                        8.8    33.53 )-----------( 345      ( 13 Apr 2023 06:40 )             29.9     04-13    141  |  108  |  82<H>  ----------------------------<  121<H>  2.8<LL>   |  17<L>  |  4.70<H>    Ca    7.9<L>      13 Apr 2023 06:40  Mg     2.3     04-13    TPro  6.3  /  Alb  2.2<L>  /  TBili  0.9  /  DBili  x   /  AST  16  /  ALT  10<L>  /  AlkPhos  164<H>  04-13        PT/INR - ( 13 Apr 2023 06:40 )   PT: 22.7 sec;   INR: 1.93 ratio             I&O's Summary    BNP  RADIOLOGY & ADDITIONAL STUDIES: Patient is a 72y old  Female who presents with a chief complaint of abdominal pain and diarrhea.    HPI: 72-year-old female with history of hypertension, CHF, afib s/p AF ablation (2/7/19) currently not on xarelto, asthma, hypothyroidism, HLD history of C. difficile January 2023 who is presenting for complaints of generalized abdominal pain ongoing diarrhea since January 19 multiple episodes every day.  Denies fever.  Tonight had 3-4 episodes of nausea and vomiting, nonbloody.  Chest pain that just started now also there is epigastric pain as well.  Patient reports she has been admitted 3 times for this diarrhea.  Son reports patient is just progressively weak and more lethargic.  Patient is also having increased lower extremity edema.  Son reports patient is not taking her Lasix.  Denies blood in the diarrhea.     Cardiac Hx:  Cardiologist: Dr. Fernandez        PAST MEDICAL & SURGICAL HISTORY:  Hypothyroidism      HLD (hyperlipidemia)      Thrombocytosis      Persistent atrial fibrillation      Obesity (BMI 35.0-39.9 without comorbidity)      Asthma      Diabetes mellitus      Ankle injury  on 2004, 5 surgeries.      Cholecystitis                ECHO  FINDINGS:      MEDICATIONS  (STANDING):  metroNIDAZOLE  IVPB 500 milliGRAM(s) IV Intermittent once  ondansetron Injectable 4 milliGRAM(s) IV Push once  potassium chloride    Tablet ER 40 milliEquivalent(s) Oral once  potassium chloride  10 mEq/100 mL IVPB 10 milliEquivalent(s) IV Intermittent every 1 hour  sodium chloride 0.9% Bolus 1000 milliLiter(s) IV Bolus once    MEDICATIONS  (PRN):      FAMILY HISTORY:  Family history of early CAD (Father)    Family history of early CAD (Mother)      Denies Family history of CAD or early MI      Constitutional: denies fever, chills  HEENT: denies blurry vision, difficulty hearing  Respiratory: denies SOB, MATTHEW, cough  Cardiovascular: +LE edema. Denies CP, palpitations, orthopnea, PND  Gastrointestinal: +nausea, +abd pain, +non-bloddy diarrhea. Denies vomiting  Genitourinary: denies urinary changes  Skin: Denies rashes, itching  Neurologic: denies headache, weakness, dizziness  Hematology/Oncology: denies bleeding, easy bruising  ROS negative except as noted above      SOCIAL HISTORY:    No tobacco, Alcohol or Ddrug use    Vital Signs Last 24 Hrs  T(C): 35.8 (13 Apr 2023 06:13), Max: 35.8 (13 Apr 2023 06:13)  T(F): 96.5 (13 Apr 2023 06:13), Max: 96.5 (13 Apr 2023 06:13)  HR: 108 (13 Apr 2023 06:13) (108 - 108)  BP: 92/61 (13 Apr 2023 06:13) (92/61 - 92/61)  BP(mean): --  RR: 18 (13 Apr 2023 06:13) (18 - 18)  SpO2: 98% (13 Apr 2023 06:13) (98% - 98%)    Parameters below as of 13 Apr 2023 06:13  Patient On (Oxygen Delivery Method): room air        Physical Exam:  General: Well developed, well nourished, NAD  HEENT: NCAT, PERRLA, EOMI bl, moist mucous membranes   Neck: Supple, nontender, no mass  Neurology: A&Ox3, nonfocal, sensation intact   Respiratory: CTA B/L, No W/R/R  CV: RRR, +S1/S2, no murmurs, rubs or gallops  Abdominal: Soft, NT, ND +BSx4, no palpable masses  Extremities: No C/C/E, + peripheral pulses  MSK: Normal ROM, no joint erythema or warmth, no joint swelling   Heme: No obvious ecchymosis or petechiae   Skin: warm, dry, normal color      ECG:    I&O's Detail      LABS:                        8.8    33.53 )-----------( 345      ( 13 Apr 2023 06:40 )             29.9     04-13    141  |  108  |  82<H>  ----------------------------<  121<H>  2.8<LL>   |  17<L>  |  4.70<H>    Ca    7.9<L>      13 Apr 2023 06:40  Mg     2.3     04-13    TPro  6.3  /  Alb  2.2<L>  /  TBili  0.9  /  DBili  x   /  AST  16  /  ALT  10<L>  /  AlkPhos  164<H>  04-13        PT/INR - ( 13 Apr 2023 06:40 )   PT: 22.7 sec;   INR: 1.93 ratio             I&O's Summary    BNP  RADIOLOGY & ADDITIONAL STUDIES: Patient is a 72y old  Female who presents with a chief complaint of abdominal pain and diarrhea.    HPI: 72-year-old female with history of hypertension, CHF, afib s/p AF ablation (19) currently not on xarelto, asthma, hypothyroidism, HLD history of C. difficile 2023 who is presenting for complaints of generalized abdominal pain ongoing diarrhea since  multiple episodes every day.  Denies fever.  Tonight had 3-4 episodes of nausea and vomiting, nonbloody x 3 days.  Chest pain that just started now also there is epigastric pain as well.  Patient reports she has been admitted 3 times for this diarrhea.    Son reports patient is just progressively weak and more lethargic having  oral intake.  Patient is also having increased lower extremity edema.  Son reports patient is not taking her Lasix and unsure if taking toprol. Pt currently not taking home xarelto and metolazone, held 2023 by outpt Cardio Dr. Fernandez due to GI colonoscopy follow up. Pt currently admits to sob and mild chest pain on sternal palpation. Pt denies any fevers, chills, palpitations, cough, radiating, arm or jaw pain.    Cardiac Hx:  Cardiologist: Dr. Fernandez        PAST MEDICAL & SURGICAL HISTORY:  Hypothyroidism      HLD (hyperlipidemia)      Thrombocytosis      Persistent atrial fibrillation      Obesity (BMI 35.0-39.9 without comorbidity)      Asthma      Diabetes mellitus      Ankle injury  on , 5 surgeries.      Cholecystitis                ECHO  FINDINGS:      MEDICATIONS  (STANDING):  metroNIDAZOLE  IVPB 500 milliGRAM(s) IV Intermittent once  ondansetron Injectable 4 milliGRAM(s) IV Push once  potassium chloride    Tablet ER 40 milliEquivalent(s) Oral once  potassium chloride  10 mEq/100 mL IVPB 10 milliEquivalent(s) IV Intermittent every 1 hour  sodium chloride 0.9% Bolus 1000 milliLiter(s) IV Bolus once    MEDICATIONS  (PRN):      FAMILY HISTORY:  Family history of early CAD (Father)    Family history of early CAD (Mother)      Denies Family history of CAD or early MI    ROS: Obtained as per son due to patient severe illness status  Constitutional: denies fever, chills  Respiratory: + SOB, denies cough  Cardiovascular: +LE edema, Sternal CP on palpation, Denies palpitations  Gastrointestinal: +nausea, +abd pain, +non-bloody diarrhea, non bloody vomiting      SOCIAL HISTORY:    No tobacco, Alcohol or Ddrug use    Vital Signs Last 24 Hrs  T(C): 35.8 (2023 06:13), Max: 35.8 (2023 06:13)  T(F): 96.5 (2023 06:13), Max: 96.5 (2023 06:13)  HR: 108 (2023 06:13) (108 - 108)  BP: 92/61 (2023 06:13) (92/61 - 92/61)  RR: 18 (2023 06:13) (18 - 18)  SpO2: 98% (2023 06:13) (98% - 98%)    Parameters below as of 2023 06:13  Patient On (Oxygen Delivery Method): room air        Physical Exam:  General:  ill appearing female laying in bed  Neurology: A&Ox2, somnolent, arousable  Respiratory: diminished breath sounds righy base, + crackles at left base  CV: Tachycardic, +S1/S2, + systolic murmur, no rubs or gallops  Abdominal: Soft, generalized abdominal tenderness, ND +BSx4  Extremities: + pitting b/l LE Edema   Skin: warm, dry, normal color      ECG:    I&O's Detail      LABS:                        8.8    33.53 )-----------( 345      ( 2023 06:40 )             29.9     04-13    141  |  108  |  82<H>  ----------------------------<  121<H>  2.8<LL>   |  17<L>  |  4.70<H>    Ca    7.9<L>      2023 06:40  Mg     2.3     -    TPro  6.3  /  Alb  2.2<L>  /  TBili  0.9  /  DBili  x   /  AST  16  /  ALT  10<L>  /  AlkPhos  164<H>  04-13        PT/INR - ( 2023 06:40 )   PT: 22.7 sec;   INR: 1.93 ratio             I&O's Summary    BNP  RADIOLOGY & ADDITIONAL STUDIES: Patient is a 72y old  Female who presents with a chief complaint of abdominal pain and diarrhea.    HPI: 72-year-old female with history of hypertension, CHF, afib s/p AF ablation (19) currently not on xarelto, asthma, hypothyroidism, HLD history of C. difficile 2023 who is presenting for complaints of generalized abdominal pain and ongoing multiple episodes of diarrhea every day since .  Denies fever.  Tonight had 3-4 episodes of nausea and vomiting, nonbloody x 3 days.  Pt complains of mid sternal chest pain that just started now also there is epigastric pain as well.  Patient reports she has been admitted 3 times for this diarrhea.  Son reports patient is progressively weak and more lethargic having  oral intake.  Patient is also having increased lower extremity edema.  Son reports patient is not taking her Lasix and unsure if taking toprol. Pt currently not taking home xarelto and metolazone, held 2023 by outpt Cardio Dr. Fernandez due to planned GI colonoscopy for +FOBT during last admission. Pt currently admits to SOB and mild chest pain on sternal palpation. Pt denies any fevers, chills, palpitations, cough, radiating, arm or jaw pain.    Cardiologist: Dr. Fernandez        PAST MEDICAL & SURGICAL HISTORY:  Hypothyroidism      HLD (hyperlipidemia)      Thrombocytosis      Persistent atrial fibrillation      Obesity (BMI 35.0-39.9 without comorbidity)      Asthma      Diabetes mellitus      Ankle injury  on , 5 surgeries.      Cholecystitis              MEDICATIONS  (STANDING):  metroNIDAZOLE  IVPB 500 milliGRAM(s) IV Intermittent once  ondansetron Injectable 4 milliGRAM(s) IV Push once  potassium chloride    Tablet ER 40 milliEquivalent(s) Oral once  potassium chloride  10 mEq/100 mL IVPB 10 milliEquivalent(s) IV Intermittent every 1 hour  sodium chloride 0.9% Bolus 1000 milliLiter(s) IV Bolus once    MEDICATIONS  (PRN):      FAMILY HISTORY:  Family history of early CAD (Father)    Family history of early CAD (Mother)      Denies Family history of CAD or early MI    ROS: Obtained from patient and as per son due to patient altered mental status  Constitutional: denies fever, chills  Respiratory: + SOB, denies cough  Cardiovascular: +LE edema, Sternal CP on palpation, Denies palpitations, denies orthopnea  Gastrointestinal: +nausea, +abd pain, +non-bloody diarrhea, non bloody vomiting      SOCIAL HISTORY:    No tobacco, Alcohol or Ddrug use    Vital Signs Last 24 Hrs  T(C): 35.8 (2023 06:13), Max: 35.8 (2023 06:13)  T(F): 96.5 (2023 06:13), Max: 96.5 (2023 06:13)  HR: 108 (2023 06:13) (108 - 108)  BP: 92/61 (2023 06:13) (92/61 - 92/61)  RR: 18 (2023 06:13) (18 - 18)  SpO2: 98% (:13) (98% - 98%)    Parameters below as of 2023 06:13  Patient On (Oxygen Delivery Method): room air        Physical Exam:  General:  ill appearing female laying in bed  Neurology: A&Ox2, somnolent but arousable  Respiratory: diminished breath sounds of right lung base, + crackles at left lung base  CV: Tachycardic, Normal rhythm, +S1/S2, + systolic murmur, no rubs or gallops  Abdominal: Soft, generalized abdominal tenderness, ND +BSx4  Extremities: + pitting b/l LE Edema   Skin: warm, dry, normal color          LABS:                        8.8    33.53 )-----------( 345      ( 2023 06:40 )             29.9     04-13    141  |  108  |  82<H>  ----------------------------<  121<H>  2.8<LL>   |  17<L>  |  4.70<H>    Ca    7.9<L>      2023 06:40  Mg     2.3     04-13    TPro  6.3  /  Alb  2.2<L>  /  TBili  0.9  /  DBili  x   /  AST  16  /  ALT  10<L>  /  AlkPhos  164<H>  04-13        PT/INR - ( 2023 06:40 )   PT: 22.7 sec;   INR: 1.93 ratio         EKG: Sinus tachycardia with PACs, 101 BPM, QTc 510

## 2023-04-13 NOTE — H&P ADULT - PROBLEM SELECTOR PLAN 2
Pt has had recurrent c diff, and p/w diarrhea  - would check GI PCR, stool culture, c. diff  - cont IV flagyl for now Pt has had recurrent c diff, and p/w diarrhea; leukocytosis to 33  - would check GI PCR, stool culture, c. diff  - cont IV flagyl for now

## 2023-04-13 NOTE — PATIENT PROFILE ADULT - FALL HARM RISK - HARM RISK INTERVENTIONS

## 2023-04-13 NOTE — ED ADULT NURSE NOTE - NSIMPLEMENTINTERV_GEN_ALL_ED
Implemented All Fall Risk Interventions:  [place] to call system. Call bell, personal items and telephone within reach. Instruct patient to call for assistance. Room bathroom lighting operational. Non-slip footwear when patient is off stretcher. Physically safe environment: no spills, clutter or unnecessary equipment. Stretcher in lowest position, wheels locked, appropriate side rails in place. Provide visual cue, wrist band, yellow gown, etc. Monitor gait and stability. Monitor for mental status changes and reorient to person, place, and time. Review medications for side effects contributing to fall risk. Reinforce activity limits and safety measures with patient and family.

## 2023-04-13 NOTE — PATIENT PROFILE ADULT - FUNCTIONAL SCREEN CURRENT LEVEL: COMMUNICATION, MLM
3 = unable to speak (not related to language barrier) 2 = difficulty speaking (not related to language barrier) 3 = unable to understand or speak (not related to language barrier)

## 2023-04-13 NOTE — ED PROVIDER NOTE - PROGRESS NOTE DETAILS
AM cardio consult called for Dr. Fernandez  Pt signed out to Dr. Hobbs to f/u labs, CXR, CT A/p & head and admit Patient was reevaluated in the emergency department.  CAT scan results called to me by radiologist at approximately 9:55 AM stating that there is a perforation intra-abdominal.  Surgical service consulted Dr. Goldberg at bedside.  ICU PA aware.  Patient will be admitted to ICU.

## 2023-04-13 NOTE — CONSULT NOTE ADULT - ASSESSMENT
73 y/o f w/ PMH of HTN, CHF, asthma, hypothyroidism, prior notes state A-fib on Xarelto, history of C. difficile 1/13/23, who p/w generalized abdominal pain, and persistent diarrhea. Found to have sepsis 2/2 suspected sigmoid colon perforation. Cannot rule out underlying c diff.    -continue Zosyn  -suggest Flagyl IV  -consider adding vancomycin PO when able to from surgical standpoint  -send stool for c diff  -suggest blood cultures    Thank you for courtesy of this consult.     Will follow.  Discussed with the primary team.     Diandra Ricardo MD  Division of Infectious Diseases   Cell [phone] between 8am and 6pm   After 6pm and weekends please call ID service at [phone].  71 y/o f w/ PMH of HTN, CHF, asthma, hypothyroidism, prior notes state A-fib on Xarelto, history of C. difficile 1/13/23, who p/w generalized abdominal pain, and persistent diarrhea. Found to have sepsis 2/2 perforated viscus from suspected sigmoid colon perforation. Imaging also showed evidence of colitis, cannot rule out underlying c diff.    -continue Zosyn  -suggest Flagyl IV  -consider adding vancomycin PO when able to from surgical standpoint  -send stool for GI PCR and c diff  -suggest blood cultures    Thank you for courtesy of this consult.     Will follow.  Discussed with the primary team.     Diandra Ricardo MD  Division of Infectious Diseases   Cell [phone] between 8am and 6pm   After 6pm and weekends please call ID service at [phone].

## 2023-04-14 NOTE — PROGRESS NOTE ADULT - ASSESSMENT
72F with pmhx afib s/p ablation, HFpEF, mod MR, thrombocytosis, asthma recent C diff infection was brought to the ED today for recurrent falls and persistent nausea/vomiting, now with pneumoperitenum in setting of recent c diff infection in Jan 2023. She remains hypotensive with elevated lactate representing shock state with septic and hypovolemic components with subsequent end organ damage as evidenced by AMS, LAYA and coagulopathy. Need to r/o active C diff infection.     Colonic perforation   Shock, septic and hypovolemic   r/o C diff  acute on chronic renal failure   metabolic acidosis  lactic acidemia   Metabolic encephalopathy   Elevated INR   Hypokalemia   Elevated troponin    Neuro: suspect encephalopathy in setting of shock state  CV: s/p 3L total of crystalloid in the ED without significant improvement in BP. Started levophed to maintain MAP >65. BNP markedly elevated, TTE pending, cardio following. trend trops. anticipate that she may require diuresis eventually; but will hold home diuretics for now  Pulm: mild tachypnea suspect in setting of metabolic acidemia. ?GGO at lung bases noted on ct scan but she is currently satting well on room air. would place supp O2 to maintain sat >92.   GI: CTAP with findings as above - discussed with surgical team, plan for emergent OR for ex-lap. r/o active c diff infection  Renal: supplementing electrolytes to maintain K >4 Phos >3 Mg >2. will recheck BMP post-procedurally   ID: spoke with ID, will broaden abx to zosyn and flagyl. stat bcx ordered.   Endo: goal bg 110-180, ISS if needed, fs q6h while npo  Heme: received DDAVP, FFP and vitamin K to reverse coagulopathy. continue to hold xarelto. ?eventual heparin gtt for full AC with afib. DVT ppx with scds for now, transition to chem ppx once celared by surgery     Dispo: admit to ICU    case d/w Dr. Hernandez  72F with pmhx afib s/p ablation, HFpEF, mod MR, thrombocytosis, asthma recent C diff infection was brought to the ED today for recurrent falls and persistent nausea/vomiting, now with pneumoperitenum and perforation; Day 1 s/p Ro.       Neuro: sedated on Fentanyl .5mcg/kg with 50mcg PRN doses for breakthrough pain.     CV: Shock likely 2/2 sepsis requiring dual vasopressor support. Levo 0.05 Zelalem 0.25. TTE pending final read. Bedside POCUS w/ dilated RV and normal LV and IVC w/ diffuse B-lines.     Pulm: mild tachypnea suspect in setting of metabolic acidemia. ?GGO at lung bases noted on ct scan but she is currently satting well on room air. would place supp O2 to maintain sat >92.   GI: CTAP with findings as above - discussed with surgical team, plan for emergent OR for ex-lap. r/o active c diff infection  Renal: supplementing electrolytes to maintain K >4 Phos >3 Mg >2. will recheck BMP post-procedurally   ID: spoke with ID, will broaden abx to zosyn and flagyl. stat bcx ordered.   Endo: goal bg 110-180, ISS if needed, fs q6h while npo  Heme: received DDAVP, FFP and vitamin K to reverse coagulopathy. continue to hold xarelto. ?eventual heparin gtt for full AC with afib. DVT ppx with scds for now, transition to chem ppx once celared by surgery     Dispo: admit to ICU    case d/w Dr. Hernandez  72F with pmhx afib s/p ablation, HFpEF, mod MR, thrombocytosis, asthma recent C diff infection was brought to the ED today for recurrent falls and persistent nausea/vomiting, now with pneumoperitenum and perforation; Day 1 s/p Ro.       Neuro: sedated on Fentanyl .5mcg/kg with 50mcg PRN doses for breakthrough pain.     CV: Shock likely 2/2 sepsis requiring dual vasopressor support. Levo 0.05 Zelalem 0.25. TTE pending final read. Bedside POCUS w/ dilated RV and normal LV and IVC w/ diffuse B-lines.    Pulm: Intubated AC 24/400/5/30%; Failed spontaneous breathing trial; likely 2/2 sedation. Start albuterol PRN.     GI: CT w/ abdominal perf; now day1 s/p Ro and 1.2L abcess drainage. Continue protonix and abx. Ostomy c/d/i w/ minimal output.     Renal: LAYA 2/2 ATN; Cr 4.2. K repleted this AM. Decrease bicarb gtt to 50mg/hr given improvement in acidosis.     ID: Coninue Zosyn/Flagyl. Peritoneal fluid w/ gram variable rods and PMNs.     Endo: Stat IV synthroid at 75% of home dose; monitor glucose w/ fingersticks.     Heme: received DDAVP, FFP and vitamin K to reverse coagulopathy. continue to hold xarelto. possible eventual heparin gtt; DVT ppx with scds for now, transition to chem ppx once cleared by surgery

## 2023-04-14 NOTE — DIETITIAN INITIAL EVALUATION ADULT - POUNDS LOST/GAINED
3/3/2021          To Whom It May Concern:    Lázaro Lindsay is currently under my medical care and may not return to work at this time.     Please excuse Davonte Avery for the next 7 work days   She may return to work on 3/10/21      If you require additional info 86

## 2023-04-14 NOTE — DIETITIAN INITIAL EVALUATION ADULT - PROBLEM SELECTOR PLAN 4
Cards consulted, f/u recs  - pt has been off AC, supposedly in prep for possible colonoscopy  - consider holding metoprolol in setting of hypotension

## 2023-04-14 NOTE — DIETITIAN INITIAL EVALUATION ADULT - NSFNSGIIOFT_GEN_A_CORE
04-13-23 @ 07:01  -  04-14-23 @ 07:00  --------------------------------------------------------  OUT:    Nasogastric/Oral tube (mL): 300 mL  Total OUT: 300 mL    Total NET: -300 mL

## 2023-04-14 NOTE — PROGRESS NOTE ADULT - ASSESSMENT
71 yo f pmhx HTN, CHF, asthma, hypothyroidism, afib (?no ac), recent cdiff infection (01/2023) presented with abdominal pain found to have perforated sigmoid colon POD#1 Tia procedures admitted to ICU in shock with resp failure, shock liver and laya.      NEURO:  CV:  RESP: hypoxic respiratory failure, ac/vc 4-6 cc/kg tv lung protective strategies, actively titrating fio2/peep for spo2 >92%.    RENAL: LAYA with poor urine output, avoid nephrotoxic meds, renally dose meds, trend urine output, bun/cr and electrolytes, replace lytes as needed.    GI: NPO, ngt in place, low intermittent suction.  monitor output from ostomy, currently with dark brown liquid stool, clement drain in place  ENDO: POCT q6hr  ID: Zosyn for coverage, flagyl for empiric cdiff coverage. cx pending  HEME: Holding ac/chemical vte ppx   DISPO: Full code.   73 yo f pmhx HTN, CHF, asthma, hypothyroidism, afib (?no ac), recent cdiff infection (01/2023) presented with abdominal pain found to have perforated sigmoid colon POD#1 Tia procedures admitted to ICU in shock with resp failure, shock liver and laya.      NEURO: Sedated on fentanyl.   CV: Shock state requiring vasopressor therapy, actively titrating levophed for MAP >65, weaning as tolerated.    RESP: hypoxic respiratory failure, ac/vc 4-6 cc/kg tv lung protective strategies, actively titrating fio2/peep for spo2 >92%.    RENAL: LAYA with poor urine output, avoid nephrotoxic meds, renally dose meds, trend urine output, bun/cr and electrolytes, replace lytes as needed.  continue bicarb gtt.  GI: NPO, ngt in place, low intermittent suction.  monitor output from ostomy, currently with dark brown liquid stool, clement drain in place  ENDO: POCT q6hr  ID: Zosyn for coverage, flagyl for empiric cdiff coverage. cx pending  HEME: Holding ac/chemical vte ppx   DISPO: Full code.      Critical Care time: 60 mins assessing presenting problems of acute illness that poses high probability of life threatening deterioration or end organ damage/dysfunction.  Medical decision making including Initiating plan of care, reviewing data, reviewing radiology, discussing with multidisciplinary team, non inclusive of procedures, discussing goals of care with patient/family       ADDENDUM: Vent changed from AC/VC to PRVC, peak pressures improved, patient receiving ~350cc tv.  ABG for ~2300, will adjust settings accordingly. 71 yo f pmhx HTN, CHF, asthma, hypothyroidism, afib (?no ac), recent cdiff infection (01/2023) presented with abdominal pain found to have perforated sigmoid colon POD#1 Tia procedures admitted to ICU in shock with resp failure, shock liver and laya.      NEURO: Sedated on fentanyl.   CV: Shock state requiring vasopressor therapy, actively titrating levophed for MAP >65, weaning as tolerated.    RESP: hypoxic respiratory failure, ac/vc 4-6 cc/kg tv lung protective strategies, actively titrating fio2/peep for spo2 >92%.    RENAL: LAYA with poor urine output, avoid nephrotoxic meds, renally dose meds, trend urine output, bun/cr and electrolytes, replace lytes as needed.  continue bicarb gtt.  GI: NPO, ngt in place, low intermittent suction.  monitor output from ostomy, currently with dark brown liquid stool, clement drain in place. transaminitis improving   ENDO: POCT q6hr  ID: Zosyn for coverage, flagyl for empiric cdiff coverage. cx pending  HEME: Worsening thrombocytopenia, Holding ac/chemical vte ppx   DISPO: Full code.      Critical Care time: 60 mins assessing presenting problems of acute illness that poses high probability of life threatening deterioration or end organ damage/dysfunction.  Medical decision making including Initiating plan of care, reviewing data, reviewing radiology, discussing with multidisciplinary team, non inclusive of procedures, discussing goals of care with patient/family       ADDENDUM: Vent changed from AC/VC to PRVC, peak pressures improved, patient receiving ~350cc tv.  ABG for ~2300, will adjust settings accordingly.

## 2023-04-14 NOTE — DIETITIAN INITIAL EVALUATION ADULT - HEIGHT FOR BMI (CENTIMETERS)
Recommendation Preamble: The following recommendations were made during the visit: oil free, noncomedogenic moisturizers. Warm compresses.
Detail Level: Zone
Render Risk Assessment In Note?: no
167.64

## 2023-04-14 NOTE — PROGRESS NOTE ADULT - SUBJECTIVE AND OBJECTIVE BOX
MEDICAL ATTENDING NOTE    Patient is a 72y old  Female who presents with a chief complaint of intra-abdominal perforation (14 Apr 2023 13:49)      INTERVAL HPI/OVERNIGHT EVENTS: Intubated    MEDICATIONS  (STANDING):  chlorhexidine 0.12% Liquid 15 milliLiter(s) Oral Mucosa every 12 hours  chlorhexidine 4% Liquid 1 Application(s) Topical <User Schedule>  dextrose 5% 1000 milliLiter(s) (50 mL/Hr) IV Continuous <Continuous>  fentaNYL   Infusion. 0.5 MICROgram(s)/kG/Hr (3.64 mL/Hr) IV Continuous <Continuous>  levothyroxine Injectable 93.75 MICROGram(s) IV Push at bedtime  metroNIDAZOLE  IVPB 500 milliGRAM(s) IV Intermittent every 8 hours  norepinephrine Infusion 0.05 MICROgram(s)/kG/Min (6.83 mL/Hr) IV Continuous <Continuous>  pantoprazole  Injectable 40 milliGRAM(s) IV Push daily  phenylephrine    Infusion 0.1 MICROgram(s)/kG/Min (2.73 mL/Hr) IV Continuous <Continuous>  piperacillin/tazobactam IVPB.. 3.375 Gram(s) IV Intermittent every 12 hours    MEDICATIONS  (PRN):  albuterol    90 MICROgram(s) HFA Inhaler 4 Puff(s) Inhalation every 6 hours PRN Shortness of Breath and/or Wheezing  fentaNYL    Injectable 50 MICROGram(s) IV Push every 4 hours PRN Severe Pain (7 - 10)  sodium chloride 0.9% lock flush 10 milliLiter(s) IV Push every 1 hour PRN Pre/post blood products, medications, blood draw, and to maintain line patency      __________________________________________________  ----------------------------------------------------------------------------------  REVIEW OF SYSTEMS: unable to participate in ROS due to intubation      Vital Signs Last 24 Hrs  T(C): 36.7 (14 Apr 2023 08:04), Max: 37.1 (13 Apr 2023 16:35)  T(F): 98.1 (14 Apr 2023 08:04), Max: 98.8 (13 Apr 2023 16:35)  HR: 99 (14 Apr 2023 14:00) (80 - 119)  BP: 96/49 (14 Apr 2023 05:00) (91/50 - 177/80)  BP(mean): 71 (14 Apr 2023 05:00) (64 - 89)  RR: 24 (14 Apr 2023 14:00) (10 - 32)  SpO2: 100% (14 Apr 2023 14:00) (98% - 100%)        _________________  PHYSICAL EXAM:  ---------------------------  Normocephalic; intubated  LUNGS - ET tube in place; bilateral air entry+  HEART: S1 S2+   ABDOMEN: Soft,  non distended, BS+  EXTREMITIES: no cyanosis; no edema  NERVOUS SYSTEM:  unable to assess as patient intubated    _________________________________________________  LABS:                        7.9    13.35 )-----------( 134      ( 14 Apr 2023 09:15 )             25.1     04-14    142  |  105  |  71<H>  ----------------------------<  132<H>  3.5   |  25  |  4.30<H>    Ca    6.3<LL>      14 Apr 2023 12:40  Phos  5.1     04-14  Mg     2.3     04-14    TPro  5.1<L>  /  Alb  2.2<L>  /  TBili  1.0  /  DBili  x   /  AST  866<H>  /  ALT  268<H>  /  AlkPhos  105  04-14    PT/INR - ( 14 Apr 2023 09:15 )   PT: 24.8 sec;   INR: 2.10 ratio         PTT - ( 14 Apr 2023 09:15 )  PTT:42.7 sec    CAPILLARY BLOOD GLUCOSE      POCT Blood Glucose.: 138 mg/dL (14 Apr 2023 12:14)  POCT Blood Glucose.: 156 mg/dL (14 Apr 2023 05:52)  POCT Blood Glucose.: 164 mg/dL (14 Apr 2023 04:58)  POCT Blood Glucose.: 157 mg/dL (14 Apr 2023 02:15)  POCT Blood Glucose.: 142 mg/dL (13 Apr 2023 23:52)  POCT Blood Glucose.: 89 mg/dL (13 Apr 2023 21:52)  POCT Blood Glucose.: 101 mg/dL (13 Apr 2023 16:42)  POCT Blood Glucose.: 67 mg/dL (13 Apr 2023 16:31)  POCT Blood Glucose.: 53 mg/dL (13 Apr 2023 16:28)                Updates about current status and plan of care provided to family by ICU team             MEDICAL ATTENDING NOTE    Patient is a 72y old  Female who presents with a chief complaint of intra-abdominal perforation (14 Apr 2023 13:49)      INTERVAL HPI/OVERNIGHT EVENTS: Intubated    MEDICATIONS  (STANDING):  chlorhexidine 0.12% Liquid 15 milliLiter(s) Oral Mucosa every 12 hours  chlorhexidine 4% Liquid 1 Application(s) Topical <User Schedule>  dextrose 5% 1000 milliLiter(s) (50 mL/Hr) IV Continuous <Continuous>  fentaNYL   Infusion. 0.5 MICROgram(s)/kG/Hr (3.64 mL/Hr) IV Continuous <Continuous>  levothyroxine Injectable 93.75 MICROGram(s) IV Push at bedtime  metroNIDAZOLE  IVPB 500 milliGRAM(s) IV Intermittent every 8 hours  norepinephrine Infusion 0.05 MICROgram(s)/kG/Min (6.83 mL/Hr) IV Continuous <Continuous>  pantoprazole  Injectable 40 milliGRAM(s) IV Push daily  phenylephrine    Infusion 0.1 MICROgram(s)/kG/Min (2.73 mL/Hr) IV Continuous <Continuous>  piperacillin/tazobactam IVPB.. 3.375 Gram(s) IV Intermittent every 12 hours    MEDICATIONS  (PRN):  albuterol    90 MICROgram(s) HFA Inhaler 4 Puff(s) Inhalation every 6 hours PRN Shortness of Breath and/or Wheezing  fentaNYL    Injectable 50 MICROGram(s) IV Push every 4 hours PRN Severe Pain (7 - 10)  sodium chloride 0.9% lock flush 10 milliLiter(s) IV Push every 1 hour PRN Pre/post blood products, medications, blood draw, and to maintain line patency      __________________________________________________  ----------------------------------------------------------------------------------  REVIEW OF SYSTEMS: unable to participate in ROS due to intubation      Vital Signs Last 24 Hrs  T(C): 36.7 (14 Apr 2023 08:04), Max: 37.1 (13 Apr 2023 16:35)  T(F): 98.1 (14 Apr 2023 08:04), Max: 98.8 (13 Apr 2023 16:35)  HR: 99 (14 Apr 2023 14:00) (80 - 119)  BP: 96/49 (14 Apr 2023 05:00) (91/50 - 177/80)  BP(mean): 71 (14 Apr 2023 05:00) (64 - 89)  RR: 24 (14 Apr 2023 14:00) (10 - 32)  SpO2: 100% (14 Apr 2023 14:00) (98% - 100%)        _________________  PHYSICAL EXAM:  ---------------------------  Normocephalic; intubated  LUNGS - ET tube in place; bilateral air entry+  HEART: S1 S2+   ABDOMEN: Soft,  non distended, Ostomy+; TAMERA drain with serosanguinous drainange+  EXTREMITIES: no cyanosis; no edema  NERVOUS SYSTEM:  unable to assess as patient intubated    _________________________________________________  LABS:                        7.9    13.35 )-----------( 134      ( 14 Apr 2023 09:15 )             25.1     04-14    142  |  105  |  71<H>  ----------------------------<  132<H>  3.5   |  25  |  4.30<H>    Ca    6.3<LL>      14 Apr 2023 12:40  Phos  5.1     04-14  Mg     2.3     04-14    TPro  5.1<L>  /  Alb  2.2<L>  /  TBili  1.0  /  DBili  x   /  AST  866<H>  /  ALT  268<H>  /  AlkPhos  105  04-14    PT/INR - ( 14 Apr 2023 09:15 )   PT: 24.8 sec;   INR: 2.10 ratio         PTT - ( 14 Apr 2023 09:15 )  PTT:42.7 sec    CAPILLARY BLOOD GLUCOSE      POCT Blood Glucose.: 138 mg/dL (14 Apr 2023 12:14)  POCT Blood Glucose.: 156 mg/dL (14 Apr 2023 05:52)  POCT Blood Glucose.: 164 mg/dL (14 Apr 2023 04:58)  POCT Blood Glucose.: 157 mg/dL (14 Apr 2023 02:15)  POCT Blood Glucose.: 142 mg/dL (13 Apr 2023 23:52)  POCT Blood Glucose.: 89 mg/dL (13 Apr 2023 21:52)  POCT Blood Glucose.: 101 mg/dL (13 Apr 2023 16:42)  POCT Blood Glucose.: 67 mg/dL (13 Apr 2023 16:31)  POCT Blood Glucose.: 53 mg/dL (13 Apr 2023 16:28)                Updates about current status and plan of care provided to family by ICU team

## 2023-04-14 NOTE — PHARMACOTHERAPY INTERVENTION NOTE - COMMENTS
Search Terms: Mallory Ornelas, 1950Search Date: 04/14/2023 08:11:01 AM  The Drug Utilization Report below displays all of the controlled substance prescriptions, if any, that your patient has filled in the last twelve months. The information displayed on this report is compiled from pharmacy submissions to the Department, and accurately reflects the information as submitted by the pharmacies.    This report was requested by: Lori Helton | Reference #: [id number]    Others' Prescriptions  Patient Name: Mallory OrnelasBirth Date: 1950  Address: [address] [place], [place] [address]Qmz: Female  Rx Written	Rx Dispensed	Drug	Quantity	Days Supply	Prescriber Name	Prescriber Heather #	Payment Method	Dispenser  02/24/2023	02/24/2023	zolpidem tartrate 5 mg tablet	30	30	Holly [place] M	[id number]	Medicare	[id number]  02/15/2023	02/16/2023	hydromet 5 mg-1.5 mg/5 ml soln	473ml	23	Benji Rice ()	[id number]	Insurance	[id number]  01/31/2023	01/31/2023	oxycodone-acetaminophen 5-325 mg tab	8	4	Gisell Posey)	[id number]	Medicare	[id number]  01/19/2023	01/19/2023	oxycodone-acetaminophen 5-325 mg tab	10	5	Shaw Conde	[id number]	Medicare	[id number]  12/16/2022	12/16/2022	hydromet 5 mg-1.5 mg/5 ml soln	473ml	24	Stephen Dalton R, PA-C	[id number]	Insurance	[id number]  11/16/2022	11/21/2022	hydromet 5 mg-1.5 mg/5 ml soln	473ml	23	Mallory Mcbride	[id number]	Insurance	[id number]  11/11/2022	11/11/2022	hydromet 5 mg-1.5 mg/5 ml soln	200ml	10	Liliya Thomas	[id number]	Insurance	[id number]  10/06/2022	10/14/2022	hydromet 5 mg-1.5 mg/5 ml soln	473ml	23	Chitty, Benji MENARD (DO)	[id number]	Insurance	[id number]  10/05/2022	10/05/2022	hydromet 5 mg-1.5 mg/5 ml soln	180ml	9	Dwayne Benji MENARD (DO)	[id number]	Insurance	[id number]  09/08/2022	09/09/2022	hydromet 5 mg-1.5 mg/5 ml soln	473ml	23	Dwayne Benji MENARD (DO)	[id number]	Insurance	[id number]  07/14/2022	07/15/2022	hydromet 5 mg-1.5 mg/5 ml soln	473ml	23	Stephen Dalton R, PA-C	[id number]	Insurance	[id number]  06/15/2022	06/17/2022	hycodan 5 mg-1.5 mg/5 ml soln	473ml	24	Stephen Dalton R, PA-C	[id number]	Cash	[facility]  05/23/2022	05/24/2022	hydromet 5 mg-1.5 mg/5 ml soln	460ml	23	Dwayne Benji MENARD (DO)	[id number]	Insurance	[id number]  04/24/2022	04/25/2022	oxycodone hcl (ir) 5 mg tablet	30	5	Damon Lauren Veliz	[id number]	Medicare	[id number]  04/15/2022	04/19/2022	hycodan 5 mg-1.5 mg/5 ml soln	473ml	23	Stephen Dalton R, PA-C	[id number]	Cash	[facility]  * - Drugs marked with an asterisk are compound drugs. If the compound drug is made up of more than one controlled substance, then each controlled substance will be a separate row in the table.

## 2023-04-14 NOTE — PROGRESS NOTE ADULT - ASSESSMENT
72-year-old female with history of hypertension, CHF, afib s/p AF ablation (2/7/19) currently not on xarelto, CATH 2018, hypothyroidism, HLD history of C. difficile January 2023 presents for vomitting and abdominal pain. Consulted for CHF.    - CT ABD/Pelvis: Pneumoperitoneum and no ascites. Suspect perforated sigmoid colon. Stable splenomegaly. Stable right lung nodule, groundglass infiltrates and loculated right pleural effusion.  - S/p Tia's with abdominal abscess drainage.    OR finding with >1.2 liters of pus from abd cavity.    pt on Full vent support. s/p PRBC, IV hydration for severe vasodilation shock , on multiple pressor .   - ProBNP 56722  - transfer to ICU s/p procedure, for vasopressor support for hypotension  - Pt states she has not taken lasix 2-3 days. no acute volume overload noted on CXR  - hold lasix for now due to hypotension  - hold home metoprolol 50mg qd due to hypotension, can consider restarting if BP stabilizes  - Elevated troponin 100.9, otherwise cardiac enzymes unremarkable, continue to trend  - EKG: Sinus tachycardia with PAC's  - No acute changes on EKG compared to previous.   - Atypical chest pain likely not cardiac of origin  - Prior TTE (1/18/23): normal LVEF 65%, normal RV size and function, biatrial enlargement, sclerotic aortic valve, mild AI, Moderate MR and TR  - f/u TTE   - Monitor and replete lytes, keep K>4, Mg>2.  - Other cardiovascular workup will depend on clinical course.  - Will continue to follow.

## 2023-04-14 NOTE — DIETITIAN INITIAL EVALUATION ADULT - ADD RECOMMEND
Pt to remain NPO with IVFs at this time. If prolonged intubation, recommend initiating enteral nutrition when medically optimized. RD remains available for po diet/TF recommendations pending hospital course.   Recommend MVI/minerals daily. Electrolyte replacement prn.

## 2023-04-14 NOTE — CHART NOTE - NSCHARTNOTEFT_GEN_A_CORE
: Mary      INDICATION: shock, acute respiratory failure      PROCEDURE:    [x ] LIMITED ECHO    [x ] LIMITED CHEST        FINDINGS:  B lines on R, difficult to assess L  markedly elevated hemidiaphragms  RLL consolidation  no pleural effusion    normal LV  RV size = LV size  IVC 2.3cm without variation  no pericardial effusion    INTERPRETATION:  shock unlikely to be volume responsive      Images stored on [employer]

## 2023-04-14 NOTE — PROGRESS NOTE ADULT - ASSESSMENT
73 y/o f w/ PMH of HTN, CHF, asthma, hypothyroidism, prior notes state A-fib on Xarelto, history of C. difficile 1/13/23, who p/w generalized abdominal pain, and persistent diarrhea. Found to have sepsis 2/2 perforated viscus from suspected sigmoid colon perforation. Imaging also showed evidence of colitis, cannot rule out underlying c diff.    S/p Tia procedure and abdominal abscess drainage on 4/13. On 2 pressors, but afebrile and leukocytosis improving.    -continue Zosyn, Flagyl IV  -consider adding vancomycin PO when able to from surgical standpoint  -send stool for GI PCR and c diff--if with output from ostomy  -follow up blood and peritoneal cultures  -I will be covered by Dr. Toshia Nguyen on 4/15, and Dr. Juarez on 4/16    Diandra Ricardo MD  Division of Infectious Diseases   Cell [phone] between 8am and 6pm   After 6pm and weekends please call ID service at [phone].

## 2023-04-14 NOTE — PROGRESS NOTE ADULT - ASSESSMENT
POD1 s/p ex-lap, yessica's for perforated sigmoid    -continue supportive care  -wean pressors  -monitor outputs  -midline packing change by surgical team

## 2023-04-14 NOTE — DIETITIAN INITIAL EVALUATION ADULT - SIGNS/SYMPTOMS
as evidenced by persistent diarrhea and abd pain, s/p ex-lap, Tia's for perforated sigmoid.  as evidenced by decrease po intake PTA, 35% wt loss x 1 year, moderate muscle depletion/fat loss.

## 2023-04-14 NOTE — PROGRESS NOTE ADULT - PROBLEM SELECTOR PLAN 2
Intubated AC 24/400/5/30%;   Failed spontaneous breathing trial;  Continue to monitor and wean as tolerated per ICU protocol

## 2023-04-14 NOTE — DIETITIAN INITIAL EVALUATION ADULT - PHYSCIAL ASSESSMENT
BMI 25.9  NFPE deferred; not appropriate at time of visit today  Observable muscle depletion/fat loss noted below other (specify)

## 2023-04-14 NOTE — PROGRESS NOTE ADULT - PROBLEM SELECTOR PLAN 3
CT with abdominal perforation; now POD #1 s/p Ro and evacuation of about 1.2L purulent collection with the peritoneum   Continue broad spectrum antibiotics  ID follow up ongoing.   Follow up cultures  Ostomy c/d/i ; minimal output.  Check stool for c-diff.

## 2023-04-14 NOTE — DIETITIAN INITIAL EVALUATION ADULT - PROBLEM SELECTOR PLAN 3
Cards consulted, f/u recs  - BNP elevated to about 60,000, and trops elevated; as per cards trend enzymes  - Cards recommending cont home lasix po 80 in am, and 40 in pm, pt has been noncompliant with lasix recently, may change to lasix 40 IV in am, and 20 IV in pm if pt unable to tolerate PO; pt has been off metolazone at home, supposedly in prep for a posisble colonoscopy  - 2d echo  - monitored on tele in ICU  - strict Is and Os, daily wts

## 2023-04-14 NOTE — PROGRESS NOTE ADULT - SUBJECTIVE AND OBJECTIVE BOX
****** CHARTING IN PROGRESS *******    INTERVAL HPI/OVERNIGHT EVENTS:    SUBJECTIVE: Patient seen and examined at bedside.     ROS:  CV: Denies chest pain  Resp: Denies SOB  GI: Denies abdominal pain, constipation, diarrhea, nausea, vomiting  : Denies dysuria  ID: Denies fevers, chills  MSK: Denies joint pain     OBJECTIVE:    VITAL SIGNS:  ICU Vital Signs Last 24 Hrs  T(C): 36.7 (14 Apr 2023 08:04), Max: 37.1 (13 Apr 2023 16:35)  T(F): 98.1 (14 Apr 2023 08:04), Max: 98.8 (13 Apr 2023 16:35)  HR: 102 (14 Apr 2023 09:30) (83 - 120)  BP: 96/49 (14 Apr 2023 05:00) (88/47 - 177/80)  BP(mean): 71 (14 Apr 2023 05:00) (58 - 89)  ABP: 99/49 (14 Apr 2023 09:30) (95/38 - 132/50)  ABP(mean): 69 (14 Apr 2023 09:30) (59 - 81)  RR: 24 (14 Apr 2023 09:30) (10 - 32)  SpO2: 100% (14 Apr 2023 09:30) (96% - 100%)    O2 Parameters below as of 13 Apr 2023 12:50  Patient On (Oxygen Delivery Method): room air          Mode: AC/ CMV (Assist Control/ Continuous Mandatory Ventilation), RR (machine): 24, TV (machine): 400, FiO2: 30, PEEP: 5, ITime: 1, MAP: 15, PIP: 43    04-13 @ 07:01  -  04-14 @ 07:00  --------------------------------------------------------  IN: 6976.1 mL / OUT: 875 mL / NET: 6101.1 mL      CAPILLARY BLOOD GLUCOSE      POCT Blood Glucose.: 156 mg/dL (14 Apr 2023 05:52)      PHYSICAL EXAM:  General: NAD, comfortable  HEENT: NCAT, PERRL, clear conjunctiva, no scleral icterus  Neck: supple, no JVD  Respiratory: CTA b/l, no wheezing, rhonchi, rales  Cardiovascular: RRR, normal S1S2, no M/R/G  Abdomen: soft, NT/ND, bowel sounds in all four quadrants, no palpable masses  Extremities: WWP, no clubbing, cyanosis, or edema  Neurology: A&Ox3, nonfocal, sensation intact     MEDICATIONS:  MEDICATIONS  (STANDING):  chlorhexidine 0.12% Liquid 15 milliLiter(s) Oral Mucosa every 12 hours  chlorhexidine 4% Liquid 1 Application(s) Topical <User Schedule>  dextrose 5% 1000 milliLiter(s) (50 mL/Hr) IV Continuous <Continuous>  fentaNYL   Infusion. 0.5 MICROgram(s)/kG/Hr (3.64 mL/Hr) IV Continuous <Continuous>  levothyroxine Injectable 93.75 MICROGram(s) IV Push at bedtime  metroNIDAZOLE  IVPB 500 milliGRAM(s) IV Intermittent every 8 hours  norepinephrine Infusion 0.05 MICROgram(s)/kG/Min (6.83 mL/Hr) IV Continuous <Continuous>  pantoprazole  Injectable 40 milliGRAM(s) IV Push daily  phenylephrine    Infusion 0.1 MICROgram(s)/kG/Min (2.73 mL/Hr) IV Continuous <Continuous>  piperacillin/tazobactam IVPB.. 3.375 Gram(s) IV Intermittent every 12 hours    MEDICATIONS  (PRN):  albuterol    90 MICROgram(s) HFA Inhaler 4 Puff(s) Inhalation every 6 hours PRN Shortness of Breath and/or Wheezing  fentaNYL    Injectable 50 MICROGram(s) IV Push every 4 hours PRN Severe Pain (7 - 10)  sodium chloride 0.9% lock flush 10 milliLiter(s) IV Push every 1 hour PRN Pre/post blood products, medications, blood draw, and to maintain line patency      ALLERGIES:  Allergies    No Known Allergies    Intolerances        LABS:                        7.9    13.35 )-----------( 134      ( 14 Apr 2023 09:15 )             25.1     04-14    143  |  106  |  71<H>  ----------------------------<  162<H>  2.9<LL>   |  24  |  4.40<H>    Ca    6.5<LL>      14 Apr 2023 09:15  Phos  5.1     04-14  Mg     2.3     04-14    TPro  5.1<L>  /  Alb  2.2<L>  /  TBili  1.0  /  DBili  x   /  AST  866<H>  /  ALT  268<H>  /  AlkPhos  105  04-14    PT/INR - ( 14 Apr 2023 09:15 )   PT: 24.8 sec;   INR: 2.10 ratio         PTT - ( 14 Apr 2023 09:15 )  PTT:42.7 sec      RADIOLOGY & ADDITIONAL TESTS: Reviewed. ***  BEDSIDE LUNG ULTRASOUND: ***  BEDSIDE ECHO: ***    CENTRAL LINE: N        DATE INSERTED:             REMOVE: N  BISHOP: Y                       DATE INSERTED:              REMOVE: Y/N  A-LINE: N                       DATE INSERTED:              REMOVE: Y/N      GLOBAL ISSUE/BEST PRACTICE  Analgesia: Y  Sedation: Y  HOB elevation: yes  Stress ulcer prophylaxis: Y  VTE prophylaxis: Y  Glycemic control: Y  Nutrition: Y    CODE STATUS: Full Code   INTERVAL HPI/OVERNIGHT EVENTS: Patient is day 1 s/p Ro for abdominal perforation; now requiring dual vasopressors for septic shock. She also recieved 2 pRBC and 2 FFP overnight. She has extremely low urine output likely due to ATN. She remains on Zosyn/Flagyl.     SUBJECTIVE: Patient seen and examined at bedside. Is intubated/sedated at this time.    ROS:  Unable to obtain     OBJECTIVE:    VITAL SIGNS:  ICU Vital Signs Last 24 Hrs  T(C): 36.7 (14 Apr 2023 08:04), Max: 37.1 (13 Apr 2023 16:35)  T(F): 98.1 (14 Apr 2023 08:04), Max: 98.8 (13 Apr 2023 16:35)  HR: 102 (14 Apr 2023 09:30) (83 - 120)  BP: 96/49 (14 Apr 2023 05:00) (88/47 - 177/80)  BP(mean): 71 (14 Apr 2023 05:00) (58 - 89)  ABP: 99/49 (14 Apr 2023 09:30) (95/38 - 132/50)  ABP(mean): 69 (14 Apr 2023 09:30) (59 - 81)  RR: 24 (14 Apr 2023 09:30) (10 - 32)  SpO2: 100% (14 Apr 2023 09:30) (96% - 100%)    O2 Parameters below as of 13 Apr 2023 12:50  Patient On (Oxygen Delivery Method): room air          Mode: AC/ CMV (Assist Control/ Continuous Mandatory Ventilation), RR (machine): 24, TV (machine): 400, FiO2: 30, PEEP: 5, ITime: 1, MAP: 15, PIP: 43    04-13 @ 07:01  -  04-14 @ 07:00  --------------------------------------------------------  IN: 6976.1 mL / OUT: 875 mL / NET: 6101.1 mL      CAPILLARY BLOOD GLUCOSE      POCT Blood Glucose.: 156 mg/dL (14 Apr 2023 05:52)      PHYSICAL EXAM:  General: intubated, sedated, critically ill  Respiratory: Intubated; 24/400/5/30%. No wheezes, rales, rhonchi   Cardiovascular: RRR, normal S1S2, no M/R/G  Abdomen: soft, NT/ND, ostomy site is dark red with no output   Extremities: WWP, no clubbing, cyanosis, or edema  Neurology: sedated on fentanyl      MEDICATIONS:  MEDICATIONS  (STANDING):  chlorhexidine 0.12% Liquid 15 milliLiter(s) Oral Mucosa every 12 hours  chlorhexidine 4% Liquid 1 Application(s) Topical <User Schedule>  dextrose 5% 1000 milliLiter(s) (50 mL/Hr) IV Continuous <Continuous>  fentaNYL   Infusion. 0.5 MICROgram(s)/kG/Hr (3.64 mL/Hr) IV Continuous <Continuous>  levothyroxine Injectable 93.75 MICROGram(s) IV Push at bedtime  metroNIDAZOLE  IVPB 500 milliGRAM(s) IV Intermittent every 8 hours  norepinephrine Infusion 0.05 MICROgram(s)/kG/Min (6.83 mL/Hr) IV Continuous <Continuous>  pantoprazole  Injectable 40 milliGRAM(s) IV Push daily  phenylephrine    Infusion 0.1 MICROgram(s)/kG/Min (2.73 mL/Hr) IV Continuous <Continuous>  piperacillin/tazobactam IVPB.. 3.375 Gram(s) IV Intermittent every 12 hours    MEDICATIONS  (PRN):  albuterol    90 MICROgram(s) HFA Inhaler 4 Puff(s) Inhalation every 6 hours PRN Shortness of Breath and/or Wheezing  fentaNYL    Injectable 50 MICROGram(s) IV Push every 4 hours PRN Severe Pain (7 - 10)  sodium chloride 0.9% lock flush 10 milliLiter(s) IV Push every 1 hour PRN Pre/post blood products, medications, blood draw, and to maintain line patency      ALLERGIES:  Allergies    No Known Allergies    Intolerances        LABS:                        7.9    13.35 )-----------( 134      ( 14 Apr 2023 09:15 )             25.1     04-14    143  |  106  |  71<H>  ----------------------------<  162<H>  2.9<LL>   |  24  |  4.40<H>    Ca    6.5<LL>      14 Apr 2023 09:15  Phos  5.1     04-14  Mg     2.3     04-14    TPro  5.1<L>  /  Alb  2.2<L>  /  TBili  1.0  /  DBili  x   /  AST  866<H>  /  ALT  268<H>  /  AlkPhos  105  04-14    PT/INR - ( 14 Apr 2023 09:15 )   PT: 24.8 sec;   INR: 2.10 ratio         PTT - ( 14 Apr 2023 09:15 )  PTT:42.7 sec      RADIOLOGY & ADDITIONAL TESTS: Reviewed. ***  BEDSIDE LUNG ULTRASOUND: ***  BEDSIDE ECHO: ***    CENTRAL LINE: N        DATE INSERTED:             REMOVE: N  BISHOP: Y                       DATE INSERTED:              REMOVE: Y/N  A-LINE: N                       DATE INSERTED:              REMOVE: Y/N      GLOBAL ISSUE/BEST PRACTICE  Analgesia: Y  Sedation: Y  HOB elevation: yes  Stress ulcer prophylaxis: Y  VTE prophylaxis: Y  Glycemic control: Y  Nutrition: Y    CODE STATUS: Full Code   INTERVAL HPI/OVERNIGHT EVENTS: Patient is day 1 s/p Ro for abdominal perforation; now requiring dual vasopressors for septic shock. She also recieved 2 pRBC and 2 FFP overnight. She has extremely low urine output likely due to ATN. She remains on Zosyn/Flagyl.     SUBJECTIVE: Patient seen and examined at bedside. Is intubated/sedated at this time.    ROS:  Unable to obtain     OBJECTIVE:    VITAL SIGNS:  ICU Vital Signs Last 24 Hrs  T(C): 36.7 (14 Apr 2023 08:04), Max: 37.1 (13 Apr 2023 16:35)  T(F): 98.1 (14 Apr 2023 08:04), Max: 98.8 (13 Apr 2023 16:35)  HR: 102 (14 Apr 2023 09:30) (83 - 120)  BP: 96/49 (14 Apr 2023 05:00) (88/47 - 177/80)  BP(mean): 71 (14 Apr 2023 05:00) (58 - 89)  ABP: 99/49 (14 Apr 2023 09:30) (95/38 - 132/50)  ABP(mean): 69 (14 Apr 2023 09:30) (59 - 81)  RR: 24 (14 Apr 2023 09:30) (10 - 32)  SpO2: 100% (14 Apr 2023 09:30) (96% - 100%)    O2 Parameters below as of 13 Apr 2023 12:50  Patient On (Oxygen Delivery Method): room air          Mode: AC/ CMV (Assist Control/ Continuous Mandatory Ventilation), RR (machine): 24, TV (machine): 400, FiO2: 30, PEEP: 5, ITime: 1, MAP: 15, PIP: 43    04-13 @ 07:01  -  04-14 @ 07:00  --------------------------------------------------------  IN: 6976.1 mL / OUT: 875 mL / NET: 6101.1 mL      CAPILLARY BLOOD GLUCOSE      POCT Blood Glucose.: 156 mg/dL (14 Apr 2023 05:52)      PHYSICAL EXAM:  General: intubated, sedated, critically ill  Respiratory: Intubated; 24/400/5/30%. No wheezes, rales, rhonchi   Cardiovascular: RRR, normal S1S2, no M/R/G  Abdomen: soft, NT/ND, ostomy site is dark red with no output   Extremities: WWP, no clubbing, cyanosis, or edema  Neurology: sedated on fentanyl      MEDICATIONS:  MEDICATIONS  (STANDING):  chlorhexidine 0.12% Liquid 15 milliLiter(s) Oral Mucosa every 12 hours  chlorhexidine 4% Liquid 1 Application(s) Topical <User Schedule>  dextrose 5% 1000 milliLiter(s) (50 mL/Hr) IV Continuous <Continuous>  fentaNYL   Infusion. 0.5 MICROgram(s)/kG/Hr (3.64 mL/Hr) IV Continuous <Continuous>  levothyroxine Injectable 93.75 MICROGram(s) IV Push at bedtime  metroNIDAZOLE  IVPB 500 milliGRAM(s) IV Intermittent every 8 hours  norepinephrine Infusion 0.05 MICROgram(s)/kG/Min (6.83 mL/Hr) IV Continuous <Continuous>  pantoprazole  Injectable 40 milliGRAM(s) IV Push daily  phenylephrine    Infusion 0.1 MICROgram(s)/kG/Min (2.73 mL/Hr) IV Continuous <Continuous>  piperacillin/tazobactam IVPB.. 3.375 Gram(s) IV Intermittent every 12 hours    MEDICATIONS  (PRN):  albuterol    90 MICROgram(s) HFA Inhaler 4 Puff(s) Inhalation every 6 hours PRN Shortness of Breath and/or Wheezing  fentaNYL    Injectable 50 MICROGram(s) IV Push every 4 hours PRN Severe Pain (7 - 10)  sodium chloride 0.9% lock flush 10 milliLiter(s) IV Push every 1 hour PRN Pre/post blood products, medications, blood draw, and to maintain line patency      ALLERGIES:  Allergies    No Known Allergies    Intolerances        LABS:                        7.9    13.35 )-----------( 134      ( 14 Apr 2023 09:15 )             25.1     04-14    143  |  106  |  71<H>  ----------------------------<  162<H>  2.9<LL>   |  24  |  4.40<H>    Ca    6.5<LL>      14 Apr 2023 09:15  Phos  5.1     04-14  Mg     2.3     04-14    TPro  5.1<L>  /  Alb  2.2<L>  /  TBili  1.0  /  DBili  x   /  AST  866<H>  /  ALT  268<H>  /  AlkPhos  105  04-14    PT/INR - ( 14 Apr 2023 09:15 )   PT: 24.8 sec;   INR: 2.10 ratio         PTT - ( 14 Apr 2023 09:15 )  PTT:42.7 sec      RADIOLOGY & ADDITIONAL TESTS:   ACC: [id number] EXAM:  CT ABDOMEN AND PELVIS OC   ORDERED BY:  MELISSA MAYFIELD     PROCEDURE DATE:  04/13/2023          INTERPRETATION:  CLINICAL INFORMATION: 72 years  Female with abd   pain/diarrhea. History of carcinoid tumor of the lung    COMPARISON: CT abdomen and pelvis 1/26/2023 and PET CT 3/17/2023    CONTRAST/COMPLICATIONS:  IV Contrast: NONE  0 cc administered   0 cc discarded  Oral Contrast: Omnipaque 300  Complications: None reported at time of study completion    PROCEDURE:  CT of the Abdomen and Pelvis was performed.  Sagittal and coronal reformats were performed.    LIMITATION: Evaluation of the solid organs is limited by lack of IV   contrast.    FINDINGS:  LOWER CHEST: Persistent bilateral groundglass opacities. Redemonstrated 9   mm right middle lobe nodule and loculated right pleural effusion.    LIVER: Within normal limits.  BILE DUCTS: Normal caliber.  GALLBLADDER: Cholecystectomy.  SPLEEN: Stable splenomegaly.  PANCREAS: Within normal limits.  ADRENALS: Within normal limits.  KIDNEYS/URETERS: Within normal limits.    BLADDER: Within normal limits.  REPRODUCTIVE ORGANS: Uterus not well visualized.    BOWEL: No bowel obstruction. Appendix is not visualized and cannot be   assessed.. Featureless colon suggestive of colitis. Sigmoid   diverticulosis. Limited evaluation for wall thickening without enteric   contrast. Droplets of extraluminal air adjacent to the sigmoid colon.   Moderate hiatus hernia.  PERITONEUM: Pneumoperitoneum most pronounced in the anterior upper   abdomen. Droplets of free air throughout the remainder of the abdomen and   adjacent to the sigmoid colon. Moderate ascites, new compared with prior.  VESSELS: Atherosclerotic changes.  RETROPERITONEUM/LYMPH NODES: No lymphadenopathy.  ABDOMINAL WALL: Within normal limits.  BONES: Degenerative changes.    IMPRESSION:  Pneumoperitoneum and no ascites. Suspect perforated sigmoid colon as   above.    Stable splenomegaly.    Stable right lung nodule, groundglass infiltrates and loculated right   pleural effusion.    BEDSIDE LUNG ULTRASOUND: - no evidence of  pleural effusion; diffuse B-lines    BEDSIDE ECHO: RV dilation; IVC 2.3cm, LV wnl     CENTRAL LINE: Y - RIJ        DATE INSERTED:             REMOVE: N  BISHOP: Y                       DATE INSERTED:              REMOVE: Y/N  A-LINE: A - L Axillary                        DATE INSERTED:              REMOVE: Y/N      GLOBAL ISSUE/BEST PRACTICE  Analgesia: Y  Sedation: Y  HOB elevation: yes  Stress ulcer prophylaxis: Y  VTE prophylaxis: Y  Glycemic control: Y  Nutrition: Y    CODE STATUS: Full Code   INTERVAL HPI/OVERNIGHT EVENTS: Patient is day 1 s/p Ro for abdominal perforation; now requiring dual vasopressors for septic shock. She also recieved 2 pRBC and 2 FFP overnight. She remains on Zosyn/Flagyl.     SUBJECTIVE: Patient seen and examined at bedside. Is intubated/sedated at this time.    ROS:  Unable to obtain     OBJECTIVE:    VITAL SIGNS:  ICU Vital Signs Last 24 Hrs  T(C): 36.7 (14 Apr 2023 08:04), Max: 37.1 (13 Apr 2023 16:35)  T(F): 98.1 (14 Apr 2023 08:04), Max: 98.8 (13 Apr 2023 16:35)  HR: 102 (14 Apr 2023 09:30) (83 - 120)  BP: 96/49 (14 Apr 2023 05:00) (88/47 - 177/80)  BP(mean): 71 (14 Apr 2023 05:00) (58 - 89)  ABP: 99/49 (14 Apr 2023 09:30) (95/38 - 132/50)  ABP(mean): 69 (14 Apr 2023 09:30) (59 - 81)  RR: 24 (14 Apr 2023 09:30) (10 - 32)  SpO2: 100% (14 Apr 2023 09:30) (96% - 100%)    O2 Parameters below as of 13 Apr 2023 12:50  Patient On (Oxygen Delivery Method): room air          Mode: AC/ CMV (Assist Control/ Continuous Mandatory Ventilation), RR (machine): 24, TV (machine): 400, FiO2: 30, PEEP: 5, ITime: 1, MAP: 15, PIP: 43    04-13 @ 07:01  -  04-14 @ 07:00  --------------------------------------------------------  IN: 6976.1 mL / OUT: 875 mL / NET: 6101.1 mL      CAPILLARY BLOOD GLUCOSE      POCT Blood Glucose.: 156 mg/dL (14 Apr 2023 05:52)      PHYSICAL EXAM:  General: intubated, sedated, critically ill  Respiratory: Intubated; 24/400/5/30%. No wheezes, rales, rhonchi   Cardiovascular: RRR, normal S1S2, no M/R/G  Abdomen: soft, NT/ND, ostomy site is dark red with no output   Extremities: WWP, no clubbing, cyanosis, or edema  Neurology: sedated on fentanyl      MEDICATIONS:  MEDICATIONS  (STANDING):  chlorhexidine 0.12% Liquid 15 milliLiter(s) Oral Mucosa every 12 hours  chlorhexidine 4% Liquid 1 Application(s) Topical <User Schedule>  dextrose 5% 1000 milliLiter(s) (50 mL/Hr) IV Continuous <Continuous>  fentaNYL   Infusion. 0.5 MICROgram(s)/kG/Hr (3.64 mL/Hr) IV Continuous <Continuous>  levothyroxine Injectable 93.75 MICROGram(s) IV Push at bedtime  metroNIDAZOLE  IVPB 500 milliGRAM(s) IV Intermittent every 8 hours  norepinephrine Infusion 0.05 MICROgram(s)/kG/Min (6.83 mL/Hr) IV Continuous <Continuous>  pantoprazole  Injectable 40 milliGRAM(s) IV Push daily  phenylephrine    Infusion 0.1 MICROgram(s)/kG/Min (2.73 mL/Hr) IV Continuous <Continuous>  piperacillin/tazobactam IVPB.. 3.375 Gram(s) IV Intermittent every 12 hours    MEDICATIONS  (PRN):  albuterol    90 MICROgram(s) HFA Inhaler 4 Puff(s) Inhalation every 6 hours PRN Shortness of Breath and/or Wheezing  fentaNYL    Injectable 50 MICROGram(s) IV Push every 4 hours PRN Severe Pain (7 - 10)  sodium chloride 0.9% lock flush 10 milliLiter(s) IV Push every 1 hour PRN Pre/post blood products, medications, blood draw, and to maintain line patency      ALLERGIES:  Allergies    No Known Allergies    Intolerances        LABS:                        7.9    13.35 )-----------( 134      ( 14 Apr 2023 09:15 )             25.1     04-14    143  |  106  |  71<H>  ----------------------------<  162<H>  2.9<LL>   |  24  |  4.40<H>    Ca    6.5<LL>      14 Apr 2023 09:15  Phos  5.1     04-14  Mg     2.3     04-14    TPro  5.1<L>  /  Alb  2.2<L>  /  TBili  1.0  /  DBili  x   /  AST  866<H>  /  ALT  268<H>  /  AlkPhos  105  04-14    PT/INR - ( 14 Apr 2023 09:15 )   PT: 24.8 sec;   INR: 2.10 ratio         PTT - ( 14 Apr 2023 09:15 )  PTT:42.7 sec      RADIOLOGY & ADDITIONAL TESTS:   ACC: [id number] EXAM:  CT ABDOMEN AND PELVIS OC   ORDERED BY:  MELISSA MAYFIELD     PROCEDURE DATE:  04/13/2023          INTERPRETATION:  CLINICAL INFORMATION: 72 years  Female with abd   pain/diarrhea. History of carcinoid tumor of the lung    COMPARISON: CT abdomen and pelvis 1/26/2023 and PET CT 3/17/2023    CONTRAST/COMPLICATIONS:  IV Contrast: NONE  0 cc administered   0 cc discarded  Oral Contrast: Omnipaque 300  Complications: None reported at time of study completion    PROCEDURE:  CT of the Abdomen and Pelvis was performed.  Sagittal and coronal reformats were performed.    LIMITATION: Evaluation of the solid organs is limited by lack of IV   contrast.    FINDINGS:  LOWER CHEST: Persistent bilateral groundglass opacities. Redemonstrated 9   mm right middle lobe nodule and loculated right pleural effusion.    LIVER: Within normal limits.  BILE DUCTS: Normal caliber.  GALLBLADDER: Cholecystectomy.  SPLEEN: Stable splenomegaly.  PANCREAS: Within normal limits.  ADRENALS: Within normal limits.  KIDNEYS/URETERS: Within normal limits.    BLADDER: Within normal limits.  REPRODUCTIVE ORGANS: Uterus not well visualized.    BOWEL: No bowel obstruction. Appendix is not visualized and cannot be   assessed.. Featureless colon suggestive of colitis. Sigmoid   diverticulosis. Limited evaluation for wall thickening without enteric   contrast. Droplets of extraluminal air adjacent to the sigmoid colon.   Moderate hiatus hernia.  PERITONEUM: Pneumoperitoneum most pronounced in the anterior upper   abdomen. Droplets of free air throughout the remainder of the abdomen and   adjacent to the sigmoid colon. Moderate ascites, new compared with prior.  VESSELS: Atherosclerotic changes.  RETROPERITONEUM/LYMPH NODES: No lymphadenopathy.  ABDOMINAL WALL: Within normal limits.  BONES: Degenerative changes.    IMPRESSION:  Pneumoperitoneum and no ascites. Suspect perforated sigmoid colon as   above.    Stable splenomegaly.    Stable right lung nodule, groundglass infiltrates and loculated right   pleural effusion.    BEDSIDE LUNG ULTRASOUND: - no evidence of  pleural effusion; diffuse B-lines    BEDSIDE ECHO: RV dilation; IVC 2.3cm, LV wnl     CENTRAL LINE: Y - RIJ        DATE INSERTED:             REMOVE: N  BISHOP: Y                       DATE INSERTED:              REMOVE: Y/N  A-LINE: A - L Axillary                        DATE INSERTED:              REMOVE: Y/N      GLOBAL ISSUE/BEST PRACTICE  Analgesia: Y  Sedation: Y  HOB elevation: yes  Stress ulcer prophylaxis: Y  VTE prophylaxis: Y  Glycemic control: Y  Nutrition: Y    CODE STATUS: Full Code

## 2023-04-14 NOTE — DIETITIAN INITIAL EVALUATION ADULT - PROBLEM SELECTOR PLAN 2
Pt has had recurrent c diff, and p/w diarrhea; leukocytosis to 33  - would check GI PCR, stool culture, c. diff  - cont IV flagyl for now

## 2023-04-14 NOTE — PROGRESS NOTE ADULT - SUBJECTIVE AND OBJECTIVE BOX
Patient is a 72y old  Female who presents with a chief complaint of intra-abdominal perforation (14 Apr 2023 14:22)      BRIEF HOSPITAL COURSE:   71 yo f pmhx HTN, CHF, asthma, hypothyroidism, afib (?no ac), recent cdiff infection (01/2023) presented with abdominal pain found to have perforated sigmoid colon POD#1 Tia procedures admitted to ICU in shock with resp failure, shock liver and aman.      Events last 24 hours:   Poor urine output, remains on levophed for bp support, minimal vent settings, peak pressures remains ~40    PAST MEDICAL & SURGICAL HISTORY:  Hypothyroidism      HLD (hyperlipidemia)      Thrombocytosis      Persistent atrial fibrillation      Obesity (BMI 35.0-39.9 without comorbidity)      Asthma      Diabetes mellitus      Ankle injury  on 2004, 5 surgeries.      Cholecystitis        Allergies    No Known Allergies    Intolerances      FAMILY HISTORY:  Family history of early CAD (Father)    Family history of early CAD (Mother)        Social History:     Review of Systems:  CONSTITUTIONAL: No fever, chills, or fatigue  EYES: No eye pain, visual disturbances, or discharge  ENMT:  No difficulty hearing, tinnitus, vertigo; No sinus or throat pain  NECK: No pain or stiffness  RESPIRATORY: No cough, wheezing, chills or hemoptysis; No shortness of breath  CARDIOVASCULAR: No chest pain, palpitations, dizziness, or leg swelling  GASTROINTESTINAL: No abdominal or epigastric pain. No nausea, vomiting, or hematemesis; No diarrhea or constipation. No melena or hematochezia.  GENITOURINARY: No dysuria, frequency, hematuria, or incontinence  NEUROLOGICAL: No headaches, memory loss, loss of strength, numbness, or tremors  SKIN: No itching, burning, rashes, or lesions   MUSCULOSKELETAL: No joint pain or swelling; No muscle, back, or extremity pain  PSYCHIATRIC: No depression, anxiety, mood swings, or difficulty sleeping      Vitals During Exam:   HR:   BP:   RR:  sPO2:     Physical Examination:    General: No acute distress.      HEENT: Pupils equal, reactive to light.  Symmetric.    PULM: Clear to auscultation bilaterally, no significant sputum production    CVS: Regular rate and rhythm, no murmurs, rubs, or gallops    ABD: Soft, nondistended, nontender, normoactive bowel sounds, no masses    EXT: No edema, nontender    SKIN: Warm and well perfused, no rashes noted.    NEURO: Alert, oriented, interactive, nonfocal      Medications:  metroNIDAZOLE  IVPB 500 milliGRAM(s) IV Intermittent every 8 hours  piperacillin/tazobactam IVPB.. 3.375 Gram(s) IV Intermittent every 12 hours    norepinephrine Infusion 0.05 MICROgram(s)/kG/Min IV Continuous <Continuous>  phenylephrine    Infusion 0.1 MICROgram(s)/kG/Min IV Continuous <Continuous>    albuterol    90 MICROgram(s) HFA Inhaler 4 Puff(s) Inhalation every 6 hours PRN    fentaNYL    Injectable 50 MICROGram(s) IV Push every 4 hours PRN  fentaNYL   Infusion. 0.5 MICROgram(s)/kG/Hr IV Continuous <Continuous>        pantoprazole  Injectable 40 milliGRAM(s) IV Push daily      levothyroxine Injectable 93.75 MICROGram(s) IV Push at bedtime    dextrose 5% 1000 milliLiter(s) IV Continuous <Continuous>  sodium chloride 0.9% lock flush 10 milliLiter(s) IV Push every 1 hour PRN      chlorhexidine 0.12% Liquid 15 milliLiter(s) Oral Mucosa every 12 hours  chlorhexidine 4% Liquid 1 Application(s) Topical <User Schedule>        Mode: AC/ CMV (Assist Control/ Continuous Mandatory Ventilation)  RR (machine): 24  TV (machine): 400  FiO2: 30  PEEP: 5  ITime: 1  MAP: 14  PIP: 39      ICU Vital Signs Last 24 Hrs  T(C): 36.9 (14 Apr 2023 20:43), Max: 37 (14 Apr 2023 16:21)  T(F): 98.4 (14 Apr 2023 20:43), Max: 98.6 (14 Apr 2023 16:21)  HR: 80 (14 Apr 2023 20:00) (80 - 119)  BP: 96/49 (14 Apr 2023 05:00) (91/50 - 111/51)  BP(mean): 71 (14 Apr 2023 05:00) (64 - 74)  ABP: 97/47 (14 Apr 2023 20:00) (88/42 - 118/45)  ABP(mean): 66 (14 Apr 2023 20:00) (59 - 72)  RR: 24 (14 Apr 2023 20:00) (24 - 32)  SpO2: 100% (14 Apr 2023 20:00) (100% - 100%)      Vital Signs Last 24 Hrs  T(C): 36.9 (14 Apr 2023 20:43), Max: 37 (14 Apr 2023 16:21)  T(F): 98.4 (14 Apr 2023 20:43), Max: 98.6 (14 Apr 2023 16:21)  HR: 80 (14 Apr 2023 20:00) (80 - 119)  BP: 96/49 (14 Apr 2023 05:00) (91/50 - 111/51)  BP(mean): 71 (14 Apr 2023 05:00) (64 - 74)  RR: 24 (14 Apr 2023 20:00) (24 - 32)  SpO2: 100% (14 Apr 2023 20:00) (100% - 100%)        ABG - ( 14 Apr 2023 04:00 )  pH, Arterial: 7.34  pH, Blood: x     /  pCO2: 41    /  pO2: 207   / HCO3: 22    / Base Excess: -3.7  /  SaO2: 100.0               I&O's Detail    13 Apr 2023 07:01  -  14 Apr 2023 07:00  --------------------------------------------------------  IN:    Albumin 5%  - 250 mL: 1000 mL    dextrose 5% w/ Additives: 1650 mL    FentaNYL: 44.1 mL    IV PiggyBack: 200 mL    IV PiggyBack: 100 mL    IV PiggyBack: 100 mL    IV PiggyBack: 100 mL    IV PiggyBack: 100 mL    Lactated Ringers: 100 mL    Lactated Ringers Bolus: 1000 mL    Norepinephrine: 107.1 mL    Norepinephrine: 8 mL    Phenylephrine: 183.9 mL    Plasma: 656 mL    PRBCs (Packed Red Blood Cells): 627 mL    Sodium Chloride 0.9% Bolus: 1000 mL  Total IN: 6976.1 mL    OUT:    Bulb (mL): 540 mL    Indwelling Catheter - Urethral (mL): 35 mL    Nasogastric/Oral tube (mL): 300 mL  Total OUT: 875 mL    Total NET: 6101.1 mL      14 Apr 2023 07:01  -  14 Apr 2023 21:15  --------------------------------------------------------  IN:    dextrose 5% w/ Additives: 550 mL    dextrose 5% w/ Additives: 300 mL    FentaNYL: 48.1 mL    Norepinephrine: 60.5 mL    Phenylephrine: 43 mL  Total IN: 1001.6 mL    OUT:    Bulb (mL): 370 mL    Indwelling Catheter - Urethral (mL): 55 mL    Nasogastric/Oral tube (mL): 350 mL  Total OUT: 775 mL    Total NET: 226.6 mL            LABS:                        7.9    13.35 )-----------( 134      ( 14 Apr 2023 09:15 )             25.1     04-14    142  |  105  |  71<H>  ----------------------------<  132<H>  3.5   |  25  |  4.30<H>    Ca    6.3<LL>      14 Apr 2023 12:40  Phos  5.1     04-14  Mg     2.3     04-14    TPro  5.1<L>  /  Alb  2.2<L>  /  TBili  1.0  /  DBili  x   /  AST  866<H>  /  ALT  268<H>  /  AlkPhos  105  04-14      CARDIAC MARKERS ( 13 Apr 2023 09:10 )  x     / x     / 62 U/L / x     / 2.4 ng/mL      CAPILLARY BLOOD GLUCOSE      POCT Blood Glucose.: 145 mg/dL (14 Apr 2023 17:40)    PT/INR - ( 14 Apr 2023 09:15 )   PT: 24.8 sec;   INR: 2.10 ratio         PTT - ( 14 Apr 2023 09:15 )  PTT:42.7 sec    CULTURES:        RADIOLOGY: ***      SUPPLEMENTAL O2:   LINES:  IVF:  BISHOP:   PPx:   CONTACT: Patient is a 72y old  Female who presents with a chief complaint of intra-abdominal perforation (14 Apr 2023 14:22)      BRIEF HOSPITAL COURSE:   73 yo f pmhx HTN, CHF, asthma, hypothyroidism, afib (?no ac), recent cdiff infection (01/2023) presented with abdominal pain found to have perforated sigmoid colon POD#1 Tia procedures admitted to ICU in shock with resp failure, shock liver and aman.      Events last 24 hours:   Poor urine output, remains on levophed for bp support, minimal vent settings, peak pressures 38-43, reviewed abgs, lowered tv from 400 to 380, peak pressures slightly improved.  repeat labs ordered for 2200.    PAST MEDICAL & SURGICAL HISTORY:  Hypothyroidism      HLD (hyperlipidemia)      Thrombocytosis      Persistent atrial fibrillation      Obesity (BMI 35.0-39.9 without comorbidity)      Asthma      Diabetes mellitus      Ankle injury  on 2004, 5 surgeries.      Cholecystitis        Allergies    No Known Allergies    Intolerances      FAMILY HISTORY:  Family history of early CAD (Father)    Family history of early CAD (Mother)        Social History:     Review of Systems:  CONSTITUTIONAL: No fever, chills, or fatigue  EYES: No eye pain, visual disturbances, or discharge  ENMT:  No difficulty hearing, tinnitus, vertigo; No sinus or throat pain  NECK: No pain or stiffness  RESPIRATORY: No cough, wheezing, chills or hemoptysis; No shortness of breath  CARDIOVASCULAR: No chest pain, palpitations, dizziness, or leg swelling  GASTROINTESTINAL: No abdominal or epigastric pain. No nausea, vomiting, or hematemesis; No diarrhea or constipation. No melena or hematochezia.  GENITOURINARY: No dysuria, frequency, hematuria, or incontinence  NEUROLOGICAL: No headaches, memory loss, loss of strength, numbness, or tremors  SKIN: No itching, burning, rashes, or lesions   MUSCULOSKELETAL: No joint pain or swelling; No muscle, back, or extremity pain  PSYCHIATRIC: No depression, anxiety, mood swings, or difficulty sleeping      Vitals During Exam:   HR:   BP:   RR:  sPO2:     Physical Examination:    General: No acute distress.      HEENT: Pupils equal, reactive to light.  Symmetric.    PULM: Clear to auscultation bilaterally, no significant sputum production    CVS: Regular rate and rhythm, no murmurs, rubs, or gallops    ABD: Soft, nondistended, nontender, normoactive bowel sounds, no masses    EXT: No edema, nontender    SKIN: Warm and well perfused, no rashes noted.    NEURO: Alert, oriented, interactive, nonfocal      Medications:  metroNIDAZOLE  IVPB 500 milliGRAM(s) IV Intermittent every 8 hours  piperacillin/tazobactam IVPB.. 3.375 Gram(s) IV Intermittent every 12 hours    norepinephrine Infusion 0.05 MICROgram(s)/kG/Min IV Continuous <Continuous>  phenylephrine    Infusion 0.1 MICROgram(s)/kG/Min IV Continuous <Continuous>    albuterol    90 MICROgram(s) HFA Inhaler 4 Puff(s) Inhalation every 6 hours PRN    fentaNYL    Injectable 50 MICROGram(s) IV Push every 4 hours PRN  fentaNYL   Infusion. 0.5 MICROgram(s)/kG/Hr IV Continuous <Continuous>        pantoprazole  Injectable 40 milliGRAM(s) IV Push daily      levothyroxine Injectable 93.75 MICROGram(s) IV Push at bedtime    dextrose 5% 1000 milliLiter(s) IV Continuous <Continuous>  sodium chloride 0.9% lock flush 10 milliLiter(s) IV Push every 1 hour PRN      chlorhexidine 0.12% Liquid 15 milliLiter(s) Oral Mucosa every 12 hours  chlorhexidine 4% Liquid 1 Application(s) Topical <User Schedule>        Mode: AC/ CMV (Assist Control/ Continuous Mandatory Ventilation)  RR (machine): 24  TV (machine): 400  FiO2: 30  PEEP: 5  ITime: 1  MAP: 14  PIP: 39      ICU Vital Signs Last 24 Hrs  T(C): 36.9 (14 Apr 2023 20:43), Max: 37 (14 Apr 2023 16:21)  T(F): 98.4 (14 Apr 2023 20:43), Max: 98.6 (14 Apr 2023 16:21)  HR: 80 (14 Apr 2023 20:00) (80 - 119)  BP: 96/49 (14 Apr 2023 05:00) (91/50 - 111/51)  BP(mean): 71 (14 Apr 2023 05:00) (64 - 74)  ABP: 97/47 (14 Apr 2023 20:00) (88/42 - 118/45)  ABP(mean): 66 (14 Apr 2023 20:00) (59 - 72)  RR: 24 (14 Apr 2023 20:00) (24 - 32)  SpO2: 100% (14 Apr 2023 20:00) (100% - 100%)      Vital Signs Last 24 Hrs  T(C): 36.9 (14 Apr 2023 20:43), Max: 37 (14 Apr 2023 16:21)  T(F): 98.4 (14 Apr 2023 20:43), Max: 98.6 (14 Apr 2023 16:21)  HR: 80 (14 Apr 2023 20:00) (80 - 119)  BP: 96/49 (14 Apr 2023 05:00) (91/50 - 111/51)  BP(mean): 71 (14 Apr 2023 05:00) (64 - 74)  RR: 24 (14 Apr 2023 20:00) (24 - 32)  SpO2: 100% (14 Apr 2023 20:00) (100% - 100%)        ABG - ( 14 Apr 2023 04:00 )  pH, Arterial: 7.34  pH, Blood: x     /  pCO2: 41    /  pO2: 207   / HCO3: 22    / Base Excess: -3.7  /  SaO2: 100.0               I&O's Detail    13 Apr 2023 07:01  -  14 Apr 2023 07:00  --------------------------------------------------------  IN:    Albumin 5%  - 250 mL: 1000 mL    dextrose 5% w/ Additives: 1650 mL    FentaNYL: 44.1 mL    IV PiggyBack: 200 mL    IV PiggyBack: 100 mL    IV PiggyBack: 100 mL    IV PiggyBack: 100 mL    IV PiggyBack: 100 mL    Lactated Ringers: 100 mL    Lactated Ringers Bolus: 1000 mL    Norepinephrine: 107.1 mL    Norepinephrine: 8 mL    Phenylephrine: 183.9 mL    Plasma: 656 mL    PRBCs (Packed Red Blood Cells): 627 mL    Sodium Chloride 0.9% Bolus: 1000 mL  Total IN: 6976.1 mL    OUT:    Bulb (mL): 540 mL    Indwelling Catheter - Urethral (mL): 35 mL    Nasogastric/Oral tube (mL): 300 mL  Total OUT: 875 mL    Total NET: 6101.1 mL      14 Apr 2023 07:01  -  14 Apr 2023 21:15  --------------------------------------------------------  IN:    dextrose 5% w/ Additives: 550 mL    dextrose 5% w/ Additives: 300 mL    FentaNYL: 48.1 mL    Norepinephrine: 60.5 mL    Phenylephrine: 43 mL  Total IN: 1001.6 mL    OUT:    Bulb (mL): 370 mL    Indwelling Catheter - Urethral (mL): 55 mL    Nasogastric/Oral tube (mL): 350 mL  Total OUT: 775 mL    Total NET: 226.6 mL            LABS:                        7.9    13.35 )-----------( 134      ( 14 Apr 2023 09:15 )             25.1     04-14    142  |  105  |  71<H>  ----------------------------<  132<H>  3.5   |  25  |  4.30<H>    Ca    6.3<LL>      14 Apr 2023 12:40  Phos  5.1     04-14  Mg     2.3     04-14    TPro  5.1<L>  /  Alb  2.2<L>  /  TBili  1.0  /  DBili  x   /  AST  866<H>  /  ALT  268<H>  /  AlkPhos  105  04-14      CARDIAC MARKERS ( 13 Apr 2023 09:10 )  x     / x     / 62 U/L / x     / 2.4 ng/mL      CAPILLARY BLOOD GLUCOSE      POCT Blood Glucose.: 145 mg/dL (14 Apr 2023 17:40)    PT/INR - ( 14 Apr 2023 09:15 )   PT: 24.8 sec;   INR: 2.10 ratio         PTT - ( 14 Apr 2023 09:15 )  PTT:42.7 sec    CULTURES:        RADIOLOGY: ***      SUPPLEMENTAL O2:   LINES:  IVF:  BISHOP:   PPx:   CONTACT: Patient is a 72y old  Female who presents with a chief complaint of intra-abdominal perforation (14 Apr 2023 14:22)      BRIEF HOSPITAL COURSE:   73 yo f pmhx HTN, CHF, asthma, hypothyroidism, afib (?no ac), recent cdiff infection (01/2023) presented with abdominal pain found to have perforated sigmoid colon POD#1 Tia procedures admitted to ICU in shock with resp failure, shock liver and aman.      Events last 24 hours:   Poor urine output, remains on levophed for bp support, minimal vent settings, peak pressures 38-43, reviewed abgs, lowered tv from 400 to 380, peak pressures slightly improved.  repeat labs ordered for 2200.    PAST MEDICAL & SURGICAL HISTORY:  Hypothyroidism      HLD (hyperlipidemia)      Thrombocytosis      Persistent atrial fibrillation      Obesity (BMI 35.0-39.9 without comorbidity)      Asthma      Diabetes mellitus      Ankle injury  on 2004, 5 surgeries.      Cholecystitis        Allergies    No Known Allergies    Intolerances      FAMILY HISTORY:  Family history of early CAD (Father)    Family history of early CAD (Mother)        Social History:   from home      Review of Systems:  unable to obtain 2/2 intubated/sedated      Physical Examination:    General: Elderly female, lying in bed, nad      HEENT: NC/AT, pupils 3-4mm equal round and reactive, ngt in place, ett in place    PULM: grossly cta b/l    CVS: sinus tach    ABD: Soft, nondistended, +tender, hypoactive bs, ostomy in place with dark brown liquid stool, abdominal incision covered with dressing, clement drain in place    EXT: Nonpitting edema, nontender    SKIN: Warm     NEURO: sedated      Medications:  metroNIDAZOLE  IVPB 500 milliGRAM(s) IV Intermittent every 8 hours  piperacillin/tazobactam IVPB.. 3.375 Gram(s) IV Intermittent every 12 hours    norepinephrine Infusion 0.05 MICROgram(s)/kG/Min IV Continuous <Continuous>  phenylephrine    Infusion 0.1 MICROgram(s)/kG/Min IV Continuous <Continuous>    albuterol    90 MICROgram(s) HFA Inhaler 4 Puff(s) Inhalation every 6 hours PRN    fentaNYL    Injectable 50 MICROGram(s) IV Push every 4 hours PRN  fentaNYL   Infusion. 0.5 MICROgram(s)/kG/Hr IV Continuous <Continuous>        pantoprazole  Injectable 40 milliGRAM(s) IV Push daily      levothyroxine Injectable 93.75 MICROGram(s) IV Push at bedtime    dextrose 5% 1000 milliLiter(s) IV Continuous <Continuous>  sodium chloride 0.9% lock flush 10 milliLiter(s) IV Push every 1 hour PRN      chlorhexidine 0.12% Liquid 15 milliLiter(s) Oral Mucosa every 12 hours  chlorhexidine 4% Liquid 1 Application(s) Topical <User Schedule>        Mode: AC/ CMV (Assist Control/ Continuous Mandatory Ventilation)  RR (machine): 24  TV (machine): 400  FiO2: 30  PEEP: 5  ITime: 1  MAP: 14  PIP: 39      ICU Vital Signs Last 24 Hrs  T(C): 36.9 (14 Apr 2023 20:43), Max: 37 (14 Apr 2023 16:21)  T(F): 98.4 (14 Apr 2023 20:43), Max: 98.6 (14 Apr 2023 16:21)  HR: 80 (14 Apr 2023 20:00) (80 - 119)  BP: 96/49 (14 Apr 2023 05:00) (91/50 - 111/51)  BP(mean): 71 (14 Apr 2023 05:00) (64 - 74)  ABP: 97/47 (14 Apr 2023 20:00) (88/42 - 118/45)  ABP(mean): 66 (14 Apr 2023 20:00) (59 - 72)  RR: 24 (14 Apr 2023 20:00) (24 - 32)  SpO2: 100% (14 Apr 2023 20:00) (100% - 100%)      Vital Signs Last 24 Hrs  T(C): 36.9 (14 Apr 2023 20:43), Max: 37 (14 Apr 2023 16:21)  T(F): 98.4 (14 Apr 2023 20:43), Max: 98.6 (14 Apr 2023 16:21)  HR: 80 (14 Apr 2023 20:00) (80 - 119)  BP: 96/49 (14 Apr 2023 05:00) (91/50 - 111/51)  BP(mean): 71 (14 Apr 2023 05:00) (64 - 74)  RR: 24 (14 Apr 2023 20:00) (24 - 32)  SpO2: 100% (14 Apr 2023 20:00) (100% - 100%)        ABG - ( 14 Apr 2023 04:00 )  pH, Arterial: 7.34  pH, Blood: x     /  pCO2: 41    /  pO2: 207   / HCO3: 22    / Base Excess: -3.7  /  SaO2: 100.0               I&O's Detail    13 Apr 2023 07:01  -  14 Apr 2023 07:00  --------------------------------------------------------  IN:    Albumin 5%  - 250 mL: 1000 mL    dextrose 5% w/ Additives: 1650 mL    FentaNYL: 44.1 mL    IV PiggyBack: 200 mL    IV PiggyBack: 100 mL    IV PiggyBack: 100 mL    IV PiggyBack: 100 mL    IV PiggyBack: 100 mL    Lactated Ringers: 100 mL    Lactated Ringers Bolus: 1000 mL    Norepinephrine: 107.1 mL    Norepinephrine: 8 mL    Phenylephrine: 183.9 mL    Plasma: 656 mL    PRBCs (Packed Red Blood Cells): 627 mL    Sodium Chloride 0.9% Bolus: 1000 mL  Total IN: 6976.1 mL    OUT:    Bulb (mL): 540 mL    Indwelling Catheter - Urethral (mL): 35 mL    Nasogastric/Oral tube (mL): 300 mL  Total OUT: 875 mL    Total NET: 6101.1 mL      14 Apr 2023 07:01  -  14 Apr 2023 21:15  --------------------------------------------------------  IN:    dextrose 5% w/ Additives: 550 mL    dextrose 5% w/ Additives: 300 mL    FentaNYL: 48.1 mL    Norepinephrine: 60.5 mL    Phenylephrine: 43 mL  Total IN: 1001.6 mL    OUT:    Bulb (mL): 370 mL    Indwelling Catheter - Urethral (mL): 55 mL    Nasogastric/Oral tube (mL): 350 mL  Total OUT: 775 mL    Total NET: 226.6 mL            LABS:                        7.9    13.35 )-----------( 134      ( 14 Apr 2023 09:15 )             25.1     04-14    142  |  105  |  71<H>  ----------------------------<  132<H>  3.5   |  25  |  4.30<H>    Ca    6.3<LL>      14 Apr 2023 12:40  Phos  5.1     04-14  Mg     2.3     04-14    TPro  5.1<L>  /  Alb  2.2<L>  /  TBili  1.0  /  DBili  x   /  AST  866<H>  /  ALT  268<H>  /  AlkPhos  105  04-14      CARDIAC MARKERS ( 13 Apr 2023 09:10 )  x     / x     / 62 U/L / x     / 2.4 ng/mL      CAPILLARY BLOOD GLUCOSE      POCT Blood Glucose.: 145 mg/dL (14 Apr 2023 17:40)    PT/INR - ( 14 Apr 2023 09:15 )   PT: 24.8 sec;   INR: 2.10 ratio         PTT - ( 14 Apr 2023 09:15 )  PTT:42.7 sec    CULTURES:      RADIOLOGY:   < from: Xray Chest 1 View- PORTABLE-Urgent (Xray Chest 1 View- PORTABLE-Urgent .) (04.13.23 @ 20:31) >  ACC: [id number] EXAM:  XR CHEST PORTABLE URGENT 1V   ORDERED BY: MICHELLE GUERRERO     PROCEDURE DATE:  04/13/2023      INTERPRETATION:  Chest one view    HISTORY: Intubation    COMPARISON STUDY: Earlier the same day    Frontal expiratory view of the chest shows the heart to be similarly   enlarged in size. Endotracheal tube reaches the mid trachea. Nasogastric   tube reaches the central stomach. Right jugular line is again noted.    The lungs show similar pulmonary markings with progression of small   pleural effusions and there is no evidence of pneumothorax although the   lung apices are not fully included.    IMPRESSION:  Lines as noted.      Thank you for the courtesy of this referral.    --- End of Report ---      MARKOS LLOYD MD; Attending Interventional Radiologist  This document has been electronically signed. Apr 14 2023 11:25AM    < end of copied text >    < from: CT Abdomen and Pelvis w/ Oral Cont (04.13.23 @ 09:26) >  ACC: [id number] EXAM:  CT ABDOMEN AND PELVIS OC   ORDERED BY:  MELISSA MAYFIELD     PROCEDURE DATE:  04/13/2023      INTERPRETATION:  CLINICAL INFORMATION: 72 years  Female with abd   pain/diarrhea. History of carcinoid tumor of the lung    COMPARISON: CT abdomen and pelvis 1/26/2023 and PET CT 3/17/2023    CONTRAST/COMPLICATIONS:  IV Contrast: NONE  0 cc administered   0 cc discarded  Oral Contrast: Omnipaque 300  Complications: None reported at time of study completion    PROCEDURE:  CT ofthe Abdomen and Pelvis was performed.  Sagittal and coronal reformats were performed.    LIMITATION: Evaluation of the solid organs is limited by lack of IV   contrast.    FINDINGS:  LOWER CHEST: Persistent bilateral groundglass opacities. Redemonstrated 9   mm right middle lobe nodule and loculated right pleural effusion.    LIVER: Within normal limits.  BILE DUCTS: Normal caliber.  GALLBLADDER: Cholecystectomy.  SPLEEN: Stable splenomegaly.  PANCREAS: Within normal limits.  ADRENALS: Within normal limits.  KIDNEYS/URETERS: Within normal limits.    BLADDER: Within normal limits.  REPRODUCTIVE ORGANS: Uterus not well visualized.    BOWEL: No bowel obstruction. Appendix is not visualized and cannot be   assessed.. Featureless colon suggestive of colitis. Sigmoid   diverticulosis. Limited evaluation for wall thickening without enteric   contrast. Droplets of extraluminal air adjacent to the sigmoid colon.   Moderate hiatus hernia.  PERITONEUM: Pneumoperitoneum most pronounced in the anterior upper   abdomen. Droplets of free air throughout the remainder of the abdomen and   adjacent to the sigmoid colon. Moderate ascites, new compared with prior.  VESSELS: Atherosclerotic changes.  RETROPERITONEUM/LYMPH NODES: No lymphadenopathy.  ABDOMINAL WALL: Within normal limits.  BONES: Degenerative changes.    IMPRESSION:  Pneumoperitoneum and no ascites. Suspect perforated sigmoid colon as   above.    Stable splenomegaly.    Stable right lung nodule, groundglass infiltrates and loculated right   pleural effusion.    CRITICAL VALUE: I discussed the major findings in this report with Dr. Mark Hobbs at 4/13/2023 9:54 AM with readback. Critical value policy of   the hospital was followed. Read back and confirmation of receipt of this   communication was  performed. This verbal communication supplements the text report of this   document.    --- End of Report ---    JUNIE GILL MD; Attending Radiologist  This document has been electronically signed. Apr 13 2023 10:01AM    < end of copied text >    < from: CT Head No Cont (04.13.23 @ 09:26) >  ACC: [id number] EXAM:  CT BRAIN   ORDERED BY:  MELISSA MAYFIELD     PROCEDURE DATE:  04/13/2023      INTERPRETATION:  CLINICAL INDICATIONS:  AMS    COMPARISON: None.    TECHNIQUE: Noncontrast CT of the head. Multiplanar reformations are   submitted.    FINDINGS:  There is periventricular and subcortical white matter hypodensity without   mass effect, nonspecific, likely representing mild chronic microvascular   ischemic changes. There is no compelling evidence for an acute   transcortical infarction. There is no evidence of mass, mass effect,   midline shift or extra-axial fluid collection. The lateral ventricles and   cortical sulci are age-appropriate in size and configuration. The orbits,   mastoid air cells and visualized paranasal sinuses are unremarkable. The   calvarium is intact. Consider MRI as clinically warranted.    IMPRESSION:  Mild chronic microvascular changes without evidence of an   acute transcortical infarction or hemorrhage.    --- End of Report ---    JAYSON DOSS MD; Attending Radiologist  This document has been electronically signed. Apr 13 2023  9:33AM    < end of copied text >        SUPPLEMENTAL O2:   LINES:  ZA:  DARIO:   PPx:   CONTACT:

## 2023-04-14 NOTE — DIETITIAN INITIAL EVALUATION ADULT - PERTINENT LABORATORY DATA
04-14    143  |  106  |  71<H>  ----------------------------<  162<H>  2.9<LL>   |  24  |  4.40<H>    Ca    6.5<LL>      14 Apr 2023 09:15  Phos  5.1     04-14  Mg     2.3     04-14    TPro  5.1<L>  /  Alb  2.2<L>  /  TBili  1.0  /  DBili  x   /  AST  866<H>  /  ALT  268<H>  /  AlkPhos  105  04-14  POCT Blood Glucose.: 138 mg/dL (04-14-23 @ 12:14)

## 2023-04-14 NOTE — ANESTHESIA FOLLOW-UP NOTE - NSEVALATIONFT_GEN_ALL_CORE
pt remains intubated and sedated on 2 pressors.  Most indicies improving.  Case discussed with ICU attending.

## 2023-04-14 NOTE — PROGRESS NOTE ADULT - PROBLEM SELECTOR PLAN 1
Shock likely due to sepsis requiring vasopressor support- currently requiring 2 pressors.   TTE pending final read.   Bedside POCUS with dilated RV and normal LV and IVC with diffuse B-lines.  Continue broad spectrum antimicrobials with Zosyn and Flagyl.   Peritoneal fluid with gram variable rods and PMNs.    Continue IV Synthroid     s/p DDAVP, FFP and vitamin K to reverse coagulopathy.   Continue to hold Xarelto. possible eventual heparin gtt;

## 2023-04-14 NOTE — PROGRESS NOTE ADULT - SUBJECTIVE AND OBJECTIVE BOX
Subjective and Objective:   [facility] CARDIOLOGY CONSULTANTS: Adrián Wray Pannella, Patel, Savella, Goodger      [phone]    CHIEF COMPLAINT: Patient is a 72y old  Female who presents with a chief complaint of intra-abdominal perforation (2023 10:18)    FOLLOW UP: S/p Tia's with abdominal abscess drainage.   OR finding with >1.2 liters of pus from abd cavity.     pt on Full vent support. s/p PRBC, IV hydration for severe vasodilation shock , on multiple pressor .       TELEMETRY: NSR PACs    unable to obtain ROS        PAST MEDICAL & SURGICAL HISTORY:  Hypothyroidism      HLD (hyperlipidemia)      Thrombocytosis      Persistent atrial fibrillation      Obesity (BMI 35.0-39.9 without comorbidity)      Asthma      Diabetes mellitus      Ankle injury  on , 5 surgeries.      Cholecystitis          MEDICATIONS  (STANDING):  chlorhexidine 0.12% Liquid 15 milliLiter(s) Oral Mucosa every 12 hours  chlorhexidine 4% Liquid 1 Application(s) Topical <User Schedule>  dextrose 5% 1000 milliLiter(s) (50 mL/Hr) IV Continuous <Continuous>  fentaNYL   Infusion. 0.5 MICROgram(s)/kG/Hr (3.64 mL/Hr) IV Continuous <Continuous>  levothyroxine Injectable 93.75 MICROGram(s) IV Push at bedtime  metroNIDAZOLE  IVPB 500 milliGRAM(s) IV Intermittent every 8 hours  norepinephrine Infusion 0.05 MICROgram(s)/kG/Min (6.83 mL/Hr) IV Continuous <Continuous>  pantoprazole  Injectable 40 milliGRAM(s) IV Push daily  phenylephrine    Infusion 0.1 MICROgram(s)/kG/Min (2.73 mL/Hr) IV Continuous <Continuous>  piperacillin/tazobactam IVPB.. 3.375 Gram(s) IV Intermittent every 12 hours  potassium chloride  10 mEq/100 mL IVPB 10 milliEquivalent(s) IV Intermittent every 2 hours      Allergies    No Known Allergies    Intolerances                              7.9    13.35 )-----------( 134      ( 2023 09:15 )             25.1       04-14    143  |  106  |  71<H>  ----------------------------<  162<H>  2.9<LL>   |  24  |  4.40<H>    Ca    6.5<LL>      2023 09:15  Phos  5.1     04-14  Mg     2.3     -14    TPro  5.1<L>  /  Alb  2.2<L>  /  TBili  1.0  /  DBili  x   /  AST  866<H>  /  ALT  268<H>  /  AlkPhos  105  -14      LIVER FUNCTIONS - ( 2023 09:15 )  Alb: 2.2 g/dL / Pro: 5.1 g/dL / ALK PHOS: 105 U/L / ALT: 268 U/L / AST: 866 U/L / GGT: x             PT/INR - ( 2023 09:15 )   PT: 24.8 sec;   INR: 2.10 ratio         PTT - ( 2023 09:15 )  PTT:42.7 sec    CARDIAC MARKERS ( 2023 09:10 )  x     / x     / 62 U/L / x     / 2.4 ng/mL                    Daily Height in cm: 167.64 (2023 12:29)    Daily Weight in k (2023 05:00)    I&O's Summary    2023 07:01  -  2023 07:00  --------------------------------------------------------  IN: 6976.1 mL / OUT: 875 mL / NET: 6101.1 mL        Vital Signs Last 24 Hrs  T(C): 36.7 (2023 08:04), Max: 37.1 (2023 16:35)  T(F): 98.1 (2023 08:04), Max: 98.8 (2023 16:35)  HR: 83 (2023 10:00) (83 - 120)  BP: 96/49 (2023 05:00) (88/47 - 177/80)  BP(mean): 71 (2023 05:00) (58 - 89)  RR: 24 (2023 10:00) (10 - 32)  SpO2: 100% (2023 10:00) (96% - 100%)    Parameters below as of 2023 12:50  Patient On (Oxygen Delivery Method): room air        PHYSICAL EXAM:   · Constitutional	Well-developed, well nourished  · Eyes	EOMI; PERRL; no drainage or redness  · ENMT	No oral lesions; no gross abnormalities  · Neck	No bruits; no thyromegaly or nodules  · Respiratory	Normal breath sounds b/l, No RRW  · Cardiovascular	Regular rate & rhythm, normal S1, S2; no murmurs, gallops or rubs; no S3, S4  · Gastrointestinal	Soft, non-tender, no hepatosplenomegaly, normal bowel sounds  · Extremities	No cyanosis, clubbing or edema  · Vascular	Equal and normal pulses (carotid, femoral, dorsalis pedis)  · Neurological	Alert & oriented; no sensory, motor or coordination deficits, normal reflexes

## 2023-04-14 NOTE — DIETITIAN INITIAL EVALUATION ADULT - PROBLEM SELECTOR PLAN 1
Pt has been evaluated by surgery and is being admitted by ICU, further management and care to be determined by ICU  - CT abd/pelvis suggests perforated sigmoid colon, leukocytosis to 33, pt also hypotensive and hypothermic, spesis present on admission  - Pt has been given IV flagyl and IV cefotetan by ED  - ID consult with Dr. Ricardo called, f/u recs, may consider zosyn, flagyl, but would f/u what ID recommends  - IVF, NPO  - IV pepcid, IV protonix  - Pain control as needed

## 2023-04-14 NOTE — PROGRESS NOTE ADULT - SUBJECTIVE AND OBJECTIVE BOX
INTERVAL HPI/OVERNIGHT EVENTS:    Weaning down on pressors. Received more volume overnight. Lactate slowly downtrending    MEDICATIONS  (STANDING):  chlorhexidine 0.12% Liquid 15 milliLiter(s) Oral Mucosa every 12 hours  chlorhexidine 4% Liquid 1 Application(s) Topical <User Schedule>  dextrose 5% 1000 milliLiter(s) (150 mL/Hr) IV Continuous <Continuous>  fentaNYL   Infusion. 0.5 MICROgram(s)/kG/Hr (3.64 mL/Hr) IV Continuous <Continuous>  metroNIDAZOLE  IVPB 500 milliGRAM(s) IV Intermittent every 8 hours  norepinephrine Infusion 0.05 MICROgram(s)/kG/Min (6.83 mL/Hr) IV Continuous <Continuous>  pantoprazole  Injectable 40 milliGRAM(s) IV Push daily  phenylephrine    Infusion 0.1 MICROgram(s)/kG/Min (2.73 mL/Hr) IV Continuous <Continuous>  piperacillin/tazobactam IVPB.. 3.375 Gram(s) IV Intermittent every 12 hours    MEDICATIONS  (PRN):  sodium chloride 0.9% lock flush 10 milliLiter(s) IV Push every 1 hour PRN Pre/post blood products, medications, blood draw, and to maintain line patency      Vital Signs Last 24 Hrs  T(C): 36.7 (14 Apr 2023 08:04), Max: 37.1 (13 Apr 2023 16:35)  T(F): 98.1 (14 Apr 2023 08:04), Max: 98.8 (13 Apr 2023 16:35)  HR: 85 (14 Apr 2023 09:00) (83 - 120)  BP: 96/49 (14 Apr 2023 05:00) (88/47 - 177/80)  BP(mean): 71 (14 Apr 2023 05:00) (58 - 89)  RR: 24 (14 Apr 2023 09:00) (10 - 32)  SpO2: 100% (14 Apr 2023 09:00) (96% - 100%)    Parameters below as of 13 Apr 2023 12:50  Patient On (Oxygen Delivery Method): room air            Physical Exam:  Gen: intubated, sedated  Pulm: intubated, on AC  CV: non-tachycardic, on levo and phenylephrine  GI: abd soft, colostomy with bowel sweat. Jake with serous  : schroeder in place, mild hematuria    I&O's Detail    13 Apr 2023 07:01  -  14 Apr 2023 07:00  --------------------------------------------------------  IN:    Albumin 5%  - 250 mL: 1000 mL    dextrose 5% w/ Additives: 1650 mL    FentaNYL: 44.1 mL    IV PiggyBack: 200 mL    IV PiggyBack: 100 mL    IV PiggyBack: 100 mL    IV PiggyBack: 100 mL    IV PiggyBack: 100 mL    Lactated Ringers: 100 mL    Lactated Ringers Bolus: 1000 mL    Norepinephrine: 8 mL    Norepinephrine: 107.1 mL    Phenylephrine: 183.9 mL    Plasma: 656 mL    PRBCs (Packed Red Blood Cells): 627 mL    Sodium Chloride 0.9% Bolus: 1000 mL  Total IN: 6976.1 mL    OUT:    Bulb (mL): 540 mL    Indwelling Catheter - Urethral (mL): 35 mL    Nasogastric/Oral tube (mL): 300 mL  Total OUT: 875 mL    Total NET: 6101.1 mL          LABS:                        8.0    14.13 )-----------( 178      ( 14 Apr 2023 04:38 )             25.9     04-14    144  |  107  |  69<H>  ----------------------------<  159<H>  3.0<L>   |  24  |  4.20<H>    Ca    6.7<L>      14 Apr 2023 04:38  Phos  5.8     04-14  Mg     1.9     04-14    TPro  5.2<L>  /  Alb  2.4<L>  /  TBili  1.0  /  DBili  x   /  AST  1043<H>  /  ALT  277<H>  /  AlkPhos  112  04-14    PT/INR - ( 14 Apr 2023 04:38 )   PT: 25.0 sec;   INR: 2.12 ratio         PTT - ( 14 Apr 2023 04:38 )  PTT:40.4 sec

## 2023-04-14 NOTE — PROGRESS NOTE ADULT - ASSESSMENT
71 yo woman with Hx afib (off xarelto at least 1wk), HFpEF, asthma, recent Cdiff in Jan presenting with weakness and multiple falls at home and was found to have perforated sigmoid colon resulting in peritonitis and septic shock.   Additionally, she was noted to have lactic acidosis and LAYA likely ATN form septic shock.  She is now s/p exploratory laparotomy with washout, drainage of 1.2L purulent collection in the peritoneal cavity,  transverse and sigmoid resection and Ro's procedure.  She is being managed in the ICU due to septic shock requiring pressor support, postop respiratory failure requiring mechanical ventilation, LAYA and acute blood loss anemia

## 2023-04-14 NOTE — PHARMACOTHERAPY INTERVENTION NOTE - COMMENTS
Patient is on Synthroid 125mcg QD at home. Discussed w/ Dr. Mary ruth recommended initiating 93.75mcg inpatient (75% of PO dose). MD accepted and order was entered.

## 2023-04-14 NOTE — PROGRESS NOTE ADULT - NUTRITIONAL ASSESSMENT
This patient has been assessed with a concern for Malnutrition and has been determined to have a diagnosis/diagnoses of Severe protein-calorie malnutrition.

## 2023-04-14 NOTE — DIETITIAN INITIAL EVALUATION ADULT - ETIOLOGY
related to decreased ability to consume sufficient energy 2/2 abd pain, persistent diarrhea, recent C diff infection related to alteration in GI tract function/structure

## 2023-04-14 NOTE — DIETITIAN INITIAL EVALUATION ADULT - OTHER INFO
Pt is a "73 yo F with pmhx afib s/p ablation, HFpEF, mod MR, thrombocytosis, asthma recent C diff infection was brought to the ED today for recurrent falls and persistent nausea/vomiting, now with pneumoperitenum and perforation; Day 1 s/p Ro."    Pt intubated/sedated at time of visit. Requiring dual vasopressors for septic shock. Remains NPO at this time. Receiving IVFs; D5@50ml/hr. S/p Tia's with abdominal abscess drainage (1.2L) for perforated sigmoid colon yesterday (4/13). +NGT to LCWS. +ostomy minimal output noted. CBW on admission 160#. As per previous RD note from pt's last hospitalization (1/29/23); pt reported poor appetite/intake at that time, and ongoing weight loss over the past year, previous wt of 246#. Indicating 86#/35% wt loss x 1 year. 3+ BL foot edema noted. Hx of malnutrition dx. Pt continues to meet criteria for severe protein-calorie malnutrition. RD remains available for TF/po diet recommendations pending hospital course.

## 2023-04-14 NOTE — PROGRESS NOTE ADULT - SUBJECTIVE AND OBJECTIVE BOX
[facility]  INFECTIOUS DISEASES - [address], [place] [address]  Tel: [phone]     Fax: [phone]  =======================================================    HERMILA QUIÑONES [id number]    Follow up: No fevers. S/p Tia procedure and abdominal abscess drainage yesterday. Intubated and on 2 pressors.    Allergies:  No Known Allergies      Antibiotics:  albuterol    90 MICROgram(s) HFA Inhaler 4 Puff(s) Inhalation every 6 hours PRN  chlorhexidine 0.12% Liquid 15 milliLiter(s) Oral Mucosa every 12 hours  chlorhexidine 4% Liquid 1 Application(s) Topical <User Schedule>  dextrose 5% 1000 milliLiter(s) IV Continuous <Continuous>  fentaNYL    Injectable 50 MICROGram(s) IV Push every 4 hours PRN  fentaNYL   Infusion. 0.5 MICROgram(s)/kG/Hr IV Continuous <Continuous>  levothyroxine Injectable 93.75 MICROGram(s) IV Push at bedtime  metroNIDAZOLE  IVPB 500 milliGRAM(s) IV Intermittent every 8 hours  norepinephrine Infusion 0.05 MICROgram(s)/kG/Min IV Continuous <Continuous>  pantoprazole  Injectable 40 milliGRAM(s) IV Push daily  phenylephrine    Infusion 0.1 MICROgram(s)/kG/Min IV Continuous <Continuous>  piperacillin/tazobactam IVPB.. 3.375 Gram(s) IV Intermittent every 12 hours  sodium chloride 0.9% lock flush 10 milliLiter(s) IV Push every 1 hour PRN       REVIEW OF SYSTEMS:  unable to obtain, intubated     Physical Exam:  ICU Vital Signs Last 24 Hrs  T(C): 36.7 (14 Apr 2023 08:04), Max: 37.1 (13 Apr 2023 16:35)  T(F): 98.1 (14 Apr 2023 08:04), Max: 98.8 (13 Apr 2023 16:35)  HR: 82 (14 Apr 2023 12:47) (80 - 119)  BP: 96/49 (14 Apr 2023 05:00) (91/50 - 177/80)  BP(mean): 71 (14 Apr 2023 05:00) (64 - 89)  ABP: 96/45 (14 Apr 2023 12:24) (88/42 - 132/50)  ABP(mean): 64 (14 Apr 2023 12:24) (59 - 81)  RR: 24 (14 Apr 2023 12:24) (10 - 32)  SpO2: 100% (14 Apr 2023 12:47) (98% - 100%)    GEN: intubated  HEENT: normocephalic and atraumatic.   NECK: Supple.   LUNGS: No wheezes, crackles or rhonchi  HEART: Regular rate and rhythm   ABDOMEN: (+) ostomy--no output  EXTREMITIES: bipedal edema  NEUROLOGIC: unable to assess, intubated and sedated    Labs:  04-14    143  |  106  |  71<H>  ----------------------------<  162<H>  2.9<LL>   |  24  |  4.40<H>    Ca    6.5<LL>      14 Apr 2023 09:15  Phos  5.1     04-14  Mg     2.3     04-14    TPro  5.1<L>  /  Alb  2.2<L>  /  TBili  1.0  /  DBili  x   /  AST  866<H>  /  ALT  268<H>  /  AlkPhos  105  04-14                          7.9    13.35 )-----------( 134      ( 14 Apr 2023 09:15 )             25.1     PT/INR - ( 14 Apr 2023 09:15 )   PT: 24.8 sec;   INR: 2.10 ratio         PTT - ( 14 Apr 2023 09:15 )  PTT:42.7 sec    LIVER FUNCTIONS - ( 14 Apr 2023 09:15 )  Alb: 2.2 g/dL / Pro: 5.1 g/dL / ALK PHOS: 105 U/L / ALT: 268 U/L / AST: 866 U/L / GGT: x             RECENT CULTURES:  04-13 @ 14:00 Peritoneal PERITONEAL FLUID #2       No polymorphonuclear cells seen per low power field  Few Gram positive cocci in pairs per oil power field      04-13 @ 06:40          NotDetec        All imaging and data are reviewed.

## 2023-04-14 NOTE — DIETITIAN INITIAL EVALUATION ADULT - PERTINENT MEDS FT
MEDICATIONS  (STANDING):  chlorhexidine 0.12% Liquid 15 milliLiter(s) Oral Mucosa every 12 hours  chlorhexidine 4% Liquid 1 Application(s) Topical <User Schedule>  dextrose 5% 1000 milliLiter(s) (50 mL/Hr) IV Continuous <Continuous>  fentaNYL   Infusion. 0.5 MICROgram(s)/kG/Hr (3.64 mL/Hr) IV Continuous <Continuous>  levothyroxine Injectable 93.75 MICROGram(s) IV Push at bedtime  metroNIDAZOLE  IVPB 500 milliGRAM(s) IV Intermittent every 8 hours  norepinephrine Infusion 0.05 MICROgram(s)/kG/Min (6.83 mL/Hr) IV Continuous <Continuous>  pantoprazole  Injectable 40 milliGRAM(s) IV Push daily  phenylephrine    Infusion 0.1 MICROgram(s)/kG/Min (2.73 mL/Hr) IV Continuous <Continuous>  piperacillin/tazobactam IVPB.. 3.375 Gram(s) IV Intermittent every 12 hours    MEDICATIONS  (PRN):  albuterol    90 MICROgram(s) HFA Inhaler 4 Puff(s) Inhalation every 6 hours PRN Shortness of Breath and/or Wheezing  fentaNYL    Injectable 50 MICROGram(s) IV Push every 4 hours PRN Severe Pain (7 - 10)  sodium chloride 0.9% lock flush 10 milliLiter(s) IV Push every 1 hour PRN Pre/post blood products, medications, blood draw, and to maintain line patency

## 2023-04-15 NOTE — PROGRESS NOTE ADULT - SUBJECTIVE AND OBJECTIVE BOX
Patient is a 72y old  Female who presents with a chief complaint of intra-abdominal perforation (15 Apr 2023 13:16)    HPI:  73 y/o f w/ PMH of HTN, CHF, asthma, hypothyroidism, prior notes state A-fib on Xarelto however patient states she is not taking any anticoagulants, history of C. difficile January 2023 p/w generalized abdominal pain. Admitted with perforated sigmoid colon s/p Tia's procedure admitted to ICU post op with respiratory failure, shock liver and LAYA    Patient remains on full ventilator support at bedside      Allergies: No Known Allergies    PAST MEDICAL & SURGICAL HISTORY:  Hypothyroidism      HLD (hyperlipidemia)      Thrombocytosis      Persistent atrial fibrillation      Obesity (BMI 35.0-39.9 without comorbidity)      Asthma      Diabetes mellitus      Ankle injury  on 2004, 5 surgeries.      Cholecystitis        FAMILY HISTORY:  Family history of early CAD (Father)    Family history of early CAD (Mother)      SOCIAL HISTORY:    Home Medications:    Review of Systems:  Unable to participate in ROS  T(F): 100.4 (04-15-23 @ 20:10), Max: 100.4 (04-15-23 @ 20:10)  HR: 119 (04-15-23 @ 20:30) (80 - 119)  BP: --  RR: 20 (04-15-23 @ 20:30) (12 - 25)  SpO2: 100% (04-15-23 @ 20:30)  Wt(kg): --  Mode: AC/ CMV (Assist Control/ Continuous Mandatory Ventilation), RR (machine): 20, TV (machine): 380, FiO2: 30, PEEP: 5  CAPILLARY BLOOD GLUCOSE      POCT Blood Glucose.: 104 mg/dL (15 Apr 2023 17:23)    I&O's Summary    14 Apr 2023 07:01  -  15 Apr 2023 07:00  --------------------------------------------------------  IN: 2762.5 mL / OUT: 1855 mL / NET: 907.5 mL    15 Apr 2023 07:01  -  15 Apr 2023 21:00  --------------------------------------------------------  IN: 1774.2 mL / OUT: 1223 mL / NET: 551.2 mL        Physical Exam:     Gen: Critically ill appearing, intubated  Neuro: sedated  CVS: +S1S2  Resp: CTA. +ET tube  Abd: soft, surgical site C/D/I. TAMERA drain - serosanguinous fluid. Ostomy with stool  Ext: cool dry no edema    Meds:  meropenem  IVPB 500 milliGRAM(s) IV Intermittent every 12 hours    norepinephrine Infusion 0.05 MICROgram(s)/kG/Min IV Continuous <Continuous>     levothyroxine Injectable 93.75 MICROGram(s) IV Push at bedtime     albuterol    90 MICROgram(s) HFA Inhaler 4 Puff(s) Inhalation every 6 hours PRN     dexMEDEtomidine Infusion 0.5 MICROgram(s)/kG/Hr IV Continuous <Continuous>  fentaNYL    Injectable 50 MICROGram(s) IV Push every 4 hours PRN           pantoprazole  Injectable 40 milliGRAM(s) IV Push daily        lactated ringers. 1000 milliLiter(s) IV Continuous <Continuous>  sodium chloride 0.9% lock flush 10 milliLiter(s) IV Push every 1 hour PRN        chlorhexidine 0.12% Liquid 15 milliLiter(s) Oral Mucosa every 12 hours  chlorhexidine 4% Liquid 1 Application(s) Topical <User Schedule>                              8.0    11.32 )-----------( 109      ( 15 Apr 2023 05:38 )             25.1       04-15    142  |  106  |  73<H>  ----------------------------<  99  3.7   |  27  |  4.40<H>    Ca    6.8<L>      15 Apr 2023 05:38  Phos  3.3     04-15  Mg     2.3     04-15    TPro  4.8<L>  /  Alb  1.8<L>  /  TBili  0.8  /  DBili  x   /  AST  380<H>  /  ALT  195<H>  /  AlkPhos  92  04-15    Lactate 1.6           04-15 @ 05:38    Lactate 3.5           04-14 @ 21:15          PT/INR - ( 15 Apr 2023 05:38 )   PT: 18.4 sec;   INR: 1.57 ratio         PTT - ( 15 Apr 2023 05:38 )  PTT:46.0 sec    .Blood Blood-Peripheral   No growth to date. -- 04-13 @ 19:30  .Blood Blood-Peripheral   No growth to date. -- 04-13 @ 19:25  Peritoneal PERITONEAL FLUID #2   Rare Proteus vulgaris group  Few Escherichia coli  Few Klebsiella pneumoniae ESBL  Few Streptococcus constellatus Susceptibility to follow.  Few Bacteroides distasonis "Susceptibilities not performed"   No polymorphonuclear cells seen per low power field  Few Gram positive cocci in pairs per oil power field 04-13 @ 14:00      Rapid RVP Result: NotDetec (04-13 @ 06:40)    ABG - ( 15 Apr 2023 05:00 )  pH, Arterial: 7.39  pH, Blood: x     /  pCO2: 46    /  pO2: 140   / HCO3: 28    / Base Excess: 2.8   /  SaO2: 100.0             Radiology:   < from: Xray Chest 1 View- PORTABLE-Routine (Xray Chest 1 View- PORTABLE-Routine in AM.) (04.15.23 @ 09:18) >    ACC: [id number] EXAM:  XR CHEST PORTABLE ROUTINE 1V   ORDERED BY: ELVIS DORAN     PROCEDURE DATE:  04/15/2023          INTERPRETATION:  AP chest on April 15, 2023 at 8:55 AM. Patient requires   intubation.    Heart magnified by technique.    Endotracheal tube, nasogastric tube, right jugular line remains similar   to April 13.    On April 13 there are scattered mid lower lung field infiltrates with a   small component basilar effusions.    On present examination infiltration particularly in the right lung has   increased.    IMPRESSION: Bilateral infiltrates of increased. Tubes remain.    --- End of Report ---          < end of copied text >      Problems  Perforated sigmoid colon  Hypoxic respiratory failure  Septic shock  LAYA  Oliguria  Peritonitis    Assessment/Plan:    Neuro: Sedation w/ Precedex gtt as needed. Additional Fentanyl PRN for pain control  CV: Levophed gtt for shock state, actively titrating for MAP >65. Holding antihypertensives and diuretics  Resp: Full mechanical ventilation. Currently on 30% FiO2. Actively titrating for O2 sat >90%. Plan for SBT in AM  GI: POD#2 s/p Tia procedure for perforated sigmoid colon. Protonix for GI prophylaxis. Shock liver; trend LFTs  Renal: LAYA on CKD secondary to ATN. Higgins in place - oliguric. Continue IVF resuscitation w/ LR @100cc/h  ID: Peritoneal fluid poly microbial. ID following; abx coverage w/ Meropenem  Heme: SCD for DVT ppx  Endo: Glycemic monitoring. Synthroid for hypothyroidism    Critical care time spent (mins): 40 minutes including time spent reviewing chart, ordering tests/labs, discussing with interdisciplinary team. Not including time spent performing procedures.

## 2023-04-15 NOTE — PROGRESS NOTE ADULT - PROBLEM SELECTOR PLAN 5
IV synthroid IV synthroid  Mgmt per ICU ProBNP 18612  TTE (04/2023) - LVEF of 55-60%; LAE; Sclerotic trileaflet aortic valve, trace AI. Mild to moderate MR and TR. IVC measures 2.0 cm and is non collapsing  Per cardio - hold Lasix and lopressor for now due to hypotension - monitor BPs for resuming  Cardio following, recs appreciated  Mgmt per ICU

## 2023-04-15 NOTE — PROGRESS NOTE ADULT - SUBJECTIVE AND OBJECTIVE BOX
Your patient, unsure if she is still in need of med  Patient is a 72y old  Female who presents with a chief complaint of intra-abdominal perforation (14 Apr 2023 21:15)      Subjective:  INTERVAL HPI/OVERNIGHT EVENTS: Patient seen and examined at bedside. No overnight events occurred. Patient has no complaints at this time. Denies fevers, chills, headache, lightheadedness, chest pain, dyspnea, abdominal pain, n/v/d/c.    MEDICATIONS  (STANDING):  chlorhexidine 0.12% Liquid 15 milliLiter(s) Oral Mucosa every 12 hours  chlorhexidine 4% Liquid 1 Application(s) Topical <User Schedule>  fentaNYL   Infusion. 0.5 MICROgram(s)/kG/Hr (3.64 mL/Hr) IV Continuous <Continuous>  lactated ringers. 1000 milliLiter(s) (100 mL/Hr) IV Continuous <Continuous>  levothyroxine Injectable 93.75 MICROGram(s) IV Push at bedtime  metroNIDAZOLE  IVPB 500 milliGRAM(s) IV Intermittent every 8 hours  norepinephrine Infusion 0.05 MICROgram(s)/kG/Min (6.83 mL/Hr) IV Continuous <Continuous>  pantoprazole  Injectable 40 milliGRAM(s) IV Push daily  piperacillin/tazobactam IVPB.. 3.375 Gram(s) IV Intermittent every 12 hours    MEDICATIONS  (PRN):  albuterol    90 MICROgram(s) HFA Inhaler 4 Puff(s) Inhalation every 6 hours PRN Shortness of Breath and/or Wheezing  fentaNYL    Injectable 50 MICROGram(s) IV Push every 4 hours PRN Severe Pain (7 - 10)  sodium chloride 0.9% lock flush 10 milliLiter(s) IV Push every 1 hour PRN Pre/post blood products, medications, blood draw, and to maintain line patency      Allergies    No Known Allergies    Intolerances        REVIEW OF SYSTEMS:  CONSTITUTIONAL: No fever or chills  HEENT:  No headache, no sore throat  RESPIRATORY: No cough, wheezing, or shortness of breath  CARDIOVASCULAR: No chest pain, palpitations  GASTROINTESTINAL: No abd pain, nausea, vomiting, or diarrhea  GENITOURINARY: No dysuria, frequency, or hematuria  NEUROLOGICAL: no focal weakness or dizziness  MUSCULOSKELETAL: no myalgias     Objective:  Vital Signs Last 24 Hrs  T(C): 37.2 (15 Apr 2023 04:00), Max: 37.2 (14 Apr 2023 23:22)  T(F): 99 (15 Apr 2023 04:00), Max: 99 (14 Apr 2023 23:22)  HR: 81 (15 Apr 2023 07:26) (80 - 106)  BP: --  BP(mean): --  RR: 24 (15 Apr 2023 07:26) (23 - 25)  SpO2: 100% (15 Apr 2023 07:26) (95% - 100%)        GENERAL: NAD, lying in bed comfortably  HEAD:  Atraumatic, Normocephalic  EYES: EOMI, PERRLA, conjunctiva and sclera clear  ENT: Moist mucous membranes  NECK: Supple, No JVD  CHEST/LUNG: Clear to auscultation bilaterally; No rales, rhonchi, wheezing, or rubs. Unlabored respirations  HEART: Regular rate and rhythm; No murmurs, rubs, or gallops  ABDOMEN: Bowel sounds present; Soft, Nontender, Nondistended. No hepatomegaly  EXTREMITIES:  2+ Peripheral Pulses, brisk capillary refill. No clubbing, cyanosis, or edema  NERVOUS SYSTEM:  Alert & Oriented X3, speech clear. No deficits   MSK: FROM all 4 extremities, full and equal strength  SKIN: No rashes or lesions    LABS:                        8.0    11.32 )-----------( 109      ( 15 Apr 2023 05:38 )             25.1     CBC Full  -  ( 15 Apr 2023 05:38 )  WBC Count : 11.32 K/uL  Hemoglobin : 8.0 g/dL  Hematocrit : 25.1 %  Platelet Count - Automated : 109 K/uL  Mean Cell Volume : 85.7 fl  Mean Cell Hemoglobin : 27.3 pg  Mean Cell Hemoglobin Concentration : 31.9 gm/dL  Auto Neutrophil # : 10.27 K/uL  Auto Lymphocyte # : 0.35 K/uL  Auto Monocyte # : 0.25 K/uL  Auto Eosinophil # : 0.20 K/uL  Auto Basophil # : 0.03 K/uL  Auto Neutrophil % : 90.7 %  Auto Lymphocyte % : 3.1 %  Auto Monocyte % : 2.2 %  Auto Eosinophil % : 1.8 %  Auto Basophil % : 0.3 %    15 Apr 2023 05:38    142    |  106    |  73     ----------------------------<  99     3.7     |  27     |  4.40     Ca    6.8        15 Apr 2023 05:38  Phos  3.3       15 Apr 2023 05:38  Mg     2.3       15 Apr 2023 05:38    TPro  4.8    /  Alb  1.8    /  TBili  0.8    /  DBili  x      /  AST  380    /  ALT  195    /  AlkPhos  92     15 Apr 2023 05:38    PT/INR - ( 15 Apr 2023 05:38 )   PT: 18.4 sec;   INR: 1.57 ratio         PTT - ( 15 Apr 2023 05:38 )  PTT:46.0 sec    CAPILLARY BLOOD GLUCOSE      POCT Blood Glucose.: 110 mg/dL (15 Apr 2023 05:46)  POCT Blood Glucose.: 125 mg/dL (14 Apr 2023 23:33)  POCT Blood Glucose.: 145 mg/dL (14 Apr 2023 17:40)  POCT Blood Glucose.: 138 mg/dL (14 Apr 2023 12:14)        Culture - Blood (collected 04-13-23 @ 19:30)  Source: .Blood Blood-Peripheral  Preliminary Report (04-15-23 @ 01:02):    No growth to date.    Culture - Blood (collected 04-13-23 @ 19:25)  Source: .Blood Blood-Peripheral  Preliminary Report (04-15-23 @ 01:02):    No growth to date.    Culture - Body Fluid with Gram Stain (collected 04-13-23 @ 14:00)  Source: Peritoneal PERITONEAL FLUID #1  Gram Stain (04-13-23 @ 22:55):    Numerous polymorphonuclear leukocytes per low power field    Few Gram Variable Rods per oil power field  Preliminary Report (04-14-23 @ 23:18):    Rare Morganella morganii    Few Klebsiella pneumoniae    Few Escherichia coli    Culture - Body Fluid with Gram Stain (collected 04-13-23 @ 14:00)  Source: Peritoneal PERITONEAL FLUID #2  Gram Stain (04-14-23 @ 00:41):    No polymorphonuclear cells seen per low power field    Few Gram positive cocci in pairs per oil power field  Preliminary Report (04-14-23 @ 23:27):    Rare Proteus vulgaris group    Few Escherichia coli    Few Klebsiella pneumoniae        RADIOLOGY & ADDITIONAL TESTS:    Personally reviewed.     Consultant(s) Notes Reviewed:  [x] YES  [ ] NO     Patient is a 72y old  Female who presents with a chief complaint of intra-abdominal perforation (14 Apr 2023 21:15)      Subjective:  INTERVAL HPI/OVERNIGHT EVENTS: Patient seen and examined at bedside. Intubated & sedated.    MEDICATIONS  (STANDING):  chlorhexidine 0.12% Liquid 15 milliLiter(s) Oral Mucosa every 12 hours  chlorhexidine 4% Liquid 1 Application(s) Topical <User Schedule>  fentaNYL   Infusion. 0.5 MICROgram(s)/kG/Hr (3.64 mL/Hr) IV Continuous <Continuous>  lactated ringers. 1000 milliLiter(s) (100 mL/Hr) IV Continuous <Continuous>  levothyroxine Injectable 93.75 MICROGram(s) IV Push at bedtime  metroNIDAZOLE  IVPB 500 milliGRAM(s) IV Intermittent every 8 hours  norepinephrine Infusion 0.05 MICROgram(s)/kG/Min (6.83 mL/Hr) IV Continuous <Continuous>  pantoprazole  Injectable 40 milliGRAM(s) IV Push daily  piperacillin/tazobactam IVPB.. 3.375 Gram(s) IV Intermittent every 12 hours    MEDICATIONS  (PRN):  albuterol    90 MICROgram(s) HFA Inhaler 4 Puff(s) Inhalation every 6 hours PRN Shortness of Breath and/or Wheezing  fentaNYL    Injectable 50 MICROGram(s) IV Push every 4 hours PRN Severe Pain (7 - 10)  sodium chloride 0.9% lock flush 10 milliLiter(s) IV Push every 1 hour PRN Pre/post blood products, medications, blood draw, and to maintain line patency      Allergies    No Known Allergies    Intolerances        REVIEW OF SYSTEMS: Unable to obtain 2/2 mechanical ventilation.    Objective:  Vital Signs Last 24 Hrs  T(C): 37.2 (15 Apr 2023 04:00), Max: 37.2 (14 Apr 2023 23:22)  T(F): 99 (15 Apr 2023 04:00), Max: 99 (14 Apr 2023 23:22)  HR: 81 (15 Apr 2023 07:26) (80 - 106)  BP: --  BP(mean): --  RR: 24 (15 Apr 2023 07:26) (23 - 25)  SpO2: 100% (15 Apr 2023 07:26) (95% - 100%)        GENERAL: NAD, lying in bed comfortably  HEAD:  Atraumatic, Normocephalic  ENT: Moist mucous membranes; NGT draining; Intubated  CHEST/LUNG: Clear to auscultation bilaterally; No w/r/r  HEART: RRR; No murmurs, rubs, or gallops  ABDOMEN: Hypoactive BS; Ostomy w/ output. Abdominal surgery site w/ bandage c/d/i; clement drain noted with serosanguinous fluid  EXTREMITIES: No clubbing, cyanosis, or edema  NERVOUS SYSTEM: Sedated  SKIN: Warm      LABS:                        8.0    11.32 )-----------( 109      ( 15 Apr 2023 05:38 )             25.1     CBC Full  -  ( 15 Apr 2023 05:38 )  WBC Count : 11.32 K/uL  Hemoglobin : 8.0 g/dL  Hematocrit : 25.1 %  Platelet Count - Automated : 109 K/uL  Mean Cell Volume : 85.7 fl  Mean Cell Hemoglobin : 27.3 pg  Mean Cell Hemoglobin Concentration : 31.9 gm/dL  Auto Neutrophil # : 10.27 K/uL  Auto Lymphocyte # : 0.35 K/uL  Auto Monocyte # : 0.25 K/uL  Auto Eosinophil # : 0.20 K/uL  Auto Basophil # : 0.03 K/uL  Auto Neutrophil % : 90.7 %  Auto Lymphocyte % : 3.1 %  Auto Monocyte % : 2.2 %  Auto Eosinophil % : 1.8 %  Auto Basophil % : 0.3 %    15 Apr 2023 05:38    142    |  106    |  73     ----------------------------<  99     3.7     |  27     |  4.40     Ca    6.8        15 Apr 2023 05:38  Phos  3.3       15 Apr 2023 05:38  Mg     2.3       15 Apr 2023 05:38    TPro  4.8    /  Alb  1.8    /  TBili  0.8    /  DBili  x      /  AST  380    /  ALT  195    /  AlkPhos  92     15 Apr 2023 05:38    PT/INR - ( 15 Apr 2023 05:38 )   PT: 18.4 sec;   INR: 1.57 ratio         PTT - ( 15 Apr 2023 05:38 )  PTT:46.0 sec    CAPILLARY BLOOD GLUCOSE      POCT Blood Glucose.: 110 mg/dL (15 Apr 2023 05:46)  POCT Blood Glucose.: 125 mg/dL (14 Apr 2023 23:33)  POCT Blood Glucose.: 145 mg/dL (14 Apr 2023 17:40)  POCT Blood Glucose.: 138 mg/dL (14 Apr 2023 12:14)        Culture - Blood (collected 04-13-23 @ 19:30)  Source: .Blood Blood-Peripheral  Preliminary Report (04-15-23 @ 01:02):    No growth to date.    Culture - Blood (collected 04-13-23 @ 19:25)  Source: .Blood Blood-Peripheral  Preliminary Report (04-15-23 @ 01:02):    No growth to date.    Culture - Body Fluid with Gram Stain (collected 04-13-23 @ 14:00)  Source: Peritoneal PERITONEAL FLUID #1  Gram Stain (04-13-23 @ 22:55):    Numerous polymorphonuclear leukocytes per low power field    Few Gram Variable Rods per oil power field  Preliminary Report (04-14-23 @ 23:18):    Rare Morganella morganii    Few Klebsiella pneumoniae    Few Escherichia coli    Culture - Body Fluid with Gram Stain (collected 04-13-23 @ 14:00)  Source: Peritoneal PERITONEAL FLUID #2  Gram Stain (04-14-23 @ 00:41):    No polymorphonuclear cells seen per low power field    Few Gram positive cocci in pairs per oil power field  Preliminary Report (04-14-23 @ 23:27):    Rare Proteus vulgaris group    Few Escherichia coli    Few Klebsiella pneumoniae        RADIOLOGY & ADDITIONAL TESTS:    Personally reviewed.     Consultant(s) Notes Reviewed:  [x] YES  [ ] NO

## 2023-04-15 NOTE — PROGRESS NOTE ADULT - ASSESSMENT
72F with pmhx afib s/p ablation, HFpEF, mod MR, thrombocytosis, asthma recent C diff infection was brought to the ED today for recurrent falls and persistent nausea/vomiting, now with pneumoperitenum and perforation; Day 1 s/p Ro.       Neuro: sedated on Fentanyl .5mcg/kg with 50mcg PRN doses for breakthrough pain.     CV: Shock likely 2/2 sepsis requiring dual vasopressor support. Levo 0.05 Zelalem 0.25. TTE pending final read. Bedside POCUS w/ dilated RV and normal LV and IVC w/ diffuse B-lines.    Pulm: Intubated AC 24/400/5/30%; Failed spontaneous breathing trial; likely 2/2 sedation. Start albuterol PRN.     GI: CT w/ abdominal perf; now day1 s/p Ro and 1.2L abcess drainage. Continue protonix and abx. Ostomy c/d/i w/ minimal output.     Renal: LAYA 2/2 ATN; Cr 4.2. K repleted this AM. Decrease bicarb gtt to 50mg/hr given improvement in acidosis.     ID: Coninue Zosyn/Flagyl. Peritoneal fluid w/ gram variable rods and PMNs.     Endo: Stat IV synthroid at 75% of home dose; monitor glucose w/ fingersticks.     Heme: received DDAVP, FFP and vitamin K to reverse coagulopathy. continue to hold xarelto. possible eventual heparin gtt; DVT ppx with scds for now, transition to chem ppx once cleared by surgery    72F with pmhx afib s/p ablation, HFpEF, mod MR, thrombocytosis, asthma recent C diff infection was brought to the ED today for recurrent falls and persistent nausea/vomiting, now with pneumoperitenum and perforation; Day 1 s/p Ro.       Neuro: sedated on Precedex 9.1mcg/kg, Off Fentanyl     CV: Shock likely 2/2 sepsis, on Levo 0.7, off Zelalem 0.25. TTE shows EF of 55-60%,    Pulm: Intubated AC 24/380/5/30%; Failed spontaneous breathing trial; likely 2/2 sedation. Continue albuterol PRN.     GI: CT w/ abdominal perf; now S/P day # 2 s/p Ro. Continue protoxin. Ostomy c/d/i w/ minimal output. Peritoneal cultures showing E-Coli, Proteus, Klebsiella and Morganella Morganii, ID following, continue with Zosyn and Metro    Renal: LAYA 2/2 ATN; Cr 4.2. K replated this AM. continue with LR at 100cc/h    ID: Coninue Zosyn/Flagyl. Peritoneal fluid w/ gram variable rods and PMNs with E-Coli, Proteus, Klebsiella and Morganella Morganii,      Endo: Stat IV synthroid at 75% of home dose; monitor glucose w/ fingersticks.     Heme: received DDAVP, FFP and vitamin K to reverse coagulopathy. Continue to hold Xarelto,  possible eventual heparin gtt; DVT ppx with scds for now, transition to chem ppx once cleared by surgery

## 2023-04-15 NOTE — PROGRESS NOTE ADULT - ASSESSMENT
73 yo woman with Hx afib (off xarelto at least 1wk), HFpEF, asthma, recent Cdiff in Jan presenting with weakness and multiple falls at home and was found to have perforated sigmoid colon resulting in peritonitis and septic shock.   Additionally, she was noted to have lactic acidosis and LAYA likely ATN form septic shock.  She is now s/p exploratory laparotomy with washout, drainage of 1.2L purulent collection in the peritoneal cavity,  transverse and sigmoid resection and Ro's procedure.  She is being managed in the ICU due to septic shock requiring pressor support, postop respiratory failure requiring mechanical ventilation, LAYA and acute blood loss anemia

## 2023-04-15 NOTE — PROGRESS NOTE ADULT - SUBJECTIVE AND OBJECTIVE BOX
Subjective and Objective:   [facility] CARDIOLOGY CONSULTANTS: Adrián Wray Pannella, Patel, Savella, Goodger      [phone]  CHIEF COMPLAINT: Patient is a 72y old  Female who presents with a chief complaint of intra-abdominal perforation (2023 10:18)    FOLLOW UP: S/p Tia's with abdominal abscess drainage.   OR finding with >1.2 liters of pus from abd cavity.     pt on Full vent support. s/p PRBC, IV hydration for severe vasodilation shock , on pressor support    unable to obtain ROS        PAST MEDICAL & SURGICAL HISTORY:  Hypothyroidism      HLD (hyperlipidemia)      Thrombocytosis      Persistent atrial fibrillation      Obesity (BMI 35.0-39.9 without comorbidity)      Asthma      Diabetes mellitus      Ankle injury  on , 5 surgeries.      Cholecystitis          MEDICATIONS  (STANDING):  chlorhexidine 0.12% Liquid 15 milliLiter(s) Oral Mucosa every 12 hours  chlorhexidine 4% Liquid 1 Application(s) Topical <User Schedule>  fentaNYL   Infusion. 0.5 MICROgram(s)/kG/Hr (3.64 mL/Hr) IV Continuous <Continuous>  lactated ringers. 1000 milliLiter(s) (100 mL/Hr) IV Continuous <Continuous>  levothyroxine Injectable 93.75 MICROGram(s) IV Push at bedtime  metroNIDAZOLE  IVPB 500 milliGRAM(s) IV Intermittent every 8 hours  norepinephrine Infusion 0.05 MICROgram(s)/kG/Min (6.83 mL/Hr) IV Continuous <Continuous>  pantoprazole  Injectable 40 milliGRAM(s) IV Push daily  piperacillin/tazobactam IVPB.. 3.375 Gram(s) IV Intermittent every 12 hours      Allergies    No Known Allergies    Intolerances                              8.0    11.32 )-----------( 109      ( 15 Apr 2023 05:38 )             25.1       04-15    142  |  106  |  73<H>  ----------------------------<  99  3.7   |  27  |  4.40<H>    Ca    6.8<L>      15 Apr 2023 05:38  Phos  3.3     04-15  Mg     2.3     04-15    TPro  4.8<L>  /  Alb  1.8<L>  /  TBili  0.8  /  DBili  x   /  AST  380<H>  /  ALT  195<H>  /  AlkPhos  92  04-15      LIVER FUNCTIONS - ( 15 Apr 2023 05:38 )  Alb: 1.8 g/dL / Pro: 4.8 g/dL / ALK PHOS: 92 U/L / ALT: 195 U/L / AST: 380 U/L / GGT: x             PT/INR - ( 15 Apr 2023 05:38 )   PT: 18.4 sec;   INR: 1.57 ratio         PTT - ( 15 Apr 2023 05:38 )  PTT:46.0 sec                      Daily     Daily Weight in k (15 Apr 2023 04:00)    I&O's Summary    2023 07:01  -  15 Apr 2023 07:00  --------------------------------------------------------  IN: 2762.5 mL / OUT: 1855 mL / NET: 907.5 mL    15 Apr 2023 07:01  -  15 Apr 2023 11:12  --------------------------------------------------------  IN: 470 mL / OUT: 509 mL / NET: -39 mL        Vital Signs Last 24 Hrs  T(C): 37.3 (15 Apr 2023 11:00), Max: 37.4 (15 Apr 2023 09:17)  T(F): 99.1 (15 Apr 2023 11:00), Max: 99.3 (15 Apr 2023 09:17)  HR: 94 (15 Apr 2023 11:00) (80 - 106)  BP: --  BP(mean): --  RR: 24 (15 Apr 2023 11:00) (23 - 25)  SpO2: 100% (15 Apr 2023 11:00) (95% - 100%)    Parameters below as of 15 Apr 2023 11:  Patient On (Oxygen Delivery Method): ventilator        PHYSICAL EXAM:   · Constitutional	Well-developed, well nourished  · Eyes	EOMI; PERRL; no drainage or redness  · ENMT	No oral lesions; no gross abnormalities  · Neck	No bruits; no thyromegaly or nodules  · Respiratory	Normal breath sounds b/l, No RRW  · Cardiovascular	Regular rate & rhythm, normal S1, S2; no murmurs, gallops or rubs; no S3, S4  · Gastrointestinal	Soft, non-tender, no hepatosplenomegaly, normal bowel sounds  · Extremities	No cyanosis, clubbing or edema  · Vascular	Equal and normal pulses (carotid, femoral, dorsalis pedis)  · Neurological	Alert & oriented; no sensory, motor or coordination deficits, normal reflexes

## 2023-04-15 NOTE — PROGRESS NOTE ADULT - PROBLEM SELECTOR PLAN 6
Continue to hold Xarelto. possible eventual heparin gtt; Continue to hold Xarelto. Possible eventual heparin gtt  Mgmt per ICU IV synthroid  Mgmt per ICU

## 2023-04-15 NOTE — PROGRESS NOTE ADULT - SUBJECTIVE AND OBJECTIVE BOX
[facility]  INFECTIOUS DISEASES   [address] [place] [address]  Tel: [phone]     Fax: [phone]  =======================================================      HERMILA QUIÑONES [id number]    Follow up: sepsis/peritonitis    Remains on pressors  afebrile    Allergies:  No Known Allergies      REVIEW OF SYSTEMS:  Unable to be obtained due to medical condition     Physical Exam:  GEN: sedated  HEENT: normocephalic and atraumatic.  Anicteric   NECK: Supple.   LUNGS: diffuse rhonchi   HEART: Regular rate and rhythm   ABDOMEN: Soft, nontender, and nondistended.  Positive bowel sounds.    : Higgins   EXTREMITIES: trace edema.  MSK: no joint swelling  NEUROLOGIC: Sedated   PSYCHIATRIC: sedated   SKIN: No Rash        Vitals:  T(F): 99.1 (15 Apr 2023 11:00), Max: 99.3 (15 Apr 2023 09:17)  HR: 94 (15 Apr 2023 11:00)  BP: --  RR: 24 (15 Apr 2023 11:00)  SpO2: 100% (15 Apr 2023 11:00) (95% - 100%)  temp max in last 48H T(F): , Max: 99.3 (04-15-23 @ 09:17)    Current Antibiotics:  metroNIDAZOLE  IVPB 500 milliGRAM(s) IV Intermittent every 8 hours  piperacillin/tazobactam IVPB.. 3.375 Gram(s) IV Intermittent every 12 hours    Other medications:  chlorhexidine 0.12% Liquid 15 milliLiter(s) Oral Mucosa every 12 hours  chlorhexidine 4% Liquid 1 Application(s) Topical <User Schedule>  fentaNYL   Infusion. 0.5 MICROgram(s)/kG/Hr IV Continuous <Continuous>  lactated ringers. 1000 milliLiter(s) IV Continuous <Continuous>  levothyroxine Injectable 93.75 MICROGram(s) IV Push at bedtime  norepinephrine Infusion 0.05 MICROgram(s)/kG/Min IV Continuous <Continuous>  pantoprazole  Injectable 40 milliGRAM(s) IV Push daily                            8.0    11.32 )-----------( 109      ( 15 Apr 2023 05:38 )             25.1     04-15    142  |  106  |  73<H>  ----------------------------<  99  3.7   |  27  |  4.40<H>    Ca    6.8<L>      15 Apr 2023 05:38  Phos  3.3     04-15  Mg     2.3     04-15    TPro  4.8<L>  /  Alb  1.8<L>  /  TBili  0.8  /  DBili  x   /  AST  380<H>  /  ALT  195<H>  /  AlkPhos  92  04-15    RECENT CULTURES:  04-13 @ 19:30 .Blood Blood-Peripheral     No growth to date.    04-13 @ 19:25 .Blood Blood-Peripheral     No growth to date.    04-13 @ 14:00 Peritoneal PERITONEAL FLUID #2     Rare Proteus vulgaris group  Few Escherichia coli  Few Klebsiella pneumoniae  No polymorphonuclear cells seen per low power field  Few Gram positive cocci in pairs per oil power field    04-13 @ 06:40    [facility]          WBC Count: 11.32 K/uL (04-15-23 @ 05:38)  WBC Count: 10.59 K/uL (04-14-23 @ 21:15)  WBC Count: 13.35 K/uL (04-14-23 @ 09:15)  WBC Count: 14.13 K/uL (04-14-23 @ 04:38)  WBC Count: 12.95 K/uL (04-13-23 @ 22:19)  WBC Count: 31.74 K/uL (04-13-23 @ 17:45)  WBC Count: 33.53 K/uL (04-13-23 @ 06:40)    Creatinine, Serum: 4.40 mg/dL (04-15-23 @ 05:38)  Creatinine, Serum: 4.40 mg/dL (04-14-23 @ 21:15)  Creatinine, Serum: 4.30 mg/dL (04-14-23 @ 12:40)  Creatinine, Serum: 4.40 mg/dL (04-14-23 @ 09:15)  Creatinine, Serum: 4.20 mg/dL (04-14-23 @ 04:38)  Creatinine, Serum: 4.30 mg/dL (04-13-23 @ 17:45)  Creatinine, Serum: 4.70 mg/dL (04-13-23 @ 06:40)    SARS-CoV-2: NotDetec (04-13-23 @ 06:40)

## 2023-04-15 NOTE — PROGRESS NOTE ADULT - SUBJECTIVE AND OBJECTIVE BOX
INTERVAL HPI/OVERNIGHT EVENTS: Patient is s/p day # 2 s/p Ro for abdominal perforation; now requiring dual vasopressors for septic shock. She also recieved 2 pRBC and 2 FFP overnight. She remains on Zosyn/Flagyl.     SUBJECTIVE: Patient seen and examined at bedside. Is intubated/sedated at this time.    ROS:  Unable to obtain     OBJECTIVE:    VITAL SIGNS:  ICU Vital Signs Last 24 Hrs  T(C): 37.3 (15 Apr 2023 11:00), Max: 37.4 (15 Apr 2023 09:17)  T(F): 99.1 (15 Apr 2023 11:00), Max: 99.3 (15 Apr 2023 09:17)  HR: 94 (15 Apr 2023 12:13) (80 - 105)  BP: --  BP(mean): --  ABP: 99/48 (15 Apr 2023 11:00) (85/44 - 109/53)  ABP(mean): 69 (15 Apr 2023 11:00) (60 - 75)  RR: 24 (15 Apr 2023 11:00) (23 - 25)  SpO2: 100% (15 Apr 2023 12:13) (95% - 100%)    O2 Parameters below as of 15 Apr 2023 11:00  Patient On (Oxygen Delivery Method): ventilator          Mode: AC/ CMV (Assist Control/ Continuous Mandatory Ventilation), RR (machine): 20, TV (machine): 380, FiO2: 30, PEEP: 5, ITime: 1, MAP: 12, PIP: 27    04-14 @ 07:01  -  04-15 @ 07:00  --------------------------------------------------------  IN: 2762.5 mL / OUT: 1855 mL / NET: 907.5 mL    04-15 @ 07:01  -  04-15 @ 13:16  --------------------------------------------------------  IN: 570 mL / OUT: 584 mL / NET: -14 mL      CAPILLARY BLOOD GLUCOSE      POCT Blood Glucose.: 110 mg/dL (15 Apr 2023 05:46)      PHYSICAL EXAM:    General: NAD, comfortable  HEENT: NCAT, PERRL, clear conjunctiva, no scleral icterus  Neck: supple, no JVD  Respiratory: CTA b/l, no wheezing, rhonchi, rales  Cardiovascular: RRR, normal S1S2, no M/R/G  Abdomen: soft, NT/ND, bowel sounds in all four quadrants, no palpable masses  Extremities: WWP, no clubbing, cyanosis, or edema  Neuro:     MEDICATIONS:  MEDICATIONS  (STANDING):  chlorhexidine 0.12% Liquid 15 milliLiter(s) Oral Mucosa every 12 hours  chlorhexidine 4% Liquid 1 Application(s) Topical <User Schedule>  dexMEDEtomidine Infusion 0.5 MICROgram(s)/kG/Hr (9.1 mL/Hr) IV Continuous <Continuous>  lactated ringers. 1000 milliLiter(s) (100 mL/Hr) IV Continuous <Continuous>  levothyroxine Injectable 93.75 MICROGram(s) IV Push at bedtime  metroNIDAZOLE  IVPB 500 milliGRAM(s) IV Intermittent every 8 hours  norepinephrine Infusion 0.05 MICROgram(s)/kG/Min (6.83 mL/Hr) IV Continuous <Continuous>  pantoprazole  Injectable 40 milliGRAM(s) IV Push daily  piperacillin/tazobactam IVPB.. 3.375 Gram(s) IV Intermittent every 12 hours    MEDICATIONS  (PRN):  albuterol    90 MICROgram(s) HFA Inhaler 4 Puff(s) Inhalation every 6 hours PRN Shortness of Breath and/or Wheezing  fentaNYL    Injectable 50 MICROGram(s) IV Push every 4 hours PRN Severe Pain (7 - 10)  sodium chloride 0.9% lock flush 10 milliLiter(s) IV Push every 1 hour PRN Pre/post blood products, medications, blood draw, and to maintain line patency      ALLERGIES:  Allergies    No Known Allergies    Intolerances        LABS:                        8.0    11.32 )-----------( 109      ( 15 Apr 2023 05:38 )             25.1     04-15    142  |  106  |  73<H>  ----------------------------<  99  3.7   |  27  |  4.40<H>    Ca    6.8<L>      15 Apr 2023 05:38  Phos  3.3     04-15  Mg     2.3     04-15    TPro  4.8<L>  /  Alb  1.8<L>  /  TBili  0.8  /  DBili  x   /  AST  380<H>  /  ALT  195<H>  /  AlkPhos  92  04-15    PT/INR - ( 15 Apr 2023 05:38 )   PT: 18.4 sec;   INR: 1.57 ratio         PTT - ( 15 Apr 2023 05:38 )  PTT:46.0 sec      RADIOLOGY & ADDITIONAL TESTS: Reviewed. INTERVAL HPI/OVERNIGHT EVENTS: Patient is s/p day # 2 s/p Ro for abdominal perforation; now requiring dual vasopressors for septic shock. She also recieved 2 pRBC and 2 FFP overnight. She remains on Zosyn/Flagyl.     SUBJECTIVE: Patient seen and examined at bedside. Is intubated/sedated at this time.    ROS:  Unable to obtain     OBJECTIVE:    VITAL SIGNS:  ICU Vital Signs Last 24 Hrs  T(C): 37.3 (15 Apr 2023 11:00), Max: 37.4 (15 Apr 2023 09:17)  T(F): 99.1 (15 Apr 2023 11:00), Max: 99.3 (15 Apr 2023 09:17)  HR: 94 (15 Apr 2023 12:13) (80 - 105)  BP: --  BP(mean): --  ABP: 99/48 (15 Apr 2023 11:00) (85/44 - 109/53)  ABP(mean): 69 (15 Apr 2023 11:00) (60 - 75)  RR: 24 (15 Apr 2023 11:00) (23 - 25)  SpO2: 100% (15 Apr 2023 12:13) (95% - 100%)    O2 Parameters below as of 15 Apr 2023 11:00  Patient On (Oxygen Delivery Method): ventilator          Mode: AC/ CMV (Assist Control/ Continuous Mandatory Ventilation), RR (machine): 20, TV (machine): 380, FiO2: 30, PEEP: 5, ITime: 1, MAP: 12, PIP: 27    04-14 @ 07:01  -  04-15 @ 07:00  --------------------------------------------------------  IN: 2762.5 mL / OUT: 1855 mL / NET: 907.5 mL    04-15 @ 07:01  -  04-15 @ 13:16  --------------------------------------------------------  IN: 570 mL / OUT: 584 mL / NET: -14 mL      CAPILLARY BLOOD GLUCOSE      POCT Blood Glucose.: 110 mg/dL (15 Apr 2023 05:46)      PHYSICAL EXAM:    General: Well developed female who is intubated, sedated, critically ill  Respiratory: Intubated; 24/380/5/30%. No wheezes, rales, rhonchi   Cardiovascular: RRR, normal S1S2, no M/R/G  Abdomen: soft, NT/ND, ostomy site is dark red with minimal output   Extremities: WWP, no clubbing, cyanosis, or edema  Neurology: sedated on Precedex     MEDICATIONS:  MEDICATIONS  (STANDING):  chlorhexidine 0.12% Liquid 15 milliLiter(s) Oral Mucosa every 12 hours  chlorhexidine 4% Liquid 1 Application(s) Topical <User Schedule>  dexMEDEtomidine Infusion 0.5 MICROgram(s)/kG/Hr (9.1 mL/Hr) IV Continuous <Continuous>  lactated ringers. 1000 milliLiter(s) (100 mL/Hr) IV Continuous <Continuous>  levothyroxine Injectable 93.75 MICROGram(s) IV Push at bedtime  metroNIDAZOLE  IVPB 500 milliGRAM(s) IV Intermittent every 8 hours  norepinephrine Infusion 0.05 MICROgram(s)/kG/Min (6.83 mL/Hr) IV Continuous <Continuous>  pantoprazole  Injectable 40 milliGRAM(s) IV Push daily  piperacillin/tazobactam IVPB.. 3.375 Gram(s) IV Intermittent every 12 hours    MEDICATIONS  (PRN):  albuterol    90 MICROgram(s) HFA Inhaler 4 Puff(s) Inhalation every 6 hours PRN Shortness of Breath and/or Wheezing  fentaNYL    Injectable 50 MICROGram(s) IV Push every 4 hours PRN Severe Pain (7 - 10)  sodium chloride 0.9% lock flush 10 milliLiter(s) IV Push every 1 hour PRN Pre/post blood products, medications, blood draw, and to maintain line patency      ALLERGIES:  Allergies    No Known Allergies    Intolerances        LABS:                        8.0    11.32 )-----------( 109      ( 15 Apr 2023 05:38 )             25.1     04-15    142  |  106  |  73<H>  ----------------------------<  99  3.7   |  27  |  4.40<H>    Ca    6.8<L>      15 Apr 2023 05:38  Phos  3.3     04-15  Mg     2.3     04-15    TPro  4.8<L>  /  Alb  1.8<L>  /  TBili  0.8  /  DBili  x   /  AST  380<H>  /  ALT  195<H>  /  AlkPhos  92  04-15    PT/INR - ( 15 Apr 2023 05:38 )   PT: 18.4 sec;   INR: 1.57 ratio         PTT - ( 15 Apr 2023 05:38 )  PTT:46.0 sec      RADIOLOGY & ADDITIONAL TESTS: Reviewed.

## 2023-04-15 NOTE — PROGRESS NOTE ADULT - PROBLEM SELECTOR PLAN 3
CT with abdominal perforation; now POD #1 s/p Ro and evacuation of about 1.2L purulent collection with the peritoneum  S/p DDAVP, FFP and vitamin K to reverse coagulopathy  Continue broad spectrum antibiotics  ID follow up ongoing.   Follow up cultures  Ostomy c/d/i ; minimal output.  Check stool for c-diff CT with abdominal perforation; now POD #1 s/p Ro and evacuation of about 1.2L purulent collection with the peritoneum  S/p DDAVP, FFP and vitamin K to reverse coagulopathy  Continue broad spectrum antibiotics  ID follow up ongoing.   Follow up cultures  Ostomy c/d/i ; minimal output.  Check stool for c-diff  Mgmt per ICU CT with abdominal perforation; now POD #2 s/p Ro and evacuation of about 1.2L purulent collection with the peritoneum  S/p DDAVP, FFP and vitamin K to reverse coagulopathy  Continue broad spectrum antibiotics as above  Cultures as above  Ostomy c/d/i; watery output noted  Check stool for c-diff  ID following, recs appreciated  Mgmt per ICU CT with abdominal perforation; now POD #2 s/p Ro and evacuation of about 1.2L purulent collection with the peritoneum  S/p DDAVP, FFP and vitamin K to reverse coagulopathy  Continue broad spectrum antibiotics as above  Cultures as above  Ostomy c/d/i; watery output noted  NPO - NGT draining  Check stool for c-diff  ID following, recs appreciated  Mgmt per ICU

## 2023-04-15 NOTE — PROGRESS NOTE ADULT - NUTRITIONAL ASSESSMENT
This patient has been assessed with a concern for Malnutrition and has been determined to have a diagnosis/diagnoses of Severe protein-calorie malnutrition.   This patient has been assessed with a concern for Malnutrition and has been determined to have a diagnosis/diagnoses of Severe protein-calorie malnutrition.      73 yo f pmhx HTN, CHF, asthma, hypothyroidism, afib (?no ac), recent cdiff infection (01/2023) presented with abdominal pain found to have perforated sigmoid colon POD#1 Tia procedures admitted to ICU in shock with resp failure, shock liver and laya.      NEURO: Sedated on fentanyl.   CV: Shock state requiring vasopressor therapy, actively titrating levophed for MAP >65, weaning as tolerated.    RESP: hypoxic respiratory failure, ac/vc 4-6 cc/kg tv lung protective strategies, actively titrating fio2/peep for spo2 >92%.    RENAL: LAYA with poor urine output, avoid nephrotoxic meds, renally dose meds, trend urine output, bun/cr and electrolytes, replace lytes as needed.  continue bicarb gtt.  GI: NPO, ngt in place, low intermittent suction.  monitor output from ostomy, currently with dark brown liquid stool, clement drain in place. transaminitis improving   ENDO: POCT q6hr  ID: Zosyn for coverage, flagyl for empiric cdiff coverage. cx pending  HEME: Worsening thrombocytopenia, Holding ac/chemical vte ppx   DISPO: Full code. This patient has been assessed with a concern for Malnutrition and has been determined to have a diagnosis/diagnoses of Severe protein-calorie malnutrition.      73 yo f pmhx HTN, CHF, asthma, hypothyroidism, afib (?no ac), recent cdiff infection (01/2023) presented with abdominal pain found to have perforated sigmoid colon POD#1 Tia procedures admitted to ICU in shock with resp failure, shock liver and laya.      NEURO: Sedated on fentanyl.   CV: Shock state requiring vasopressor therapy, actively titrating levophed for MAP >65, weaning as tolerated.    RESP: hypoxic respiratory failure, ac/vc 4-6 cc/kg tv lung protective strategies, actively titrating fio2/peep for spo2 >92%.    RENAL: LAYA with poor urine output, avoid nephrotoxic meds, renally dose meds, trend urine output, bun/cr and electrolytes, replace lytes as needed.  continue bicarb gtt.  GI: NPO, ngt in place, low intermittent suction.  monitor output from ostomy, currently with dark brown liquid stool, clement drain in place. transaminitis improving   ENDO: POCT q6hr  ID: Zosyn for coverage, flagyl for empiric cdiff coverage. cx pending  HEME: Worsening thrombocytopenia, Holding ac/chemical vte ppx   DISPO: Full code.      - CT ABD/Pelvis: Pneumoperitoneum and no ascites. Suspect perforated sigmoid colon. Stable splenomegaly. Stable right lung nodule, groundglass infiltrates and loculated right pleural effusion.  - S/p Tia's with abdominal abscess drainage.    OR finding with >1.2 liters of pus from abd cavity.    pt on Full vent support. s/p PRBC, IV hydration for severe vasodilation shock , on multiple pressor .   - ProBNP [id number]  - transfer to ICU s/p procedure, for vasopressor support for hypotension  - hold lasix for now due to hypotension, monitor closely as PASP 66mmHg  - hold home metoprolol 50mg qd due to hypotension, can consider restarting if BP stabilizes  - Elevated troponin 100.9, otherwise cardiac enzymes unremarkable, continue to trend  - EKG: Sinus tachycardia with PAC's  - No acute changes on EKG compared to previous.   - Atypical chest pain likely not cardiac of origin  - Prior TTE (1/18/23): normal LVEF 65%, normal RV size and function, biatrial enlargement, sclerotic aortic valve, mild AI, Moderate MR and TR  - TTE shows EF 55-60%, RVE, PASP 66   - Monitor and replete lytes, keep K>4, Mg>2.  - Other cardiovascular workup will depend on clinical course.  - Will continue to follow.

## 2023-04-15 NOTE — PROGRESS NOTE ADULT - PROBLEM SELECTOR PLAN 1
Shock likely due to sepsis requiring vasopressor support- currently requiring 2 pressors.   TTE pending final read.   Bedside POCUS with dilated RV and normal LV and IVC with diffuse B-lines.  Continue broad spectrum antimicrobials with Zosyn and Flagyl.   Peritoneal fluid with gram variable rods and PMNs. Abd pain + persistent diarrhea for last several months with several episodes every day, worsening in last few weeks.  Pt's abdominal pain acutely worsened in last day, with achy, persistent pain, diffusely in abdomen, nonraidtaing, not responding to oral pain meds, associated with nausea and vomiting, prompting pt to come to ED.   CT A/P - Pneumoperitoneum and no ascites. Suspect perforated sigmoid colon.      perforated sigmoid colon POD#1 Tia procedures admitted to ICU in shock with resp failure, shock liver and aman.   requiring vasopressor support- currently requiring 2 pressors.   TTE pending final read.   Bedside POCUS with dilated RV and normal LV and IVC with diffuse B-lines.  Continue broad spectrum antimicrobials with Zosyn and Flagyl.   Peritoneal fluid with gram variable rods and PMNs.  Mgmt per ICU Abd pain + persistent diarrhea for several months, multiple episodes a day w/ progressive worsening; not relieved by pain meds, a/w n/v  CT A/P - Pneumoperitoneum and no ascites. Suspect perforated sigmoid colon.  POD#2 - Tia procedure and abdominal abscess drainage  On levophed (weaned off dual pressor)  TTE (04/2023) - LVEF of 55-60%; LAE; Sclerotic trileaflet aortic valve, trace AI. Mild to moderate MR and TR. IVC measures 2.0 cm and is non collapsing  BCx - 4/13 no growth   Peritoneal cultures poly microbial - Proteus vulgaris group, Escherichia coli, Klebsiella pneumoniae, Morganella morganii   Peritoneal fluid with gram variable rods and PMNs.  Continue broad spectrum antimicrobials with Zosyn and Flagyl (pending Cdiff - f/u GI PCR and cdiff)  ID following, recs appreciated  Mgmt per ICU Abd pain + persistent diarrhea for several months, multiple episodes a day w/ progressive worsening; not relieved by pain meds, a/w n/v  CT A/P - Pneumoperitoneum and no ascites. Suspect perforated sigmoid colon.  POD#2 - Tia procedure and abdominal abscess drainage  Lactemia resolved  On levophed (weaned off dual pressor)  TTE (04/2023) - LVEF of 55-60%; LAE; Sclerotic trileaflet aortic valve, trace AI. Mild to moderate MR and TR. IVC measures 2.0 cm and is non collapsing  BCx - 4/13 no growth   Peritoneal cultures poly microbial - Proteus vulgaris group, Escherichia coli, Klebsiella pneumoniae, Morganella morganii   Peritoneal fluid with gram variable rods and PMNs.  Continue broad spectrum antimicrobials with Zosyn and Flagyl (pending Cdiff - f/u GI PCR and cdiff)  ID following, recs appreciated  Mgmt per ICU

## 2023-04-15 NOTE — PROGRESS NOTE ADULT - PROBLEM SELECTOR PLAN 4
LAYA  likely ATN due to septic shock  Cr 4.2.   Correct electrolyte derangement LAYA  likely ATN due to septic shock  Cr 4.2.   Correct electrolyte derangement  Mgmt per ICU LAYA  likely ATN due to septic shock  Cr 4.2.   Correct electrolyte derangement  S/p bicarb gtt  Mgmt per ICU

## 2023-04-15 NOTE — PROGRESS NOTE ADULT - ASSESSMENT
71 y/o f w/ PMH of HTN, CHF, asthma, hypothyroidism, prior notes state A-fib on Xarelto, history of C. difficile 1/13/23, who p/w generalized abdominal pain, and persistent diarrhea. Found to have sepsis 2/2 perforated viscus from suspected sigmoid colon perforation. Imaging also showed evidence of colitis, cannot rule out underlying c diff.    S/p Tia procedure and abdominal abscess drainage on 4/13. On 2 pressors, but afebrile and leukocytosis improving.    - blood cultures 4/13 no growth   - peritoneal cultures poly microbial - Proteus vulgaris group, Escherichia coli, Klebsiella pneumoniae, Morganella morganii   -continue Zosyn, Flagyl IV  -IV flagyl being used pending C diff  -send stool for GI PCR and c diff since output from ostomy noted and is watery   -follow up blood and peritoneal cultures      please call ID service at [phone] with any questions.    71 y/o f w/ PMH of HTN, CHF, asthma, hypothyroidism, prior notes state A-fib on Xarelto, history of C. difficile 1/13/23, who p/w generalized abdominal pain, and persistent diarrhea. Found to have sepsis 2/2 perforated viscus from suspected sigmoid colon perforation. Imaging also showed evidence of colitis, cannot rule out underlying c diff.    S/p Tia procedure and abdominal abscess drainage on 4/13. On 2 pressors, but afebrile and leukocytosis improving.    - blood cultures 4/13 no growth   - peritoneal cultures poly microbial - Proteus vulgaris group, Escherichia coli, ESBL Klebsiella pneumoniae, Morganella morganii   - D/C Zosyn  - Start Meropenem  - D/C Flagyl IV  - stool for GI PCR and c diff negative  -follow up blood and peritoneal cultures      please call ID service at [phone] with any questions.

## 2023-04-15 NOTE — PROGRESS NOTE ADULT - PROBLEM SELECTOR PLAN 8
Continue DVT prophylaxis with venodynes  Mgmt per ICU        #Imaging abnormality  Stable right lung nodule, ground-glass infiltrates and loculated right pleural effusion.        #DISPO  PT consult when able

## 2023-04-15 NOTE — PROGRESS NOTE ADULT - SUBJECTIVE AND OBJECTIVE BOX
NOTE INCOMPLETE, DOCUMENTING IN PROGRESS   S: Patient seen and examined at bedside.  No acute overnight events.  Patient reports no new complaints at this time.  Admits to flatus and BM.  Voiding, ambulating and tolerating diet. Patient denies any fever, chills, chest pain, shortness of breath, nausea, vomiting, or urinary complaints.    MEDICATIONS:  albuterol    90 MICROgram(s) HFA Inhaler 4 Puff(s) Inhalation every 6 hours PRN  chlorhexidine 0.12% Liquid 15 milliLiter(s) Oral Mucosa every 12 hours  chlorhexidine 4% Liquid 1 Application(s) Topical <User Schedule>  dexMEDEtomidine Infusion 0.5 MICROgram(s)/kG/Hr IV Continuous <Continuous>  fentaNYL    Injectable 50 MICROGram(s) IV Push every 4 hours PRN  lactated ringers. 1000 milliLiter(s) IV Continuous <Continuous>  levothyroxine Injectable 93.75 MICROGram(s) IV Push at bedtime  metroNIDAZOLE  IVPB 500 milliGRAM(s) IV Intermittent every 8 hours  norepinephrine Infusion 0.05 MICROgram(s)/kG/Min IV Continuous <Continuous>  pantoprazole  Injectable 40 milliGRAM(s) IV Push daily  piperacillin/tazobactam IVPB.. 3.375 Gram(s) IV Intermittent every 12 hours  sodium chloride 0.9% lock flush 10 milliLiter(s) IV Push every 1 hour PRN      O:  Vital Signs Last 24 Hrs  T(C): 37.3 (15 Apr 2023 11:00), Max: 37.4 (15 Apr 2023 09:17)  T(F): 99.1 (15 Apr 2023 11:00), Max: 99.3 (15 Apr 2023 09:17)  HR: 94 (15 Apr 2023 12:13) (80 - 106)  BP: --  BP(mean): --  RR: 24 (15 Apr 2023 11:00) (23 - 25)  SpO2: 100% (15 Apr 2023 12:13) (95% - 100%)    Parameters below as of 15 Apr 2023 11:00  Patient On (Oxygen Delivery Method): ventilator        I&O SUMMARY:    04-14-23 @ 07:01  -  04-15-23 @ 07:00  --------------------------------------------------------  IN:    dextrose 5% w/ Additives: 300 mL    dextrose 5% w/ Additives: 650 mL    FentaNYL: 128.4 mL    IV PiggyBack: 200 mL    IV PiggyBack: 300 mL    IV PiggyBack: 100 mL    Lactated Ringers: 900 mL    Norepinephrine: 141.1 mL    Phenylephrine: 43 mL  Total IN: 2762.5 mL    OUT:    Bulb (mL): 550 mL    Colostomy (mL): 50 mL    Indwelling Catheter - Urethral (mL): 105 mL    Nasogastric/Oral tube (mL): 1150 mL  Total OUT: 1855 mL    Total NET: 907.5 mL      04-15-23 @ 07:01  -  04-15-23 @ 12:45  --------------------------------------------------------  IN:    FentaNYL: 22 mL    Lactated Ringers: 500 mL    Norepinephrine: 48 mL  Total IN: 570 mL    OUT:    Bulb (mL): 255 mL    Colostomy (mL): 27 mL    Indwelling Catheter - Urethral (mL): 2 mL    Nasogastric/Oral tube (mL): 300 mL  Total OUT: 584 mL    Total NET: -14 mL          PHYSICAL EXAM:  GENERAL: currently intubated, sedated, on pressor support  HEAD:  Atraumatic, Normocephalic  CHEST/LUNG: on ventilator, equal chest rise bilaterally  HEART: Regular rate and rhythm, on norepinephrine and phenylephrine  ABDOMEN: Soft, ostomy dusky, appears retracted, midline wound partially closed with staples, packed, dressing changed. TAMERA drain with serous > sanguinous drainage. Ostomy with bowel sweat.  : schroeder catheter in place with yellow urine    LABS:                        8.0    11.32 )-----------( 109      ( 15 Apr 2023 05:38 )             25.1     04-15    142  |  106  |  73<H>  ----------------------------<  99  3.7   |  27  |  4.40<H>    Ca    6.8<L>      15 Apr 2023 05:38  Phos  3.3     04-15  Mg     2.3     04-15    TPro  4.8<L>  /  Alb  1.8<L>  /  TBili  0.8  /  DBili  x   /  AST  380<H>  /  ALT  195<H>  /  AlkPhos  92  04-15    PT/INR - ( 15 Apr 2023 05:38 )   PT: 18.4 sec;   INR: 1.57 ratio         PTT - ( 15 Apr 2023 05:38 )  PTT:46.0 sec    Lactate, Blood: 1.6 mmol/L (04-15 @ 05:38)  Lactate, Blood: 3.5 mmol/L (04-14 @ 21:15)  Lactate, Blood: 4.5 mmol/L (04-14 @ 16:49)  Lactate, Blood: 4.9 mmol/L (04-14 @ 12:40)  Lactate, Blood: 5.5 mmol/L (04-14 @ 09:15) S: Patient seen and examined at bedside, POD#2 extended yessica's procedure. Patient currently intubated, sedated, on pressure support in ICU.    MEDICATIONS:  albuterol    90 MICROgram(s) HFA Inhaler 4 Puff(s) Inhalation every 6 hours PRN  chlorhexidine 0.12% Liquid 15 milliLiter(s) Oral Mucosa every 12 hours  chlorhexidine 4% Liquid 1 Application(s) Topical <User Schedule>  dexMEDEtomidine Infusion 0.5 MICROgram(s)/kG/Hr IV Continuous <Continuous>  fentaNYL    Injectable 50 MICROGram(s) IV Push every 4 hours PRN  lactated ringers. 1000 milliLiter(s) IV Continuous <Continuous>  levothyroxine Injectable 93.75 MICROGram(s) IV Push at bedtime  metroNIDAZOLE  IVPB 500 milliGRAM(s) IV Intermittent every 8 hours  norepinephrine Infusion 0.05 MICROgram(s)/kG/Min IV Continuous <Continuous>  pantoprazole  Injectable 40 milliGRAM(s) IV Push daily  piperacillin/tazobactam IVPB.. 3.375 Gram(s) IV Intermittent every 12 hours  sodium chloride 0.9% lock flush 10 milliLiter(s) IV Push every 1 hour PRN      O:  Vital Signs Last 24 Hrs  T(C): 37.3 (15 Apr 2023 11:00), Max: 37.4 (15 Apr 2023 09:17)  T(F): 99.1 (15 Apr 2023 11:00), Max: 99.3 (15 Apr 2023 09:17)  HR: 94 (15 Apr 2023 12:13) (80 - 106)  RR: 24 (15 Apr 2023 11:00) (23 - 25)  SpO2: 100% (15 Apr 2023 12:13) (95% - 100%)    Parameters below as of 15 Apr 2023 11:00  Patient On (Oxygen Delivery Method): ventilator        I&O SUMMARY:    04-14-23 @ 07:01  -  04-15-23 @ 07:00  --------------------------------------------------------  IN:    dextrose 5% w/ Additives: 300 mL    dextrose 5% w/ Additives: 650 mL    FentaNYL: 128.4 mL    IV PiggyBack: 200 mL    IV PiggyBack: 300 mL    IV PiggyBack: 100 mL    Lactated Ringers: 900 mL    Norepinephrine: 141.1 mL    Phenylephrine: 43 mL  Total IN: 2762.5 mL    OUT:    Bulb (mL): 550 mL    Colostomy (mL): 50 mL    Indwelling Catheter - Urethral (mL): 105 mL    Nasogastric/Oral tube (mL): 1150 mL  Total OUT: 1855 mL    Total NET: 907.5 mL      04-15-23 @ 07:01  -  04-15-23 @ 12:45  --------------------------------------------------------  IN:    FentaNYL: 22 mL    Lactated Ringers: 500 mL    Norepinephrine: 48 mL  Total IN: 570 mL    OUT:    Bulb (mL): 255 mL    Colostomy (mL): 27 mL    Indwelling Catheter - Urethral (mL): 2 mL    Nasogastric/Oral tube (mL): 300 mL  Total OUT: 584 mL    Total NET: -14 mL          PHYSICAL EXAM:  GENERAL: currently intubated, sedated, on pressor support  HEAD:  Atraumatic, Normocephalic  CHEST/LUNG: on ventilator, equal chest rise bilaterally  HEART: Regular rate and rhythm, on norepinephrine and phenylephrine  ABDOMEN: Soft, ostomy dusky, appears retracted, midline wound partially closed with staples, packed, dressing changed. TAMERA drain with serous > sanguinous drainage. Ostomy with bowel sweat.  : schroeder catheter in place with yellow urine    LABS:                        8.0    11.32 )-----------( 109      ( 15 Apr 2023 05:38 )             25.1     04-15    142  |  106  |  73<H>  ----------------------------<  99  3.7   |  27  |  4.40<H>    Ca    6.8<L>      15 Apr 2023 05:38  Phos  3.3     04-15  Mg     2.3     04-15    TPro  4.8<L>  /  Alb  1.8<L>  /  TBili  0.8  /  DBili  x   /  AST  380<H>  /  ALT  195<H>  /  AlkPhos  92  04-15    PT/INR - ( 15 Apr 2023 05:38 )   PT: 18.4 sec;   INR: 1.57 ratio         PTT - ( 15 Apr 2023 05:38 )  PTT:46.0 sec    Lactate, Blood: 1.6 mmol/L (04-15 @ 05:38)  Lactate, Blood: 3.5 mmol/L (04-14 @ 21:15)  Lactate, Blood: 4.5 mmol/L (04-14 @ 16:49)  Lactate, Blood: 4.9 mmol/L (04-14 @ 12:40)  Lactate, Blood: 5.5 mmol/L (04-14 @ 09:15)

## 2023-04-15 NOTE — PROGRESS NOTE ADULT - PROBLEM SELECTOR PLAN 2
Intubated AC 24/400/5/30%;   Failed spontaneous breathing trial;  Continue to monitor and wean as tolerated per ICU protocol Intubated AC 24/400/5/30%;   Failed spontaneous breathing trial;  Continue to monitor and wean as tolerated per ICU protocol  Mgmt per ICU

## 2023-04-15 NOTE — PROGRESS NOTE ADULT - ASSESSMENT
72yoF now POD 2 extended yessica's including descending colon for perforated sigmoid, admitted post-operatively to ICU for pressure support, intubated currently.    PLAN:  - Continue care per ICU  - wean pressors if able  - continue to monitor colostomy output, TAMERA output, NGT output and schroeder catheter output  - COntinue abx per ID    Will discuss with Dr. Dumont (covering for Dr. Goldberg)

## 2023-04-15 NOTE — PROGRESS NOTE ADULT - ASSESSMENT
72-year-old female with history of hypertension, CHF, afib s/p AF ablation (2/7/19) currently not on xarelto, CATH 2018, hypothyroidism, HLD history of C. difficile January 2023 presents for vomitting and abdominal pain. Consulted for CHF.    - CT ABD/Pelvis: Pneumoperitoneum and no ascites. Suspect perforated sigmoid colon. Stable splenomegaly. Stable right lung nodule, groundglass infiltrates and loculated right pleural effusion.  - S/p Tia's with abdominal abscess drainage.    OR finding with >1.2 liters of pus from abd cavity.    pt on Full vent support. s/p PRBC, IV hydration for severe vasodilation shock , on multiple pressor .   - ProBNP 65510  - transfer to ICU s/p procedure, for vasopressor support for hypotension  - hold lasix for now due to hypotension, monitor closely as PASP 66mmHg  - hold home metoprolol 50mg qd due to hypotension, can consider restarting if BP stabilizes  - Elevated troponin 100.9, otherwise cardiac enzymes unremarkable, continue to trend  - EKG: Sinus tachycardia with PAC's  - No acute changes on EKG compared to previous.   - Atypical chest pain likely not cardiac of origin  - Prior TTE (1/18/23): normal LVEF 65%, normal RV size and function, biatrial enlargement, sclerotic aortic valve, mild AI, Moderate MR and TR  - TTE shows EF 55-60%, RVE, PASP 66   - Monitor and replete lytes, keep K>4, Mg>2.  - Other cardiovascular workup will depend on clinical course.  - Will continue to follow.

## 2023-04-16 NOTE — PROGRESS NOTE ADULT - PROBLEM SELECTOR PLAN 3
CT with abdominal perforation; now POD #2 s/p Ro and evacuation of about 1.2L purulent collection with the peritoneum  S/p DDAVP, FFP and vitamin K to reverse coagulopathy  Continue broad spectrum antibiotics as above  Cultures as above  Ostomy c/d/i; watery output noted  NPO - NGT draining  Check stool for c-diff  ID following, recs appreciated  Mgmt per ICU CT with abdominal perforation; now POD #3 s/p Ro and evacuation of about 1.2L purulent collection with the peritoneum  S/p DDAVP, FFP and vitamin K to reverse coagulopathy  Continue broad spectrum antibiotics as above  Cultures as above  Ostomy c/d/i; watery output noted  NPO - NGT draining  neg c-diff  ID following, recs appreciated  Mgmt per ICU

## 2023-04-16 NOTE — PROGRESS NOTE ADULT - SUBJECTIVE AND OBJECTIVE BOX
****** CHARTING IN PROGRESS *******    INTERVAL HPI/OVERNIGHT EVENTS:    SUBJECTIVE: Patient seen and examined at bedside.     ROS:  CV: Denies chest pain  Resp: Denies SOB  GI: Denies abdominal pain, constipation, diarrhea, nausea, vomiting  : Denies dysuria  ID: Denies fevers, chills  MSK: Denies joint pain     OBJECTIVE:    VITAL SIGNS:  ICU Vital Signs Last 24 Hrs  T(C): 37 (16 Apr 2023 08:08), Max: 38 (15 Apr 2023 20:10)  T(F): 98.6 (16 Apr 2023 08:08), Max: 100.4 (15 Apr 2023 20:10)  HR: 83 (16 Apr 2023 08:10) (80 - 119)  BP: --  BP(mean): --  ABP: 118/49 (16 Apr 2023 07:30) (77/38 - 137/68)  ABP(mean): 77 (16 Apr 2023 07:30) (54 - 96)  RR: 20 (16 Apr 2023 07:30) (12 - 25)  SpO2: 100% (16 Apr 2023 08:12) (100% - 100%)    O2 Parameters below as of 16 Apr 2023 07:30  Patient On (Oxygen Delivery Method): ventilator          Mode: CPAP with PS, FiO2: 30, PEEP: 5, PC: 13    04-15 @ 07:01  -  04-16 @ 07:00  --------------------------------------------------------  IN: 3074.9 mL / OUT: 2098 mL / NET: 976.9 mL      CAPILLARY BLOOD GLUCOSE      POCT Blood Glucose.: 102 mg/dL (16 Apr 2023 05:09)      PHYSICAL EXAM:  General: NAD, comfortable  HEENT: NCAT, PERRL, clear conjunctiva, no scleral icterus  Neck: supple, no JVD  Respiratory: CTA b/l, no wheezing, rhonchi, rales  Cardiovascular: RRR, normal S1S2, no M/R/G  Abdomen: soft, NT/ND, bowel sounds in all four quadrants, no palpable masses  Extremities: WWP, no clubbing, cyanosis, or edema  Neurology: A&Ox3, nonfocal, sensation intact     MEDICATIONS:  MEDICATIONS  (STANDING):  chlorhexidine 0.12% Liquid 15 milliLiter(s) Oral Mucosa every 12 hours  chlorhexidine 4% Liquid 1 Application(s) Topical <User Schedule>  dexMEDEtomidine Infusion 0.5 MICROgram(s)/kG/Hr (9.1 mL/Hr) IV Continuous <Continuous>  lactated ringers. 1000 milliLiter(s) (100 mL/Hr) IV Continuous <Continuous>  levothyroxine Injectable 93.75 MICROGram(s) IV Push at bedtime  meropenem  IVPB 500 milliGRAM(s) IV Intermittent every 12 hours  norepinephrine Infusion 0.05 MICROgram(s)/kG/Min (6.83 mL/Hr) IV Continuous <Continuous>  pantoprazole  Injectable 40 milliGRAM(s) IV Push daily    MEDICATIONS  (PRN):  albuterol    90 MICROgram(s) HFA Inhaler 4 Puff(s) Inhalation every 6 hours PRN Shortness of Breath and/or Wheezing  fentaNYL    Injectable 50 MICROGram(s) IV Push every 4 hours PRN Severe Pain (7 - 10)  sodium chloride 0.9% lock flush 10 milliLiter(s) IV Push every 1 hour PRN Pre/post blood products, medications, blood draw, and to maintain line patency      ALLERGIES:  Allergies    No Known Allergies    Intolerances        LABS:                        8.0    18.41 )-----------( 96       ( 16 Apr 2023 06:05 )             26.5     04-16    142  |  107  |  77<H>  ----------------------------<  97  3.5   |  28  |  4.70<H>    Ca    7.3<L>      16 Apr 2023 06:05  Phos  3.3     04-15  Mg     2.3     04-15    TPro  4.5<L>  /  Alb  1.7<L>  /  TBili  0.8  /  DBili  x   /  AST  178<H>  /  ALT  140<H>  /  AlkPhos  84  04-16    PT/INR - ( 15 Apr 2023 05:38 )   PT: 18.4 sec;   INR: 1.57 ratio         PTT - ( 15 Apr 2023 05:38 )  PTT:46.0 sec      RADIOLOGY & ADDITIONAL TESTS: Reviewed. ***  BEDSIDE LUNG ULTRASOUND: ***  BEDSIDE ECHO: ***    CENTRAL LINE: N        DATE INSERTED:             REMOVE: N  BISHOP: Y                       DATE INSERTED:              REMOVE: Y/N  A-LINE: N                       DATE INSERTED:              REMOVE: Y/N      GLOBAL ISSUE/BEST PRACTICE  Analgesia: Y  Sedation: Y  HOB elevation: yes  Stress ulcer prophylaxis: Y  VTE prophylaxis: Y  Glycemic control: Y  Nutrition: Y    CODE STATUS: Full Code   INTERVAL HPI/OVERNIGHT EVENTS: No acute overnight events; patient failed PS trial yesterday but is more awake and arousable, following commands this AM. Day 3 s/p Jia. Leukocytosis uptrending to 18 from 11. Cr 4.7; LFTs downtrending.  Will give dilaudid 0.5mg IVP for pain.     SUBJECTIVE: Patient seen and examined at bedside. Awake and responding to commands. Intubated.     ROS:  Endorses pain. Unable to obtain further ROS given being intubated    OBJECTIVE:    VITAL SIGNS:  ICU Vital Signs Last 24 Hrs  T(C): 37 (16 Apr 2023 08:08), Max: 38 (15 Apr 2023 20:10)  T(F): 98.6 (16 Apr 2023 08:08), Max: 100.4 (15 Apr 2023 20:10)  HR: 83 (16 Apr 2023 08:10) (80 - 119)  ABP: 118/49 (16 Apr 2023 07:30) (77/38 - 137/68)  ABP(mean): 77 (16 Apr 2023 07:30) (54 - 96)  RR: 20 (16 Apr 2023 07:30) (12 - 25)  SpO2: 100% (16 Apr 2023 08:12) (100% - 100%)    O2 Parameters below as of 16 Apr 2023 07:30  Patient On (Oxygen Delivery Method): ventilator          Mode: CPAP with PS, FiO2: 30, PEEP: 5, PC: 13    04-15 @ 07:01  -  04-16 @ 07:00  --------------------------------------------------------  IN: 3074.9 mL / OUT: 2098 mL / NET: 976.9 mL      CAPILLARY BLOOD GLUCOSE      POCT Blood Glucose.: 102 mg/dL (16 Apr 2023 05:09)      PHYSICAL EXAM:  General: Well developed female who is intubated, critically ill  Respiratory: Intubated; 20/380/5/30%. No wheezes, rales, rhonchi   Cardiovascular: RRR, normal S1S2, no M/R/G  Abdomen: soft, NT/ND, ostomy site is dark red with minimal output   Extremities: WWP, no clubbing, cyanosis, or edema  Neurology: awake, responding to commands.     MEDICATIONS:  MEDICATIONS  (STANDING):  chlorhexidine 0.12% Liquid 15 milliLiter(s) Oral Mucosa every 12 hours  chlorhexidine 4% Liquid 1 Application(s) Topical <User Schedule>  dexMEDEtomidine Infusion 0.5 MICROgram(s)/kG/Hr (9.1 mL/Hr) IV Continuous <Continuous>  lactated ringers. 1000 milliLiter(s) (100 mL/Hr) IV Continuous <Continuous>  levothyroxine Injectable 93.75 MICROGram(s) IV Push at bedtime  meropenem  IVPB 500 milliGRAM(s) IV Intermittent every 12 hours  norepinephrine Infusion 0.05 MICROgram(s)/kG/Min (6.83 mL/Hr) IV Continuous <Continuous>  pantoprazole  Injectable 40 milliGRAM(s) IV Push daily    MEDICATIONS  (PRN):  albuterol    90 MICROgram(s) HFA Inhaler 4 Puff(s) Inhalation every 6 hours PRN Shortness of Breath and/or Wheezing  fentaNYL    Injectable 50 MICROGram(s) IV Push every 4 hours PRN Severe Pain (7 - 10)  sodium chloride 0.9% lock flush 10 milliLiter(s) IV Push every 1 hour PRN Pre/post blood products, medications, blood draw, and to maintain line patency      ALLERGIES:  Allergies    No Known Allergies    Intolerances        LABS:                        8.0    18.41 )-----------( 96       ( 16 Apr 2023 06:05 )             26.5     04-16    142  |  107  |  77<H>  ----------------------------<  97  3.5   |  28  |  4.70<H>    Ca    7.3<L>      16 Apr 2023 06:05  Phos  3.3     04-15  Mg     2.3     04-15    TPro  4.5<L>  /  Alb  1.7<L>  /  TBili  0.8  /  DBili  x   /  AST  178<H>  /  ALT  140<H>  /  AlkPhos  84  04-16    PT/INR - ( 15 Apr 2023 05:38 )   PT: 18.4 sec;   INR: 1.57 ratio         PTT - ( 15 Apr 2023 05:38 )  PTT:46.0 sec      RADIOLOGY & ADDITIONAL TESTS:   No interval imaging performed.      CENTRAL LINE: N        DATE INSERTED:             REMOVE: N  BISHOP: Y                       DATE INSERTED:              REMOVE: Y/N  A-LINE: N                       DATE INSERTED:              REMOVE: Y/N      GLOBAL ISSUE/BEST PRACTICE  Analgesia: Y  Sedation: Y  HOB elevation: yes  Stress ulcer prophylaxis: Y  VTE prophylaxis: Y  Glycemic control: Y  Nutrition: Y    CODE STATUS: Full Code

## 2023-04-16 NOTE — PROGRESS NOTE ADULT - PROBLEM SELECTOR PLAN 2
Intubated AC 24/400/5/30%;   Failed spontaneous breathing trial;  Continue to monitor and wean as tolerated per ICU protocol  Mgmt per ICU Intubated CPAP 23/300/5/30  Failed spontaneous breathing trial;  - precedex for sedation  Continue to monitor and wean as tolerated per ICU protocol  Mgmt per ICU

## 2023-04-16 NOTE — PROGRESS NOTE ADULT - SUBJECTIVE AND OBJECTIVE BOX
[facility]   INFECTIOUS DISEASES   [address] [place] [address]  Tel: [phone]     Fax: [phone]  =========================================================  MD Adriana Bird Kaushal, MD Cho, Michelle, MD Sunjit, Jaspal, MD  =========================================================    HERMILA QUIÑONES [id number]    Follow up: Perforated colon     Intubated but opens her eyes with stimuli, mildly tachycardic.  Low grade fever 100.4.     Allergies:  No Known Allergies    Antibiotics:  albuterol    90 MICROgram(s) HFA Inhaler 4 Puff(s) Inhalation every 6 hours PRN  chlorhexidine 0.12% Liquid 15 milliLiter(s) Oral Mucosa every 12 hours  chlorhexidine 4% Liquid 1 Application(s) Topical <User Schedule>  dexMEDEtomidine Infusion 0.5 MICROgram(s)/kG/Hr IV Continuous <Continuous>  fentaNYL    Injectable 50 MICROGram(s) IV Push every 4 hours PRN  lactated ringers. 1000 milliLiter(s) IV Continuous <Continuous>  levothyroxine Injectable 93.75 MICROGram(s) IV Push at bedtime  meropenem  IVPB 500 milliGRAM(s) IV Intermittent every 12 hours  norepinephrine Infusion 0.05 MICROgram(s)/kG/Min IV Continuous <Continuous>  pantoprazole  Injectable 40 milliGRAM(s) IV Push daily  sodium chloride 0.9% lock flush 10 milliLiter(s) IV Push every 1 hour PRN     REVIEW OF SYSTEMS:  Unable     Physical Exam:  ICU Vital Signs Last 24 Hrs  T(C): 37 (16 Apr 2023 08:08), Max: 38 (15 Apr 2023 20:10)  T(F): 98.6 (16 Apr 2023 08:08), Max: 100.4 (15 Apr 2023 20:10)  HR: 119 (16 Apr 2023 10:00) (79 - 119)  ABP: 109/52 (16 Apr 2023 10:00) (77/38 - 137/68)  ABP(mean): 77 (16 Apr 2023 10:00) (54 - 96)  RR: 23 (16 Apr 2023 10:00) (12 - 27)  SpO2: 100% (16 Apr 2023 10:00) (100% - 100%)  O2 Parameters below as of 16 Apr 2023 09:00  Patient On (Oxygen Delivery Method): ventilator  GEN: sedated  HEENT: normocephalic and atraumatic.  Anicteric   NECK: Supple.   LUNGS: diffuse rhonchi   HEART: Regular rate and rhythm   ABDOMEN: Colostomy in R with some liquid stool  Left side wound is open packed with gauze,   drain with some purulent discharge  : Higgins   EXTREMITIES: trace edema.  MSK: no joint swelling  NEUROLOGIC: Sedated   PSYCHIATRIC: sedated   SKIN: No Rash    Labs:  04-16    142  |  107  |  77<H>  ----------------------------<  97  3.5   |  28  |  4.70<H>    Ca    7.3<L>      16 Apr 2023 06:05  Phos  3.3     04-15  Mg     2.3     04-15    TPro  4.5<L>  /  Alb  1.7<L>  /  TBili  0.8  /  DBili  x   /  AST  178<H>  /  ALT  140<H>  /  AlkPhos  84  04-16                        8.0    18.41 )-----------( 96       ( 16 Apr 2023 06:05 )             26.5     PT/INR - ( 15 Apr 2023 05:38 )   PT: 18.4 sec;   INR: 1.57 ratio    PTT - ( 15 Apr 2023 05:38 )  PTT:46.0 sec    LIVER FUNCTIONS - ( 16 Apr 2023 06:05 )  Alb: 1.7 g/dL / Pro: 4.5 g/dL / ALK PHOS: 84 U/L / ALT: 140 U/L / AST: 178 U/L / GGT: x           RECENT CULTURES:  04-13 @ 19:30 .Blood Blood-Peripheral     No growth to date.    04-13 @ 19:25 .Blood Blood-Peripheral     No growth to date.    04-13 @ 14:00 Peritoneal PERITONEAL FLUID #2 Proteus vulgaris group  Escherichia coli  Klebsiella pneumoniae ESBL    Rare Proteus vulgaris group  Few Escherichia coli  Few Klebsiella pneumoniae ESBL  Few Streptococcus constellatus Susceptibility to follow.  Few Bacteroides distasonis "Susceptibilities not performed"    No polymorphonuclear cells seen per low power field  Few Gram positive cocci in pairs per oil power field    04-13 @ 06:40      NotDete    All imaging and data are reviewed.   < from: Xray Chest 1 View- PORTABLE-Routine (Xray Chest 1 View- PORTABLE-Routine in AM.) (04.15.23 @ 09:18) >  IMPRESSION: Bilateral infiltrates of increased. Tubes remain.    < from: CT Abdomen and Pelvis w/ Oral Cont (04.13.23 @ 09:26) >  IMPRESSION:  Pneumoperitoneum and no ascites. Suspect perforated sigmoid colon as   above.  Stable splenomegaly.  Stable right lung nodule, groundglass infiltrates and loculated right   pleural effusion.    Assessment and Plan:   73 y/o f w/ PMH of HTN, CHF, asthma, hypothyroidism, prior notes state A-fib on Xarelto, history of C. difficile 1/13/23, who p/w generalized abdominal pain, and persistent diarrhea. Found to have sepsis 2/2 perforated viscus from suspected sigmoid colon perforation. Imaging also showed evidence of colitis, cannot rule out underlying c diff.    On 4/13 S/P Tia's Procedure and abdominal abscess drainage 1.2 L pus in abdomen with perforated sigmoid colon and splenic flexure ischemia. Low grade fever, leukocytosis improving. Still intubated but responding.     - Blood cultures 4/13 no growth   - Peritoneal cultures poly microbial - Proteus vulgaris group, Escherichia coli, ESBL Klebsiella pneumoniae, Morganella morganii   - Continue Meropenem  - Stool for GI PCR and c diff negative  - Monitor WBC     Will follow.     Murtaza Juarez MD  Division of Infectious Diseases   Please call ID service at [phone] with any question.      35 minutes spent on total encounter assessing patient, examination, chart review, counseling and coordinating care by the attending physician/nurse/care manager.   [facility]   INFECTIOUS DISEASES   [address] [place] [address]  Tel: [phone]     Fax: [phone]  =========================================================  MD Adriana Bird Kaushal, MD Cho, Michelle, MD Sunjit, Jaspal, MD  =========================================================    HERMILA QUIÑONES [id number]    Follow up: Perforated colon     Intubated but opens her eyes with stimuli, mildly tachycardic.  Low grade fever 100.4.     Allergies:  No Known Allergies    Antibiotics:  albuterol    90 MICROgram(s) HFA Inhaler 4 Puff(s) Inhalation every 6 hours PRN  chlorhexidine 0.12% Liquid 15 milliLiter(s) Oral Mucosa every 12 hours  chlorhexidine 4% Liquid 1 Application(s) Topical <User Schedule>  dexMEDEtomidine Infusion 0.5 MICROgram(s)/kG/Hr IV Continuous <Continuous>  fentaNYL    Injectable 50 MICROGram(s) IV Push every 4 hours PRN  lactated ringers. 1000 milliLiter(s) IV Continuous <Continuous>  levothyroxine Injectable 93.75 MICROGram(s) IV Push at bedtime  meropenem  IVPB 500 milliGRAM(s) IV Intermittent every 12 hours  norepinephrine Infusion 0.05 MICROgram(s)/kG/Min IV Continuous <Continuous>  pantoprazole  Injectable 40 milliGRAM(s) IV Push daily  sodium chloride 0.9% lock flush 10 milliLiter(s) IV Push every 1 hour PRN     REVIEW OF SYSTEMS:  Unable     Physical Exam:  ICU Vital Signs Last 24 Hrs  T(C): 37 (16 Apr 2023 08:08), Max: 38 (15 Apr 2023 20:10)  T(F): 98.6 (16 Apr 2023 08:08), Max: 100.4 (15 Apr 2023 20:10)  HR: 119 (16 Apr 2023 10:00) (79 - 119)  ABP: 109/52 (16 Apr 2023 10:00) (77/38 - 137/68)  ABP(mean): 77 (16 Apr 2023 10:00) (54 - 96)  RR: 23 (16 Apr 2023 10:00) (12 - 27)  SpO2: 100% (16 Apr 2023 10:00) (100% - 100%)  O2 Parameters below as of 16 Apr 2023 09:00  Patient On (Oxygen Delivery Method): ventilator  GEN: sedated  HEENT: normocephalic and atraumatic.  Anicteric   NECK: Supple.   LUNGS: diffuse rhonchi   HEART: Regular rate and rhythm   ABDOMEN: Colostomy in R with some liquid stool  Left side wound is open packed with gauze,   drain with some purulent discharge  : Higgins   EXTREMITIES: trace edema.  MSK: no joint swelling  NEUROLOGIC: Sedated   PSYCHIATRIC: sedated   SKIN: No Rash    Labs:  04-16    142  |  107  |  77<H>  ----------------------------<  97  3.5   |  28  |  4.70<H>    Ca    7.3<L>      16 Apr 2023 06:05  Phos  3.3     04-15  Mg     2.3     04-15    TPro  4.5<L>  /  Alb  1.7<L>  /  TBili  0.8  /  DBili  x   /  AST  178<H>  /  ALT  140<H>  /  AlkPhos  84  04-16                        8.0    18.41 )-----------( 96       ( 16 Apr 2023 06:05 )             26.5     PT/INR - ( 15 Apr 2023 05:38 )   PT: 18.4 sec;   INR: 1.57 ratio    PTT - ( 15 Apr 2023 05:38 )  PTT:46.0 sec    LIVER FUNCTIONS - ( 16 Apr 2023 06:05 )  Alb: 1.7 g/dL / Pro: 4.5 g/dL / ALK PHOS: 84 U/L / ALT: 140 U/L / AST: 178 U/L / GGT: x           RECENT CULTURES:  04-13 @ 19:30 .Blood Blood-Peripheral     No growth to date.    04-13 @ 19:25 .Blood Blood-Peripheral     No growth to date.    04-13 @ 14:00 Peritoneal PERITONEAL FLUID #2 Proteus vulgaris group  Escherichia coli  Klebsiella pneumoniae ESBL    Rare Proteus vulgaris group  Few Escherichia coli  Few Klebsiella pneumoniae ESBL  Few Streptococcus constellatus Susceptibility to follow.  Few Bacteroides distasonis "Susceptibilities not performed"    No polymorphonuclear cells seen per low power field  Few Gram positive cocci in pairs per oil power field    04-13 @ 06:40      NotDete    All imaging and data are reviewed.   < from: Xray Chest 1 View- PORTABLE-Routine (Xray Chest 1 View- PORTABLE-Routine in AM.) (04.15.23 @ 09:18) >  IMPRESSION: Bilateral infiltrates of increased. Tubes remain.    < from: CT Abdomen and Pelvis w/ Oral Cont (04.13.23 @ 09:26) >  IMPRESSION:  Pneumoperitoneum and no ascites. Suspect perforated sigmoid colon as   above.  Stable splenomegaly.  Stable right lung nodule, groundglass infiltrates and loculated right   pleural effusion.    Assessment and Plan:   73 y/o f w/ PMH of HTN, CHF, asthma, hypothyroidism, prior notes state A-fib on Xarelto, history of C. difficile 1/13/23, who p/w generalized abdominal pain, and persistent diarrhea. Found to have sepsis 2/2 perforated viscus from suspected sigmoid colon perforation. Imaging also showed evidence of colitis, cannot rule out underlying c diff.    On 4/13 S/P Tia's Procedure and abdominal abscess drainage 1.2 L pus in abdomen with perforated sigmoid colon and splenic flexure ischemia. Low grade fever, leukocytosis improved initially now 18 from 11k. Still intubated but responding.     - Blood cultures 4/13 no growth   - Peritoneal cultures poly microbial - Proteus vulgaris group, Escherichia coli, ESBL Klebsiella pneumoniae, Morganella morganii   - Continue Meropenem  - Stool for GI PCR and c diff negative  - Monitor WBC     Will follow.     Murtaza Juarez MD  Division of Infectious Diseases   Please call ID service at [phone] with any question.      35 minutes spent on total encounter assessing patient, examination, chart review, counseling and coordinating care by the attending physician/nurse/care manager.

## 2023-04-16 NOTE — PROGRESS NOTE ADULT - PROBLEM SELECTOR PLAN 8
Continue DVT prophylaxis with venodynes  Mgmt per ICU        #Imaging abnormality  Stable right lung nodule, ground-glass infiltrates and loculated right pleural effusion.        #DISPO  PT consult when able Continue DVT prophylaxis with venodynes  Mgmt per ICU      #Imaging abnormality  Stable right lung nodule, ground-glass infiltrates and loculated right pleural effusion.      # Goals of Care  - consider Palliative Consult to discuss goals of care    #DISPO  PT consult when able

## 2023-04-16 NOTE — PROGRESS NOTE ADULT - PROBLEM SELECTOR PLAN 5
ProBNP 55313  TTE (04/2023) - LVEF of 55-60%; LAE; Sclerotic trileaflet aortic valve, trace AI. Mild to moderate MR and TR. IVC measures 2.0 cm and is non collapsing  Per cardio - hold Lasix and lopressor for now due to hypotension - monitor BPs for resuming  Cardio following, recs appreciated  Mgmt per ICU

## 2023-04-16 NOTE — PROGRESS NOTE ADULT - PROBLEM SELECTOR PLAN 4
LAYA  likely ATN due to septic shock  Cr 4.2.   Correct electrolyte derangement  S/p bicarb gtt  Mgmt per ICU LAYA  likely ATN due to septic shock  Cr worsened to 4.7  Correct electrolyte derangement  S/p bicarb gtt  Mgmt per ICU

## 2023-04-16 NOTE — PROGRESS NOTE ADULT - ASSESSMENT
72yoF now POD 3 extended yessica's including descending colon for perforated sigmoid, admitted post-operatively to ICU for pressure support, currently intubated, continued on pressors, with shock liver and LAYA.    PLAN:  - Continue care per ICU  - continue to monitor colostomy output, TAMERA output, NGT output and schroeder catheter output  - COntinue abx per ID    Discussed with Dr. Dumont (covering for Dr. Goldberg)

## 2023-04-16 NOTE — PROGRESS NOTE ADULT - SUBJECTIVE AND OBJECTIVE BOX
S: Patient seen and examined at bedside, POD3 s/p extended yessica's procedure.  No acute overnight events. Patient remains intubated, on pressor support. Pt unable to participate with exam.    MEDICATIONS:  albuterol    90 MICROgram(s) HFA Inhaler 4 Puff(s) Inhalation every 6 hours PRN  chlorhexidine 0.12% Liquid 15 milliLiter(s) Oral Mucosa every 12 hours  chlorhexidine 4% Liquid 1 Application(s) Topical <User Schedule>  dexMEDEtomidine Infusion 0.5 MICROgram(s)/kG/Hr IV Continuous <Continuous>  fentaNYL    Injectable 50 MICROGram(s) IV Push every 4 hours PRN  lactated ringers. 1000 milliLiter(s) IV Continuous <Continuous>  levothyroxine Injectable 93.75 MICROGram(s) IV Push at bedtime  meropenem  IVPB 500 milliGRAM(s) IV Intermittent every 12 hours  norepinephrine Infusion 0.05 MICROgram(s)/kG/Min IV Continuous <Continuous>  pantoprazole  Injectable 40 milliGRAM(s) IV Push daily  sodium chloride 0.9% lock flush 10 milliLiter(s) IV Push every 1 hour PRN    O:  Vital Signs Last 24 Hrs  T(C): 36.9 (16 Apr 2023 11:53), Max: 38 (15 Apr 2023 20:10)  T(F): 98.4 (16 Apr 2023 11:53), Max: 100.4 (15 Apr 2023 20:10)  HR: 82 (16 Apr 2023 13:00) (79 - 121)  RR: 20 (16 Apr 2023 13:00) (19 - 27)  SpO2: 100% (16 Apr 2023 13:00) (100% - 100%)    Parameters below as of 16 Apr 2023 09:00  Patient On (Oxygen Delivery Method): ventilator    I&O SUMMARY:    04-15-23 @ 07:01  -  04-16-23 @ 07:00  --------------------------------------------------------  IN:    Dexmedetomidine: 63.7 mL    FentaNYL: 22 mL    IV PiggyBack: 100 mL    IV PiggyBack: 100 mL    IV PiggyBack: 100 mL    Lactated Ringers: 2400 mL    Norepinephrine: 289.2 mL  Total IN: 3074.9 mL    OUT:    Bulb (mL): 475 mL    Colostomy (mL): 161 mL    Indwelling Catheter - Urethral (mL): 287 mL    Nasogastric/Oral tube (mL): 1175 mL  Total OUT: 2098 mL    Total NET: 976.9 mL      04-16-23 @ 07:01  -  04-16-23 @ 13:01  --------------------------------------------------------  IN:    Lactated Ringers: 300 mL    Norepinephrine: 35.5 mL  Total IN: 335.5 mL    OUT:  Total OUT: 0 mL    Total NET: 335.5 mL    PHYSICAL EXAM:  GENERAL: currently intubated, on pressor support  HEAD:  Atraumatic, Normocephalic, eyes open  CHEST/LUNG: on ventilator, equal chest rise bilaterally  HEART: Regular rate and rhythm, on norepinephrine  ABDOMEN: Soft, ostomy dusky, appears retracted, midline wound partially closed with staples, packed between stables. TAMERA drain with serous > sanguinous drainage. Ostomy with bowel sweat.  : schroeder catheter in place with yellow urine    LABS:                        8.0    18.41 )-----------( 96       ( 16 Apr 2023 06:05 )             26.5     04-16    142  |  107  |  77<H>  ----------------------------<  97  3.5   |  28  |  4.70<H>    Ca    7.3<L>      16 Apr 2023 06:05  Phos  3.3     04-15  Mg     2.3     04-15    TPro  4.5<L>  /  Alb  1.7<L>  /  TBili  0.8  /  DBili  x   /  AST  178<H>  /  ALT  140<H>  /  AlkPhos  84  04-16    PT/INR - ( 15 Apr 2023 05:38 )   PT: 18.4 sec;   INR: 1.57 ratio         PTT - ( 15 Apr 2023 05:38 )  PTT:46.0 sec    Culture - Blood (04.13.23 @ 19:30)    Specimen Source: .Blood Blood-Peripheral   Culture Results:   No growth to date.    Culture - Blood (04.13.23 @ 19:25)    Specimen Source: .Blood Blood-Peripheral   Culture Results:   No growth to date.    Culture - Body Fluid with Gram Stain (04.13.23 @ 14:00)    Gram Stain:   Numerous polymorphonuclear leukocytes per low power field  Few Gram Variable Rods per oil power field   -  Trimethoprim/Sulfamethoxazole: R >2/38   -  Trimethoprim/Sulfamethoxazole: S <=0.5/9.5   -  Trimethoprim/Sulfamethoxazole: S <=0.5/9.5   -  Piperacillin/Tazobactam: R <=8   -  Piperacillin/Tazobactam: S <=8   -  Piperacillin/Tazobactam: S <=8   -  Tobramycin: S <=2   -  Tobramycin: S <=2   -  Tobramycin: S 4   -  Gentamicin: R >8   -  Gentamicin: S <=2   -  Gentamicin: S <=2   -  Imipenem: S <=1   -  Imipenem: S <=1   -  Levofloxacin: S <=0.5   -  Levofloxacin: S <=0.5   -  Levofloxacin: S <=0.5   -  Meropenem: S <=1   -  Meropenem: S <=1   -  Meropenem: S <=1   -  Amikacin: S <=16   -  Amikacin: S <=16   -  Amikacin: S <=16   -  Amoxicillin/Clavulanic Acid: R >16/8   -  Amoxicillin/Clavulanic Acid: S <=8/4   -  Amoxicillin/Clavulanic Acid: S <=8/4   -  Ampicillin: S <=8 These ampicillin results predict results for amoxicillin   -  Ampicillin: R >16 These ampicillin results predict results for amoxicillin   -  Ampicillin: R >16 These ampicillin results predict results for amoxicillin   -  Ampicillin/Sulbactam: S 8/4 Enterobacter, Klebsiella aerogenes, Citrobacter, and Serratia may develop resistance during prolonged therapy (3-4 days)   -  Ampicillin/Sulbactam: R >16/8 Enterobacter, Klebsiella aerogenes, Citrobacter, and Serratia may develop resistance during prolonged therapy (3-4 days)   -  Ampicillin/Sulbactam: S <=4/2 Enterobacter, Klebsiella aerogenes, Citrobacter, and Serratia may develop resistance during prolonged therapy (3-4 days)   -  Aztreonam: S <=4   -  Aztreonam: R <=4   -  Aztreonam: S <=4   -  Cefazolin: R >16 Enterobacter, Klebsiella aerogenes, Citrobacter, and Serratia may develop resistance during prolonged therapy (3-4 days)   -  Cefazolin: R >16 Enterobacter, Klebsiella aerogenes, Citrobacter, and Serratia may develop resistance during prolonged therapy (3-4 days)   -  Cefazolin: S <=2 Enterobacter, Klebsiella aerogenes, Citrobacter, and Serratia may develop resistance during prolonged therapy (3-4 days)   -  Cefepime: S <=2   -  Cefepime: R >16   -  Cefepime: S <=2   -  Cefoxitin: S <=8   -  Cefoxitin: S <=8   -  Ceftriaxone: S <=1 Enterobacter, Klebsiella aerogenes, Citrobacter, and Serratia may develop resistance during prolonged therapy   -  Ceftriaxone: S <=1 Enterobacter, Klebsiella aerogenes, Citrobacter, and Serratia may develop resistance during prolonged therapy   -  Ceftriaxone: R >32 Enterobacter, Klebsiella aerogenes, Citrobacter, and Serratia may develop resistance during prolonged therapy   -  Ciprofloxacin: I 0.5   -  Ciprofloxacin: S <=0.25   -  Ciprofloxacin: S <=0.25   -  Ertapenem: S <=0.5   -  Ertapenem: S <=0.5   -  Ertapenem: S <=0.5   Specimen Source: Peritoneal PERITONEAL FLUID #1   Culture Results:   Rare Morganella morganii  Few Klebsiella pneumoniae ESBL  Few Escherichia coli  Few Streptococcus constellatus See previous culture [id number]  Few Bacteroides distasonis "Susceptibilities not performed"   Organism Identification: Morganella morganii  Klebsiella pneumoniae ESBL  Escherichia coli   Organism: Morganella morganii   Organism: Klebsiella pneumoniae ESBL   Organism: Escherichia coli   Method Type: MICAELA   Method Type: MICAELA   Method Type: MICAELA

## 2023-04-16 NOTE — PROGRESS NOTE ADULT - SUBJECTIVE AND OBJECTIVE BOX
Subjective and Objective:   [facility] CARDIOLOGY CONSULTANTS: Adrián Wray Pannella, Patel, Savella, Goodger      [phone]    CHIEF COMPLAINT: Patient is a 72y old  Female who presents with a chief complaint of intra-abdominal perforation (2023 10:18)    FOLLOW UP: S/p Tia's with abdominal abscess drainage.   OR finding with >1.2 liters of pus from abd cavity.     pt on Full vent support. s/p PRBC, IV hydration for severe vasodilation shock , on pressor support    unable to obtain ROS      PAST MEDICAL & SURGICAL HISTORY:  Hypothyroidism      HLD (hyperlipidemia)      Thrombocytosis      Persistent atrial fibrillation      Obesity (BMI 35.0-39.9 without comorbidity)      Asthma      Diabetes mellitus      Ankle injury  on , 5 surgeries.      Cholecystitis          MEDICATIONS  (STANDING):  chlorhexidine 0.12% Liquid 15 milliLiter(s) Oral Mucosa every 12 hours  chlorhexidine 4% Liquid 1 Application(s) Topical <User Schedule>  dexMEDEtomidine Infusion 0.5 MICROgram(s)/kG/Hr (9.1 mL/Hr) IV Continuous <Continuous>  lactated ringers. 1000 milliLiter(s) (100 mL/Hr) IV Continuous <Continuous>  levothyroxine Injectable 93.75 MICROGram(s) IV Push at bedtime  meropenem  IVPB 500 milliGRAM(s) IV Intermittent every 12 hours  norepinephrine Infusion 0.05 MICROgram(s)/kG/Min (6.83 mL/Hr) IV Continuous <Continuous>  pantoprazole  Injectable 40 milliGRAM(s) IV Push daily      Allergies    No Known Allergies    Intolerances                              8.0    18.41 )-----------( 96       ( 2023 06:05 )             26.5       04-16    142  |  107  |  77<H>  ----------------------------<  97  3.5   |  28  |  4.70<H>    Ca    7.3<L>      2023 06:05  Phos  3.3     04-15  Mg     2.3     04-15    TPro  4.5<L>  /  Alb  1.7<L>  /  TBili  0.8  /  DBili  x   /  AST  178<H>  /  ALT  140<H>  /  AlkPhos  84  04-16      LIVER FUNCTIONS - ( 2023 06:05 )  Alb: 1.7 g/dL / Pro: 4.5 g/dL / ALK PHOS: 84 U/L / ALT: 140 U/L / AST: 178 U/L / GGT: x             PT/INR - ( 15 Apr 2023 05:38 )   PT: 18.4 sec;   INR: 1.57 ratio         PTT - ( 15 Apr 2023 05:38 )  PTT:46.0 sec                      Daily     Daily Weight in k.7 (2023 07:30)    I&O's Summary    15 Apr 2023 07:01  -  2023 07:00  --------------------------------------------------------  IN: 3074.9 mL / OUT: 2098 mL / NET: 976.9 mL    2023 07:01  -  2023 11:44  --------------------------------------------------------  IN: 335.5 mL / OUT: 0 mL / NET: 335.5 mL        Vital Signs Last 24 Hrs  T(C): 37 (2023 08:08), Max: 38 (15 Apr 2023 20:10)  T(F): 98.6 (2023 08:08), Max: 100.4 (15 Apr 2023 20:10)  HR: 121 (2023 11:00) (79 - 121)  BP: --  BP(mean): --  RR: 21 (2023 11:00) (12 - 27)  SpO2: 100% (2023 11:00) (100% - 100%)    Parameters below as of 2023 09:00  Patient On (Oxygen Delivery Method): ventilator        PHYSICAL EXAM:   · Constitutional	Well-developed, well nourished  · Eyes	EOMI; PERRL; no drainage or redness  · ENMT	No oral lesions; no gross abnormalities  · Neck	No bruits; no thyromegaly or nodules  · Respiratory	Normal breath sounds b/l, No RRW  · Cardiovascular	Regular rate & rhythm, normal S1, S2; no murmurs, gallops or rubs; no S3, S4  · Gastrointestinal	Soft, non-tender, no hepatosplenomegaly, normal bowel sounds  · Extremities	No cyanosis, clubbing or edema  · Vascular	Equal and normal pulses (carotid, femoral, dorsalis pedis)  · Neurological	unable to assess

## 2023-04-16 NOTE — PROGRESS NOTE ADULT - ASSESSMENT
71 yo woman with Hx afib (off xarelto at least 1wk), HFpEF, asthma, recent Cdiff in Jan presenting with weakness and multiple falls at home and was found to have perforated sigmoid colon resulting in peritonitis and septic shock.   Additionally, she was noted to have lactic acidosis and LAYA likely ATN form septic shock.  She is now s/p exploratory laparotomy with washout, drainage of 1.2L purulent collection in the peritoneal cavity,  transverse and sigmoid resection and Ro's procedure.  She is being managed in the ICU due to septic shock requiring pressor support, postop respiratory failure requiring mechanical ventilation, LAYA and acute blood loss anemia  71 yo woman with Hx afib (off xarelto at least 1wk), HFpEF, asthma, recent Cdiff in Jan presenting with weakness and multiple falls at home and was found to have perforated sigmoid colon resulting in peritonitis and septic shock.   Additionally, she was noted to have lactic acidosis and LAYA likely ATN form septic shock.  She is now s/p exploratory laparotomy with washout, drainage of 1.2L purulent collection in the peritoneal cavity,  transverse and sigmoid resection and Ro's procedure.  She is being managed in the ICU due to septic shock requiring pressor support, postop respiratory failure requiring mechanical ventilation, LAYA and acute blood loss anemia

## 2023-04-16 NOTE — PROGRESS NOTE ADULT - ASSESSMENT
72F with pmhx afib s/p ablation, HFpEF, mod MR, thrombocytosis, asthma recent C diff infection was brought to the ED today for recurrent falls and persistent nausea/vomiting, now with pneumoperitenum and perforation; Day 1 s/p Ro.       Neuro: sedated on Precedex 9.1mcg/kg, Off Fentanyl     CV: Shock likely 2/2 sepsis, on Levo 0.7, off Zelalem 0.25. TTE shows EF of 55-60%,    Pulm: Intubated AC 24/380/5/30%; Failed spontaneous breathing trial; likely 2/2 sedation. Continue albuterol PRN.     GI: CT w/ abdominal perf; now S/P day # 2 s/p Ro. Continue protoxin. Ostomy c/d/i w/ minimal output. Peritoneal cultures showing E-Coli, Proteus, Klebsiella and Morganella Morganii, ID following, continue with Zosyn and Metro    Renal: LAYA 2/2 ATN; Cr 4.2. K replated this AM. continue with LR at 100cc/h    ID: Coninue Zosyn/Flagyl. Peritoneal fluid w/ gram variable rods and PMNs with E-Coli, Proteus, Klebsiella and Morganella Morganii,      Endo: Stat IV synthroid at 75% of home dose; monitor glucose w/ fingersticks.     Heme: received DDAVP, FFP and vitamin K to reverse coagulopathy. Continue to hold Xarelto,  possible eventual heparin gtt; DVT ppx with scds for now, transition to chem ppx once cleared by surgery    72F with pmhx afib s/p ablation, HFpEF, mod MR, thrombocytosis, asthma recent C diff infection was brought to the ED today for recurrent falls and persistent nausea/vomiting, now with pneumoperitenum and perforation; Day 1 s/p Ro.       Neuro: Off fentanyl and precedx at this time. awake and responding to commands.     CV: Shock likely 2/2 sepsis, on Levo 0.1, continue to wean     Pulm: Intubated AC 20/380/5/30%; Failed spontaneous breathing trial; likely 2/2 sedation. Continue albuterol PRN.     GI: CT w/ abdominal perf; now S/P day # 3 s/p Ro. Continue protonix. Ostomy c/d/i w/ minimal output. Peritoneal cultures showing E-Coli, Proteus, Klebsiella and Morganella Morganii, ID following, continue with Meropenam     Renal: LAYA 2/2 ATN; Cr 4.7 this AM. continue with LR at 100cc/h. replete lytes PRN     ID: Cont meropenam 2/2 ESBL.  ID following     Endo: IV synthroid at 75% of home dose; monitor glucose w/ fingersticks.     Heme: received DDAVP, FFP and vitamin K to reverse coagulopathy. Continue to hold Xarelto,  possible eventual heparin gtt; DVT ppx with scds for now, transition to chem ppx once cleared by surgery

## 2023-04-16 NOTE — PROGRESS NOTE ADULT - TIME BILLING
Pt seen and examined at bedside with resident physician. The necessity of the time spent during the encounter today was to assess routine vitals, labs, imagining, and consultant recommendations. medical plan as documented above.

## 2023-04-16 NOTE — PROGRESS NOTE ADULT - SUBJECTIVE AND OBJECTIVE BOX
Patient is a 72y old  Female who presents with a chief complaint of intra-abdominal perforation (16 Apr 2023 08:50    INTERVAL HPI/OVERNIGHT EVENTS: No acute overnight events. Pt seen and examined at the bedside this AM. She is on ventilator, opens eyes to voice.    MEDICATIONS  (STANDING):  chlorhexidine 0.12% Liquid 15 milliLiter(s) Oral Mucosa every 12 hours  chlorhexidine 4% Liquid 1 Application(s) Topical <User Schedule>  dexMEDEtomidine Infusion 0.5 MICROgram(s)/kG/Hr (9.1 mL/Hr) IV Continuous <Continuous>  lactated ringers. 1000 milliLiter(s) (100 mL/Hr) IV Continuous <Continuous>  levothyroxine Injectable 93.75 MICROGram(s) IV Push at bedtime  meropenem  IVPB 500 milliGRAM(s) IV Intermittent every 12 hours  norepinephrine Infusion 0.05 MICROgram(s)/kG/Min (6.83 mL/Hr) IV Continuous <Continuous>  pantoprazole  Injectable 40 milliGRAM(s) IV Push daily    MEDICATIONS  (PRN):  albuterol    90 MICROgram(s) HFA Inhaler 4 Puff(s) Inhalation every 6 hours PRN Shortness of Breath and/or Wheezing  fentaNYL    Injectable 50 MICROGram(s) IV Push every 4 hours PRN Severe Pain (7 - 10)  sodium chloride 0.9% lock flush 10 milliLiter(s) IV Push every 1 hour PRN Pre/post blood products, medications, blood draw, and to maintain line patency      Allergies    No Known Allergies    Intolerances        REVIEW OF SYSTEMS:  Unable to obtain 2/2 intubation    Vital Signs Last 24 Hrs  T(C): 37 (16 Apr 2023 08:08), Max: 38 (15 Apr 2023 20:10)  T(F): 98.6 (16 Apr 2023 08:08), Max: 100.4 (15 Apr 2023 20:10)  HR: 118 (16 Apr 2023 09:00) (79 - 119)  BP: --  BP(mean): --  RR: 24 (16 Apr 2023 09:00) (12 - 26)  SpO2: 100% (16 Apr 2023 09:00) (100% - 100%)    Parameters below as of 16 Apr 2023 09:00  Patient On (Oxygen Delivery Method): ventilator        PHYSICAL EXAM:  GENERAL: NAD, lying comfortably in bed, intubated on ventilator  HEENT: dry mucous membranes  CHEST/LUNG:  CTA b/l, no rales, wheezes, or rhonchi  HEART: tachycardic, irregular  ABDOMEN:  Ostomy w/ output. Abdominal surgery site w/ bandage c/d/i; clement drain noted with serosanguinous fluid  EXTREMITIES: + upper extremity 1+ nonpitting edema. LE no edema, cyanosis, or calf tenderness  NERVOUS SYSTEM: sedated    LABS:                        8.0    18.41 )-----------( 96       ( 16 Apr 2023 06:05 )             26.5     CBC Full  -  ( 16 Apr 2023 06:05 )  WBC Count : 18.41 K/uL  Hemoglobin : 8.0 g/dL  Hematocrit : 26.5 %  Platelet Count - Automated : 96 K/uL  Mean Cell Volume : 86.9 fl  Mean Cell Hemoglobin : 26.2 pg  Mean Cell Hemoglobin Concentration : 30.2 gm/dL  Auto Neutrophil # : x  Auto Lymphocyte # : x  Auto Monocyte # : x  Auto Eosinophil # : x  Auto Basophil # : x  Auto Neutrophil % : x  Auto Lymphocyte % : x  Auto Monocyte % : x  Auto Eosinophil % : x  Auto Basophil % : x    16 Apr 2023 06:05    142    |  107    |  77     ----------------------------<  97     3.5     |  28     |  4.70     Ca    7.3        16 Apr 2023 06:05    TPro  4.5    /  Alb  1.7    /  TBili  0.8    /  DBili  x      /  AST  178    /  ALT  140    /  AlkPhos  84     16 Apr 2023 06:05    PT/INR - ( 15 Apr 2023 05:38 )   PT: 18.4 sec;   INR: 1.57 ratio         PTT - ( 15 Apr 2023 05:38 )  PTT:46.0 sec    CAPILLARY BLOOD GLUCOSE      POCT Blood Glucose.: 102 mg/dL (16 Apr 2023 05:09)  POCT Blood Glucose.: 94 mg/dL (15 Apr 2023 23:11)  POCT Blood Glucose.: 104 mg/dL (15 Apr 2023 17:23)  POCT Blood Glucose.: 110 mg/dL (15 Apr 2023 14:22)        Culture - Blood (collected 04-13-23 @ 19:30)  Source: .Blood Blood-Peripheral  Preliminary Report (04-15-23 @ 01:02):    No growth to date.    Culture - Blood (collected 04-13-23 @ 19:25)  Source: .Blood Blood-Peripheral  Preliminary Report (04-15-23 @ 01:02):    No growth to date.    Culture - Body Fluid with Gram Stain (collected 04-13-23 @ 14:00)  Source: Peritoneal PERITONEAL FLUID #1  Gram Stain (04-13-23 @ 22:55):    Numerous polymorphonuclear leukocytes per low power field    Few Gram Variable Rods per oil power field  Final Report (04-15-23 @ 16:47):    Rare Morganella morganii    Few Klebsiella pneumoniae ESBL    Few Escherichia coli    Few Streptococcus constellatus See previous culture [id number]    Few Bacteroides distasonis "Susceptibilities not performed"  Organism: Morganella morganii  Klebsiella pneumoniae ESBL  Escherichia coli (04-15-23 @ 16:47)  Organism: Escherichia coli (04-15-23 @ 16:47)      Method Type: MICAELA      -  Amikacin: S <=16      -  Amoxicillin/Clavulanic Acid: S <=8/4      -  Ampicillin: S <=8 These ampicillin results predict results for amoxicillin      -  Ampicillin/Sulbactam: S <=4/2 Enterobacter, Klebsiella aerogenes, Citrobacter, and Serratia may develop resistance during prolonged therapy (3-4 days)      -  Aztreonam: S <=4      -  Cefazolin: S <=2 Enterobacter, Klebsiella aerogenes, Citrobacter, and Serratia may develop resistance during prolonged therapy (3-4 days)      -  Cefepime: S <=2      -  Cefoxitin: S <=8      -  Ceftriaxone: S <=1 Enterobacter, Klebsiella aerogenes, Citrobacter, and Serratia may develop resistance during prolonged therapy      -  Ciprofloxacin: S <=0.25      -  Ertapenem: S <=0.5      -  Gentamicin: S <=2      -  Imipenem: S <=1      -  Levofloxacin: S <=0.5      -  Meropenem: S <=1      -  Piperacillin/Tazobactam: S <=8      -  Tobramycin: S <=2      -  Trimethoprim/Sulfamethoxazole: S <=0.5/9.5  Organism: Klebsiella pneumoniae ESBL (04-15-23 @ 16:47)      Method Type: MICAELA      -  Amikacin: S <=16      -  Amoxicillin/Clavulanic Acid: S <=8/4      -  Ampicillin: R >16 These ampicillin results predict results for amoxicillin      -  Ampicillin/Sulbactam: R >16/8 Enterobacter, Klebsiella aerogenes, Citrobacter, and Serratia may develop resistance during prolonged therapy (3-4 days)      -  Aztreonam: R <=4      -  Cefazolin: R >16 Enterobacter, Klebsiella aerogenes, Citrobacter, and Serratia may develop resistance during prolonged therapy (3-4 days)      -  Cefepime: R >16      -  Ceftriaxone: R >32 Enterobacter, Klebsiella aerogenes, Citrobacter, and Serratia may develop resistance during prolonged therapy      -  Ciprofloxacin: I 0.5      -  Ertapenem: S <=0.5      -  Gentamicin: R >8      -  Imipenem: S <=1      -  Levofloxacin: S <=0.5      -  Meropenem: S <=1      -  Piperacillin/Tazobactam: R <=8      -  Tobramycin: S 4      -  Trimethoprim/Sulfamethoxazole: R >2/38  Organism: Morganella morganii (04-15-23 @ 16:47)      Method Type: MICAELA      -  Amikacin: S <=16      -  Amoxicillin/Clavulanic Acid: R >16/8      -  Ampicillin: R >16 These ampicillin results predict results for amoxicillin      -  Ampicillin/Sulbactam: S 8/4 Enterobacter, Klebsiella aerogenes, Citrobacter, and Serratia may develop resistance during prolonged therapy (3-4 days)      -  Aztreonam: S <=4      -  Cefazolin: R >16 Enterobacter, Klebsiella aerogenes, Citrobacter, and Serratia may develop resistance during prolonged therapy (3-4 days)      -  Cefepime: S <=2      -  Cefoxitin: S <=8      -  Ceftriaxone: S <=1 Enterobacter, Klebsiella aerogenes, Citrobacter, and Serratia may develop resistance during prolonged therapy      -  Ciprofloxacin: S <=0.25      -  Ertapenem: S <=0.5      -  Gentamicin: S <=2      -  Levofloxacin: S <=0.5      -  Meropenem: S <=1      -  Piperacillin/Tazobactam: S <=8      -  Tobramycin: S <=2      -  Trimethoprim/Sulfamethoxazole: S <=0.5/9.5    Culture - Body Fluid with Gram Stain (collected 04-13-23 @ 14:00)  Source: Peritoneal PERITONEAL FLUID #2  Gram Stain (04-14-23 @ 00:41):    No polymorphonuclear cells seen per low power field    Few Gram positive cocci in pairs per oil power field  Preliminary Report (04-15-23 @ 16:47):    Rare Proteus vulgaris group    Few Escherichia coli    Few Klebsiella pneumoniae ESBL    Few Streptococcus constellatus Susceptibility to follow.    Few Bacteroides distasonis "Susceptibilities not performed"  Organism: Proteus vulgaris group  Escherichia coli  Klebsiella pneumoniae ESBL (04-15-23 @ 15:33)  Organism: Klebsiella pneumoniae ESBL (04-15-23 @ 15:33)      Method Type: MICAELA      -  Amikacin: S <=16      -  Amoxicillin/Clavulanic Acid: S <=8/4      -  Ampicillin: R >16 These ampicillin results predict results for amoxicillin      -  Ampicillin/Sulbactam: R >16/8 Enterobacter, Klebsiella aerogenes, Citrobacter, and Serratia may develop resistance during prolonged therapy (3-4 days)      -  Aztreonam: R <=4      -  Cefazolin: R >16 Enterobacter, Klebsiella aerogenes, Citrobacter, and Serratia may develop resistance during prolonged therapy (3-4 days)      -  Cefepime: R >16      -  Ceftriaxone: R >32 Enterobacter, Klebsiella aerogenes, Citrobacter, and Serratia may develop resistance during prolonged therapy      -  Ciprofloxacin: I 0.5      -  Ertapenem: S <=0.5      -  Gentamicin: R >8      -  Imipenem: S <=1      -  Levofloxacin: S <=0.5      -  Meropenem: S <=1      -  Piperacillin/Tazobactam: R <=8      -  Tobramycin: I 8      -  Trimethoprim/Sulfamethoxazole: R >2/38  Organism: Escherichia coli (04-15-23 @ 15:32)      Method Type: MICAELA      -  Amikacin: S <=16      -  Amoxicillin/Clavulanic Acid: S <=8/4      -  Ampicillin: S <=8 These ampicillin results predict results for amoxicillin      -  Ampicillin/Sulbactam: S <=4/2 Enterobacter, Klebsiella aerogenes, Citrobacter, and Serratia may develop resistance during prolonged therapy (3-4 days)      -  Aztreonam: S <=4      -  Cefazolin: S <=2 Enterobacter, Klebsiella aerogenes, Citrobacter, and Serratia may develop resistance during prolonged therapy (3-4 days)      -  Cefepime: S <=2      -  Cefoxitin: S <=8      -  Ceftriaxone: S <=1 Enterobacter, Klebsiella aerogenes, Citrobacter, and Serratia may develop resistance during prolonged therapy      -  Ciprofloxacin: S <=0.25      -  Ertapenem: S <=0.5      -  Gentamicin: S <=2      -  Imipenem: S <=1      -  Levofloxacin: S <=0.5      -  Meropenem: S <=1      -  Piperacillin/Tazobactam: S <=8      -  Tobramycin: S <=2      -  Trimethoprim/Sulfamethoxazole: S <=0.5/9.5  Organism: Proteus vulgaris group (04-15-23 @ 15:32)      Method Type: MICAELA      -  Amikacin: S <=16      -  Amoxicillin/Clavulanic Acid: S <=8/4      -  Ampicillin: R >16 These ampicillin results predict results for amoxicillin      -  Ampicillin/Sulbactam: S <=4/2 Enterobacter, Klebsiella aerogenes, Citrobacter, and Serratia may develop resistance during prolonged therapy (3-4 days)      -  Aztreonam: S <=4      -  Cefazolin: R >16 Enterobacter, Klebsiella aerogenes, Citrobacter, and Serratia may develop resistance during prolonged therapy (3-4 days)      -  Cefepime: S <=2      -  Cefoxitin: S <=8      -  Ceftriaxone: S <=1 Enterobacter, Klebsiella aerogenes, Citrobacter, and Serratia may develop resistance during prolonged therapy      -  Ciprofloxacin: S <=0.25      -  Ertapenem: S <=0.5      -  Gentamicin: S <=2      -  Levofloxacin: S <=0.5      -  Meropenem: S <=1      -  Piperacillin/Tazobactam: S <=8      -  Tobramycin: S <=2      -  Trimethoprim/Sulfamethoxazole: S <=0.5/9.5        RADIOLOGY & ADDITIONAL TESTS:  Personally reviewed.     Consultant(s) Notes Reviewed:  [x] YES  [ ] NO

## 2023-04-16 NOTE — PROGRESS NOTE ADULT - PROBLEM SELECTOR PLAN 1
Abd pain + persistent diarrhea for several months, multiple episodes a day w/ progressive worsening; not relieved by pain meds, a/w n/v  CT A/P - Pneumoperitoneum and no ascites. Suspect perforated sigmoid colon.  POD#2 - Tia procedure and abdominal abscess drainage  Lactemia resolved  On levophed (weaned off dual pressor)  TTE (04/2023) - LVEF of 55-60%; LAE; Sclerotic trileaflet aortic valve, trace AI. Mild to moderate MR and TR. IVC measures 2.0 cm and is non collapsing  BCx - 4/13 no growth   Peritoneal cultures poly microbial - Proteus vulgaris group, Escherichia coli, Klebsiella pneumoniae, Morganella morganii   Peritoneal fluid with gram variable rods and PMNs.  Continue broad spectrum antimicrobials with Zosyn and Flagyl (pending Cdiff - f/u GI PCR and cdiff)  ID following, recs appreciated  Mgmt per ICU Abd pain + persistent diarrhea for several months, multiple episodes a day w/ progressive worsening; not relieved by pain meds, a/w n/v  CT A/P - Pneumoperitoneum and no ascites. Suspect perforated sigmoid colon.  POD#3 - Tia procedure and abdominal abscess drainage  Lactemia resolved  On levophed (weaned off dual pressor)  TTE (04/2023) - LVEF of 55-60%; LAE; Sclerotic trileaflet aortic valve, trace AI. Mild to moderate MR and TR. IVC measures 2.0 cm and is non collapsing  BCx - 4/13 no growth   Peritoneal cultures poly microbial - Proteus vulgaris group, Escherichia coli, Klebsiella pneumoniae, Morganella morganii   Peritoneal fluid with gram variable rods and PMNs.  - CDiff negative  s/p Zosyn and Flagyl   - start meropenem IV  ID following, recs appreciated  Mgmt per ICU

## 2023-04-16 NOTE — PROGRESS NOTE ADULT - ASSESSMENT
72-year-old female with history of hypertension, CHF, afib s/p AF ablation (2/7/19) currently not on xarelto, CATH 2018, hypothyroidism, HLD history of C. difficile January 2023 presents for vomitting and abdominal pain. Consulted for CHF.    - CT ABD/Pelvis: Pneumoperitoneum and no ascites. Suspect perforated sigmoid colon. Stable splenomegaly. Stable right lung nodule, groundglass infiltrates and loculated right pleural effusion.  - S/p Tia's with abdominal abscess drainage.    OR finding with >1.2 liters of pus from abd cavity.    pt on Full vent support. s/p PRBC, IV hydration for severe vasodilation shock , on pressor support   - ProBNP [id number]  - transferred to ICU s/p procedure, for vasopressor support for hypotension  - hold lasix for now due to hypotension, monitor closely as PASP 66mmHg  - hold home metoprolol 50mg qd due to hypotension, can consider restarting if BP stabilizes  - Elevated troponin peaked at 100.9, otherwise cardiac enzymes unremarkable, likely demand in the setting of sepsis  - EKG: Sinus tachycardia with PAC's  - No acute changes on EKG compared to previous.   - Atypical chest pain on admission likely not cardiac of origin  - Prior TTE (1/18/23): normal LVEF 65%, normal RV size and function, biatrial enlargement, sclerotic aortic valve, mild AI, Moderate MR and TR  - TTE shows EF 55-60%, RVE, PASP 66     AF  - s/p ablation  - not on AC at home    - Monitor and replete lytes, keep K>4, Mg>2.  - Other cardiovascular workup will depend on clinical course.  - Will continue to follow.

## 2023-04-17 NOTE — CHART NOTE - NSCHARTNOTEFT_GEN_A_CORE
Assessment/Follow up: Pt remains intubated/sedated at time of visit. On levophed. S/p Tia's with abdominal abscess drainage (1.2L) for perforated sigmoid colon POD#4. +NGT to LWS. NPO past 5 days. Hypoactive BS, small liquid stool via colostomy 4/17. On IVF-LR@100cc/hr. Current electrolytes wdl. Will remain available for nutrition support pending clinical course. If unable to extubate recommend initiating trickle feeds of Vital 1.5 @ starting rate 10cc/hr x 24hrs. If tolerated than increase by 10cc q 8hrs until goal rate 60cc/hr x 20hrs, prosource 30cc daily (to provide 1860kcal, 96gm protein). Once extubated clear liquid diet with ensure clear TID.     Factors impacting intake: [ ] none [ ] nausea  [ ] vomiting [ ] diarrhea [ ] constipation  [ ]chewing problems [ ] swallowing issues  [ x] other: intubated/sedated, bowel surgery-awaiting return of bowel function    Diet Presciption: NPO x 5 days    Current Weight: Weight (kg): 72.8 (04-13 @ 16:35); 178.7lbs(4/17), gen 2+edema, radu feet/arms 3+edema      Pertinent Medications: MEDICATIONS  (STANDING):  chlorhexidine 0.12% Liquid 15 milliLiter(s) Oral Mucosa every 12 hours  chlorhexidine 4% Liquid 1 Application(s) Topical <User Schedule>  dexMEDEtomidine Infusion 0.5 MICROgram(s)/kG/Hr (9.1 mL/Hr) IV Continuous <Continuous>  lactated ringers. 1000 milliLiter(s) (100 mL/Hr) IV Continuous <Continuous>  levothyroxine Injectable 93.75 MICROGram(s) IV Push at bedtime  meropenem  IVPB 500 milliGRAM(s) IV Intermittent every 12 hours  norepinephrine Infusion 0.05 MICROgram(s)/kG/Min (6.83 mL/Hr) IV Continuous <Continuous>  pantoprazole  Injectable 40 milliGRAM(s) IV Push daily    MEDICATIONS  (PRN):  albuterol    90 MICROgram(s) HFA Inhaler 4 Puff(s) Inhalation every 6 hours PRN Shortness of Breath and/or Wheezing  fentaNYL    Injectable 50 MICROGram(s) IV Push every 4 hours PRN Severe Pain (7 - 10)  sodium chloride 0.9% lock flush 10 milliLiter(s) IV Push every 1 hour PRN Pre/post blood products, medications, blood draw, and to maintain line patency    Pertinent Labs: 04-17 Na142 mmol/L Glu 96 mg/dL K+ 3.7 mmol/L Cr  4.80 mg/dL<H> BUN 82 mg/dL<H> 04-15 Phos 3.3 mg/dL 04-17 Alb 1.6 g/dL<L>     CAPILLARY BLOOD GLUCOSE      POCT Blood Glucose.: 117 mg/dL (17 Apr 2023 05:00)  POCT Blood Glucose.: 95 mg/dL (17 Apr 2023 00:04)  POCT Blood Glucose.: 101 mg/dL (16 Apr 2023 17:04)  POCT Blood Glucose.: 94 mg/dL (16 Apr 2023 11:44)    Skin: midline abd surgical incision, sacrum S.DTI, JAKUB Reynoso 12.     Estimated Needs:   [ ] no change since previous assessment  [ x] recalculated: Based off admission weight 160lbs.                            Estimated energy needs: 1818-2181kcal (25-30kcal/kg)                           Estimated pro needs: 87-116gm (1.2-1.6gm/kg)       Previous Nutrition Diagnosis:   [ ] Inadequate Energy Intake [ ]Inadequate Oral Intake [ ] Excessive Energy Intake   [ ] Underweight [ ] Increased Nutrient Needs [ ] Overweight/Obesity   [ x] Altered GI Function [ ] Unintended Weight Loss [ ] Food & Nutrition Related Knowledge Deficit [x] Malnutrition     Nutrition Diagnosis is [x ] ongoing  [ ] resolved [ ] not applicable     New Nutrition Diagnosis: [x ] not applicable       Interventions:   Recommend  [ ] Change Diet To:  [ ] Nutrition Supplement  [x ] Nutrition Support: As medically feasible trickle feeds of Vital 1.5 @10cc/hr x24hrs via NGT. If tolerated than increase by 10cc q 8hrs until goal rate 60cc/hr x 20hrs, prosource 30cc daily (to provide 1860kcal, 96gm protein).  [x ] Other: MVI with minerals daily, 500mg Vit C daily. Electrolyte replacement prn. Thiamine supplementation. Once extubated SLP evaluation. Clear liquid diet with ensure clear TID as feasible.     Monitoring and Evaluation:   [ ] PO intake [ x ] Tolerance to diet prescription [ x ] weights [ x ] labs[ x ] follow up per protocol  [ ] other:

## 2023-04-17 NOTE — PROGRESS NOTE ADULT - TIME BILLING
Pt seen + examined. Case discussed with resident. Agree with assessment and plan above (edited by me in detail):  Time spent: 50min on critically ill pt with: severe sepsis and peritonitis due to perforated sigmoid colon, also with LAYA due to ATN, shock liver, and post/op acute respiratory failure requiring mechanical ventilation, and acute blood loss anemia.    71yo F with PMH of afib (off xarelto at least 1wk PTA), HFpEF, asthma, recent C diff in Jan presenting with abd pain, weakness and falls at home a/w severe sepsis and peritonitis due to perforated sigmoid colon, also with LAYA due to ATN, shock liver, and post/op acute respiratory failure requiring mechanical ventilation, and acute blood loss anemia.    Abd pain + persistent diarrhea for several months, multiple episodes a day w/ progressive worsening; not relieved by pain meds, a/w n/v  CT A/P - Pneumoperitoneum and no ascites. Suspect perforated sigmoid colon.  s/p Tia procedure and abdominal abscess drainage on 4/13  lactic acidosis resolved  On levophed (weaned off dual pressor)  TTE (04/2023) - LVEF of 55-60%; LAE; Sclerotic trileaflet aortic valve, trace AI. Mild to moderate MR and TR. IVC measures 2.0 cm and is non collapsing  BCx - 4/13 no growth   Peritoneal cultures poly microbial - Proteus vulgaris group, Escherichia coli, Klebsiella pneumoniae, Morganella morganii   Peritoneal fluid with gram variable rods and PMNs.  - CDiff negative, GI PCR neg  s/p Zosyn and Flagyl   continue meropenem   ID following, recs appreciated  Shock liver, improving LFTs ; ischemic ATN  Mgmt per ICU    LAYA  likely ATN due to septic shock  WORSENING  Cr worsened to 4.8  Correct electrolyte derangement  S/p bicarb gtt  Mgmt per ICU  consider nephro eval

## 2023-04-17 NOTE — CONSULT NOTE ADULT - SUBJECTIVE AND OBJECTIVE BOX
HPI:  71 y/o f w/ PMH of HTN, CHF, asthma, hypothyroidism, prior notes state A-fib on Xarelto however patient states she is not taking any anticoagulants, history of C. difficile January 2023 p/w generalized abdominal pain, and persistent diarrhea for last several months with several episodes every day, worsening in last few weeks.  Pt's abdominal pain acutely worsened in last day, with achy, persistent pain, diffusely in abdomen, nonraidtaing, not responding to oral pain meds, associated with nausea and vomiting, prompting pt to come to ED.  Pt did not report any fevers.  Pt has also had increased lower extremity swelling, but per son, has been noncompliant with lasix recently. Today patient presents with new transverse colostomy POD 3 stoma dusky red 1.5 x 1 periwound mild erythema no air brown liquid in pouch, patient intubated sedated abdominal incision being cared for by surgery> 2. sacral region deep tissue pressure injury patient came in with stage 1 pressure injury now DTI likely worsening 2/2 vasopressor usage   (13 Apr 2023 10:37)      PAST MEDICAL & SURGICAL HISTORY:  Hypothyroidism      HLD (hyperlipidemia)      Thrombocytosis      Persistent atrial fibrillation      Obesity (BMI 35.0-39.9 without comorbidity)      Asthma      Diabetes mellitus      Ankle injury  on 2004, 5 surgeries.      Cholecystitis      REVIEW OF SYSTEMS  Unable to obtain ROS  2/2 non-verbal status        MEDICATIONS  (STANDING):  buMETAnide Injectable 2 milliGRAM(s) IV Push once  chlorhexidine 0.12% Liquid 15 milliLiter(s) Oral Mucosa every 12 hours  chlorhexidine 4% Liquid 1 Application(s) Topical <User Schedule>  dexMEDEtomidine Infusion 0.5 MICROgram(s)/kG/Hr (9.1 mL/Hr) IV Continuous <Continuous>  heparin   Injectable 5000 Unit(s) SubCutaneous every 8 hours  lactated ringers. 1000 milliLiter(s) (100 mL/Hr) IV Continuous <Continuous>  levothyroxine Injectable 93.75 MICROGram(s) IV Push at bedtime  meropenem  IVPB 500 milliGRAM(s) IV Intermittent every 12 hours  norepinephrine Infusion 0.05 MICROgram(s)/kG/Min (6.83 mL/Hr) IV Continuous <Continuous>  pantoprazole  Injectable 40 milliGRAM(s) IV Push daily  vancomycin    Solution 125 milliGRAM(s) Oral daily    MEDICATIONS  (PRN):  albuterol    90 MICROgram(s) HFA Inhaler 4 Puff(s) Inhalation every 6 hours PRN Shortness of Breath and/or Wheezing  fentaNYL    Injectable 50 MICROGram(s) IV Push every 4 hours PRN Severe Pain (7 - 10)  sodium chloride 0.9% lock flush 10 milliLiter(s) IV Push every 1 hour PRN Pre/post blood products, medications, blood draw, and to maintain line patency      Allergies    No Known Allergies    Intolerances        SOCIAL HISTORY:  Lives with son    FAMILY HISTORY:  Family history of early CAD (Father)    Family history of early CAD (Mother)        Vital Signs Last 24 Hrs  T(C): 37.9 (17 Apr 2023 11:28), Max: 37.9 (17 Apr 2023 11:28)  T(F): 100.3 (17 Apr 2023 11:28), Max: 100.3 (17 Apr 2023 11:28)  HR: 80 (17 Apr 2023 12:00) (68 - 111)  BP: --  BP(mean): --  RR: 27 (17 Apr 2023 12:00) (18 - 35)  SpO2: 100% (17 Apr 2023 12:00) (100% - 100%)    Parameters below as of 17 Apr 2023 08:00  Patient On (Oxygen Delivery Method): ventilator                          8.1    16.02 )-----------( 114      ( 17 Apr 2023 05:40 )             26.6     04-17    142  |  107  |  82<H>  ----------------------------<  96  3.7   |  25  |  4.80<H>    Ca    7.4<L>      17 Apr 2023 05:40    TPro  4.4<L>  /  Alb  1.6<L>  /  TBili  0.7  /  DBili  x   /  AST  85<H>  /  ALT  97<H>  /  AlkPhos  77  04-17          PHYSICAL EXAM:    General: resting comfortably in bed; intubated  ENT: no nasal discharge; hearing intact  Neck: intubated  Extremities: no clubbing or cyanosis;: no gross deformities   Dermatologic:  new transverse colostomy POD 3 stoma dusky red 1.5 x 1 periwound mild erythema, no air, brown liquid in pouch, patient intubated sedated abdominal incision being cared for by surgery> 2. sacral region deep tissue pressure injury patient came in with stage 1 pressure injury now DTI likely worsening 2/2 vasopressor usage  Neurologic: Intubated  Musculoskeletal/Vascular: no deformities/ contractures

## 2023-04-17 NOTE — PROGRESS NOTE ADULT - SUBJECTIVE AND OBJECTIVE BOX
Patient is a 72y old  Female who presents with a chief complaint of intra-abdominal perforation (17 Apr 2023 11:23)      Subjective:  INTERVAL HPI/OVERNIGHT EVENTS: Patient seen and examined at bedside. No overnight events occurred. Pt on mechanical ventilator. Pt able to open eyes to voice.    MEDICATIONS  (STANDING):  buMETAnide Injectable 2 milliGRAM(s) IV Push once  chlorhexidine 0.12% Liquid 15 milliLiter(s) Oral Mucosa every 12 hours  chlorhexidine 4% Liquid 1 Application(s) Topical <User Schedule>  dexMEDEtomidine Infusion 0.5 MICROgram(s)/kG/Hr (9.1 mL/Hr) IV Continuous <Continuous>  heparin   Injectable 5000 Unit(s) SubCutaneous every 8 hours  lactated ringers. 1000 milliLiter(s) (100 mL/Hr) IV Continuous <Continuous>  levothyroxine Injectable 93.75 MICROGram(s) IV Push at bedtime  meropenem  IVPB 500 milliGRAM(s) IV Intermittent every 12 hours  norepinephrine Infusion 0.05 MICROgram(s)/kG/Min (6.83 mL/Hr) IV Continuous <Continuous>  pantoprazole  Injectable 40 milliGRAM(s) IV Push daily  vancomycin    Solution 125 milliGRAM(s) Oral daily    MEDICATIONS  (PRN):  albuterol    90 MICROgram(s) HFA Inhaler 4 Puff(s) Inhalation every 6 hours PRN Shortness of Breath and/or Wheezing  fentaNYL    Injectable 50 MICROGram(s) IV Push every 4 hours PRN Severe Pain (7 - 10)  sodium chloride 0.9% lock flush 10 milliLiter(s) IV Push every 1 hour PRN Pre/post blood products, medications, blood draw, and to maintain line patency      Allergies    No Known Allergies    Intolerances        REVIEW OF SYSTEMS:  unable to obtain     Objective:  Vital Signs Last 24 Hrs  T(C): 37.9 (17 Apr 2023 11:28), Max: 37.9 (17 Apr 2023 11:28)  T(F): 100.3 (17 Apr 2023 11:28), Max: 100.3 (17 Apr 2023 11:28)  HR: 80 (17 Apr 2023 12:00) (68 - 111)  BP: --  BP(mean): --  RR: 27 (17 Apr 2023 12:00) (18 - 35)  SpO2: 100% (17 Apr 2023 12:00) (100% - 100%)    Parameters below as of 17 Apr 2023 08:00  Patient On (Oxygen Delivery Method): ventilator        GENERAL: NAD, lying comfortably in bed, intubated on ventilator  HEENT: dry mucous membranes  CHEST/LUNG:  diffuse rhonchi  HEART: tachycardic, irregular  ABDOMEN:  Ostomy w/ output. open abdominal surgery site w/ bandage c/d/i; clement drain noted with serosanguinous fluid  EXTREMITIES: + upper extremity 1+ nonpitting edema. LE no edema, cyanosis, or calf tenderness  NERVOUS SYSTEM: sedated, opening eyes to voice     LABS:                        8.1    16.02 )-----------( 114      ( 17 Apr 2023 05:40 )             26.6     CBC Full  -  ( 17 Apr 2023 05:40 )  WBC Count : 16.02 K/uL  Hemoglobin : 8.1 g/dL  Hematocrit : 26.6 %  Platelet Count - Automated : 114 K/uL  Mean Cell Volume : 88.4 fl  Mean Cell Hemoglobin : 26.9 pg  Mean Cell Hemoglobin Concentration : 30.5 gm/dL  Auto Neutrophil # : x  Auto Lymphocyte # : x  Auto Monocyte # : x  Auto Eosinophil # : x  Auto Basophil # : x  Auto Neutrophil % : x  Auto Lymphocyte % : x  Auto Monocyte % : x  Auto Eosinophil % : x  Auto Basophil % : x    17 Apr 2023 05:40    142    |  107    |  82     ----------------------------<  96     3.7     |  25     |  4.80     Ca    7.4        17 Apr 2023 05:40    TPro  4.4    /  Alb  1.6    /  TBili  0.7    /  DBili  x      /  AST  85     /  ALT  97     /  AlkPhos  77     17 Apr 2023 05:40        CAPILLARY BLOOD GLUCOSE      POCT Blood Glucose.: 106 mg/dL (17 Apr 2023 11:23)  POCT Blood Glucose.: 117 mg/dL (17 Apr 2023 05:00)  POCT Blood Glucose.: 95 mg/dL (17 Apr 2023 00:04)  POCT Blood Glucose.: 101 mg/dL (16 Apr 2023 17:04)        Culture - Blood (collected 04-13-23 @ 19:30)  Source: .Blood Blood-Peripheral  Preliminary Report (04-15-23 @ 01:02):    No growth to date.    Culture - Blood (collected 04-13-23 @ 19:25)  Source: .Blood Blood-Peripheral  Preliminary Report (04-15-23 @ 01:02):    No growth to date.    Culture - Body Fluid with Gram Stain (collected 04-13-23 @ 14:00)  Source: Peritoneal PERITONEAL FLUID #1  Gram Stain (04-13-23 @ 22:55):    Numerous polymorphonuclear leukocytes per low power field    Few Gram Variable Rods per oil power field  Final Report (04-15-23 @ 16:47):    Rare Morganella morganii    Few Klebsiella pneumoniae ESBL    Few Escherichia coli    Few Streptococcus constellatus See previous culture [id number]    Few Bacteroides distasonis "Susceptibilities not performed"  Organism: Morganella morganii  Klebsiella pneumoniae ESBL  Escherichia coli (04-15-23 @ 16:47)  Organism: Escherichia coli (04-15-23 @ 16:47)      Method Type: MICAELA      -  Amikacin: S <=16      -  Amoxicillin/Clavulanic Acid: S <=8/4      -  Ampicillin: S <=8 These ampicillin results predict results for amoxicillin      -  Ampicillin/Sulbactam: S <=4/2 Enterobacter, Klebsiella aerogenes, Citrobacter, and Serratia may develop resistance during prolonged therapy (3-4 days)      -  Aztreonam: S <=4      -  Cefazolin: S <=2 Enterobacter, Klebsiella aerogenes, Citrobacter, and Serratia may develop resistance during prolonged therapy (3-4 days)      -  Cefepime: S <=2      -  Cefoxitin: S <=8      -  Ceftriaxone: S <=1 Enterobacter, Klebsiella aerogenes, Citrobacter, and Serratia may develop resistance during prolonged therapy      -  Ciprofloxacin: S <=0.25      -  Ertapenem: S <=0.5      -  Gentamicin: S <=2      -  Imipenem: S <=1      -  Levofloxacin: S <=0.5      -  Meropenem: S <=1      -  Piperacillin/Tazobactam: S <=8      -  Tobramycin: S <=2      -  Trimethoprim/Sulfamethoxazole: S <=0.5/9.5  Organism: Klebsiella pneumoniae ESBL (04-15-23 @ 16:47)      Method Type: MICAELA      -  Amikacin: S <=16      -  Amoxicillin/Clavulanic Acid: S <=8/4      -  Ampicillin: R >16 These ampicillin results predict results for amoxicillin      -  Ampicillin/Sulbactam: R >16/8 Enterobacter, Klebsiella aerogenes, Citrobacter, and Serratia may develop resistance during prolonged therapy (3-4 days)      -  Aztreonam: R <=4      -  Cefazolin: R >16 Enterobacter, Klebsiella aerogenes, Citrobacter, and Serratia may develop resistance during prolonged therapy (3-4 days)      -  Cefepime: R >16      -  Ceftriaxone: R >32 Enterobacter, Klebsiella aerogenes, Citrobacter, and Serratia may develop resistance during prolonged therapy      -  Ciprofloxacin: I 0.5      -  Ertapenem: S <=0.5      -  Gentamicin: R >8      -  Imipenem: S <=1      -  Levofloxacin: S <=0.5      -  Meropenem: S <=1      -  Piperacillin/Tazobactam: R <=8      -  Tobramycin: S 4      -  Trimethoprim/Sulfamethoxazole: R >2/38  Organism: Morganella morganii (04-15-23 @ 16:47)      Method Type: MICAELA      -  Amikacin: S <=16      -  Amoxicillin/Clavulanic Acid: R >16/8      -  Ampicillin: R >16 These ampicillin results predict results for amoxicillin      -  Ampicillin/Sulbactam: S 8/4 Enterobacter, Klebsiella aerogenes, Citrobacter, and Serratia may develop resistance during prolonged therapy (3-4 days)      -  Aztreonam: S <=4      -  Cefazolin: R >16 Enterobacter, Klebsiella aerogenes, Citrobacter, and Serratia may develop resistance during prolonged therapy (3-4 days)      -  Cefepime: S <=2      -  Cefoxitin: S <=8      -  Ceftriaxone: S <=1 Enterobacter, Klebsiella aerogenes, Citrobacter, and Serratia may develop resistance during prolonged therapy      -  Ciprofloxacin: S <=0.25      -  Ertapenem: S <=0.5      -  Gentamicin: S <=2      -  Levofloxacin: S <=0.5      -  Meropenem: S <=1      -  Piperacillin/Tazobactam: S <=8      -  Tobramycin: S <=2      -  Trimethoprim/Sulfamethoxazole: S <=0.5/9.5    Culture - Body Fluid with Gram Stain (collected 04-13-23 @ 14:00)  Source: Peritoneal PERITONEAL FLUID #2  Gram Stain (04-14-23 @ 00:41):    No polymorphonuclear cells seen per low power field    Few Gram positive cocci in pairs per oil power field  Final Report (04-16-23 @ 22:25):    Rare Proteus vulgaris group    Few Escherichia coli    Few Klebsiella pneumoniae ESBL    Few Streptococcus constellatus    Few Bacteroides distasonis "Susceptibilities not performed"  Organism: Proteus vulgaris group  Escherichia coli  Klebsiella pneumoniae ESBL  Streptococcus constellatus (04-16-23 @ 22:25)  Organism: Streptococcus constellatus (04-16-23 @ 22:25)      Method Type: MICAELA      -  Ceftriaxone: S <=0.25      -  Clindamycin: R >0.5      -  Erythromycin: R >0.5      -  Levofloxacin: S <=0.25      -  Penicillin: S <=0.03  Organism: Klebsiella pneumoniae ESBL (04-16-23 @ 22:25)      Method Type: MICAELA      -  Amikacin: S <=16      -  Amoxicillin/Clavulanic Acid: S <=8/4      -  Ampicillin: R >16 These ampicillin results predict results for amoxicillin      -  Ampicillin/Sulbactam: R >16/8 Enterobacter, Klebsiella aerogenes, Citrobacter, and Serratia may develop resistance during prolonged therapy (3-4 days)      -  Aztreonam: R <=4      -  Cefazolin: R >16 Enterobacter, Klebsiella aerogenes, Citrobacter, and Serratia may develop resistance during prolonged therapy (3-4 days)      -  Cefepime: R >16      -  Ceftriaxone: R >32 Enterobacter, Klebsiella aerogenes, Citrobacter, and Serratia may develop resistance during prolonged therapy      -  Ciprofloxacin: I 0.5      -  Ertapenem: S <=0.5      -  Gentamicin: R >8      -  Imipenem: S <=1      -  Levofloxacin: S <=0.5      -  Meropenem: S <=1      -  Piperacillin/Tazobactam: R <=8      -  Tobramycin: I 8      -  Trimethoprim/Sulfamethoxazole: R >2/38  Organism: Escherichia coli (04-16-23 @ 22:25)      Method Type: MICAELA      -  Amikacin: S <=16      -  Amoxicillin/Clavulanic Acid: S <=8/4      -  Ampicillin: S <=8 These ampicillin results predict results for amoxicillin      -  Ampicillin/Sulbactam: S <=4/2 Enterobacter, Klebsiella aerogenes, Citrobacter, and Serratia may develop resistance during prolonged therapy (3-4 days)      -  Aztreonam: S <=4      -  Cefazolin: S <=2 Enterobacter, Klebsiella aerogenes, Citrobacter, and Serratia may develop resistance during prolonged therapy (3-4 days)      -  Cefepime: S <=2      -  Cefoxitin: S <=8      -  Ceftriaxone: S <=1 Enterobacter, Klebsiella aerogenes, Citrobacter, and Serratia may develop resistance during prolonged therapy      -  Ciprofloxacin: S <=0.25      -  Ertapenem: S <=0.5      -  Gentamicin: S <=2      -  Imipenem: S <=1      -  Levofloxacin: S <=0.5      -  Meropenem: S <=1      -  Piperacillin/Tazobactam: S <=8      -  Tobramycin: S <=2      -  Trimethoprim/Sulfamethoxazole: S <=0.5/9.5  Organism: Proteus vulgaris group (04-16-23 @ 22:25)      Method Type: MICAELA      -  Amikacin: S <=16      -  Amoxicillin/Clavulanic Acid: S <=8/4      -  Ampicillin: R >16 These ampicillin results predict results for amoxicillin      -  Ampicillin/Sulbactam: S <=4/2 Enterobacter, Klebsiella aerogenes, Citrobacter, and Serratia may develop resistance during prolonged therapy (3-4 days)      -  Aztreonam: S <=4      -  Cefazolin: R >16 Enterobacter, Klebsiella aerogenes, Citrobacter, and Serratia may develop resistance during prolonged therapy (3-4 days)      -  Cefepime: S <=2      -  Cefoxitin: S <=8      -  Ceftriaxone: S <=1 Enterobacter, Klebsiella aerogenes, Citrobacter, and Serratia may develop resistance during prolonged therapy      -  Ciprofloxacin: S <=0.25      -  Ertapenem: S <=0.5      -  Gentamicin: S <=2      -  Levofloxacin: S <=0.5      -  Meropenem: S <=1      -  Piperacillin/Tazobactam: S <=8      -  Tobramycin: S <=2      -  Trimethoprim/Sulfamethoxazole: S <=0.5/9.5        RADIOLOGY & ADDITIONAL TESTS:    Personally reviewed.     Consultant(s) Notes Reviewed:  [x] YES  [ ] NO     Patient is a 72y old  Female who presents with a chief complaint of generalized abdominal pain and persistent diarrhea.      Subjective:  INTERVAL HPI/OVERNIGHT EVENTS: Patient seen and examined at bedside. No overnight events occurred. Pt on mechanical ventilator. Pt able to open eyes to voice, but on sedation and cannot provide ROS.     MEDICATIONS  (STANDING):  buMETAnide Injectable 2 milliGRAM(s) IV Push once  chlorhexidine 0.12% Liquid 15 milliLiter(s) Oral Mucosa every 12 hours  chlorhexidine 4% Liquid 1 Application(s) Topical <User Schedule>  dexMEDEtomidine Infusion 0.5 MICROgram(s)/kG/Hr (9.1 mL/Hr) IV Continuous <Continuous>  heparin   Injectable 5000 Unit(s) SubCutaneous every 8 hours  lactated ringers. 1000 milliLiter(s) (100 mL/Hr) IV Continuous <Continuous>  levothyroxine Injectable 93.75 MICROGram(s) IV Push at bedtime  meropenem  IVPB 500 milliGRAM(s) IV Intermittent every 12 hours  norepinephrine Infusion 0.05 MICROgram(s)/kG/Min (6.83 mL/Hr) IV Continuous <Continuous>  pantoprazole  Injectable 40 milliGRAM(s) IV Push daily  vancomycin    Solution 125 milliGRAM(s) Oral daily    MEDICATIONS  (PRN):  albuterol    90 MICROgram(s) HFA Inhaler 4 Puff(s) Inhalation every 6 hours PRN Shortness of Breath and/or Wheezing  fentaNYL    Injectable 50 MICROGram(s) IV Push every 4 hours PRN Severe Pain (7 - 10)  sodium chloride 0.9% lock flush 10 milliLiter(s) IV Push every 1 hour PRN Pre/post blood products, medications, blood draw, and to maintain line patency      Allergies    No Known Allergies    Intolerances        REVIEW OF SYSTEMS:  unable to obtain as pt is sedated/intubated    Objective:  Vital Signs Last 24 Hrs  T(C): 37.9 (17 Apr 2023 11:28), Max: 37.9 (17 Apr 2023 11:28)  T(F): 100.3 (17 Apr 2023 11:28), Max: 100.3 (17 Apr 2023 11:28)  HR: 80 (17 Apr 2023 12:00) (68 - 111)  BP: --  BP(mean): --  RR: 27 (17 Apr 2023 12:00) (18 - 35)  SpO2: 100% (17 Apr 2023 12:00) (100% - 100%)    Parameters below as of 17 Apr 2023 08:00  Patient On (Oxygen Delivery Method): ventilator        GENERAL: ill-appearing, intubated on ventilator  HEENT: dry mucous membranes  CHEST/LUNG:  b/l rhonchi  HEART: tachycardic to 100bpm, irregular, S1, S2  ABDOMEN:  Ostomy w/ output. midline abdominal surgical wound w/ bandage c/d/i; TAMERA drain noted with serosanguinous fluid, nondistended, no apparent tenderness   EXTREMITIES: edema in 4 extremities, no cyanosis, or apparent calf tenderness  NERVOUS SYSTEM: sedated, opening eyes to voice     LABS:                        8.1    16.02 )-----------( 114      ( 17 Apr 2023 05:40 )             26.6     CBC Full  -  ( 17 Apr 2023 05:40 )  WBC Count : 16.02 K/uL  Hemoglobin : 8.1 g/dL  Hematocrit : 26.6 %  Platelet Count - Automated : 114 K/uL  Mean Cell Volume : 88.4 fl  Mean Cell Hemoglobin : 26.9 pg  Mean Cell Hemoglobin Concentration : 30.5 gm/dL  Auto Neutrophil # : x  Auto Lymphocyte # : x  Auto Monocyte # : x  Auto Eosinophil # : x  Auto Basophil # : x  Auto Neutrophil % : x  Auto Lymphocyte % : x  Auto Monocyte % : x  Auto Eosinophil % : x  Auto Basophil % : x    17 Apr 2023 05:40    142    |  107    |  82     ----------------------------<  96     3.7     |  25     |  4.80     Ca    7.4        17 Apr 2023 05:40    TPro  4.4    /  Alb  1.6    /  TBili  0.7    /  DBili  x      /  AST  85     /  ALT  97     /  AlkPhos  77     17 Apr 2023 05:40        CAPILLARY BLOOD GLUCOSE      POCT Blood Glucose.: 106 mg/dL (17 Apr 2023 11:23)  POCT Blood Glucose.: 117 mg/dL (17 Apr 2023 05:00)  POCT Blood Glucose.: 95 mg/dL (17 Apr 2023 00:04)  POCT Blood Glucose.: 101 mg/dL (16 Apr 2023 17:04)        Culture - Blood (collected 04-13-23 @ 19:30)  Source: .Blood Blood-Peripheral  Preliminary Report (04-15-23 @ 01:02):    No growth to date.    Culture - Blood (collected 04-13-23 @ 19:25)  Source: .Blood Blood-Peripheral  Preliminary Report (04-15-23 @ 01:02):    No growth to date.    Culture - Body Fluid with Gram Stain (collected 04-13-23 @ 14:00)  Source: Peritoneal PERITONEAL FLUID #1  Gram Stain (04-13-23 @ 22:55):    Numerous polymorphonuclear leukocytes per low power field    Few Gram Variable Rods per oil power field  Final Report (04-15-23 @ 16:47):    Rare Morganella morganii    Few Klebsiella pneumoniae ESBL    Few Escherichia coli    Few Streptococcus constellatus See previous culture [id number]    Few Bacteroides distasonis "Susceptibilities not performed"  Organism: Morganella morganii  Klebsiella pneumoniae ESBL  Escherichia coli (04-15-23 @ 16:47)  Organism: Escherichia coli (04-15-23 @ 16:47)      Method Type: MICAELA      -  Amikacin: S <=16      -  Amoxicillin/Clavulanic Acid: S <=8/4      -  Ampicillin: S <=8 These ampicillin results predict results for amoxicillin      -  Ampicillin/Sulbactam: S <=4/2 Enterobacter, Klebsiella aerogenes, Citrobacter, and Serratia may develop resistance during prolonged therapy (3-4 days)      -  Aztreonam: S <=4      -  Cefazolin: S <=2 Enterobacter, Klebsiella aerogenes, Citrobacter, and Serratia may develop resistance during prolonged therapy (3-4 days)      -  Cefepime: S <=2      -  Cefoxitin: S <=8      -  Ceftriaxone: S <=1 Enterobacter, Klebsiella aerogenes, Citrobacter, and Serratia may develop resistance during prolonged therapy      -  Ciprofloxacin: S <=0.25      -  Ertapenem: S <=0.5      -  Gentamicin: S <=2      -  Imipenem: S <=1      -  Levofloxacin: S <=0.5      -  Meropenem: S <=1      -  Piperacillin/Tazobactam: S <=8      -  Tobramycin: S <=2      -  Trimethoprim/Sulfamethoxazole: S <=0.5/9.5  Organism: Klebsiella pneumoniae ESBL (04-15-23 @ 16:47)      Method Type: MICAELA      -  Amikacin: S <=16      -  Amoxicillin/Clavulanic Acid: S <=8/4      -  Ampicillin: R >16 These ampicillin results predict results for amoxicillin      -  Ampicillin/Sulbactam: R >16/8 Enterobacter, Klebsiella aerogenes, Citrobacter, and Serratia may develop resistance during prolonged therapy (3-4 days)      -  Aztreonam: R <=4      -  Cefazolin: R >16 Enterobacter, Klebsiella aerogenes, Citrobacter, and Serratia may develop resistance during prolonged therapy (3-4 days)      -  Cefepime: R >16      -  Ceftriaxone: R >32 Enterobacter, Klebsiella aerogenes, Citrobacter, and Serratia may develop resistance during prolonged therapy      -  Ciprofloxacin: I 0.5      -  Ertapenem: S <=0.5      -  Gentamicin: R >8      -  Imipenem: S <=1      -  Levofloxacin: S <=0.5      -  Meropenem: S <=1      -  Piperacillin/Tazobactam: R <=8      -  Tobramycin: S 4      -  Trimethoprim/Sulfamethoxazole: R >2/38  Organism: Morganella morganii (04-15-23 @ 16:47)      Method Type: MICAELA      -  Amikacin: S <=16      -  Amoxicillin/Clavulanic Acid: R >16/8      -  Ampicillin: R >16 These ampicillin results predict results for amoxicillin      -  Ampicillin/Sulbactam: S 8/4 Enterobacter, Klebsiella aerogenes, Citrobacter, and Serratia may develop resistance during prolonged therapy (3-4 days)      -  Aztreonam: S <=4      -  Cefazolin: R >16 Enterobacter, Klebsiella aerogenes, Citrobacter, and Serratia may develop resistance during prolonged therapy (3-4 days)      -  Cefepime: S <=2      -  Cefoxitin: S <=8      -  Ceftriaxone: S <=1 Enterobacter, Klebsiella aerogenes, Citrobacter, and Serratia may develop resistance during prolonged therapy      -  Ciprofloxacin: S <=0.25      -  Ertapenem: S <=0.5      -  Gentamicin: S <=2      -  Levofloxacin: S <=0.5      -  Meropenem: S <=1      -  Piperacillin/Tazobactam: S <=8      -  Tobramycin: S <=2      -  Trimethoprim/Sulfamethoxazole: S <=0.5/9.5    Culture - Body Fluid with Gram Stain (collected 04-13-23 @ 14:00)  Source: Peritoneal PERITONEAL FLUID #2  Gram Stain (04-14-23 @ 00:41):    No polymorphonuclear cells seen per low power field    Few Gram positive cocci in pairs per oil power field  Final Report (04-16-23 @ 22:25):    Rare Proteus vulgaris group    Few Escherichia coli    Few Klebsiella pneumoniae ESBL    Few Streptococcus constellatus    Few Bacteroides distasonis "Susceptibilities not performed"  Organism: Proteus vulgaris group  Escherichia coli  Klebsiella pneumoniae ESBL  Streptococcus constellatus (04-16-23 @ 22:25)  Organism: Streptococcus constellatus (04-16-23 @ 22:25)      Method Type: MICAELA      -  Ceftriaxone: S <=0.25      -  Clindamycin: R >0.5      -  Erythromycin: R >0.5      -  Levofloxacin: S <=0.25      -  Penicillin: S <=0.03  Organism: Klebsiella pneumoniae ESBL (04-16-23 @ 22:25)      Method Type: MICAELA      -  Amikacin: S <=16      -  Amoxicillin/Clavulanic Acid: S <=8/4      -  Ampicillin: R >16 These ampicillin results predict results for amoxicillin      -  Ampicillin/Sulbactam: R >16/8 Enterobacter, Klebsiella aerogenes, Citrobacter, and Serratia may develop resistance during prolonged therapy (3-4 days)      -  Aztreonam: R <=4      -  Cefazolin: R >16 Enterobacter, Klebsiella aerogenes, Citrobacter, and Serratia may develop resistance during prolonged therapy (3-4 days)      -  Cefepime: R >16      -  Ceftriaxone: R >32 Enterobacter, Klebsiella aerogenes, Citrobacter, and Serratia may develop resistance during prolonged therapy      -  Ciprofloxacin: I 0.5      -  Ertapenem: S <=0.5      -  Gentamicin: R >8      -  Imipenem: S <=1      -  Levofloxacin: S <=0.5      -  Meropenem: S <=1      -  Piperacillin/Tazobactam: R <=8      -  Tobramycin: I 8      -  Trimethoprim/Sulfamethoxazole: R >2/38  Organism: Escherichia coli (04-16-23 @ 22:25)      Method Type: MICAELA      -  Amikacin: S <=16      -  Amoxicillin/Clavulanic Acid: S <=8/4      -  Ampicillin: S <=8 These ampicillin results predict results for amoxicillin      -  Ampicillin/Sulbactam: S <=4/2 Enterobacter, Klebsiella aerogenes, Citrobacter, and Serratia may develop resistance during prolonged therapy (3-4 days)      -  Aztreonam: S <=4      -  Cefazolin: S <=2 Enterobacter, Klebsiella aerogenes, Citrobacter, and Serratia may develop resistance during prolonged therapy (3-4 days)      -  Cefepime: S <=2      -  Cefoxitin: S <=8      -  Ceftriaxone: S <=1 Enterobacter, Klebsiella aerogenes, Citrobacter, and Serratia may develop resistance during prolonged therapy      -  Ciprofloxacin: S <=0.25      -  Ertapenem: S <=0.5      -  Gentamicin: S <=2      -  Imipenem: S <=1      -  Levofloxacin: S <=0.5      -  Meropenem: S <=1      -  Piperacillin/Tazobactam: S <=8      -  Tobramycin: S <=2      -  Trimethoprim/Sulfamethoxazole: S <=0.5/9.5  Organism: Proteus vulgaris group (04-16-23 @ 22:25)      Method Type: MICAELA      -  Amikacin: S <=16      -  Amoxicillin/Clavulanic Acid: S <=8/4      -  Ampicillin: R >16 These ampicillin results predict results for amoxicillin      -  Ampicillin/Sulbactam: S <=4/2 Enterobacter, Klebsiella aerogenes, Citrobacter, and Serratia may develop resistance during prolonged therapy (3-4 days)      -  Aztreonam: S <=4      -  Cefazolin: R >16 Enterobacter, Klebsiella aerogenes, Citrobacter, and Serratia may develop resistance during prolonged therapy (3-4 days)      -  Cefepime: S <=2      -  Cefoxitin: S <=8      -  Ceftriaxone: S <=1 Enterobacter, Klebsiella aerogenes, Citrobacter, and Serratia may develop resistance during prolonged therapy      -  Ciprofloxacin: S <=0.25      -  Ertapenem: S <=0.5      -  Gentamicin: S <=2      -  Levofloxacin: S <=0.5      -  Meropenem: S <=1      -  Piperacillin/Tazobactam: S <=8      -  Tobramycin: S <=2      -  Trimethoprim/Sulfamethoxazole: S <=0.5/9.5        RADIOLOGY & ADDITIONAL TESTS:    Personally reviewed.     Consultant(s) Notes Reviewed:  [x] YES  [ ] NO

## 2023-04-17 NOTE — PROGRESS NOTE ADULT - SUBJECTIVE AND OBJECTIVE BOX
[facility] - Adrián Wray Pannella, Patel, Savella, Goodger  Office Number:  [phone]    Patient resting comfortably in bed in NAD.  Remains on vent on IV pressor support.  Unable to provide any meaningful interval history.     F/U for:  Hypotension    Telemetry:  SR    MEDICATIONS  (STANDING):  albumin human 25% IVPB 50 milliLiter(s) IV Intermittent once  buMETAnide IVPB 2 milliGRAM(s) IV Intermittent once  chlorhexidine 0.12% Liquid 15 milliLiter(s) Oral Mucosa every 12 hours  chlorhexidine 4% Liquid 1 Application(s) Topical <User Schedule>  dexMEDEtomidine Infusion 0.5 MICROgram(s)/kG/Hr (9.1 mL/Hr) IV Continuous <Continuous>  heparin   Injectable 5000 Unit(s) SubCutaneous every 8 hours  lactated ringers. 1000 milliLiter(s) (100 mL/Hr) IV Continuous <Continuous>  levothyroxine Injectable 93.75 MICROGram(s) IV Push at bedtime  meropenem  IVPB 500 milliGRAM(s) IV Intermittent every 12 hours  norepinephrine Infusion 0.05 MICROgram(s)/kG/Min (6.83 mL/Hr) IV Continuous <Continuous>  pantoprazole  Injectable 40 milliGRAM(s) IV Push daily  vancomycin    Solution 125 milliGRAM(s) Oral daily    MEDICATIONS  (PRN):  albuterol    90 MICROgram(s) HFA Inhaler 4 Puff(s) Inhalation every 6 hours PRN Shortness of Breath and/or Wheezing  fentaNYL    Injectable 50 MICROGram(s) IV Push every 4 hours PRN Severe Pain (7 - 10)  sodium chloride 0.9% lock flush 10 milliLiter(s) IV Push every 1 hour PRN Pre/post blood products, medications, blood draw, and to maintain line patency      Allergies    No Known Allergies             Vital Signs Last 24 Hrs  T(C): 37.6 (17 Apr 2023 08:02), Max: 37.7 (17 Apr 2023 04:46)  T(F): 99.7 (17 Apr 2023 08:02), Max: 99.9 (17 Apr 2023 04:46)  HR: 76 (17 Apr 2023 09:00) (68 - 118)  BP: --  BP(mean): --  RR: 26 (17 Apr 2023 09:00) (18 - 35)  SpO2: 100% (17 Apr 2023 09:00) (100% - 100%)    Parameters below as of 17 Apr 2023 08:00  Patient On (Oxygen Delivery Method): ventilator        I&O's Summary    16 Apr 2023 07:01  -  17 Apr 2023 07:00  --------------------------------------------------------  IN: 2912 mL / OUT: 1350 mL / NET: 1562 mL        ON EXAM:    General: NAD, intubated and unresponsive.  HEENT: Mucous membranes are dry, anicteric.  ETT in place  Lungs: Non-labored, breath sounds are clear bilaterally, No wheezing, rales or rhonchi  Cardiovascular: Regular, S1 and S2, no murmurs, rubs, or gallops  Gastrointestinal: Bowel Sounds present, soft, nontender.   Lymph: No peripheral edema. No lymphadenopathy.  Skin: No rashes or ulcers  Psych:  Unable to assess    LABS: All Labs Reviewed:                        8.1    16.02 )-----------( 114      ( 17 Apr 2023 05:40 )             26.6                         8.0    18.41 )-----------( 96       ( 16 Apr 2023 06:05 )             26.5                         8.0    11.32 )-----------( 109      ( 15 Apr 2023 05:38 )             25.1     17 Apr 2023 05:40    142    |  107    |  82     ----------------------------<  96     3.7     |  25     |  4.80   16 Apr 2023 06:05    142    |  107    |  77     ----------------------------<  97     3.5     |  28     |  4.70   15 Apr 2023 05:38    142    |  106    |  73     ----------------------------<  99     3.7     |  27     |  4.40     Ca    7.4        17 Apr 2023 05:40  Ca    7.3        16 Apr 2023 06:05  Ca    6.8        15 Apr 2023 05:38  Phos  3.3       15 Apr 2023 05:38  Phos  3.9       14 Apr 2023 21:15  Mg     2.3       15 Apr 2023 05:38  Mg     2.3       14 Apr 2023 21:15    TPro  4.4    /  Alb  1.6    /  TBili  0.7    /  DBili  x      /  AST  85     /  ALT  97     /  AlkPhos  77     17 Apr 2023 05:40  TPro  4.5    /  Alb  1.7    /  TBili  0.8    /  DBili  x      /  AST  178    /  ALT  140    /  AlkPhos  84     16 Apr 2023 06:05  TPro  4.8    /  Alb  1.8    /  TBili  0.8    /  DBili  x      /  AST  380    /  ALT  195    /  AlkPhos  92     15 Apr 2023 05:38          Blood Culture: Organism --  Gram Stain Blood -- Gram Stain --  Specimen Source .Blood Blood-Peripheral  Culture-Blood --    Organism --  Gram Stain Blood -- Gram Stain --  Specimen Source .Blood Blood-Peripheral  Culture-Blood --    Organism Proteus vulgaris group  Gram Stain Blood -- Gram Stain   No polymorphonuclear cells seen per low power field  Few Gram positive cocci in pairs per oil power field  Specimen Source Peritoneal PERITONEAL FLUID #2  Culture-Blood --

## 2023-04-17 NOTE — PROGRESS NOTE ADULT - ASSESSMENT
72-year-old female with history of hypertension, CHF, afib s/p AF ablation (2/7/19) currently not on xarelto, CATH 2018, hypothyroidism, HLD history of C. difficile January 2023 presents for vomitting and abdominal pain. Consulted for CHF.    - CT ABD/Pelvis: Pneumoperitoneum and no ascites. Suspect perforated sigmoid colon. Stable splenomegaly. Stable right lung nodule, groundglass infiltrates and loculated right pleural effusion.  - S/p Tia's with abdominal abscess drainage.    OR finding with >1.2 liters of pus from abd cavity.    pt on Full vent support. s/p PRBC, IV hydration for severe vasodilation shock , on pressor support   - ProBNP [id number]  - transferred to ICU s/p procedure, for vasopressor support for hypotension  - hold lasix for now due to hypotension, monitor closely as PASP 66mmHg  - hold home metoprolol 50mg qd due to hypotension, can consider restarting if BP stabilizes  - Elevated troponin peaked at 100.9, otherwise cardiac enzymes unremarkable, likely demand in the setting of sepsis  - EKG: Sinus tachycardia with PAC's  - No acute changes on EKG compared to previous.   - Atypical chest pain on admission likely not cardiac of origin  - Prior TTE (1/18/23): normal LVEF 65%, normal RV size and function, biatrial enlargement, sclerotic aortic valve, mild AI, Moderate MR and TR  - TTE shows EF 55-60%, RVE, PASP 66     AF  - s/p ablation  - not on AC at home    - Monitor and replete lytes, keep K>4, Mg>2.  - Other cardiovascular workup will depend on clinical course.  - Will continue to follow.

## 2023-04-17 NOTE — PROGRESS NOTE ADULT - ASSESSMENT
72yoF now POD 3 extended yessica's including descending colon for perforated sigmoid, admitted post-operatively to ICU for pressure support, currently intubated, continued on pressors, with shock liver and LAYA.    PLAN:  - Continue care per ICU  - continue to monitor colostomy output, TAMERA output, NGT output and schroeder catheter output  - Continue abx per ID

## 2023-04-17 NOTE — CONSULT NOTE ADULT - ASSESSMENT
Patient presents with   1.  with new transverse colostomy POD 3 stoma dusky red 1.5 x 1 periwound mild erythema no air brown liquid in pouch, patient intubated sedated and therefore ineducable  2. abdominal incision being cared for by surgery>   3. sacral region deep tissue pressure injury patient came in with stage 1 pressure injury now DTI likely worsening 2/2 vasopressor usage  recommendation:   -cavilon daily  for the critically ill patient experiencing multiorgan failure, impaired tissue oxygenation, and perfusion can contribute to skin failure thus the development of a pressure related injury may be unavoidable    This pressure injury is community aquired /YES  At risk for altered tissue perfusion /YES  Impaired perfusion of peripheral tissue /YES  Continue  Nutrition (as tolerated)  Continue  Offloading   Continue Pericare  Apply cair boots at all times while in bed.   Provide skin checks and foot placement q8h.  Care as per medicine will follow w/ you   Findings and recommendations discussed with JAN diallo   Thank you for this consult  Argelia Cruz NP, [facility] [phone]

## 2023-04-17 NOTE — PROGRESS NOTE ADULT - PROBLEM SELECTOR PLAN 8
Continue DVT prophylaxis with venodynes  Mgmt per ICU      #Imaging abnormality  Stable right lung nodule, ground-glass infiltrates and loculated right pleural effusion.      # Goals of Care  - consider Palliative Consult to discuss goals of care    #DISPO  PT consult when able

## 2023-04-17 NOTE — PROGRESS NOTE ADULT - PROBLEM SELECTOR PLAN 1
Abd pain + persistent diarrhea for several months, multiple episodes a day w/ progressive worsening; not relieved by pain meds, a/w n/v  CT A/P - Pneumoperitoneum and no ascites. Suspect perforated sigmoid colon.  POD#3 - Tia procedure and abdominal abscess drainage  Lactemia resolved  On levophed (weaned off dual pressor)  TTE (04/2023) - LVEF of 55-60%; LAE; Sclerotic trileaflet aortic valve, trace AI. Mild to moderate MR and TR. IVC measures 2.0 cm and is non collapsing  BCx - 4/13 no growth   Peritoneal cultures poly microbial - Proteus vulgaris group, Escherichia coli, Klebsiella pneumoniae, Morganella morganii   Peritoneal fluid with gram variable rods and PMNs.  - CDiff negative  s/p Zosyn and Flagyl   - start meropenem IV  ID following, recs appreciated  Mgmt per ICU Abd pain + persistent diarrhea for several months, multiple episodes a day w/ progressive worsening; not relieved by pain meds, a/w n/v  CT A/P - Pneumoperitoneum and no ascites. Suspect perforated sigmoid colon.  s/p Tia procedure and abdominal abscess drainage on 4/13  Lactemia resolved  On levophed (weaned off dual pressor)  TTE (04/2023) - LVEF of 55-60%; LAE; Sclerotic trileaflet aortic valve, trace AI. Mild to moderate MR and TR. IVC measures 2.0 cm and is non collapsing  BCx - 4/13 no growth   Peritoneal cultures poly microbial - Proteus vulgaris group, Escherichia coli, Klebsiella pneumoniae, Morganella morganii   Peritoneal fluid with gram variable rods and PMNs.  - CDiff negative, GI PCR neg  s/p Zosyn and Flagyl   - continue meropenem IV  ID following, recs appreciated  - Shock liver, improving LFTs  Mgmt per ICU Abd pain + persistent diarrhea for several months, multiple episodes a day w/ progressive worsening; not relieved by pain meds, a/w n/v  CT A/P - Pneumoperitoneum and no ascites. Suspect perforated sigmoid colon.  s/p Tia procedure and abdominal abscess drainage on 4/13  lactic acidosis resolved  On levophed (weaned off dual pressor)  TTE (04/2023) - LVEF of 55-60%; LAE; Sclerotic trileaflet aortic valve, trace AI. Mild to moderate MR and TR. IVC measures 2.0 cm and is non collapsing  BCx - 4/13 no growth   Peritoneal cultures poly microbial - Proteus vulgaris group, Escherichia coli, Klebsiella pneumoniae, Morganella morganii   Peritoneal fluid with gram variable rods and PMNs.  - CDiff negative, GI PCR neg  s/p Zosyn and Flagyl   continue meropenem   ID following, recs appreciated  Shock liver, improving LFTs ; ischemic ATN  Mgmt per ICU

## 2023-04-17 NOTE — PROGRESS NOTE ADULT - ASSESSMENT
72F with pmhx afib s/p ablation, HFpEF, mod MR, thrombocytosis, asthma recent C diff infection was brought to the ED today for recurrent falls and persistent nausea/vomiting, now with pneumoperitenum and perforation; Day 4 s/p Ro.       Neuro: Continuing to wean precedex at this time. Arousable and responding to commands. Will attempt extubation today if patient tolerates     CV: Shock likely 2/2 sepsis, on Levo 0.03, continue to wean as tolerated     Pulm: Intubated AC 20/380/5/30%; Will attempt SBT and extubate if tolerates     GI: day # 4 s/p Ro. Continue protonix, meropenam; start PO Vancomycin. Ostomy c/d/i w/ minimal output.    Renal: LAYA 2/2 ATN; Cr 4.8 this AM. continue with LR. Will give 25% albumin and 2mg Bumex to attempt to stimulate urine production     ID: Cont meropenam 2/2 ESBL. Start PO vancomycin 125mg per day for Cdiff prophylaxis    Endo: Continue IV synthroid at 75% of home dose; monitor glucose w/ fingersticks.     Heme: Start HSQ TID dvt ppx

## 2023-04-17 NOTE — PROGRESS NOTE ADULT - PROBLEM SELECTOR PLAN 4
LAYA  likely ATN due to septic shock  Cr worsened to 4.7  Correct electrolyte derangement  S/p bicarb gtt  Mgmt per ICU LAYA  likely ATN due to septic shock  WORSENING  Cr worsened to 4.8  Correct electrolyte derangement  S/p bicarb gtt  Mgmt per ICU LAYA  likely ATN due to septic shock  WORSENING  Cr worsened to 4.8  Correct electrolyte derangement  S/p bicarb gtt  Mgmt per ICU  consider nephro eval

## 2023-04-17 NOTE — PROGRESS NOTE ADULT - SUBJECTIVE AND OBJECTIVE BOX
****** CHARTING IN PROGRESS *******    INTERVAL HPI/OVERNIGHT EVENTS:    SUBJECTIVE: Patient seen and examined at bedside.     ROS:  CV: Denies chest pain  Resp: Denies SOB  GI: Denies abdominal pain, constipation, diarrhea, nausea, vomiting  : Denies dysuria  ID: Denies fevers, chills  MSK: Denies joint pain     OBJECTIVE:    VITAL SIGNS:  ICU Vital Signs Last 24 Hrs  T(C): 37.6 (17 Apr 2023 08:02), Max: 37.7 (17 Apr 2023 04:46)  T(F): 99.7 (17 Apr 2023 08:02), Max: 99.9 (17 Apr 2023 04:46)  HR: 76 (17 Apr 2023 09:00) (68 - 118)  BP: --  BP(mean): --  ABP: 113/49 (17 Apr 2023 09:00) (85/39 - 134/53)  ABP(mean): 74 (17 Apr 2023 09:00) (58 - 86)  RR: 26 (17 Apr 2023 09:00) (18 - 35)  SpO2: 100% (17 Apr 2023 09:00) (100% - 100%)    O2 Parameters below as of 17 Apr 2023 08:00  Patient On (Oxygen Delivery Method): ventilator          Mode: AC/ CMV (Assist Control/ Continuous Mandatory Ventilation), RR (machine): 20, TV (machine): 380, FiO2: 30, PEEP: 5, ITime: 1, MAP: 14, PIP: 30    04-16 @ 07:01  -  04-17 @ 07:00  --------------------------------------------------------  IN: 2912 mL / OUT: 1350 mL / NET: 1562 mL      CAPILLARY BLOOD GLUCOSE      POCT Blood Glucose.: 117 mg/dL (17 Apr 2023 05:00)      PHYSICAL EXAM:  General: NAD, comfortable  HEENT: NCAT, PERRL, clear conjunctiva, no scleral icterus  Neck: supple, no JVD  Respiratory: CTA b/l, no wheezing, rhonchi, rales  Cardiovascular: RRR, normal S1S2, no M/R/G  Abdomen: soft, NT/ND, bowel sounds in all four quadrants, no palpable masses  Extremities: WWP, no clubbing, cyanosis, or edema  Neurology: A&Ox3, nonfocal, sensation intact     MEDICATIONS:  MEDICATIONS  (STANDING):  chlorhexidine 0.12% Liquid 15 milliLiter(s) Oral Mucosa every 12 hours  chlorhexidine 4% Liquid 1 Application(s) Topical <User Schedule>  dexMEDEtomidine Infusion 0.5 MICROgram(s)/kG/Hr (9.1 mL/Hr) IV Continuous <Continuous>  lactated ringers. 1000 milliLiter(s) (100 mL/Hr) IV Continuous <Continuous>  levothyroxine Injectable 93.75 MICROGram(s) IV Push at bedtime  meropenem  IVPB 500 milliGRAM(s) IV Intermittent every 12 hours  norepinephrine Infusion 0.05 MICROgram(s)/kG/Min (6.83 mL/Hr) IV Continuous <Continuous>  pantoprazole  Injectable 40 milliGRAM(s) IV Push daily    MEDICATIONS  (PRN):  albuterol    90 MICROgram(s) HFA Inhaler 4 Puff(s) Inhalation every 6 hours PRN Shortness of Breath and/or Wheezing  fentaNYL    Injectable 50 MICROGram(s) IV Push every 4 hours PRN Severe Pain (7 - 10)  sodium chloride 0.9% lock flush 10 milliLiter(s) IV Push every 1 hour PRN Pre/post blood products, medications, blood draw, and to maintain line patency      ALLERGIES:  Allergies    No Known Allergies    Intolerances        LABS:                        8.1    16.02 )-----------( 114      ( 17 Apr 2023 05:40 )             26.6     04-17    142  |  107  |  82<H>  ----------------------------<  96  3.7   |  25  |  4.80<H>    Ca    7.4<L>      17 Apr 2023 05:40    TPro  4.4<L>  /  Alb  1.6<L>  /  TBili  0.7  /  DBili  x   /  AST  85<H>  /  ALT  97<H>  /  AlkPhos  77  04-17          RADIOLOGY & ADDITIONAL TESTS: Reviewed. ***  BEDSIDE LUNG ULTRASOUND: ***  BEDSIDE ECHO: ***    CENTRAL LINE: N        DATE INSERTED:             REMOVE: N  BISHOP: Y                       DATE INSERTED:              REMOVE: Y/N  A-LINE: N                       DATE INSERTED:              REMOVE: Y/N      GLOBAL ISSUE/BEST PRACTICE  Analgesia: Y  Sedation: Y  HOB elevation: yes  Stress ulcer prophylaxis: Y  VTE prophylaxis: Y  Glycemic control: Y  Nutrition: Y    CODE STATUS: Full Code   INTERVAL HPI/OVERNIGHT EVENTS: No acute overnight events. Patient remains intubated, sedated on precedex. Still requiring vasopressor support w/ Levophed. Will wean today and attempt extubation. Leukocytosis downtrending. Urine output approx 25cc.     SUBJECTIVE: Patient seen and examined at bedside. Intubated sedated, but arousable and able to follow commands.     ROS:  Unable to obtain     OBJECTIVE:    VITAL SIGNS:  ICU Vital Signs Last 24 Hrs  T(C): 37.6 (17 Apr 2023 08:02), Max: 37.7 (17 Apr 2023 04:46)  T(F): 99.7 (17 Apr 2023 08:02), Max: 99.9 (17 Apr 2023 04:46)  HR: 76 (17 Apr 2023 09:00) (68 - 118)  ABP: 113/49 (17 Apr 2023 09:00) (85/39 - 134/53)  ABP(mean): 74 (17 Apr 2023 09:00) (58 - 86)  RR: 26 (17 Apr 2023 09:00) (18 - 35)  SpO2: 100% (17 Apr 2023 09:00) (100% - 100%)    O2 Parameters below as of 17 Apr 2023 08:00  Patient On (Oxygen Delivery Method): ventilator      Mode: AC/ CMV (Assist Control/ Continuous Mandatory Ventilation), RR (machine): 20, TV (machine): 380, FiO2: 30, PEEP: 5, ITime: 1, MAP: 14, PIP: 30    04-16 @ 07:01  -  04-17 @ 07:00  --------------------------------------------------------  IN: 2912 mL / OUT: 1350 mL / NET: 1562 mL      CAPILLARY BLOOD GLUCOSE      POCT Blood Glucose.: 117 mg/dL (17 Apr 2023 05:00)      PHYSICAL EXAM:  General: Well developed female who is intubated, critically ill  Respiratory: Intubated; 20/380/5/30%. No wheezes, rales, rhonchi   Cardiovascular: RRR, normal S1S2, no M/R/G  Abdomen: soft, NT/ND, ostomy site is red with minimal output   Extremities: WWP, no clubbing, cyanosis, or edema  Neurology: awake, responding to commands, sedated on precedex     MEDICATIONS:  MEDICATIONS  (STANDING):  chlorhexidine 0.12% Liquid 15 milliLiter(s) Oral Mucosa every 12 hours  chlorhexidine 4% Liquid 1 Application(s) Topical <User Schedule>  dexMEDEtomidine Infusion 0.5 MICROgram(s)/kG/Hr (9.1 mL/Hr) IV Continuous <Continuous>  lactated ringers. 1000 milliLiter(s) (100 mL/Hr) IV Continuous <Continuous>  levothyroxine Injectable 93.75 MICROGram(s) IV Push at bedtime  meropenem  IVPB 500 milliGRAM(s) IV Intermittent every 12 hours  norepinephrine Infusion 0.05 MICROgram(s)/kG/Min (6.83 mL/Hr) IV Continuous <Continuous>  pantoprazole  Injectable 40 milliGRAM(s) IV Push daily    MEDICATIONS  (PRN):  albuterol    90 MICROgram(s) HFA Inhaler 4 Puff(s) Inhalation every 6 hours PRN Shortness of Breath and/or Wheezing  fentaNYL    Injectable 50 MICROGram(s) IV Push every 4 hours PRN Severe Pain (7 - 10)  sodium chloride 0.9% lock flush 10 milliLiter(s) IV Push every 1 hour PRN Pre/post blood products, medications, blood draw, and to maintain line patency      ALLERGIES:  Allergies    No Known Allergies    Intolerances        LABS:                        8.1    16.02 )-----------( 114      ( 17 Apr 2023 05:40 )             26.6     04-17    142  |  107  |  82<H>  ----------------------------<  96  3.7   |  25  |  4.80<H>    Ca    7.4<L>      17 Apr 2023 05:40    TPro  4.4<L>  /  Alb  1.6<L>  /  TBili  0.7  /  DBili  x   /  AST  85<H>  /  ALT  97<H>  /  AlkPhos  77  04-17          RADIOLOGY & ADDITIONAL TESTS: Reviewed. ***  BEDSIDE LUNG ULTRASOUND: ***  BEDSIDE ECHO: ***    CENTRAL LINE: N        DATE INSERTED:             REMOVE: N  BISHOP: Y                       DATE INSERTED:              REMOVE: Y/N  A-LINE: N                       DATE INSERTED:              REMOVE: Y/N      GLOBAL ISSUE/BEST PRACTICE  Analgesia: Y  Sedation: Y  HOB elevation: yes  Stress ulcer prophylaxis: Y  VTE prophylaxis: Y  Glycemic control: Y  Nutrition: Y    CODE STATUS: Full Code

## 2023-04-17 NOTE — PROGRESS NOTE ADULT - PROBLEM SELECTOR PLAN 2
Intubated CPAP 23/300/5/30  Failed spontaneous breathing trial;  - precedex for sedation  Continue to monitor and wean as tolerated per ICU protocol  Mgmt per ICU Intubated AC 20/380/5/30  Failed spontaneous breathing trial;  - precedex for sedation  Continue to monitor and wean as tolerated per ICU protocol  Mgmt per ICU Intubated AC 20/380/5/30  Failed spontaneous breathing trial  - precedex for sedation  Continue to monitor and wean as tolerated per ICU protocol  Mgmt per ICU

## 2023-04-17 NOTE — PROGRESS NOTE ADULT - ASSESSMENT
73 y/o f w/ PMH of HTN, CHF, asthma, hypothyroidism, prior notes state A-fib on Xarelto, history of C. difficile 1/13/23, who p/w generalized abdominal pain, and persistent diarrhea. Found to have sepsis 2/2 perforated viscus from suspected sigmoid colon perforation.    S/p Tia procedure and abdominal abscess drainage on 4/13. Peritoneal fluid cultures grew ESBL klebsiella, strep constellatus, proteus vulgaris, E coli, bacteroides. Blood cultures no growth. C diff negative. Had low grade temp today, but transaminitis and leukocytosis better. Pressor requirements improving.    -continue meropenem  -add vancomycin 125mg PO ppx when stable to take PO  -suggest blood and respiratory cultures if febrile  -follow cultures to completion  -discussed with ICU team    Diandra Ricardo MD  Division of Infectious Diseases   Cell [phone] between 8am and 6pm   After 6pm and weekends please call ID service at [phone].

## 2023-04-17 NOTE — PROGRESS NOTE ADULT - SUBJECTIVE AND OBJECTIVE BOX
POD #4: extended Tia's procedure    SUBJECTIVE:  Patient seen and examined at bedside, remains intubated and sedated on pressors.     MEDICATIONS  (STANDING):  chlorhexidine 0.12% Liquid 15 milliLiter(s) Oral Mucosa every 12 hours  chlorhexidine 4% Liquid 1 Application(s) Topical <User Schedule>  dexMEDEtomidine Infusion 0.5 MICROgram(s)/kG/Hr (9.1 mL/Hr) IV Continuous <Continuous>  lactated ringers. 1000 milliLiter(s) (100 mL/Hr) IV Continuous <Continuous>  levothyroxine Injectable 93.75 MICROGram(s) IV Push at bedtime  meropenem  IVPB 500 milliGRAM(s) IV Intermittent every 12 hours  norepinephrine Infusion 0.05 MICROgram(s)/kG/Min (6.83 mL/Hr) IV Continuous <Continuous>  pantoprazole  Injectable 40 milliGRAM(s) IV Push daily    MEDICATIONS  (PRN):  albuterol    90 MICROgram(s) HFA Inhaler 4 Puff(s) Inhalation every 6 hours PRN Shortness of Breath and/or Wheezing  fentaNYL    Injectable 50 MICROGram(s) IV Push every 4 hours PRN Severe Pain (7 - 10)  sodium chloride 0.9% lock flush 10 milliLiter(s) IV Push every 1 hour PRN Pre/post blood products, medications, blood draw, and to maintain line patency      Vital Signs Last 24 Hrs  T(C): 37.6 (17 Apr 2023 08:02), Max: 37.7 (17 Apr 2023 04:46)  T(F): 99.7 (17 Apr 2023 08:02), Max: 99.9 (17 Apr 2023 04:46)  HR: 86 (17 Apr 2023 08:09) (68 - 121)  BP: --  BP(mean): --  RR: 21 (17 Apr 2023 08:00) (18 - 35)  SpO2: 100% (17 Apr 2023 08:09) (100% - 100%)    Parameters below as of 17 Apr 2023 08:00  Patient On (Oxygen Delivery Method): ventilator        PHYSICAL EXAM:  Constitutional: AAOx3, no acute distress  HEENT: NCAT, airway patent  Cardiovascular: RRR, pulses present bilaterally  Respiratory: nonlabored breathing  Gastrointestinal: abdomen soft, nontender, non distended, no rebound or guarding, no palpable masses, incisions are clean, dry and intact without any surrounding erythema, drainage, bleeding or signs of infection, TAMERA with serous>sang drainage   Neuro: no focal deficits  Extremities: non edematous, no calf pain bilaterally    I&O's Detail    16 Apr 2023 07:01  -  17 Apr 2023 07:00  --------------------------------------------------------  IN:    Dexmedetomidine: 159 mL    IV PiggyBack: 100 mL    Lactated Ringers: 2400 mL    Norepinephrine: 253 mL  Total IN: 2912 mL    OUT:    Bulb (mL): 120 mL    Colostomy (mL): 70 mL    Indwelling Catheter - Urethral (mL): 360 mL    Nasogastric/Oral tube (mL): 800 mL  Total OUT: 1350 mL    Total NET: 1562 mL          LABS:                        8.1    16.02 )-----------( 114      ( 17 Apr 2023 05:40 )             26.6     04-17    142  |  107  |  82<H>  ----------------------------<  96  3.7   |  25  |  4.80<H>    Ca    7.4<L>      17 Apr 2023 05:40    TPro  4.4<L>  /  Alb  1.6<L>  /  TBili  0.7  /  DBili  x   /  AST  85<H>  /  ALT  97<H>  /  AlkPhos  77  04-17          RADIOLOGY & ADDITIONAL STUDIES: POD #4: extended Tia's procedure    SUBJECTIVE:  Patient seen and examined at bedside, remains intubated and sedated on pressors.     MEDICATIONS  (STANDING):  chlorhexidine 0.12% Liquid 15 milliLiter(s) Oral Mucosa every 12 hours  chlorhexidine 4% Liquid 1 Application(s) Topical <User Schedule>  dexMEDEtomidine Infusion 0.5 MICROgram(s)/kG/Hr (9.1 mL/Hr) IV Continuous <Continuous>  lactated ringers. 1000 milliLiter(s) (100 mL/Hr) IV Continuous <Continuous>  levothyroxine Injectable 93.75 MICROGram(s) IV Push at bedtime  meropenem  IVPB 500 milliGRAM(s) IV Intermittent every 12 hours  norepinephrine Infusion 0.05 MICROgram(s)/kG/Min (6.83 mL/Hr) IV Continuous <Continuous>  pantoprazole  Injectable 40 milliGRAM(s) IV Push daily    MEDICATIONS  (PRN):  albuterol    90 MICROgram(s) HFA Inhaler 4 Puff(s) Inhalation every 6 hours PRN Shortness of Breath and/or Wheezing  fentaNYL    Injectable 50 MICROGram(s) IV Push every 4 hours PRN Severe Pain (7 - 10)  sodium chloride 0.9% lock flush 10 milliLiter(s) IV Push every 1 hour PRN Pre/post blood products, medications, blood draw, and to maintain line patency      Vital Signs Last 24 Hrs  T(C): 37.6 (17 Apr 2023 08:02), Max: 37.7 (17 Apr 2023 04:46)  T(F): 99.7 (17 Apr 2023 08:02), Max: 99.9 (17 Apr 2023 04:46)  HR: 86 (17 Apr 2023 08:09) (68 - 121)  BP: --  BP(mean): --  RR: 21 (17 Apr 2023 08:00) (18 - 35)  SpO2: 100% (17 Apr 2023 08:09) (100% - 100%)    Parameters below as of 17 Apr 2023 08:00  Patient On (Oxygen Delivery Method): ventilator        PHYSICAL EXAM:  Constitutional: AAOx3, no acute distress  HEENT: NCAT, airway patent  Cardiovascular: RRR, pulses present bilaterally  Respiratory: nonlabored breathing  Gastrointestinal: abdomen soft, nontender, non distended, no rebound or guarding, no palpable masses, incisions are clean, dry and intact without any surrounding erythema, drainage, bleeding or signs of infection, TAMERA with serous>sang drainage, ostomy with bowel sweat, stoma dusky appearing   Neuro: no focal deficits  Extremities: non edematous, no calf pain bilaterally    I&O's Detail    16 Apr 2023 07:01  -  17 Apr 2023 07:00  --------------------------------------------------------  IN:    Dexmedetomidine: 159 mL    IV PiggyBack: 100 mL    Lactated Ringers: 2400 mL    Norepinephrine: 253 mL  Total IN: 2912 mL    OUT:    Bulb (mL): 120 mL    Colostomy (mL): 70 mL    Indwelling Catheter - Urethral (mL): 360 mL    Nasogastric/Oral tube (mL): 800 mL  Total OUT: 1350 mL    Total NET: 1562 mL          LABS:                        8.1    16.02 )-----------( 114      ( 17 Apr 2023 05:40 )             26.6     04-17    142  |  107  |  82<H>  ----------------------------<  96  3.7   |  25  |  4.80<H>    Ca    7.4<L>      17 Apr 2023 05:40    TPro  4.4<L>  /  Alb  1.6<L>  /  TBili  0.7  /  DBili  x   /  AST  85<H>  /  ALT  97<H>  /  AlkPhos  77  04-17

## 2023-04-17 NOTE — PROGRESS NOTE ADULT - SUBJECTIVE AND OBJECTIVE BOX
[facility]  INFECTIOUS DISEASES - [address], [place] [address]  Tel: [phone]     Fax: [phone]  =======================================================    HERMILA QUIÑONES [id number]    Follow up: Tmax of 100.3 today. Remains intubated, pressor requirement decreasing.    Allergies:  No Known Allergies      Antibiotics:  albuterol    90 MICROgram(s) HFA Inhaler 4 Puff(s) Inhalation every 6 hours PRN  chlorhexidine 0.12% Liquid 15 milliLiter(s) Oral Mucosa every 12 hours  chlorhexidine 4% Liquid 1 Application(s) Topical <User Schedule>  dexMEDEtomidine Infusion 0.5 MICROgram(s)/kG/Hr IV Continuous <Continuous>  fentaNYL    Injectable 50 MICROGram(s) IV Push every 4 hours PRN  heparin   Injectable 5000 Unit(s) SubCutaneous every 8 hours  lactated ringers. 1000 milliLiter(s) IV Continuous <Continuous>  levothyroxine Injectable 93.75 MICROGram(s) IV Push at bedtime  meropenem  IVPB 500 milliGRAM(s) IV Intermittent every 12 hours  norepinephrine Infusion 0.05 MICROgram(s)/kG/Min IV Continuous <Continuous>  pantoprazole  Injectable 40 milliGRAM(s) IV Push daily  sodium chloride 0.9% lock flush 10 milliLiter(s) IV Push every 1 hour PRN  vancomycin    Solution 125 milliGRAM(s) Oral daily       REVIEW OF SYSTEMS:  unable to obtain, intubated     Physical Exam:  ICU Vital Signs Last 24 Hrs  T(C): 37.9 (17 Apr 2023 15:40), Max: 37.9 (17 Apr 2023 11:28)  T(F): 100.2 (17 Apr 2023 15:40), Max: 100.3 (17 Apr 2023 11:28)  HR: 84 (17 Apr 2023 14:30) (68 - 111)  BP: --  BP(mean): --  ABP: 92/39 (17 Apr 2023 14:30) (92/39 - 134/53)  ABP(mean): 61 (17 Apr 2023 14:30) (61 - 86)  RR: 32 (17 Apr 2023 14:30) (20 - 35)  SpO2: 100% (17 Apr 2023 14:30) (100% - 100%)    O2 Parameters below as of 17 Apr 2023 08:00  Patient On (Oxygen Delivery Method): ventilator        GEN: intubated  HEENT: normocephalic and atraumatic.   NECK: Supple.   LUNGS: No wheezes, crackles or rhonchi  HEART: Regular rate and rhythm   ABDOMEN: (+) ostomy, soft, nondistended  EXTREMITIES: bipedal edema  NEUROLOGIC: unable to assess, intubated and sedated    Labs:  04-17    142  |  107  |  82<H>  ----------------------------<  96  3.7   |  25  |  4.80<H>    Ca    7.4<L>      17 Apr 2023 05:40    TPro  4.4<L>  /  Alb  1.6<L>  /  TBili  0.7  /  DBili  x   /  AST  85<H>  /  ALT  97<H>  /  AlkPhos  77  04-17                          8.1    16.02 )-----------( 114      ( 17 Apr 2023 05:40 )             26.6         LIVER FUNCTIONS - ( 17 Apr 2023 05:40 )  Alb: 1.6 g/dL / Pro: 4.4 g/dL / ALK PHOS: 77 U/L / ALT: 97 U/L / AST: 85 U/L / GGT: x             RECENT CULTURES:  04-13 @ 19:30 .Blood Blood-Peripheral     No growth to date.        04-13 @ 19:25 .Blood Blood-Peripheral     No growth to date.        04-13 @ 14:00 Peritoneal PERITONEAL FLUID #2 Proteus vulgaris group  Escherichia coli  Klebsiella pneumoniae ESBL  Streptococcus constellatus    Rare Proteus vulgaris group  Few Escherichia coli  Few Klebsiella pneumoniae ESBL  Few Streptococcus constellatus  Few Bacteroides distasonis "Susceptibilities not performed"    No polymorphonuclear cells seen per low power field  Few Gram positive cocci in pairs per oil power field      04-13 @ 06:40          NotDetec        All imaging and data are reviewed.

## 2023-04-17 NOTE — PROGRESS NOTE ADULT - PROBLEM SELECTOR PLAN 3
CT with abdominal perforation; now POD #3 s/p Ro and evacuation of about 1.2L purulent collection with the peritoneum  S/p DDAVP, FFP and vitamin K to reverse coagulopathy  Continue broad spectrum antibiotics as above  Cultures as above  Ostomy c/d/i; watery output noted  NPO - NGT draining  neg c-diff  ID following, recs appreciated  Mgmt per ICU CT with abdominal perforation; now s/p Ro and evacuation of about 1.2L purulent collection with the peritoneum on 4/13  S/p DDAVP, FFP and vitamin K to reverse coagulopathy  Continue broad spectrum antibiotics as above  Cultures as above  Ostomy c/d/i; watery output noted  NPO - NGT draining  neg c-diff, neg GI pcr  ID following, recs appreciated  Mgmt per ICU

## 2023-04-17 NOTE — PROGRESS NOTE ADULT - PROBLEM SELECTOR PLAN 5
ProBNP 08517  TTE (04/2023) - LVEF of 55-60%; LAE; Sclerotic trileaflet aortic valve, trace AI. Mild to moderate MR and TR. IVC measures 2.0 cm and is non collapsing  Per cardio - hold Lasix and lopressor for now due to hypotension - monitor BPs for resuming  Cardio following, recs appreciated  Mgmt per ICU

## 2023-04-17 NOTE — PROGRESS NOTE ADULT - ASSESSMENT
73 yo woman with Hx afib (off xarelto at least 1wk), HFpEF, asthma, recent Cdiff in Jan presenting with weakness and multiple falls at home and was found to have perforated sigmoid colon resulting in peritonitis and septic shock.   Additionally, she was noted to have lactic acidosis and LAYA likely ATN form septic shock.  She is now s/p exploratory laparotomy with washout, drainage of 1.2L purulent collection in the peritoneal cavity,  transverse and sigmoid resection and Ro's procedure. She is being managed in the ICU due to septic shock requiring pressor support, postop respiratory failure requiring mechanical ventilation, LAYA and acute blood loss anemia. 73 yo woman with Hx afib (off xarelto at least 1wk), HFpEF, asthma, recent Cdiff in Jan presenting with weakness and multiple falls at home and was found to have perforated sigmoid colon resulting in peritonitis and septic shock.   Additionally, she was noted to have lactic acidosis and LAYA likely ATN form septic shock.  She is now s/p exploratory laparotomy with washout, drainage of 1.2L purulent collection in the peritoneal cavity,  transverse and sigmoid resection and Ro's procedure on 4/13. She is being managed in the ICU due to septic shock requiring pressor support, postop respiratory failure requiring mechanical ventilation, LAYA and acute blood loss anemia. 71yo F with PMH of afib (off xarelto at least 1wk PTA), HFpEF, asthma, recent C diff in [place] presenting with abd pain, weakness and falls at home a/w severe sepsis and peritonitis due to perforated sigmoid colon, also with LAYA due to ATN, shock liver, and post/op acute respiratory failure requiring mechanical ventilation, and acute blood loss anemia.

## 2023-04-18 NOTE — PROGRESS NOTE ADULT - PROBLEM SELECTOR PLAN 3
Refilled in office visit. CT with abdominal perforation; now s/p Ro and evacuation of about 1.2L purulent collection with the peritoneum on 4/13  S/p DDAVP, FFP and vitamin K to reverse coagulopathy  Continue broad spectrum antibiotics as above  Cultures as above  Ostomy c/d/i; watery output noted  NPO - NGT draining  neg c-diff, neg GI pcr  ID following, recs appreciated  Mgmt per ICU CT with abdominal perforation; now s/p Ro and evacuation of about 1.2L purulent collection with the peritoneum on 4/13  S/p DDAVP, FFP and vitamin K to reverse coagulopathy  given 2un PRBC for acute blood loss anemia  Continue broad spectrum antibiotics as above  Cultures as above  Ostomy c/d/i; watery output noted  NPO - NGT draining  neg c-diff, neg GI pcr  ID following, recs appreciated  Mgmt per ICU

## 2023-04-18 NOTE — PROGRESS NOTE ADULT - ASSESSMENT
71yo F with PMH of afib (off xarelto at least 1wk PTA), HFpEF, asthma, recent C diff in [place] presenting with abd pain, weakness and falls at home a/w severe sepsis and peritonitis due to perforated sigmoid colon, also with LAYA due to ATN, shock liver, and post/op acute respiratory failure requiring mechanical ventilation, and acute blood loss anemia.

## 2023-04-18 NOTE — CHART NOTE - NSCHARTNOTEFT_GEN_A_CORE
Assessment:     Factors impacting intake: [ ] none [ ] nausea  [ ] vomiting [ ] diarrhea [ ] constipation  [ ]chewing problems [ ] swallowing issues  [ ] other:     Diet Presciption:   Intake:     Current Weight: Weight (kg): 72.8 (04-13 @ 16:35)  % Weight Change    Pertinent Medications: MEDICATIONS  (STANDING):  chlorhexidine 0.12% Liquid 15 milliLiter(s) Oral Mucosa every 12 hours  chlorhexidine 4% Liquid 1 Application(s) Topical <User Schedule>  dexMEDEtomidine Infusion 0.5 MICROgram(s)/kG/Hr (9.1 mL/Hr) IV Continuous <Continuous>  heparin   Injectable 5000 Unit(s) SubCutaneous every 8 hours  levothyroxine Injectable 93.75 MICROGram(s) IV Push at bedtime  meropenem  IVPB 500 milliGRAM(s) IV Intermittent every 12 hours  norepinephrine Infusion 0.05 MICROgram(s)/kG/Min (6.83 mL/Hr) IV Continuous <Continuous>  pantoprazole  Injectable 40 milliGRAM(s) IV Push daily  vancomycin    Solution 125 milliGRAM(s) Oral daily    MEDICATIONS  (PRN):  albuterol    90 MICROgram(s) HFA Inhaler 4 Puff(s) Inhalation every 6 hours PRN Shortness of Breath and/or Wheezing  fentaNYL    Injectable 50 MICROGram(s) IV Push every 4 hours PRN Severe Pain (7 - 10)  sodium chloride 0.9% lock flush 10 milliLiter(s) IV Push every 1 hour PRN Pre/post blood products, medications, blood draw, and to maintain line patency    Pertinent Labs: 04-17 Na142 mmol/L Glu 96 mg/dL K+ 3.7 mmol/L Cr  4.80 mg/dL<H> BUN 82 mg/dL<H> 04-18 Phos 4.2 mg/dL 04-17 Alb 1.6 g/dL<L>     CAPILLARY BLOOD GLUCOSE      POCT Blood Glucose.: 110 mg/dL (18 Apr 2023 05:50)  POCT Blood Glucose.: 118 mg/dL (18 Apr 2023 00:30)  POCT Blood Glucose.: 109 mg/dL (17 Apr 2023 17:53)  POCT Blood Glucose.: 106 mg/dL (17 Apr 2023 11:23)    Skin:     Estimated Needs:   [ ] no change since previous assessment  [ ] recalculated:     Previous Nutrition Diagnosis:   [ ] Inadequate Energy Intake [ ]Inadequate Oral Intake [ ] Excessive Energy Intake   [ ] Underweight [ ] Increased Nutrient Needs [ ] Overweight/Obesity   [ ] Altered GI Function [ ] Unintended Weight Loss [ ] Food & Nutrition Related Knowledge Deficit [ ] Malnutrition     Nutrition Diagnosis is [ ] ongoing  [ ] resolved [ ] not applicable     New Nutrition Diagnosis: [ ] not applicable       Interventions:   Recommend  [ ] Change Diet To:  [ ] Nutrition Supplement  [ ] Nutrition Support  [ ] Other:     Monitoring and Evaluation:   [ ] PO intake [ x ] Tolerance to diet prescription [ x ] weights [ x ] labs[ x ] follow up per protocol  [ ] other:

## 2023-04-18 NOTE — PROGRESS NOTE ADULT - SUBJECTIVE AND OBJECTIVE BOX
Patient is a 72y old  Female who presents with a chief complaint of intra-abdominal perforation (18 Apr 2023 09:24)      Subjective:  INTERVAL HPI/OVERNIGHT EVENTS: Patient seen and examined at bedside. No overnight events occurred. Patient on ventilator, opens eyes to voice. Appears uncomfortable.    MEDICATIONS  (STANDING):  chlorhexidine 0.12% Liquid 15 milliLiter(s) Oral Mucosa every 12 hours  chlorhexidine 4% Liquid 1 Application(s) Topical <User Schedule>  dexMEDEtomidine Infusion 0.5 MICROgram(s)/kG/Hr (9.1 mL/Hr) IV Continuous <Continuous>  heparin   Injectable 5000 Unit(s) SubCutaneous every 8 hours  levothyroxine Injectable 93.75 MICROGram(s) IV Push at bedtime  meropenem  IVPB 500 milliGRAM(s) IV Intermittent every 12 hours  norepinephrine Infusion 0.05 MICROgram(s)/kG/Min (6.83 mL/Hr) IV Continuous <Continuous>  pantoprazole  Injectable 40 milliGRAM(s) IV Push daily  vancomycin    Solution 125 milliGRAM(s) Oral daily    MEDICATIONS  (PRN):  albuterol    90 MICROgram(s) HFA Inhaler 4 Puff(s) Inhalation every 6 hours PRN Shortness of Breath and/or Wheezing  fentaNYL    Injectable 50 MICROGram(s) IV Push every 4 hours PRN Severe Pain (7 - 10)  sodium chloride 0.9% lock flush 10 milliLiter(s) IV Push every 1 hour PRN Pre/post blood products, medications, blood draw, and to maintain line patency      Allergies    No Known Allergies    Intolerances        REVIEW OF SYSTEMS:  Unable to obtain 2/2 ventilator.    Objective:  Vital Signs Last 24 Hrs  T(C): 36.7 (18 Apr 2023 08:15), Max: 37.9 (17 Apr 2023 11:28)  T(F): 98 (18 Apr 2023 08:15), Max: 100.3 (17 Apr 2023 11:28)  HR: 75 (18 Apr 2023 10:00) (66 - 116)  BP: --  BP(mean): --  RR: 26 (18 Apr 2023 10:00) (20 - 38)  SpO2: 100% (18 Apr 2023 10:00) (97% - 100%)      GENERAL: ill-appearing, intubated on ventilator, awake with open eyes  HEENT: dry mucous membranes  CHEST/LUNG:  b/l rhonchi  HEART: tachycardic to 100bpm, irregular, S1, S2  ABDOMEN:  Ostomy w/ output. midline abdominal surgical wound w/ bandage c/d/i; TAMERA drain noted with serosanguinous fluid, nondistended, no apparent tenderness   EXTREMITIES: edema in 4 extremities, no cyanosis, or apparent calf tenderness  NERVOUS SYSTEM: sedated, opening eyes to voice     LABS:                        7.6    18.60 )-----------( 159      ( 18 Apr 2023 05:43 )             25.4     CBC Full  -  ( 18 Apr 2023 05:43 )  WBC Count : 18.60 K/uL  Hemoglobin : 7.6 g/dL  Hematocrit : 25.4 %  Platelet Count - Automated : 159 K/uL  Mean Cell Volume : 88.8 fl  Mean Cell Hemoglobin : 26.6 pg  Mean Cell Hemoglobin Concentration : 29.9 gm/dL  Auto Neutrophil # : x  Auto Lymphocyte # : x  Auto Monocyte # : x  Auto Eosinophil # : x  Auto Basophil # : x  Auto Neutrophil % : x  Auto Lymphocyte % : x  Auto Monocyte % : x  Auto Eosinophil % : x  Auto Basophil % : x    18 Apr 2023 05:43    143    |  108    |  87     ----------------------------<  108    3.9     |  24     |  5.00     Ca    7.7        18 Apr 2023 05:43  Phos  4.2       18 Apr 2023 05:43  Mg     2.1       18 Apr 2023 05:43    TPro  4.6    /  Alb  1.8    /  TBili  0.8    /  DBili  x      /  AST  45     /  ALT  63     /  AlkPhos  82     18 Apr 2023 05:43        CAPILLARY BLOOD GLUCOSE      POCT Blood Glucose.: 110 mg/dL (18 Apr 2023 05:50)  POCT Blood Glucose.: 118 mg/dL (18 Apr 2023 00:30)  POCT Blood Glucose.: 109 mg/dL (17 Apr 2023 17:53)  POCT Blood Glucose.: 106 mg/dL (17 Apr 2023 11:23)        Culture - Blood (collected 04-13-23 @ 19:30)  Source: .Blood Blood-Peripheral  Preliminary Report (04-15-23 @ 01:02):    No growth to date.    Culture - Blood (collected 04-13-23 @ 19:25)  Source: .Blood Blood-Peripheral  Preliminary Report (04-15-23 @ 01:02):    No growth to date.    Culture - Body Fluid with Gram Stain (collected 04-13-23 @ 14:00)  Source: Peritoneal PERITONEAL FLUID #1  Gram Stain (04-13-23 @ 22:55):    Numerous polymorphonuclear leukocytes per low power field    Few Gram Variable Rods per oil power field  Final Report (04-15-23 @ 16:47):    Rare Morganella morganii    Few Klebsiella pneumoniae ESBL    Few Escherichia coli    Few Streptococcus constellatus See previous culture [id number]    Few Bacteroides distasonis "Susceptibilities not performed"  Organism: Morganella morganii  Klebsiella pneumoniae ESBL  Escherichia coli (04-15-23 @ 16:47)  Organism: Escherichia coli (04-15-23 @ 16:47)      Method Type: MICAELA      -  Amikacin: S <=16      -  Amoxicillin/Clavulanic Acid: S <=8/4      -  Ampicillin: S <=8 These ampicillin results predict results for amoxicillin      -  Ampicillin/Sulbactam: S <=4/2 Enterobacter, Klebsiella aerogenes, Citrobacter, and Serratia may develop resistance during prolonged therapy (3-4 days)      -  Aztreonam: S <=4      -  Cefazolin: S <=2 Enterobacter, Klebsiella aerogenes, Citrobacter, and Serratia may develop resistance during prolonged therapy (3-4 days)      -  Cefepime: S <=2      -  Cefoxitin: S <=8      -  Ceftriaxone: S <=1 Enterobacter, Klebsiella aerogenes, Citrobacter, and Serratia may develop resistance during prolonged therapy      -  Ciprofloxacin: S <=0.25      -  Ertapenem: S <=0.5      -  Gentamicin: S <=2      -  Imipenem: S <=1      -  Levofloxacin: S <=0.5      -  Meropenem: S <=1      -  Piperacillin/Tazobactam: S <=8      -  Tobramycin: S <=2      -  Trimethoprim/Sulfamethoxazole: S <=0.5/9.5  Organism: Klebsiella pneumoniae ESBL (04-15-23 @ 16:47)      Method Type: MICAELA      -  Amikacin: S <=16      -  Amoxicillin/Clavulanic Acid: S <=8/4      -  Ampicillin: R >16 These ampicillin results predict results for amoxicillin      -  Ampicillin/Sulbactam: R >16/8 Enterobacter, Klebsiella aerogenes, Citrobacter, and Serratia may develop resistance during prolonged therapy (3-4 days)      -  Aztreonam: R <=4      -  Cefazolin: R >16 Enterobacter, Klebsiella aerogenes, Citrobacter, and Serratia may develop resistance during prolonged therapy (3-4 days)      -  Cefepime: R >16      -  Ceftriaxone: R >32 Enterobacter, Klebsiella aerogenes, Citrobacter, and Serratia may develop resistance during prolonged therapy      -  Ciprofloxacin: I 0.5      -  Ertapenem: S <=0.5      -  Gentamicin: R >8      -  Imipenem: S <=1      -  Levofloxacin: S <=0.5      -  Meropenem: S <=1      -  Piperacillin/Tazobactam: R <=8      -  Tobramycin: S 4      -  Trimethoprim/Sulfamethoxazole: R >2/38  Organism: Morganella morganii (04-15-23 @ 16:47)      Method Type: MICAELA      -  Amikacin: S <=16      -  Amoxicillin/Clavulanic Acid: R >16/8      -  Ampicillin: R >16 These ampicillin results predict results for amoxicillin      -  Ampicillin/Sulbactam: S 8/4 Enterobacter, Klebsiella aerogenes, Citrobacter, and Serratia may develop resistance during prolonged therapy (3-4 days)      -  Aztreonam: S <=4      -  Cefazolin: R >16 Enterobacter, Klebsiella aerogenes, Citrobacter, and Serratia may develop resistance during prolonged therapy (3-4 days)      -  Cefepime: S <=2      -  Cefoxitin: S <=8      -  Ceftriaxone: S <=1 Enterobacter, Klebsiella aerogenes, Citrobacter, and Serratia may develop resistance during prolonged therapy      -  Ciprofloxacin: S <=0.25      -  Ertapenem: S <=0.5      -  Gentamicin: S <=2      -  Levofloxacin: S <=0.5      -  Meropenem: S <=1      -  Piperacillin/Tazobactam: S <=8      -  Tobramycin: S <=2      -  Trimethoprim/Sulfamethoxazole: S <=0.5/9.5    Culture - Body Fluid with Gram Stain (collected 04-13-23 @ 14:00)  Source: Peritoneal PERITONEAL FLUID #2  Gram Stain (04-14-23 @ 00:41):    No polymorphonuclear cells seen per low power field    Few Gram positive cocci in pairs per oil power field  Final Report (04-16-23 @ 22:25):    Rare Proteus vulgaris group    Few Escherichia coli    Few Klebsiella pneumoniae ESBL    Few Streptococcus constellatus    Few Bacteroides distasonis "Susceptibilities not performed"  Organism: Proteus vulgaris group  Escherichia coli  Klebsiella pneumoniae ESBL  Streptococcus constellatus (04-16-23 @ 22:25)  Organism: Streptococcus constellatus (04-16-23 @ 22:25)      Method Type: MICAELA      -  Ceftriaxone: S <=0.25      -  Clindamycin: R >0.5      -  Erythromycin: R >0.5      -  Levofloxacin: S <=0.25      -  Penicillin: S <=0.03  Organism: Klebsiella pneumoniae ESBL (04-16-23 @ 22:25)      Method Type: MICAELA      -  Amikacin: S <=16      -  Amoxicillin/Clavulanic Acid: S <=8/4      -  Ampicillin: R >16 These ampicillin results predict results for amoxicillin      -  Ampicillin/Sulbactam: R >16/8 Enterobacter, Klebsiella aerogenes, Citrobacter, and Serratia may develop resistance during prolonged therapy (3-4 days)      -  Aztreonam: R <=4      -  Cefazolin: R >16 Enterobacter, Klebsiella aerogenes, Citrobacter, and Serratia may develop resistance during prolonged therapy (3-4 days)      -  Cefepime: R >16      -  Ceftriaxone: R >32 Enterobacter, Klebsiella aerogenes, Citrobacter, and Serratia may develop resistance during prolonged therapy      -  Ciprofloxacin: I 0.5      -  Ertapenem: S <=0.5      -  Gentamicin: R >8      -  Imipenem: S <=1      -  Levofloxacin: S <=0.5      -  Meropenem: S <=1      -  Piperacillin/Tazobactam: R <=8      -  Tobramycin: I 8      -  Trimethoprim/Sulfamethoxazole: R >2/38  Organism: Escherichia coli (04-16-23 @ 22:25)      Method Type: MICAELA      -  Amikacin: S <=16      -  Amoxicillin/Clavulanic Acid: S <=8/4      -  Ampicillin: S <=8 These ampicillin results predict results for amoxicillin      -  Ampicillin/Sulbactam: S <=4/2 Enterobacter, Klebsiella aerogenes, Citrobacter, and Serratia may develop resistance during prolonged therapy (3-4 days)      -  Aztreonam: S <=4      -  Cefazolin: S <=2 Enterobacter, Klebsiella aerogenes, Citrobacter, and Serratia may develop resistance during prolonged therapy (3-4 days)      -  Cefepime: S <=2      -  Cefoxitin: S <=8      -  Ceftriaxone: S <=1 Enterobacter, Klebsiella aerogenes, Citrobacter, and Serratia may develop resistance during prolonged therapy      -  Ciprofloxacin: S <=0.25      -  Ertapenem: S <=0.5      -  Gentamicin: S <=2      -  Imipenem: S <=1      -  Levofloxacin: S <=0.5      -  Meropenem: S <=1      -  Piperacillin/Tazobactam: S <=8      -  Tobramycin: S <=2      -  Trimethoprim/Sulfamethoxazole: S <=0.5/9.5  Organism: Proteus vulgaris group (04-16-23 @ 22:25)      Method Type: MICAELA      -  Amikacin: S <=16      -  Amoxicillin/Clavulanic Acid: S <=8/4      -  Ampicillin: R >16 These ampicillin results predict results for amoxicillin      -  Ampicillin/Sulbactam: S <=4/2 Enterobacter, Klebsiella aerogenes, Citrobacter, and Serratia may develop resistance during prolonged therapy (3-4 days)      -  Aztreonam: S <=4      -  Cefazolin: R >16 Enterobacter, Klebsiella aerogenes, Citrobacter, and Serratia may develop resistance during prolonged therapy (3-4 days)      -  Cefepime: S <=2      -  Cefoxitin: S <=8      -  Ceftriaxone: S <=1 Enterobacter, Klebsiella aerogenes, Citrobacter, and Serratia may develop resistance during prolonged therapy      -  Ciprofloxacin: S <=0.25      -  Ertapenem: S <=0.5      -  Gentamicin: S <=2      -  Levofloxacin: S <=0.5      -  Meropenem: S <=1      -  Piperacillin/Tazobactam: S <=8      -  Tobramycin: S <=2      -  Trimethoprim/Sulfamethoxazole: S <=0.5/9.5        RADIOLOGY & ADDITIONAL TESTS:    Personally reviewed.     Consultant(s) Notes Reviewed:  [x] YES  [ ] NO     Patient is a 72y old  Female who presents with a chief complaint of generalized abdominal pain and persistent diarrhea.      Subjective:  INTERVAL HPI/OVERNIGHT EVENTS: Patient seen and examined at bedside. Failed CPAP trial again today. Patient on ventilator, opens eyes to voice. Appears uncomfortable.    MEDICATIONS  (STANDING):  chlorhexidine 0.12% Liquid 15 milliLiter(s) Oral Mucosa every 12 hours  chlorhexidine 4% Liquid 1 Application(s) Topical <User Schedule>  dexMEDEtomidine Infusion 0.5 MICROgram(s)/kG/Hr (9.1 mL/Hr) IV Continuous <Continuous>  heparin   Injectable 5000 Unit(s) SubCutaneous every 8 hours  levothyroxine Injectable 93.75 MICROGram(s) IV Push at bedtime  meropenem  IVPB 500 milliGRAM(s) IV Intermittent every 12 hours  norepinephrine Infusion 0.05 MICROgram(s)/kG/Min (6.83 mL/Hr) IV Continuous <Continuous>  pantoprazole  Injectable 40 milliGRAM(s) IV Push daily  vancomycin    Solution 125 milliGRAM(s) Oral daily    MEDICATIONS  (PRN):  albuterol    90 MICROgram(s) HFA Inhaler 4 Puff(s) Inhalation every 6 hours PRN Shortness of Breath and/or Wheezing  fentaNYL    Injectable 50 MICROGram(s) IV Push every 4 hours PRN Severe Pain (7 - 10)  sodium chloride 0.9% lock flush 10 milliLiter(s) IV Push every 1 hour PRN Pre/post blood products, medications, blood draw, and to maintain line patency      Allergies    No Known Allergies    Intolerances        REVIEW OF SYSTEMS:  Unable to obtain as pt is sedated/intubated.    Objective:  Vital Signs Last 24 Hrs  T(C): 36.7 (18 Apr 2023 08:15), Max: 37.9 (17 Apr 2023 11:28)  T(F): 98 (18 Apr 2023 08:15), Max: 100.3 (17 Apr 2023 11:28)  HR: 75 (18 Apr 2023 10:00) (66 - 116)  BP: --  BP(mean): --  RR: 26 (18 Apr 2023 10:00) (20 - 38)  SpO2: 100% (18 Apr 2023 10:00) (97% - 100%)      GENERAL: ill-appearing, intubated on ventilator, opening eyes  HEENT: dry mucous membranes, anicteric  CHEST/LUNG:  b/l rhonchi  HEART: irregular at 80bpm, S1, S2  ABDOMEN:  Ostomy w/ output. midline abdominal surgical wound w/ bandage c/d/i; TAMERA drain noted with serosanguinous fluid, nondistended, no apparent tenderness   EXTREMITIES: edema in 4 extremities, no cyanosis, or apparent calf tenderness  NERVOUS SYSTEM: sedated, opening eyes to voice but cannot answer questions or follow commands    LABS:                        7.6    18.60 )-----------( 159      ( 18 Apr 2023 05:43 )             25.4     CBC Full  -  ( 18 Apr 2023 05:43 )  WBC Count : 18.60 K/uL  Hemoglobin : 7.6 g/dL  Hematocrit : 25.4 %  Platelet Count - Automated : 159 K/uL  Mean Cell Volume : 88.8 fl  Mean Cell Hemoglobin : 26.6 pg  Mean Cell Hemoglobin Concentration : 29.9 gm/dL  Auto Neutrophil # : x  Auto Lymphocyte # : x  Auto Monocyte # : x  Auto Eosinophil # : x  Auto Basophil # : x  Auto Neutrophil % : x  Auto Lymphocyte % : x  Auto Monocyte % : x  Auto Eosinophil % : x  Auto Basophil % : x    18 Apr 2023 05:43    143    |  108    |  87     ----------------------------<  108    3.9     |  24     |  5.00     Ca    7.7        18 Apr 2023 05:43  Phos  4.2       18 Apr 2023 05:43  Mg     2.1       18 Apr 2023 05:43    TPro  4.6    /  Alb  1.8    /  TBili  0.8    /  DBili  x      /  AST  45     /  ALT  63     /  AlkPhos  82     18 Apr 2023 05:43        CAPILLARY BLOOD GLUCOSE      POCT Blood Glucose.: 110 mg/dL (18 Apr 2023 05:50)  POCT Blood Glucose.: 118 mg/dL (18 Apr 2023 00:30)  POCT Blood Glucose.: 109 mg/dL (17 Apr 2023 17:53)  POCT Blood Glucose.: 106 mg/dL (17 Apr 2023 11:23)        Culture - Blood (collected 04-13-23 @ 19:30)  Source: .Blood Blood-Peripheral  Preliminary Report (04-15-23 @ 01:02):    No growth to date.    Culture - Blood (collected 04-13-23 @ 19:25)  Source: .Blood Blood-Peripheral  Preliminary Report (04-15-23 @ 01:02):    No growth to date.    Culture - Body Fluid with Gram Stain (collected 04-13-23 @ 14:00)  Source: Peritoneal PERITONEAL FLUID #1  Gram Stain (04-13-23 @ 22:55):    Numerous polymorphonuclear leukocytes per low power field    Few Gram Variable Rods per oil power field  Final Report (04-15-23 @ 16:47):    Rare Morganella morganii    Few Klebsiella pneumoniae ESBL    Few Escherichia coli    Few Streptococcus constellatus See previous culture [id number]    Few Bacteroides distasonis "Susceptibilities not performed"  Organism: Morganella morganii  Klebsiella pneumoniae ESBL  Escherichia coli (04-15-23 @ 16:47)  Organism: Escherichia coli (04-15-23 @ 16:47)      Method Type: MICAELA      -  Amikacin: S <=16      -  Amoxicillin/Clavulanic Acid: S <=8/4      -  Ampicillin: S <=8 These ampicillin results predict results for amoxicillin      -  Ampicillin/Sulbactam: S <=4/2 Enterobacter, Klebsiella aerogenes, Citrobacter, and Serratia may develop resistance during prolonged therapy (3-4 days)      -  Aztreonam: S <=4      -  Cefazolin: S <=2 Enterobacter, Klebsiella aerogenes, Citrobacter, and Serratia may develop resistance during prolonged therapy (3-4 days)      -  Cefepime: S <=2      -  Cefoxitin: S <=8      -  Ceftriaxone: S <=1 Enterobacter, Klebsiella aerogenes, Citrobacter, and Serratia may develop resistance during prolonged therapy      -  Ciprofloxacin: S <=0.25      -  Ertapenem: S <=0.5      -  Gentamicin: S <=2      -  Imipenem: S <=1      -  Levofloxacin: S <=0.5      -  Meropenem: S <=1      -  Piperacillin/Tazobactam: S <=8      -  Tobramycin: S <=2      -  Trimethoprim/Sulfamethoxazole: S <=0.5/9.5  Organism: Klebsiella pneumoniae ESBL (04-15-23 @ 16:47)      Method Type: MICAELA      -  Amikacin: S <=16      -  Amoxicillin/Clavulanic Acid: S <=8/4      -  Ampicillin: R >16 These ampicillin results predict results for amoxicillin      -  Ampicillin/Sulbactam: R >16/8 Enterobacter, Klebsiella aerogenes, Citrobacter, and Serratia may develop resistance during prolonged therapy (3-4 days)      -  Aztreonam: R <=4      -  Cefazolin: R >16 Enterobacter, Klebsiella aerogenes, Citrobacter, and Serratia may develop resistance during prolonged therapy (3-4 days)      -  Cefepime: R >16      -  Ceftriaxone: R >32 Enterobacter, Klebsiella aerogenes, Citrobacter, and Serratia may develop resistance during prolonged therapy      -  Ciprofloxacin: I 0.5      -  Ertapenem: S <=0.5      -  Gentamicin: R >8      -  Imipenem: S <=1      -  Levofloxacin: S <=0.5      -  Meropenem: S <=1      -  Piperacillin/Tazobactam: R <=8      -  Tobramycin: S 4      -  Trimethoprim/Sulfamethoxazole: R >2/38  Organism: Morganella morganii (04-15-23 @ 16:47)      Method Type: MICAELA      -  Amikacin: S <=16      -  Amoxicillin/Clavulanic Acid: R >16/8      -  Ampicillin: R >16 These ampicillin results predict results for amoxicillin      -  Ampicillin/Sulbactam: S 8/4 Enterobacter, Klebsiella aerogenes, Citrobacter, and Serratia may develop resistance during prolonged therapy (3-4 days)      -  Aztreonam: S <=4      -  Cefazolin: R >16 Enterobacter, Klebsiella aerogenes, Citrobacter, and Serratia may develop resistance during prolonged therapy (3-4 days)      -  Cefepime: S <=2      -  Cefoxitin: S <=8      -  Ceftriaxone: S <=1 Enterobacter, Klebsiella aerogenes, Citrobacter, and Serratia may develop resistance during prolonged therapy      -  Ciprofloxacin: S <=0.25      -  Ertapenem: S <=0.5      -  Gentamicin: S <=2      -  Levofloxacin: S <=0.5      -  Meropenem: S <=1      -  Piperacillin/Tazobactam: S <=8      -  Tobramycin: S <=2      -  Trimethoprim/Sulfamethoxazole: S <=0.5/9.5    Culture - Body Fluid with Gram Stain (collected 04-13-23 @ 14:00)  Source: Peritoneal PERITONEAL FLUID #2  Gram Stain (04-14-23 @ 00:41):    No polymorphonuclear cells seen per low power field    Few Gram positive cocci in pairs per oil power field  Final Report (04-16-23 @ 22:25):    Rare Proteus vulgaris group    Few Escherichia coli    Few Klebsiella pneumoniae ESBL    Few Streptococcus constellatus    Few Bacteroides distasonis "Susceptibilities not performed"  Organism: Proteus vulgaris group  Escherichia coli  Klebsiella pneumoniae ESBL  Streptococcus constellatus (04-16-23 @ 22:25)  Organism: Streptococcus constellatus (04-16-23 @ 22:25)      Method Type: MICAELA      -  Ceftriaxone: S <=0.25      -  Clindamycin: R >0.5      -  Erythromycin: R >0.5      -  Levofloxacin: S <=0.25      -  Penicillin: S <=0.03  Organism: Klebsiella pneumoniae ESBL (04-16-23 @ 22:25)      Method Type: MICAELA      -  Amikacin: S <=16      -  Amoxicillin/Clavulanic Acid: S <=8/4      -  Ampicillin: R >16 These ampicillin results predict results for amoxicillin      -  Ampicillin/Sulbactam: R >16/8 Enterobacter, Klebsiella aerogenes, Citrobacter, and Serratia may develop resistance during prolonged therapy (3-4 days)      -  Aztreonam: R <=4      -  Cefazolin: R >16 Enterobacter, Klebsiella aerogenes, Citrobacter, and Serratia may develop resistance during prolonged therapy (3-4 days)      -  Cefepime: R >16      -  Ceftriaxone: R >32 Enterobacter, Klebsiella aerogenes, Citrobacter, and Serratia may develop resistance during prolonged therapy      -  Ciprofloxacin: I 0.5      -  Ertapenem: S <=0.5      -  Gentamicin: R >8      -  Imipenem: S <=1      -  Levofloxacin: S <=0.5      -  Meropenem: S <=1      -  Piperacillin/Tazobactam: R <=8      -  Tobramycin: I 8      -  Trimethoprim/Sulfamethoxazole: R >2/38  Organism: Escherichia coli (04-16-23 @ 22:25)      Method Type: MICAELA      -  Amikacin: S <=16      -  Amoxicillin/Clavulanic Acid: S <=8/4      -  Ampicillin: S <=8 These ampicillin results predict results for amoxicillin      -  Ampicillin/Sulbactam: S <=4/2 Enterobacter, Klebsiella aerogenes, Citrobacter, and Serratia may develop resistance during prolonged therapy (3-4 days)      -  Aztreonam: S <=4      -  Cefazolin: S <=2 Enterobacter, Klebsiella aerogenes, Citrobacter, and Serratia may develop resistance during prolonged therapy (3-4 days)      -  Cefepime: S <=2      -  Cefoxitin: S <=8      -  Ceftriaxone: S <=1 Enterobacter, Klebsiella aerogenes, Citrobacter, and Serratia may develop resistance during prolonged therapy      -  Ciprofloxacin: S <=0.25      -  Ertapenem: S <=0.5      -  Gentamicin: S <=2      -  Imipenem: S <=1      -  Levofloxacin: S <=0.5      -  Meropenem: S <=1      -  Piperacillin/Tazobactam: S <=8      -  Tobramycin: S <=2      -  Trimethoprim/Sulfamethoxazole: S <=0.5/9.5  Organism: Proteus vulgaris group (04-16-23 @ 22:25)      Method Type: MICAELA      -  Amikacin: S <=16      -  Amoxicillin/Clavulanic Acid: S <=8/4      -  Ampicillin: R >16 These ampicillin results predict results for amoxicillin      -  Ampicillin/Sulbactam: S <=4/2 Enterobacter, Klebsiella aerogenes, Citrobacter, and Serratia may develop resistance during prolonged therapy (3-4 days)      -  Aztreonam: S <=4      -  Cefazolin: R >16 Enterobacter, Klebsiella aerogenes, Citrobacter, and Serratia may develop resistance during prolonged therapy (3-4 days)      -  Cefepime: S <=2      -  Cefoxitin: S <=8      -  Ceftriaxone: S <=1 Enterobacter, Klebsiella aerogenes, Citrobacter, and Serratia may develop resistance during prolonged therapy      -  Ciprofloxacin: S <=0.25      -  Ertapenem: S <=0.5      -  Gentamicin: S <=2      -  Levofloxacin: S <=0.5      -  Meropenem: S <=1      -  Piperacillin/Tazobactam: S <=8      -  Tobramycin: S <=2      -  Trimethoprim/Sulfamethoxazole: S <=0.5/9.5        RADIOLOGY & ADDITIONAL TESTS:    Personally reviewed.     Consultant(s) Notes Reviewed:  [x] YES  [ ] NO

## 2023-04-18 NOTE — PROGRESS NOTE ADULT - SUBJECTIVE AND OBJECTIVE BOX
[facility]  INFECTIOUS DISEASES - [address]ana Juarez, [place] [address]  Tel: [phone]     Fax: [phone]  =======================================================    HERMILA QUIÑONES [id number]    Follow up: Tmax of 99.9. On norepinephrine. Appears more awake, shook head no when asked if she had pain or SOB.    Allergies:  No Known Allergies      Antibiotics:  albuterol    90 MICROgram(s) HFA Inhaler 4 Puff(s) Inhalation every 6 hours PRN  chlorhexidine 0.12% Liquid 15 milliLiter(s) Oral Mucosa every 12 hours  chlorhexidine 4% Liquid 1 Application(s) Topical <User Schedule>  dexMEDEtomidine Infusion 0.5 MICROgram(s)/kG/Hr IV Continuous <Continuous>  fentaNYL    Injectable 50 MICROGram(s) IV Push every 4 hours PRN  heparin   Injectable 5000 Unit(s) SubCutaneous every 8 hours  levothyroxine Injectable 93.75 MICROGram(s) IV Push at bedtime  norepinephrine Infusion 0.05 MICROgram(s)/kG/Min IV Continuous <Continuous>  pantoprazole  Injectable 40 milliGRAM(s) IV Push daily  sodium chloride 0.9% lock flush 10 milliLiter(s) IV Push every 1 hour PRN  vancomycin    Solution 125 milliGRAM(s) Oral daily       REVIEW OF SYSTEMS:  unable to obtain, intubated     Physical Exam:  ICU Vital Signs Last 24 Hrs  T(C): 36.9 (18 Apr 2023 12:08), Max: 37.7 (17 Apr 2023 18:00)  T(F): 98.4 (18 Apr 2023 12:08), Max: 99.9 (17 Apr 2023 18:00)  HR: 96 (18 Apr 2023 14:10) (66 - 116)  BP: --  BP(mean): --  ABP: 110/46 (18 Apr 2023 14:10) (90/41 - 133/55)  ABP(mean): 73 (18 Apr 2023 14:10) (60 - 86)  RR: 30 (18 Apr 2023 14:10) (20 - 38)  SpO2: 100% (18 Apr 2023 14:10) (97% - 100%)       GEN: not in apparent distress  HEENT: normocephalic and atraumatic.   NECK: Supple.   LUNGS: No wheezes, crackles or rhonchi  HEART: Regular rate and rhythm   ABDOMEN: (+) ostomy, soft, nondistended  EXTREMITIES: bipedal edema  NEUROLOGIC: unable to assess, intubated and sedated    Labs:  04-18    143  |  108  |  87<H>  ----------------------------<  108<H>  3.9   |  24  |  5.00<H>    Ca    7.7<L>      18 Apr 2023 05:43  Phos  4.2     04-18  Mg     2.1     04-18    TPro  4.6<L>  /  Alb  1.8<L>  /  TBili  0.8  /  DBili  x   /  AST  45<H>  /  ALT  63  /  AlkPhos  82  04-18                          7.6    18.60 )-----------( 159      ( 18 Apr 2023 05:43 )             25.4         LIVER FUNCTIONS - ( 18 Apr 2023 05:43 )  Alb: 1.8 g/dL / Pro: 4.6 g/dL / ALK PHOS: 82 U/L / ALT: 63 U/L / AST: 45 U/L / GGT: x             RECENT CULTURES:  04-13 @ 19:30 .Blood Blood-Peripheral     No growth to date.        04-13 @ 19:25 .Blood Blood-Peripheral     No growth to date.        04-13 @ 14:00 Peritoneal PERITONEAL FLUID #2 Proteus vulgaris group  Escherichia coli  Klebsiella pneumoniae ESBL  Streptococcus constellatus    Rare Proteus vulgaris group  Few Escherichia coli  Few Klebsiella pneumoniae ESBL  Few Streptococcus constellatus  Few Bacteroides distasonis "Susceptibilities not performed"    No polymorphonuclear cells seen per low power field  Few Gram positive cocci in pairs per oil power field      04-13 @ 06:40          NotDetec        All imaging and data are reviewed.

## 2023-04-18 NOTE — PROGRESS NOTE ADULT - TIME BILLING
Pt seen + examined. Case discussed with resident. Agree with assessment and plan above (edited by me in detail):  Time spent: 50min on critically ill pt with: severe sepsis and peritonitis due to perforated sigmoid colon, also with LAYA due to ATN, shock liver, and post/op acute respiratory failure requiring mechanical ventilation, and acute blood loss anemia.    71yo F with PMH of afib (off xarelto at least 1wk PTA), HFpEF, asthma, recent C diff in Jan presenting with abd pain, weakness and falls at home a/w severe sepsis and peritonitis due to perforated sigmoid colon, also with LAYA due to ATN, shock liver, and post/op acute respiratory failure requiring mechanical ventilation, and acute blood loss anemia.    Abd pain + persistent diarrhea for several months, multiple episodes a day w/ progressive worsening; not relieved by pain meds, a/w n/v  CT A/P - Pneumoperitoneum and no ascites. Suspect perforated sigmoid colon.  septic shock due to peritonitis and perforated diverticulitis  s/p Tia procedure and abdominal abscess drainage on 4/13  lactic acidosis resolved  On levophed (weaned off dual pressor)  TTE (04/2023) - LVEF of 55-60%; LAE; Sclerotic trileaflet aortic valve, trace AI. Mild to moderate MR and TR. IVC measures 2.0 cm and is non collapsing  BCx - 4/13 no growth   Peritoneal cultures poly microbial - Proteus vulgaris group, Escherichia coli, Klebsiella pneumoniae, Morganella morganii   Peritoneal fluid with gram variable rods and PMNs.  CDiff negative, GI PCR neg  s/p Zosyn and Flagyl   continue meropenem  on vancomycin oral solution for C diff prophylaxis  ID following, recs appreciated  Shock liver, improving LFTs ; ischemic ATN  Mgmt per ICU    LAYA likely ATN due to septic shock  Cr worsened to 5.0  urine output starting to improve to ~50cc/hr now  Correct electrolyte derangement  S/p bicarb gtt  Mgmt per ICU  consider nephro eval

## 2023-04-18 NOTE — PROGRESS NOTE ADULT - CRITICAL CARE ATTENDING COMMENT
Upon my evaluation, this patient is at high risk for imminent or life threatening deterioration due to resp failure, shock  and other active medical issues which require my direct attention, intervention, and personal management.  I have personally spent >30 minutes  of critical care time exclusive of time spent on separate billing procedures. This includes review of laboratory data, radiology results, discussion with primary team\patient, and monitoring for potential decompensation. Interventions were performed as documented above.
Pt seen and examined at bedside with resident physician. Routine vitals, labs, imaging, and consultant recommendations were all assessed. Medical plan as documented.

## 2023-04-18 NOTE — PROGRESS NOTE ADULT - ASSESSMENT
72-year-old female with history of hypertension, CHF, afib s/p AF ablation (2/7/19) currently not on xarelto, CATH 2018, hypothyroidism, HLD history of C. difficile January 2023 presents for vomitting and abdominal pain. Consulted for CHF.    - CT ABD/Pelvis: Pneumoperitoneum and no ascites. Suspect perforated sigmoid colon. Stable splenomegaly. Stable right lung nodule, groundglass infiltrates and loculated right pleural effusion.  - S/p Tia's with abdominal abscess drainage.    OR finding with >1.2 liters of pus from abd cavity.   - cont abx    - Cont on mechanical ventilatory support. Wean as tolerated.  - Cont pressor support to maintain MAP at least >60. Titrate off as blood pressure tolerates.   - hold lasix for now due to hypotension, monitor closely as PASP 66mmHg    - hold home metoprolol 50mg qd due to hypotension, can consider restarting if BP stabilizes    - Elevated troponin peaked at 100.9, otherwise cardiac enzymes unremarkable, likely demand in the setting of sepsis  - EKG: Sinus tachycardia with PAC's  - No acute changes on EKG compared to previous.   - Prior TTE (1/18/23): normal LVEF 65%, normal RV size and function, biatrial enlargement, sclerotic aortic valve, mild AI, Moderate MR and TR  - TTE shows EF 55-60%, RVE, PASP 66     - hx of PAF s/p PVI  - maintaining SR  - not on AC at home  - cont tele    - Monitor and replete lytes, keep K>4, Mg>2.  - Other cardiovascular workup will depend on clinical course.  - Will continue to follow.

## 2023-04-18 NOTE — PROGRESS NOTE ADULT - ASSESSMENT
73 y/o f w/ PMH of HTN, CHF, asthma, hypothyroidism, Afib, splenomegaly, myelofibrosis, history of C. difficile 1/13/23, who p/w generalized abdominal pain, and persistent diarrhea. Found to have sepsis 2/2 perforated viscus from sigmoid colon perforation.    S/p Tia procedure and abdominal abscess drainage on 4/13. Peritoneal fluid cultures grew ESBL klebsiella, strep constellatus, proteus vulgaris, E coli, bacteroides. Blood cultures no growth. C diff negative. Had low grade temp today, but transaminitis better. Has persistent leukocytosis, but appears to be chronic and is being seen by Heme Onc outpatient.    -continue meropenem  -continue vancomycin 125mg PO daily for c diff ppx  -suggest blood and respiratory cultures if febrile  -monitor WBC  -discussed with ICU team    Diandra Ricardo MD  Division of Infectious Diseases   Cell [phone] between 8am and 6pm   After 6pm and weekends please call ID service at [phone].

## 2023-04-18 NOTE — PROGRESS NOTE ADULT - SUBJECTIVE AND OBJECTIVE BOX
[facility] -- Nils Johnson, Adrián Stiles Pannella, Patel, Savella  Office # [phone]      Follow Up:  resp failure    Subjective/Observations: Patient is seen and examined. Currently in ICU. Pt is intubated and sedated. Maintained on pressor support. Pt is unable to provide any meaningful information.       REVIEW OF SYSTEMS: Limited secondary to comorbidities.     PAST MEDICAL & SURGICAL HISTORY:  Hypothyroidism      HLD (hyperlipidemia)      Thrombocytosis      Persistent atrial fibrillation      Obesity (BMI 35.0-39.9 without comorbidity)      Asthma      Diabetes mellitus      Ankle injury  on 2004, 5 surgeries.      Cholecystitis          MEDICATIONS  (STANDING):  buMETAnide Injectable 2 milliGRAM(s) IV Push once  chlorhexidine 0.12% Liquid 15 milliLiter(s) Oral Mucosa every 12 hours  chlorhexidine 4% Liquid 1 Application(s) Topical <User Schedule>  dexMEDEtomidine Infusion 0.5 MICROgram(s)/kG/Hr (9.1 mL/Hr) IV Continuous <Continuous>  heparin   Injectable 5000 Unit(s) SubCutaneous every 8 hours  levothyroxine Injectable 93.75 MICROGram(s) IV Push at bedtime  norepinephrine Infusion 0.05 MICROgram(s)/kG/Min (6.83 mL/Hr) IV Continuous <Continuous>  pantoprazole  Injectable 40 milliGRAM(s) IV Push daily  vancomycin    Solution 125 milliGRAM(s) Oral daily    MEDICATIONS  (PRN):  albuterol    90 MICROgram(s) HFA Inhaler 4 Puff(s) Inhalation every 6 hours PRN Shortness of Breath and/or Wheezing  fentaNYL    Injectable 50 MICROGram(s) IV Push every 4 hours PRN Severe Pain (7 - 10)  sodium chloride 0.9% lock flush 10 milliLiter(s) IV Push every 1 hour PRN Pre/post blood products, medications, blood draw, and to maintain line patency      Allergies    No Known Allergies    Intolerances            Vital Signs Last 24 Hrs  T(C): 36.9 (18 Apr 2023 12:08), Max: 37.9 (17 Apr 2023 15:40)  T(F): 98.4 (18 Apr 2023 12:08), Max: 100.2 (17 Apr 2023 15:40)  HR: 88 (18 Apr 2023 12:04) (66 - 116)  BP: --  BP(mean): --  RR: 30 (18 Apr 2023 12:00) (20 - 38)  SpO2: 100% (18 Apr 2023 12:04) (97% - 100%)        I&O's Summary    17 Apr 2023 07:01  -  18 Apr 2023 07:00  --------------------------------------------------------  IN: 2932.3 mL / OUT: 1600 mL / NET: 1332.3 mL    18 Apr 2023 07:01  -  18 Apr 2023 12:56  --------------------------------------------------------  IN: 28.6 mL / OUT: 195 mL / NET: -166.4 mL          PHYSICAL EXAM:  TELE:  AVELINO  Constitutional: intubated, NAD   HEENT: Moist Mucous Membranes, Anicteric ETT in place   Pulmonary: Decreased breath sounds bilaterally, no sig rales, rhonchi, wheeze appreciated   Cardiovascular: Regular, S1 and S2, No murmurs, rubs, gallops or clicks  Gastrointestinal: Bowel Sounds present, soft, nontender.   Lymph: No peripheral edema. No lymphadenopathy.  Skin: No visible rashes or ulcers.  Psych:  unable to fully assess     LABS: All Labs Reviewed:                        7.6    18.60 )-----------( 159      ( 18 Apr 2023 05:43 )             25.4                         8.1    16.02 )-----------( 114      ( 17 Apr 2023 05:40 )             26.6                         8.0    18.41 )-----------( 96       ( 16 Apr 2023 06:05 )             26.5     18 Apr 2023 05:43    143    |  108    |  87     ----------------------------<  108    3.9     |  24     |  5.00   17 Apr 2023 05:40    142    |  107    |  82     ----------------------------<  96     3.7     |  25     |  4.80   16 Apr 2023 06:05    142    |  107    |  77     ----------------------------<  97     3.5     |  28     |  4.70     Ca    7.7        18 Apr 2023 05:43  Ca    7.4        17 Apr 2023 05:40  Ca    7.3        16 Apr 2023 06:05  Phos  4.2       18 Apr 2023 05:43  Mg     2.1       18 Apr 2023 05:43    TPro  4.6    /  Alb  1.8    /  TBili  0.8    /  DBili  x      /  AST  45     /  ALT  63     /  AlkPhos  82     18 Apr 2023 05:43  TPro  4.4    /  Alb  1.6    /  TBili  0.7    /  DBili  x      /  AST  85     /  ALT  97     /  AlkPhos  77     17 Apr 2023 05:40  TPro  4.5    /  Alb  1.7    /  TBili  0.8    /  DBili  x      /  AST  178    /  ALT  140    /  AlkPhos  84     16 Apr 2023 06:05

## 2023-04-18 NOTE — PROGRESS NOTE ADULT - PROBLEM SELECTOR PLAN 1
Abd pain + persistent diarrhea for several months, multiple episodes a day w/ progressive worsening; not relieved by pain meds, a/w n/v  CT A/P - Pneumoperitoneum and no ascites. Suspect perforated sigmoid colon.  s/p Tia procedure and abdominal abscess drainage on 4/13  lactic acidosis resolved  On levophed (weaned off dual pressor)  TTE (04/2023) - LVEF of 55-60%; LAE; Sclerotic trileaflet aortic valve, trace AI. Mild to moderate MR and TR. IVC measures 2.0 cm and is non collapsing  BCx - 4/13 no growth   Peritoneal cultures poly microbial - Proteus vulgaris group, Escherichia coli, Klebsiella pneumoniae, Morganella morganii   Peritoneal fluid with gram variable rods and PMNs.  - CDiff negative, GI PCR neg  s/p Zosyn and Flagyl   continue meropenem and vancomycin  ID following, recs appreciated  Shock liver, improving LFTs ; ischemic ATN  Mgmt per ICU Abd pain + persistent diarrhea for several months, multiple episodes a day w/ progressive worsening; not relieved by pain meds, a/w n/v  CT A/P - Pneumoperitoneum and no ascites. Suspect perforated sigmoid colon.  s/p Tia procedure and abdominal abscess drainage on 4/13  lactic acidosis resolved  On levophed (weaned off dual pressor)  TTE (04/2023) - LVEF of 55-60%; LAE; Sclerotic trileaflet aortic valve, trace AI. Mild to moderate MR and TR. IVC measures 2.0 cm and is non collapsing  BCx - 4/13 no growth   Peritoneal cultures poly microbial - Proteus vulgaris group, Escherichia coli, Klebsiella pneumoniae, Morganella morganii   Peritoneal fluid with gram variable rods and PMNs.  - CDiff negative, GI PCR neg  s/p Zosyn and Flagyl   continue meropenem  started vancomycin oral solution of C diff prophylaxis  ID following, recs appreciated  Shock liver, improving LFTs ; ischemic ATN  Mgmt per ICU Abd pain + persistent diarrhea for several months, multiple episodes a day w/ progressive worsening; not relieved by pain meds, a/w n/v  CT A/P - Pneumoperitoneum and no ascites. Suspect perforated sigmoid colon.  septic shock due to peritonitis and perforated diverticulitis  s/p Tia procedure and abdominal abscess drainage on 4/13  lactic acidosis resolved  On levophed (weaned off dual pressor)  TTE (04/2023) - LVEF of 55-60%; LAE; Sclerotic trileaflet aortic valve, trace AI. Mild to moderate MR and TR. IVC measures 2.0 cm and is non collapsing  BCx - 4/13 no growth   Peritoneal cultures poly microbial - Proteus vulgaris group, Escherichia coli, Klebsiella pneumoniae, Morganella morganii   Peritoneal fluid with gram variable rods and PMNs.  CDiff negative, GI PCR neg  s/p Zosyn and Flagyl   continue meropenem  on vancomycin oral solution for C diff prophylaxis  ID following, recs appreciated  Shock liver, improving LFTs ; ischemic ATN  Mgmt per ICU

## 2023-04-18 NOTE — PHARMACOTHERAPY INTERVENTION NOTE - COMMENTS
Patient is on meropenem 500mg q12h for intra-abdominal infection. Discussed w/ ICU Dr. Miranda and recommended decreasing dose to 500mg q24h (eGFR <10). MD accepted and order was updated.  Patient is on meropenem 500mg q12h for intra-abdominal infection. Discussed w/ ICU Dr. Miranda and recommended decreasing dose to 500mg q24h based on hospital policy for eGFR <10.  (eGFR 9). MD accepted and order was updated.

## 2023-04-18 NOTE — PROGRESS NOTE ADULT - PROBLEM SELECTOR PLAN 2
Intubated AC 20/380/5/30  Failed spontaneous breathing trial  - precedex for sedation, wean  - attempt to extubate today  Continue to monitor and wean as tolerated per ICU protocol  Mgmt per ICU Intubated  20/380/5/30  Failed spontaneous breathing trial  - precedex for sedation, wean  -plan to attempt to extubate today  Continue to monitor and wean as tolerated per ICU protocol  Mgmt per ICU

## 2023-04-18 NOTE — PROGRESS NOTE ADULT - SUBJECTIVE AND OBJECTIVE BOX
Postoperative Day #: 5 S/p extended Tia's (including descending colon) for perforated sigmoid    Patient seen at bedside, sedated, intubated. Unable to obtain ROS.     T(F): 98 (04-18-23 @ 08:15), Max: 100.2 (04-17-23 @ 15:40)  HR: 71 (04-18-23 @ 11:00) (66 - 116)  BP: --  RR: 22 (04-18-23 @ 11:00) (20 - 38)  SpO2: 100% (04-18-23 @ 11:00) (97% - 100%)  Wt(kg): --  CAPILLARY BLOOD GLUCOSE      POCT Blood Glucose.: 128 mg/dL (18 Apr 2023 11:05)  POCT Blood Glucose.: 110 mg/dL (18 Apr 2023 05:50)  POCT Blood Glucose.: 118 mg/dL (18 Apr 2023 00:30)  POCT Blood Glucose.: 109 mg/dL (17 Apr 2023 17:53)      PHYSICAL EXAM:  General: Intubated, sedated, responsive to voice  Neuro:  Moving extremities spontaneously   HEENT: NCAT  CV: RR  Lung: NWOB  Abdomen: soft, non-distended, midline incision with packing in place, serous staining.   Extremities: BL pitting edema to calves   : schroeder in place draining clear yellow urine.     LABS:                        7.6    18.60 )-----------( 159      ( 18 Apr 2023 05:43 )             25.4     04-18    143  |  108  |  87<H>  ----------------------------<  108<H>  3.9   |  24  |  5.00<H>    Ca    7.7<L>      18 Apr 2023 05:43  Phos  4.2     04-18  Mg     2.1     04-18    TPro  4.6<L>  /  Alb  1.8<L>  /  TBili  0.8  /  DBili  x   /  AST  45<H>  /  ALT  63  /  AlkPhos  82  04-18      I&O's Detail    17 Apr 2023 07:01  -  18 Apr 2023 07:00  --------------------------------------------------------  IN:    Albumin 5%  - 250 mL: 50 mL    Dexmedetomidine: 228.7 mL    IV PiggyBack: 50 mL    IV PiggyBack: 100 mL    Lactated Ringers: 2400 mL    Norepinephrine: 103.6 mL  Total IN: 2932.3 mL    OUT:    Bulb (mL): 60 mL    Colostomy (mL): 90 mL    Indwelling Catheter - Urethral (mL): 750 mL    Nasogastric/Oral tube (mL): 700 mL  Total OUT: 1600 mL    Total NET: 1332.3 mL      18 Apr 2023 07:01  -  18 Apr 2023 11:45  --------------------------------------------------------  IN:    Dexmedetomidine: 6.3 mL    Norepinephrine: 16.4 mL  Total IN: 22.7 mL    OUT:    Indwelling Catheter - Urethral (mL): 60 mL  Total OUT: 60 mL    Total NET: -37.3 mL          Impression: 72y Female found to have perforated sigmoid colon now POD 5 S/p extended Tia's (including descending colon). Patient is currently in the ICU intubated and sedated on minimal pressors, being treated with IV antibiotics. Pt vs wnl afebrile, exam with ostomy producing stool, UOP increasing. Midline wound with packing in place. Cr still increasing 5 today, WBC elevated at 18.        Plan:  -SBT/SWT  -Cont IV antibiotics  -Midline wound packing change via surgery   -continue VTE prophylaxis   -PT  -Planning for TF to start Friday   -continue local wound care  -appreciate ID recs  -Discussed w/ Dr. Goldberg

## 2023-04-18 NOTE — PROGRESS NOTE ADULT - PROBLEM SELECTOR PLAN 4
LAYA  likely ATN due to septic shock  WORSENING  Cr worsened to 4.8  Correct electrolyte derangement  S/p bicarb gtt  Mgmt per ICU  consider nephro eval LAYA  likely ATN due to septic shock  WORSENING  Cr worsened to 5.0  - still making urine output  Correct electrolyte derangement  S/p bicarb gtt  Mgmt per ICU  consider nephro eval LAYA likely ATN due to septic shock  Cr worsened to 5.0  urine output starting to improve to ~50cc/hr now  Correct electrolyte derangement  S/p bicarb gtt  Mgmt per ICU  consider nephro eval

## 2023-04-18 NOTE — PROGRESS NOTE ADULT - ASSESSMENT
72F with pmhx afib s/p ablation, HFpEF, mod MR, thrombocytosis, asthma recent C diff infection was brought to the ED today for recurrent falls and persistent nausea/vomiting, now with pneumoperitenum and perforation; Day 4 s/p Ro.       Neuro: Continuing to wean precedex at this time. Arousable and responding to commands. Will attempt extubation today if patient tolerates     CV: Shock likely 2/2 sepsis, on Levo 0.03, continue to wean as tolerated     Pulm: Intubated AC 20/380/5/30%; Will attempt SBT and extubate if tolerates     GI: day # 4 s/p Ro. Continue protonix, meropenam; start PO Vancomycin. Ostomy c/d/i w/ minimal output.    Renal: LAYA 2/2 ATN; Cr 4.8 this AM. continue with LR. Will give 25% albumin and 2mg Bumex to attempt to stimulate urine production     ID: Cont meropenam 2/2 ESBL. Start PO vancomycin 125mg per day for Cdiff prophylaxis    Endo: Continue IV synthroid at 75% of home dose; monitor glucose w/ fingersticks.     Heme: Start HSQ TID dvt ppx    72F with pmhx afib s/p ablation, HFpEF, mod MR, thrombocytosis, asthma recent C diff infection was brought to the ED today for recurrent falls and persistent nausea/vomiting, now with pneumoperitenum and perforation; Day 5 s/p Ro.       Neuro: Continuing to wean precedex at this time, recieving 0.1mcg/kg this AM. Arousable and responding to commands, moving all extremities.     CV: Shock likely 2/2 sepsis, on Levo 0.03, continue to wean as tolerated. D/c IVF as patient is grossly net positive intake/output for hospital course.     Pulm: Intubated AC 20/380/5/30%; attempted CPAP trial on 12/5 but patient developed rapid shallow breathing. Will cont w/ SBT     GI: day # 5 s/p Ro. Continue protonix, meropenam, PO Vancomycin. Ostomy c/d/i w/ bilious output. Will start Nepro tube feeds today at 10cc/hr and increase as tolerated.     Renal: LAYA 2/2 ATN; Cr 5.0 this AM. continue with LR. Will give 25% albumin and 2mg Bumex again today to attempt to further stimulate urine production - patient has been making approximately 40cc/hr.     ID: Cont meropenam 2/2 ESBL and PO vancomycin 125mg per day for Cdiff prophylaxis    Endo: Continue IV synthroid at 75% of home dose; monitor glucose w/ fingersticks.     Heme: Continue HSQ TID dvt ppx

## 2023-04-18 NOTE — PROGRESS NOTE ADULT - SUBJECTIVE AND OBJECTIVE BOX
****** CHARTING IN PROGRESS *******    INTERVAL HPI/OVERNIGHT EVENTS:    SUBJECTIVE: Patient seen and examined at bedside.     ROS:  CV: Denies chest pain  Resp: Denies SOB  GI: Denies abdominal pain, constipation, diarrhea, nausea, vomiting  : Denies dysuria  ID: Denies fevers, chills  MSK: Denies joint pain     OBJECTIVE:    VITAL SIGNS:  ICU Vital Signs Last 24 Hrs  T(C): 36.7 (18 Apr 2023 08:15), Max: 37.9 (17 Apr 2023 11:28)  T(F): 98 (18 Apr 2023 08:15), Max: 100.3 (17 Apr 2023 11:28)  HR: 75 (18 Apr 2023 09:00) (66 - 116)  BP: --  BP(mean): --  ABP: 106/46 (18 Apr 2023 09:00) (90/41 - 133/55)  ABP(mean): 69 (18 Apr 2023 09:00) (60 - 86)  RR: 21 (18 Apr 2023 09:00) (20 - 38)  SpO2: 100% (18 Apr 2023 09:00) (97% - 100%)      Mode: AC/ CMV (Assist Control/ Continuous Mandatory Ventilation), RR (machine): 20, TV (machine): 380, FiO2: 30, PEEP: 5, ITime: 1, MAP: 14, PIP: 33    04-17 @ 07:01 - 04-18 @ 07:00  --------------------------------------------------------  IN: 2932.3 mL / OUT: 1600 mL / NET: 1332.3 mL    04-18 @ 07:01 - 04-18 @ 09:25  --------------------------------------------------------  IN: 10.9 mL / OUT: 15 mL / NET: -4.1 mL      CAPILLARY BLOOD GLUCOSE      POCT Blood Glucose.: 110 mg/dL (18 Apr 2023 05:50)      PHYSICAL EXAM:  General: NAD, comfortable  HEENT: NCAT, PERRL, clear conjunctiva, no scleral icterus  Neck: supple, no JVD  Respiratory: CTA b/l, no wheezing, rhonchi, rales  Cardiovascular: RRR, normal S1S2, no M/R/G  Abdomen: soft, NT/ND, bowel sounds in all four quadrants, no palpable masses  Extremities: WWP, no clubbing, cyanosis, or edema  Neurology: A&Ox3, nonfocal, sensation intact     MEDICATIONS:  MEDICATIONS  (STANDING):  chlorhexidine 0.12% Liquid 15 milliLiter(s) Oral Mucosa every 12 hours  chlorhexidine 4% Liquid 1 Application(s) Topical <User Schedule>  dexMEDEtomidine Infusion 0.5 MICROgram(s)/kG/Hr (9.1 mL/Hr) IV Continuous <Continuous>  heparin   Injectable 5000 Unit(s) SubCutaneous every 8 hours  levothyroxine Injectable 93.75 MICROGram(s) IV Push at bedtime  meropenem  IVPB 500 milliGRAM(s) IV Intermittent every 12 hours  norepinephrine Infusion 0.05 MICROgram(s)/kG/Min (6.83 mL/Hr) IV Continuous <Continuous>  pantoprazole  Injectable 40 milliGRAM(s) IV Push daily  vancomycin    Solution 125 milliGRAM(s) Oral daily    MEDICATIONS  (PRN):  albuterol    90 MICROgram(s) HFA Inhaler 4 Puff(s) Inhalation every 6 hours PRN Shortness of Breath and/or Wheezing  fentaNYL    Injectable 50 MICROGram(s) IV Push every 4 hours PRN Severe Pain (7 - 10)  sodium chloride 0.9% lock flush 10 milliLiter(s) IV Push every 1 hour PRN Pre/post blood products, medications, blood draw, and to maintain line patency      ALLERGIES:  Allergies    No Known Allergies    Intolerances        LABS:                        7.6    18.60 )-----------( 159      ( 18 Apr 2023 05:43 )             25.4     04-18    143  |  108  |  87<H>  ----------------------------<  108<H>  3.9   |  24  |  5.00<H>    Ca    7.7<L>      18 Apr 2023 05:43  Phos  4.2     04-18  Mg     2.1     04-18    TPro  4.6<L>  /  Alb  1.8<L>  /  TBili  0.8  /  DBili  x   /  AST  45<H>  /  ALT  63  /  AlkPhos  82  04-18          RADIOLOGY & ADDITIONAL TESTS: Reviewed. ***  BEDSIDE LUNG ULTRASOUND: ***  BEDSIDE ECHO: ***    CENTRAL LINE: N        DATE INSERTED:             REMOVE: N  BISHOP: Y                       DATE INSERTED:              REMOVE: Y/N  A-LINE: N                       DATE INSERTED:              REMOVE: Y/N      GLOBAL ISSUE/BEST PRACTICE  Analgesia: Y  Sedation: Y  HOB elevation: yes  Stress ulcer prophylaxis: Y  VTE prophylaxis: Y  Glycemic control: Y  Nutrition: Y    CODE STATUS: Full Code   INTERVAL HPI/OVERNIGHT EVENTS: No acute overnight events. Patient is significantly more responsive and arousable today; eyes open, following commmands. Day 5 s/p Ro. Decreasing dose of precedex and levophed. Continue SBT.    SUBJECTIVE: Patient seen and examined at bedside. Denies pain. Intubated.     ROS:  Unable to obtain, intubated.     OBJECTIVE:    VITAL SIGNS:  ICU Vital Signs Last 24 Hrs  T(C): 36.7 (18 Apr 2023 08:15), Max: 37.9 (17 Apr 2023 11:28)  T(F): 98 (18 Apr 2023 08:15), Max: 100.3 (17 Apr 2023 11:28)  HR: 75 (18 Apr 2023 09:00) (66 - 116)  ABP: 106/46 (18 Apr 2023 09:00) (90/41 - 133/55)  ABP(mean): 69 (18 Apr 2023 09:00) (60 - 86)  RR: 21 (18 Apr 2023 09:00) (20 - 38)  SpO2: 100% (18 Apr 2023 09:00) (97% - 100%)      Mode: AC/ CMV (Assist Control/ Continuous Mandatory Ventilation), RR (machine): 20, TV (machine): 380, FiO2: 30, PEEP: 5, ITime: 1, MAP: 14, PIP: 33    04-17 @ 07:01  -  04-18 @ 07:00  --------------------------------------------------------  IN: 2932.3 mL / OUT: 1600 mL / NET: 1332.3 mL    04-18 @ 07:01  -  04-18 @ 09:25  --------------------------------------------------------  IN: 10.9 mL / OUT: 15 mL / NET: -4.1 mL      CAPILLARY BLOOD GLUCOSE      POCT Blood Glucose.: 110 mg/dL (18 Apr 2023 05:50)      PHYSICAL EXAM:  General: NAD, intubated, ill appearing   Respiratory: CTA b/l. Rapid shallow breathing when placed on CPAP trial   Cardiovascular: RRR, normal S1S2, no M/R/G  Abdomen: soft, NT/ND, midline surgical site c/d/i Ostomy w/ good output    Extremities: WWP, no clubbing, cyanosis, or edema  Neurology: Awake, arousable, following all commands and moving all extremities.      MEDICATIONS:  MEDICATIONS  (STANDING):  chlorhexidine 0.12% Liquid 15 milliLiter(s) Oral Mucosa every 12 hours  chlorhexidine 4% Liquid 1 Application(s) Topical <User Schedule>  dexMEDEtomidine Infusion 0.5 MICROgram(s)/kG/Hr (9.1 mL/Hr) IV Continuous <Continuous>  heparin   Injectable 5000 Unit(s) SubCutaneous every 8 hours  levothyroxine Injectable 93.75 MICROGram(s) IV Push at bedtime  meropenem  IVPB 500 milliGRAM(s) IV Intermittent every 12 hours  norepinephrine Infusion 0.05 MICROgram(s)/kG/Min (6.83 mL/Hr) IV Continuous <Continuous>  pantoprazole  Injectable 40 milliGRAM(s) IV Push daily  vancomycin    Solution 125 milliGRAM(s) Oral daily    MEDICATIONS  (PRN):  albuterol    90 MICROgram(s) HFA Inhaler 4 Puff(s) Inhalation every 6 hours PRN Shortness of Breath and/or Wheezing  fentaNYL    Injectable 50 MICROGram(s) IV Push every 4 hours PRN Severe Pain (7 - 10)  sodium chloride 0.9% lock flush 10 milliLiter(s) IV Push every 1 hour PRN Pre/post blood products, medications, blood draw, and to maintain line patency      ALLERGIES:  Allergies    No Known Allergies    Intolerances        LABS:                        7.6    18.60 )-----------( 159      ( 18 Apr 2023 05:43 )             25.4     04-18    143  |  108  |  87<H>  ----------------------------<  108<H>  3.9   |  24  |  5.00<H>    Ca    7.7<L>      18 Apr 2023 05:43  Phos  4.2     04-18  Mg     2.1     04-18    TPro  4.6<L>  /  Alb  1.8<L>  /  TBili  0.8  /  DBili  x   /  AST  45<H>  /  ALT  63  /  AlkPhos  82  04-18          RADIOLOGY & ADDITIONAL TESTS:   No interval imaging performed.        CENTRAL LINE: N        DATE INSERTED:             REMOVE: N  BISHOP: Y                       DATE INSERTED:              REMOVE: Y/N  A-LINE: N                       DATE INSERTED:              REMOVE: Y/N      GLOBAL ISSUE/BEST PRACTICE  Analgesia: Y  Sedation: Y  HOB elevation: yes  Stress ulcer prophylaxis: Y  VTE prophylaxis: Y  Glycemic control: Y  Nutrition: Y    CODE STATUS: Full Code

## 2023-04-19 NOTE — PROGRESS NOTE ADULT - SUBJECTIVE AND OBJECTIVE BOX
[facility] - Adrián Wray Pannella, Patel, Savella, Goodger  Office Number: [phone]    Follow Up:  resp failure    Subjective/Observations: Patient is seen and examined. Currently in ICU. Pt is intubated and sedated. Maintained on pressor support. Pt is unable to provide any meaningful information.       REVIEW OF SYSTEMS: Limited secondary to comorbidities.       PAST MEDICAL & SURGICAL HISTORY:  Hypothyroidism      HLD (hyperlipidemia)      Thrombocytosis      Persistent atrial fibrillation      Obesity (BMI 35.0-39.9 without comorbidity)      Asthma      Diabetes mellitus      Ankle injury  on 2004, 5 surgeries.      Cholecystitis          SOCIAL HISTORY:  No tobacco, ethanol, or drug abuse.    FAMILY HISTORY:  Family history of early CAD (Father)    Family history of early CAD (Mother)      No family history of acute MI or sudden cardiac death.    MEDICATIONS  (STANDING):  chlorhexidine 0.12% Liquid 15 milliLiter(s) Oral Mucosa every 12 hours  chlorhexidine 4% Liquid 1 Application(s) Topical <User Schedule>  dexMEDEtomidine Infusion 0.5 MICROgram(s)/kG/Hr (9.1 mL/Hr) IV Continuous <Continuous>  heparin   Injectable 5000 Unit(s) SubCutaneous every 8 hours  levothyroxine Injectable 93.75 MICROGram(s) IV Push at bedtime  meropenem  IVPB 500 milliGRAM(s) IV Intermittent every 24 hours  norepinephrine Infusion 0.05 MICROgram(s)/kG/Min (6.83 mL/Hr) IV Continuous <Continuous>  pantoprazole  Injectable 40 milliGRAM(s) IV Push daily  vancomycin    Solution 125 milliGRAM(s) Oral daily    MEDICATIONS  (PRN):  albuterol    90 MICROgram(s) HFA Inhaler 4 Puff(s) Inhalation every 6 hours PRN Shortness of Breath and/or Wheezing  fentaNYL    Injectable 50 MICROGram(s) IV Push every 4 hours PRN Severe Pain (7 - 10)  sodium chloride 0.9% lock flush 10 milliLiter(s) IV Push every 1 hour PRN Pre/post blood products, medications, blood draw, and to maintain line patency      Allergies    No Known Allergies    Intolerances        VITAL SIGNS:   Vital Signs Last 24 Hrs  T(C): 37.1 (19 Apr 2023 08:06), Max: 37.2 (18 Apr 2023 20:07)  T(F): 98.8 (19 Apr 2023 08:06), Max: 99 (18 Apr 2023 20:07)  HR: 77 (19 Apr 2023 09:00) (68 - 97)  BP: --  BP(mean): --  RR: 30 (19 Apr 2023 09:00) (20 - 31)  SpO2: 100% (19 Apr 2023 09:00) (100% - 100%)    Parameters below as of 19 Apr 2023 08:00  Patient On (Oxygen Delivery Method): ventilator        I&O's Summary    18 Apr 2023 07:01  -  19 Apr 2023 07:00  --------------------------------------------------------  IN: 494 mL / OUT: 2220 mL / NET: -1726 mL    19 Apr 2023 07:01  -  19 Apr 2023 09:36  --------------------------------------------------------  IN: 41.4 mL / OUT: 30 mL / NET: 11.4 mL        On Exam:  Constitutional: intubated, sedated  HEENT: Moist Mucous Membranes, Anicteric  Pulmonary: Non-labored, breath sounds are clear bilaterally, No wheezing, rales or rhonchi  Cardiovascular: Regular, S1 and S2, No murmurs, rubs, gallops or clicks  Gastrointestinal: Bowel Sounds present, soft, nontender.   Lymph: No peripheral edema. No lymphadenopathy.  Skin: No visible rashes or ulcers.  Psych:  intubated, sedated    LABS: All Labs Reviewed:                        7.6    23.77 )-----------( 238      ( 19 Apr 2023 05:45 )             26.0                         7.6    18.60 )-----------( 159      ( 18 Apr 2023 05:43 )             25.4                         8.1    16.02 )-----------( 114      ( 17 Apr 2023 05:40 )             26.6     19 Apr 2023 05:45    146    |  110    |  88     ----------------------------<  131    3.8     |  26     |  5.20   18 Apr 2023 05:43    143    |  108    |  87     ----------------------------<  108    3.9     |  24     |  5.00   17 Apr 2023 05:40    142    |  107    |  82     ----------------------------<  96     3.7     |  25     |  4.80     Ca    7.8        19 Apr 2023 05:45  Ca    7.7        18 Apr 2023 05:43  Ca    7.4        17 Apr 2023 05:40  Phos  4.5       19 Apr 2023 05:45  Phos  4.2       18 Apr 2023 05:43  Mg     2.1       19 Apr 2023 05:45  Mg     2.1       18 Apr 2023 05:43    TPro  4.6    /  Alb  1.8    /  TBili  0.6    /  DBili  x      /  AST  22     /  ALT  40     /  AlkPhos  61     19 Apr 2023 05:45  TPro  4.6    /  Alb  1.8    /  TBili  0.8    /  DBili  x      /  AST  45     /  ALT  63     /  AlkPhos  82     18 Apr 2023 05:43  TPro  4.4    /  Alb  1.6    /  TBili  0.7    /  DBili  x      /  AST  85     /  ALT  97     /  AlkPhos  77     17 Apr 2023 05:40          Blood Culture:         RADIOLOGY:    ECG:

## 2023-04-19 NOTE — PROGRESS NOTE ADULT - SUBJECTIVE AND OBJECTIVE BOX
pt seen  started feeds  weaning pressors, on minimal  still intubated  ICU Vital Signs Last 24 Hrs  T(C): 37.1 (19 Apr 2023 08:06), Max: 37.2 (18 Apr 2023 20:07)  T(F): 98.8 (19 Apr 2023 08:06), Max: 99 (18 Apr 2023 20:07)  HR: 70 (19 Apr 2023 10:00) (68 - 97)  BP: --  BP(mean): --  ABP: 104/45 (19 Apr 2023 10:00) (94/67 - 120/55)  ABP(mean): 69 (19 Apr 2023 10:00) (63 - 83)  RR: 28 (19 Apr 2023 10:00) (20 - 31)  SpO2: 100% (19 Apr 2023 10:00) (99% - 100%)    O2 Parameters below as of 19 Apr 2023 08:00  Patient On (Oxygen Delivery Method): ventilator        gen-NAD  resp-clear  abd-soft NT/ND  wound open clean  ostomy functioning                          7.6    23.77 )-----------( 238      ( 19 Apr 2023 05:45 )             26.0     04-19    146<H>  |  110<H>  |  88<H>  ----------------------------<  131<H>  3.8   |  26  |  5.20<H>    Ca    7.8<L>      19 Apr 2023 05:45  Phos  4.5     04-19  Mg     2.1     04-19    TPro  4.6<L>  /  Alb  1.8<L>  /  TBili  0.6  /  DBili  x   /  AST  22  /  ALT  40  /  AlkPhos  61  04-19

## 2023-04-19 NOTE — CARE COORDINATION ASSESSMENT. - NSCAREPROVIDERS_GEN_ALL_CORE_FT
CARE PROVIDERS:  Accepting Physician: Bulmaro Harris  Access Services: Akua Romero  Admitting: Bulmaro Harris  Attending: Agustina Hernandez  Clinical Doc. Improvement: Aidan Mott  Consultant: Bradley Fernandez  Consultant: Murtaza Juarez  Consultant: Cole Goldberg  Consultant: Darion Ingram  Consultant: Noe Robertson  Consultant: Kirt Mcghee  Consultant: Argelia Cruz  Consultant: Bryson Fernandez  Consultant: Diego Alexandre  Consultant: Diandra Pool  Consultant: Shen Baker  Consultant: Mac Miranda  Consultant: Samm Hunter  Consultant: Pacheco Aguilar  Consultant: Donovan Desir  Consultant: Sotero Schmitz  Consultant: Diandra Ricardo  Consultant: Jl Angel  Covering Team: Isabela Lamb  Covering Team: Kirsten Patel ED Attending: Mark Hobbs ED Nurse: Ashlyn Hernandez  Nurse: Sabina Arndt  Nurse: Nettie Ribera  Nurse: Isabela Roberts  Nurse: Ivon Rendon  Nurse: Holli Black  Nurse: Leslie Triplett  Oncology: Seema, Shira  Ordered: Patrick, Sunjit  Ordered: ADM, User  Ordered: Doctor, Unknown  Ordered: Lupis Dolan  Outpatient Provider: Donovan Campos  PCA/Nursing Assistant: Agustina Reid  Primary Team: Tiffanie Marcus  Primary Team: Mustapha Walter  Primary Team: Robyn Aguilar  Primary Team: Ju Martinez  Primary Team: Cole Goldberg  Primary Team: Tammie Hahn  Primary Team: Tomas Mann  Primary Team: John Parekh  Primary Team: Ángel Vazquez  Primary Team: Charlie Virk  Primary Team: Rupa Hauser  Primary Team: Dav Mcgill  Registered Dietitian: Jillian King  Registered Dietitian: Ania Allen  Registered Dietitian: Esmer Chavarria  Respiratory Therapy: Donovan Garcia  : Sveta Leiva// Supp. Assoc.: Edel Cm

## 2023-04-19 NOTE — PROGRESS NOTE ADULT - NUTRITIONAL ASSESSMENT
This patient has been assessed with a concern for Malnutrition and has been determined to have a diagnosis/diagnoses of Severe protein-calorie malnutrition.    This patient is being managed with:   Diet NPO with Tube Feed-  Tube Feeding Modality: Nasogastric  Nepro with Carb Steady  Total Volume for 24 Hours (mL): 240  Continuous  Until Goal Tube Feed Rate (mL per Hour): 10  Tube Feed Duration (in Hours): 24  Tube Feed Start Time: 13:00  Entered: Apr 18 2023 12:02PM

## 2023-04-19 NOTE — CONSULT NOTE ADULT - ASSESSMENT
LAYA on CKD: Septic/hemodynamic ATN  Sigmoid perforation, s/p surgery  Anemia  Shock, Sepsis    Will follow creatinine trend and urine out put. On less pressor support. IV abx, ID follow up. Monitor h/h trend. Transfuse PRBC's PRN.   Avoid nephrotoxic meds as possible. Avoid ACEI, ARB, NSAIDs and IV contrast. Will follow electrolytes and renal function trend. D/w pt's son over the phone.   No emergent need for hemodialysis at this time.     Further recommendations pending clinical course. Thank you for the courtesy of this referral.

## 2023-04-19 NOTE — CONSULT NOTE ADULT - SUBJECTIVE AND OBJECTIVE BOX
Patient is a 72y old  Female who presents with a chief complaint of intra-abdominal perforation (18 Apr 2023 15:46)    HPI:  73 y/o f w/ PMH of HTN, CHF, asthma, hypothyroidism, prior notes state A-fib on Xarelto however patient states she is not taking any anticoagulants, history of C. difficile January 2023 p/w generalized abdominal pain, and persistent diarrhea for last several months with several episodes every day, worsening in last few weeks.  Pt's abdominal pain acutely worsened in last day, with achy, persistent pain, diffusely in abdomen, nonraidtaing, not responding to oral pain meds, associated with nausea and vomiting, prompting pt to come to ED.  Pt did not report any fevers.  Pt has also had increased lower extremity swelling, but per son, has been noncompliant with lasix recently.   (13 Apr 2023 10:37)      PAST MEDICAL HISTORY:  Hypothyroidism    HLD (hyperlipidemia)    Thrombocytosis    Persistent atrial fibrillation    Obesity (BMI 35.0-39.9 without comorbidity)    Asthma    Diabetes mellitus        PAST SURGICAL HISTORY:  Ankle injury    Cholecystitis        FAMILY HISTORY:  Family history of early CAD (Father)    Family history of early CAD (Mother)        SOCIAL HISTORY:    Allergies    No Known Allergies    Intolerances      Home Medications:  anagrelide 0.5 mg oral capsule: Take 3 caps in AM and 3 caps in PM (14 Apr 2023 08:14)  escitalopram 20 mg oral tablet: 1 tab(s) orally once a day (14 Apr 2023 08:07)  famotidine 40 mg oral tablet: 1 tab(s) orally once a day (at bedtime), As Needed (14 Apr 2023 08:07)  furosemide 40 mg oral tablet: 1 tab(s) orally 2 times a day (14 Apr 2023 08:14)  gabapentin 100 mg oral tablet: 1 cap(s) orally 2 times a day (14 Apr 2023 08:14)  Hydromet 5 mg-1.5 mg/5 mL oral syrup: 5 milliliter(s) orally every 6 hours, As Needed for cough  (14 Apr 2023 08:14)  levocetirizine 5 mg oral tablet: 1 tab(s) orally once a day (in the evening) (14 Apr 2023 08:14)  levothyroxine 125 mcg (0.125 mg) oral tablet: 1 tab(s) orally once a day (14 Apr 2023 08:14)  metOLazone 2.5 mg oral tablet: 1 tab(s) orally once a day (14 Apr 2023 08:14)  Trelegy Ellipta 100 mcg-62.5 mcg-25 mcg/inh inhalation powder: 1 puff(s) inhaled once a day (14 Apr 2023 08:14)  Xarelto 20 mg oral tablet: 1 tab(s) orally once a day (14 Apr 2023 08:14)  zolpidem 5 mg oral tablet: 1 tab(s) orally once a day (at bedtime) (14 Apr 2023 08:14)    MEDICATIONS  (STANDING):  chlorhexidine 0.12% Liquid 15 milliLiter(s) Oral Mucosa every 12 hours  chlorhexidine 4% Liquid 1 Application(s) Topical <User Schedule>  dexMEDEtomidine Infusion 0.5 MICROgram(s)/kG/Hr (9.1 mL/Hr) IV Continuous <Continuous>  heparin   Injectable 5000 Unit(s) SubCutaneous every 8 hours  levothyroxine Injectable 93.75 MICROGram(s) IV Push at bedtime  meropenem  IVPB 500 milliGRAM(s) IV Intermittent every 24 hours  norepinephrine Infusion 0.05 MICROgram(s)/kG/Min (6.83 mL/Hr) IV Continuous <Continuous>  pantoprazole  Injectable 40 milliGRAM(s) IV Push daily  vancomycin    Solution 125 milliGRAM(s) Oral daily    MEDICATIONS  (PRN):  albuterol    90 MICROgram(s) HFA Inhaler 4 Puff(s) Inhalation every 6 hours PRN Shortness of Breath and/or Wheezing  fentaNYL    Injectable 50 MICROGram(s) IV Push every 4 hours PRN Severe Pain (7 - 10)  sodium chloride 0.9% lock flush 10 milliLiter(s) IV Push every 1 hour PRN Pre/post blood products, medications, blood draw, and to maintain line patency      REVIEW OF SYSTEMS:  General:   Respiratory: No cough, SOB  Cardiovascular: No CP or Palpitations	  Gastrointestinal: No nausea, Vomiting. No diarrhea  Genitourinary: No urinary complaints	  Musculoskeletal: No leg swelling, No new rash or lesions	  Neurological: 	  all other systems negative    T(F): 98.8 (04-19-23 @ 08:06), Max: 99 (04-18-23 @ 20:07)  HR: 77 (04-19-23 @ 09:00) (68 - 97)  BP: --  RR: 30 (04-19-23 @ 09:00) (20 - 31)  SpO2: 100% (04-19-23 @ 09:00) (100% - 100%)  Wt(kg): --    PHYSICAL EXAM:  General: NAD  Respiratory: b/l air entry  Cardiovascular: S1 S2  Gastrointestinal: soft  Extremities: edema    Mode: CPAP with PS  FiO2: 30  PEEP: 5  PS: 10  MAP: 10  PIP: 16      04-19    146<H>  |  110<H>  |  88<H>  ----------------------------<  131<H>  3.8   |  26  |  5.20<H>    Ca    7.8<L>      19 Apr 2023 05:45  Phos  4.5     04-19  Mg     2.1     04-19    TPro  4.6<L>  /  Alb  1.8<L>  /  TBili  0.6  /  DBili  x   /  AST  22  /  ALT  40  /  AlkPhos  61  04-19                          7.6    23.77 )-----------( 238      ( 19 Apr 2023 05:45 )             26.0       Hemoglobin: 7.6 g/dL (04-19 @ 05:45)  Hematocrit: 26.0 % (04-19 @ 05:45)  Potassium, Serum: 3.8 mmol/L (04-19 @ 05:45)  Blood Urea Nitrogen, Serum: 88 mg/dL (04-19 @ 05:45)      Creatinine, Serum: 5.20 (04-19 @ 05:45)  Creatinine, Serum: 5.00 (04-18 @ 05:43)  Creatinine, Serum: 4.80 (04-17 @ 05:40)        LIVER FUNCTIONS - ( 19 Apr 2023 05:45 )  Alb: 1.8 g/dL / Pro: 4.6 g/dL / ALK PHOS: 61 U/L / ALT: 40 U/L / AST: 22 U/L / GGT: x                     ABG - ( 17 Apr 2023 16:42 )  pH, Arterial: 7.28  pH, Blood: x     /  pCO2: 51    /  pO2: 74    / HCO3: 24    / Base Excess: -2.7  /  SaO2: 96.5              I&O's Detail    18 Apr 2023 07:01  -  19 Apr 2023 07:00  --------------------------------------------------------  IN:    Dexmedetomidine: 111.2 mL    IV PiggyBack: 50 mL    IV PiggyBack: 50 mL    Nepro with Carb Steady: 190 mL    Norepinephrine: 92.8 mL  Total IN: 494 mL    OUT:    Bulb (mL): 70 mL    Colostomy (mL): 1350 mL    Indwelling Catheter - Urethral (mL): 800 mL  Total OUT: 2220 mL    Total NET: -1726 mL      19 Apr 2023 07:01  -  19 Apr 2023 09:28  --------------------------------------------------------  IN:    Dexmedetomidine: 14.6 mL    Nepro with Carb Steady: 20 mL    Norepinephrine: 6.8 mL  Total IN: 41.4 mL    OUT:    Indwelling Catheter - Urethral (mL): 30 mL  Total OUT: 30 mL    Total NET: 11.4 mL            Culture - Blood (collected 13 Apr 2023 19:30)  Source: .Blood Blood-Peripheral  Final Report (19 Apr 2023 01:00):    No Growth Final    Culture - Blood (collected 13 Apr 2023 19:25)  Source: .Blood Blood-Peripheral  Final Report (19 Apr 2023 01:00):    No Growth Final    Culture - Body Fluid with Gram Stain (collected 13 Apr 2023 14:00)  Source: Peritoneal PERITONEAL FLUID #1  Gram Stain (13 Apr 2023 22:55):    Numerous polymorphonuclear leukocytes per low power field    Few Gram Variable Rods per oil power field  Final Report (15 Apr 2023 16:47):    Rare Morganella morganii    Few Klebsiella pneumoniae ESBL    Few Escherichia coli    Few Streptococcus constellatus See previous culture [id number]    Few Bacteroides distasonis "Susceptibilities not performed"  Organism: Morganella morganii  Klebsiella pneumoniae ESBL  Escherichia coli (15 Apr 2023 16:47)  Organism: Escherichia coli (15 Apr 2023 16:47)    Sensitivities:      Method Type: MICAELA      -  Amikacin: S <=16      -  Amoxicillin/Clavulanic Acid: S <=8/4      -  Ampicillin: S <=8 These ampicillin results predict results for amoxicillin      -  Ampicillin/Sulbactam: S <=4/2 Enterobacter, Klebsiella aerogenes, Citrobacter, and Serratia may develop resistance during prolonged therapy (3-4 days)      -  Aztreonam: S <=4      -  Cefazolin: S <=2 Enterobacter, Klebsiella aerogenes, Citrobacter, and Serratia may develop resistance during prolonged therapy (3-4 days)      -  Cefepime: S <=2      -  Cefoxitin: S <=8      -  Ceftriaxone: S <=1 Enterobacter, Klebsiella aerogenes, Citrobacter, and Serratia may develop resistance during prolonged therapy      -  Ciprofloxacin: S <=0.25      -  Ertapenem: S <=0.5      -  Gentamicin: S <=2      -  Imipenem: S <=1      -  Levofloxacin: S <=0.5      -  Meropenem: S <=1      -  Piperacillin/Tazobactam: S <=8      -  Tobramycin: S <=2      -  Trimethoprim/Sulfamethoxazole: S <=0.5/9.5  Organism: Klebsiella pneumoniae ESBL (15 Apr 2023 16:47)    Sensitivities:      Method Type: MICAELA      -  Amikacin: S <=16      -  Amoxicillin/Clavulanic Acid: S <=8/4      -  Ampicillin: R >16 These ampicillin results predict results for amoxicillin      -  Ampicillin/Sulbactam: R >16/8 Enterobacter, Klebsiella aerogenes, Citrobacter, and Serratia may develop resistance during prolonged therapy (3-4 days)      -  Aztreonam: R <=4      -  Cefazolin: R >16 Enterobacter, Klebsiella aerogenes, Citrobacter, and Serratia may develop resistance during prolonged therapy (3-4 days)      -  Cefepime: R >16      -  Ceftriaxone: R >32 Enterobacter, Klebsiella aerogenes, Citrobacter, and Serratia may develop resistance during prolonged therapy      -  Ciprofloxacin: I 0.5      -  Ertapenem: S <=0.5      -  Gentamicin: R >8      -  Imipenem: S <=1      -  Levofloxacin: S <=0.5      -  Meropenem: S <=1      -  Piperacillin/Tazobactam: R <=8      -  Tobramycin: S 4      -  Trimethoprim/Sulfamethoxazole: R >2/38  Organism: Morganella morganii (15 Apr 2023 16:47)    Sensitivities:      Method Type: MICAELA      -  Amikacin: S <=16      -  Amoxicillin/Clavulanic Acid: R >16/8      -  Ampicillin: R >16 These ampicillin results predict results for amoxicillin      -  Ampicillin/Sulbactam: S 8/4 Enterobacter, Klebsiella aerogenes, Citrobacter, and Serratia may develop resistance during prolonged therapy (3-4 days)      -  Aztreonam: S <=4      -  Cefazolin: R >16 Enterobacter, Klebsiella aerogenes, Citrobacter, and Serratia may develop resistance during prolonged therapy (3-4 days)      -  Cefepime: S <=2      -  Cefoxitin: S <=8      -  Ceftriaxone: S <=1 Enterobacter, Klebsiella aerogenes, Citrobacter, and Serratia may develop resistance during prolonged therapy      -  Ciprofloxacin: S <=0.25      -  Ertapenem: S <=0.5      -  Gentamicin: S <=2      -  Levofloxacin: S <=0.5      -  Meropenem: S <=1      -  Piperacillin/Tazobactam: S <=8      -  Tobramycin: S <=2      -  Trimethoprim/Sulfamethoxazole: S <=0.5/9.5    Culture - Body Fluid with Gram Stain (collected 13 Apr 2023 14:00)  Source: Peritoneal PERITONEAL FLUID #2  Gram Stain (14 Apr 2023 00:41):    No polymorphonuclear cells seen per low power field    Few Gram positive cocci in pairs per oil power field  Final Report (16 Apr 2023 22:25):    Rare Proteus vulgaris group    Few Escherichia coli    Few Klebsiella pneumoniae ESBL    Few Streptococcus constellatus    Few Bacteroides distasonis "Susceptibilities not performed"  Organism: Proteus vulgaris group  Escherichia coli  Klebsiella pneumoniae ESBL  Streptococcus constellatus (16 Apr 2023 22:25)  Organism: Streptococcus constellatus (16 Apr 2023 22:25)    Sensitivities:      Method Type: MICAELA      -  Ceftriaxone: S <=0.25      -  Clindamycin: R >0.5      -  Erythromycin: R >0.5      -  Levofloxacin: S <=0.25      -  Penicillin: S <=0.03  Organism: Klebsiella pneumoniae ESBL (16 Apr 2023 22:25)    Sensitivities:      Method Type: MICAELA      -  Amikacin: S <=16      -  Amoxicillin/Clavulanic Acid: S <=8/4      -  Ampicillin: R >16 These ampicillin results predict results for amoxicillin      -  Ampicillin/Sulbactam: R >16/8 Enterobacter, Klebsiella aerogenes, Citrobacter, and Serratia may develop resistance during prolonged therapy (3-4 days)      -  Aztreonam: R <=4      -  Cefazolin: R >16 Enterobacter, Klebsiella aerogenes, Citrobacter, and Serratia may develop resistance during prolonged therapy (3-4 days)      -  Cefepime: R >16      -  Ceftriaxone: R >32 Enterobacter, Klebsiella aerogenes, Citrobacter, and Serratia may develop resistance during prolonged therapy      -  Ciprofloxacin: I 0.5      -  Ertapenem: S <=0.5      -  Gentamicin: R >8      -  Imipenem: S <=1      -  Levofloxacin: S <=0.5      -  Meropenem: S <=1      -  Piperacillin/Tazobactam: R <=8      -  Tobramycin: I 8      -  Trimethoprim/Sulfamethoxazole: R >2/38  Organism: Escherichia coli (16 Apr 2023 22:25)    Sensitivities:      Method Type: MICAELA      -  Amikacin: S <=16      -  Amoxicillin/Clavulanic Acid: S <=8/4      -  Ampicillin: S <=8 These ampicillin results predict results for amoxicillin      -  Ampicillin/Sulbactam: S <=4/2 Enterobacter, Klebsiella aerogenes, Citrobacter, and Serratia may develop resistance during prolonged therapy (3-4 days)      -  Aztreonam: S <=4      -  Cefazolin: S <=2 Enterobacter, Klebsiella aerogenes, Citrobacter, and Serratia may develop resistance during prolonged therapy (3-4 days)      -  Cefepime: S <=2      -  Cefoxitin: S <=8      -  Ceftriaxone: S <=1 Enterobacter, Klebsiella aerogenes, Citrobacter, and Serratia may develop resistance during prolonged therapy      -  Ciprofloxacin: S <=0.25      -  Ertapenem: S <=0.5      -  Gentamicin: S <=2      -  Imipenem: S <=1      -  Levofloxacin: S <=0.5      -  Meropenem: S <=1      -  Piperacillin/Tazobactam: S <=8      -  Tobramycin: S <=2      -  Trimethoprim/Sulfamethoxazole: S <=0.5/9.5  Organism: Proteus vulgaris group (16 Apr 2023 22:25)    Sensitivities:      Method Type: MICAELA      -  Amikacin: S <=16      -  Amoxicillin/Clavulanic Acid: S <=8/4      -  Ampicillin: R >16 These ampicillin results predict results for amoxicillin      -  Ampicillin/Sulbactam: S <=4/2 Enterobacter, Klebsiella aerogenes, Citrobacter, and Serratia may develop resistance during prolonged therapy (3-4 days)      -  Aztreonam: S <=4      -  Cefazolin: R >16 Enterobacter, Klebsiella aerogenes, Citrobacter, and Serratia may develop resistance during prolonged therapy (3-4 days)      -  Cefepime: S <=2      -  Cefoxitin: S <=8      -  Ceftriaxone: S <=1 Enterobacter, Klebsiella aerogenes, Citrobacter, and Serratia may develop resistance during prolonged therapy      -  Ciprofloxacin: S <=0.25      -  Ertapenem: S <=0.5      -  Gentamicin: S <=2      -  Levofloxacin: S <=0.5      -  Meropenem: S <=1      -  Piperacillin/Tazobactam: S <=8      -  Tobramycin: S <=2      -  Trimethoprim/Sulfamethoxazole: S <=0.5/9.5       Patient is a 72y old  Female who presents with a chief complaint of intra-abdominal perforation (18 Apr 2023 15:46)    HPI:  73 y/o f w/ PMH of HTN, CHF, asthma, hypothyroidism, prior notes state A-fib on Xarelto however patient states she is not taking any anticoagulants, history of C. difficile January 2023 p/w generalized abdominal pain, and persistent diarrhea for last several months with several episodes every day, worsening in last few weeks.  Pt's abdominal pain acutely worsened in last day, with achy, persistent pain, diffusely in abdomen, nonraidtaing, not responding to oral pain meds, associated with nausea and vomiting, prompting pt to come to ED.  Pt did not report any fevers.  Pt has also had increased lower extremity swelling, but per son, has been noncompliant with lasix recently.   (13 Apr 2023 10:37)    Renal consult called for LAYA on CKD. History obtained from chart.       PAST MEDICAL HISTORY:  Hypothyroidism    HLD (hyperlipidemia)    Thrombocytosis    Persistent atrial fibrillation    Obesity (BMI 35.0-39.9 without comorbidity)    Asthma    Diabetes mellitus        PAST SURGICAL HISTORY:  Ankle injury    Cholecystitis        FAMILY HISTORY:  Family history of early CAD (Father)    Family history of early CAD (Mother)        SOCIAL HISTORY: No smoking or alcohol use     Allergies    No Known Allergies    Intolerances      Home Medications:  anagrelide 0.5 mg oral capsule: Take 3 caps in AM and 3 caps in PM (14 Apr 2023 08:14)  escitalopram 20 mg oral tablet: 1 tab(s) orally once a day (14 Apr 2023 08:07)  famotidine 40 mg oral tablet: 1 tab(s) orally once a day (at bedtime), As Needed (14 Apr 2023 08:07)  furosemide 40 mg oral tablet: 1 tab(s) orally 2 times a day (14 Apr 2023 08:14)  gabapentin 100 mg oral tablet: 1 cap(s) orally 2 times a day (14 Apr 2023 08:14)  Hydromet 5 mg-1.5 mg/5 mL oral syrup: 5 milliliter(s) orally every 6 hours, As Needed for cough  (14 Apr 2023 08:14)  levocetirizine 5 mg oral tablet: 1 tab(s) orally once a day (in the evening) (14 Apr 2023 08:14)  levothyroxine 125 mcg (0.125 mg) oral tablet: 1 tab(s) orally once a day (14 Apr 2023 08:14)  metOLazone 2.5 mg oral tablet: 1 tab(s) orally once a day (14 Apr 2023 08:14)  Trelegy Ellipta 100 mcg-62.5 mcg-25 mcg/inh inhalation powder: 1 puff(s) inhaled once a day (14 Apr 2023 08:14)  Xarelto 20 mg oral tablet: 1 tab(s) orally once a day (14 Apr 2023 08:14)  zolpidem 5 mg oral tablet: 1 tab(s) orally once a day (at bedtime) (14 Apr 2023 08:14)    MEDICATIONS  (STANDING):  chlorhexidine 0.12% Liquid 15 milliLiter(s) Oral Mucosa every 12 hours  chlorhexidine 4% Liquid 1 Application(s) Topical <User Schedule>  dexMEDEtomidine Infusion 0.5 MICROgram(s)/kG/Hr (9.1 mL/Hr) IV Continuous <Continuous>  heparin   Injectable 5000 Unit(s) SubCutaneous every 8 hours  levothyroxine Injectable 93.75 MICROGram(s) IV Push at bedtime  meropenem  IVPB 500 milliGRAM(s) IV Intermittent every 24 hours  norepinephrine Infusion 0.05 MICROgram(s)/kG/Min (6.83 mL/Hr) IV Continuous <Continuous>  pantoprazole  Injectable 40 milliGRAM(s) IV Push daily  vancomycin    Solution 125 milliGRAM(s) Oral daily    MEDICATIONS  (PRN):  albuterol    90 MICROgram(s) HFA Inhaler 4 Puff(s) Inhalation every 6 hours PRN Shortness of Breath and/or Wheezing  fentaNYL    Injectable 50 MICROGram(s) IV Push every 4 hours PRN Severe Pain (7 - 10)  sodium chloride 0.9% lock flush 10 milliLiter(s) IV Push every 1 hour PRN Pre/post blood products, medications, blood draw, and to maintain line patency      REVIEW OF SYSTEMS:  General: on vent    T(F): 98.8 (04-19-23 @ 08:06), Max: 99 (04-18-23 @ 20:07)  HR: 77 (04-19-23 @ 09:00) (68 - 97)  BP: --  RR: 30 (04-19-23 @ 09:00) (20 - 31)  SpO2: 100% (04-19-23 @ 09:00) (100% - 100%)  Wt(kg): --    PHYSICAL EXAM:  General: on vent  Respiratory: b/l air entry  Cardiovascular: S1 S2  Gastrointestinal: soft, s/p surgery, + ostomy  Extremities: + edema    Mode: CPAP with PS  FiO2: 30  PEEP: 5  PS: 10  MAP: 10  PIP: 16      04-19    146<H>  |  110<H>  |  88<H>  ----------------------------<  131<H>  3.8   |  26  |  5.20<H>    Ca    7.8<L>      19 Apr 2023 05:45  Phos  4.5     04-19  Mg     2.1     04-19    TPro  4.6<L>  /  Alb  1.8<L>  /  TBili  0.6  /  DBili  x   /  AST  22  /  ALT  40  /  AlkPhos  61  04-19                          7.6    23.77 )-----------( 238      ( 19 Apr 2023 05:45 )             26.0       Hemoglobin: 7.6 g/dL (04-19 @ 05:45)  Hematocrit: 26.0 % (04-19 @ 05:45)  Potassium, Serum: 3.8 mmol/L (04-19 @ 05:45)  Blood Urea Nitrogen, Serum: 88 mg/dL (04-19 @ 05:45)      Creatinine, Serum: 5.20 (04-19 @ 05:45)  Creatinine, Serum: 5.00 (04-18 @ 05:43)  Creatinine, Serum: 4.80 (04-17 @ 05:40)        LIVER FUNCTIONS - ( 19 Apr 2023 05:45 )  Alb: 1.8 g/dL / Pro: 4.6 g/dL / ALK PHOS: 61 U/L / ALT: 40 U/L / AST: 22 U/L / GGT: x               ABG - ( 17 Apr 2023 16:42 )  pH, Arterial: 7.28  pH, Blood: x     /  pCO2: 51    /  pO2: 74    / HCO3: 24    / Base Excess: -2.7  /  SaO2: 96.5        I&O's Detail    18 Apr 2023 07:01  -  19 Apr 2023 07:00  --------------------------------------------------------  IN:    Dexmedetomidine: 111.2 mL    IV PiggyBack: 50 mL    IV PiggyBack: 50 mL    Nepro with Carb Steady: 190 mL    Norepinephrine: 92.8 mL  Total IN: 494 mL    OUT:    Bulb (mL): 70 mL    Colostomy (mL): 1350 mL    Indwelling Catheter - Urethral (mL): 800 mL  Total OUT: 2220 mL    Total NET: -1726 mL      19 Apr 2023 07:01  -  19 Apr 2023 09:28  --------------------------------------------------------  IN:    Dexmedetomidine: 14.6 mL    Nepro with Carb Steady: 20 mL    Norepinephrine: 6.8 mL  Total IN: 41.4 mL    OUT:    Indwelling Catheter - Urethral (mL): 30 mL  Total OUT: 30 mL    Total NET: 11.4 mL            Culture - Blood (collected 13 Apr 2023 19:30)  Source: .Blood Blood-Peripheral  Final Report (19 Apr 2023 01:00):    No Growth Final    Culture - Blood (collected 13 Apr 2023 19:25)  Source: .Blood Blood-Peripheral  Final Report (19 Apr 2023 01:00):    No Growth Final    Culture - Body Fluid with Gram Stain (collected 13 Apr 2023 14:00)  Source: Peritoneal PERITONEAL FLUID #1  Gram Stain (13 Apr 2023 22:55):    Numerous polymorphonuclear leukocytes per low power field    Few Gram Variable Rods per oil power field  Final Report (15 Apr 2023 16:47):    Rare Morganella morganii    Few Klebsiella pneumoniae ESBL    Few Escherichia coli    Few Streptococcus constellatus See previous culture [id number]    Few Bacteroides distasonis "Susceptibilities not performed"  Organism: Morganella morganii  Klebsiella pneumoniae ESBL  Escherichia coli (15 Apr 2023 16:47)  Organism: Escherichia coli (15 Apr 2023 16:47)    Sensitivities:      Method Type: MICAELA      -  Amikacin: S <=16      -  Amoxicillin/Clavulanic Acid: S <=8/4      -  Ampicillin: S <=8 These ampicillin results predict results for amoxicillin      -  Ampicillin/Sulbactam: S <=4/2 Enterobacter, Klebsiella aerogenes, Citrobacter, and Serratia may develop resistance during prolonged therapy (3-4 days)      -  Aztreonam: S <=4      -  Cefazolin: S <=2 Enterobacter, Klebsiella aerogenes, Citrobacter, and Serratia may develop resistance during prolonged therapy (3-4 days)      -  Cefepime: S <=2      -  Cefoxitin: S <=8      -  Ceftriaxone: S <=1 Enterobacter, Klebsiella aerogenes, Citrobacter, and Serratia may develop resistance during prolonged therapy      -  Ciprofloxacin: S <=0.25      -  Ertapenem: S <=0.5      -  Gentamicin: S <=2      -  Imipenem: S <=1      -  Levofloxacin: S <=0.5      -  Meropenem: S <=1      -  Piperacillin/Tazobactam: S <=8      -  Tobramycin: S <=2      -  Trimethoprim/Sulfamethoxazole: S <=0.5/9.5  Organism: Klebsiella pneumoniae ESBL (15 Apr 2023 16:47)    Sensitivities:      Method Type: MICAELA      -  Amikacin: S <=16      -  Amoxicillin/Clavulanic Acid: S <=8/4      -  Ampicillin: R >16 These ampicillin results predict results for amoxicillin      -  Ampicillin/Sulbactam: R >16/8 Enterobacter, Klebsiella aerogenes, Citrobacter, and Serratia may develop resistance during prolonged therapy (3-4 days)      -  Aztreonam: R <=4      -  Cefazolin: R >16 Enterobacter, Klebsiella aerogenes, Citrobacter, and Serratia may develop resistance during prolonged therapy (3-4 days)      -  Cefepime: R >16      -  Ceftriaxone: R >32 Enterobacter, Klebsiella aerogenes, Citrobacter, and Serratia may develop resistance during prolonged therapy      -  Ciprofloxacin: I 0.5      -  Ertapenem: S <=0.5      -  Gentamicin: R >8      -  Imipenem: S <=1      -  Levofloxacin: S <=0.5      -  Meropenem: S <=1      -  Piperacillin/Tazobactam: R <=8      -  Tobramycin: S 4      -  Trimethoprim/Sulfamethoxazole: R >2/38  Organism: Morganella morganii (15 Apr 2023 16:47)    Sensitivities:      Method Type: MICAELA      -  Amikacin: S <=16      -  Amoxicillin/Clavulanic Acid: R >16/8      -  Ampicillin: R >16 These ampicillin results predict results for amoxicillin      -  Ampicillin/Sulbactam: S 8/4 Enterobacter, Klebsiella aerogenes, Citrobacter, and Serratia may develop resistance during prolonged therapy (3-4 days)      -  Aztreonam: S <=4      -  Cefazolin: R >16 Enterobacter, Klebsiella aerogenes, Citrobacter, and Serratia may develop resistance during prolonged therapy (3-4 days)      -  Cefepime: S <=2      -  Cefoxitin: S <=8      -  Ceftriaxone: S <=1 Enterobacter, Klebsiella aerogenes, Citrobacter, and Serratia may develop resistance during prolonged therapy      -  Ciprofloxacin: S <=0.25      -  Ertapenem: S <=0.5      -  Gentamicin: S <=2      -  Levofloxacin: S <=0.5      -  Meropenem: S <=1      -  Piperacillin/Tazobactam: S <=8      -  Tobramycin: S <=2      -  Trimethoprim/Sulfamethoxazole: S <=0.5/9.5    Culture - Body Fluid with Gram Stain (collected 13 Apr 2023 14:00)  Source: Peritoneal PERITONEAL FLUID #2  Gram Stain (14 Apr 2023 00:41):    No polymorphonuclear cells seen per low power field    Few Gram positive cocci in pairs per oil power field  Final Report (16 Apr 2023 22:25):    Rare Proteus vulgaris group    Few Escherichia coli    Few Klebsiella pneumoniae ESBL    Few Streptococcus constellatus    Few Bacteroides distasonis "Susceptibilities not performed"  Organism: Proteus vulgaris group  Escherichia coli  Klebsiella pneumoniae ESBL  Streptococcus constellatus (16 Apr 2023 22:25)  Organism: Streptococcus constellatus (16 Apr 2023 22:25)           < from: Xray Chest 1 View- PORTABLE-Routine (Xray Chest 1 View- PORTABLE-Routine in AM.) (04.15.23 @ 09:18) >    ACC: [id number] EXAM:  XR CHEST PORTABLE ROUTINE 1V   ORDERED BY: ELVIS DORAN     PROCEDURE DATE:  04/15/2023          INTERPRETATION:  AP chest on April 15, 2023 at 8:55 AM. Patient requires   intubation.    Heart magnified by technique.    Endotracheal tube, nasogastric tube, right jugular line remains similar   to April 13.    On April 13 there are scattered mid lower lung field infiltrates with a   small component basilar effusions.    On present examination infiltration particularly in the right lung has   increased.    IMPRESSION: Bilateral infiltrates of increased. Tubes remain.    --- End of Report ---            DAVID HINSON MD; Attending Radiologist  This document has been electronically signed. Apr 15 2023  2:13PM    < end of copied text >  < from: CT Abdomen and Pelvis w/ Oral Cont (04.13.23 @ 09:26) >    ACC: [id number] EXAM:  CT ABDOMEN AND PELVIS OC   ORDERED BY:  MELISSA MAYFIELD     PROCEDURE DATE:  04/13/2023          INTERPRETATION:  CLINICAL INFORMATION: 72 years  Female with abd   pain/diarrhea. History of carcinoid tumor of the lung    COMPARISON: CT abdomen and pelvis 1/26/2023 and PET CT 3/17/2023    CONTRAST/COMPLICATIONS:  IV Contrast: NONE  0 cc administered   0 cc discarded  Oral Contrast: Omnipaque 300  Complications: None reported at time of study completion    PROCEDURE:  CT ofthe Abdomen and Pelvis was performed.  Sagittal and coronal reformats were performed.    LIMITATION: Evaluation of the solid organs is limited by lack of IV   contrast.    FINDINGS:  LOWER CHEST: Persistent bilateral groundglass opacities. Redemonstrated 9   mm right middle lobe nodule and loculated right pleural effusion.    LIVER: Within normal limits.  BILE DUCTS: Normal caliber.  GALLBLADDER: Cholecystectomy.  SPLEEN: Stable splenomegaly.  PANCREAS: Within normal limits.  ADRENALS: Within normal limits.  KIDNEYS/URETERS: Within normal limits.    BLADDER: Within normal limits.  REPRODUCTIVE ORGANS: Uterus not well visualized.    BOWEL: No bowel obstruction. Appendix is not visualized and cannot be   assessed.. Featureless colon suggestive of colitis. Sigmoid   diverticulosis. Limited evaluation for wall thickening without enteric   contrast. Droplets of extraluminal air adjacent to the sigmoid colon.   Moderate hiatus hernia.  PERITONEUM: Pneumoperitoneum most pronounced in the anterior upper   abdomen. Droplets of free air throughout the remainder of the abdomen and   adjacent to the sigmoid colon. Moderate ascites, new compared with prior.  VESSELS: Atherosclerotic changes.  RETROPERITONEUM/LYMPH NODES: No lymphadenopathy.  ABDOMINAL WALL: Within normal limits.  BONES: Degenerative changes.    IMPRESSION:  Pneumoperitoneum and no ascites. Suspect perforated sigmoid colon as   above.    Stable splenomegaly.    Stable right lung nodule, groundglass infiltrates and loculated right   pleural effusion.    CRITICAL VALUE: I discussed the major findings in this report with Dr. Mark Hobbs at 4/13/2023 9:54 AM with readback. Critical value policy of   the hospital was followed. Read back and confirmation of receipt of this   communication was  performed. This verbal communication supplements the text report of this   document.    --- End of Report ---            JUNIE GILL MD; Attending Radiologist  This document has been electronically signed. Apr 13 2023 10:01AM    < end of copied text >

## 2023-04-19 NOTE — AIRWAY REMOVAL NOTE  ADULT & PEDS - BREATH SOUNDS ALL FIELDS
Marzena. clear/equal bilaterally

## 2023-04-19 NOTE — PROGRESS NOTE ADULT - NUTRITIONAL ASSESSMENT
This patient has been assessed with a concern for Malnutrition and has been determined to have a diagnosis/diagnoses of Severe protein-calorie malnutrition.    This patient is being managed with:   Diet NPO with Tube Feed-  Tube Feeding Modality: Nasogastric  Vital 1.5 Adalberto  Total Volume for 24 Hours (mL): 240  Continuous  Until Goal Tube Feed Rate (mL per Hour): 10  Tube Feed Duration (in Hours): 24  Tube Feed Start Time: 12:00  Entered: Apr 19 2023 12:13PM    Diet NPO with Tube Feed-  Tube Feeding Modality: Nasogastric  Nepro with Carb Steady  Total Volume for 24 Hours (mL): 240  Continuous  Until Goal Tube Feed Rate (mL per Hour): 10  Tube Feed Duration (in Hours): 24  Tube Feed Start Time: 13:00  Entered: Apr 18 2023 12:02PM    The following pending diet order is being considered for treatment of Severe protein-calorie malnutrition:null

## 2023-04-19 NOTE — PROGRESS NOTE ADULT - SUBJECTIVE AND OBJECTIVE BOX
HERMILA QUIÑONES    [id number]    72y      Female    Patient is a 72y old  Female who presents with a chief complaint of intra-abdominal perforation (19 Apr 2023 13:04)      INTERVAL HPI/OVERNIGHT EVENTS:    patient is doing ok, feeling some improvement, he is going to have extubation soon today       Vital Signs Last 24 Hrs  T(C): 36.9 (19 Apr 2023 12:02), Max: 37.2 (18 Apr 2023 20:07)  T(F): 98.4 (19 Apr 2023 12:02), Max: 99 (18 Apr 2023 20:07)  HR: 77 (19 Apr 2023 13:30) (68 - 97)  BP: --  BP(mean): --  RR: 30 (19 Apr 2023 13:30) (20 - 32)  SpO2: 100% (19 Apr 2023 13:30) (98% - 100%)    Parameters below as of 19 Apr 2023 13:23    O2 Flow (L/min): 4      PHYSICAL EXAM:    GENERAL: Elderly female looking comfortable   HEENT: Intubated   NECK: soft, Supple, No JVD   CHEST/LUNG: Decrease bilaterally; No wheezing  HEART: S1S2+, Regular rate and rhythm; No murmurs  ABDOMEN: Soft, Nontender, Nondistended; Bowel sounds present  EXTREMITIES:  1+ Peripheral Pulses, No edema  SKIN: No rashes or lesions  NEURO: Alert    LABS:                        7.6    23.77 )-----------( 238      ( 19 Apr 2023 05:45 )             26.0     04-19    146<H>  |  110<H>  |  88<H>  ----------------------------<  131<H>  3.8   |  26  |  5.20<H>    Ca    7.8<L>      19 Apr 2023 05:45  Phos  4.5     04-19  Mg     2.1     04-19    TPro  4.6<L>  /  Alb  1.8<L>  /  TBili  0.6  /  DBili  x   /  AST  22  /  ALT  40  /  AlkPhos  61  04-19            I&O's Summary    18 Apr 2023 07:01  -  19 Apr 2023 07:00  --------------------------------------------------------  IN: 494 mL / OUT: 2220 mL / NET: -1726 mL    19 Apr 2023 07:01  -  19 Apr 2023 14:09  --------------------------------------------------------  IN: 61.4 mL / OUT: 280 mL / NET: -218.6 mL        MEDICATIONS  (STANDING):  albumin human 25% IVPB 50 milliLiter(s) IV Intermittent every 8 hours  chlorhexidine 4% Liquid 1 Application(s) Topical <User Schedule>  dexMEDEtomidine Infusion 0.5 MICROgram(s)/kG/Hr (9.1 mL/Hr) IV Continuous <Continuous>  heparin   Injectable 5000 Unit(s) SubCutaneous every 8 hours  levothyroxine Injectable 93.75 MICROGram(s) IV Push at bedtime  meropenem  IVPB 500 milliGRAM(s) IV Intermittent every 24 hours  midodrine 10 milliGRAM(s) Oral every 8 hours  norepinephrine Infusion 0.05 MICROgram(s)/kG/Min (6.83 mL/Hr) IV Continuous <Continuous>  pantoprazole  Injectable 40 milliGRAM(s) IV Push daily  vancomycin    Solution 125 milliGRAM(s) Oral daily    MEDICATIONS  (PRN):  albuterol    90 MICROgram(s) HFA Inhaler 4 Puff(s) Inhalation every 6 hours PRN Shortness of Breath and/or Wheezing  fentaNYL    Injectable 50 MICROGram(s) IV Push every 4 hours PRN Severe Pain (7 - 10)  sodium chloride 0.9% lock flush 10 milliLiter(s) IV Push every 1 hour PRN Pre/post blood products, medications, blood draw, and to maintain line patency

## 2023-04-19 NOTE — PROGRESS NOTE ADULT - ASSESSMENT
72F with pmhx afib s/p ablation, HFpEF, mod MR, thrombocytosis, asthma recent C diff infection was brought to the ED today for recurrent falls and persistent nausea/vomiting, now with pneumoperitenum and perforation; Day 5 s/p Ro.       Neuro: Continuing to wean precedex at this time, recieving 0.1mcg/kg this AM. Arousable and responding to commands, moving all extremities.     CV: Shock likely 2/2 sepsis, on Levo 0.03, continue to wean as tolerated. D/c IVF as patient is grossly net positive intake/output for hospital course.     Pulm: Intubated AC 20/380/5/30%; attempted CPAP trial on 12/5 but patient developed rapid shallow breathing. Will cont w/ SBT     GI: day # 5 s/p Ro. Continue protonix, meropenam, PO Vancomycin. Ostomy c/d/i w/ bilious output. Will start Nepro tube feeds today at 10cc/hr and increase as tolerated.     Renal: LAYA 2/2 ATN; Cr 5.0 this AM. continue with LR. Will give 25% albumin and 2mg Bumex again today to attempt to further stimulate urine production - patient has been making approximately 40cc/hr.     ID: Cont meropenam 2/2 ESBL and PO vancomycin 125mg per day for Cdiff prophylaxis    Endo: Continue IV synthroid at 75% of home dose; monitor glucose w/ fingersticks.     Heme: Continue HSQ TID dvt ppx    72F with pmhx afib s/p ablation, HFpEF, mod MR, thrombocytosis, asthma recent C diff infection was brought to the ED today for recurrent falls and persistent nausea/vomiting, now with pneumoperitenum and perforation; Day 6 s/p Ro.       Neuro: d/c precedex. awake, able to point to things in room and people, moves all ext. Attempt extubation this PM.    CV: Levo 0.01, continue to wean as tolerated    Pulm: Intubated; saturating well on CPAP 5/5. plan to extubate today     GI: day # 6 s/p Ro. Continue protonix, meropenam, PO Vancomycin. Ostomy c/d/i w/ bilious output. Hold tube feeds this AM in anticipation of extubation     Renal: LAYA 2/2 ATN; Cr 5.0 this AM. continue with LR. Will give 25% albumin and 2mg Bumex again today to attempt to further stimulate urine production - patient has been making approximately 40cc/hr.     ID: Cont meropenam 2/2 ESBL and PO vancomycin 125mg per day for Cdiff prophylaxis    Endo: Continue IV synthroid at 75% of home dose; monitor glucose w/ fingersticks.     Heme: Continue HSQ TID dvt ppx

## 2023-04-19 NOTE — PROGRESS NOTE ADULT - SUBJECTIVE AND OBJECTIVE BOX
****** CHARTING IN PROGRESS *******    INTERVAL HPI/OVERNIGHT EVENTS:    SUBJECTIVE: Patient seen and examined at bedside.     ROS:  CV: Denies chest pain  Resp: Denies SOB  GI: Denies abdominal pain, constipation, diarrhea, nausea, vomiting  : Denies dysuria  ID: Denies fevers, chills  MSK: Denies joint pain     OBJECTIVE:    VITAL SIGNS:  ICU Vital Signs Last 24 Hrs  T(C): 37.1 (19 Apr 2023 08:06), Max: 37.2 (18 Apr 2023 20:07)  T(F): 98.8 (19 Apr 2023 08:06), Max: 99 (18 Apr 2023 20:07)  HR: 72 (19 Apr 2023 10:30) (68 - 97)  BP: --  BP(mean): --  ABP: 104/45 (19 Apr 2023 10:00) (94/67 - 120/55)  ABP(mean): 69 (19 Apr 2023 10:00) (63 - 83)  RR: 28 (19 Apr 2023 10:00) (20 - 31)  SpO2: 98% (19 Apr 2023 10:30) (98% - 100%)    O2 Parameters below as of 19 Apr 2023 08:00  Patient On (Oxygen Delivery Method): ventilator          Mode: CPAP with PS, FiO2: 30, PEEP: 5, PS: 8, MAP: 10, PIP: 15    04-18 @ 07:01 - 04-19 @ 07:00  --------------------------------------------------------  IN: 494 mL / OUT: 2220 mL / NET: -1726 mL    04-19 @ 07:01 - 04-19 @ 10:35  --------------------------------------------------------  IN: 61.4 mL / OUT: 280 mL / NET: -218.6 mL      CAPILLARY BLOOD GLUCOSE      POCT Blood Glucose.: 130 mg/dL (19 Apr 2023 05:22)      PHYSICAL EXAM:  General: NAD, comfortable  HEENT: NCAT, PERRL, clear conjunctiva, no scleral icterus  Neck: supple, no JVD  Respiratory: CTA b/l, no wheezing, rhonchi, rales  Cardiovascular: RRR, normal S1S2, no M/R/G  Abdomen: soft, NT/ND, bowel sounds in all four quadrants, no palpable masses  Extremities: WWP, no clubbing, cyanosis, or edema  Neurology: A&Ox3, nonfocal, sensation intact     MEDICATIONS:  MEDICATIONS  (STANDING):  chlorhexidine 0.12% Liquid 15 milliLiter(s) Oral Mucosa every 12 hours  chlorhexidine 4% Liquid 1 Application(s) Topical <User Schedule>  dexMEDEtomidine Infusion 0.5 MICROgram(s)/kG/Hr (9.1 mL/Hr) IV Continuous <Continuous>  heparin   Injectable 5000 Unit(s) SubCutaneous every 8 hours  levothyroxine Injectable 93.75 MICROGram(s) IV Push at bedtime  meropenem  IVPB 500 milliGRAM(s) IV Intermittent every 24 hours  norepinephrine Infusion 0.05 MICROgram(s)/kG/Min (6.83 mL/Hr) IV Continuous <Continuous>  pantoprazole  Injectable 40 milliGRAM(s) IV Push daily  vancomycin    Solution 125 milliGRAM(s) Oral daily    MEDICATIONS  (PRN):  albuterol    90 MICROgram(s) HFA Inhaler 4 Puff(s) Inhalation every 6 hours PRN Shortness of Breath and/or Wheezing  fentaNYL    Injectable 50 MICROGram(s) IV Push every 4 hours PRN Severe Pain (7 - 10)  sodium chloride 0.9% lock flush 10 milliLiter(s) IV Push every 1 hour PRN Pre/post blood products, medications, blood draw, and to maintain line patency      ALLERGIES:  Allergies    No Known Allergies    Intolerances        LABS:                        7.6    23.77 )-----------( 238      ( 19 Apr 2023 05:45 )             26.0     04-19    146<H>  |  110<H>  |  88<H>  ----------------------------<  131<H>  3.8   |  26  |  5.20<H>    Ca    7.8<L>      19 Apr 2023 05:45  Phos  4.5     04-19  Mg     2.1     04-19    TPro  4.6<L>  /  Alb  1.8<L>  /  TBili  0.6  /  DBili  x   /  AST  22  /  ALT  40  /  AlkPhos  61  04-19          RADIOLOGY & ADDITIONAL TESTS: Reviewed. ***  BEDSIDE LUNG ULTRASOUND: ***  BEDSIDE ECHO: ***    CENTRAL LINE: N        DATE INSERTED:             REMOVE: N  BISHOP: Y                       DATE INSERTED:              REMOVE: Y/N  A-LINE: N                       DATE INSERTED:              REMOVE: Y/N      GLOBAL ISSUE/BEST PRACTICE  Analgesia: Y  Sedation: Y  HOB elevation: yes  Stress ulcer prophylaxis: Y  VTE prophylaxis: Y  Glycemic control: Y  Nutrition: Y    CODE STATUS: Full Code   INTERVAL HPI/OVERNIGHT EVENTS: No acute overnight events. Patient is awake and responding to commands; moving all extremities and able to point to things on board. Continuing to wean off sedation; will attempt extubation this afternoon.     SUBJECTIVE: Patient seen and examined at bedside. Intubated. Denies pain     ROS:  Unable to obtain     OBJECTIVE:    VITAL SIGNS:  ICU Vital Signs Last 24 Hrs  T(C): 37.1 (19 Apr 2023 08:06), Max: 37.2 (18 Apr 2023 20:07)  T(F): 98.8 (19 Apr 2023 08:06), Max: 99 (18 Apr 2023 20:07)  HR: 72 (19 Apr 2023 10:30) (68 - 97)  ABP: 104/45 (19 Apr 2023 10:00) (94/67 - 120/55)  ABP(mean): 69 (19 Apr 2023 10:00) (63 - 83)  RR: 28 (19 Apr 2023 10:00) (20 - 31)  SpO2: 98% (19 Apr 2023 10:30) (98% - 100%)    O2 Parameters below as of 19 Apr 2023 08:00  Patient On (Oxygen Delivery Method): ventilator        Mode: CPAP with PS, FiO2: 30, PEEP: 5, PS: 8, MAP: 10, PIP: 15    04-18 @ 07:01  -  04-19 @ 07:00  --------------------------------------------------------  IN: 494 mL / OUT: 2220 mL / NET: -1726 mL    04-19 @ 07:01  -  04-19 @ 10:35  --------------------------------------------------------  IN: 61.4 mL / OUT: 280 mL / NET: -218.6 mL      CAPILLARY BLOOD GLUCOSE      POCT Blood Glucose.: 130 mg/dL (19 Apr 2023 05:22)      PHYSICAL EXAM:  General: NAD, comfortable  Respiratory: CTA b/l, intubated, CPAPing well on 5/5   Cardiovascular: RRR, normal S1S2, no M/R/G  Abdomen: soft, NT/ND, ostomy c/d/i, making output.   Extremities: WWP, no clubbing, cyanosis, or edema  Neurology: awake and responding to commands; moving all extremities and able to point to things on board. Continuing to wean off sedation      MEDICATIONS:  MEDICATIONS  (STANDING):  chlorhexidine 0.12% Liquid 15 milliLiter(s) Oral Mucosa every 12 hours  chlorhexidine 4% Liquid 1 Application(s) Topical <User Schedule>  dexMEDEtomidine Infusion 0.5 MICROgram(s)/kG/Hr (9.1 mL/Hr) IV Continuous <Continuous>  heparin   Injectable 5000 Unit(s) SubCutaneous every 8 hours  levothyroxine Injectable 93.75 MICROGram(s) IV Push at bedtime  meropenem  IVPB 500 milliGRAM(s) IV Intermittent every 24 hours  norepinephrine Infusion 0.05 MICROgram(s)/kG/Min (6.83 mL/Hr) IV Continuous <Continuous>  pantoprazole  Injectable 40 milliGRAM(s) IV Push daily  vancomycin    Solution 125 milliGRAM(s) Oral daily    MEDICATIONS  (PRN):  albuterol    90 MICROgram(s) HFA Inhaler 4 Puff(s) Inhalation every 6 hours PRN Shortness of Breath and/or Wheezing  fentaNYL    Injectable 50 MICROGram(s) IV Push every 4 hours PRN Severe Pain (7 - 10)  sodium chloride 0.9% lock flush 10 milliLiter(s) IV Push every 1 hour PRN Pre/post blood products, medications, blood draw, and to maintain line patency      ALLERGIES:  Allergies    No Known Allergies    Intolerances        LABS:                        7.6    23.77 )-----------( 238      ( 19 Apr 2023 05:45 )             26.0     04-19    146<H>  |  110<H>  |  88<H>  ----------------------------<  131<H>  3.8   |  26  |  5.20<H>    Ca    7.8<L>      19 Apr 2023 05:45  Phos  4.5     04-19  Mg     2.1     04-19    TPro  4.6<L>  /  Alb  1.8<L>  /  TBili  0.6  /  DBili  x   /  AST  22  /  ALT  40  /  AlkPhos  61  04-19          RADIOLOGY & ADDITIONAL TESTS: Reviewed. ***  BEDSIDE LUNG ULTRASOUND: ***  BEDSIDE ECHO: ***    CENTRAL LINE: N        DATE INSERTED:             REMOVE: N  BISHOP: Y                       DATE INSERTED:              REMOVE: Y/N  A-LINE: N                       DATE INSERTED:              REMOVE: Y/N      GLOBAL ISSUE/BEST PRACTICE  Analgesia: Y  Sedation: Y  HOB elevation: yes  Stress ulcer prophylaxis: Y  VTE prophylaxis: Y  Glycemic control: Y  Nutrition: Y    CODE STATUS: Full Code   INTERVAL HPI/OVERNIGHT EVENTS: No acute overnight events. Patient is awake and responding to commands; moving all extremities and able to point to things on board. Continuing to wean off sedation; will attempt extubation this afternoon.     SUBJECTIVE: Patient seen and examined at bedside. Intubated. Denies pain     ROS:  Unable to obtain     OBJECTIVE:    VITAL SIGNS:  ICU Vital Signs Last 24 Hrs  T(C): 37.1 (19 Apr 2023 08:06), Max: 37.2 (18 Apr 2023 20:07)  T(F): 98.8 (19 Apr 2023 08:06), Max: 99 (18 Apr 2023 20:07)  HR: 72 (19 Apr 2023 10:30) (68 - 97)  ABP: 104/45 (19 Apr 2023 10:00) (94/67 - 120/55)  ABP(mean): 69 (19 Apr 2023 10:00) (63 - 83)  RR: 28 (19 Apr 2023 10:00) (20 - 31)  SpO2: 98% (19 Apr 2023 10:30) (98% - 100%)    O2 Parameters below as of 19 Apr 2023 08:00  Patient On (Oxygen Delivery Method): ventilator        Mode: CPAP with PS, FiO2: 30, PEEP: 5, PS: 8, MAP: 10, PIP: 15    04-18 @ 07:01  -  04-19 @ 07:00  --------------------------------------------------------  IN: 494 mL / OUT: 2220 mL / NET: -1726 mL    04-19 @ 07:01  -  04-19 @ 10:35  --------------------------------------------------------  IN: 61.4 mL / OUT: 280 mL / NET: -218.6 mL      CAPILLARY BLOOD GLUCOSE      POCT Blood Glucose.: 130 mg/dL (19 Apr 2023 05:22)      PHYSICAL EXAM:  General: NAD, comfortable  Respiratory: CTA b/l, intubated, CPAPing well on 5/5   Cardiovascular: RRR, normal S1S2, no M/R/G  Abdomen: soft, NT/ND, ostomy c/d/i, making output.   Extremities: WWP, no clubbing, cyanosis, or edema  Neurology: awake and responding to commands; moving all extremities and able to point to things on board. Continuing to wean off sedation      MEDICATIONS:  MEDICATIONS  (STANDING):  chlorhexidine 0.12% Liquid 15 milliLiter(s) Oral Mucosa every 12 hours  chlorhexidine 4% Liquid 1 Application(s) Topical <User Schedule>  dexMEDEtomidine Infusion 0.5 MICROgram(s)/kG/Hr (9.1 mL/Hr) IV Continuous <Continuous>  heparin   Injectable 5000 Unit(s) SubCutaneous every 8 hours  levothyroxine Injectable 93.75 MICROGram(s) IV Push at bedtime  meropenem  IVPB 500 milliGRAM(s) IV Intermittent every 24 hours  norepinephrine Infusion 0.05 MICROgram(s)/kG/Min (6.83 mL/Hr) IV Continuous <Continuous>  pantoprazole  Injectable 40 milliGRAM(s) IV Push daily  vancomycin    Solution 125 milliGRAM(s) Oral daily    MEDICATIONS  (PRN):  albuterol    90 MICROgram(s) HFA Inhaler 4 Puff(s) Inhalation every 6 hours PRN Shortness of Breath and/or Wheezing  fentaNYL    Injectable 50 MICROGram(s) IV Push every 4 hours PRN Severe Pain (7 - 10)  sodium chloride 0.9% lock flush 10 milliLiter(s) IV Push every 1 hour PRN Pre/post blood products, medications, blood draw, and to maintain line patency      ALLERGIES:  Allergies    No Known Allergies    Intolerances        LABS:                        7.6    23.77 )-----------( 238      ( 19 Apr 2023 05:45 )             26.0     04-19    146<H>  |  110<H>  |  88<H>  ----------------------------<  131<H>  3.8   |  26  |  5.20<H>    Ca    7.8<L>      19 Apr 2023 05:45  Phos  4.5     04-19  Mg     2.1     04-19    TPro  4.6<L>  /  Alb  1.8<L>  /  TBili  0.6  /  DBili  x   /  AST  22  /  ALT  40  /  AlkPhos  61  04-19          RADIOLOGY & ADDITIONAL TESTS:   No interval imaging ordered      CENTRAL LINE: N        DATE INSERTED:             REMOVE: N  BISHOP: Y                       DATE INSERTED:              REMOVE: Y/N  A-LINE: N                       DATE INSERTED:              REMOVE: Y/N      GLOBAL ISSUE/BEST PRACTICE  Analgesia: Y  Sedation: Y  HOB elevation: yes  Stress ulcer prophylaxis: Y  VTE prophylaxis: Y  Glycemic control: Y  Nutrition: Y    CODE STATUS: Full Code

## 2023-04-19 NOTE — AIRWAY REMOVAL NOTE  ADULT & PEDS - REMOVAL OF AN ARTIFICAL AIRWAY DATE AND TIME
I reviewed below complete result note with the patient and he voiced understanding. 19-Apr-2023 13:10

## 2023-04-19 NOTE — CARE COORDINATION ASSESSMENT. - OTHER PERTINENT REFERRAL INFORMATION
Spoke w son and ex  DIL at bedside.Patient lives in a [place]yr and over community in an elevator to her 2nd floor apt. She has a RW w seat ,a hi toilet seat ,and cane . She did not have [facility] PTA. Son states he lives in the apt above her , but works. He has been looking into pvt hiring based on patients needs at [facility]. He is not adverse to [facility] if recommended when medically stable. CM to follow and assess for needs pending hospital course.

## 2023-04-19 NOTE — PROGRESS NOTE ADULT - ASSESSMENT
73 y/o f w/ PMH of HTN, CHF, asthma, hypothyroidism, Afib, splenomegaly, myelofibrosis, history of C. difficile 1/13/23, who p/w generalized abdominal pain, and persistent diarrhea. Found to have sepsis 2/2 perforated viscus from sigmoid colon perforation.    S/p Tia procedure and abdominal abscess drainage on 4/13. Peritoneal fluid cultures grew ESBL klebsiella, strep constellatus, proteus vulgaris, E coli, bacteroides. Blood cultures no growth. C diff negative. No fever but WBC increasing. Has chronic leukocytosis--but if continues to worsen will need further evaluation.    -continue meropenem (renally dosed)  -continue vancomycin 125mg PO daily for c diff ppx  -suggest blood and respiratory cultures if febrile  -monitor WBC--if increasing or febrile consider CT A/P  -discussed with ICU team    Diandra Ricardo MD  Division of Infectious Diseases   Cell [phone] between 8am and 6pm   After 6pm and weekends please call ID service at [phone].

## 2023-04-19 NOTE — PROGRESS NOTE ADULT - ASSESSMENT
71yo F with PMH of afib (off xarelto at least 1wk PTA), HFpEF, asthma, recent C diff in [place] presenting with abd pain, weakness and falls at home a/w severe sepsis and peritonitis due to perforated sigmoid colon, also with LAYA due to ATN, shock liver, and post/op acute respiratory failure requiring mechanical ventilation, and acute blood loss anemia.    Plan:     Septic shock:   Abd pain + persistent diarrhea for several months, multiple episodes a day w/ progressive worsening; not relieved by pain meds, a/w n/v  CT A/P - Pneumoperitoneum and no ascites. Suspect perforated sigmoid colon.  septic shock due to peritonitis and perforated diverticulitis  s/p Tia procedure and abdominal abscess drainage on 4/13  lactic acidosis resolved  On levophed (weaned off dual pressor)  TTE (04/2023) - LVEF of 55-60%; LAE; Sclerotic trileaflet aortic valve, trace AI. Mild to moderate MR and TR. IVC measures 2.0 cm and is non collapsing  BCx - 4/13 no growth   Peritoneal cultures poly microbial - Proteus vulgaris group, Escherichia coli, Klebsiella pneumoniae, Morganella morganii   Peritoneal fluid with gram variable rods and PMNs.  CDiff negative, GI PCR neg  s/p Zosyn and Flagyl   continue meropenem  on vancomycin oral solution for C diff prophylaxis  ID following, recs appreciated  Shock liver, improving LFTs ; ischemic ATN  Mgmt per ICU.  white count is 23k  monitor CBC      Acute respiratory failure with hypoxia:    Intubated AC 20/380/5/30  Failed spontaneous breathing trial earlier,   - precedex for sedation   -plan to attempt to extubate today  Continue to monitor and wean as tolerated per ICU protocol  Mgmt per ICU.    Perforated sigmoid colon.   ·  Plan: CT with abdominal perforation; now s/p Ro and evacuation of about 1.2L purulent collection with the peritoneum on 4/13  S/p DDAVP, FFP and vitamin K to reverse coagulopathy  given 2un PRBC for acute blood loss anemia  Continue broad spectrum antibiotics as above  Cultures as above  Ostomy c/d/i; watery output noted  NPO - NGT draining  neg c-diff, neg GI pcr  ID following, recs appreciated  Mgmt per ICU     LAYA (acute kidney injury).   ·  Plan: LAYA likely ATN due to septic shock  Cr worsened to 5.0  urine output starting to improve to ~50cc/hr now  Correct electrolyte derangement  S/p bicarb gtt  Mgmt per ICU  consider nephro eval.  monitor BMP      Chronic CHF: ProBNP 68921  TTE (04/2023) - LVEF of 55-60%; LAE; Sclerotic trileaflet aortic valve, trace AI. Mild to moderate MR and TR. IVC measures 2.0 cm and is non collapsing  Per cardio - hold Lasix and lopressor for now due to hypotension - monitor BPs for resuming  Cardio following, recs appreciated  Mgmt per ICU.    Hypothyroidism: IV synthroid  Mgmt per ICU.    Chronic atrial fibrillation: Continue to hold Xarelto. Possible eventual heparin gtt.  Mgmt per ICU.    Need for prophylactic measure: Continue DVT prophylaxis with venodynes  Mgmt per ICU      #Imaging abnormality  Stable right lung nodule, ground-glass infiltrates and loculated right pleural effusion.    # Goals of Care  - consider Palliative Consult to discuss goals of care    #DISPO  PT consult when able.

## 2023-04-19 NOTE — PROGRESS NOTE ADULT - SUBJECTIVE AND OBJECTIVE BOX
[facility]  INFECTIOUS DISEASES - [address], [place] [address]  Tel: [phone]     Fax: [phone]  =======================================================    HERMILA QUIÑONES [id number]    Follow up: No fevers. Remains on norepinephrine. Intubated but awake, shook head no when asked if she had pain or SOB.    Allergies:  No Known Allergies      Antibiotics:  albumin human 25% IVPB 50 milliLiter(s) IV Intermittent every 8 hours  albuterol    90 MICROgram(s) HFA Inhaler 4 Puff(s) Inhalation every 6 hours PRN  chlorhexidine 4% Liquid 1 Application(s) Topical <User Schedule>  dexMEDEtomidine Infusion 0.5 MICROgram(s)/kG/Hr IV Continuous <Continuous>  fentaNYL    Injectable 50 MICROGram(s) IV Push every 4 hours PRN  heparin   Injectable 5000 Unit(s) SubCutaneous every 8 hours  levothyroxine Injectable 93.75 MICROGram(s) IV Push at bedtime  meropenem  IVPB 500 milliGRAM(s) IV Intermittent every 24 hours  norepinephrine Infusion 0.05 MICROgram(s)/kG/Min IV Continuous <Continuous>  pantoprazole  Injectable 40 milliGRAM(s) IV Push daily  sodium chloride 0.9% lock flush 10 milliLiter(s) IV Push every 1 hour PRN  vancomycin    Solution 125 milliGRAM(s) Oral daily       REVIEW OF SYSTEMS:  unable to obtain, intubated     Physical Exam:  ICU Vital Signs Last 24 Hrs  T(C): 36.9 (19 Apr 2023 12:02), Max: 37.2 (18 Apr 2023 20:07)  T(F): 98.4 (19 Apr 2023 12:02), Max: 99 (18 Apr 2023 20:07)  HR: 94 (19 Apr 2023 12:00) (68 - 97)  BP: --  BP(mean): --  ABP: 121/57 (19 Apr 2023 12:00) (94/67 - 121/57)  ABP(mean): 83 (19 Apr 2023 12:00) (63 - 83)  RR: 27 (19 Apr 2023 12:00) (20 - 32)  SpO2: 100% (19 Apr 2023 12:00) (98% - 100%)    O2 Parameters below as of 19 Apr 2023 08:00  Patient On (Oxygen Delivery Method): ventilator         GEN: awake, not in apparent distress  HEENT: normocephalic and atraumatic.   NECK: Supple.   LUNGS: No wheezes, crackles or rhonchi  HEART: Regular rate and rhythm   ABDOMEN: (+) ostomy, soft, nondistended  EXTREMITIES: bipedal edema  NEUROLOGIC: answering some questions by shaking head yes/no    Labs:  04-19    146<H>  |  110<H>  |  88<H>  ----------------------------<  131<H>  3.8   |  26  |  5.20<H>    Ca    7.8<L>      19 Apr 2023 05:45  Phos  4.5     04-19  Mg     2.1     04-19    TPro  4.6<L>  /  Alb  1.8<L>  /  TBili  0.6  /  DBili  x   /  AST  22  /  ALT  40  /  AlkPhos  61  04-19                          7.6    23.77 )-----------( 238      ( 19 Apr 2023 05:45 )             26.0         LIVER FUNCTIONS - ( 19 Apr 2023 05:45 )  Alb: 1.8 g/dL / Pro: 4.6 g/dL / ALK PHOS: 61 U/L / ALT: 40 U/L / AST: 22 U/L / GGT: x             RECENT CULTURES:  04-13 @ 19:30 .Blood Blood-Peripheral     No Growth Final        04-13 @ 19:25 .Blood Blood-Peripheral     No Growth Final        04-13 @ 14:00 Peritoneal PERITONEAL FLUID #2 Proteus vulgaris group  Escherichia coli  Klebsiella pneumoniae ESBL  Streptococcus constellatus    Rare Proteus vulgaris group  Few Escherichia coli  Few Klebsiella pneumoniae ESBL  Few Streptococcus constellatus  Few Bacteroides distasonis "Susceptibilities not performed"    No polymorphonuclear cells seen per low power field  Few Gram positive cocci in pairs per oil power field      04-13 @ 06:40          NotDetec        All imaging and data are reviewed.

## 2023-04-20 NOTE — CHART NOTE - NSCHARTNOTEFT_GEN_A_CORE
Assessment/Follow up: Pt awake/alert at visit. S/p extubation 4/19 in the evening, on 4L NC.  +NGT in place however TF was on hold for extubation plus now with episode emesis 4/19 overnight. Plan for repeat CT of abdomen today. Moderate liquid dark brown stool via colostomy. SLP evaluation pending to assess swallowing status. If cleared after CT/speech evaluation recommend clear liquid diet with ensure clear TID. Given suboptimal po intake > 1 week continue supplemental NGT feeds of Vital 1.5. Recommend starting rate 10cc/hr & increase by 10cc q 8hrs until goal rate 60cc/hr x 20hrs, prosouce 30cc daily.     Factors impacting intake: [ ] none [ ] nausea  [ ] vomiting [ ] diarrhea [ ] constipation  [ ]chewing problems [x ] swallowing issues  [x ] other: bowel surgery    Diet Presciption: Diet, NPO with Tube Feed:   Tube Feeding Modality: Nasogastric  Nepro with Carb Steady  Total Volume for 24 Hours (mL): 240  Continuous  Until Goal Tube Feed Rate (mL per Hour): 10  Tube Feed Duration (in Hours): 24  Tube Feed Start Time: 13:00 (04-18-23 @ 12:02)      Current Weight: Weight (kg): 72.8 (04-13 @ 16:35); 171.9lbs(4/20). Increase in weight in setting of fluid retention. Gen 1+edema, right leg 2+, left arm 2+      Pertinent Medications: MEDICATIONS  (STANDING):  albumin human 25% IVPB 50 milliLiter(s) IV Intermittent every 8 hours  chlorhexidine 4% Liquid 1 Application(s) Topical <User Schedule>  dexMEDEtomidine Infusion 0.5 MICROgram(s)/kG/Hr (9.1 mL/Hr) IV Continuous <Continuous>  heparin   Injectable 5000 Unit(s) SubCutaneous every 8 hours  iohexol 300 mG (iodine)/mL Oral Solution 30 milliLiter(s) Oral once  levothyroxine Injectable 93.75 MICROGram(s) IV Push at bedtime  meropenem  IVPB 500 milliGRAM(s) IV Intermittent every 24 hours  midodrine 10 milliGRAM(s) Oral every 8 hours  norepinephrine Infusion 0.05 MICROgram(s)/kG/Min (6.83 mL/Hr) IV Continuous <Continuous>  pantoprazole  Injectable 40 milliGRAM(s) IV Push daily  vancomycin    Solution 125 milliGRAM(s) Oral daily    MEDICATIONS  (PRN):  albuterol    90 MICROgram(s) HFA Inhaler 4 Puff(s) Inhalation every 6 hours PRN Shortness of Breath and/or Wheezing  sodium chloride 0.9% lock flush 10 milliLiter(s) IV Push every 1 hour PRN Pre/post blood products, medications, blood draw, and to maintain line patency    Pertinent Labs: 04-20 Na146 mmol/L<H> Glu 106 mg/dL<H> K+ 3.7 mmol/L Cr  5.30 mg/dL<H> BUN 91 mg/dL<H> 04-20 Phos 4.9 mg/dL<H> 04-20 Alb 2.0 g/dL<L>     CAPILLARY BLOOD GLUCOSE      POCT Blood Glucose.: 111 mg/dL (20 Apr 2023 06:00)  POCT Blood Glucose.: 119 mg/dL (19 Apr 2023 23:57)  POCT Blood Glucose.: 120 mg/dL (19 Apr 2023 17:15)  POCT Blood Glucose.: 140 mg/dL (19 Apr 2023 11:33)    Skin: midline abdomen surgicial incision, MAD, sacrum S.DTI. Edgardo 11.     Estimated Needs:   [x ] no change since previous assessment  [ ] recalculated:     Previous Nutrition Diagnosis:   [ ] Inadequate Energy Intake [ ]Inadequate Oral Intake [ ] Excessive Energy Intake   [ ] Underweight [ ] Increased Nutrient Needs [ ] Overweight/Obesity   [ x] Altered GI Function [ ] Unintended Weight Loss [ ] Food & Nutrition Related Knowledge Deficit [ x] Malnutrition     Nutrition Diagnosis is [x ] ongoing  [ ] resolved [ ] not applicable     New Nutrition Diagnosis: [ ] not applicable       Interventions:   Recommend  [ x] Change Diet To: As medically feasible pending CT abdomen/SLP evaluation - clear liquid diet with ensure clear TID  [ ] Nutrition Supplement  [x ] Nutrition Support: Resuming NGT feedings of Vital 1.5 @10cc/hr & increase by 10cc q 8hrs until goal rate 60cc/hr x 20hrs, prosource 30cc daily.   [x ] Other: Thiamine supplementation. MVI with minerals daily. Close electrolyte monitoring with replacement prn.     Monitoring and Evaluation:   [ ] PO intake [ x ] Tolerance to diet prescription [ x ] weights [ x ] labs[ x ] follow up per protocol  [ ] other:

## 2023-04-20 NOTE — PROVIDER CONTACT NOTE (EICU) - ACTION/TREATMENT ORDERED:
eICU vent rounds: Vent setting updated in EMR to reflect current vent settings
above orders placed in EMR. Results to be f/u by bedside provider.

## 2023-04-20 NOTE — PROGRESS NOTE ADULT - SUBJECTIVE AND OBJECTIVE BOX
****** CHARTING IN PROGRESS *******    INTERVAL HPI/OVERNIGHT EVENTS:    SUBJECTIVE: Patient seen and examined at bedside.     ROS:  CV: Denies chest pain  Resp: Denies SOB  GI: Denies abdominal pain, constipation, diarrhea, nausea, vomiting  : Denies dysuria  ID: Denies fevers, chills  MSK: Denies joint pain     OBJECTIVE:    VITAL SIGNS:  ICU Vital Signs Last 24 Hrs  T(C): 36.9 (20 Apr 2023 08:02), Max: 36.9 (19 Apr 2023 12:02)  T(F): 98.4 (20 Apr 2023 08:02), Max: 98.4 (19 Apr 2023 12:02)  HR: 79 (20 Apr 2023 08:30) (70 - 97)  BP: --  BP(mean): --  ABP: 109/38 (20 Apr 2023 08:30) (100/42 - 146/62)  ABP(mean): 67 (20 Apr 2023 08:30) (64 - 97)  RR: 28 (20 Apr 2023 08:30) (20 - 32)  SpO2: 100% (20 Apr 2023 08:30) (97% - 100%)    O2 Parameters below as of 20 Apr 2023 07:30  Patient On (Oxygen Delivery Method): nasal cannula  O2 Flow (L/min): 3        Mode: CPAP with PS, FiO2: 30, PEEP: 5, PS: 5, MAP: 10, PIP: 15    04-19 @ 07:01 - 04-20 @ 07:00  --------------------------------------------------------  IN: 347.2 mL / OUT: 1805 mL / NET: -1457.8 mL    04-20 @ 07:01 - 04-20 @ 08:53  --------------------------------------------------------  IN: 1.4 mL / OUT: 50 mL / NET: -48.6 mL      CAPILLARY BLOOD GLUCOSE      POCT Blood Glucose.: 111 mg/dL (20 Apr 2023 06:00)      PHYSICAL EXAM:  General: NAD, comfortable  HEENT: NCAT, PERRL, clear conjunctiva, no scleral icterus  Neck: supple, no JVD  Respiratory: CTA b/l, no wheezing, rhonchi, rales  Cardiovascular: RRR, normal S1S2, no M/R/G  Abdomen: soft, NT/ND, bowel sounds in all four quadrants, no palpable masses  Extremities: WWP, no clubbing, cyanosis, or edema  Neurology: A&Ox3, nonfocal, sensation intact     MEDICATIONS:  MEDICATIONS  (STANDING):  albumin human 25% IVPB 50 milliLiter(s) IV Intermittent every 8 hours  chlorhexidine 4% Liquid 1 Application(s) Topical <User Schedule>  dexMEDEtomidine Infusion 0.5 MICROgram(s)/kG/Hr (9.1 mL/Hr) IV Continuous <Continuous>  heparin   Injectable 5000 Unit(s) SubCutaneous every 8 hours  levothyroxine Injectable 93.75 MICROGram(s) IV Push at bedtime  meropenem  IVPB 500 milliGRAM(s) IV Intermittent every 24 hours  midodrine 10 milliGRAM(s) Oral every 8 hours  norepinephrine Infusion 0.05 MICROgram(s)/kG/Min (6.83 mL/Hr) IV Continuous <Continuous>  pantoprazole  Injectable 40 milliGRAM(s) IV Push daily  vancomycin    Solution 125 milliGRAM(s) Oral daily    MEDICATIONS  (PRN):  albuterol    90 MICROgram(s) HFA Inhaler 4 Puff(s) Inhalation every 6 hours PRN Shortness of Breath and/or Wheezing  sodium chloride 0.9% lock flush 10 milliLiter(s) IV Push every 1 hour PRN Pre/post blood products, medications, blood draw, and to maintain line patency      ALLERGIES:  Allergies    No Known Allergies    Intolerances        LABS:                        7.4    29.57 )-----------( 333      ( 20 Apr 2023 05:40 )             25.8     04-20    146<H>  |  111<H>  |  91<H>  ----------------------------<  106<H>  3.7   |  26  |  5.30<H>    Ca    7.9<L>      20 Apr 2023 05:40  Phos  4.9     04-20  Mg     2.1     04-20    TPro  4.7<L>  /  Alb  2.0<L>  /  TBili  0.5  /  DBili  x   /  AST  15  /  ALT  24  /  AlkPhos  54  04-20          RADIOLOGY & ADDITIONAL TESTS: Reviewed. ***  BEDSIDE LUNG ULTRASOUND: ***  BEDSIDE ECHO: ***    CENTRAL LINE: N        DATE INSERTED:             REMOVE: N  BISHOP: Y                       DATE INSERTED:              REMOVE: Y/N  A-LINE: N                       DATE INSERTED:              REMOVE: Y/N      GLOBAL ISSUE/BEST PRACTICE  Analgesia: Y  Sedation: Y  HOB elevation: yes  Stress ulcer prophylaxis: Y  VTE prophylaxis: Y  Glycemic control: Y  Nutrition: Y    CODE STATUS: Full Code   INTERVAL HPI/OVERNIGHT EVENTS: Patient vomitted overnight and again this AM when given PO contrast for CT scan. Tube feeds dc/ed. VSS; remains on 0.01 levo. Leukocytosis uptrending to 29 this AM. Plan for CT and S/S eval for vomiting today.     SUBJECTIVE: Patient seen and examined at bedside. Doing well; eager to remove NG tube     ROS  Denies pain       OBJECTIVE:      VITAL SIGNS:  ICU Vital Signs Last 24 Hrs  T(C): 36.9 (20 Apr 2023 08:02), Max: 36.9 (19 Apr 2023 12:02)  T(F): 98.4 (20 Apr 2023 08:02), Max: 98.4 (19 Apr 2023 12:02)  HR: 79 (20 Apr 2023 08:30) (70 - 97)  ABP: 109/38 (20 Apr 2023 08:30) (100/42 - 146/62)  ABP(mean): 67 (20 Apr 2023 08:30) (64 - 97)  RR: 28 (20 Apr 2023 08:30) (20 - 32)  SpO2: 100% (20 Apr 2023 08:30) (97% - 100%)    O2 Parameters below as of 20 Apr 2023 07:30  Patient On (Oxygen Delivery Method): nasal cannula  O2 Flow (L/min): 3        Mode: CPAP with PS, FiO2: 30, PEEP: 5, PS: 5, MAP: 10, PIP: 15    04-19 @ 07:01  -  04-20 @ 07:00  --------------------------------------------------------  IN: 347.2 mL / OUT: 1805 mL / NET: -1457.8 mL    04-20 @ 07:01  -  04-20 @ 08:53  --------------------------------------------------------  IN: 1.4 mL / OUT: 50 mL / NET: -48.6 mL      CAPILLARY BLOOD GLUCOSE      POCT Blood Glucose.: 111 mg/dL (20 Apr 2023 06:00)      PHYSICAL EXAM:  eral: NAD, comfortable  Respiratory: CTA b/l, on 2L NC   Cardiovascular: RRR, normal S1S2, no M/R/G  Abdomen: soft, NT/ND, ostomy c/d/i, making output.   Extremities: WWP, no clubbing, cyanosis, or edema  Neurology:  off sedation. AxOx3, still recovering but is improving. able to move all extremities     MEDICATIONS:  MEDICATIONS  (STANDING):  albumin human 25% IVPB 50 milliLiter(s) IV Intermittent every 8 hours  chlorhexidine 4% Liquid 1 Application(s) Topical <User Schedule>  dexMEDEtomidine Infusion 0.5 MICROgram(s)/kG/Hr (9.1 mL/Hr) IV Continuous <Continuous>  heparin   Injectable 5000 Unit(s) SubCutaneous every 8 hours  levothyroxine Injectable 93.75 MICROGram(s) IV Push at bedtime  meropenem  IVPB 500 milliGRAM(s) IV Intermittent every 24 hours  midodrine 10 milliGRAM(s) Oral every 8 hours  norepinephrine Infusion 0.05 MICROgram(s)/kG/Min (6.83 mL/Hr) IV Continuous <Continuous>  pantoprazole  Injectable 40 milliGRAM(s) IV Push daily  vancomycin    Solution 125 milliGRAM(s) Oral daily    MEDICATIONS  (PRN):  albuterol    90 MICROgram(s) HFA Inhaler 4 Puff(s) Inhalation every 6 hours PRN Shortness of Breath and/or Wheezing  sodium chloride 0.9% lock flush 10 milliLiter(s) IV Push every 1 hour PRN Pre/post blood products, medications, blood draw, and to maintain line patency      ALLERGIES:  Allergies    No Known Allergies    Intolerances        LABS:                        7.4    29.57 )-----------( 333      ( 20 Apr 2023 05:40 )             25.8     04-20    146<H>  |  111<H>  |  91<H>  ----------------------------<  106<H>  3.7   |  26  |  5.30<H>    Ca    7.9<L>      20 Apr 2023 05:40  Phos  4.9     04-20  Mg     2.1     04-20    TPro  4.7<L>  /  Alb  2.0<L>  /  TBili  0.5  /  DBili  x   /  AST  15  /  ALT  24  /  AlkPhos  54  04-20          RADIOLOGY & ADDITIONAL TESTS: Reviewed. ***  BEDSIDE LUNG ULTRASOUND: ***  BEDSIDE ECHO: ***    CENTRAL LINE: N        DATE INSERTED:             REMOVE: N  BISHOP: Y                       DATE INSERTED:              REMOVE: Y/N  A-LINE: N                       DATE INSERTED:              REMOVE: Y/N      GLOBAL ISSUE/BEST PRACTICE  Analgesia: Y  Sedation: Y  HOB elevation: yes  Stress ulcer prophylaxis: Y  VTE prophylaxis: Y  Glycemic control: Y  Nutrition: Y    CODE STATUS: Full Code

## 2023-04-20 NOTE — PROGRESS NOTE ADULT - SUBJECTIVE AND OBJECTIVE BOX
HERMILA QUIÑONES    [id number]    72y      Female    Patient is a 72y old  Female who presents with a chief complaint of intra-abdominal perforation (20 Apr 2023 15:14)      INTERVAL HPI/OVERNIGHT EVENTS:    patient is s/p Extubation, now sating well on RA, she looks lethargic, unable to provide much info, has some pain in the legs          Vital Signs Last 24 Hrs  T(C): 36.9 (20 Apr 2023 08:02), Max: 36.9 (19 Apr 2023 20:54)  T(F): 98.4 (20 Apr 2023 08:02), Max: 98.4 (19 Apr 2023 20:54)  HR: 80 (20 Apr 2023 14:00) (70 - 97)  BP: 128/62 (20 Apr 2023 14:00) (128/62 - 135/63)  BP(mean): 89 (20 Apr 2023 14:00) (86 - 90)  RR: 33 (20 Apr 2023 14:00) (20 - 35)  SpO2: 99% (20 Apr 2023 14:00) (96% - 100%)    Parameters below as of 20 Apr 2023 11:00  Patient On (Oxygen Delivery Method): room air        PHYSICAL EXAM:    GENERAL: Elderly female looking lethargic   HEENT: has NG in place   NECK: soft, Supple   CHEST/LUNG: Decrease air entry bilaterally; No wheezing  HEART: S1S2+, Regular rate and rhythm; No murmurs  ABDOMEN: Soft, Nontender, Nondistended; Bowel sounds present, surgical wound   EXTREMITIES:  1+ Peripheral Pulses, has some edema  SKIN: No rashes or lesions  NEURO: following simple commands         LABS:                        7.4    29.57 )-----------( 333      ( 20 Apr 2023 05:40 )             25.8     04-20    146<H>  |  111<H>  |  91<H>  ----------------------------<  106<H>  3.7   |  26  |  5.30<H>    Ca    7.9<L>      20 Apr 2023 05:40  Phos  4.9     04-20  Mg     2.1     04-20    TPro  4.7<L>  /  Alb  2.0<L>  /  TBili  0.5  /  DBili  x   /  AST  15  /  ALT  24  /  AlkPhos  54  04-20            I&O's Summary    19 Apr 2023 07:01  -  20 Apr 2023 07:00  --------------------------------------------------------  IN: 347.2 mL / OUT: 1805 mL / NET: -1457.8 mL    20 Apr 2023 07:01  -  20 Apr 2023 15:25  --------------------------------------------------------  IN: 77.5 mL / OUT: 565 mL / NET: -487.5 mL        MEDICATIONS  (STANDING):  albumin human 25% IVPB 50 milliLiter(s) IV Intermittent every 8 hours  chlorhexidine 4% Liquid 1 Application(s) Topical <User Schedule>  dexMEDEtomidine Infusion 0.5 MICROgram(s)/kG/Hr (9.1 mL/Hr) IV Continuous <Continuous>  heparin   Injectable 5000 Unit(s) SubCutaneous every 8 hours  levothyroxine Injectable 93.75 MICROGram(s) IV Push at bedtime  meropenem  IVPB 500 milliGRAM(s) IV Intermittent every 24 hours  metoclopramide Injectable 10 milliGRAM(s) IV Push every 8 hours  midodrine 10 milliGRAM(s) Oral every 8 hours  norepinephrine Infusion 0.05 MICROgram(s)/kG/Min (6.83 mL/Hr) IV Continuous <Continuous>  pantoprazole  Injectable 40 milliGRAM(s) IV Push daily  vancomycin    Solution 125 milliGRAM(s) Oral daily    MEDICATIONS  (PRN):  albuterol    90 MICROgram(s) HFA Inhaler 4 Puff(s) Inhalation every 6 hours PRN Shortness of Breath and/or Wheezing  fentaNYL    Injectable 25 MICROGram(s) IV Push every 4 hours PRN Severe Pain (7 - 10)  sodium chloride 0.9% lock flush 10 milliLiter(s) IV Push every 1 hour PRN Pre/post blood products, medications, blood draw, and to maintain line patency

## 2023-04-20 NOTE — PROGRESS NOTE ADULT - ASSESSMENT
73 y/o f w/ PMH of HTN, CHF, asthma, hypothyroidism, Afib, splenomegaly, myelofibrosis, history of C. difficile 1/13/23, who p/w generalized abdominal pain, and persistent diarrhea. Found to have sepsis 2/2 perforated viscus from sigmoid colon perforation.    S/p Tia procedure and abdominal abscess drainage on 4/13. Peritoneal fluid cultures grew ESBL klebsiella, strep constellatus, proteus vulgaris, E coli, bacteroides. Blood cultures no growth. C diff negative.    Patient with worsening leukocytosis, WBC now 29. Otherwise now extubated and remains afebrile. CT A/P done today a limited study but shows possible enterocolitis. No localized intra-abdominal fluid collection seen.    -continue meropenem (renally dosed)  -continue vancomycin 125mg PO daily for c diff ppx--but if WBC worse consider changing to treatment dose  -suggest blood and respiratory cultures if febrile, or if WBC worse  -discussed with ICU team    Diandra Ricardo MD  Division of Infectious Diseases   Cell [phone] between 8am and 6pm   After 6pm and weekends please call ID service at [phone]

## 2023-04-20 NOTE — PROGRESS NOTE ADULT - ASSESSMENT
73yo F with PMH of afib (off xarelto at least 1wk PTA), HFpEF, asthma, recent C diff in Jan presenting with abd pain, weakness and falls at home a/w severe sepsis and peritonitis due to perforated sigmoid colon, also with LAYA due to ATN, shock liver, and post/op acute respiratory failure requiring mechanical ventilation, and acute blood loss anemia.    Plan:     Septic shock:   Abd pain + persistent diarrhea for several months, multiple episodes a day w/ progressive worsening; not relieved by pain meds, a/w n/v  CT A/P - Pneumoperitoneum and no ascites. Suspect perforated sigmoid colon.  septic shock due to peritonitis and perforated diverticulitis  s/p Tia procedure and abdominal abscess drainage on 4/13  lactic acidosis resolved  On levophed (weaned off dual pressor)  TTE (04/2023) - LVEF of 55-60%; LAE; Sclerotic trileaflet aortic valve, trace AI. Mild to moderate MR and TR. IVC measures 2.0 cm and is non collapsing  BCx - 4/13 no growth   Peritoneal cultures poly microbial - Proteus vulgaris group, Escherichia coli, Klebsiella pneumoniae, Morganella morganii   Peritoneal fluid with gram variable rods and PMNs.  CDiff negative, GI PCR neg  s/p Zosyn and Flagyl   continue meropenem  on vancomycin oral solution for C diff prophylaxis  ID following, recs appreciated  Shock liver, improving LFTs ; ischemic ATN  Mgmt per ICU.  white count is 23k going up to 29k, need to discuss with ID   monitor CBC      Acute respiratory failure with hypoxia:    Intubated Failed spontaneous breathing trial earlier, reattempt to extubate yesterday and now successful, she is sating well on RA   Will encourage IS   chest PT     Perforated sigmoid colon:   CT with abdominal perforation; now s/p Ro and evacuation of about 1.2L purulent collection with the peritoneum on 4/13  S/p DDAVP, FFP and vitamin K to reverse coagulopathy  given 2un PRBC for acute blood loss anemia  Continue broad spectrum antibiotics as above  Cultures as above  Ostomy c/d/i; watery output noted  NPO - has NGT, being evaluated by speech and swallow team    neg c-diff, neg GI pcr  ID following, recs appreciated  Mgmt per ICU     LAYA (acute kidney injury): LAYA likely ATN due to septic shock  Cr worsened to 5.0 now 5.30   S/p bicarb gtt  Mgmt per ICU and Nephrology team   consider nephro eval.  monitor BMP  I/O  Might be a candidate for Albumin due to thrid spacing from low albumin       Chronic CHF: ProBNP 88321  TTE (04/2023) - LVEF of 55-60%; LAE; Sclerotic trileaflet aortic valve, trace AI. Mild to moderate MR and TR. IVC measures 2.0 cm and is non collapsing  Per cardio - hold Lasix and lopressor for now due to worsening kidney function   Cardio following, recs appreciated  Mgmt per ICU.    Hypothyroidism: IV synthroid  Mgmt per ICU.    Chronic atrial fibrillation: Continue to hold Xarelto. Possible eventual heparin gtt.  Mgmt per ICU.    Need for prophylactic measure: Continue DVT prophylaxis with venodynes  Mgmt per ICU      #Imaging abnormality  Stable right lung nodule, ground-glass infiltrates and loculated right pleural effusion.    # Goals of Care  - consider Palliative Consult to discuss goals of care    #DISPO  PT consult when able.

## 2023-04-20 NOTE — PROGRESS NOTE ADULT - PROBLEM SELECTOR PLAN 1
Antibiotics continued- Meropenem, Vanco  Continue NPO with NGT, started on tube feed.   Out of bed  Incentive spirometer  Continue to monitor labs  VTE with lovenox  Possible rescan?  Will discuss with Dr. Goldberg.

## 2023-04-20 NOTE — PROGRESS NOTE ADULT - SUBJECTIVE AND OBJECTIVE BOX
Patient is a 72y old  Female who presents with a chief complaint of intra-abdominal perforation (20 Apr 2023 11:52)    Patient seen in follow up for LAYA on CKD.        PAST MEDICAL HISTORY:  Hypothyroidism    HLD (hyperlipidemia)    Thrombocytosis    Persistent atrial fibrillation    Obesity (BMI 35.0-39.9 without comorbidity)    Asthma    Diabetes mellitus      MEDICATIONS  (STANDING):  albumin human 25% IVPB 50 milliLiter(s) IV Intermittent every 8 hours  chlorhexidine 4% Liquid 1 Application(s) Topical <User Schedule>  dexMEDEtomidine Infusion 0.5 MICROgram(s)/kG/Hr (9.1 mL/Hr) IV Continuous <Continuous>  heparin   Injectable 5000 Unit(s) SubCutaneous every 8 hours  levothyroxine Injectable 93.75 MICROGram(s) IV Push at bedtime  meropenem  IVPB 500 milliGRAM(s) IV Intermittent every 24 hours  midodrine 10 milliGRAM(s) Oral every 8 hours  norepinephrine Infusion 0.05 MICROgram(s)/kG/Min (6.83 mL/Hr) IV Continuous <Continuous>  pantoprazole  Injectable 40 milliGRAM(s) IV Push daily  vancomycin    Solution 125 milliGRAM(s) Oral daily    MEDICATIONS  (PRN):  albuterol    90 MICROgram(s) HFA Inhaler 4 Puff(s) Inhalation every 6 hours PRN Shortness of Breath and/or Wheezing  sodium chloride 0.9% lock flush 10 milliLiter(s) IV Push every 1 hour PRN Pre/post blood products, medications, blood draw, and to maintain line patency    T(C): 36.9 (04-20-23 @ 08:02), Max: 37.2 (04-19-23 @ 04:19)  HR: 77 (04-20-23 @ 13:00) (68 - 97)  BP: 129/60 (04-20-23 @ 13:00) (129/60 - 129/60)  RR: 31 (04-20-23 @ 13:00) (20 - 35)  SpO2: 99% (04-20-23 @ 13:00) (96% - 100%)  Wt(kg): --  I&O's Detail    19 Apr 2023 07:01  -  20 Apr 2023 07:00  --------------------------------------------------------  IN:    Albumin 5%  - 250 mL: 50 mL    Dexmedetomidine: 21.9 mL    IV PiggyBack: 100 mL    IV PiggyBack: 50 mL    Nepro with Carb Steady: 80 mL    Norepinephrine: 45.3 mL  Total IN: 347.2 mL    OUT:    Bulb (mL): 30 mL    Colostomy (mL): 1225 mL    Indwelling Catheter - Urethral (mL): 550 mL  Total OUT: 1805 mL    Total NET: -1457.8 mL      20 Apr 2023 07:01  -  20 Apr 2023 13:24  --------------------------------------------------------  IN:    Norepinephrine: 22 mL  Total IN: 22 mL    OUT:    Colostomy (mL): 150 mL    Dexmedetomidine: 0 mL    Indwelling Catheter - Urethral (mL): 365 mL    Nepro with Carb Steady: 0 mL  Total OUT: 515 mL    Total NET: -493 mL          PHYSICAL EXAM:  General: No distress  Respiratory: b/l air entry  Cardiovascular: S1 S2  Gastrointestinal: soft  Extremities:  edema                              7.4    29.57 )-----------( 333      ( 20 Apr 2023 05:40 )             25.8     04-20    146<H>  |  111<H>  |  91<H>  ----------------------------<  106<H>  3.7   |  26  |  5.30<H>    Ca    7.9<L>      20 Apr 2023 05:40  Phos  4.9     04-20  Mg     2.1     04-20    TPro  4.7<L>  /  Alb  2.0<L>  /  TBili  0.5  /  DBili  x   /  AST  15  /  ALT  24  /  AlkPhos  54  04-20        LIVER FUNCTIONS - ( 20 Apr 2023 05:40 )  Alb: 2.0 g/dL / Pro: 4.7 g/dL / ALK PHOS: 54 U/L / ALT: 24 U/L / AST: 15 U/L / GGT: x             ABG - ( 19 Apr 2023 12:39 )  pH, Arterial: 7.31  pH, Blood: x     /  pCO2: 50    /  pO2: 105   / HCO3: 25    / Base Excess: -1.1  /  SaO2: 99.6              Sodium, Serum: 146 (04-20 @ 05:40)  Sodium, Serum: 146 (04-19 @ 05:45)  Sodium, Serum: 143 (04-18 @ 05:43)  Sodium, Serum: 142 (04-17 @ 05:40)    Creatinine, Serum: 5.30 (04-20 @ 05:40)  Creatinine, Serum: 5.20 (04-19 @ 05:45)  Creatinine, Serum: 5.00 (04-18 @ 05:43)  Creatinine, Serum: 4.80 (04-17 @ 05:40)    Potassium, Serum: 3.7 (04-20 @ 05:40)  Potassium, Serum: 3.8 (04-19 @ 05:45)  Potassium, Serum: 3.9 (04-18 @ 05:43)  Potassium, Serum: 3.7 (04-17 @ 05:40)    Hemoglobin: 7.4 (04-20 @ 05:40)  Hemoglobin: 7.6 (04-19 @ 05:45)  Hemoglobin: 7.6 (04-18 @ 05:43)  Hemoglobin: 8.1 (04-17 @ 05:40)

## 2023-04-20 NOTE — PROGRESS NOTE ADULT - ASSESSMENT
LAYA on CKD: Septic/hemodynamic ATN  Sigmoid perforation, s/p surgery  Anemia  Shock, Sepsis    04/19/23: Will follow creatinine trend and urine out put. On less pressor support. IV abx, ID follow up. Monitor h/h trend. Transfuse PRBC's PRN.   Avoid nephrotoxic meds as possible. Avoid ACEI, ARB, NSAIDs and IV contrast. Will follow electrolytes and renal function trend. D/w pt's son over the phone.   No emergent need for hemodialysis at this time.   04/20/23: Pt extubated. IV diuresis. Follow UO. Avoid nephrotoxic meds as possible. Monitor h/h trend. Transfuse PRBC's PRN. ICU management.

## 2023-04-20 NOTE — PROGRESS NOTE ADULT - SUBJECTIVE AND OBJECTIVE BOX
[facility]  INFECTIOUS DISEASES - [address], [place] [address]  Tel: [phone]     Fax: [phone]  =======================================================    HERMILA QUIÑONES [id number]    Follow up: No fevers. Extubated, remains on Levophed. Appears weak, but denied any pain or SOB at the time of my exam. Has nausea and vomiting, unable to tolerate PO.    Allergies:  No Known Allergies      Antibiotics:  albumin human 25% IVPB 50 milliLiter(s) IV Intermittent every 8 hours  albuterol    90 MICROgram(s) HFA Inhaler 4 Puff(s) Inhalation every 6 hours PRN  chlorhexidine 4% Liquid 1 Application(s) Topical <User Schedule>  dexMEDEtomidine Infusion 0.5 MICROgram(s)/kG/Hr IV Continuous <Continuous>  fentaNYL    Injectable 25 MICROGram(s) IV Push every 4 hours PRN  heparin   Injectable 5000 Unit(s) SubCutaneous every 8 hours  levothyroxine Injectable 93.75 MICROGram(s) IV Push at bedtime  meropenem  IVPB 500 milliGRAM(s) IV Intermittent every 24 hours  midodrine 10 milliGRAM(s) Oral every 8 hours  norepinephrine Infusion 0.05 MICROgram(s)/kG/Min IV Continuous <Continuous>  pantoprazole  Injectable 40 milliGRAM(s) IV Push daily  sodium chloride 0.9% lock flush 10 milliLiter(s) IV Push every 1 hour PRN  vancomycin    Solution 125 milliGRAM(s) Oral daily       REVIEW OF SYSTEMS:  as per HPI     Physical Exam:  ICU Vital Signs Last 24 Hrs  T(C): 36.9 (20 Apr 2023 08:02), Max: 36.9 (19 Apr 2023 20:54)  T(F): 98.4 (20 Apr 2023 08:02), Max: 98.4 (19 Apr 2023 20:54)  HR: 80 (20 Apr 2023 14:00) (70 - 97)  BP: 128/62 (20 Apr 2023 14:00) (128/62 - 135/63)  BP(mean): 89 (20 Apr 2023 14:00) (86 - 90)  ABP: 130/53 (20 Apr 2023 14:00) (74/44 - 146/62)  ABP(mean): 85 (20 Apr 2023 14:00) (61 - 97)  RR: 33 (20 Apr 2023 14:00) (20 - 35)  SpO2: 99% (20 Apr 2023 14:00) (96% - 100%)    O2 Parameters below as of 20 Apr 2023 11:00  Patient On (Oxygen Delivery Method): room air      GEN: appears weak but not in apparent distress  HEENT: normocephalic and atraumatic.   NECK: Supple.   LUNGS: Normal respiratory effort  HEART: Regular rate and rhythm   ABDOMEN: (+) ostomy with small amount of liquid stool, soft, nondistended  EXTREMITIES: bipedal edema  NEUROLOGIC: answering some simple questions    Labs:  04-20    146<H>  |  111<H>  |  91<H>  ----------------------------<  106<H>  3.7   |  26  |  5.30<H>    Ca    7.9<L>      20 Apr 2023 05:40  Phos  4.9     04-20  Mg     2.1     04-20    TPro  4.7<L>  /  Alb  2.0<L>  /  TBili  0.5  /  DBili  x   /  AST  15  /  ALT  24  /  AlkPhos  54  04-20                          7.4    29.57 )-----------( 333      ( 20 Apr 2023 05:40 )             25.8         LIVER FUNCTIONS - ( 20 Apr 2023 05:40 )  Alb: 2.0 g/dL / Pro: 4.7 g/dL / ALK PHOS: 54 U/L / ALT: 24 U/L / AST: 15 U/L / GGT: x             RECENT CULTURES:  04-13 @ 19:30 .Blood Blood-Peripheral     No Growth Final        04-13 @ 19:25 .Blood Blood-Peripheral     No Growth Final        04-13 @ 14:00 Peritoneal PERITONEAL FLUID #2 Proteus vulgaris group  Escherichia coli  Klebsiella pneumoniae ESBL  Streptococcus constellatus    Rare Proteus vulgaris group  Few Escherichia coli  Few Klebsiella pneumoniae ESBL  Few Streptococcus constellatus  Few Bacteroides distasonis "Susceptibilities not performed"    No polymorphonuclear cells seen per low power field  Few Gram positive cocci in pairs per oil power field      04-13 @ 06:40          [facility]        All imaging and data are reviewed.     CT A/P:  FINDINGS:    LOWER CHEST:  9 mm nodule right middle lobe, stable.  Small loculated right pleural effusion with underlying airspace   consolidation, which could reflect chronic atelectasis.  Trace left-sided pleural effusion and pleural thickening with   pleural-based calcifications.    Extensive streak artifact degrades image quality, limiting evaluation.    The evaluation of the solid organ parenchyma is limited without   intravenous contrast.    LIVER: Within normal limits.  BILE DUCTS: Normal caliber.  GALLBLADDER: Cholecystectomy.  SPLEEN: Splenomegaly.  PANCREAS: Within normal limits.  ADRENALS: Within normal limits.  KIDNEYS/URETERS: Within normal limits.    BLADDER: Higgins catheter.  REPRODUCTIVE ORGANS:  The uterus and adnexa are not well evaluated on this exam.    BOWEL:  PERITONEUM:  Nasogastric tube, tip within the stomach. Evaluation of the stomach is   limited without distention however, suggestion of diffuse gastric wall   thickening.    Status post left hemicolectomy  with rectosigmoid stump and right lower quadrant colostomy.  A rectal probe is present.  There is diffuse small bowel and colonic wall thickening. Enteric   contrast transits through the colon to the ostomy, without bowel   obstruction.  There is a small to moderate volume of abdominal and pelvic ascites.  There is diffuse mesenteric stranding.  No localized fluid collection is noted.  There is a small droplet of free intraperitoneal air, which may be   postoperative.  There is mild presacral stranding.    VESSELS: Atherosclerotic changes.  RETROPERITONEUM/LYMPH NODES: No lymphadenopathy.    ABDOMINAL WALL:  Diffuse subcutaneous body wall edema/anasarca.    Postsurgical changes with ventral abdominal wall wound.  No localized encapsulated subcutaneous fluid collection.    There is subtle linear high attenuation in the midline abdominal wall,   extending toward the superficial defect, (2:77, 601:85).  There are adjacent small bubbles of gas/air within the subcutaneous   tissues.    This may reflect postsurgical material, however, cannot exclude enteric   contrast related to enterocutaneous fistula.    BONES: Degenerative changes.    IMPRESSION:    Limited study.    Status post left hemicolectomy and right lower quadrant colostomy.    Diffuse small bowel and colonic wall thickening may reflect an   enterocolitis.    Small to moderate abdominal pelvic ascites, without localized   intra-abdominal fluid collection.    Diffuse body wall edema/anasarca.  Subtle linear high attenuation material extending from the abdominal wall   to the midline surgical wound, may reflect postsurgical material,   however, cannot exclude enterocutaneous fistula.    Other findings as discussed above.

## 2023-04-20 NOTE — PROGRESS NOTE ADULT - ASSESSMENT
72-year-old female with history of hypertension, CHF, afib s/p AF ablation (2/7/19) currently not on xarelto, CATH 2018, hypothyroidism, HLD history of C. difficile January 2023 presents for vomitting and abdominal pain. Consulted for CHF.    - CT ABD/Pelvis: Pneumoperitoneum and no ascites. Suspect perforated sigmoid colon. Stable splenomegaly. Stable right lung nodule, groundglass infiltrates and loculated right pleural effusion.  - S/p Tia's with abdominal abscess drainage.   -  OR finding with >1.2 liters of pus from abd cavity.   - cont abx    - extubated yesterday, now on NC  - pressor support to maintain MAP at least >60. Titrate off as blood pressure tolerates.   - diuretics have been held for hypotension, though appears volume overloaded this am, and pulmonary pressures have been in the 60s. Worsening renal function overall, and would try to diurese today.    - hold home metoprolol 50mg qd due to hypotension, can consider restarting if BP stabilizes    - Elevated troponin peaked at 100.9, otherwise cardiac enzymes unremarkable, likely demand in the setting of sepsis  - EKG: Sinus tachycardia with PAC's  - No acute changes on EKG compared to previous.   - Prior TTE (1/18/23): normal LVEF 65%, normal RV size and function, biatrial enlargement, sclerotic aortic valve, mild AI, Moderate MR and TR  - TTE shows EF 55-60%, RVE, PASP 66     - hx of PAF s/p PVI  - maintaining SR  - not on AC at home  - cont tele    - Monitor and replete lytes, keep K>4, Mg>2.  - Other cardiovascular workup will depend on clinical course.  - Will continue to follow.  - very high risk of decompensation.

## 2023-04-20 NOTE — PROGRESS NOTE ADULT - ASSESSMENT
72F with pmhx afib s/p ablation, HFpEF, mod MR, thrombocytosis, asthma recent C diff infection was brought to the ED today for recurrent falls and persistent nausea/vomiting, now with pneumoperitenum and perforation; Day 6 s/p Ro.       Neuro: d/c precedex. awake, able to point to things in room and people, moves all ext. Attempt extubation this PM.    CV: Levo 0.01, continue to wean as tolerated    Pulm: Intubated; saturating well on CPAP 5/5. plan to extubate today     GI: day # 6 s/p Ro. Continue protonix, meropenam, PO Vancomycin. Ostomy c/d/i w/ bilious output. Hold tube feeds this AM in anticipation of extubation     Renal: LAYA 2/2 ATN; Cr 5.0 this AM. continue with LR. Will give 25% albumin and 2mg Bumex again today to attempt to further stimulate urine production - patient has been making approximately 40cc/hr.     ID: Cont meropenam 2/2 ESBL and PO vancomycin 125mg per day for Cdiff prophylaxis    Endo: Continue IV synthroid at 75% of home dose; monitor glucose w/ fingersticks.     Heme: Continue HSQ TID dvt ppx    72F with pmhx afib s/p ablation, HFpEF, mod MR, thrombocytosis, asthma recent C diff infection was brought to the ED today for recurrent falls and persistent nausea/vomiting, now with pneumoperitenum and perforation; Day 7 s/p Ro.       Neuro: off all sedation, mentating well, improving, denies pain. PT eval today     CV: Levo 0.01, continue to wean as tolerated    Pulm: extubated yestrday; on 2L NC satuarting well; continue to wean as tolerated.     GI: day # 7 s/p Ro. Continue protonix, meropenam, PO Vancomycin. Ostomy c/d/i w/ bilious output. Hold tube feeds this AM after vomiting x2. Will obtain CT Abd/pelv to r/o obstruction. F/u S/S eval - consider starting PO diet pending eval.    Renal: LAYA 2/2 ATN; Cr 5.2 this AM. continue with LR. Bumex 2mg x1 given this AM. Urine output improving to apprx 50cc/hr. Continue to monitor.     ID: Cont meropenam 2/2 ESBL and PO vancomycin 125mg per day for Cdiff prophylaxis    Endo: Continue IV synthroid at 75% of home dose; monitor glucose w/ fingersticks.     Heme: Continue HSQ TID dvt ppx

## 2023-04-20 NOTE — PROGRESS NOTE ADULT - SUBJECTIVE AND OBJECTIVE BOX
Postoperative Day #: 7    72y Female admitted with Enterocolitis due to Clostridioides difficile  S/P Extended hartmanns (including descending colon) for perforated sigmoid      Patient seen and examined bedside resting comfortably.  Currently in bed, NGT in place.  Did not want to discuss anything further.        T(F): 98.4 (04-20-23 @ 08:02), Max: 98.4 (04-19-23 @ 12:02)  HR: 79 (04-20-23 @ 08:30) (70 - 97)  BP: --  RR: 28 (04-20-23 @ 08:30) (20 - 32)  SpO2: 100% (04-20-23 @ 08:30) (97% - 100%)  Wt(kg): --  CAPILLARY BLOOD GLUCOSE      POCT Blood Glucose.: 111 mg/dL (20 Apr 2023 06:00)  POCT Blood Glucose.: 119 mg/dL (19 Apr 2023 23:57)  POCT Blood Glucose.: 120 mg/dL (19 Apr 2023 17:15)  POCT Blood Glucose.: 140 mg/dL (19 Apr 2023 11:33)      PHYSICAL EXAM:  General: NAD  Neuro:  Alert & oriented, a bit fatigued.   Pt refused any further exam, dressing changes.     LABS:                        7.4    29.57 )-----------( 333      ( 20 Apr 2023 05:40 )             25.8     04-20    146<H>  |  111<H>  |  91<H>  ----------------------------<  106<H>  3.7   |  26  |  5.30<H>    Ca    7.9<L>      20 Apr 2023 05:40  Phos  4.9     04-20  Mg     2.1     04-20    TPro  4.7<L>  /  Alb  2.0<L>  /  TBili  0.5  /  DBili  x   /  AST  15  /  ALT  24  /  AlkPhos  54  04-20      I&O's Detail    19 Apr 2023 07:01  -  20 Apr 2023 07:00  --------------------------------------------------------  IN:    Albumin 5%  - 250 mL: 50 mL    Dexmedetomidine: 21.9 mL    IV PiggyBack: 100 mL    IV PiggyBack: 50 mL    Nepro with Carb Steady: 80 mL    Norepinephrine: 45.3 mL  Total IN: 347.2 mL    OUT:    Bulb (mL): 30 mL    Colostomy (mL): 1225 mL    Indwelling Catheter - Urethral (mL): 550 mL  Total OUT: 1805 mL    Total NET: -1457.8 mL      20 Apr 2023 07:01  -  20 Apr 2023 08:37  --------------------------------------------------------  IN:    Norepinephrine: 1.4 mL  Total IN: 1.4 mL    OUT:    Dexmedetomidine: 0 mL    Indwelling Catheter - Urethral (mL): 50 mL    Nepro with Carb Steady: 0 mL  Total OUT: 50 mL    Total NET: -48.6 mL            RADIOLOGY:

## 2023-04-20 NOTE — PROGRESS NOTE ADULT - SUBJECTIVE AND OBJECTIVE BOX
[facility] - Adrián Wray Pannella, Patel, Savella  Office Number:  [phone]    Patient resting comfortably in bed in NAD.  ngt in place  now extubated  tired this am  no chest pain    ROS: negative unless otherwise mentioned.    Telemetry:  sr    MEDICATIONS  (STANDING):  albumin human 25% IVPB 50 milliLiter(s) IV Intermittent every 8 hours  chlorhexidine 4% Liquid 1 Application(s) Topical <User Schedule>  dexMEDEtomidine Infusion 0.5 MICROgram(s)/kG/Hr (9.1 mL/Hr) IV Continuous <Continuous>  heparin   Injectable 5000 Unit(s) SubCutaneous every 8 hours  levothyroxine Injectable 93.75 MICROGram(s) IV Push at bedtime  meropenem  IVPB 500 milliGRAM(s) IV Intermittent every 24 hours  midodrine 10 milliGRAM(s) Oral every 8 hours  norepinephrine Infusion 0.05 MICROgram(s)/kG/Min (6.83 mL/Hr) IV Continuous <Continuous>  pantoprazole  Injectable 40 milliGRAM(s) IV Push daily  vancomycin    Solution 125 milliGRAM(s) Oral daily    MEDICATIONS  (PRN):  albuterol    90 MICROgram(s) HFA Inhaler 4 Puff(s) Inhalation every 6 hours PRN Shortness of Breath and/or Wheezing  sodium chloride 0.9% lock flush 10 milliLiter(s) IV Push every 1 hour PRN Pre/post blood products, medications, blood draw, and to maintain line patency      Allergies    No Known Allergies    Intolerances        Vital Signs Last 24 Hrs  T(C): 36.9 (20 Apr 2023 08:02), Max: 36.9 (19 Apr 2023 12:02)  T(F): 98.4 (20 Apr 2023 08:02), Max: 98.4 (19 Apr 2023 12:02)  HR: 78 (20 Apr 2023 11:30) (70 - 97)  BP: --  BP(mean): --  RR: 30 (20 Apr 2023 11:30) (20 - 32)  SpO2: 96% (20 Apr 2023 11:30) (96% - 100%)    Parameters below as of 20 Apr 2023 11:00  Patient On (Oxygen Delivery Method): room air        I&O's Summary    19 Apr 2023 07:01  -  20 Apr 2023 07:00  --------------------------------------------------------  IN: 347.2 mL / OUT: 1805 mL / NET: -1457.8 mL    20 Apr 2023 07:01  -  20 Apr 2023 11:52  --------------------------------------------------------  IN: 11 mL / OUT: 385 mL / NET: -374 mL        ON EXAM:    Constitutional: awake, tired, ngt in place  HEENT: Moist Mucous Membranes, Anicteric  Pulmonary: Non-labored, breath sounds are decreased bilaterally, No wheezing, rales or rhonchi  Cardiovascular: Regular, S1 and S2, No murmurs, rubs, gallops or clicks  Gastrointestinal: Bowel Sounds present, soft, nontender.   Lymph: mild peripheral edema. No lymphadenopathy.  Skin: No visible rashes or ulcers.  Psych:  awake, sleepy    LABS: All Labs Reviewed:                        7.4    29.57 )-----------( 333      ( 20 Apr 2023 05:40 )             25.8                         7.6    23.77 )-----------( 238      ( 19 Apr 2023 05:45 )             26.0                         7.6    18.60 )-----------( 159      ( 18 Apr 2023 05:43 )             25.4     20 Apr 2023 05:40    146    |  111    |  91     ----------------------------<  106    3.7     |  26     |  5.30   19 Apr 2023 05:45    146    |  110    |  88     ----------------------------<  131    3.8     |  26     |  5.20   18 Apr 2023 05:43    143    |  108    |  87     ----------------------------<  108    3.9     |  24     |  5.00     Ca    7.9        20 Apr 2023 05:40  Ca    7.8        19 Apr 2023 05:45  Ca    7.7        18 Apr 2023 05:43  Phos  4.9       20 Apr 2023 05:40  Phos  4.5       19 Apr 2023 05:45  Phos  4.2       18 Apr 2023 05:43  Mg     2.1       20 Apr 2023 05:40  Mg     2.1       19 Apr 2023 05:45  Mg     2.1       18 Apr 2023 05:43    TPro  4.7    /  Alb  2.0    /  TBili  0.5    /  DBili  x      /  AST  15     /  ALT  24     /  AlkPhos  54     20 Apr 2023 05:40  TPro  4.6    /  Alb  1.8    /  TBili  0.6    /  DBili  x      /  AST  22     /  ALT  40     /  AlkPhos  61     19 Apr 2023 05:45  TPro  4.6    /  Alb  1.8    /  TBili  0.8    /  DBili  x      /  AST  45     /  ALT  63     /  AlkPhos  82     18 Apr 2023 05:43          Blood Culture:

## 2023-04-20 NOTE — SWALLOW BEDSIDE ASSESSMENT ADULT - COMMENTS
MD orders received. Chart reviewed. Per charting, pt is a "72F with pmhx afib s/p ablation, HFpEF, mod MR, thrombocytosis, asthma recent C diff infection was brought to the ED today for recurrent falls and persistent nausea/vomiting, now with pneumoperitenum and perforation; Day 7 s/p Ro. "    Per today's critical care note " Patient vomitted overnight and again this AM when given PO contrast for CT scan. Tube feeds dc/ed. VSS; remains on 0.01 levo. Leukocytosis uptrending to 29 this AM. Plan for CT and S/S eval for vomiting today. "    Pt attempted to be seen this AM, however, ICU team requested SLP evaluation was deferred until this PM as pt was planned for abdominal CT with contrast. Results of abdominal CT reviewed, SLP called out to ICU team to obtain verbal clearance to provide trials at which time ICU team (spectra [id number]) reports pt remains nauseous and wishes to defer evaluation again at this time.    Medical team advised to reconsult this service upon improvement in medical status and/or reduced pt report of nausea to allow for safe presentation of PO trials. MD in agreement with plan.

## 2023-04-20 NOTE — PROGRESS NOTE ADULT - ASSESSMENT
73 yo female with pmhx of CLL, HFpef here s/p extended hartmanns (including descending colon) for perforated sigmoid.  Currently with NGT, and rising white count.

## 2023-04-21 NOTE — PROGRESS NOTE ADULT - SUBJECTIVE AND OBJECTIVE BOX
[facility]  INFECTIOUS DISEASES - Adriana Juarez, [place] [address]  Tel: [phone]     Fax: [phone]  =======================================================    HERMILA QUIÑONES [id number]    Follow up: No fevers. Appears weak but awake. Denies any pain, SOB.    Allergies:  No Known Allergies      Antibiotics:  albuterol    90 MICROgram(s) HFA Inhaler 4 Puff(s) Inhalation every 6 hours PRN  chlorhexidine 2% Cloths 1 Application(s) Topical daily  escitalopram 20 milliGRAM(s) Oral daily  fentaNYL    Injectable 25 MICROGram(s) IV Push every 4 hours PRN  heparin   Injectable 5000 Unit(s) SubCutaneous every 8 hours  levothyroxine Injectable 93.75 MICROGram(s) IV Push at bedtime  meropenem  IVPB 500 milliGRAM(s) IV Intermittent every 24 hours  midodrine 10 milliGRAM(s) Oral every 8 hours  pantoprazole   Suspension 40 milliGRAM(s) Oral daily  psyllium Powder 1 Packet(s) Oral daily  sodium chloride 0.9% lock flush 10 milliLiter(s) IV Push every 1 hour PRN  vancomycin    Solution 125 milliGRAM(s) Oral every 6 hours       REVIEW OF SYSTEMS:  Limited 2/2 clinical condition, as per HPI.     Physical Exam:  ICU Vital Signs Last 24 Hrs  T(C): 36.8 (21 Apr 2023 12:09), Max: 36.9 (20 Apr 2023 15:57)  T(F): 98.2 (21 Apr 2023 12:09), Max: 98.4 (20 Apr 2023 15:57)  HR: 83 (21 Apr 2023 14:00) (72 - 87)  BP: 114/56 (21 Apr 2023 14:00) (114/56 - 136/63)  BP(mean): 81 (21 Apr 2023 14:00) (81 - 102)  ABP: 134/57 (21 Apr 2023 13:00) (70/47 - 139/58)  ABP(mean): 89 (21 Apr 2023 13:00) (61 - 92)  RR: 34 (21 Apr 2023 14:00) (10 - 36)  SpO2: 94% (21 Apr 2023 14:00) (75% - 99%)    GEN: appears weak but not in apparent distress  HEENT: normocephalic and atraumatic.   NECK: Supple.   LUNGS: Normal respiratory effort  HEART: Regular rate and rhythm   ABDOMEN: (+) ostomy with small amount of liquid stool, soft, nondistended  EXTREMITIES: bipedal edema  NEUROLOGIC: answering some simple questions with yes/no      Labs:  04-21    146<H>  |  111<H>  |  86<H>  ----------------------------<  92  3.6   |  24  |  5.30<H>    Ca    7.7<L>      21 Apr 2023 05:50  Phos  4.6     04-21  Mg     2.2     04-21    TPro  4.9<L>  /  Alb  2.2<L>  /  TBili  0.5  /  DBili  x   /  AST  13<L>  /  ALT  17  /  AlkPhos  51  04-21                          7.7    36.72 )-----------( 416      ( 21 Apr 2023 05:50 )             26.1         LIVER FUNCTIONS - ( 21 Apr 2023 05:50 )  Alb: 2.2 g/dL / Pro: 4.9 g/dL / ALK PHOS: 51 U/L / ALT: 17 U/L / AST: 13 U/L / GGT: x             RECENT CULTURES:  04-13 @ 19:30 .Blood Blood-Peripheral     No Growth Final        04-13 @ 19:25 .Blood Blood-Peripheral     No Growth Final        04-13 @ 14:00 Peritoneal PERITONEAL FLUID #2 Proteus vulgaris group  Escherichia coli  Klebsiella pneumoniae ESBL  Streptococcus constellatus    Rare Proteus vulgaris group  Few Escherichia coli  Few Klebsiella pneumoniae ESBL  Few Streptococcus constellatus  Few Bacteroides distasonis "Susceptibilities not performed"    No polymorphonuclear cells seen per low power field  Few Gram positive cocci in pairs per oil power field      04-13 @ 06:40          NotDetec        All imaging and data are reviewed.

## 2023-04-21 NOTE — PROGRESS NOTE ADULT - ASSESSMENT
72F PMH A-Fib on rivaroxaban, HFpEF, recent C diff infection (Jan 2023), myelofibrosis with thrombocytosis on anagrelide, hypothyroidism, asthma, and lung nodules s/p resection (reportedly malignant) who presents with perforated sigmoid colon with acute bacterial peritonitis, septic shock, lactic acidosis, and LAYA 2/2 ATN, s/p extended Tia's including descending colon on 4/13. Extubated 4/19.    # Neuro:  - Off of sedation. Non verbal this AM.  - On Lexapro   - Pain regimen: fentanyl 25 mg q4 PRN for severe pain   - PT recommending ALFRED   #CV:  - off of pressors. On midodrine 10 mg TID   - Diuresis with albumin and bumetanide  - Holding home BB due to soft BP   #Pulm:   -extubated 4/19. Doing well on RA, goal SpO2>90%. C  #GI:  - On metoclopramide PRN for n/v  -CT A/P demonstrated diffuse gastric wall thickening. S/p left hemicolectomy with rectosigmoid stump and RLQ colostomy. Diffuse small bowel and colonic wall thickening. Enteric contrast transits through the colon to the ostomy, without bowel obstruction. Small-moderate abdominal and pelvic ascites. Diffuse mesenteric stranding. No localized fluid collection. Small droplet of free intraperitoneal air, likely post-operative. Mild presacral stranding.   -Monitor drain and colostomy output.   # Renal: LAYA 2/2 ATN, continue with albumin + bumetanide. Strict I/O's, close monitoring of kidney function and lytes.  ID:  - polymicrobial peritonitis, including ESBL Klebsiella, Morganella, E. coli, Strep constellatus, and Bacteroides. All covered by meropenem.  - Continue vancomycin 125 mg po daily for C diff prophylaxis  #Heme:   -history of myeloproliferative disorder with fibrosis, on anagrelide as outpatient   - DVT ppx: Heparin   # Endo: continue synthroid

## 2023-04-21 NOTE — PROGRESS NOTE ADULT - SUBJECTIVE AND OBJECTIVE BOX
HERMILA QUIÑONES    [id number]    72y      Female    Patient is a 72y old  Female who presents with a chief complaint of intra-abdominal perforation (20 Apr 2023 15:14)      INTERVAL HPI/OVERNIGHT EVENTS:  Patient seen and examined bedside.   no overnight events   WBC trended up     ROS: unable to examine due to [x ] Encephalopathy  [ ] Advanced Dementia  [ ] Expressive Aphasia  [ ] Non-verbal patient          Vital Signs Last 24 Hrs  T(C): 36.8 (21 Apr 2023 12:09), Max: 36.9 (20 Apr 2023 15:57)  T(F): 98.2 (21 Apr 2023 12:09), Max: 98.4 (20 Apr 2023 15:57)  HR: 83 (21 Apr 2023 14:00) (72 - 87)  BP: 114/56 (21 Apr 2023 14:00) (114/56 - 129/61)  BP(mean): 81 (21 Apr 2023 14:00) (81 - 102)  RR: 34 (21 Apr 2023 14:00) (10 - 36)  SpO2: 94% (21 Apr 2023 14:00) (75% - 99%)            PHYSICAL EXAM:    GENERAL: Elderly female , NAD, no increased WOB , +NGT   CHEST/LUNG: good b/l air entry   HEART: S1S2+, Regular rate and rhythm; No murmurs  ABDOMEN: Soft, Nontender, Nondistended; Bowel sounds present, surgical wound   EXTREMITIES: LE edema b/l            LABS:                                   7.7    36.72 )-----------( 416      ( 21 Apr 2023 05:50 )             26.1   04-21    146<H>  |  111<H>  |  86<H>  ----------------------------<  92  3.6   |  24  |  5.30<H>    Ca    7.7<L>      21 Apr 2023 05:50  Phos  4.6     04-21  Mg     2.2     04-21    TPro  4.9<L>  /  Alb  2.2<L>  /  TBili  0.5  /  DBili  x   /  AST  13<L>  /  ALT  17  /  AlkPhos  51  04-21              MEDICATIONS  (STANDING):  albumin human 25% IVPB 50 milliLiter(s) IV Intermittent every 8 hours  chlorhexidine 4% Liquid 1 Application(s) Topical <User Schedule>  dexMEDEtomidine Infusion 0.5 MICROgram(s)/kG/Hr (9.1 mL/Hr) IV Continuous <Continuous>  heparin   Injectable 5000 Unit(s) SubCutaneous every 8 hours  levothyroxine Injectable 93.75 MICROGram(s) IV Push at bedtime  meropenem  IVPB 500 milliGRAM(s) IV Intermittent every 24 hours  metoclopramide Injectable 10 milliGRAM(s) IV Push every 8 hours  midodrine 10 milliGRAM(s) Oral every 8 hours  norepinephrine Infusion 0.05 MICROgram(s)/kG/Min (6.83 mL/Hr) IV Continuous <Continuous>  pantoprazole  Injectable 40 milliGRAM(s) IV Push daily  vancomycin    Solution 125 milliGRAM(s) Oral daily    MEDICATIONS  (PRN):  albuterol    90 MICROgram(s) HFA Inhaler 4 Puff(s) Inhalation every 6 hours PRN Shortness of Breath and/or Wheezing  fentaNYL    Injectable 25 MICROGram(s) IV Push every 4 hours PRN Severe Pain (7 - 10)  sodium chloride 0.9% lock flush 10 milliLiter(s) IV Push every 1 hour PRN Pre/post blood products, medications, blood draw, and to maintain line patency

## 2023-04-21 NOTE — PROGRESS NOTE ADULT - ASSESSMENT
71 y/o f w/ PMH of HTN, CHF, asthma, hypothyroidism, Afib, splenomegaly, myelofibrosis, history of C. difficile 1/13/23, who p/w generalized abdominal pain, and persistent diarrhea. Found to have sepsis 2/2 perforated viscus from sigmoid colon perforation.    S/p Tia procedure and abdominal abscess drainage on 4/13. Peritoneal fluid cultures grew ESBL klebsiella, strep constellatus, proteus vulgaris, E coli, bacteroides. Blood cultures no growth. C diff negative.    Patient with worsening leukocytosis, WBC now 36. Central line being removed today. Otherwise afebrile and off pressors. Repeat c diff negative. CT A/P done on 4/20 a limited study showed possible enterocolitis. No localized intra-abdominal fluid collection seen.    -continue meropenem (renally dosed)  -switch vancomycin to 125mg PO daily for PCP ppx  -suggest repeat blood cultures and change of Higgins if febrile, or if WBC worse  -monitor WBC  -discussed with ICU team  -I will be covered by Dr. Flower Pedro on 4/22-4/23/23. I will be away from 4/24/23-5/7/23, and will be covered by Dr. Juarez that time.     Diandra Ricardo MD  Division of Infectious Diseases   Cell [phone] between 8am and 6pm   After 6pm and weekends please call ID service at [phone]

## 2023-04-21 NOTE — PROGRESS NOTE ADULT - ASSESSMENT
72-year-old female with history of hypertension, CHF, afib s/p AF ablation (2/7/19) currently not on xarelto, CATH 2018, hypothyroidism, HLD history of C. difficile January 2023 presents for vomitting and abdominal pain. Consulted for CHF.    - CT ABD/Pelvis: Pneumoperitoneum and no ascites, perforated sigmoid colon. Stable splenomegaly. Stable right lung nodule, groundglass infiltrates and loculated right pleural effusion.  - S/p Tia's with abdominal abscess drainage.   -  OR finding with >1.2 liters of pus from abd cavity.   - cont abx    - extubated, now on NC  - pressor support to maintain MAP at least >60, now off.    - diuretics had been held for hypotension, but given bumex 2mg 4/20 and is written for bumex 2mg again today   - net neg 1865cc, with stable though abnormal bun and creat  -cont to monitor for need for addl diuresis    - holding home metoprolol 50mg qd due to hypotension, can consider restarting if BP stabilizes  -remains on mido with sbp 120s-140s, would try to reduce midodrine before adding metoprolol    - Elevated troponin peaked at 100.9, otherwise cardiac enzymes unremarkable, likely demand in the setting of sepsis  - EKG: Sinus tachycardia with PAC's  - No acute changes on EKG compared to previous.   - Prior TTE (1/18/23): normal LVEF 65%, normal RV size and function, biatrial enlargement, sclerotic aortic valve, mild AI, Moderate MR and TR  - TTE shows EF 55-60%, RVE, PASP 66     - hx of PAF s/p PVI  - maintaining SR, with brief runs of svt  - not on AC at home  - cont tele    - Monitor and replete lytes, keep K>4, Mg>2.  - Other cardiovascular workup will depend on clinical course.  - Will continue to follow.  - very high risk of decompensation.    Upon my evaluation, this patient is at high risk for imminent or life threatening deterioration due to resp failure, hypotension,  and other active medical issues which require my direct attention, intervention, and personal management.  I have personally spent >35 minutes  of critical care time exclusive of time spent on separate billing procedures. This includes review of laboratory data, radiology results, discussion with primary team\patient, and monitoring for potential decompensation Interventions were performed as documented above.

## 2023-04-21 NOTE — PROGRESS NOTE ADULT - SUBJECTIVE AND OBJECTIVE BOX
SUBJECTIVE:  Patient seen and examined at bedside.  No overnight events.  Patient reports no new complaints at this time.  She denies pain, nausea/vomiting. Ostomy with liquidy output, on c. diff precautions (pending). Off pressors since last.     VITALS  Vital Signs Last 24 Hrs  T(C): 36.4 (21 Apr 2023 04:37), Max: 36.9 (20 Apr 2023 08:02)  T(F): 97.6 (21 Apr 2023 04:37), Max: 98.4 (20 Apr 2023 08:02)  HR: 77 (21 Apr 2023 07:30) (70 - 87)  BP: 129/61 (20 Apr 2023 16:35) (123/58 - 144/60)  BP(mean): 88 (20 Apr 2023 16:35) (84 - 90)  RR: 30 (21 Apr 2023 07:30) (10 - 35)  SpO2: 98% (21 Apr 2023 07:30) (92% - 100%)    Parameters below as of 20 Apr 2023 11:00  Patient On (Oxygen Delivery Method): room air    PHYSICAL EXAM  GENERAL:  Well-developed female lying in bed in NAD.  HEENT:  NC/AT. Sclera white. NGT in place with empty cannister  CARDIO:  Regular rate and rhythm.   RESPIRATORY:  Nonlabored breathing, no accessory muscle use.   ABDOMEN:  Soft, nondistended, nontender. Midline wound stapled with small opening at superior and inferior aspect, packing changed; no surrounding skin erythema or purulent drainage noted; redressed with 4x4 gauze and tape.  Ostomy with approximately 100cc liquid brown stool. No rebound tenderness or guarding.  : Higgins in place with clear yellow urine in tubing.  SKIN:  No jaundice, pallor, or cyanosis  NEURO:  Alert; responds appropriately to questions with head nods    INTAKE & OUTPUT  I&O's Summary    20 Apr 2023 07:01  -  21 Apr 2023 07:00  --------------------------------------------------------  IN: 209.7 mL / OUT: 2075 mL / NET: -1865.3 mL    I&O's Detail    20 Apr 2023 07:01  -  21 Apr 2023 07:00  --------------------------------------------------------  IN:    IV PiggyBack: 100 mL    IV PiggyBack: 50 mL    Nepro with Carb Steady: 20 mL    Norepinephrine: 39.7 mL  Total IN: 209.7 mL    OUT:    Bulb (mL): 50 mL    Colostomy (mL): 850 mL    Dexmedetomidine: 0 mL    Indwelling Catheter - Urethral (mL): 775 mL    Nasogastric/Oral tube (mL): 400 mL  Total OUT: 2075 mL    Total NET: -1865.3 mL    MEDICATIONS  MEDICATIONS  (STANDING):  chlorhexidine 4% Liquid 1 Application(s) Topical <User Schedule>  dexMEDEtomidine Infusion 0.5 MICROgram(s)/kG/Hr (9.1 mL/Hr) IV Continuous <Continuous>  heparin   Injectable 5000 Unit(s) SubCutaneous every 8 hours  levothyroxine Injectable 93.75 MICROGram(s) IV Push at bedtime  meropenem  IVPB 500 milliGRAM(s) IV Intermittent every 24 hours  metoclopramide Injectable 10 milliGRAM(s) IV Push every 8 hours  midodrine 10 milliGRAM(s) Oral every 8 hours  norepinephrine Infusion 0.05 MICROgram(s)/kG/Min (6.83 mL/Hr) IV Continuous <Continuous>  pantoprazole  Injectable 40 milliGRAM(s) IV Push daily  vancomycin    Solution 125 milliGRAM(s) Oral every 6 hours    MEDICATIONS  (PRN):  albuterol    90 MICROgram(s) HFA Inhaler 4 Puff(s) Inhalation every 6 hours PRN Shortness of Breath and/or Wheezing  fentaNYL    Injectable 25 MICROGram(s) IV Push every 4 hours PRN Severe Pain (7 - 10)  sodium chloride 0.9% lock flush 10 milliLiter(s) IV Push every 1 hour PRN Pre/post blood products, medications, blood draw, and to maintain line patency    LABS:                        7.7    36.72 )-----------( 416      ( 21 Apr 2023 05:50 )             26.1     04-21    146<H>  |  111<H>  |  86<H>  ----------------------------<  92  3.6   |  24  |  5.30<H>    Ca    7.7<L>      21 Apr 2023 05:50  Phos  4.6     04-21  Mg     2.2     04-21    TPro  4.9<L>  /  Alb  2.2<L>  /  TBili  0.5  /  DBili  x   /  AST  13<L>  /  ALT  17  /  AlkPhos  51  04-21    ASSESSMENT & PLAN   72 year old female POD#8 s/p extended Tia's (including descending colon) for perforated sigmoid. Off pressors. R/o c. diff. SUBJECTIVE:  Patient seen and examined at bedside.  No overnight events.  Patient reports no new complaints at this time.  She denies pain, nausea/vomiting. Ostomy with liquidy output, on c. diff precautions (pending). Off pressors since last.     VITALS  Vital Signs Last 24 Hrs  T(C): 36.4 (21 Apr 2023 04:37), Max: 36.9 (20 Apr 2023 08:02)  T(F): 97.6 (21 Apr 2023 04:37), Max: 98.4 (20 Apr 2023 08:02)  HR: 77 (21 Apr 2023 07:30) (70 - 87)  BP: 129/61 (20 Apr 2023 16:35) (123/58 - 144/60)  BP(mean): 88 (20 Apr 2023 16:35) (84 - 90)  RR: 30 (21 Apr 2023 07:30) (10 - 35)  SpO2: 98% (21 Apr 2023 07:30) (92% - 100%)    Parameters below as of 20 Apr 2023 11:00  Patient On (Oxygen Delivery Method): room air    PHYSICAL EXAM  GENERAL:  Well-developed female lying in bed in NAD.  HEENT:  NC/AT. Sclera white. NGT in place with empty cannister  CARDIO:  Regular rate and rhythm.   RESPIRATORY:  Nonlabored breathing, no accessory muscle use.   ABDOMEN:  Soft, nondistended, nontender. Midline wound stapled with small opening at superior and inferior aspect, packing changed; no surrounding skin erythema or purulent drainage noted; redressed with 4x4 gauze and tape.  Ostomy with approximately 100cc liquid brown stool. No rebound tenderness or guarding.  : Higgins in place with clear yellow urine in tubing.  SKIN:  No jaundice, pallor, or cyanosis  NEURO:  Alert; responds appropriately to questions with head nods    INTAKE & OUTPUT  I&O's Summary    20 Apr 2023 07:01  -  21 Apr 2023 07:00  --------------------------------------------------------  IN: 209.7 mL / OUT: 2075 mL / NET: -1865.3 mL    I&O's Detail    20 Apr 2023 07:01  -  21 Apr 2023 07:00  --------------------------------------------------------  IN:    IV PiggyBack: 100 mL    IV PiggyBack: 50 mL    Nepro with Carb Steady: 20 mL    Norepinephrine: 39.7 mL  Total IN: 209.7 mL    OUT:    Bulb (mL): 50 mL    Colostomy (mL): 850 mL    Dexmedetomidine: 0 mL    Indwelling Catheter - Urethral (mL): 775 mL    Nasogastric/Oral tube (mL): 400 mL  Total OUT: 2075 mL    Total NET: -1865.3 mL    MEDICATIONS  MEDICATIONS  (STANDING):  chlorhexidine 4% Liquid 1 Application(s) Topical <User Schedule>  dexMEDEtomidine Infusion 0.5 MICROgram(s)/kG/Hr (9.1 mL/Hr) IV Continuous <Continuous>  heparin   Injectable 5000 Unit(s) SubCutaneous every 8 hours  levothyroxine Injectable 93.75 MICROGram(s) IV Push at bedtime  meropenem  IVPB 500 milliGRAM(s) IV Intermittent every 24 hours  metoclopramide Injectable 10 milliGRAM(s) IV Push every 8 hours  midodrine 10 milliGRAM(s) Oral every 8 hours  norepinephrine Infusion 0.05 MICROgram(s)/kG/Min (6.83 mL/Hr) IV Continuous <Continuous>  pantoprazole  Injectable 40 milliGRAM(s) IV Push daily  vancomycin    Solution 125 milliGRAM(s) Oral every 6 hours    MEDICATIONS  (PRN):  albuterol    90 MICROgram(s) HFA Inhaler 4 Puff(s) Inhalation every 6 hours PRN Shortness of Breath and/or Wheezing  fentaNYL    Injectable 25 MICROGram(s) IV Push every 4 hours PRN Severe Pain (7 - 10)  sodium chloride 0.9% lock flush 10 milliLiter(s) IV Push every 1 hour PRN Pre/post blood products, medications, blood draw, and to maintain line patency    LABS:                        7.7    36.72 )-----------( 416      ( 21 Apr 2023 05:50 )             26.1     04-21    146<H>  |  111<H>  |  86<H>  ----------------------------<  92  3.6   |  24  |  5.30<H>    Ca    7.7<L>      21 Apr 2023 05:50  Phos  4.6     04-21  Mg     2.2     04-21    TPro  4.9<L>  /  Alb  2.2<L>  /  TBili  0.5  /  DBili  x   /  AST  13<L>  /  ALT  17  /  AlkPhos  51  04-21    ASSESSMENT & PLAN   72 year old female POD#8 s/p extended Tia's (including descending colon) for perforated sigmoid. Off levo; now on midodrine. R/o c. diff. SUBJECTIVE:  Patient seen and examined at bedside.  No overnight events.  Patient reports no new complaints at this time.  She denies pain, nausea/vomiting. Ostomy with liquidy output, on c. diff precautions (pending). Off pressors since last.     VITALS  Vital Signs Last 24 Hrs  T(C): 36.4 (21 Apr 2023 04:37), Max: 36.9 (20 Apr 2023 08:02)  T(F): 97.6 (21 Apr 2023 04:37), Max: 98.4 (20 Apr 2023 08:02)  HR: 77 (21 Apr 2023 07:30) (70 - 87)  BP: 129/61 (20 Apr 2023 16:35) (123/58 - 144/60)  BP(mean): 88 (20 Apr 2023 16:35) (84 - 90)  RR: 30 (21 Apr 2023 07:30) (10 - 35)  SpO2: 98% (21 Apr 2023 07:30) (92% - 100%)    Parameters below as of 20 Apr 2023 11:00  Patient On (Oxygen Delivery Method): room air    PHYSICAL EXAM  GENERAL:  Well-developed female lying in bed in NAD.  HEENT:  NC/AT. Sclera white. NGT in place.  CARDIO:  Regular rate and rhythm.   RESPIRATORY:  Nonlabored breathing, no accessory muscle use.   ABDOMEN:  Soft, nondistended, nontender. Midline wound stapled with small opening at superior and inferior aspect, packing changed; no surrounding skin erythema or purulent drainage noted; redressed with 4x4 gauze and tape.  Ostomy with approximately 100cc liquid brown stool. No rebound tenderness or guarding.  : Higgins in place with clear yellow urine in tubing.  SKIN:  No jaundice, pallor, or cyanosis  NEURO:  Alert; responds appropriately to questions with head nods    INTAKE & OUTPUT  I&O's Summary    20 Apr 2023 07:01  -  21 Apr 2023 07:00  --------------------------------------------------------  IN: 209.7 mL / OUT: 2075 mL / NET: -1865.3 mL    I&O's Detail    20 Apr 2023 07:01  -  21 Apr 2023 07:00  --------------------------------------------------------  IN:    IV PiggyBack: 100 mL    IV PiggyBack: 50 mL    Nepro with Carb Steady: 20 mL    Norepinephrine: 39.7 mL  Total IN: 209.7 mL    OUT:    Bulb (mL): 50 mL    Colostomy (mL): 850 mL    Dexmedetomidine: 0 mL    Indwelling Catheter - Urethral (mL): 775 mL    Nasogastric/Oral tube (mL): 400 mL  Total OUT: 2075 mL    Total NET: -1865.3 mL    MEDICATIONS  MEDICATIONS  (STANDING):  chlorhexidine 4% Liquid 1 Application(s) Topical <User Schedule>  dexMEDEtomidine Infusion 0.5 MICROgram(s)/kG/Hr (9.1 mL/Hr) IV Continuous <Continuous>  heparin   Injectable 5000 Unit(s) SubCutaneous every 8 hours  levothyroxine Injectable 93.75 MICROGram(s) IV Push at bedtime  meropenem  IVPB 500 milliGRAM(s) IV Intermittent every 24 hours  metoclopramide Injectable 10 milliGRAM(s) IV Push every 8 hours  midodrine 10 milliGRAM(s) Oral every 8 hours  norepinephrine Infusion 0.05 MICROgram(s)/kG/Min (6.83 mL/Hr) IV Continuous <Continuous>  pantoprazole  Injectable 40 milliGRAM(s) IV Push daily  vancomycin    Solution 125 milliGRAM(s) Oral every 6 hours    MEDICATIONS  (PRN):  albuterol    90 MICROgram(s) HFA Inhaler 4 Puff(s) Inhalation every 6 hours PRN Shortness of Breath and/or Wheezing  fentaNYL    Injectable 25 MICROGram(s) IV Push every 4 hours PRN Severe Pain (7 - 10)  sodium chloride 0.9% lock flush 10 milliLiter(s) IV Push every 1 hour PRN Pre/post blood products, medications, blood draw, and to maintain line patency    LABS:                        7.7    36.72 )-----------( 416      ( 21 Apr 2023 05:50 )             26.1     04-21    146<H>  |  111<H>  |  86<H>  ----------------------------<  92  3.6   |  24  |  5.30<H>    Ca    7.7<L>      21 Apr 2023 05:50  Phos  4.6     04-21  Mg     2.2     04-21    TPro  4.9<L>  /  Alb  2.2<L>  /  TBili  0.5  /  DBili  x   /  AST  13<L>  /  ALT  17  /  AlkPhos  51  04-21    ASSESSMENT & PLAN  72 year old female POD#8 s/p extended Tia's (including descending colon) for perforated sigmoid. Off levo; now on midodrine. R/o c. diff.    - Continue to monitor ostomy output  - Continue antibiotics per primary team  - Trend WBC  - Follow up c. diff  - DVT prophylaxis with SQH  - PT/OT  - Pain control PRN  - Continue tube feeds  - Care per ICU team  - Discussed with Dr. Goldberg SUBJECTIVE:  Patient seen and examined at bedside.  No overnight events.  Patient reports no new complaints at this time.  She denies pain, nausea/vomiting. Ostomy with liquidy output, on c. diff precautions (pending). Off pressors since last night.     VITALS  Vital Signs Last 24 Hrs  T(C): 36.4 (21 Apr 2023 04:37), Max: 36.9 (20 Apr 2023 08:02)  T(F): 97.6 (21 Apr 2023 04:37), Max: 98.4 (20 Apr 2023 08:02)  HR: 77 (21 Apr 2023 07:30) (70 - 87)  BP: 129/61 (20 Apr 2023 16:35) (123/58 - 144/60)  BP(mean): 88 (20 Apr 2023 16:35) (84 - 90)  RR: 30 (21 Apr 2023 07:30) (10 - 35)  SpO2: 98% (21 Apr 2023 07:30) (92% - 100%)    Parameters below as of 20 Apr 2023 11:00  Patient On (Oxygen Delivery Method): room air    PHYSICAL EXAM  GENERAL:  Well-developed female lying in bed in NAD.  HEENT:  NC/AT. Sclera white. NGT in place.  CARDIO:  Regular rate and rhythm.   RESPIRATORY:  Nonlabored breathing, no accessory muscle use.   ABDOMEN:  Soft, nondistended, nontender. Midline wound stapled with small opening at superior and inferior aspect, packing changed; no surrounding skin erythema or purulent drainage noted; redressed with 4x4 gauze and tape.  Ostomy with approximately 100cc liquid brown stool. No rebound tenderness or guarding.  : Higgins in place with clear yellow urine in tubing.  SKIN:  No jaundice, pallor, or cyanosis  NEURO:  Alert; responds appropriately to questions with head nods    INTAKE & OUTPUT  I&O's Summary    20 Apr 2023 07:01  -  21 Apr 2023 07:00  --------------------------------------------------------  IN: 209.7 mL / OUT: 2075 mL / NET: -1865.3 mL    I&O's Detail    20 Apr 2023 07:01  -  21 Apr 2023 07:00  --------------------------------------------------------  IN:    IV PiggyBack: 100 mL    IV PiggyBack: 50 mL    Nepro with Carb Steady: 20 mL    Norepinephrine: 39.7 mL  Total IN: 209.7 mL    OUT:    Bulb (mL): 50 mL    Colostomy (mL): 850 mL    Dexmedetomidine: 0 mL    Indwelling Catheter - Urethral (mL): 775 mL    Nasogastric/Oral tube (mL): 400 mL  Total OUT: 2075 mL    Total NET: -1865.3 mL    MEDICATIONS  MEDICATIONS  (STANDING):  chlorhexidine 4% Liquid 1 Application(s) Topical <User Schedule>  dexMEDEtomidine Infusion 0.5 MICROgram(s)/kG/Hr (9.1 mL/Hr) IV Continuous <Continuous>  heparin   Injectable 5000 Unit(s) SubCutaneous every 8 hours  levothyroxine Injectable 93.75 MICROGram(s) IV Push at bedtime  meropenem  IVPB 500 milliGRAM(s) IV Intermittent every 24 hours  metoclopramide Injectable 10 milliGRAM(s) IV Push every 8 hours  midodrine 10 milliGRAM(s) Oral every 8 hours  norepinephrine Infusion 0.05 MICROgram(s)/kG/Min (6.83 mL/Hr) IV Continuous <Continuous>  pantoprazole  Injectable 40 milliGRAM(s) IV Push daily  vancomycin    Solution 125 milliGRAM(s) Oral every 6 hours    MEDICATIONS  (PRN):  albuterol    90 MICROgram(s) HFA Inhaler 4 Puff(s) Inhalation every 6 hours PRN Shortness of Breath and/or Wheezing  fentaNYL    Injectable 25 MICROGram(s) IV Push every 4 hours PRN Severe Pain (7 - 10)  sodium chloride 0.9% lock flush 10 milliLiter(s) IV Push every 1 hour PRN Pre/post blood products, medications, blood draw, and to maintain line patency    LABS:                        7.7    36.72 )-----------( 416      ( 21 Apr 2023 05:50 )             26.1     04-21    146<H>  |  111<H>  |  86<H>  ----------------------------<  92  3.6   |  24  |  5.30<H>    Ca    7.7<L>      21 Apr 2023 05:50  Phos  4.6     04-21  Mg     2.2     04-21    TPro  4.9<L>  /  Alb  2.2<L>  /  TBili  0.5  /  DBili  x   /  AST  13<L>  /  ALT  17  /  AlkPhos  51  04-21    ASSESSMENT & PLAN  72 year old female POD#8 s/p extended Tia's (including descending colon) for perforated sigmoid. Off levo; now on midodrine. R/o c. diff.    - Continue to monitor ostomy output  - Continue antibiotics per primary team  - Trend WBC  - Follow up c. diff  - DVT prophylaxis with SQH  - PT/OT  - Pain control PRN  - Continue tube feeds  - Care per ICU team  - Discussed with Dr. Goldberg

## 2023-04-21 NOTE — PROGRESS NOTE ADULT - SUBJECTIVE AND OBJECTIVE BOX
Patient is a 72y old  Female who presents with a chief complaint of intra-abdominal perforation (20 Apr 2023 11:52)    Patient seen in follow up for LAYA on CKD.        PAST MEDICAL HISTORY:  Hypothyroidism    HLD (hyperlipidemia)    Thrombocytosis    Persistent atrial fibrillation    Obesity (BMI 35.0-39.9 without comorbidity)    Asthma    Diabetes mellitus        MEDICATIONS  (STANDING):  chlorhexidine 2% Cloths 1 Application(s) Topical daily  escitalopram 20 milliGRAM(s) Oral daily  heparin   Injectable 5000 Unit(s) SubCutaneous every 8 hours  levothyroxine Injectable 93.75 MICROGram(s) IV Push at bedtime  meropenem  IVPB 500 milliGRAM(s) IV Intermittent every 24 hours  midodrine 10 milliGRAM(s) Oral every 8 hours  pantoprazole   Suspension 40 milliGRAM(s) Oral daily  psyllium Powder 1 Packet(s) Oral daily  vancomycin    Solution 125 milliGRAM(s) Oral every 6 hours    MEDICATIONS  (PRN):  albuterol    90 MICROgram(s) HFA Inhaler 4 Puff(s) Inhalation every 6 hours PRN Shortness of Breath and/or Wheezing  fentaNYL    Injectable 25 MICROGram(s) IV Push every 4 hours PRN Severe Pain (7 - 10)  sodium chloride 0.9% lock flush 10 milliLiter(s) IV Push every 1 hour PRN Pre/post blood products, medications, blood draw, and to maintain line patency    T(C): 36.8 (04-21-23 @ 12:09), Max: 36.9 (04-19-23 @ 20:54)  HR: 83 (04-21-23 @ 14:00) (70 - 97)  BP: 114/56 (04-21-23 @ 14:00) (114/56 - 144/60)  RR: 34 (04-21-23 @ 14:00)  SpO2: 94% (04-21-23 @ 14:00)  Wt(kg): --  I&O's Detail    20 Apr 2023 07:01  -  21 Apr 2023 07:00  --------------------------------------------------------  IN:    IV PiggyBack: 100 mL    IV PiggyBack: 50 mL    Nepro with Carb Steady: 20 mL    Norepinephrine: 39.7 mL  Total IN: 209.7 mL    OUT:    Bulb (mL): 50 mL    Colostomy (mL): 850 mL    Dexmedetomidine: 0 mL    Indwelling Catheter - Urethral (mL): 775 mL    Nasogastric/Oral tube (mL): 400 mL  Total OUT: 2075 mL    Total NET: -1865.3 mL      21 Apr 2023 07:01  -  21 Apr 2023 14:49  --------------------------------------------------------  IN:    Nepro with Carb Steady: 50 mL  Total IN: 50 mL    OUT:    Indwelling Catheter - Urethral (mL): 80 mL  Total OUT: 80 mL    Total NET: -30 mL              PHYSICAL EXAM:  General: No distress  Respiratory: b/l air entry  Cardiovascular: S1 S2  Gastrointestinal: soft  Extremities:  edema                       LABORATORY:                        7.7    36.72 )-----------( 416      ( 21 Apr 2023 05:50 )             26.1     04-21    146<H>  |  111<H>  |  86<H>  ----------------------------<  92  3.6   |  24  |  5.30<H>    Ca    7.7<L>      21 Apr 2023 05:50  Phos  4.6     04-21  Mg     2.2     04-21    TPro  4.9<L>  /  Alb  2.2<L>  /  TBili  0.5  /  DBili  x   /  AST  13<L>  /  ALT  17  /  AlkPhos  51  04-21    Sodium, Serum: 146 mmol/L (04-21 @ 05:50)  Sodium, Serum: 146 mmol/L (04-20 @ 05:40)    Potassium, Serum: 3.6 mmol/L (04-21 @ 05:50)  Potassium, Serum: 3.7 mmol/L (04-20 @ 05:40)    Hemoglobin: 7.7 g/dL (04-21 @ 05:50)  Hemoglobin: 7.4 g/dL (04-20 @ 05:40)  Hemoglobin: 7.6 g/dL (04-19 @ 05:45)    Creatinine, Serum 5.30 (04-21 @ 05:50)  Creatinine, Serum 5.30 (04-20 @ 05:40)  Creatinine, Serum 5.20 (04-19 @ 05:45)        LIVER FUNCTIONS - ( 21 Apr 2023 05:50 )  Alb: 2.2 g/dL / Pro: 4.9 g/dL / ALK PHOS: 51 U/L / ALT: 17 U/L / AST: 13 U/L / GGT: x

## 2023-04-21 NOTE — PHARMACOTHERAPY INTERVENTION NOTE - COMMENTS
Patient is on pantoprazole 40mg daily IV. Discussed w/ Dr. Miranda and recommended switching to PO as patient is tolerating other oral meds. Also recommended re-starting patient’s home med escitalopram 20mg daily. MD agreed and orders entered.

## 2023-04-21 NOTE — CONSULT NOTE ADULT - SUBJECTIVE AND OBJECTIVE BOX
Patient is a 72y old  Female who presents with a chief complaint of intra-abdominal perforation (21 Apr 2023 15:44)      HPI:  73 y/o f w/ PMH of HTN, CHF, asthma, hypothyroidism, prior notes state A-fib on Xarelto however patient states she is not taking any anticoagulants, history of C. difficile January 2023 p/w generalized abdominal pain, and persistent diarrhea for last several months with several episodes every day, worsening in last few weeks.  Pt's abdominal pain acutely worsened in last day, with achy, persistent pain, diffusely in abdomen, nonraidtaing, not responding to oral pain meds, associated with nausea and vomiting, prompting pt to come to ED.  Pt did not report any fevers.  Pt has also had increased lower extremity swelling, but per son, has been noncompliant with lasix recently.   (13 Apr 2023 10:37)       ROS: followed by Dr. Roland at [facility] for myeloproliferative disease ET evolving to myelofibrosis  Negative except for:    PAST MEDICAL & SURGICAL HISTORY:  Hypothyroidism      HLD (hyperlipidemia)      Thrombocytosis      Persistent atrial fibrillation      Obesity (BMI 35.0-39.9 without comorbidity)      Asthma      Diabetes mellitus      Ankle injury  on 2004, 5 surgeries.      Cholecystitis          SOCIAL HISTORY:    FAMILY HISTORY:  Family history of early CAD (Father)    Family history of early CAD (Mother)        MEDICATIONS  (STANDING):  chlorhexidine 2% Cloths 1 Application(s) Topical daily  escitalopram 20 milliGRAM(s) Oral daily  heparin   Injectable 5000 Unit(s) SubCutaneous every 8 hours  levothyroxine Injectable 93.75 MICROGram(s) IV Push at bedtime  meropenem  IVPB 500 milliGRAM(s) IV Intermittent every 24 hours  metoclopramide 5 milliGRAM(s) Oral every 6 hours  midodrine 10 milliGRAM(s) Oral every 8 hours  octreotide  Injectable 100 MICROGram(s) SubCutaneous every 8 hours  pantoprazole   Suspension 40 milliGRAM(s) Oral daily  psyllium Powder 1 Packet(s) Oral daily  vancomycin    Solution 125 milliGRAM(s) Oral daily    MEDICATIONS  (PRN):  albuterol    90 MICROgram(s) HFA Inhaler 4 Puff(s) Inhalation every 6 hours PRN Shortness of Breath and/or Wheezing  fentaNYL    Injectable 25 MICROGram(s) IV Push every 4 hours PRN Severe Pain (7 - 10)  sodium chloride 0.9% lock flush 10 milliLiter(s) IV Push every 1 hour PRN Pre/post blood products, medications, blood draw, and to maintain line patency      Allergies    No Known Allergies    Intolerances        Vital Signs Last 24 Hrs  T(C): 37.3 (21 Apr 2023 21:12), Max: 37.3 (21 Apr 2023 21:12)  T(F): 99.1 (21 Apr 2023 21:12), Max: 99.1 (21 Apr 2023 21:12)  HR: 88 (21 Apr 2023 22:00) (72 - 99)  BP: 124/56 (21 Apr 2023 22:00) (113/57 - 144/60)  BP(mean): 80 (21 Apr 2023 21:00) (78 - 102)  RR: 22 (21 Apr 2023 22:00) (14 - 36)  SpO2: 98% (21 Apr 2023 22:00) (75% - 98%)    Parameters below as of 21 Apr 2023 18:30  Patient On (Oxygen Delivery Method): room air        PHYSICAL EXAM  General: adult in NAD NG tube in place.  chronically ill appearing  HEENT: clear oropharynx, anicteric sclera, pink conjunctivae  Neck: supple  CV: normal S1S2 with no murmur rubs or gallops  Lungs: clear to auscultation, no wheezes, no rhales  Abdomen: soft non-tender non-distended, no hepato/splenomegaly  Ext: no clubbing cyanosis or edema  Skin: no rashes and no petichiae  Neuro: alert and oriented X3 no focal deficits      LABS:    CBC Full  -  ( 21 Apr 2023 05:50 )  WBC Count : 36.72 K/uL  RBC Count : 2.85 M/uL  Hemoglobin : 7.7 g/dL  Hematocrit : 26.1 %  Platelet Count - Automated : 416 K/uL  Mean Cell Volume : 91.6 fl  Mean Cell Hemoglobin : 27.0 pg  Mean Cell Hemoglobin Concentration : 29.5 gm/dL  Auto Neutrophil # : 32.34 K/uL  Auto Lymphocyte # : 1.21 K/uL  Auto Monocyte # : 0.75 K/uL  Auto Eosinophil # : 0.41 K/uL  Auto Basophil # : 0.23 K/uL  Auto Neutrophil % : 88.2 %  Auto Lymphocyte % : 3.3 %  Auto Monocyte % : 2.0 %  Auto Eosinophil % : 1.1 %  Auto Basophil % : 0.6 %    04-21    146<H>  |  111<H>  |  86<H>  ----------------------------<  92  3.6   |  24  |  5.30<H>    Ca    7.7<L>      21 Apr 2023 05:50  Phos  4.6     04-21  Mg     2.2     04-21    TPro  4.9<L>  /  Alb  2.2<L>  /  TBili  0.5  /  DBili  x   /  AST  13<L>  /  ALT  17  /  AlkPhos  51  04-21              BLOOD SMEAR INTERPRETATION:    RADIOLOGY & ADDITIONAL STUDIES:    < from: CT Abdomen and Pelvis w/ Oral Cont (04.20.23 @ 12:36) >  ACC: [id number] EXAM:  CT ABDOMEN AND PELVIS OC   ORDERED BY: DAGOBERTO PEREZ     PROCEDURE DATE:  04/20/2023          INTERPRETATION:  CLINICAL INFORMATION: Postoperative day 7 for perforated   sigmoid diverticulosis.  Leukocytosis.    COMPARISON: CT scan abdomen pelvis for/13/23.    CONTRAST/COMPLICATIONS:  IV Contrast: NONE  0 cc administered   0 cc discarded  Oral Contrast: Omnipaque 300  Complications: None reported at time of study completion    PROCEDURE:  CT of the Abdomen and Pelvis was performed.  Sagittal and coronal reformats were performed.    FINDINGS:    LOWER CHEST:  9 mm nodule right middle lobe, stable.  Small loculated right pleural effusion with underlying airspace   consolidation, which could reflect chronic atelectasis.  Trace left-sided pleural effusion and pleural thickening with   pleural-based calcifications.    Extensive streak artifact degrades image quality, limiting evaluation.    The evaluation of the solid organ parenchyma is limited without   intravenous contrast.    LIVER: Within normal limits.  BILE DUCTS: Normal caliber.  GALLBLADDER: Cholecystectomy.  SPLEEN: Splenomegaly.  PANCREAS: Within normal limits.  ADRENALS: Within normal limits.  KIDNEYS/URETERS: Within normal limits.    BLADDER: Foleycatheter.  REPRODUCTIVE ORGANS:  The uterus and adnexa are not well evaluated on this exam.    BOWEL:  PERITONEUM:  Nasogastric tube, tip within the stomach. Evaluation of the stomach is   limited without distention however, suggestion of diffuse gastric wall   thickening.    Status post left hemicolectomy  with rectosigmoid stump and right lower quadrant colostomy.  A rectal probe is present.  There is diffuse small bowel and colonic wall thickening. Enteric   contrast transits through the colonto the ostomy, without bowel   obstruction.  There is a small to moderate volume of abdominal and pelvic ascites.  There is diffuse mesenteric stranding.  No localized fluid collection is noted.  There is a small droplet of free intraperitoneal air, which may be   postoperative.  There is mild presacral stranding.    VESSELS: Atherosclerotic changes.  RETROPERITONEUM/LYMPH NODES: No lymphadenopathy.    ABDOMINAL WALL:  Diffuse subcutaneous body wall edema/anasarca.    Postsurgical changes with ventral abdominal wall wound.  No localized encapsulated subcutaneous fluid collection.    There is subtle linear high attenuation in the midline abdominal wall,   extending toward the superficial defect, (2:77, 601:85).  There are adjacent small bubbles of gas/air within the subcutaneous   tissues.    This may reflect postsurgical material, however, cannot exclude enteric   contrast related to enterocutaneous fistula.    BONES: Degenerative changes.    IMPRESSION:    Limited study.    Status post left hemicolectomy and right lower quadrant colostomy.    Diffuse small bowel and colonic wall thickening may reflect an   enterocolitis.    Small to moderate abdominal pelvic ascites, without localized   intra-abdominal fluid collection.    Diffuse body wall edema/anasarca.  Subtle linear high attenuation material extending from the abdominal wall   to the midline surgical wound, may reflect postsurgical material,   however, cannot exclude enterocutaneous fistula.    Other findings as discussed above.    --- End of Report ---            MARY MARES MD; Attending Radiologist  This document has been electronically signed. Apr 20 2023  1:30PM    < end of copied text >  < from: CT Abdomen and Pelvis w/ Oral Cont (04.13.23 @ 09:26) >  ACC: [id number] EXAM:  CT ABDOMEN AND PELVIS OC   ORDERED BY:  MELISSA MAYFIELD     PROCEDURE DATE:  04/13/2023          INTERPRETATION:  CLINICAL INFORMATION: 72 years  Female with abd   pain/diarrhea. History of carcinoid tumor of the lung    COMPARISON: CT abdomen and pelvis 1/26/2023 and PET CT 3/17/2023    CONTRAST/COMPLICATIONS:  IV Contrast: NONE  0 cc administered   0 cc discarded  Oral Contrast: Omnipaque 300  Complications: None reported at time of study completion    PROCEDURE:  CT ofthe Abdomen and Pelvis was performed.  Sagittal and coronal reformats were performed.    LIMITATION: Evaluation of the solid organs is limited by lack of IV   contrast.    FINDINGS:  LOWER CHEST: Persistent bilateral groundglass opacities. Redemonstrated 9   mm right middle lobe nodule and loculated right pleural effusion.    LIVER: Within normal limits.  BILE DUCTS: Normal caliber.  GALLBLADDER: Cholecystectomy.  SPLEEN: Stable splenomegaly.  PANCREAS: Within normal limits.  ADRENALS: Within normal limits.  KIDNEYS/URETERS: Within normal limits.    BLADDER: Within normal limits.  REPRODUCTIVE ORGANS: Uterus not well visualized.    BOWEL: No bowel obstruction. Appendix is not visualized and cannot be   assessed.. Featureless colon suggestive of colitis. Sigmoid   diverticulosis. Limited evaluation for wall thickening without enteric   contrast. Droplets of extraluminal air adjacent to the sigmoid colon.   Moderate hiatus hernia.  PERITONEUM: Pneumoperitoneum most pronounced in the anterior upper   abdomen. Droplets of free air throughout the remainder of the abdomen and   adjacent to the sigmoid colon. Moderate ascites, new compared with prior.  VESSELS: Atherosclerotic changes.  RETROPERITONEUM/LYMPH NODES: No lymphadenopathy.  ABDOMINAL WALL: Within normal limits.  BONES: Degenerative changes.    IMPRESSION:  Pneumoperitoneum and no ascites. Suspect perforated sigmoid colon as   above.    Stable splenomegaly.    Stable right lung nodule, groundglass infiltrates and loculated right   pleural effusion.    CRITICAL VALUE: I discussed the major findings in this report with Dr. Mark Hobbs at 4/13/2023 9:54 AM with readback. Critical value policy of   the hospital was followed. Read back and confirmation of receipt of this   communication was  performed. This verbal communication supplements the text report of this   document.    --- End of Report ---            JUNIE GILL MD; Attending Radiologist  This document has been electronically signed. Apr 13 2023 10:01AM    < end of copied text >  < from: NM PET/CT Skull to Thigh Dotatate, Initial (03.17.23 @ 18:27) >  EXAM: [id number] - PETCT SKUL-THI DOTATATE INIT  - ORDERED BY: DILEEP ROLAND      PROCEDURE DATE:  03/17/2023           INTERPRETATION:  CLINICAL INFORMATION: Carcinoid tumor of lung. History   of left lower lung resection. Carcinoid symptoms.    TREATMENT STRATEGY EVALUATION: Initial  RADIOPHARMACEUTICAL:  3.9 mCi Cu-64 DOTATATE, I.V.  UPTAKE PERIOD: 70 minutes  SCANNER: Barre [id number]  ORAL CONTRAST: Patient drank OMNIPAQUE contrast during the uptake period.    TECHNIQUE: Following intravenous injection of the radiopharmaceutical and   above uptake period, PET/CT was obtained from vertex to mid-thigh.  The   CT protocol was optimized for PET attenuation correction and anatomic   localization and was not designed to produce and cannot replace   state-of-the-art diagnostic CT images with specific imaging protocols for   different body parts and indications. Images were reconstructed and   reviewed in axial, coronal and sagittal views and and three-dimensional   MIP.    The standardized uptake values (SUV) are normalized to patient body   weight and indicate the highest activity concentration (SUVmax) in a   given site. All image numbers refer to axial image number.    COMPARISON:  No prior DOTATATE-PET/CT    OTHER STUDIES USED FOR CORRELATION: CT abdomen 1/26/2023. CT chest   1/12/2023.    FINDINGS:    HEAD/NECK: Physiologic radiotracer activity in pituitary gland, salivary   glands, and thyroid gland.    THORAX: Physiologic radiotracer activity. No lymphadenopathy.    LUNGS:  Medial right middle lobe lung nodule, not radiotracer avid, 1.0 x 0.9 cm;   previously 0.9 cm on CT 1/12/2023. No radiotracer avid lung nodules.  Small mildly loculated right pleural effusion with adjacent atelectasis.   Status post left lower lobe wedge resection with stable postsurgical   changes. Left basilar pleural thickening, stable.    PLEURA/PERICARDIUM: No abnormal radiotracer activity. No pleural or   pericardial effusion.    HEPATOBILIARY/PANCREAS:  Physiologic FDG activity.  For reference, normal   liver demonstrates SUV mean 5.3. Hepatomegaly. Cholecystectomy.    SPLEEN: Physiologic activity. Normal size. Splenomegaly.    ADRENAL GLANDS: Physiologic activity. No nodule.    KIDNEYS/URINARY BLADDER: Physiologic excreted radiotracer activity.    REPRODUCTIVE ORGANS: No abnormal radiotracer activity.    ABDOMINOPELVIC LYMPH NODES/RETROPERITONEUM: No enlarged or avid lymph   node.    BOWEL/PERITONEUM/MESENTERY: Small hiatal hernia. Tracer uptake in the   stomach, nonspecific. Favor physiological bowel activity, without   focality.    VESSELS: Coronary and large vessel calcifications.    BONES/SOFT TISSUES: No abnormal radiotracer activity. Diffuse body wall   edema/anasarca. Degenerative changes in the spine.    IMPRESSION:    1. No dotatate-avid lung nodules or other dotatate-avid lesions to   suggest carcinoid tumor.    --- End of Report ---               SMILEY SWAIN MD; Attending Radiologist   This document has been electronically signed. Mar 20 2023  1:28PM    < end of copied text >

## 2023-04-21 NOTE — PROGRESS NOTE ADULT - ASSESSMENT
71yo F with PMH of afib (off xarelto at least 1wk PTA), HFpEF, asthma, recent C diff in Jan presenting with abd pain, weakness and falls at home a/w severe sepsis and peritonitis due to perforated sigmoid colon, also with LAYA due to ATN, shock liver, and post/op acute respiratory failure requiring mechanical ventilation, and acute blood loss anemia.    Plan:     Septic shock: perforated sigmoid colon with polymicrobial peritonitis  worsening leukocytosis   Abd pain + persistent diarrhea for several months, multiple episodes a day w/ progressive worsening; not relieved by pain meds, a/w n/v  CT A/P - Pneumoperitoneum and no ascites. Suspect perforated sigmoid colon.  septic shock due to peritonitis and perforated diverticulitis  s/p Tia procedure and abdominal abscess drainage on 4/13  lactic acidosis resolved  On levophed (weaned off dual pressor)  TTE (04/2023) - LVEF of 55-60%; LAE; Sclerotic trileaflet aortic valve, trace AI. Mild to moderate MR and TR. IVC measures 2.0 cm and is non collapsing  BCx - 4/13 no growth   Peritoneal cultures poly microbial - Proteus vulgaris group, Escherichia coli, Klebsiella pneumoniae, Morganella morganii   Peritoneal fluid with gram variable rods and PMNs.  CDiff negative, GI PCR neg  continue meropenem  on vancomycin oral solution for C diff prophylaxis  ID following, recs appreciated  Shock liver, improving LFTs ; ischemic ATN  Mgmt per ICU.  monitor CBC      Acute respiratory failure with hypoxia extubated 4/19   Will encourage IS   chest PT     Perforated sigmoid colon:   CT with abdominal perforation; now s/p Ro and evacuation of about 1.2L purulent collection with the peritoneum on 4/13  S/p DDAVP, FFP and vitamin K to reverse coagulopathy  given 2un PRBC for acute blood loss anemia  Continue broad spectrum antibiotics as above  Cultures as above  Ostomy c/d/i; watery output noted  NPO - has NGT, being evaluated by speech and swallow team    neg c-diff, neg GI pcr  ID following, recs appreciated  Mgmt per ICU     LAYA (acute kidney injury): LAYA likely ATN due to septic shock  Cr worsened to 5.0 now 5.30   S/p bicarb gtt  Mgmt per ICU and Nephrology team   consider nephro eval.  monitor BMP  I/O  Might be a candidate for Albumin due to thrid spacing from low albumin       Chronic CHF: ProBNP 22277  TTE (04/2023) - LVEF of 55-60%; LAE; Sclerotic trileaflet aortic valve, trace AI. Mild to moderate MR and TR. IVC measures 2.0 cm and is non collapsing  Per cardio - hold Lasix and lopressor for now due to worsening kidney function   Cardio following, recs appreciated  Mgmt per ICU.    Hypothyroidism: IV synthroid  Mgmt per ICU.    Chronic atrial fibrillation: Continue to hold Xarelto per ICU team   Mgmt per ICU.  consider restarting AC when able , patient high stroke risk     Need for prophylactic measure: Continue DVT prophylaxis with venodynes  Mgmt per ICU      #Imaging abnormality  Stable right lung nodule, ground-glass infiltrates and loculated right pleural effusion.    Preventative measures   heparin SQ-dvt ppx  fall, aspiration precautions   HOBE     # Goals of Care  - consider Palliative Consult to discuss goals of care    #DISPO  PT consult when able.

## 2023-04-21 NOTE — PROGRESS NOTE ADULT - SUBJECTIVE AND OBJECTIVE BOX
Patient is a 72y old  Female who presents with a chief complaint of intra-abdominal perforation (21 Apr 2023 10:32)      Interval Events: No acute events overnight, however this AM noticed WBC up trending. S&S and PT consulted     Review of Systems: unable to get meaningful information given patient medical condition.     T(F): 97.5 (04-21-23 @ 08:25), Max: 98.4 (04-20-23 @ 15:57)  HR: 77 (04-21-23 @ 10:00) (75 - 87)  BP: 129/61 (04-20-23 @ 16:35) (123/58 - 144/60)  RR: 36 (04-21-23 @ 10:00) (10 - 36)  SpO2: 98% (04-21-23 @ 10:00) (92% - 99%)  Wt(kg): --          CAPILLARY BLOOD GLUCOSE      POCT Blood Glucose.: 107 mg/dL (21 Apr 2023 05:10)    I&O's Summary    20 Apr 2023 07:01  -  21 Apr 2023 07:00  --------------------------------------------------------  IN: 209.7 mL / OUT: 2075 mL / NET: -1865.3 mL    21 Apr 2023 07:01  -  21 Apr 2023 11:59  --------------------------------------------------------  IN: 50 mL / OUT: 80 mL / NET: -30 mL        Physical Exam:   T(C): 36.4 (04-21-23 @ 08:25), Max: 36.9 (04-20-23 @ 15:57)  HR: 77 (04-21-23 @ 10:00) (75 - 87)  BP: 129/61 (04-20-23 @ 16:35) (123/58 - 144/60)  RR: 36 (04-21-23 @ 10:00) (10 - 36)  SpO2: 98% (04-21-23 @ 10:00) (92% - 99%)    GENERAL: patient appears well,   ENMT: moist mucous membranes. NGT on placed   LUNGS: good air entry bilaterally, clear to auscultation, symmetric breath sounds, no wheezing or rhonchi appreciated  HEART: soft S1/S2, regular rate and rhythm, no murmurs noted, + lower extremity edema  GASTROINTESTINAL: abdomen is soft, nontender, nondistended, normoactive bowel sounds, ostomy liquid brown stools   NEUROLOGIC: awake, reactive to voice but non verbal           Meds:  heparin   Injectable 5000 Unit(s) SubCutaneous every 8 hours    meropenem  IVPB 500 milliGRAM(s) IV Intermittent every 24 hours  vancomycin    Solution 125 milliGRAM(s) Oral every 6 hours    buMETAnide Injectable 2 milliGRAM(s) IV Push once  metolazone 5 milliGRAM(s) Oral once  midodrine 10 milliGRAM(s) Oral every 8 hours    levothyroxine Injectable 93.75 MICROGram(s) IV Push at bedtime    albuterol    90 MICROgram(s) HFA Inhaler 4 Puff(s) Inhalation every 6 hours PRN    escitalopram 20 milliGRAM(s) Oral daily  fentaNYL    Injectable 25 MICROGram(s) IV Push every 4 hours PRN  metoclopramide Injectable 10 milliGRAM(s) IV Push every 8 hours    pantoprazole   Suspension 40 milliGRAM(s) Oral daily  psyllium Powder 1 Packet(s) Oral daily        sodium chloride 0.9% lock flush 10 milliLiter(s) IV Push every 1 hour PRN      chlorhexidine 4% Liquid 1 Application(s) Topical <User Schedule>          Labs:                        7.7    36.72 )-----------( 416      ( 21 Apr 2023 05:50 )             26.1       04-21    146<H>  |  111<H>  |  86<H>  ----------------------------<  92  3.6   |  24  |  5.30<H>    Ca    7.7<L>      21 Apr 2023 05:50  Phos  4.6     04-21  Mg     2.2     04-21    TPro  4.9<L>  /  Alb  2.2<L>  /  TBili  0.5  /  DBili  x   /  AST  13<L>  /  ALT  17  /  AlkPhos  51  04-21                    ABG - ( 19 Apr 2023 12:39 )  pH, Arterial: 7.31  pH, Blood: x     /  pCO2: 50    /  pO2: 105   / HCO3: 25    / Base Excess: -1.1  /  SaO2: 99.6                CENTRAL LINE: Y  DATE INSERTED:   REMOVE: Y/N    BISHOP: Y     DATE INSERTED:        REMOVE: Y/N    A-LINE: Y    DATE INSERTED:              REMOVE: Y/N    GLOBAL ISSUE/BEST PRACTICE:  Analgesia: N  Sedation: N  HOB elevation: Y  Stress ulcer prophylaxis: Y   VTE prophylaxis: Y  Glycemic control: Y  Nutrition: FT     CODE STATUS: ***No

## 2023-04-21 NOTE — PROGRESS NOTE ADULT - NUTRITIONAL ASSESSMENT
This patient has been assessed with a concern for Malnutrition and has been determined to have a diagnosis/diagnoses of Severe protein-calorie malnutrition.    This patient is being managed with:   Diet NPO with Tube Feed-  Tube Feeding Modality: Nasogastric  Nepro with Carb Steady  Total Volume for 24 Hours (mL): 1080  Continuous  Starting Tube Feed Rate {mL per Hour}: 20  Increase Tube Feed Rate by (mL): 10     Every 6 hours  Until Goal Tube Feed Rate (mL per Hour): 45  Tube Feed Duration (in Hours): 24  Tube Feed Start Time: 15:35  Entered: Apr 21 2023  3:32PM    Diet NPO with Tube Feed-  Tube Feeding Modality: Nasogastric  Vital 1.5 Adalberto  Total Volume for 24 Hours (mL): 240  Continuous  Until Goal Tube Feed Rate (mL per Hour): 10  Tube Feed Duration (in Hours): 24  Tube Feed Start Time: 12:00  Entered: Apr 19 2023 12:13PM    The following pending diet order is being considered for treatment of Severe protein-calorie malnutrition:null

## 2023-04-21 NOTE — CONSULT NOTE ADULT - ASSESSMENT
Patient with history myeloproliferative on agrylin (being treated by Dr. Rice) admitted with abd pain, weakness and falls at home a/w severe sepsis and peritonitis due to perforated sigmoid colon, also with LAYA due to ATN, shock liver, and post/op acute respiratory failure requiring mechanical ventilation, and acute blood loss anemia. pt is off agrylin.  had a period of thrombocytopenia now resolved with current platelet count 416K  Course also complicated by diarrhea which apparently was present prior to admission.  Has hx of ?pulmonary carcinoid with nodules being followed. however recent PET/CT dotatate negative.      Recommendations:  1.  follow CBC and transfuse as indicated  2.  continue post op care  3.  continue evaluation of diarrhea.  if workup negative may not be unreasonable to try sandostatin  4.  to hold agrylin until taking better po.  platelet count is not elevated at present but will follow  5.  further heme recommendations pending above  discussed with Dr. Miranda

## 2023-04-21 NOTE — PHYSICAL THERAPY INITIAL EVALUATION ADULT - PERTINENT HX OF CURRENT PROBLEM, REHAB EVAL
73 y/o f adm 4/13w/ PMH of HTN, CHF, asthma, hypothyroidism, prior notes states A-fib on Xarelto, but pt has not been taking AC p/w abd pn, nausea, diarrhea, found to have intraabdominal perforation, r/o c. diff. s/p partial colectomy w/ end colostomy 4/13. Post surgical course included respiratory distress and intubation x 3 days

## 2023-04-21 NOTE — PHYSICAL THERAPY INITIAL EVALUATION ADULT - ADDITIONAL COMMENTS
Pt lives alone in a senior apt, no steps. pt was an independent ambulator with RW and ind w/ ADLs. Pt is very weak and speech is hypophonic- pt whispered answers and nodded yes/no to questions

## 2023-04-21 NOTE — PROGRESS NOTE ADULT - SUBJECTIVE AND OBJECTIVE BOX
[facility]    Adrián Wray, Elsa, Phan Fernandez      [phone]    CHIEF COMPLAINT: Patient is a 72y old  Female who presents with a chief complaint of intra-abdominal perforation (21 Apr 2023 07:56)      Follow Up: colon perf, postop hypotension/resp failure    Interim history: The patient reports no new symptoms.  Denies chest discomfort and shortness of breath.  No abdominal pain.  No new neurologic symptoms.      MEDICATIONS  (STANDING):  buMETAnide Injectable 2 milliGRAM(s) IV Push once  chlorhexidine 4% Liquid 1 Application(s) Topical <User Schedule>  heparin   Injectable 5000 Unit(s) SubCutaneous every 8 hours  levothyroxine Injectable 93.75 MICROGram(s) IV Push at bedtime  meropenem  IVPB 500 milliGRAM(s) IV Intermittent every 24 hours  metoclopramide Injectable 10 milliGRAM(s) IV Push every 8 hours  metolazone 5 milliGRAM(s) Oral once  midodrine 10 milliGRAM(s) Oral every 8 hours  pantoprazole  Injectable 40 milliGRAM(s) IV Push daily  vancomycin    Solution 125 milliGRAM(s) Oral every 6 hours    MEDICATIONS  (PRN):  albuterol    90 MICROgram(s) HFA Inhaler 4 Puff(s) Inhalation every 6 hours PRN Shortness of Breath and/or Wheezing  fentaNYL    Injectable 25 MICROGram(s) IV Push every 4 hours PRN Severe Pain (7 - 10)  sodium chloride 0.9% lock flush 10 milliLiter(s) IV Push every 1 hour PRN Pre/post blood products, medications, blood draw, and to maintain line patency      REVIEW OF SYSTEMS:  eye, ent, GI, , allergic, dermatologic, musculoskeletal and neurologic are negative except as described above    Vital Signs Last 24 Hrs  T(C): 36.4 (21 Apr 2023 08:25), Max: 36.9 (20 Apr 2023 15:57)  T(F): 97.5 (21 Apr 2023 08:25), Max: 98.4 (20 Apr 2023 15:57)  HR: 77 (21 Apr 2023 10:00) (70 - 87)  BP: 129/61 (20 Apr 2023 16:35) (123/58 - 144/60)  BP(mean): 88 (20 Apr 2023 16:35) (84 - 90)  RR: 36 (21 Apr 2023 10:00) (10 - 36)  SpO2: 98% (21 Apr 2023 10:00) (92% - 100%)    Parameters below as of 20 Apr 2023 11:00  Patient On (Oxygen Delivery Method): room air        I&O's Summary    20 Apr 2023 07:01  -  21 Apr 2023 07:00  --------------------------------------------------------  IN: 209.7 mL / OUT: 2075 mL / NET: -1865.3 mL    21 Apr 2023 07:01  -  21 Apr 2023 10:33  --------------------------------------------------------  IN: 80 mL / OUT: 0 mL / NET: 80 mL        Telemetry past 24h: sr brief svt    PHYSICAL EXAM:    Constitutional: well-nourished, well-developed, NAD   HEENT:  ngt, sclerae anicteric, conjunctivae clear, no oral cyanosis.  Pulmonary: Non-labored, breath sounds are clear bilaterally, No wheezing, rales or rhonchi  Cardiovascular: Regular, S1 and S2.  No murmur.  No rubs, gallops or clicks  Gastrointestinal: Bowel Sounds present, soft, ileostomy with liquid stool  Lymph: No peripheral edema.   Neurological: Alert, no focal deficits  Skin: No rashes.  Psych:  Mood & affect appropriate    LABS: All Labs Reviewed:                        7.7    36.72 )-----------( 416      ( 21 Apr 2023 05:50 )             26.1                         7.4    29.57 )-----------( 333      ( 20 Apr 2023 05:40 )             25.8                         7.6    23.77 )-----------( 238      ( 19 Apr 2023 05:45 )             26.0     21 Apr 2023 05:50    146    |  111    |  86     ----------------------------<  92     3.6     |  24     |  5.30   20 Apr 2023 05:40    146    |  111    |  91     ----------------------------<  106    3.7     |  26     |  5.30   19 Apr 2023 05:45    146    |  110    |  88     ----------------------------<  131    3.8     |  26     |  5.20     Ca    7.7        21 Apr 2023 05:50  Ca    7.9        20 Apr 2023 05:40  Ca    7.8        19 Apr 2023 05:45  Phos  4.6       21 Apr 2023 05:50  Phos  4.9       20 Apr 2023 05:40  Phos  4.5       19 Apr 2023 05:45  Mg     2.2       21 Apr 2023 05:50  Mg     2.1       20 Apr 2023 05:40  Mg     2.1       19 Apr 2023 05:45    TPro  4.9    /  Alb  2.2    /  TBili  0.5    /  DBili  x      /  AST  13     /  ALT  17     /  AlkPhos  51     21 Apr 2023 05:50  TPro  4.7    /  Alb  2.0    /  TBili  0.5    /  DBili  x      /  AST  15     /  ALT  24     /  AlkPhos  54     20 Apr 2023 05:40  TPro  4.6    /  Alb  1.8    /  TBili  0.6    /  DBili  x      /  AST  22     /  ALT  40     /  AlkPhos  61     19 Apr 2023 05:45          Blood Culture:         RADIOLOGY:    EKG:    Echo:

## 2023-04-21 NOTE — SWALLOW BEDSIDE ASSESSMENT ADULT - COMMENTS
Pt was awake, cooperative and positioned upright in bed for a clinical assessment of swallow function this PM Pt was awake, cooperative and positioned upright in bed for a clinical assessment of swallow function this PM. Per charting, pt is a "72F PMH A-Fib on rivaroxaban, HFpEF, recent C diff infection (Jan 2023), myelofibrosis with thrombocytosis on anagrelide, hypothyroidism, asthma, and lung nodules s/p resection (reportedly malignant) who presents with perforated sigmoid colon with acute bacterial peritonitis, septic shock, lactic acidosis, and LAYA 2/2 ATN, s/p extended Tia's including descending colon on 4/13. Extubated 4/19."    Recent CXR revealed " Bilateral infiltrates show some improvement. Endotracheal tube removed."    Recent abdominal CT revealed 'Limited study. Status post left hemicolectomy and right lower quadrant colostomy. Diffuse small bowel and colonic wall thickening may reflect an enterocolitis. Small to moderate abdominal pelvic ascites, without localized intra-abdominal fluid collection. Diffuse body wall edema/anasarca. Subtle linear high attenuation material extending from the abdominal wall to the midline surgical wound, may reflect postsurgical material, however, cannot exclude enterocutaneous fistula. Other findings as discussed above."     At the time of today's assessment, pt presents with NGT in place. She is nonverbal throughout assessment. RN present at bedside.

## 2023-04-21 NOTE — PROGRESS NOTE ADULT - ASSESSMENT
LAYA on CKD: Septic/hemodynamic ATN  Sigmoid perforation, s/p surgery  Anemia  Shock, Sepsis    04/19/23: Will follow creatinine trend and urine out put. On less pressor support. IV abx, ID follow up. Monitor h/h trend. Transfuse PRBC's PRN.   Avoid nephrotoxic meds as possible. Avoid ACEI, ARB, NSAIDs and IV contrast. Will follow electrolytes and renal function trend. D/w pt's son over the phone.   No emergent need for hemodialysis at this time.   04/20/23: Pt extubated. IV diuresis. Follow UO. Avoid nephrotoxic meds as possible. Monitor h/h trend. Transfuse PRBC's PRN. ICU management.   04/21/23: Remains non oliguric. Continue to monitor on IV diuretics. Monitor h/h trend. Transfuse PRBC's PRN. Monitor for dialytic need on a daily basis.

## 2023-04-21 NOTE — PHYSICAL THERAPY INITIAL EVALUATION ADULT - LIVES WITH, PROFILE
Detail Level: Detailed Biopsy Type: Biopsy Indication: lip lesion Lab Facility: [id number] Consent: The risks, benefits, complications, and alternatives of a lip biopsy were discussed in detail.  Risks and complications include but are not limited to: pain, infection, bleeding, scarring, numbness, regrowth of the lesion, and need for further biopsy and treatment, among others. Billing Type: Third-Party Bill alone

## 2023-04-22 NOTE — PROGRESS NOTE ADULT - SUBJECTIVE AND OBJECTIVE BOX
Subjective: NGT maintained. Somewhat somnolent.       MEDICATIONS  (STANDING):  escitalopram 20 milliGRAM(s) Oral daily  heparin   Injectable 5000 Unit(s) SubCutaneous every 8 hours  levothyroxine Injectable 93.75 MICROGram(s) IV Push at bedtime  meropenem  IVPB 500 milliGRAM(s) IV Intermittent every 24 hours  midodrine 10 milliGRAM(s) Oral every 8 hours  octreotide  Injectable 100 MICROGram(s) SubCutaneous every 8 hours  pantoprazole   Suspension 40 milliGRAM(s) Oral daily  psyllium Powder 1 Packet(s) Oral daily  vancomycin    Solution 125 milliGRAM(s) Oral daily    MEDICATIONS  (PRN):  albuterol    90 MICROgram(s) HFA Inhaler 4 Puff(s) Inhalation every 6 hours PRN Shortness of Breath and/or Wheezing  metoclopramide Injectable 5 milliGRAM(s) IV Push every 6 hours PRN nausea and/or vomiting  sodium chloride 0.9% lock flush 10 milliLiter(s) IV Push every 1 hour PRN Pre/post blood products, medications, blood draw, and to maintain line patency          T(C): 37 (04-22-23 @ 08:00), Max: 37.4 (04-21-23 @ 23:58)  HR: 86 (04-22-23 @ 10:00) (72 - 99)  BP: 114/56 (04-22-23 @ 10:00) (113/57 - 144/60)  RR: 31 (04-22-23 @ 10:00) (11 - 36)  SpO2: 99% (04-22-23 @ 10:00) (75% - 100%)  Wt(kg): --        I&O's Detail    21 Apr 2023 07:01  -  22 Apr 2023 07:00  --------------------------------------------------------  IN:    Free Water: 800 mL    IV PiggyBack: 50 mL    Nepro with Carb Steady: 560 mL  Total IN: 1410 mL    OUT:    Bulb (mL): 50 mL    Colostomy (mL): 1350 mL    Indwelling Catheter - Urethral (mL): 945 mL  Total OUT: 2345 mL    Total NET: -935 mL      22 Apr 2023 07:01  -  22 Apr 2023 10:56  --------------------------------------------------------  IN:    Free Water: 200 mL    Nepro with Carb Steady: 135 mL  Total IN: 335 mL    OUT:    Indwelling Catheter - Urethral (mL): 115 mL  Total OUT: 115 mL    Total NET: 220 mL               PHYSICAL EXAM:    GENERAL: somnolent.   CHEST/LUNG: Clear  HEART: S1S2  ABDOMEN: midline dressing, ostomy  EXTREMITIES:  edema.       LABS:  CBC Full  -  ( 22 Apr 2023 05:50 )  WBC Count : 42.12 K/uL  RBC Count : 2.95 M/uL  Hemoglobin : 8.3 g/dL  Hematocrit : 29.9 %  Platelet Count - Automated : 343 K/uL  Mean Cell Volume : 101.4 fl  Mean Cell Hemoglobin : 28.1 pg  Mean Cell Hemoglobin Concentration : 27.8 gm/dL  Auto Neutrophil # : x  Auto Lymphocyte # : x  Auto Monocyte # : x  Auto Eosinophil # : x  Auto Basophil # : x  Auto Neutrophil % : x  Auto Lymphocyte % : x  Auto Monocyte % : x  Auto Eosinophil % : x  Auto Basophil % : x    04-22    144  |  110<H>  |  88<H>  ----------------------------<  136<H>  4.0   |  24  |  5.30<H>    Ca    7.7<L>      22 Apr 2023 05:50  Phos  5.3     04-22  Mg     2.3     04-22    TPro  5.1<L>  /  Alb  2.1<L>  /  TBili  0.4  /  DBili  x   /  AST  15  /  ALT  15  /  AlkPhos  66  04-22          Impression:  1.LAYA on CKD: Septic/hemodynamic ATN. Cr has stabilized. + cc/day  2.Sigmoid perforation, s/p surgical repair  3.Anemia  4.Shock, Sepsis    Recommendations:   She remains without dialytic indications.   Cont hemodynamic support at the discretion of PCCM  Will cont to monitor closely with you  Adjust all meds to Cr Cl 10-15cc/min

## 2023-04-22 NOTE — PROGRESS NOTE ADULT - SUBJECTIVE AND OBJECTIVE BOX
[facility]    Adrián Wray, Elsa, Phan Fernandez      [phone]    CHIEF COMPLAINT: Patient is a 72y old  Female who presents with a chief complaint of intra-abdominal perforation (22 Apr 2023 08:34)      Follow Up: s/p perf colon, hypotension    Interim history: lethargic but arousable this am.  events noted    MEDICATIONS  (STANDING):  escitalopram 20 milliGRAM(s) Oral daily  heparin   Injectable 5000 Unit(s) SubCutaneous every 8 hours  levothyroxine Injectable 93.75 MICROGram(s) IV Push at bedtime  meropenem  IVPB 500 milliGRAM(s) IV Intermittent every 24 hours  midodrine 10 milliGRAM(s) Oral every 8 hours  octreotide  Injectable 100 MICROGram(s) SubCutaneous every 8 hours  pantoprazole   Suspension 40 milliGRAM(s) Oral daily  psyllium Powder 1 Packet(s) Oral daily  vancomycin    Solution 125 milliGRAM(s) Oral daily    MEDICATIONS  (PRN):  albuterol    90 MICROgram(s) HFA Inhaler 4 Puff(s) Inhalation every 6 hours PRN Shortness of Breath and/or Wheezing  metoclopramide Injectable 5 milliGRAM(s) IV Push every 6 hours PRN nausea and/or vomiting  sodium chloride 0.9% lock flush 10 milliLiter(s) IV Push every 1 hour PRN Pre/post blood products, medications, blood draw, and to maintain line patency      REVIEW OF SYSTEMS: unable (ms)    Vital Signs Last 24 Hrs  T(C): 37 (22 Apr 2023 08:00), Max: 37.4 (21 Apr 2023 23:58)  T(F): 98.6 (22 Apr 2023 08:00), Max: 99.4 (21 Apr 2023 23:58)  HR: 86 (22 Apr 2023 10:00) (72 - 99)  BP: 114/56 (22 Apr 2023 10:00) (113/57 - 144/60)  BP(mean): 81 (22 Apr 2023 10:00) (78 - 102)  RR: 31 (22 Apr 2023 10:00) (11 - 36)  SpO2: 99% (22 Apr 2023 10:00) (75% - 100%)    Parameters below as of 22 Apr 2023 07:00  Patient On (Oxygen Delivery Method): nasal cannula  O2 Flow (L/min): 2      I&O's Summary    21 Apr 2023 07:01  -  22 Apr 2023 07:00  --------------------------------------------------------  IN: 1410 mL / OUT: 2345 mL / NET: -935 mL    22 Apr 2023 07:01  -  22 Apr 2023 10:14  --------------------------------------------------------  IN: 245 mL / OUT: 35 mL / NET: 210 mL        Telemetry past 24h: sr    PHYSICAL EXAM:    Constitutional: well-nourished, well-developed, NAD   HEENT:  ngt , sclerae anicteric, conjunctivae clear, no oral cyanosis.  Pulmonary: Non-labored, breath sounds are clear bilaterally, No wheezing, rales or rhonchi  Cardiovascular: Regular, S1 and S2.  No murmur.  No rubs, gallops or clicks  Gastrointestinal: Bowel Sounds present, soft, nontender.   Lymph: mild depperipheral edema.   Neurological: Alert, no focal deficits  Skin: No rashes.  Psych:  Mood & affect appropriate    LABS: All Labs Reviewed:                        8.3    42.12 )-----------( 343      ( 22 Apr 2023 05:50 )             29.9                         7.7    36.72 )-----------( 416      ( 21 Apr 2023 05:50 )             26.1                         7.4    29.57 )-----------( 333      ( 20 Apr 2023 05:40 )             25.8     22 Apr 2023 05:50    144    |  110    |  88     ----------------------------<  136    4.0     |  24     |  5.30   21 Apr 2023 05:50    146    |  111    |  86     ----------------------------<  92     3.6     |  24     |  5.30   20 Apr 2023 05:40    146    |  111    |  91     ----------------------------<  106    3.7     |  26     |  5.30     Ca    7.7        22 Apr 2023 05:50  Ca    7.7        21 Apr 2023 05:50  Ca    7.9        20 Apr 2023 05:40  Phos  5.3       22 Apr 2023 05:50  Phos  4.6       21 Apr 2023 05:50  Phos  4.9       20 Apr 2023 05:40  Mg     2.3       22 Apr 2023 05:50  Mg     2.2       21 Apr 2023 05:50  Mg     2.1       20 Apr 2023 05:40    TPro  5.1    /  Alb  2.1    /  TBili  0.4    /  DBili  x      /  AST  15     /  ALT  15     /  AlkPhos  66     22 Apr 2023 05:50  TPro  4.9    /  Alb  2.2    /  TBili  0.5    /  DBili  x      /  AST  13     /  ALT  17     /  AlkPhos  51     21 Apr 2023 05:50  TPro  4.7    /  Alb  2.0    /  TBili  0.5    /  DBili  x      /  AST  15     /  ALT  24     /  AlkPhos  54     20 Apr 2023 05:40          Blood Culture:         RADIOLOGY:    EKG:    Echo:

## 2023-04-22 NOTE — PROGRESS NOTE ADULT - ASSESSMENT
72 year old female s/p extended Tia's (including descending colon) for perforated sigmoid. NPO with NGT on TF. Off levo; now on midodrine. R/o c. diff. Daily dressing changes.    - Continue to monitor ostomy output  - Continue antibiotics per primary team  - Trend WBC  - DVT prophylaxis with SQH  - PT/OT  - Pain control and replete lytes PRN  - Continue tube feeds  - Care per ICU team  - To be discussed with Attending

## 2023-04-22 NOTE — PROGRESS NOTE ADULT - PROBLEM SELECTOR PLAN 2
Septic shock due to perforated sigmoid colon with polymicrobial peritonitis; worsening leukocytosis   s/p Tia procedure and abdominal abscess drainage on 4/13  lactic acidosis resolved  weaned off pressor support  BCx - 4/13 no growth   Peritoneal cultures poly microbial - Proteus vulgaris group, Escherichia coli, Klebsiella pneumoniae, Morganella morganii   Peritoneal fluid with gram variable rods and PMNs.  C-Diff negative, GI PCR neg  continue broad spectrum antimicrobial therapy with meropenem  Shock liver, improving LFTs ; ischemic ATN  Continue care per ICU Perforated sigmoid colon: s/p Ro's procedure   Ostomy c/d/i; watery output noted  NPO with NGT,   Await evaluation by speech and swallow team    neg c-diff, neg GI pcr  ID follow up ongoing  Continue Nepro at goal rate of 45mL/hour.   Continue Metoclopramide prn and PPI   Monitor drain and colostomy output. Continue octreotide given history of carcinoid with increased ostomy output. Psyllium added to bulk stool per ICU   Care per ICU  Started on bismuth salicylate and per discussion with ICU attending, if ostomy output remains high, plan to start cholestyramine or hyoscyamine.   Continue to monitor

## 2023-04-22 NOTE — PROGRESS NOTE ADULT - GASTROINTESTINAL COMMENTS
Midline wound stapled with small opening at superior and inferior aspect, packing changed; no surrounding skin erythema or purulent drainage noted; redressed with 4x4 gauze and tape. right drain with serous fluid. Ostomy with liquid brown stool

## 2023-04-22 NOTE — PROGRESS NOTE ADULT - PROBLEM SELECTOR PLAN 1
Perforated sigmoid colon: s/p Ro's procedure     Ostomy c/d/i; watery output noted  NPO - has NGT, being evaluated by speech and swallow team    neg c-diff, neg GI pcr  ID following, recs appreciated  Mgmt per ICU Septic shock due to perforated sigmoid colon with polymicrobial peritonitis; worsening leukocytosis   s/p Tia procedure and abdominal abscess drainage on 4/13  lactic acidosis resolved  Continue midodrine 10 mg q8h.   weaned off pressor support  BCx - 4/13 no growth   Peritoneal cultures poly microbial - Proteus vulgaris group, Escherichia coli, Klebsiella pneumoniae, Morganella morganii   Peritoneal fluid with gram variable rods and PMNs.  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  Prognosis remains guarded

## 2023-04-22 NOTE — PROGRESS NOTE ADULT - PROBLEM SELECTOR PLAN 3
Acute respiratory failure with hypoxia extubated 4/19   Will encourage IS   chest PT Acute respiratory failure with hypoxia extubated 4/19   Will encourage incentive spirometry  chest PT as tolerated  Starting albuterol-ipratropium nebs q6h + budesonide 0.5 mg nebs q12h (on Trelegy Ellipta inhaler at home) per ICU recommendations

## 2023-04-22 NOTE — PROGRESS NOTE ADULT - SUBJECTIVE AND OBJECTIVE BOX
Patient is a 72y old  Female who presents with a chief complaint of intra-abdominal perforation (22 Apr 2023 08:20)      Interval Events:     Review of Systems:  Constitutional: no fever, chills, fatigue  Neuro: no headache, numbness, weakness  Resp: no cough, wheezing, shortness of breath  CV: no chest pain, palpitations, leg swelling  GI: no abdominal pain, nausea, vomiting, diarrhea   : no dysuria, frequency, incontinence  Skin: no itching, burning, rashes, lesions   MSK: no joint pain or swelling  Psych: no depression, anxiety    T(F): 98.6 (04-22-23 @ 08:00), Max: 99.4 (04-21-23 @ 23:58)  HR: 83 (04-22-23 @ 08:01) (72 - 99)  BP: 125/60 (04-22-23 @ 08:01) (113/57 - 144/60)  RR: 25 (04-22-23 @ 08:01) (11 - 36)  SpO2: 100% (04-22-23 @ 08:01) (75% - 100%)  Wt(kg): --          CAPILLARY BLOOD GLUCOSE      POCT Blood Glucose.: 166 mg/dL (22 Apr 2023 05:14)    I&O's Summary    21 Apr 2023 07:01  -  22 Apr 2023 07:00  --------------------------------------------------------  IN: 1410 mL / OUT: 2345 mL / NET: -935 mL        Physical Exam:   Gen:  Neuro:  HEENT:  CV:  Pulm:  GI:  Ext:  Skin:      Meds:  heparin   Injectable 5000 Unit(s) SubCutaneous every 8 hours    meropenem  IVPB 500 milliGRAM(s) IV Intermittent every 24 hours  vancomycin    Solution 125 milliGRAM(s) Oral daily    midodrine 10 milliGRAM(s) Oral every 8 hours    levothyroxine Injectable 93.75 MICROGram(s) IV Push at bedtime  octreotide  Injectable 100 MICROGram(s) SubCutaneous every 8 hours    albuterol    90 MICROgram(s) HFA Inhaler 4 Puff(s) Inhalation every 6 hours PRN    escitalopram 20 milliGRAM(s) Oral daily  fentaNYL    Injectable 25 MICROGram(s) IV Push every 4 hours PRN    pantoprazole   Suspension 40 milliGRAM(s) Oral daily  psyllium Powder 1 Packet(s) Oral daily        sodium chloride 0.9% lock flush 10 milliLiter(s) IV Push every 1 hour PRN      chlorhexidine 2% Cloths 1 Application(s) Topical daily          Labs:                        7.7    36.72 )-----------( 416      ( 21 Apr 2023 05:50 )             26.1       04-22    144  |  110<H>  |  88<H>  ----------------------------<  136<H>  4.0   |  24  |  5.30<H>    Ca    7.7<L>      22 Apr 2023 05:50  Phos  5.3     04-22  Mg     2.3     04-22    TPro  5.1<L>  /  Alb  2.1<L>  /  TBili  0.4  /  DBili  x   /  AST  15  /  ALT  15  /  AlkPhos  66  04-22                C Diff by PCR Result: [facility] (04-20 @ 19:03)      CENTRAL LINE: Y  DATE INSERTED:   REMOVE: Y/N    BISHOP: Y     DATE INSERTED:        REMOVE: Y/N    A-LINE: Y    DATE INSERTED:              REMOVE: Y/N    GLOBAL ISSUE/BEST PRACTICE:  Analgesia: N  Sedation: N  HOB elevation: Y  Stress ulcer prophylaxis: Y   VTE prophylaxis: Y  Glycemic control: Y  Nutrition: FT     CODE STATUS: ***No   Patient is a 72y old  Female who presents with a chief complaint of intra-abdominal perforation (22 Apr 2023 08:20)      Interval Events: Leukocytosis up trending, afebrile overnight. Will repeat cultures     Review of Systems: unable to obtain meaningful information given patient medical condition       T(F): 98.6 (04-22-23 @ 08:00), Max: 99.4 (04-21-23 @ 23:58)  HR: 83 (04-22-23 @ 08:01) (72 - 99)  BP: 125/60 (04-22-23 @ 08:01) (113/57 - 144/60)  RR: 25 (04-22-23 @ 08:01) (11 - 36)  SpO2: 100% (04-22-23 @ 08:01) (75% - 100%)  Wt(kg): --          CAPILLARY BLOOD GLUCOSE      POCT Blood Glucose.: 166 mg/dL (22 Apr 2023 05:14)    I&O's Summary    21 Apr 2023 07:01  -  22 Apr 2023 07:00  --------------------------------------------------------  IN: 1410 mL / OUT: 2345 mL / NET: -935 mL        Physical Exam:   GENERAL: patient appears ill, lethargic   ENMT: moist mucous membranes. NGT on placed   LUNGS: good air entry bilaterally, clear to auscultation, symmetric breath sounds, no wheezing or rhonchi appreciated  HEART: soft S1/S2, regular rate and rhythm, no murmurs noted, + lower extremity edema  GASTROINTESTINAL: abdomen is soft, nontender, nondistended, normoactive bowel sounds, ostomy liquid brown stools   NEUROLOGIC: awake, reactive to voice but non verbal         Meds:  heparin   Injectable 5000 Unit(s) SubCutaneous every 8 hours    meropenem  IVPB 500 milliGRAM(s) IV Intermittent every 24 hours  vancomycin    Solution 125 milliGRAM(s) Oral daily    midodrine 10 milliGRAM(s) Oral every 8 hours    levothyroxine Injectable 93.75 MICROGram(s) IV Push at bedtime  octreotide  Injectable 100 MICROGram(s) SubCutaneous every 8 hours    albuterol    90 MICROgram(s) HFA Inhaler 4 Puff(s) Inhalation every 6 hours PRN    escitalopram 20 milliGRAM(s) Oral daily  fentaNYL    Injectable 25 MICROGram(s) IV Push every 4 hours PRN    pantoprazole   Suspension 40 milliGRAM(s) Oral daily  psyllium Powder 1 Packet(s) Oral daily        sodium chloride 0.9% lock flush 10 milliLiter(s) IV Push every 1 hour PRN      chlorhexidine 2% Cloths 1 Application(s) Topical daily          Labs:                        7.7    36.72 )-----------( 416      ( 21 Apr 2023 05:50 )             26.1       04-22    144  |  110<H>  |  88<H>  ----------------------------<  136<H>  4.0   |  24  |  5.30<H>    Ca    7.7<L>      22 Apr 2023 05:50  Phos  5.3     04-22  Mg     2.3     04-22    TPro  5.1<L>  /  Alb  2.1<L>  /  TBili  0.4  /  DBili  x   /  AST  15  /  ALT  15  /  AlkPhos  66  04-22                C Diff by PCR Result: [facility] (04-20 @ 19:03)      CENTRAL LINE: Y  DATE INSERTED:   REMOVE: Y/N    BISHOP: Y     DATE INSERTED:        REMOVE: Y/N    A-LINE: Y    DATE INSERTED:              REMOVE: Y/N    GLOBAL ISSUE/BEST PRACTICE:  Analgesia: N  Sedation: N  HOB elevation: Y  Stress ulcer prophylaxis: Y   VTE prophylaxis: Y  Glycemic control: Y  Nutrition: FT    Patient is a 72y old  Female who presents with a chief complaint of intra-abdominal perforation (22 Apr 2023 08:20)      Interval Events: No acute events overnight. This AM noticed that leukocytosis is up trending and more lethargic. Afebrile overnight. Will repeat blood culture and UA this PM        Review of Systems: unable to obtain meaningful information given patient medical condition       T(F): 98.6 (04-22-23 @ 08:00), Max: 99.4 (04-21-23 @ 23:58)  HR: 83 (04-22-23 @ 08:01) (72 - 99)  BP: 125/60 (04-22-23 @ 08:01) (113/57 - 144/60)  RR: 25 (04-22-23 @ 08:01) (11 - 36)  SpO2: 100% (04-22-23 @ 08:01) (75% - 100%)  Wt(kg): --          CAPILLARY BLOOD GLUCOSE      POCT Blood Glucose.: 166 mg/dL (22 Apr 2023 05:14)    I&O's Summary    21 Apr 2023 07:01  -  22 Apr 2023 07:00  --------------------------------------------------------  IN: 1410 mL / OUT: 2345 mL / NET: -935 mL        Physical Exam:   GENERAL: patient appears ill, lethargic   ENMT: moist mucous membranes. NGT on placed   LUNGS: decreased air entry bilaterally base,  no wheezing, + rhonchi appreciated  HEART: soft S1/S2, regular rate and rhythm, no murmurs noted, + lower extremity edema  GASTROINTESTINAL: abdomen is soft, nontender, nondistended, normoactive bowel sounds, ostomy liquid brown stools   NEUROLOGIC: awake, follow commands but not able to articulate due to weakness         Meds:  heparin   Injectable 5000 Unit(s) SubCutaneous every 8 hours    meropenem  IVPB 500 milliGRAM(s) IV Intermittent every 24 hours  vancomycin    Solution 125 milliGRAM(s) Oral daily    midodrine 10 milliGRAM(s) Oral every 8 hours    levothyroxine Injectable 93.75 MICROGram(s) IV Push at bedtime  octreotide  Injectable 100 MICROGram(s) SubCutaneous every 8 hours    albuterol    90 MICROgram(s) HFA Inhaler 4 Puff(s) Inhalation every 6 hours PRN    escitalopram 20 milliGRAM(s) Oral daily  fentaNYL    Injectable 25 MICROGram(s) IV Push every 4 hours PRN    pantoprazole   Suspension 40 milliGRAM(s) Oral daily  psyllium Powder 1 Packet(s) Oral daily        sodium chloride 0.9% lock flush 10 milliLiter(s) IV Push every 1 hour PRN      chlorhexidine 2% Cloths 1 Application(s) Topical daily          Labs:                        7.7    36.72 )-----------( 416      ( 21 Apr 2023 05:50 )             26.1       04-22    144  |  110<H>  |  88<H>  ----------------------------<  136<H>  4.0   |  24  |  5.30<H>    Ca    7.7<L>      22 Apr 2023 05:50  Phos  5.3     04-22  Mg     2.3     04-22    TPro  5.1<L>  /  Alb  2.1<L>  /  TBili  0.4  /  DBili  x   /  AST  15  /  ALT  15  /  AlkPhos  66  04-22                C Diff by PCR Result: [facility] (04-20 @ 19:03)      CENTRAL LINE: Y  DATE INSERTED:   REMOVE: Y/N    BISHOP: Y     DATE INSERTED:        REMOVE: Y/N    A-LINE: Y    DATE INSERTED:              REMOVE: Y/N    GLOBAL ISSUE/BEST PRACTICE:  Analgesia: N  Sedation: N  HOB elevation: Y  Stress ulcer prophylaxis: Y   VTE prophylaxis: Y  Glycemic control: Y  Nutrition: FT

## 2023-04-22 NOTE — PROGRESS NOTE ADULT - ASSESSMENT
72F PMH A-Fib on rivaroxaban, HFpEF, recent C diff infection (Jan 2023), myelofibrosis with thrombocytosis on anagrelide, hypothyroidism, asthma, and lung nodules s/p resection (reportedly malignant) who presents with perforated sigmoid colon with acute bacterial peritonitis, septic shock, lactic acidosis, and LAYA 2/2 ATN, s/p extended Tia's including descending colon on 4/13. Extubated 4/19.    # Neuro:  - Off of sedation.   - On Lexapro   - Pain regimen: fentanyl 25 mg q4 PRN for severe pain   - PT recommending ALFRED   #CV:  - off of pressors. On midodrine 10 mg TID   - Holding home BB due to soft BP   #Pulm:   -extubated 4/19. Doing well on RA, goal SpO2>90%. C  #GI:  - On metoclopramide PRN for n/v  -CT A/P demonstrated diffuse gastric wall thickening. S/p left hemicolectomy with rectosigmoid stump and RLQ colostomy. Diffuse small bowel and colonic wall thickening. Enteric contrast transits through the colon to the ostomy, without bowel obstruction. Small-moderate abdominal and pelvic ascites. Diffuse mesenteric stranding. No localized fluid collection. Small droplet of free intraperitoneal air, likely post-operative. Mild presacral stranding.   -Monitor drain and colostomy output. Continue octreotide and metamucil   # Renal: LAYA 2/2 ATN. Strict I/O's, close monitoring of kidney function and lytes.  ID:  - polymicrobial peritonitis, including ESBL Klebsiella, Morganella, E. coli, Strep constellatus, and Bacteroides. All covered by meropenem. Continue vancomycin 125 mg po daily for C diff prophylaxis  #Heme:   -history of myeloproliferative disorder with fibrosis, on anagrelide as outpatient, holding for now   - DVT ppx: Heparin   # Endo: continue synthroid   72F PMH A-Fib on rivaroxaban, HFpEF, recent C diff infection (Jan 2023), myelofibrosis with thrombocytosis on anagrelide, hypothyroidism, asthma, and lung nodules s/p resection (reportedly malignant) who presents with perforated sigmoid colon with acute bacterial peritonitis, septic shock, lactic acidosis, and LAYA 2/2 ATN, s/p extended Tia's including descending colon on 4/13. Extubated 4/19.    # Neuro:  - Off of sedation.   - On Lexapro   - Pain regimen: fentanyl 25 mg q4 PRN for severe pain   - PT recommending ALFRED   #CV:  - off of pressors. On midodrine 10 mg TID   - Holding home BB due to soft BP   #Pulm:   -extubated 4/19. Doing well on RA, goal SpO2>90%. C  #GI:  - On metoclopramide PRN for n/v  -CT A/P demonstrated diffuse gastric wall thickening. S/p left hemicolectomy with rectosigmoid stump and RLQ colostomy. Diffuse small bowel and colonic wall thickening. Enteric contrast transits through the colon to the ostomy, without bowel obstruction. Small-moderate abdominal and pelvic ascites. Diffuse mesenteric stranding. No localized fluid collection. Small droplet of free intraperitoneal air, likely post-operative. Mild presacral stranding.   -Monitor drain and colostomy output. Continue octreotide and metamucil   # Renal: LAYA 2/2 ATN. Strict I/O's, close monitoring of kidney function and lytes.  ID:  - WBC up trending this AM, afebrile overnight. Will repeat cultures.   - C. diff PCR negative   - polymicrobial peritonitis, including ESBL Klebsiella, Morganella, E. coli, Strep constellatus, and Bacteroides. All covered by meropenem. Continue vancomycin 125 mg po daily for C diff prophylaxis  #Heme:   -history of myeloproliferative disorder with fibrosis, on anagrelide as outpatient, holding for now. Heme/ onc following    - DVT ppx: Heparin   # Endo: continue synthroid   72F PMH A-Fib on rivaroxaban, HFpEF, recent C diff infection (Jan 2023), myelofibrosis with thrombocytosis on anagrelide, hypothyroidism, asthma, and lung nodules s/p resection (reportedly malignant) who presents with perforated sigmoid colon with acute bacterial peritonitis, septic shock, lactic acidosis, and LAYA 2/2 ATN, s/p extended Tia's including descending colon on 4/13. Extubated 4/19.    # Neuro:  - Appears lethargic   - Off of sedation.   - On Lexapro   - PT recommending ALFRED   #CV:  - off of pressors. On midodrine 10 mg TID   - Holding home BB due to soft BP   #Pulm:   -extubated 4/19. Doing well on NC, goal SpO2>90%.   #GI:  - On metoclopramide PRN for n/v  -CT A/P demonstrated diffuse gastric wall thickening. S/p left hemicolectomy with rectosigmoid stump and RLQ colostomy. Diffuse small bowel and colonic wall thickening. Enteric contrast transits through the colon to the ostomy, without bowel obstruction. Small-moderate abdominal and pelvic ascites. Diffuse mesenteric stranding. No localized fluid collection. Small droplet of free intraperitoneal air, likely post-operative. Mild presacral stranding.   -Monitor drain and colostomy output. Continue octreotide and metamucil   # Renal: LAYA 2/2 ATN. Strict I/O's, close monitoring of kidney function and lytes.  ID:  - WBC up trending this AM, afebrile overnight.  - C. diff PCR negative   - polymicrobial peritonitis, including ESBL Klebsiella, Morganella, E. coli, Strep constellatus, and Bacteroides. All covered by meropenem. Continue vancomycin 125 mg po daily for C diff prophylaxis  - Will repeat Blood cultures and UA this PM  #Heme:   -history of myeloproliferative disorder with fibrosis, on anagrelide as outpatient, holding for now. Heme/ onc following    - DVT ppx: Heparin   # Endo: continue synthroid

## 2023-04-22 NOTE — PROGRESS NOTE ADULT - ASSESSMENT
72-year-old female with history of hypertension, CHF, afib s/p AF ablation (2/7/19) currently not on xarelto, CATH 2018, hypothyroidism, HLD history of C. difficile January 2023 presents for vomitting and abdominal pain. Consulted for CHF.    - CT ABD/Pelvis: Pneumoperitoneum and no ascites, perforated sigmoid colon. Stable splenomegaly. Stable right lung nodule, groundglass infiltrates and loculated right pleural effusion.  - S/p Tia's with abdominal abscess drainage.   -  OR finding with >1.2 liters of pus from abd cavity.   - cont abx    - extubated, now on NC  - pressor support to maintain MAP at least >60, now off.    - diuretics had been held for hypotension, but given bumex 2mg 4/20 and 4/21, non written for today   - net neg 935 , with stable though abnormal bun and creat  -cont to monitor for need for addl diuresis, would try to maintain net even to slightly neg    - holding home metoprolol 50mg qd due to hypotension, can consider restarting if BP stabilizes  -remains on mido with sbp 120s-140s, would try to reduce midodrine before adding metoprolol    - Elevated troponin peaked at 100.9, otherwise cardiac enzymes unremarkable, likely demand in the setting of sepsis  - EKG: Sinus tachycardia with PAC's  - No acute changes on EKG compared to previous.   - Prior TTE (1/18/23): normal LVEF 65%, normal RV size and function, biatrial enlargement, sclerotic aortic valve, mild AI, Moderate MR and TR  - TTE shows EF 55-60%, RVE, PASP 66     - hx of PAF s/p PVI  - maintaining SR, with brief runs of svt  - not on AC at home  - cont tele    - Monitor and replete lytes, keep K>4, Mg>2.  - Other cardiovascular workup will depend on clinical course.  - Will continue to follow.  - very high risk of decompensation.    Upon my evaluation, this patient is at high risk for imminent or life threatening deterioration due to resp failure, hypotension,  and other active medical issues which require my direct attention, intervention, and personal management.  I have personally spent >35 minutes  of critical care time exclusive of time spent on separate billing procedures. This includes review of laboratory data, radiology results, discussion with primary team, and monitoring for potential decompensation Interventions were performed as documented above.

## 2023-04-22 NOTE — PROGRESS NOTE ADULT - SUBJECTIVE AND OBJECTIVE BOX
Interval History:  still with diarrhea.  started octreotide  Chart reviewed and events noted;   Overnight events:    MEDICATIONS  (STANDING):  albuterol/ipratropium for Nebulization 3 milliLiter(s) Nebulizer every 6 hours  bismuth subsalicylate Liquid 30 milliLiter(s) Oral every 6 hours  buDESOnide    Inhalation Suspension 0.5 milliGRAM(s) Inhalation every 12 hours  escitalopram 20 milliGRAM(s) Oral daily  heparin   Injectable 5000 Unit(s) SubCutaneous every 8 hours  levothyroxine 125 MICROGram(s) Oral daily  meropenem  IVPB 500 milliGRAM(s) IV Intermittent every 24 hours  midodrine 10 milliGRAM(s) Oral every 8 hours  octreotide  Injectable 100 MICROGram(s) SubCutaneous every 8 hours  pantoprazole   Suspension 40 milliGRAM(s) Oral daily  psyllium Powder 1 Packet(s) Oral daily  vancomycin    Solution 125 milliGRAM(s) Oral daily    MEDICATIONS  (PRN):  albuterol    90 MICROgram(s) HFA Inhaler 4 Puff(s) Inhalation every 6 hours PRN Shortness of Breath and/or Wheezing  metoclopramide Injectable 5 milliGRAM(s) IV Push every 6 hours PRN nausea and/or vomiting  sodium chloride 0.9% lock flush 10 milliLiter(s) IV Push every 1 hour PRN Pre/post blood products, medications, blood draw, and to maintain line patency      Vital Signs Last 24 Hrs  T(C): 36.6 (23 Apr 2023 05:00), Max: 37.4 (22 Apr 2023 17:00)  T(F): 97.8 (23 Apr 2023 05:00), Max: 99.3 (22 Apr 2023 17:00)  HR: 70 (23 Apr 2023 07:58) (70 - 97)  BP: 124/75 (23 Apr 2023 05:00) (108/66 - 144/63)  BP(mean): 78 (22 Apr 2023 21:00) (72 - 95)  RR: 20 (22 Apr 2023 22:41) (16 - 34)  SpO2: 98% (23 Apr 2023 00:04) (93% - 99%)    Parameters below as of 23 Apr 2023 00:04  Patient On (Oxygen Delivery Method): nasal cannula        PHYSICAL EXAM  General: adult in NAD, chroincially ill appearing  HEENT: clear oropharynx, anicteric sclera, pink conjunctivae  Neck: supple  CV: normal S1S2 with no murmur rubs or gallops  Lungs: clear to auscultation, no wheezes, no rhales  Abdomen: soft non-tender non-distended, no hepato/splenomegaly  Ext: no clubbing cyanosis or edema  Skin: no rashes and no petichiae  Neuro: alert and oriented X3 no focal deficits      LABS:  CBC Full  -  ( 23 Apr 2023 07:25 )  WBC Count : 41.64 K/uL  RBC Count : 2.98 M/uL  Hemoglobin : 8.1 g/dL  Hematocrit : 28.2 %  Platelet Count - Automated : 514 K/uL  Mean Cell Volume : 94.6 fl  Mean Cell Hemoglobin : 27.2 pg  Mean Cell Hemoglobin Concentration : 28.7 gm/dL  Auto Neutrophil # : x  Auto Lymphocyte # : x  Auto Monocyte # : x  Auto Eosinophil # : x  Auto Basophil # : x  Auto Neutrophil % : x  Auto Lymphocyte % : x  Auto Monocyte % : x  Auto Eosinophil % : x  Auto Basophil % : x    04-23    143  |  110<H>  |  87<H>  ----------------------------<  163<H>  3.7   |  27  |  5.20<H>    Ca    7.7<L>      23 Apr 2023 07:25  Phos  5.6     04-23  Mg     2.2     04-23    TPro  5.2<L>  /  Alb  2.1<L>  /  TBili  0.4  /  DBili  x   /  AST  13<L>  /  ALT  12  /  AlkPhos  54  04-23        fe studies      WBC trend  41.64 K/uL (04-23-23 @ 07:25)  42.12 K/uL (04-22-23 @ 05:50)  36.72 K/uL (04-21-23 @ 05:50)      Hgb trend  8.1 g/dL (04-23-23 @ 07:25)  8.3 g/dL (04-22-23 @ 05:50)  7.7 g/dL (04-21-23 @ 05:50)      plt trend  514 K/uL (04-23-23 @ 07:25)  343 K/uL (04-22-23 @ 05:50)  416 K/uL (04-21-23 @ 05:50)        RADIOLOGY & ADDITIONAL STUDIES:

## 2023-04-22 NOTE — PROGRESS NOTE ADULT - ASSESSMENT
Patient with history myeloproliferative on agrylin (being treated by Dr. Rice) admitted with abd pain, weakness and falls at home a/w severe sepsis and peritonitis due to perforated sigmoid colon, also with LAYA due to ATN, shock liver, and post/op acute respiratory failure requiring mechanical ventilation, and acute blood loss anemia. pt is off agrylin.  had a period of thrombocytopenia now resolved with current platelet count 416K  Course also complicated by diarrhea which apparently was present prior to admission.  Has hx of ?pulmonary carcinoid with nodules being followed. however recent PET/CT dotatate negative.      Recommendations:  1.  follow CBC and transfuse as indicated  2.  continue post op care  3.  continue sandostatin  4.  to hold agrylin until taking better po.  platelet count is not elevated at present but will follow  5.  further heme recommendations pending above  discussed with Dr. Miranda

## 2023-04-22 NOTE — PROGRESS NOTE ADULT - SUBJECTIVE AND OBJECTIVE BOX
SUBJECTIVE  Patient examined at bedside.  No overnight events. Vital signs stable. Pain controlled. Remains NPO/NGT. Denies sxs of nausea or vomiting. Daily packing changed at bedside. right drain with serous fluid. Ostomy with liquid output, C. diff 4/15, 4/20 c. diff negative. Weaned off pressors. No other complaints.    ICU Vital Signs Last 24 Hrs  T(C): 37 (22 Apr 2023 08:00), Max: 37.4 (21 Apr 2023 23:58)  T(F): 98.6 (22 Apr 2023 08:00), Max: 99.4 (21 Apr 2023 23:58)  HR: 83 (22 Apr 2023 08:01) (72 - 99)  BP: 125/60 (22 Apr 2023 08:01) (113/57 - 144/60)  BP(mean): 87 (22 Apr 2023 08:01) (78 - 102)  ABP: 134/57 (21 Apr 2023 13:00) (115/54 - 134/57)  ABP(mean): 89 (21 Apr 2023 13:00) (77 - 89)  RR: 25 (22 Apr 2023 08:01) (11 - 36)  SpO2: 100% (22 Apr 2023 08:01) (75% - 100%)    O2 Parameters below as of 22 Apr 2023 07:00  Patient On (Oxygen Delivery Method): nasal cannula  O2 Flow (L/min): 2      21 Apr 2023 07:01  -  22 Apr 2023 07:00  --------------------------------------------------------  IN:    Free Water: 800 mL    IV PiggyBack: 50 mL    Nepro with Carb Steady: 560 mL  Total IN: 1410 mL    OUT:    Bulb (mL): 50 mL    Colostomy (mL): 1350 mL    Indwelling Catheter - Urethral (mL): 945 mL  Total OUT: 2345 mL    Total NET: -935 mL      MEDICATIONS  (STANDING):  chlorhexidine 2% Cloths 1 Application(s) Topical daily  escitalopram 20 milliGRAM(s) Oral daily  heparin   Injectable 5000 Unit(s) SubCutaneous every 8 hours  levothyroxine Injectable 93.75 MICROGram(s) IV Push at bedtime  meropenem  IVPB 500 milliGRAM(s) IV Intermittent every 24 hours  metoclopramide 5 milliGRAM(s) Oral every 6 hours  midodrine 10 milliGRAM(s) Oral every 8 hours  octreotide  Injectable 100 MICROGram(s) SubCutaneous every 8 hours  pantoprazole   Suspension 40 milliGRAM(s) Oral daily  psyllium Powder 1 Packet(s) Oral daily  vancomycin    Solution 125 milliGRAM(s) Oral daily    MEDICATIONS  (PRN):  albuterol    90 MICROgram(s) HFA Inhaler 4 Puff(s) Inhalation every 6 hours PRN Shortness of Breath and/or Wheezing  fentaNYL    Injectable 25 MICROGram(s) IV Push every 4 hours PRN Severe Pain (7 - 10)  sodium chloride 0.9% lock flush 10 milliLiter(s) IV Push every 1 hour PRN Pre/post blood products, medications, blood draw, and to maintain line patency        LABS:                          7.7    36.72 )-----------( 416      ( 21 Apr 2023 05:50 )             26.1     04-22    144  |  110<H>  |  88<H>  ----------------------------<  136<H>  4.0   |  24  |  5.30<H>    Ca    7.7<L>      22 Apr 2023 05:50  Phos  5.3     04-22  Mg     2.3     04-22    TPro  5.1<L>  /  Alb  2.1<L>  /  TBili  0.4  /  DBili  x   /  AST  15  /  ALT  15  /  AlkPhos  66  04-22    LIVER FUNCTIONS - ( 22 Apr 2023 05:50 )  Alb: 2.1 g/dL / Pro: 5.1 g/dL / ALK PHOS: 66 U/L / ALT: 15 U/L / AST: 15 U/L / GGT: x

## 2023-04-22 NOTE — PROGRESS NOTE ADULT - NSPROGADDITIONALINFOA_GEN_ALL_CORE
agree with above unless contradicts below  overnight events reviewed  s/p 2 U PRBC, no signs of bleeding  still on pressors  TAMERA-serosanguinous   ostomy no outpt    s/p L hemicolectomy for perforated sigmoid, and ischemia splenic flexure    cont ICU Care
Chronic heart failure with preserved EF and elevated ProBNP 97972; TTE (04/2023) - LVEF of 55-60%; LAE; Sclerotic trileaflet aortic valve, trace AI. Mild to moderate MR and TR    Hypothyroidism: currently on  IV synthroid    Depressed mood- Continue escitalopram    Chronic atrial fibrillation: Xarelto on hold; consider restarting AC when able , patient high stroke risk ; Mgmt per ICU    Incidental finding on imaging - Stable right lung nodule, ground-glass infiltrates and loculated right pleural effusion.

## 2023-04-22 NOTE — PROGRESS NOTE ADULT - PROBLEM SELECTOR PLAN 4
LAYA (acute kidney injury): LAYA likely ATN due to septic shock  Cr worsened to 5.0 now 5.30   S/p bicarb gtt  Mgmt per ICU and Nephrology team   consider nephro eval.  monitor BMP  I/O  Might be a candidate for Albumin due to thrid spacing from low albumin     Chronic CHF: ProBNP 11549  TTE (04/2023) - LVEF of 55-60%; LAE; Sclerotic trileaflet aortic valve, trace AI. Mild to moderate MR and TR. IVC measures 2.0 cm and is non collapsing  Per cardio - hold Lasix and lopressor for now due to worsening kidney function   Cardio following, recs appreciated  Mgmt per ICU. LAYA likely ATN due to septic shock  S/p bicarb gtt for metabolic acidosis  s/p diuresis with metolazone and bumetanide- held due ot rising creatinine;   monitor off diuretics

## 2023-04-22 NOTE — PROGRESS NOTE ADULT - SUBJECTIVE AND OBJECTIVE BOX
MEDICAL ATTENDING NOTE    Patient is a 72y old  Female who presents with a chief complaint of intra-abdominal perforation (22 Apr 2023 10:56)      INTERVAL HPI/OVERNIGHT EVENTS: no acute issues reported    MEDICATIONS  (STANDING):  escitalopram 20 milliGRAM(s) Oral daily  heparin   Injectable 5000 Unit(s) SubCutaneous every 8 hours  levothyroxine Injectable 93.75 MICROGram(s) IV Push at bedtime  meropenem  IVPB 500 milliGRAM(s) IV Intermittent every 24 hours  midodrine 10 milliGRAM(s) Oral every 8 hours  octreotide  Injectable 100 MICROGram(s) SubCutaneous every 8 hours  pantoprazole   Suspension 40 milliGRAM(s) Oral daily  psyllium Powder 1 Packet(s) Oral daily  vancomycin    Solution 125 milliGRAM(s) Oral daily    MEDICATIONS  (PRN):  albuterol    90 MICROgram(s) HFA Inhaler 4 Puff(s) Inhalation every 6 hours PRN Shortness of Breath and/or Wheezing  metoclopramide Injectable 5 milliGRAM(s) IV Push every 6 hours PRN nausea and/or vomiting  sodium chloride 0.9% lock flush 10 milliLiter(s) IV Push every 1 hour PRN Pre/post blood products, medications, blood draw, and to maintain line patency      __________________________________________________  ----------------------------------------------------------------------------------  REVIEW OF SYSTEMS: no chest pain, no SOB, No fever, no HA      Vital Signs Last 24 Hrs  T(C): 37 (22 Apr 2023 08:00), Max: 37.4 (21 Apr 2023 23:58)  T(F): 98.6 (22 Apr 2023 08:00), Max: 99.4 (21 Apr 2023 23:58)  HR: 86 (22 Apr 2023 10:00) (72 - 99)  BP: 114/56 (22 Apr 2023 10:00) (113/57 - 144/60)  BP(mean): 81 (22 Apr 2023 10:00) (78 - 102)  RR: 31 (22 Apr 2023 10:00) (11 - 36)  SpO2: 99% (22 Apr 2023 10:00) (75% - 100%)    Parameters below as of 22 Apr 2023 07:00  Patient On (Oxygen Delivery Method): nasal cannula  O2 Flow (L/min): 2      _________________  PHYSICAL EXAM:  ---------------------------   NAD; Normocephalic; NGT+  LUNGS - no wheezing  HEART: S1 S2+   ABDOMEN: Soft, Nontender, non distended  EXTREMITIES: no cyanosis; no edema  NERVOUS SYSTEM:  Awake and alert; follows most commands; generalized muscle weakeness    _________________________________________________  LABS:                        8.3    42.12 )-----------( 343      ( 22 Apr 2023 05:50 )             29.9     04-22    144  |  110<H>  |  88<H>  ----------------------------<  136<H>  4.0   |  24  |  5.30<H>    Ca    7.7<L>      22 Apr 2023 05:50  Phos  5.3     04-22  Mg     2.3     04-22    TPro  5.1<L>  /  Alb  2.1<L>  /  TBili  0.4  /  DBili  x   /  AST  15  /  ALT  15  /  AlkPhos  66  04-22        CAPILLARY BLOOD GLUCOSE      POCT Blood Glucose.: 166 mg/dL (22 Apr 2023 05:14)  POCT Blood Glucose.: 145 mg/dL (21 Apr 2023 23:44)  POCT Blood Glucose.: 110 mg/dL (21 Apr 2023 18:01)  POCT Blood Glucose.: 99 mg/dL (21 Apr 2023 12:22)                Plan of care was discussed with patient;  care was aligned with patient's wishes.             MEDICAL ATTENDING NOTE    Patient is a 72y old  Female who presents with a chief complaint of intra-abdominal perforation (22 Apr 2023 10:56)      INTERVAL HPI/OVERNIGHT EVENTS: no acute issues reported    MEDICATIONS  (STANDING):  escitalopram 20 milliGRAM(s) Oral daily  heparin   Injectable 5000 Unit(s) SubCutaneous every 8 hours  levothyroxine Injectable 93.75 MICROGram(s) IV Push at bedtime  meropenem  IVPB 500 milliGRAM(s) IV Intermittent every 24 hours  midodrine 10 milliGRAM(s) Oral every 8 hours  octreotide  Injectable 100 MICROGram(s) SubCutaneous every 8 hours  pantoprazole   Suspension 40 milliGRAM(s) Oral daily  psyllium Powder 1 Packet(s) Oral daily  vancomycin    Solution 125 milliGRAM(s) Oral daily    MEDICATIONS  (PRN):  albuterol    90 MICROgram(s) HFA Inhaler 4 Puff(s) Inhalation every 6 hours PRN Shortness of Breath and/or Wheezing  metoclopramide Injectable 5 milliGRAM(s) IV Push every 6 hours PRN nausea and/or vomiting  sodium chloride 0.9% lock flush 10 milliLiter(s) IV Push every 1 hour PRN Pre/post blood products, medications, blood draw, and to maintain line patency      __________________________________________________  ----------------------------------------------------------------------------------  REVIEW OF SYSTEMS: no chest pain, no SOB, No fever, no HA      Vital Signs Last 24 Hrs  T(C): 37 (22 Apr 2023 08:00), Max: 37.4 (21 Apr 2023 23:58)  T(F): 98.6 (22 Apr 2023 08:00), Max: 99.4 (21 Apr 2023 23:58)  HR: 86 (22 Apr 2023 10:00) (72 - 99)  BP: 114/56 (22 Apr 2023 10:00) (113/57 - 144/60)  BP(mean): 81 (22 Apr 2023 10:00) (78 - 102)  RR: 31 (22 Apr 2023 10:00) (11 - 36)  SpO2: 99% (22 Apr 2023 10:00) (75% - 100%)    Parameters below as of 22 Apr 2023 07:00  Patient On (Oxygen Delivery Method): nasal cannula  O2 Flow (L/min): 2      _________________  PHYSICAL EXAM:  ---------------------------   NAD; Normocephalic; NGT+  LUNGS - no wheezing  HEART: S1 S2+   ABDOMEN: Soft, Nontender, non distended  EXTREMITIES: no cyanosis; no edema  NERVOUS SYSTEM:  Awake and alert; follows most commands; generalized muscle weakeness    _________________________________________________  LABS:                        8.3    42.12 )-----------( 343      ( 22 Apr 2023 05:50 )             29.9     04-22    144  |  110<H>  |  88<H>  ----------------------------<  136<H>  4.0   |  24  |  5.30<H>    Ca    7.7<L>      22 Apr 2023 05:50  Phos  5.3     04-22  Mg     2.3     04-22    TPro  5.1<L>  /  Alb  2.1<L>  /  TBili  0.4  /  DBili  x   /  AST  15  /  ALT  15  /  AlkPhos  66  04-22        CAPILLARY BLOOD GLUCOSE      POCT Blood Glucose.: 166 mg/dL (22 Apr 2023 05:14)  POCT Blood Glucose.: 145 mg/dL (21 Apr 2023 23:44)  POCT Blood Glucose.: 110 mg/dL (21 Apr 2023 18:01)  POCT Blood Glucose.: 99 mg/dL (21 Apr 2023 12:22)                Patient's family updated by ICU team; care aligned with patient's known wishes.

## 2023-04-22 NOTE — PROGRESS NOTE ADULT - PROBLEM SELECTOR PLAN 5
DVT prophylaxis- continue venodynes    Hypothyroidism: currently on  IV synthroid    Chronic atrial fibrillation: Xarelto on hold; consider restarting AC when able , patient high stroke risk   Mgmt per ICU    Incidental finding on imaging - Stable right lung nodule, ground-glass infiltrates and loculated right pleural effusion. DVT prophylaxis- continue venodynes    Chronic heart failure with preserved EF and elevated ProBNP 85770; TTE (04/2023) - LVEF of 55-60%; LAE; Sclerotic trileaflet aortic valve, trace AI. Mild to moderate MR and TR    Hypothyroidism: currently on  IV synthroid    Depressed mood- Continue escitalopram    Chronic atrial fibrillation: Xarelto on hold; consider restarting AC when able , patient high stroke risk ; Mgmt per ICU    Incidental finding on imaging - Stable right lung nodule, ground-glass infiltrates and loculated right pleural effusion. DVT prophylaxis- continue venodynes

## 2023-04-22 NOTE — PROGRESS NOTE ADULT - ASSESSMENT
73yo F with PMH of afib (off xarelto at least 1wk PTA), HFpEF, asthma, recent C diff in [place] presenting with abd pain, weakness and falls at home a/w severe sepsis and peritonitis due to perforated sigmoid colon, also with LAYA due to ATN, shock liver, and post/op acute respiratory failure requiring mechanical ventilation, and acute blood loss anemia.                   73 y/o F with h/o A-Fib on rivaroxaban, HFpEF, recent C diff infection (Jan 2023), myelofibrosis with thrombocytosis on anagrelide, hypothyroidism, asthma, and lung nodules s/p resection (reportedly malignant) who presents with perforated sigmoid colon with acute bacterial peritonitis, LAYA due to ATN,septic shock, lactic acidosis, shock liver;  post/op acute respiratory failure requiring mechanical ventilation, and acute blood loss anemia; s/p extended Tia's procedure including descending colon on 4/13.  She was extubated 4/19 and tolerating supplemental oxygen via nasal canula. Overall improved but with severe critical illness polyneuropathy and rising leukocytosis of unclear etiology.

## 2023-04-23 NOTE — PROGRESS NOTE ADULT - SUBJECTIVE AND OBJECTIVE BOX
Postoperative Day #: 10    72y Female admitted with Enterocolitis due to Clostridioides difficile  S/P Extended hartmanns for perforated sigmoid.      Patient seen and examined bedside resting,  Pt continues to appear very lethargic.  Noted to have been downgraded to the floor overnight.  Continues on tube feeds.  NO complaints.  No overnight events.      T(F): 97.8 (04-23-23 @ 05:00), Max: 99.3 (04-22-23 @ 17:00)  HR: 72 (04-23-23 @ 14:22) (70 - 97)  BP: 124/75 (04-23-23 @ 05:00) (108/66 - 129/64)  RR: 20 (04-22-23 @ 22:41) (20 - 34)  SpO2: 98% (04-23-23 @ 00:04) (93% - 99%)  Wt(kg): --  CAPILLARY BLOOD GLUCOSE      POCT Blood Glucose.: 178 mg/dL (23 Apr 2023 11:50)  POCT Blood Glucose.: 170 mg/dL (23 Apr 2023 06:37)  POCT Blood Glucose.: 179 mg/dL (23 Apr 2023 00:20)  POCT Blood Glucose.: 179 mg/dL (22 Apr 2023 18:42)      PHYSICAL EXAM:  General: NAD  Neuro:  Lethargic, but arousable.   Abdomen: BS+ Soft. ND.  Ostomy with function.  Jake with scant sanguinous fluid in bulb.  Midline incision packing removed and replaced. Staples intact.  Incision without surrounding erythema and drainage.   Extremities: no pedal edema or calf tenderness noted       LABS:                        8.1    41.64 )-----------( 514      ( 23 Apr 2023 07:25 )             28.2     04-23    143  |  110<H>  |  87<H>  ----------------------------<  163<H>  3.7   |  27  |  5.20<H>    Ca    7.7<L>      23 Apr 2023 07:25  Phos  5.6     04-23  Mg     2.2     04-23    TPro  5.2<L>  /  Alb  2.1<L>  /  TBili  0.4  /  DBili  x   /  AST  13<L>  /  ALT  12  /  AlkPhos  54  04-23      I&O's Detail    22 Apr 2023 07:01  -  23 Apr 2023 07:00  --------------------------------------------------------  IN:    Free Water: 800 mL    Nepro with Carb Steady: 540 mL  Total IN: 1340 mL    OUT:    Bulb (mL): 40 mL    Colostomy (mL): 1450 mL    Indwelling Catheter - Urethral (mL): 475 mL    Voided (mL): 250 mL  Total OUT: 2215 mL    Total NET: -875 mL      23 Apr 2023 07:01  -  23 Apr 2023 15:13  --------------------------------------------------------  IN:  Total IN: 0 mL    OUT:    Bulb (mL): 10 mL  Total OUT: 10 mL    Total NET: -10 mL            RADIOLOGY:

## 2023-04-23 NOTE — PROGRESS NOTE ADULT - ASSESSMENT
72-year-old female with history of hypertension, CHF, afib s/p AF ablation (2/7/19) currently not on xarelto, CATH 2018, hypothyroidism, HLD history of C. difficile January 2023 presents for vomitting and abdominal pain. Consulted for CHF.    Cardiac Optimization  - CT ABD/Pelvis: Pneumoperitoneum and no ascites, perforated sigmoid colon. Stable splenomegaly. Stable right lung nodule, groundglass infiltrates and loculated right pleural effusion.  - S/p Tia's with abdominal abscess drainage.   - OR finding with >1.2 liters of pus from abd cavity, drain in situ.  - Follow Surgery recs.  - Continue Abx per ID  - Encourage incentive spirometer    - s/p extubation, now tolerating 2L/min via NC  - BP stable off pressor  - s/p Bumex 2 mg IV daily x 6 days.  Last dose, 4/22  - Net neg 875c, with stable, though, abnormal bun and creat  - Will monitor volume status closely.  Will diurese prn at this point.  Will maintain net even  - Renal following.  No indication for HD at this point    - Know with Permanent Afib, rate-controlled  - Holding home metoprolol 50mg qd due to hypotension.  BP now stable but just came off of IV pressor  - Can resume at a lower dose in am, 4/23, if BP remains stable  - Remains on Midodrine, would try to reduce before adding metoprolol    - Elevated troponin peaked at 100.9, otherwise cardiac enzymes unremarkable, likely demand in the setting of sepsis  - EKG: Sinus tachycardia with PAC's  - No acute changes on EKG compared to previous.   - Prior TTE (1/18/23): normal LVEF 65%, normal RV size and function, biatrial enlargement, sclerotic aortic valve, mild AI, Moderate MR and TR  - TTE shows EF 55-60%, RVE, PASP 66     - hx of PAF s/p PVI  - Not on tele but regular on auscultation  - Not on AC at home    - Monitor and replete lytes, keep K>4, Mg>2.  - Other cardiovascular workup will depend on clinical course.  - Will continue to follow.  - Remains at high risk of decompensation.    Ivana Whitney DNP, NP-C, [facility]  Cardiology   Spectra #[phone]

## 2023-04-23 NOTE — PROGRESS NOTE ADULT - PROBLEM SELECTOR PLAN 1
Continue tube feeds  Physical therapy  Daily packing changes  Continue IV antibiotics- Vanco, Meropenem  VTE with SQH  Possible neuro consult for lethargy.  Per son this is not patient baseline.   Will discuss with Dr. Zendejas (Covering for Dr. Goldberg)

## 2023-04-23 NOTE — PROGRESS NOTE ADULT - PROBLEM SELECTOR PLAN 4
LAYA likely ATN due to septic shock  S/p bicarb gtt for metabolic acidosis  s/p diuresis with metolazone and bumetanide- held due ot rising creatinine;   monitor off diuretics

## 2023-04-23 NOTE — PROGRESS NOTE ADULT - ASSESSMENT
71 y/o F with h/o A-Fib on rivaroxaban, HFpEF, recent C diff infection (Jan 2023), myelofibrosis with thrombocytosis on anagrelide, hypothyroidism, asthma, and lung nodules s/p resection (reportedly malignant) who presents with perforated sigmoid colon with acute bacterial peritonitis, LAYA due to ATN,septic shock, lactic acidosis, shock liver;  post/op acute respiratory failure requiring mechanical ventilation, and acute blood loss anemia; s/p extended Tia's procedure including descending colon on 4/13.  She was extubated 4/19 and tolerating supplemental oxygen via nasal canula. Overall improved but with severe critical illness polyneuropathy and rising leukocytosis of unclear etiology.

## 2023-04-23 NOTE — PROGRESS NOTE ADULT - ASSESSMENT
71 yo female here for perforated sigmoid colon s./p Extended Hartmanns procedure with NGT in place being used as feeding tube.  Currently receiving feeds at 40mL per hour. White count remains elevated at 41K.  Heme/onc on board.   73 yo female here for perforated sigmoid colon s./p Extended Hartmanns procedure with NGT in place being used as feeding tube.  Currently receiving feeds at 40mL per hour. White count remains elevated at 41K.  Heme/onc on board.        As in above full PA notation.  Ms. Ornelas personally seen and examined during afternoon rounds.  NG no longer functional.  Vitals non-suggestive overall.  Abdomen soft/ non tense/ non tender with functional stoma.  Labs reviewed with slight downtrend in WBCs, stable lytes and no acidosis.  Most recent imaging re-reviewed.  Clinically, slowly improving overall with Heme Onc and ID following for leukocytosis.  No obvious leak on last CT, no obvious collection for IR to drain.  Surgically, stable for the present.  To continue current supportive care with plan to pull NG now that non-functional in consideration of speech and swallow evaluation.  Reviewed with patient in detail, Surgical Residents/ PA's as well as today's RN team.

## 2023-04-23 NOTE — PROGRESS NOTE ADULT - ASSESSMENT
Patient with history myeloproliferative on agrylin (being treated by Dr. Rice) admitted with abd pain, weakness and falls at home a/w severe sepsis and peritonitis due to perforated sigmoid colon, also with LAYA due to ATN, shock liver, and post/op acute respiratory failure requiring mechanical ventilation, and acute blood loss anemia. pt is off agrylin.  had a period of thrombocytopenia now resolved with current platelet count 416K  Course also complicated by diarrhea which apparently was present prior to admission.  Has hx of ?pulmonary carcinoid with nodules being followed. however recent PET/CT dotatate negative.    WBC remains elevated ?reactive or part of her myeloproliferative disease.  to continue observation    Recommendations:  1.  follow CBC and transfuse as indicated  2.  continue post op care  3.  continue sandostatin  4.  to hold agrylin until taking better po.  platelet count is not elevated at present but will follow  5.  further heme recommendations pending above

## 2023-04-23 NOTE — PROGRESS NOTE ADULT - PROBLEM SELECTOR PLAN 2
Perforated sigmoid colon: s/p Ro's procedure   Ostomy c/d/i; watery output noted  NPO with NGT,   Await evaluation by speech and swallow team    neg c-diff, neg GI pcr  ID follow up ongoing  Continue Metoclopramide prn and PPI   Monitor drain and colostomy output.   Continue octreotide given history of carcinoid with increased ostomy output.   Psyllium added to bulk stool per ICU   Continue to monitor

## 2023-04-23 NOTE — PROGRESS NOTE ADULT - PROBLEM SELECTOR PLAN 3
Acute respiratory failure with hypoxia extubated 4/19   Will encourage incentive spirometry  chest PT as tolerated  Starting albuterol-ipratropium nebs q6h + budesonide 0.5 mg nebs q12h (on Trelegy Ellipta inhaler at home) per ICU recommendations

## 2023-04-23 NOTE — PROGRESS NOTE ADULT - NS ATTEST RISK PROBLEM GEN_ALL_CORE FT
Patient is acutely ill and is at risk of sudden cardio pulmonary decompensation and so requires continued close monitoring.      Discussed with nursing staff- NGT clogged  Multiple attempts made to unclog it  Discussed with surgical team- plan for s/s eval and them reassess further need for NGT  Gentle hydration while NGT out and no feeds  Convert essential meds to IV form pending S/S eval.  Will benefit from Se with PT/OT/cognitive therapy  Overall prognosis is guarded
direct care of a critically ill patient including but not limited to reviewing chart, medications ,laboratory data, imaging reports, discussion of plan of care with consultants on the case, coordination of care with multidisciplinary team involved in the case and discussion of plan with ICU team .    Discussed with patient son at bedside- all his questions and concerns were addressed.   Overall prognosis is guarded. Continue close monitoring.
Patient is critically ill and requiring ICU care. Due to acute illness, patient is at risk of sudden cardio pulmonary decompensation and so requires close monitoring and continued ICU care at this time.    Plan of care discussed with ICU attending

## 2023-04-23 NOTE — PROGRESS NOTE ADULT - SUBJECTIVE AND OBJECTIVE BOX
Subjective:       MEDICATIONS  (STANDING):  albuterol/ipratropium for Nebulization 3 milliLiter(s) Nebulizer every 6 hours  bismuth subsalicylate Liquid 30 milliLiter(s) Oral every 6 hours  buDESOnide    Inhalation Suspension 0.5 milliGRAM(s) Inhalation every 12 hours  escitalopram 20 milliGRAM(s) Oral daily  heparin   Injectable 5000 Unit(s) SubCutaneous every 8 hours  levothyroxine 125 MICROGram(s) Oral daily  meropenem  IVPB 500 milliGRAM(s) IV Intermittent every 24 hours  midodrine 10 milliGRAM(s) Oral every 8 hours  octreotide  Injectable 100 MICROGram(s) SubCutaneous every 8 hours  pantoprazole   Suspension 40 milliGRAM(s) Oral daily  psyllium Powder 1 Packet(s) Oral daily  vancomycin    Solution 125 milliGRAM(s) Oral daily    MEDICATIONS  (PRN):  albuterol    90 MICROgram(s) HFA Inhaler 4 Puff(s) Inhalation every 6 hours PRN Shortness of Breath and/or Wheezing  metoclopramide Injectable 5 milliGRAM(s) IV Push every 6 hours PRN nausea and/or vomiting  sodium chloride 0.9% lock flush 10 milliLiter(s) IV Push every 1 hour PRN Pre/post blood products, medications, blood draw, and to maintain line patency          T(C): 36.6 (04-23-23 @ 05:00), Max: 37.4 (04-22-23 @ 17:00)  HR: 70 (04-23-23 @ 07:58) (70 - 97)  BP: 124/75 (04-23-23 @ 05:00) (108/66 - 144/63)  RR: 20 (04-22-23 @ 22:41) (16 - 34)  SpO2: 98% (04-23-23 @ 00:04) (93% - 99%)  Wt(kg): --        I&O's Detail    22 Apr 2023 07:01  -  23 Apr 2023 07:00  --------------------------------------------------------  IN:    Free Water: 800 mL    Nepro with Carb Steady: 540 mL  Total IN: 1340 mL    OUT:    Bulb (mL): 40 mL    Colostomy (mL): 1450 mL    Indwelling Catheter - Urethral (mL): 475 mL    Voided (mL): 250 mL  Total OUT: 2215 mL    Total NET: -875 mL               PHYSICAL EXAM:    GENERAL: somnolent.   CHEST/LUNG: Clear  HEART: S1S2  ABDOMEN: midline dressing, ostomy  EXTREMITIES:  edema.         LABS:  CBC Full  -  ( 23 Apr 2023 07:25 )  WBC Count : 41.64 K/uL  RBC Count : 2.98 M/uL  Hemoglobin : 8.1 g/dL  Hematocrit : 28.2 %  Platelet Count - Automated : 514 K/uL  Mean Cell Volume : 94.6 fl  Mean Cell Hemoglobin : 27.2 pg  Mean Cell Hemoglobin Concentration : 28.7 gm/dL  Auto Neutrophil # : 36.64 K/uL  Auto Lymphocyte # : 1.25 K/uL  Auto Monocyte # : 1.25 K/uL  Auto Eosinophil # : 0.42 K/uL  Auto Basophil # : 0.42 K/uL  Auto Neutrophil % : 88.0 %  Auto Lymphocyte % : 3.0 %  Auto Monocyte % : 3.0 %  Auto Eosinophil % : 1.0 %  Auto Basophil % : 1.0 %    04-23    143  |  110<H>  |  87<H>  ----------------------------<  163<H>  3.7   |  27  |  5.20<H>    Ca    7.7<L>      23 Apr 2023 07:25  Phos  5.6     04-23  Mg     2.2     04-23    TPro  5.2<L>  /  Alb  2.1<L>  /  TBili  0.4  /  DBili  x   /  AST  13<L>  /  ALT  12  /  AlkPhos  54  04-23        Impression:  1.LAYA on CKD: Septic/hemodynamic ATN. Cr has stabilized. + cc/day  2.Sigmoid perforation, s/p surgical repair  3.Anemia  4.Shock, Sepsis    Recommendations:   She remains without dialytic indications.   Will cont to monitor closely with you  Adjust all meds to Cr Cl 10-15cc/min

## 2023-04-23 NOTE — PROGRESS NOTE ADULT - SUBJECTIVE AND OBJECTIVE BOX
[facility] -- Adrián Wray Pannella, Patel, Savella, Goodger  Office # [phone]    Follow Up:  Permanent Afib s/p ablation, Cardiac Optimization    Subjective/Observations: Seen and evaluated.  Awake but lethargic.  Verbally responsive.  Denies postop pain.  Denies any form of respiratory or cardia discomfort.      REVIEW OF SYSTEMS: All other review of systems is negative unless indicated above  PAST MEDICAL & SURGICAL HISTORY:  Hypothyroidism  HLD (hyperlipidemia)  Thrombocytosis  Persistent atrial fibrillation  Obesity (BMI 35.0-39.9 without comorbidity)  Asthma  Diabetes mellitus  Ankle injury  on 2004, 5 surgeries.  Cholecystitis    MEDICATIONS  (STANDING):  albuterol/ipratropium for Nebulization 3 milliLiter(s) Nebulizer every 6 hours  bismuth subsalicylate Liquid 30 milliLiter(s) Oral every 6 hours  buDESOnide    Inhalation Suspension 0.5 milliGRAM(s) Inhalation every 12 hours  escitalopram 20 milliGRAM(s) Oral daily  heparin   Injectable 5000 Unit(s) SubCutaneous every 8 hours  levothyroxine 125 MICROGram(s) Oral daily  meropenem  IVPB 500 milliGRAM(s) IV Intermittent every 24 hours  midodrine 10 milliGRAM(s) Oral every 8 hours  octreotide  Injectable 100 MICROGram(s) SubCutaneous every 8 hours  pantoprazole   Suspension 40 milliGRAM(s) Oral daily  psyllium Powder 1 Packet(s) Oral daily  vancomycin    Solution 125 milliGRAM(s) Oral daily    MEDICATIONS  (PRN):  albuterol    90 MICROgram(s) HFA Inhaler 4 Puff(s) Inhalation every 6 hours PRN Shortness of Breath and/or Wheezing  metoclopramide Injectable 5 milliGRAM(s) IV Push every 6 hours PRN nausea and/or vomiting  sodium chloride 0.9% lock flush 10 milliLiter(s) IV Push every 1 hour PRN Pre/post blood products, medications, blood draw, and to maintain line patency    Allergies    No Known Allergies    Intolerances      Vital Signs Last 24 Hrs  T(C): 36.6 (23 Apr 2023 05:00), Max: 37.4 (22 Apr 2023 17:00)  T(F): 97.8 (23 Apr 2023 05:00), Max: 99.3 (22 Apr 2023 17:00)  HR: 70 (23 Apr 2023 07:58) (70 - 97)  BP: 124/75 (23 Apr 2023 05:00) (108/66 - 144/63)  BP(mean): 78 (22 Apr 2023 21:00) (72 - 95)  RR: 20 (22 Apr 2023 22:41) (16 - 34)  SpO2: 98% (23 Apr 2023 00:04) (93% - 99%)    Parameters below as of 23 Apr 2023 00:04  Patient On (Oxygen Delivery Method): nasal cannula      I&O's Summary    22 Apr 2023 07:01  -  23 Apr 2023 07:00  --------------------------------------------------------  IN: 1340 mL / OUT: 2215 mL / NET: -875 mL      PHYSICAL EXAM:  TELE: Not on tele  Constitutional: NAD, awake but lethargic, well-developed  HEENT: Moist Mucous Membranes, Anicteric  Pulmonary: Non-labored, breath sounds are diminished bilaterally, No wheezing, rales or rhonchi  Cardiovascular: RRR, S1 and S2, + murmurs, no rubs, gallops or clicks  Gastrointestinal: Bowel Sounds present, soft, nontender.  NGT to tube feeds; +active colostomy; midabdominal incision with dressing dry and intact; Rt abdominal drain in situ  Lymph: No peripheral edema. No lymphadenopathy.  Skin: No visible rashes or ulcers.  Psych:  Mood & affect: Flat, lethargic  LABS: All Labs Reviewed:                        8.1    41.64 )-----------( 514      ( 23 Apr 2023 07:25 )             28.2                         8.3    42.12 )-----------( 343      ( 22 Apr 2023 05:50 )             29.9                         7.7    36.72 )-----------( 416      ( 21 Apr 2023 05:50 )             26.1     23 Apr 2023 07:25    143    |  110    |  87     ----------------------------<  163    3.7     |  27     |  5.20   22 Apr 2023 05:50    144    |  110    |  88     ----------------------------<  136    4.0     |  24     |  5.30   21 Apr 2023 05:50    146    |  111    |  86     ----------------------------<  92     3.6     |  24     |  5.30     Ca    7.7        23 Apr 2023 07:25  Ca    7.7        22 Apr 2023 05:50  Ca    7.7        21 Apr 2023 05:50  Phos  5.6       23 Apr 2023 07:25  Phos  5.3       22 Apr 2023 05:50  Phos  4.6       21 Apr 2023 05:50  Mg     2.2       23 Apr 2023 07:25  Mg     2.3       22 Apr 2023 05:50  Mg     2.2       21 Apr 2023 05:50    TPro  5.2    /  Alb  2.1    /  TBili  0.4    /  DBili  x      /  AST  13     /  ALT  12     /  AlkPhos  54     23 Apr 2023 07:25  TPro  5.1    /  Alb  2.1    /  TBili  0.4    /  DBili  x      /  AST  15     /  ALT  15     /  AlkPhos  66     22 Apr 2023 05:50  TPro  4.9    /  Alb  2.2    /  TBili  0.5    /  DBili  x      /  AST  13     /  ALT  17     /  AlkPhos  51     21 Apr 2023 05:50      ACC: [id number] EXAM:  ECHO TTE WO CON COMP W DOPP   ORDERED BY: RAFY WILSON     PROCEDURE DATE:  04/14/2023          INTERPRETATION:  INDICATION: Heart failure  Sonographer KL    Blood Pressure unavailable    Height 167.6 cm     Weight 60 kgBSA   1.8 sq m    Dimensions:  LA 4.0       Normal Values: 2.0 - 4.0 cm  Ao 3.4        Normal Values: 2.0 - 3.8 cm  SEPTUM 0.9       Normal Values: 0.6 - 1.2 cm  PWT 0.8       Normal Values: 0.6 - 1.1 cm  LVIDd 4.2         Normal Values: 3.0 - 5.6 cm  LVIDs 2.9         Normal Values: 1.8 - 4.0 cm      OBSERVATIONS:  Mitral Valve: Mild to moderate MR.  Aortic Valve/Aorta: Sclerotic trileaflet aortic valve with grossly normal   opening. Trace AI  Tricuspid Valve: Mild to moderate TR.  Pulmonic Valve: Mild PI  Left Atrium: Enlarged  Right Atrium: Not well-visualized  Left Ventricle: normal LV size and systolic function, estimated LVEF of   55-60%.  Right Ventricle: The right ventricle is enlarged with grossly normal   function  Pericardium: no significant pericardial effusion.  Pulmonary/RV Pressure: estimated PA systolic pressure of 66mmHg assuming   an RA pressure of 10 mmHg.  IVC measures 2.0 cm and is non collapsing  LV diastolic dysfunction is present  Bilateral pleural effusions  IMPRESSION:  Normal left ventricular internal dimensions and systolic function,   estimated LVEF of 55-60%.  The right ventricle is enlarged with grossly normal function  Left atrial enlargement  Sclerotic trileaflet aortic valve, trace AI.  Mild to moderate MR and TR.  Estimated PA systolic pressure of 66mmHg assuming an RA pressure of 10   mmHg.  IVC measures 2.0 cm and is non collapsing    --- End of Report ---    NEYMAR ROWLAND MD; Attending Cardiologist  This document has been electronically signed. Apr 15 2023 10:52AM  ACC: [id number] EXAM:  XR CHEST PORTABLE URGENT 1V   ORDERED BY:  GIOVANNY MACK     PROCEDURE DATE:  04/20/2023      INTERPRETATION:  AP chest on April 20, 2023 at 12:28 AM. Patient concern   is aspiration. Patient has enterocolitis.    Heart isenlarged.    NG tube and right jugular line remain.    Endotracheal tube present on April 15 has been removed.    On April 15 there were scattered mid lower lung field infiltrates which   shows slight improvement on present study.    IMPRESSION: Bilateral infiltrates show some improvement. Endotracheal   tube removed.    --- End of Report ---    DAVID HINSON MD; Attending Radiologist  This document has been electronically signed. Apr 20 2023  3:20PM      Ventricular Rate 84 BPM    Atrial Rate 84 BPM    P-R Interval 152 ms    QRS Duration 84 ms    Q-T Interval 388 ms    QTC Calculation(Bazett) 458 ms    P Axis 77 degrees    R Axis 29 degrees    T Axis 10 degrees    Diagnosis Line Sinus rhythm with premature atrial complexes with aberrant conduction  Otherwise normal ECG  When compared with ECG of 13-APR-2023 07:02,  T wave inversion less evident in Inferior leads  QT has shortened  Confirmed by Mustapha Sampson MD ([id number]) on 4/22/2023 2:21:10 PM

## 2023-04-23 NOTE — PROGRESS NOTE ADULT - PROBLEM SELECTOR PLAN 1
Septic shock due to perforated sigmoid colon with polymicrobial peritonitis; worsening leukocytosis   s/p Tia procedure and abdominal abscess drainage on 4/13  lactic acidosis resolved  Continue midodrine 10 mg q8h.   weaned off pressor support  BCx - 4/13 no growth   Peritoneal cultures poly microbial - Proteus vulgaris group, Escherichia coli, Klebsiella pneumoniae, Morganella morganii   Peritoneal fluid with gram variable rods and PMNs.  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  Prognosis remains guarded  Transferred out of the ICU

## 2023-04-23 NOTE — PROGRESS NOTE ADULT - SUBJECTIVE AND OBJECTIVE BOX
MEDICAL ATTENDING NOTE    Patient is a 72y old  Female who presents with a chief complaint of intra-abdominal perforation (22 Apr 2023 11:37)      INTERVAL HPI/OVERNIGHT EVENTS: no acute issues reported per nursing    MEDICATIONS  (STANDING):  albuterol/ipratropium for Nebulization 3 milliLiter(s) Nebulizer every 6 hours  bismuth subsalicylate Liquid 30 milliLiter(s) Oral every 6 hours  buDESOnide    Inhalation Suspension 0.5 milliGRAM(s) Inhalation every 12 hours  escitalopram 20 milliGRAM(s) Oral daily  heparin   Injectable 5000 Unit(s) SubCutaneous every 8 hours  levothyroxine 125 MICROGram(s) Oral daily  meropenem  IVPB 500 milliGRAM(s) IV Intermittent every 24 hours  midodrine 10 milliGRAM(s) Oral every 8 hours  octreotide  Injectable 100 MICROGram(s) SubCutaneous every 8 hours  pantoprazole   Suspension 40 milliGRAM(s) Oral daily  psyllium Powder 1 Packet(s) Oral daily  vancomycin    Solution 125 milliGRAM(s) Oral daily    MEDICATIONS  (PRN):  albuterol    90 MICROgram(s) HFA Inhaler 4 Puff(s) Inhalation every 6 hours PRN Shortness of Breath and/or Wheezing  metoclopramide Injectable 5 milliGRAM(s) IV Push every 6 hours PRN nausea and/or vomiting  sodium chloride 0.9% lock flush 10 milliLiter(s) IV Push every 1 hour PRN Pre/post blood products, medications, blood draw, and to maintain line patency      __________________________________________________  ----------------------------------------------------------------------------------  REVIEW OF SYSTEMS: limited ROS due to altered mental status      Vital Signs Last 24 Hrs  T(C): 36.6 (23 Apr 2023 05:00), Max: 37.4 (22 Apr 2023 17:00)  T(F): 97.8 (23 Apr 2023 05:00), Max: 99.3 (22 Apr 2023 17:00)  HR: 70 (23 Apr 2023 07:58) (70 - 97)  BP: 124/75 (23 Apr 2023 05:00) (108/66 - 144/63)  BP(mean): 78 (22 Apr 2023 21:00) (72 - 95)  RR: 20 (22 Apr 2023 22:41) (16 - 34)  SpO2: 98% (23 Apr 2023 00:04) (93% - 99%)    Parameters below as of 23 Apr 2023 00:04  Patient On (Oxygen Delivery Method): nasal cannula        _________________  PHYSICAL EXAM:  ---------------------------   NAD; Normocephalic;   LUNGS - no wheezing, fair bilateral air entry  HEART: S1 S2+   ABDOMEN: Soft, Nontender, non distended, ostomy+, surgical wound+  EXTREMITIES: no cyanosis; no edema  NERVOUS SYSTEM:  lethargic+  _________________________________________________  LABS:                        8.1    41.64 )-----------( 514      ( 23 Apr 2023 07:25 )             28.2     04-23    143  |  110<H>  |  87<H>  ----------------------------<  163<H>  3.7   |  27  |  5.20<H>    Ca    7.7<L>      23 Apr 2023 07:25  Phos  5.6     04-23  Mg     2.2     04-23    TPro  5.2<L>  /  Alb  2.1<L>  /  TBili  0.4  /  DBili  x   /  AST  13<L>  /  ALT  12  /  AlkPhos  54  04-23        CAPILLARY BLOOD GLUCOSE      POCT Blood Glucose.: 170 mg/dL (23 Apr 2023 06:37)  POCT Blood Glucose.: 179 mg/dL (23 Apr 2023 00:20)  POCT Blood Glucose.: 179 mg/dL (22 Apr 2023 18:42)  POCT Blood Glucose.: 183 mg/dL (22 Apr 2023 12:13)                Plan of care was discussed with patient;  care was aligned with patient's wishes.

## 2023-04-23 NOTE — PROGRESS NOTE ADULT - SUBJECTIVE AND OBJECTIVE BOX
Interval History:  transferred to floor  NG tube still in  son at bedside  Chart reviewed and events noted;   Overnight events:    MEDICATIONS  (STANDING):  albuterol/ipratropium for Nebulization 3 milliLiter(s) Nebulizer every 6 hours  bismuth subsalicylate Liquid 30 milliLiter(s) Oral every 6 hours  buDESOnide    Inhalation Suspension 0.5 milliGRAM(s) Inhalation every 12 hours  escitalopram 20 milliGRAM(s) Oral daily  heparin   Injectable 5000 Unit(s) SubCutaneous every 8 hours  levothyroxine 125 MICROGram(s) Oral daily  meropenem  IVPB 500 milliGRAM(s) IV Intermittent every 24 hours  midodrine 10 milliGRAM(s) Oral every 8 hours  octreotide  Injectable 100 MICROGram(s) SubCutaneous every 8 hours  pantoprazole   Suspension 40 milliGRAM(s) Oral daily  psyllium Powder 1 Packet(s) Oral daily  vancomycin    Solution 125 milliGRAM(s) Oral daily    MEDICATIONS  (PRN):  albuterol    90 MICROgram(s) HFA Inhaler 4 Puff(s) Inhalation every 6 hours PRN Shortness of Breath and/or Wheezing  metoclopramide Injectable 5 milliGRAM(s) IV Push every 6 hours PRN nausea and/or vomiting  sodium chloride 0.9% lock flush 10 milliLiter(s) IV Push every 1 hour PRN Pre/post blood products, medications, blood draw, and to maintain line patency      Vital Signs Last 24 Hrs  T(C): 36.6 (23 Apr 2023 05:00), Max: 37.4 (22 Apr 2023 17:00)  T(F): 97.8 (23 Apr 2023 05:00), Max: 99.3 (22 Apr 2023 17:00)  HR: 70 (23 Apr 2023 07:58) (70 - 97)  BP: 124/75 (23 Apr 2023 05:00) (108/66 - 144/63)  BP(mean): 78 (22 Apr 2023 21:00) (72 - 95)  RR: 20 (22 Apr 2023 22:41) (16 - 34)  SpO2: 98% (23 Apr 2023 00:04) (93% - 99%)    Parameters below as of 23 Apr 2023 00:04  Patient On (Oxygen Delivery Method): nasal cannula        PHYSICAL EXAM  General: adult in NAD, weak and lethagic  HEENT: clear oropharynx, anicteric sclera, pink conjunctivae  Neck: supple  CV: normal S1S2 with no murmur rubs or gallops  Lungs: clear to auscultation, no wheezes, no rhales  Abdomen: soft non-tender non-distended, no hepato/splenomegaly  Ext: no clubbing cyanosis or edema  Skin: no rashes and no petichiae  Neuro: alert and oriented X3 no focal deficits      LABS:  CBC Full  -  ( 23 Apr 2023 07:25 )  WBC Count : 41.64 K/uL  RBC Count : 2.98 M/uL  Hemoglobin : 8.1 g/dL  Hematocrit : 28.2 %  Platelet Count - Automated : 514 K/uL  Mean Cell Volume : 94.6 fl  Mean Cell Hemoglobin : 27.2 pg  Mean Cell Hemoglobin Concentration : 28.7 gm/dL  Auto Neutrophil # : 36.64 K/uL  Auto Lymphocyte # : 1.25 K/uL  Auto Monocyte # : 1.25 K/uL  Auto Eosinophil # : 0.42 K/uL  Auto Basophil # : 0.42 K/uL  Auto Neutrophil % : 88.0 %  Auto Lymphocyte % : 3.0 %  Auto Monocyte % : 3.0 %  Auto Eosinophil % : 1.0 %  Auto Basophil % : 1.0 %    04-23    143  |  110<H>  |  87<H>  ----------------------------<  163<H>  3.7   |  27  |  5.20<H>    Ca    7.7<L>      23 Apr 2023 07:25  Phos  5.6     04-23  Mg     2.2     04-23    TPro  5.2<L>  /  Alb  2.1<L>  /  TBili  0.4  /  DBili  x   /  AST  13<L>  /  ALT  12  /  AlkPhos  54  04-23        fe studies      WBC trend  41.64 K/uL (04-23-23 @ 07:25)  42.12 K/uL (04-22-23 @ 05:50)  36.72 K/uL (04-21-23 @ 05:50)      Hgb trend  8.1 g/dL (04-23-23 @ 07:25)  8.3 g/dL (04-22-23 @ 05:50)  7.7 g/dL (04-21-23 @ 05:50)      plt trend  514 K/uL (04-23-23 @ 07:25)  343 K/uL (04-22-23 @ 05:50)  416 K/uL (04-21-23 @ 05:50)        RADIOLOGY & ADDITIONAL STUDIES:

## 2023-04-24 NOTE — PROGRESS NOTE ADULT - SUBJECTIVE AND OBJECTIVE BOX
TOMASA    POD #11: Extended Tia's 2/2 perforated sigmoid diverticulitis     SUBJECTIVE:  Patient is doing well this morning, seen and examined at bedside, denies any new complaints. Denies any nausea, vomiting, chest pain, shortness of breath, calf tenderness, fever or chills. Reports +bm/+f. Tolerating diet, ambulating as tolerated.     MEDICATIONS  (STANDING):  albuterol/ipratropium for Nebulization 3 milliLiter(s) Nebulizer every 6 hours  bismuth subsalicylate Liquid 30 milliLiter(s) Oral every 6 hours  buDESOnide    Inhalation Suspension 0.5 milliGRAM(s) Inhalation every 12 hours  escitalopram 20 milliGRAM(s) Oral daily  heparin   Injectable 5000 Unit(s) SubCutaneous every 8 hours  levothyroxine Injectable 93.75 MICROGram(s) IV Push <User Schedule>  meropenem  IVPB 500 milliGRAM(s) IV Intermittent every 24 hours  midodrine 10 milliGRAM(s) Oral every 8 hours  octreotide  Injectable 100 MICROGram(s) SubCutaneous every 8 hours  pantoprazole  Injectable 40 milliGRAM(s) IV Push daily  psyllium Powder 1 Packet(s) Oral daily  vancomycin    Solution 125 milliGRAM(s) Oral daily    MEDICATIONS  (PRN):  albuterol    90 MICROgram(s) HFA Inhaler 4 Puff(s) Inhalation every 6 hours PRN Shortness of Breath and/or Wheezing  metoclopramide Injectable 5 milliGRAM(s) IV Push every 6 hours PRN nausea and/or vomiting  sodium chloride 0.9% lock flush 10 milliLiter(s) IV Push every 1 hour PRN Pre/post blood products, medications, blood draw, and to maintain line patency      Vital Signs Last 24 Hrs  T(C): 36.4 (24 Apr 2023 08:03), Max: 36.8 (23 Apr 2023 17:16)  T(F): 97.6 (24 Apr 2023 08:03), Max: 98.2 (23 Apr 2023 17:16)  HR: 85 (24 Apr 2023 08:03) (70 - 85)  BP: 93/58 (24 Apr 2023 08:03) (93/58 - 104/61)  BP(mean): --  RR: 18 (24 Apr 2023 08:03) (16 - 18)  SpO2: 91% (24 Apr 2023 08:03) (91% - 100%)    Parameters below as of 24 Apr 2023 08:03  Patient On (Oxygen Delivery Method): room air        PHYSICAL EXAM:      Constitutional: A&Ox3    Breasts:    Respiratory: non labored breathing, no respiratory distress    Cardiovascular: NSR, RRR    Gastrointestinal:                 Incision:    Genitourinary:    Extremities: (-) edema                  I&O's Detail    23 Apr 2023 07:01  -  24 Apr 2023 07:00  --------------------------------------------------------  IN:  Total IN: 0 mL    OUT:    Bulb (mL): 20 mL    Colostomy (mL): 550 mL    Voided (mL): 500 mL  Total OUT: 1070 mL    Total NET: -1070 mL          LABS:                        7.7    33.79 )-----------( 417      ( 24 Apr 2023 06:36 )             27.5     04-24    146<H>  |  112<H>  |  86<H>  ----------------------------<  115<H>  3.9   |  28  |  4.90<H>    Ca    7.6<L>      24 Apr 2023 06:36  Phos  6.0     04-24  Mg     2.1     04-24    TPro  5.2<L>  /  Alb  2.1<L>  /  TBili  0.4  /  DBili  x   /  AST  13<L>  /  ALT  12  /  AlkPhos  54  04-23          RADIOLOGY & ADDITIONAL STUDIES: RAÚLOPALON    POD #11: Extended Tia's 2/2 perforated sigmoid diverticulitis     SUBJECTIVE:  Patient is doing well this morning, seen and examined at bedside, denies any new complaints. Patient does not vocalize concerns but nods/indicates appropriately. Denies any nausea, vomiting, chest pain, shortness of breath, calf tenderness, fever or chills.    MEDICATIONS  (STANDING):  albuterol/ipratropium for Nebulization 3 milliLiter(s) Nebulizer every 6 hours  bismuth subsalicylate Liquid 30 milliLiter(s) Oral every 6 hours  buDESOnide    Inhalation Suspension 0.5 milliGRAM(s) Inhalation every 12 hours  escitalopram 20 milliGRAM(s) Oral daily  heparin   Injectable 5000 Unit(s) SubCutaneous every 8 hours  levothyroxine Injectable 93.75 MICROGram(s) IV Push <User Schedule>  meropenem  IVPB 500 milliGRAM(s) IV Intermittent every 24 hours  midodrine 10 milliGRAM(s) Oral every 8 hours  octreotide  Injectable 100 MICROGram(s) SubCutaneous every 8 hours  pantoprazole  Injectable 40 milliGRAM(s) IV Push daily  psyllium Powder 1 Packet(s) Oral daily  vancomycin    Solution 125 milliGRAM(s) Oral daily    MEDICATIONS  (PRN):  albuterol    90 MICROgram(s) HFA Inhaler 4 Puff(s) Inhalation every 6 hours PRN Shortness of Breath and/or Wheezing  metoclopramide Injectable 5 milliGRAM(s) IV Push every 6 hours PRN nausea and/or vomiting  sodium chloride 0.9% lock flush 10 milliLiter(s) IV Push every 1 hour PRN Pre/post blood products, medications, blood draw, and to maintain line patency      Vital Signs Last 24 Hrs  T(C): 36.4 (24 Apr 2023 08:03), Max: 36.8 (23 Apr 2023 17:16)  T(F): 97.6 (24 Apr 2023 08:03), Max: 98.2 (23 Apr 2023 17:16)  HR: 85 (24 Apr 2023 08:03) (70 - 85)  BP: 93/58 (24 Apr 2023 08:03) (93/58 - 104/61)  BP(mean): --  RR: 18 (24 Apr 2023 08:03) (16 - 18)  SpO2: 91% (24 Apr 2023 08:03) (91% - 100%)    Parameters below as of 24 Apr 2023 08:03  Patient On (Oxygen Delivery Method): room air        PHYSICAL EXAM:  Constitutional: AAOx3, no acute distress  HEENT: NCAT, airway patent  Cardiovascular: RRR, pulses present bilaterally  Respiratory: nonlabored breathing  Gastrointestinal: abdomen soft, non distended, ostomy with output, stoma p/p/p, midline wound with staples, packing clean  Neuro: no focal deficits  Extremities: non edematous, no calf pain bilaterally    I&O's Detail    23 Apr 2023 07:01  -  24 Apr 2023 07:00  --------------------------------------------------------  IN:  Total IN: 0 mL    OUT:    Bulb (mL): 20 mL    Colostomy (mL): 550 mL    Voided (mL): 500 mL  Total OUT: 1070 mL    Total NET: -1070 mL          LABS:                        7.7    33.79 )-----------( 417      ( 24 Apr 2023 06:36 )             27.5     04-24    146<H>  |  112<H>  |  86<H>  ----------------------------<  115<H>  3.9   |  28  |  4.90<H>    Ca    7.6<L>      24 Apr 2023 06:36  Phos  6.0     04-24  Mg     2.1     04-24    TPro  5.2<L>  /  Alb  2.1<L>  /  TBili  0.4  /  DBili  x   /  AST  13<L>  /  ALT  12  /  AlkPhos  54  04-23

## 2023-04-24 NOTE — DISCHARGE NOTE PROVIDER - HOSPITAL COURSE
FROM ADMISSION H+P:   HPI:  73 y/o f w/ PMH of HTN, CHF, asthma, hypothyroidism, prior notes state A-fib on Xarelto however patient states she is not taking any anticoagulants, history of C. difficile January 2023 p/w generalized abdominal pain, and persistent diarrhea for last several months with several episodes every day, worsening in last few weeks.  Pt's abdominal pain acutely worsened in last day, with achy, persistent pain, diffusely in abdomen, nonraidtaing, not responding to oral pain meds, associated with nausea and vomiting, prompting pt to come to ED.  Pt did not report any fevers.  Pt has also had increased lower extremity swelling, but per son, has been noncompliant with lasix recently.   (13 Apr 2023 10:37)      ---  HOSPITAL COURSE: Patient presents with perforated sigmoid colon. CT abd/pelvis: Pneumoperitoneum and no ascites. Suspect perforated sigmoid colon, stable splenomegaly and stable right lung nodule, groundglass infiltrates and loculated right pleural effusion. Patient had an extended Tia's procedure including descending colon on 4/13. Course was complicated by  post/op acute respiratory failure requiring mechanical ventilation, septic shock, shock liver, LAYA due to ATN and acute blood loss anemia. She was intubated during her surgery on 4/13 and was extubated 4/19. Peritoneal cultures with ESBL Klebsiella, strep constellatus, Proteus vulgaris, E. coli, Bacteroides, Morganella morganii. Patient was started on IV antibiotics. She required ICU stay for pressor support and was successfully weaned off pressor support and downgraded to floors. Overall improved but with severe critical illness polyneuropathy and rising leukocytosis of unclear etiology. Patient with difficulty swallowing with multiple failed swallow tests requiring an NG tube.       ---  CONSULTANTS:   Nephrology: Dr. Mcghee   Cardiology: Dr. Chisholm  ID: Dr. Ricardo     ---  TIME SPENT:  I, the attending physician, was physically present for the key portions of the evaluation and management (E/M) service provided. The total amount of time spent reviewing the hospital notes, laboratory values, imaging findings, assessing/counseling the patient, discussing with consultant physicians, social work, nursing staff was -- minutes    ---  Primary care provider was made aware of plan for discharge:      [  ] NO     [  ] YES

## 2023-04-24 NOTE — DISCHARGE NOTE PROVIDER - CARE PROVIDERS DIRECT ADDRESSES
[email or web address]

## 2023-04-24 NOTE — PROGRESS NOTE ADULT - PROBLEM SELECTOR PLAN 2
Perforated sigmoid colon: s/p Ro's procedure   Ostomy c/d/i; watery output noted  NPO with NGT,   speech and swallow recs: NPO  neg c-diff, neg GI pcr  ID follow up ongoing  Continue Metoclopramide prn and PPI   Monitor drain and colostomy output.   Continue octreotide given history of carcinoid with increased ostomy output.   Psyllium added to bulk stool per ICU   Continue to monitor

## 2023-04-24 NOTE — PROGRESS NOTE ADULT - ASSESSMENT
73 y/o F with h/o A-Fib on rivaroxaban, HFpEF, recent C diff infection (Jan 2023), myelofibrosis with thrombocytosis on anagrelide, hypothyroidism, asthma, and lung nodules s/p resection (reportedly malignant) who presents with perforated sigmoid colon with acute bacterial peritonitis, LAYA due to ATN,septic shock, lactic acidosis, shock liver;  post/op acute respiratory failure requiring mechanical ventilation, and acute blood loss anemia; s/p extended Tia's procedure including descending colon on 4/13. She was extubated 4/19 and tolerating supplemental oxygen via nasal canula. Overall improved but with severe critical illness polyneuropathy and rising leukocytosis of unclear etiology.

## 2023-04-24 NOTE — PROGRESS NOTE ADULT - SUBJECTIVE AND OBJECTIVE BOX
Patient is a 72y old  Female who presents with a chief complaint of intra-abdominal perforation (24 Apr 2023 12:28)    INTERVAL HPI/OVERNIGHT EVENTS: Patient seen and examined at bedside. Pt NG tube clogged overnight requiring change this morning. Pt stating she is in pain mostly from her abdomen. Denies fevers, chills, headache, lightheadedness, chest pain, dyspnea, n/v/d/c.    MEDICATIONS  (STANDING):  albuterol/ipratropium for Nebulization 3 milliLiter(s) Nebulizer every 6 hours  buDESOnide    Inhalation Suspension 0.5 milliGRAM(s) Inhalation every 12 hours  dextrose 5%. 1000 milliLiter(s) (65 mL/Hr) IV Continuous <Continuous>  escitalopram 20 milliGRAM(s) Oral daily  heparin   Injectable 5000 Unit(s) SubCutaneous every 8 hours  levothyroxine Injectable 93.75 MICROGram(s) IV Push <User Schedule>  midodrine 10 milliGRAM(s) Oral every 8 hours  octreotide  Injectable 100 MICROGram(s) SubCutaneous every 8 hours  pantoprazole  Injectable 40 milliGRAM(s) IV Push daily  psyllium Powder 1 Packet(s) Oral daily  vancomycin    Solution 125 milliGRAM(s) Oral daily    MEDICATIONS  (PRN):  albuterol    90 MICROgram(s) HFA Inhaler 4 Puff(s) Inhalation every 6 hours PRN Shortness of Breath and/or Wheezing  metoclopramide Injectable 5 milliGRAM(s) IV Push every 6 hours PRN nausea and/or vomiting  sodium chloride 0.9% lock flush 10 milliLiter(s) IV Push every 1 hour PRN Pre/post blood products, medications, blood draw, and to maintain line patency    Allergies  No Known Allergies    Intolerances    REVIEW OF SYSTEMS: Limited 2/2 lethargy    Vital Signs Last 24 Hrs  T(C): 36.4 (24 Apr 2023 08:03), Max: 36.8 (23 Apr 2023 17:16)  T(F): 97.6 (24 Apr 2023 08:03), Max: 98.2 (23 Apr 2023 17:16)  HR: 85 (24 Apr 2023 08:03) (70 - 85)  BP: 93/58 (24 Apr 2023 08:03) (93/58 - 104/61)  BP(mean): --  RR: 18 (24 Apr 2023 08:03) (16 - 18)  SpO2: 91% (24 Apr 2023 08:03) (91% - 100%)    Parameters below as of 24 Apr 2023 08:03  Patient On (Oxygen Delivery Method): room air    PHYSICAL EXAM:  GENERAL: NAD, weak appearing  HEENT:  anicteric, moist mucous membranes  CHEST/LUNG:  CTA b/l, no rales, wheezes, or rhonchi  HEART: irregular rhythm , S1, S2  ABDOMEN: ileostomy present with drainage, BS+, soft, nontender, nondistended  EXTREMITIES: no edema, cyanosis, or calf tenderness  NERVOUS SYSTEM: lethargic, able to answer questions with nodding, unable to vocalize    LABS:                        7.7    33.79 )-----------( 417      ( 24 Apr 2023 06:36 )             27.5     CBC Full  -  ( 24 Apr 2023 06:36 )  WBC Count : 33.79 K/uL  Hemoglobin : 7.7 g/dL  Hematocrit : 27.5 %  Platelet Count - Automated : 417 K/uL  Mean Cell Volume : 96.5 fl  Mean Cell Hemoglobin : 27.0 pg  Mean Cell Hemoglobin Concentration : 28.0 gm/dL  Auto Neutrophil # : x  Auto Lymphocyte # : x  Auto Monocyte # : x  Auto Eosinophil # : x  Auto Basophil # : x  Auto Neutrophil % : x  Auto Lymphocyte % : x  Auto Monocyte % : x  Auto Eosinophil % : x  Auto Basophil % : x    24 Apr 2023 06:36    146    |  112    |  86     ----------------------------<  115    3.9     |  28     |  4.90     Ca    7.6        24 Apr 2023 06:36  Phos  6.0       24 Apr 2023 06:36  Mg     2.1       24 Apr 2023 06:36    CAPILLARY BLOOD GLUCOSE  POCT Blood Glucose.: 126 mg/dL (24 Apr 2023 12:16)  POCT Blood Glucose.: 120 mg/dL (24 Apr 2023 01:45)  POCT Blood Glucose.: 157 mg/dL (23 Apr 2023 17:02)    Culture - Blood (collected 04-22-23 @ 12:30)  Source: .Blood Blood-Peripheral  Preliminary Report (04-23-23 @ 16:01):    No growth to date.    Culture - Blood (collected 04-22-23 @ 12:26)  Source: .Blood Blood-Peripheral  Preliminary Report (04-23-23 @ 16:01):    No growth to date.    RADIOLOGY & ADDITIONAL TESTS:    < from: CT Abdomen and Pelvis w/ Oral Cont (04.20.23 @ 12:36) >  PROCEDURE DATE:  04/20/2023          INTERPRETATION:  CLINICAL INFORMATION: Postoperative day 7 for perforated   sigmoid diverticulosis.  Leukocytosis.    COMPARISON: CT scan abdomen pelvis for/13/23.    CONTRAST/COMPLICATIONS:  IV Contrast: NONE  0 cc administered   0 cc discarded  Oral Contrast: Omnipaque 300  Complications: None reported at time of study completion    PROCEDURE:  CT of the Abdomen and Pelvis was performed.  Sagittal and coronal reformats were performed.    FINDINGS:    LOWER CHEST:  9 mm nodule right middle lobe, stable.  Small loculated right pleural effusion with underlying airspace   consolidation, which could reflect chronic atelectasis.  Trace left-sided pleural effusion and pleural thickening with   pleural-based calcifications.    Extensive streak artifact degrades image quality, limiting evaluation.    The evaluation of the solid organ parenchyma is limited without   intravenous contrast.    LIVER: Within normal limits.  BILE DUCTS: Normal caliber.  GALLBLADDER: Cholecystectomy.  SPLEEN: Splenomegaly.  PANCREAS: Within normal limits.  ADRENALS: Within normal limits.  KIDNEYS/URETERS: Within normal limits.    BLADDER: Foleycatheter.  REPRODUCTIVE ORGANS:  The uterus and adnexa are not well evaluated on this exam.    BOWEL:  PERITONEUM:  Nasogastric tube, tip within the stomach. Evaluation of the stomach is   limited without distention however, suggestion of diffuse gastric wall   thickening.    Status post left hemicolectomy  with rectosigmoid stump and right lower quadrant colostomy.  A rectal probe is present.  There is diffuse small bowel and colonic wall thickening. Enteric   contrast transits through the colonto the ostomy, without bowel   obstruction.  There is a small to moderate volume of abdominal and pelvic ascites.  There is diffuse mesenteric stranding.  No localized fluid collection is noted.  There is a small droplet of free intraperitoneal air, which may be   postoperative.  There is mild presacral stranding.    VESSELS: Atherosclerotic changes.  RETROPERITONEUM/LYMPH NODES: No lymphadenopathy.    ABDOMINAL WALL:  Diffuse subcutaneous body wall edema/anasarca.    Postsurgical changes with ventral abdominal wall wound.  No localized encapsulated subcutaneous fluid collection.    There is subtle linear high attenuation in the midline abdominal wall,   extending toward the superficial defect, (2:77, 601:85).  There are adjacent small bubbles of gas/air within the subcutaneous   tissues.    This may reflect postsurgical material, however, cannot exclude enteric   contrast related to enterocutaneous fistula.    BONES: Degenerative changes.    IMPRESSION:    Limited study.    Status post left hemicolectomy and right lower quadrant colostomy.    Diffuse small bowel and colonic wall thickening may reflect an   enterocolitis.    Small to moderate abdominal pelvic ascites, without localized   intra-abdominal fluid collection.    Diffuse body wall edema/anasarca.  Subtle linear high attenuation material extending from the abdominal wall   to the midline surgical wound, may reflect postsurgical material,   however, cannot exclude enterocutaneous fistula.    Other findings as discussed above.    --- End of Report ---      < end of copied text >    Personally reviewed.     Consultant(s) Notes Reviewed:  [x] YES  [ ] NO     Patient is a 72y old  Female who presents with a chief complaint of intra-abdominal perforation (24 Apr 2023 12:28)    INTERVAL HPI/OVERNIGHT EVENTS: Patient seen and examined at bedside. Pt NG tube clogged overnight requiring change this morning. Pt stating she is in pain mostly from her abdomen. Denies fevers, chills, headache, lightheadedness, chest pain, dyspnea,            REVIEW OF SYSTEMS: Limited 2/2 lethargy    Vital Signs Last 24 Hrs  T(C): 36.4 (24 Apr 2023 08:03), Max: 36.8 (23 Apr 2023 17:16)  T(F): 97.6 (24 Apr 2023 08:03), Max: 98.2 (23 Apr 2023 17:16)  HR: 85 (24 Apr 2023 08:03) (70 - 85)  BP: 93/58 (24 Apr 2023 08:03) (93/58 - 104/61)  BP(mean): --  RR: 18 (24 Apr 2023 08:03) (16 - 18)  SpO2: 91% (24 Apr 2023 08:03) (91% - 100%)    Parameters below as of 24 Apr 2023 08:03  Patient On (Oxygen Delivery Method): room air    PHYSICAL EXAM:  GENERAL: NAD, weak appearing  HEENT:  anicteric, moist mucous membranes  CHEST/LUNG:  CTA b/l, no rales, wheezes, or rhonchi  HEART: irregular rhythm , S1, S2  ABDOMEN: ileostomy present with drainage, BS+, soft, nontender, nondistended  EXTREMITIES: no edema, cyanosis, or calf tenderness  NERVOUS SYSTEM: lethargic, able to answer questions with nodding, unable to vocalize  gu intact    MEDICATIONS  (STANDING):  albuterol/ipratropium for Nebulization 3 milliLiter(s) Nebulizer every 6 hours  buDESOnide    Inhalation Suspension 0.5 milliGRAM(s) Inhalation every 12 hours  dextrose 5%. 1000 milliLiter(s) (65 mL/Hr) IV Continuous <Continuous>  escitalopram 20 milliGRAM(s) Oral daily  heparin   Injectable 5000 Unit(s) SubCutaneous every 8 hours  levothyroxine Injectable 93.75 MICROGram(s) IV Push <User Schedule>  midodrine 10 milliGRAM(s) Oral every 8 hours  octreotide  Injectable 100 MICROGram(s) SubCutaneous every 8 hours  pantoprazole  Injectable 40 milliGRAM(s) IV Push daily  psyllium Powder 1 Packet(s) Oral daily  vancomycin    Solution 125 milliGRAM(s) Oral daily    MEDICATIONS  (PRN):  albuterol    90 MICROgram(s) HFA Inhaler 4 Puff(s) Inhalation every 6 hours PRN Shortness of Breath and/or Wheezing  metoclopramide Injectable 5 milliGRAM(s) IV Push every 6 hours PRN nausea and/or vomiting  sodium chloride 0.9% lock flush 10 milliLiter(s) IV Push every 1 hour PRN Pre/post blood products, medications, blood draw, and to maintain line patency    LABS:                        7.7    33.79 )-----------( 417      ( 24 Apr 2023 06:36 )             27.5     CBC Full  -  ( 24 Apr 2023 06:36 )  WBC Count : 33.79 K/uL  Hemoglobin : 7.7 g/dL  Hematocrit : 27.5 %  Platelet Count - Automated : 417 K/uL  Mean Cell Volume : 96.5 fl  Mean Cell Hemoglobin : 27.0 pg  Mean Cell Hemoglobin Concentration : 28.0 gm/dL  Auto Neutrophil # : x  Auto Lymphocyte # : x  Auto Monocyte # : x  Auto Eosinophil # : x  Auto Basophil # : x  Auto Neutrophil % : x  Auto Lymphocyte % : x  Auto Monocyte % : x  Auto Eosinophil % : x  Auto Basophil % : x    24 Apr 2023 06:36    146    |  112    |  86     ----------------------------<  115    3.9     |  28     |  4.90     Ca    7.6        24 Apr 2023 06:36  Phos  6.0       24 Apr 2023 06:36  Mg     2.1       24 Apr 2023 06:36    CAPILLARY BLOOD GLUCOSE  POCT Blood Glucose.: 126 mg/dL (24 Apr 2023 12:16)  POCT Blood Glucose.: 120 mg/dL (24 Apr 2023 01:45)  POCT Blood Glucose.: 157 mg/dL (23 Apr 2023 17:02)    Culture - Blood (collected 04-22-23 @ 12:30)  Source: .Blood Blood-Peripheral  Preliminary Report (04-23-23 @ 16:01):    No growth to date.    Culture - Blood (collected 04-22-23 @ 12:26)  Source: .Blood Blood-Peripheral  Preliminary Report (04-23-23 @ 16:01):    No growth to date.    RADIOLOGY & ADDITIONAL TESTS:    < from: CT Abdomen and Pelvis w/ Oral Cont (04.20.23 @ 12:36) >  PROCEDURE DATE:  04/20/2023          INTERPRETATION:  CLINICAL INFORMATION: Postoperative day 7 for perforated   sigmoid diverticulosis.  Leukocytosis.    COMPARISON: CT scan abdomen pelvis for/13/23.    CONTRAST/COMPLICATIONS:  IV Contrast: NONE  0 cc administered   0 cc discarded  Oral Contrast: Omnipaque 300  Complications: None reported at time of study completion    PROCEDURE:  CT of the Abdomen and Pelvis was performed.  Sagittal and coronal reformats were performed.    FINDINGS:    LOWER CHEST:  9 mm nodule right middle lobe, stable.  Small loculated right pleural effusion with underlying airspace   consolidation, which could reflect chronic atelectasis.  Trace left-sided pleural effusion and pleural thickening with   pleural-based calcifications.    Extensive streak artifact degrades image quality, limiting evaluation.    The evaluation of the solid organ parenchyma is limited without   intravenous contrast.    LIVER: Within normal limits.  BILE DUCTS: Normal caliber.  GALLBLADDER: Cholecystectomy.  SPLEEN: Splenomegaly.  PANCREAS: Within normal limits.  ADRENALS: Within normal limits.  KIDNEYS/URETERS: Within normal limits.    BLADDER: Foleycatheter.  REPRODUCTIVE ORGANS:  The uterus and adnexa are not well evaluated on this exam.    BOWEL:  PERITONEUM:  Nasogastric tube, tip within the stomach. Evaluation of the stomach is   limited without distention however, suggestion of diffuse gastric wall   thickening.    Status post left hemicolectomy  with rectosigmoid stump and right lower quadrant colostomy.  A rectal probe is present.  There is diffuse small bowel and colonic wall thickening. Enteric   contrast transits through the colonto the ostomy, without bowel   obstruction.  There is a small to moderate volume of abdominal and pelvic ascites.  There is diffuse mesenteric stranding.  No localized fluid collection is noted.  There is a small droplet of free intraperitoneal air, which may be   postoperative.  There is mild presacral stranding.    VESSELS: Atherosclerotic changes.  RETROPERITONEUM/LYMPH NODES: No lymphadenopathy.    ABDOMINAL WALL:  Diffuse subcutaneous body wall edema/anasarca.    Postsurgical changes with ventral abdominal wall wound.  No localized encapsulated subcutaneous fluid collection.    There is subtle linear high attenuation in the midline abdominal wall,   extending toward the superficial defect, (2:77, 601:85).  There are adjacent small bubbles of gas/air within the subcutaneous   tissues.    This may reflect postsurgical material, however, cannot exclude enteric   contrast related to enterocutaneous fistula.    BONES: Degenerative changes.    IMPRESSION:    Limited study.    Status post left hemicolectomy and right lower quadrant colostomy.    Diffuse small bowel and colonic wall thickening may reflect an   enterocolitis.    Small to moderate abdominal pelvic ascites, without localized   intra-abdominal fluid collection.    Diffuse body wall edema/anasarca.  Subtle linear high attenuation material extending from the abdominal wall   to the midline surgical wound, may reflect postsurgical material,   however, cannot exclude enterocutaneous fistula.    Other findings as discussed above.    --- End of Report ---      < end of copied text >    Personally reviewed.     Consultant(s) Notes Reviewed:  [x] YES  [ ] NO     Patient is a 72y old  Female who presents with a chief complaint of intra-abdominal perforation (24 Apr 2023 12:28)    INTERVAL HPI/OVERNIGHT EVENTS: Patient seen and examined at bedside.  weak Pt NG tube clogged overnight requiring change this morning. Pt stating she is in pain mostly from her abdomen. Denies fevers, chills, headache, lightheadedness, chest pain, dyspnea,            REVIEW OF SYSTEMS: Limited 2/2 lethargy    Vital Signs Last 24 Hrs  T(C): 36.4 (24 Apr 2023 08:03), Max: 36.8 (23 Apr 2023 17:16)  T(F): 97.6 (24 Apr 2023 08:03), Max: 98.2 (23 Apr 2023 17:16)  HR: 85 (24 Apr 2023 08:03) (70 - 85)  BP: 93/58 (24 Apr 2023 08:03) (93/58 - 104/61)  BP(mean): --  RR: 18 (24 Apr 2023 08:03) (16 - 18)  SpO2: 91% (24 Apr 2023 08:03) (91% - 100%)    Parameters below as of 24 Apr 2023 08:03  Patient On (Oxygen Delivery Method): room air    PHYSICAL EXAM:  GENERAL: NAD, weak appearing  HEENT:  anicteric, moist mucous membranes  CHEST/LUNG:  CTA b/l, no rales, wheezes, or rhonchi  HEART: irregular rhythm , S1, S2  ABDOMEN: ileostomy present with drainage, BS+, soft, nontender, nondistended  EXTREMITIES: no edema, cyanosis, or calf tenderness  NERVOUS SYSTEM: lethargic, able to answer questions with nodding, unable to vocalize  gu intact    MEDICATIONS  (STANDING):  albuterol/ipratropium for Nebulization 3 milliLiter(s) Nebulizer every 6 hours  buDESOnide    Inhalation Suspension 0.5 milliGRAM(s) Inhalation every 12 hours  dextrose 5%. 1000 milliLiter(s) (65 mL/Hr) IV Continuous <Continuous>  escitalopram 20 milliGRAM(s) Oral daily  heparin   Injectable 5000 Unit(s) SubCutaneous every 8 hours  levothyroxine Injectable 93.75 MICROGram(s) IV Push <User Schedule>  midodrine 10 milliGRAM(s) Oral every 8 hours  octreotide  Injectable 100 MICROGram(s) SubCutaneous every 8 hours  pantoprazole  Injectable 40 milliGRAM(s) IV Push daily  psyllium Powder 1 Packet(s) Oral daily  vancomycin    Solution 125 milliGRAM(s) Oral daily    MEDICATIONS  (PRN):  albuterol    90 MICROgram(s) HFA Inhaler 4 Puff(s) Inhalation every 6 hours PRN Shortness of Breath and/or Wheezing  metoclopramide Injectable 5 milliGRAM(s) IV Push every 6 hours PRN nausea and/or vomiting  sodium chloride 0.9% lock flush 10 milliLiter(s) IV Push every 1 hour PRN Pre/post blood products, medications, blood draw, and to maintain line patency    LABS:                        7.7    33.79 )-----------( 417      ( 24 Apr 2023 06:36 )             27.5     CBC Full  -  ( 24 Apr 2023 06:36 )  WBC Count : 33.79 K/uL  Hemoglobin : 7.7 g/dL  Hematocrit : 27.5 %  Platelet Count - Automated : 417 K/uL  Mean Cell Volume : 96.5 fl  Mean Cell Hemoglobin : 27.0 pg  Mean Cell Hemoglobin Concentration : 28.0 gm/dL  Auto Neutrophil # : x  Auto Lymphocyte # : x  Auto Monocyte # : x  Auto Eosinophil # : x  Auto Basophil # : x  Auto Neutrophil % : x  Auto Lymphocyte % : x  Auto Monocyte % : x  Auto Eosinophil % : x  Auto Basophil % : x    24 Apr 2023 06:36    146    |  112    |  86     ----------------------------<  115    3.9     |  28     |  4.90     Ca    7.6        24 Apr 2023 06:36  Phos  6.0       24 Apr 2023 06:36  Mg     2.1       24 Apr 2023 06:36    CAPILLARY BLOOD GLUCOSE  POCT Blood Glucose.: 126 mg/dL (24 Apr 2023 12:16)  POCT Blood Glucose.: 120 mg/dL (24 Apr 2023 01:45)  POCT Blood Glucose.: 157 mg/dL (23 Apr 2023 17:02)    Culture - Blood (collected 04-22-23 @ 12:30)  Source: .Blood Blood-Peripheral  Preliminary Report (04-23-23 @ 16:01):    No growth to date.    Culture - Blood (collected 04-22-23 @ 12:26)  Source: .Blood Blood-Peripheral  Preliminary Report (04-23-23 @ 16:01):    No growth to date.    RADIOLOGY & ADDITIONAL TESTS:    < from: CT Abdomen and Pelvis w/ Oral Cont (04.20.23 @ 12:36) >  PROCEDURE DATE:  04/20/2023          INTERPRETATION:  CLINICAL INFORMATION: Postoperative day 7 for perforated   sigmoid diverticulosis.  Leukocytosis.    COMPARISON: CT scan abdomen pelvis for/13/23.    CONTRAST/COMPLICATIONS:  IV Contrast: NONE  0 cc administered   0 cc discarded  Oral Contrast: Omnipaque 300  Complications: None reported at time of study completion    PROCEDURE:  CT of the Abdomen and Pelvis was performed.  Sagittal and coronal reformats were performed.    FINDINGS:    LOWER CHEST:  9 mm nodule right middle lobe, stable.  Small loculated right pleural effusion with underlying airspace   consolidation, which could reflect chronic atelectasis.  Trace left-sided pleural effusion and pleural thickening with   pleural-based calcifications.    Extensive streak artifact degrades image quality, limiting evaluation.    The evaluation of the solid organ parenchyma is limited without   intravenous contrast.    LIVER: Within normal limits.  BILE DUCTS: Normal caliber.  GALLBLADDER: Cholecystectomy.  SPLEEN: Splenomegaly.  PANCREAS: Within normal limits.  ADRENALS: Within normal limits.  KIDNEYS/URETERS: Within normal limits.    BLADDER: Foleycatheter.  REPRODUCTIVE ORGANS:  The uterus and adnexa are not well evaluated on this exam.    BOWEL:  PERITONEUM:  Nasogastric tube, tip within the stomach. Evaluation of the stomach is   limited without distention however, suggestion of diffuse gastric wall   thickening.    Status post left hemicolectomy  with rectosigmoid stump and right lower quadrant colostomy.  A rectal probe is present.  There is diffuse small bowel and colonic wall thickening. Enteric   contrast transits through the colonto the ostomy, without bowel   obstruction.  There is a small to moderate volume of abdominal and pelvic ascites.  There is diffuse mesenteric stranding.  No localized fluid collection is noted.  There is a small droplet of free intraperitoneal air, which may be   postoperative.  There is mild presacral stranding.    VESSELS: Atherosclerotic changes.  RETROPERITONEUM/LYMPH NODES: No lymphadenopathy.    ABDOMINAL WALL:  Diffuse subcutaneous body wall edema/anasarca.    Postsurgical changes with ventral abdominal wall wound.  No localized encapsulated subcutaneous fluid collection.    There is subtle linear high attenuation in the midline abdominal wall,   extending toward the superficial defect, (2:77, 601:85).  There are adjacent small bubbles of gas/air within the subcutaneous   tissues.    This may reflect postsurgical material, however, cannot exclude enteric   contrast related to enterocutaneous fistula.    BONES: Degenerative changes.    IMPRESSION:    Limited study.    Status post left hemicolectomy and right lower quadrant colostomy.    Diffuse small bowel and colonic wall thickening may reflect an   enterocolitis.    Small to moderate abdominal pelvic ascites, without localized   intra-abdominal fluid collection.    Diffuse body wall edema/anasarca.  Subtle linear high attenuation material extending from the abdominal wall   to the midline surgical wound, may reflect postsurgical material,   however, cannot exclude enterocutaneous fistula.    Other findings as discussed above.    --- End of Report ---      < end of copied text >    Personally reviewed.     Consultant(s) Notes Reviewed:  [x] YES  [ ] NO

## 2023-04-24 NOTE — SWALLOW BEDSIDE ASSESSMENT ADULT - COMMENTS
Chart reviewed order received for swallow eval.  Pt received sleeping in bed, arousable with cues, however, pt lethargic, +O2NC, reduced cognition, poor command following, no verbalization, pain scale 0/10 pre & post eval via non verbal pain scale.  Swallow eval completed see below for details.  Pt left as received NAD [facility] [phone] notified.  Will follow.     Per charting, pt is a "72F PMH A-Fib on rivaroxaban, HFpEF, recent C diff infection (Jan 2023), myelofibrosis with thrombocytosis on anagrelide, hypothyroidism, asthma, and lung nodules s/p resection (reportedly malignant) who presents with perforated sigmoid colon with acute bacterial peritonitis, septic shock, lactic acidosis, and LAYA 2/2 ATN, s/p extended Tia's including descending colon on 4/13. Extubated 4/19."  CT Abdomen & Pelvis 4/20/23: "LOWER CHEST:  9 mm nodule right middle lobe, stable.  Small loculated right pleural effusion with underlying airspace   consolidation, which could reflect chronic atelectasis.  Trace left-sided pleural effusion and pleural thickening with   pleural-based calcifications.    Extensive streak artifact degrades image quality, limiting evaluation.    The evaluation of the solid organ parenchyma is limited without   intravenous contrast."

## 2023-04-24 NOTE — PROGRESS NOTE ADULT - SUBJECTIVE AND OBJECTIVE BOX
NEPHROLOGY PROGRESS NOTE    CHIEF COMPLAINT:  LAYA    HPI:  Renal function with little change.     EXAM:  T(F): 97.6 (04-24-23 @ 08:03)  HR: 85 (04-24-23 @ 08:03)  BP: 93/58 (04-24-23 @ 08:03)  RR: 18 (04-24-23 @ 08:03)  SpO2: 91% (04-24-23 @ 08:03)    Somnolent, easily arouses  Normal respiratory effort, lungs clear bilaterally  Heart RRR with no murmur, no peripheral edema         LABS                             7.7    33.79 )-----------( 417      ( 24 Apr 2023 06:36 )             27.5          04-24    146<H>  |  112<H>  |  86<H>  ----------------------------<  115<H>  3.9   |  28  |  4.90<H>    Ca    7.6<L>      24 Apr 2023 06:36  Phos  6.0     04-24  Mg     2.1     04-24    TPro  5.2<L>  /  Alb  2.1<L>  /  TBili  0.4  /  DBili  x   /  AST  13<L>  /  ALT  12  /  AlkPhos  54  04-23           Impression:  1. LAYA on CKD: Septic/hemodynamic ATN. Cr has stabilized.  cc/day  2. Sigmoid perforation, s/p surgical repair  3. Mild hypernatremia    Recommendations:   Continue D5W for now until can get enteral water  She remains without dialytic indications  Will cont to monitor closely with you  Adjust all meds to Cr Cl 10-15cc/min

## 2023-04-24 NOTE — SOCIAL WORK PROGRESS NOTE - NSSWPROGRESSNOTE_GEN_ALL_CORE
Discussed today at rounds, case received after transfer from ICU to [place]. Patient seen at bedside and appears lethargic. D/C planning folder left at bedside. PT is recommending [facility]. I spoke with son William by phone. He confirmed patient was living alone pta. Educated him re. role of social work and recommendation for sub-acute rehab. He states patient is very set in her ways and he does not think she will agree. He is willing to take her to his home in [place] with [facility]. To remain available as needed.

## 2023-04-24 NOTE — PROGRESS NOTE ADULT - TIME BILLING
Please call us with any concerns or questions.   We will continue to follow.     Danny Wright MD   Division of Infectious Diseases  [facility]   Cell [phone] between 8am and 6pm   After 6pm and weekends please call ID service at [phone].

## 2023-04-24 NOTE — PROGRESS NOTE ADULT - ASSESSMENT
72F s/p extended Hartmanns for perforated sigmoid diverticulitis     Plan:  - NGT removed this AM, speech and swallow to see ?replace tube  - PT  - Packing changes QD  - Continue IV abx  - supportive care

## 2023-04-24 NOTE — PROGRESS NOTE ADULT - ASSESSMENT
IMPRESSION    Patient with history myeloproliferative on agrylin (being treated by Dr. Rice) admitted with abd pain, weakness and falls at home a/w severe sepsis and peritonitis due to perforated sigmoid colon, also with LAYA due to ATN, shock liver, and post/op acute respiratory failure requiring mechanical ventilation, and acute blood loss anemia. pt is off agrylin.  had a period of thrombocytopenia now resolved with current platelet count 416K  Course also complicated by diarrhea which apparently was present prior to admission.  Has hx of ?pulmonary carcinoid with nodules being followed. however recent PET/CT dotatate negative.    WBC remains elevated ?reactive or part of her myeloproliferative disease.  to continue observation    RECOMMNDATIONS    1.  follow CBC and transfuse as indicated  2.  continue post op care    3.  was started on sandostatin,     4.  HOLD agrylin until taking better po.    platelet count is not elevated at present but will follow    5.  further heme recommendations pending above

## 2023-04-24 NOTE — DISCHARGE NOTE PROVIDER - CARE PROVIDER_API CALL
Holly Edge (DO)  Family Medicine  [address] [place] [address]  Phone: [phone]  Fax: [phone]  Follow Up Time:

## 2023-04-24 NOTE — PROGRESS NOTE ADULT - ASSESSMENT
72-year-old female with history of hypertension, CHF, afib s/p AF ablation (2/7/19) currently not on xarelto, CATH 2018, hypothyroidism, HLD history of C. difficile January 2023 presents for vomitting and abdominal pain. Consulted for CHF.    Cardiac Optimization  - CT ABD/Pelvis: Pneumoperitoneum and no ascites, perforated sigmoid colon. Stable splenomegaly. Stable right lung nodule, groundglass infiltrates and loculated right pleural effusion.  - S/p Tia's with abdominal abscess drainage.   - Follow Surgery recs.  - Continue Abx per ID  - Encourage incentive spirometer    - s/p extubation,   - s/p Bumex 2 mg IV daily x 6 days.  Last dose, 4/22  - Will monitor volume status closely.  Will diurese prn at this point.  Will maintain net even  - Renal following.  No indication for HD at this point    - Know with Permanent Afib, rate-controlled  - Holding home metoprolol 50mg qd due to hypotension.   -continue midodrine , bp 93/58     - Elevated troponin peaked at 100.9, otherwise cardiac enzymes unremarkable, likely demand in the setting of sepsis  - EKG: Sinus tachycardia with PAC's  - No acute changes on EKG compared to previous.   - Prior TTE (1/18/23): normal LVEF 65%, normal RV size and function, biatrial enlargement, sclerotic aortic valve, mild AI, Moderate MR and TR  - TTE shows EF 55-60%, RVE, PASP 66   - hx of PAF s/p PVI  - Not on tele but regular on auscultation  - Not on AC at home    - Monitor and replete lytes, keep K>4, Mg>2.  Leilani Owens FNP-C  Cardiology NP  [facility] [phone]        72-year-old female with history of hypertension, CHF, afib s/p AF ablation (2/7/19) currently not on xarelto, CATH 2018, hypothyroidism, HLD history of C. difficile January 2023 presents for vomitting and abdominal pain. Consulted for CHF.    Cardiac Optimization  - CT ABD/Pelvis: Pneumoperitoneum and no ascites, perforated sigmoid colon. Stable splenomegaly. Stable right lung nodule, ground glass infiltrates and loculated right pleural effusion.  - S/p Tia's with abdominal abscess drainage.   - Follow Surgery recs.  - Continue Abx per ID    - Renal following.  No indication for HD at this point    - Know with Permanent Afib, rate-controlled  - Holding home metoprolol 50mg qd due to hypotension.   -continue midodrine , bp 93/58     - Elevated troponin peaked at 100.9, otherwise cardiac enzymes unremarkable, likely demand in the setting of sepsis  - EKG: Sinus tachycardia with PAC's  - No acute changes on EKG compared to previous.   - Prior TTE (1/18/23): normal LVEF 65%, normal RV size and function, biatrial enlargement, sclerotic aortic valve, mild AI, Moderate MR and TR  - TTE shows EF 55-60%, RVE, PASP 66   - hx of PAF s/p PVI  - Not on AC at home    - Monitor and replete lytes, keep K>4, Mg>2.  Leilani Owens FNP-C  Cardiology NP  [facility] [phone]        72-year-old female with history of hypertension, CHF, afib s/p AF ablation (2/7/19) currently not on xarelto, CATH 2018, hypothyroidism, HLD history of C. difficile January 2023 presents for vomitting and abdominal pain. Consulted for CHF.    Cardiac Optimization  - CT ABD/Pelvis: Pneumoperitoneum and no ascites, perforated sigmoid colon. Stable splenomegaly. Stable right lung nodule, ground glass infiltrates and loculated right pleural effusion.  - S/p Tia's with abdominal abscess drainage.   - Follow Surgery recs.  - Continue Abx per ID    - Renal following.  No indication for HD at this point    - Know with Permanent Afib, rate-controlled  - Holding home metoprolol 50mg qd due to hypotension.   -continue midodrine , bp 93/58     - Elevated troponin peaked at 100.9, otherwise cardiac enzymes unremarkable, likely demand in the setting of sepsis  - EKG: Sinus tachycardia with PAC's repeat ekg today for irreg HR on exam  - No acute changes on EKG compared to previous.   - Prior TTE (1/18/23): normal LVEF 65%, normal RV size and function, biatrial enlargement, sclerotic aortic valve, mild AI, Moderate MR and TR  - TTE shows EF 55-60%, RVE, PASP 66   - hx of PAF s/p PVI  - Not on AC at home    - Monitor and replete lytes, keep K>4, Mg>2.  Leilani Owens FNP-C  Cardiology NP  [facility] [phone]

## 2023-04-24 NOTE — PROGRESS NOTE ADULT - PROBLEM SELECTOR PLAN 1
Septic shock due to perforated sigmoid colon with polymicrobial peritonitis; improving leukocytosis   s/p Tia procedure and abdominal abscess drainage on 4/13  lactic acidosis resolved  Continue midodrine 10 mg q8h.   weaned off pressor support  BCx - 4/13 no growth   Peritoneal cultures poly microbial - Proteus vulgaris group, Escherichia coli, Klebsiella pneumoniae, Morganella morganii   Peritoneal fluid with gram variable rods and PMNs.  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  Prognosis remains guarded

## 2023-04-24 NOTE — PROGRESS NOTE ADULT - TIME BILLING
pt seen and examine today see above plan - pt with Septic shock due to perforated sigmoid colon with polymicrobial peritonitis; improving leukocytosis s/p Tia procedure and abdominal abscess drainage on 4/13   lactic acidosis resolved , Peritoneal cultures poly microbial - Proteus vulgaris group, Escherichia coli, Klebsiella pneumoniae, Morganella morganii ,BCx - 4/13 no growth ,   Meropenem  500  mg ivpb daily   day 5 /5   finished  and  weaned off pressor support Continue midodrine 10 mg q8h. pt seen and examine today see above plan - pt with Septic shock due to perforated sigmoid colon with polymicrobial peritonitis; improving leukocytosis s/p Tia procedure and abdominal abscess drainage on 4/13   lactic acidosis resolved , Peritoneal cultures poly microbial - Proteus vulgaris group, Escherichia coli, Klebsiella pneumoniae, Morganella morganii ,BCx - 4/13 no growth ,   Meropenem  500  mg ivpb daily   day 5 /5   finished  and  weaned off pressor support Continue midodrine 10 mg q8h  continue  ng tube feed speech evalu failed today .

## 2023-04-24 NOTE — DISCHARGE NOTE PROVIDER - NSDCMRMEDTOKEN_GEN_ALL_CORE_FT
anagrelide 0.5 mg oral capsule: Take 3 caps in AM and 3 caps in PM  escitalopram 20 mg oral tablet: 1 tab(s) orally once a day  famotidine 40 mg oral tablet: 1 tab(s) orally once a day (at bedtime), As Needed  furosemide 40 mg oral tablet: 1 tab(s) orally 2 times a day  gabapentin 100 mg oral tablet: 1 cap(s) orally 2 times a day  Hydromet 5 mg-1.5 mg/5 mL oral syrup: 5 milliliter(s) orally every 6 hours, As Needed for cough   levocetirizine 5 mg oral tablet: 1 tab(s) orally once a day (in the evening)  levothyroxine 125 mcg (0.125 mg) oral tablet: 1 tab(s) orally once a day  metOLazone 2.5 mg oral tablet: 1 tab(s) orally once a day  Trelegy Ellipta 100 mcg-62.5 mcg-25 mcg/inh inhalation powder: 1 puff(s) inhaled once a day  Xarelto 20 mg oral tablet: 1 tab(s) orally once a day  zolpidem 5 mg oral tablet: 1 tab(s) orally once a day (at bedtime)

## 2023-04-24 NOTE — PROGRESS NOTE ADULT - SUBJECTIVE AND OBJECTIVE BOX
[facility] -- Adrián Wray Pannella, Patel, Savella [facility]  Office # [phone]      Follow Up:  Afib , cardiac optimization     Subjective/Observations:       REVIEW OF SYSTEMS: All other review of systems is negative unless indicated above    PAST MEDICAL & SURGICAL HISTORY:  Hypothyroidism      HLD (hyperlipidemia)      Thrombocytosis      Persistent atrial fibrillation      Obesity (BMI 35.0-39.9 without comorbidity)      Asthma      Diabetes mellitus      Ankle injury  on , 5 surgeries.      Cholecystitis          MEDICATIONS  (STANDING):  albuterol/ipratropium for Nebulization 3 milliLiter(s) Nebulizer every 6 hours  bismuth subsalicylate Liquid 30 milliLiter(s) Oral every 6 hours  buDESOnide    Inhalation Suspension 0.5 milliGRAM(s) Inhalation every 12 hours  escitalopram 20 milliGRAM(s) Oral daily  heparin   Injectable 5000 Unit(s) SubCutaneous every 8 hours  levothyroxine Injectable 93.75 MICROGram(s) IV Push <User Schedule>  meropenem  IVPB 500 milliGRAM(s) IV Intermittent every 24 hours  midodrine 10 milliGRAM(s) Oral every 8 hours  octreotide  Injectable 100 MICROGram(s) SubCutaneous every 8 hours  pantoprazole  Injectable 40 milliGRAM(s) IV Push daily  psyllium Powder 1 Packet(s) Oral daily  vancomycin    Solution 125 milliGRAM(s) Oral daily    MEDICATIONS  (PRN):  albuterol    90 MICROgram(s) HFA Inhaler 4 Puff(s) Inhalation every 6 hours PRN Shortness of Breath and/or Wheezing  metoclopramide Injectable 5 milliGRAM(s) IV Push every 6 hours PRN nausea and/or vomiting  sodium chloride 0.9% lock flush 10 milliLiter(s) IV Push every 1 hour PRN Pre/post blood products, medications, blood draw, and to maintain line patency      Allergies    No Known Allergies    Intolerances        Vital Signs Last 24 Hrs  T(C): 36.4 (2023 08:03), Max: 36.8 (2023 17:16)  T(F): 97.6 (2023 08:03), Max: 98.2 (2023 17:16)  HR: 85 (2023 08:03) (70 - 85)  BP: 93/58 (2023 08:03) (93/58 - 104/61)  BP(mean): --  RR: 18 (2023 08:03) (16 - 18)  SpO2: 91% (2023 08:03) (91% - 100%)    Parameters below as of 2023 08:03  Patient On (Oxygen Delivery Method): room air        I&O's Summary    2023 07:01  -  2023 07:00  --------------------------------------------------------  IN: 0 mL / OUT: 1070 mL / NET: -1070 mL          PHYSICAL EXAM:  TELE: not on tele   Constitutional: NAD,  HEENT: Moist Mucous Membranes, Anicteric  Pulmonary: Non-labored, breath sounds are diminished   Cardiovascular: Regular, S1 and S2 nl, murmur   Gastrointestinal: Bowel Sounds present, soft, nontender.   Lymph: No lymphadenopathy. No peripheral edema.  Skin: No visible rashes or ulcers.  Psych:  Mood & affect appropriate    LABS: All Labs Reviewed:                        7.7    33.79 )-----------( 417      ( 2023 06:36 )             27.5                         8.1    41.64 )-----------( 514      ( 2023 07:25 )             28.2                         8.3    42.12 )-----------( 343      ( 2023 05:50 )             29.9     2023 06:36    146    |  112    |  86     ----------------------------<  115    3.9     |  28     |  4.90   2023 07:25    143    |  110    |  87     ----------------------------<  163    3.7     |  27     |  5.20   2023 05:50    144    |  110    |  88     ----------------------------<  136    4.0     |  24     |  5.30     Ca    7.6        2023 06:36  Ca    7.7        2023 07:25  Ca    7.7        2023 05:50  Phos  6.0       2023 06:36  Phos  5.6       2023 07:25  Phos  5.3       2023 05:50  Mg     2.1       2023 06:36  Mg     2.2       2023 07:25  Mg     2.3       2023 05:50    TPro  5.2    /  Alb  2.1    /  TBili  0.4    /  DBili  x      /  AST  13     /  ALT  12     /  AlkPhos  54     2023 07:25  TPro  5.1    /  Alb  2.1    /  TBili  0.4    /  DBili  x      /  AST  15     /  ALT  15     /  AlkPhos  66     2023 05:50             EC Lead ECG:   Ventricular Rate 84 BPM    Atrial Rate 84 BPM    P-R Interval 152 ms    QRS Duration 84 ms    Q-T Interval 388 ms    QTC Calculation(Bazett) 458 ms    P Axis 77 degrees    R Axis 29 degrees    T Axis 10 degrees    Diagnosis Line Sinus rhythm with premature atrial complexes with aberrant conduction  Otherwise normal ECG  When compared with ECG of 2023 07:02,  T wave inversion less evident in Inferior leads  QT has shortened  Confirmed by Mustapha Sampson MD ([id number]) on 2023 2:21:10 PM (23 @ 09:43)      ACC: [id number] EXAM:  ECHO TTE WO CON COMP W DOPP   ORDERED BY: RAFY WILSON     PROCEDURE DATE:  2023          INTERPRETATION:  INDICATION: Heart failure  Sonographer KL    Blood Pressure unavailable    Height 167.6 cm     Weight 60 kgBSA   1.8 sq m    Dimensions:  LA 4.0       Normal Values: 2.0 - 4.0 cm  Ao 3.4        Normal Values: 2.0 - 3.8 cm  SEPTUM 0.9       Normal Values: 0.6 - 1.2 cm  PWT 0.8       Normal Values: 0.6 - 1.1 cm  LVIDd 4.2         Normal Values: 3.0 - 5.6 cm  LVIDs 2.9         Normal Values: 1.8 - 4.0 cm      OBSERVATIONS:  Mitral Valve: Mild to moderate MR.  Aortic Valve/Aorta: Sclerotic trileaflet aortic valve with grossly normal   opening. Trace AI  Tricuspid Valve: Mild to moderate TR.  Pulmonic Valve: Mild PI  Left Atrium: Enlarged  Right Atrium: Not well-visualized  Left Ventricle: normal LV size and systolic function, estimated LVEF of   55-60%.  Right Ventricle: The right ventricle is enlarged with grossly normal   function  Pericardium: no significant pericardial effusion.  Pulmonary/RV Pressure: estimated PA systolic pressure of 66mmHg assuming   an RA pressure of 10 mmHg.  IVC measures 2.0 cm and is non collapsing  LV diastolic dysfunction is present  Bilateral pleural effusions        IMPRESSION:  Normal left ventricular internal dimensions and systolic function,   estimated LVEF of 55-60%.  The right ventricle is enlarged with grossly normal function  Left atrial enlargement  Sclerotic trileaflet aortic valve, trace AI.  Mild to moderate MR and TR.  Estimated PA systolic pressure of 66mmHg assuming an RA pressure of 10   mmHg.  IVC measures 2.0 cm and is non collapsing    --- End of Report ---            NEYMAR ROWLAND MD; Attending Cardiologist  This document has been electronically signed. Apr 15 2023 10:52AM      Radiology:         [facility] -- Adrián Wray Pannella, Patel, Savella [facility]  Office # [phone]      Follow Up:  Afib , cardiac optimization   Subjective/Observations:   Seen at bedside, in no acute distress     REVIEW OF SYSTEMS: All other review of systems is negative unless indicated above    PAST MEDICAL & SURGICAL HISTORY:  Hypothyroidism      HLD (hyperlipidemia)      Thrombocytosis      Persistent atrial fibrillation      Obesity (BMI 35.0-39.9 without comorbidity)      Asthma      Diabetes mellitus      Ankle injury  on , 5 surgeries.      Cholecystitis          MEDICATIONS  (STANDING):  albuterol/ipratropium for Nebulization 3 milliLiter(s) Nebulizer every 6 hours  bismuth subsalicylate Liquid 30 milliLiter(s) Oral every 6 hours  buDESOnide    Inhalation Suspension 0.5 milliGRAM(s) Inhalation every 12 hours  escitalopram 20 milliGRAM(s) Oral daily  heparin   Injectable 5000 Unit(s) SubCutaneous every 8 hours  levothyroxine Injectable 93.75 MICROGram(s) IV Push <User Schedule>  meropenem  IVPB 500 milliGRAM(s) IV Intermittent every 24 hours  midodrine 10 milliGRAM(s) Oral every 8 hours  octreotide  Injectable 100 MICROGram(s) SubCutaneous every 8 hours  pantoprazole  Injectable 40 milliGRAM(s) IV Push daily  psyllium Powder 1 Packet(s) Oral daily  vancomycin    Solution 125 milliGRAM(s) Oral daily    MEDICATIONS  (PRN):  albuterol    90 MICROgram(s) HFA Inhaler 4 Puff(s) Inhalation every 6 hours PRN Shortness of Breath and/or Wheezing  metoclopramide Injectable 5 milliGRAM(s) IV Push every 6 hours PRN nausea and/or vomiting  sodium chloride 0.9% lock flush 10 milliLiter(s) IV Push every 1 hour PRN Pre/post blood products, medications, blood draw, and to maintain line patency      Allergies    No Known Allergies    Intolerances        Vital Signs Last 24 Hrs  T(C): 36.4 (2023 08:03), Max: 36.8 (2023 17:16)  T(F): 97.6 (2023 08:03), Max: 98.2 (2023 17:16)  HR: 85 (2023 08:03) (70 - 85)  BP: 93/58 (2023 08:03) (93/58 - 104/61)  BP(mean): --  RR: 18 (2023 08:03) (16 - 18)  SpO2: 91% (2023 08:03) (91% - 100%)    Parameters below as of 2023 08:03  Patient On (Oxygen Delivery Method): room air        I&O's Summary    2023 07:01  -  2023 07:00  --------------------------------------------------------  IN: 0 mL / OUT: 1070 mL / NET: -1070 mL          PHYSICAL EXAM:  TELE: not on tele   Constitutional: NAD,  HEENT: Moist Mucous Membranes, Anicteric  Pulmonary: Non-labored, breath sounds are diminished   Cardiovascular: Regular, S1 and S2 nl, murmur   Gastrointestinal: Bowel Sounds present, soft, nontender.   Lymph: No lymphadenopathy. No peripheral edema.  Skin: No visible rashes or ulcers.  Psych:  Mood & affect appropriate    LABS: All Labs Reviewed:                        7.7    33.79 )-----------( 417      ( 2023 06:36 )             27.5                         8.1    41.64 )-----------( 514      ( 2023 07:25 )             28.2                         8.3    42.12 )-----------( 343      ( 2023 05:50 )             29.9     2023 06:36    146    |  112    |  86     ----------------------------<  115    3.9     |  28     |  4.90   2023 07:25    143    |  110    |  87     ----------------------------<  163    3.7     |  27     |  5.20   2023 05:50    144    |  110    |  88     ----------------------------<  136    4.0     |  24     |  5.30     Ca    7.6        2023 06:36  Ca    7.7        2023 07:25  Ca    7.7        2023 05:50  Phos  6.0       2023 06:36  Phos  5.6       2023 07:25  Phos  5.3       2023 05:50  Mg     2.1       2023 06:36  Mg     2.2       2023 07:25  Mg     2.3       2023 05:50    TPro  5.2    /  Alb  2.1    /  TBili  0.4    /  DBili  x      /  AST  13     /  ALT  12     /  AlkPhos  54     2023 07:25  TPro  5.1    /  Alb  2.1    /  TBili  0.4    /  DBili  x      /  AST  15     /  ALT  15     /  AlkPhos  66     2023 05:50             EC Lead ECG:   Ventricular Rate 84 BPM    Atrial Rate 84 BPM    P-R Interval 152 ms    QRS Duration 84 ms    Q-T Interval 388 ms    QTC Calculation(Bazett) 458 ms    P Axis 77 degrees    R Axis 29 degrees    T Axis 10 degrees    Diagnosis Line Sinus rhythm with premature atrial complexes with aberrant conduction  Otherwise normal ECG  When compared with ECG of 2023 07:02,  T wave inversion less evident in Inferior leads  QT has shortened  Confirmed by Mustapha Sampson MD ([id number]) on 2023 2:21:10 PM (23 @ 09:43)      ACC: [id number] EXAM:  ECHO TTE WO CON COMP W DOPP   ORDERED BY: RAFY WILSON     PROCEDURE DATE:  2023          INTERPRETATION:  INDICATION: Heart failure  Sonographer KL    Blood Pressure unavailable    Height 167.6 cm     Weight 60 kgBSA   1.8 sq m    Dimensions:  LA 4.0       Normal Values: 2.0 - 4.0 cm  Ao 3.4        Normal Values: 2.0 - 3.8 cm  SEPTUM 0.9       Normal Values: 0.6 - 1.2 cm  PWT 0.8       Normal Values: 0.6 - 1.1 cm  LVIDd 4.2         Normal Values: 3.0 - 5.6 cm  LVIDs 2.9         Normal Values: 1.8 - 4.0 cm      OBSERVATIONS:  Mitral Valve: Mild to moderate MR.  Aortic Valve/Aorta: Sclerotic trileaflet aortic valve with grossly normal   opening. Trace AI  Tricuspid Valve: Mild to moderate TR.  Pulmonic Valve: Mild PI  Left Atrium: Enlarged  Right Atrium: Not well-visualized  Left Ventricle: normal LV size and systolic function, estimated LVEF of   55-60%.  Right Ventricle: The right ventricle is enlarged with grossly normal   function  Pericardium: no significant pericardial effusion.  Pulmonary/RV Pressure: estimated PA systolic pressure of 66mmHg assuming   an RA pressure of 10 mmHg.  IVC measures 2.0 cm and is non collapsing  LV diastolic dysfunction is present  Bilateral pleural effusions        IMPRESSION:  Normal left ventricular internal dimensions and systolic function,   estimated LVEF of 55-60%.  The right ventricle is enlarged with grossly normal function  Left atrial enlargement  Sclerotic trileaflet aortic valve, trace AI.  Mild to moderate MR and TR.  Estimated PA systolic pressure of 66mmHg assuming an RA pressure of 10   mmHg.  IVC measures 2.0 cm and is non collapsing          Radiology:

## 2023-04-24 NOTE — DISCHARGE NOTE PROVIDER - NSDCHC_MEDRECSTATUS_GEN_ALL_CORE
Admission Reconciliation is Not Complete  Discharge Reconciliation is Not Complete Render Note In Bullet Format When Appropriate: No Post-Care Instructions: I reviewed with the patient in detail post-care instructions. Patient is to wear sunprotection, and avoid picking at any of the treated lesions. Pt may apply Vaseline to crusted or scabbing areas. Consent: The patient's consent was obtained including but not limited to risks of crusting, scabbing, blistering, scarring, darker or lighter pigmentary change, recurrence, incomplete removal and infection. Duration Of Freeze Thaw-Cycle (Seconds): 0 Detail Level: Detailed Number Of Freeze-Thaw Cycles: 1 freeze-thaw cycle

## 2023-04-24 NOTE — PROGRESS NOTE ADULT - SUBJECTIVE AND OBJECTIVE BOX
[facility]  INFECTIOUS DISEASES   [address] [place] [address]  Tel: [phone]     Fax: [phone]  ======================================================  MD Adriana Bird Kaushal, MD Cho, Michelle, MD   ======================================================    Assessment/Recommendations:      72-year-old  female with past medical history of essential hypertension, congestive heart failure, asthma, hypothyroidism, atrial fibrillation, splenomegaly with myelofibrosis with history of C. difficile in January 2023 initially presented with generalized abdominal pain with persistent diarrhea found to have sepsis secondary to perforated viscus from sigmoid colon perforation  with multi- bacterial peritonitis s/p Tia procedure as well as abdominal abscess drainage on April 13  with leukocytosis     patient seen and examined     peritoneal cultures: April 13: ESBL Klebsiella, strep constellatus, Proteus vulgaris, E. coli, Bacteroides, Morganella morganii   blood culture: April 22 (2 sets), April 13 (2 sets): Negative to date   stool for C. difficile by PCR: April 15 and 20: Negative to date  CT Abdomen and Pelvis w/ Oral Cont (04.20.23): Limited study.Status post left hemicolectomy and right lower quadrant colostomy. Diffuse small bowel and colonic wall thickening may reflect an  enterocolitis.Small to moderate abdominal pelvic ascites, without localized intra-abdominal fluid collection. Diffuse body wall edema/anasarca. Subtle linear high attenuation material extending from the abdominal wall to the midline surgical wound, may reflect postsurgical material, however, cannot exclude enterocutaneous fistula.     WBC 33,000 on admission improved as low as 10.5 thousand on April 14 subsequently peaked at 42,000 on April 22 currently trending down to 33.7 thousand on April 24   afebrile, borderline hypotensive with normal heart rate  S/p removal of central line and indwelling urinary catheter  monitor WBC, temperature curve     antibiotics:   Patient was on Zosyn from April 13 through 15 when transitioning to meropenem   which has been continued up until now (Day 10 today); Recommend monitoring off of IV meropenem; to continue PO Vanco when able to tolerate PO diet    currently managed on meropenem  500 mg IV daily adjusted for creatinine clearance with C. difficile prophylaxis  with p.o. vancomycin up until remains on meropenem   if hemodynamically unstable off of antibiotics or continued increased WBC with fever/hypothermia would recommend repeating blood culture, urine analysis, chest x-ray, CT abdomen pelvis    D/w Primary team     ________________________________    Last 24 hours:    Could not obtain review of systems as patient appeared extremely sedated and not able to participate in the interview    Further ROS:  All systems reviewed. No other complaints besides mentioned above     ______________________________  Allergies    No Known Allergies    medications:  meropenem  IVPB 500 milliGRAM(s) IV Intermittent every 24 hours  vancomycin    Solution 125 milliGRAM(s) Oral daily  albuterol    90 MICROgram(s) HFA Inhaler 4 Puff(s) Inhalation every 6 hours PRN  albuterol/ipratropium for Nebulization 3 milliLiter(s) Nebulizer every 6 hours  buDESOnide    Inhalation Suspension 0.5 milliGRAM(s) Inhalation every 12 hours  dextrose 5%. 1000 milliLiter(s) IV Continuous <Continuous>  escitalopram 20 milliGRAM(s) Oral daily  heparin   Injectable 5000 Unit(s) SubCutaneous every 8 hours  levothyroxine Injectable 93.75 MICROGram(s) IV Push <User Schedule>  metoclopramide Injectable 5 milliGRAM(s) IV Push every 6 hours PRN  midodrine 10 milliGRAM(s) Oral every 8 hours  octreotide  Injectable 100 MICROGram(s) SubCutaneous every 8 hours  pantoprazole  Injectable 40 milliGRAM(s) IV Push daily  psyllium Powder 1 Packet(s) Oral daily  sodium chloride 0.9% lock flush 10 milliLiter(s) IV Push every 1 hour PRN      _________________    PHYSICAL EXAM:    Objective:  Vital Signs Last 24 Hrs  T(C): 36.4 (24 Apr 2023 08:03), Max: 36.8 (23 Apr 2023 17:16)  T(F): 97.6 (24 Apr 2023 08:03), Max: 98.2 (23 Apr 2023 17:16)  HR: 85 (24 Apr 2023 08:03) (70 - 85)  BP: 93/58 (24 Apr 2023 08:03) (93/58 - 104/61)  RR: 18 (24 Apr 2023 08:03) (16 - 18)  SpO2: 91% (24 Apr 2023 08:03) (91% - 100%)    Parameters below as of 24 Apr 2023 08:03  Patient On (Oxygen Delivery Method): room air    General: No acute distress.  Lying in bed comfortably   Neuro: Lethargic, No motor, sensory, or cranial nerve deficit  HEENT: Pupils equal, reactive to light b/l 5 mm, Oral mucosa moist  PULM: Clear to auscultation bilaterally, no significant adventitious breath sounds   CVS: Regular rhythm and controlled rate,2+ systolic murmurs+ no rubs, or gallops  ABD: Soft, distended, nontender, normoactive bowel sounds, no CVA tenderness,  Surgical Site without surrounding cellulitic changes with staples noted with mucosa appearing normal, TAMERA bulb noted inferior to colostomy bag with minimal erythema surrounding it  EXT: No b/l LE edema, nontender with pedal pulse palpable   SKIN: Warm and well perfused, no acute rashes    external urinary suction catheter present  ______________  LABS, MICROBIOLOGY, RADIOLOGY & ADDITIONAL STUDIES: Reviewed

## 2023-04-24 NOTE — DISCHARGE NOTE PROVIDER - NSDCCPCAREPLAN_GEN_ALL_CORE_FT
PRINCIPAL DISCHARGE DIAGNOSIS  Diagnosis: Perforated abdominal viscus  Assessment and Plan of Treatment: You presented to the hospital in septic shock likely due to perforated sigmoid colon with polymicrobial peritonitis, which means you had infection throughout the abdominal cavity due to the perforation of your colon. You had a Tia procedure done which is a procedure in which part of your colon is removed and an colostomy pouch is created. You also had abcess drainage done at that time. After your  procedure on 4/13, you were intubated until 4/19 due to acute hypoxic respiratory failure. Due to the underlying infection caused by perforation of your colon you were in septic shock which means you were unable to maintain a normal blood pressure on your own and you had a prolonged intubation for which you required ICU level of care.      SECONDARY DISCHARGE DIAGNOSES  Diagnosis: LAYA (acute kidney injury)  Assessment and Plan of Treatment:

## 2023-04-24 NOTE — PROGRESS NOTE ADULT - SUBJECTIVE AND OBJECTIVE BOX
[INTERVAL HX: ]  Patient seen and examined;  Chart reviewed and events noted;     s/p NGT  Plts declining to 417  WBC declined to 33.7    [MEDICATIONS]  MEDICATIONS  (STANDING):  albuterol/ipratropium for Nebulization 3 milliLiter(s) Nebulizer every 6 hours  buDESOnide    Inhalation Suspension 0.5 milliGRAM(s) Inhalation every 12 hours  dextrose 5%. 1000 milliLiter(s) (65 mL/Hr) IV Continuous <Continuous>  escitalopram 20 milliGRAM(s) Oral daily  heparin   Injectable 5000 Unit(s) SubCutaneous every 8 hours  levothyroxine Injectable 93.75 MICROGram(s) IV Push <User Schedule>  midodrine 10 milliGRAM(s) Oral every 8 hours  octreotide  Injectable 100 MICROGram(s) SubCutaneous every 8 hours  pantoprazole  Injectable 40 milliGRAM(s) IV Push daily  vancomycin    Solution 125 milliGRAM(s) Oral daily    MEDICATIONS  (PRN):  albuterol    90 MICROgram(s) HFA Inhaler 4 Puff(s) Inhalation every 6 hours PRN Shortness of Breath and/or Wheezing  metoclopramide Injectable 5 milliGRAM(s) IV Push every 6 hours PRN nausea and/or vomiting  sodium chloride 0.9% lock flush 10 milliLiter(s) IV Push every 1 hour PRN Pre/post blood products, medications, blood draw, and to maintain line patency      [VITALS]  Vital Signs Last 24 Hrs  T(C): 36.3 (24 Apr 2023 12:15), Max: 36.7 (24 Apr 2023 05:12)  T(F): 97.4 (24 Apr 2023 12:15), Max: 98 (24 Apr 2023 05:12)  HR: 86 (24 Apr 2023 13:57) (70 - 86)  BP: 114/67 (24 Apr 2023 12:15) (93/58 - 114/67)  BP(mean): --  RR: 16 (24 Apr 2023 12:15) (16 - 18)  SpO2: 95% (24 Apr 2023 13:57) (91% - 100%)    Parameters below as of 24 Apr 2023 13:57  Patient On (Oxygen Delivery Method): nasal cannula,2 LPM      [WT/HT]  Daily     Daily   [VENT]      [PHYSICAL EXAM]  GEN: chronically ill looking.   HEENT: normocephalic and atraumatic. EOMI.  NGT in place  NECK: Supple.  No lymphadenopathy   LUNGS: Clear to auscultation.  HEART: Regular rate and rhythm,  no MRG  ABDOMEN: Soft, nontender, and nondistended.  Positive bowel sounds.    EXTREMITIES: +edema.  NEUROLOGIC: grossly intact.      [LABS:]                        7.7    33.79 )-----------( 417      ( 24 Apr 2023 06:36 )             27.5     04-24    146<H>  |  112<H>  |  86<H>  ----------------------------<  115<H>  3.9   |  28  |  4.90<H>    Ca    7.6<L>      24 Apr 2023 06:36  Phos  6.0     04-24  Mg     2.1     04-24    TPro  5.2<L>  /  Alb  2.1<L>  /  TBili  0.4  /  DBili  x   /  AST  13<L>  /  ALT  12  /  AlkPhos  54  04-23          Reticulocyte Count (01-29-23 @ 06:39)  Reticulocyte Percent: 1.4 % [0.5 - 2.5]  Absolute Reticulocytes: 43.3 K/uL [25.0 - 125.0]    Ferritin, Serum: 293 ng/mL *H* [15 - 150] (01-28-23 @ 07:38)    Iron - Total Binding Capacity.: 190 ug/dL *L* [220 - 430] (01-28-23 @ 07:38)    Reticulocyte Count (09-12-22 @ 15:23)  Reticulocyte Percent: 2.0 % [0.5 - 2.5]  Absolute Reticulocytes: 61.8 K/uL [25.0 - 125.0]    Reticulocyte Count (08-24-22 @ 09:40)  Reticulocyte Percent: 1.6 % [0.5 - 2.5]  Absolute Reticulocytes: 49.4 K/uL [25.0 - 125.0]    Iron - Total Binding Capacity.: 221 ug/dL [220 - 430] (08-16-22 @ 04:55)    Vitamin B12, Serum: 972 pg/mL [232 - 1245] (08-16-22 @ 04:55)    Ferritin, Serum: 319 ng/mL *H* [15 - 150] (08-16-22 @ 04:55)    Folate, Serum: >20.0 ng/mL (08-16-22 @ 04:55)    Serum Protein Electrophoresis Interp: Weak Gamma Migrating Paraprotein Identified (08-16-22 @ 04:55)    Immunofixation, Serum:   Weak IgG Kappa Band Identified    Reference Range: None Detected (08-16-22 @ 04:55)    Reticulocyte Count (07-11-22 @ 10:43)  Reticulocyte Percent: 1.6 % [0.5 - 2.5]  Absolute Reticulocytes: 47.1 K/uL [25.0 - 125.0]    Reticulocyte Count (05-04-22 @ 14:14)  Reticulocyte Percent: 3.6 % *H* [0.5 - 2.5]  Absolute Reticulocytes: 99.5 K/uL [25.0 - 125.0]          Culture - Blood (collected 22 Apr 2023 12:30)  Source: .Blood Blood-Peripheral  Preliminary Report (23 Apr 2023 16:01):    No growth to date.    Culture - Blood (collected 22 Apr 2023 12:26)  Source: .Blood Blood-Peripheral  Preliminary Report (23 Apr 2023 16:01):    No growth to date.      SARS-CoV-2: NotDetec (13 Apr 2023 06:40)        Culture - Blood (collected 22 Apr 2023 12:30)  Source: .Blood Blood-Peripheral  Preliminary Report (23 Apr 2023 16:01):    No growth to date.    Culture - Blood (collected 22 Apr 2023 12:26)  Source: .Blood Blood-Peripheral  Preliminary Report (23 Apr 2023 16:01):    No growth to date.        [RADIOLOGY STUDIES:]

## 2023-04-25 NOTE — PROGRESS NOTE ADULT - SUBJECTIVE AND OBJECTIVE BOX
[facility]  INFECTIOUS DISEASES   [address] [place] [address]  Tel: [phone]     Fax: [phone]  ======================================================  MD Adriana Bird Kaushal, MD Cho, Michelle, MD   ======================================================    Assessment/Recommendations:      72-year-old  female with past medical history of essential hypertension, congestive heart failure, asthma, hypothyroidism, atrial fibrillation, splenomegaly with myelofibrosis with history of C. difficile in January 2023 initially presented with generalized abdominal pain with persistent diarrhea found to have sepsis secondary to perforated viscus from sigmoid colon perforation  with multi- bacterial peritonitis s/p Tia procedure as well as abdominal abscess drainage on April 13  with leukocytosis     patient seen and examined     peritoneal cultures: April 13: ESBL Klebsiella, strep constellatus, Proteus vulgaris, E. coli, Bacteroides, Morganella morganii   blood culture: April 22 (2 sets), April 13 (2 sets): Negative to date   stool for C. difficile by PCR: April 15 and 20: Negative to date  CT Abdomen and Pelvis w/ Oral Cont (04.20.23): Limited study. Status post left hemicolectomy and right lower quadrant colostomy. Diffuse small bowel and colonic wall thickening may reflect an  enterocolitis.Small to moderate abdominal pelvic ascites, without localized intra-abdominal fluid collection. Diffuse body wall edema/anasarca. Subtle linear high attenuation material extending from the abdominal wall to the midline surgical wound, may reflect postsurgical material, however, cannot exclude enterocutaneous fistula.     WBC 33,000 on admission improved as low as 10.5 thousand on April 14 subsequently peaked at 42,000 on April 22 currently trending 33.79k to 37.21k in last 24 hours    afebrile, borderline hypotensive with normal heart rate (on midodrine)   S/p removal of central line and indwelling urinary catheter  monitor WBC, temperature curve  If     antibiotics:   Patient was on Zosyn from April 13 through 15 when transitioning to meropenem   which has been continued up until 4.24(10 days); Recommend monitoring off of IV meropenem; to continue PO Vanco      if hemodynamically unstable off of antibiotics or continued increased WBC with fever/hypothermia would recommend repeating blood culture, urine analysis, chest x-ray, CT abdomen pelvis/chest     D/w Primary team     ________________________________    Last 24 hours:   NGT tube placed again as failed swallow eval  Now improved mentation this morning, denied any pain or nausea  On mittens b/l     Further ROS:  All systems reviewed. No other complaints besides mentioned above     ______________________________  Allergies    No Known Allergies    MEDICATIONS  (STANDING):  albuterol/ipratropium for Nebulization 3 milliLiter(s) Nebulizer every 6 hours  buDESOnide    Inhalation Suspension 0.5 milliGRAM(s) Inhalation every 12 hours  dextrose 5%. 1000 milliLiter(s) (65 mL/Hr) IV Continuous <Continuous>  escitalopram 20 milliGRAM(s) Oral daily  heparin   Injectable 5000 Unit(s) SubCutaneous every 8 hours  levothyroxine Injectable 93.75 MICROGram(s) IV Push <User Schedule>  midodrine 10 milliGRAM(s) Oral every 8 hours  octreotide  Injectable 100 MICROGram(s) SubCutaneous every 8 hours  pantoprazole  Injectable 40 milliGRAM(s) IV Push daily  vancomycin    Solution 125 milliGRAM(s) Oral daily    MEDICATIONS  (PRN):  albuterol    90 MICROgram(s) HFA Inhaler 4 Puff(s) Inhalation every 6 hours PRN Shortness of Breath and/or Wheezing  metoclopramide Injectable 5 milliGRAM(s) IV Push every 6 hours PRN nausea and/or vomiting  sodium chloride 0.9% lock flush 10 milliLiter(s) IV Push every 1 hour PRN Pre/post blood products, medications, blood draw, and to maintain line patency    _________________    PHYSICAL EXAM:    Objective:  Vital Signs Last 24 Hrs  T(C): 36.7 (25 Apr 2023 04:36), Max: 36.7 (25 Apr 2023 04:36)  T(F): 98 (25 Apr 2023 04:36), Max: 98 (25 Apr 2023 04:36)  HR: 76 (25 Apr 2023 07:58) (76 - 106)  BP: 105/59 (25 Apr 2023 04:36) (93/70 - 114/67)  RR: 18 (25 Apr 2023 04:36) (16 - 18)  SpO2: 98% (25 Apr 2023 07:58) (94% - 98%)  O2 Parameters below as of 25 Apr 2023 07:58  Patient On (Oxygen Delivery Method): nasal cannula,2LPM    General: No acute distress.  Lying in bed comfortably   Neuro: AAO*1-2, No motor, sensory, or cranial nerve deficit  HEENT: Pupils equal, reactive to light b/l 5 mm, Oral mucosa moist, NGT+  PULM: Clear to auscultation bilaterally except decreased at the bases, no significant adventitious breath sounds   CVS: Regular rhythm and controlled rate,2+ systolic murmurs+ no rubs, or gallops  ABD: Soft, distended, nontender, normoactive bowel sounds, no CVA tenderness,  Surgical Site without surrounding cellulitic changes with packing and dressing+, TAMERA bulb noted inferior to colostomy bag with minimal erythema surrounding it  EXT: No b/l LE edema, nontender with pedal pulse palpable   SKIN: Warm and well perfused, no acute rashes    external urinary suction catheter present  ______________  LABS, MICROBIOLOGY, RADIOLOGY & ADDITIONAL STUDIES: Reviewed

## 2023-04-25 NOTE — PROGRESS NOTE ADULT - PROBLEM SELECTOR PLAN 1
Septic shock due to perforated sigmoid colon with polymicrobial peritonitis; overall improving leukocytosis   s/p Tia procedure and abdominal abscess drainage on 4/13  lactic acidosis resolved  Continue midodrine 10 mg q8h.   weaned off pressor support  BCx - 4/13 no growth   Peritoneal cultures poly microbial - Proteus vulgaris group, Escherichia coli, Klebsiella pneumoniae, Morganella morganii   Peritoneal fluid with gram variable rods and PMNs.  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  Prognosis remains guarded Septic shock due to perforated sigmoid colon with polymicrobial peritonitis; overall improving leukocytosis   s/p Tia procedure and abdominal abscess drainage on 4/13  lactic acidosis resolved  Continue midodrine 10 mg q8h.   weaned off pressor support  BCx - 4/13 no growth   Peritoneal cultures poly microbial - Proteus vulgaris group, Escherichia coli, Klebsiella pneumoniae, Morganella morganii   Peritoneal fluid with gram variable rods and PMNs.  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  Prognosis remains guarded

## 2023-04-25 NOTE — CHART NOTE - NSCHARTNOTEFT_GEN_A_CORE
Assessment: patient seen for malnutrition follow up  72y old  Female who presents with a chief complaint of intra-abdominal perforation septic shock LAYA status post Hartmans 2/2 perforated diverticulitis , extubated 4/19  NGT out then replaced . with pump study over 24 hrs only 52% of feeding received 2/2 tube out.   patient tolerating feeding running at 45ml this morning  4/25 small liquid BM per flow sheet + colostomy   current feeding as ordered provides 1944kcals and 87gms protein         Factors impacting intake: [ ] none [ ] nausea  [ ] vomiting [ ] diarrhea [ ] constipation  [ ]chewing problems [ ] swallowing issues  [x ] other: NGT feeding     Diet Prescription: Diet, NPO with Tube Feed:   Tube Feeding Modality: Nasogastric  Nepro with Carb Steady  Total Volume for 24 Hours (mL): 1080  Continuous  Starting Tube Feed Rate {mL per Hour}: 20  Increase Tube Feed Rate by (mL): 10     Every 6 hours  Until Goal Tube Feed Rate (mL per Hour): 45  Tube Feed Duration (in Hours): 24  Tube Feed Start Time: 15:35 (04-21-23 @ 15:33)      Current Weight: 4/25 165.7# right and left leg + 3 edema       Pertinent Medications: MEDICATIONS  (STANDING):  albuterol/ipratropium for Nebulization 3 milliLiter(s) Nebulizer every 6 hours  buDESOnide    Inhalation Suspension 0.5 milliGRAM(s) Inhalation every 12 hours  dextrose 5%. 1000 milliLiter(s) (65 mL/Hr) IV Continuous <Continuous>  escitalopram 20 milliGRAM(s) Oral daily  heparin   Injectable 5000 Unit(s) SubCutaneous every 8 hours  levothyroxine Injectable 93.75 MICROGram(s) IV Push <User Schedule>  midodrine 10 milliGRAM(s) Oral every 8 hours  octreotide  Injectable 100 MICROGram(s) SubCutaneous every 8 hours  pantoprazole  Injectable 40 milliGRAM(s) IV Push daily  vancomycin    Solution 125 milliGRAM(s) Oral daily    MEDICATIONS  (PRN):  albuterol    90 MICROgram(s) HFA Inhaler 4 Puff(s) Inhalation every 6 hours PRN Shortness of Breath and/or Wheezing  metoclopramide Injectable 5 milliGRAM(s) IV Push every 6 hours PRN nausea and/or vomiting  sodium chloride 0.9% lock flush 10 milliLiter(s) IV Push every 1 hour PRN Pre/post blood products, medications, blood draw, and to maintain line patency    Pertinent Labs: blood sugars 141,152 , Na 146 IV fluids ordered  BUN 86 Creat 4.9  Skin: sacrum stage 1    Estimated Needs:   [x ] no change since previous assessment based on admit wt 160# 25-30kcals/kg 1818-2181kcals  [x ] recalculated: protein needs .8-1.0gms/kg protein 58-72gms based on 160#    Previous Nutrition Diagnosis:   [ ] Inadequate Energy Intake [ ]Inadequate Oral Intake [ ] Excessive Energy Intake   [ ] Underweight [ ] Increased Nutrient Needs [ ] Overweight/Obesity   [x ] Altered GI Function [ ] Unintended Weight Loss [ ] Food & Nutrition Related Knowledge Deficit [x ] Malnutrition     Nutrition Diagnosis is [x ] ongoing  [ ] resolved [ ] not applicable     New Nutrition Diagnosis: [x ] not applicable       Interventions:   Recommend  [ ] Change Diet To:  [ ] Nutrition Supplement  [x ] Nutrition Support consider run feeding 50ml X 20 hr  1000ml 1800kcals 81 gms protein   [ x] Other: follow renal indices , follow weights , consider probiotic     Monitoring and Evaluation:   [ ] PO intake [ x ] Tolerance to diet prescription [ x ] weights [ x ] labs[ x ] follow up per protocol  [ ] other:

## 2023-04-25 NOTE — PROGRESS NOTE ADULT - PROBLEM SELECTOR PLAN 3
Acute respiratory failure with hypoxia extubated 4/19   Will encourage incentive spirometry  chest PT as tolerated  Ct albuterol-ipratropium nebs q6h + budesonide 0.5 mg nebs q12h (on Trelegy Ellipta inhaler at home)

## 2023-04-25 NOTE — PROGRESS NOTE ADULT - ASSESSMENT
IMPRESSION    Patient with history myeloproliferative on agrylin (being treated by Dr. Rice) admitted with abd pain, weakness and falls at home a/w severe sepsis and peritonitis due to perforated sigmoid colon, also with LAYA due to ATN, shock liver, and post/op acute respiratory failure requiring mechanical ventilation, and acute blood loss anemia. pt is off agrylin.  had a period of thrombocytopenia now resolved with current platelet count 416K  Course also complicated by diarrhea which apparently was present prior to admission.  Has hx of ?pulmonary carcinoid with nodules being followed. however recent PET/CT dotatate negative.  stool output appears improved  WBC remains elevated ?reactive or part of her myeloproliferative disease.  to continue observation, surgical and ID evaluation    RECOMMNDATIONS    1.  follow CBC and transfuse as indicated  2.  continue post op care    3.  was started on sandostatin, to continue with improved stool output    4.  HOLD agrylin until taking better po.    platelet count is not elevated at present but will follow    5.  further heme recommendations pending above

## 2023-04-25 NOTE — SWALLOW BEDSIDE ASSESSMENT ADULT - COMMENTS
Chart reviewed, case discussed with Dr. Goldberg, per MD pt not appropriate for swallow reassessment at this time, MD to reconsult when appropriate.

## 2023-04-25 NOTE — PROGRESS NOTE ADULT - SUBJECTIVE AND OBJECTIVE BOX
NEPHROLOGY PROGRESS NOTE    CHIEF COMPLAINT:  LAYA    HPI:  Small improvement  noted in renal function.  Remains tube fed via NGT and free water 1.2 L/day ordered.    EXAM:  T(F): 97.8 (04-25-23 @ 11:10)  HR: 80 (04-25-23 @ 11:10)  BP: 104/62 (04-25-23 @ 11:10)  RR: 18 (04-25-23 @ 11:10)  SpO2: 99% (04-25-23 @ 11:10)    Conversant, in no apparent distress  Normal respiratory effort, lungs clear bilaterally  Heart RRR with no murmur, mild peripheral edema         LABS                             7.6    37.21 )-----------( 446      ( 25 Apr 2023 07:10 )             26.8          04-25    144  |  110<H>  |  87<H>  ----------------------------<  116<H>  3.6   |  28  |  4.60<H>    Ca    7.6<L>      25 Apr 2023 07:10  Phos  5.5     04-25  Mg     2.0     04-25      Impression:  1. LAYA on CKD: Septic/hemodynamic ATN, stabilized  2. Sigmoid perforation, s/p surgical repair  3. Mild hypernatremia, better    Recommendations:   Continue enteral water  She remains without dialytic indications  Adjust all meds to Cr Cl 10-15cc/min

## 2023-04-25 NOTE — PROGRESS NOTE ADULT - SUBJECTIVE AND OBJECTIVE BOX
Interval History:  awake  Chart reviewed and events noted;   Overnight events:    MEDICATIONS  (STANDING):  albuterol/ipratropium for Nebulization 3 milliLiter(s) Nebulizer every 6 hours  buDESOnide    Inhalation Suspension 0.5 milliGRAM(s) Inhalation every 12 hours  dextrose 5%. 1000 milliLiter(s) (65 mL/Hr) IV Continuous <Continuous>  escitalopram 20 milliGRAM(s) Oral daily  heparin   Injectable 5000 Unit(s) SubCutaneous every 8 hours  levothyroxine Injectable 93.75 MICROGram(s) IV Push <User Schedule>  midodrine 10 milliGRAM(s) Oral every 8 hours  octreotide  Injectable 100 MICROGram(s) SubCutaneous every 8 hours  pantoprazole  Injectable 40 milliGRAM(s) IV Push daily  vancomycin    Solution 125 milliGRAM(s) Oral daily    MEDICATIONS  (PRN):  albuterol    90 MICROgram(s) HFA Inhaler 4 Puff(s) Inhalation every 6 hours PRN Shortness of Breath and/or Wheezing  metoclopramide Injectable 5 milliGRAM(s) IV Push every 6 hours PRN nausea and/or vomiting  sodium chloride 0.9% lock flush 10 milliLiter(s) IV Push every 1 hour PRN Pre/post blood products, medications, blood draw, and to maintain line patency      Vital Signs Last 24 Hrs  T(C): 36.6 (25 Apr 2023 11:10), Max: 36.7 (25 Apr 2023 04:36)  T(F): 97.8 (25 Apr 2023 11:10), Max: 98 (25 Apr 2023 04:36)  HR: 80 (25 Apr 2023 11:10) (76 - 106)  BP: 104/62 (25 Apr 2023 11:10) (93/70 - 105/59)  BP(mean): --  RR: 18 (25 Apr 2023 11:10) (18 - 18)  SpO2: 99% (25 Apr 2023 11:10) (94% - 99%)    Parameters below as of 25 Apr 2023 11:10  Patient On (Oxygen Delivery Method): room air        PHYSICAL EXAM  General: adult in NAD NG tube in place  HEENT: clear oropharynx, anicteric sclera, pink conjunctivae  Neck: supple  CV: normal S1S2 with no murmur rubs or gallops  Lungs: clear to auscultation, no wheezes, no rhales  Abdomen: soft non-tender non-distended, no hepato/splenomegaly  Ext: no clubbing cyanosis or edema  Skin: no rashes and no petichiae  Neuro: alert and oriented X3 no focal deficits      LABS:  CBC Full  -  ( 25 Apr 2023 07:10 )  WBC Count : 37.21 K/uL  RBC Count : 2.77 M/uL  Hemoglobin : 7.6 g/dL  Hematocrit : 26.8 %  Platelet Count - Automated : 446 K/uL  Mean Cell Volume : 96.8 fl  Mean Cell Hemoglobin : 27.4 pg  Mean Cell Hemoglobin Concentration : 28.4 gm/dL  Auto Neutrophil # : 33.06 K/uL  Auto Lymphocyte # : 0.82 K/uL  Auto Monocyte # : 1.35 K/uL  Auto Eosinophil # : 0.47 K/uL  Auto Basophil # : 0.21 K/uL  Auto Neutrophil % : 88.8 %  Auto Lymphocyte % : 2.2 %  Auto Monocyte % : 3.6 %  Auto Eosinophil % : 1.3 %  Auto Basophil % : 0.6 %    04-25    144  |  110<H>  |  87<H>  ----------------------------<  116<H>  3.6   |  28  |  4.60<H>    Ca    7.6<L>      25 Apr 2023 07:10  Phos  5.5     04-25  Mg     2.0     04-25          fe studies      WBC trend  37.21 K/uL (04-25-23 @ 07:10)  33.79 K/uL (04-24-23 @ 06:36)  41.64 K/uL (04-23-23 @ 07:25)      Hgb trend  7.6 g/dL (04-25-23 @ 07:10)  7.7 g/dL (04-24-23 @ 06:36)  8.1 g/dL (04-23-23 @ 07:25)      plt trend  446 K/uL (04-25-23 @ 07:10)  417 K/uL (04-24-23 @ 06:36)  514 K/uL (04-23-23 @ 07:25)        RADIOLOGY & ADDITIONAL STUDIES:

## 2023-04-25 NOTE — PROGRESS NOTE ADULT - PROBLEM SELECTOR PLAN 5
DVT prophylaxis- continue venodynes DVT prophylaxis- continue veno dynes    history myeloproliferative on agrylin hold until pt start taking po

## 2023-04-25 NOTE — PROGRESS NOTE ADULT - ASSESSMENT
72-year-old female with history of hypertension, CHF, afib s/p AF ablation (2/7/19) currently not on xarelto, CATH 2018, hypothyroidism, HLD history of C. difficile January 2023 presents for vomitting and abdominal pain. Consulted for CHF.    Cardiac Optimization  - CT ABD/Pelvis: Pneumoperitoneum and no ascites, perforated sigmoid colon. Stable splenomegaly. Stable right lung nodule, ground glass infiltrates and loculated right pleural effusion.  - S/p Tia's with abdominal abscess drainage.   - Follow Surgery recs.  - Continue Abx per ID  - Renal following.  No indication for HD at this point  - Know with Permanent Afib, rate-controlled  - Holding home metoprolol 50mg qd due to hypotension.   -continue midodrine , bp 105/59   - Elevated troponin peaked at 100.9, otherwise cardiac enzymes unremarkable, likely demand in the setting of sepsis  - EKG: Sinus tachycardia with PAC's repeat ekg today for irreg HR on exam  - No acute changes on EKG compared to previous.   - Prior TTE (1/18/23): normal LVEF 65%, normal RV size and function, biatrial enlargement, sclerotic aortic valve, mild AI, Moderate MR and TR  - TTE shows EF 55-60%, RVE, PASP 66   - hx of PAF s/p PVI  - Not on AC at home  - Monitor and replete lytes, keep K>4, Mg>2.  Leilani Owens FNP-C  Cardiology NP  [id number] [phone]

## 2023-04-25 NOTE — PROGRESS NOTE ADULT - SUBJECTIVE AND OBJECTIVE BOX
Pt seen and examined at bedside, minimally responsive to ROS this morning but shakes head no when asked about any pain, nausea.    T(C): 36.7 (04-25-23 @ 04:36), Max: 36.7 (04-25-23 @ 04:36)  HR: 76 (04-25-23 @ 07:58) (76 - 106)  BP: 105/59 (04-25-23 @ 04:36) (93/70 - 114/67)  RR: 18 (04-25-23 @ 04:36) (16 - 18)  SpO2: 98% (04-25-23 @ 07:58) (94% - 98%)    PHYSICAL EXAM:  General: no acute distress, NGT in place - connected to TF at 45 cc/ hr  HEENT: normocephalic, anicteric  Pulm: non labored respirations  Cardio: RRR  Abdomen: soft, nontender, nondistended, drain in place - bulb empty; stoma patent w/ loose yellow stool in collection bag  Extremities: no calf tenderness, 1+ edema    I&O's Detail    24 Apr 2023 07:01  -  25 Apr 2023 07:00  --------------------------------------------------------  IN:    dextrose 5%: 715 mL    Enteral Tube Flush: 120 mL    Free Water: 600 mL    Nepro with Carb Steady: 450 mL  Total IN: 1885 mL    OUT:    Bulb (mL): 15 mL    Colostomy (mL): 1000 mL    Voided (mL): 500 mL  Total OUT: 1515 mL    Total NET: 370 mL          LABS:                        7.7    33.79 )-----------( 417      ( 24 Apr 2023 06:36 )             27.5     04-24    146<H>  |  112<H>  |  86<H>  ----------------------------<  115<H>  3.9   |  28  |  4.90<H>    Ca    7.6<L>      24 Apr 2023 06:36  Phos  6.0     04-24  Mg     2.1     04-24    Culture - Blood (collected 22 Apr 2023 12:30)  Source: .Blood Blood-Peripheral  Preliminary Report (23 Apr 2023 16:01):    No growth to date.    Culture - Blood (collected 22 Apr 2023 12:26)  Source: .Blood Blood-Peripheral  Preliminary Report (23 Apr 2023 16:01):    No growth to date.      CAPILLARY BLOOD GLUCOSE      POCT Blood Glucose.: 141 mg/dL (25 Apr 2023 07:38)  POCT Blood Glucose.: 152 mg/dL (24 Apr 2023 21:47)  POCT Blood Glucose.: 124 mg/dL (24 Apr 2023 18:31)  POCT Blood Glucose.: 126 mg/dL (24 Apr 2023 12:16)      RADIOLOGY & ADDITIONAL STUDIES:    MEDICATIONS:  albuterol    90 MICROgram(s) HFA Inhaler 4 Puff(s) Inhalation every 6 hours PRN  albuterol/ipratropium for Nebulization 3 milliLiter(s) Nebulizer every 6 hours  buDESOnide    Inhalation Suspension 0.5 milliGRAM(s) Inhalation every 12 hours  dextrose 5%. 1000 milliLiter(s) IV Continuous <Continuous>  escitalopram 20 milliGRAM(s) Oral daily  heparin   Injectable 5000 Unit(s) SubCutaneous every 8 hours  levothyroxine Injectable 93.75 MICROGram(s) IV Push <User Schedule>  metoclopramide Injectable 5 milliGRAM(s) IV Push every 6 hours PRN  midodrine 10 milliGRAM(s) Oral every 8 hours  octreotide  Injectable 100 MICROGram(s) SubCutaneous every 8 hours  pantoprazole  Injectable 40 milliGRAM(s) IV Push daily  sodium chloride 0.9% lock flush 10 milliLiter(s) IV Push every 1 hour PRN  vancomycin    Solution 125 milliGRAM(s) Oral daily         Pt seen and examined at bedside, minimally responsive to ROS this morning but shakes head no when asked about any pain, nausea.    T(C): 36.7 (04-25-23 @ 04:36), Max: 36.7 (04-25-23 @ 04:36)  HR: 76 (04-25-23 @ 07:58) (76 - 106)  BP: 105/59 (04-25-23 @ 04:36) (93/70 - 114/67)  RR: 18 (04-25-23 @ 04:36) (16 - 18)  SpO2: 98% (04-25-23 @ 07:58) (94% - 98%)    PHYSICAL EXAM:  General: no acute distress, NGT in place - connected to TF at 45 cc/ hr  HEENT: normocephalic, anicteric  Pulm: non labored respirations  Cardio: RRR  Abdomen: soft, nontender, nondistended, drain in place; stoma patent w/ loose yellow stool in collection bag; drsg in place  Extremities: no calf tenderness, 1+ edema    I&O's Detail    24 Apr 2023 07:01  -  25 Apr 2023 07:00  --------------------------------------------------------  IN:    dextrose 5%: 715 mL    Enteral Tube Flush: 120 mL    Free Water: 600 mL    Nepro with Carb Steady: 450 mL  Total IN: 1885 mL    OUT:    Bulb (mL): 15 mL    Colostomy (mL): 1000 mL    Voided (mL): 500 mL  Total OUT: 1515 mL    Total NET: 370 mL          LABS:                        7.7    33.79 )-----------( 417      ( 24 Apr 2023 06:36 )             27.5     04-24    146<H>  |  112<H>  |  86<H>  ----------------------------<  115<H>  3.9   |  28  |  4.90<H>    Ca    7.6<L>      24 Apr 2023 06:36  Phos  6.0     04-24  Mg     2.1     04-24    Culture - Blood (collected 22 Apr 2023 12:30)  Source: .Blood Blood-Peripheral  Preliminary Report (23 Apr 2023 16:01):    No growth to date.    Culture - Blood (collected 22 Apr 2023 12:26)  Source: .Blood Blood-Peripheral  Preliminary Report (23 Apr 2023 16:01):    No growth to date.      CAPILLARY BLOOD GLUCOSE      POCT Blood Glucose.: 141 mg/dL (25 Apr 2023 07:38)  POCT Blood Glucose.: 152 mg/dL (24 Apr 2023 21:47)  POCT Blood Glucose.: 124 mg/dL (24 Apr 2023 18:31)  POCT Blood Glucose.: 126 mg/dL (24 Apr 2023 12:16)      RADIOLOGY & ADDITIONAL STUDIES:    MEDICATIONS:  albuterol    90 MICROgram(s) HFA Inhaler 4 Puff(s) Inhalation every 6 hours PRN  albuterol/ipratropium for Nebulization 3 milliLiter(s) Nebulizer every 6 hours  buDESOnide    Inhalation Suspension 0.5 milliGRAM(s) Inhalation every 12 hours  dextrose 5%. 1000 milliLiter(s) IV Continuous <Continuous>  escitalopram 20 milliGRAM(s) Oral daily  heparin   Injectable 5000 Unit(s) SubCutaneous every 8 hours  levothyroxine Injectable 93.75 MICROGram(s) IV Push <User Schedule>  metoclopramide Injectable 5 milliGRAM(s) IV Push every 6 hours PRN  midodrine 10 milliGRAM(s) Oral every 8 hours  octreotide  Injectable 100 MICROGram(s) SubCutaneous every 8 hours  pantoprazole  Injectable 40 milliGRAM(s) IV Push daily  sodium chloride 0.9% lock flush 10 milliLiter(s) IV Push every 1 hour PRN  vancomycin    Solution 125 milliGRAM(s) Oral daily

## 2023-04-25 NOTE — PROGRESS NOTE ADULT - TIME BILLING
Please call us with any concerns or questions.   We will continue to follow.     Danny Wright MD   Division of Infectious Diseases  [facility]   Cell [phone] between 8am and 6pm   After 6pm and weekends please call ID service at [phone].

## 2023-04-25 NOTE — SWALLOW BEDSIDE ASSESSMENT ADULT - COMMENTS
Chart reviewed order received for swallow eval.  Pt received sleeping in bed, arousable with cues, however, lethargy noted, +NGT, +b/l handmitts, reduced cognition, poor command following, intermittent vocalization noted, pt noted with +wincing however, unable to localize pain or quantify pain level JAN Alberto notified.  Swallow eval completed see below for details.  Pt left as received NAD JAN Bangura & Resident [phone] notified.  Will follow.    Per charting, pt is a "72F PMH A-Fib on rivaroxaban, HFpEF, recent C diff infection (Jan 2023), myelofibrosis with thrombocytosis on anagrelide, hypothyroidism, asthma, and lung nodules s/p resection (reportedly malignant) who presents with perforated sigmoid colon with acute bacterial peritonitis, septic shock, lactic acidosis, and LAYA 2/2 ATN, s/p extended Tia's including descending colon on 4/13. Extubated 4/19."    CT Abdomen & Pelvis 4/20/23: "LOWER CHEST:9 mm nodule right middle lobe, stable. Small loculated right pleural effusion with underlying airspace consolidation, which could reflect chronic atelectasis. Trace left-sided pleural effusion and pleural thickening with pleural-based calcifications.  Extensive streak artifact degrades image quality, limiting evaluation.  The evaluation of the solid organ parenchyma is limited without   intravenous contrast." Chart reviewed order received for swallow eval.  Pt received sleeping in bed, arousable with cues, however, lethargy noted, +NGT, +b/l handmitts, reduced cognition, poor command following, intermittent vocalization noted, pt noted with +wincing however, unable to localize pain or quantify pain level JAN Alberto notified.  Swallow eval completed see below for details.  Pt left as received NAD JAN Bangura, Resident [phone] & Surgical PA notified.  Will follow.    Per charting, pt is a "72F PMH A-Fib on rivaroxaban, HFpEF, recent C diff infection (Jan 2023), myelofibrosis with thrombocytosis on anagrelide, hypothyroidism, asthma, and lung nodules s/p resection (reportedly malignant) who presents with perforated sigmoid colon with acute bacterial peritonitis, septic shock, lactic acidosis, and LAYA 2/2 ATN, s/p extended Tia's including descending colon on 4/13. Extubated 4/19."    CT Abdomen & Pelvis 4/20/23: "LOWER CHEST:9 mm nodule right middle lobe, stable. Small loculated right pleural effusion with underlying airspace consolidation, which could reflect chronic atelectasis. Trace left-sided pleural effusion and pleural thickening with pleural-based calcifications.  Extensive streak artifact degrades image quality, limiting evaluation.  The evaluation of the solid organ parenchyma is limited without   intravenous contrast."

## 2023-04-25 NOTE — PROGRESS NOTE ADULT - TIME BILLING
pt seen and examine today see above plan - pt  s/p Septic shock due to perforated sigmoid colon with polymicrobial peritonitis; improving leukocytosis s/p Tia procedure and abdominal abscess drainage on 4/13   lactic acidosis resolved , Peritoneal cultures poly microbial - Proteus vulgaris group, Escherichia coli, Klebsiella pneumoniae, Morganella morganii ,BCx - 4/13 no growth ,   Meropenem  500  mg ivpb daily   day 5 /5   finished  and  weaned off pressor support  downgrade from icu  Continue midodrine 10 mg q8h  continue  ng tube feed speech evaluation  failed   pt more awake today will reassess  again    Mild hypernatremia, better s/p  iv fluid /pt evaluation son aware plan .

## 2023-04-25 NOTE — PROGRESS NOTE ADULT - SUBJECTIVE AND OBJECTIVE BOX
[facility] -- Adrián Wray Pannella, Patel, Savella [facility]  Office # [phone]      Follow Up:    afib cardiac optimization   Subjective/Observations:     Seen at bedside, lethargic, wrist restraints on nasal cannula   unable to provide any meaningful information  REVIEW OF SYSTEMS: All other review of systems is negative unless indicated above    PAST MEDICAL & SURGICAL HISTORY:  Hypothyroidism      HLD (hyperlipidemia)      Thrombocytosis      Persistent atrial fibrillation      Obesity (BMI 35.0-39.9 without comorbidity)      Asthma      Diabetes mellitus      Ankle injury  on , 5 surgeries.      Cholecystitis          MEDICATIONS  (STANDING):  albuterol/ipratropium for Nebulization 3 milliLiter(s) Nebulizer every 6 hours  buDESOnide    Inhalation Suspension 0.5 milliGRAM(s) Inhalation every 12 hours  dextrose 5%. 1000 milliLiter(s) (65 mL/Hr) IV Continuous <Continuous>  escitalopram 20 milliGRAM(s) Oral daily  heparin   Injectable 5000 Unit(s) SubCutaneous every 8 hours  levothyroxine Injectable 93.75 MICROGram(s) IV Push <User Schedule>  midodrine 10 milliGRAM(s) Oral every 8 hours  octreotide  Injectable 100 MICROGram(s) SubCutaneous every 8 hours  pantoprazole  Injectable 40 milliGRAM(s) IV Push daily  vancomycin    Solution 125 milliGRAM(s) Oral daily    MEDICATIONS  (PRN):  albuterol    90 MICROgram(s) HFA Inhaler 4 Puff(s) Inhalation every 6 hours PRN Shortness of Breath and/or Wheezing  metoclopramide Injectable 5 milliGRAM(s) IV Push every 6 hours PRN nausea and/or vomiting  sodium chloride 0.9% lock flush 10 milliLiter(s) IV Push every 1 hour PRN Pre/post blood products, medications, blood draw, and to maintain line patency      Allergies    No Known Allergies    Intolerances        Vital Signs Last 24 Hrs  T(C): 36.7 (2023 04:36), Max: 36.7 (2023 04:36)  T(F): 98 (2023 04:36), Max: 98 (2023 04:36)  HR: 76 (2023 07:58) (76 - 106)  BP: 105/59 (2023 04:36) (93/70 - 114/67)  BP(mean): --  RR: 18 (2023 04:36) (16 - 18)  SpO2: 98% (2023 07:58) (94% - 98%)    Parameters below as of 2023 07:58  Patient On (Oxygen Delivery Method): nasal cannula,2LPM        I&O's Summary    2023 07:01  -  2023 07:00  --------------------------------------------------------  IN: 1885 mL / OUT: 1515 mL / NET: 370 mL          PHYSICAL EXAM:  TELE: not on tele   Constitutional: NAD, awake and alert, well-developed  HEENT: Moist Mucous Membranes, Anicteric  Pulmonary: Non-labored, breath sounds are dim   Cardiovascular: Regular, S1 and S2 nl, murmur   Gastrointestinal: Bowel Sounds present, soft, nontender.   Lymph: No lymphadenopathy. No peripheral edema.  Skin: No visible rashes or ulcers.  Psych:  Mood & affect appropriate    LABS: All Labs Reviewed:                        7.7    33.79 )-----------( 417      ( 2023 06:36 )             27.5                         8.1    41.64 )-----------( 514      ( 2023 07:25 )             28.2     2023 06:36    146    |  112    |  86     ----------------------------<  115    3.9     |  28     |  4.90   2023 07:25    143    |  110    |  87     ----------------------------<  163    3.7     |  27     |  5.20     Ca    7.6        2023 06:36  Ca    7.7        2023 07:25  Phos  6.0       2023 06:36  Phos  5.6       2023 07:25  Mg     2.1       2023 06:36  Mg     2.2       2023 07:25    TPro  5.2    /  Alb  2.1    /  TBili  0.4    /  DBili  x      /  AST  13     /  ALT  12     /  AlkPhos  54     2023 07:25             EC Lead ECG:   Ventricular Rate 82 BPM    Atrial Rate 82 BPM    P-R Interval 160 ms    QRS Duration 84 ms    Q-T Interval 412 ms    QTC Calculation(Bazett) 481 ms    P Axis 68 degrees    R Axis 13 degrees    T Axis 4 degrees    Diagnosis Line Normal sinus rhythm  Right atrial enlargement  Borderline ECG  When compared with ECG of 2023 09:43,  aberrant conduction is no longer present  Confirmed by PAULINE LARSEN ([id number]) on 2023 4:48:07 PM (23 @ 13:13)      ACC: [id number] EXAM:  ECHO TTE WO CON COMP W DOPP   ORDERED BY: RAFY WILSON     PROCEDURE DATE:  2023          INTERPRETATION:  INDICATION: Heart failure  Sonographer KL    Blood Pressure unavailable    Height 167.6 cm     Weight 60 kgBSA   1.8 sq m    Dimensions:  LA 4.0       Normal Values: 2.0 - 4.0 cm  Ao 3.4        Normal Values: 2.0 - 3.8 cm  SEPTUM 0.9       Normal Values: 0.6 - 1.2 cm  PWT 0.8       Normal Values: 0.6 - 1.1 cm  LVIDd 4.2         Normal Values: 3.0 - 5.6 cm  LVIDs 2.9         Normal Values: 1.8 - 4.0 cm      OBSERVATIONS:  Mitral Valve: Mild to moderate MR.  Aortic Valve/Aorta: Sclerotic trileaflet aortic valve with grossly normal   opening. Trace AI  Tricuspid Valve: Mild to moderate TR.  Pulmonic Valve: Mild PI  Left Atrium: Enlarged  Right Atrium: Not well-visualized  Left Ventricle: normal LV size and systolic function, estimated LVEF of   55-60%.  Right Ventricle: The right ventricle is enlarged with grossly normal   function  Pericardium: no significant pericardial effusion.  Pulmonary/RV Pressure: estimated PA systolic pressure of 66mmHg assuming   an RA pressure of 10 mmHg.  IVC measures 2.0 cm and is non collapsing  LV diastolic dysfunction is present  Bilateral pleural effusions        IMPRESSION:  Normal left ventricular internal dimensions and systolic function,   estimated LVEF of 55-60%.  The right ventricle is enlarged with grossly normal function  Left atrial enlargement  Sclerotic trileaflet aortic valve, trace AI.  Mild to moderate MR and TR.  Estimated PA systolic pressure of 66mmHg assuming an RA pressure of 10   mmHg.  IVC measures 2.0 cm and is non collapsing    --- End of Report ---            NEYMAR ROWLAND MD; Attending Cardiologist  This document has been electronically signed. Apr 15 2023 10:52AM      Radiology:

## 2023-04-25 NOTE — PROGRESS NOTE ADULT - SUBJECTIVE AND OBJECTIVE BOX
Patient is a 72y old  Female who presents with a chief complaint of intra-abdominal perforation (25 Apr 2023 12:19)    INTERVAL HPI/OVERNIGHT EVENTS: Patient seen and examined at bedside. No overnight events occurred. Patient has no complaints at this time. Denies fevers, chills, headache, lightheadedness, chest pain, dyspnea, abdominal pain, n/v/d/c.    MEDICATIONS  (STANDING):  albuterol/ipratropium for Nebulization 3 milliLiter(s) Nebulizer every 6 hours  buDESOnide    Inhalation Suspension 0.5 milliGRAM(s) Inhalation every 12 hours  dextrose 5%. 1000 milliLiter(s) (65 mL/Hr) IV Continuous <Continuous>  escitalopram 20 milliGRAM(s) Oral daily  heparin   Injectable 5000 Unit(s) SubCutaneous every 8 hours  levothyroxine Injectable 93.75 MICROGram(s) IV Push <User Schedule>  midodrine 10 milliGRAM(s) Oral every 8 hours  octreotide  Injectable 100 MICROGram(s) SubCutaneous every 8 hours  pantoprazole  Injectable 40 milliGRAM(s) IV Push daily  vancomycin    Solution 125 milliGRAM(s) Oral daily    MEDICATIONS  (PRN):  albuterol    90 MICROgram(s) HFA Inhaler 4 Puff(s) Inhalation every 6 hours PRN Shortness of Breath and/or Wheezing  metoclopramide Injectable 5 milliGRAM(s) IV Push every 6 hours PRN nausea and/or vomiting  sodium chloride 0.9% lock flush 10 milliLiter(s) IV Push every 1 hour PRN Pre/post blood products, medications, blood draw, and to maintain line patency    Allergies    No Known Allergies    Intolerances    REVIEW OF SYSTEMS: Limited 2/2 lethargy    Vital Signs Last 24 Hrs  T(C): 36.6 (25 Apr 2023 11:10), Max: 36.7 (25 Apr 2023 04:36)  T(F): 97.8 (25 Apr 2023 11:10), Max: 98 (25 Apr 2023 04:36)  HR: 80 (25 Apr 2023 11:10) (76 - 106)  BP: 104/62 (25 Apr 2023 11:10) (93/70 - 105/59)  BP(mean): --  RR: 18 (25 Apr 2023 11:10) (18 - 18)  SpO2: 99% (25 Apr 2023 11:10) (94% - 99%)    Parameters below as of 25 Apr 2023 11:10  Patient On (Oxygen Delivery Method): room air    PHYSICAL EXAM:  GENERAL: NAD, weak appearing  HEENT:  anicteric, moist mucous membranes  CHEST/LUNG:  CTA b/l, no rales, wheezes, or rhonchi  HEART: irregular rhythm , S1, S2  ABDOMEN: ileostomy present with drainage, BS+, soft, nontender, nondistended  EXTREMITIES: no edema, cyanosis, or calf tenderness  NERVOUS SYSTEM: lethargic, able to answer questions with nodding, unable to vocalize  gu intact    LABS:                        7.6    37.21 )-----------( 446      ( 25 Apr 2023 07:10 )             26.8     CBC Full  -  ( 25 Apr 2023 07:10 )  WBC Count : 37.21 K/uL  Hemoglobin : 7.6 g/dL  Hematocrit : 26.8 %  Platelet Count - Automated : 446 K/uL  Mean Cell Volume : 96.8 fl  Mean Cell Hemoglobin : 27.4 pg  Mean Cell Hemoglobin Concentration : 28.4 gm/dL  Auto Neutrophil # : 33.06 K/uL  Auto Lymphocyte # : 0.82 K/uL  Auto Monocyte # : 1.35 K/uL  Auto Eosinophil # : 0.47 K/uL  Auto Basophil # : 0.21 K/uL  Auto Neutrophil % : 88.8 %  Auto Lymphocyte % : 2.2 %  Auto Monocyte % : 3.6 %  Auto Eosinophil % : 1.3 %  Auto Basophil % : 0.6 %    25 Apr 2023 07:10    144    |  110    |  87     ----------------------------<  116    3.6     |  28     |  4.60     Ca    7.6        25 Apr 2023 07:10  Phos  5.5       25 Apr 2023 07:10  Mg     2.0       25 Apr 2023 07:10    CAPILLARY BLOOD GLUCOSE  POCT Blood Glucose.: 159 mg/dL (25 Apr 2023 11:44)  POCT Blood Glucose.: 141 mg/dL (25 Apr 2023 07:38)  POCT Blood Glucose.: 152 mg/dL (24 Apr 2023 21:47)  POCT Blood Glucose.: 124 mg/dL (24 Apr 2023 18:31)    Culture - Blood (collected 04-22-23 @ 12:30)  Source: .Blood Blood-Peripheral  Preliminary Report (04-23-23 @ 16:01):    No growth to date.    Culture - Blood (collected 04-22-23 @ 12:26)  Source: .Blood Blood-Peripheral  Preliminary Report (04-23-23 @ 16:01):    No growth to date.    RADIOLOGY & ADDITIONAL TESTS:    < from: Xray Chest 1 View- PORTABLE-Urgent (Xray Chest 1 View- PORTABLE-Urgent .) (04.24.23 @ 16:23) >  PROCEDURE DATE:  04/24/2023      INTERPRETATION:  TIME OF EXAM: April 24, 2023 at 1:15 PM.    CLINICAL INFORMATION: Status post extended Ro's for perforated   sigmoid. NG tube placement.    COMPARISON:  April 20, 2023.    TECHNIQUE:   AP Portable chest x-ray. The head and chin obscures the   superior mediastinum and parts of the apices.    INTERPRETATION:    Heart size and the mediastinum cannot be accurately evaluated on this   projection. Calcified thoracic aorta.  Distal enteric tube looped on itself at the level of the GE junction and   extending craniad with the tip in the distal esophagus.  Left lung postsurgical change with chain sutures is again noted.  Small patchy inferior right upper lobe opacity, not significantly changed.  There is new linear atelectasis in the left midlung.  Bibasilar opacities with costophrenic angle blunting, possibly pleural   effusions with associated passive atelectasis are not significantly   changed.  Previous small pneumothorax at the lateral left base is decreased in   size. There is osteoarthritic degenerative changeof the spine.    AP portable chest x-ray from April 24, 2023 at 3:33 PM and 3:34 PM:    CLINICAL INFORMATION: NG tube for feeding, postmanipulation.    INTERPRETATION:    Chin and head again obscures part of the superior mediastinum and apices.  Enteric tube repositioned with tip now likely in the stomach. Right upper   quadrant abdominal surgical clips seen.  New bandlike atelectasis in the left mid to lower lung periphery.  Other findings, not significantly changed.      IMPRESSION:  Enteric tube tip is in the stomach on the most recent image.    Left lung postsurgical change. Minimal lateral left basilar pneumothorax,   unchanged from earlier on April 24, 2023, the most recent image, and   decreased from April 20, 2023.    Small patchy inferior right upper lobe opacity, of indeterminate nature,   not significantly changed.    Continued left midlung linear atelectasis.    Bibasilar opacities with costophrenic angle blunting, possibly pleural   effusions with associated passive atelectasis, not significantly changed.    --- End of Report ---    < end of copied text >    Personally reviewed.     Consultant(s) Notes Reviewed:  [x] YES  [ ] NO     Patient is a 72y old  Female who presents with a chief complaint of intra-abdominal perforation (25 Apr 2023 12:19)    INTERVAL HPI/OVERNIGHT EVENTS: Patient seen and examined at bedside. No overnight events occurred. Patient has no complaints at this time. Denies fevers, chills, headache, lightheadedness, chest pain, dyspnea, abdominal pain.        REVIEW OF SYSTEMS: Limited 2/2 but more arousal's     Vital Signs Last 24 Hrs  T(C): 36.6 (25 Apr 2023 11:10), Max: 36.7 (25 Apr 2023 04:36)  T(F): 97.8 (25 Apr 2023 11:10), Max: 98 (25 Apr 2023 04:36)  HR: 80 (25 Apr 2023 11:10) (76 - 106)  BP: 104/62 (25 Apr 2023 11:10) (93/70 - 105/59)  BP(mean): --  RR: 18 (25 Apr 2023 11:10) (18 - 18)  SpO2: 99% (25 Apr 2023 11:10) (94% - 99%)    Parameters below as of 25 Apr 2023 11:10  Patient On (Oxygen Delivery Method): room air    PHYSICAL EXAM:  GENERAL: NAD, weak appearing  HEENT:  anicteric, moist mucous membranes  CHEST/LUNG:  CTA b/l, no rales, wheezes, or rhonchi  HEART: irregular rhythm , S1, S2  ABDOMEN: ileostomy present with drainage, BS+, soft, nontender, nondistended  EXTREMITIES: no edema, cyanosis, or calf tenderness  NERVOUS SYSTEM: lethargic, able to answer questions with nodding, unable to vocalize  gu intact    MEDICATIONS  (STANDING):  albuterol/ipratropium for Nebulization 3 milliLiter(s) Nebulizer every 6 hours  buDESOnide    Inhalation Suspension 0.5 milliGRAM(s) Inhalation every 12 hours  dextrose 5%. 1000 milliLiter(s) (65 mL/Hr) IV Continuous <Continuous>  escitalopram 20 milliGRAM(s) Oral daily  heparin   Injectable 5000 Unit(s) SubCutaneous every 8 hours  levothyroxine Injectable 93.75 MICROGram(s) IV Push <User Schedule>  midodrine 10 milliGRAM(s) Oral every 8 hours  octreotide  Injectable 100 MICROGram(s) SubCutaneous every 8 hours  pantoprazole  Injectable 40 milliGRAM(s) IV Push daily  vancomycin    Solution 125 milliGRAM(s) Oral daily    MEDICATIONS  (PRN):  albuterol    90 MICROgram(s) HFA Inhaler 4 Puff(s) Inhalation every 6 hours PRN Shortness of Breath and/or Wheezing  metoclopramide Injectable 5 milliGRAM(s) IV Push every 6 hours PRN nausea and/or vomiting  sodium chloride 0.9% lock flush 10 milliLiter(s) IV Push every 1 hour PRN Pre/post blood products, medications, blood draw, and to maintain line patency          LABS:                        7.6    37.21 )-----------( 446      ( 25 Apr 2023 07:10 )             26.8     CBC Full  -  ( 25 Apr 2023 07:10 )  WBC Count : 37.21 K/uL  Hemoglobin : 7.6 g/dL  Hematocrit : 26.8 %  Platelet Count - Automated : 446 K/uL  Mean Cell Volume : 96.8 fl  Mean Cell Hemoglobin : 27.4 pg  Mean Cell Hemoglobin Concentration : 28.4 gm/dL  Auto Neutrophil # : 33.06 K/uL  Auto Lymphocyte # : 0.82 K/uL  Auto Monocyte # : 1.35 K/uL  Auto Eosinophil # : 0.47 K/uL  Auto Basophil # : 0.21 K/uL  Auto Neutrophil % : 88.8 %  Auto Lymphocyte % : 2.2 %  Auto Monocyte % : 3.6 %  Auto Eosinophil % : 1.3 %  Auto Basophil % : 0.6 %    25 Apr 2023 07:10    144    |  110    |  87     ----------------------------<  116    3.6     |  28     |  4.60     Ca    7.6        25 Apr 2023 07:10  Phos  5.5       25 Apr 2023 07:10  Mg     2.0       25 Apr 2023 07:10    CAPILLARY BLOOD GLUCOSE  POCT Blood Glucose.: 159 mg/dL (25 Apr 2023 11:44)  POCT Blood Glucose.: 141 mg/dL (25 Apr 2023 07:38)  POCT Blood Glucose.: 152 mg/dL (24 Apr 2023 21:47)  POCT Blood Glucose.: 124 mg/dL (24 Apr 2023 18:31)    Culture - Blood (collected 04-22-23 @ 12:30)  Source: .Blood Blood-Peripheral  Preliminary Report (04-23-23 @ 16:01):    No growth to date.    Culture - Blood (collected 04-22-23 @ 12:26)  Source: .Blood Blood-Peripheral  Preliminary Report (04-23-23 @ 16:01):    No growth to date.    RADIOLOGY & ADDITIONAL TESTS:    < from: Xray Chest 1 View- PORTABLE-Urgent (Xray Chest 1 View- PORTABLE-Urgent .) (04.24.23 @ 16:23) >  PROCEDURE DATE:  04/24/2023      INTERPRETATION:  TIME OF EXAM: April 24, 2023 at 1:15 PM.    CLINICAL INFORMATION: Status post extended Ro's for perforated   sigmoid. NG tube placement.    COMPARISON:  April 20, 2023.    TECHNIQUE:   AP Portable chest x-ray. The head and chin obscures the   superior mediastinum and parts of the apices.    INTERPRETATION:    Heart size and the mediastinum cannot be accurately evaluated on this   projection. Calcified thoracic aorta.  Distal enteric tube looped on itself at the level of the GE junction and   extending craniad with the tip in the distal esophagus.  Left lung postsurgical change with chain sutures is again noted.  Small patchy inferior right upper lobe opacity, not significantly changed.  There is new linear atelectasis in the left midlung.  Bibasilar opacities with costophrenic angle blunting, possibly pleural   effusions with associated passive atelectasis are not significantly   changed.  Previous small pneumothorax at the lateral left base is decreased in   size. There is osteoarthritic degenerative changeof the spine.    AP portable chest x-ray from April 24, 2023 at 3:33 PM and 3:34 PM:    CLINICAL INFORMATION: NG tube for feeding, postmanipulation.    INTERPRETATION:    Chin and head again obscures part of the superior mediastinum and apices.  Enteric tube repositioned with tip now likely in the stomach. Right upper   quadrant abdominal surgical clips seen.  New bandlike atelectasis in the left mid to lower lung periphery.  Other findings, not significantly changed.      IMPRESSION:  Enteric tube tip is in the stomach on the most recent image.    Left lung postsurgical change. Minimal lateral left basilar pneumothorax,   unchanged from earlier on April 24, 2023, the most recent image, and   decreased from April 20, 2023.    Small patchy inferior right upper lobe opacity, of indeterminate nature,   not significantly changed.    Continued left midlung linear atelectasis.    Bibasilar opacities with costophrenic angle blunting, possibly pleural   effusions with associated passive atelectasis, not significantly changed.    --- End of Report ---    < end of copied text >    Personally reviewed.     Consultant(s) Notes Reviewed:  [x] YES  [ ] NO

## 2023-04-25 NOTE — PROGRESS NOTE ADULT - ASSESSMENT
73 y/o female POD#12 s/p extended Tia's for perforated sigmoid diverticulitis. Failed S&S - NGt replaced 4/24    PLAN:  - Feeds via NGT, currently at goal of 45 cc/hr  - Continue Vanco per ID  - Monitor & record ostomy output  - OOB  - Packing changes QD  - Trending labs  - To be discussed w/ Dr. Goldberg

## 2023-04-25 NOTE — PROGRESS NOTE ADULT - NUTRITIONAL ASSESSMENT
This patient has been assessed with a concern for Malnutrition and has been determined to have a diagnosis/diagnoses of Severe protein-calorie malnutrition.    This patient is being managed with:   Diet NPO with Tube Feed-  Tube Feeding Modality: Nasogastric  Nepro with Carb Steady  Total Volume for 24 Hours (mL): 1000  Continuous  Starting Tube Feed Rate {mL per Hour}: 40  Increase Tube Feed Rate by (mL): 10     Every 6 hours  Until Goal Tube Feed Rate (mL per Hour): 50  Tube Feed Duration (in Hours): 20  Tube Feed Start Time: 06:00  Entered: Apr 25 2023 10:59AM    Diet NPO with Tube Feed-  Tube Feeding Modality: Nasogastric  Nepro with Carb Steady  Total Volume for 24 Hours (mL): 1080  Continuous  Starting Tube Feed Rate {mL per Hour}: 20  Increase Tube Feed Rate by (mL): 10     Every 6 hours  Until Goal Tube Feed Rate (mL per Hour): 45  Tube Feed Duration (in Hours): 24  Tube Feed Start Time: 15:35  Entered: Apr 21 2023  3:32PM    Diet NPO with Tube Feed-  Tube Feeding Modality: Nasogastric  Vital 1.5 Adalberto  Total Volume for 24 Hours (mL): 240  Continuous  Until Goal Tube Feed Rate (mL per Hour): 10  Tube Feed Duration (in Hours): 24  Tube Feed Start Time: 12:00  Entered: Apr 19 2023 12:13PM    The following pending diet order is being considered for treatment of Severe protein-calorie malnutrition:null

## 2023-04-26 NOTE — PROGRESS NOTE ADULT - PROBLEM SELECTOR PLAN 3
RESOLVED    Acute respiratory failure with hypoxia extubated 4/19   Will encourage incentive spirometry  chest PT as tolerated  Ct albuterol-ipratropium nebs q6h + budesonide 0.5 mg nebs q12h (on Trelegy Ellipta inhaler at home) RESOLVED  Acute respiratory failure with hypoxia extubated 4/19   Will encourage incentive spirometry  chest PT as tolerated  Ct albuterol-ipratropium nebs q6h + budesonide 0.5 mg nebs q12h (on Trelegy Ellipta inhaler at home)

## 2023-04-26 NOTE — PROGRESS NOTE ADULT - SUBJECTIVE AND OBJECTIVE BOX
[facility] -- Adrián Wary Pannella, Patel, Savella, Goodger  Office # [phone]    Follow Up:  Hx Afib s/p ablation, Cardiac Optimization    Subjective/Observations: Awake but lethargic.  Lethargic and not following commands.  Not in any form of distress    REVIEW OF SYSTEMS: All other review of systems is negative unless indicated above  PAST MEDICAL & SURGICAL HISTORY:  Hypothyroidism  HLD (hyperlipidemia)  Thrombocytosis  Persistent atrial fibrillation  Obesity (BMI 35.0-39.9 without comorbidity)  Asthma  Diabetes mellitus  Ankle injury  on 2004, 5 surgeries.  Cholecystitis    MEDICATIONS  (STANDING):  albuterol/ipratropium for Nebulization 3 milliLiter(s) Nebulizer every 6 hours  buDESOnide    Inhalation Suspension 0.5 milliGRAM(s) Inhalation every 12 hours  dextrose 5%. 1000 milliLiter(s) (65 mL/Hr) IV Continuous <Continuous>  escitalopram 20 milliGRAM(s) Oral daily  heparin   Injectable 5000 Unit(s) SubCutaneous every 8 hours  levothyroxine Injectable 93.75 MICROGram(s) IV Push <User Schedule>  midodrine 10 milliGRAM(s) Oral every 8 hours  octreotide  Injectable 100 MICROGram(s) SubCutaneous every 8 hours  pantoprazole  Injectable 40 milliGRAM(s) IV Push daily  vancomycin    Solution 125 milliGRAM(s) Oral daily    MEDICATIONS  (PRN):  albuterol    90 MICROgram(s) HFA Inhaler 4 Puff(s) Inhalation every 6 hours PRN Shortness of Breath and/or Wheezing  metoclopramide Injectable 5 milliGRAM(s) IV Push every 6 hours PRN nausea and/or vomiting  sodium chloride 0.9% lock flush 10 milliLiter(s) IV Push every 1 hour PRN Pre/post blood products, medications, blood draw, and to maintain line patency    Allergies    No Known Allergies    Intolerances    Vital Signs Last 24 Hrs  T(C): 36.6 (26 Apr 2023 11:00), Max: 37.1 (26 Apr 2023 05:17)  T(F): 97.8 (26 Apr 2023 11:00), Max: 98.7 (26 Apr 2023 05:17)  HR: 74 (26 Apr 2023 11:00) (73 - 78)  BP: 121/57 (26 Apr 2023 11:00) (102/58 - 121/57)  BP(mean): --  RR: 18 (26 Apr 2023 11:00) (18 - 18)  SpO2: 99% (26 Apr 2023 11:00) (93% - 99%)    Parameters below as of 26 Apr 2023 11:00  Patient On (Oxygen Delivery Method): room air    I&O's Summary    25 Apr 2023 07:01  -  26 Apr 2023 07:00  --------------------------------------------------------  IN: 0 mL / OUT: 400 mL / NET: -400 mL     PHYSICAL EXAM:  TELE: Not on tele  Constitutional: NAD, awake and alert, well-developed  HEENT: Dry Mucous Membranes, Anicteric  Pulmonary: Non-labored, breath sounds are clear but diminished bilaterally, No wheezing, rales or rhonchi  Cardiovascular: Regular, S1 and S2, +murmurs, no rubs, gallops or clicks  Gastrointestinal: Bowel Sounds present, soft, nontender.   Midabdomianl incision; +colostomy  Lymph: Generalized edema. No lymphadenopathy.  Skin: No visible rashes or ulcers.  Psych:  Mood & affect: Flat  LABS: All Labs Reviewed:                        7.6    35.31 )-----------( 481      ( 26 Apr 2023 07:15 )             26.5                         7.6    37.21 )-----------( 446      ( 25 Apr 2023 07:10 )             26.8                         7.7    33.79 )-----------( 417      ( 24 Apr 2023 06:36 )             27.5     26 Apr 2023 07:15    144    |  111    |  79     ----------------------------<  134    3.6     |  29     |  4.30   25 Apr 2023 07:10    144    |  110    |  87     ----------------------------<  116    3.6     |  28     |  4.60   24 Apr 2023 06:36    146    |  112    |  86     ----------------------------<  115    3.9     |  28     |  4.90     Ca    7.9        26 Apr 2023 07:15  Ca    7.6        25 Apr 2023 07:10  Ca    7.6        24 Apr 2023 06:36  Phos  5.6       26 Apr 2023 07:15  Phos  5.5       25 Apr 2023 07:10  Phos  6.0       24 Apr 2023 06:36  Mg     2.1       26 Apr 2023 07:15  Mg     2.0       25 Apr 2023 07:10  Mg     2.1       24 Apr 2023 06:36    ACC: [id number] EXAM:  ECHO TTE WO CON COMP W DOPP   ORDERED BY: RAFY WILSON     PROCEDURE DATE:  04/14/2023      INTERPRETATION:  INDICATION: Heart failure  Sonographer KL    Blood Pressure unavailable    Height 167.6 cm     Weight 60 kgBSA   1.8 sq m    Dimensions:  LA 4.0       Normal Values: 2.0 - 4.0 cm  Ao 3.4        Normal Values: 2.0 - 3.8 cm  SEPTUM 0.9       Normal Values: 0.6 - 1.2 cm  PWT 0.8       Normal Values: 0.6 - 1.1 cm  LVIDd 4.2         Normal Values: 3.0 - 5.6 cm  LVIDs 2.9         Normal Values: 1.8 - 4.0 cm    OBSERVATIONS:  Mitral Valve: Mild to moderate MR.  Aortic Valve/Aorta: Sclerotic trileaflet aortic valve with grossly normal   opening. Trace AI  Tricuspid Valve: Mild to moderate TR.  Pulmonic Valve: Mild PI  Left Atrium: Enlarged  Right Atrium: Not well-visualizedLeft Ventricle: normal LV size and systolic function, estimated LVEF of   55-60%.  Right Ventricle: The right ventricle is enlarged with grossly normal   function  Pericardium: no significant pericardial effusion.  Pulmonary/RV Pressure: estimated PA systolic pressure of 66mmHg assuming   an RA pressure of 10 mmHg.  IVC measures 2.0 cm and is non collapsing  LV diastolic dysfunction is present  Bilateral pleural effusions    IMPRESSION:  Normal left ventricular internal dimensions and systolic function,   estimated LVEF of 55-60%.  The right ventricle is enlarged with grossly normal function  Left atrial enlargement  Sclerotic trileaflet aortic valve, trace AI.  Mild to moderate MR and TR.  Estimated PA systolic pressure of 66mmHg assuming an RA pressure of 10   mmHg.  IVC measures 2.0 cm and is non collapsing    --- End of Report ---    NEYMAR ROWLAND MD; Attending Cardiologist  This document has been electronically signed. Apr 15 2023 10:52AM    ACC: [id number] EXAM:  XR CHEST PORTABLE URGENT 1V   ORDERED BY: HOA SLOAN     ACC: [id number] EXAM:  XR CHEST PORTABLE URGENT 1V   ORDERED BY: HOA SLOAN     PROCEDURE DATE:  04/24/2023        INTERPRETATION:  TIME OF EXAM: April 24, 2023 at 1:15 PM.    CLINICAL INFORMATION: Status post extended Ro's for perforated   sigmoid. NG tube placement.    COMPARISON:  April 20, 2023.    TECHNIQUE:   AP Portable chest x-ray. The head and chin obscures the   superior mediastinum and parts of the apices.    INTERPRETATION:    Heart size and the mediastinum cannot be accurately evaluated on this   projection. Calcified thoracic aorta.  Distal enteric tube looped on itself at the level of the GE junction and   extending craniad with the tip in the distal esophagus.  Left lung postsurgical change with chain sutures is again noted.  Small patchy inferior right upper lobe opacity, not significantly changed.  There is new linear atelectasis in the left midlung.  Bibasilar opacities with costophrenic angle blunting, possibly pleural   effusions with associated passive atelectasis are not significantly   changed.  Previous small pneumothorax at the lateral left base is decreased in   size. There is osteoarthritic degenerative changeof the spine.    AP portable chest x-ray from April 24, 2023 at 3:33 PM and 3:34 PM:    CLINICAL INFORMATION: NG tube for feeding, postmanipulation.    INTERPRETATION:    Chin and head again obscures part of the superior mediastinum and apices.  Enteric tube repositioned with tip now likely in the stomach. Right upper   quadrant abdominal surgical clips seen.  New bandlike atelectasis in the left mid to lower lung periphery.  Other findings, not significantly changed.    IMPRESSION:  Enteric tube tip is in the stomach on the most recent image.    Left lung postsurgical change. Minimal lateral left basilar pneumothorax,   unchanged from earlier on April 24, 2023, the most recent image, and   decreased from April 20, 2023.    Small patchy inferior right upper lobe opacity, of indeterminate nature,   not significantly changed.    Continued left midlung linear atelectasis.    Bibasilar opacities with costophrenic angle blunting, possibly pleural   effusions with associated passive atelectasis, not significantly changed.    --- End of Report ---    VIOLETTE BARRIGA MD; Attending Radiologist  This document has been electronically signed. Apr 24 2023  5:21PM    Ventricular Rate 82 BPM    Atrial Rate 82 BPM    P-R Interval 160 ms    QRS Duration 84 ms    Q-T Interval 412 ms    QTC Calculation(Bazett) 481 ms    P Axis 68 degrees    R Axis 13 degrees    T Axis 4 degrees    Diagnosis Line Normal sinus rhythm  Right atrial enlargement  Borderline ECG  When compared with ECG of 22-APR-2023 09:43,  aberrant conduction is no longer present  Confirmed by PAULINE LARSEN ([id number]) on 4/24/2023 4:48:07 PM

## 2023-04-26 NOTE — PROGRESS NOTE ADULT - PROBLEM SELECTOR PLAN 5
DVT prophylaxis- continue veno dynes  history myeloproliferative on agrylin hold until pt start taking po

## 2023-04-26 NOTE — PROGRESS NOTE ADULT - SUBJECTIVE AND OBJECTIVE BOX
Subjective: lethargic. NGT maintained to suction.       MEDICATIONS  (STANDING):  albuterol/ipratropium for Nebulization 3 milliLiter(s) Nebulizer every 6 hours  buDESOnide    Inhalation Suspension 0.5 milliGRAM(s) Inhalation every 12 hours  dextrose 5%. 1000 milliLiter(s) (65 mL/Hr) IV Continuous <Continuous>  escitalopram 20 milliGRAM(s) Oral daily  heparin   Injectable 5000 Unit(s) SubCutaneous every 8 hours  levothyroxine Injectable 93.75 MICROGram(s) IV Push <User Schedule>  midodrine 10 milliGRAM(s) Oral every 8 hours  octreotide  Injectable 100 MICROGram(s) SubCutaneous every 8 hours  pantoprazole  Injectable 40 milliGRAM(s) IV Push daily  vancomycin    Solution 125 milliGRAM(s) Oral daily    MEDICATIONS  (PRN):  albuterol    90 MICROgram(s) HFA Inhaler 4 Puff(s) Inhalation every 6 hours PRN Shortness of Breath and/or Wheezing  metoclopramide Injectable 5 milliGRAM(s) IV Push every 6 hours PRN nausea and/or vomiting  sodium chloride 0.9% lock flush 10 milliLiter(s) IV Push every 1 hour PRN Pre/post blood products, medications, blood draw, and to maintain line patency          T(C): 36.6 (04-26-23 @ 11:00), Max: 37.1 (04-26-23 @ 05:17)  HR: 74 (04-26-23 @ 11:00) (73 - 78)  BP: 121/57 (04-26-23 @ 11:00) (102/58 - 121/57)  RR: 18 (04-26-23 @ 11:00) (18 - 18)  SpO2: 99% (04-26-23 @ 11:00) (93% - 99%)  Wt(kg): --        I&O's Detail    25 Apr 2023 07:01  -  26 Apr 2023 07:00  --------------------------------------------------------  IN:  Total IN: 0 mL    OUT:    Colostomy (mL): 400 mL  Total OUT: 400 mL    Total NET: -400 mL               PHYSICAL EXAM:    CHEST/LUNG: dec BS  HEART: S1S2  ABDOMEN: distended, surgical dressing  EXTREMITIES:  some edema      LABS:  CBC Full  -  ( 26 Apr 2023 07:15 )  WBC Count : 35.31 K/uL  RBC Count : 2.72 M/uL  Hemoglobin : 7.6 g/dL  Hematocrit : 26.5 %  Platelet Count - Automated : 481 K/uL  Mean Cell Volume : 97.4 fl  Mean Cell Hemoglobin : 27.9 pg  Mean Cell Hemoglobin Concentration : 28.7 gm/dL  Auto Neutrophil # : 30.53 K/uL  Auto Lymphocyte # : 1.10 K/uL  Auto Monocyte # : 1.35 K/uL  Auto Eosinophil # : 0.56 K/uL  Auto Basophil # : 0.26 K/uL  Auto Neutrophil % : 86.5 %  Auto Lymphocyte % : 3.1 %  Auto Monocyte % : 3.8 %  Auto Eosinophil % : 1.6 %  Auto Basophil % : 0.7 %    04-26    144  |  111<H>  |  79<H>  ----------------------------<  134<H>  3.6   |  29  |  4.30<H>    Ca    7.9<L>      26 Apr 2023 07:15  Phos  5.6     04-26  Mg     2.1     04-26        Impression:  1.LAYA on CKD: Septic/hemodynamic ATN. Cr is slowly improving  2.Sigmoid perforation, s/p surgical repair  3.Anemia  4.Shock, Sepsis. Better     Recommendations:   She remains without dialytic indications.   Will cont to monitor closely with you  Adjust all meds to Cr Cl 10-15cc/min

## 2023-04-26 NOTE — PROGRESS NOTE ADULT - PROBLEM SELECTOR PLAN 1
Septic shock due to perforated sigmoid colon with polymicrobial peritonitis; overall improving leukocytosis   s/p Tia procedure and abdominal abscess drainage on 4/13  s/p lactic acidosis and pressor support  Continue midodrine 10 mg q8h.   BCx - 4/13 no growth   Peritoneal cultures poly microbial - Proteus vulgaris group, Escherichia coli, Klebsiella pneumoniae, Morganella morganii   Peritoneal fluid with gram variable rods and PMNs.  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  Prognosis remains guarded Septic shock due to perforated sigmoid colon with polymicrobial peritonitis; overall improving leukocytosis   s/p Tia procedure and abdominal abscess drainage on 4/13  s/p lactic acidosis and pressor support  Continue midodrine 10mg q8h -- will taper if BP rising  BCx - 4/13 no growth   Peritoneal cultures poly microbial - Proteus vulgaris group, Escherichia coli, Klebsiella pneumoniae, Morganella morganii   Peritoneal fluid with gram variable rods and PMNs.  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  ID ([facility]), recs appreciated Septic shock due to perforated sigmoid colon with polymicrobial peritonitis; overall improving leukocytosis   s/p Tia procedure and abdominal abscess drainage on 4/13  s/p lactic acidosis and pressor support  Continue midodrine 10mg q8h -- will taper if BP rising  BCx - 4/13 no growth   Peritoneal cultures poly microbial - Proteus vulgaris group, Escherichia coli, Klebsiella pneumoniae, Morganella morganii   Peritoneal fluid with gram variable rods and PMNs.  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  Pt has not been able to participate with S&S and needs to continue on tube feeds for now -- reassess in a couple of days if mentation / strength improving.

## 2023-04-26 NOTE — PROGRESS NOTE ADULT - SUBJECTIVE AND OBJECTIVE BOX
[facility]  INFECTIOUS DISEASES   [address] [place] [address]  Tel: [phone]     Fax: [phone]  ======================================================  MD Adriana Bird Kaushal, MD Cho, Michelle, MD   ======================================================    Assessment/Recommendations:      72-year-old  female with past medical history of essential hypertension, congestive heart failure, asthma, hypothyroidism, atrial fibrillation, splenomegaly with myelofibrosis with history of C. difficile in January 2023 initially presented with generalized abdominal pain with persistent diarrhea found to have sepsis secondary to perforated viscus from sigmoid colon perforation  with multi- bacterial peritonitis s/p Tia procedure as well as abdominal abscess drainage on April 13  with leukocytosis     patient seen and examined     peritoneal cultures: April 13: ESBL Klebsiella, strep constellatus, Proteus vulgaris, E. coli, Bacteroides, Morganella morganii   blood culture: April 22 (2 sets), April 13 (2 sets): Negative to date   stool for C. difficile by PCR: April 15 and 20: Negative to date    CT Abdomen and Pelvis w/ Oral Cont (04.20.23): Limited study. Status post left hemicolectomy and right lower quadrant colostomy. Diffuse small bowel and colonic wall thickening may reflect an  enterocolitis.Small to moderate abdominal pelvic ascites, without localized intra-abdominal fluid collection. Diffuse body wall edema/anasarca. Subtle linear high attenuation material extending from the abdominal wall to the midline surgical wound, may reflect postsurgical material, however, cannot exclude enterocutaneous fistula.     WBC 33,000 on admission improved as low as 10.5 thousand on April 14 subsequently peaked at 42,000 on April 22 currently trending 33.79k->37.21k-> 35.31 in last 24 hours    afebrile, borderline hypotensive intermittently with normal heart rate (on midodrine)   S/p removal of central line and indwelling urinary catheter  monitor WBC, temperature curve  Antibiotics:  Patient was on Zosyn from April 13 through 15 when transitioning to meropenem   which has been continued up until 4.24(10 days); Recommend monitoring off of IV meropenem; to continue PO Vanco through NGT until 4.28     if hemodynamically unstable off of antibiotics or continued increased WBC with fever/hypothermia would recommend repeating blood culture, urine analysis, chest x-ray, CT abdomen pelvis/chest   AMS eval and management per primary team     D/w Primary team     ________________________________    Last 24 hours:   failed swallow eval  Lethargic again   On mittens b/l     Further ROS:  Could not obtain ROS due to underlying mentation  ______________________________  Allergies    No Known Allergies    MEDICATIONS  (STANDING):  albuterol/ipratropium for Nebulization 3 milliLiter(s) Nebulizer every 6 hours  buDESOnide    Inhalation Suspension 0.5 milliGRAM(s) Inhalation every 12 hours  dextrose 5%. 1000 milliLiter(s) (65 mL/Hr) IV Continuous <Continuous>  escitalopram 20 milliGRAM(s) Oral daily  FIRST- Mouthwash  BLM 10 milliLiter(s) Swish and Spit four times a day  heparin   Injectable 5000 Unit(s) SubCutaneous every 8 hours  levothyroxine Injectable 93.75 MICROGram(s) IV Push <User Schedule>  midodrine 10 milliGRAM(s) Oral every 8 hours  octreotide  Injectable 100 MICROGram(s) SubCutaneous every 8 hours  pantoprazole  Injectable 40 milliGRAM(s) IV Push daily  vancomycin    Solution 125 milliGRAM(s) Oral daily    MEDICATIONS  (PRN):  albuterol    90 MICROgram(s) HFA Inhaler 4 Puff(s) Inhalation every 6 hours PRN Shortness of Breath and/or Wheezing  metoclopramide Injectable 5 milliGRAM(s) IV Push every 6 hours PRN nausea and/or vomiting  sodium chloride 0.9% lock flush 10 milliLiter(s) IV Push every 1 hour PRN Pre/post blood products, medications, blood draw, and to maintain line patency    _________________    PHYSICAL EXAM:    Objective:  ICU Vital Signs Last 24 Hrs  T(C): 37.1 (26 Apr 2023 05:17), Max: 37.1 (26 Apr 2023 05:17)  T(F): 98.7 (26 Apr 2023 05:17), Max: 98.7 (26 Apr 2023 05:17)  HR: 74 (26 Apr 2023 08:07) (73 - 80)  BP: 102/58 (26 Apr 2023 05:17) (102/58 - 110/69)  RR: 18 (26 Apr 2023 05:17) (18 - 18)  SpO2: 99% (26 Apr 2023 08:07) (93% - 99%)  O2 Parameters below as of 26 Apr 2023 08:07  Patient On (Oxygen Delivery Method): nasal cannula,3LPM    General: No acute distress.  Lying in bed comfortably   Neuro: Lethargic and difficult to arouse, No obvious motor deficit  HEENT: Pupils equal, reactive to light b/l 5 mm, Oral mucosa moist, NGT+  PULM: Clear to auscultation bilaterally except decreased at the bases, no significant adventitious breath sounds   CVS: Regular rhythm and controlled rate,2+ systolic murmurs+ no rubs, or gallops  ABD: Soft, distended, nontender, normoactive bowel sounds, no CVA tenderness,  Surgical Site without surrounding cellulitic changes with packing and dressing+, TAMERA bulb noted inferior to colostomy bag with minimal erythema surrounding it  EXT: No b/l LE edema, nontender with pedal pulse palpable   SKIN: Warm and well perfused, no acute rashes    external urinary suction catheter present  ______________  LABS, MICROBIOLOGY, RADIOLOGY & ADDITIONAL STUDIES: Reviewed

## 2023-04-26 NOTE — PROGRESS NOTE ADULT - SUBJECTIVE AND OBJECTIVE BOX
Patient is a 72y old  Female who presents with a chief complaint of intra-abdominal perforation (26 Apr 2023 12:31)    INTERVAL HPI/OVERNIGHT EVENTS: Patient seen and examined at bedside. No overnight events occurred. Patient has no complaints at this time. Denies fevers, chills, headache, lightheadedness, chest pain, dyspnea, abdominal pain, n/v/d/c.    MEDICATIONS  (STANDING):  albuterol/ipratropium for Nebulization 3 milliLiter(s) Nebulizer every 6 hours  buDESOnide    Inhalation Suspension 0.5 milliGRAM(s) Inhalation every 12 hours  dextrose 5%. 1000 milliLiter(s) (65 mL/Hr) IV Continuous <Continuous>  escitalopram 20 milliGRAM(s) Oral daily  heparin   Injectable 5000 Unit(s) SubCutaneous every 8 hours  levothyroxine Injectable 93.75 MICROGram(s) IV Push <User Schedule>  midodrine 10 milliGRAM(s) Oral every 8 hours  octreotide  Injectable 100 MICROGram(s) SubCutaneous every 8 hours  pantoprazole  Injectable 40 milliGRAM(s) IV Push daily  vancomycin    Solution 125 milliGRAM(s) Oral daily    MEDICATIONS  (PRN):  albuterol    90 MICROgram(s) HFA Inhaler 4 Puff(s) Inhalation every 6 hours PRN Shortness of Breath and/or Wheezing  metoclopramide Injectable 5 milliGRAM(s) IV Push every 6 hours PRN nausea and/or vomiting  sodium chloride 0.9% lock flush 10 milliLiter(s) IV Push every 1 hour PRN Pre/post blood products, medications, blood draw, and to maintain line patency    Allergies    No Known Allergies  Intolerances    REVIEW OF SYSTEMS: Limited 2/2 lethargy    Vital Signs Last 24 Hrs  T(C): 36.6 (26 Apr 2023 11:00), Max: 37.1 (26 Apr 2023 05:17)  T(F): 97.8 (26 Apr 2023 11:00), Max: 98.7 (26 Apr 2023 05:17)  HR: 74 (26 Apr 2023 13:10) (73 - 78)  BP: 104/61 (26 Apr 2023 13:10) (102/58 - 121/57)  BP(mean): --  RR: 18 (26 Apr 2023 11:00) (18 - 18)  SpO2: 99% (26 Apr 2023 11:00) (93% - 99%)    Parameters below as of 26 Apr 2023 11:00  Patient On (Oxygen Delivery Method): room air    PHYSICAL EXAM:  GENERAL: NAD, weak appearing  HEENT:  anicteric, moist mucous membranes  CHEST/LUNG:  CTA b/l, no rales, wheezes, or rhonchi  HEART: irregular rhythm , S1, S2  ABDOMEN: ileostomy present with drainage, BS+, soft, nontender, nondistended  EXTREMITIES: no edema, cyanosis, or calf tenderness  NERVOUS SYSTEM: lethargic, able to answer questions with nodding, unable to vocalize    LABS:                        7.6    35.31 )-----------( 481      ( 26 Apr 2023 07:15 )             26.5     CBC Full  -  ( 26 Apr 2023 07:15 )  WBC Count : 35.31 K/uL  Hemoglobin : 7.6 g/dL  Hematocrit : 26.5 %  Platelet Count - Automated : 481 K/uL  Mean Cell Volume : 97.4 fl  Mean Cell Hemoglobin : 27.9 pg  Mean Cell Hemoglobin Concentration : 28.7 gm/dL  Auto Neutrophil # : 30.53 K/uL  Auto Lymphocyte # : 1.10 K/uL  Auto Monocyte # : 1.35 K/uL  Auto Eosinophil # : 0.56 K/uL  Auto Basophil # : 0.26 K/uL  Auto Neutrophil % : 86.5 %  Auto Lymphocyte % : 3.1 %  Auto Monocyte % : 3.8 %  Auto Eosinophil % : 1.6 %  Auto Basophil % : 0.7 %    26 Apr 2023 07:15    144    |  111    |  79     ----------------------------<  134    3.6     |  29     |  4.30     Ca    7.9        26 Apr 2023 07:15  Phos  5.6       26 Apr 2023 07:15  Mg     2.1       26 Apr 2023 07:15    CAPILLARY BLOOD GLUCOSE  POCT Blood Glucose.: 147 mg/dL (26 Apr 2023 12:12)  POCT Blood Glucose.: 147 mg/dL (26 Apr 2023 06:43)    Culture - Blood (collected 04-22-23 @ 12:30)  Source: .Blood Blood-Peripheral  Preliminary Report (04-23-23 @ 16:01):    No growth to date.    Culture - Blood (collected 04-22-23 @ 12:26)  Source: .Blood Blood-Peripheral  Preliminary Report (04-23-23 @ 16:01):    No growth to date.    RADIOLOGY & ADDITIONAL TESTS:    < from: Xray Chest 1 View- PORTABLE-Urgent (Xray Chest 1 View- PORTABLE-Urgent .) (04.24.23 @ 16:23) >  PROCEDURE DATE:  04/24/2023          INTERPRETATION:  TIME OF EXAM: April 24, 2023 at 1:15 PM.    CLINICAL INFORMATION: Status post extended Ro's for perforated   sigmoid. NG tube placement.    COMPARISON:  April 20, 2023.    TECHNIQUE:   AP Portable chest x-ray. The head and chin obscures the   superior mediastinum and parts of the apices.    INTERPRETATION:    Heart size and the mediastinum cannot be accurately evaluated on this   projection. Calcified thoracic aorta.  Distal enteric tube looped on itself at the level of the GE junction and   extending craniad with the tip in the distal esophagus.  Left lung postsurgical change with chain sutures is again noted.  Small patchy inferior right upper lobe opacity, not significantly changed.  There is new linear atelectasis in the left midlung.  Bibasilar opacities with costophrenic angle blunting, possibly pleural   effusions with associated passive atelectasis are not significantly   changed.  Previous small pneumothorax at the lateral left base is decreased in   size. There is osteoarthritic degenerative changeof the spine.    AP portable chest x-ray from April 24, 2023 at 3:33 PM and 3:34 PM:    CLINICAL INFORMATION: NG tube for feeding, postmanipulation.    INTERPRETATION:    Chin and head again obscures part of the superior mediastinum and apices.  Enteric tube repositioned with tip now likely in the stomach. Right upper   quadrant abdominal surgical clips seen.  New bandlike atelectasis in the left mid to lower lung periphery.  Other findings, not significantly changed.      IMPRESSION:  Enteric tube tip is in the stomach on the most recent image.    Left lung postsurgical change. Minimal lateral left basilar pneumothorax,   unchanged from earlier on April 24, 2023, the most recent image, and   decreased from April 20, 2023.    Small patchy inferior right upper lobe opacity, of indeterminate nature,   not significantly changed.    Continued left midlung linear atelectasis.    Bibasilar opacities with costophrenic angle blunting, possibly pleural   effusions with associated passive atelectasis, not significantly changed.    --- End of Report ---    < end of copied text >    Personally reviewed.     Consultant(s) Notes Reviewed:  [x] YES  [ ] NO     Patient is a 72y old  Female who presents with a chief complaint of generalized abdominal pain and persistent diarrhea.    INTERVAL HPI/OVERNIGHT EVENTS: Patient seen and examined at bedside. No acute overnight events occurred. Patient has no complaints at this time. Pt somnolent and answering few questions. Denies SOB, CP, fever. Admits occasional abd pain. Denies nausea, vomiting.    MEDICATIONS  (STANDING):  albuterol/ipratropium for Nebulization 3 milliLiter(s) Nebulizer every 6 hours  buDESOnide    Inhalation Suspension 0.5 milliGRAM(s) Inhalation every 12 hours  dextrose 5%. 1000 milliLiter(s) (65 mL/Hr) IV Continuous <Continuous>  escitalopram 20 milliGRAM(s) Oral daily  heparin   Injectable 5000 Unit(s) SubCutaneous every 8 hours  levothyroxine Injectable 93.75 MICROGram(s) IV Push <User Schedule>  midodrine 10 milliGRAM(s) Oral every 8 hours  octreotide  Injectable 100 MICROGram(s) SubCutaneous every 8 hours  pantoprazole  Injectable 40 milliGRAM(s) IV Push daily  vancomycin    Solution 125 milliGRAM(s) Oral daily    MEDICATIONS  (PRN):  albuterol    90 MICROgram(s) HFA Inhaler 4 Puff(s) Inhalation every 6 hours PRN Shortness of Breath and/or Wheezing  metoclopramide Injectable 5 milliGRAM(s) IV Push every 6 hours PRN nausea and/or vomiting  sodium chloride 0.9% lock flush 10 milliLiter(s) IV Push every 1 hour PRN Pre/post blood products, medications, blood draw, and to maintain line patency    Allergies    No Known Allergies  Intolerances    REVIEW OF SYSTEMS:  Pt somnolent and answering few questions. Denies SOB, CP, fever. Admits occasional abd pain. Denies nausea, vomiting.    Vital Signs Last 24 Hrs  T(C): 36.6 (26 Apr 2023 11:00), Max: 37.1 (26 Apr 2023 05:17)  T(F): 97.8 (26 Apr 2023 11:00), Max: 98.7 (26 Apr 2023 05:17)  HR: 74 (26 Apr 2023 13:10) (73 - 78)  BP: 104/61 (26 Apr 2023 13:10) (102/58 - 121/57)  BP(mean): --  RR: 18 (26 Apr 2023 11:00) (18 - 18)  SpO2: 99% (26 Apr 2023 11:00) (93% - 99%)    Parameters below as of 26 Apr 2023 11:00  Patient On (Oxygen Delivery Method): room air    PHYSICAL EXAM:  GENERAL: chronically ill-appearing, not in acute distress; frail, somnolent  HEENT:  anicteric, moist mucous membranes  CHEST/LUNG:  CTA b/l, no rales, wheezes, or rhonchi  HEART: irregular rhythm at 72bpm, S1, S2  ABDOMEN: ileostomy present with drainage, BS+, soft, nontender, nondistended  EXTREMITIES: no edema, cyanosis, or calf tenderness  NERVOUS SYSTEM: somnolent, answering a few questions with nodding and few words    LABS:                        7.6    35.31 )-----------( 481      ( 26 Apr 2023 07:15 )             26.5     CBC Full  -  ( 26 Apr 2023 07:15 )  WBC Count : 35.31 K/uL  Hemoglobin : 7.6 g/dL  Hematocrit : 26.5 %  Platelet Count - Automated : 481 K/uL  Mean Cell Volume : 97.4 fl  Mean Cell Hemoglobin : 27.9 pg  Mean Cell Hemoglobin Concentration : 28.7 gm/dL  Auto Neutrophil # : 30.53 K/uL  Auto Lymphocyte # : 1.10 K/uL  Auto Monocyte # : 1.35 K/uL  Auto Eosinophil # : 0.56 K/uL  Auto Basophil # : 0.26 K/uL  Auto Neutrophil % : 86.5 %  Auto Lymphocyte % : 3.1 %  Auto Monocyte % : 3.8 %  Auto Eosinophil % : 1.6 %  Auto Basophil % : 0.7 %    26 Apr 2023 07:15    144    |  111    |  79     ----------------------------<  134    3.6     |  29     |  4.30     Ca    7.9        26 Apr 2023 07:15  Phos  5.6       26 Apr 2023 07:15  Mg     2.1       26 Apr 2023 07:15    CAPILLARY BLOOD GLUCOSE  POCT Blood Glucose.: 147 mg/dL (26 Apr 2023 12:12)  POCT Blood Glucose.: 147 mg/dL (26 Apr 2023 06:43)    Culture - Blood (collected 04-22-23 @ 12:30)  Source: .Blood Blood-Peripheral  Preliminary Report (04-23-23 @ 16:01):    No growth to date.    Culture - Blood (collected 04-22-23 @ 12:26)  Source: .Blood Blood-Peripheral  Preliminary Report (04-23-23 @ 16:01):    No growth to date.    RADIOLOGY & ADDITIONAL TESTS:    < from: Xray Chest 1 View- PORTABLE-Urgent (Xray Chest 1 View- PORTABLE-Urgent .) (04.24.23 @ 16:23) >  PROCEDURE DATE:  04/24/2023          INTERPRETATION:  TIME OF EXAM: April 24, 2023 at 1:15 PM.    CLINICAL INFORMATION: Status post extended Ro's for perforated   sigmoid. NG tube placement.    COMPARISON:  April 20, 2023.    TECHNIQUE:   AP Portable chest x-ray. The head and chin obscures the   superior mediastinum and parts of the apices.    INTERPRETATION:    Heart size and the mediastinum cannot be accurately evaluated on this   projection. Calcified thoracic aorta.  Distal enteric tube looped on itself at the level of the GE junction and   extending craniad with the tip in the distal esophagus.  Left lung postsurgical change with chain sutures is again noted.  Small patchy inferior right upper lobe opacity, not significantly changed.  There is new linear atelectasis in the left midlung.  Bibasilar opacities with costophrenic angle blunting, possibly pleural   effusions with associated passive atelectasis are not significantly   changed.  Previous small pneumothorax at the lateral left base is decreased in   size. There is osteoarthritic degenerative changeof the spine.    AP portable chest x-ray from April 24, 2023 at 3:33 PM and 3:34 PM:    CLINICAL INFORMATION: NG tube for feeding, postmanipulation.    INTERPRETATION:    Chin and head again obscures part of the superior mediastinum and apices.  Enteric tube repositioned with tip now likely in the stomach. Right upper   quadrant abdominal surgical clips seen.  New bandlike atelectasis in the left mid to lower lung periphery.  Other findings, not significantly changed.      IMPRESSION:  Enteric tube tip is in the stomach on the most recent image.    Left lung postsurgical change. Minimal lateral left basilar pneumothorax,   unchanged from earlier on April 24, 2023, the most recent image, and   decreased from April 20, 2023.    Small patchy inferior right upper lobe opacity, of indeterminate nature,   not significantly changed.    Continued left midlung linear atelectasis.    Bibasilar opacities with costophrenic angle blunting, possibly pleural   effusions with associated passive atelectasis, not significantly changed.    --- End of Report ---    < end of copied text >    Personally reviewed.     Consultant(s) Notes Reviewed:  [x] YES  [ ] NO     Patient is a 72y old  Female who presents with a chief complaint of generalized abdominal pain and persistent diarrhea.    INTERVAL HPI/OVERNIGHT EVENTS: Patient seen and examined at bedside. No acute overnight events occurred. Patient has no complaints at this time. Pt somnolent and answering few questions. Denies SOB, CP, fever. Admits occasional abd pain. Denies nausea, vomiting.    MEDICATIONS  (STANDING):  albuterol/ipratropium for Nebulization 3 milliLiter(s) Nebulizer every 6 hours  buDESOnide    Inhalation Suspension 0.5 milliGRAM(s) Inhalation every 12 hours  dextrose 5%. 1000 milliLiter(s) (65 mL/Hr) IV Continuous <Continuous>  escitalopram 20 milliGRAM(s) Oral daily  heparin   Injectable 5000 Unit(s) SubCutaneous every 8 hours  levothyroxine Injectable 93.75 MICROGram(s) IV Push <User Schedule>  midodrine 10 milliGRAM(s) Oral every 8 hours  octreotide  Injectable 100 MICROGram(s) SubCutaneous every 8 hours  pantoprazole  Injectable 40 milliGRAM(s) IV Push daily  vancomycin    Solution 125 milliGRAM(s) Oral daily    MEDICATIONS  (PRN):  albuterol    90 MICROgram(s) HFA Inhaler 4 Puff(s) Inhalation every 6 hours PRN Shortness of Breath and/or Wheezing  metoclopramide Injectable 5 milliGRAM(s) IV Push every 6 hours PRN nausea and/or vomiting  sodium chloride 0.9% lock flush 10 milliLiter(s) IV Push every 1 hour PRN Pre/post blood products, medications, blood draw, and to maintain line patency    Allergies    No Known Allergies  Intolerances    REVIEW OF SYSTEMS:  Pt somnolent and answering few questions. Denies SOB, CP, fever. Admits occasional abd pain. Denies nausea, vomiting.    Vital Signs Last 24 Hrs  T(C): 36.6 (26 Apr 2023 11:00), Max: 37.1 (26 Apr 2023 05:17)  T(F): 97.8 (26 Apr 2023 11:00), Max: 98.7 (26 Apr 2023 05:17)  HR: 74 (26 Apr 2023 13:10) (73 - 78)  BP: 104/61 (26 Apr 2023 13:10) (102/58 - 121/57)  BP(mean): --  RR: 18 (26 Apr 2023 11:00) (18 - 18)  SpO2: 99% (26 Apr 2023 11:00) (93% - 99%)    Parameters below as of 26 Apr 2023 11:00  Patient On (Oxygen Delivery Method): room air    PHYSICAL EXAM:  GENERAL: chronically ill-appearing, not in acute distress; frail, somnolent  HEENT:  anicteric, moist mucous membranes  CHEST/LUNG:  CTA b/l, no rales, wheezes, or rhonchi  HEART: irregular rhythm at 72bpm, S1, S2  ABDOMEN: ileostomy present with drainage, BS+, soft, no apparent tenderness, nondistended  EXTREMITIES: no cyanosis, or calf tenderness  NERVOUS SYSTEM: somnolent, answering a few questions with nodding and few words    LABS:                        7.6    35.31 )-----------( 481      ( 26 Apr 2023 07:15 )             26.5     CBC Full  -  ( 26 Apr 2023 07:15 )  WBC Count : 35.31 K/uL  Hemoglobin : 7.6 g/dL  Hematocrit : 26.5 %  Platelet Count - Automated : 481 K/uL  Mean Cell Volume : 97.4 fl  Mean Cell Hemoglobin : 27.9 pg  Mean Cell Hemoglobin Concentration : 28.7 gm/dL  Auto Neutrophil # : 30.53 K/uL  Auto Lymphocyte # : 1.10 K/uL  Auto Monocyte # : 1.35 K/uL  Auto Eosinophil # : 0.56 K/uL  Auto Basophil # : 0.26 K/uL  Auto Neutrophil % : 86.5 %  Auto Lymphocyte % : 3.1 %  Auto Monocyte % : 3.8 %  Auto Eosinophil % : 1.6 %  Auto Basophil % : 0.7 %    26 Apr 2023 07:15    144    |  111    |  79     ----------------------------<  134    3.6     |  29     |  4.30     Ca    7.9        26 Apr 2023 07:15  Phos  5.6       26 Apr 2023 07:15  Mg     2.1       26 Apr 2023 07:15    CAPILLARY BLOOD GLUCOSE  POCT Blood Glucose.: 147 mg/dL (26 Apr 2023 12:12)  POCT Blood Glucose.: 147 mg/dL (26 Apr 2023 06:43)    Culture - Blood (collected 04-22-23 @ 12:30)  Source: .Blood Blood-Peripheral  Preliminary Report (04-23-23 @ 16:01):    No growth to date.    Culture - Blood (collected 04-22-23 @ 12:26)  Source: .Blood Blood-Peripheral  Preliminary Report (04-23-23 @ 16:01):    No growth to date.    RADIOLOGY & ADDITIONAL TESTS:    < from: Xray Chest 1 View- PORTABLE-Urgent (Xray Chest 1 View- PORTABLE-Urgent .) (04.24.23 @ 16:23) >  PROCEDURE DATE:  04/24/2023          INTERPRETATION:  TIME OF EXAM: April 24, 2023 at 1:15 PM.    CLINICAL INFORMATION: Status post extended Ro's for perforated   sigmoid. NG tube placement.    COMPARISON:  April 20, 2023.    TECHNIQUE:   AP Portable chest x-ray. The head and chin obscures the   superior mediastinum and parts of the apices.    INTERPRETATION:    Heart size and the mediastinum cannot be accurately evaluated on this   projection. Calcified thoracic aorta.  Distal enteric tube looped on itself at the level of the GE junction and   extending craniad with the tip in the distal esophagus.  Left lung postsurgical change with chain sutures is again noted.  Small patchy inferior right upper lobe opacity, not significantly changed.  There is new linear atelectasis in the left midlung.  Bibasilar opacities with costophrenic angle blunting, possibly pleural   effusions with associated passive atelectasis are not significantly   changed.  Previous small pneumothorax at the lateral left base is decreased in   size. There is osteoarthritic degenerative changeof the spine.    AP portable chest x-ray from April 24, 2023 at 3:33 PM and 3:34 PM:    CLINICAL INFORMATION: NG tube for feeding, postmanipulation.    INTERPRETATION:    Chin and head again obscures part of the superior mediastinum and apices.  Enteric tube repositioned with tip now likely in the stomach. Right upper   quadrant abdominal surgical clips seen.  New bandlike atelectasis in the left mid to lower lung periphery.  Other findings, not significantly changed.      IMPRESSION:  Enteric tube tip is in the stomach on the most recent image.    Left lung postsurgical change. Minimal lateral left basilar pneumothorax,   unchanged from earlier on April 24, 2023, the most recent image, and   decreased from April 20, 2023.    Small patchy inferior right upper lobe opacity, of indeterminate nature,   not significantly changed.    Continued left midlung linear atelectasis.    Bibasilar opacities with costophrenic angle blunting, possibly pleural   effusions with associated passive atelectasis, not significantly changed.    --- End of Report ---    < end of copied text >    Personally reviewed.     Consultant(s) Notes Reviewed:  [x] YES  [ ] NO

## 2023-04-26 NOTE — PROGRESS NOTE ADULT - TIME BILLING
Submitted surgical case change request Please call us with any concerns or questions.   We will continue to follow.     Danny Wright MD   Division of Infectious Diseases  [facility]   Cell [phone] between 8am and 6pm   After 6pm and weekends please call ID service at [phone].

## 2023-04-26 NOTE — PROGRESS NOTE ADULT - ASSESSMENT
IMPRESSION    72y Female admitted with Enterocolitis due to Clostridioides difficile  S/P extended hartmanns, for perforated colon    Patient with history myeloproliferative on agrylin (being treated by Dr. Rice) admitted with abd pain, weakness and falls at home a/w severe sepsis and peritonitis due to perforated sigmoid colon, also with LAYA due to ATN, shock liver, and post/op acute respiratory failure requiring mechanical ventilation, and acute blood loss anemia. pt is off agrylin.  had a period of thrombocytopenia now resolved with current platelet count 416K  Course also complicated by diarrhea which apparently was present prior to admission.  Has hx of ?pulmonary carcinoid with nodules being followed. however recent PET/CT dotatate negative.      RECOMMENDATIONS> :  1.  follow CBC and transfuse as indicated  2.  continue post op care  3.  continue evaluation of diarrhea.  if workup negative may not be unreasonable to try Sandostatin  4.  to hold agrylin until taking better po.  platelet count is not elevated at present but will follow  5.  further heme recommendations pending above

## 2023-04-26 NOTE — PROGRESS NOTE ADULT - ASSESSMENT
71 yo female here for perforated sigmoid, s/p Extended hartmanns procedure.  Pt much more alert than previously.  Answering questions more coherently.  currently has level 1 restraint.

## 2023-04-26 NOTE — PROGRESS NOTE ADULT - ASSESSMENT
72-year-old female with history of hypertension, CHF, afib s/p AF ablation (2/7/19) currently not on xarelto, CATH 2018, hypothyroidism, HLD history of C. difficile January 2023 presents for vomitting and abdominal pain. Consulted for CHF.    Cardiac Optimization, Hx Afib s/p Ablation  - CT ABD/Pelvis: Pneumoperitoneum and no ascites, perforated sigmoid colon. Stable splenomegaly. Stable right lung nodule, ground glass infiltrates and loculated right pleural effusion.  - S/P Tia's with abdominal abscess drainage.   - Follow Surgery recs.  - Continue Abx per ID  - Renal following.  No indication for HD at this point    - Known with Permanent Afib, rate-controlled  - Not on home AC  - Holding home metoprolol 50mg qd due to hypotension, though, now improved  - Continue Midodrine  - Elevated troponin peaked at 100.9, otherwise cardiac enzymes unremarkable, likely demand in the setting of sepsis  - EKG: Sinus tachycardia with PAC's repeat ekg today for irreg HR on exam  - No acute changes on EKG compared to previous.   - Prior TTE (1/18/23): normal LVEF 65%, normal RV size and function, biatrial enlargement, sclerotic aortic valve, mild AI, Moderate MR and TR  - TTE shows EF 55-60%, RVE, PASP 66     - Monitor and replete lytes, keep K>4, Mg>2.  - DVT ppx  - Will continue to follow    Ivana Whitney DNP, NP-C, AGACNP-C  Cardiology   Spectra #[id number]

## 2023-04-26 NOTE — PROGRESS NOTE ADULT - PROBLEM SELECTOR PLAN 2
Perforated sigmoid colon: s/p Ro's procedure   Ostomy c/d/i; watery output noted  NPO with NGT,   speech and swallow recs: NPO, will re-evaluate  neg c-diff, neg GI pcr  ID follow up ongoing  Continue Metoclopramide prn and PPI   Monitor drain and colostomy output.   Continue octreotide given history of carcinoid with increased ostomy output.   Psyllium added to bulk stool per ICU   Continue to monitor Perforated sigmoid colon: s/p Ro's procedure   Ostomy c/d/i; watery output noted  NPO with NGT,   speech and swallow recs: NPO, will re-evaluate  neg c-diff, neg GI pcr  ID ([facility]), recs appreciated  Continue Metoclopramide prn and PPI   Monitor drain and colostomy output.   Continue octreotide given history of carcinoid with increased ostomy output.   Psyllium added to bulk stool per ICU   Continue to monitor

## 2023-04-26 NOTE — PROGRESS NOTE ADULT - ASSESSMENT
73 y/o F with h/o A-Fib on rivaroxaban, HFpEF, recent C diff infection (Jan 2023), myelofibrosis with thrombocytosis on anagrelide, hypothyroidism, asthma, and lung nodules s/p resection (reportedly malignant) who presents with perforated sigmoid colon with acute bacterial peritonitis, LAYA due to ATN,septic shock, lactic acidosis, shock liver;  post/op acute respiratory failure requiring mechanical ventilation, and acute blood loss anemia; s/p extended Tia's procedure including descending colon on 4/13. She was extubated 4/19 and tolerating supplemental oxygen via nasal canula. Overall improved but with severe critical illness polyneuropathy and leukocytosis of unclear etiology.                 71yo F w/ PMH of A-Fib on rivaroxaban, HFpEF, recent C diff infection (Jan 2023), myelofibrosis with thrombocytosis on anagrelide, hypothyroidism, asthma, and lung nodules s/p resection (reportedly malignant) who presents with perforated sigmoid colon with acute bacterial peritonitis, LAYA due to ATN,septic shock, lactic acidosis, shock liver;  post/op acute respiratory failure requiring mechanical ventilation, and acute blood loss anemia; s/p extended Tia's procedure including descending colon on 4/13. She was extubated 4/19 and tolerating supplemental oxygen via nasal canula, overall gradual improvement, but complicated by critical illness polyneuropathy and leukocytosis of unclear etiology.

## 2023-04-26 NOTE — PROGRESS NOTE ADULT - PROBLEM SELECTOR PLAN 4
LAYA likely ATN due to septic shock  S/p bicarb gtt for metabolic acidosis  s/p diuresis with metolazone and bumetanide- held due ot rising creatinine;   monitor off diuretics LAYA likely ATN due to septic shock  S/p bicarb gtt for metabolic acidosis  s/p diuresis with metolazone and bumetanide- held due to rising creatinine  monitor off diuretics  renal function improving  nephro ([facility]), recs appreciated

## 2023-04-26 NOTE — PROGRESS NOTE ADULT - SUBJECTIVE AND OBJECTIVE BOX
Postoperative Day #: 13    72y Female admitted with Enterocolitis due to Clostridioides difficile  S/P extended hartmanns, for perforated colon    Patient seen and examined bedside resting comfortably.  Pt much more alert and awake.  Does respond verbally to some questions.  Failed speech and swallow x2.  Will attempt again. Currently on first degree restraints, secondary to trying to pull NGT. No overnight events.     T(F): 97.8 (04-26-23 @ 11:00), Max: 98.7 (04-26-23 @ 05:17)  HR: 74 (04-26-23 @ 11:00) (73 - 78)  BP: 121/57 (04-26-23 @ 11:00) (102/58 - 121/57)  RR: 18 (04-26-23 @ 11:00) (18 - 18)  SpO2: 99% (04-26-23 @ 11:00) (93% - 99%)  Wt(kg): --  CAPILLARY BLOOD GLUCOSE      POCT Blood Glucose.: 147 mg/dL (26 Apr 2023 12:12)  POCT Blood Glucose.: 147 mg/dL (26 Apr 2023 06:43)      PHYSICAL EXAM:  General: NAD  Neuro:  Alert  with mitts on.   Abdomen: Soft. BS+ Ostomy with function.  Midline incision dressing/packing changed.   Extremities: no pedal edema or calf tenderness noted.  Currently in CAIR boots.       LABS:                        7.6    35.31 )-----------( 481      ( 26 Apr 2023 07:15 )             26.5     04-26    144  |  111<H>  |  79<H>  ----------------------------<  134<H>  3.6   |  29  |  4.30<H>    Ca    7.9<L>      26 Apr 2023 07:15  Phos  5.6     04-26  Mg     2.1     04-26        I&O's Detail    25 Apr 2023 07:01  -  26 Apr 2023 07:00  --------------------------------------------------------  IN:  Total IN: 0 mL    OUT:    Colostomy (mL): 400 mL  Total OUT: 400 mL    Total NET: -400 mL            RADIOLOGY:

## 2023-04-26 NOTE — PROGRESS NOTE ADULT - PROBLEM SELECTOR PLAN 1
Continue level 1 restraint as needed  Speech and swallow, possibly tomorrow, if not then friday.   Tube feeds.   Physical therapy  Antibiotics- po vanco  Continue NPO for now  Seen and reviewed with Taurus Goldberg.

## 2023-04-26 NOTE — PROGRESS NOTE ADULT - TIME BILLING
Pt seen + examined. Case discussed with resident. Agree with assessment and plan above (edited by me in detail):  Time spent: 50min. More than 50% of the visit was spent counseling the patient on medical condition -- severe sepsis and peritonitis due to perforated sigmoid colon, LAYA due to ATN, shock liver, and post/op acute respiratory failure requiring mechanical ventilation, and acute blood loss anemia.    71yo F with PMH of afib (off xarelto at least 1wk PTA), HFpEF, asthma, recent C diff in Jan presenting with abd pain, weakness and falls at home a/w severe sepsis and peritonitis due to perforated sigmoid colon, also with LAYA due to ATN, shock liver, and post/op acute respiratory failure requiring mechanical ventilation, and acute blood loss anemia.    Abd pain + persistent diarrhea for several months, multiple episodes a day w/ progressive worsening; not relieved by pain meds, a/w n/v  CT A/P - Pneumoperitoneum and no ascites. Suspect perforated sigmoid colon.  septic shock due to peritonitis and perforated diverticulitis  s/p Tia procedure and abdominal abscess drainage on 4/13  lactic acidosis resolved  On levophed (weaned off dual pressor)  TTE (04/2023) - LVEF of 55-60%; LAE; Sclerotic trileaflet aortic valve, trace AI. Mild to moderate MR and TR. IVC measures 2.0 cm and is non collapsing  BCx - 4/13 no growth   Peritoneal cultures poly microbial - Proteus vulgaris group, Escherichia coli, Klebsiella pneumoniae, Morganella morganii   Peritoneal fluid with gram variable rods and PMNs.  CDiff negative, GI PCR neg  s/p Zosyn and Flagyl   continue meropenem  on vancomycin oral solution for C diff prophylaxis  ID following, recs appreciated  Shock liver, improving LFTs ; ischemic ATN  Mgmt per ICU    LAYA likely ATN due to septic shock  Cr worsened to 5.0  urine output starting to improve to ~50cc/hr now  Correct electrolyte derangement  S/p bicarb gtt  Mgmt per ICU  consider nephro eval. Pt seen + examined. Case discussed with resident. Agree with assessment and plan above (edited by me in detail):  Time spent: 50min. More than 50% of the visit was spent counseling the patient on medical condition -- severe sepsis and peritonitis due to perforated sigmoid colon, LAYA due to ATN, shock liver, and post/op acute respiratory failure requiring mechanical ventilation, and acute blood loss anemia.    73yo F w/ PMH of A-Fib on rivaroxaban, HFpEF, recent C diff infection (Jan 2023), myelofibrosis with thrombocytosis on anagrelide, hypothyroidism, asthma, and lung nodules s/p resection (reportedly malignant) who presents with perforated sigmoid colon with acute bacterial peritonitis, LAYA due to ATN,septic shock, lactic acidosis, shock liver;  post/op acute respiratory failure requiring mechanical ventilation, and acute blood loss anemia; s/p extended Tia's procedure including descending colon on 4/13. She was extubated 4/19 and tolerating supplemental oxygen via nasal canula, overall gradual improvement, but complicated by critical illness polyneuropathy and leukocytosis of unclear etiology.    Septic shock due to perforated sigmoid colon with polymicrobial peritonitis; overall improving leukocytosis   s/p Tia procedure and abdominal abscess drainage on 4/13  s/p lactic acidosis and pressor support  Continue midodrine 10mg q8h -- will taper if BP rising  BCx - 4/13 no growth   Peritoneal cultures poly microbial - Proteus vulgaris group, Escherichia coli, Klebsiella pneumoniae, Morganella morganii   Peritoneal fluid with gram variable rods and PMNs.  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  ID ([facility]), recs appreciated    LAYA likely ATN due to septic shock  S/p bicarb gtt for metabolic acidosis  s/p diuresis with metolazone and bumetanide- held due to rising creatinine  monitor off diuretics  renal function improving   nephro ([facility]), recs appreciated Pt seen + examined. Case discussed with resident. Agree with assessment and plan above (edited by me in detail):  Time spent: 50min. More than 50% of the visit was spent counseling the patient on medical condition -- severe sepsis and peritonitis due to perforated sigmoid colon, LAYA due to ATN, shock liver, and post/op acute respiratory failure requiring mechanical ventilation, and acute blood loss anemia.    71yo F w/ PMH of A-Fib on rivaroxaban, HFpEF, recent C diff infection (Jan 2023), myelofibrosis with thrombocytosis on anagrelide, hypothyroidism, asthma, and lung nodules s/p resection (reportedly malignant) who presents with perforated sigmoid colon with acute bacterial peritonitis, LAYA due to ATN,septic shock, lactic acidosis, shock liver;  post/op acute respiratory failure requiring mechanical ventilation, and acute blood loss anemia; s/p extended Tia's procedure including descending colon on 4/13. She was extubated 4/19 and tolerating supplemental oxygen via nasal canula, overall gradual improvement, but complicated by critical illness polyneuropathy and leukocytosis of unclear etiology.    Septic shock due to perforated sigmoid colon with polymicrobial peritonitis; overall improving leukocytosis   s/p Tia procedure and abdominal abscess drainage on 4/13  s/p lactic acidosis and pressor support  Continue midodrine 10mg q8h -- will taper if BP rising  BCx - 4/13 no growth   Peritoneal cultures poly microbial - Proteus vulgaris group, Escherichia coli, Klebsiella pneumoniae, Morganella morganii   Peritoneal fluid with gram variable rods and PMNs.  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  Pt has not been able to participate with S&S and needs to continue on tube feeds for now -- reassess in a couple of days if mentation / strength improving.    LAYA likely ATN due to septic shock  S/p bicarb gtt for metabolic acidosis  s/p diuresis with metolazone and bumetanide- held due to rising creatinine  monitor off diuretics  renal function improving   nephro ([facility]), recs appreciated

## 2023-04-27 NOTE — PROGRESS NOTE ADULT - SUBJECTIVE AND OBJECTIVE BOX
[facility] -- Adrián Wray Pannella, Patel, Savella, Goodger  Office # [phone]    Follow Up:  Hx Afib s/p ablation, Cardiac Optimization    Subjective/Observations: Seen and evaluated.  Awake but lethargic.  Uninterested.  Does not follow commands and does not answer verbally.  Only nods or shakes head.  NC in use but ot in any form of respiratory discomfort    REVIEW OF SYSTEMS: All other review of systems is negative unless indicated above  PAST MEDICAL & SURGICAL HISTORY:  Hypothyroidism  HLD (hyperlipidemia)  Thrombocytosis  Persistent atrial fibrillation  Obesity (BMI 35.0-39.9 without comorbidity)  Asthma  Diabetes mellitus  Ankle injury  on 2004, 5 surgeries.  Cholecystitis    MEDICATIONS  (STANDING):  albuterol/ipratropium for Nebulization 3 milliLiter(s) Nebulizer every 6 hours  buDESOnide    Inhalation Suspension 0.5 milliGRAM(s) Inhalation every 12 hours  dextrose 5%. 1000 milliLiter(s) (65 mL/Hr) IV Continuous <Continuous>  escitalopram 20 milliGRAM(s) Oral daily  fluconAZOLE IVPB 100 milliGRAM(s) IV Intermittent once  heparin   Injectable 5000 Unit(s) SubCutaneous every 8 hours  levothyroxine Injectable 93.75 MICROGram(s) IV Push <User Schedule>  midodrine 10 milliGRAM(s) Oral every 8 hours  octreotide  Injectable 100 MICROGram(s) SubCutaneous every 8 hours  pantoprazole  Injectable 40 milliGRAM(s) IV Push daily  vancomycin    Solution 125 milliGRAM(s) Oral daily    MEDICATIONS  (PRN):  albuterol    90 MICROgram(s) HFA Inhaler 4 Puff(s) Inhalation every 6 hours PRN Shortness of Breath and/or Wheezing  metoclopramide Injectable 5 milliGRAM(s) IV Push every 6 hours PRN nausea and/or vomiting  sodium chloride 0.9% lock flush 10 milliLiter(s) IV Push every 1 hour PRN Pre/post blood products, medications, blood draw, and to maintain line patency    Allergies    No Known Allergies    Intolerances    Vital Signs Last 24 Hrs  T(C): 36.8 (27 Apr 2023 04:48), Max: 36.8 (27 Apr 2023 04:48)  T(F): 98.3 (27 Apr 2023 04:48), Max: 98.3 (27 Apr 2023 04:48)  HR: 81 (27 Apr 2023 04:48) (60 - 81)  BP: 118/65 (27 Apr 2023 04:48) (104/61 - 184/79)  BP(mean): --  RR: 18 (27 Apr 2023 04:48) (18 - 18)  SpO2: 99% (27 Apr 2023 04:48) (91% - 100%)    Parameters below as of 27 Apr 2023 04:48  Patient On (Oxygen Delivery Method): nasal cannula  O2 Flow (L/min): 2    I&O's Summary    26 Apr 2023 07:01  -  27 Apr 2023 07:00  --------------------------------------------------------  IN: 540 mL / OUT: 3000 mL / NET: -2460 mL    PHYSICAL EXAM:  TELE: Not on tele  Constitutional: NAD, awake and alert, well-developed  HEENT: Dry Mucous Membranes, Anicteric  Pulmonary: Non-labored, breath sounds are clear but diminished bilaterally, No wheezing, rales or rhonchi  Cardiovascular: Regular, S1 and S2, +murmurs, no rubs, gallops or clicks  Gastrointestinal: Bowel Sounds present, soft, nontender.   Midabdomianl incision; +colostomy  Lymph: Generalized edema. No lymphadenopathy.  Skin: No visible rashes or ulcers.  Psych:  Mood & affect: Flat     LABS: All Labs Reviewed:                        7.3    34.35 )-----------( 494      ( 27 Apr 2023 07:10 )             25.5                         7.6    35.31 )-----------( 481      ( 26 Apr 2023 07:15 )             26.5                         7.6    37.21 )-----------( 446      ( 25 Apr 2023 07:10 )             26.8     27 Apr 2023 07:10    143    |  110    |  79     ----------------------------<  121    3.6     |  27     |  3.90   26 Apr 2023 07:15    144    |  111    |  79     ----------------------------<  134    3.6     |  29     |  4.30   25 Apr 2023 07:10    144    |  110    |  87     ----------------------------<  116    3.6     |  28     |  4.60     Ca    7.9        27 Apr 2023 07:10  Ca    7.9        26 Apr 2023 07:15  Ca    7.6        25 Apr 2023 07:10  Phos  5.6       26 Apr 2023 07:15  Phos  5.5       25 Apr 2023 07:10  Mg     2.1       26 Apr 2023 07:15  Mg     2.0       25 Apr 2023 07:10    TPro  5.3    /  Alb  2.0    /  TBili  0.3    /  DBili  x      /  AST  10     /  ALT  7      /  AlkPhos  57     27 Apr 2023 07:10    ACC: [id number] EXAM:  ECHO TTE WO CON COMP W DOPP   ORDERED BY: RAFY WILSON     PROCEDURE DATE:  04/14/2023      INTERPRETATION:  INDICATION: Heart failure  Sonographer KL    Blood Pressure unavailable    Height 167.6 cm     Weight 60 kgBSA   1.8 sq m    Dimensions:  LA 4.0       Normal Values: 2.0 - 4.0 cm  Ao 3.4        Normal Values: 2.0 - 3.8 cm  SEPTUM 0.9       Normal Values: 0.6 - 1.2 cm  PWT 0.8       Normal Values: 0.6 - 1.1 cm  LVIDd 4.2         Normal Values: 3.0 - 5.6 cm  LVIDs 2.9         Normal Values: 1.8 - 4.0 cm    OBSERVATIONS:  Mitral Valve: Mild to moderate MR.  Aortic Valve/Aorta: Sclerotic trileaflet aortic valve with grossly normal   opening. Trace AI  Tricuspid Valve: Mild to moderate TR.  Pulmonic Valve: Mild PI  Left Atrium: Enlarged  Right Atrium: Not well-visualizedLeft Ventricle: normal LV size and systolic function, estimated LVEF of   55-60%.  Right Ventricle: The right ventricle is enlarged with grossly normal   function  Pericardium: no significant pericardial effusion.  Pulmonary/RV Pressure: estimated PA systolic pressure of 66mmHg assuming   an RA pressure of 10 mmHg.  IVC measures 2.0 cm and is non collapsing  LV diastolic dysfunction is present  Bilateral pleural effusions    IMPRESSION:  Normal left ventricular internal dimensions and systolic function,   estimated LVEF of 55-60%.  The right ventricle is enlarged with grossly normal function  Left atrial enlargement  Sclerotic trileaflet aortic valve, trace AI.  Mild to moderate MR and TR.  Estimated PA systolic pressure of 66mmHg assuming an RA pressure of 10   mmHg.  IVC measures 2.0 cm and is non collapsing    --- End of Report ---    NEYMAR ROWLAND MD; Attending Cardiologist  This document has been electronically signed. Apr 15 2023 10:52AM    ACC: [id number] EXAM:  XR CHEST PORTABLE URGENT 1V   ORDERED BY: HOA SLOAN     ACC: [id number] EXAM:  XR CHEST PORTABLE URGENT 1V   ORDERED BY: HOA SLOAN     PROCEDURE DATE:  04/24/2023        INTERPRETATION:  TIME OF EXAM: April 24, 2023 at 1:15 PM.    CLINICAL INFORMATION: Status post extended Ro's for perforated   sigmoid. NG tube placement.    COMPARISON:  April 20, 2023.    TECHNIQUE:   AP Portable chest x-ray. The head and chin obscures the   superior mediastinum and parts of the apices.    INTERPRETATION:    Heart size and the mediastinum cannot be accurately evaluated on this   projection. Calcified thoracic aorta.  Distal enteric tube looped on itself at the level of the GE junction and   extending craniad with the tip in the distal esophagus.  Left lung postsurgical change with chain sutures is again noted.  Small patchy inferior right upper lobe opacity, not significantly changed.  There is new linear atelectasis in the left midlung.  Bibasilar opacities with costophrenic angle blunting, possibly pleural   effusions with associated passive atelectasis are not significantly   changed.  Previous small pneumothorax at the lateral left base is decreased in   size. There is osteoarthritic degenerative changeof the spine.    AP portable chest x-ray from April 24, 2023 at 3:33 PM and 3:34 PM:    CLINICAL INFORMATION: NG tube for feeding, postmanipulation.    INTERPRETATION:    Chin and head again obscures part of the superior mediastinum and apices.  Enteric tube repositioned with tip now likely in the stomach. Right upper   quadrant abdominal surgical clips seen.  New bandlike atelectasis in the left mid to lower lung periphery.  Other findings, not significantly changed.    IMPRESSION:  Enteric tube tip is in the stomach on the most recent image.    Left lung postsurgical change. Minimal lateral left basilar pneumothorax,   unchanged from earlier on April 24, 2023, the most recent image, and   decreased from April 20, 2023.    Small patchy inferior right upper lobe opacity, of indeterminate nature,   not significantly changed.    Continued left midlung linear atelectasis.    Bibasilar opacities with costophrenic angle blunting, possibly pleural   effusions with associated passive atelectasis, not significantly changed.    --- End of Report ---    VIOLETTE BARRIGA MD; Attending Radiologist  This document has been electronically signed. Apr 24 2023  5:21PM    Ventricular Rate 82 BPM    Atrial Rate 82 BPM    P-R Interval 160 ms    QRS Duration 84 ms    Q-T Interval 412 ms    QTC Calculation(Bazett) 481 ms    P Axis 68 degrees    R Axis 13 degrees    T Axis 4 degrees    Diagnosis Line Normal sinus rhythm  Right atrial enlargement  Borderline ECG  When compared with ECG of 22-APR-2023 09:43,  aberrant conduction is no longer present  Confirmed by PAULINE LARSEN ([id number]) on 4/24/2023 4:48:07 PM

## 2023-04-27 NOTE — PROGRESS NOTE ADULT - SUBJECTIVE AND OBJECTIVE BOX
[facility]  INFECTIOUS DISEASES   [address] [place] [address]  Tel: [phone]     Fax: [phone]  ======================================================  MD Adriana Bird Kaushal, MD Cho, Michelle, MD   ======================================================    Assessment/Recommendations:      72-year-old  female with past medical history of essential hypertension, congestive heart failure, asthma, hypothyroidism, atrial fibrillation, splenomegaly with myelofibrosis with history of C. difficile in January 2023 initially presented with generalized abdominal pain with persistent diarrhea found to have sepsis secondary to perforated viscus from sigmoid colon perforation  with multi- bacterial peritonitis s/p Tia procedure as well as abdominal abscess drainage on April 13  with leukocytosis mostly reactive; with oral candidiasis with prolonged broad spectrum antibiotic exposure      patient seen and examined     peritoneal cultures: April 13: ESBL Klebsiella, strep constellatus, Proteus vulgaris, E. coli, Bacteroides, Morganella morganii   blood culture: April 22 (2 sets), April 13 (2 sets): Negative to date   stool for C. difficile by PCR: April 15 and 20: Negative to date    CT Abdomen and Pelvis w/ Oral Cont (04.20.23): Limited study. Status post left hemicolectomy and right lower quadrant colostomy. Diffuse small bowel and colonic wall thickening may reflect an  enterocolitis.Small to moderate abdominal pelvic ascites, without localized intra-abdominal fluid collection. Diffuse body wall edema/anasarca. Subtle linear high attenuation material extending from the abdominal wall to the midline surgical wound, may reflect postsurgical material, however, cannot exclude enterocutaneous fistula.     WBC 33,000 on admission improved as low as 10.5 thousand on April 14 subsequently peaked at 42,000 on April 22 currently trending 33.79k->37.21k-> 35.31-> 34.35k in last 72 hours    afebrile, borderline hypotensive intermittently with normal heart rate (on midodrine)   S/p removal of central line and indwelling urinary catheter  monitor WBC, temperature curve  Antibiotics:  Patient was on Zosyn from April 13 through 15 when transitioning to meropenem   which has been continued up until 4.24(10 days); Recommend monitoring off of IV meropenem; to continue PO Vanco through NGT until 4.28  One dose of Diflucan today ( mg)     if hemodynamically unstable off of antibiotics or continued increased WBC with fever/hypothermia would recommend repeating blood culture, urine analysis, chest x-ray, CT abdomen pelvis/chest   AMS eval and management per primary team     D/w Primary team     ________________________________    Last 24 hours:   C/o back pain   On mittens b/l     Further ROS:  Could not obtain ROS due to underlying mentation  ______________________________  Allergies    No Known Allergies    MEDICATIONS  (STANDING):  albuterol/ipratropium for Nebulization 3 milliLiter(s) Nebulizer every 6 hours  buDESOnide    Inhalation Suspension 0.5 milliGRAM(s) Inhalation every 12 hours  dextrose 5%. 1000 milliLiter(s) (65 mL/Hr) IV Continuous <Continuous>  escitalopram 20 milliGRAM(s) Oral daily  fluconAZOLE IVPB 100 milliGRAM(s) IV Intermittent once  heparin   Injectable 5000 Unit(s) SubCutaneous every 8 hours  levothyroxine Injectable 93.75 MICROGram(s) IV Push <User Schedule>  midodrine 10 milliGRAM(s) Oral every 8 hours  octreotide  Injectable 100 MICROGram(s) SubCutaneous every 8 hours  pantoprazole  Injectable 40 milliGRAM(s) IV Push daily  vancomycin    Solution 125 milliGRAM(s) Oral daily    MEDICATIONS  (PRN):  albuterol    90 MICROgram(s) HFA Inhaler 4 Puff(s) Inhalation every 6 hours PRN Shortness of Breath and/or Wheezing  metoclopramide Injectable 5 milliGRAM(s) IV Push every 6 hours PRN nausea and/or vomiting  sodium chloride 0.9% lock flush 10 milliLiter(s) IV Push every 1 hour PRN Pre/post blood products, medications, blood draw, and to maintain line patency    _________________    PHYSICAL EXAM:    Objective:  ICU Vital Signs Last 24 Hrs  T(C): 36.8 (27 Apr 2023 04:48), Max: 36.8 (27 Apr 2023 04:48)  T(F): 98.3 (27 Apr 2023 04:48), Max: 98.3 (27 Apr 2023 04:48)  HR: 81 (27 Apr 2023 04:48) (60 - 81)  BP: 118/65 (27 Apr 2023 04:48) (104/61 - 184/79)  RR: 18 (27 Apr 2023 04:48) (18 - 18)  SpO2: 99% (27 Apr 2023 04:48) (91% - 100%)    O2 Parameters below as of 27 Apr 2023 04:48  Patient On (Oxygen Delivery Method): nasal cannula  O2 Flow (L/min): 2        General: No acute distress.  Lying in bed comfortably   Neuro: Lethargic and difficult to arouse, No obvious motor deficit  HEENT: Pupils equal, reactive to light b/l 5 mm, Oral mucosa moist, NGT+  PULM: Clear to auscultation bilaterally except decreased at the bases, no significant adventitious breath sounds   CVS: Regular rhythm and controlled rate,2+ systolic murmurs+ no rubs, or gallops  ABD: Soft, distended, nontender, normoactive bowel sounds, no CVA tenderness,  Surgical Site without surrounding cellulitic changes with packing and dressing+, TAMERA bulb noted inferior to colostomy bag with minimal erythema surrounding it  EXT: No b/l LE edema, nontender with pedal pulse palpable   SKIN: Warm and well perfused, no acute rashes    external urinary suction catheter present  ______________  LABS, MICROBIOLOGY, RADIOLOGY & ADDITIONAL STUDIES: Reviewed

## 2023-04-27 NOTE — PROGRESS NOTE ADULT - PROBLEM SELECTOR PLAN 4
LAYA likely ATN due to septic shock  S/p bicarb gtt for metabolic acidosis  s/p diuresis with metolazone and bumetanide- held due to rising creatinine  monitor off diuretics  renal function improving  nephro ([facility]), recs appreciated

## 2023-04-27 NOTE — PROGRESS NOTE ADULT - PROBLEM SELECTOR PLAN 2
Perforated sigmoid colon: s/p Ro's procedure   Ostomy c/d/i; watery output noted  NPO with NGT,   speech and swallow recs: NPO, will re-evaluate  neg c-diff, neg GI pcr  ID ([facility]), recs appreciated  Continue Metoclopramide prn and PPI   Monitor drain and colostomy output.   Continue octreotide given history of carcinoid with increased ostomy output.   Psyllium added to bulk stool per ICU   f/u repeat CT  Dilaudid 1mg q6hr PRN and APAP 650mg q6hr for pain Perforated sigmoid colon: s/p Ro's procedure   Ostomy c/d/i; watery output noted  NPO with NGT,   speech and swallow recs: NPO, will re-evaluate  neg c-diff, neg GI pcr  ID ([facility]), recs appreciated  Continue Metoclopramide prn and PPI   Monitor drain and colostomy output.   has been on octreotide given history of carcinoid with increased ostomy output -- per heme/onc last PET CT without evidence of carcinoid tumor and recommend trial off the octreotide -- discontinued this afternoon  Psyllium added to bulk stool per ICU   f/u repeat CT  Dilaudid 1mg q6hr PRN and APAP 650mg q6hr for pain    pt complained of abd pain and tenderness in the morning -- had CT Abd/P which showed: "Enteric tube terminates in the stomach, approximately 8 cm while the GE junction.  This may be advanced as clinically warranted. New subcapsular fluid collections about the liver, measuring approximately 4.5 x 1.4 cm anteriorly, 8.1 x 2.3 cm along the right side of the liver superiorly, 5.2 x 2.7 cm posteromedially, and 1.6 x 1.2 cm posterolaterally. Otherwise no significant interval change from 04/20/2023. There appears to be persistent thickening of the small bowel and proximal   colon which is suboptimally evaluated due to lack of oral/IV contrast and diffuse underdistention the bowel. There is redemonstration of gastric wall thickening versus underdistention."  unclear significance of the fluid collections, pt denied abd pain/tenderness in the afternoon exam -- f/up with surgery if any intervention needed for the changes on CT.

## 2023-04-27 NOTE — PROGRESS NOTE ADULT - TIME BILLING
Plan d/w patient and primary team   We will sign off for now.   Please call us with any concerns or questions.     Danny Wright MD   Division of Infectious Diseases  [facility]   Cell [phone] between 8am and 6pm   After 6pm and weekends please call ID service at [phone].

## 2023-04-27 NOTE — PROGRESS NOTE ADULT - PROBLEM SELECTOR PLAN 1
Septic shock due to perforated sigmoid colon with polymicrobial peritonitis; overall improving leukocytosis   s/p Tia procedure and abdominal abscess drainage on 4/13  s/p lactic acidosis and pressor support  midodrine 10mg q8h transitioned to 5mg q8hr  BCx - 4/13 no growth   Peritoneal cultures poly microbial - Proteus vulgaris group, Escherichia coli, Klebsiella pneumoniae, Morganella morganii   Peritoneal fluid with gram variable rods and PMNs.  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  Pt has not been able to participate with S&S and needs to continue on tube feeds for now -- reassess in a couple of days if mentation / strength improving. Septic shock due to perforated sigmoid colon with polymicrobial peritonitis; overall improving leukocytosis   s/p Tia procedure and abdominal abscess drainage on 4/13  s/p lactic acidosis and pressor support  midodrine 10mg q8h transitioned to 5mg q8hr  BCx - 4/13 no growth   Peritoneal cultures poly microbial - Proteus vulgaris group, Escherichia coli, Klebsiella pneumoniae, Morganella morganii   Peritoneal fluid with gram variable rods and PMNs.  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  Pt has not been able to participate with S&S and needs to continue on tube feeds for now -- reassess tomorrow if mentation / strength improving.

## 2023-04-27 NOTE — PROGRESS NOTE ADULT - PROBLEM SELECTOR PLAN 3
RESOLVED  Acute respiratory failure with hypoxia extubated 4/19   Will encourage incentive spirometry  chest PT as tolerated  Ct albuterol-ipratropium nebs q6h + budesonide 0.5 mg nebs q12h (on Trelegy Ellipta inhaler at home)

## 2023-04-27 NOTE — PROGRESS NOTE ADULT - SUBJECTIVE AND OBJECTIVE BOX
Subjective: c/o malaise and myalgia.       MEDICATIONS  (STANDING):  acetaminophen   IVPB .. 1000 milliGRAM(s) IV Intermittent once  albuterol/ipratropium for Nebulization 3 milliLiter(s) Nebulizer every 6 hours  buDESOnide    Inhalation Suspension 0.5 milliGRAM(s) Inhalation every 12 hours  dextrose 5%. 1000 milliLiter(s) (65 mL/Hr) IV Continuous <Continuous>  escitalopram 20 milliGRAM(s) Oral daily  fluconAZOLE IVPB 100 milliGRAM(s) IV Intermittent once  heparin   Injectable 5000 Unit(s) SubCutaneous every 8 hours  levothyroxine Injectable 93.75 MICROGram(s) IV Push <User Schedule>  midodrine. 5 milliGRAM(s) Oral three times a day  octreotide  Injectable 100 MICROGram(s) SubCutaneous every 8 hours  pantoprazole  Injectable 40 milliGRAM(s) IV Push daily  vancomycin    Solution 125 milliGRAM(s) Oral daily    MEDICATIONS  (PRN):  acetaminophen     Tablet .. 650 milliGRAM(s) Oral every 6 hours PRN Moderate Pain (4 - 6)  albuterol    90 MICROgram(s) HFA Inhaler 4 Puff(s) Inhalation every 6 hours PRN Shortness of Breath and/or Wheezing  HYDROmorphone   Tablet 1 milliGRAM(s) Oral every 6 hours PRN Severe Pain (7 - 10)  metoclopramide Injectable 5 milliGRAM(s) IV Push every 6 hours PRN nausea and/or vomiting  sodium chloride 0.9% lock flush 10 milliLiter(s) IV Push every 1 hour PRN Pre/post blood products, medications, blood draw, and to maintain line patency          T(C): 36.6 (04-27-23 @ 11:35), Max: 36.8 (04-27-23 @ 04:48)  HR: 77 (04-27-23 @ 11:35) (60 - 81)  BP: 123/70 (04-27-23 @ 11:35) (104/61 - 184/79)  RR: 18 (04-27-23 @ 11:35) (18 - 18)  SpO2: 98% (04-27-23 @ 11:35) (91% - 100%)  Wt(kg): --        I&O's Detail    26 Apr 2023 07:01  -  27 Apr 2023 07:00  --------------------------------------------------------  IN:    Nepro with Carb Steady: 540 mL  Total IN: 540 mL    OUT:    Bulb (mL): 50 mL    Colostomy (mL): 1150 mL    Voided (mL): 1800 mL  Total OUT: 3000 mL    Total NET: -2460 mL               PHYSICAL EXAM:    CHEST/LUNG: dec BS  HEART: S1S2  ABDOMEN: distended, surgical dressing  EXTREMITIES:  some edema      LABS:  CBC Full  -  ( 27 Apr 2023 07:10 )  WBC Count : 34.35 K/uL  RBC Count : 2.63 M/uL  Hemoglobin : 7.3 g/dL  Hematocrit : 25.5 %  Platelet Count - Automated : 494 K/uL  Mean Cell Volume : 97.0 fl  Mean Cell Hemoglobin : 27.8 pg  Mean Cell Hemoglobin Concentration : 28.6 gm/dL  Auto Neutrophil # : 32.63 K/uL  Auto Lymphocyte # : 0.34 K/uL  Auto Monocyte # : 1.03 K/uL  Auto Eosinophil # : 0.00 K/uL  Auto Basophil # : 0.34 K/uL  Auto Neutrophil % : 92.0 %  Auto Lymphocyte % : 1.0 %  Auto Monocyte % : 3.0 %  Auto Eosinophil % : 0.0 %  Auto Basophil % : 1.0 %    04-27    143  |  110<H>  |  79<H>  ----------------------------<  121<H>  3.6   |  27  |  3.90<H>    Ca    7.9<L>      27 Apr 2023 07:10  Phos  5.6     04-26  Mg     2.1     04-26    TPro  5.3<L>  /  Alb  2.0<L>  /  TBili  0.3  /  DBili  x   /  AST  10<L>  /  ALT  7<L>  /  AlkPhos  57  04-27        Impression:  1.LAYA on CKD: Septic/hemodynamic ATN. Cr is slowly improving  2.Sigmoid perforation, s/p surgical repair  3.Anemia  4.Shock, Sepsis. Better     Recommendations:   She remains without dialytic indications.   Will cont to monitor closely with you  Adjust all meds to Cr Cl 15-20cc/min

## 2023-04-27 NOTE — PROGRESS NOTE ADULT - SUBJECTIVE AND OBJECTIVE BOX
Interval History:  remains weak,, awake but non verbal to me  Chart reviewed and events noted;   Overnight events:    MEDICATIONS  (STANDING):  albuterol/ipratropium for Nebulization 3 milliLiter(s) Nebulizer every 6 hours  buDESOnide    Inhalation Suspension 0.5 milliGRAM(s) Inhalation every 12 hours  dextrose 5%. 1000 milliLiter(s) (65 mL/Hr) IV Continuous <Continuous>  escitalopram 20 milliGRAM(s) Oral daily  fluconAZOLE IVPB 100 milliGRAM(s) IV Intermittent once  heparin   Injectable 5000 Unit(s) SubCutaneous every 8 hours  levothyroxine Injectable 93.75 MICROGram(s) IV Push <User Schedule>  midodrine. 5 milliGRAM(s) Oral three times a day  octreotide  Injectable 100 MICROGram(s) SubCutaneous every 8 hours  pantoprazole  Injectable 40 milliGRAM(s) IV Push daily  vancomycin    Solution 125 milliGRAM(s) Oral daily    MEDICATIONS  (PRN):  acetaminophen     Tablet .. 650 milliGRAM(s) Oral every 6 hours PRN Moderate Pain (4 - 6)  albuterol    90 MICROgram(s) HFA Inhaler 4 Puff(s) Inhalation every 6 hours PRN Shortness of Breath and/or Wheezing  HYDROmorphone   Tablet 1 milliGRAM(s) Oral every 6 hours PRN Severe Pain (7 - 10)  metoclopramide Injectable 5 milliGRAM(s) IV Push every 6 hours PRN nausea and/or vomiting  sodium chloride 0.9% lock flush 10 milliLiter(s) IV Push every 1 hour PRN Pre/post blood products, medications, blood draw, and to maintain line patency      Vital Signs Last 24 Hrs  T(C): 36.6 (27 Apr 2023 11:35), Max: 36.8 (27 Apr 2023 04:48)  T(F): 97.8 (27 Apr 2023 11:35), Max: 98.3 (27 Apr 2023 04:48)  HR: 77 (27 Apr 2023 11:35) (60 - 81)  BP: 123/70 (27 Apr 2023 11:35) (113/58 - 184/79)  BP(mean): --  RR: 18 (27 Apr 2023 11:35) (18 - 18)  SpO2: 98% (27 Apr 2023 11:35) (91% - 100%)    Parameters below as of 27 Apr 2023 11:35  Patient On (Oxygen Delivery Method): nasal cannula        PHYSICAL EXAM  General: adult in NAD  HEENT: clear oropharynx, anicteric sclera, pink conjunctivae  Neck: supple  CV: normal S1S2 with no murmur rubs or gallops  Lungs: clear to auscultation, no wheezes, no rhales  Abdomen: soft non-tender non-distended, no hepato/splenomegaly  Ext: no clubbing cyanosis or edema  Skin: no rashes and no petichiae  Neuro: alert and oriented X3 no focal deficits      LABS:  CBC Full  -  ( 27 Apr 2023 07:10 )  WBC Count : 34.35 K/uL  RBC Count : 2.63 M/uL  Hemoglobin : 7.3 g/dL  Hematocrit : 25.5 %  Platelet Count - Automated : 494 K/uL  Mean Cell Volume : 97.0 fl  Mean Cell Hemoglobin : 27.8 pg  Mean Cell Hemoglobin Concentration : 28.6 gm/dL  Auto Neutrophil # : 32.63 K/uL  Auto Lymphocyte # : 0.34 K/uL  Auto Monocyte # : 1.03 K/uL  Auto Eosinophil # : 0.00 K/uL  Auto Basophil # : 0.34 K/uL  Auto Neutrophil % : 92.0 %  Auto Lymphocyte % : 1.0 %  Auto Monocyte % : 3.0 %  Auto Eosinophil % : 0.0 %  Auto Basophil % : 1.0 %    04-27    143  |  110<H>  |  79<H>  ----------------------------<  121<H>  3.6   |  27  |  3.90<H>    Ca    7.9<L>      27 Apr 2023 07:10  Phos  5.6     04-26  Mg     2.1     04-26    TPro  5.3<L>  /  Alb  2.0<L>  /  TBili  0.3  /  DBili  x   /  AST  10<L>  /  ALT  7<L>  /  AlkPhos  57  04-27        fe studies      WBC trend  34.35 K/uL (04-27-23 @ 07:10)  35.31 K/uL (04-26-23 @ 07:15)  37.21 K/uL (04-25-23 @ 07:10)      Hgb trend  7.3 g/dL (04-27-23 @ 07:10)  7.6 g/dL (04-26-23 @ 07:15)  7.6 g/dL (04-25-23 @ 07:10)      plt trend  494 K/uL (04-27-23 @ 07:10)  481 K/uL (04-26-23 @ 07:15)  446 K/uL (04-25-23 @ 07:10)        RADIOLOGY & ADDITIONAL STUDIES:

## 2023-04-27 NOTE — PROGRESS NOTE ADULT - ASSESSMENT
72-year-old female with history of hypertension, CHF, afib s/p AF ablation (2/7/19) currently not on xarelto, CATH 2018, hypothyroidism, HLD history of C. difficile January 2023 presents for vomitting and abdominal pain. Consulted for CHF.    Cardiac Optimization, Hx Afib s/p Ablation  - CT ABD/Pelvis: Pneumoperitoneum and no ascites, perforated sigmoid colon. Stable splenomegaly. Stable right lung nodule, ground glass infiltrates and loculated right pleural effusion.  - S/P Tia's with abdominal abscess drainage.   - Follow Surgery recs.  - Continue Abx per ID    - Known Permanent Afib.  Remains rate-controlled  - Holding home Xarelto.  Anemia persistent  - Holding home metoprolol 50mg qd due to hypotension, though, now resolved.  Can resume at lower dose  - Continue Midodrine.  Can probably start tapering with a goal of discontinuing it.  Not her home med    - Elevated troponin peaked at 100.9, otherwise cardiac enzymes unremarkable, likely demand in the setting of sepsis  - EKG: Sinus tachycardia with PAC's repeat ekg today for irreg HR on exam  - No acute changes on EKG compared to previous.   - Prior TTE (1/18/23): normal LVEF 65%, normal RV size and function, biatrial enlargement, sclerotic aortic valve, mild AI, Moderate MR and TR  - TTE shows EF 55-60%, RVE, PASP 66     - Now with improving creatinine  - Renal following.  No indication for HD at this point    - Monitor and replete lytes, keep K>4, Mg>2.  - DVT ppx  - Will continue to follow    Ivana Whitney DNP, NP-C, AGACNP-C  Cardiology   Spectra #[phone]

## 2023-04-27 NOTE — PROGRESS NOTE ADULT - TIME BILLING
Pt seen + examined. Case discussed with resident. Agree with assessment and plan above (edited by me in detail):  Time spent: 50min. More than 50% of the visit was spent counseling the patient on medical condition -- severe sepsis and peritonitis due to perforated sigmoid colon, LAYA due to ATN, shock liver, and post/op acute respiratory failure requiring mechanical ventilation, and acute blood loss anemia.    73yo F w/ PMH of A-Fib on rivaroxaban, HFpEF, recent C diff infection (Jan 2023), myeloproliferative disorder with thrombocytosis on anagrelide, hypothyroidism, asthma, and lung nodules s/p resection (reportedly malignant) who presents with perforated sigmoid colon with acute bacterial peritonitis, LAYA due to ATN, septic shock, lactic acidosis, shock liver;  post/op acute respiratory failure requiring mechanical ventilation, and acute blood loss anemia; s/p extended Tia's procedure including descending colon on 4/13. She was extubated 4/19 and tolerating supplemental oxygen via nasal canula, overall gradual improvement, but complicated by critical illness polyneuropathy.    Septic shock due to perforated sigmoid colon with polymicrobial peritonitis; overall improving leukocytosis   s/p Tia procedure and abdominal abscess drainage on 4/13  s/p lactic acidosis and pressor support  midodrine 10mg q8h transitioned to 5mg q8hr  BCx - 4/13 no growth   Peritoneal cultures poly microbial - Proteus vulgaris group, Escherichia coli, Klebsiella pneumoniae, Morganella morganii   Peritoneal fluid with gram variable rods and PMNs.  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  Pt has not been able to participate with S&S and needs to continue on tube feeds for now -- reassess tomorrow if mentation / strength improving.    pt complained of abd pain and tenderness in the morning -- had CT Abd/P which showed: "Enteric tube terminates in the stomach, approximately 8 cm while the GE junction.  This may be advanced as clinically warranted. New subcapsular fluid collections about the liver, measuring approximately 4.5 x 1.4 cm anteriorly, 8.1 x 2.3 cm along the right side of the liver superiorly, 5.2 x 2.7 cm posteromedially, and 1.6 x 1.2 cm posterolaterally. Otherwise no significant interval change from 04/20/2023. There appears to be persistent thickening of the small bowel and proximal   colon which is suboptimally evaluated due to lack of oral/IV contrast and diffuse underdistention the bowel. There is redemonstration of gastric wall thickening versus underdistention."  unclear significance of the fluid collections, pt denied abd pain/tenderness in the afternoon exam -- f/up with surgery if any intervention needed for the changes on CT.    has been on octreotide given history of carcinoid with increased ostomy output -- per heme/onc last PET CT without evidence of carcinoid tumor and recommend trial off the octreotide -- discontinued this afternoon    LAYA likely ATN due to septic shock  S/p bicarb gtt for metabolic acidosis  s/p diuresis with metolazone and bumetanide- held due to rising creatinine  monitor off diuretics  renal function improving   nephro ([facility]), recs appreciated

## 2023-04-27 NOTE — PROGRESS NOTE ADULT - ASSESSMENT
73yo F w/ PMH of A-Fib on rivaroxaban, HFpEF, recent C diff infection (Jan 2023), myelofibrosis with thrombocytosis on anagrelide, hypothyroidism, asthma, and lung nodules s/p resection (reportedly malignant) who presents with perforated sigmoid colon with acute bacterial peritonitis, LAYA due to ATN,septic shock, lactic acidosis, shock liver;  post/op acute respiratory failure requiring mechanical ventilation, and acute blood loss anemia; s/p extended Tia's procedure including descending colon on 4/13. She was extubated 4/19 and tolerating supplemental oxygen via nasal canula, overall gradual improvement, but complicated by critical illness polyneuropathy and leukocytosis of unclear etiology.                 73yo F w/ PMH of A-Fib on rivaroxaban, HFpEF, recent C diff infection (Jan 2023), myeloproliferative disorder with thrombocytosis on anagrelide, hypothyroidism, asthma, and lung nodules s/p resection (reportedly malignant) who presents with perforated sigmoid colon with acute bacterial peritonitis, LAYA due to ATN, septic shock, lactic acidosis, shock liver;  post/op acute respiratory failure requiring mechanical ventilation, and acute blood loss anemia; s/p extended Tia's procedure including descending colon on 4/13. She was extubated 4/19 and tolerating supplemental oxygen via nasal canula, overall gradual improvement, but complicated by critical illness polyneuropathy.

## 2023-04-27 NOTE — PROGRESS NOTE ADULT - SUBJECTIVE AND OBJECTIVE BOX
Patient is a 72y old  Female who presents with a chief complaint of intra-abdominal perforation (27 Apr 2023 10:35)    INTERVAL HPI/OVERNIGHT EVENTS: Patient seen and examined at bedside. No overnight events occurred. Patient complaining of diffuse body aching and lethargy. Denies fevers, chills, headache, lightheadedness, dyspnea, n/v/d/c.    MEDICATIONS  (STANDING):  acetaminophen   IVPB .. 1000 milliGRAM(s) IV Intermittent once  albuterol/ipratropium for Nebulization 3 milliLiter(s) Nebulizer every 6 hours  buDESOnide    Inhalation Suspension 0.5 milliGRAM(s) Inhalation every 12 hours  dextrose 5%. 1000 milliLiter(s) (65 mL/Hr) IV Continuous <Continuous>  escitalopram 20 milliGRAM(s) Oral daily  fluconAZOLE IVPB 100 milliGRAM(s) IV Intermittent once  heparin   Injectable 5000 Unit(s) SubCutaneous every 8 hours  levothyroxine Injectable 93.75 MICROGram(s) IV Push <User Schedule>  midodrine. 5 milliGRAM(s) Oral three times a day  octreotide  Injectable 100 MICROGram(s) SubCutaneous every 8 hours  pantoprazole  Injectable 40 milliGRAM(s) IV Push daily  vancomycin    Solution 125 milliGRAM(s) Oral daily    MEDICATIONS  (PRN):  acetaminophen     Tablet .. 650 milliGRAM(s) Oral every 6 hours PRN Moderate Pain (4 - 6)  albuterol    90 MICROgram(s) HFA Inhaler 4 Puff(s) Inhalation every 6 hours PRN Shortness of Breath and/or Wheezing  HYDROmorphone   Tablet 1 milliGRAM(s) Oral every 6 hours PRN Severe Pain (7 - 10)  metoclopramide Injectable 5 milliGRAM(s) IV Push every 6 hours PRN nausea and/or vomiting  sodium chloride 0.9% lock flush 10 milliLiter(s) IV Push every 1 hour PRN Pre/post blood products, medications, blood draw, and to maintain line patency    Allergies  No Known Allergies    Intolerances    REVIEW OF SYSTEMS: Limited 2/2 lethargy. Pt stating she does not have SOB     Vital Signs Last 24 Hrs  T(C): 36.6 (27 Apr 2023 11:35), Max: 36.8 (27 Apr 2023 04:48)  T(F): 97.8 (27 Apr 2023 11:35), Max: 98.3 (27 Apr 2023 04:48)  HR: 77 (27 Apr 2023 11:35) (60 - 81)  BP: 123/70 (27 Apr 2023 11:35) (104/61 - 184/79)  BP(mean): --  RR: 18 (27 Apr 2023 11:35) (18 - 18)  SpO2: 98% (27 Apr 2023 11:35) (91% - 100%)    Parameters below as of 27 Apr 2023 11:35  Patient On (Oxygen Delivery Method): nasal cannula    PHYSICAL EXAM:  GENERAL: chronically ill-appearing, not in acute distress; frail, somnolent  HEENT:  anicteric, moist mucous membranes  CHEST/LUNG:  CTA b/l, no rales, wheezes, or rhonchi  HEART: irregular rhythm at 72bpm, S1, S2  ABDOMEN: ileostomy present with drainage, BS+, soft, no apparent tenderness, nondistended  EXTREMITIES: no cyanosis, or calf tenderness  NERVOUS SYSTEM: somnolent, answering a few questions with nodding and few words    LABS:                        7.3    34.35 )-----------( 494      ( 27 Apr 2023 07:10 )             25.5     CBC Full  -  ( 27 Apr 2023 07:10 )  WBC Count : 34.35 K/uL  Hemoglobin : 7.3 g/dL  Hematocrit : 25.5 %  Platelet Count - Automated : 494 K/uL  Mean Cell Volume : 97.0 fl  Mean Cell Hemoglobin : 27.8 pg  Mean Cell Hemoglobin Concentration : 28.6 gm/dL  Auto Neutrophil # : 32.63 K/uL  Auto Lymphocyte # : 0.34 K/uL  Auto Monocyte # : 1.03 K/uL  Auto Eosinophil # : 0.00 K/uL  Auto Basophil # : 0.34 K/uL  Auto Neutrophil % : 92.0 %  Auto Lymphocyte % : 1.0 %  Auto Monocyte % : 3.0 %  Auto Eosinophil % : 0.0 %  Auto Basophil % : 1.0 %    27 Apr 2023 07:10    143    |  110    |  79     ----------------------------<  121    3.6     |  27     |  3.90     Ca    7.9        27 Apr 2023 07:10    TPro  5.3    /  Alb  2.0    /  TBili  0.3    /  DBili  x      /  AST  10     /  ALT  7      /  AlkPhos  57     27 Apr 2023 07:10    CAPILLARY BLOOD GLUCOSE  POCT Blood Glucose.: 134 mg/dL (27 Apr 2023 06:15)  POCT Blood Glucose.: 157 mg/dL (27 Apr 2023 00:16)  POCT Blood Glucose.: 145 mg/dL (26 Apr 2023 18:32)    Culture - Blood (collected 04-22-23 @ 12:30)  Source: .Blood Blood-Peripheral  Preliminary Report (04-23-23 @ 16:01):    No growth to date.    Culture - Blood (collected 04-22-23 @ 12:26)  Source: .Blood Blood-Peripheral  Preliminary Report (04-23-23 @ 16:01):    No growth to date.    RADIOLOGY & ADDITIONAL TESTS:    < from: Xray Chest 1 View- PORTABLE-Urgent (Xray Chest 1 View- PORTABLE-Urgent .) (04.24.23 @ 16:23) >  PROCEDURE DATE:  04/24/2023          INTERPRETATION:  TIME OF EXAM: April 24, 2023 at 1:15 PM.    CLINICAL INFORMATION: Status post extended Ro's for perforated   sigmoid. NG tube placement.    COMPARISON:  April 20, 2023.    TECHNIQUE:   AP Portable chest x-ray. The head and chin obscures the   superior mediastinum and parts of the apices.    INTERPRETATION:    Heart size and the mediastinum cannot be accurately evaluated on this   projection. Calcified thoracic aorta.  Distal enteric tube looped on itself at the level of the GE junction and   extending craniad with the tip in the distal esophagus.  Left lung postsurgical change with chain sutures is again noted.  Small patchy inferior right upper lobe opacity, not significantly changed.  There is new linear atelectasis in the left midlung.  Bibasilar opacities with costophrenic angle blunting, possibly pleural   effusions with associated passive atelectasis are not significantly   changed.  Previous small pneumothorax at the lateral left base is decreased in   size. There is osteoarthritic degenerative changeof the spine.    AP portable chest x-ray from April 24, 2023 at 3:33 PM and 3:34 PM:    CLINICAL INFORMATION: NG tube for feeding, postmanipulation.    INTERPRETATION:    Chin and head again obscures part of the superior mediastinum and apices.  Enteric tube repositioned with tip now likely in the stomach. Right upper   quadrant abdominal surgical clips seen.  New bandlike atelectasis in the left mid to lower lung periphery.  Other findings, not significantly changed.      IMPRESSION:  Enteric tube tip is in the stomach on the most recent image.    Left lung postsurgical change. Minimal lateral left basilar pneumothorax,   unchanged from earlier on April 24, 2023, the most recent image, and   decreased from April 20, 2023.    Small patchy inferior right upper lobe opacity, of indeterminate nature,   not significantly changed.    Continued left midlung linear atelectasis.    Bibasilar opacities with costophrenic angle blunting, possibly pleural   effusions with associated passive atelectasis, not significantly changed.    --- End of Report ---    < end of copied text >    Personally reviewed.     Consultant(s) Notes Reviewed:  [x] YES  [ ] NO     Patient is a 72y old  Female who presents with a chief complaint of generalized abdominal pain and persistent diarrhea.    INTERVAL HPI/OVERNIGHT EVENTS: Patient seen and examined at bedside. No acute overnight events occurred. Patient complaining of diffuse body aching including abd pain and fatigue. Denies fevers, chills, headache, lightheadedness, dyspnea, n/v/d/c.    MEDICATIONS  (STANDING):  acetaminophen   IVPB .. 1000 milliGRAM(s) IV Intermittent once  albuterol/ipratropium for Nebulization 3 milliLiter(s) Nebulizer every 6 hours  buDESOnide    Inhalation Suspension 0.5 milliGRAM(s) Inhalation every 12 hours  dextrose 5%. 1000 milliLiter(s) (65 mL/Hr) IV Continuous <Continuous>  escitalopram 20 milliGRAM(s) Oral daily  fluconAZOLE IVPB 100 milliGRAM(s) IV Intermittent once  heparin   Injectable 5000 Unit(s) SubCutaneous every 8 hours  levothyroxine Injectable 93.75 MICROGram(s) IV Push <User Schedule>  midodrine. 5 milliGRAM(s) Oral three times a day  octreotide  Injectable 100 MICROGram(s) SubCutaneous every 8 hours  pantoprazole  Injectable 40 milliGRAM(s) IV Push daily  vancomycin    Solution 125 milliGRAM(s) Oral daily    MEDICATIONS  (PRN):  acetaminophen     Tablet .. 650 milliGRAM(s) Oral every 6 hours PRN Moderate Pain (4 - 6)  albuterol    90 MICROgram(s) HFA Inhaler 4 Puff(s) Inhalation every 6 hours PRN Shortness of Breath and/or Wheezing  HYDROmorphone   Tablet 1 milliGRAM(s) Oral every 6 hours PRN Severe Pain (7 - 10)  metoclopramide Injectable 5 milliGRAM(s) IV Push every 6 hours PRN nausea and/or vomiting  sodium chloride 0.9% lock flush 10 milliLiter(s) IV Push every 1 hour PRN Pre/post blood products, medications, blood draw, and to maintain line patency    Allergies  No Known Allergies    Intolerances    REVIEW OF SYSTEMS: Limited 2/2 lethargy. Reports "pain all over." Reports abd pain. Pt stating she does not have SOB.    Vital Signs Last 24 Hrs  T(C): 36.6 (27 Apr 2023 11:35), Max: 36.8 (27 Apr 2023 04:48)  T(F): 97.8 (27 Apr 2023 11:35), Max: 98.3 (27 Apr 2023 04:48)  HR: 77 (27 Apr 2023 11:35) (60 - 81)  BP: 123/70 (27 Apr 2023 11:35) (104/61 - 184/79)  BP(mean): --  RR: 18 (27 Apr 2023 11:35) (18 - 18)  SpO2: 98% (27 Apr 2023 11:35) (91% - 100%)    Parameters below as of 27 Apr 2023 11:35  Patient On (Oxygen Delivery Method): nasal cannula    PHYSICAL EXAM:  GENERAL: chronically ill-appearing, not in acute distress; frail, somnolent  HEENT:  anicteric, moist mucous membranes  CHEST/LUNG:  CTA b/l, no rales, wheezes, or rhonchi  HEART: irregular rhythm at 72bpm, S1, S2  ABDOMEN: ileostomy present with drainage, BS+, soft, reported tenderness generally in the morning exam (nontender in the afternoon), nondistended  EXTREMITIES: no cyanosis, or calf tenderness  NERVOUS SYSTEM: somnolent, answering a few questions with nodding and few words    LABS:                        7.3    34.35 )-----------( 494      ( 27 Apr 2023 07:10 )             25.5     CBC Full  -  ( 27 Apr 2023 07:10 )  WBC Count : 34.35 K/uL  Hemoglobin : 7.3 g/dL  Hematocrit : 25.5 %  Platelet Count - Automated : 494 K/uL  Mean Cell Volume : 97.0 fl  Mean Cell Hemoglobin : 27.8 pg  Mean Cell Hemoglobin Concentration : 28.6 gm/dL  Auto Neutrophil # : 32.63 K/uL  Auto Lymphocyte # : 0.34 K/uL  Auto Monocyte # : 1.03 K/uL  Auto Eosinophil # : 0.00 K/uL  Auto Basophil # : 0.34 K/uL  Auto Neutrophil % : 92.0 %  Auto Lymphocyte % : 1.0 %  Auto Monocyte % : 3.0 %  Auto Eosinophil % : 0.0 %  Auto Basophil % : 1.0 %    27 Apr 2023 07:10    143    |  110    |  79     ----------------------------<  121    3.6     |  27     |  3.90     Ca    7.9        27 Apr 2023 07:10    TPro  5.3    /  Alb  2.0    /  TBili  0.3    /  DBili  x      /  AST  10     /  ALT  7      /  AlkPhos  57     27 Apr 2023 07:10    CAPILLARY BLOOD GLUCOSE  POCT Blood Glucose.: 134 mg/dL (27 Apr 2023 06:15)  POCT Blood Glucose.: 157 mg/dL (27 Apr 2023 00:16)  POCT Blood Glucose.: 145 mg/dL (26 Apr 2023 18:32)    Culture - Blood (collected 04-22-23 @ 12:30)  Source: .Blood Blood-Peripheral  Preliminary Report (04-23-23 @ 16:01):    No growth to date.    Culture - Blood (collected 04-22-23 @ 12:26)  Source: .Blood Blood-Peripheral  Preliminary Report (04-23-23 @ 16:01):    No growth to date.    RADIOLOGY & ADDITIONAL TESTS:    < from: Xray Chest 1 View- PORTABLE-Urgent (Xray Chest 1 View- PORTABLE-Urgent .) (04.24.23 @ 16:23) >  PROCEDURE DATE:  04/24/2023          INTERPRETATION:  TIME OF EXAM: April 24, 2023 at 1:15 PM.    CLINICAL INFORMATION: Status post extended Ro's for perforated   sigmoid. NG tube placement.    COMPARISON:  April 20, 2023.    TECHNIQUE:   AP Portable chest x-ray. The head and chin obscures the   superior mediastinum and parts of the apices.    INTERPRETATION:    Heart size and the mediastinum cannot be accurately evaluated on this   projection. Calcified thoracic aorta.  Distal enteric tube looped on itself at the level of the GE junction and   extending craniad with the tip in the distal esophagus.  Left lung postsurgical change with chain sutures is again noted.  Small patchy inferior right upper lobe opacity, not significantly changed.  There is new linear atelectasis in the left midlung.  Bibasilar opacities with costophrenic angle blunting, possibly pleural   effusions with associated passive atelectasis are not significantly   changed.  Previous small pneumothorax at the lateral left base is decreased in   size. There is osteoarthritic degenerative changeof the spine.    AP portable chest x-ray from April 24, 2023 at 3:33 PM and 3:34 PM:    CLINICAL INFORMATION: NG tube for feeding, postmanipulation.    INTERPRETATION:    Chin and head again obscures part of the superior mediastinum and apices.  Enteric tube repositioned with tip now likely in the stomach. Right upper   quadrant abdominal surgical clips seen.  New bandlike atelectasis in the left mid to lower lung periphery.  Other findings, not significantly changed.      IMPRESSION:  Enteric tube tip is in the stomach on the most recent image.    Left lung postsurgical change. Minimal lateral left basilar pneumothorax,   unchanged from earlier on April 24, 2023, the most recent image, and   decreased from April 20, 2023.    Small patchy inferior right upper lobe opacity, of indeterminate nature,   not significantly changed.    Continued left midlung linear atelectasis.    Bibasilar opacities with costophrenic angle blunting, possibly pleural   effusions with associated passive atelectasis, not significantly changed.    --- End of Report ---    < end of copied text >    Personally reviewed.     Consultant(s) Notes Reviewed:  [x] YES  [ ] NO

## 2023-04-27 NOTE — PROGRESS NOTE ADULT - ASSESSMENT
IMPRESSION    72y Female admitted with Enterocolitis due to Clostridioides difficile  S/P extended hartmanns, for perforated colon    Patient with history myeloproliferative on agrylin (being treated by Dr. Rice) admitted with abd pain, weakness and falls at home a/w severe sepsis and peritonitis due to perforated sigmoid colon, also with LAYA due to ATN, shock liver, and post/op acute respiratory failure requiring mechanical ventilation, and acute blood loss anemia. pt is off agrylin.  had a period of thrombocytopenia now resolved with current platelet count 416K  Course also complicated by diarrhea which apparently was present prior to admission.  Has hx of ?pulmonary carcinoid with nodules being followed. however recent PET/CT dotatate negative.      RECOMMENDATIONS> :  1.  follow CBC and transfuse as indicated  2.  continue post op care  3.  to stop sandostatin and evaluate change in BM  4.  to hold agrylin until taking better po.  platelet count is not elevated at present but will follow  5.  further heme recommendations pending above

## 2023-04-28 NOTE — PROGRESS NOTE ADULT - PROBLEM SELECTOR PLAN 4
LAYA likely ATN due to septic shock  S/p bicarb gtt for metabolic acidosis  s/p diuresis with metolazone and bumetanide- held due to rising creatinine  monitor off diuretics  renal function improving  nephro ([facility]), recs appreciated LAYA likely ATN due to septic shock; Cr downtrending to 3.5 this AM   S/p bicarb gtt for metabolic acidosis  s/p diuresis with metolazone and bumetanide- held due to rising creatinine  monitor off diuretics  nephro ([facility]), recs appreciated Acute respiratory failure with hypoxia extubated 4/19   Will encourage incentive spirometry  chest PT as tolerated  Ct albuterol-ipratropium nebs q6h + budesonide 0.5 mg nebs q12h (on Trelegy Ellipta inhaler at home)

## 2023-04-28 NOTE — SWALLOW BEDSIDE ASSESSMENT ADULT - COMMENTS
per charting - Per charting, pt is a "72F PMH A-Fib on rivaroxaban, HFpEF, recent C diff infection (Jan 2023), myelofibrosis with thrombocytosis on anagrelide, hypothyroidism, asthma, and lung nodules s/p resection (reportedly malignant) who presents with perforated sigmoid colon with acute bacterial peritonitis, septic shock, lactic acidosis, and LAYA 2/2 ATN, s/p extended Tia's including descending colon on 4/13. Extubated 4/19."    CT Abdomen & Pelvis 4/20/23: "LOWER CHEST:9 mm nodule right middle lobe, stable. Small loculated right pleural effusion with underlying airspace consolidation, which could reflect chronic atelectasis. Trace left-sided pleural effusion and pleural thickening with pleural-based calcifications.  Extensive streak artifact degrades image quality, limiting evaluation.The evaluation of the solid organ parenchyma is limited without  intravenous contrast    Pt able to be seen for eval per RN. Patient is known to this service from previous swallow evaluations, please see reports for details. Pt currently NPO with NG tube feedings in place. Pt is AAOx1, will follow 1 step commands. States name, no other verbalizations. Pt left as received, NAD.

## 2023-04-28 NOTE — PROGRESS NOTE ADULT - ASSESSMENT
72-year-old female with history of hypertension, CHF, afib s/p AF ablation (2/7/19) currently not on xarelto, CATH 2018, hypothyroidism, HLD history of C. difficile January 2023 presents for vomitting and abdominal pain. Consulted for CHF.    Cardiac Optimization, Hx Afib s/p Ablation  - CT ABD/Pelvis: Pneumoperitoneum and no ascites, perforated sigmoid colon. Stable splenomegaly. Stable right lung nodule, ground glass infiltrates and loculated right pleural effusion.  - S/P Tia's with abdominal abscess drainage, surgery following   - Abx per ID following     - Known Permanent Afib, rate-controlled per flow sheet   - Holding home Xarelto.    - Anemia persistent, H/H 6.9/24, transfuse per primary, continue to trend.   - Monitor and replete lytes, keep K>4, Mg>2.    - Holding home metoprolol 50mg qd due to hypotension, though, now resolved.  Can resume at lower dose  - Continue Midodrine.  Can probably start tapering with a goal of discontinuing it.  Not her home med    - Elevated troponin peaked at 100.9, likely demand in the setting of sepsis  - EKG: Sinus tachycardia with PAC's. No acute changes, unchanged from previous.   - TTE (1/18/23): normal LVEF 65%, normal RV size and function, biatrial enlargement, sclerotic aortic valve, mild AI, Moderate MR and TR  - TTE (4/15/2023) EF 55-60%, RVE, PASP 66   - no sign of volume overload     - creatinine slowly improving, no indication for HD at this time, renal following   - DVT ppx  - Will continue to follow    MARY GRACE Black  Nurse Practitioner - Cardiology   Spectra #[phone]/ [phone]         72-year-old female with history of hypertension, CHF, afib s/p AF ablation (2/7/19) currently not on xarelto, CATH 2018, hypothyroidism, HLD history of C. difficile January 2023 presents for vomiting and abdominal pain. Consulted for CHF.    Cardiac Optimization, Hx Afib s/p Ablation  - CT ABD/Pelvis: Pneumoperitoneum and no ascites, perforated sigmoid colon. Stable splenomegaly. Stable right lung nodule, ground glass infiltrates and loculated right pleural effusion.  - S/P Tia's with abdominal abscess drainage, surgery following   - Abx per ID following     - Known Permanent Afib, rate-controlled per flow sheet   - Holding home Xarelto.    - Anemia persistent, H/H 6.9/24, transfuse per primary, continue to trend.   - Monitor and replete lytes, keep K>4, Mg>2.    - Holding home metoprolol 50mg qd due to hypotension, though, now resolved.  Can resume at lower dose  - Continue Midodrine.  Can probably start tapering with a goal of discontinuing it if BP remains stable.  Not her home med    - Elevated troponin peaked at 100.9, likely demand in the setting of sepsis  - EKG: Sinus tachycardia with PAC's. No acute changes, unchanged from previous.   - TTE (1/18/23): normal LVEF 65%, normal RV size and function, biatrial enlargement, sclerotic aortic valve, mild AI, Moderate MR and TR  - TTE (4/15/2023) EF 55-60%, RVE, PASP 66   - no sign of volume overload     - creatinine slowly improving, no indication for HD at this time, renal following   - DVT ppx  - Will continue to follow    MARY GRACE Black  Nurse Practitioner - Cardiology   Spectra #[phone]/ [phone]

## 2023-04-28 NOTE — PROGRESS NOTE ADULT - SUBJECTIVE AND OBJECTIVE BOX
NEPHROLOGY PROGRESS NOTE    CHIEF COMPLAINT:  LAYA/CKD    HPI:  Renal function slowly improving.  Receiving PRBC today.       EXAM:  T(F): 98 (04-28-23 @ 11:43)  HR: 81 (04-28-23 @ 11:43)  BP: 119/68 (04-28-23 @ 11:43)  RR: 17 (04-28-23 @ 11:43)  SpO2: 93% (04-28-23 @ 11:43)    Conversant, in no apparent distress  Normal respiratory effort, lungs clear bilaterally  Heart RRR with no murmur, no peripheral edema         LABS                             6.9    34.21 )-----------( 498      ( 28 Apr 2023 07:09 )             24.0          04-28    142  |  110<H>  |  77<H>  ----------------------------<  124<H>  3.7   |  27  |  3.50<H>    Ca    8.0<L>      28 Apr 2023 07:09  Phos  4.2     04-28  Mg     2.1     04-28    TPro  5.4<L>  /  Alb  2.0<L>  /  TBili  0.4  /  DBili  x   /  AST  8<L>  /  ALT  7<L>  /  AlkPhos  59  04-28           Impression:  1.LAYA on CKD: Septic/hemodynamic ATN. Cr is slowly improving  2.Sigmoid perforation, s/p surgical repair  3.Anemia  4.Shock, Sepsis. Better     Recommendations:   Monitor daily BMP

## 2023-04-28 NOTE — PROGRESS NOTE ADULT - PROBLEM SELECTOR PLAN 3
RESOLVED  Acute respiratory failure with hypoxia extubated 4/19   Will encourage incentive spirometry  chest PT as tolerated  Ct albuterol-ipratropium nebs q6h + budesonide 0.5 mg nebs q12h (on Trelegy Ellipta inhaler at home) -had acute blood loss anemia due to acute perforated bowel and extensive abdominal surgery early in the admission requiring 2un PRBC  -hgb has been generally stable since then with a slow gradual downtrend subsequently without evidence of acute bleeding and suspected to be due to severe illness with likely poor BM activity and production of blood given severe illness and renal failure  -retacrit per nephro  -as Hgb downtrended from 7.3 to 6.9 today, will give 1un PRBC and will transfuse to maintain goal Hgb>7, unless has acute bleeding

## 2023-04-28 NOTE — PROGRESS NOTE ADULT - ASSESSMENT
71yo F w/ PMH of A-Fib on rivaroxaban, HFpEF, recent C diff infection (Jan 2023), myeloproliferative disorder with thrombocytosis on anagrelide, hypothyroidism, asthma, and lung nodules s/p resection (reportedly malignant) who presents with perforated sigmoid colon with acute bacterial peritonitis, LAYA due to ATN, septic shock, lactic acidosis, shock liver;  post/op acute respiratory failure requiring mechanical ventilation, and acute blood loss anemia; s/p extended Ita's procedure including descending colon on 4/13. She was extubated 4/19 and tolerating supplemental oxygen via nasal canula, overall gradual improvement, but complicated by critical illness polyneuropathy.

## 2023-04-28 NOTE — PROGRESS NOTE ADULT - SUBJECTIVE AND OBJECTIVE BOX
Postoperative Day #: 15    72y Female admitted with Enterocolitis due to Clostridioides difficile  S/P Extended hartmanns (including descending colon) for perforated sigmoid      Patient seen and examined bedside resting comfortably.  Is much more talkative,. and alert than previously noted.  Tolerating her tube feeds.  No overnight events.  Currently complaining of mild abdominal discomfort during dressing change that she is unable to localized,       T(F): 97.7 (04-28-23 @ 04:57), Max: 97.8 (04-27-23 @ 11:35)  HR: 79 (04-28-23 @ 04:57) (74 - 82)  BP: 111/50 (04-28-23 @ 04:57) (111/50 - 127/56)  RR: 18 (04-28-23 @ 04:57) (17 - 18)  SpO2: 98% (04-28-23 @ 04:57) (98% - 100%)  Wt(kg): --  CAPILLARY BLOOD GLUCOSE      POCT Blood Glucose.: 136 mg/dL (28 Apr 2023 06:42)  POCT Blood Glucose.: 144 mg/dL (28 Apr 2023 00:13)      PHYSICAL EXAM:  General: NAD  Neuro:  Alert & oriented, much more.   CV: +S1+S2 regular rate and rhythm  Lung: clear to ausculation bilaterally  Abdomen: BS+ Soft. ND.  Ostomy with function.  MIdline dressing changed and repacked.    Extremities: no pedal edema or calf tenderness noted       LABS:                        7.3    34.35 )-----------( 494      ( 27 Apr 2023 07:10 )             25.5     04-27    143  |  110<H>  |  79<H>  ----------------------------<  121<H>  3.6   |  27  |  3.90<H>    Ca    7.9<L>      27 Apr 2023 07:10    TPro  5.3<L>  /  Alb  2.0<L>  /  TBili  0.3  /  DBili  x   /  AST  10<L>  /  ALT  7<L>  /  AlkPhos  57  04-27      I&O's Detail    27 Apr 2023 07:01  -  28 Apr 2023 07:00  --------------------------------------------------------  IN:    Nepro with Carb Steady: 540 mL  Total IN: 540 mL    OUT:    Bulb (mL): 40 mL    Colostomy (mL): 1250 mL    Voided (mL): 1000 mL  Total OUT: 2290 mL    Total NET: -1750 mL            RADIOLOGY:  < from: CT Abdomen and Pelvis No Cont (04.27.23 @ 12:05) >  ACC: [id number] EXAM:  CT ABDOMEN AND PELVIS   ORDERED BY: SEAN GAMBLE     PROCEDURE DATE:  04/27/2023          INTERPRETATION:  CLINICAL INFORMATION: Worsening abdominal pain.    Prolonged hospitalization secondary to sigmoid perforation with partial   colectomy and Ro's pouch.    COMPARISON: 4/20/2023, 4/13/2023, and 01/26/2020    CONTRAST/COMPLICATIONS:  IV Contrast: NONE  Oral Contrast: NONE  Complications: None reported at time of study completion    PROCEDURE:  CT of the Abdomen and Pelvis was performed.  Sagittal and coronal reformats were performed.    FINDINGS:  LOWER CHEST: Mosaic attenuation pattern in the lung bases is grossly   stable from 01/26/2023.  This may be related to chronic small airways   disease or small vessel disease.  Dependent airspace opacity in both   lower lobes compatible with atelectasis is stable.  An approximately 9 x   7 mm right middle lobe pulmonary nodule (2:10) is stable.  A small right   pleural effusion with pleural thickening is grossly stable.  A small left   pleural effusion with pleural thickening and pleural calcification is   grossly stable.  Cardiomegaly.  Mild atherosclerotic calcification the   visualized coronary arteries.  Low attenuation cardiac blood pool   compatible with anemia.    LIVER: There are new subcapsular fluid collections about the liver,   measuring approximately 4.5 x 1.4 cm anteriorly (2:19), 8.1 x 2.3 cm   along the right side of the liver superiorly (2:15), 5.2 x 2.7 cm   posteromedially (2:26), and 1.6x 1.2 cm posterolaterally (2:26).  BILE DUCTS: Mildly dilated common bile duct.  GALLBLADDER: Cholecystectomy.  SPLEEN: Splenomegaly; enlarged spleen measures up to approximately 15.9   cm in craniocaudal dimension (602:57).  PANCREAS: Within normal limits.  ADRENALS: Within normal limits.  KIDNEYS/URETERS: Within normal limits.    BLADDER: A 1.3 x 0.6 cm nodular hyperattenuating focus along the   dependent portion of the bladder is new from 04/20/2023; this presumably   represents a small amountof hemorrhage.  REPRODUCTIVE ORGANS: The uterus and ovaries are not well visualized due   to surrounding fluid and unopacified bowel loops    BOWEL: Enteric tube terminates in the stomach, approximately 8 cm below   the GE junction.  There is redemonstration of gastric wall thickening   versus underdistention.  Patient is status post partial left colectomy   with right-sided colostomy and Ro's pouch. No bowel obstruction.    There appears to be persistent wall thickening in the small bowel and   proximal colon, suboptimally evaluated due to lack of oral/IV contrast   and diffuse underdistention of the bowel.  There is mild fluid distention   of the rectal pouch.  There is nonspecific perirectal fat stranding and   presacral edema.  PERITONEUM: A percutaneous drain terminates in the left.  Moderate   abdominal and pelvic ascites is redemonstrated.  VESSELS: Within normal limits.  RETROPERITONEUM/LYMPH NODES: No lymphadenopathy.  ABDOMINAL WALL: Stable postsurgical changes in the ventral abdominal   wall, with open midline incision and small foci of soft tissue gas.    Redemonstration of curvilinear hyperattenuation in the lower ventral   abdominal wall (2:74), which may represent surgical material.  Diffuse   anasarca.  BONES: Mild degenerative changes in the spine appear unchanged from   01/26/2023.    IMPRESSION:  Enteric tube terminates in the stomach, approximately 8 cm while the GE   junction.  This may be advanced as clinically warranted.    New subcapsular fluid collections about the liver, measuring   approximately 4.5 x 1.4 cm anteriorly, 8.1 x 2.3 cm along the right side   of the liver superiorly, 5.2 x 2.7 cm posteromedially, and 1.6 x 1.2 cm   posterolaterally.      Otherwise no significant interval change from04/20/2023.    There appears to be persistent thickening of the small bowel and proximal   colon which is suboptimally evaluated due to lack of oral/IV contrast and   diffuse underdistention the bowel.    There is redemonstration of gastric wall thickening versus   underdistention.    --- End of Report ---            MAYTE CAMPO MD; Attending Radiologist  This document has been electronically signed. Apr 27 2023  2:31PM    < end of copied text >

## 2023-04-28 NOTE — PROGRESS NOTE ADULT - PROBLEM SELECTOR PLAN 5
DVT prophylaxis- continue veno dynes  history myeloproliferative on agrylin hold until pt start taking po LAYA likely ATN due to septic shock; Cr downtrending to 3.5 this AM   S/p bicarb gtt for metabolic acidosis  s/p diuresis with metolazone and bumetanide- held due to rising creatinine  monitor off diuretics  nephro ([facility]), recs appreciated

## 2023-04-28 NOTE — PROGRESS NOTE ADULT - TIME BILLING
Pt seen + examined. Case discussed with resident. Agree with assessment and plan above (edited by me in detail):  Time spent: 50min. More than 50% of the visit was spent counseling the patient on medical condition -- severe sepsis and peritonitis due to perforated sigmoid colon, LAYA due to ATN, shock liver, and post/op acute respiratory failure requiring mechanical ventilation, and acute blood loss anemia, dysphagia, FEES study, severe anemia, PRBC.    73yo F w/ PMH of A-Fib on rivaroxaban, HFpEF, recent C diff infection (Jan 2023), myeloproliferative disorder with thrombocytosis on anagrelide, hypothyroidism, asthma, and lung nodules s/p resection (reportedly malignant) who presents with perforated sigmoid colon with acute bacterial peritonitis, LAYA due to ATN, septic shock, lactic acidosis, shock liver;  post/op acute respiratory failure requiring mechanical ventilation, and acute blood loss anemia; s/p extended Tia's procedure including descending colon on 4/13. She was extubated 4/19 and tolerating supplemental oxygen via nasal canula, overall gradual improvement, but complicated by critical illness polyneuropathy.    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  -S&S reassessed today and felt pt did better, but not well enough to be recommended for po intake -- still recommend NPO, but now recommend to perform a FEES study on Monday for the next reassessment as feel pt has higher chance to pass the swallow test  -pt with history of myeloproliferative disorder per discussion with hematology, so unclear the significance of the leukocytosis    -had acute blood loss anemia due to acute perforated bowel and extensive abdominal surgery early in the admission requiring 2un PRBC  -hgb has been generally stable since then with a slow gradual downtrend subsequently without evidence of acute bleeding and suspected to be due to severe illness with likely poor BM activity and production of blood given severe illness and renal failure  -retacrit per nephro  -as Hgb downtrended from 7.3 to 6.9 today, will give 1un PRBC and will transfuse to maintain goal Hgb>7, unless has acute bleeding    -had been on octreotide given history of carcinoid with increased ostomy output -- per heme/onc last PET CT without evidence of carcinoid tumor and recommend trial off the octreotide -- discontinued on 4/27    LAYA likely ATN due to septic shock; Cr downtrending to 3.5 this AM   S/p bicarb gtt for metabolic acidosis  s/p diuresis with metolazone and bumetanide- held due to rising creatinine  monitor off diuretics  nephro ([facility]), recs appreciated

## 2023-04-28 NOTE — CASE MANAGEMENT PROGRESS NOTE - NSCMPROGRESSNOTE_GEN_ALL_CORE
Discussed pt in rounds, pt remains acute, wbc 34.21, H/H 6.9/24, to receive 1 unit PRBCs. Pt has colostomy, CM notified wound care nurse that patient has a new colostomy.

## 2023-04-28 NOTE — PROVIDER CONTACT NOTE (CRITICAL VALUE NOTIFICATION) - BACKGROUND
Pt admitted with septic shock, lactic acidosis and abdominal perforation
Pt status post surgery
patient in OR

## 2023-04-28 NOTE — PROGRESS NOTE ADULT - PROBLEM SELECTOR PLAN 2
Perforated sigmoid colon: s/p Ro's procedure   Ostomy c/d/i; watery output noted  NPO with NGT,   speech and swallow recs: NPO, will re-evaluate  neg c-diff, neg GI pcr  ID ([facility]), recs appreciated  Continue Metoclopramide prn and PPI   Monitor drain and colostomy output.   has been on octreotide given history of carcinoid with increased ostomy output -- per heme/onc last PET CT without evidence of carcinoid tumor and recommend trial off the octreotide -- discontinued this afternoon  Psyllium added to bulk stool per ICU   f/u repeat CT  Dilaudid 1mg q6hr PRN and APAP 650mg q6hr for pain    pt complained of abd pain and tenderness in the morning -- had CT Abd/P which showed: "Enteric tube terminates in the stomach, approximately 8 cm while the GE junction.  This may be advanced as clinically warranted. New subcapsular fluid collections about the liver, measuring approximately 4.5 x 1.4 cm anteriorly, 8.1 x 2.3 cm along the right side of the liver superiorly, 5.2 x 2.7 cm posteromedially, and 1.6 x 1.2 cm posterolaterally. Otherwise no significant interval change from 04/20/2023. There appears to be persistent thickening of the small bowel and proximal   colon which is suboptimally evaluated due to lack of oral/IV contrast and diffuse underdistention the bowel. There is redemonstration of gastric wall thickening versus underdistention."  unclear significance of the fluid collections, pt denied abd pain/tenderness in the afternoon exam -- f/up with surgery if any intervention needed for the changes on CT. Perforated sigmoid colon: s/p Ro's procedure   Repeat CT showing subcapsular fluid and SB thickening   Ostomy c/d/i; consistent brown liquid output   NPO with NGT, failed s/s eval today - re-eval tomorrow   neg c-diff, neg GI pcr  ID ([facility]), recs appreciated  Continue Metoclopramide prn and PPI   Dilaudid 1mg q6hr PRN and APAP 650mg q6hr for pain  No role for further surgical intervention at this time -Perforated sigmoid colon: s/p Ro's procedure   -Ostomy c/d/i; consistent brown liquid output   -neg c-diff, neg GI pcr  -ID ([facility]), recs appreciated  -completed course of broad spectrum Abx  -Continue Metoclopramide prn and PPI   -Dilaudid 1mg q6hr PRN and APAP 650mg q6hr for pain  -repeat CT with some intra-abdominal collections of unclear etiology and bowel thickening, pt not reporting abd pain/tenderness today   -surgery (Yusuf), recs appreciated  -No role for further surgical intervention at this time  -S&S reassessed today and felt pt did better, but not well enough to be recommended for po intake -- still recommend NPO, but now recommend to perform a FEES study on Monday for the next reassessment as feel pt has higher chance to pass the swallow test

## 2023-04-28 NOTE — PROGRESS NOTE ADULT - PROBLEM SELECTOR PLAN 6
DVT prophylaxis- continue veno dynes  history myeloproliferative disorder on agrylin hold until pt start taking po

## 2023-04-28 NOTE — PROGRESS NOTE ADULT - PROBLEM SELECTOR PLAN 1
Continue tube feeds  Will attempt speech and swallow today  Watch H?H- if drops may need to transfuse.   Continue VTE with SQH  Continue Oral vanco.  Continue restraints for now  Will discuss with Dr. Goldberg.

## 2023-04-28 NOTE — SOCIAL WORK PROGRESS NOTE - NSSWPROGRESSNOTE_GEN_ALL_CORE
JETT spoke with pt son William regarding d/c plan. JETT explained that pt will need roselia lift and will have colostomy bag on d/c. William will further discuss taking pt home with private hire aides vs. ALFRED with his brother. William requested that JETT f/u tomorrow morning. JETT to follow and remain available for any needs.

## 2023-04-28 NOTE — PROGRESS NOTE ADULT - PROBLEM SELECTOR PLAN 1
Septic shock due to perforated sigmoid colon with polymicrobial peritonitis; overall improving leukocytosis   s/p Tia procedure and abdominal abscess drainage on 4/13  s/p lactic acidosis and pressor support  midodrine 10mg q8h transitioned to 5mg q8hr  BCx - 4/13 no growth   Peritoneal cultures poly microbial - Proteus vulgaris group, Escherichia coli, Klebsiella pneumoniae, Morganella morganii   Peritoneal fluid with gram variable rods and PMNs.  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  Pt has not been able to participate with S&S and needs to continue on tube feeds for now -- reassess tomorrow if mentation / strength improving. Septic shock due to perforated sigmoid colon with polymicrobial peritonitis; s/p Tia procedure and abdominal abscess drainage on 4/13 w/ ICU stay requiring vasopressor support   c/w midodrine 5mg q8hr at this time   C-Diff negative on multiple checks during hospitalization and GI PCR negative - c/w prophylactic po vanco   ID ([facility]), recs appreciated  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  Pt has not been able to participate with S&S and needs to continue on tube feeds for now -- reassess tomorrow if mentation / strength improving. 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  -S&S reassessed today and felt pt did better, but not well enough to be recommended for po intake -- still recommend NPO, but now recommend to perform a FEES study on Monday for the next reassessment as feel pt has higher chance to pass the swallow test  -pt with history of myeloproliferative disorder per discussion with hematology, so unclear the significance of the leukocytosis

## 2023-04-28 NOTE — PROVIDER CONTACT NOTE (CHANGE IN STATUS NOTIFICATION) - ASSESSMENT
Patient with no new complaints, bp 98/50 (map 69), HR 73. Patient with no new complaints, still confused, bp 98/50 (map 69), HR 73, satting 99% on 2LNC. 98.3 oral.

## 2023-04-28 NOTE — PROGRESS NOTE ADULT - SUBJECTIVE AND OBJECTIVE BOX
[facility] -- Nils Johnson,  Adrián, Jim Hunter [place], [place]  Office # [phone]    Follow Up:  Hx Afib s/p ablation, Cardiac Optimization    Subjective/Observations: Patient seen and examined, awake, withdrawn, unable to provide meaningful information at this time, NAD in bed, NGT in place, TF /IVF infusing. b/l mittens in place     REVIEW OF SYSTEMS: All other review of systems is negative unless indicated above  PAST MEDICAL & SURGICAL HISTORY:  Hypothyroidism      HLD (hyperlipidemia)      Thrombocytosis      Persistent atrial fibrillation      Obesity (BMI 35.0-39.9 without comorbidity)      Asthma      Diabetes mellitus      Ankle injury  on 2004, 5 surgeries.      Cholecystitis        MEDICATIONS  (STANDING):  albuterol/ipratropium for Nebulization 3 milliLiter(s) Nebulizer every 6 hours  buDESOnide    Inhalation Suspension 0.5 milliGRAM(s) Inhalation every 12 hours  dextrose 5%. 1000 milliLiter(s) (65 mL/Hr) IV Continuous <Continuous>  escitalopram 20 milliGRAM(s) Oral daily  heparin   Injectable 5000 Unit(s) SubCutaneous every 8 hours  levothyroxine Injectable 93.75 MICROGram(s) IV Push <User Schedule>  midodrine. 5 milliGRAM(s) Oral three times a day  pantoprazole  Injectable 40 milliGRAM(s) IV Push daily  vancomycin    Solution 125 milliGRAM(s) Oral daily    MEDICATIONS  (PRN):  acetaminophen     Tablet .. 650 milliGRAM(s) Oral every 6 hours PRN Moderate Pain (4 - 6)  albuterol    90 MICROgram(s) HFA Inhaler 4 Puff(s) Inhalation every 6 hours PRN Shortness of Breath and/or Wheezing  HYDROmorphone   Tablet 1 milliGRAM(s) Oral every 6 hours PRN Severe Pain (7 - 10)  metoclopramide Injectable 5 milliGRAM(s) IV Push every 6 hours PRN nausea and/or vomiting  sodium chloride 0.9% lock flush 10 milliLiter(s) IV Push every 1 hour PRN Pre/post blood products, medications, blood draw, and to maintain line patency    Allergies    No Known Allergies    Intolerances      Vital Signs Last 24 Hrs  T(C): 36.7 (28 Apr 2023 09:15), Max: 36.7 (28 Apr 2023 09:15)  T(F): 98.1 (28 Apr 2023 09:15), Max: 98.1 (28 Apr 2023 09:15)  HR: 75 (28 Apr 2023 09:15) (66 - 82)  BP: 114/71 (28 Apr 2023 09:15) (111/50 - 127/56)  BP(mean): --  RR: 18 (28 Apr 2023 09:15) (17 - 18)  SpO2: 98% (28 Apr 2023 09:15) (96% - 100%)    Parameters below as of 28 Apr 2023 09:15  Patient On (Oxygen Delivery Method): nasal cannula  O2 Flow (L/min): 4    I&O's Summary    27 Apr 2023 07:01  -  28 Apr 2023 07:00  --------------------------------------------------------  IN: 540 mL / OUT: 2290 mL / NET: -1750 mL        TELE: Not on telemetry   PHYSICAL EXAM:  Constitutional: NAD, awake   HEENT: Moist Mucous Membranes, Anicteric  Pulmonary: Non-labored, breath sounds are clear bilaterally, No wheezing, rales or rhonchi  Cardiovascular: Regular, S1 and S2, + murmurs, rubs, gallops or clicks  Gastrointestinal: Bowel Sounds present, soft, Midabdomianl incision, +colostomy  Lymph: generalized edema. No lymphadenopathy.  Skin: No visible rashes or ulcers.  Psych:  Mood & affect flat   LABS: All Labs Reviewed:                        6.9    34.21 )-----------( 498      ( 28 Apr 2023 07:09 )             24.0                         7.3    34.35 )-----------( 494      ( 27 Apr 2023 07:10 )             25.5                         7.6    35.31 )-----------( 481      ( 26 Apr 2023 07:15 )             26.5     28 Apr 2023 07:09    142    |  110    |  77     ----------------------------<  124    3.7     |  27     |  3.50   27 Apr 2023 07:10    143    |  110    |  79     ----------------------------<  121    3.6     |  27     |  3.90   26 Apr 2023 07:15    144    |  111    |  79     ----------------------------<  134    3.6     |  29     |  4.30     Ca    8.0        28 Apr 2023 07:09  Ca    7.9        27 Apr 2023 07:10  Ca    7.9        26 Apr 2023 07:15  Phos  4.2       28 Apr 2023 07:09  Phos  5.6       26 Apr 2023 07:15  Mg     2.1       28 Apr 2023 07:09  Mg     2.1       26 Apr 2023 07:15    TPro  5.4    /  Alb  2.0    /  TBili  0.4    /  DBili  x      /  AST  8      /  ALT  7      /  AlkPhos  59     28 Apr 2023 07:09  TPro  5.3    /  Alb  2.0    /  TBili  0.3    /  DBili  x      /  AST  10     /  ALT  7      /  AlkPhos  57     27 Apr 2023 07:10          12 Lead ECG:   Ventricular Rate 82 BPM    Atrial Rate 82 BPM    P-R Interval 160 ms    QRS Duration 84 ms    Q-T Interval 412 ms    QTC Calculation(Bazett) 481 ms    P Axis 68 degrees    R Axis 13 degrees    T Axis 4 degrees    Diagnosis Line Normal sinus rhythm  Right atrial enlargement  Borderline ECG  When compared with ECG of 22-APR-2023 09:43,  aberrant conduction is no longer present  Confirmed by PAULINE LARSEN ([id number]) on 4/24/2023 4:48:07 PM (04-24-23 @ 13:13)      ACC: [id number] EXAM:  ECHO TTE WO CON COMP W DOPP   ORDERED BY: RAFY WILSON     PROCEDURE DATE:  04/14/2023          INTERPRETATION:  INDICATION: Heart failure  Sonographer KL    Blood Pressure unavailable    Height 167.6 cm     Weight 60 kgBSA   1.8 sq m    Dimensions:  LA 4.0       Normal Values: 2.0 - 4.0 cm  Ao 3.4        Normal Values: 2.0 - 3.8 cm  SEPTUM 0.9       Normal Values: 0.6 - 1.2 cm  PWT 0.8       Normal Values: 0.6 - 1.1 cm  LVIDd 4.2         Normal Values: 3.0 - 5.6 cm  LVIDs 2.9         Normal Values: 1.8 - 4.0 cm      OBSERVATIONS:  Mitral Valve: Mild to moderate MR.  Aortic Valve/Aorta: Sclerotic trileaflet aortic valve with grossly normal   opening. Trace AI  Tricuspid Valve: Mild to moderate TR.  Pulmonic Valve: Mild PI  Left Atrium: Enlarged  Right Atrium: Not well-visualized  Left Ventricle: normal LV size and systolic function, estimated LVEF of   55-60%.  Right Ventricle: The right ventricle is enlarged with grossly normal   function  Pericardium: no significant pericardial effusion.  Pulmonary/RV Pressure: estimated PA systolic pressure of 66mmHg assuming   an RA pressure of 10 mmHg.  IVC measures 2.0 cm and is non collapsing  LV diastolic dysfunction is present  Bilateral pleural effusions        IMPRESSION:  Normal left ventricular internal dimensions and systolic function,   estimated LVEF of 55-60%.  The right ventricle is enlarged with grossly normal function  Left atrial enlargement  Sclerotic trileaflet aortic valve, trace AI.  Mild to moderate MR and TR.  Estimated PA systolic pressure of 66mmHg assuming an RA pressure of 10   mmHg.  IVC measures 2.0 cm and is non collapsing    --- End of Report ---            NEYMAR ROWLAND MD; Attending Cardiologist  This document has been electronically signed. Apr 15 2023 10:52AM

## 2023-04-28 NOTE — PROGRESS NOTE ADULT - SUBJECTIVE AND OBJECTIVE BOX
[INTERVAL HX: ]  Patient seen and examined;  Chart reviewed and events noted;     appears stronger.   No CP, no SOB    NGT in place. ostomy with brown pasty loose stool.       [MEDICATIONS]  MEDICATIONS  (STANDING):  albuterol/ipratropium for Nebulization 3 milliLiter(s) Nebulizer every 6 hours  buDESOnide    Inhalation Suspension 0.5 milliGRAM(s) Inhalation every 12 hours  dextrose 5%. 1000 milliLiter(s) (65 mL/Hr) IV Continuous <Continuous>  escitalopram 20 milliGRAM(s) Oral daily  heparin   Injectable 5000 Unit(s) SubCutaneous every 8 hours  levothyroxine Injectable 93.75 MICROGram(s) IV Push <User Schedule>  midodrine. 5 milliGRAM(s) Oral three times a day  pantoprazole  Injectable 40 milliGRAM(s) IV Push daily  vancomycin    Solution 125 milliGRAM(s) Oral daily    MEDICATIONS  (PRN):  acetaminophen     Tablet .. 650 milliGRAM(s) Oral every 6 hours PRN Moderate Pain (4 - 6)  albuterol    90 MICROgram(s) HFA Inhaler 4 Puff(s) Inhalation every 6 hours PRN Shortness of Breath and/or Wheezing  HYDROmorphone   Tablet 1 milliGRAM(s) Oral every 6 hours PRN Severe Pain (7 - 10)  metoclopramide Injectable 5 milliGRAM(s) IV Push every 6 hours PRN nausea and/or vomiting  sodium chloride 0.9% lock flush 10 milliLiter(s) IV Push every 1 hour PRN Pre/post blood products, medications, blood draw, and to maintain line patency      [VITALS]  Vital Signs Last 24 Hrs  T(C): 36.7 (2023 16:57), Max: 37.1 (2023 10:52)  T(F): 98.1 (2023 16:57), Max: 98.7 (2023 10:52)  HR: 86 (2023 16:57) (66 - 86)  BP: 115/68 (2023 16:57) (111/50 - 127/56)  BP(mean): --  RR: 18 (2023 16:57) (16 - 18)  SpO2: 100% (2023 16:57) (93% - 100%)    Parameters below as of 2023 16:57  Patient On (Oxygen Delivery Method): nasal cannula      [WT/HT]  Daily     Daily Weight in k.4 (2023 04:57)  [VENT]      [PHYSICAL EXAM]  GEN: chronically ill looking.   HEENT: normocephalic and atraumatic. EOMI. NGT in palce.    NECK: Supple.  No lymphadenopathy   LUNGS: Clear to auscultation.  HEART: Regular rate and rhythm,  no MRG  ABDOMEN: Soft, nontender, and nondistended.  Positive bowel sounds.  ostomy.   : No CVA tenderness  EXTREMITIES: Without edema.  NEUROLOGIC: grossly intact.  PSYCHIATRIC: Appropriate affect .  SKIN: No rash     [LABS:]                        6.9    34.21 )-----------( 498      ( 2023 07:09 )             24.0         142  |  110<H>  |  77<H>  ----------------------------<  124<H>  3.7   |  27  |  3.50<H>    Ca    8.0<L>      2023 07:09  Phos  4.2       Mg     2.1         TPro  5.4<L>  /  Alb  2.0<L>  /  TBili  0.4  /  DBili  x   /  AST  8<L>  /  ALT  7<L>  /  AlkPhos  59        Reticulocyte Count (23 @ 06:39)  Reticulocyte Percent: 1.4 % [0.5 - 2.5]  Absolute Reticulocytes: 43.3 K/uL [25.0 - 125.0]    Ferritin, Serum: 293 ng/mL *H* [15 - 150] (23 @ 07:38)    Iron - Total Binding Capacity.: 190 ug/dL *L* [220 - 430] (23 @ 07:38)    Reticulocyte Count (22 @ 15:23)  Reticulocyte Percent: 2.0 % [0.5 - 2.5]  Absolute Reticulocytes: 61.8 K/uL [25.0 - 125.0]    Reticulocyte Count (22 @ 09:40)  Reticulocyte Percent: 1.6 % [0.5 - 2.5]  Absolute Reticulocytes: 49.4 K/uL [25.0 - 125.0]    Iron - Total Binding Capacity.: 221 ug/dL [220 - 430] (22 @ 04:55)    Vitamin B12, Serum: 972 pg/mL [232 - 1245] (22 @ 04:55)    Ferritin, Serum: 319 ng/mL *H* [15 - 150] (22 @ 04:55)    Folate, Serum: >20.0 ng/mL (22 @ 04:55)    Serum Protein Electrophoresis Interp: Weak Gamma Migrating Paraprotein Identified (22 @ 04:55)    Immunofixation, Serum:   Weak IgG Kappa Band Identified    Reference Range: None Detected (22 @ 04:55)    Reticulocyte Count (22 @ 10:43)  Reticulocyte Percent: 1.6 % [0.5 - 2.5]  Absolute Reticulocytes: 47.1 K/uL [25.0 - 125.0]    Reticulocyte Count (22 @ 14:14)  Reticulocyte Percent: 3.6 % *H* [0.5 - 2.5]  Absolute Reticulocytes: 99.5 K/uL [25.0 - 125.0]          SARS-CoV-2: NotDetec (2023 06:40)          [RADIOLOGY STUDIES:]     [INTERVAL HX: ]  Patient seen and examined;  Chart reviewed and events noted;     appears stronger.   No CP, no SOB    NGT in place. ostomy with brown pasty loose stool.       [MEDICATIONS]  MEDICATIONS  (STANDING):  albuterol/ipratropium for Nebulization 3 milliLiter(s) Nebulizer every 6 hours  buDESOnide    Inhalation Suspension 0.5 milliGRAM(s) Inhalation every 12 hours  dextrose 5%. 1000 milliLiter(s) (65 mL/Hr) IV Continuous <Continuous>  escitalopram 20 milliGRAM(s) Oral daily  heparin   Injectable 5000 Unit(s) SubCutaneous every 8 hours  levothyroxine Injectable 93.75 MICROGram(s) IV Push <User Schedule>  midodrine. 5 milliGRAM(s) Oral three times a day  pantoprazole  Injectable 40 milliGRAM(s) IV Push daily  vancomycin    Solution 125 milliGRAM(s) Oral daily    MEDICATIONS  (PRN):  acetaminophen     Tablet .. 650 milliGRAM(s) Oral every 6 hours PRN Moderate Pain (4 - 6)  albuterol    90 MICROgram(s) HFA Inhaler 4 Puff(s) Inhalation every 6 hours PRN Shortness of Breath and/or Wheezing  HYDROmorphone   Tablet 1 milliGRAM(s) Oral every 6 hours PRN Severe Pain (7 - 10)  metoclopramide Injectable 5 milliGRAM(s) IV Push every 6 hours PRN nausea and/or vomiting  sodium chloride 0.9% lock flush 10 milliLiter(s) IV Push every 1 hour PRN Pre/post blood products, medications, blood draw, and to maintain line patency      [VITALS]  Vital Signs Last 24 Hrs  T(C): 36.7 (2023 16:57), Max: 37.1 (2023 10:52)  T(F): 98.1 (2023 16:57), Max: 98.7 (2023 10:52)  HR: 86 (2023 16:57) (66 - 86)  BP: 115/68 (2023 16:57) (111/50 - 127/56)  BP(mean): --  RR: 18 (2023 16:57) (16 - 18)  SpO2: 100% (2023 16:57) (93% - 100%)    Parameters below as of 2023 16:57  Patient On (Oxygen Delivery Method): nasal cannula      [WT/HT]  Daily     Daily Weight in k.4 (2023 04:57)  [VENT]      [PHYSICAL EXAM]  GEN: chronically ill looking.   HEENT: normocephalic and atraumatic. EOMI. NGT in palce.    NECK: Supple.  No lymphadenopathy   LUNGS: Clear to auscultation.  HEART: Regular rate and rhythm,  no MRG  ABDOMEN: Soft, nontender, and nondistended.  Positive bowel sounds.  ostomy.   : No CVA tenderness  EXTREMITIES: Without edema.  NEUROLOGIC: grossly intact.  PSYCHIATRIC: Appropriate affect .  SKIN: No rash     [LABS:]                        6.9    34.21 )-----------( 498      ( 2023 07:09 )             24.0         142  |  110<H>  |  77<H>  ----------------------------<  124<H>  3.7   |  27  |  3.50<H>    Ca    8.0<L>      2023 07:09  Phos  4.2       Mg     2.1         TPro  5.4<L>  /  Alb  2.0<L>  /  TBili  0.4  /  DBili  x   /  AST  8<L>  /  ALT  7<L>  /  AlkPhos  59        Reticulocyte Count (23 @ 06:39)  Reticulocyte Percent: 1.4 % [0.5 - 2.5]  Absolute Reticulocytes: 43.3 K/uL [25.0 - 125.0]    Ferritin, Serum: 293 ng/mL *H* [15 - 150] (23 @ 07:38)    Iron - Total Binding Capacity.: 190 ug/dL *L* [220 - 430] (23 @ 07:38)    Reticulocyte Count (22 @ 15:23)  Reticulocyte Percent: 2.0 % [0.5 - 2.5]  Absolute Reticulocytes: 61.8 K/uL [25.0 - 125.0]    Reticulocyte Count (22 @ 09:40)  Reticulocyte Percent: 1.6 % [0.5 - 2.5]  Absolute Reticulocytes: 49.4 K/uL [25.0 - 125.0]    Iron - Total Binding Capacity.: 221 ug/dL [220 - 430] (22 @ 04:55)    Vitamin B12, Serum: 972 pg/mL [232 - 1245] (22 @ 04:55)    Ferritin, Serum: 319 ng/mL *H* [15 - 150] (22 @ 04:55)    Folate, Serum: >20.0 ng/mL (22 @ 04:55)    Serum Protein Electrophoresis Interp: Weak Gamma Migrating Paraprotein Identified (22 @ 04:55)    Immunofixation, Serum:   Weak IgG Kappa Band Identified   Reference Range: None Detected (22 @ 04:55)    Reticulocyte Count (22 @ 10:43)  Reticulocyte Percent: 1.6 % [0.5 - 2.5]  Absolute Reticulocytes: 47.1 K/uL [25.0 - 125.0]    Reticulocyte Count (22 @ 14:14)  Reticulocyte Percent: 3.6 % *H* [0.5 - 2.5]  Absolute Reticulocytes: 99.5 K/uL [25.0 - 125.0]          SARS-CoV-2: NotDetec (2023 06:40)          [RADIOLOGY STUDIES:]

## 2023-04-28 NOTE — PROGRESS NOTE ADULT - ASSESSMENT
IMPRESSION    72y Female admitted with Enterocolitis due to Clostridium difficile  S/P extended Tia's for perforated colon    Patient with history myeloproliferative on agrylin (being treated by Dr. Rice) admitted with abd pain, weakness and falls at home a/w severe sepsis and peritonitis due to perforated sigmoid colon, also with LAYA due to ATN, shock liver, and post/op acute respiratory failure requiring mechanical ventilation, and acute blood loss anemia. pt is off agrylin.  had a period of thrombocytopenia now resolved with current platelet count 416K  Course also complicated by diarrhea which apparently was present prior to admission.  Has hx of ?pulmonary carcinoid with nodules being followed. however recent PET/CT dotatate negative.      RECOMMENDATIONS> :  1.  follow CBC and transfuse as indicated  2.  continue post op care  3.  HOLD sandostatin and evaluate change in BM  4.  to hold agrylin until taking better po.  platelet count is not significantly elevated at present; will follow  5.  further heme recommendations pending above    PO vanco per ID     IMPRESSION    72y Female admitted with Enterocolitis due to Clostridium difficile  S/P extended Tia's for perforated colon    Patient with history myeloproliferative on agrylin (being treated by Dr. Rice) admitted with abd pain, weakness and falls at home a/w severe sepsis and peritonitis due to perforated sigmoid colon, also with LAYA due to ATN, shock liver, and post/op acute respiratory failure requiring mechanical ventilation, and acute blood loss anemia. pt is off agrylin.  had a period of thrombocytopenia now resolved with current platelet count 416K  Course also complicated by diarrhea which apparently was present prior to admission.  Has hx of ?pulmonary carcinoid with nodules being followed. however recent PET/CT dotatate negative.      hx MGUS  Weak IgG Kappa Band Identified. (08-16-22 @ 04:55)        RECOMMENDATIONS> :  1.  follow CBC and transfuse as indicated  2.  continue post op care  3.  HOLD sandostatin and evaluate change in BM  4.  to hold agrylin until taking better po.  platelet count is not significantly elevated at present; will follow  5.  further heme recommendations pending above    PO vanco per ID

## 2023-04-29 NOTE — SOCIAL WORK PROGRESS NOTE - NSSWPROGRESSNOTE_GEN_ALL_CORE
JETT followed up with pt son William. Per William, he feels that pt would be more appropriate for [facility] due to current needs. JETT obtained choices as follows: 1) [facility] 2) [facility] 3) [facility]. JETT sent referrals. Pending MD clearance for d/c. DOROTHY requested. SW to continue to follow and remain available for any needs.

## 2023-04-29 NOTE — PROGRESS NOTE ADULT - NUTRITIONAL ASSESSMENT
This patient has been assessed with a concern for Malnutrition and has been determined to have a diagnosis/diagnoses of Severe protein-calorie malnutrition.    This patient is being managed with:   Diet NPO-  Entered: Apr 29 2023  9:22AM    The following pending diet order is being considered for treatment of Severe protein-calorie malnutrition:  Diet NPO with Tube Feed-  Tube Feeding Modality: Nasogastric  Nepro with Carb Steady  Total Volume for 24 Hours (mL): 1000  Continuous  Starting Tube Feed Rate {mL per Hour}: 40  Increase Tube Feed Rate by (mL): 10     Every 6 hours  Until Goal Tube Feed Rate (mL per Hour): 50  Tube Feed Duration (in Hours): 20  Tube Feed Start Time: 06:00  Entered: Apr 25 2023 10:59AM    Diet NPO with Tube Feed-  Tube Feeding Modality: Nasogastric  Vital 1.5 Adalberto  Total Volume for 24 Hours (mL): 240  Continuous  Until Goal Tube Feed Rate (mL per Hour): 10  Tube Feed Duration (in Hours): 24  Tube Feed Start Time: 12:00  Entered: Apr 19 2023 12:13PM

## 2023-04-29 NOTE — PROGRESS NOTE ADULT - PROBLEM SELECTOR PLAN 1
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  -S&S reassessed today and felt pt did better, but not well enough to be recommended for po intake -- still recommend NPO, but now recommend to perform a FEES study on Monday for the next reassessment as feel pt has higher chance to pass the swallow test  -pt with history of myeloproliferative disorder per discussion with hematology, so unclear the significance of the leukocytosis -Septic shock due to perforated sigmoid colon with polymicrobial peritonitis; s/p Tia procedure and abdominal abscess drainage on 4/13 w/ ICU stay requiring vasopressor support   -has stabilized, now only on midodrine 5mg q8hr -- taper off as able  -C-Diff negative on multiple checks during hospitalization and GI PCR negative - c/w prophylactic po vanco   -ID ([facility]), recs appreciated  -completed course of broad spectrum Abx  -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  -S&S reassessed on 4/28 and felt pt did better, but not well enough to be recommended for po intake -- still recommend NPO, but now recommend to perform a FEES study on Tuesday for the next reassessment as feel pt has higher chance to pass the swallow test  -pt with history of myeloproliferative disorder per discussion with hematology, so unclear the significance of the leukocytosis

## 2023-04-29 NOTE — PROGRESS NOTE ADULT - ASSESSMENT
73yo F w/ PMH of A-Fib on rivaroxaban, HFpEF, recent C diff infection (Jan 2023), myeloproliferative disorder with thrombocytosis on anagrelide, hypothyroidism, asthma, and lung nodules s/p resection (reportedly malignant) who presents with perforated sigmoid colon with acute bacterial peritonitis, LAYA due to ATN, septic shock, lactic acidosis, shock liver;  post/op acute respiratory failure requiring mechanical ventilation, and acute blood loss anemia; s/p extended Tai's procedure including descending colon on 4/13. She was extubated 4/19 and tolerating supplemental oxygen via nasal canula, overall gradual improvement, but complicated by critical illness polyneuropathy. 73yo F w/ PMH of A-Fib on rivaroxaban, HFpEF, recent C diff infection (Jan 2023), myeloproliferative disorder with thrombocytosis on anagrelide, hypothyroidism, asthma, and lung nodules s/p resection (reportedly malignant) who presents with perforated sigmoid colon with acute bacterial peritonitis, LAYA due to ATN, septic shock, lactic acidosis, shock liver;  post/op acute respiratory failure requiring mechanical ventilation, and acute blood loss anemia; s/p extended Tia's procedure including descending colon on 4/13. She was extubated 4/19 and tolerating supplemental oxygen via nasal cannula, overall gradual improvement, but complicated by critical illness polyneuropathy, and further c/b by acute GIB.

## 2023-04-29 NOTE — PROGRESS NOTE ADULT - PROBLEM SELECTOR PLAN 5
LAYA likely ATN due to septic shock; Cr downtrending to 3.1 this AM   S/p bicarb gtt for metabolic acidosis  s/p diuresis with metolazone and bumetanide- held due to rising creatinine  monitor off diuretics  nephro ([facility]), recs appreciated LAYA likely ATN due to septic shock; BUN/Cr downtrending to 80/3.1 this AM   S/p bicarb gtt for metabolic acidosis  s/p diuresis with metolazone and bumetanide- held due to rising creatinine  monitor off diuretics  nephro ([facility]), recs appreciated

## 2023-04-29 NOTE — PROGRESS NOTE ADULT - ASSESSMENT
IMPRESSION    72y Female admitted with Enterocolitis due to Clostridium difficile  S/P extended Tia's for perforated colon    Patient with history myeloproliferative on agrylin (being treated by Dr. Rice) admitted with abd pain, weakness and falls at home a/w severe sepsis and peritonitis due to perforated sigmoid colon, also with LAYA due to ATN, shock liver, and post/op acute respiratory failure requiring mechanical ventilation, and acute blood loss anemia. pt is off agrylin.  had a period of thrombocytopenia now resolved with current platelet count 416K  Course also complicated by diarrhea which apparently was present prior to admission.  Has hx of ?pulmonary carcinoid with nodules being followed. however recent PET/CT dotatate negative.      hx MGUS  Weak IgG Kappa Band Identified. (08-16-22 @ 04:55)    RECOMMENDATIONS> :  1.  follow CBC and transfuse as indicated  2.  continue post op care  3.  HOLD sandostatin and evaluate change in BM  4.  to hold agrylin until taking better po.  platelet count is not significantly elevated at present; will follow  5.  further heme recommendations pending clinical course.     PO vanco per ID

## 2023-04-29 NOTE — PROGRESS NOTE ADULT - ASSESSMENT
72-year-old female with history of hypertension, CHF, afib s/p AF ablation (2/7/19) currently not on xarelto, CATH 2018, hypothyroidism, HLD history of C. difficile January 2023 presents for vomiting and abdominal pain. Consulted for CHF.    Cardiac Optimization, Hx Afib s/p Ablation  - CT ABD/Pelvis: Pneumoperitoneum and no ascites, perforated sigmoid colon. Stable splenomegaly. Stable right lung nodule, ground glass infiltrates and loculated right pleural effusion.  - S/P Tia's with abdominal abscess drainage, surgery following   - Abx per ID following     - Known Permanent Afib, rate-controlled per flow sheet   - Holding home Xarelto.    - Anemia persistent, H/H 6.9/24, transfuse per primary, continue to trend.   - Monitor and replete lytes, keep K>4, Mg>2.    - Holding home metoprolol 50mg qd due to hypotension, though, now resolved.  Can resume at lower dose  - Continue Midodrine.  Can probably start tapering with a goal of discontinuing it if BP remains stable.  Not her home med    - Elevated troponin peaked at 100.9, likely demand in the setting of sepsis  - EKG: Sinus tachycardia with PAC's. No acute changes, unchanged from previous.   - TTE (1/18/23): normal LVEF 65%, normal RV size and function, biatrial enlargement, sclerotic aortic valve, mild AI, Moderate MR and TR  - TTE (4/15/2023) EF 55-60%, RVE, PASP 66   - no sign of volume overload     - creatinine slowly improving, no indication for HD at this time, renal following   - DVT ppx  - Will continue to follow    MARY GRACE Black  Nurse Practitioner - Cardiology   Spectra #[phone]/ [phone]       72-year-old female with history of hypertension, CHF, afib s/p AF ablation (2/7/19) currently not on xarelto, CATH 2018, hypothyroidism, HLD history of C. difficile January 2023 presents for vomiting and abdominal pain. Consulted for CHF.    Cardiac Optimization, Hx Afib s/p Ablation  - CT ABD/Pelvis: Pneumoperitoneum and no ascites, perforated sigmoid colon. Stable splenomegaly. Stable right lung nodule, ground glass infiltrates and loculated right pleural effusion.  - S/P Tia's with abdominal abscess drainage, surgery following   - Abx per ID following     - Known Permanent Afib, rate-controlled per flow sheet   - Holding home Xarelto.    - Anemia persistent, H/H 7.2/24.5, transfuse per primary, continue to trend.   - Monitor and replete lytes, keep K>4, Mg>2.    - BP soft 90- 100's systolics  - Continue Midodrine    - continue to monitor routine hemodynamics     - Elevated troponin peaked at 100.9, likely demand in the setting of sepsis  - EKG: ST with PAC's. No acute changes, unchanged from previous.   - TTE (1/18/23): normal LVEF 65%, normal RV size and function, biatrial enlargement, sclerotic aortic valve, mild AI, Moderate MR and TR  - TTE (4/15/2023) EF 55-60%, RVE, PASP 66   - appears euvolemic to dry, on IVF, currently NPO   - continue to monitor volume status     - creatinine slowly improving, no indication for HD at this time, renal following   - DVT ppx  - Will continue to follow    MARY GRACE Black  Nurse Practitioner - Cardiology   Spectra #[phone]/ [phone]       72-year-old female with history of hypertension, CHF, afib s/p AF ablation (2/7/19) currently not on xarelto, CATH 2018, hypothyroidism, HLD history of C. difficile January 2023 presents for vomiting and abdominal pain. Consulted for CHF.    Cardiac Optimization, Hx Afib s/p Ablation  - CT ABD/Pelvis: Pneumoperitoneum and no ascites, perforated sigmoid colon. Stable splenomegaly. Stable right lung nodule, ground glass infiltrates and loculated right pleural effusion.  - S/P Tia's with abdominal abscess drainage, surgery following   - Abx per ID following     - Known PAfib, rate-controlled per flow sheet   - Holding home Xarelto.    - Anemia persistent, H/H 7.2/24.5, transfuse per primary, continue to trend.   - Monitor and replete lytes, keep K>4, Mg>2.    - BP soft 90- 100's systolics  - Continue Midodrine    - continue to monitor routine hemodynamics     - Elevated troponin peaked at 100.9, likely demand in the setting of sepsis  - EKG: ST with PAC's. No acute changes, unchanged from previous.   - TTE (1/18/23): normal LVEF 65%, normal RV size and function, biatrial enlargement, sclerotic aortic valve, mild AI, Moderate MR and TR  - TTE (4/15/2023) EF 55-60%, RVE, PASP 66   - appears euvolemic to dry, on IVF, currently NPO   - continue to monitor volume status     - creatinine slowly improving, no indication for HD at this time, renal following   - DVT ppx  - Will continue to follow    MARY GRACE Black  Nurse Practitioner - Cardiology   Spectra #[phone]/ [phone]

## 2023-04-29 NOTE — PROGRESS NOTE ADULT - SUBJECTIVE AND OBJECTIVE BOX
GENERAL SURGERY PROGRESS NOTE      s/p Extended Tia's Procedure 4/13, POD #16      SUBJECTIVE:  Patient examined at bedside, overnight she began bleeding from her stoma.  1.9L of blood and clots were collected overnight  H&H acutely dropped and she was transfused 2u PRBCs, currently awaiting a repeat CBC      OBJECTIVE:  VITALS:  T(F): 98.7 (04-29-23 @ 06:20), Max: 98.7 (04-28-23 @ 10:52)  HR: 64 (04-29-23 @ 07:18) (64 - 86)  BP: 98/65 (04-29-23 @ 06:20) (89/52 - 121/62)  RR: 19 (04-29-23 @ 06:20) (14 - 19)  SpO2: 100% (04-29-23 @ 07:18) (93% - 100%)        04-28 @ 07:01  -  04-29 @ 07:00  --------------------------------------------------------  IN:    Enteral Tube Flush: 50 mL    Free Water: 400 mL    Nepro with Carb Steady: 810 mL    PRBCs (Packed Red Blood Cells): 560 mL  Total IN: 1820 mL    OUT:    Colostomy (mL): 1950 mL    Voided (mL): 650 mL  Total OUT: 2600 mL    Total NET: -780 mL      LABS:                        7.2    37.23 )-----------( 476      ( 29 Apr 2023 00:46 )             24.5     04-28    142  |  110<H>  |  77<H>  ----------------------------<  124<H>  3.7   |  27  |  3.50<H>    Ca    8.0<L>      28 Apr 2023 07:09  Phos  4.2     04-28  Mg     2.1     04-28    TPro  5.4<L>  /  Alb  2.0<L>  /  TBili  0.4  /  DBili  x   /  AST  8<L>  /  ALT  7<L>  /  AlkPhos  59  04-28        PHYSICAL EXAM:   General: Awake and responsive, No acute distress  Skin: No jaundice, no icterus  Respiratory: clear to auscultation bilaterally, no wheezes / rhonchi / rales, trachea midline  Cardiac: S1 & S2 present, rate and rhythm are regular  Abdomen: soft, nontender, nondistended, no rebound tenderness, no guarding, no palpable masses, stoma is pink but there is dark/coffee ground colored blood and clots present in the stoma bag.   Extremities: non edematous, no calf pain bilaterally

## 2023-04-29 NOTE — PROGRESS NOTE ADULT - SUBJECTIVE AND OBJECTIVE BOX
DOCUMENTATION IN PROGRESS      [facility] -- Nils Johnson,  Adrián, Jim Hunter [place], [place]  Office # [phone]    Follow Up:  Hx Afib s/p ablation, Cardiac Optimization    Subjective/Observations: Patient seen and examined, awake, withdrawn, unable to provide meaningful information at this time, NAD in bed, NGT in place, TF /IVF infusing. b/l mittens in place, events overnight noted       REVIEW OF SYSTEMS: All other review of systems is negative unless indicated above  PAST MEDICAL & SURGICAL HISTORY:  Hypothyroidism      HLD (hyperlipidemia)      Thrombocytosis      Persistent atrial fibrillation      Obesity (BMI 35.0-39.9 without comorbidity)      Asthma      Diabetes mellitus      Ankle injury  on 2004, 5 surgeries.      Cholecystitis        MEDICATIONS  (STANDING):  albuterol/ipratropium for Nebulization 3 milliLiter(s) Nebulizer every 6 hours  buDESOnide    Inhalation Suspension 0.5 milliGRAM(s) Inhalation every 12 hours  dextrose 5%. 1000 milliLiter(s) (65 mL/Hr) IV Continuous <Continuous>  escitalopram 20 milliGRAM(s) Oral daily  levothyroxine Injectable 93.75 MICROGram(s) IV Push <User Schedule>  midodrine. 5 milliGRAM(s) Oral three times a day  pantoprazole  Injectable 40 milliGRAM(s) IV Push daily  vancomycin    Solution 125 milliGRAM(s) Oral daily    MEDICATIONS  (PRN):  acetaminophen     Tablet .. 650 milliGRAM(s) Oral every 6 hours PRN Moderate Pain (4 - 6)  albuterol    90 MICROgram(s) HFA Inhaler 4 Puff(s) Inhalation every 6 hours PRN Shortness of Breath and/or Wheezing  HYDROmorphone   Tablet 1 milliGRAM(s) Oral every 6 hours PRN Severe Pain (7 - 10)  metoclopramide Injectable 5 milliGRAM(s) IV Push every 6 hours PRN nausea and/or vomiting  sodium chloride 0.9% lock flush 10 milliLiter(s) IV Push every 1 hour PRN Pre/post blood products, medications, blood draw, and to maintain line patency    Allergies    No Known Allergies    Intolerances      Vital Signs Last 24 Hrs  T(C): 37.1 (29 Apr 2023 06:20), Max: 37.1 (28 Apr 2023 10:52)  T(F): 98.7 (29 Apr 2023 06:20), Max: 98.7 (28 Apr 2023 10:52)  HR: 76 (29 Apr 2023 06:20) (66 - 86)  BP: 98/65 (29 Apr 2023 06:20) (89/52 - 121/62)  BP(mean): 63 (29 Apr 2023 01:20) (63 - 69)  RR: 19 (29 Apr 2023 06:20) (14 - 19)  SpO2: 99% (29 Apr 2023 04:00) (93% - 100%)    Parameters below as of 29 Apr 2023 04:00  Patient On (Oxygen Delivery Method): nasal cannula      I&O's Summary    28 Apr 2023 07:01  -  29 Apr 2023 07:00  --------------------------------------------------------  IN: 1820 mL / OUT: 2600 mL / NET: -780 mL      TELE: Not on telemetry   PHYSICAL EXAM:  Constitutional: NAD, awake   HEENT: Moist Mucous Membranes, Anicteric  Pulmonary: Non-labored, breath sounds are clear bilaterally, No wheezing, rales or rhonchi  Cardiovascular: Regular, S1 and S2, + murmurs, rubs, gallops or clicks  Gastrointestinal: Bowel Sounds present, soft, Midabdomianl incision, +colostomy  Lymph: generalized edema. No lymphadenopathy.  Skin: No visible rashes or ulcers.  Psych:  Mood & affect flat       LABS: All Labs Reviewed:                        7.2    37.23 )-----------( 476      ( 29 Apr 2023 00:46 )             24.5                         8.3    38.20 )-----------( 481      ( 28 Apr 2023 20:28 )             28.0                         6.9    34.21 )-----------( 498      ( 28 Apr 2023 07:09 )             24.0     28 Apr 2023 07:09    142    |  110    |  77     ----------------------------<  124    3.7     |  27     |  3.50   27 Apr 2023 07:10    143    |  110    |  79     ----------------------------<  121    3.6     |  27     |  3.90   26 Apr 2023 07:15    144    |  111    |  79     ----------------------------<  134    3.6     |  29     |  4.30     Ca    8.0        28 Apr 2023 07:09  Ca    7.9        27 Apr 2023 07:10  Ca    7.9        26 Apr 2023 07:15  Phos  4.2       28 Apr 2023 07:09  Phos  5.6       26 Apr 2023 07:15  Mg     2.1       28 Apr 2023 07:09  Mg     2.1       26 Apr 2023 07:15    TPro  5.4    /  Alb  2.0    /  TBili  0.4    /  DBili  x      /  AST  8      /  ALT  7      /  AlkPhos  59     28 Apr 2023 07:09  TPro  5.3    /  Alb  2.0    /  TBili  0.3    /  DBili  x      /  AST  10     /  ALT  7      /  AlkPhos  57     27 Apr 2023 07:10    PT/INR - ( 29 Apr 2023 01:46 )   PT: 13.7 sec;   INR: 1.17 ratio         PTT - ( 29 Apr 2023 01:46 )  PTT:33.2 sec      12 Lead ECG:   Ventricular Rate 82 BPM    Atrial Rate 82 BPM    P-R Interval 160 ms    QRS Duration 84 ms    Q-T Interval 412 ms    QTC Calculation(Bazett) 481 ms    P Axis 68 degrees    R Axis 13 degrees    T Axis 4 degrees    Diagnosis Line Normal sinus rhythm  Right atrial enlargement  Borderline ECG  When compared with ECG of 22-APR-2023 09:43,  aberrant conduction is no longer present  Confirmed by PAULINE LARSEN ([id number]) on 4/24/2023 4:48:07 PM (04-24-23 @ 13:13)      ACC: [id number] EXAM:  ECHO TTE WO CON COMP W DOPP   ORDERED BY: RAFY WILSON     PROCEDURE DATE:  04/14/2023          INTERPRETATION:  INDICATION: Heart failure  Sonographer KL    Blood Pressure unavailable    Height 167.6 cm     Weight 60 kgBSA   1.8 sq m    Dimensions:  LA 4.0       Normal Values: 2.0 - 4.0 cm  Ao 3.4        Normal Values: 2.0 - 3.8 cm  SEPTUM 0.9       Normal Values: 0.6 - 1.2 cm  PWT 0.8       Normal Values: 0.6 - 1.1 cm  LVIDd 4.2         Normal Values: 3.0 - 5.6 cm  LVIDs 2.9         Normal Values: 1.8 - 4.0 cm      OBSERVATIONS:  Mitral Valve: Mild to moderate MR.  Aortic Valve/Aorta: Sclerotic trileaflet aortic valve with grossly normal   opening. Trace AI  Tricuspid Valve: Mild to moderate TR.  Pulmonic Valve: Mild PI  Left Atrium: Enlarged  Right Atrium: Not well-visualized  Left Ventricle: normal LV size and systolic function, estimated LVEF of   55-60%.  Right Ventricle: The right ventricle is enlarged with grossly normal   function  Pericardium: no significant pericardial effusion.  Pulmonary/RV Pressure: estimated PA systolic pressure of 66mmHg assuming   an RA pressure of 10 mmHg.  IVC measures 2.0 cm and is non collapsing  LV diastolic dysfunction is present  Bilateral pleural effusions        IMPRESSION:  Normal left ventricular internal dimensions and systolic function,   estimated LVEF of 55-60%.  The right ventricle is enlarged with grossly normal function  Left atrial enlargement  Sclerotic trileaflet aortic valve, trace AI.  Mild to moderate MR and TR.  Estimated PA systolic pressure of 66mmHg assuming an RA pressure of 10   mmHg.  IVC measures 2.0 cm and is non collapsing    --- End of Report ---            NEYMAR ROWLAND MD; Attending Cardiologist  This document has been electronically signed. Apr 15 2023 10:52AM      [facility] -- Nils Johnson,  Adrián, Jim Hunter [place], [place]  Office # [phone]    Follow Up:  Hx Afib s/p ablation, Cardiac Optimization    Subjective/Observations: Patient seen and examined, awake, withdrawn, unable to provide meaningful information at this time, NAD in bed, NGT in place, IVF infusing. b/l mittens in place, events overnight noted, + dark red o/p from colostomy       REVIEW OF SYSTEMS: All other review of systems is negative unless indicated above  PAST MEDICAL & SURGICAL HISTORY:  Hypothyroidism      HLD (hyperlipidemia)      Thrombocytosis      Persistent atrial fibrillation      Obesity (BMI 35.0-39.9 without comorbidity)      Asthma      Diabetes mellitus      Ankle injury  on 2004, 5 surgeries.      Cholecystitis        MEDICATIONS  (STANDING):  albuterol/ipratropium for Nebulization 3 milliLiter(s) Nebulizer every 6 hours  buDESOnide    Inhalation Suspension 0.5 milliGRAM(s) Inhalation every 12 hours  dextrose 5%. 1000 milliLiter(s) (65 mL/Hr) IV Continuous <Continuous>  escitalopram 20 milliGRAM(s) Oral daily  levothyroxine Injectable 93.75 MICROGram(s) IV Push <User Schedule>  midodrine. 5 milliGRAM(s) Oral three times a day  pantoprazole  Injectable 40 milliGRAM(s) IV Push daily  vancomycin    Solution 125 milliGRAM(s) Oral daily    MEDICATIONS  (PRN):  acetaminophen     Tablet .. 650 milliGRAM(s) Oral every 6 hours PRN Moderate Pain (4 - 6)  albuterol    90 MICROgram(s) HFA Inhaler 4 Puff(s) Inhalation every 6 hours PRN Shortness of Breath and/or Wheezing  HYDROmorphone   Tablet 1 milliGRAM(s) Oral every 6 hours PRN Severe Pain (7 - 10)  metoclopramide Injectable 5 milliGRAM(s) IV Push every 6 hours PRN nausea and/or vomiting  sodium chloride 0.9% lock flush 10 milliLiter(s) IV Push every 1 hour PRN Pre/post blood products, medications, blood draw, and to maintain line patency    Allergies    No Known Allergies    Intolerances      Vital Signs Last 24 Hrs  T(C): 37.1 (29 Apr 2023 06:20), Max: 37.1 (28 Apr 2023 10:52)  T(F): 98.7 (29 Apr 2023 06:20), Max: 98.7 (28 Apr 2023 10:52)  HR: 76 (29 Apr 2023 06:20) (66 - 86)  BP: 98/65 (29 Apr 2023 06:20) (89/52 - 121/62)  BP(mean): 63 (29 Apr 2023 01:20) (63 - 69)  RR: 19 (29 Apr 2023 06:20) (14 - 19)  SpO2: 99% (29 Apr 2023 04:00) (93% - 100%)    Parameters below as of 29 Apr 2023 04:00  Patient On (Oxygen Delivery Method): nasal cannula      I&O's Summary    28 Apr 2023 07:01  -  29 Apr 2023 07:00  --------------------------------------------------------  IN: 1820 mL / OUT: 2600 mL / NET: -780 mL      TELE: Not on telemetry   PHYSICAL EXAM:  Constitutional: NAD, awake   HEENT: Moist Mucous Membranes, Anicteric  Pulmonary: Non-labored, breath sounds are clear bilaterally, No wheezing, rales or rhonchi  Cardiovascular: Regular, S1 and S2, + murmurs, rubs, gallops or clicks  Gastrointestinal: Bowel Sounds present, soft, Mid abdomianl incision, +colostomy  Lymph: generalized edema. No lymphadenopathy.  Skin: No visible rashes or ulcers.  Psych:  Mood & affect flat       LABS: All Labs Reviewed:                        7.2    37.23 )-----------( 476      ( 29 Apr 2023 00:46 )             24.5                         8.3    38.20 )-----------( 481      ( 28 Apr 2023 20:28 )             28.0                         6.9    34.21 )-----------( 498      ( 28 Apr 2023 07:09 )             24.0     28 Apr 2023 07:09    142    |  110    |  77     ----------------------------<  124    3.7     |  27     |  3.50   27 Apr 2023 07:10    143    |  110    |  79     ----------------------------<  121    3.6     |  27     |  3.90   26 Apr 2023 07:15    144    |  111    |  79     ----------------------------<  134    3.6     |  29     |  4.30     Ca    8.0        28 Apr 2023 07:09  Ca    7.9        27 Apr 2023 07:10  Ca    7.9        26 Apr 2023 07:15  Phos  4.2       28 Apr 2023 07:09  Phos  5.6       26 Apr 2023 07:15  Mg     2.1       28 Apr 2023 07:09  Mg     2.1       26 Apr 2023 07:15    TPro  5.4    /  Alb  2.0    /  TBili  0.4    /  DBili  x      /  AST  8      /  ALT  7      /  AlkPhos  59     28 Apr 2023 07:09  TPro  5.3    /  Alb  2.0    /  TBili  0.3    /  DBili  x      /  AST  10     /  ALT  7      /  AlkPhos  57     27 Apr 2023 07:10    PT/INR - ( 29 Apr 2023 01:46 )   PT: 13.7 sec;   INR: 1.17 ratio         PTT - ( 29 Apr 2023 01:46 )  PTT:33.2 sec      12 Lead ECG:   Ventricular Rate 82 BPM    Atrial Rate 82 BPM    P-R Interval 160 ms    QRS Duration 84 ms    Q-T Interval 412 ms    QTC Calculation(Bazett) 481 ms    P Axis 68 degrees    R Axis 13 degrees    T Axis 4 degrees    Diagnosis Line Normal sinus rhythm  Right atrial enlargement  Borderline ECG  When compared with ECG of 22-APR-2023 09:43,  aberrant conduction is no longer present  Confirmed by PAULINE LARSEN ([id number]) on 4/24/2023 4:48:07 PM (04-24-23 @ 13:13)      ACC: [id number] EXAM:  ECHO TTE WO CON COMP W DOPP   ORDERED BY: RAFY WILSON     PROCEDURE DATE:  04/14/2023          INTERPRETATION:  INDICATION: Heart failure  Sonographer KL    Blood Pressure unavailable    Height 167.6 cm     Weight 60 kgBSA   1.8 sq m    Dimensions:  LA 4.0       Normal Values: 2.0 - 4.0 cm  Ao 3.4        Normal Values: 2.0 - 3.8 cm  SEPTUM 0.9       Normal Values: 0.6 - 1.2 cm  PWT 0.8       Normal Values: 0.6 - 1.1 cm  LVIDd 4.2         Normal Values: 3.0 - 5.6 cm  LVIDs 2.9         Normal Values: 1.8 - 4.0 cm      OBSERVATIONS:  Mitral Valve: Mild to moderate MR.  Aortic Valve/Aorta: Sclerotic trileaflet aortic valve with grossly normal   opening. Trace AI  Tricuspid Valve: Mild to moderate TR.  Pulmonic Valve: Mild PI  Left Atrium: Enlarged  Right Atrium: Not well-visualized  Left Ventricle: normal LV size and systolic function, estimated LVEF of   55-60%.  Right Ventricle: The right ventricle is enlarged with grossly normal   function  Pericardium: no significant pericardial effusion.  Pulmonary/RV Pressure: estimated PA systolic pressure of 66mmHg assuming   an RA pressure of 10 mmHg.  IVC measures 2.0 cm and is non collapsing  LV diastolic dysfunction is present  Bilateral pleural effusions        IMPRESSION:  Normal left ventricular internal dimensions and systolic function,   estimated LVEF of 55-60%.  The right ventricle is enlarged with grossly normal function  Left atrial enlargement  Sclerotic trileaflet aortic valve, trace AI.  Mild to moderate MR and TR.  Estimated PA systolic pressure of 66mmHg assuming an RA pressure of 10   mmHg.  IVC measures 2.0 cm and is non collapsing    --- End of Report ---            NEYMAR ROWLAND MD; Attending Cardiologist  This document has been electronically signed. Apr 15 2023 10:52AM

## 2023-04-29 NOTE — PROGRESS NOTE ADULT - SUBJECTIVE AND OBJECTIVE BOX
Patient is a 72y old  Female who presents with a chief complaint of intra-abdominal perforation (29 Apr 2023 12:40)    INTERVAL HPI/OVERNIGHT EVENTS: Patient seen and examined at bedside. Pt required 2 units prbc overnight with clots noted in ileostomy drainage bag. Pt made NPO and NG tube placed on suction. Patient has no complaints at this time. Denies fevers, chills, headache, lightheadedness, chest pain, dyspnea, abdominal pain, n/v/d/c.    MEDICATIONS  (STANDING):  albuterol/ipratropium for Nebulization 3 milliLiter(s) Nebulizer every 6 hours  buDESOnide    Inhalation Suspension 0.5 milliGRAM(s) Inhalation every 12 hours  dextrose 5%. 1000 milliLiter(s) (65 mL/Hr) IV Continuous <Continuous>  escitalopram 20 milliGRAM(s) Oral daily  levothyroxine Injectable 93.75 MICROGram(s) IV Push <User Schedule>  midodrine. 5 milliGRAM(s) Oral three times a day  pantoprazole  Injectable 40 milliGRAM(s) IV Push two times a day  vancomycin    Solution 125 milliGRAM(s) Oral daily    MEDICATIONS  (PRN):  acetaminophen     Tablet .. 650 milliGRAM(s) Oral every 6 hours PRN Moderate Pain (4 - 6)  albuterol    90 MICROgram(s) HFA Inhaler 4 Puff(s) Inhalation every 6 hours PRN Shortness of Breath and/or Wheezing  HYDROmorphone   Tablet 1 milliGRAM(s) Oral every 6 hours PRN Severe Pain (7 - 10)  metoclopramide Injectable 5 milliGRAM(s) IV Push every 6 hours PRN nausea and/or vomiting  sodium chloride 0.9% lock flush 10 milliLiter(s) IV Push every 1 hour PRN Pre/post blood products, medications, blood draw, and to maintain line patency    Allergies  No Known Allergies    Intolerances    REVIEW OF SYSTEMS:  Patient is lethargic but able to answer questions. Denies fevers, chills, headache, nausea, abd pain, CP, SOB.    Vital Signs Last 24 Hrs  T(C): 37.1 (29 Apr 2023 06:20), Max: 37.1 (29 Apr 2023 04:00)  T(F): 98.7 (29 Apr 2023 06:20), Max: 98.7 (29 Apr 2023 04:00)  HR: 64 (29 Apr 2023 07:18) (64 - 86)  BP: 98/65 (29 Apr 2023 06:20) (89/52 - 121/62)  BP(mean): 63 (29 Apr 2023 01:20) (63 - 69)  RR: 19 (29 Apr 2023 06:20) (14 - 19)  SpO2: 100% (29 Apr 2023 07:18) (97% - 100%)    Parameters below as of 29 Apr 2023 07:18  Patient On (Oxygen Delivery Method): nasal cannula,3 L    PHYSICAL EXAM:  GENERAL: chronically ill-appearing, not in acute distress; frail  HEENT:  anicteric, moist mucous membranes, NGT in place on suction with gastric contents noted in tube  CHEST/LUNG:  CTA b/l, shallow breaths, decreased at bases  HEART: irregular rhythm, S1, S2  ABDOMEN: ileostomy in place; dark red/brown output noted, BS+, soft, nontender, nondistended  EXTREMITIES: no cyanosis, or calf tenderness  NERVOUS SYSTEM: answering questions with nodding and few words    LABS:                        8.3    38.85 )-----------( 459      ( 29 Apr 2023 10:35 )             26.8     CBC Full  -  ( 29 Apr 2023 10:35 )  WBC Count : 38.85 K/uL  Hemoglobin : 8.3 g/dL  Hematocrit : 26.8 %  Platelet Count - Automated : 459 K/uL  Mean Cell Volume : 92.7 fl  Mean Cell Hemoglobin : 28.7 pg  Mean Cell Hemoglobin Concentration : 31.0 gm/dL  Auto Neutrophil # : x  Auto Lymphocyte # : x  Auto Monocyte # : x  Auto Eosinophil # : x  Auto Basophil # : x  Auto Neutrophil % : x  Auto Lymphocyte % : x  Auto Monocyte % : x  Auto Eosinophil % : x  Auto Basophil % : x    29 Apr 2023 10:35    143    |  111    |  80     ----------------------------<  115    3.9     |  25     |  3.10     Ca    8.1        29 Apr 2023 10:35  Phos  3.7       29 Apr 2023 10:35  Mg     1.8       29 Apr 2023 10:35    PT/INR - ( 29 Apr 2023 01:46 )   PT: 13.7 sec;   INR: 1.17 ratio      PTT - ( 29 Apr 2023 01:46 )  PTT:33.2 sec    CAPILLARY BLOOD GLUCOSE  POCT Blood Glucose.: 115 mg/dL (29 Apr 2023 12:10)  POCT Blood Glucose.: 117 mg/dL (29 Apr 2023 06:19)  POCT Blood Glucose.: 119 mg/dL (29 Apr 2023 00:45)  POCT Blood Glucose.: 138 mg/dL (28 Apr 2023 19:50)    RADIOLOGY & ADDITIONAL TESTS:    < from: CT Abdomen and Pelvis No Cont (04.27.23 @ 12:05) >  PROCEDURE DATE:  04/27/2023      INTERPRETATION:  CLINICAL INFORMATION: Worsening abdominal pain.    Prolonged hospitalization secondary to sigmoid perforation with partial   colectomy and Ro's pouch.    COMPARISON: 4/20/2023, 4/13/2023, and 01/26/2020    CONTRAST/COMPLICATIONS:  IV Contrast: NONE  Oral Contrast: NONE  Complications: None reported at time of study completion    PROCEDURE:  CT of the Abdomen and Pelvis was performed.  Sagittal and coronal reformats were performed.    FINDINGS:  LOWER CHEST: Mosaic attenuation pattern in the lung bases is grossly   stable from 01/26/2023.  This may be related to chronic small airways   disease or small vessel disease.  Dependent airspace opacity in both   lower lobes compatible with atelectasis is stable.  An approximately 9 x   7 mm right middle lobe pulmonary nodule (2:10) is stable.  A small right   pleural effusion with pleural thickening is grossly stable.  A small left   pleural effusion with pleural thickening and pleural calcification is   grossly stable.  Cardiomegaly.  Mild atherosclerotic calcification the   visualized coronary arteries.  Low attenuation cardiac blood pool   compatible with anemia.    LIVER: There are new subcapsular fluid collections about the liver,   measuring approximately 4.5 x 1.4 cm anteriorly (2:19), 8.1 x 2.3 cm   along the right side of the liver superiorly (2:15), 5.2 x 2.7 cm   posteromedially (2:26), and 1.6x 1.2 cm posterolaterally (2:26).  BILE DUCTS: Mildly dilated common bile duct.  GALLBLADDER: Cholecystectomy.  SPLEEN: Splenomegaly; enlarged spleen measures up to approximately 15.9   cm in craniocaudal dimension (602:57).  PANCREAS: Within normal limits.  ADRENALS: Within normal limits.  KIDNEYS/URETERS: Within normal limits.    BLADDER: A 1.3 x 0.6 cm nodular hyperattenuating focus along the   dependent portion of the bladder is new from 04/20/2023; this presumably   represents a small amountof hemorrhage.  REPRODUCTIVE ORGANS: The uterus and ovaries are not well visualized due   to surrounding fluid and unopacified bowel loops    BOWEL: Enteric tube terminates in the stomach, approximately 8 cm below   the GE junction.  There is redemonstration of gastric wall thickening   versus underdistention.  Patient is status post partial left colectomy   with right-sided colostomy and Ro's pouch. No bowel obstruction.    There appears to be persistent wall thickening in the small bowel and   proximal colon, suboptimally evaluated due to lack of oral/IV contrast   and diffuse underdistention of the bowel.  There is mild fluid distention   of the rectal pouch.  There is nonspecific perirectal fat stranding and   presacral edema.  PERITONEUM: A percutaneous drain terminates in the left.  Moderate   abdominal and pelvic ascites is redemonstrated.  VESSELS: Within normal limits.  RETROPERITONEUM/LYMPH NODES: No lymphadenopathy.  ABDOMINAL WALL: Stable postsurgical changes in the ventral abdominal   wall, with open midline incision and small foci of soft tissue gas.    Redemonstration of curvilinear hyperattenuation in the lower ventral   abdominal wall (2:74), which may represent surgical material.  Diffuse   anasarca.  BONES: Mild degenerative changes in the spine appear unchanged from   01/26/2023.    IMPRESSION:  Enteric tube terminates in the stomach, approximately 8 cm while the GE   junction.  This may be advanced as clinically warranted.    New subcapsular fluid collections about the liver, measuring   approximately 4.5 x 1.4 cm anteriorly, 8.1 x 2.3 cm along the right side   of the liver superiorly, 5.2 x 2.7 cm posteromedially, and 1.6 x 1.2 cm   posterolaterally.      Otherwise no significant interval change from04/20/2023.    There appears to be persistent thickening of the small bowel and proximal   colon which is suboptimally evaluated due to lack of oral/IV contrast and   diffuse underdistention the bowel.    There is redemonstration of gastric wall thickening versus   underdistention.    --- End of Report ---    < end of copied text >    Personally reviewed.     Consultant(s) Notes Reviewed:  [x] YES  [ ] NO     Patient is a 72y old  Female who presents with a chief complaint of generalized abdominal pain and persistent diarrhea.    INTERVAL HPI/OVERNIGHT EVENTS: Patient seen and examined at bedside. Pt required 2 units PRBC overnight with clots noted in ileostomy drainage bag. Pt made NPO and NG tube placed on suction. Patient has no complaints at this time. Denies fevers, chills, headache, lightheadedness, chest pain, dyspnea, abdominal pain, n/v/d/c.    MEDICATIONS  (STANDING):  albuterol/ipratropium for Nebulization 3 milliLiter(s) Nebulizer every 6 hours  buDESOnide    Inhalation Suspension 0.5 milliGRAM(s) Inhalation every 12 hours  dextrose 5%. 1000 milliLiter(s) (65 mL/Hr) IV Continuous <Continuous>  escitalopram 20 milliGRAM(s) Oral daily  levothyroxine Injectable 93.75 MICROGram(s) IV Push <User Schedule>  midodrine. 5 milliGRAM(s) Oral three times a day  pantoprazole  Injectable 40 milliGRAM(s) IV Push two times a day  vancomycin    Solution 125 milliGRAM(s) Oral daily    MEDICATIONS  (PRN):  acetaminophen     Tablet .. 650 milliGRAM(s) Oral every 6 hours PRN Moderate Pain (4 - 6)  albuterol    90 MICROgram(s) HFA Inhaler 4 Puff(s) Inhalation every 6 hours PRN Shortness of Breath and/or Wheezing  HYDROmorphone   Tablet 1 milliGRAM(s) Oral every 6 hours PRN Severe Pain (7 - 10)  metoclopramide Injectable 5 milliGRAM(s) IV Push every 6 hours PRN nausea and/or vomiting  sodium chloride 0.9% lock flush 10 milliLiter(s) IV Push every 1 hour PRN Pre/post blood products, medications, blood draw, and to maintain line patency    Allergies  No Known Allergies    Intolerances    REVIEW OF SYSTEMS:  Patient is somewhat somnolent, but able to answer some questions. Denies fevers, chills, headache, nausea, vomiting, abd pain, CP, SOB.    Vital Signs Last 24 Hrs  T(C): 37.1 (29 Apr 2023 06:20), Max: 37.1 (29 Apr 2023 04:00)  T(F): 98.7 (29 Apr 2023 06:20), Max: 98.7 (29 Apr 2023 04:00)  HR: 64 (29 Apr 2023 07:18) (64 - 86)  BP: 98/65 (29 Apr 2023 06:20) (89/52 - 121/62)  BP(mean): 63 (29 Apr 2023 01:20) (63 - 69)  RR: 19 (29 Apr 2023 06:20) (14 - 19)  SpO2: 100% (29 Apr 2023 07:18) (97% - 100%)    Parameters below as of 29 Apr 2023 07:18  Patient On (Oxygen Delivery Method): nasal cannula,3 L    PHYSICAL EXAM:  GENERAL: chronically ill-appearing, not in acute distress; frail  HEENT:  anicteric, moist mucous membranes, NGT in place on suction with gastric contents noted in tube  CHEST/LUNG:  CTA b/l, shallow breaths, decreased at bases  HEART: irregular rhythm at 68bpm, S1, S2  ABDOMEN: ileostomy in place; dark red/brown output noted, BS+, soft, nontender, nondistended  EXTREMITIES: no cyanosis, or calf tenderness  NERVOUS SYSTEM: somnolent but answering questions with nodding and few words    LABS:                        8.3    38.85 )-----------( 459      ( 29 Apr 2023 10:35 )             26.8     CBC Full  -  ( 29 Apr 2023 10:35 )  WBC Count : 38.85 K/uL  Hemoglobin : 8.3 g/dL  Hematocrit : 26.8 %  Platelet Count - Automated : 459 K/uL  Mean Cell Volume : 92.7 fl  Mean Cell Hemoglobin : 28.7 pg  Mean Cell Hemoglobin Concentration : 31.0 gm/dL  Auto Neutrophil # : x  Auto Lymphocyte # : x  Auto Monocyte # : x  Auto Eosinophil # : x  Auto Basophil # : x  Auto Neutrophil % : x  Auto Lymphocyte % : x  Auto Monocyte % : x  Auto Eosinophil % : x  Auto Basophil % : x    29 Apr 2023 10:35    143    |  111    |  80     ----------------------------<  115    3.9     |  25     |  3.10     Ca    8.1        29 Apr 2023 10:35  Phos  3.7       29 Apr 2023 10:35  Mg     1.8       29 Apr 2023 10:35    PT/INR - ( 29 Apr 2023 01:46 )   PT: 13.7 sec;   INR: 1.17 ratio      PTT - ( 29 Apr 2023 01:46 )  PTT:33.2 sec    CAPILLARY BLOOD GLUCOSE  POCT Blood Glucose.: 115 mg/dL (29 Apr 2023 12:10)  POCT Blood Glucose.: 117 mg/dL (29 Apr 2023 06:19)  POCT Blood Glucose.: 119 mg/dL (29 Apr 2023 00:45)  POCT Blood Glucose.: 138 mg/dL (28 Apr 2023 19:50)    RADIOLOGY & ADDITIONAL TESTS:    < from: CT Abdomen and Pelvis No Cont (04.27.23 @ 12:05) >  PROCEDURE DATE:  04/27/2023      INTERPRETATION:  CLINICAL INFORMATION: Worsening abdominal pain.    Prolonged hospitalization secondary to sigmoid perforation with partial   colectomy and Ro's pouch.    COMPARISON: 4/20/2023, 4/13/2023, and 01/26/2020    CONTRAST/COMPLICATIONS:  IV Contrast: NONE  Oral Contrast: NONE  Complications: None reported at time of study completion    PROCEDURE:  CT of the Abdomen and Pelvis was performed.  Sagittal and coronal reformats were performed.    FINDINGS:  LOWER CHEST: Mosaic attenuation pattern in the lung bases is grossly   stable from 01/26/2023.  This may be related to chronic small airways   disease or small vessel disease.  Dependent airspace opacity in both   lower lobes compatible with atelectasis is stable.  An approximately 9 x   7 mm right middle lobe pulmonary nodule (2:10) is stable.  A small right   pleural effusion with pleural thickening is grossly stable.  A small left   pleural effusion with pleural thickening and pleural calcification is   grossly stable.  Cardiomegaly.  Mild atherosclerotic calcification the   visualized coronary arteries.  Low attenuation cardiac blood pool   compatible with anemia.    LIVER: There are new subcapsular fluid collections about the liver,   measuring approximately 4.5 x 1.4 cm anteriorly (2:19), 8.1 x 2.3 cm   along the right side of the liver superiorly (2:15), 5.2 x 2.7 cm   posteromedially (2:26), and 1.6x 1.2 cm posterolaterally (2:26).  BILE DUCTS: Mildly dilated common bile duct.  GALLBLADDER: Cholecystectomy.  SPLEEN: Splenomegaly; enlarged spleen measures up to approximately 15.9   cm in craniocaudal dimension (602:57).  PANCREAS: Within normal limits.  ADRENALS: Within normal limits.  KIDNEYS/URETERS: Within normal limits.    BLADDER: A 1.3 x 0.6 cm nodular hyperattenuating focus along the   dependent portion of the bladder is new from 04/20/2023; this presumably   represents a small amountof hemorrhage.  REPRODUCTIVE ORGANS: The uterus and ovaries are not well visualized due   to surrounding fluid and unopacified bowel loops    BOWEL: Enteric tube terminates in the stomach, approximately 8 cm below   the GE junction.  There is redemonstration of gastric wall thickening   versus underdistention.  Patient is status post partial left colectomy   with right-sided colostomy and Ro's pouch. No bowel obstruction.    There appears to be persistent wall thickening in the small bowel and   proximal colon, suboptimally evaluated due to lack of oral/IV contrast   and diffuse underdistention of the bowel.  There is mild fluid distention   of the rectal pouch.  There is nonspecific perirectal fat stranding and   presacral edema.  PERITONEUM: A percutaneous drain terminates in the left.  Moderate   abdominal and pelvic ascites is redemonstrated.  VESSELS: Within normal limits.  RETROPERITONEUM/LYMPH NODES: No lymphadenopathy.  ABDOMINAL WALL: Stable postsurgical changes in the ventral abdominal   wall, with open midline incision and small foci of soft tissue gas.    Redemonstration of curvilinear hyperattenuation in the lower ventral   abdominal wall (2:74), which may represent surgical material.  Diffuse   anasarca.  BONES: Mild degenerative changes in the spine appear unchanged from   01/26/2023.    IMPRESSION:  Enteric tube terminates in the stomach, approximately 8 cm while the GE   junction.  This may be advanced as clinically warranted.    New subcapsular fluid collections about the liver, measuring   approximately 4.5 x 1.4 cm anteriorly, 8.1 x 2.3 cm along the right side   of the liver superiorly, 5.2 x 2.7 cm posteromedially, and 1.6 x 1.2 cm   posterolaterally.      Otherwise no significant interval change from04/20/2023.    There appears to be persistent thickening of the small bowel and proximal   colon which is suboptimally evaluated due to lack of oral/IV contrast and   diffuse underdistention the bowel.    There is redemonstration of gastric wall thickening versus   underdistention.    --- End of Report ---    < end of copied text >    Personally reviewed.     Consultant(s) Notes Reviewed:  [x] YES  [ ] NO

## 2023-04-29 NOTE — CONSULT NOTE ADULT - SUBJECTIVE AND OBJECTIVE BOX
Chief Complaint:  Patient is a 72y old  Female who presents with a chief complaint of intra-abdominal perforation (2023 12:40)    Hypothyroidism    HLD (hyperlipidemia)    Thrombocytosis    Persistent atrial fibrillation    Obesity (BMI 35.0-39.9 without comorbidity)    Asthma    Diabetes mellitus    Ankle injury    Cholecystitis       HPI:  73 y/o f w/ PMH of HTN, CHF, asthma, hypothyroidism, prior notes state A-fib on Xarelto however patient states she is not taking any anticoagulants, history of C. difficile 2023 p/w generalized abdominal pain, and persistent diarrhea for last several months with several episodes every day, worsening in last few weeks.  Pt's abdominal pain acutely worsened in last day, with achy, persistent pain, diffusely in abdomen, nonraidtaing, not responding to oral pain meds, associated with nausea and vomiting, prompting pt to come to ED.  Pt did not report any fevers.  Pt has also had increased lower extremity swelling, but per son, has been noncompliant with lasix recently.   (2023 10:37)      No Known Allergies      acetaminophen     Tablet .. 650 milliGRAM(s) Oral every 6 hours PRN  albuterol    90 MICROgram(s) HFA Inhaler 4 Puff(s) Inhalation every 6 hours PRN  albuterol/ipratropium for Nebulization 3 milliLiter(s) Nebulizer every 6 hours  buDESOnide    Inhalation Suspension 0.5 milliGRAM(s) Inhalation every 12 hours  dextrose 5%. 1000 milliLiter(s) IV Continuous <Continuous>  escitalopram 20 milliGRAM(s) Oral daily  HYDROmorphone   Tablet 1 milliGRAM(s) Oral every 6 hours PRN  levothyroxine Injectable 93.75 MICROGram(s) IV Push <User Schedule>  metoclopramide Injectable 5 milliGRAM(s) IV Push every 6 hours PRN  midodrine. 5 milliGRAM(s) Oral three times a day  pantoprazole  Injectable 40 milliGRAM(s) IV Push two times a day  sodium chloride 0.9% lock flush 10 milliLiter(s) IV Push every 1 hour PRN  vancomycin    Solution 125 milliGRAM(s) Oral daily        FAMILY HISTORY:  Family history of early CAD (Father)    Family history of early CAD (Mother)          Review of Systems:    General:  No wt loss, fevers, chills, night sweats,fatigue,   Eyes:  Good vision, no reported pain  ENT:  No sore throat, pain, runny nose, dysphagia  CV:  No pain, palpitatioins, hypo/hypertension  Resp:  No dyspnea, cough, tachypnea, wheezing  :  No pain, bleeding, incontinence, nocturia  Muscle:  No pain, weakness  Neuro:  No weakness, tingling, memory problems  Psych:  No fatigue, insomnia, mood problems, depression  Endocrine:  No polyuria, polydypsia, cold/heat intolerance  Heme:  No petechiae, ecchymosis, easy bruisability  Skin:  No rash, tattoos, scars, edema    Relevant Family History:       Relevant Social History:       Physical Exam:    Vital Signs:  Vital Signs Last 24 Hrs  T(C): 37.1 (2023 06:20), Max: 37.1 (2023 04:00)  T(F): 98.7 (2023 06:20), Max: 98.7 (2023 04:00)  HR: 64 (2023 07:18) (64 - 86)  BP: 98/65 (2023 06:20) (89/52 - 121/62)  BP(mean): 63 (2023 01:20) (63 - 69)  RR: 19 (2023 06:20) (14 - 19)  SpO2: 100% (2023 07:18) (97% - 100%)    Parameters below as of 2023 07:18  Patient On (Oxygen Delivery Method): nasal cannula,3 L      Daily     Daily Weight in k.7 (2023 03:45)    General:  Appears stated age, well-groomed, well-nourished, no distress  HEENT:  NC/AT,  conjunctivae clear and pink, no thyromegaly, nodules, adenopathy, no JVD  Chest:  Full & symmetric excursion, no increased effort, breath sounds clear  Cardiovascular:  Regular rhythm, S1, S2, no murmur/rub/S3/S4, no abdominal bruit, no edema  Abdomen:  Soft, non-tender, non-distended, normoactive bowel sounds,  no masses ,no hepatosplenomeagaly, no signs of chronic liver disease  Extremities:  no cyanosis,clubbing or edema  Skin:  No rash/erythema/ecchymoses/petechiae/wounds/abscess/warm/dry  Neuro/Psych:  Alert, oriented, no asterixis, no tremor, no encephalopathy    Laboratory:                            8.3    38.85 )-----------( 459      ( 2023 10:35 )             26.8         143  |  111<H>  |  80<H>  ----------------------------<  115<H>  3.9   |  25  |  3.10<H>    Ca    8.1<L>      2023 10:35  Phos  3.7       Mg     1.8         TPro  5.4<L>  /  Alb  2.0<L>  /  TBili  0.4  /  DBili  x   /  AST  8<L>  /  ALT  7<L>  /  AlkPhos  59  28    LIVER FUNCTIONS - ( 2023 07:09 )  Alb: 2.0 g/dL / Pro: 5.4 g/dL / ALK PHOS: 59 U/L / ALT: 7 U/L / AST: 8 U/L / GGT: x           PT/INR - ( 2023 01:46 )   PT: 13.7 sec;   INR: 1.17 ratio         PTT - ( 2023 01:46 )  PTT:33.2 sec      Imaging:

## 2023-04-29 NOTE — CHART NOTE - NSCHARTNOTEFT_GEN_A_CORE
Assessment: patient seen for follow up malnutrition    72y old  Female who presents with a chief complaint of intra-abdominal perforation  status post extended Tia's procedure   patient NPO with bleeding from stoma . 2 units PRBC  patient was previously on Nepro feeding recommend vital 1.5 elemental feeds   D5 IV           Factors impacting intake: [ ] none [ ] nausea  [ ] vomiting [ ] diarrhea [ ] constipation  [ ]chewing problems [x ] swallowing issues  [ ] other: speech continues to recommend NPO    Diet Prescription: Diet, NPO (04-29-23 @ 09:23)        Current Weight: 4/29 166.8# right and left leg + 2 edema       Pertinent Medications: MEDICATIONS  (STANDING):  albuterol/ipratropium for Nebulization 3 milliLiter(s) Nebulizer every 6 hours  buDESOnide    Inhalation Suspension 0.5 milliGRAM(s) Inhalation every 12 hours  dextrose 5%. 1000 milliLiter(s) (65 mL/Hr) IV Continuous <Continuous>  escitalopram 20 milliGRAM(s) Oral daily  levothyroxine Injectable 93.75 MICROGram(s) IV Push <User Schedule>  midodrine. 5 milliGRAM(s) Oral three times a day  pantoprazole  Injectable 40 milliGRAM(s) IV Push two times a day  vancomycin    Solution 125 milliGRAM(s) Oral daily    MEDICATIONS  (PRN):  acetaminophen     Tablet .. 650 milliGRAM(s) Oral every 6 hours PRN Moderate Pain (4 - 6)  albuterol    90 MICROgram(s) HFA Inhaler 4 Puff(s) Inhalation every 6 hours PRN Shortness of Breath and/or Wheezing  HYDROmorphone   Tablet 1 milliGRAM(s) Oral every 6 hours PRN Severe Pain (7 - 10)  metoclopramide Injectable 5 milliGRAM(s) IV Push every 6 hours PRN nausea and/or vomiting  sodium chloride 0.9% lock flush 10 milliLiter(s) IV Push every 1 hour PRN Pre/post blood products, medications, blood draw, and to maintain line patency    Pertinent Labs: BUN 80 Creat 3.1 POCT 115  Skin: sacrum suspected DTI on admit    Estimated Needs:   [x ] no change since previous assessment based on 160# 25-30kcal/kg 1818-2181kcals and .8-1.0gms protein /kg 58-72gms protein  [ ] recalculated:     Previous Nutrition Diagnosis:   [ ] Inadequate Energy Intake [ ]Inadequate Oral Intake [ ] Excessive Energy Intake   [ ] Underweight [ ] Increased Nutrient Needs [ ] Overweight/Obesity   [x ] Altered GI Function [ ] Unintended Weight Loss [ ] Food & Nutrition Related Knowledge Deficit [x ] Malnutrition     Nutrition Diagnosis is [x ] ongoing  [ ] resolved [ ] not applicable     New Nutrition Diagnosis: [x ] not applicable       Interventions:   Recommend  [ ] Change Diet To:  [ ] Nutrition Supplement  [x ] Nutrition Support recommend vital 1.5 once feeding re-started start [id number] to goal [id number]  hr 1200ml 1800kcals and 81gms protein and [id number] with additional fluid flush per MD discretion   [x ] Other: recommend MVI and Vit C 500mg BID     Monitoring and Evaluation:   [ ] PO intake [ x ] Tolerance to diet prescription [ x ] weights [ x ] labs[ x ] follow up per protocol  [ ] other:

## 2023-04-29 NOTE — PROGRESS NOTE ADULT - PROBLEM SELECTOR PLAN 2
-Perforated sigmoid colon: s/p Ro's procedure   -Ostomy c/d/i; dark red/brown output with clots noted 4/28, s/p 3 units prbc with Hg 8.3 this AM  -neg c-diff, neg GI pcr  -ID ([facility]), recs appreciated  -completed course of broad spectrum Abx  -Continue Metoclopramide prn and PPI   -Dilaudid 1mg q6hr PRN and APAP 650mg q6hr for pain  -repeat CT with some intra-abdominal collections of unclear etiology and bowel thickening, pt not reporting abd pain/tenderness today   -surgery ([facility]), recs appreciated  -No role for further surgical intervention at this time  -S&S reassessed today and felt pt did better, but not well enough to be recommended for po intake -- still recommend NPO, but now recommend to perform a FEES study on Monday for the next reassessment as feel pt has higher chance to pass the swallow test -Perforated sigmoid colon: s/p Ro's procedure   -Ostomy c/d/i; dark red/brown output with clots noted 4/28, s/p 3 units prbc with Hg 8.3 this AM  -neg c-diff, neg GI pcr  -ID ([facility]), recs appreciated  -completed course of broad spectrum Abx  -Continue Metoclopramide prn and PPI   -Dilaudid 1mg q6hr PRN and APAP 650mg q6hr for pain  -repeat CT with some intra-abdominal collections of unclear etiology and bowel thickening, pt not reporting abd pain/tenderness today   -surgery ([facility]), recs appreciated  -No role for further surgical intervention at this time  -S&S reassessed on 4/28 and felt pt did better, but not well enough to be recommended for po intake -- still recommend NPO, but now recommend to perform a FEES study on Monday for the next reassessment as feel pt has higher chance to pass the swallow test

## 2023-04-29 NOTE — PROGRESS NOTE ADULT - SUBJECTIVE AND OBJECTIVE BOX
[INTERVAL HX: ]  Patient seen and examined;  Chart reviewed and events noted;     [MEDICATIONS]  MEDICATIONS  (STANDING):  albuterol/ipratropium for Nebulization 3 milliLiter(s) Nebulizer every 6 hours  buDESOnide    Inhalation Suspension 0.5 milliGRAM(s) Inhalation every 12 hours  dextrose 5%. 1000 milliLiter(s) (65 mL/Hr) IV Continuous <Continuous>  escitalopram 20 milliGRAM(s) Oral daily  levothyroxine Injectable 93.75 MICROGram(s) IV Push <User Schedule>  midodrine. 5 milliGRAM(s) Oral three times a day  pantoprazole  Injectable 40 milliGRAM(s) IV Push daily  vancomycin    Solution 125 milliGRAM(s) Oral daily    MEDICATIONS  (PRN):  acetaminophen     Tablet .. 650 milliGRAM(s) Oral every 6 hours PRN Moderate Pain (4 - 6)  albuterol    90 MICROgram(s) HFA Inhaler 4 Puff(s) Inhalation every 6 hours PRN Shortness of Breath and/or Wheezing  HYDROmorphone   Tablet 1 milliGRAM(s) Oral every 6 hours PRN Severe Pain (7 - 10)  metoclopramide Injectable 5 milliGRAM(s) IV Push every 6 hours PRN nausea and/or vomiting  sodium chloride 0.9% lock flush 10 milliLiter(s) IV Push every 1 hour PRN Pre/post blood products, medications, blood draw, and to maintain line patency      [VITALS]  Vital Signs Last 24 Hrs  T(C): 37.1 (2023 06:20), Max: 37.1 (2023 04:00)  T(F): 98.7 (2023 06:20), Max: 98.7 (2023 04:00)  HR: 64 (2023 07:18) (64 - 86)  BP: 98/65 (2023 06:20) (89/52 - 121/62)  BP(mean): 63 (2023 01:20) (63 - 69)  RR: 19 (2023 06:20) (14 - 19)  SpO2: 100% (2023 07:18) (97% - 100%)    Parameters below as of 2023 07:18  Patient On (Oxygen Delivery Method): nasal cannula,3 L      [WT/HT]  Daily     Daily Weight in k.7 (2023 03:45)  [VENT]      [PHYSICAL EXAM]  GEN: cachetic, chronically ill looking. NGT in place  HEENT: normocephalic and atraumatic. EOMI. .    NECK: Supple.  No lymphadenopathy   LUNGS: Clear to auscultation.  HEART: Regular rate and rhythm,  no MRG  ABDOMEN: Soft, nontender, and nondistended.  Positive bowel sounds.  +colostomy  : No CVA tenderness  EXTREMITIES: trace edema.  SKIN: No rash     [LABS:]                        8.3    38.85 )-----------( 459      ( 2023 10:35 )             26.8         143  |  111<H>  |  80<H>  ----------------------------<  115<H>  3.9   |  25  |  3.10<H>    Ca    8.1<L>      2023 10:35  Phos  3.7       Mg     1.8         TPro  5.4<L>  /  Alb  2.0<L>  /  TBili  0.4  /  DBili  x   /  AST  8<L>  /  ALT  7<L>  /  AlkPhos  59      PT/INR - ( 2023 01:46 )   PT: 13.7 sec;   INR: 1.17 ratio         PTT - ( 2023 01:46 )  PTT:33.2 sec      Reticulocyte Count (23 @ 06:39)  Reticulocyte Percent: 1.4 % [0.5 - 2.5]  Absolute Reticulocytes: 43.3 K/uL [25.0 - 125.0]    Ferritin, Serum: 293 ng/mL *H* [15 - 150] (23 @ 07:38)    Iron - Total Binding Capacity.: 190 ug/dL *L* [220 - 430] (23 @ 07:38)    Reticulocyte Count (22 @ 15:23)  Reticulocyte Percent: 2.0 % [0.5 - 2.5]  Absolute Reticulocytes: 61.8 K/uL [25.0 - 125.0]    Reticulocyte Count (22 @ 09:40)  Reticulocyte Percent: 1.6 % [0.5 - 2.5]  Absolute Reticulocytes: 49.4 K/uL [25.0 - 125.0]    Iron - Total Binding Capacity.: 221 ug/dL [220 - 430] (22 @ 04:55)    Vitamin B12, Serum: 972 pg/mL [232 - 1245] (22 @ 04:55)    Ferritin, Serum: 319 ng/mL *H* [15 - 150] (22 @ 04:55)    Folate, Serum: >20.0 ng/mL (22 @ 04:55)    Serum Protein Electrophoresis Interp: Weak Gamma Migrating Paraprotein Identified (22 @ 04:55)    Immunofixation, Serum:   Weak IgG Kappa Band Identified    Reference Range: None Detected (22 @ 04:55)    Reticulocyte Count (22 @ 10:43)  Reticulocyte Percent: 1.6 % [0.5 - 2.5]  Absolute Reticulocytes: 47.1 K/uL [25.0 - 125.0]    Reticulocyte Count (22 @ 14:14)  Reticulocyte Percent: 3.6 % *H* [0.5 - 2.5]  Absolute Reticulocytes: 99.5 K/uL [25.0 - 125.0]          SARS-CoV-2: NotDetec (2023 06:40)          [RADIOLOGY STUDIES:]

## 2023-04-29 NOTE — CONSULT NOTE ADULT - ASSESSMENT
Lower GI bleed  hold tube feeds  monitor cbc   transfuse prn  surg fu noted  will need CTA +/- IR eval if continues to bleed

## 2023-04-29 NOTE — PROGRESS NOTE ADULT - TIME BILLING
Pt seen + examined. Case discussed with resident. Agree with assessment and plan above (edited by me in detail):  Time spent: 50min. More than 50% of the visit was spent counseling the patient and pt's son on medical condition -- severe sepsis and peritonitis due to perforated sigmoid colon, LAYA due to ATN, shock liver, and post/op acute respiratory failure requiring mechanical ventilation, and acute blood loss anemia, dysphagia, FEES study, severe anemia, PRBC, acute GIB.    71yo F w/ PMH of A-Fib on rivaroxaban, HFpEF, recent C diff infection (Jan 2023), myeloproliferative disorder with thrombocytosis on anagrelide, hypothyroidism, asthma, and lung nodules s/p resection (reportedly malignant) who presents with perforated sigmoid colon with acute bacterial peritonitis, LAYA due to ATN, septic shock, lactic acidosis, shock liver;  post/op acute respiratory failure requiring mechanical ventilation, and acute blood loss anemia; s/p extended Tia's procedure including descending colon on 4/13. She was extubated 4/19 and tolerating supplemental oxygen via nasal cannula, overall gradual improvement, but complicated by critical illness polyneuropathy, and further c/b by acute GIB.    -had acute blood loss anemia due to acute perforated bowel and extensive abdominal surgery early in the admission requiring 2un PRBC  -given 1 more unit PRBC on morning of 4/28 for slow gradual downtrend of H&H and no evidence of bleeding at that time; however, on night of 4/28 pt had acute GIB with dark red/brown output with clots noted in ostomy -- pt given 2un PRBC and repeat Hgb this evening of 7.8; will give one more unit PRBC tonight and monitor closely  -retacrit per nephro for renal failure  -serial CBCs today, transfuse as needed with goal Hgb >8 now given bleeding  -per surgery, had NGT suction during the day but no signs of blood on the suctioned gastric contents so bleed likely in small intestine or remaining colon -- this evening cleared to come off NGT suction and may give meds via NGT but will not restart tube feeds yet  -GI ([facility]), recs appreciated -- if pt continues to bleed, may need further imaging and/or IR involvement -- performing CTA has significant risks as pt is just starting to recover from severe ATN from septic shock and still has significantly impaired renal function    -Septic shock due to perforated sigmoid colon with polymicrobial peritonitis; s/p Tia procedure and abdominal abscess drainage on 4/13 w/ ICU stay requiring vasopressor support   -has stabilized, now only on midodrine 5mg q8hr -- taper off as able  -C-Diff negative on multiple checks during hospitalization and GI PCR negative - c/w prophylactic po vanco   -ID ([facility]), recs appreciated  -completed course of broad spectrum Abx  -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  -S&S reassessed on 4/28 and felt pt did better, but not well enough to be recommended for po intake -- still recommend NPO, but now recommend to perform a FEES study on Tuesday for the next reassessment as feel pt has higher chance to pass the swallow test  -pt with history of myeloproliferative disorder per discussion with hematology, so unclear the significance of the leukocytosis    -had been on octreotide given history of carcinoid with increased ostomy output -- per heme/onc last PET CT without evidence of carcinoid tumor and recommend trial off the octreotide -- discontinued on 4/27    LAYA likely ATN due to septic shock; BUN/Cr downtrending to 80/3.1 this AM   S/p bicarb gtt for metabolic acidosis  s/p diuresis with metolazone and bumetanide- held due to rising creatinine  monitor off diuretics  nephro ([facility]), recs appreciated

## 2023-04-29 NOTE — PROGRESS NOTE ADULT - PROBLEM SELECTOR PLAN 4
Acute respiratory failure with hypoxia extubated 4/19   Will encourage incentive spirometry  chest PT as tolerated  Ct albuterol-ipratropium nebs q6h + budesonide 0.5 mg nebs q12h (on Trelegy Ellipta inhaler at home) Acute respiratory failure with hypoxia extubated 4/19   Will encourage incentive spirometry  chest PT as tolerated  c/w nebs and budesonide 0.5 mg nebs q12h (on Trelegy Ellipta inhaler at home)

## 2023-04-29 NOTE — PROGRESS NOTE ADULT - ASSESSMENT
72 F s/p an Extended Tia's Procedure 4/13, now with acute GI Bleeding and acute blood loss anemia.      1. Stop tube feeds  2. NGT to LWS  3. Protonix  4. GI Consult  5. Trend H&H and transfuse as needed  6. IVF to support blood pressure  7. Discussed with Dr. Goldberg & Dr. Taylor as well as the medical resident. 72 F s/p an Extended Tia's Procedure 4/13, now with acute GI Bleeding and acute blood loss anemia.      1. Stop tube feeds  2. NGT to LWS  3. Protonix  4. GI Consult  5. Trend H&H and transfuse as needed  6. IVF to support blood pressure  7. Discussed with Dr. Goldberg & Dr. Taylor as well as the medical resident.  Surg. Att.  Pt. seen examined reviewed 24 hours events.  I also reviewed GI notes. Will observe the pt closely

## 2023-04-29 NOTE — PROGRESS NOTE ADULT - PROBLEM SELECTOR PLAN 3
-had acute blood loss anemia due to acute perforated bowel and extensive abdominal surgery early in the admission requiring 2un PRBC,   -dark red/brown output with clots noted 4/28, s/p 3 units prbc with Hg 8.3 this AM  -retacrit per nephro  -serial CBCs today, transfuse as needed -had acute blood loss anemia due to acute perforated bowel and extensive abdominal surgery early in the admission requiring 2un PRBC  -given 1 more unit PRBC on morning of 4/28 for slow gradual downtrend of H&H and no evidence of bleeding at that time; however, on night of 4/28 pt had acute GIB with dark red/brown output with clots noted in ostomy -- pt given 2un PRBC and repeat Hgb this evening of 7.8; will give one more unit PRBC tonight and monitor closely  -retacrit per nephro for renal failure  -serial CBCs today, transfuse as needed with goal Hgb >8 now given bleeding  -per surgery, had NGT suction during the day but no signs of blood on the suctioned gastric contents so bleed likely in small intestine or remaining colon -- this evening cleared to come off NGT suction and may give meds via NGT but will not restart tube feeds yet  -GI ([facility]), recs appreciated -- if pt continues to bleed, may need further imaging and/or IR involvement -- performing CTA has significant risks as pt is just starting to recover from severe ATN from septic shock and still has significantly impaired renal function

## 2023-04-29 NOTE — CHART NOTE - NSCHARTNOTEFT_GEN_A_CORE
Called by RN for patient with blood clots in ostomy bad. Pt received a unit of blood earlier and is now having around ~200cc collection of blood clots in ostomy bag. Patient seen and examined at bedside. Patient has no complaints at this time. Denies fevers, chills, headache, lightheadedness, chest pain, SOB, n/v/d/c.        T(C): 36.7 (04-28-23 @ 16:57), Max: 37.1 (04-28-23 @ 10:52)  HR: 86 (04-28-23 @ 16:57) (66 - 86)  BP: 115/68 (04-28-23 @ 16:57) (111/50 - 119/68)  RR: 18 (04-28-23 @ 16:57) (16 - 18)  SpO2: 100% (04-28-23 @ 16:57) (93% - 100%)  Wt(kg): --    Physical Exam:  GENERAL: chronically ill-appearing, not in acute distress; frail, somnolent  HEENT:  anicteric, moist mucous membranes  CHEST/LUNG:  CTA b/l, no rales, wheezes, or rhonchi  HEART: irregular rhythm at 72bpm, S1, S2  ABDOMEN: ileostomy present with dark brown/red drainage, BS+, soft, non-tender, nondistended  EXTREMITIES: no cyanosis, or calf tenderness  NERVOUS SYSTEM: somnolent, answering a few questions with nodding and few words      Assessment/Plan  73yo F w/ PMH of A-Fib on rivaroxaban, HFpEF, recent C diff infection (Jan 2023), myeloproliferative disorder with thrombocytosis on anagrelide, hypothyroidism, asthma, and lung nodules s/p resection (reportedly malignant) who presents with perforated sigmoid colon with acute bacterial peritonitis, LAYA due to ATN, septic shock, lactic acidosis, shock liver;  post/op acute respiratory failure requiring mechanical ventilation, and acute blood loss anemia; s/p extended Tia's procedure including descending colon on 4/13. She was extubated 4/19 and tolerating supplemental oxygen via nasal canula, overall gradual improvement, but complicated by critical illness polyneuropathy.    Called by RN for blood clots in ostomy bag    - mild blood collection in ostomy bag likely 2/2 to recent pRBC transfusion.  - low suspicion for acute intraabdominal hemorrhage or perforation at this time due to benign abd exam  - ordered STAT repeat CBC, f/u results  - Discussed and examined patient with surgical Mustapha TERAN; agrees with above plan   - RN to call if changes Called by RN for patient with blood clots in ostomy bad. Pt received a unit of blood earlier and is now having around ~200cc collection of blood clots in ostomy bag. Patient seen and examined at bedside. Patient has no complaints at this time. Denies fevers, chills, headache, lightheadedness, chest pain, SOB, n/v/d/c.        T(C): 36.7 (04-28-23 @ 16:57), Max: 37.1 (04-28-23 @ 10:52)  HR: 86 (04-28-23 @ 16:57) (66 - 86)  BP: 115/68 (04-28-23 @ 16:57) (111/50 - 119/68)  RR: 18 (04-28-23 @ 16:57) (16 - 18)  SpO2: 100% (04-28-23 @ 16:57) (93% - 100%)  Wt(kg): --    Physical Exam:  GENERAL: chronically ill-appearing, not in acute distress; frail, somnolent  HEENT:  anicteric, moist mucous membranes  CHEST/LUNG:  CTA b/l, no rales, wheezes, or rhonchi  HEART: irregular rhythm at 72bpm, S1, S2  ABDOMEN: ileostomy present with dark brown/red drainage, BS+, soft, non-tender, nondistended  EXTREMITIES: no cyanosis, or calf tenderness  NERVOUS SYSTEM: somnolent, answering a few questions with nodding and few words      Assessment/Plan  73yo F w/ PMH of A-Fib on rivaroxaban, HFpEF, recent C diff infection (Jan 2023), myeloproliferative disorder with thrombocytosis on anagrelide, hypothyroidism, asthma, and lung nodules s/p resection (reportedly malignant) who presents with perforated sigmoid colon with acute bacterial peritonitis, LAYA due to ATN, septic shock, lactic acidosis, shock liver;  post/op acute respiratory failure requiring mechanical ventilation, and acute blood loss anemia; s/p extended Tia's procedure including descending colon on 4/13. She was extubated 4/19 and tolerating supplemental oxygen via nasal canula, overall gradual improvement, but complicated by critical illness polyneuropathy.    Called by RN for blood clots in ostomy bag    - mild blood collection in ostomy bag likely 2/2 to recent pRBC transfusion.  - vital signs stable  - low suspicion for acute intraabdominal hemorrhage or perforation at this time due to benign abd exam  - ordered STAT repeat CBC, f/u results  - Discussed and examined patient with surgical Mustapha TERAN; agrees with above plan   - RN to call if changes Called by RN for patient with blood clots in ostomy bad. Pt received a unit of blood earlier and is now having around ~200cc collection of blood clots in ostomy bag. Patient seen and examined at bedside. Patient has no complaints at this time. Denies fevers, chills, headache, lightheadedness, chest pain, SOB, n/v/d/c.        T(C): 36.7 (04-28-23 @ 16:57), Max: 37.1 (04-28-23 @ 10:52)  HR: 86 (04-28-23 @ 16:57) (66 - 86)  BP: 115/68 (04-28-23 @ 16:57) (111/50 - 119/68)  RR: 18 (04-28-23 @ 16:57) (16 - 18)  SpO2: 100% (04-28-23 @ 16:57) (93% - 100%)  Wt(kg): --    Physical Exam:  GENERAL: chronically ill-appearing, not in acute distress; frail, somnolent  HEENT:  anicteric, moist mucous membranes  CHEST/LUNG:  CTA b/l, no rales, wheezes, or rhonchi  HEART: irregular rhythm at 72bpm, S1, S2  ABDOMEN: ileostomy present with dark brown/red drainage, BS+, soft, non-tender, nondistended  EXTREMITIES: no cyanosis, or calf tenderness  NERVOUS SYSTEM: somnolent, answering a few questions with nodding and few words      Assessment/Plan  73yo F w/ PMH of A-Fib on rivaroxaban, HFpEF, recent C diff infection (Jan 2023), myeloproliferative disorder with thrombocytosis on anagrelide, hypothyroidism, asthma, and lung nodules s/p resection (reportedly malignant) who presents with perforated sigmoid colon with acute bacterial peritonitis, LAYA due to ATN, septic shock, lactic acidosis, shock liver;  post/op acute respiratory failure requiring mechanical ventilation, and acute blood loss anemia; s/p extended Tia's procedure including descending colon on 4/13. She was extubated 4/19 and tolerating supplemental oxygen via nasal canula, overall gradual improvement, but complicated by critical illness polyneuropathy.    Called by RN for blood clots in ostomy bag    - mild blood collection in ostomy bag likely 2/2 to recent pRBC transfusion.  - vital signs stable  - low suspicion for acute intraabdominal hemorrhage or perforation at this time due to benign abd exam  - ordered STAT repeat CBC, f/u results  - Discussed and examined patient with surgical PAMustapha; agrees with above plan   - RN to call if changes      #Addendum (1:15am):    Called by RN with around another 200cc of blood clots in ostomy bag. repeat CBC came back with Hgb 7.2, Hct 24.5    - ordered another unit of pRBC  - continue protonix 40mg IVP qd  - would consider getting CT abd/pelvis with contrast  - RN to call if changes Called by RN for patient with blood clots in ostomy bad. Pt received a unit of blood earlier and is now having around ~200cc collection of blood clots in ostomy bag. Patient seen and examined at bedside. Patient has no complaints at this time. Denies fevers, chills, headache, lightheadedness, chest pain, SOB, n/v/d/c.        T(C): 36.7 (04-28-23 @ 16:57), Max: 37.1 (04-28-23 @ 10:52)  HR: 86 (04-28-23 @ 16:57) (66 - 86)  BP: 115/68 (04-28-23 @ 16:57) (111/50 - 119/68)  RR: 18 (04-28-23 @ 16:57) (16 - 18)  SpO2: 100% (04-28-23 @ 16:57) (93% - 100%)  Wt(kg): --    Physical Exam:  GENERAL: chronically ill-appearing, not in acute distress; frail, somnolent  HEENT:  anicteric, moist mucous membranes  CHEST/LUNG:  CTA b/l, no rales, wheezes, or rhonchi  HEART: irregular rhythm at 72bpm, S1, S2  ABDOMEN: ileostomy present with dark brown/red blood collection/clots, BS+, soft, non-tender, nondistended  EXTREMITIES: no cyanosis, or calf tenderness  NERVOUS SYSTEM: somnolent, answering a few questions with nodding and few words      Assessment/Plan  73yo F w/ PMH of A-Fib on rivaroxaban, HFpEF, recent C diff infection (Jan 2023), myeloproliferative disorder with thrombocytosis on anagrelide, hypothyroidism, asthma, and lung nodules s/p resection (reportedly malignant) who presents with perforated sigmoid colon with acute bacterial peritonitis, LAYA due to ATN, septic shock, lactic acidosis, shock liver;  post/op acute respiratory failure requiring mechanical ventilation, and acute blood loss anemia; s/p extended Tia's procedure including descending colon on 4/13. She was extubated 4/19 and tolerating supplemental oxygen via nasal canula, overall gradual improvement, but complicated by critical illness polyneuropathy.    Called by RN for blood clots in ostomy bag    - notable blood collection in ostomy bag likely 2/2 to recent pRBC transfusion.  - vital signs stable  - low suspicion for acute intraabdominal hemorrhage or perforation at this time due to benign abd exam  - ordered STAT repeat CBC, f/u results  - Discussed and examined patient with surgical PAMustapha; agrees with above plan   - RN to call if changes      #Addendum (1:15am):    Called by RN with around another 200cc of blood clots in ostomy bag. repeat CBC came back with Hgb 7.2, Hct 24.5    - ordered another unit of pRBC  - continue protonix 40mg IVP qd  - would consider getting CT abd/pelvis with contrast  - RN to call if changes Called by RN for patient with blood clots in ostomy bad. Pt received a unit of blood earlier and is now having around ~200cc collection of blood clots in ostomy bag. Patient seen and examined at bedside. Patient has no complaints at this time. Denies fevers, chills, headache, lightheadedness, chest pain, SOB, n/v/d/c.        T(C): 36.7 (04-28-23 @ 16:57), Max: 37.1 (04-28-23 @ 10:52)  HR: 86 (04-28-23 @ 16:57) (66 - 86)  BP: 115/68 (04-28-23 @ 16:57) (111/50 - 119/68)  RR: 18 (04-28-23 @ 16:57) (16 - 18)  SpO2: 100% (04-28-23 @ 16:57) (93% - 100%)  Wt(kg): --    Physical Exam:  GENERAL: chronically ill-appearing, not in acute distress; frail, somnolent  HEENT:  anicteric, moist mucous membranes  CHEST/LUNG:  CTA b/l, no rales, wheezes, or rhonchi  HEART: irregular rhythm at 72bpm, S1, S2  ABDOMEN: ileostomy present with dark brown/red blood collection/clots, BS+, soft, non-tender, nondistended  EXTREMITIES: no cyanosis, or calf tenderness  NERVOUS SYSTEM: somnolent, answering a few questions with nodding and few words      Assessment/Plan  71yo F w/ PMH of A-Fib on rivaroxaban, HFpEF, recent C diff infection (Jan 2023), myeloproliferative disorder with thrombocytosis on anagrelide, hypothyroidism, asthma, and lung nodules s/p resection (reportedly malignant) who presents with perforated sigmoid colon with acute bacterial peritonitis, LAYA due to ATN, septic shock, lactic acidosis, shock liver;  post/op acute respiratory failure requiring mechanical ventilation, and acute blood loss anemia; s/p extended Tia's procedure including descending colon on 4/13. She was extubated 4/19 and tolerating supplemental oxygen via nasal canula, overall gradual improvement, but complicated by critical illness polyneuropathy.    Called by RN for blood clots in ostomy bag    - notable blood collection in ostomy bag likely 2/2 to recent pRBC transfusion.  - vital signs stable  - suspicion for possible acute intra-abdominal hemorrhage or perforation at this time due to benign abd exam  - ordered STAT repeat CBC, f/u results  - Discussed and examined patient with surgical PAMustapha; agrees with above plan   - RN to call if changes      #Addendum (1:15am):    Called by RN with around another 200cc of blood clots in ostomy bag. repeat CBC came back with Hgb 7.2, Hct 24.5    - ordered 2 units of pRBC  - continue protonix 40mg IVP qd  - would consider possible CT abd/pelvis with contrast to find source of bleed in AM  - RN to call if changes Called by RN for patient with blood clots in ostomy bad. Pt received a unit of blood earlier and is now having around ~200cc collection of blood clots in ostomy bag. Patient seen and examined at bedside. Patient has no complaints at this time. Denies fevers, chills, headache, lightheadedness, chest pain, SOB, n/v/d/c.        T(C): 36.7 (04-28-23 @ 16:57), Max: 37.1 (04-28-23 @ 10:52)  HR: 86 (04-28-23 @ 16:57) (66 - 86)  BP: 115/68 (04-28-23 @ 16:57) (111/50 - 119/68)  RR: 18 (04-28-23 @ 16:57) (16 - 18)  SpO2: 100% (04-28-23 @ 16:57) (93% - 100%)  Wt(kg): --    Physical Exam:  GENERAL: chronically ill-appearing, not in acute distress; frail, somnolent  HEENT:  anicteric, moist mucous membranes  CHEST/LUNG:  CTA b/l, no rales, wheezes, or rhonchi  HEART: irregular rhythm at 72bpm, S1, S2  ABDOMEN: ileostomy present with dark brown/red blood collection/clots, BS+, soft, non-tender, nondistended  EXTREMITIES: no cyanosis, or calf tenderness  NERVOUS SYSTEM: somnolent, answering a few questions with nodding and few words      Assessment/Plan  73yo F w/ PMH of A-Fib on rivaroxaban, HFpEF, recent C diff infection (Jan 2023), myeloproliferative disorder with thrombocytosis on anagrelide, hypothyroidism, asthma, and lung nodules s/p resection (reportedly malignant) who presents with perforated sigmoid colon with acute bacterial peritonitis, LAYA due to ATN, septic shock, lactic acidosis, shock liver;  post/op acute respiratory failure requiring mechanical ventilation, and acute blood loss anemia; s/p extended Tia's procedure including descending colon on 4/13. She was extubated 4/19 and tolerating supplemental oxygen via nasal canula, overall gradual improvement, but complicated by critical illness polyneuropathy.    Called by RN for blood clots in ostomy bag    - notable blood collection in ostomy bag likely 2/2 to recent pRBC transfusion.  - vital signs stable  - suspicion for possible acute intra-abdominal hemorrhage or perforation at this time due to benign abd exam  - ordered STAT repeat CBC, f/u results  - Discussed and examined patient with surgical Mustapha TERAN; agrees with above plan   - RN to call if changes      #Addendum (1:15am):    Called by RN with around another 200cc of blood clots in ostomy bag. repeat CBC came back with Hgb 7.2, Hct 24.5    - ordered 2 units of pRBC  - continue protonix 40mg IVP qd  - AM team can consider possible CT abd/pelvis with contrast to find source of bleed  - Discussed plan with surgical Mustapha TERAN; agrees with above plan   - RN to call if changes

## 2023-04-30 NOTE — PROGRESS NOTE ADULT - ASSESSMENT
IMPRESSION    72y Female admitted with Enterocolitis due to Clostridium difficile  S/P extended Tia's for perforated colon    Patient with history myeloproliferative on agrylin (being treated by Dr. Rice) admitted with abd pain, weakness and falls at home a/w severe sepsis and peritonitis due to perforated sigmoid colon, also with LAYA due to ATN, shock liver, and post/op acute respiratory failure requiring mechanical ventilation, and acute blood loss anemia. pt is off agrylin.  had a period of thrombocytopenia now resolved with current platelet count 416K  Course also complicated by diarrhea which apparently was present prior to admission.  Has hx of ?pulmonary carcinoid with nodules being followed. however recent PET/CT dotatate negative.      hx MGUS  Weak IgG Kappa Band Identified. (08-16-22 @ 04:55)    Bloody BM/ostomy output 04/28/23  ostomy outpt slight blood tinge to brown stool. [04/30/23]  s/p multriple unites PRBC yesterday and day before.   Hgb stable.   1U PRBC 04/28  3IU PRBC 04/29  1U PRBC 04/30    Cr continues to improve.     NGT in place.     RECOMMENDATIONS> :  1.  follow CBC and transfuse as indicated  2.  continue post op care  3.  HOLD sandostatin and evaluate change in BM  4.  Hold agrylin until taking better po and bleeding controlled.   platelet count is not significantly elevated at present; will follow  SCD for DVT prophylaxis    5.  further heme recommendations pending clinical course.     PO vanco per ID  Surgery following

## 2023-04-30 NOTE — PROGRESS NOTE ADULT - NUTRITIONAL ASSESSMENT
This patient has been assessed with a concern for Malnutrition and has been determined to have a diagnosis/diagnoses of Severe protein-calorie malnutrition.    This patient is being managed with:   Diet NPO-  Except Medications  Entered: Apr 29 2023  6:46PM    The following pending diet order is being considered for treatment of Severe protein-calorie malnutrition:  Diet NPO with Tube Feed-  Tube Feeding Modality: Nasogastric  Nepro with Carb Steady  Total Volume for 24 Hours (mL): 1000  Continuous  Starting Tube Feed Rate {mL per Hour}: 40  Increase Tube Feed Rate by (mL): 10     Every 6 hours  Until Goal Tube Feed Rate (mL per Hour): 50  Tube Feed Duration (in Hours): 20  Tube Feed Start Time: 06:00  Entered: Apr 25 2023 10:59AM    Diet NPO with Tube Feed-  Tube Feeding Modality: Nasogastric  Vital 1.5 Adalberto  Total Volume for 24 Hours (mL): 240  Continuous  Until Goal Tube Feed Rate (mL per Hour): 10  Tube Feed Duration (in Hours): 24  Tube Feed Start Time: 12:00  Entered: Apr 19 2023 12:13PM

## 2023-04-30 NOTE — PROGRESS NOTE ADULT - ASSESSMENT
72-year-old female with history of hypertension, CHF, afib s/p AF ablation (2/7/19) currently not on xarelto, CATH 2018, hypothyroidism, HLD history of C. difficile January 2023 presents for vomiting and abdominal pain. Consulted for CHF.    Cardiac Optimization, Hx Afib s/p Ablation  - CT ABD/Pelvis: Pneumoperitoneum and no ascites, perforated sigmoid colon. Stable splenomegaly. Stable right lung nodule, ground glass infiltrates and loculated right pleural effusion.  - S/P Tia's with abdominal abscess drainage, surgery following   - Abx per ID following     - Known PAfib, rate-controlled per flow sheet   - Holding home Xarelto.    - Anemia persistent, H/H 7.2/24.5, transfuse per primary, continue to trend.   - Monitor and replete lytes, keep K>4, Mg>2.    - BP soft 90- 100's systolics  - Continue Midodrine    - continue to monitor routine hemodynamics     - Elevated troponin peaked at 100.9, likely demand in the setting of sepsis  - EKG: ST with PAC's. No acute changes, unchanged from previous.   - TTE (1/18/23): normal LVEF 65%, normal RV size and function, biatrial enlargement, sclerotic aortic valve, mild AI, Moderate MR and TR  - TTE (4/15/2023) EF 55-60%, RVE, PASP 66   - appears euvolemic to dry, on IVF, currently NPO   - continue to monitor volume status     - creatinine slowly improving, no indication for HD at this time, renal following   - DVT ppx  - Will continue to follow    MARY GRACE Black  Nurse Practitioner - Cardiology   Spectra #[id number]/ [phone]         72-year-old female with history of hypertension, CHF, afib s/p AF ablation (2/7/19) currently not on Xarelto CATH 2018, hypothyroidism, HLD history of C. difficile January 2023 presents for vomiting and abdominal pain. Consulted for CHF.    Cardiac Optimization, Hx Afib s/p Ablation  - CT ABD/Pelvis: Pneumoperitoneum and no ascites, perforated sigmoid colon. Stable splenomegaly. Stable right lung nodule, ground glass infiltrates and loculated right pleural effusion.  - S/P Tia's with abdominal abscess drainage, surgery following   - Abx per ID following     - Known PAfib, rate-controlled per flow sheet   - Holding home Xarelto.    - Anemia persistent, H/H 7.7/24.7, transfuse per primary, continue to trend.   - Monitor and replete lytes, keep K>4, Mg>2.    - BP soft 80- 90's systolics  - Continue Midodrine , can increase to 10 mg PO TID   - continue to monitor routine hemodynamics     - EKG: ST with PAC's. No acute changes, unchanged from previous.   - Elevated troponin peaked at 100.9, likely demand in the setting of sepsis    - TTE (1/18/23): normal LVEF 65%, normal RV size and function, biatrial enlargement, sclerotic aortic valve, mild AI, Moderate MR and TR  - TTE (4/15/2023) EF 55-60%, RVE, PASP 66   - appears euvolemic to dry, on IVF, currently NPO   - continue to monitor volume status     - creatinine slowly improving, no indication for HD at this time, renal following   - DVT ppx per primary   - Will continue to follow    MARY GRACE Black  Nurse Practitioner - Cardiology   Spectra #[id number]/ [phone]

## 2023-04-30 NOTE — PROGRESS NOTE ADULT - ASSESSMENT
73yo F w/ PMH of A-Fib on rivaroxaban, HFpEF, recent C diff infection (Jan 2023), myeloproliferative disorder with thrombocytosis on anagrelide, hypothyroidism, asthma, and lung nodules s/p resection (reportedly malignant) who presents with perforated sigmoid colon with acute bacterial peritonitis, LAYA due to ATN, septic shock, lactic acidosis, shock liver;  post/op acute respiratory failure requiring mechanical ventilation, and acute blood loss anemia; s/p extended Tia's procedure including descending colon on 4/13. She was extubated 4/19 and tolerating supplemental oxygen via nasal cannula, overall gradual improvement, but complicated by critical illness polyneuropathy, and further c/b by acute GIB. 73yo F w/ PMH of A-Fib on rivaroxaban, HFpEF, recent C diff infection (Jan 2023), myeloproliferative disorder with thrombocytosis on anagrelide, hypothyroidism, asthma, and lung nodules s/p resection (reportedly malignant) who presents with perforated sigmoid colon with acute bacterial peritonitis, LAYA due to ATN, septic shock, lactic acidosis, shock liver;  post/op acute respiratory failure requiring mechanical ventilation, and acute blood loss anemia; s/p extended Tia's procedure including descending colon on 4/13. She was extubated 4/19 and tolerating supplemental oxygen via nasal cannula, overall gradual improvement, but complicated by critical illness polyneuropathy, and further c/b acute GIB.

## 2023-04-30 NOTE — PROGRESS NOTE ADULT - PROBLEM SELECTOR PLAN 5
LAYA likely ATN due to septic shock; BUN/Cr downtrending to 80/3.1 this AM   S/p bicarb gtt for metabolic acidosis  s/p diuresis with metolazone and bumetanide- held due to rising creatinine  monitor off diuretics  nephro ([facility]), recs appreciated LAYA likely ATN due to septic shock; BUN/Cr downtrending to 74/2.8 this AM   S/p bicarb gtt for metabolic acidosis  s/p diuresis with metolazone and bumetanide- held due to rising creatinine  monitor off diuretics  nephro ([facility]), recs appreciated

## 2023-04-30 NOTE — PROGRESS NOTE ADULT - PROBLEM SELECTOR PLAN 2
-Perforated sigmoid colon: s/p Ro's procedure   -Ostomy c/d/i; dark red/brown output with clots noted 4/28, s/p 4 units prbc with Hg 7.7 this AM  -neg c-diff, neg GI pcr  -ID ([facility]), recs appreciated  -completed course of broad spectrum Abx  -Continue Metoclopramide prn and PPI   -Dilaudid 1mg q6hr PRN and APAP 650mg q6hr for pain  -repeat CT with some intra-abdominal collections of unclear etiology and bowel thickening, pt not reporting abd pain/tenderness today   -surgery (Yusuf), recs appreciated  -No role for further surgical intervention at this time  -S&S reassessed on 4/28 and felt pt did better, but not well enough to be recommended for po intake -- still recommend NPO, but now recommend to perform a FEES study on Monday for the next reassessment as feel pt has higher chance to pass the swallow test -Septic shock due to perforated sigmoid colon with polymicrobial peritonitis; s/p Tia procedure and abdominal abscess drainage on 4/13 w/ ICU stay requiring vasopressor support   -has stabilized, now only on midodrine 5mg q8hr -- taper off as able  -C-Diff negative on multiple checks during hospitalization and GI PCR negative - c/w prophylactic po vanco   -ID ([facility]), recs appreciated  -completed course of broad spectrum Abx  -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  -S&S reassessed on 4/28 and felt pt did better, but not well enough to be recommended for po intake -- still recommend NPO, but now recommend to perform a FEES study earliest on Tuesday for the next reassessment as feel pt has higher chance to pass the swallow test  -pt with history of myeloproliferative disorder per discussion with hematology, so unclear the significance of the leukocytosis

## 2023-04-30 NOTE — PROGRESS NOTE ADULT - SUBJECTIVE AND OBJECTIVE BOX
S: Patient seen and examined at bedside, POD#17 extended yessica's for perforated sigmoid, poc complicated by septic shock requiring pressor support in ICU, respiratory failure requiring continued intubation post-operatively in ICU, ATN, bleeding.  Patient received total of 3 U pRBC last night, post-transfusion cbc with hgb of 7.7 from 7.8 yesterday afternoon. Limited ros 2/2 mental status    MEDICATIONS:  acetaminophen     Tablet .. 650 milliGRAM(s) Oral every 6 hours PRN  albuterol    90 MICROgram(s) HFA Inhaler 4 Puff(s) Inhalation every 6 hours PRN  albuterol/ipratropium for Nebulization 3 milliLiter(s) Nebulizer every 6 hours  buDESOnide    Inhalation Suspension 0.5 milliGRAM(s) Inhalation every 12 hours  dextrose 5% + lactated ringers. 1000 milliLiter(s) IV Continuous <Continuous>  escitalopram 20 milliGRAM(s) Oral daily  HYDROmorphone   Tablet 1 milliGRAM(s) Oral every 6 hours PRN  levothyroxine Injectable 93.75 MICROGram(s) IV Push <User Schedule>  metoclopramide Injectable 5 milliGRAM(s) IV Push every 6 hours PRN  midodrine. 5 milliGRAM(s) Oral three times a day  pantoprazole  Injectable 40 milliGRAM(s) IV Push two times a day  sodium chloride 0.9% lock flush 10 milliLiter(s) IV Push every 1 hour PRN  vancomycin    Solution 125 milliGRAM(s) Oral daily      O:  Vital Signs Last 24 Hrs  T(C): 36.5 (30 Apr 2023 16:25), Max: 36.7 (29 Apr 2023 19:19)  T(F): 97.7 (30 Apr 2023 16:25), Max: 98.1 (29 Apr 2023 19:19)  HR: 89 (30 Apr 2023 16:25) (56 - 98)  BP: 113/56 (30 Apr 2023 16:25) (88/60 - 116/59)  RR: 19 (30 Apr 2023 16:25) (17 - 19)  SpO2: 95% (30 Apr 2023 14:24) (91% - 99%)    Parameters below as of 30 Apr 2023 14:24  Patient On (Oxygen Delivery Method): 2 L        I&O SUMMARY:    04-29-23 @ 07:01  -  04-30-23 @ 07:00  --------------------------------------------------------  IN:  Total IN: 0 mL    OUT:    Bulb (mL): 30 mL    Colostomy (mL): 650 mL    Nasogastric/Oral tube (mL): 200 mL  Total OUT: 880 mL    Total NET: -880 mL    PHYSICAL EXAM:  GENERAL: No acute distress, somnolent, answers questions with nodding of head  HEAD:  Atraumatic, Normocephalic, NGT in place not currently connected to suction  CHEST/LUNG: Non-labored respirations, no accessory muscle use  HEART: Regular rate and rhythm  ABD: ostomy in place with blood tinged brown liquid stool    LABS:                        7.7    24.25 )-----------( 369      ( 30 Apr 2023 07:53 )             24.7     04-30    144  |  113<H>  |  74<H>  ----------------------------<  98  3.8   |  27  |  2.80<H>    Ca    8.1<L>      30 Apr 2023 07:53  Phos  3.7     04-29  Mg     1.8     04-29    TPro  4.9<L>  /  Alb  1.8<L>  /  TBili  0.5  /  DBili  x   /  AST  9<L>  /  ALT  9<L>  /  AlkPhos  56  04-30    PT/INR - ( 29 Apr 2023 01:46 )   PT: 13.7 sec;   INR: 1.17 ratio         PTT - ( 29 Apr 2023 01:46 )  PTT:33.2 sec

## 2023-04-30 NOTE — PROGRESS NOTE ADULT - SUBJECTIVE AND OBJECTIVE BOX
[facility] -- Nils Johnson,  Adrián, Jim Hunter Savella, Goodger  Office # [phone]    Follow Up:  Afib s/p ablation, Cardiac Optimization    Subjective/Observations: Patient seen and examined, awake, withdrawn, unable to provide meaningful information at this time, NAD in bed, NGT in place, IVF infusing. b/l mittens in place, events overnight noted, + dark red o/p from colostomy     REVIEW OF SYSTEMS: All other review of systems is negative unless indicated above  PAST MEDICAL & SURGICAL HISTORY:  Hypothyroidism      HLD (hyperlipidemia)      Thrombocytosis      Persistent atrial fibrillation      Obesity (BMI 35.0-39.9 without comorbidity)      Asthma      Diabetes mellitus      Ankle injury  on 2004, 5 surgeries.      Cholecystitis        MEDICATIONS  (STANDING):  albuterol/ipratropium for Nebulization 3 milliLiter(s) Nebulizer every 6 hours  buDESOnide    Inhalation Suspension 0.5 milliGRAM(s) Inhalation every 12 hours  escitalopram 20 milliGRAM(s) Oral daily  lactated ringers. 1000 milliLiter(s) (75 mL/Hr) IV Continuous <Continuous>  levothyroxine Injectable 93.75 MICROGram(s) IV Push <User Schedule>  midodrine. 5 milliGRAM(s) Oral three times a day  pantoprazole  Injectable 40 milliGRAM(s) IV Push two times a day  vancomycin    Solution 125 milliGRAM(s) Oral daily    MEDICATIONS  (PRN):  acetaminophen     Tablet .. 650 milliGRAM(s) Oral every 6 hours PRN Moderate Pain (4 - 6)  albuterol    90 MICROgram(s) HFA Inhaler 4 Puff(s) Inhalation every 6 hours PRN Shortness of Breath and/or Wheezing  HYDROmorphone   Tablet 1 milliGRAM(s) Oral every 6 hours PRN Severe Pain (7 - 10)  metoclopramide Injectable 5 milliGRAM(s) IV Push every 6 hours PRN nausea and/or vomiting  sodium chloride 0.9% lock flush 10 milliLiter(s) IV Push every 1 hour PRN Pre/post blood products, medications, blood draw, and to maintain line patency    Allergies    No Known Allergies    Intolerances      Vital Signs Last 24 Hrs  T(C): 36.6 (30 Apr 2023 05:37), Max: 36.7 (29 Apr 2023 17:34)  T(F): 97.9 (30 Apr 2023 05:37), Max: 98.1 (29 Apr 2023 17:34)  HR: 71 (30 Apr 2023 05:37) (64 - 101)  BP: 93/52 (30 Apr 2023 05:37) (88/60 - 105/53)  BP(mean): --  RR: 18 (30 Apr 2023 05:37) (18 - 18)  SpO2: 99% (30 Apr 2023 05:37) (92% - 100%)    Parameters below as of 30 Apr 2023 05:37  Patient On (Oxygen Delivery Method): nasal cannula  O2 Flow (L/min): 2    I&O's Summary    29 Apr 2023 07:01  -  30 Apr 2023 07:00  --------------------------------------------------------  IN: 0 mL / OUT: 880 mL / NET: -880 mL        TELE: Not on telemetry   PHYSICAL EXAM:  Constitutional: NAD, awake   HEENT: Moist Mucous Membranes, Anicteric  Pulmonary: Non-labored, breath sounds are clear bilaterally, No wheezing, rales or rhonchi  Cardiovascular: Regular, S1 and S2, + murmurs, rubs, gallops or clicks  Gastrointestinal: Bowel Sounds present, soft, Mid abdomianl incision, +colostomy  Lymph: generalized edema. No lymphadenopathy.  Skin: No visible rashes or ulcers.  Psych:  Mood & affect flat       LABS: All Labs Reviewed:                        7.8    32.29 )-----------( 426      ( 29 Apr 2023 16:30 )             25.1                         8.3    38.85 )-----------( 459      ( 29 Apr 2023 10:35 )             26.8                         7.2    37.23 )-----------( 476      ( 29 Apr 2023 00:46 )             24.5     29 Apr 2023 10:35    143    |  111    |  80     ----------------------------<  115    3.9     |  25     |  3.10   28 Apr 2023 07:09    142    |  110    |  77     ----------------------------<  124    3.7     |  27     |  3.50     Ca    8.1        29 Apr 2023 10:35  Ca    8.0        28 Apr 2023 07:09  Phos  3.7       29 Apr 2023 10:35  Phos  4.2       28 Apr 2023 07:09  Mg     1.8       29 Apr 2023 10:35  Mg     2.1       28 Apr 2023 07:09    TPro  5.4    /  Alb  2.0    /  TBili  0.4    /  DBili  x      /  AST  8      /  ALT  7      /  AlkPhos  59     28 Apr 2023 07:09    PT/INR - ( 29 Apr 2023 01:46 )   PT: 13.7 sec;   INR: 1.17 ratio         PTT - ( 29 Apr 2023 01:46 )  PTT:33.2 sec      12 Lead ECG:   Ventricular Rate 82 BPM    Atrial Rate 82 BPM    P-R Interval 160 ms    QRS Duration 84 ms    Q-T Interval 412 ms    QTC Calculation(Bazett) 481 ms    P Axis 68 degrees    R Axis 13 degrees    T Axis 4 degrees    Diagnosis Line Normal sinus rhythm  Right atrial enlargement  Borderline ECG  When compared with ECG of 22-APR-2023 09:43,  aberrant conduction is no longer present  Confirmed by PAULINE LARSEN ([id number]) on 4/24/2023 4:48:07 PM (04-24-23 @ 13:13)      ACC: [id number] EXAM:  ECHO TTE WO CON COMP W DOPP   ORDERED BY: RAFY WILSON     PROCEDURE DATE:  04/14/2023          INTERPRETATION:  INDICATION: Heart failure  Sonographer KL    Blood Pressure unavailable    Height 167.6 cm     Weight 60 kgBSA   1.8 sq m    Dimensions:  LA 4.0       Normal Values: 2.0 - 4.0 cm  Ao 3.4        Normal Values: 2.0 - 3.8 cm  SEPTUM 0.9       Normal Values: 0.6 - 1.2 cm  PWT 0.8       Normal Values: 0.6 - 1.1 cm  LVIDd 4.2         Normal Values: 3.0 - 5.6 cm  LVIDs 2.9         Normal Values: 1.8 - 4.0 cm      OBSERVATIONS:  Mitral Valve: Mild to moderate MR.  Aortic Valve/Aorta: Sclerotic trileaflet aortic valve with grossly normal   opening. Trace AI  Tricuspid Valve: Mild to moderate TR.  Pulmonic Valve: Mild PI  Left Atrium: Enlarged  Right Atrium: Not well-visualized  Left Ventricle: normal LV size and systolic function, estimated LVEF of   55-60%.  Right Ventricle: The right ventricle is enlarged with grossly normal   function  Pericardium: no significant pericardial effusion.  Pulmonary/RV Pressure: estimated PA systolic pressure of 66mmHg assuming   an RA pressure of 10 mmHg.  IVC measures 2.0 cm and is non collapsing  LV diastolic dysfunction is present  Bilateral pleural effusions        IMPRESSION:  Normal left ventricular internal dimensions and systolic function,   estimated LVEF of 55-60%.  The right ventricle is enlarged with grossly normal function  Left atrial enlargement  Sclerotic trileaflet aortic valve, trace AI.  Mild to moderate MR and TR.  Estimated PA systolic pressure of 66mmHg assuming an RA pressure of 10   mmHg.  IVC measures 2.0 cm and is non collapsing    --- End of Report ---            NEYMAR ROWLAND MD; Attending Cardiologist  This document has been electronically signed. Apr 15 2023 10:52AM      [facility] -- Nils Johnson,  Adrián, Jim Hunter Savella, Goodger  Office # [phone]    Follow Up:  Afib s/p ablation, Cardiac Optimization    Subjective/Observations: seen and examined, sleepy, opens eyes at times, unable to provide meaningful information at this time, NAD in bed, NGT in place, IVF infusing, b/l mittens in place, events overnight noted    REVIEW OF SYSTEMS: All other review of systems is negative unless indicated above  PAST MEDICAL & SURGICAL HISTORY:  Hypothyroidism      HLD (hyperlipidemia)      Thrombocytosis      Persistent atrial fibrillation      Obesity (BMI 35.0-39.9 without comorbidity)      Asthma      Diabetes mellitus      Ankle injury  on 2004, 5 surgeries.      Cholecystitis        MEDICATIONS  (STANDING):  albuterol/ipratropium for Nebulization 3 milliLiter(s) Nebulizer every 6 hours  buDESOnide    Inhalation Suspension 0.5 milliGRAM(s) Inhalation every 12 hours  escitalopram 20 milliGRAM(s) Oral daily  lactated ringers. 1000 milliLiter(s) (75 mL/Hr) IV Continuous <Continuous>  levothyroxine Injectable 93.75 MICROGram(s) IV Push <User Schedule>  midodrine. 5 milliGRAM(s) Oral three times a day  pantoprazole  Injectable 40 milliGRAM(s) IV Push two times a day  vancomycin    Solution 125 milliGRAM(s) Oral daily    MEDICATIONS  (PRN):  acetaminophen     Tablet .. 650 milliGRAM(s) Oral every 6 hours PRN Moderate Pain (4 - 6)  albuterol    90 MICROgram(s) HFA Inhaler 4 Puff(s) Inhalation every 6 hours PRN Shortness of Breath and/or Wheezing  HYDROmorphone   Tablet 1 milliGRAM(s) Oral every 6 hours PRN Severe Pain (7 - 10)  metoclopramide Injectable 5 milliGRAM(s) IV Push every 6 hours PRN nausea and/or vomiting  sodium chloride 0.9% lock flush 10 milliLiter(s) IV Push every 1 hour PRN Pre/post blood products, medications, blood draw, and to maintain line patency    Allergies    No Known Allergies    Intolerances      Vital Signs Last 24 Hrs  T(C): 36.6 (30 Apr 2023 05:37), Max: 36.7 (29 Apr 2023 17:34)  T(F): 97.9 (30 Apr 2023 05:37), Max: 98.1 (29 Apr 2023 17:34)  HR: 71 (30 Apr 2023 05:37) (64 - 101)  BP: 93/52 (30 Apr 2023 05:37) (88/60 - 105/53)  BP(mean): --  RR: 18 (30 Apr 2023 05:37) (18 - 18)  SpO2: 99% (30 Apr 2023 05:37) (92% - 100%)    Parameters below as of 30 Apr 2023 05:37  Patient On (Oxygen Delivery Method): nasal cannula  O2 Flow (L/min): 2    I&O's Summary    29 Apr 2023 07:01  -  30 Apr 2023 07:00  --------------------------------------------------------  IN: 0 mL / OUT: 880 mL / NET: -880 mL        TELE: Not on telemetry   PHYSICAL EXAM:  Constitutional: NAD, asleep  HEENT: Moist Mucous Membranes, Anicteric, +NGT  Pulmonary: Non-labored, breath sounds are clear bilaterally, No wheezing, rales or rhonchi  Cardiovascular: Regular, S1 and S2, + murmurs, rubs, gallops or clicks  Gastrointestinal: Bowel Sounds present, soft, Mid abdominal incision, +colostomy  Lymph: generalized edema. No lymphadenopathy.  Skin: No visible rashes or ulcers.  Psych:  Mood & affect flat       LABS: All Labs Reviewed:                        7.8    32.29 )-----------( 426      ( 29 Apr 2023 16:30 )             25.1                         8.3    38.85 )-----------( 459      ( 29 Apr 2023 10:35 )             26.8                         7.2    37.23 )-----------( 476      ( 29 Apr 2023 00:46 )             24.5     29 Apr 2023 10:35    143    |  111    |  80     ----------------------------<  115    3.9     |  25     |  3.10   28 Apr 2023 07:09    142    |  110    |  77     ----------------------------<  124    3.7     |  27     |  3.50     Ca    8.1        29 Apr 2023 10:35  Ca    8.0        28 Apr 2023 07:09  Phos  3.7       29 Apr 2023 10:35  Phos  4.2       28 Apr 2023 07:09  Mg     1.8       29 Apr 2023 10:35  Mg     2.1       28 Apr 2023 07:09    TPro  5.4    /  Alb  2.0    /  TBili  0.4    /  DBili  x      /  AST  8      /  ALT  7      /  AlkPhos  59     28 Apr 2023 07:09    PT/INR - ( 29 Apr 2023 01:46 )   PT: 13.7 sec;   INR: 1.17 ratio         PTT - ( 29 Apr 2023 01:46 )  PTT:33.2 sec      12 Lead ECG:   Ventricular Rate 82 BPM    Atrial Rate 82 BPM    P-R Interval 160 ms    QRS Duration 84 ms    Q-T Interval 412 ms    QTC Calculation(Bazett) 481 ms    P Axis 68 degrees    R Axis 13 degrees    T Axis 4 degrees    Diagnosis Line Normal sinus rhythm  Right atrial enlargement  Borderline ECG  When compared with ECG of 22-APR-2023 09:43,  aberrant conduction is no longer present  Confirmed by PAULINE LARSEN ([id number]) on 4/24/2023 4:48:07 PM (04-24-23 @ 13:13)      ACC: [id number] EXAM:  ECHO TTE WO CON COMP W DOPP   ORDERED BY: RAFY WILSON     PROCEDURE DATE:  04/14/2023          INTERPRETATION:  INDICATION: Heart failure  Sonographer KL    Blood Pressure unavailable    Height 167.6 cm     Weight 60 kgBSA   1.8 sq m    Dimensions:  LA 4.0       Normal Values: 2.0 - 4.0 cm  Ao 3.4        Normal Values: 2.0 - 3.8 cm  SEPTUM 0.9       Normal Values: 0.6 - 1.2 cm  PWT 0.8       Normal Values: 0.6 - 1.1 cm  LVIDd 4.2         Normal Values: 3.0 - 5.6 cm  LVIDs 2.9         Normal Values: 1.8 - 4.0 cm      OBSERVATIONS:  Mitral Valve: Mild to moderate MR.  Aortic Valve/Aorta: Sclerotic trileaflet aortic valve with grossly normal   opening. Trace AI  Tricuspid Valve: Mild to moderate TR.  Pulmonic Valve: Mild PI  Left Atrium: Enlarged  Right Atrium: Not well-visualized  Left Ventricle: normal LV size and systolic function, estimated LVEF of   55-60%.  Right Ventricle: The right ventricle is enlarged with grossly normal   function  Pericardium: no significant pericardial effusion.  Pulmonary/RV Pressure: estimated PA systolic pressure of 66mmHg assuming   an RA pressure of 10 mmHg.  IVC measures 2.0 cm and is non collapsing  LV diastolic dysfunction is present  Bilateral pleural effusions        IMPRESSION:  Normal left ventricular internal dimensions and systolic function,   estimated LVEF of 55-60%.  The right ventricle is enlarged with grossly normal function  Left atrial enlargement  Sclerotic trileaflet aortic valve, trace AI.  Mild to moderate MR and TR.  Estimated PA systolic pressure of 66mmHg assuming an RA pressure of 10   mmHg.  IVC measures 2.0 cm and is non collapsing    --- End of Report ---            NEYMAR ROWLAND MD; Attending Cardiologist  This document has been electronically signed. Apr 15 2023 10:52AM

## 2023-04-30 NOTE — PROGRESS NOTE ADULT - ASSESSMENT
pt. seen with surg. team.  Last night there were some issues with ileostomy bag leaking , but it was fine this AM.  Will follow.

## 2023-04-30 NOTE — PROGRESS NOTE ADULT - PROBLEM SELECTOR PLAN 4
Acute respiratory failure with hypoxia extubated 4/19   Will encourage incentive spirometry  chest PT as tolerated  c/w nebs and budesonide 0.5 mg nebs q12h (on Trelegy Ellipta inhaler at home) Acute respiratory failure with hypoxia extubated 4/19   Will encourage incentive spirometry  chest PT as tolerated  c/w nebs and budesonide 0.5 mg nebs q12h (on Trelegy Ellipta inhaler at home)  has some coarse breath sounds on right today -- will check repeat CXR

## 2023-04-30 NOTE — PROGRESS NOTE ADULT - SUBJECTIVE AND OBJECTIVE BOX
Patient is a 72y old  Female who presents with a chief complaint of intra-abdominal perforation (30 Apr 2023 10:50)    INTERVAL HPI/OVERNIGHT EVENTS: Patient seen and examined at bedside. No overnight events occurred. Patient has no complaints at this time. Pt had NG tube clamped with minimal residual noted. Pt remains weak. ROS limited 2/2 weakness and lethargy, denies fevers, chills, headache, lightheadedness, chest pain, dyspnea, abdominal pain, n/v/d/c.    MEDICATIONS  (STANDING):  albuterol/ipratropium for Nebulization 3 milliLiter(s) Nebulizer every 6 hours  buDESOnide    Inhalation Suspension 0.5 milliGRAM(s) Inhalation every 12 hours  dextrose 5% + lactated ringers. 1000 milliLiter(s) (60 mL/Hr) IV Continuous <Continuous>  escitalopram 20 milliGRAM(s) Oral daily  levothyroxine Injectable 93.75 MICROGram(s) IV Push <User Schedule>  midodrine. 5 milliGRAM(s) Oral three times a day  pantoprazole  Injectable 40 milliGRAM(s) IV Push two times a day  vancomycin    Solution 125 milliGRAM(s) Oral daily    MEDICATIONS  (PRN):  acetaminophen     Tablet .. 650 milliGRAM(s) Oral every 6 hours PRN Moderate Pain (4 - 6)  albuterol    90 MICROgram(s) HFA Inhaler 4 Puff(s) Inhalation every 6 hours PRN Shortness of Breath and/or Wheezing  HYDROmorphone   Tablet 1 milliGRAM(s) Oral every 6 hours PRN Severe Pain (7 - 10)  metoclopramide Injectable 5 milliGRAM(s) IV Push every 6 hours PRN nausea and/or vomiting  sodium chloride 0.9% lock flush 10 milliLiter(s) IV Push every 1 hour PRN Pre/post blood products, medications, blood draw, and to maintain line patency    Allergies  No Known Allergies    Intolerances    REVIEW OF SYSTEMS:  Patient is somewhat somnolent, but able to answer some questions. Denies fevers, chills, headache, nausea, vomiting, abd pain, CP, SOB.    Vital Signs Last 24 Hrs  T(C): 36.6 (30 Apr 2023 11:25), Max: 36.7 (29 Apr 2023 17:34)  T(F): 97.9 (30 Apr 2023 11:25), Max: 98.1 (29 Apr 2023 17:34)  HR: 56 (30 Apr 2023 11:25) (56 - 98)  BP: 116/59 (30 Apr 2023 11:25) (88/60 - 116/59)  BP(mean): --  RR: 17 (30 Apr 2023 11:25) (17 - 18)  SpO2: 91% (30 Apr 2023 11:25) (91% - 99%)    Parameters below as of 30 Apr 2023 11:25  Patient On (Oxygen Delivery Method): nasal cannula    PHYSICAL EXAM:  GENERAL: chronically ill-appearing, not in acute distress; frail  HEENT:  anicteric, moist mucous membranes, NGT in place on suction with gastric contents noted in tube  CHEST/LUNG:  CTA b/l, shallow breaths, decreased at bases  HEART: irregular rhythm at 68bpm, S1, S2  ABDOMEN: ileostomy in place; dark red/brown output noted, BS+, soft, nontender, nondistended  EXTREMITIES: no cyanosis, or calf tenderness  NERVOUS SYSTEM: somnolent but answering questions with nodding and few words    LABS:                        7.7    24.25 )-----------( 369      ( 30 Apr 2023 07:53 )             24.7     CBC Full  -  ( 30 Apr 2023 07:53 )  WBC Count : 24.25 K/uL  Hemoglobin : 7.7 g/dL  Hematocrit : 24.7 %  Platelet Count - Automated : 369 K/uL  Mean Cell Volume : 95.0 fl  Mean Cell Hemoglobin : 29.6 pg  Mean Cell Hemoglobin Concentration : 31.2 gm/dL  Auto Neutrophil # : 22.55 K/uL  Auto Lymphocyte # : 0.49 K/uL  Auto Monocyte # : 0.97 K/uL  Auto Eosinophil # : 0.24 K/uL  Auto Basophil # : 0.00 K/uL  Auto Neutrophil % : 91.0 %  Auto Lymphocyte % : 2.0 %  Auto Monocyte % : 4.0 %  Auto Eosinophil % : 1.0 %  Auto Basophil % : 0.0 %    30 Apr 2023 07:53    144    |  113    |  74     ----------------------------<  98     3.8     |  27     |  2.80     Ca    8.1        30 Apr 2023 07:53    TPro  4.9    /  Alb  1.8    /  TBili  0.5    /  DBili  x      /  AST  9      /  ALT  9      /  AlkPhos  56     30 Apr 2023 07:53    PT/INR - ( 29 Apr 2023 01:46 )   PT: 13.7 sec;   INR: 1.17 ratio         PTT - ( 29 Apr 2023 01:46 )  PTT:33.2 sec    CAPILLARY BLOOD GLUCOSE  POCT Blood Glucose.: 98 mg/dL (30 Apr 2023 12:21)  POCT Blood Glucose.: 97 mg/dL (30 Apr 2023 07:48)  POCT Blood Glucose.: 106 mg/dL (30 Apr 2023 00:12)  POCT Blood Glucose.: 110 mg/dL (29 Apr 2023 17:33)    Consultant(s) Notes Reviewed:  [x] YES  [ ] NO     Patient is a 72y old  Female who presents with a chief complaint of generalized abdominal pain and persistent diarrhea.    INTERVAL HPI/OVERNIGHT EVENTS: Patient seen and examined at bedside. No acute overnight events occurred. Patient has no complaints at this time. Pt had NG tube clamped with minimal residual noted. Pt remains weak. ROS limited 2/2 weakness and somnolence, denies fevers, chills, headache, lightheadedness, chest pain, dyspnea, abdominal pain, n/v.    MEDICATIONS  (STANDING):  albuterol/ipratropium for Nebulization 3 milliLiter(s) Nebulizer every 6 hours  buDESOnide    Inhalation Suspension 0.5 milliGRAM(s) Inhalation every 12 hours  dextrose 5% + lactated ringers. 1000 milliLiter(s) (60 mL/Hr) IV Continuous <Continuous>  escitalopram 20 milliGRAM(s) Oral daily  levothyroxine Injectable 93.75 MICROGram(s) IV Push <User Schedule>  midodrine. 5 milliGRAM(s) Oral three times a day  pantoprazole  Injectable 40 milliGRAM(s) IV Push two times a day  vancomycin    Solution 125 milliGRAM(s) Oral daily    MEDICATIONS  (PRN):  acetaminophen     Tablet .. 650 milliGRAM(s) Oral every 6 hours PRN Moderate Pain (4 - 6)  albuterol    90 MICROgram(s) HFA Inhaler 4 Puff(s) Inhalation every 6 hours PRN Shortness of Breath and/or Wheezing  HYDROmorphone   Tablet 1 milliGRAM(s) Oral every 6 hours PRN Severe Pain (7 - 10)  metoclopramide Injectable 5 milliGRAM(s) IV Push every 6 hours PRN nausea and/or vomiting  sodium chloride 0.9% lock flush 10 milliLiter(s) IV Push every 1 hour PRN Pre/post blood products, medications, blood draw, and to maintain line patency    Allergies  No Known Allergies    Intolerances    REVIEW OF SYSTEMS:  ROS limited 2/2 weakness and somnolence, denies fevers, chills, headache, lightheadedness, chest pain, dyspnea, abdominal pain, n/v.    Vital Signs Last 24 Hrs  T(C): 36.6 (30 Apr 2023 11:25), Max: 36.7 (29 Apr 2023 17:34)  T(F): 97.9 (30 Apr 2023 11:25), Max: 98.1 (29 Apr 2023 17:34)  HR: 56 (30 Apr 2023 11:25) (56 - 98)  BP: 116/59 (30 Apr 2023 11:25) (88/60 - 116/59)  BP(mean): --  RR: 17 (30 Apr 2023 11:25) (17 - 18)  SpO2: 91% (30 Apr 2023 11:25) (91% - 99%)    Parameters below as of 30 Apr 2023 11:25  Patient On (Oxygen Delivery Method): nasal cannula    PHYSICAL EXAM:  GENERAL: chronically ill-appearing, not in acute distress; frail  HEENT:  anicteric, moist mucous membranes, NGT in place  CHEST/LUNG:  shallow breaths, decreased at bases, coarse breath sounds in right lower lung fields  HEART: irregular rhythm at 68bpm, S1, S2  ABDOMEN: ileostomy in place; small volume of blood-tinged brown liquid stool noted, BS+, soft, nontender, nondistended; midline surgical incision, surgical dressing C/D/I  EXTREMITIES: no cyanosis, or calf tenderness  NERVOUS SYSTEM: somnolent but answering simple questions with nodding and few words    LABS:                        7.7    24.25 )-----------( 369      ( 30 Apr 2023 07:53 )             24.7     CBC Full  -  ( 30 Apr 2023 07:53 )  WBC Count : 24.25 K/uL  Hemoglobin : 7.7 g/dL  Hematocrit : 24.7 %  Platelet Count - Automated : 369 K/uL  Mean Cell Volume : 95.0 fl  Mean Cell Hemoglobin : 29.6 pg  Mean Cell Hemoglobin Concentration : 31.2 gm/dL  Auto Neutrophil # : 22.55 K/uL  Auto Lymphocyte # : 0.49 K/uL  Auto Monocyte # : 0.97 K/uL  Auto Eosinophil # : 0.24 K/uL  Auto Basophil # : 0.00 K/uL  Auto Neutrophil % : 91.0 %  Auto Lymphocyte % : 2.0 %  Auto Monocyte % : 4.0 %  Auto Eosinophil % : 1.0 %  Auto Basophil % : 0.0 %    30 Apr 2023 07:53    144    |  113    |  74     ----------------------------<  98     3.8     |  27     |  2.80     Ca    8.1        30 Apr 2023 07:53    TPro  4.9    /  Alb  1.8    /  TBili  0.5    /  DBili  x      /  AST  9      /  ALT  9      /  AlkPhos  56     30 Apr 2023 07:53    PT/INR - ( 29 Apr 2023 01:46 )   PT: 13.7 sec;   INR: 1.17 ratio         PTT - ( 29 Apr 2023 01:46 )  PTT:33.2 sec    CAPILLARY BLOOD GLUCOSE  POCT Blood Glucose.: 98 mg/dL (30 Apr 2023 12:21)  POCT Blood Glucose.: 97 mg/dL (30 Apr 2023 07:48)  POCT Blood Glucose.: 106 mg/dL (30 Apr 2023 00:12)  POCT Blood Glucose.: 110 mg/dL (29 Apr 2023 17:33)    Consultant(s) Notes Reviewed:  [x] YES  [ ] NO

## 2023-04-30 NOTE — PROGRESS NOTE ADULT - ASSESSMENT
72yoF s/p an Extended Tia's Procedure 4/13, now with acute GI Bleeding and acute blood loss anemia.    PLAN:  - Transfuse per primary team PRN  - will follow post transfusion cbc following 3 U pRBC today  - Appreciate GI recs may require IR intervention for bleeding  - Continue care per primary team    Discussed with Dr. Taylor (covering for Dr. Goldberg)

## 2023-04-30 NOTE — PROGRESS NOTE ADULT - SUBJECTIVE AND OBJECTIVE BOX
NEPHROLOGY PROGRESS NOTE    CHIEF COMPLAINT:  LAYA    HPI:  Slow steady renal recovery noted on LR @ 75 mL/hr.  Currently NPO.     EXAM:  T(F): 97.9 (04-30-23 @ 05:37)  HR: 78 (04-30-23 @ 08:13)  BP: 93/52 (04-30-23 @ 05:37)  RR: 18 (04-30-23 @ 05:37)  SpO2: 96% (04-30-23 @ 08:13)    Conversant, in no apparent distress  Normal respiratory effort, lungs clear bilaterally  Heart RRR with no murmur, no peripheral edema         LABS                             7.7    24.25 )-----------( 369      ( 30 Apr 2023 07:53 )             24.7          04-30    144  |  113<H>  |  74<H>  ----------------------------<  98  3.8   |  27  |  2.80<H>    Ca    8.1<L>      30 Apr 2023 07:53  Phos  3.7     04-29  Mg     1.8     04-29    TPro  4.9<L>  /  Alb  1.8<L>  /  TBili  0.5  /  DBili  x   /  AST  9<L>  /  ALT  9<L>  /  AlkPhos  56  04-30           Impression:  1. LAYA on CKD due to septic ATN, recovery phase  2 .Sigmoid perforation, s/p surgical repair, ileostomy  3. Acute/chronic anemia due to blood loss, GI bleeding  4. Septic shock, resolved    Recommendations:   Monitor daily BMP on IVF

## 2023-04-30 NOTE — PROGRESS NOTE ADULT - SUBJECTIVE AND OBJECTIVE BOX
[INTERVAL HX: ]  Patient seen and examined;  Chart reviewed and events noted;     ostomy outpt slight blook tinge to brown stool.   s/p multriple unites PRBC yesterday and day before.   Hgb stable.   1U PRBC   3IU PRBC   1U PRBC     [MEDICATIONS]  MEDICATIONS  (STANDING):  albuterol/ipratropium for Nebulization 3 milliLiter(s) Nebulizer every 6 hours  buDESOnide    Inhalation Suspension 0.5 milliGRAM(s) Inhalation every 12 hours  escitalopram 20 milliGRAM(s) Oral daily  lactated ringers. 1000 milliLiter(s) (75 mL/Hr) IV Continuous <Continuous>  levothyroxine Injectable 93.75 MICROGram(s) IV Push <User Schedule>  midodrine. 5 milliGRAM(s) Oral three times a day  pantoprazole  Injectable 40 milliGRAM(s) IV Push two times a day  vancomycin    Solution 125 milliGRAM(s) Oral daily    MEDICATIONS  (PRN):  acetaminophen     Tablet .. 650 milliGRAM(s) Oral every 6 hours PRN Moderate Pain (4 - 6)  albuterol    90 MICROgram(s) HFA Inhaler 4 Puff(s) Inhalation every 6 hours PRN Shortness of Breath and/or Wheezing  HYDROmorphone   Tablet 1 milliGRAM(s) Oral every 6 hours PRN Severe Pain (7 - 10)  metoclopramide Injectable 5 milliGRAM(s) IV Push every 6 hours PRN nausea and/or vomiting  sodium chloride 0.9% lock flush 10 milliLiter(s) IV Push every 1 hour PRN Pre/post blood products, medications, blood draw, and to maintain line patency      [VITALS]  Vital Signs Last 24 Hrs  T(C): 36.6 (2023 05:37), Max: 36.7 (2023 17:34)  T(F): 97.9 (2023 05:37), Max: 98.1 (2023 17:34)  HR: 78 (2023 08:13) (71 - 101)  BP: 93/52 (2023 05:37) (88/60 - 105/53)  BP(mean): --  RR: 18 (2023 05:37) (18 - 18)  SpO2: 96% (2023 08:13) (92% - 99%)    Parameters below as of 2023 08:13  Patient On (Oxygen Delivery Method): nasal cannula,2 L      [WT/HT]  Daily     Daily Weight in k (2023 05:37)  [VENT]      [PHYSICAL EXAM]  GEN: NAD  HEENT: normocephalic and atraumatic. EOMI. PERRL.    NECK: Supple.  No lymphadenopathy   LUNGS: Clear to auscultation.  HEART: Regular rate and rhythm,  no MRG  ABDOMEN: Soft, nontender, and nondistended.  Positive bowel sounds.    : No CVA tenderness  EXTREMITIES: Without edema.  NEUROLOGIC: grossly intact.  PSYCHIATRIC: Appropriate affect .  SKIN: No rash     [LABS:]                        7.7    24.25 )-----------( 369      ( 2023 07:53 )             24.7     04-30    144  |  113<H>  |  74<H>  ----------------------------<  98  3.8   |  27  |  2.80<H>    Ca    8.1<L>      2023 07:53  Phos  3.7     04-29  Mg     1.8     04-29    TPro  4.9<L>  /  Alb  1.8<L>  /  TBili  0.5  /  DBili  x   /  AST  9<L>  /  ALT  9<L>  /  AlkPhos  56  04-30    PT/INR - ( 2023 01:46 )   PT: 13.7 sec;   INR: 1.17 ratio         PTT - ( 2023 01:46 )  PTT:33.2 sec      Reticulocyte Count (23 @ 06:39)  Reticulocyte Percent: 1.4 % [0.5 - 2.5]  Absolute Reticulocytes: 43.3 K/uL [25.0 - 125.0]    Ferritin, Serum: 293 ng/mL *H* [15 - 150] (23 @ 07:38)    Iron - Total Binding Capacity.: 190 ug/dL *L* [220 - 430] (23 @ 07:38)    Reticulocyte Count (22 @ 15:23)  Reticulocyte Percent: 2.0 % [0.5 - 2.5]  Absolute Reticulocytes: 61.8 K/uL [25.0 - 125.0]    Reticulocyte Count (22 @ 09:40)  Reticulocyte Percent: 1.6 % [0.5 - 2.5]  Absolute Reticulocytes: 49.4 K/uL [25.0 - 125.0]    Iron - Total Binding Capacity.: 221 ug/dL [220 - 430] (22 @ 04:55)    Vitamin B12, Serum: 972 pg/mL [232 - 1245] (22 @ 04:55)    Ferritin, Serum: 319 ng/mL *H* [15 - 150] (22 @ 04:55)    Folate, Serum: >20.0 ng/mL (22 @ 04:55)    Serum Protein Electrophoresis Interp: Weak Gamma Migrating Paraprotein Identified (22 @ 04:55)    Immunofixation, Serum:   Weak IgG Kappa Band Identified    Reference Range: None Detected (22 @ 04:55)    Reticulocyte Count (22 @ 10:43)  Reticulocyte Percent: 1.6 % [0.5 - 2.5]  Absolute Reticulocytes: 47.1 K/uL [25.0 - 125.0]    Reticulocyte Count (22 @ 14:14)  Reticulocyte Percent: 3.6 % *H* [0.5 - 2.5]  Absolute Reticulocytes: 99.5 K/uL [25.0 - 125.0]          SARS-CoV-2: NotDete (2023 06:40)          [RADIOLOGY STUDIES:]

## 2023-04-30 NOTE — PROGRESS NOTE ADULT - PROBLEM SELECTOR PLAN 3
-had acute blood loss anemia due to acute perforated bowel and extensive abdominal surgery early in the admission requiring 2un PRBC  -given 1 more unit PRBC on morning of 4/28 for slow gradual downtrend of H&H and no evidence of bleeding at that time; however, on night of 4/28 pt had acute GIB with dark red/brown output with clots noted in ostomy -- pt given 2un PRBC and repeat Hgb this evening of 7.8; will give one more unit PRBC tonight and monitor closely  -retacrit per nephro for renal failure  -serial CBCs today, transfuse as needed with goal Hgb >8 now given bleeding  -per surgery, had NGT suction during the day but no signs of blood on the suctioned gastric contents so bleed likely in small intestine or remaining colon -- this evening cleared to come off NGT suction and may give meds via NGT but will not restart tube feeds yet  -GI ([facility]), recs appreciated -- if pt continues to bleed, may need further imaging and/or IR involvement -- performing CTA has significant risks as pt is just starting to recover from severe ATN from septic shock and still has significantly impaired renal function -had acute blood loss anemia due to acute perforated bowel and extensive abdominal surgery early in the admission requiring 2un PRBC  -Pt now s/p additional 4 units prbc with new GIB, additional unit ordered this AM 2/2 continued bleeding and decline in Hg. Hg 7.7 this AM  -retacrit per nephro for renal failure  -serial CBCs, transfuse as needed with goal Hgb >8 now given bleeding  -NGT off suction today, ok to give meds through tube  -GI ([facility]), recs appreciated -- if pt continues to bleed, may need further imaging and/or IR involvement -- performing CTA has significant risks as pt is just starting to recover from severe ATN from septic shock and still has significantly impaired renal function -Perforated sigmoid colon: s/p Ro's procedure   -Ostomy c/d/i; dark red/brown output with clots noted 4/28, s/p 4 units prbc with Hg 7.7 this AM  -neg c-diff, neg GI pcr  -ID ([facility]), recs appreciated  -completed course of broad spectrum Abx  -Continue Metoclopramide prn and PPI   -Dilaudid 1mg q6hr PRN and APAP 650mg q6hr for pain  -repeat CT with some intra-abdominal collections of unclear etiology and bowel thickening, pt not reporting abd pain/tenderness today   -surgery ([facility]), recs appreciated  -No role for further surgical intervention at this time  -S&S reassessed on 4/28 and felt pt did better, but not well enough to be recommended for po intake -- still recommend NPO, but now recommend to perform a FEES study on Monday for the next reassessment as feel pt has higher chance to pass the swallow test

## 2023-04-30 NOTE — PROGRESS NOTE ADULT - TIME BILLING
Pt seen + examined. Case discussed with resident. Agree with assessment and plan above (edited by me in detail):  Time spent: 50min. More than 50% of the visit was spent counseling the patient and pt's son on medical condition -- severe sepsis and peritonitis due to perforated sigmoid colon, LAYA due to ATN, shock liver, and post/op acute respiratory failure requiring mechanical ventilation, and acute blood loss anemia, dysphagia, FEES study, severe anemia, PRBC, acute GIB.    73yo F w/ PMH of A-Fib on rivaroxaban, HFpEF, recent C diff infection (Jan 2023), myeloproliferative disorder with thrombocytosis on anagrelide, hypothyroidism, asthma, and lung nodules s/p resection (reportedly malignant) who presents with perforated sigmoid colon with acute bacterial peritonitis, LAYA due to ATN, septic shock, lactic acidosis, shock liver;  post/op acute respiratory failure requiring mechanical ventilation, and acute blood loss anemia; s/p extended Tia's procedure including descending colon on 4/13. She was extubated 4/19 and tolerating supplemental oxygen via nasal cannula, overall gradual improvement, but complicated by critical illness polyneuropathy, and further c/b acute GIB.    -had acute blood loss anemia due to acute perforated bowel and extensive abdominal surgery early in the admission requiring 2un PRBC  -Pt now s/p additional 4 units PRBC with new GIB, 1 more unit PRBC ordered this AM as pt still having small amount of bloody output and poor response to last unit PRBC given last night as Hgb 7.7 this AM  -retacrit per nephro for renal failure  -serial CBCs, transfuse as needed with goal Hgb >8 now given bleeding  -NGT off suction today, ok to give meds through tube  -GI ([facility]), recs appreciated -- if pt continues to bleed, may need further imaging and/or IR involvement -- performing CTA has significant risks as pt is just starting to recover from severe ATN from septic shock and still has significantly impaired renal function; if needs imaging, will pursue NM bleeding scan    -Septic shock due to perforated sigmoid colon with polymicrobial peritonitis; s/p Tia procedure and abdominal abscess drainage on 4/13 w/ ICU stay requiring vasopressor support   -has stabilized, now only on midodrine 5mg q8hr -- taper off as able  -C-Diff negative on multiple checks during hospitalization and GI PCR negative - c/w prophylactic po vanco   -ID ([facility]), recs appreciated  -completed course of broad spectrum Abx  -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  -S&S reassessed on 4/28 and felt pt did better, but not well enough to be recommended for po intake -- still recommend NPO, but now recommend to perform a FEES study earliest on Tuesday for the next reassessment as feel pt has higher chance to pass the swallow test  -pt with history of myeloproliferative disorder per discussion with hematology, so unclear the significance of the leukocytosis    -had been on octreotide given history of carcinoid with increased ostomy output -- per heme/onc last PET CT without evidence of carcinoid tumor and recommend trial off the octreotide -- discontinued on 4/27    LAYA likely ATN due to septic shock; BUN/Cr downtrending to 74/2.8 this AM   S/p bicarb gtt for metabolic acidosis  s/p diuresis with metolazone and bumetanide- held due to rising creatinine  monitor off diuretics  nephro ([facility]), recs appreciated

## 2023-05-01 NOTE — PROGRESS NOTE ADULT - SUBJECTIVE AND OBJECTIVE BOX
Patient is a 72y old  Female who presents with a chief complaint of intra-abdominal perforation (20 Apr 2023 11:52)    Patient seen in follow up for LAYA on CKD.        PAST MEDICAL HISTORY:  Hypothyroidism    HLD (hyperlipidemia)    Thrombocytosis    Persistent atrial fibrillation    Obesity (BMI 35.0-39.9 without comorbidity)    Asthma    Diabetes mellitus      MEDICATIONS  (STANDING):  albuterol/ipratropium for Nebulization 3 milliLiter(s) Nebulizer every 6 hours  buDESOnide    Inhalation Suspension 0.5 milliGRAM(s) Inhalation every 12 hours  dextrose 5% + lactated ringers. 1000 milliLiter(s) (60 mL/Hr) IV Continuous <Continuous>  escitalopram 20 milliGRAM(s) Oral daily  levothyroxine Injectable 93.75 MICROGram(s) IV Push <User Schedule>  midodrine. 5 milliGRAM(s) Oral three times a day  pantoprazole  Injectable 40 milliGRAM(s) IV Push two times a day  vancomycin    Solution 125 milliGRAM(s) Oral daily    MEDICATIONS  (PRN):  acetaminophen     Tablet .. 650 milliGRAM(s) Oral every 6 hours PRN Moderate Pain (4 - 6)  albuterol    90 MICROgram(s) HFA Inhaler 4 Puff(s) Inhalation every 6 hours PRN Shortness of Breath and/or Wheezing  HYDROmorphone   Tablet 1 milliGRAM(s) Oral every 6 hours PRN Severe Pain (7 - 10)  metoclopramide Injectable 5 milliGRAM(s) IV Push every 6 hours PRN nausea and/or vomiting  sodium chloride 0.9% lock flush 10 milliLiter(s) IV Push every 1 hour PRN Pre/post blood products, medications, blood draw, and to maintain line patency    T(C): 36.6 (05-01-23 @ 10:56), Max: 37.2 (05-01-23 @ 04:48)  HR: 91 (05-01-23 @ 14:26) (56 - 98)  BP: 116/62 (05-01-23 @ 10:56) (88/60 - 120/77)  RR: 19 (05-01-23 @ 10:56)  SpO2: 99% (05-01-23 @ 14:26)  Wt(kg): --  I&O's Detail    30 Apr 2023 07:01  -  01 May 2023 07:00  --------------------------------------------------------  IN:  Total IN: 0 mL    OUT:    Bulb (mL): 5 mL    Colostomy (mL): 50 mL  Total OUT: 55 mL    Total NET: -55 mL                PHYSICAL EXAM:  General: No distress  Respiratory: b/l air entry  Cardiovascular: S1 S2  Gastrointestinal: soft, s/p surgery  Extremities:  edema                       LABORATORY:                        8.6    23.36 )-----------( 430      ( 01 May 2023 07:45 )             28.0     05-01    146<H>  |  114<H>  |  61<H>  ----------------------------<  83  3.5   |  26  |  2.70<H>    Ca    8.0<L>      01 May 2023 07:45    TPro  5.2<L>  /  Alb  1.9<L>  /  TBili  0.4  /  DBili  x   /  AST  12<L>  /  ALT  8<L>  /  AlkPhos  65  05-01    Sodium, Serum: 146 mmol/L (05-01 @ 07:45)  Sodium, Serum: 144 mmol/L (04-30 @ 07:53)    Potassium, Serum: 3.5 mmol/L (05-01 @ 07:45)  Potassium, Serum: 3.8 mmol/L (04-30 @ 07:53)    Hemoglobin: 8.6 g/dL (05-01 @ 07:45)  Hemoglobin: 8.8 g/dL (04-30 @ 18:35)  Hemoglobin: 7.7 g/dL (04-30 @ 07:53)  Hemoglobin: 7.8 g/dL (04-29 @ 16:30)    Creatinine, Serum 2.70 (05-01 @ 07:45)  Creatinine, Serum 2.80 (04-30 @ 07:53)  Creatinine, Serum 3.10 (04-29 @ 10:35)        LIVER FUNCTIONS - ( 01 May 2023 07:45 )  Alb: 1.9 g/dL / Pro: 5.2 g/dL / ALK PHOS: 65 U/L / ALT: 8 U/L / AST: 12 U/L / GGT: x

## 2023-05-01 NOTE — PROGRESS NOTE ADULT - ASSESSMENT
IMPRESSION    72y Female admitted with Enterocolitis due to Clostridium difficile  S/P extended Tia's for perforated colon    Patient with history myeloproliferative on agrylin (being treated by Dr. Rice) admitted with abd pain, weakness and falls at home a/w severe sepsis and peritonitis due to perforated sigmoid colon, also with LAYA due to ATN, shock liver, and post/op acute respiratory failure requiring mechanical ventilation, and acute blood loss anemia. pt is off agrylin.  had a period of thrombocytopenia now resolved with current platelet count 416K  Course also complicated by diarrhea which apparently was present prior to admission.  Has hx of ?pulmonary carcinoid with nodules being followed. however recent PET/CT dotatate negative.      hx MGUS  Weak IgG Kappa Band Identified. (08-16-22 @ 04:55)    Bloody BM/ostomy output 04/28/23  ostomy outpt slight blood tinge to brown stool. [04/30/23]  s/p multriple unites PRBC yesterday and day before.   Hgb stable.   1U PRBC 04/28  3IU PRBC 04/29  1U PRBC 04/30    Cr continues to improve.     NGT in place.     RECOMMENDATIONS> :  1.  follow CBC and transfuse as indicated  2.  continue post op care  3.  HOLD sandostatin and evaluate change in BM  4.  Hold agrylin until taking better po and bleeding controlled.     Recent bleeding.   platelet count is not significantly elevated at present; will follow    SCD for DVT prophylaxis    5.  further heme recommendations pending clinical course.     PO vanco per ID  Surgery following

## 2023-05-01 NOTE — PROGRESS NOTE ADULT - PROBLEM SELECTOR PLAN 5
LAYA likely ATN due to septic shock; BUN/Cr downtrending to 61/2.7 this AM   S/p bicarb gtt for metabolic acidosis  s/p diuresis with metolazone and bumetanide- held due to rising creatinine  monitor off diuretics  nephro ([facility]), recs appreciated

## 2023-05-01 NOTE — PROGRESS NOTE ADULT - SUBJECTIVE AND OBJECTIVE BOX
Pt seen and examined at bedside, responds to voice but minimally responsive to questions. Shakes head "no" when questions about any pain, lightheadedness, or chest pain,    T(C): 37.2 (05-01-23 @ 04:48), Max: 37.2 (05-01-23 @ 04:48)  HR: 79 (05-01-23 @ 04:48) (56 - 89)  BP: 102/69 (05-01-23 @ 04:48) (102/69 - 120/77)  RR: 19 (05-01-23 @ 04:48) (17 - 19)  SpO2: 91% (05-01-23 @ 04:48) (91% - 100%)  Wt(kg): --    PHYSICAL EXAM:  General: no acute distress; mittens  HEENT: normocephalic, anicteric; NGT in place  Pulm: non labored respirations  Cardio: RRR  Abdomen: soft, nontender, nondistended, drain in place w/ scant yellow/orange serous fluid in bulb; stoma patent w/ loose yellow/brown stool in collection bag, no visible blood or clots; midline wound w/ three openings - drainage noted on drsg. mild surrounding erythema; dressing & changed this morning  Extremities: no calf tenderness noted    I&O's Detail    30 Apr 2023 07:01  -  01 May 2023 07:00  --------------------------------------------------------  IN:  Total IN: 0 mL    OUT:    Bulb (mL): 5 mL    Colostomy (mL): 50 mL  Total OUT: 55 mL    Total NET: -55 mL    LABS:                        8.8    x     )-----------( x        ( 30 Apr 2023 18:35 )             27.9     04-30    144  |  113<H>  |  74<H>  ----------------------------<  98  3.8   |  27  |  2.80<H>    Ca    8.1<L>      30 Apr 2023 07:53  Phos  3.7     04-29  Mg     1.8     04-29    TPro  4.9<L>  /  Alb  1.8<L>  /  TBili  0.5  /  DBili  x   /  AST  9<L>  /  ALT  9<L>  /  AlkPhos  56  04-30    CAPILLARY BLOOD GLUCOSE  POCT Blood Glucose.: 106 mg/dL (01 May 2023 00:02)  POCT Blood Glucose.: 103 mg/dL (30 Apr 2023 17:27)  POCT Blood Glucose.: 98 mg/dL (30 Apr 2023 12:21)  POCT Blood Glucose.: 97 mg/dL (30 Apr 2023 07:48)    MEDICATIONS:  acetaminophen     Tablet .. 650 milliGRAM(s) Oral every 6 hours PRN  albuterol    90 MICROgram(s) HFA Inhaler 4 Puff(s) Inhalation every 6 hours PRN  albuterol/ipratropium for Nebulization 3 milliLiter(s) Nebulizer every 6 hours  buDESOnide    Inhalation Suspension 0.5 milliGRAM(s) Inhalation every 12 hours  dextrose 5% + lactated ringers. 1000 milliLiter(s) IV Continuous <Continuous>  escitalopram 20 milliGRAM(s) Oral daily  HYDROmorphone   Tablet 1 milliGRAM(s) Oral every 6 hours PRN  levothyroxine Injectable 93.75 MICROGram(s) IV Push <User Schedule>  metoclopramide Injectable 5 milliGRAM(s) IV Push every 6 hours PRN  midodrine. 5 milliGRAM(s) Oral three times a day  pantoprazole  Injectable 40 milliGRAM(s) IV Push two times a day  sodium chloride 0.9% lock flush 10 milliLiter(s) IV Push every 1 hour PRN  vancomycin    Solution 125 milliGRAM(s) Oral daily     Pt seen and examined at bedside, responds to voice but minimally responsive to questions. Shakes head "no" when questions about any pain, lightheadedness, or chest pain,    T(C): 37.2 (05-01-23 @ 04:48), Max: 37.2 (05-01-23 @ 04:48)  HR: 79 (05-01-23 @ 04:48) (56 - 89)  BP: 102/69 (05-01-23 @ 04:48) (102/69 - 120/77)  RR: 19 (05-01-23 @ 04:48) (17 - 19)  SpO2: 91% (05-01-23 @ 04:48) (91% - 100%)  Wt(kg): --    PHYSICAL EXAM:  General: no acute distress; mittens  HEENT: normocephalic, anicteric; NGT in place  Pulm: non labored respirations  Cardio: RRR  Abdomen: soft, nontender, nondistended, drain in place w/ scant yellow/orange serous fluid in bulb; stoma patent w/ loose yellow/brown stool in collection bag, no visible blood or clots; midline wound w/ three openings - drainage noted on drsg. mild surrounding erythema; dressing changed this morning  Extremities: no calf tenderness noted    I&O's Detail    30 Apr 2023 07:01  -  01 May 2023 07:00  --------------------------------------------------------  IN:  Total IN: 0 mL    OUT:    Bulb (mL): 5 mL    Colostomy (mL): 50 mL  Total OUT: 55 mL    Total NET: -55 mL    LABS:                        8.8    x     )-----------( x        ( 30 Apr 2023 18:35 )             27.9     04-30    144  |  113<H>  |  74<H>  ----------------------------<  98  3.8   |  27  |  2.80<H>    Ca    8.1<L>      30 Apr 2023 07:53  Phos  3.7     04-29  Mg     1.8     04-29    TPro  4.9<L>  /  Alb  1.8<L>  /  TBili  0.5  /  DBili  x   /  AST  9<L>  /  ALT  9<L>  /  AlkPhos  56  04-30    CAPILLARY BLOOD GLUCOSE  POCT Blood Glucose.: 106 mg/dL (01 May 2023 00:02)  POCT Blood Glucose.: 103 mg/dL (30 Apr 2023 17:27)  POCT Blood Glucose.: 98 mg/dL (30 Apr 2023 12:21)  POCT Blood Glucose.: 97 mg/dL (30 Apr 2023 07:48)    MEDICATIONS:  acetaminophen     Tablet .. 650 milliGRAM(s) Oral every 6 hours PRN  albuterol    90 MICROgram(s) HFA Inhaler 4 Puff(s) Inhalation every 6 hours PRN  albuterol/ipratropium for Nebulization 3 milliLiter(s) Nebulizer every 6 hours  buDESOnide    Inhalation Suspension 0.5 milliGRAM(s) Inhalation every 12 hours  dextrose 5% + lactated ringers. 1000 milliLiter(s) IV Continuous <Continuous>  escitalopram 20 milliGRAM(s) Oral daily  HYDROmorphone   Tablet 1 milliGRAM(s) Oral every 6 hours PRN  levothyroxine Injectable 93.75 MICROGram(s) IV Push <User Schedule>  metoclopramide Injectable 5 milliGRAM(s) IV Push every 6 hours PRN  midodrine. 5 milliGRAM(s) Oral three times a day  pantoprazole  Injectable 40 milliGRAM(s) IV Push two times a day  sodium chloride 0.9% lock flush 10 milliLiter(s) IV Push every 1 hour PRN  vancomycin    Solution 125 milliGRAM(s) Oral daily

## 2023-05-01 NOTE — PROGRESS NOTE ADULT - PROBLEM SELECTOR PLAN 1
-had acute blood loss anemia due to acute perforated bowel and extensive abdominal surgery early in the admission requiring 2un PRBC  -Pt now s/p additional 5 units PRBC with new GIB with improvement of Hg after last unit from 7.7 to 8.8  -retacrit per nephro for renal failure  -serial CBCs, transfuse as needed with goal Hgb >8 now given bleeding  -NGT in place, ok to give meds through tube  -GI ([facility]), recs appreciated -- if pt continues to bleed, may need further imaging and/or IR involvement -- performing CTA has significant risks as pt is just starting to recover from severe ATN from septic shock and still has significantly impaired renal function; if needs imaging, will pursue NM bleeding scan -had acute blood loss anemia due to acute perforated bowel and extensive abdominal surgery early in the admission requiring 2un PRBC  -Pt now s/p additional 5 units PRBC with new GIB   -ostomy output is now brown and pt had appropriate response of Hgb after last unit from 7.7 to 8.8   -retacrit per nephro for renal failure  -serial CBCs, transfuse as needed with goal Hgb >8 now given severe bleeding pt had  -NGT in place, discussed with GI/surgery - can restart tube feeds this evening if no further bleeding  -GI ([facility]), recs appreciated -- if pt rebleeds, may need further imaging and/or IR involvement -- performing CTA has significant risks as pt is recovering from severe ATN from septic shock and still has significantly impaired renal function; if needs imaging, will pursue NM bleeding scan

## 2023-05-01 NOTE — PROGRESS NOTE ADULT - SUBJECTIVE AND OBJECTIVE BOX
[facility]  Yaw Tolbert PA-C  [address] [place] [address]  [phone]      INTERVAL HPI/OVERNIGHT EVENTS:  Pt s/e  Reports no new GI events  +ostomy    MEDICATIONS  (STANDING):  albuterol/ipratropium for Nebulization 3 milliLiter(s) Nebulizer every 6 hours  buDESOnide    Inhalation Suspension 0.5 milliGRAM(s) Inhalation every 12 hours  dextrose 5% + lactated ringers. 1000 milliLiter(s) (60 mL/Hr) IV Continuous <Continuous>  escitalopram 20 milliGRAM(s) Oral daily  levothyroxine Injectable 93.75 MICROGram(s) IV Push <User Schedule>  midodrine. 5 milliGRAM(s) Oral three times a day  pantoprazole  Injectable 40 milliGRAM(s) IV Push two times a day  vancomycin    Solution 125 milliGRAM(s) Oral daily    MEDICATIONS  (PRN):  acetaminophen     Tablet .. 650 milliGRAM(s) Oral every 6 hours PRN Moderate Pain (4 - 6)  albuterol    90 MICROgram(s) HFA Inhaler 4 Puff(s) Inhalation every 6 hours PRN Shortness of Breath and/or Wheezing  HYDROmorphone   Tablet 1 milliGRAM(s) Oral every 6 hours PRN Severe Pain (7 - 10)  metoclopramide Injectable 5 milliGRAM(s) IV Push every 6 hours PRN nausea and/or vomiting  sodium chloride 0.9% lock flush 10 milliLiter(s) IV Push every 1 hour PRN Pre/post blood products, medications, blood draw, and to maintain line patency      Allergies    No Known Allergies    PHYSICAL EXAM:   Vital Signs:  Vital Signs Last 24 Hrs  T(C): 36.6 (01 May 2023 10:56), Max: 37.2 (01 May 2023 04:48)  T(F): 97.8 (01 May 2023 10:56), Max: 98.9 (01 May 2023 04:48)  HR: 78 (01 May 2023 10:56) (73 - 89)  BP: 116/62 (01 May 2023 10:56) (102/69 - 120/77)  BP(mean): --  RR: 19 (01 May 2023 10:56) (19 - 19)  SpO2: 99% (01 May 2023 10:56) (91% - 100%)    Parameters below as of 01 May 2023 10:56  Patient On (Oxygen Delivery Method): room air      Daily     Daily Weight in k.5 (01 May 2023 04:48)    GENERAL:  Appears stated age  HEENT:  NC/AT  CHEST:  Full & symmetric excursion  HEART:  Regular rhythm  ABDOMEN:  Soft, non-tender, non-distended, +ostomy  EXTEREMITIES:  no cyanosis  SKIN:  No rash  NEURO:  Alert      LABS:                        8.6    23.36 )-----------( 430      ( 01 May 2023 07:45 )             28.0         146<H>  |  114<H>  |  61<H>  ----------------------------<  83  3.5   |  26  |  2.70<H>    Ca    8.0<L>      01 May 2023 07:45    TPro  5.2<L>  /  Alb  1.9<L>  /  TBili  0.4  /  DBili  x   /  AST  12<L>  /  ALT  8<L>  /  AlkPhos  65

## 2023-05-01 NOTE — PROGRESS NOTE ADULT - TIME BILLING
Pt seen + examined. Case discussed with resident. Agree with assessment and plan above (edited by me in detail):  Time spent: 50min. More than 50% of the visit was spent counseling the patient and pt's son on medical condition -- severe sepsis and peritonitis due to perforated sigmoid colon, LAYA due to ATN, shock liver, and post/op acute respiratory failure requiring mechanical ventilation, and acute blood loss anemia, dysphagia, FEES study, severe anemia, PRBC, acute GIB.    73yo F w/ PMH of A-Fib on rivaroxaban, HFpEF, recent C diff infection (Jan 2023), myeloproliferative disorder with thrombocytosis on anagrelide, hypothyroidism, asthma, and lung nodules s/p resection (reportedly malignant) who presents with perforated sigmoid colon with acute bacterial peritonitis, LAYA due to ATN, septic shock, lactic acidosis, shock liver;  post/op acute respiratory failure requiring mechanical ventilation, and acute blood loss anemia; s/p extended Tia's procedure including descending colon on 4/13. She was extubated 4/19 and tolerating supplemental oxygen via nasal cannula, overall gradual improvement, but complicated by critical illness polyneuropathy, and further c/b acute GIB.    -had acute blood loss anemia due to acute perforated bowel and extensive abdominal surgery early in the admission requiring 2un PRBC  -Pt now s/p additional 5 units PRBC with new GIB   -ostomy output is now brown and pt had appropriate response of Hgb after last unit from 7.7 to 8.8   -retacrit per nephro for renal failure  -serial CBCs, transfuse as needed with goal Hgb >8 now given severe bleeding pt had  -NGT in place, discussed with GI/surgery - can restart tube feeds this evening if no further bleeding  -GI ([facility]), recs appreciated -- if pt rebleeds, may need further imaging and/or IR involvement -- performing CTA has significant risks as pt is recovering from severe ATN from septic shock and still has significantly impaired renal function; if needs imaging, will pursue NM bleeding scan    -a/w septic shock due to perforated sigmoid colon with polymicrobial peritonitis; s/p Tia procedure and abdominal abscess drainage on 4/13 w/ ICU stay requiring vasopressor support   -has stabilized, now only on midodrine 5mg q8hr -- taper off as able  -C-Diff negative on multiple checks during hospitalization and GI PCR negative - c/w prophylactic po vanco   -ID ([facility]), recs appreciated  -completed course of broad spectrum Abx  -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  -S&S reassessed on 4/28 and felt pt did better, but not well enough to be recommended for po intake -- still recommend NPO, but now recommend to perform a FEES study earliest on Tuesday for the next reassessment as feel pt has higher chance to pass the swallow test -- f/up FEES  -pt with history of myeloproliferative disorder per discussion with hematology, so unclear the significance of the leukocytosis    -had been on octreotide given history of carcinoid with increased ostomy output -- per heme/onc last PET CT without evidence of carcinoid tumor and recommend trial off the octreotide -- discontinued on 4/27    LAYA likely ATN due to septic shock; BUN/Cr downtrending to 61/2.7 this AM   S/p bicarb gtt for metabolic acidosis  s/p diuresis with metolazone and bumetanide- held due to rising creatinine  monitor off diuretics  nephro ([facility]), recs appreciated

## 2023-05-01 NOTE — PROGRESS NOTE ADULT - ASSESSMENT
72-year-old female with history of hypertension, CHF, afib s/p AF ablation (2/7/19) currently not on Xarelto CATH 2018, hypothyroidism, HLD history of C. difficile January 2023 presents for vomiting and abdominal pain. Consulted for CHF, S/P Tia's    Cardiac Optimization, Hx Afib s/p Ablation, HTN, CHF  - CT ABD/Pelvis: Pneumoperitoneum and no ascites, perforated sigmoid colon. Stable splenomegaly. Stable right lung nodule, ground glass infiltrates and loculated right pleural effusion.  - S/P Tia's with abdominal abscess drainage, surgery following   - Abx per ID following     - Known PAFIB rate-controlled per flow sheet   - Holding home Xarelto.    - Anemia persistent, H/H 7.7/24.7, transfuse per primary, continue to trend.     - BP stable and controlled   - Continue Midodrine, can increase to 10 mg PO TID   - Continue to monitor routine hemodynamics     - EKG: ST with PAC's. No acute changes, unchanged from previous.   - Elevated troponin peaked at 100.9, likely demand in the setting of sepsis  - No evidence of any active ischemia     - TTE (1/18/23): normal LVEF 65%, normal RV size and function, biatrial enlargement, sclerotic aortic valve, mild AI, Moderate MR and TR  - TTE (4/15/2023) EF 55-60%, RVE, PASP 66   - BNP:  <--91839  - Pt appears euvolemic to dry, on IVF, currently NPO     - Creatinine slowly improving, no indication for HD at this time, renal following   - DVT ppx per primary     - Monitor and replete lytes, keep K>4, Mg>2.  - Will continue to follow.    Randall Wang, MS FNP, [facility]  Nurse Practitioner- Cardiology   Spectra #[id number] /[phone]

## 2023-05-01 NOTE — PROGRESS NOTE ADULT - PROBLEM SELECTOR PLAN 4
Acute respiratory failure with hypoxia extubated 4/19   Will encourage incentive spirometry  chest PT as tolerated  c/w nebs and budesonide 0.5 mg nebs q12h (on Trelegy Ellipta inhaler at home)  has some coarse breath sounds on right today -- will check repeat CXR

## 2023-05-01 NOTE — PROGRESS NOTE ADULT - SUBJECTIVE AND OBJECTIVE BOX
Patient is a 72y old  Female who presents with a chief complaint of intra-abdominal perforation (01 May 2023 12:03)    INTERVAL HPI/OVERNIGHT EVENTS: Patient seen and examined at bedside. No overnight events occurred. Patient has no complaints at this time. Denies fevers, chills, headache, lightheadedness, chest pain, dyspnea, abdominal pain, n/v/d/c.    MEDICATIONS  (STANDING):  albuterol/ipratropium for Nebulization 3 milliLiter(s) Nebulizer every 6 hours  buDESOnide    Inhalation Suspension 0.5 milliGRAM(s) Inhalation every 12 hours  dextrose 5% + lactated ringers. 1000 milliLiter(s) (60 mL/Hr) IV Continuous <Continuous>  escitalopram 20 milliGRAM(s) Oral daily  levothyroxine Injectable 93.75 MICROGram(s) IV Push <User Schedule>  midodrine. 5 milliGRAM(s) Oral three times a day  pantoprazole  Injectable 40 milliGRAM(s) IV Push two times a day  vancomycin    Solution 125 milliGRAM(s) Oral daily    MEDICATIONS  (PRN):  acetaminophen     Tablet .. 650 milliGRAM(s) Oral every 6 hours PRN Moderate Pain (4 - 6)  albuterol    90 MICROgram(s) HFA Inhaler 4 Puff(s) Inhalation every 6 hours PRN Shortness of Breath and/or Wheezing  HYDROmorphone   Tablet 1 milliGRAM(s) Oral every 6 hours PRN Severe Pain (7 - 10)  metoclopramide Injectable 5 milliGRAM(s) IV Push every 6 hours PRN nausea and/or vomiting  sodium chloride 0.9% lock flush 10 milliLiter(s) IV Push every 1 hour PRN Pre/post blood products, medications, blood draw, and to maintain line patency    Allergies    No Known Allergies    Intolerances    REVIEW OF SYSTEMS:  ROS limited 2/2 weakness and somnolence, denies fevers, chills, headache, lightheadedness, chest pain, dyspnea, abdominal pain, n/v.    Vital Signs Last 24 Hrs  T(C): 36.6 (01 May 2023 10:56), Max: 37.2 (01 May 2023 04:48)  T(F): 97.8 (01 May 2023 10:56), Max: 98.9 (01 May 2023 04:48)  HR: 78 (01 May 2023 10:56) (73 - 89)  BP: 116/62 (01 May 2023 10:56) (102/69 - 120/77)  BP(mean): --  RR: 19 (01 May 2023 10:56) (19 - 19)  SpO2: 99% (01 May 2023 10:56) (91% - 100%)    Parameters below as of 01 May 2023 10:56  Patient On (Oxygen Delivery Method): room air    PHYSICAL EXAM:  GENERAL: chronically ill-appearing, not in acute distress; frail  HEENT:  anicteric, moist mucous membranes, NGT in place  CHEST/LUNG:  decreased breath sounds noted bilaterally, weak inspiratory effort  HEART: irregular rhythm at 68bpm, S1, S2  ABDOMEN: ileostomy in place; small volume of lightly blood-tinged brown liquid stool noted, BS+, soft, nontender, nondistended; midline surgical incision, surgical dressing C/D/I  EXTREMITIES: no cyanosis, or calf tenderness  NERVOUS SYSTEM: somnolent but answering simple questions with nodding and few words    LABS:                        8.6    23.36 )-----------( 430      ( 01 May 2023 07:45 )             28.0     CBC Full  -  ( 01 May 2023 07:45 )  WBC Count : 23.36 K/uL  Hemoglobin : 8.6 g/dL  Hematocrit : 28.0 %  Platelet Count - Automated : 430 K/uL  Mean Cell Volume : 94.9 fl  Mean Cell Hemoglobin : 29.2 pg  Mean Cell Hemoglobin Concentration : 30.7 gm/dL  Auto Neutrophil # : 20.80 K/uL  Auto Lymphocyte # : 0.61 K/uL  Auto Monocyte # : 0.77 K/uL  Auto Eosinophil # : 0.46 K/uL  Auto Basophil # : 0.13 K/uL  Auto Neutrophil % : 89.0 %  Auto Lymphocyte % : 2.6 %  Auto Monocyte % : 3.3 %  Auto Eosinophil % : 2.0 %  Auto Basophil % : 0.6 %    01 May 2023 07:45    146    |  114    |  61     ----------------------------<  83     3.5     |  26     |  2.70     Ca    8.0        01 May 2023 07:45    TPro  5.2    /  Alb  1.9    /  TBili  0.4    /  DBili  x      /  AST  12     /  ALT  8      /  AlkPhos  65     01 May 2023 07:45    CAPILLARY BLOOD GLUCOSE  POCT Blood Glucose.: 106 mg/dL (01 May 2023 12:05)  POCT Blood Glucose.: 106 mg/dL (01 May 2023 00:02)  POCT Blood Glucose.: 103 mg/dL (30 Apr 2023 17:27)    RADIOLOGY & ADDITIONAL TESTS:    < from: Xray Chest 1 View- PORTABLE-Urgent (Xray Chest 1 View- PORTABLE-Urgent .) (04.30.23 @ 16:29) >  PROCEDURE DATE:  04/30/2023      INTERPRETATION:  Clinical History: 72-year-old female, coarse breath   sounds on the right.    Portable view the chest is compared to 4/24/2023 and is correlated with   the abdominal CT of 4/27/2023.    FINDINGS: Tip of the NG tube in the stomach, unchanged.    Mild to moderate cardiomegaly and mild pulmonary venous congestion,   unchanged.    Small bilateral pleural effusions, unchanged.    No pneumoperitoneum or acute osseous finding.    IMPRESSION:  NG tube with the tip in the stomach, unchanged.    Small bilateral pleural effusions, unchanged    --- End of Report ---    < end of copied text >    Personally reviewed.     Consultant(s) Notes Reviewed:  [x] YES  [ ] NO     Patient is a 72y old  Female who presents with a chief complaint of generalized abdominal pain and persistent diarrhea.    INTERVAL HPI/OVERNIGHT EVENTS: Patient seen and examined at bedside. No acute overnight events occurred. Patient answering most questions with nodding of head or one word answers -- denies fevers, chills, headache, chest pain, dyspnea, abdominal pain, n/v.    MEDICATIONS  (STANDING):  albuterol/ipratropium for Nebulization 3 milliLiter(s) Nebulizer every 6 hours  buDESOnide    Inhalation Suspension 0.5 milliGRAM(s) Inhalation every 12 hours  dextrose 5% + lactated ringers. 1000 milliLiter(s) (60 mL/Hr) IV Continuous <Continuous>  escitalopram 20 milliGRAM(s) Oral daily  levothyroxine Injectable 93.75 MICROGram(s) IV Push <User Schedule>  midodrine. 5 milliGRAM(s) Oral three times a day  pantoprazole  Injectable 40 milliGRAM(s) IV Push two times a day  vancomycin    Solution 125 milliGRAM(s) Oral daily    MEDICATIONS  (PRN):  acetaminophen     Tablet .. 650 milliGRAM(s) Oral every 6 hours PRN Moderate Pain (4 - 6)  albuterol    90 MICROgram(s) HFA Inhaler 4 Puff(s) Inhalation every 6 hours PRN Shortness of Breath and/or Wheezing  HYDROmorphone   Tablet 1 milliGRAM(s) Oral every 6 hours PRN Severe Pain (7 - 10)  metoclopramide Injectable 5 milliGRAM(s) IV Push every 6 hours PRN nausea and/or vomiting  sodium chloride 0.9% lock flush 10 milliLiter(s) IV Push every 1 hour PRN Pre/post blood products, medications, blood draw, and to maintain line patency    Allergies    No Known Allergies    Intolerances    REVIEW OF SYSTEMS:  ROS limited 2/2 weakness and somnolence, denies fevers, chills, headache, chest pain, dyspnea, abdominal pain, n/v.    Vital Signs Last 24 Hrs  T(C): 36.6 (01 May 2023 10:56), Max: 37.2 (01 May 2023 04:48)  T(F): 97.8 (01 May 2023 10:56), Max: 98.9 (01 May 2023 04:48)  HR: 78 (01 May 2023 10:56) (73 - 89)  BP: 116/62 (01 May 2023 10:56) (102/69 - 120/77)  BP(mean): --  RR: 19 (01 May 2023 10:56) (19 - 19)  SpO2: 99% (01 May 2023 10:56) (91% - 100%)    Parameters below as of 01 May 2023 10:56  Patient On (Oxygen Delivery Method): room air    PHYSICAL EXAM:  GENERAL: chronically ill-appearing, not in acute distress; frail  HEENT:  anicteric, moist mucous membranes, NGT in place  CHEST/LUNG:  decreased breath sounds noted bilaterally, weak inspiratory effort  HEART: irregular rhythm at 72bpm, S1, S2  ABDOMEN: ileostomy in place; small volume of brown liquid stool noted, BS+, soft, nontender, nondistended; midline surgical incision, surgical dressing C/D/I  EXTREMITIES: no cyanosis, or calf tenderness  NERVOUS SYSTEM: somnolent but easily rousible to voice and answering simple questions with nodding and few words    LABS:                        8.6    23.36 )-----------( 430      ( 01 May 2023 07:45 )             28.0     CBC Full  -  ( 01 May 2023 07:45 )  WBC Count : 23.36 K/uL  Hemoglobin : 8.6 g/dL  Hematocrit : 28.0 %  Platelet Count - Automated : 430 K/uL  Mean Cell Volume : 94.9 fl  Mean Cell Hemoglobin : 29.2 pg  Mean Cell Hemoglobin Concentration : 30.7 gm/dL  Auto Neutrophil # : 20.80 K/uL  Auto Lymphocyte # : 0.61 K/uL  Auto Monocyte # : 0.77 K/uL  Auto Eosinophil # : 0.46 K/uL  Auto Basophil # : 0.13 K/uL  Auto Neutrophil % : 89.0 %  Auto Lymphocyte % : 2.6 %  Auto Monocyte % : 3.3 %  Auto Eosinophil % : 2.0 %  Auto Basophil % : 0.6 %    01 May 2023 07:45    146    |  114    |  61     ----------------------------<  83     3.5     |  26     |  2.70     Ca    8.0        01 May 2023 07:45    TPro  5.2    /  Alb  1.9    /  TBili  0.4    /  DBili  x      /  AST  12     /  ALT  8      /  AlkPhos  65     01 May 2023 07:45    CAPILLARY BLOOD GLUCOSE  POCT Blood Glucose.: 106 mg/dL (01 May 2023 12:05)  POCT Blood Glucose.: 106 mg/dL (01 May 2023 00:02)  POCT Blood Glucose.: 103 mg/dL (30 Apr 2023 17:27)    RADIOLOGY & ADDITIONAL TESTS:    < from: Xray Chest 1 View- PORTABLE-Urgent (Xray Chest 1 View- PORTABLE-Urgent .) (04.30.23 @ 16:29) >  PROCEDURE DATE:  04/30/2023      INTERPRETATION:  Clinical History: 72-year-old female, coarse breath   sounds on the right.    Portable view the chest is compared to 4/24/2023 and is correlated with   the abdominal CT of 4/27/2023.    FINDINGS: Tip of the NG tube in the stomach, unchanged.    Mild to moderate cardiomegaly and mild pulmonary venous congestion,   unchanged.    Small bilateral pleural effusions, unchanged.    No pneumoperitoneum or acute osseous finding.    IMPRESSION:  NG tube with the tip in the stomach, unchanged.    Small bilateral pleural effusions, unchanged    --- End of Report ---    < end of copied text >    Personally reviewed.     Consultant(s) Notes Reviewed:  [x] YES  [ ] NO

## 2023-05-01 NOTE — PROGRESS NOTE ADULT - SUBJECTIVE AND OBJECTIVE BOX
[INTERVAL HX: ]  Patient seen and examined;  Chart reviewed and events noted;     weak appearing.   denies pain  watery brown liquid with slight red tinge in ostomy.     [MEDICATIONS]  MEDICATIONS  (STANDING):  albuterol/ipratropium for Nebulization 3 milliLiter(s) Nebulizer every 6 hours  buDESOnide    Inhalation Suspension 0.5 milliGRAM(s) Inhalation every 12 hours  dextrose 5% + lactated ringers. 1000 milliLiter(s) (60 mL/Hr) IV Continuous <Continuous>  escitalopram 20 milliGRAM(s) Oral daily  levothyroxine Injectable 93.75 MICROGram(s) IV Push <User Schedule>  midodrine. 5 milliGRAM(s) Oral three times a day  pantoprazole  Injectable 40 milliGRAM(s) IV Push two times a day  vancomycin    Solution 125 milliGRAM(s) Oral daily    MEDICATIONS  (PRN):  acetaminophen     Tablet .. 650 milliGRAM(s) Oral every 6 hours PRN Moderate Pain (4 - 6)  albuterol    90 MICROgram(s) HFA Inhaler 4 Puff(s) Inhalation every 6 hours PRN Shortness of Breath and/or Wheezing  HYDROmorphone   Tablet 1 milliGRAM(s) Oral every 6 hours PRN Severe Pain (7 - 10)  metoclopramide Injectable 5 milliGRAM(s) IV Push every 6 hours PRN nausea and/or vomiting  sodium chloride 0.9% lock flush 10 milliLiter(s) IV Push every 1 hour PRN Pre/post blood products, medications, blood draw, and to maintain line patency      [VITALS]  Vital Signs Last 24 Hrs  T(C): 36.6 (01 May 2023 10:56), Max: 37.2 (01 May 2023 04:48)  T(F): 97.8 (01 May 2023 10:56), Max: 98.9 (01 May 2023 04:48)  HR: 78 (01 May 2023 10:56) (73 - 89)  BP: 116/62 (01 May 2023 10:56) (102/69 - 120/77)  BP(mean): --  RR: 19 (01 May 2023 10:56) (19 - 19)  SpO2: 99% (01 May 2023 10:56) (91% - 100%)    Parameters below as of 01 May 2023 10:56  Patient On (Oxygen Delivery Method): room air      [WT/HT]  Daily     Daily Weight in k.5 (01 May 2023 04:48)  [VENT]      [PHYSICAL EXAM]  GEN: NAD  HEENT: normocephalic and atraumatic. EOMI. PERRL.    NECK: Supple.  No lymphadenopathy   LUNGS: Clear to auscultation.  HEART: Regular rate and rhythm,  no MRG  ABDOMEN: Soft, nontender, and nondistended.  Positive bowel sounds.    : No CVA tenderness  EXTREMITIES: Without edema.  NEUROLOGIC: grossly intact.  PSYCHIATRIC: Appropriate affect .  SKIN: No rash     [LABS:]                        8.6    23.36 )-----------( 430      ( 01 May 2023 07:45 )             28.0     05-    146<H>  |  114<H>  |  61<H>  ----------------------------<  83  3.5   |  26  |  2.70<H>    Ca    8.0<L>      01 May 2023 07:45    TPro  5.2<L>  /  Alb  1.9<L>  /  TBili  0.4  /  DBili  x   /  AST  12<L>  /  ALT  8<L>  /  AlkPhos  65  05-01          Reticulocyte Count (23 @ 06:39)  Reticulocyte Percent: 1.4 % [0.5 - 2.5]  Absolute Reticulocytes: 43.3 K/uL [25.0 - 125.0]    Ferritin, Serum: 293 ng/mL *H* [15 - 150] (23 @ 07:38)    Iron - Total Binding Capacity.: 190 ug/dL *L* [220 - 430] (23 @ 07:38)    Reticulocyte Count (22 @ 15:23)  Reticulocyte Percent: 2.0 % [0.5 - 2.5]  Absolute Reticulocytes: 61.8 K/uL [25.0 - 125.0]    Reticulocyte Count (22 @ 09:40)  Reticulocyte Percent: 1.6 % [0.5 - 2.5]  Absolute Reticulocytes: 49.4 K/uL [25.0 - 125.0]    Iron - Total Binding Capacity.: 221 ug/dL [220 - 430] (22 @ 04:55)    Vitamin B12, Serum: 972 pg/mL [232 - 1245] (22 @ 04:55)    Ferritin, Serum: 319 ng/mL *H* [15 - 150] (22 @ 04:55)    Folate, Serum: >20.0 ng/mL (22 @ 04:55)    Serum Protein Electrophoresis Interp: Weak Gamma Migrating Paraprotein Identified (22 @ 04:55)    Immunofixation, Serum:   Weak IgG Kappa Band Identified    Reference Range: None Detected (22 @ 04:55)    Reticulocyte Count (22 @ 10:43)  Reticulocyte Percent: 1.6 % [0.5 - 2.5]  Absolute Reticulocytes: 47.1 K/uL [25.0 - 125.0]    Reticulocyte Count (22 @ 14:14)  Reticulocyte Percent: 3.6 % *H* [0.5 - 2.5]  Absolute Reticulocytes: 99.5 K/uL [25.0 - 125.0]          [facility] ([facility]          [RADIOLOGY STUDIES:]

## 2023-05-01 NOTE — PROGRESS NOTE ADULT - SUBJECTIVE AND OBJECTIVE BOX
[facility] -- Adrián Wray, Jim Hunter [place], [place]: Office # [phone]    Follow Up:  Afib s/p ablation, Cardiac Optimization    Subjective/Observations: Pt seen and examined, Patient awake, alert, unable to provide meaningful information at this time, NGT in place, IVF infusing, b/l mittens in place,     REVIEW OF SYSTEMS: All other review of systems are negative unless indicated above    PAST MEDICAL & SURGICAL HISTORY:  Hypothyroidism      Hypothyroidism      HLD (hyperlipidemia)      Thrombocytosis      Persistent atrial fibrillation      Obesity (BMI 35.0-39.9 without comorbidity)      Asthma      Diabetes mellitus      Ankle injury  on 2004, 5 surgeries.      Cholecystitis    MEDICATIONS  (STANDING):  albuterol/ipratropium for Nebulization 3 milliLiter(s) Nebulizer every 6 hours  buDESOnide    Inhalation Suspension 0.5 milliGRAM(s) Inhalation every 12 hours  dextrose 5% + lactated ringers. 1000 milliLiter(s) (60 mL/Hr) IV Continuous <Continuous>  escitalopram 20 milliGRAM(s) Oral daily  levothyroxine Injectable 93.75 MICROGram(s) IV Push <User Schedule>  midodrine. 5 milliGRAM(s) Oral three times a day  pantoprazole  Injectable 40 milliGRAM(s) IV Push two times a day  vancomycin    Solution 125 milliGRAM(s) Oral daily    MEDICATIONS  (PRN):  acetaminophen     Tablet .. 650 milliGRAM(s) Oral every 6 hours PRN Moderate Pain (4 - 6)  albuterol    90 MICROgram(s) HFA Inhaler 4 Puff(s) Inhalation every 6 hours PRN Shortness of Breath and/or Wheezing  HYDROmorphone   Tablet 1 milliGRAM(s) Oral every 6 hours PRN Severe Pain (7 - 10)  metoclopramide Injectable 5 milliGRAM(s) IV Push every 6 hours PRN nausea and/or vomiting  sodium chloride 0.9% lock flush 10 milliLiter(s) IV Push every 1 hour PRN Pre/post blood products, medications, blood draw, and to maintain line patency    Allergies  No Known Allergies    Vital Signs Last 24 Hrs  T(C): 37.2 (01 May 2023 04:48), Max: 37.2 (01 May 2023 04:48)  T(F): 98.9 (01 May 2023 04:48), Max: 98.9 (01 May 2023 04:48)  HR: 74 (01 May 2023 08:24) (56 - 89)  BP: 102/69 (01 May 2023 04:48) (102/69 - 120/77)  BP(mean): --  RR: 19 (01 May 2023 04:48) (17 - 19)  SpO2: 97% (01 May 2023 08:24) (91% - 100%)    Parameters below as of 01 May 2023 08:24  Patient On (Oxygen Delivery Method): nasal cannula      I&O's Summary    30 Apr 2023 07:01  -  01 May 2023 07:00  --------------------------------------------------------  IN: 0 mL / OUT: 55 mL / NET: -55 mL    TELE: Not on telemetry   PHYSICAL EXAM:  Constitutional: NAD, awake and alert  HEENT: Moist Mucous Membranes, Anicteric  Pulmonary: Non-labored, breath sounds are clear bilaterally, No wheezing, rales or rhonchi  Cardiovascular: Regular, S1 and S2, + murmurs, No rubs, gallops or clicks  Gastrointestinal:  soft, nontender, nondistended + NGT, + GI ostomy  Lymph: No peripheral edema. No lymphadenopathy.   Skin: No visible rashes or ulcers.  Psych:  Mood & affect appropriate      LABS: All Labs Reviewed:                        8.6    23.36 )-----------( 430      ( 01 May 2023 07:45 )             28.0                         8.8    x     )-----------( x        ( 30 Apr 2023 18:35 )             27.9                         7.7    24.25 )-----------( 369      ( 30 Apr 2023 07:53 )             24.7     01 May 2023 07:45    146    |  114    |  61     ----------------------------<  83     3.5     |  26     |  2.70   30 Apr 2023 07:53    144    |  113    |  74     ----------------------------<  98     3.8     |  27     |  2.80   29 Apr 2023 10:35    143    |  111    |  80     ----------------------------<  115    3.9     |  25     |  3.10     Ca    8.0        01 May 2023 07:45  Ca    8.1        30 Apr 2023 07:53  Ca    8.1        29 Apr 2023 10:35  Phos  3.7       29 Apr 2023 10:35  Mg     1.8       29 Apr 2023 10:35    TPro  5.2    /  Alb  1.9    /  TBili  0.4    /  DBili  x      /  AST  12     /  ALT  8      /  AlkPhos  65     01 May 2023 07:45  TPro  4.9    /  Alb  1.8    /  TBili  0.5    /  DBili  x      /  AST  9      /  ALT  9      /  AlkPhos  56     30 Apr 2023 07:53   LIVER FUNCTIONS - ( 01 May 2023 07:45 )  Alb: 1.9 g/dL / Pro: 5.2 g/dL / ALK PHOS: 65 U/L / ALT: 8 U/L / AST: 12 U/L / GGT: x             12 Lead ECG:   Ventricular Rate 82 BPM    Atrial Rate 82 BPM    P-R Interval 160 ms    QRS Duration 84 ms    Q-T Interval 412 ms    QTC Calculation(Bazett) 481 ms    P Axis 68 degrees    R Axis 13 degrees    T Axis 4 degrees    Diagnosis Line Normal sinus rhythm  Right atrial enlargement  Borderline ECG  When compared with ECG of 22-APR-2023 09:43,  aberrant conduction is no longer present  Confirmed by PAULINE LARSEN ([id number]) on 4/24/2023 4:48:07 PM (04-24-23 @ 13:13)      ACC: [id number] EXAM:  ECHO TTE WO CON COMP W DOPP   ORDERED BY: RAFY WILSON     PROCEDURE DATE:  04/14/2023          INTERPRETATION:  INDICATION: Heart failure  Sonographer KL    Blood Pressure unavailable    Height 167.6 cm     Weight 60 kgBSA   1.8 sq m    Dimensions:  LA 4.0       Normal Values: 2.0 - 4.0 cm  Ao 3.4        Normal Values: 2.0 - 3.8 cm  SEPTUM 0.9       Normal Values: 0.6 - 1.2 cm  PWT 0.8       Normal Values: 0.6 - 1.1 cm  LVIDd 4.2         Normal Values: 3.0 - 5.6 cm  LVIDs 2.9         Normal Values: 1.8 - 4.0 cm      OBSERVATIONS:  Mitral Valve: Mild to moderate MR.  Aortic Valve/Aorta: Sclerotic trileaflet aortic valve with grossly normal   opening. Trace AI  Tricuspid Valve: Mild to moderate TR.  Pulmonic Valve: Mild PI  Left Atrium: Enlarged  Right Atrium: Not well-visualized  Left Ventricle: normal LV size and systolic function, estimated LVEF of   55-60%.  Right Ventricle: The right ventricle is enlarged with grossly normal   function  Pericardium: no significant pericardial effusion.  Pulmonary/RV Pressure: estimated PA systolic pressure of 66mmHg assuming   an RA pressure of 10 mmHg.  IVC measures 2.0 cm and is non collapsing  LV diastolic dysfunction is present  Bilateral pleural effusions    IMPRESSION:  Normal left ventricular internal dimensions and systolic function,   estimated LVEF of 55-60%.  The right ventricle is enlarged with grossly normal function  Left atrial enlargement  Sclerotic trileaflet aortic valve, trace AI.  Mild to moderate MR and TR.  Estimated PA systolic pressure of 66mmHg assuming an RA pressure of 10   mmHg.  IVC measures 2.0 cm and is non collapsing  -- End of Report ---  NEYMAR ROWLAND MD; Attending Cardiologist  This document has been electronically signed. Apr 15 2023 10:52AM

## 2023-05-01 NOTE — PROGRESS NOTE ADULT - ASSESSMENT
71 y/o female POD#18 s/p extended Tia's for perforated sigmoid diverticulitis. Failed S&S - NGT replaced 4/24, TF currently held. C/b acute GI bleed - blood & clots noted in ostomy bag on Friday 4.28; now s/p 4u pRBC.    PLAN:  - NPO, IVF; TF held - possible rpt swallow eval when pt is more alert & able to participate  - Continue antibiotics per ID  - Daily labs - trend H&H, transfuse as needed  - Monitor ostomy output & care; record drain output  - Midline drsg changes  - GI consulted - pt may need additional imaging if bleeding persists  - To be updated once discussed with Dr. Goldberg    Surgical Team  Spectralink: [id number] 73 y/o female POD#18 s/p extended Tia's for perforated sigmoid diverticulitis. Failed S&S - NGT replaced 4/24, TF currently held. C/b acute GI bleed - blood & clots noted in ostomy bag on Friday 4.28; now s/p 4u pRBC over the weekend. Ostomy output w/o blood/clots this morning    PLAN:  - NPO, IVF; TF held - possible rpt swallow eval when pt is more alert & able to participate  - Continue antibiotics per ID  - Daily labs - trend H&H, transfuse as needed  - Monitor ostomy output & care; record drain output  - Midline drsg changes  - GI consulted - pt may need additional imaging if bleeding persists  - To be updated once discussed with Dr. Goldberg    Surgical Team  Spectralink: [id number]

## 2023-05-01 NOTE — PROGRESS NOTE ADULT - ASSESSMENT
Lower GI bleed    cbc noted  transfuse prn  no further overt GI bleed  monitor ostomy output  if decompensates may need CTA/IR/CRS eval  will follow    I reviewed the overnight course of events on the unit, re-confirming the patient history. I discussed the care with the patient and their family  Differential diagnosis and plan of care discussed with patient after the evaluation  50 minutes spent on total encounter of which more than fifty percent of the encounter was spent counseling and/or coordinating care by the attending physician.  Advanced care planning was discussed with patient and family.  Advanced care planning forms were reviewed and discussed.  Risks, benefits and alternatives of gastroenterologic procedures were discussed in detail and all questions were answered.

## 2023-05-01 NOTE — PROGRESS NOTE ADULT - ASSESSMENT
LAYA on CKD: Septic/hemodynamic ATN  Sigmoid perforation, s/p surgery  Anemia  s/p Shock, Sepsis    Improving renal indices, To continue current meds. Monitor h/h trend. Transfuse PRBC's PRN.   Avoid nephrotoxic meds as possible. Will follow electrolytes and renal function trend. Surgery follow up.

## 2023-05-01 NOTE — PROGRESS NOTE ADULT - PROBLEM SELECTOR PLAN 2
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  -S&S reassessed on 4/28 and felt pt did better, but not well enough to be recommended for po intake -- still recommend NPO, but now recommend to perform a FEES study earliest on Tuesday for the next reassessment as feel pt has higher chance to pass the swallow test  -pt with history of myeloproliferative disorder per discussion with hematology, so unclear the significance of the leukocytosis -a/w septic shock due to perforated sigmoid colon with polymicrobial peritonitis; s/p Tia procedure and abdominal abscess drainage on 4/13 w/ ICU stay requiring vasopressor support   -has stabilized, now only on midodrine 5mg q8hr -- taper off as able  -C-Diff negative on multiple checks during hospitalization and GI PCR negative - c/w prophylactic po vanco   -ID ([facility]), recs appreciated  -completed course of broad spectrum Abx  -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  -S&S reassessed on 4/28 and felt pt did better, but not well enough to be recommended for po intake -- still recommend NPO, but now recommend to perform a FEES study earliest on Tuesday for the next reassessment as feel pt has higher chance to pass the swallow test -- f/up FEES  -pt with history of myeloproliferative disorder per discussion with hematology, so unclear the significance of the leukocytosis

## 2023-05-01 NOTE — PROGRESS NOTE ADULT - PROBLEM SELECTOR PLAN 3
-Perforated sigmoid colon: s/p Ro's procedure   -Ostomy c/d/i; dark red/brown output with clots noted 4/28, s/p 4 units prbc with Hg 7.7 this AM  -neg c-diff, neg GI pcr  -ID ([facility]), recs appreciated  -completed course of broad spectrum Abx  -Continue Metoclopramide prn and PPI   -Dilaudid 1mg q6hr PRN and APAP 650mg q6hr for pain  -repeat CT with some intra-abdominal collections of unclear etiology and bowel thickening, pt not reporting abd pain/tenderness today   -surgery ([facility]us[facility]), recs appreciated  -No role for further surgical intervention at this time  -S&S reassessed on 4/28 and felt pt did better, but not well enough to be recommended for po intake -- still recommend NPO, but now recommend to perform a FEES study on Tuesday for the next reassessment

## 2023-05-02 NOTE — PROGRESS NOTE ADULT - NUTRITIONAL ASSESSMENT
This patient has been assessed with a concern for Malnutrition and has been determined to have a diagnosis/diagnoses of Severe protein-calorie malnutrition.    This patient is being managed with:   Diet NPO with Tube Feed-  Tube Feeding Modality: Nasogastric  Nepro with Carb Steady  Total Volume for 24 Hours (mL): 1000  Continuous  Starting Tube Feed Rate {mL per Hour}: 20  Increase Tube Feed Rate by (mL): 10     Every 6 hours  Until Goal Tube Feed Rate (mL per Hour): 50  Tube Feed Duration (in Hours): 20  Tube Feed Start Time: 06:00  Entered: May  1 2023  4:30PM    Diet NPO with Tube Feed-  Tube Feeding Modality: Nasogastric  Vital 1.5 Adalberto  Total Volume for 24 Hours (mL): 240  Continuous  Until Goal Tube Feed Rate (mL per Hour): 10  Tube Feed Duration (in Hours): 24  Tube Feed Start Time: 12:00  Entered: Apr 19 2023 12:13PM    The following pending diet order is being considered for treatment of Severe protein-calorie malnutrition:null

## 2023-05-02 NOTE — PROGRESS NOTE ADULT - PROBLEM SELECTOR PLAN 2
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  -S&S reassessed on 4/28 and felt pt did better, but not well enough to be recommended for po intake -- still recommend NPO, but now recommend to perform a FEES study earliest on Tuesday for the next reassessment as feel pt has higher chance to pass the swallow test -- f/up FEES  -pt with history of myeloproliferative disorder per discussion with hematology, so unclear the significance of the leukocytosis -a/w septic shock due to perforated sigmoid colon with polymicrobial peritonitis; s/p Ita procedure and abdominal abscess drainage on 4/13 w/ ICU stay requiring vasopressor support   -has stabilized, now only on midodrine 5mg q8hr -- taper off as able  -C-Diff and PCR negative  -ID ([facility]), recs appreciated  -completed course of broad spectrum Abx and prophylactic PO vanc  -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  -S&S reassessed on 4/28 and felt pt did better, but not well enough to be recommended for po intake -- still recommend NPO, but now recommend to perform a FEES study earliest on Tuesday for the next reassessment as feel pt has higher chance to pass the swallow test -- f/up FEES  -pt with history of myeloproliferative disorder per discussion with hematology, so unclear the significance of the leukocytosis -a/w septic shock due to perforated sigmoid colon with polymicrobial peritonitis; s/p Tia procedure and abdominal abscess drainage on 4/13 w/ ICU stay requiring vasopressor support   -has stabilized, now only on midodrine 5mg q8hr -- taper off as able  -C-Diff and PCR negative  -ID ([facility]), recs appreciated  -completed course of broad spectrum Abx and prophylactic PO vanc  -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  -S&S reassessed on 4/28 and felt pt did better, but not well enough to be recommended for po intake -- still recommend NPO, but now recommend to perform a FEES study as feel pt has higher chance to pass the swallow test given improved mentation -- f/up FEES  -pt with history of myeloproliferative disorder per discussion with hematology, so unclear the significance of the leukocytosis

## 2023-05-02 NOTE — PROGRESS NOTE ADULT - SUBJECTIVE AND OBJECTIVE BOX
[facility]  Yaw Tolbert PA-C  [address] [place] [address]  [phone]      INTERVAL HPI/OVERNIGHT EVENTS:  Pt s/e  Reports no new GI events    MEDICATIONS  (STANDING):  albuterol/ipratropium for Nebulization 3 milliLiter(s) Nebulizer every 6 hours  buDESOnide    Inhalation Suspension 0.5 milliGRAM(s) Inhalation every 12 hours  escitalopram 20 milliGRAM(s) Oral daily  levothyroxine Injectable 93.75 MICROGram(s) IV Push <User Schedule>  midodrine. 5 milliGRAM(s) Oral three times a day  pantoprazole  Injectable 40 milliGRAM(s) IV Push two times a day  potassium chloride   Powder 20 milliEquivalent(s) Enteral Tube once    MEDICATIONS  (PRN):  acetaminophen     Tablet .. 650 milliGRAM(s) Oral every 6 hours PRN Moderate Pain (4 - 6)  albuterol    90 MICROgram(s) HFA Inhaler 4 Puff(s) Inhalation every 6 hours PRN Shortness of Breath and/or Wheezing  HYDROmorphone   Tablet 1 milliGRAM(s) Oral every 6 hours PRN Severe Pain (7 - 10)  metoclopramide Injectable 5 milliGRAM(s) IV Push every 6 hours PRN nausea and/or vomiting  sodium chloride 0.9% lock flush 10 milliLiter(s) IV Push every 1 hour PRN Pre/post blood products, medications, blood draw, and to maintain line patency      Allergies    No Known Allergies      PHYSICAL EXAM:   Vital Signs:  Vital Signs Last 24 Hrs  T(C): 36.6 (02 May 2023 04:34), Max: 37.1 (01 May 2023 20:59)  T(F): 97.8 (02 May 2023 04:34), Max: 98.7 (01 May 2023 20:59)  HR: 74 (02 May 2023 07:35) (69 - 93)  BP: 125/58 (02 May 2023 04:34) (125/58 - 136/66)  BP(mean): --  RR: 17 (02 May 2023 04:34) (17 - 17)  SpO2: 96% (02 May 2023 07:35) (96% - 100%)    Parameters below as of 02 May 2023 07:35  Patient On (Oxygen Delivery Method): nasal cannula      Daily     Daily Weight in k.5 (02 May 2023 04:34)    GENERAL:  Appears stated age  HEENT:  NC/AT  CHEST:  Full & symmetric excursion  HEART:  Regular rhythm  ABDOMEN:  Soft, non-tender, non-distended, +ostomy with brown output  EXTEREMITIES:  no cyanosis  SKIN:  No rash  NEURO:  Alert      LABS:                        9.4    29.42 )-----------( 569      ( 02 May 2023 07:25 )             30.3     05-02    146<H>  |  115<H>  |  53<H>  ----------------------------<  118<H>  3.5   |  25  |  2.50<H>    Ca    8.0<L>      02 May 2023 07:25  Phos  3.8     05-02  Mg     1.9         TPro  5.7<L>  /  Alb  2.0<L>  /  TBili  0.4  /  DBili  x   /  AST  13<L>  /  ALT  8<L>  /  AlkPhos  70  0502

## 2023-05-02 NOTE — PROGRESS NOTE ADULT - ASSESSMENT
IMPRESSION    72y Female admitted with Enterocolitis due to Clostridium difficile  S/P extended Tia's for perforated colon    Patient with history myeloproliferative on agrylin (being treated by Dr. Rice) admitted with abd pain, weakness and falls at home a/w severe sepsis and peritonitis due to perforated sigmoid colon, also with LAYA due to ATN, shock liver, and post/op acute respiratory failure requiring mechanical ventilation, and acute blood loss anemia. pt is off agrylin.  had a period of thrombocytopenia now resolved with current platelet count 416K  Course also complicated by diarrhea which apparently was present prior to admission.  Has hx of ?pulmonary carcinoid with nodules being followed. however recent PET/CT dotatate negative.      hx MGUS  Weak IgG Kappa Band Identified. (08-16-22 @ 04:55)    Bloody BM/ostomy output 04/28/23  ostomy outpt slight blood tinge to brown stool. [04/30/23]  s/p multriple unites PRBC yesterday and day before.   Hgb stable.   1U PRBC 04/28  3IU PRBC 04/29  1U PRBC 04/30    Cr continues to improve.     NGT in place.     RECOMMENDATIONS> :      -no documented malignancy  -anemia, leukocytosis and thrombocytosis due to bloody diarrhea, inflammation, C dif, acute illness, poor resopnse during stress  -recommend continue supportive and symptoamtic care from Heme standpoint  -no acute heme intervention needed for present level of CBC    will sign off for now, please call if any questions ambulatory

## 2023-05-02 NOTE — PROGRESS NOTE ADULT - SUBJECTIVE AND OBJECTIVE BOX
Patient is a 72y old  Female who presents with a chief complaint of intra-abdominal perforation (20 Apr 2023 11:52)    Patient seen in follow up for LAYA on CKD.        PAST MEDICAL HISTORY:  Hypothyroidism    HLD (hyperlipidemia)    Thrombocytosis    Persistent atrial fibrillation    Obesity (BMI 35.0-39.9 without comorbidity)    Asthma    Diabetes mellitus      MEDICATIONS  (STANDING):  albuterol/ipratropium for Nebulization 3 milliLiter(s) Nebulizer every 6 hours  buDESOnide    Inhalation Suspension 0.5 milliGRAM(s) Inhalation every 12 hours  escitalopram 20 milliGRAM(s) Oral daily  levothyroxine Injectable 93.75 MICROGram(s) IV Push <User Schedule>  midodrine. 5 milliGRAM(s) Oral three times a day  pantoprazole  Injectable 40 milliGRAM(s) IV Push two times a day  potassium chloride   Powder 20 milliEquivalent(s) Enteral Tube once  vancomycin    Solution 125 milliGRAM(s) Oral daily    MEDICATIONS  (PRN):  acetaminophen     Tablet .. 650 milliGRAM(s) Oral every 6 hours PRN Moderate Pain (4 - 6)  albuterol    90 MICROgram(s) HFA Inhaler 4 Puff(s) Inhalation every 6 hours PRN Shortness of Breath and/or Wheezing  HYDROmorphone   Tablet 1 milliGRAM(s) Oral every 6 hours PRN Severe Pain (7 - 10)  metoclopramide Injectable 5 milliGRAM(s) IV Push every 6 hours PRN nausea and/or vomiting  sodium chloride 0.9% lock flush 10 milliLiter(s) IV Push every 1 hour PRN Pre/post blood products, medications, blood draw, and to maintain line patency    T(C): 36.6 (05-02-23 @ 04:34), Max: 37.2 (05-01-23 @ 04:48)  HR: 74 (05-02-23 @ 07:35) (69 - 93)  BP: 125/58 (05-02-23 @ 04:34) (102/69 - 136/66)  RR: 17 (05-02-23 @ 04:34)  SpO2: 96% (05-02-23 @ 07:35)  Wt(kg): --  I&O's Detail    01 May 2023 07:01  -  02 May 2023 07:00  --------------------------------------------------------  IN:  Total IN: 0 mL    OUT:    Bulb (mL): 15 mL    Colostomy (mL): 600 mL  Total OUT: 615 mL    Total NET: -615 mL                    PHYSICAL EXAM:  General: No distress  Respiratory: b/l air entry  Cardiovascular: S1 S2  Gastrointestinal: soft, s/p surgery  Extremities:  edema                       LABORATORY:                        9.4    29.42 )-----------( 569      ( 02 May 2023 07:25 )             30.3     05-02    146<H>  |  115<H>  |  53<H>  ----------------------------<  118<H>  3.5   |  25  |  2.50<H>    Ca    8.0<L>      02 May 2023 07:25  Phos  3.8     05-02  Mg     1.9     05-02    TPro  5.7<L>  /  Alb  2.0<L>  /  TBili  0.4  /  DBili  x   /  AST  13<L>  /  ALT  8<L>  /  AlkPhos  70  05-02    Sodium, Serum: 146 mmol/L (05-02 @ 07:25)  Sodium, Serum: 146 mmol/L (05-01 @ 07:45)    Potassium, Serum: 3.5 mmol/L (05-02 @ 07:25)  Potassium, Serum: 3.5 mmol/L (05-01 @ 07:45)    Hemoglobin: 9.4 g/dL (05-02 @ 07:25)  Hemoglobin: 8.6 g/dL (05-01 @ 07:45)  Hemoglobin: 8.8 g/dL (04-30 @ 18:35)  Hemoglobin: 7.7 g/dL (04-30 @ 07:53)    Creatinine, Serum 2.50 (05-02 @ 07:25)  Creatinine, Serum 2.70 (05-01 @ 07:45)  Creatinine, Serum 2.80 (04-30 @ 07:53)        LIVER FUNCTIONS - ( 02 May 2023 07:25 )  Alb: 2.0 g/dL / Pro: 5.7 g/dL / ALK PHOS: 70 U/L / ALT: 8 U/L / AST: 13 U/L / GGT: x

## 2023-05-02 NOTE — PROGRESS NOTE ADULT - TIME BILLING
Pt seen + examined. Case discussed with resident. Agree with assessment and plan above (edited by me in detail):  Time spent: 50min. More than 50% of the visit was spent counseling the patient and pt's son on medical condition -- severe sepsis and peritonitis due to perforated sigmoid colon, LAYA due to ATN, shock liver, and post/op acute respiratory failure requiring mechanical ventilation, and acute blood loss anemia, dysphagia, FEES study, severe anemia, PRBC, acute GIB.    71yo F w/ PMH of A-Fib on rivaroxaban, HFpEF, recent C diff infection (Jan 2023), myeloproliferative disorder with thrombocytosis on anagrelide, hypothyroidism, asthma, and lung nodules s/p resection (reportedly malignant) who presents with perforated sigmoid colon with acute bacterial peritonitis, LAYA due to ATN, septic shock, lactic acidosis, shock liver;  post/op acute respiratory failure requiring mechanical ventilation, and acute blood loss anemia; s/p extended Tia's procedure including descending colon on 4/13. She was extubated 4/19 and tolerating supplemental oxygen via nasal cannula, overall gradual improvement, but complicated by critical illness polyneuropathy, and further c/b acute GIB.    -s/p blood loss anemia due to acute perforated bowel and extensive abdominal surgery early in the admission requiring 2un PRBC  -Pt now s/p additional 5 units PRBC with new GIB   -ostomy output is now brown and pt had appropriate response of Hgb after last unit from 7.7 to 8.8. Hg improved to 9.4 this morning  -retacrit per nephro for renal failure  -serial CBCs, transfuse as needed with goal Hgb >8 now given severe bleeding pt had  -NGT in place, discussed with GI/surgery - tube feeds resumed as bleeding has resolved  -GI ([facility]), recs appreciated -- if pt rebleeds, may need further imaging and/or IR involvement -- performing CTA has significant risks as pt is recovering from severe ATN from septic shock and still has significantly impaired renal function; if needs imaging, will pursue NM bleeding scan    -a/w septic shock due to perforated sigmoid colon with polymicrobial peritonitis; s/p Tia procedure and abdominal abscess drainage on 4/13 w/ ICU stay requiring vasopressor support   -has stabilized, now only on midodrine 5mg q8hr -- taper off as able  -C-Diff and PCR negative  -ID ([facility]), recs appreciated  -completed course of broad spectrum Abx and prophylactic PO vanc  -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  -S&S reassessed on 4/28 and felt pt did better, but not well enough to be recommended for po intake -- still recommend NPO, but now recommend to perform a FEES study as feel pt has higher chance to pass the swallow test given improved mentation -- f/up FEES  -pt with history of myeloproliferative disorder per discussion with hematology, so unclear the significance of the leukocytosis    -had been on octreotide given history of carcinoid with increased ostomy output -- per heme/onc last PET CT without evidence of carcinoid tumor and recommend trial off the octreotide -- discontinued on 4/27    LAYA likely ATN due to septic shock; BUN/Cr downtrending to 53/2.5 this AM   S/p bicarb gtt for metabolic acidosis  s/p diuresis with metolazone and bumetanide- held due to rising creatinine  monitor off diuretics  nephro ([facility]), recs appreciated    Disp: if passes FEES study and able to take nutrition/hydration by mouth as opposed to NGT, will begin disposition process

## 2023-05-02 NOTE — PROGRESS NOTE ADULT - ASSESSMENT
71 y/o female POD#19 s/p extended Tia's for perforated sigmoid diverticulitis. Failed S&S - NGT replaced 4/24, for TF, post-op course c/b acute GI bleed, s/p 4u pRBC over the weekend.     PLAN:  - NPO, IVF; TF restarted; awaiting swallow eval  - Daily labs - trend H&H, transfuse as needed  - Monitor ostomy output & care; record drain output  - Daily midline drsg changes  - GI consulted - pt may need additional imaging if bleeding persists, per med - NM scan if necessary  - Above to be discussed with Dr. Goldberg    Surgical Team  Spectralink: [id number]

## 2023-05-02 NOTE — PROGRESS NOTE ADULT - SUBJECTIVE AND OBJECTIVE BOX
HPI:  71 y/o f w/ PMH of HTN, CHF, asthma, hypothyroidism, prior notes state A-fib on Xarelto however patient states she is not taking any anticoagulants, history of C. difficile January 2023 . Today patient presents with  transverse colostomy  stoma  red periwound mild erythema no air brown liquid in pouch,  abdominal incision has been  cared for by surgery today I was asked to see patient 10 staples removed 9 x 2.5 x 1 with bridge moderate serosanguinous drainage no odor, no induration, no fluctuance patient was  medicated for pain by RN 2. sacral region deep tissue pressure injury patient came in with stage 1 pressure injury now DTI being cared for by nursing with triad        PAST MEDICAL & SURGICAL HISTORY:  Hypothyroidism      HLD (hyperlipidemia)      Thrombocytosis      Persistent atrial fibrillation      Obesity (BMI 35.0-39.9 without comorbidity)      Asthma      Diabetes mellitus      Ankle injury  on 2004, 5 surgeries.      Cholecystitis      REVIEW OF SYSTEMS  Unable to obtain ROS  2/2 non-verbal status        MEDICATIONS  (STANDING):  albuterol/ipratropium for Nebulization 3 milliLiter(s) Nebulizer every 6 hours  buDESOnide    Inhalation Suspension 0.5 milliGRAM(s) Inhalation every 12 hours  escitalopram 20 milliGRAM(s) Oral daily  levothyroxine Injectable 93.75 MICROGram(s) IV Push <User Schedule>  midodrine. 5 milliGRAM(s) Oral three times a day  pantoprazole  Injectable 40 milliGRAM(s) IV Push two times a day  potassium chloride   Powder 20 milliEquivalent(s) Enteral Tube once  vancomycin    Solution 125 milliGRAM(s) Oral daily    MEDICATIONS  (PRN):  acetaminophen     Tablet .. 650 milliGRAM(s) Oral every 6 hours PRN Moderate Pain (4 - 6)  albuterol    90 MICROgram(s) HFA Inhaler 4 Puff(s) Inhalation every 6 hours PRN Shortness of Breath and/or Wheezing  HYDROmorphone   Tablet 1 milliGRAM(s) Oral every 6 hours PRN Severe Pain (7 - 10)  metoclopramide Injectable 5 milliGRAM(s) IV Push every 6 hours PRN nausea and/or vomiting  sodium chloride 0.9% lock flush 10 milliLiter(s) IV Push every 1 hour PRN Pre/post blood products, medications, blood draw, and to maintain line patency        Allergies    No Known Allergies    Intolerances        SOCIAL HISTORY:  Lives with son    FAMILY HISTORY:  Family history of early CAD (Father)    Family history of early CAD (Mother)        Vital Signs Last 24 Hrs  T(C): 36.6 (02 May 2023 04:34), Max: 37.1 (01 May 2023 20:59)  T(F): 97.8 (02 May 2023 04:34), Max: 98.7 (01 May 2023 20:59)  HR: 74 (02 May 2023 07:35) (69 - 93)  BP: 125/58 (02 May 2023 04:34) (125/58 - 136/66)  BP(mean): --  RR: 17 (02 May 2023 04:34) (17 - 17)  SpO2: 96% (02 May 2023 07:35) (96% - 100%)    Parameters below as of 02 May 2023 07:35  Patient On (Oxygen Delivery Method): nasal cannula                                                 9.4    29.42 )-----------( 569      ( 02 May 2023 07:25 )             30.3   Historical Values  WBC Count: 29.42 K/uL (05.02.23 @ 07:25)   WBC Count: 23.36 K/uL (05.01.23 @ 07:45)   WBC Count: 24.25 K/uL (04.30.23 @ 07:53)   WBC Count: 32.29 K/uL (04.29.23 @ 16:30)           PHYSICAL EXAM:    General: resting comfortably in bed; intubated  ENT: no nasal discharge; hearing intact  Neck: intubated  Extremities: no clubbing or cyanosis;: no gross deformities   Dermatologic:  transverse colostomy  stoma dusky red  periwound mild erythema, no air, brown liquid in pouch, 2. Abdominal wound 10 staples removed 9 x 2.5 x 1 with bridge moderate serosanguinous drainage no odor, no induration, no fluctuance patient was  medicated for pain by RN 2. sacral region deep tissue pressure injury being cared for by nursing with triad  Neurologic: Intubated  Musculoskeletal/Vascular: no deformities/ contractures

## 2023-05-02 NOTE — PROGRESS NOTE ADULT - SUBJECTIVE AND OBJECTIVE BOX
All interim records and events noted.    resting in bed, comfortable      MEDICATIONS  (STANDING):  albuterol/ipratropium for Nebulization 3 milliLiter(s) Nebulizer every 6 hours  buDESOnide    Inhalation Suspension 0.5 milliGRAM(s) Inhalation every 12 hours  escitalopram 20 milliGRAM(s) Oral daily  levothyroxine Injectable 93.75 MICROGram(s) IV Push <User Schedule>  midodrine. 5 milliGRAM(s) Oral three times a day  pantoprazole  Injectable 40 milliGRAM(s) IV Push two times a day  potassium chloride   Powder 20 milliEquivalent(s) Enteral Tube once  vancomycin    Solution 125 milliGRAM(s) Oral daily    MEDICATIONS  (PRN):  acetaminophen     Tablet .. 650 milliGRAM(s) Oral every 6 hours PRN Moderate Pain (4 - 6)  albuterol    90 MICROgram(s) HFA Inhaler 4 Puff(s) Inhalation every 6 hours PRN Shortness of Breath and/or Wheezing  HYDROmorphone   Tablet 1 milliGRAM(s) Oral every 6 hours PRN Severe Pain (7 - 10)  metoclopramide Injectable 5 milliGRAM(s) IV Push every 6 hours PRN nausea and/or vomiting  sodium chloride 0.9% lock flush 10 milliLiter(s) IV Push every 1 hour PRN Pre/post blood products, medications, blood draw, and to maintain line patency      Vital Signs Last 24 Hrs  T(C): 36.6 (02 May 2023 04:34), Max: 37.1 (01 May 2023 20:59)  T(F): 97.8 (02 May 2023 04:34), Max: 98.7 (01 May 2023 20:59)  HR: 74 (02 May 2023 07:35) (69 - 93)  BP: 125/58 (02 May 2023 04:34) (125/58 - 136/66)  BP(mean): --  RR: 17 (02 May 2023 04:34) (17 - 17)  SpO2: 96% (02 May 2023 07:35) (96% - 100%)    Parameters below as of 02 May 2023 07:35  Patient On (Oxygen Delivery Method): nasal cannula        PHYSICAL EXAM  General: we, in no acute distress  Head: atraumatic, normocephalic  ENT: nose midline  Neck: supple, trachea midline  CV: S1 S2, regular rate and rhythm  Lungs: clear to auscultation, no wheezes/rhonchi  Abdomen: soft, nontender, bowel sounds present, no palpable masses  Skin: no significant increased ecchymosis/petechiae        LABS:             9.4    29.42 )-----------( 569      ( 05-02 @ 07:25 )             30.3                8.6    23.36 )-----------( 430      ( 05-01 @ 07:45 )             28.0                8.8    x     )-----------( x        ( 04-30 @ 18:35 )             27.9                7.7    24.25 )-----------( 369      ( 04-30 @ 07:53 )             24.7                7.8    32.29 )-----------( 426      ( 04-29 @ 16:30 )             25.1       05-02    146<H>  |  115<H>  |  53<H>  ----------------------------<  118<H>  3.5   |  25  |  2.50<H>    Ca    8.0<L>      02 May 2023 07:25  Phos  3.8     05-02  Mg     1.9     05-02    TPro  5.7<L>  /  Alb  2.0<L>  /  TBili  0.4  /  DBili  x   /  AST  13<L>  /  ALT  8<L>  /  AlkPhos  70  05-02 04-29 @ 01:46  PT13.7 INR1.17  PTT33.2      RADIOLOGY & ADDITIONAL STUDIES:    IMPRESSION/RECOMMENDATIONS:

## 2023-05-02 NOTE — PROGRESS NOTE ADULT - NUTRITIONAL ASSESSMENT
Diet, NPO with Tube Feed:   Tube Feeding Modality: Nasogastric  Nepro with Carb Steady  Total Volume for 24 Hours (mL): 1000  Continuous  Starting Tube Feed Rate {mL per Hour}: 20  Increase Tube Feed Rate by (mL): 10     Every 6 hours  Until Goal Tube Feed Rate (mL per Hour): 50  Tube Feed Duration (in Hours): 20  Tube Feed Start Time: 06:00 (05-01-23 @ 16:31) [Active]  Diet, NPO with Tube Feed:   Tube Feeding Modality: Nasogastric  Vital 1.5 Adalberto  Total Volume for 24 Hours (mL): 240  Continuous  Until Goal Tube Feed Rate (mL per Hour): 10  Tube Feed Duration (in Hours): 24  Tube Feed Start Time: 12:00 (04-19-23 @ 12:13) [Pending Verification By Attending]

## 2023-05-02 NOTE — PROGRESS NOTE ADULT - ASSESSMENT
Patient presents with   1.  transverse colostomy  stoma  red 1.5 x 1 periwound mild erythema no air brown liquid in pouch,  2.  Abdominal wound 10 staples removed 9 x 2.5 x 1 with bridge moderate serosanguinous drainage no odor, no induration, no fluctuance  Recommendation  NPWT white and black foam  3.sacral region deep tissue pressure injury being cared for by nursing with triad  recommendation: continue  -Triad BID and PRN  This pressure injury is community acquired /YES  At risk for altered tissue perfusion /YES  Impaired perfusion of peripheral tissue /YES  Continue  Nutrition (as tolerated)  Continue  Offloading   Continue Pericare  Apply cair boots at all times while in bed.   Provide skin checks and foot placement q8h.  Care as per medicine will follow w/ you   Findings and recommendations discussed with RN Shannan diallo   Thank you for this consult  Argelia Cruz NP, [facility] [phone]

## 2023-05-02 NOTE — PROGRESS NOTE ADULT - PROBLEM SELECTOR PLAN 1
-had acute blood loss anemia due to acute perforated bowel and extensive abdominal surgery early in the admission requiring 2un PRBC  -Pt now s/p additional 5 units PRBC with new GIB   -ostomy output is now brown and pt had appropriate response of Hgb after last unit from 7.7 to 8.8. Hg improved to 9.4 this morning  -retacrit per nephro for renal failure  -serial CBCs, transfuse as needed with goal Hgb >8 now given severe bleeding pt had  -NGT in place, discussed with GI/surgery - tube feeds resumed as bleeding has resolved  -GI ([facility]), recs appreciated -- if pt rebleeds, may need further imaging and/or IR involvement -- performing CTA has significant risks as pt is recovering from severe ATN from septic shock and still has significantly impaired renal function; if needs imaging, will pursue NM bleeding scan -s/p blood loss anemia due to acute perforated bowel and extensive abdominal surgery early in the admission requiring 2un PRBC  -Pt now s/p additional 5 units PRBC with new GIB   -ostomy output is now brown and pt had appropriate response of Hgb after last unit from 7.7 to 8.8. Hg improved to 9.4 this morning  -retacrit per nephro for renal failure  -serial CBCs, transfuse as needed with goal Hgb >8 now given severe bleeding pt had  -NGT in place, discussed with GI/surgery - tube feeds resumed as bleeding has resolved  -GI ([facility]), recs appreciated -- if pt rebleeds, may need further imaging and/or IR involvement -- performing CTA has significant risks as pt is recovering from severe ATN from septic shock and still has significantly impaired renal function; if needs imaging, will pursue NM bleeding scan

## 2023-05-02 NOTE — PROGRESS NOTE ADULT - PROBLEM SELECTOR PLAN 4
Acute respiratory failure with hypoxia extubated 4/19   Will encourage incentive spirometry  chest PT as tolerated  c/w nebs and budesonide 0.5 mg nebs q12h (on Trelegy Ellipta inhaler at home)  has some coarse breath sounds on right today -- will check repeat CXR Acute respiratory failure with hypoxia extubated 4/19   Will encourage incentive spirometry  chest PT as tolerated  c/w nebs and budesonide 0.5 mg nebs q12h (on Trelegy Ellipta inhaler at home)  repeat CXR on 4/30 stable

## 2023-05-02 NOTE — PHARMACOTHERAPY INTERVENTION NOTE - INTERVENTION TYPE RECOOMEND
IV to PO
Therapy Recommended - Drug indicated but not ordered
Therapy Discontinuation Recommended - No indication

## 2023-05-02 NOTE — SWALLOW BEDSIDE ASSESSMENT ADULT - COMMENTS
Chart reviewed, new order received for swallow eval and FEES study.  Pt received sleeping in bed, +NGT, +O2NC, +baseline congested cough, CNA and RN Shannan & Pamela present, pain scale 0/10 pre & post assessment via non verbal pain scale.  Pt unarousable despite max modality cues, RN's reported pt was A&A earlier in the morning, received pain meds at 1106, and suspecting may be contributing to current level of arousal.  Due to current presentation, unable to complete swallow eval at this time and unable to assess candidacy for FEES.  Pt left as received NAD with RN's present, Dr. Mcgill made aware.  Will follow to reattempt.

## 2023-05-02 NOTE — PROGRESS NOTE ADULT - SUBJECTIVE AND OBJECTIVE BOX
[facility] -- Adrián Wray Pannella, Patel, Savella, Goodger: Office # [phone]    Follow Up:  Afib s/p ablation, Cardiac Optimization    Subjective/Observations: Pt seen and examined, Patient awake, alert, unable to provide meaningful information at this time, NGT in place, tube feed and IVF infusing, b/l mittens in place,     REVIEW OF SYSTEMS: All other review of systems are negative unless indicated above    PAST MEDICAL & SURGICAL HISTORY:  Hypothyroidism      Hypothyroidism      HLD (hyperlipidemia)      Thrombocytosis      Persistent atrial fibrillation      Obesity (BMI 35.0-39.9 without comorbidity)      Asthma      Diabetes mellitus      Ankle injury  on 2004, 5 surgeries.      Cholecystitis    MEDICATIONS  (STANDING):  albuterol/ipratropium for Nebulization 3 milliLiter(s) Nebulizer every 6 hours  buDESOnide    Inhalation Suspension 0.5 milliGRAM(s) Inhalation every 12 hours  escitalopram 20 milliGRAM(s) Oral daily  levothyroxine Injectable 93.75 MICROGram(s) IV Push <User Schedule>  midodrine. 5 milliGRAM(s) Oral three times a day  pantoprazole  Injectable 40 milliGRAM(s) IV Push two times a day  potassium chloride   Powder 20 milliEquivalent(s) Enteral Tube once  vancomycin    Solution 125 milliGRAM(s) Oral daily    MEDICATIONS  (PRN):  acetaminophen     Tablet .. 650 milliGRAM(s) Oral every 6 hours PRN Moderate Pain (4 - 6)  albuterol    90 MICROgram(s) HFA Inhaler 4 Puff(s) Inhalation every 6 hours PRN Shortness of Breath and/or Wheezing  HYDROmorphone   Tablet 1 milliGRAM(s) Oral every 6 hours PRN Severe Pain (7 - 10)  metoclopramide Injectable 5 milliGRAM(s) IV Push every 6 hours PRN nausea and/or vomiting  sodium chloride 0.9% lock flush 10 milliLiter(s) IV Push every 1 hour PRN Pre/post blood products, medications, blood draw, and to maintain line patency    Allergies  No Known Allergies    Vital Signs Last 24 Hrs  T(C): 36.6 (02 May 2023 04:34), Max: 37.1 (01 May 2023 20:59)  T(F): 97.8 (02 May 2023 04:34), Max: 98.7 (01 May 2023 20:59)  HR: 74 (02 May 2023 07:35) (69 - 93)  BP: 125/58 (02 May 2023 04:34) (125/58 - 136/66)  BP(mean): --  RR: 17 (02 May 2023 04:34) (17 - 17)  SpO2: 96% (02 May 2023 07:35) (96% - 100%)    Parameters below as of 02 May 2023 07:35  Patient On (Oxygen Delivery Method): nasal cannula      I&O's Summary    01 May 2023 07:01  -  02 May 2023 07:00  --------------------------------------------------------  IN: 0 mL / OUT: 615 mL / NET: -615 mL        TELE: Not on telemetry   PHYSICAL EXAM:  Constitutional: NAD, awake and alert  HEENT: Moist Mucous Membranes, Anicteric  Pulmonary: Non-labored, breath sounds are clear bilaterally, No wheezing, rales or rhonchi  Cardiovascular: Regular, S1 and S2, + murmurs, No rubs, gallops or clicks  Gastrointestinal:  soft, nontender, nondistended + NGT, + GI ostomy  Lymph: No peripheral edema. No lymphadenopathy.   Skin: No visible rashes or ulcers.  Psych:  Mood & affect appropriate        LABS: All Labs Reviewed:                        9.4    29.42 )-----------( 569      ( 02 May 2023 07:25 )             30.3                         8.6    23.36 )-----------( 430      ( 01 May 2023 07:45 )             28.0                         8.8    x     )-----------( x        ( 30 Apr 2023 18:35 )             27.9     02 May 2023 07:25    146    |  115    |  53     ----------------------------<  118    3.5     |  25     |  2.50   01 May 2023 07:45    146    |  114    |  61     ----------------------------<  83     3.5     |  26     |  2.70   30 Apr 2023 07:53    144    |  113    |  74     ----------------------------<  98     3.8     |  27     |  2.80     Ca    8.0        02 May 2023 07:25  Ca    8.0        01 May 2023 07:45  Ca    8.1        30 Apr 2023 07:53  Phos  3.8       02 May 2023 07:25  Mg     1.9       02 May 2023 07:25    TPro  5.7    /  Alb  2.0    /  TBili  0.4    /  DBili  x      /  AST  13     /  ALT  8      /  AlkPhos  70     02 May 2023 07:25  TPro  5.2    /  Alb  1.9    /  TBili  0.4    /  DBili  x      /  AST  12     /  ALT  8      /  AlkPhos  65     01 May 2023 07:45  TPro  4.9    /  Alb  1.8    /  TBili  0.5    /  DBili  x      /  AST  9      /  ALT  9      /  AlkPhos  56     30 Apr 2023 07:53   LIVER FUNCTIONS - ( 02 May 2023 07:25 )  Alb: 2.0 g/dL / Pro: 5.7 g/dL / ALK PHOS: 70 U/L / ALT: 8 U/L / AST: 13 U/L / GGT: x             12 Lead ECG:   Ventricular Rate 82 BPM    Atrial Rate 82 BPM    P-R Interval 160 ms    QRS Duration 84 ms    Q-T Interval 412 ms    QTC Calculation(Bazett) 481 ms    P Axis 68 degrees    R Axis 13 degrees    T Axis 4 degrees    Diagnosis Line Normal sinus rhythm  Right atrial enlargement  Borderline ECG  When compared with ECG of 22-APR-2023 09:43,  aberrant conduction is no longer present  Confirmed by PAULINE LARSEN ([id number]) on 4/24/2023 4:48:07 PM (04-24-23 @ 13:13)      ACC: [id number] EXAM:  ECHO TTE WO CON COMP W DOPP   ORDERED BY: RAFY WILSON     PROCEDURE DATE:  04/14/2023    INTERPRETATION:  INDICATION: Heart failure  Sonographer KL    Blood Pressure unavailable    Height 167.6 cm     Weight 60 kgBSA   1.8 sq m    Dimensions:  LA 4.0       Normal Values: 2.0 - 4.0 cm  Ao 3.4        Normal Values: 2.0 - 3.8 cm  SEPTUM 0.9       Normal Values: 0.6 - 1.2 cm  PWT 0.8       Normal Values: 0.6 - 1.1 cm  LVIDd 4.2         Normal Values: 3.0 - 5.6 cm  LVIDs 2.9         Normal Values: 1.8 - 4.0 cm      OBSERVATIONS:  Mitral Valve: Mild to moderate MR.  Aortic Valve/Aorta: Sclerotic trileaflet aortic valve with grossly normal   opening. Trace AI  Tricuspid Valve: Mild to moderate TR.  Pulmonic Valve: Mild PI  Left Atrium: Enlarged  Right Atrium: Not well-visualized  Left Ventricle: normal LV size and systolic function, estimated LVEF of   55-60%.  Right Ventricle: The right ventricle is enlarged with grossly normal   function  Pericardium: no significant pericardial effusion.  Pulmonary/RV Pressure: estimated PA systolic pressure of 66mmHg assuming   an RA pressure of 10 mmHg.  IVC measures 2.0 cm and is non collapsing  LV diastolic dysfunction is present  Bilateral pleural effusions    IMPRESSION:  Normal left ventricular internal dimensions and systolic function,   estimated LVEF of 55-60%.  The right ventricle is enlarged with grossly normal function  Left atrial enlargement  Sclerotic trileaflet aortic valve, trace AI.  Mild to moderate MR and TR.  Estimated PA systolic pressure of 66mmHg assuming an RA pressure of 10   mmHg.  IVC measures 2.0 cm and is non collapsing    --- End of Report ---    NEYMAR ROWLAND MD; Attending Cardiologist  This document has been electronically signed. Apr 15 2023 10:52AM

## 2023-05-02 NOTE — PROGRESS NOTE ADULT - ASSESSMENT
73yo F w/ PMH of A-Fib on rivaroxaban, HFpEF, recent C diff infection (Jan 2023), myeloproliferative disorder with thrombocytosis on anagrelide, hypothyroidism, asthma, and lung nodules s/p resection (reportedly malignant) who presents with perforated sigmoid colon with acute bacterial peritonitis, LAYA due to ATN, septic shock, lactic acidosis, shock liver;  post/op acute respiratory failure requiring mechanical ventilation, and acute blood loss anemia; s/p extended Tia's procedure including descending colon on 4/13. She was extubated 4/19 and tolerating supplemental oxygen via nasal cannula, overall gradual improvement, but complicated by critical illness polyneuropathy, and further c/b acute GIB.

## 2023-05-02 NOTE — PHARMACOTHERAPY INTERVENTION NOTE - COMMENTS
Patient is on vancomycin PO 125mg QD for c diff ppx. Discussed w/ Dr. Mcgill that per ID continue until 4/28 and recommended discontinuation if no other indication. MD accepted and order was discontinued.

## 2023-05-02 NOTE — PROGRESS NOTE ADULT - ASSESSMENT
LAYA on CKD: Septic/hemodynamic ATN  Sigmoid perforation, s/p surgery  Anemia  s/p Shock, Sepsis    Improving renal indices. On tube feeds. Will d/c IVF. Free water with tube feeds. Potassium supplementation.   To continue current meds. Monitor h/h trend. Transfuse PRBC's PRN.   Avoid nephrotoxic meds as possible. Will follow electrolytes and renal function trend. Surgery follow up.

## 2023-05-02 NOTE — PROGRESS NOTE ADULT - ATTENDING COMMENTS
72F PMH A-Fib on rivaroxaban, HFpEF, recent C diff infection (Jan 2023), myelofibrosis with thrombocytosis on anagrelide, hypothyroidism, asthma, and lung nodules s/p resection (reportedly malignant) who presents with perforated sigmoid colon with acute bacterial peritonitis, septic shock, lactic acidosis, and LAYA 2/2 ATN, s/p extended Tia's including descending colon on 4/13. Extubated 4/19.    1. Neuro: remains off dexmedetomidine. AAOx3, following commands. PT eval, out of bed to chair. Restart escitalopram  2. CV: resolved shock, off pressors, d/c a-line and TLC. Continue midodrine 10 mg q8h. Diuresis with metolazone and bumetanide  3. Pulm: doing well on room air with SpO2>92%. Chest PT  4. GI: improved n/v with metoclopramide, decrease dose to 5 mg IV q6h. Increase Nepro tube feeds to 20mL/hour, increase slowly to goal rate of 45 mL/hour. CT A/P on 4/20 with diffuse gastric wall thickening. S/p left hemicolectomy with rectosigmoid stump and RLQ colostomy. Diffuse small bowel and colonic wall thickening. Enteric contrast transits through the colon to the ostomy, without bowel obstruction. Small-moderate abdominal and pelvic ascites. Diffuse mesenteric stranding. No localized fluid collection. Small droplet of free intraperitoneal air, likely post-operative. Mild presacral stranding. C diff negative. Monitor drain and colostomy output. Start octreotide given history of carcinoid with increased ostomy output. Start psyllium to bulk stool. Change PPI to po (hold home famotidine given LAYA). Resolved transaminitis. S&S eval  5. Renal: LAYA 2/2 ATN, diuresis with metolazone + bumetanide. Strict I/O's, close monitoring of kidney function and lytes. Follow-up renal. Does not require dialysis at this time  6. ID: polymicrobial peritonitis, including ESBL Klebsiella, Morganella, E. coli, Strep constellatus, and Bacteroides. All covered by meropenem. Continue vancomycin 125 mg po daily for C diff prophylaxis (C diff negative, but recent history in Jan 2023)  7. Heme: leukocytosis, possibly infectious vs. due to underlying myeloproliferative disorder with fibrosis. She is on anagrelide as outpatient, may need to restart as PLTs continue to rise. HSQ for DVT ppx. Heme/onc eval  8. Endo: continue synthroid  9. Dispo: full code, discussed with patient and son  CC time spent: 35 min
72F PMH A-Fib on rivaroxaban, HFpEF, recent C diff infection (Jan 2023), myelofibrosis with thrombocytosis on anagrelide, hypothyroidism, asthma, and lung nodules s/p resection (reportedly malignant) who presents with perforated sigmoid colon with acute bacterial peritonitis, septic shock, lactic acidosis, and ALYA 2/2 ATN, s/p extended Tia's including descending colon.     1. Neuro: slowly taper off dexmedetomidine. Fentanyl prn pain. Opens eyes and follows simple commands  2. CV: septic shock, continue norepinephrine for goal MAP>65. Continue IVF hydration while NPO  3. Pulm: continue lung protective ventilation. Daily SBT as tolerates. Did 5/5 for several hours today, but ABG with mild uncompensated hypercapnia. Will continue SBT daily  4. GI: keep NPO, NG tube to suction. Monitor drain and colostomy output. Improving transaminitis  5. Renal: LAYA 2/2 ATN, trial of albumin + bumetanide. Strict I/O's, close monitoring of kidney function and lytes  6. ID: polymicrobial peritonitis, including ESBL Klebsiella, Morganella, E. coli, Strep constellatus, and Bacteroides. All covered by meropenem. Start vancomycin 125 mg po daily for C diff prophylaxis  7. Heme: history of myeloproliferative disorder with fibrosis, on anagrelide as outpatient for associated thrombocytosis. Hold anagrelide. HSQ for DVT ppx  8. Endo: continue synthroid  9. Dispo: full code, discussed with jeannette Rene at bedside  CC time spent: 35 min
72F PMH A-Fib on rivaroxaban, HFpEF, recent C diff infection (Jan 2023), myelofibrosis with thrombocytosis on anagrelide, hypothyroidism, asthma, and lung nodules s/p resection (reportedly malignant) who presents with perforated sigmoid colon with acute bacterial peritonitis, septic shock, lactic acidosis, and LAYA 2/2 ATN, s/p extended Tia's including descending colon on 4/13.     1. Neuro: on low dose dexmedetomidine for comfort. Fentanyl prn pain. Opens eyes and following commands  2. CV: septic shock, continue norepinephrine for goal MAP>65. D/c IVF. Trial of albumin and bumetanide  3. Pulm: continue lung protective ventilation. Daily SBT as tolerates. Develops rapid shallow breathing. Continue daily trials  4. GI: start Nepro tube feeds. Monitor drain and colostomy output. Improving transaminitis  5. Renal: LAYA 2/2 ATN, trial of albumin + bumetanide. Strict I/O's, close monitoring of kidney function and lytes  6. ID: polymicrobial peritonitis, including ESBL Klebsiella, Morganella, E. coli, Strep constellatus, and Bacteroides. All covered by meropenem. Continue vancomycin 125 mg po daily for C diff prophylaxis  7. Heme: history of myeloproliferative disorder with fibrosis, on anagrelide as outpatient for associated thrombocytosis. Hold anagrelide. HSQ for DVT ppx  8. Endo: continue synthroid  9. Dispo: full code, discussed with patient and son Edgard at bedside  CC time spent: 35 min
72F PMH A-Fib on rivaroxaban, HFpEF, recent C diff infection (Jan 2023), myelofibrosis with thrombocytosis on anagrelide, hypothyroidism, asthma, and lung nodules s/p resection (reportedly malignant) who presents with perforated sigmoid colon with acute bacterial peritonitis, septic shock, lactic acidosis, and LAYA 2/2 ATN, s/p extended Tia's including descending colon on 4/13. Extubated 4/19.    1. Neuro: now off dexmedetomidine. Opens eyes and following commands. PT eval, out of bed to chair  2. CV: septic shock, wean of norepinephrine for goal MAP>65. Diuresis with albumin and bumetanide  3. Pulm: extubated 4/19. Doing well on NC@2 LPM, goal SpO2>90%. Chest PT  4. GI: developed vomiting overnight. Hold tube feeds. Start metoclopramide 10 mg IV q8h. CT A/P with diffuse gastric wall thickening. S/p left hemicolectomy with rectosigmoid stump and RLQ colostomy. Diffuse small bowel and colonic wall thickening. Enteric contrast transits through the colon to the ostomy, without bowel obstruction. Small-moderate abdominal and pelvic ascites. Diffuse mesenteric stranding. No localized fluid collection. Small droplet of free intraperitoneal air, likely post-operative. Mild presacral stranding. Monitor drain and colostomy output. Improving transaminitis. Eventually needs S&S eval  5. Renal: LAYA 2/2 ATN, continue with albumin + bumetanide. Strict I/O's, close monitoring of kidney function and lytes. Follow-up renal. Does not require dialysis at this time  6. ID: polymicrobial peritonitis, including ESBL Klebsiella, Morganella, E. coli, Strep constellatus, and Bacteroides. All covered by meropenem. Continue vancomycin 125 mg po daily for C diff prophylaxis  7. Heme: history of myeloproliferative disorder with fibrosis, on anagrelide as outpatient for associated thrombocytosis. Hold anagrelide. HSQ for DVT ppx  8. Endo: continue synthroid  9. Dispo: full code, discussed with patient at bedside  CC time spent: 35 min
72F PMH A-Fib on rivaroxaban, HFpEF, recent C diff infection (Jan 2023), myelofibrosis with thrombocytosis on anagrelide, hypothyroidism, asthma, and lung nodules s/p resection (reportedly malignant) who presents with perforated sigmoid colon with acute bacterial peritonitis, septic shock, lactic acidosis, and LAYA 2/2 ATN, s/p extended Tia's including descending colon on 4/13. Extubated 4/19.    1. Neuro: taper off dexmedetomidine. Opens eyes and following commands  2. CV: septic shock, continue norepinephrine for goal MAP>65. Diuresis with albumin and bumetanide  3. Pulm: passed SBT, extubated to NC@2-4 LPM, goal SpO2>90%. Follow-up post-extubation ABG. Keep BiPAP on standby. Chest PT  4. GI: increase Nepro tube feeds to 20mL/hour if respiratory status remains stable post-extubation. Monitor drain and colostomy output. Improving transaminitis  5. Renal: LAYA 2/2 ATN, continue with albumin + bumetanide. Renal evaluation. Strict I/O's, close monitoring of kidney function and lytes  6. ID: polymicrobial peritonitis, including ESBL Klebsiella, Morganella, E. coli, Strep constellatus, and Bacteroides. All covered by meropenem. Continue vancomycin 125 mg po daily for C diff prophylaxis  7. Heme: history of myeloproliferative disorder with fibrosis, on anagrelide as outpatient for associated thrombocytosis. Hold anagrelide. HSQ for DVT ppx  8. Endo: continue synthroid  9. Dispo: full code, discussed with patient and son Edgard at bedside  CC time spent: 35 min
72 year old female with history of atrial fibrillation, HFpEF, asthma, recent c dif infection 1/2023 who presented with a perforated sigmoid colon resulting in peritonitis, septic shock, lactic acidosis, and acute kidney injury. Now POD 2 following extended Tia's including descending colon for perforated sigmoid.    NEURO: Fentanyl PRN for pain. Transition from fentanyl gtt to Precedex to facilitate weaning.  CVS: sepsis with septic shock. Wean vasopressors.  PULM: Post-operative acute respiratory failure. Continue lung protective ventilation. PST as tolerates.  GI: Monitor drain and colostomy output. NPO. NGT to suction. Monitor transaminitis.  RENAL: Acute renal failure due to ATN. Monitor UOP.  ENDO: Levothyroxine for hypothyroidism.  ID: Zosyn and Flagyl. Follow up cultures.  PPX: PPI. SCDs.  Full code
72 year old female with history of atrial fibrillation, HFpEF, asthma, recent c dif infection 1/2023 who presented with a perforated sigmoid colon resulting in peritonitis, septic shock, lactic acidosis, and acute kidney injury. Now POD 3 following extended Tia's including descending colon for perforated sigmoid.    NEURO: Fentanyl PRN for pain. Continue Precedex gtt for sedation. More awake today.  CVS: Sepsis with septic shock. Wean vasopressors.  PULM: Post-operative acute respiratory failure. Continue lung protective ventilation. PST as tolerates.  GI: Monitor drain and colostomy output. NPO. NGT to suction. Monitor transaminitis.  RENAL: Acute renal failure due to ATN. Monitor UOP.  ENDO: Levothyroxine for hypothyroidism.  ID: Zosyn and Flagyl changed to meropenem based on sensitivities polymicrobial organisms from peritoneal culture.  PPX: PPI. SCDs.  Full code.
72F PMH A-Fib on rivaroxaban, HFpEF, recent C diff infection (Jan 2023), myelofibrosis with thrombocytosis on anagrelide, hypothyroidism, asthma, and lung nodules s/p resection (reportedly malignant) who presents with perforated sigmoid colon with acute bacterial peritonitis, septic shock, lactic acidosis, and LAYA 2/2 ATN, s/p extended Tia's including descending colon on 4/13. Extubated 4/19. Overall improved but with severe critical illness polyneuropathy and rising leukocytosis of unclear etiology.    1. Neuro: awake and alert, follows commands. Has severe critical illness polyneuropathy vs. hypoactive delirium. Physical therapy, out of bed to chair. Continue escitalopram  2. CV: HD stabe. Continue Continue midodrine 10 mg q8h. Diuresis with metolazone and bumetanide  3. Pulm: doing well on room air with SpO2>90%. Supplemental oxygen prn with NC@1-2 LPM. Chest PT. Start albuterol-ipratropium nebs q6h + budesonide 0.5 mg nebs q12h (on Trelegy Ellipta inhaler at home)  4. GI: resolved nausea/vomiting. Continue Nepro at goal rate of 45mL/hour. Metoclopramide prn. Continue PPI (hold famotidine given LAYA). Resolved transaminitis. S&S eval. Monitor drain and colostomy output. Continue octreotide given history of carcinoid with increased ostomy output. Psyllium to bulk stool. Start bismuth salicylate. If ostomy output remains high, will start cholestyramine or hyoscyamine. May also require loperamide. CT A/P on 4/20 with diffuse gastric wall thickening. S/p left hemicolectomy with rectosigmoid stump and RLQ colostomy. Diffuse small bowel and colonic wall thickening. Enteric contrast transits through the colon to the ostomy, without bowel obstruction. Small-moderate abdominal and pelvic ascites. Diffuse mesenteric stranding. No localized fluid collection. Small droplet of free intraperitoneal air, likely post-operative. Mild presacral stranding. C diff negative  5. Renal: LAYA 2/2 ATN, non-oliguric. Diuresis with metolazone + bumetanide. Strict I/O's, close monitoring of kidney function and lytes. Follow-up renal. Does not require dialysis at this time  6. ID: polymicrobial peritonitis, including ESBL Klebsiella, Morganella, E. coli, Strep constellatus, and Bacteroides. All covered by meropenem. Continue vancomycin 125 mg po daily for C diff prophylaxis (C diff negative, but recent history in Jan 2023). Given rising leukocytosis, will check blood cultures and nasal MRSA. Check UA, if positive will need to replace schroeder  7. Heme: leukocytosis, possibly infectious vs. due to underlying myeloproliferative disorder with fibrosis vs. possible leukemoid reaction. She is on anagrelide as outpatient, may need to restart if PLTs rise further, currently stable at 343. HSQ for DVT ppx. Follow-up heme/onc  8. Endo: continue synthroid, change to po  9. Dispo: full code, discussed with patient at bedside, stable for transfer to floors
73 yo woman with Hx afib (off xarelto at least 1wk), HFpEF, asthma, recent Cdiff in Jan presenting with perforated sigmoid colon resulting in peritonitis, septic shock, lactic acidosis, LAYA.  POD 1 s/p washout, drainage of 1.2L pus, transverse and sigmoid resection and Ro's procedure.      --pain control and sedation with fentanyl gtt, add prn  follows commands when awoken  --septic shock, with Hx HFpEF and PH  transition off phenylephrine to levophed  unlikely to be volume responsive by POCUS  --acute hypoxemic respiratory failure  failed PS trial with shallow breathing on PS 8/5 within minutes  continue full vent support  asthma, ?compliance with meds, start prn albuterol and escalate prn  --LAYA from ATN persists  lactic acidosis improved  decrease bicarb gtt  --s/p Ro's   NPO, NGT to suction  PPI  shock liver improving  --peritonitis with abd abscess  continue zosyn  empiric flagyl for Cdiff  --coagulopathy from sepsis, no signs of bleeding  hold DVT ppx  --hypothryoid, continue IV synthroid  check fs  --son updated at bedside  discussed with ID and surgery
see below
Pt seen and examined with resident physician at bedside. Agree with medical assessment and plan as documented above. Management as per ICU team. Pt is intubated and sedated with Precedex. Pt is post op Tia procedure and abdominal abscess drainage. Currently on pressor support with Levophed. Pt completed course of Zosyn and Flagyl. Started on IV Merrem. ID is following.
see below
Pt seen and examined at bedside with resident physician. Agree with medical assessment and plan as documented above. In summary pt admitted with a perforated sigmoid colon, peritonitis, and septic shock. Pt is post op Tia procedure and abdominal abscess drainage. Pt currently requires pressor support and is on IV antibiotics. Pt is NPO with NGT in place. Pt is being managed under primary ICU team.

## 2023-05-02 NOTE — PROGRESS NOTE ADULT - SUBJECTIVE AND OBJECTIVE BOX
Pt seen and examined at bedside. Vital signs stable. Denies any abd pain, nausea, chest pain, palpitations, shortness of breath.    T(C): 36.6 (05-02-23 @ 04:34), Max: 37.1 (05-01-23 @ 20:59)  HR: 72 (05-02-23 @ 04:34) (69 - 93)  BP: 125/58 (05-02-23 @ 04:34) (116/62 - 136/66)  RR: 17 (05-02-23 @ 04:34) (17 - 19)  SpO2: 100% (05-02-23 @ 04:34) (96% - 100%)    PHYSICAL EXAM:  General: no acute distress, appears comfortable  HEENT: normocephalic, NGT in place for feeds  Pulm: non labored respirations  Cardio: RRR  Abdomen: soft, nontender, nondistended, drain in place w/ scant yellow-serous fluid in bulb; stoma patent w/ loose yellow/brown stool in collection bag, no visible blood or clots; midline wound w/ three openings - yellow drainage noted on drsg. mild surrounding erythema; dressing changed this morning  Extremities: no calf tenderness noted    I&O's Detail    01 May 2023 07:01  -  02 May 2023 07:00  --------------------------------------------------------  IN:  Total IN: 0 mL    OUT:    Bulb (mL): 15 mL    Colostomy (mL): 600 mL  Total OUT: 615 mL    Total NET: -615 mL    LABS:                        8.6    23.36 )-----------( 430      ( 01 May 2023 07:45 )             28.0     05-01    146<H>  |  114<H>  |  61<H>  ----------------------------<  83  3.5   |  26  |  2.70<H>    Ca    8.0<L>      01 May 2023 07:45    TPro  5.2<L>  /  Alb  1.9<L>  /  TBili  0.4  /  DBili  x   /  AST  12<L>  /  ALT  8<L>  /  AlkPhos  65  05-01    CAPILLARY BLOOD GLUCOSE  POCT Blood Glucose.: 118 mg/dL (02 May 2023 05:58)  POCT Blood Glucose.: 109 mg/dL (02 May 2023 00:16)  POCT Blood Glucose.: 102 mg/dL (01 May 2023 17:17)  POCT Blood Glucose.: 106 mg/dL (01 May 2023 12:05)    MEDICATIONS:  acetaminophen     Tablet .. 650 milliGRAM(s) Oral every 6 hours PRN  albuterol    90 MICROgram(s) HFA Inhaler 4 Puff(s) Inhalation every 6 hours PRN  albuterol/ipratropium for Nebulization 3 milliLiter(s) Nebulizer every 6 hours  buDESOnide    Inhalation Suspension 0.5 milliGRAM(s) Inhalation every 12 hours  dextrose 5% + lactated ringers. 1000 milliLiter(s) IV Continuous <Continuous>  escitalopram 20 milliGRAM(s) Oral daily  HYDROmorphone   Tablet 1 milliGRAM(s) Oral every 6 hours PRN  levothyroxine Injectable 93.75 MICROGram(s) IV Push <User Schedule>  metoclopramide Injectable 5 milliGRAM(s) IV Push every 6 hours PRN  midodrine. 5 milliGRAM(s) Oral three times a day  pantoprazole  Injectable 40 milliGRAM(s) IV Push two times a day  sodium chloride 0.9% lock flush 10 milliLiter(s) IV Push every 1 hour PRN  vancomycin    Solution 125 milliGRAM(s) Oral daily

## 2023-05-02 NOTE — PROGRESS NOTE ADULT - PROBLEM SELECTOR PLAN 3
-Perforated sigmoid colon: s/p Ro's procedure   -Ostomy c/d/i; dark red/brown output with clots noted 4/28, s/p 4 units prbc with Hg 7.7 this AM  -neg c-diff, neg GI pcr  -ID ([facility]), recs appreciated  -completed course of broad spectrum Abx  -Continue Metoclopramide prn and PPI   -Dilaudid 1mg q6hr PRN and APAP 650mg q6hr for pain  -repeat CT with some intra-abdominal collections of unclear etiology and bowel thickening, pt not reporting abd pain/tenderness today   -surgery (Yusuf), recs appreciated  -No role for further surgical intervention at this time  -S&S reassessed on 4/28 and felt pt did better, but not well enough to be recommended for po intake -- still recommend NPO, but now recommend to perform a FEES study on Tuesday for the next reassessment -Perforated sigmoid colon: s/p Ro's procedure   -Ostomy c/d/i; dark red/brown output with clots noted 4/28, s/p 5 units PRBC ; bleeding has now stopped and H&H stabilized  -neg c-diff, neg GI pcr  -ID ([facility]), recs appreciated  -completed course of broad spectrum Abx  -Continue Metoclopramide prn and PPI   -Dilaudid 1mg q6hr PRN and APAP 650mg q6hr for pain  -repeat CT with some intra-abdominal collections of unclear etiology and bowel thickening, pt not reporting abd pain/tenderness today   -surgery (Yusuf), recs appreciated  -No role for further surgical intervention at this time  -S&S reassessed on 4/28 and felt pt did better, but not well enough to be recommended for po intake -- still recommend NPO, but now recommend to perform a FEES study as feel pt has higher chance to pass the swallow test given improved mentation -- f/up FEES

## 2023-05-02 NOTE — PROGRESS NOTE ADULT - ASSESSMENT
72-year-old female with history of hypertension, CHF, afib s/p AF ablation (2/7/19) currently not on Xarelto CATH 2018, hypothyroidism, HLD history of C. difficile January 2023 presents for vomiting and abdominal pain. Consulted for CHF, S/P Tia's    Cardiac Optimization, Hx Afib s/p Ablation, HTN, CHF  - CT ABD/Pelvis: Pneumoperitoneum and no ascites, perforated sigmoid colon. Stable splenomegaly. Stable right lung nodule, ground glass infiltrates and loculated right pleural effusion.  - S/P Tia's with abdominal abscess drainage, surgery following   - Abx per ID following     - Known PAFIB rate-controlled per flow sheet   - Holding home Xarelto.    - Anemia persistent, transfuse per primary, continue to trend.     - BP stable and controlled   - Continue Midodrine, can increase to 10 mg PO TID   - Continue to monitor routine hemodynamics     - EKG: ST with PAC's. No acute changes, unchanged from previous.   - Elevated troponin peaked at 100.9, likely demand in the setting of sepsis  - No evidence of any active ischemia     - TTE (1/18/23): normal LVEF 65%, normal RV size and function, biatrial enlargement, sclerotic aortic valve, mild AI, Moderate MR and TR  - TTE (4/15/2023) EF 55-60%, RVE, PASP 66   - BNP:  <--11756  - Pt appears euvolemic to dry, on IVF, currently NPO     - Creatinine slowly improving, no indication for HD at this time, renal following   - DVT ppx per primary     - Monitor and replete lytes, keep K>4, Mg>2.  - Will continue to follow.    Randall Wang, MS FNP, [facility]  Nurse Practitioner- Cardiology   Spectra #[id number] /[phone]

## 2023-05-02 NOTE — SOCIAL WORK PROGRESS NOTE - NSSWPROGRESSNOTE_GEN_ALL_CORE
Discussed at daily rounds. Patient is not ready for discharge but updates sent to sub-acute rehabs. Social work to follow.

## 2023-05-02 NOTE — PROGRESS NOTE ADULT - PROBLEM SELECTOR PLAN 5
LAYA likely ATN due to septic shock; BUN/Cr downtrending to 53/2.5 this AM   S/p bicarb gtt for metabolic acidosis  s/p diuresis with metolazone and bumetanide- held due to rising creatinine  monitor off diuretics  nephro ([facility]), recs appreciated

## 2023-05-02 NOTE — PROGRESS NOTE ADULT - SUBJECTIVE AND OBJECTIVE BOX
Patient is a 72y old  Female who presents with a chief complaint of intra-abdominal perforation (02 May 2023 07:40)    INTERVAL HPI/OVERNIGHT EVENTS: Patient seen and examined at bedside. No overnight events occurred. Patient more alert today, stating that she feels weak but is not in any pain. She is coughing at bedside with minimal sputum expectorant. Denies fevers, chills, headache, lightheadedness, chest pain, dyspnea, abdominal pain, n/v/d/c.    MEDICATIONS  (STANDING):  albuterol/ipratropium for Nebulization 3 milliLiter(s) Nebulizer every 6 hours  buDESOnide    Inhalation Suspension 0.5 milliGRAM(s) Inhalation every 12 hours  escitalopram 20 milliGRAM(s) Oral daily  levothyroxine Injectable 93.75 MICROGram(s) IV Push <User Schedule>  midodrine. 5 milliGRAM(s) Oral three times a day  pantoprazole  Injectable 40 milliGRAM(s) IV Push two times a day  potassium chloride   Powder 20 milliEquivalent(s) Enteral Tube once  vancomycin    Solution 125 milliGRAM(s) Oral daily    MEDICATIONS  (PRN):  acetaminophen     Tablet .. 650 milliGRAM(s) Oral every 6 hours PRN Moderate Pain (4 - 6)  albuterol    90 MICROgram(s) HFA Inhaler 4 Puff(s) Inhalation every 6 hours PRN Shortness of Breath and/or Wheezing  HYDROmorphone   Tablet 1 milliGRAM(s) Oral every 6 hours PRN Severe Pain (7 - 10)  metoclopramide Injectable 5 milliGRAM(s) IV Push every 6 hours PRN nausea and/or vomiting  sodium chloride 0.9% lock flush 10 milliLiter(s) IV Push every 1 hour PRN Pre/post blood products, medications, blood draw, and to maintain line patency    Allergies  No Known Allergies    Intolerances    REVIEW OF SYSTEMS:   CONSTITUTIONAL: No fever or chills  HEENT:  No headache, no sore throat  RESPIRATORY: + cough, +SOB,  CARDIOVASCULAR: No chest pain, palpitations  GASTROINTESTINAL: No abd pain, nausea, vomiting, or diarrhea  GENITOURINARY: No dysuria, frequency, or hematuria  NEUROLOGICAL: +general severe weakness  MUSCULOSKELETAL: no myalgias     Vital Signs Last 24 Hrs  T(C): 36.6 (02 May 2023 04:34), Max: 37.1 (01 May 2023 20:59)  T(F): 97.8 (02 May 2023 04:34), Max: 98.7 (01 May 2023 20:59)  HR: 74 (02 May 2023 07:35) (69 - 93)  BP: 125/58 (02 May 2023 04:34) (116/62 - 136/66)  BP(mean): --  RR: 17 (02 May 2023 04:34) (17 - 19)  SpO2: 96% (02 May 2023 07:35) (96% - 100%)    Parameters below as of 02 May 2023 07:35  Patient On (Oxygen Delivery Method): nasal cannula    PHYSICAL EXAM:  GENERAL: chronically ill-appearing, not in acute distress; frail, more alert  HEENT:  anicteric, moist mucous membranes, NGT in place  CHEST/LUNG: mild decreased breath sounds noted bilaterally, weak inspiratory effort  HEART: irregular rhythm S1, S2  ABDOMEN: ileostomy in place; small volume of brown liquid stool noted, BS+, soft, nontender, nondistended; midline surgical incision, surgical dressing C/D/I  EXTREMITIES: no cyanosis, or calf tenderness  NERVOUS SYSTEM: awake and alert, able to vocalize answers  LABS:                        9.4    29.42 )-----------( 569      ( 02 May 2023 07:25 )             30.3     CBC Full  -  ( 02 May 2023 07:25 )  WBC Count : 29.42 K/uL  Hemoglobin : 9.4 g/dL  Hematocrit : 30.3 %  Platelet Count - Automated : 569 K/uL  Mean Cell Volume : 97.1 fl  Mean Cell Hemoglobin : 30.1 pg  Mean Cell Hemoglobin Concentration : 31.0 gm/dL  Auto Neutrophil # : 26.00 K/uL  Auto Lymphocyte # : 0.65 K/uL  Auto Monocyte # : 1.09 K/uL  Auto Eosinophil # : 0.48 K/uL  Auto Basophil # : 0.23 K/uL  Auto Neutrophil % : 88.4 %  Auto Lymphocyte % : 2.2 %  Auto Monocyte % : 3.7 %  Auto Eosinophil % : 1.6 %  Auto Basophil % : 0.8 %    02 May 2023 07:25    146    |  115    |  53     ----------------------------<  118    3.5     |  25     |  2.50     Ca    8.0        02 May 2023 07:25  Phos  3.8       02 May 2023 07:25  Mg     1.9       02 May 2023 07:25    TPro  5.7    /  Alb  2.0    /  TBili  0.4    /  DBili  x      /  AST  13     /  ALT  8      /  AlkPhos  70     02 May 2023 07:25    CAPILLARY BLOOD GLUCOSE  POCT Blood Glucose.: 118 mg/dL (02 May 2023 05:58)  POCT Blood Glucose.: 109 mg/dL (02 May 2023 00:16)  POCT Blood Glucose.: 102 mg/dL (01 May 2023 17:17)  POCT Blood Glucose.: 106 mg/dL (01 May 2023 12:05)    RADIOLOGY & ADDITIONAL TESTS:    < from: Xray Chest 1 View- PORTABLE-Urgent (Xray Chest 1 View- PORTABLE-Urgent .) (04.30.23 @ 16:29) >  PROCEDURE DATE:  04/30/2023          INTERPRETATION:  Clinical History: 72-year-old female, coarse breath   sounds on the right.    Portable view the chest is compared to 4/24/2023 and is correlated with   the abdominal CT of 4/27/2023.    FINDINGS: Tip of the NG tube in the stomach, unchanged.    Mild to moderate cardiomegaly and mild pulmonary venous congestion,   unchanged.    Small bilateral pleural effusions, unchanged.    No pneumoperitoneum or acute osseous finding.    IMPRESSION:  NG tube with the tip in the stomach, unchanged.    Small bilateral pleural effusions, unchanged    --- End of Report ---      < end of copied text >      Personally reviewed.     Consultant(s) Notes Reviewed:  [x] YES  [ ] NO     Patient is a 72y old  Female who presents with a chief complaint of generalized abdominal pain and persistent diarrhea.    INTERVAL HPI/OVERNIGHT EVENTS: Patient seen and examined at bedside. No acute overnight events occurred. Patient more alert today, stating that she feels weak, but is not in any pain. Coughed at bedside with minimal sputum expectorant. Denies fevers, chills, headache, lightheadedness, chest pain, dyspnea, abdominal pain, n/v. Admits generalized severe weakness since ICU.     MEDICATIONS  (STANDING):  albuterol/ipratropium for Nebulization 3 milliLiter(s) Nebulizer every 6 hours  buDESOnide    Inhalation Suspension 0.5 milliGRAM(s) Inhalation every 12 hours  escitalopram 20 milliGRAM(s) Oral daily  levothyroxine Injectable 93.75 MICROGram(s) IV Push <User Schedule>  midodrine. 5 milliGRAM(s) Oral three times a day  pantoprazole  Injectable 40 milliGRAM(s) IV Push two times a day  potassium chloride   Powder 20 milliEquivalent(s) Enteral Tube once  vancomycin    Solution 125 milliGRAM(s) Oral daily    MEDICATIONS  (PRN):  acetaminophen     Tablet .. 650 milliGRAM(s) Oral every 6 hours PRN Moderate Pain (4 - 6)  albuterol    90 MICROgram(s) HFA Inhaler 4 Puff(s) Inhalation every 6 hours PRN Shortness of Breath and/or Wheezing  HYDROmorphone   Tablet 1 milliGRAM(s) Oral every 6 hours PRN Severe Pain (7 - 10)  metoclopramide Injectable 5 milliGRAM(s) IV Push every 6 hours PRN nausea and/or vomiting  sodium chloride 0.9% lock flush 10 milliLiter(s) IV Push every 1 hour PRN Pre/post blood products, medications, blood draw, and to maintain line patency    Allergies  No Known Allergies    Intolerances    REVIEW OF SYSTEMS:   CONSTITUTIONAL: No fever or chills  HEENT:  No headache, no sore throat  RESPIRATORY: cough improving; SOB improved   CARDIOVASCULAR: No chest pain, palpitations  GASTROINTESTINAL: No abd pain, nausea, vomiting, or diarrhea  GENITOURINARY: No dysuria, frequency, or hematuria  NEUROLOGICAL: +general severe weakness  MUSCULOSKELETAL: no myalgias     Vital Signs Last 24 Hrs  T(C): 36.6 (02 May 2023 04:34), Max: 37.1 (01 May 2023 20:59)  T(F): 97.8 (02 May 2023 04:34), Max: 98.7 (01 May 2023 20:59)  HR: 74 (02 May 2023 07:35) (69 - 93)  BP: 125/58 (02 May 2023 04:34) (116/62 - 136/66)  BP(mean): --  RR: 17 (02 May 2023 04:34) (17 - 19)  SpO2: 96% (02 May 2023 07:35) (96% - 100%)    Parameters below as of 02 May 2023 07:35  Patient On (Oxygen Delivery Method): nasal cannula    PHYSICAL EXAM:  GENERAL: chronically ill-appearing, not in acute distress; frail, more alert  HEENT:  anicteric, moist mucous membranes, NGT in place  CHEST/LUNG: somewhat decreased breath sounds bilaterally, weak inspiratory effort  HEART: irregular rhythm S1, S2  ABDOMEN: ileostomy in place; small volume of brown liquid stool noted, BS+, soft, nontender, nondistended; midline surgical incision, surgical dressing C/D/I  EXTREMITIES: no cyanosis, or calf tenderness  NERVOUS SYSTEM: awake and alert, able to vocalize few answers    LABS:                        9.4    29.42 )-----------( 569      ( 02 May 2023 07:25 )             30.3     CBC Full  -  ( 02 May 2023 07:25 )  WBC Count : 29.42 K/uL  Hemoglobin : 9.4 g/dL  Hematocrit : 30.3 %  Platelet Count - Automated : 569 K/uL  Mean Cell Volume : 97.1 fl  Mean Cell Hemoglobin : 30.1 pg  Mean Cell Hemoglobin Concentration : 31.0 gm/dL  Auto Neutrophil # : 26.00 K/uL  Auto Lymphocyte # : 0.65 K/uL  Auto Monocyte # : 1.09 K/uL  Auto Eosinophil # : 0.48 K/uL  Auto Basophil # : 0.23 K/uL  Auto Neutrophil % : 88.4 %  Auto Lymphocyte % : 2.2 %  Auto Monocyte % : 3.7 %  Auto Eosinophil % : 1.6 %  Auto Basophil % : 0.8 %    02 May 2023 07:25    146    |  115    |  53     ----------------------------<  118    3.5     |  25     |  2.50     Ca    8.0        02 May 2023 07:25  Phos  3.8       02 May 2023 07:25  Mg     1.9       02 May 2023 07:25    TPro  5.7    /  Alb  2.0    /  TBili  0.4    /  DBili  x      /  AST  13     /  ALT  8      /  AlkPhos  70     02 May 2023 07:25    CAPILLARY BLOOD GLUCOSE  POCT Blood Glucose.: 118 mg/dL (02 May 2023 05:58)  POCT Blood Glucose.: 109 mg/dL (02 May 2023 00:16)  POCT Blood Glucose.: 102 mg/dL (01 May 2023 17:17)  POCT Blood Glucose.: 106 mg/dL (01 May 2023 12:05)    RADIOLOGY & ADDITIONAL TESTS:    < from: Xray Chest 1 View- PORTABLE-Urgent (Xray Chest 1 View- PORTABLE-Urgent .) (04.30.23 @ 16:29) >  PROCEDURE DATE:  04/30/2023          INTERPRETATION:  Clinical History: 72-year-old female, coarse breath   sounds on the right.    Portable view the chest is compared to 4/24/2023 and is correlated with   the abdominal CT of 4/27/2023.    FINDINGS: Tip of the NG tube in the stomach, unchanged.    Mild to moderate cardiomegaly and mild pulmonary venous congestion,   unchanged.    Small bilateral pleural effusions, unchanged.    No pneumoperitoneum or acute osseous finding.    IMPRESSION:  NG tube with the tip in the stomach, unchanged.    Small bilateral pleural effusions, unchanged    --- End of Report ---      < end of copied text >      Personally reviewed.     Consultant(s) Notes Reviewed:  [x] YES  [ ] NO

## 2023-05-03 NOTE — PROGRESS NOTE ADULT - PROBLEM SELECTOR PLAN 3
-Perforated sigmoid colon: s/p Ro's procedure   -Ostomy c/d/i; dark red/brown output with clots noted 4/28, s/p 5 units PRBC ; bleeding has now stopped and H&H stabilized  -neg c-diff, neg GI pcr  -ID ([facility]), recs appreciated  -completed course of broad spectrum Abx  -Continue Metoclopramide prn and PPI   -Dilaudid 1mg q6hr PRN and APAP 650mg q6hr for pain  -repeat CT with some intra-abdominal collections of unclear etiology and bowel thickening, pt not reporting abd pain/tenderness today   -surgery (Yusuf), recs appreciated  -No role for further surgical intervention at this time  -S&S reassessed on 4/28 and felt pt did better, but not well enough to be recommended for po intake -- still recommend NPO, but now recommend to perform a FEES study as feel pt has higher chance to pass the swallow test given improved mentation -- f/up FEES when able to tolerate. Pt continued to fail, plan now pending ENT recs

## 2023-05-03 NOTE — PROGRESS NOTE ADULT - TIME BILLING
pt seen and examine  see above plan - dysphagia  on ng tube feed keep failing swallow evaluation 5th  failed attempt - plan ENT  evaluation , might need PEG  placement by  gi dr ortiz / surg dr diallo aware  , son aware bedside tt /plan .

## 2023-05-03 NOTE — SWALLOW BEDSIDE ASSESSMENT ADULT - COMMENTS
Chart reviewed order received for swallow eval.  Pt received in bed A&A Ox1, reduced cognition, increased processing time, intermittent 1 step command following, positioned upright for eval, +O2NC, +NGT, +weak baseline congested cough, pain scale 0/10 pre & post eval, Dr. Posey present at conclusion of assessment.  Swallow eval completed see below for details.  Pt left as received NAD JAN Santiago, Dr. Posey and Surgery PA [phone] notified.  Will follow.     Per charting, pt is a "71yo F w/ PMH of A-Fib on rivaroxaban, HFpEF, recent C diff infection (Jan 2023), myeloproliferative disorder with thrombocytosis on anagrelide, hypothyroidism, asthma, and lung nodules s/p resection (reportedly malignant) who presents with perforated sigmoid colon with acute bacterial peritonitis, LAYA due to ATN, septic shock, lactic acidosis, shock liver;  post/op acute respiratory failure requiring mechanical ventilation, and acute blood loss anemia; s/p extended Tia's procedure including descending colon on 4/13. She was extubated 4/19 and tolerating supplemental oxygen via nasal cannula, overall gradual improvement, but complicated by critical illness polyneuropathy, and further c/b acute GIB."    Chest xray 4/30/23: "IMPRESSION:  NG tube with the tip in the stomach, unchanged.    Small bilateral pleural effusions, unchanged"

## 2023-05-03 NOTE — PROGRESS NOTE ADULT - ASSESSMENT
ower GI bleed    cbc noted  transfuse prn  no further overt GI bleed  monitor ostomy output  S/S eval noted  can start diet per SLP recs  will follow    I reviewed the overnight course of events on the unit, re-confirming the patient history. I discussed the care with the patient and their family  Differential diagnosis and plan of care discussed with patient after the evaluation  50 minutes spent on total encounter of which more than fifty percent of the encounter was spent counseling and/or coordinating care by the attending physician.  Advanced care planning was discussed with patient and family.  Advanced care planning forms were reviewed and discussed.  Risks, benefits and alternatives of gastroenterologic procedures were discussed in detail and all questions were answered.

## 2023-05-03 NOTE — PROGRESS NOTE ADULT - ASSESSMENT
71yo F w/ PMH of A-Fib on rivaroxaban, HFpEF, recent C diff infection (Jan 2023), myeloproliferative disorder with thrombocytosis on anagrelide, hypothyroidism, asthma, and lung nodules s/p resection (reportedly malignant) who presents with perforated sigmoid colon with acute bacterial peritonitis, LAYA due to ATN, septic shock, lactic acidosis, shock liver;  post/op acute respiratory failure requiring mechanical ventilation, and acute blood loss anemia; s/p extended Tia's procedure including descending colon on 4/13. She was extubated 4/19 and tolerating supplemental oxygen via nasal cannula, overall gradual improvement, but complicated by critical illness polyneuropathy, and further c/b acute GIB.

## 2023-05-03 NOTE — PROGRESS NOTE ADULT - ASSESSMENT
72-year-old female with history of hypertension, CHF, afib s/p AF ablation (2/7/19) currently not on Xarelto CATH 2018, hypothyroidism, HLD history of C. difficile January 2023 presents for vomiting and abdominal pain. Consulted for CHF, S/P Tia's    IN progress  Cardiac Optimization, Hx Afib s/p Ablation, HTN, CHF  - CT ABD/Pelvis: Pneumoperitoneum and no ascites, perforated sigmoid colon. Stable splenomegaly. Stable right lung nodule, ground glass infiltrates and loculated right pleural effusion.  - S/P Tia's with abdominal abscess drainage, surgery following   - Abx per ID following   - Known PAFIB rate-controlled per flow sheet   - Holding home Xarelto.    - Anemia persistent, transfuse per primary, continue to trend.     - BP stable and controlled 121/72   - Continue Midodrine,   - Continue to monitor routine hemodynamics     - EKG: ST with PAC's. No acute changes, unchanged from previous.   - Elevated troponin peaked at 100.9, likely demand in the setting of sepsis  - No evidence of any active ischemia     - TTE (1/18/23): normal LVEF 65%, normal RV size and function, biatrial enlargement, sclerotic aortic valve, mild AI, Moderate MR and TR  - TTE (4/15/2023) EF 55-60%, RVE, PASP 66   - BNP:  <--08550  - DVT ppx per primary     - Monitor and replete lytes, keep K>4, Mg>2.  Leilani Owens FNP-C  Cardiology NP  [id number] [phone]     72-year-old female with history of hypertension, CHF, afib s/p AF ablation (2/7/19) currently not on Xarelto CATH 2018, hypothyroidism, HLD history of C. difficile January 2023 presents for vomiting and abdominal pain. Consulted for CHF, S/P Tia's    Cardiac Optimization, Hx Afib s/p Ablation, HTN, CHF  - CT ABD/Pelvis: Pneumoperitoneum and no ascites, perforated sigmoid colon. Stable splenomegaly. Stable right lung nodule, ground glass infiltrates and loculated right pleural effusion.  - S/P Tia's with abdominal abscess drainage, surgery following   - Abx per ID following   - Known PAFIB rate-controlled per flow sheet   - Holding home Xarelto.    - Anemia persistent, transfuse per primary, continue to trend.   - BP stable and controlled 121/72   - Continue Midodrine,   - Continue to monitor routine hemodynamics     - EKG: ST with PAC's. No acute changes, unchanged from previous.   - Elevated troponin peaked at 100.9, likely demand in the setting of sepsis  - No evidence of any active ischemia     - TTE (1/18/23): normal LVEF 65%, normal RV size and function, biatrial enlargement, sclerotic aortic valve, mild AI, Moderate MR and TR  - TTE (4/15/2023) EF 55-60%, RVE, PASP 66   - BNP:  <--57829  - DVT ppx per primary     - Monitor and replete lytes, keep K>4, Mg>2.  Leilani Owens FNP-C  Cardiology NP  [facility] [phone]

## 2023-05-03 NOTE — PROGRESS NOTE ADULT - ASSESSMENT
72yoF s/p an Extended Tia's Procedure 4/13, currently stable with tube feeds restarted.    PLAN:  - VSS  - AM labs pending  - Continue tube feeds  - Pending eval by S&S today  - Trend CBC  - Transfuse per primary team PRN  - Appreciate GI recs may require IR intervention for bleeding  - Continue care per primary team    Discussed with Dr. Goldberg

## 2023-05-03 NOTE — PROGRESS NOTE ADULT - PROBLEM SELECTOR PLAN 2
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  -S&S reassessed on 4/28 and felt pt did better, but not well enough to be recommended for po intake -- still recommend NPO, but now recommend to perform a FEES study as feel pt has higher chance to pass the swallow test given improved mentation -- f/up FEES  -pt with history of myeloproliferative disorder per discussion with hematology, so unclear the significance of the leukocytosis

## 2023-05-03 NOTE — PROGRESS NOTE ADULT - SUBJECTIVE AND OBJECTIVE BOX
S: Patient seen and examined at bedside, POD#20 extended yessica's for perforated diverticulosis, post op course c/b septic shock requiring pressor support in ICU, respiratory failure requiring continued intubation post-operatively in ICU, ATN, bleeding.  No acute overnight events.  Patient coughing.  +colostomy, +schroeder, +NGT with tube feeds. Patient denies any fever, chills, chest pain, shortness of breath, nausea, vomiting, or urinary complaints.    MEDICATIONS:  acetaminophen     Tablet .. 650 milliGRAM(s) Oral every 6 hours PRN  albuterol    90 MICROgram(s) HFA Inhaler 4 Puff(s) Inhalation every 6 hours PRN  albuterol/ipratropium for Nebulization 3 milliLiter(s) Nebulizer every 6 hours  buDESOnide    Inhalation Suspension 0.5 milliGRAM(s) Inhalation every 12 hours  escitalopram 20 milliGRAM(s) Oral daily  levothyroxine Injectable 93.75 MICROGram(s) IV Push <User Schedule>  metoclopramide Injectable 5 milliGRAM(s) IV Push every 6 hours PRN  midodrine. 5 milliGRAM(s) Oral three times a day  pantoprazole  Injectable 40 milliGRAM(s) IV Push two times a day  sodium chloride 0.9% lock flush 10 milliLiter(s) IV Push every 1 hour PRN      O:  Vital Signs Last 24 Hrs  T(C): 36.8 (03 May 2023 04:18), Max: 36.8 (03 May 2023 04:18)  T(F): 98.3 (03 May 2023 04:18), Max: 98.3 (03 May 2023 04:18)  HR: 82 (03 May 2023 07:13) (68 - 93)  BP: 121/72 (03 May 2023 04:18) (106/58 - 132/71)  RR: 18 (03 May 2023 04:18) (17 - 18)  SpO2: 98% (03 May 2023 07:13) (94% - 98%)    Parameters below as of 03 May 2023 07:13  Patient On (Oxygen Delivery Method): nasal cannula w/ humidification    I&O SUMMARY:    05-02-23 @ 07:01  -  05-03-23 @ 07:00  --------------------------------------------------------  IN:    Enteral Tube Flush: 470 mL    Free Water: 1000 mL  Total IN: 1470 mL    OUT:    Bulb (mL): 82 mL    Colostomy (mL): 1400 mL    Nasogastric/Oral tube (mL): 125 mL    Voided (mL): 300 mL  Total OUT: 1907 mL    Total NET: -437 mL    PHYSICAL EXAM:  GENERAL: No acute distress, wet cough appreciated  HEAD:  Atraumatic, Normocephalic  CHEST/LUNG: wet cough, course rales, no respiratory distress  HEART: Regular rate and rhythm  ABDOMEN: Soft, non-tender, non-distended; wound vac in place with good seal, colostomy with light brown stool in bag, stoma open; TAMERA of the RLQ with minimal serous drainage  EXT: calves non-tender b/l  NEUROLOGY: no focal deficits    LABS:  AM labs pending

## 2023-05-03 NOTE — PROGRESS NOTE ADULT - PROBLEM SELECTOR PLAN 4
Acute respiratory failure with hypoxia extubated 4/19   Will encourage incentive spirometry  chest PT as tolerated  c/w nebs and budesonide 0.5 mg nebs q12h (on Trelegy Ellipta inhaler at home)  repeat CXR on 4/30 stable

## 2023-05-03 NOTE — PROGRESS NOTE ADULT - NUTRITIONAL ASSESSMENT
This patient has been assessed with a concern for Malnutrition and has been determined to have a diagnosis/diagnoses of Severe protein-calorie malnutrition.    This patient is being managed with:   Diet NPO with Tube Feed-  Tube Feeding Modality: Nasogastric  Nepro with Carb Steady  Total Volume for 24 Hours (mL): 1000  Continuous  Starting Tube Feed Rate {mL per Hour}: 40  Increase Tube Feed Rate by (mL): 10     Every 6 hours  Until Goal Tube Feed Rate (mL per Hour): 50  Tube Feed Duration (in Hours): 20  Tube Feed Start Time: 06:00  No Carb Prosource (1pkg = 15gms Protein)     Qty per Day:  30 ml daily  Entered: May  3 2023 12:07PM    Diet NPO with Tube Feed-  Tube Feeding Modality: Nasogastric  Nepro with Carb Steady  Total Volume for 24 Hours (mL): 1000  Continuous  Starting Tube Feed Rate {mL per Hour}: 20  Increase Tube Feed Rate by (mL): 10     Every 6 hours  Until Goal Tube Feed Rate (mL per Hour): 50  Tube Feed Duration (in Hours): 20  Tube Feed Start Time: 06:00  Entered: May  1 2023  4:30PM    Diet NPO with Tube Feed-  Tube Feeding Modality: Nasogastric  Vital 1.5 Adalberto  Total Volume for 24 Hours (mL): 240  Continuous  Until Goal Tube Feed Rate (mL per Hour): 10  Tube Feed Duration (in Hours): 24  Tube Feed Start Time: 12:00  Entered: Apr 19 2023 12:13PM    The following pending diet order is being considered for treatment of Severe protein-calorie malnutrition:null

## 2023-05-03 NOTE — PROGRESS NOTE ADULT - SUBJECTIVE AND OBJECTIVE BOX
[facility] -- Adrián Wray Pannella, Patel, Savella [facility]  Office # [phone]      Follow Up:    af , cardiac optimization     Subjective/Observations:     Unable to provide any meaningful information restraints in place , ngt place     REVIEW OF SYSTEMS: All other review of systems is negative unless indicated above    PAST MEDICAL & SURGICAL HISTORY:  Hypothyroidism      HLD (hyperlipidemia)      Thrombocytosis      Persistent atrial fibrillation      Obesity (BMI 35.0-39.9 without comorbidity)      Asthma      Diabetes mellitus      Ankle injury  on , 5 surgeries.      Cholecystitis          MEDICATIONS  (STANDING):  albuterol/ipratropium for Nebulization 3 milliLiter(s) Nebulizer every 6 hours  buDESOnide    Inhalation Suspension 0.5 milliGRAM(s) Inhalation every 12 hours  escitalopram 20 milliGRAM(s) Oral daily  levothyroxine Injectable 93.75 MICROGram(s) IV Push <User Schedule>  midodrine. 5 milliGRAM(s) Oral three times a day  pantoprazole  Injectable 40 milliGRAM(s) IV Push two times a day    MEDICATIONS  (PRN):  acetaminophen     Tablet .. 650 milliGRAM(s) Oral every 6 hours PRN Moderate Pain (4 - 6)  albuterol    90 MICROgram(s) HFA Inhaler 4 Puff(s) Inhalation every 6 hours PRN Shortness of Breath and/or Wheezing  metoclopramide Injectable 5 milliGRAM(s) IV Push every 6 hours PRN nausea and/or vomiting  sodium chloride 0.9% lock flush 10 milliLiter(s) IV Push every 1 hour PRN Pre/post blood products, medications, blood draw, and to maintain line patency      Allergies    No Known Allergies    Intolerances        Vital Signs Last 24 Hrs  T(C): 36.8 (03 May 2023 04:18), Max: 36.8 (03 May 2023 04:18)  T(F): 98.3 (03 May 2023 04:18), Max: 98.3 (03 May 2023 04:18)  HR: 82 (03 May 2023 07:13) (68 - 93)  BP: 121/72 (03 May 2023 04:18) (106/58 - 132/71)  BP(mean): --  RR: 18 (03 May 2023 04:18) (17 - 18)  SpO2: 98% (03 May 2023 07:13) (94% - 98%)    Parameters below as of 03 May 2023 07:13  Patient On (Oxygen Delivery Method): nasal cannula w/ humidification        I&O's Summary    02 May 2023 07:01  -  03 May 2023 07:00  --------------------------------------------------------  IN: 1470 mL / OUT: 1907 mL / NET: -437 mL          PHYSICAL EXAM:  TELE: not on tele   Constitutional: NAD, awake and alert, well-developed  HEENT: Moist Mucous Membranes, Anicteric  Pulmonary: Non-labored, breath sounds are clear bilaterally, No wheezing, crackles or rhonchi  Cardiovascular: Regular, S1 and S2 nl, murmur   Gastrointestinal: Bowel Sounds present, soft, nontender. NGT , ostomy   Lymph: No lymphadenopathy. No peripheral edema.  Skin: No visible rashes or ulcers.      LABS: All Labs Reviewed:                        9.4    29.42 )-----------( 569      ( 02 May 2023 07:25 )             30.3                         8.6    23.36 )-----------( 430      ( 01 May 2023 07:45 )             28.0                         8.8    x     )-----------( x        ( 2023 18:35 )             27.9     02 May 2023 07:25    146    |  115    |  53     ----------------------------<  118    3.5     |  25     |  2.50   01 May 2023 07:45    146    |  114    |  61     ----------------------------<  83     3.5     |  26     |  2.70     Ca    8.0        02 May 2023 07:25  Ca    8.0        01 May 2023 07:45  Phos  3.8       02 May 2023 07:25  Mg     1.9       02 May 2023 07:25    TPro  5.7    /  Alb  2.0    /  TBili  0.4    /  DBili  x      /  AST  13     /  ALT  8      /  AlkPhos  70     02 May 2023 07:25  TPro  5.2    /  Alb  1.9    /  TBili  0.4    /  DBili  x      /  AST  12     /  ALT  8      /  AlkPhos  65     01 May 2023 07:45             EC Lead ECG:   Ventricular Rate 82 BPM    Atrial Rate 82 BPM    P-R Interval 160 ms    QRS Duration 84 ms    Q-T Interval 412 ms    QTC Calculation(Bazett) 481 ms    P Axis 68 degrees    R Axis 13 degrees    T Axis 4 degrees    Diagnosis Line Normal sinus rhythm  Right atrial enlargement  Borderline ECG  When compared with ECG of 2023 09:43,  aberrant conduction is no longer present  Confirmed by PAULINE LARSEN ([id number]) on 2023 4:48:07 PM (23 @ 13:13)      ACC: [id number] EXAM:  ECHO TTE WO CON COMP W DOPP   ORDERED BY: RAFY WILSON     PROCEDURE DATE:  2023          INTERPRETATION:  INDICATION: Heart failure  Sonographer KL    Blood Pressure unavailable    Height 167.6 cm     Weight 60 kgBSA   1.8 sq m    Dimensions:  LA 4.0       Normal Values: 2.0 - 4.0 cm  Ao 3.4        Normal Values: 2.0 - 3.8 cm  SEPTUM 0.9       Normal Values: 0.6 - 1.2 cm  PWT 0.8       Normal Values: 0.6 - 1.1 cm  LVIDd 4.2         Normal Values: 3.0 - 5.6 cm  LVIDs 2.9         Normal Values: 1.8 - 4.0 cm      OBSERVATIONS:  Mitral Valve: Mild to moderate MR.  Aortic Valve/Aorta: Sclerotic trileaflet aortic valve with grossly normal   opening. Trace AI  Tricuspid Valve: Mild to moderate TR.  Pulmonic Valve: Mild PI  Left Atrium: Enlarged  Right Atrium: Not well-visualized  Left Ventricle: normal LV size and systolic function, estimated LVEF of   55-60%.  Right Ventricle: The right ventricle is enlarged with grossly normal   function  Pericardium: no significant pericardial effusion.  Pulmonary/RV Pressure: estimated PA systolic pressure of 66mmHg assuming   an RA pressure of 10 mmHg.  IVC measures 2.0 cm and is non collapsing  LV diastolic dysfunction is present  Bilateral pleural effusions        IMPRESSION:  Normal left ventricular internal dimensions and systolic function,   estimated LVEF of 55-60%.  The right ventricle is enlarged with grossly normal function  Left atrial enlargement  Sclerotic trileaflet aortic valve, trace AI.  Mild to moderate MR and TR.  Estimated PA systolic pressure of 66mmHg assuming an RA pressure of 10   mmHg.  IVC measures 2.0 cm and is non collapsing              NEYMAR ROWLAND MD; Attending Cardiologist  This document has been electronically signed. Apr 15 2023 10:52AM      Radiology:         [facility] -- Adrián Wray Pannella, Patel, Savella [facility]  Office # [phone]      Follow Up:    af , cardiac optimization     Subjective/Observations:     Unable to provide any meaningful information restraints in place , ngt place     REVIEW OF SYSTEMS: All other review of systems is negative unless indicated above    PAST MEDICAL & SURGICAL HISTORY:  Hypothyroidism      HLD (hyperlipidemia)      Thrombocytosis      Persistent atrial fibrillation      Obesity (BMI 35.0-39.9 without comorbidity)      Asthma      Diabetes mellitus      Ankle injury  on , 5 surgeries.      Cholecystitis          MEDICATIONS  (STANDING):  albuterol/ipratropium for Nebulization 3 milliLiter(s) Nebulizer every 6 hours  buDESOnide    Inhalation Suspension 0.5 milliGRAM(s) Inhalation every 12 hours  escitalopram 20 milliGRAM(s) Oral daily  levothyroxine Injectable 93.75 MICROGram(s) IV Push <User Schedule>  midodrine. 5 milliGRAM(s) Oral three times a day  pantoprazole  Injectable 40 milliGRAM(s) IV Push two times a day    MEDICATIONS  (PRN):  acetaminophen     Tablet .. 650 milliGRAM(s) Oral every 6 hours PRN Moderate Pain (4 - 6)  albuterol    90 MICROgram(s) HFA Inhaler 4 Puff(s) Inhalation every 6 hours PRN Shortness of Breath and/or Wheezing  metoclopramide Injectable 5 milliGRAM(s) IV Push every 6 hours PRN nausea and/or vomiting  sodium chloride 0.9% lock flush 10 milliLiter(s) IV Push every 1 hour PRN Pre/post blood products, medications, blood draw, and to maintain line patency      Allergies    No Known Allergies    Intolerances        Vital Signs Last 24 Hrs  T(C): 36.8 (03 May 2023 04:18), Max: 36.8 (03 May 2023 04:18)  T(F): 98.3 (03 May 2023 04:18), Max: 98.3 (03 May 2023 04:18)  HR: 82 (03 May 2023 07:13) (68 - 93)  BP: 121/72 (03 May 2023 04:18) (106/58 - 132/71)  BP(mean): --  RR: 18 (03 May 2023 04:18) (17 - 18)  SpO2: 98% (03 May 2023 07:13) (94% - 98%)    Parameters below as of 03 May 2023 07:13  Patient On (Oxygen Delivery Method): nasal cannula w/ humidification        I&O's Summary    02 May 2023 07:01  -  03 May 2023 07:00  --------------------------------------------------------  IN: 1470 mL / OUT: 1907 mL / NET: -437 mL          PHYSICAL EXAM:  TELE: not on tele   Constitutional: NAD  HEENT: Moist Mucous Membranes, Anicteric  Pulmonary: Non-labored, breath sounds are clear bilaterally, No wheezing, crackles or rhonchi  Cardiovascular: Regular, S1 and S2 nl, murmur   Gastrointestinal: Bowel Sounds present, soft, nontender. NGT , ostomy   Lymph: No lymphadenopathy. No peripheral edema.  Skin: No visible rashes or ulcers.      LABS: All Labs Reviewed:                        9.4    29.42 )-----------( 569      ( 02 May 2023 07:25 )             30.3                         8.6    23.36 )-----------( 430      ( 01 May 2023 07:45 )             28.0                         8.8    x     )-----------( x        ( 2023 18:35 )             27.9     02 May 2023 07:25    146    |  115    |  53     ----------------------------<  118    3.5     |  25     |  2.50   01 May 2023 07:45    146    |  114    |  61     ----------------------------<  83     3.5     |  26     |  2.70     Ca    8.0        02 May 2023 07:25  Ca    8.0        01 May 2023 07:45  Phos  3.8       02 May 2023 07:25  Mg     1.9       02 May 2023 07:25    TPro  5.7    /  Alb  2.0    /  TBili  0.4    /  DBili  x      /  AST  13     /  ALT  8      /  AlkPhos  70     02 May 2023 07:25  TPro  5.2    /  Alb  1.9    /  TBili  0.4    /  DBili  x      /  AST  12     /  ALT  8      /  AlkPhos  65     01 May 2023 07:45             EC Lead ECG:   Ventricular Rate 82 BPM    Atrial Rate 82 BPM    P-R Interval 160 ms    QRS Duration 84 ms    Q-T Interval 412 ms    QTC Calculation(Bazett) 481 ms    P Axis 68 degrees    R Axis 13 degrees    T Axis 4 degrees    Diagnosis Line Normal sinus rhythm  Right atrial enlargement  Borderline ECG  When compared with ECG of 2023 09:43,  aberrant conduction is no longer present  Confirmed by PAULINE LARSEN ([id number]) on 2023 4:48:07 PM (23 @ 13:13)      ACC: [id number] EXAM:  ECHO TTE WO CON COMP W DOPP   ORDERED BY: RAFY WILSON     PROCEDURE DATE:  2023          INTERPRETATION:  INDICATION: Heart failure  Sonographer KL    Blood Pressure unavailable    Height 167.6 cm     Weight 60 kgBSA   1.8 sq m    Dimensions:  LA 4.0       Normal Values: 2.0 - 4.0 cm  Ao 3.4        Normal Values: 2.0 - 3.8 cm  SEPTUM 0.9       Normal Values: 0.6 - 1.2 cm  PWT 0.8       Normal Values: 0.6 - 1.1 cm  LVIDd 4.2         Normal Values: 3.0 - 5.6 cm  LVIDs 2.9         Normal Values: 1.8 - 4.0 cm      OBSERVATIONS:  Mitral Valve: Mild to moderate MR.  Aortic Valve/Aorta: Sclerotic trileaflet aortic valve with grossly normal   opening. Trace AI  Tricuspid Valve: Mild to moderate TR.  Pulmonic Valve: Mild PI  Left Atrium: Enlarged  Right Atrium: Not well-visualized  Left Ventricle: normal LV size and systolic function, estimated LVEF of   55-60%.  Right Ventricle: The right ventricle is enlarged with grossly normal   function  Pericardium: no significant pericardial effusion.  Pulmonary/RV Pressure: estimated PA systolic pressure of 66mmHg assuming   an RA pressure of 10 mmHg.  IVC measures 2.0 cm and is non collapsing  LV diastolic dysfunction is present  Bilateral pleural effusions        IMPRESSION:  Normal left ventricular internal dimensions and systolic function,   estimated LVEF of 55-60%.  The right ventricle is enlarged with grossly normal function  Left atrial enlargement  Sclerotic trileaflet aortic valve, trace AI.  Mild to moderate MR and TR.  Estimated PA systolic pressure of 66mmHg assuming an RA pressure of 10   mmHg.  IVC measures 2.0 cm and is non collapsing              NEYMAR ROWLAND MD; Attending Cardiologist  This document has been electronically signed. Apr 15 2023 10:52AM      Radiology:

## 2023-05-03 NOTE — CHART NOTE - NSCHARTNOTEFT_GEN_A_CORE
Assessment: patient seen for malnutrition follow up   72y old  Female who presents with a chief complaint of intra-abdominal perforation + TAMERA drain  Hartmanns for perforated sigmoid diverticulitis  failed speech evaluation today. continue on NGT feeding   wound vac in place   ileostomy functioning   current feed as ordered will provide at goal rate 1800kcals 81 gms protein   fluid flush 200ml q 4 hours      Factors impacting intake: [ ] none [ ] nausea  [ ] vomiting [ ] diarrhea [ ] constipation  [ ]chewing problems [ ] swallowing issues  [x ] other: NGT failed swallow study    Diet Prescription: Diet, NPO with Tube Feed:   Tube Feeding Modality: Nasogastric  Nepro with Carb Steady  Total Volume for 24 Hours (mL): 1000  Continuous  Starting Tube Feed Rate {mL per Hour}: 20  Increase Tube Feed Rate by (mL): 10     Every 6 hours  Until Goal Tube Feed Rate (mL per Hour): 50  Tube Feed Duration (in Hours): 20  Tube Feed Start Time: 06:00 (05-01-23 @ 16:31)    Intake:   pump study completed 784ml over 24 hours 78% and 59% of feeding received over 48 hours   Current Weight: 5/2 wt 164.2# 5/3 163.1#  4/29 wt 166.8#      Pertinent Medications: MEDICATIONS  (STANDING):  albuterol/ipratropium for Nebulization 3 milliLiter(s) Nebulizer every 6 hours  buDESOnide    Inhalation Suspension 0.5 milliGRAM(s) Inhalation every 12 hours  escitalopram 20 milliGRAM(s) Oral daily  levothyroxine Injectable 93.75 MICROGram(s) IV Push <User Schedule>  midodrine. 5 milliGRAM(s) Oral three times a day  pantoprazole  Injectable 40 milliGRAM(s) IV Push two times a day    MEDICATIONS  (PRN):  acetaminophen     Tablet .. 650 milliGRAM(s) Oral every 6 hours PRN Moderate Pain (4 - 6)  albuterol    90 MICROgram(s) HFA Inhaler 4 Puff(s) Inhalation every 6 hours PRN Shortness of Breath and/or Wheezing  metoclopramide Injectable 5 milliGRAM(s) IV Push every 6 hours PRN nausea and/or vomiting  sodium chloride 0.9% lock flush 10 milliLiter(s) IV Push every 1 hour PRN Pre/post blood products, medications, blood draw, and to maintain line patency    Pertinent Labs: Creat 2.3 BUN 54 POCT 144,118   Skin: sacrum stage 1 abdominal wound surgical per RN    Estimated Needs:   [x ] no change since previous assessment based on 160# 25-30kcals/kg 1818-2181kcals and .8-1.0 58-72gms protein   [ ] recalculated:     Previous Nutrition Diagnosis:   [ ] Inadequate Energy Intake [ ]Inadequate Oral Intake [ ] Excessive Energy Intake   [ ] Underweight [ ] Increased Nutrient Needs [ ] Overweight/Obesity   [x ] Altered GI Function [ ] Unintended Weight Loss [ ] Food & Nutrition Related Knowledge Deficit [x ] Malnutrition     Nutrition Diagnosis is [x ] ongoing  [ ] resolved [ ] not applicable     New Nutrition Diagnosis: [x ] not applicable       Interventions:   Recommend  [ ] Change Diet To:  [x ] Nutrition Supplement recommend add proource 30 ml daily 15 gms protein and 60kcals   [ ] Nutrition Support  [x ] Other: recommend MVI and Vit C 500mg BID , follow renal indices    Monitoring and Evaluation:   [ ] PO intake [ x ] Tolerance to diet prescription [ x ] weights [ x ] labs[ x ] follow up per protocol  [ ] other:

## 2023-05-03 NOTE — PROGRESS NOTE ADULT - SUBJECTIVE AND OBJECTIVE BOX
Patient is a 72y old  Female who presents with a chief complaint of intra-abdominal perforation (03 May 2023 11:38)    INTERVAL HPI/OVERNIGHT EVENTS: Patient seen and examined at bedside. No overnight events occurred. Patient has no complaints at this time. Denies fevers, chills, headache, lightheadedness, chest pain, dyspnea, abdominal pain, n/v/d/c.    MEDICATIONS  (STANDING):  albuterol/ipratropium for Nebulization 3 milliLiter(s) Nebulizer every 6 hours  buDESOnide    Inhalation Suspension 0.5 milliGRAM(s) Inhalation every 12 hours  escitalopram 20 milliGRAM(s) Oral daily  levothyroxine Injectable 93.75 MICROGram(s) IV Push <User Schedule>  midodrine. 5 milliGRAM(s) Oral three times a day  pantoprazole  Injectable 40 milliGRAM(s) IV Push two times a day    MEDICATIONS  (PRN):  acetaminophen     Tablet .. 650 milliGRAM(s) Oral every 6 hours PRN Moderate Pain (4 - 6)  albuterol    90 MICROgram(s) HFA Inhaler 4 Puff(s) Inhalation every 6 hours PRN Shortness of Breath and/or Wheezing  metoclopramide Injectable 5 milliGRAM(s) IV Push every 6 hours PRN nausea and/or vomiting  sodium chloride 0.9% lock flush 10 milliLiter(s) IV Push every 1 hour PRN Pre/post blood products, medications, blood draw, and to maintain line patency    Allergies  No Known Allergies    Intolerances    REVIEW OF SYSTEMS:  CONSTITUTIONAL: No fever or chills  HEENT:  No headache, no sore throat  RESPIRATORY: cough improving; SOB improved   CARDIOVASCULAR: No chest pain, palpitations  GASTROINTESTINAL: No abd pain, nausea, vomiting, or diarrhea  GENITOURINARY: No dysuria, frequency, or hematuria  NEUROLOGICAL: +general severe weakness  MUSCULOSKELETAL: no myalgias     Vital Signs Last 24 Hrs  T(C): 36.4 (03 May 2023 11:19), Max: 36.8 (03 May 2023 04:18)  T(F): 97.6 (03 May 2023 11:19), Max: 98.3 (03 May 2023 04:18)  HR: 87 (03 May 2023 14:09) (68 - 87)  BP: 110/61 (03 May 2023 11:19) (106/58 - 121/72)  BP(mean): --  RR: 19 (03 May 2023 11:19) (18 - 19)  SpO2: 98% (03 May 2023 14:09) (94% - 98%)    Parameters below as of 03 May 2023 14:09  Patient On (Oxygen Delivery Method): nasal cannula,3 LPM    PHYSICAL EXAM:  GENERAL: chronically ill-appearing, not in acute distress; frail, more alert  HEENT:  anicteric, moist mucous membranes, NGT in place  CHEST/LUNG: somewhat decreased breath sounds bilaterally, weak inspiratory effort  HEART: irregular rhythm S1, S2  ABDOMEN: ileostomy in place; small volume of brown liquid stool noted, BS+, soft, nontender, nondistended; midline surgical incision, surgical dressing C/D/I  EXTREMITIES: no cyanosis, or calf tenderness  NERVOUS SYSTEM: awake and alert, able to vocalize few answers    LABS:                        9.4    39.94 )-----------( 566      ( 03 May 2023 07:00 )             31.1     CBC Full  -  ( 03 May 2023 07:00 )  WBC Count : 39.94 K/uL  Hemoglobin : 9.4 g/dL  Hematocrit : 31.1 %  Platelet Count - Automated : 566 K/uL  Mean Cell Volume : 99.0 fl  Mean Cell Hemoglobin : 29.9 pg  Mean Cell Hemoglobin Concentration : 30.2 gm/dL  Auto Neutrophil # : 36.00 K/uL  Auto Lymphocyte # : 0.67 K/uL  Auto Monocyte # : 1.42 K/uL  Auto Eosinophil # : 0.47 K/uL  Auto Basophil # : 0.25 K/uL  Auto Neutrophil % : 90.1 %  Auto Lymphocyte % : 1.7 %  Auto Monocyte % : 3.6 %  Auto Eosinophil % : 1.2 %  Auto Basophil % : 0.6 %    03 May 2023 07:00    147    |  115    |  54     ----------------------------<  92     3.7     |  24     |  2.30     Ca    8.4        03 May 2023 07:00  Phos  2.8       03 May 2023 07:00  Mg     1.8       03 May 2023 07:00    TPro  5.8    /  Alb  2.1    /  TBili  0.6    /  DBili  x      /  AST  9      /  ALT  11     /  AlkPhos  77     03 May 2023 07:00    CAPILLARY BLOOD GLUCOSE  POCT Blood Glucose.: 139 mg/dL (03 May 2023 12:04)  POCT Blood Glucose.: 144 mg/dL (02 May 2023 18:00)    RADIOLOGY & ADDITIONAL TESTS:    < from: Xray Chest 1 View- PORTABLE-Urgent (Xray Chest 1 View- PORTABLE-Urgent .) (04.30.23 @ 16:29) >  PROCEDURE DATE:  04/30/2023          INTERPRETATION:  Clinical History: 72-year-old female, coarse breath   sounds on the right.    Portable view the chest is compared to 4/24/2023 and is correlated with   the abdominal CT of 4/27/2023.    FINDINGS: Tip of the NG tube in the stomach, unchanged.    Mild to moderate cardiomegaly and mild pulmonary venous congestion,   unchanged.    Small bilateral pleural effusions, unchanged.    No pneumoperitoneum or acute osseous finding.    IMPRESSION:  NG tube with the tip in the stomach, unchanged.    Small bilateral pleural effusions, unchanged    --- End of Report ---      < end of copied text >      Personally reviewed.     Consultant(s) Notes Reviewed:  [x] YES  [ ] NO

## 2023-05-03 NOTE — PROGRESS NOTE ADULT - SUBJECTIVE AND OBJECTIVE BOX
[facility]  Yaw Tolbert PA-C  [address] [place] [address]  [phone]      INTERVAL HPI/OVERNIGHT EVENTS:  Pt s/e  Reports no new GI events  +ostomy  +NGT    MEDICATIONS  (STANDING):  albuterol/ipratropium for Nebulization 3 milliLiter(s) Nebulizer every 6 hours  buDESOnide    Inhalation Suspension 0.5 milliGRAM(s) Inhalation every 12 hours  escitalopram 20 milliGRAM(s) Oral daily  levothyroxine Injectable 93.75 MICROGram(s) IV Push <User Schedule>  midodrine. 5 milliGRAM(s) Oral three times a day  pantoprazole  Injectable 40 milliGRAM(s) IV Push two times a day    MEDICATIONS  (PRN):  acetaminophen     Tablet .. 650 milliGRAM(s) Oral every 6 hours PRN Moderate Pain (4 - 6)  albuterol    90 MICROgram(s) HFA Inhaler 4 Puff(s) Inhalation every 6 hours PRN Shortness of Breath and/or Wheezing  metoclopramide Injectable 5 milliGRAM(s) IV Push every 6 hours PRN nausea and/or vomiting  sodium chloride 0.9% lock flush 10 milliLiter(s) IV Push every 1 hour PRN Pre/post blood products, medications, blood draw, and to maintain line patency      Allergies    No Known Allergies    PHYSICAL EXAM:   Vital Signs:  Vital Signs Last 24 Hrs  T(C): 36.4 (03 May 2023 11:19), Max: 36.8 (03 May 2023 04:18)  T(F): 97.6 (03 May 2023 11:19), Max: 98.3 (03 May 2023 04:18)  HR: 87 (03 May 2023 11:19) (68 - 93)  BP: 110/61 (03 May 2023 11:19) (106/58 - 132/71)  BP(mean): --  RR: 19 (03 May 2023 11:19) (17 - 19)  SpO2: 98% (03 May 2023 11:19) (94% - 98%)    Parameters below as of 03 May 2023 11:19  Patient On (Oxygen Delivery Method): nasal cannula  O2 Flow (L/min): 3    Daily     Daily Weight in k (03 May 2023 04:18)    GENERAL:  Appears stated age  HEENT:  NC/AT, +NGT  CHEST:  Full & symmetric excursion  HEART:  Regular rhythm  ABDOMEN:  Soft, non-tender, non-distended, +ostomy no blood  EXTEREMITIES:  no cyanosis  SKIN:  No rash  NEURO:  Alert      LABS:                        9.4    39.94 )-----------( 566      ( 03 May 2023 07:00 )             31.1     05-    147<H>  |  115<H>  |  54<H>  ----------------------------<  92  3.7   |  24  |  2.30<H>    Ca    8.4<L>      03 May 2023 07:00  Phos  2.8     05-  Mg     1.8         TPro  5.8<L>  /  Alb  2.1<L>  /  TBili  0.6  /  DBili  x   /  AST  9<L>  /  ALT  11<L>  /  AlkPhos  77

## 2023-05-03 NOTE — PROGRESS NOTE ADULT - PROBLEM SELECTOR PLAN 1
-s/p blood loss anemia due to acute perforated bowel and extensive abdominal surgery early in the admission requiring 2un PRBC  -Pt now s/p additional 5 units PRBC with new GIB   -ostomy output is now brown and pt had appropriate response of Hgb after last unit from 7.7 to 8.8. Hg improved to 9.4 this morning  -retacrit per nephro for renal failure  -serial CBCs, transfuse as needed with goal Hgb >8 now given severe bleeding pt had  -NGT in place, discussed with GI/surgery - tube feeds resumed as bleeding has resolved  -D5 65/hr ordered  -GI ([facility]), recs appreciated -- if pt rebleeds, may need further imaging and/or IR involvement -- performing CTA has significant risks as pt is recovering from severe ATN from septic shock and still has significantly impaired renal function; if needs imaging, will pursue NM bleeding scan

## 2023-05-04 NOTE — SWALLOW BEDSIDE ASSESSMENT ADULT - COMMENTS
Chart reviewed, attempted to see pt to reassess swallow, pt received sleeping in bed, +NGT, +O2NC, +b/l handmitts, +baseline congested cough, pt unarousable despite max modality cues, pt not appropriate for assessment at this time, left as received NAD.  JAN Trotter and Dr. Posey notified.  Will reattempt x1 later today, as appropriate.

## 2023-05-04 NOTE — CONSULT NOTE ADULT - ASSESSMENT
A/P: 71 yo F perforated sigmoid s/p Tia poor condition, code blue now intubated  - Unable to perform full assessment due to patient's poor condition  - Can reconsult once extubated but patient is not likely to be a good PO candidate in the near future  - Favor aspiration in the setting of deconditioned state    Brian Franco, DO

## 2023-05-04 NOTE — PROGRESS NOTE ADULT - PROBLEM SELECTOR PLAN 2
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  -S&S continues to fail due to lethargy and inability to swallow, will reassess if pt improves clinically  -leukocytosis likely related to myeloproliferative disorder

## 2023-05-04 NOTE — SWALLOW BEDSIDE ASSESSMENT ADULT - MODE OF PRESENTATION
spoon/fed by clinician

## 2023-05-04 NOTE — PROGRESS NOTE ADULT - SUBJECTIVE AND OBJECTIVE BOX
Patient is a 72y old  Female who presents with a chief complaint of intra-abdominal perforation (04 May 2023 08:35)    INTERVAL HPI/OVERNIGHT EVENTS: Patient seen and examined at bedside. No overnight events occurred. Patient has no complaints at this time. PT failed bedside speech and swallow screen again this AM. Team to re-eval. Pt still with severe general weakness. Plan for MRI today. Denies fevers, chills, headache, lightheadedness, chest pain, dyspnea, abdominal pain, n/v/d/c.    MEDICATIONS  (STANDING):  albuterol/ipratropium for Nebulization 3 milliLiter(s) Nebulizer every 6 hours  buDESOnide    Inhalation Suspension 0.5 milliGRAM(s) Inhalation every 12 hours  dextrose 5%. 1000 milliLiter(s) (65 mL/Hr) IV Continuous <Continuous>  escitalopram 20 milliGRAM(s) Oral daily  levothyroxine Injectable 93.75 MICROGram(s) IV Push <User Schedule>  midodrine. 5 milliGRAM(s) Oral three times a day  pantoprazole  Injectable 40 milliGRAM(s) IV Push two times a day  potassium chloride   Powder 20 milliEquivalent(s) Oral every 2 hours    MEDICATIONS  (PRN):  acetaminophen     Tablet .. 650 milliGRAM(s) Oral every 6 hours PRN Moderate Pain (4 - 6)  albuterol    90 MICROgram(s) HFA Inhaler 4 Puff(s) Inhalation every 6 hours PRN Shortness of Breath and/or Wheezing  metoclopramide Injectable 5 milliGRAM(s) IV Push every 6 hours PRN nausea and/or vomiting  sodium chloride 0.9% lock flush 10 milliLiter(s) IV Push every 1 hour PRN Pre/post blood products, medications, blood draw, and to maintain line patency    Allergies  No Known Allergies    Intolerances    REVIEW OF SYSTEMS:  CONSTITUTIONAL: No fever or chills  HEENT:  No headache, no sore throat  RESPIRATORY: cough improving; SOB improved   CARDIOVASCULAR: No chest pain, palpitations  GASTROINTESTINAL: No abd pain, nausea, vomiting, or diarrhea  GENITOURINARY: No dysuria, frequency, or hematuria  NEUROLOGICAL: +general severe weakness  MUSCULOSKELETAL: no myalgias     Vital Signs Last 24 Hrs  T(C): 36.6 (04 May 2023 04:56), Max: 36.9 (04 May 2023 00:44)  T(F): 97.9 (04 May 2023 04:56), Max: 98.5 (04 May 2023 00:44)  HR: 76 (04 May 2023 08:21) (70 - 95)  BP: 127/68 (04 May 2023 04:56) (110/61 - 127/68)  BP(mean): --  RR: 18 (04 May 2023 04:56) (18 - 19)  SpO2: 98% (04 May 2023 04:56) (92% - 98%)    Parameters below as of 04 May 2023 08:21  Patient On (Oxygen Delivery Method): nasal cannula    PHYSICAL EXAM:  GENERAL: chronically ill-appearing, not in acute distress; frail, more alert  HEENT:  anicteric, moist mucous membranes, NGT in place  CHEST/LUNG: somewhat decreased breath sounds bilaterally, weak inspiratory effort  HEART: irregular rhythm S1, S2  ABDOMEN: ileostomy in place; small volume of brown liquid stool noted, BS+, soft, nontender, nondistended; midline surgical incision, surgical dressing C/D/I  EXTREMITIES: no cyanosis, or calf tenderness  NERVOUS SYSTEM: awake and alert, able to vocalize few answers    LABS:                        8.4    30.22 )-----------( 538      ( 04 May 2023 07:05 )             28.5     CBC Full  -  ( 04 May 2023 07:05 )  WBC Count : 30.22 K/uL  Hemoglobin : 8.4 g/dL  Hematocrit : 28.5 %  Platelet Count - Automated : 538 K/uL  Mean Cell Volume : 99.7 fl  Mean Cell Hemoglobin : 29.4 pg  Mean Cell Hemoglobin Concentration : 29.5 gm/dL  Auto Neutrophil # : x  Auto Lymphocyte # : x  Auto Monocyte # : x  Auto Eosinophil # : x  Auto Basophil # : x  Auto Neutrophil % : x  Auto Lymphocyte % : x  Auto Monocyte % : x  Auto Eosinophil % : x  Auto Basophil % : x    04 May 2023 07:05    145    |  115    |  50     ----------------------------<  129    3.4     |  24     |  2.20     Ca    8.3        04 May 2023 07:05  Phos  2.5       04 May 2023 07:05  Mg     2.2       04 May 2023 07:05    TPro  5.8    /  Alb  2.0    /  TBili  0.4    /  DBili  x      /  AST  10     /  ALT  8      /  AlkPhos  76     04 May 2023 07:05    CAPILLARY BLOOD GLUCOSE  POCT Blood Glucose.: 163 mg/dL (04 May 2023 07:42)  POCT Blood Glucose.: 146 mg/dL (04 May 2023 05:19)  POCT Blood Glucose.: 143 mg/dL (04 May 2023 00:34)  POCT Blood Glucose.: 134 mg/dL (03 May 2023 20:04)  POCT Blood Glucose.: 139 mg/dL (03 May 2023 12:04)    RADIOLOGY & ADDITIONAL TESTS:    < from: Xray Chest 1 View AP/PA (05.03.23 @ 18:03) >  PROCEDURE DATE:  05/03/2023      INTERPRETATION:  Clinical history: 72-year-old female, confirm NG tube   placement.    Portable views of the chest are compared to 4/30/2023, chest CT of   1/12/2023 abdominal CT of 4/27/2023 and 1/26/2023.    FINDINGS: Tip of the NG tube in the stomach fundus post adjustment.    Mild cardiomegaly and mild pulmonary venous congestion with no lobar   consolidation, right effusion, pneumothorax or acute osseous finding.    Coarse perihilar interstitial opacities, unchanged    Small left pleural effusion/pleural thickening, unchanged since 1/23.    IMPRESSION:    Tip of the NG tube in the stomach fundus post adjustment.    Mild pulmonary venous congestion, unchanged.    Perihilar interstitial opacities, unchanged since 1/23    --- End of Report ---    < end of copied text >    Personally reviewed.     Consultant(s) Notes Reviewed:  [x] YES  [ ] NO     Patient is a 72y old  Female who presents with a chief complaint of intra-abdominal perforation (04 May 2023 08:35)    INTERVAL HPI/OVERNIGHT EVENTS: Patient seen and examined at bedside. No overnight events occurred. Patient has no complaints at this time. PT failed bedside speech and swallow screen again this AM. Team to re-eval. Pt still with severe general weakness. Plan for MRI today. Denies fevers, chills, headache, lightheadedness, chest pain, dyspnea, abdominal pain.      REVIEW OF SYSTEMS:  CONSTITUTIONAL: No fever or chills  HEENT:  No headache, no sore throat  RESPIRATORY: cough improving; SOB improved   CARDIOVASCULAR: No chest pain, palpitations  GASTROINTESTINAL: No abd pain, nausea, vomiting, or diarrhea  GENITOURINARY: No dysuria, frequency, or hematuria  NEUROLOGICAL: +general severe weakness  MUSCULOSKELETAL: no myalgias     Vital Signs Last 24 Hrs  T(C): 36.6 (04 May 2023 04:56), Max: 36.9 (04 May 2023 00:44)  T(F): 97.9 (04 May 2023 04:56), Max: 98.5 (04 May 2023 00:44)  HR: 76 (04 May 2023 08:21) (70 - 95)  BP: 127/68 (04 May 2023 04:56) (110/61 - 127/68)  BP(mean): --  RR: 18 (04 May 2023 04:56) (18 - 19)  SpO2: 98% (04 May 2023 04:56) (92% - 98%)    Parameters below as of 04 May 2023 08:21  Patient On (Oxygen Delivery Method): nasal cannula    PHYSICAL EXAM:  GENERAL: chronically ill-appearing, not in acute distress; frail, more alert  HEENT:  anicteric, moist mucous membranes, NGT in place  CHEST/LUNG: somewhat decreased breath sounds bilaterally, weak inspiratory effort  HEART: irregular rhythm S1, S2  ABDOMEN: ileostomy in place; small volume of brown liquid stool noted, BS+, soft, nontender, nondistended; midline surgical incision, surgical dressing C/D/I  EXTREMITIES: no cyanosis, or calf tenderness  NERVOUS SYSTEM: awake and alert, able to vocalize few answers  gu intact    MEDICATIONS  (STANDING):  albuterol/ipratropium for Nebulization 3 milliLiter(s) Nebulizer every 6 hours  buDESOnide    Inhalation Suspension 0.5 milliGRAM(s) Inhalation every 12 hours  dextrose 5%. 1000 milliLiter(s) (65 mL/Hr) IV Continuous <Continuous>  escitalopram 20 milliGRAM(s) Oral daily  levothyroxine Injectable 93.75 MICROGram(s) IV Push <User Schedule>  midodrine. 5 milliGRAM(s) Oral three times a day  pantoprazole  Injectable 40 milliGRAM(s) IV Push two times a day  potassium chloride   Powder 20 milliEquivalent(s) Oral every 2 hours    MEDICATIONS  (PRN):  acetaminophen     Tablet .. 650 milliGRAM(s) Oral every 6 hours PRN Moderate Pain (4 - 6)  albuterol    90 MICROgram(s) HFA Inhaler 4 Puff(s) Inhalation every 6 hours PRN Shortness of Breath and/or Wheezing  metoclopramide Injectable 5 milliGRAM(s) IV Push every 6 hours PRN nausea and/or vomiting  sodium chloride 0.9% lock flush 10 milliLiter(s) IV Push every 1 hour PRN Pre/post blood products, medications, blood draw, and to maintain line patency      LABS:                        8.4    30.22 )-----------( 538      ( 04 May 2023 07:05 )             28.5     CBC Full  -  ( 04 May 2023 07:05 )  WBC Count : 30.22 K/uL  Hemoglobin : 8.4 g/dL  Hematocrit : 28.5 %  Platelet Count - Automated : 538 K/uL  Mean Cell Volume : 99.7 fl  Mean Cell Hemoglobin : 29.4 pg  Mean Cell Hemoglobin Concentration : 29.5 gm/dL  Auto Neutrophil # : x  Auto Lymphocyte # : x  Auto Monocyte # : x  Auto Eosinophil # : x  Auto Basophil # : x  Auto Neutrophil % : x  Auto Lymphocyte % : x  Auto Monocyte % : x  Auto Eosinophil % : x  Auto Basophil % : x    04 May 2023 07:05    145    |  115    |  50     ----------------------------<  129    3.4     |  24     |  2.20     Ca    8.3        04 May 2023 07:05  Phos  2.5       04 May 2023 07:05  Mg     2.2       04 May 2023 07:05    TPro  5.8    /  Alb  2.0    /  TBili  0.4    /  DBili  x      /  AST  10     /  ALT  8      /  AlkPhos  76     04 May 2023 07:05    CAPILLARY BLOOD GLUCOSE  POCT Blood Glucose.: 163 mg/dL (04 May 2023 07:42)  POCT Blood Glucose.: 146 mg/dL (04 May 2023 05:19)  POCT Blood Glucose.: 143 mg/dL (04 May 2023 00:34)  POCT Blood Glucose.: 134 mg/dL (03 May 2023 20:04)  POCT Blood Glucose.: 139 mg/dL (03 May 2023 12:04)    RADIOLOGY & ADDITIONAL TESTS:    < from: Xray Chest 1 View AP/PA (05.03.23 @ 18:03) >  PROCEDURE DATE:  05/03/2023      INTERPRETATION:  Clinical history: 72-year-old female, confirm NG tube   placement.    Portable views of the chest are compared to 4/30/2023, chest CT of   1/12/2023 abdominal CT of 4/27/2023 and 1/26/2023.    FINDINGS: Tip of the NG tube in the stomach fundus post adjustment.    Mild cardiomegaly and mild pulmonary venous congestion with no lobar   consolidation, right effusion, pneumothorax or acute osseous finding.    Coarse perihilar interstitial opacities, unchanged    Small left pleural effusion/pleural thickening, unchanged since 1/23.    IMPRESSION:    Tip of the NG tube in the stomach fundus post adjustment.    Mild pulmonary venous congestion, unchanged.    Perihilar interstitial opacities, unchanged since 1/23    --- End of Report ---    < end of copied text >    Personally reviewed.     Consultant(s) Notes Reviewed:  [x] YES  [ ] NO

## 2023-05-04 NOTE — SWALLOW BEDSIDE ASSESSMENT ADULT - ORAL PHASE
Max modality cues provided to increase swallow elicitation, however, unsuccessful.  Pt unable to elicit swallow, all bolus trials removed by SLP via Yankauer/Decreased anterior-posterior movement of the bolus/Delayed oral transit time
Decreased anterior-posterior movement of the bolus/Delayed oral transit time/Lingual stasis
Absent posterior bolus transfer, despite max cues, bolus ultimately suctioned from oral cavity via Yankauer.
Max modality cues provided to increase swallow elicitation, however, unsuccessful.  Pt unable to elicit swallow, all bolus trials manually removed by SLP via toothette and Yankauer/Decreased anterior-posterior movement of the bolus/Delayed oral transit time
absent posterior transfer/transport
poor attention to bolus requiring max cues to increase bolus manipulation and swallow elicitation/Decreased anterior-posterior movement of the bolus/Delayed oral transit time

## 2023-05-04 NOTE — SWALLOW BEDSIDE ASSESSMENT ADULT - COMMENTS
Chart reviewed pt more awake per RN, seen to reassess swallow.  Pt received awake in bed, fatigue noted, Ox1 via yes/no, reduced cognition, reduced vocal intensity when attempting to verbalize, +NGT, O2NC, +baseline weak, congested cough, +b/l handmitts, pain scale 0/10 pre & post eval via non verbal pain scale.  Swallow reassessment completed see below for details.  Pt left as received NAD JAN Trotter & Dr. Posey notified.  No further follow up at this time, please reconsult prn.    Per charting, pt is a "73yo F w/ PMH of A-Fib on rivaroxaban, HFpEF, recent C diff infection (Jan 2023), myeloproliferative disorder with thrombocytosis on anagrelide, hypothyroidism, asthma, and lung nodules s/p resection (reportedly malignant) who presents with perforated sigmoid colon with acute bacterial peritonitis, LAYA due to ATN, septic shock, lactic acidosis, shock liver;  post/op acute respiratory failure requiring mechanical ventilation, and acute blood loss anemia; s/p extended Tia's procedure including descending colon on 4/13. She was extubated 4/19 and tolerating supplemental oxygen via nasal cannula, overall gradual improvement, but complicated by critical illness polyneuropathy, and further c/b acute GIB."    Chest xray 4/30/23: "IMPRESSION:  NG tube with the tip in the stomach, unchanged.    Small bilateral pleural effusions, unchanged"

## 2023-05-04 NOTE — PROVIDER CONTACT NOTE (OTHER) - SITUATION
Pt removed Angiocath and was pulling on NGT
Pt is back from MRI and having continues cough and wheezing   responding only yes

## 2023-05-04 NOTE — SWALLOW BEDSIDE ASSESSMENT ADULT - SPECIFY REASON(S)
Reassess swallow to determine PO candidacy
to assess swallow function
Reassess swallow to determine PO candidacy
to assess swallow function
Reassess swallow to determine PO candidacy
Reassess swallow to determine PO candidacy
clinical swallow eval
R/O Dysphagia

## 2023-05-04 NOTE — PROGRESS NOTE ADULT - ASSESSMENT
lower GI bleed    cbc noted  transfuse prn  no further overt GI bleed  monitor ostomy output  S/S eval noted; cont NGT feeds  will follow    I reviewed the overnight course of events on the unit, re-confirming the patient history. I discussed the care with the patient and their family  Differential diagnosis and plan of care discussed with patient after the evaluation  50 minutes spent on total encounter of which more than fifty percent of the encounter was spent counseling and/or coordinating care by the attending physician.  Advanced care planning was discussed with patient and family.  Advanced care planning forms were reviewed and discussed.  Risks, benefits and alternatives of gastroenterologic procedures were discussed in detail and all questions were answered.

## 2023-05-04 NOTE — PROCEDURE NOTE - ADDITIONAL PROCEDURE DETAILS
Indication: septic shock     Procedure performed independently of critical care time.
Dx:  1) Cardiac Arrest   2) Acute Hypoxic / Hypercapnic Respiratory Failure  3) Distributive Shock   4) Metabolic Acidosis   5) Aspiration PNA    Procedure performed independent of CC time spent at the bedside.
Dx:  1) Cardiac Arrest   2) Acute Hypoxic / Hypercapnic Respiratory Failure  3) Distributive Shock   4) Metabolic Acidosis   5) Aspiration PNA    Procedure performed independent of CC time spent at the bedside.
Indication: septic shock     Procedure performed independently of critical care time.

## 2023-05-04 NOTE — CONSULT NOTE ADULT - PROVIDER SPECIALTY LIST ADULT
Critical Care
Cardiology
Critical Care
Nephrology
Surgery
ENT
Infectious Disease
Wound Care
Gastroenterology
Heme/Onc

## 2023-05-04 NOTE — SWALLOW BEDSIDE ASSESSMENT ADULT - ADDITIONAL RECOMMENDATIONS
Consider GOC conversation with pt/family regarding nutrition/hydration
Consider GOC conversation with pt/family regarding nutrition/hydration
This dept to continue to f/u as schedule permits for PO candidacy. Pt is not a candidate for swallow therapy at this time as pt unable to follow low-level, verbally presented directives at time of assessment. MD advised to reconsult this service should pt experience a change/improvement in status.

## 2023-05-04 NOTE — SWALLOW BEDSIDE ASSESSMENT ADULT - ASR SWALLOW REFERRAL
Registered Dietitian
to ensure adequate caloric intake/Registered Dietitian

## 2023-05-04 NOTE — PROGRESS NOTE ADULT - SUBJECTIVE AND OBJECTIVE BOX
S: Patient seen and examined at bedside, POD#21 s/p extended yessica's (including descending colon). Patient had eval with S&S yesterday, recommended NPO. NGT changed to k-tube for enteral feeds, currently running. Patient reports no new complaints at this time.  Patient minimally responsive today. Patient denies any fever, chills, chest pain, shortness of breath, nausea, vomiting, or urinary complaints.    MEDICATIONS:  acetaminophen     Tablet .. 650 milliGRAM(s) Oral every 6 hours PRN  albuterol    90 MICROgram(s) HFA Inhaler 4 Puff(s) Inhalation every 6 hours PRN  albuterol/ipratropium for Nebulization 3 milliLiter(s) Nebulizer every 6 hours  buDESOnide    Inhalation Suspension 0.5 milliGRAM(s) Inhalation every 12 hours  dextrose 5%. 1000 milliLiter(s) IV Continuous <Continuous>  escitalopram 20 milliGRAM(s) Oral daily  levothyroxine Injectable 93.75 MICROGram(s) IV Push <User Schedule>  metoclopramide Injectable 5 milliGRAM(s) IV Push every 6 hours PRN  midodrine. 5 milliGRAM(s) Oral three times a day  pantoprazole  Injectable 40 milliGRAM(s) IV Push two times a day  sodium chloride 0.9% lock flush 10 milliLiter(s) IV Push every 1 hour PRN    O:  Vital Signs Last 24 Hrs  T(C): 36.6 (04 May 2023 04:56), Max: 36.9 (04 May 2023 00:44)  T(F): 97.9 (04 May 2023 04:56), Max: 98.5 (04 May 2023 00:44)  HR: 76 (04 May 2023 08:21) (70 - 95)  BP: 127/68 (04 May 2023 04:56) (110/61 - 127/68)  RR: 18 (04 May 2023 04:56) (18 - 19)  SpO2: 98% (04 May 2023 04:56) (92% - 98%)    Parameters below as of 04 May 2023 08:21  Patient On (Oxygen Delivery Method): nasal cannula    I&O SUMMARY:    05-03-23 @ 07:01  -  05-04-23 @ 07:00  --------------------------------------------------------  IN:    dextrose 5%: 225 mL    Enteral Tube Flush: 650 mL    Free Water: 600 mL  Total IN: 1475 mL    OUT:    Colostomy (mL): 900 mL    Voided (mL): 150 mL  Total OUT: 1050 mL    Total NET: 425 mL    PHYSICAL EXAM:  GENERAL: No acute distress, lying in bed  HEAD:  Atraumatic, Normocephalic  CHEST/LUNG: Wet cough, rales auscultated L>R  HEART: Regular rate and rhythm  ABDOMEN: Soft, non-tender, non-distended, wound vac in place to midline incision with good seal; Ostomy functioning with yellow/brown output  EXT: calves non-tender b/l  NEUROLOGY: no focal deficits; answering some questions    LABS:    AM Labs pending

## 2023-05-04 NOTE — PROCEDURE NOTE - NSPOSTPRCRAD_GEN_A_CORE
post-procedure radiography performed
central line located in the superior vena cava/no pneumothorax/post-procedure radiography performed
17cm/depth of insertion/post-procedure radiography performed

## 2023-05-04 NOTE — PROGRESS NOTE ADULT - SUBJECTIVE AND OBJECTIVE BOX
[facility]  Yaw Tolbert PA-C  [address] [place] [address]  [phone]      INTERVAL HPI/OVERNIGHT EVENTS:  Pt s/e  Lethargic this am  +NGT    MEDICATIONS  (STANDING):  albuterol/ipratropium for Nebulization 3 milliLiter(s) Nebulizer every 6 hours  buDESOnide    Inhalation Suspension 0.5 milliGRAM(s) Inhalation every 12 hours  dextrose 5%. 1000 milliLiter(s) (65 mL/Hr) IV Continuous <Continuous>  escitalopram 20 milliGRAM(s) Oral daily  levothyroxine Injectable 93.75 MICROGram(s) IV Push <User Schedule>  midodrine. 5 milliGRAM(s) Oral three times a day  pantoprazole  Injectable 40 milliGRAM(s) IV Push two times a day    MEDICATIONS  (PRN):  acetaminophen     Tablet .. 650 milliGRAM(s) Oral every 6 hours PRN Moderate Pain (4 - 6)  albuterol    90 MICROgram(s) HFA Inhaler 4 Puff(s) Inhalation every 6 hours PRN Shortness of Breath and/or Wheezing  metoclopramide Injectable 5 milliGRAM(s) IV Push every 6 hours PRN nausea and/or vomiting  sodium chloride 0.9% lock flush 10 milliLiter(s) IV Push every 1 hour PRN Pre/post blood products, medications, blood draw, and to maintain line patency      Allergies    No Known Allergies      PHYSICAL EXAM:   Vital Signs:  Vital Signs Last 24 Hrs  T(C): 36.9 (04 May 2023 12:00), Max: 36.9 (04 May 2023 00:44)  T(F): 98.4 (04 May 2023 12:00), Max: 98.5 (04 May 2023 00:44)  HR: 100 (04 May 2023 12:00) (70 - 100)  BP: 118/60 (04 May 2023 12:00) (111/71 - 127/68)  BP(mean): --  RR: 18 (04 May 2023 12:00) (18 - 18)  SpO2: 97% (04 May 2023 12:00) (92% - 98%)    Parameters below as of 04 May 2023 12:00  Patient On (Oxygen Delivery Method): nasal cannula      Daily     Daily Weight in k (04 May 2023 04:56)    GENERAL:  Appears stated age  HEENT:  NC/AT  CHEST:  Full & symmetric excursion  HEART:  Regular rhythm  ABDOMEN:  Soft, non-tender, non-distended, +NGT  EXTEREMITIES:  no cyanosis  SKIN:  No rash  NEURO:  Lethargic      LABS:                        8.4    30.22 )-----------( 538      ( 04 May 2023 07:05 )             28.5     05-04    145  |  115<H>  |  50<H>  ----------------------------<  129<H>  3.4<L>   |  24  |  2.20<H>    Ca    8.3<L>      04 May 2023 07:05  Phos  2.5     05-04  Mg     2.2     05-04    TPro  5.8<L>  /  Alb  2.0<L>  /  TBili  0.4  /  DBili  x   /  AST  10<L>  /  ALT  8<L>  /  AlkPhos  76  05-04

## 2023-05-04 NOTE — CONSULT NOTE ADULT - SUBJECTIVE AND OBJECTIVE BOX
Patient is a 72y old  Female who presents with a chief complaint of intra-abdominal perforation (04 May 2023 14:45)      BRIEF HOSPITAL COURSE: 73yo F w/ PMH of A-Fib on rivaroxaban, HFpEF, recent C diff infection (Jan 2023), myeloproliferative disorder with thrombocytosis on anagrelide, hypothyroidism, asthma, and lung nodules s/p resection (reportedly malignant) who presents with perforated sigmoid colon with acute bacterial peritonitis, LAYA due to ATN, septic shock, lactic acidosis, shock liver;  post/op acute respiratory failure requiring mechanical ventilation, and acute blood loss anemia; s/p extended Tia's procedure including descending colon on 4/13. She was extubated 4/19 and tolerating supplemental oxygen via nasal cannula, overall gradual improvement, but complicated by critical illness polyneuropathy, and further c/b acute GIB.      Events last 24 hours: CODE BLUE around 17:00. On arrival patient noted to be ashen, grey, unresponsive. CPR started immediately per ACLS protocol. Lasted about 10min, got epi x2, bicarb x1, PEA initial rhythm. Brought to ICU, started on levophed. Noted to have Peak pressures of 60, sedated with propofol, given toi 50mg IVP x1.     PAST MEDICAL & SURGICAL HISTORY:  Hypothyroidism      HLD (hyperlipidemia)      Thrombocytosis      Persistent atrial fibrillation      Obesity (BMI 35.0-39.9 without comorbidity)      Asthma      Diabetes mellitus      Ankle injury  on 2004, 5 surgeries.      Cholecystitis          Review of Systems:  unable to obtain due to patients clinical condition       Medications:    midodrine. 5 milliGRAM(s) Oral three times a day  norepinephrine Infusion 0.04 MICROgram(s)/kG/Min IV Continuous <Continuous>    albuterol    90 MICROgram(s) HFA Inhaler 4 Puff(s) Inhalation every 6 hours PRN  albuterol/ipratropium for Nebulization 3 milliLiter(s) Nebulizer every 6 hours  buDESOnide    Inhalation Suspension 0.5 milliGRAM(s) Inhalation every 12 hours    acetaminophen     Tablet .. 650 milliGRAM(s) Oral every 6 hours PRN  escitalopram 20 milliGRAM(s) Oral daily  metoclopramide Injectable 5 milliGRAM(s) IV Push every 6 hours PRN  propofol Infusion 20 MICROgram(s)/kG/Min IV Continuous <Continuous>        pantoprazole  Injectable 40 milliGRAM(s) IV Push two times a day      hydrocortisone sodium succinate Injectable 100 milliGRAM(s) IV Push every 8 hours  levothyroxine Injectable 93.75 MICROGram(s) IV Push <User Schedule>    sodium chloride 0.9% lock flush 10 milliLiter(s) IV Push every 1 hour PRN      chlorhexidine 0.12% Liquid 15 milliLiter(s) Oral Mucosa every 12 hours  chlorhexidine 2% Cloths 1 Application(s) Topical <User Schedule>        Mode: AC/ CMV (Assist Control/ Continuous Mandatory Ventilation)  RR (machine): 22  TV (machine): 400  FiO2: 50  PEEP: 5  ITime: 1  MAP: 22  PIP: 59      ICU Vital Signs Last 24 Hrs  T(C): 36.9 (04 May 2023 12:00), Max: 36.9 (04 May 2023 00:44)  T(F): 98.4 (04 May 2023 12:00), Max: 98.5 (04 May 2023 00:44)  HR: 100 (04 May 2023 12:00) (70 - 100)  BP: 118/60 (04 May 2023 12:00) (111/71 - 127/68)  RR: 18 (04 May 2023 12:00) (18 - 18)  SpO2: 97% (04 May 2023 12:00) (92% - 98%)    O2 Parameters below as of 04 May 2023 12:00  Patient On (Oxygen Delivery Method): nasal cannula            ABG - ( 04 May 2023 18:07 )  pH, Arterial: 7.17  pH, Blood: x     /  pCO2: 64    /  pO2: 73    / HCO3: 23    / Base Excess: -5.2  /  SaO2: 96.1                I&O's Detail    03 May 2023 07:01  -  04 May 2023 07:00  --------------------------------------------------------  IN:    dextrose 5%: 225 mL    Enteral Tube Flush: 650 mL    Free Water: 600 mL  Total IN: 1475 mL    OUT:    Colostomy (mL): 900 mL    Voided (mL): 150 mL  Total OUT: 1050 mL    Total NET: 425 mL            LABS:                        8.7    39.38 )-----------( 691      ( 04 May 2023 18:15 )             29.8     05-04    145  |  115<H>  |  50<H>  ----------------------------<  129<H>  3.4<L>   |  24  |  2.20<H>    Ca    8.3<L>      04 May 2023 07:05  Phos  2.5     05-04  Mg     2.2     05-04    TPro  5.8<L>  /  Alb  2.0<L>  /  TBili  0.4  /  DBili  x   /  AST  10<L>  /  ALT  8<L>  /  AlkPhos  76  05-04          CAPILLARY BLOOD GLUCOSE      POCT Blood Glucose.: 141 mg/dL (04 May 2023 12:14)      CULTURES:      Physical Examination:    General: ill appearing, ashen, grey, unresponsive     HEENT: dilated, minimally reactivce    PULM: good air movement, rhonchi on right, diminished on left     CVS: tachycardia regular rhythm     ABD: Soft, nondistended, nontender, colostomy with stool, wound vac over anterior abdomen     EXT: No edema, nontender    SKIN: Warm    NEURO: unresposnive, even after arrest with no mental stauts      RADIOLOGY: CXR with ET tube in good position, infiltrates b/l       CRITICAL CARE TIME SPENT: 75 minutes assessing presenting problems of acute illness, which pose high probability of life threatening deterioration or end organ damage/dysfunction, as well as medical decision making including initiating plan of care, reviewing data, reviewing radiologic exams, discussing with multidisciplinary team,  discussing goals of care with patient/family, and writing this note.  Non-inclusive of procedures performed,

## 2023-05-04 NOTE — SWALLOW BEDSIDE ASSESSMENT ADULT - NS ASR SWALLOW FINDINGS DISCUS
Physician/Nursing/Patient
Surgical PA [id number]/Nursing/Patient
Nursing
Surgery PA [id number]/Physician/Nursing/Patient
Resident [id number]/Nursing/Patient
Physician/Nursing/Patient

## 2023-05-04 NOTE — PROGRESS NOTE ADULT - NUTRITIONAL ASSESSMENT
This patient has been assessed with a concern for Malnutrition and has been determined to have a diagnosis/diagnoses of Severe protein-calorie malnutrition.    This patient is being managed with:   Diet NPO with Tube Feed-  Tube Feeding Modality: Nasogastric  Nepro with Carb Steady  Total Volume for 24 Hours (mL): 1000  Continuous  Starting Tube Feed Rate {mL per Hour}: 40  Increase Tube Feed Rate by (mL): 10     Every 6 hours  Until Goal Tube Feed Rate (mL per Hour): 50  Tube Feed Duration (in Hours): 20  Tube Feed Start Time: 06:00  No Carb Prosource (1pkg = 15gms Protein)     Qty per Day:  30 ml daily  Entered: May  3 2023 12:07PM    The following pending diet order is being considered for treatment of Severe protein-calorie malnutrition:null

## 2023-05-04 NOTE — PROCEDURE NOTE - PROCEDURE DATE TIME, MLM
04-May-2023 20:00
13-Apr-2023 11:30
13-Apr-2023 18:00
04-May-2023 21:40
24-Apr-2023 12:31
04-May-2023 21:18

## 2023-05-04 NOTE — PROCEDURE NOTE - NSBRONCHPROCDETAILS_GEN_A_CORE_FT
Bronchoscopy Preformed due to high Peak pressures that did not resolve after multiple interventions. Preformed with large bronchoscope, Et tube above silke, right and left mainstem bronchus without obvious occlusion or mucus. First segments of right and left lung without obvious occlusion or mucus. Scant secretions sucitoned, nothing able to be sent for culture. Procedure completed, patient SpO2 did not drop below 95% during procedure. Peak pressures did not improve post bronch. All materials accounted for

## 2023-05-04 NOTE — CONSULT NOTE ADULT - CONSULT REASON
Wound Consult
colon perforation
CHF, ACS
CODE BLUE
GIB
perforated viscus
Failed swallow studies
LAYA on CKD
evaluation of myelofibrosis D75.81
perforated colon

## 2023-05-04 NOTE — SWALLOW BEDSIDE ASSESSMENT ADULT - SWALLOW EVAL: DIAGNOSIS
Patient presents with moderate oropharyngeal dysphagia for puree solids. Oral stage marked by reduced oral grading, delayed bolus manipulation, delayed posterior transfer, mild lingual stasis post swallow. Pharyngeal stage marked by appearance of delayed swallow initiation and reduced hyolaryngeal elevation/excursion, weak throat clearing post swallow in 2/3 trials suggestive of airway protection deficits and multiple swallows (suggestive of penetration/pharyngeal residue)
Pt presents with severe oral dysphagia marked by reduced oral grading, +oral holding, poor attention to bolus, increased oral transit time, reduced posterior bolus transport, requiring max modality cues to increase bolus manipulation and swallow elicitation, +moderate lingual stasis which was suctioned via Yankauer by SLP after each trial.  Suspect pharyngeal dysphagia due to suspected swallow delay, +delayed throat clearing post swallow.  Overall reduced cognition and alertness further impacting swallow profile.  PO diet remains contraindicated at this time.
Severe oral dysphagia with puree marked by reduced oral grading, +oral holding, increased oral transit time requiring max modality cues to increase bolus manipulation with poor success, poor anterior-posterior bolus transport, pt unable to elicit swallow, bolus manually removed by SLP via toothette and Nellie.  Unable to assess pharyngeal stage due to no swallow elicited.  PO diet contraindicated at this time.  Reduced cognition and overall debility suspected to further impact swallow profile. Discussed with Dr. Posey at bedside and Surgery PA, today's assessment is a decline in swallow profile with comparison to 4/28/23 swallow eval and is 5th swallow assessment this admission.  Will follow to reassess at bedside, MD and Surgery PA aware and in agreement.
severe oral dysphagia given puree textures marked by reduced bolus awareness with reduced stripping from utensil. pt required maximum verbal encouragement to collect bolus into oral cavity with subsequently absent posterior transfer and transport despite continued verbal encouragement provided by SLP. absent pharyngeal swallow trigger and reduced hyolaryngeal excursion further observed. pureed bolus removed from oral cavity manually via spoon as well as via Yankauer suction by SLP and RN.
Pt continues to present with severe oral dysphagia marked by poor oral grading, poor attention and awareness to bolus in oral cavity resulting in absent bolus manipulation and posterior transfer despite max cues.  Bolus ultimately suctioned from oral cavity via Yankauer.  Unable to assess pharyngeal stage due to no swallow elicited.  Overall reduced level of alertness and reduced cognition further impacting overall swallow profile.  PO diet contraindicated at this time.
Severe oral dysphagia persists with puree marked by reduced oral grading, +oral holding, increased oral transit time requiring max modality cues to increase bolus manipulation with poor success, poor anterior-posterior bolus transport, pt unable to elicit swallow, despite consistent, max cues.  Bolus' ultimately removed by SLP via Nellie.  Unable to assess pharyngeal stage due to no swallow elicited.  PO diet remains contraindicated at this time.  Reduced cognition and overall debility suspected to further impact swallow profile. Discussed with Dr. Posey, this is 6th swallow assessment this admission, rx continue NPO with short-term vs consideration of long term non oral means of nutrition/hydration as per pt/family wishes, as well as, consider GOC conversation with pt/family regarding nutrition/hydration.  No further follow up at this time, please reconsult if pt demonstrating improvement in overall medical status.

## 2023-05-04 NOTE — SWALLOW BEDSIDE ASSESSMENT ADULT - SLP PERTINENT HISTORY OF CURRENT PROBLEM
Pt seen this admission for multiple assessments at bedside, FEES study was recommended and tentatively planned for 5/1 however pt not appropriate due to change in status see all reports for details.
Pt seen this admission for swallow evals 4/21/23 and 4/24/23 Rx NPO with short-term non oral means of nutrition/hydration as per pt/family wishes, see reports for details.
r/o dysphagia
r/o dysphagia
Pt seen this admission for swallow eval 4/21/23 Rx NPO, see report for details.
Pt seen for 5 previous swallow evaluations, last assessed 5/3/23 Rx NPO see reports for details.  Attempted to see pt earlier this morning for swallow reassessment, however, pt unarousable.
Pt seen for 4 previous swallow evaluations, last assessed 4/28/23 Rx NPO and FEES study planned for 5/1/23, however, due to GIB FEES cancelled.  Swallow evaluation attempted 5/2/23 however, pt unarousable.  Please see all reports for details.

## 2023-05-04 NOTE — CONSULT NOTE ADULT - SUBJECTIVE AND OBJECTIVE BOX
71yo F w/ PMH of A-Fib on rivaroxaban, HFpEF, recent C diff infection (Jan 2023), myeloproliferative disorder with thrombocytosis on anagrelide, hypothyroidism, asthma, and lung nodules s/p resection (reportedly malignant) who presents with perforated sigmoid colon with acute bacterial peritonitis, LAYA due to ATN, septic shock, lactic acidosis, shock liver;  post/op acute respiratory failure requiring mechanical ventilation, and acute blood loss anemia; s/p extended Tia's procedure including descending colon on 4/13. She was extubated 4/19 and tolerating supplemental oxygen via nasal cannula, overall gradual improvement, but complicated by critical illness polyneuropathy, and further c/b acute GIB.  Events last 24 hours: CODE BLUE around 17:00. On arrival patient noted to be ashen, grey, unresponsive. CPR started immediately per ACLS protocol. Lasted about 10min, got epi x2, bicarb x1, PEA initial rhythm. Brought to ICU, started on levophed. Noted to have Peak pressures of 60, sedated with propofol, given toi 50mg IVP x1. Failed multiple swallow studies, ENT asked to evaluate source of dysphagia.     PAST MEDICAL & SURGICAL HISTORY:  Hypothyroidism      HLD (hyperlipidemia)      Thrombocytosis      Persistent atrial fibrillation      Obesity (BMI 35.0-39.9 without comorbidity)      Asthma      Diabetes mellitus      Ankle injury  on 2004, 5 surgeries.      Cholecystitis          Review of Systems:  unable to obtain due to patients clinical condition       Medications:    midodrine. 5 milliGRAM(s) Oral three times a day  norepinephrine Infusion 0.04 MICROgram(s)/kG/Min IV Continuous <Continuous>    albuterol    90 MICROgram(s) HFA Inhaler 4 Puff(s) Inhalation every 6 hours PRN  albuterol/ipratropium for Nebulization 3 milliLiter(s) Nebulizer every 6 hours  buDESOnide    Inhalation Suspension 0.5 milliGRAM(s) Inhalation every 12 hours    acetaminophen     Tablet .. 650 milliGRAM(s) Oral every 6 hours PRN  escitalopram 20 milliGRAM(s) Oral daily  metoclopramide Injectable 5 milliGRAM(s) IV Push every 6 hours PRN  propofol Infusion 20 MICROgram(s)/kG/Min IV Continuous <Continuous>        pantoprazole  Injectable 40 milliGRAM(s) IV Push two times a day      hydrocortisone sodium succinate Injectable 100 milliGRAM(s) IV Push every 8 hours  levothyroxine Injectable 93.75 MICROGram(s) IV Push <User Schedule>    sodium chloride 0.9% lock flush 10 milliLiter(s) IV Push every 1 hour PRN      chlorhexidine 0.12% Liquid 15 milliLiter(s) Oral Mucosa every 12 hours  chlorhexidine 2% Cloths 1 Application(s) Topical <User Schedule>        LABS:                        8.7    39.38 )-----------( 691      ( 04 May 2023 18:15 )             29.8     05-04    145  |  115<H>  |  50<H>  ----------------------------<  129<H>  3.4<L>   |  24  |  2.20<H>    Ca    8.3<L>      04 May 2023 07:05  Phos  2.5     05-04  Mg     2.2     05-04    TPro  5.8<L>  /  Alb  2.0<L>  /  TBili  0.4  /  DBili  x   /  AST  10<L>  /  ALT  8<L>  /  AlkPhos  76  05-04    PHYSICAL EXAM  VITALS  ICU Vital Signs Last 24 Hrs  T(C): 36.9 (04 May 2023 12:00), Max: 36.9 (04 May 2023 00:44)  T(F): 98.4 (04 May 2023 12:00), Max: 98.5 (04 May 2023 00:44)  HR: 100 (04 May 2023 12:00) (70 - 100)  BP: 118/60 (04 May 2023 12:00) (111/71 - 127/68)  RR: 18 (04 May 2023 12:00) (18 - 18)  SpO2: 97% (04 May 2023 12:00) (92% - 98%)    O2 Parameters below as of 04 May 2023 12:00  Patient On (Oxygen Delivery Method): nasal cannula    GENERAL: Sedated on vent  HEENT: NCAT, PERRLA, AU EACs patent, NGT, OC/OP ETT, cuff leak present, neck supple  Unable to perform fiberoptic laryngoscopy due to intubation

## 2023-05-04 NOTE — SWALLOW BEDSIDE ASSESSMENT ADULT - ORAL PREPARATORY PHASE
Poor attention and awareness to bolus, absent bolus manipulation/Reduced oral grading
+oral holding/Reduced oral grading
+Oral holding/Reduced oral grading
Reduced oral grading
reduced bolus awareness
+oral holding/Reduced oral grading

## 2023-05-04 NOTE — PROCEDURE NOTE - NSPROCNAME_GEN_A_CORE
Bronchoscopy
Central Line Insertion
Gastric Intubation/Gastric Lavage
Arterial Puncture/Cannulation
Arterial Puncture/Cannulation
Central Line Insertion

## 2023-05-04 NOTE — SWALLOW BEDSIDE ASSESSMENT ADULT - SWALLOW EVAL: FUNCTIONAL LEVEL AT TIME OF EVAL
A&A Ox1, reduced cognition
Lethargic, reduced cognition
Lethargic, reduced cognition
Awake, reduced cognition
awake

## 2023-05-04 NOTE — SWALLOW BEDSIDE ASSESSMENT ADULT - SWALLOW EVAL: ORAL MUSCULATURE
unable to assess due to poor participation/comprehension
generally intact

## 2023-05-04 NOTE — CONSULT NOTE ADULT - CONSULT REQUESTED DATE/TIME
29-Apr-2023 13:32
13-Apr-2023 10:33
19-Apr-2023 09:28
13-Apr-2023 13:44
13-Apr-2023 10:30
04-May-2023 18:44
13-Apr-2023 08:30
04-May-2023 19:06
17-Apr-2023 13:48
21-Apr-2023 22:08

## 2023-05-04 NOTE — PROGRESS NOTE ADULT - PROBLEM SELECTOR PLAN 3
-Perforated sigmoid colon: s/p Ro's procedure   -Pt not currently in any pain  -repeat CT 4/27 with some intra-abdominal collections of unclear etiology and bowel thickening  -surgery (Yusuf), recs appreciated  -No role for further surgical intervention at this time  chest PT as tolerated  repeat CXR on 4/30 stable

## 2023-05-04 NOTE — CHART NOTE - NSCHARTNOTESELECT_GEN_ALL_CORE
Nutrition Services
POCUS/Event Note
POCUS/Event Note
code blue
Event Note
Event Note
Nutrition Services

## 2023-05-04 NOTE — SWALLOW BEDSIDE ASSESSMENT ADULT - SWALLOW EVAL: CRITERIA FOR SKILLED INTERVENTION MET
not appropriate for swallowing intervention
demonstrates skilled criteria for swallowing intervention

## 2023-05-04 NOTE — CHART NOTE - NSCHARTNOTEFT_GEN_A_CORE
RRT was called on patient and surgery team arrived immediately. Dr. Goldberg was notified upon arrival to the patients room. Chest compressions were started and patient intubated. 2 doses of epi and 1 dose of bicarb were given. Patient achieved ROSC and transferred patient to ICU after ET was secured and placed on oxygen tank.    Dr. Goldberg was being updated throughout the event.

## 2023-05-04 NOTE — PROGRESS NOTE ADULT - NS ATTEND AMEND GEN_ALL_CORE FT
Remains intubated in ICU on vent support.  More arousable today.  Still on low dose pressors.  Urine output improving.  Colostomy dusky and with only edematous fluid output.  No flatus or BM as of yet.  Continue supportive care.  Awaiting return of bowel function  Wean as tolerated.
cont midodrine. volume status unchanged.  No signs of significant ischemia or volume overload. cont current care. Further cardiac workup will depend on clinical course.
pneumoperitoneum, perforated sigmoid colon, s/p hartmanns and drainage of abscess  af controlled  off with persistent anemia  cont mido and wean as philip by bp  s/p demand ischemia in the setting of severe illness  ef preserved
s/p Tia's with abdominal abscess drainage  remains frail  s/p bumex, and vol status improved  can diurese prn  paf s/p pvi and off ac  off bb for soft bp  cont midodrine
Covering Dr Goldberg  POD 2 s/p Exp lap for perforated diverticulitis, Left colectomy/sigmoidectomy.  Distal transverse end colostomy.  Remains intubated and sedated in ICU.    Continues on low dose pressors.  Marginal urine output.  Midline wound packed between staples.  No purulence or active drainage.  Stoma dusky with only scant edematous drainage at this point in time  TAMERA drain serous.    Continue supportive ICU care as patient recovers from intra-abdominal sepsis secondary to sigmoid colon perforation.  Continue IV antibiotics.  Wean from sedation and vent support as tolerated.  Prognosis remains guarded.
Tolerated OR from cardiac point of view.  To follow while admitted.
cont midodrine. volume status unchanged.  No signs of significant ischemia or volume overload. cont current care. Further cardiac workup will depend on clinical course.
s/p Tia's with abdominal abscess drainage  abx  remains frail  s/p bumex, and vol status improved  can diurese prn  paf s/p pvi and off ac  off bb for soft bp  cont midodrine  remains at risk of decompensation
I have personally seen and examined the patient in detail.  I have spoken to the provider regarding the assessment and plan of care.  I have made changes to the note accordingly.
Would increase Midodrine to 10mg. May need PRBCs. No signs of significant ischemia or volume overload. cont current care. Further cardiac workup will depend on clinical course.
I have personally seen and examined the patient in detail.  I have spoken to the provider regarding the assessment and plan of care.  I have made changes to the note accordingly.
Appears compensated from HF POV. anemia worsening. will likely need PRBCs if remains <7. Cont midodrine for now. bb when able. Further cardiac workup will depend on clinical course.
irreg HR on exam. repeat ekg today. No signs of significant ischemia or volume overload. cont current care. Further cardiac workup will depend on clinical course.
cont bp support with midodrine. Appears compensated from HF POV. Rates controlled. Further cardiac workup will depend on clinical course.

## 2023-05-04 NOTE — SWALLOW BEDSIDE ASSESSMENT ADULT - ASR SWALLOW RECOMMEND DIAG
Objective assessment not rx'd at this time due to severity of oral dysphagia and swallow not elicited by pt.  Will follow to reassess at bedside.
Objective assessment not rx'd at this time due to severity of oral dysphagia, reduced alertness, reduced cognition.
Objective assessment not rx'd at this time due to severity of oral dysphagia, reduced cognition and reduced alertness
Objective assessment not rx'd at this time due to severity of oral dysphagia and swallow not elicited by pt
not indicated at this time given clinical presentation

## 2023-05-04 NOTE — PROGRESS NOTE ADULT - SUBJECTIVE AND OBJECTIVE BOX
Patient is a 72y old  Female who presents with a chief complaint of intra-abdominal perforation (20 Apr 2023 11:52)    Patient seen in follow up for LAYA on CKD.        PAST MEDICAL HISTORY:  Hypothyroidism    HLD (hyperlipidemia)    Thrombocytosis    Persistent atrial fibrillation    Obesity (BMI 35.0-39.9 without comorbidity)    Asthma    Diabetes mellitus      MEDICATIONS  (STANDING):  albuterol/ipratropium for Nebulization 3 milliLiter(s) Nebulizer every 6 hours  buDESOnide    Inhalation Suspension 0.5 milliGRAM(s) Inhalation every 12 hours  escitalopram 20 milliGRAM(s) Oral daily  levothyroxine Injectable 93.75 MICROGram(s) IV Push <User Schedule>  midodrine. 5 milliGRAM(s) Oral three times a day  pantoprazole  Injectable 40 milliGRAM(s) IV Push two times a day    MEDICATIONS  (PRN):  acetaminophen     Tablet .. 650 milliGRAM(s) Oral every 6 hours PRN Moderate Pain (4 - 6)  albuterol    90 MICROgram(s) HFA Inhaler 4 Puff(s) Inhalation every 6 hours PRN Shortness of Breath and/or Wheezing  metoclopramide Injectable 5 milliGRAM(s) IV Push every 6 hours PRN nausea and/or vomiting  sodium chloride 0.9% lock flush 10 milliLiter(s) IV Push every 1 hour PRN Pre/post blood products, medications, blood draw, and to maintain line patency    T(C): 36.9 (05-04-23 @ 12:00), Max: 36.9 (05-04-23 @ 00:44)  HR: 100 (05-04-23 @ 12:00) (68 - 100)  BP: 118/60 (05-04-23 @ 12:00) (106/58 - 127/68)  RR: 18 (05-04-23 @ 12:00)  SpO2: 97% (05-04-23 @ 12:00)  Wt(kg): --  I&O's Detail    03 May 2023 07:01  -  04 May 2023 07:00  --------------------------------------------------------  IN:    dextrose 5%: 225 mL    Enteral Tube Flush: 650 mL    Free Water: 600 mL  Total IN: 1475 mL    OUT:    Colostomy (mL): 900 mL    Voided (mL): 150 mL  Total OUT: 1050 mL    Total NET: 425 mL                        PHYSICAL EXAM:  General: No distress  Respiratory: b/l air entry  Cardiovascular: S1 S2  Gastrointestinal: soft, s/p surgery  Extremities:  edema                       LABORATORY:                        8.4    30.22 )-----------( 538      ( 04 May 2023 07:05 )             28.5     05-04    145  |  115<H>  |  50<H>  ----------------------------<  129<H>  3.4<L>   |  24  |  2.20<H>    Ca    8.3<L>      04 May 2023 07:05  Phos  2.5     05-04  Mg     2.2     05-04    TPro  5.8<L>  /  Alb  2.0<L>  /  TBili  0.4  /  DBili  x   /  AST  10<L>  /  ALT  8<L>  /  AlkPhos  76  05-04    Sodium, Serum: 145 mmol/L (05-04 @ 07:05)  Sodium, Serum: 147 mmol/L (05-03 @ 07:00)    Potassium, Serum: 3.4 mmol/L (05-04 @ 07:05)  Potassium, Serum: 3.7 mmol/L (05-03 @ 07:00)    Hemoglobin: 8.4 g/dL (05-04 @ 07:05)  Hemoglobin: 9.4 g/dL (05-03 @ 07:00)  Hemoglobin: 9.4 g/dL (05-02 @ 07:25)    Creatinine, Serum 2.20 (05-04 @ 07:05)  Creatinine, Serum 2.30 (05-03 @ 07:00)  Creatinine, Serum 2.50 (05-02 @ 07:25)        LIVER FUNCTIONS - ( 04 May 2023 07:05 )  Alb: 2.0 g/dL / Pro: 5.8 g/dL / ALK PHOS: 76 U/L / ALT: 8 U/L / AST: 10 U/L / GGT: x

## 2023-05-04 NOTE — PROGRESS NOTE ADULT - SUBJECTIVE AND OBJECTIVE BOX
[facility] -- Adrián Wray Pannella, Patel, Savella, Goodger: Office # [phone]    Follow Up: A fib, Cardiac clearance      Subjective/Observations: Patient seen and examined. Patient awake, alert, Unable to obtain meaningful information 2/2 confusion. Munson not appear to be in distress. Tolerating O2 via nasal cannula. NGT in place and is receiving TF     REVIEW OF SYSTEMS: All other review of systems are negative unless indicated above    PAST MEDICAL & SURGICAL HISTORY:  Hypothyroidism      Hypothyroidism      HLD (hyperlipidemia)      Thrombocytosis      Persistent atrial fibrillation      Obesity (BMI 35.0-39.9 without comorbidity)      Asthma      Diabetes mellitus      Ankle injury  on 2004, 5 surgeries.      Cholecystitis          MEDICATIONS  (STANDING):  albuterol/ipratropium for Nebulization 3 milliLiter(s) Nebulizer every 6 hours  buDESOnide    Inhalation Suspension 0.5 milliGRAM(s) Inhalation every 12 hours  dextrose 5%. 1000 milliLiter(s) (65 mL/Hr) IV Continuous <Continuous>  escitalopram 20 milliGRAM(s) Oral daily  levothyroxine Injectable 93.75 MICROGram(s) IV Push <User Schedule>  midodrine. 5 milliGRAM(s) Oral three times a day  pantoprazole  Injectable 40 milliGRAM(s) IV Push two times a day    MEDICATIONS  (PRN):  acetaminophen     Tablet .. 650 milliGRAM(s) Oral every 6 hours PRN Moderate Pain (4 - 6)  albuterol    90 MICROgram(s) HFA Inhaler 4 Puff(s) Inhalation every 6 hours PRN Shortness of Breath and/or Wheezing  metoclopramide Injectable 5 milliGRAM(s) IV Push every 6 hours PRN nausea and/or vomiting  sodium chloride 0.9% lock flush 10 milliLiter(s) IV Push every 1 hour PRN Pre/post blood products, medications, blood draw, and to maintain line patency    Allergies    No Known Allergies    Intolerances      Vital Signs Last 24 Hrs  T(C): 36.9 (04 May 2023 12:00), Max: 36.9 (04 May 2023 00:44)  T(F): 98.4 (04 May 2023 12:00), Max: 98.5 (04 May 2023 00:44)  HR: 100 (04 May 2023 12:00) (70 - 100)  BP: 118/60 (04 May 2023 12:00) (111/71 - 127/68)  BP(mean): --  RR: 18 (04 May 2023 12:00) (18 - 18)  SpO2: 97% (04 May 2023 12:00) (92% - 98%)    Parameters below as of 04 May 2023 12:00  Patient On (Oxygen Delivery Method): nasal cannula      I&O's Summary    03 May 2023 07:01  -  04 May 2023 07:00  --------------------------------------------------------  IN: 1475 mL / OUT: 1050 mL / NET: 425 mL          TELE: Not on telemetry   PHYSICAL EXAM:  Constitutional: NAD, awake and alert  HEENT: Moist Mucous Membranes, Anicteric  Pulmonary: Non-labored, breath sounds are clear bilaterally, No wheezing, rales or rhonchi  Cardiovascular: Regular, S1 and S2, + murmurs, No rubs, gallops or clicks  Gastrointestinal:  soft, nontender, nondistended + NGT, + ostomy  Lymph: No peripheral edema. No lymphadenopathy.   Skin: No visible rashes or ulcers.  Psych:  Mood & affect appropriate      LABS: All Labs Reviewed:                        8.4    30.22 )-----------( 538      ( 04 May 2023 07:05 )             28.5                         9.4    39.94 )-----------( 566      ( 03 May 2023 07:00 )             31.1                         9.4    29.42 )-----------( 569      ( 02 May 2023 07:25 )             30.3     04 May 2023 07:05    145    |  115    |  50     ----------------------------<  129    3.4     |  24     |  2.20   03 May 2023 07:00    147    |  115    |  54     ----------------------------<  92     3.7     |  24     |  2.30   02 May 2023 07:25    146    |  115    |  53     ----------------------------<  118    3.5     |  25     |  2.50     Ca    8.3        04 May 2023 07:05  Ca    8.4        03 May 2023 07:00  Ca    8.0        02 May 2023 07:25  Phos  2.5       04 May 2023 07:05  Phos  2.8       03 May 2023 07:00  Phos  3.8       02 May 2023 07:25  Mg     2.2       04 May 2023 07:05  Mg     1.8       03 May 2023 07:00  Mg     1.9       02 May 2023 07:25    TPro  5.8    /  Alb  2.0    /  TBili  0.4    /  DBili  x      /  AST  10     /  ALT  8      /  AlkPhos  76     04 May 2023 07:05  TPro  5.8    /  Alb  2.1    /  TBili  0.6    /  DBili  x      /  AST  9      /  ALT  11     /  AlkPhos  77     03 May 2023 07:00  TPro  5.7    /  Alb  2.0    /  TBili  0.4    /  DBili  x      /  AST  13     /  ALT  8      /  AlkPhos  70     02 May 2023 07:25   LIVER FUNCTIONS - ( 04 May 2023 07:05 )  Alb: 2.0 g/dL / Pro: 5.8 g/dL / ALK PHOS: 76 U/L / ALT: 8 U/L / AST: 10 U/L / GGT: x             12 Lead ECG:   Ventricular Rate 82 BPM    Atrial Rate 82 BPM    P-R Interval 160 ms    QRS Duration 84 ms    Q-T Interval 412 ms    QTC Calculation(Bazett) 481 ms    P Axis 68 degrees    R Axis 13 degrees    T Axis 4 degrees    Diagnosis Line Normal sinus rhythm  Right atrial enlargement  Borderline ECG  When compared with ECG of 22-APR-2023 09:43,  aberrant conduction is no longer present  Confirmed by PAULINE LARSEN ([id number]) on 4/24/2023 4:48:07 PM (04-24-23 @ 13:13)      ACC: [id number] EXAM:  ECHO TTE WO CON COMP W DOPP   ORDERED BY: RAFY WILSON     PROCEDURE DATE:  04/14/2023          INTERPRETATION:  INDICATION: Heart failure  Sonographer KL    Blood Pressure unavailable    Height 167.6 cm     Weight 60 kgBSA   1.8 sq m    Dimensions:  LA 4.0       Normal Values: 2.0 - 4.0 cm  Ao 3.4        Normal Values: 2.0 - 3.8 cm  SEPTUM 0.9       Normal Values: 0.6 - 1.2 cm  PWT 0.8       Normal Values: 0.6 - 1.1 cm  LVIDd 4.2         Normal Values: 3.0 - 5.6 cm  LVIDs 2.9         Normal Values: 1.8 - 4.0 cm      OBSERVATIONS:  Mitral Valve: Mild to moderate MR.  Aortic Valve/Aorta: Sclerotic trileaflet aortic valve with grossly normal   opening. Trace AI  Tricuspid Valve: Mild to moderate TR.  Pulmonic Valve: Mild PI  Left Atrium: Enlarged  Right Atrium: Not well-visualized  Left Ventricle: normal LV size and systolic function, estimated LVEF of   55-60%.  Right Ventricle: The right ventricle is enlarged with grossly normal   function  Pericardium: no significant pericardial effusion.  Pulmonary/RV Pressure: estimated PA systolic pressure of 66mmHg assuming   an RA pressure of 10 mmHg.  IVC measures 2.0 cm and is non collapsing  LV diastolic dysfunction is present  Bilateral pleural effusions        IMPRESSION:  Normal left ventricular internal dimensions and systolic function,   estimated LVEF of 55-60%.  The right ventricle is enlarged with grossly normal function  Left atrial enlargement  Sclerotic trileaflet aortic valve, trace AI.  Mild to moderate MR and TR.  Estimated PA systolic pressure of 66mmHg assuming an RA pressure of 10   mmHg.  IVC measures 2.0 cm and is non collapsing    --- End of Report ---  NEYMAR ROWLAND MD; Attending Cardiologist  This document has been electronically signed. Apr 15 2023 10:52AM

## 2023-05-04 NOTE — PROGRESS NOTE ADULT - TIME BILLING
pt seen and examine  see above plan - dysphagia  on ng tube feed keep failing swallow evaluation 6th  failed attempt - ENT  evaluation dr dalila Torres  , might need PEG  placement by  gi dr ortiz   next wk  / surg dr diallo aware   ng tube  11 day     , son aware bedside tt /plan .

## 2023-05-04 NOTE — PROCEDURE NOTE - NSPOSTCAREGUIDE_GEN_A_CORE
Care for catheter as per unit/ICU protocols
Verbal/written post procedure instructions were given to patient/caregiver/Instructed patient/caregiver regarding signs and symptoms of infection
Care for catheter as per unit/ICU protocols
Verbal/written post procedure instructions were given to patient/caregiver/Instructed patient/caregiver regarding signs and symptoms of infection

## 2023-05-04 NOTE — SWALLOW BEDSIDE ASSESSMENT ADULT - SWALLOW EVAL: RECOMMENDED DIET
NPO with short-term vs long term non oral means of nutrition/hydration as per pt/family wishes.
Oral intake contraindicated due to deficits described above. Continue NG tube
1- oral nutrition/hydration is contraindicated at this time. 2- consider ST vs LT alternative means of nutrition/hydration as pt is at an increased nutritional risk. 3- consider ongoing GOC discussion with pt, family and MD olivares: alternative modes of nutrition.
NPO with short-term non oral means of nutrition/hydration as per pt/family wishes.
NPO with short-term vs consideration of long term non oral means of nutrition/hydration as per pt/family wishes.
NPO with short-term non oral means of nutrition/hydration as per pt/family wishes.

## 2023-05-04 NOTE — PROGRESS NOTE ADULT - PROBLEM SELECTOR PLAN 5
DVT prophylaxis- continue veno dynes  history myeloproliferative disorder on agrylin hold until pt start taking po.

## 2023-05-04 NOTE — SWALLOW BEDSIDE ASSESSMENT ADULT - SWALLOW EVAL: CURRENT DIET
NPO +NGT
NPO pending formal speech and swallow evaluation, per MD order
NPO with NGT in place pending speech and swallow evaluation, per MD order
NPO, +NGT
NG tube
NPO, +NGT
NPO +NGT
NPO, NGT pulled this morning per RN/PA

## 2023-05-04 NOTE — PROVIDER CONTACT NOTE (EICU) - SITUATION
Fentanyl gtt ordered as requested by bedside team
Alerted by bedside ACP requesting order for AM labs (cbc, cmp, mg, phos).

## 2023-05-04 NOTE — CHART NOTE - NSCHARTNOTEFT_GEN_A_CORE
Code blue initially called overhead at 5:11pm by nursing staff due to unresponsive patient. Per RN, patient came back from MRI scan, was found pulseless. Called code blue.   Code blue was called as patient had no pulse. ACLS protocol was initiated. Patient was coded following protocol with rounds of Epi given as well as calcium. She was successfully intubated by respiratory.  During each pulse check patient was with irregular rhythm/ arrythmia. ROCS achieved, patient transferred to ICU for further care.     Reviewed labs from the AM with chart review, patient is here with septic shock due to perforated sigmoid colon with polymicrobial peritonitis; s/p Tia procedure and abdominal abscess drainage on 4/13 w/ ICU stay requiring vasopressor support, She was extubated 4/19 and tolerating supplemental oxygen via nasal cannula, overall gradual improvement, but complicated by critical illness polyneuropathy, and further c/b acute GIB down graded to tele floor. Patient was NPO s/p NGT by surgery , started on tube feeds . NG tube place April 24th (11th day). Eval for PEG tube now. Completed course of broad spectrum Abx and prophylactic PO vanc  Course complicated with  persistent weakness and AMS -Suspect severe critical illness polyneuropathy, went for  MRI, was found pulseless and code was called.    - Dr Posey and medicine team made aware  - Family ( son august and Edgard ) updated by Dr Bolton  - Patient transferred to ICU for further management.

## 2023-05-04 NOTE — PROCEDURE NOTE - NSINFORMCONSENT_GEN_A_CORE
William (son)/Benefits, risks, and possible complications of procedure explained to patient/caregiver who verbalized understanding and gave written consent.
This was an emergent procedure.
Verbal consent via son William via phone and other son at bedside/Benefits, risks, and possible complications of procedure explained to patient/caregiver who verbalized understanding and gave verbal consent.
William (son)/Benefits, risks, and possible complications of procedure explained to patient/caregiver who verbalized understanding and gave written consent.
This was an emergent procedure.

## 2023-05-04 NOTE — SWALLOW BEDSIDE ASSESSMENT ADULT - SWALLOW EVAL: PROGNOSIS
PO diet contraindicated at this time
guarded for PO intake at this time
PO diet contraindicated

## 2023-05-04 NOTE — PROVIDER CONTACT NOTE (OTHER) - ASSESSMENT
Alert, Appears to understand when spoken to. Pt spoke spontaneously this shift occasionally
looks pale RR is 21/mt vitals by this time pt is getting more unresponsive I made RRT and Code blue not completed the conversation with

## 2023-05-04 NOTE — PROGRESS NOTE ADULT - ASSESSMENT
72-year-old female with history of hypertension, CHF, afib s/p AF ablation (2/7/19) currently not on Xarelto CATH 2018, hypothyroidism, HLD history of C. difficile January 2023 presents for vomiting and abdominal pain. Consulted for CHF, S/P Tia's    Cardiac Optimization, Hx Afib s/p Ablation, HTN, CHF  - CT ABD/Pelvis: Pneumoperitoneum and no ascites, perforated sigmoid colon. Stable splenomegaly. Stable right lung nodule, ground glass infiltrates and loculated right pleural effusion.  - S/P Tia's with abdominal abscess drainage, surgery following   - Abx per ID following     - Known PAFIB rate-controlled per flow sheet   - Holding home Xarelto.    - Anemia persistent, transfuse per primary, continue to trend.     - BP stable and controlled  - Continue Midodrine    - EKG: ST with PAC's. No acute changes, unchanged from previous.   - Elevated troponin peaked at 100.9, likely demand in the setting of sepsis  - No evidence of any active ischemia     - TTE (1/18/23): normal LVEF 65%, normal RV size and function, biatrial enlargement, sclerotic aortic valve, mild AI, Moderate MR and TR  - TTE (4/15/2023) EF 55-60%, RVE, PASP 66   - BNP:  <--43848  - DVT ppx per primary     - Monitor and replete lytes, keep K>4, Mg>2.  - Will continue to follow.    Randall Wang, MS FNP, [facility]  Nurse Practitioner- Cardiology   Spectra #[id number] /[phone]

## 2023-05-04 NOTE — SWALLOW BEDSIDE ASSESSMENT ADULT - ASR SWALLOW ASPIRATION MONITOR
change of breathing pattern/oral hygiene/position upright (90Y)/cough/gurgly voice/fever/pneumonia/throat clearing/upper respiratory infection

## 2023-05-04 NOTE — PROGRESS NOTE ADULT - PROBLEM SELECTOR PLAN 1
-s/p blood loss anemia due to acute perforated bowel and extensive abdominal surgery early in the admission requiring 2un PRBC followed by acute GIB requiring addition 5 units PRBC without any intervention resulting in stabilization of Hg 8-9   -ostomy output is now brown  -retacrit per nephro for renal failure  -serial CBCs, transfuse as needed with goal Hgb >8 now given severe bleeding pt had  -NGT in place, discussed with GI/surgery - ct tube feeds for now  -s/p D5 65/hr ordered for 10 hours yesterday  -GI ( group), recs appreciated -- if pt rebleeds, may need further imaging and/or IR involvement -- performing CTA has significant risks as pt is recovering from severe ATN from septic shock and still has significantly impaired renal function; if needs imaging, will pursue NM bleeding scan  -F/u MRI given persistent weakness and AMS and prolonged holding of AC given bleeding -s/p blood loss anemia due to acute perforated bowel and extensive abdominal surgery early in the admission requiring 2un PRBC followed by acute GIB requiring addition 5 units PRBC without any intervention resulting in stabilization of Hg 8-9   -ostomy output is now brown  -retacrit per nephro for renal failure  -serial CBCs, transfuse as needed with goal Hgb >8 now given severe bleeding pt had  -NGT in place, discussed with GI/surgery - ct tube feeds for now. NG tube place April 24th (11th day). Eval for PEG tube  -s/p D5 65/hr ordered for 10 hours yesterday  -GI ( group), recs appreciated -- if pt rebleeds, may need further imaging and/or IR involvement -- performing CTA has significant risks as pt is recovering from severe ATN from septic shock and still has significantly impaired renal function; if needs imaging, will pursue NM bleeding scan  -F/u MRI given persistent weakness and AMS and prolonged holding of AC given bleeding

## 2023-05-04 NOTE — SWALLOW BEDSIDE ASSESSMENT ADULT - PHARYNGEAL PHASE
Unable to assess due to no swallow elicited.
Delayed pharyngeal swallow/Throat clear post oral intake
Unable to assess due to no swallow elicited
Delayed pharyngeal swallow/Throat clear post oral intake/Multiple swallows
Unable to assess due to no swallow elicited
Absent laryngeal elevation/Absent trigger of swallow

## 2023-05-04 NOTE — PROGRESS NOTE ADULT - ASSESSMENT
LAYA on CKD: Septic/hemodynamic ATN  Sigmoid perforation, s/p surgery  Anemia  s/p Shock, Sepsis    Improving renal indices. On tube feeds. Sodium levels improving. Free water with tube feeds. Potassium supplementation.   To continue current meds. Monitor h/h trend. Transfuse PRBC's PRN.   Avoid nephrotoxic meds as possible. Will follow electrolytes and renal function trend. Surgery follow up.

## 2023-05-04 NOTE — SWALLOW BEDSIDE ASSESSMENT ADULT - DATE
21-Apr-2023
04-May-2023
25-Apr-2023
20-Apr-2023
02-May-2023
24-Apr-2023
04-May-2023
28-Apr-2023
03-May-2023
25-Apr-2023

## 2023-05-04 NOTE — SWALLOW BEDSIDE ASSESSMENT ADULT - SLP GENERAL OBSERVATIONS
Pt received sleeping in bed, arousable with cues, however, lethargy noted, +NGT, +b/l handmitts, reduced cognition, poor command following, intermittent vocalization noted, pt noted with +wincing however, unable to localize pain or quantify pain level RN Dolly notified.
Pt received awake in bed, fatigue noted, Ox1 via yes/no, reduced cognition, reduced vocal intensity when attempting to verbalize, +NGT, O2NC, +baseline weak, congested cough, +b/l handmitts, pain scale 0/10 pre & post eval via non verbal pain scale.
Pt received sleeping in bed, arousable with cues, however, pt lethargic, +O2NC, reduced cognition, poor command following, no verbalization, pain scale 0/10 pre & post eval via non verbal pain scale
awake
Pt received in bed A&A Ox1, reduced cognition, increased processing time, intermittent 1 step command following, positioned upright for eval, +O2NC, +NGT, +weak baseline congested cough, pain scale 0/10 pre & post eval, Dr. Posey present at conclusion of assessment.

## 2023-05-04 NOTE — PROCEDURE NOTE - NSINDICATIONS_GEN_A_CORE
critical illness/emergency venous access
feeds
arterial puncture to obtain ABG's/blood sampling/cannulation purposes/critical patient/monitoring purposes
critical patient/monitoring purposes
emergency venous access

## 2023-05-04 NOTE — CONSULT NOTE ADULT - REASON FOR ADMISSION
intra-abdominal perforation

## 2023-05-04 NOTE — PROGRESS NOTE ADULT - ASSESSMENT
72yoF s/p an Extended Tia's Procedure 4/13, currently stable with tube feeds restarted.    PLAN:  - VSS  - AM labs pending  - Continue tube feeds  - Appreciate GI recs may require IR intervention for bleeding  - Continue care per primary team  - Wound vac to be changed today  - Consider neuro consult for lethargy    Discussed with Dr. Goldberg

## 2023-05-04 NOTE — PROCEDURE NOTE - NSPROCDETAILS_GEN_ALL_CORE
nasogastric/audible air bolus/placement confirmed by auscultation
location identified, draped/prepped, sterile technique used, needle inserted/introduced/positive blood return obtained via catheter/connected to a pressurized flush line/sutured in place/hemostasis with direct pressure, dressing applied/Seldinger technique/all materials/supplies accounted for at end of procedure
location identified, draped/prepped, sterile technique used, needle inserted/introduced/positive blood return obtained via catheter/connected to a pressurized flush line/sutured in place/hemostasis with direct pressure, dressing applied/Seldinger technique/all materials/supplies accounted for at end of procedure
Ultrasound utilized throughout procedure. First to find adequate vessel and ensure vein was compressible. Second to visualize catheter tip entered vessel. Third to visualize wire entering vessel. Lastly, it was used to confirm that injected saline did not extravasate from vessel/guidewire recovered/lumen(s) aspirated and flushed/sterile dressing applied/sterile technique, catheter placed/ultrasound guidance with use of sterile gel and probe cove
guidewire recovered/lumen(s) aspirated and flushed/sterile dressing applied/sterile technique, catheter placed/ultrasound guidance with use of sterile gel and probe cove

## 2023-05-04 NOTE — CONSULT NOTE ADULT - ASSESSMENT
Problem List:  1) Cardiac arrest  Problem List:  1) Cardiac arrest   2) ACute hypoxic and hypercapnic respiratory failure  3) Distributive shock   4) metabolic acidosis   5) r/o aspiration pneumonitis      NEURO: no mental status immediately post arrest. given resp failure and high peak pressures may need to keep patient sedated, will use propofol, however other longer acting sedatives may be needed. She did get one dose of rocuronium 50mg IVP.     CV: shock state starting levophed to maintain MAP > 65 Give 1L IVF bolus. Sinus tach and A-fib on monitor. POCUS with what appears to be preserved EF, no RV dilation, IVC is 1.5cm    PULM: Vented, use high RR and low TV givne elevated peak and plateau pressures. 350cc TV, 28RR, FiO2 to maintain SPO2 > 92%, PEEP at 5 for now. Will try to use increased PEEP if she requires more FIO2. Peak pressures 60s with plateau pressures 50. Appears to be no PTX on Chest x ray, + lung slide b/l on POCUS. Bronchoscopy performed at the bedside, airways appear clear with no obvious obstruction or mucus. paralyzation only mildly improved her pressures. Given bronchodilators, Will treat with lung protective ventilation. She does use Trelegy at home so there is a possibility of chronic lung dz    GI: NPO for now. Belly is soft, Members from the surgery team are following and were present at the beside to assist with cardiac arrest    RENAL: Cr 2.4 prior to arrest, check stat labs now including CMP, CBC, mag, phos, lactic.     ID: cover with meropenem and vancomycin for suspect aspiration PNA    DISPO: to remain in ICU crticially ill, spoke to son Cuauhtemoc over the phone and updated him on patients current condition all questions answered     Plan discussed with ICU attending Dr. Mondragon

## 2023-05-05 NOTE — GOALS OF CARE CONVERSATION - ADVANCED CARE PLANNING - CONVERSATION DETAILS
Spoke to patient's son, William, his wife and patient's other son at bedside. William is patient's HCP.  We discussed how patient is currently in triple-pressor shock state, essentially Spoke to patient's son, William, his wife and patient's other son at bedside. William is patient's HCP.  We discussed how patient is currently in triple-pressor shock state, essentially being chemically coded.    She is also on three continuous medications for sedation (Prop / Fent / Ketamine) and Nimbex for paralytics, however despite our best efforts we are still unable to ventilate her and she is still very acidotic.     William and family are focussed on making patient as comfortable as possible, and we discussed making her a DNR and comfort measures only. All questions/concerns addressed. Emotional support provided. Spoke to patient's son, William, his wife and patient's other son at bedside. William is patient's HCP.  We discussed how patient is currently in triple-pressor shock state, essentially being chemically coded.    She is also on three continuous medications for sedation (Prop / Fent / Ketamine) and Nimbex for paralytics, however despite our best efforts we are still unable to ventilate her and she is still very acidotic.     William and family are focussed on making patient as comfortable as possible, and we discussed making her a DNR and comfort measures only- to which they were all in agreement. All questions/concerns addressed. Emotional support provided.

## 2023-05-05 NOTE — PROGRESS NOTE ADULT - PROVIDER SPECIALTY LIST ADULT
Cardiology
Cardiology
Critical Care
Critical Care
Infectious Disease
MICU
Nephrology
Nephrology
Surgery
Cardiology
Critical Care
Critical Care
Gastroenterology
Heme/Onc
Hospitalist
Infectious Disease
Infectious Disease
Nephrology
Surgery
Cardiology
Critical Care
Hospitalist
Hospitalist
Infectious Disease
Internal Medicine
Nephrology
Surgery
Cardiology
Critical Care
Gastroenterology
Heme/Onc
Infectious Disease
Nephrology
Surgery
Critical Care
Heme/Onc
Infectious Disease
Infectious Disease
Cardiology
Cardiology
Heme/Onc
Surgery
Surgery
Wound Care
Heme/Onc
Internal Medicine
Surgery
Heme/Onc
Heme/Onc
Hospitalist
Heme/Onc
Hospitalist
Internal Medicine
Hospitalist

## 2023-05-05 NOTE — PROVIDER CONTACT NOTE (CRITICAL VALUE NOTIFICATION) - ACTION/TREATMENT ORDERED:
Continuing fluids
No ventilator changes at this time. Will continue to monitor
RR increased to 34. Peep set to 0
1000ml LR bolus x1 ordered.
Vt 350/RR 28
WBC trending up will continue to monitor
Replacing fluids
no vent changes at this time
Continue fluids
Continue fluids
no changes to vent at this time
1500ml LR bolus ordered.
Dr. Hahn made aware
Dr. Mcgill aware and will order PRBC infusion

## 2023-05-05 NOTE — PROGRESS NOTE ADULT - SUBJECTIVE AND OBJECTIVE BOX
Patient is a 72y old  Female who presents with a chief complaint of intra-abdominal perforation (04 May 2023 19:06)    BRIEF HOSPITAL COURSE:   73yo F w/ PMH of A-Fib on rivaroxaban, HFpEF, recent C diff infection (Jan 2023), myeloproliferative disorder with thrombocytosis on anagrelide, hypothyroidism, asthma, and lung nodules s/p resection (reportedly malignant) who presents with perforated sigmoid colon with acute bacterial peritonitis, LAYA due to ATN, septic shock, lactic acidosis, shock liver;  post/op acute respiratory failure requiring mechanical ventilation, and acute blood loss anemia; s/p extended Tia's procedure including descending colon on 4/13. She was extubated 4/19 and tolerating supplemental oxygen via nasal cannula, overall gradual improvement, but complicated by critical illness polyneuropathy, and further c/b acute GIB.  On 5/4, while receiving MRI Head, CODE BLUE called - PEA arrest w/ 10 minute downtime. Upgraded to MICU w/ Cardiac Arrest, Acute Hypoxic / Hypercapnic Respiratory Failure, Distributive Shock, Metabolic Acidosis, Aspiration PNA, ?Asthma Exacerbation.     Events last 24 hours:   -Patient w/ increasing Pk / Plateau Pressures (50s). B/l Lung Slide on POCUS, CXR w/o PTX and ETT in good position. S/p Bronchoscopy which was essentially clean and w/o resolution of pressures. Increasingly Acidotic (most recent pH 7.12 / CO2 61 / PO2 73)  this evening as unable to ventilate. Deep sedation w/ Propofol and Fentanyl infusions and now paralyzed w/ Nimbex infusion. RR 34 / Vt 350, RR increased to 35. Given Nebulizer treatments and Magnesium Sulfate 2g IVPB.   -Remains in vasopressor-dependent shock state w/ Levophed infusion. Vasopressin infusion added to offload Levophed requirements and Phenyephrine infusion added to wean further to due increasing tachycardia.    -On TTM for goal T <36'C.   -CVC and Arterial Lines placed.     PAST MEDICAL & SURGICAL HISTORY:  Hypothyroidism  HLD (hyperlipidemia)  Thrombocytosis  Persistent atrial fibrillation  Obesity (BMI 35.0-39.9 without comorbidity)  Asthma  Diabetes mellitus  Ankle injury  on 2004, 5 surgeries.  Cholecystitis    Review of Systems:  Due to pt being intubated/sedated/paralyzed, subjective information was not able to be obtained from the patient. History was obtained, to the extent possible, from review of the chart and collateral sources of information.     Medications:  meropenem  IVPB      midodrine. 5 milliGRAM(s) Oral three times a day  norepinephrine Infusion 0.05 MICROgram(s)/kG/Min IV Continuous <Continuous>  phenylephrine    Infusion 0.1 MICROgram(s)/kG/Min IV Continuous <Continuous>  albuterol/ipratropium for Nebulization 3 milliLiter(s) Nebulizer every 6 hours  buDESOnide    Inhalation Suspension 0.5 milliGRAM(s) Inhalation every 12 hours  levalbuterol Inhalation 1.25 milliGRAM(s) Inhalation <Continuous>  acetaminophen     Tablet .. 650 milliGRAM(s) Oral every 6 hours PRN  cisatracurium Infusion 3 MICROgram(s)/kG/Min IV Continuous <Continuous>  escitalopram 20 milliGRAM(s) Oral daily  fentaNYL   Infusion. 0.5 MICROgram(s)/kG/Hr IV Continuous <Continuous>  metoclopramide Injectable 5 milliGRAM(s) IV Push every 6 hours PRN  propofol Infusion 20 MICROgram(s)/kG/Min IV Continuous <Continuous  glycopyrrolate Injectable 0.2 milliGRAM(s) IV Push every 6 hours  pantoprazole  Injectable 40 milliGRAM(s) IV Push two times a day  hydrocortisone sodium succinate Injectable 100 milliGRAM(s) IV Push every 8 hours  levothyroxine Injectable 93.75 MICROGram(s) IV Push <User Schedule>  vasopressin Infusion 0.04 Unit(s)/Min IV Continuous <Continuous>  sodium chloride 0.9% lock flush 10 milliLiter(s) IV Push every 1 hour PRN  chlorhexidine 0.12% Liquid 15 milliLiter(s) Oral Mucosa every 12 hours  chlorhexidine 2% Cloths 1 Application(s) Topical <User Schedule>  chlorhexidine 4% Liquid 1 Application(s) Topical <User Schedule>    Mode: AC/ CMV (Assist Control/ Continuous Mandatory Ventilation)  RR (machine): 34  TV (machine): 350  FiO2: 50  PEEP: 0  ITime: 1  MAP: 27  PIP: 55    ICU Vital Signs Last 24 Hrs  T(C): 36.8 (04 May 2023 20:00), Max: 36.9 (04 May 2023 00:44)  T(F): 98.2 (04 May 2023 20:00), Max: 98.5 (04 May 2023 00:44)  HR: 128 (04 May 2023 23:00) (70 - 141)  BP: 120/52 (04 May 2023 22:30) (67/34 - 142/56)  BP(mean): 80 (04 May 2023 22:01) (44 - 80)  ABP: 126/61 (04 May 2023 23:00) (119/56 - 126/61)  ABP(mean): 83 (04 May 2023 23:00) (76 - 83)  RR: 34 (04 May 2023 23:00) (18 - 34)  SpO2: 94% (04 May 2023 23:00) (91% - 100%)  O2 Parameters below as of 04 May 2023 20:57  Patient On (Oxygen Delivery Method): ventilator,100  ABG - ( 04 May 2023 23:10 )  pH, Arterial: 7.12  pH, Blood: x     /  pCO2: 61    /  pO2: 73    / HCO3: 20    / Base Excess: -9.5  /  SaO2: 95.9      I&O's Detail  03 May 2023 07:01  -  04 May 2023 07:00  --------------------------------------------------------  IN:    dextrose 5%: 225 mL    Enteral Tube Flush: 650 mL    Free Water: 600 mL  Total IN: 1475 mL  OUT:    Colostomy (mL): 900 mL    Voided (mL): 150 mL  Total OUT: 1050 mL  Total NET: 425 mL    LABS:                      8.7    39.38 )-----------( 691      ( 04 May 2023 18:15 )             29.8     05-04  141  |  116<H>  |  50<H>  ----------------------------<  126<H>  4.7   |  22  |  2.30<H>  Ca    8.0<L>      04 May 2023 18:15  Phos  4.0     05-04  Mg     2.2     05-04  TPro  5.3<L>  /  Alb  1.8<L>  /  TBili  0.4  /  DBili  x   /  AST  19  /  ALT  11<L>  /  AlkPhos  81  05-04    CARDIAC MARKERS ( 04 May 2023 18:15 )  x     / x     / 54 U/L / x     / x        CAPILLARY BLOOD GLUCOSE  POCT Blood Glucose.: 141 mg/dL (04 May 2023 12:14)    CULTURES:    Physical Examination:  General: +Intubated.   HEENT: PERRL.   PULM: Course BS b/l, +wheezing.   CVS: s1/s2.   ABD: Soft, nondistended, nontender, normoactive bowel sounds.  EXT: No edema, nontender.  SKIN: Warm.  NEURO: +Sedated/paralyzed.     RADIOLOGY:   < from: Xray Chest 1 View- PORTABLE-Urgent (Xray Chest 1 View- PORTABLE-Urgent .) (05.04.23 @ 18:00) >  ACC: [id number] EXAM:  XR CHEST PORTABLE URGENT 1V   ORDERED BY: LAUREN MURRAY   PROCEDURE DATE:  05/04/2023    IMPRESSION:  1. Endotracheal tube terminates approximately 5 cm above the silke.  2. Nasogastric tube extends below the diaphragm, with the tip out of the   field-of-view.  3. Electronic module overlying the midline thorax with wires directed   inferiorly.  4. Cardiomegaly with CHF, small bilateral pleural effusions along with   coexistent interstitial opacities bilaterally. Coexisting pneumonia   cannot be excluded.    73yo F w/ PMH of A-Fib on rivaroxaban, HFpEF, recent C diff infection (Jan 2023), myeloproliferative disorder with thrombocytosis on anagrelide, hypothyroidism, asthma, and lung nodules s/p resection (reportedly malignant) who presents with perforated sigmoid colon with acute bacterial peritonitis, LAYA due to ATN, septic shock, lactic acidosis, shock liver;  post/op acute respiratory failure requiring mechanical ventilation, and acute blood loss anemia; s/p extended Tia's procedure including descending colon on 4/13. She was extubated 4/19 and tolerating supplemental oxygen via nasal cannula, overall gradual improvement, but complicated by critical illness polyneuropathy, and further c/b acute GIB.  On 5/4, while receiving MRI Head, CODE BLUE called - PEA arrest w/ 10 minute downtime. Upgraded to MICU w/ Cardiac Arrest, Acute Hypoxic / Hypercapnic Respiratory Failure, Distributive Shock, Metabolic Acidosis, Aspiration PNA, ?Asthma Exacerbation.   Assessment:  1. Cardiac Arrest   2. Acute Hypoxic / Hypercapnic Respiratory Failure  3. Distributive Shock   4. Metabolic Acidosis   5. Aspiration PNA  6. ?Asthma Exacerbation  7. Severe Acidosis    Plan:  NEURO:   -Sedated w/ Propofol and Fentanyl infusions. Paralyzed w/ Nimbex Infusion Also now added Ketamine infusion for deeper sedation.   -TTM goal <36'C.     CV:   -Remains in vasopressor-dependent shock state w/ Levophed infusions - actively titrating to maintain goal MAP >65 monitoring end points of organ perfusion. Vasopressin infusion added to help offload Levophed requirements. Phenylephrine started due to patient's tachycardia in order to help wean off Levophed further.  -Keep K~4 and Mg>2 for optimal arrhythmia suppression.  -Bedside POCUS w/ preserved EF, s/p 1L earlier, Given additional 1L LR crystalloid bolus.     PULM:   -Patient currently on Full vent support  -titrate settings to maintain SaO2 >90%, or pH >7.25  -consider low tidal volume ventilation strategy w/ goal Tv 4-6 cc/kg of ideal body weight  -Goal plateu pressure goal <30  -Peridex oral care  -aggressive pulmonary toilet  -Patient appears to be more w/ Acute Asthma vs. ARDS, wheezing on exam, very difficult time ventilating and still w/ severe acidosis despite high RR due to significantly high Pk/Plt. RR increased to 35. Repeat ABG.   -Solu-medrol 40mg q8h. S/p Nebulizer treatments w/ Albuterol + Magnesium. Will start continuous nebs w/ Xopenex, I have also added ATC Robinul 0.2mg q6h.     GI:   -NPO.  -Protonix BID.     RENAL:   -Renal function currently LAYA.   -trend lytes/Scr daily with BMP  -I's and O's, goal UOP 0.5 cc/kg/hr  -renal dose meds and avoid nephrotoxins   -Sodium Bicarbonate 3 amps given.     ENDO:   -Aggressive glycemic control to limit FS glucose to <180mg/dl. BS stable.   -Keep Euthyroid.     ID:   -Leukocytosis. Broad spectrum abx w/ renally-dosed Meropenem, given x1 dose of Vancomycin. Check BloodCx, Legionella/Strep, MRSA/MSSA, SputumCx.   -ABX use and/or discontinuation based on discussion with ID in conjunction with clinical features, culture data, and judicious procalcitonin monitoring.    HEME:   -DVT ppx s/ SCDs.     D/w eICU attending, Dr. Alonso.     [facility]: I spoke w/ patient's two sons, including William (HCP) at bedside and discussed patient's diagnosis, prognosis, and advanced directives. Family understands patient is very sick w/ high likelihood that she may not survive the evening. They stated that at this time, they would like for treat as we currently are, however if patient were to further decompensate they would elect for comfort measures. Currently Full Code. All questions/concerns addressed.     Prognosis Poor.     CRITICAL CARE TIME SPENT:  105 minutes of critical care time spent providing medical care for patient's acute illness/conditions that impairs at least one vital organ system and/or poses a high risk of imminent or life threatening deterioration in the patient's condition. It includes time spent evaluating and treating the patient's acute illness as well as time spent reviewing labs, radiology, discussing goals of care with patient and/or patient's family, and discussing the case with a multidisciplinary team, including the eICU, in an effort to prevent further life threatening deterioration or end organ damage. This time is independent of any procedures performed.  Patient is a 72y old  Female who presents with a chief complaint of intra-abdominal perforation (04 May 2023 19:06)    BRIEF HOSPITAL COURSE:   71yo F w/ PMH of A-Fib on rivaroxaban, HFpEF, recent C diff infection (Jan 2023), myeloproliferative disorder with thrombocytosis on anagrelide, hypothyroidism, asthma, and lung nodules s/p resection (reportedly malignant) who presents with perforated sigmoid colon with acute bacterial peritonitis, LAYA due to ATN, septic shock, lactic acidosis, shock liver;  post/op acute respiratory failure requiring mechanical ventilation, and acute blood loss anemia; s/p extended Tia's procedure including descending colon on 4/13. She was extubated 4/19 and tolerating supplemental oxygen via nasal cannula, overall gradual improvement, but complicated by critical illness polyneuropathy, and further c/b acute GIB.  On 5/4, while receiving MRI Head, CODE BLUE called - PEA arrest w/ 10 minute downtime. Upgraded to MICU w/ Cardiac Arrest, Acute Hypoxic / Hypercapnic Respiratory Failure, Distributive Shock, Metabolic Acidosis, Aspiration PNA, ?Asthma Exacerbation.     Events last 24 hours:   -Patient w/ increasing Pk / Plateau Pressures (50s). B/l Lung Slide on POCUS, CXR w/o PTX and ETT in good position. S/p Bronchoscopy which was essentially clean and w/o resolution of pressures. Increasingly Acidotic (most recent pH 7.12 / CO2 61 / PO2 73)  this evening as unable to ventilate. Deep sedation w/ Propofol and Fentanyl infusions and now paralyzed w/ Nimbex infusion. RR 34 / Vt 350, RR increased to 35. Given Nebulizer treatments and Magnesium Sulfate 2g IVPB.   -Remains in vasopressor-dependent shock state w/ Levophed infusion. Vasopressin infusion added to offload Levophed requirements and Phenyephrine infusion added to wean further to due increasing tachycardia.    -On TTM for goal T <36'C.   -CVC and Arterial Lines placed.     PAST MEDICAL & SURGICAL HISTORY:  Hypothyroidism  HLD (hyperlipidemia)  Thrombocytosis  Persistent atrial fibrillation  Obesity (BMI 35.0-39.9 without comorbidity)  Asthma  Diabetes mellitus  Ankle injury  on 2004, 5 surgeries.  Cholecystitis    Review of Systems:  Due to pt being intubated/sedated/paralyzed, subjective information was not able to be obtained from the patient. History was obtained, to the extent possible, from review of the chart and collateral sources of information.     Medications:  meropenem  IVPB      midodrine. 5 milliGRAM(s) Oral three times a day  norepinephrine Infusion 0.05 MICROgram(s)/kG/Min IV Continuous <Continuous>  phenylephrine    Infusion 0.1 MICROgram(s)/kG/Min IV Continuous <Continuous>  albuterol/ipratropium for Nebulization 3 milliLiter(s) Nebulizer every 6 hours  buDESOnide    Inhalation Suspension 0.5 milliGRAM(s) Inhalation every 12 hours  levalbuterol Inhalation 1.25 milliGRAM(s) Inhalation <Continuous>  acetaminophen     Tablet .. 650 milliGRAM(s) Oral every 6 hours PRN  cisatracurium Infusion 3 MICROgram(s)/kG/Min IV Continuous <Continuous>  escitalopram 20 milliGRAM(s) Oral daily  fentaNYL   Infusion. 0.5 MICROgram(s)/kG/Hr IV Continuous <Continuous>  metoclopramide Injectable 5 milliGRAM(s) IV Push every 6 hours PRN  propofol Infusion 20 MICROgram(s)/kG/Min IV Continuous <Continuous  glycopyrrolate Injectable 0.2 milliGRAM(s) IV Push every 6 hours  pantoprazole  Injectable 40 milliGRAM(s) IV Push two times a day  hydrocortisone sodium succinate Injectable 100 milliGRAM(s) IV Push every 8 hours  levothyroxine Injectable 93.75 MICROGram(s) IV Push <User Schedule>  vasopressin Infusion 0.04 Unit(s)/Min IV Continuous <Continuous>  sodium chloride 0.9% lock flush 10 milliLiter(s) IV Push every 1 hour PRN  chlorhexidine 0.12% Liquid 15 milliLiter(s) Oral Mucosa every 12 hours  chlorhexidine 2% Cloths 1 Application(s) Topical <User Schedule>  chlorhexidine 4% Liquid 1 Application(s) Topical <User Schedule>    Mode: AC/ CMV (Assist Control/ Continuous Mandatory Ventilation)  RR (machine): 34  TV (machine): 350  FiO2: 50  PEEP: 0  ITime: 1  MAP: 27  PIP: 55    ICU Vital Signs Last 24 Hrs  T(C): 36.8 (04 May 2023 20:00), Max: 36.9 (04 May 2023 00:44)  T(F): 98.2 (04 May 2023 20:00), Max: 98.5 (04 May 2023 00:44)  HR: 128 (04 May 2023 23:00) (70 - 141)  BP: 120/52 (04 May 2023 22:30) (67/34 - 142/56)  BP(mean): 80 (04 May 2023 22:01) (44 - 80)  ABP: 126/61 (04 May 2023 23:00) (119/56 - 126/61)  ABP(mean): 83 (04 May 2023 23:00) (76 - 83)  RR: 34 (04 May 2023 23:00) (18 - 34)  SpO2: 94% (04 May 2023 23:00) (91% - 100%)  O2 Parameters below as of 04 May 2023 20:57  Patient On (Oxygen Delivery Method): ventilator,100  ABG - ( 04 May 2023 23:10 )  pH, Arterial: 7.12  pH, Blood: x     /  pCO2: 61    /  pO2: 73    / HCO3: 20    / Base Excess: -9.5  /  SaO2: 95.9      I&O's Detail  03 May 2023 07:01  -  04 May 2023 07:00  --------------------------------------------------------  IN:    dextrose 5%: 225 mL    Enteral Tube Flush: 650 mL    Free Water: 600 mL  Total IN: 1475 mL  OUT:    Colostomy (mL): 900 mL    Voided (mL): 150 mL  Total OUT: 1050 mL  Total NET: 425 mL    LABS:                      8.7    39.38 )-----------( 691      ( 04 May 2023 18:15 )             29.8     05-04  141  |  116<H>  |  50<H>  ----------------------------<  126<H>  4.7   |  22  |  2.30<H>  Ca    8.0<L>      04 May 2023 18:15  Phos  4.0     05-04  Mg     2.2     05-04  TPro  5.3<L>  /  Alb  1.8<L>  /  TBili  0.4  /  DBili  x   /  AST  19  /  ALT  11<L>  /  AlkPhos  81  05-04    CARDIAC MARKERS ( 04 May 2023 18:15 )  x     / x     / 54 U/L / x     / x        CAPILLARY BLOOD GLUCOSE  POCT Blood Glucose.: 141 mg/dL (04 May 2023 12:14)    CULTURES:    Physical Examination:  General: +Intubated.   HEENT: PERRL.   PULM: Course BS b/l, +wheezing.   CVS: s1/s2.   ABD: Soft, nondistended, nontender, normoactive bowel sounds.  EXT: No edema, nontender.  SKIN: Warm.  NEURO: +Sedated/paralyzed.     RADIOLOGY:   < from: Xray Chest 1 View- PORTABLE-Urgent (Xray Chest 1 View- PORTABLE-Urgent .) (05.04.23 @ 18:00) >  ACC: [id number] EXAM:  XR CHEST PORTABLE URGENT 1V   ORDERED BY: LAUREN MURRAY   PROCEDURE DATE:  05/04/2023    IMPRESSION:  1. Endotracheal tube terminates approximately 5 cm above the silke.  2. Nasogastric tube extends below the diaphragm, with the tip out of the   field-of-view.  3. Electronic module overlying the midline thorax with wires directed   inferiorly.  4. Cardiomegaly with CHF, small bilateral pleural effusions along with   coexistent interstitial opacities bilaterally. Coexisting pneumonia   cannot be excluded.    71yo F w/ PMH of A-Fib on rivaroxaban, HFpEF, recent C diff infection (Jan 2023), myeloproliferative disorder with thrombocytosis on anagrelide, hypothyroidism, asthma, and lung nodules s/p resection (reportedly malignant) who presents with perforated sigmoid colon with acute bacterial peritonitis, LAYA due to ATN, septic shock, lactic acidosis, shock liver;  post/op acute respiratory failure requiring mechanical ventilation, and acute blood loss anemia; s/p extended Tia's procedure including descending colon on 4/13. She was extubated 4/19 and tolerating supplemental oxygen via nasal cannula, overall gradual improvement, but complicated by critical illness polyneuropathy, and further c/b acute GIB.  On 5/4, while receiving MRI Head, CODE BLUE called - PEA arrest w/ 10 minute downtime. Upgraded to MICU w/ Cardiac Arrest, Acute Hypoxic / Hypercapnic Respiratory Failure, Distributive Shock, Metabolic Acidosis, Aspiration PNA, ?Asthma Exacerbation.   Assessment:  1. Cardiac Arrest   2. Acute Hypoxic / Hypercapnic Respiratory Failure  3. Distributive Shock   4. Aspiration PNA  5 ?Asthma Exacerbation  6. Severe Acidosis    Plan:  NEURO:   -Sedated w/ Propofol and Fentanyl infusions. Paralyzed w/ Nimbex Infusion Also now added Ketamine infusion for deeper sedation.   -TTM goal <36'C.     CV:   -Remains in vasopressor-dependent shock state w/ Levophed infusions - actively titrating to maintain goal MAP >65 monitoring end points of organ perfusion. Vasopressin infusion added to help offload Levophed requirements. Phenylephrine started due to patient's tachycardia in order to help wean off Levophed further.  -Keep K~4 and Mg>2 for optimal arrhythmia suppression.  -Bedside POCUS w/ preserved EF, s/p 1L earlier, Given additional 1L LR crystalloid bolus.     PULM:   -Patient currently on Full vent support  -titrate settings to maintain SaO2 >90%, or pH >7.25  -consider low tidal volume ventilation strategy w/ goal Tv 4-6 cc/kg of ideal body weight  -Goal plateu pressure goal <30  -Peridex oral care  -aggressive pulmonary toilet  -Patient appears to be more w/ Acute Asthma vs. ARDS, wheezing on exam, very difficult time ventilating and still w/ severe acidosis despite high RR due to significantly high Pk/Plt. RR increased to 35. Repeat ABG.   -Solu-medrol 40mg q8h. S/p Nebulizer treatments w/ Albuterol + Magnesium. Will start continuous nebs w/ Xopenex, I have also added ATC Robinul 0.2mg q6h.     GI:   -NPO.  -Protonix BID.     RENAL:   -Renal function currently LAYA.   -trend lytes/Scr daily with BMP  -I's and O's, goal UOP 0.5 cc/kg/hr  -renal dose meds and avoid nephrotoxins   -Sodium Bicarbonate 3 amps given.     ENDO:   -Aggressive glycemic control to limit FS glucose to <180mg/dl. BS stable.   -Keep Euthyroid.     ID:   -Leukocytosis. Broad spectrum abx w/ renally-dosed Meropenem, given x1 dose of Vancomycin. Check BloodCx, Legionella/Strep, MRSA/MSSA, SputumCx.   -ABX use and/or discontinuation based on discussion with ID in conjunction with clinical features, culture data, and judicious procalcitonin monitoring.    HEME:   -DVT ppx s/ SCDs.     D/w eICU attending, Dr. Alonso.     [facility]: I spoke w/ patient's two sons, including William (HCP) at bedside and discussed patient's diagnosis, prognosis, and advanced directives. Family understands patient is very sick w/ high likelihood that she may not survive the evening. They stated that at this time, they would like for treat as we currently are, however if patient were to further decompensate they would elect for comfort measures. Currently Full Code. All questions/concerns addressed.     Prognosis Poor.     CRITICAL CARE TIME SPENT:  105 minutes of critical care time spent providing medical care for patient's acute illness/conditions that impairs at least one vital organ system and/or poses a high risk of imminent or life threatening deterioration in the patient's condition. It includes time spent evaluating and treating the patient's acute illness as well as time spent reviewing labs, radiology, discussing goals of care with patient and/or patient's family, and discussing the case with a multidisciplinary team, including the eICU, in an effort to prevent further life threatening deterioration or end organ damage. This time is independent of any procedures performed.

## 2023-05-05 NOTE — PROCEDURAL SAFETY CHECKLIST WITH OR WITHOUT SEDATION - [id number]_CORE
04-May-2023 20:30 Solaraze Counseling:  I discussed with the patient the risks of Solaraze including but not limited to erythema, scaling, itching, weeping, crusting, and pain.

## 2023-05-05 NOTE — PROVIDER CONTACT NOTE (CRITICAL VALUE NOTIFICATION) - ASSESSMENT
patient stable at the time, wbc trending down. on iv abx
Pt is sedated
Pt AXOx2, asymptomatic
pt is sedated.

## 2023-05-05 NOTE — DISCHARGE NOTE FOR THE EXPIRED PATIENT - NS PRELIMINARY CAUSE OF DEATH_RESTRICTED
Cardiopulmonary Arrest/Multi-organ Dysfunction Syndrome/Renal Failure/Respiratory Failure/Sepsis/Other:

## 2023-05-05 NOTE — PROVIDER CONTACT NOTE (CRITICAL VALUE NOTIFICATION) - NAME OF MD/NP/PA/DO NOTIFIED:
Blanco SINGHA
Dr. Don
PARUL Mccullough
Blanco SINGHA
CAYDEN Pineda
jose
Blanco SINGHA
Jack RAMIREZ
icu pa
Blanco TERAN
CAYDEN Pineda
Donovan TERAN
Blanco TERAN
CAYDEN Pineda
Dr. Martinez
Dr. Serra
LORA TERAN
Dr. Hernandez
dr. Jay
Donovan TERAN
Dr. Mcgill

## 2023-05-05 NOTE — DISCHARGE NOTE FOR THE EXPIRED PATIENT - HOSPITAL COURSE
71yo F w/ PMH of A-Fib on rivaroxaban, HFpEF, recent C diff infection (2023), myeloproliferative disorder with thrombocytosis on anagrelide, hypothyroidism, asthma, and lung nodules s/p resection (reportedly malignant) who presents with perforated sigmoid colon with acute bacterial peritonitis, LAYA due to ATN, septic shock, lactic acidosis, shock liver;  post/op acute respiratory failure requiring mechanical ventilation, and acute blood loss anemia; s/p extended Tia's procedure including descending colon on . She was extubated  and tolerating supplemental oxygen via nasal cannula, overall gradual improvement, but complicated by critical illness polyneuropathy, and further c/b acute GIB.  On , while receiving MRI Head, CODE BLUE called - PEA arrest w/ 10 minute downtime. Upgraded to MICU w/ Cardiac Arrest, Acute Hypoxic / Hypercapnic Respiratory Failure, Distributive Shock, Metabolic Acidosis, Aspiration PNA, ?Asthma Exacerbation. Pt progressed to triple-vasopressor shock and persistent respiratory acidosis w/ no ability to ventilate due to elevated ventilatory pressures despite deep sedation and paralytics. Family opted to make her DNR/Comfort Measures Only.  Pt /pronounced at 02:43 w/ family at bedside.

## 2023-05-05 NOTE — PROVIDER CONTACT NOTE (CRITICAL VALUE NOTIFICATION) - SITUATION
patient being treated for sepsis. on iv abx tx.
lactate 5.2
pH           7.15  C02          32  p02          282  Sp02        100%  HCO3       11
H/h 6.9/ 24.0
lactate 3.5

## 2023-05-05 NOTE — PROVIDER CONTACT NOTE (CRITICAL VALUE NOTIFICATION) - PERSON GIVING RESULT:
Ale- Adela Toribio
ME
Radha Gonzalez
Sandee Benavides
Trent aceves
Ale-Radha
Lab
Candy Marcial/ Ale
Ale-Radha
Ale (Leona Horan)
Kathleen Polk/ Respiratory Care
ankit lopez
jannette
Kaden Dumont RT
Radha Aguilera
jannette lopez
me
ME
Brian James
Lab-Merchung
Bill

## 2023-05-13 NOTE — CARE COORDINATION ASSESSMENT. - OTHER PERTINENT DISCHARGE PLANNING INFORMATION:
72F hx DM2, HTN, HLD, hypothyroid presents with constant substernal/midsternal chest pain for 3 days, with some pain in her left arm. Also reports dyspnea on exertion  Family at bedside coroborrating history. Patient had abnormal stress test 2 weeks ago and was scheduled to have cardiac catheterization on Thursday. Denies fever, chills, n/v, dizziness, headache.  CM met with patient at bedside, educated her on role of CM and transition to home, she verbalized understanding. Pt resides in a Condo with an elevator. Pt stated that she is independent with ADLs and ambulation. Has a cane and walker, denies any prior home care services. Pt admitted with Cdif.  CM met with patient at bedside, educated her on role of CM and transition to home, she verbalized understanding. Pt resides in a Condo with an elevator. Pt stated that she is independent with ADLs and ambulation. Has a cane and walker, denies any prior home care services. Pt admitted with Cdif, CHF.

## 2023-05-18 LAB
BASE EXCESS BLDA CALC-SCNC: -0.4 MMOL/L — SIGNIFICANT CHANGE UP (ref -2–3)
HCO3 BLDA-SCNC: 25 MMOL/L — SIGNIFICANT CHANGE UP (ref 21–28)
PCO2 BLDA: 43 MMHG — HIGH (ref 32–35)
PH BLDA: 7.37 — SIGNIFICANT CHANGE UP (ref 7.35–7.45)
PO2 BLDA: 232 MMHG — HIGH (ref 83–108)
SAO2 % BLDA: 99.6 % — HIGH (ref 94–98)

## 2023-06-21 NOTE — PROGRESS NOTE ADULT - NS ATTEST RISKLEV GEN_ALL_CORE
High Skin Substitute Paste Text: The defect edges were debeveled with a #15 scalpel blade.  Given the location of the defect, shape of the defect and the proximity to free margins a skin substitute micronized graft was deemed most appropriate.  The entire vial contents were admixed with 0.5ccs of sterile saline, formed into a paste and then evenly spread over the entire wound bed.

## 2023-09-07 NOTE — PROGRESS NOTE ADULT - PROBLEM SELECTOR PROBLEM 5
[Normal Growth] : growth [Normal Development] : development  [No Elimination Concerns] : elimination [Continue Regimen] : feeding [No Skin Concerns] : skin [Normal Sleep Pattern] : sleep Congestive heart failure (CHF) [Anticipatory Guidance Given] : Anticipatory guidance addressed as per the history of present illness section [School] : school [Development and Mental Health] : development and mental health [Nutrition and Physical Activity] : nutrition and physical activity [Oral Health] : oral health [Safety] : safety [No Vaccines] : no vaccines needed [No Medications] : ~He/She~ is not on any medications [Patient] : patient [Parent/Guardian] : Parent/Guardian [Full Activity without restrictions including Physical Education & Athletics] : Full Activity without restrictions including Physical Education & Athletics [I have examined the above-named student and completed the preparticipation physical evaluation. The athlete does not present apparent clinical contraindications to practice and participate in sport(s) as outlined above. A copy of the physical exam is on r] : I have examined the above-named student and completed the preparticipation physical evaluation. The athlete does not present apparent clinical contraindications to practice and participate in sport(s) as outlined above. A copy of the physical exam is on record in my office and can be made available to the school at the request of the parents. If conditions arise after the athlete has been cleared for participation, the physician may rescind the clearance until the problem is resolved and the potential consequences are completely explained to the athlete (and parents/guardians). [FreeTextEntry4] : Asthma, Short stature [] : The components of the vaccine(s) to be administered today are listed in the plan of care. The disease(s) for which the vaccine(s) are intended to prevent and the risks have been discussed with the caretaker.  The risks are also included in the appropriate vaccination information statements which have been provided to the patient's caregiver.  The caregiver has given consent to vaccinate. [FreeTextEntry1] : Continue balanced diet with all food groups. Brush teeth twice a day with toothbrush. Recommend visit to dentist. Help child to maintain consistent daily routines and sleep schedule. Personal hygiene and puberty explained. School discussed. Ensure home is safe. Teach child about personal safety. Use consistent, positive discipline. Limit screen time to no more than 2 hours per day. Encourage physical activity.  Provided counseling on the diseases to be vaccinated against as well as the risks/benefits of providing and withholding recommended vaccines to be given today to TOM . All questions were answered and the parent verbalized understanding. VIS made available.  Return 1 year for routine well child check.  Referred to ophthalmology.

## 2023-10-27 NOTE — DISCHARGE NOTE PROVIDER - NSDCCPGOAL_GEN_ALL_CORE_FT
Case Management Discharge Planning Note    Patient name Marii Zavala  Location AZ3 [id number]/[id number] MRN [id number]  : 1946 Date 10/27/2023       Current Admission Date: 10/23/2023  Current Admission Diagnosis:Fall   Patient Active Problem List    Diagnosis Date Noted    MSSA bacteremia 10/26/2023    Fall 10/23/2023    Bladder cancer metastasized to lung ([address]) 10/23/2023    Insomnia 10/23/2023    History of liver transplant ([address]) 10/23/2023    T2DM (type 2 diabetes mellitus) ([address]) 10/23/2023    Acute on chronic renal failure  10/23/2023    UTI (urinary tract infection) 10/23/2023    Dementia ([address]) 10/23/2023    CKD (chronic kidney disease) 10/23/2023    Thrombocytopenia ([address]) 10/23/2023    History of CVA (cerebrovascular accident) 10/23/2023    Anemia 10/23/2023      LOS (days): 4  Geometric Mean LOS (GMLOS) (days): 3.90  Days to GMLOS:0.2     OBJECTIVE:  Risk of Unplanned Readmission Score: 30.22         Current admission status: Inpatient   Preferred Pharmacy:   [address] [place] [address]  No address on file      Primary Care Provider: Rigoberto Farmer DO    Primary Insurance: [facility]  Secondary Insurance:     DISCHARGE DETAILS:    DCP-STR-TCT Donato Carmona, reviewed facilities that have accepted pt. She will research and advise of preferred facility. Request provider list be left in room.   Same left in room Detail Level: Simple Additional Notes: Patient consent was obtained to proceed with the visit and recommended plan of care after discussion of all risks and benefits, including the risks of COVID-19 exposure. To get better and follow your care plan as instructed.

## 2024-03-21 NOTE — PROGRESS NOTE ADULT - PROBLEM/PLAN-7
Left Voicemail (2nd Attempt) and Left Voicemail with Family Member (2nd Attempt) for the patient to schedule:    Appointment type: Return Nephrology   Provider: Dr. Harrison   Return date: Approx. 6/18   Phone number: [phone]   Add'l appt(s) needed: Labs at 6 weeks (4/30) and 3 months (6/18)  Left messages on mobile phone, at home, and with sister Sofi  
DISPLAY PLAN FREE TEXT

## 2024-07-02 NOTE — ED ADULT NURSE NOTE - WAS YOUR LAST COVID-19 VACCINE GREATER THAN OR EQUAL TO TWO MONTHS AGO?
-- DO NOT REPLY / DO NOT REPLY ALL --  -- This inbox is not monitored  -- Message is from Engagement Center Operations (ECO) --      Message Type:  Refill Medication   Refill request for Pended medication named:   Preferred pharmacy verified, and selected.      [id number] - [place], [place] - [address] AT [facility] [address]  [phone]        Is the patient OUT of Medication?  Yes and Medication Refills handled by Practice Site        Message: patient confirmed 2 prescriptions were sent to a local pharmacy she would like to have them transferred                     No

## 2024-08-05 NOTE — PROVIDER CONTACT NOTE (CRITICAL VALUE NOTIFICATION) - PERSON GIVING RESULT:
LVM for patient's legal guardian to return phonecall to schedule ED follow up.    Upon return call, please reach out to Deisy Cha on Teams.    
patito Martell
patito vergara

## 2024-09-19 NOTE — PROGRESS NOTE ADULT - ASSESSMENT
09/19/2024  Yasmine Villa is a 61 y.o., female here for a L humerus ORIF        Pre-op Assessment    I have reviewed the Patient Summary Reports.    I have reviewed the NPO Status.   I have reviewed the Medications.     Review of Systems  Anesthesia Hx:  No problems with previous Anesthesia               Denies Personal Hx of Anesthesia complications.                    Social:  Non-Smoker, No Alcohol Use       EENT/Dental:  chronic allergic rhinitis           Cardiovascular:     Hypertension                                        Neurological:      Headaches                                 Psych:  Psychiatric History                  Physical Exam  General: Well nourished, Cooperative, Alert and Oriented    Airway:  Mallampati: II   Mouth Opening: Normal  TM Distance: Normal  Tongue: Normal  Neck ROM: Normal ROM    Chest/Lungs:  Clear to auscultation, Normal Respiratory Rate    Heart:  Rate: Normal  Rhythm: Regular Rhythm        Anesthesia Plan  Type of Anesthesia, risks & benefits discussed:    Anesthesia Type: Regional, Gen ETT  Intra-op Monitoring Plan: Standard ASA Monitors  Post Op Pain Control Plan: multimodal analgesia  Induction:  IV  Airway Plan: Video and Direct, Post-Induction  Informed Consent: Informed consent signed with the Patient and all parties understand the risks and agree with anesthesia plan.  All questions answered.   ASA Score: 2    Ready For Surgery From Anesthesia Perspective.     .       73 yo female here s/p extended hartmanns for perforated sigmoid colon.  Currently much more alert and awake.  Able to answer questions.  Will monitor H/.H as it has been downtrending.

## 2025-02-01 NOTE — PROGRESS NOTE ADULT - ASSESSMENT
72-year-old female with history of hypothyroidism, hyperlipidemia, AF on Xarelto, thrombocytosis presented with abd pain , diarrhea, worsening MATTHEW, b/l LE edema     CHF exac, Afib   - p/w c/o abd pain, diarrhea, worsening MATTHEW and b/l LE  - was discharged from recent admission for CHF exac, covid, now presented again with volume overload  - CXR with small pleural effusion with Right atelectasis   - elevated proBNP 82751, remains vol OL on exam, still with MATTHEW and b/l LE edema  - Pt takes Lasix 120 mg po at home  - Continue Lasix 80 IVP daily here if renal is agreeable  - Monitor Cr/BUN, which is uptrending, renal following  - Strict i/o, given that she is having diarrhea, will reassess volume status closely  - recent ECHO (1/17/2023) showed LVH normal LV & RV size and function EF 65%, mod MR, trace TR, no need for repeat     - EKG: ST.  No anginal complaints   - cath without cad in 2018    - known hx of PAFIB   - Continue BB, Xarelto    - CT abd noted, Cdiff +  - rec GI consult      - Monitor and replete lytes, keep K>4, Mg>2.  - Will continue to follow.    Hannah Coley NP  Nurse Practitioner- Cardiology   Spectra #[phone] see mdm

## 2025-02-13 NOTE — CHART NOTE - NSCHARTNOTEFT_GEN_A_CORE
Called by RN because pt having pain associated with shingles.   According to H&P, was started on Gabapentin 100 mg BID for neuropathic pain.  Pt was given Ofirmev x1 with no pain resolution.  Will order stat dose Gabapentin 100 mg now.
Patient seen and examined in ER, complaining of pain at left hip from shingles. Denies fever, chills. SOB is improved from admission. Denies current chest pain, nausea, vomiting. VSS, Lungs sounds clear to auscultation with minimal left basilar rales. Noted + mild JVD on neck exam. minimal le edema. Skin noted to have left dermatome herpetic rash with crusting at ~ 20% of lesions. labs noted, s/p 1 unit prbc, when hgb 7.2, follow up h/h in am. continue diuresis with lasix. Placed patient on contact precautions for shingles. continue gabapentin. If improving and able to ambulate, possible dc in am. rest as per H+P
62.5

## 2025-05-13 NOTE — H&P CARDIOLOGY - ALCOHOL USE HISTORY SINGLE SELECT
Immediate Brief Procedure Note    Patient: Sera Mercado    Pre-op Diagnosis: Mesenteric/ retroperitoneal mass    Post-op Diagnosis: Same    Procedure: Mesenteric/ retroperitoneal mass biopsy    Proceduralist: Loki Haddad MD    Anesthesia Type: Conscious sedation    Findings: Technically successful image guided biopsy of mesenteric mass    Estimated Blood Loss: Minimal    Complications: None    Specimens Removed: Biopsy    Plan: Follow up biopsy results  
yes...

## 2025-07-16 NOTE — H&P ADULT - NSICDXPASTSURGICALHX_GEN_ALL_CORE_FT
Requested medication(s) are due for refill today: Yes  Patient has already received a courtesy refill: No  Other reason request has been forwarded to provider: This refill cannot be delegated   PAST SURGICAL HISTORY:  Ankle injury on 2004, 5 surgeries.    Cholecystitis

## (undated) DEVICE — FOLEY TRAY 16FR 5CC LTX UMETER CLOSED

## (undated) DEVICE — DRSG COMBINE 5X9"

## (undated) DEVICE — GLV 8 PROTEXIS (CREAM) NEU-THERA

## (undated) DEVICE — SOL IRR POUR H2O 1000ML

## (undated) DEVICE — WARMING BLANKET LOWER ADULT

## (undated) DEVICE — FEEDING TUBE NG SUMP 16FR 48"

## (undated) DEVICE — Device

## (undated) DEVICE — SUT SILK 2-0 30" KS

## (undated) DEVICE — SUT SILK 0 30" PSL

## (undated) DEVICE — STAPLER COVIDIEN ENDO GIA STANDARD HANDLE

## (undated) DEVICE — SUT PDS II 1 27" CT

## (undated) DEVICE — ELCTR BOVIE TIP BLADE VALLEYLAB 6.5"

## (undated) DEVICE — LIGASURE IMPACT

## (undated) DEVICE — SOL IRR POUR NS 0.9% 1000ML

## (undated) DEVICE — DRAPE 3/4 SHEET 52X76"

## (undated) DEVICE — VENODYNE/SCD SLEEVE CALF MEDIUM

## (undated) DEVICE — BLADE SURGICAL #20 CARBON

## (undated) DEVICE — PACK MAJOR CHEST BREAST